# Patient Record
Sex: MALE | Race: WHITE | NOT HISPANIC OR LATINO | Employment: PART TIME | URBAN - METROPOLITAN AREA
[De-identification: names, ages, dates, MRNs, and addresses within clinical notes are randomized per-mention and may not be internally consistent; named-entity substitution may affect disease eponyms.]

---

## 2017-05-31 ENCOUNTER — ALLSCRIPTS OFFICE VISIT (OUTPATIENT)
Dept: OTHER | Facility: OTHER | Age: 47
End: 2017-05-31

## 2017-05-31 DIAGNOSIS — M54.50 LOW BACK PAIN: ICD-10-CM

## 2017-05-31 DIAGNOSIS — E03.9 HYPOTHYROIDISM: ICD-10-CM

## 2017-05-31 DIAGNOSIS — E78.5 HYPERLIPIDEMIA: ICD-10-CM

## 2017-06-10 ENCOUNTER — HOSPITAL ENCOUNTER (EMERGENCY)
Facility: HOSPITAL | Age: 47
Discharge: HOME | End: 2017-06-10
Attending: EMERGENCY MEDICINE | Admitting: EMERGENCY MEDICINE
Payer: COMMERCIAL

## 2017-06-10 VITALS
RESPIRATION RATE: 18 BRPM | SYSTOLIC BLOOD PRESSURE: 118 MMHG | WEIGHT: 200 LBS | DIASTOLIC BLOOD PRESSURE: 57 MMHG | OXYGEN SATURATION: 95 % | HEART RATE: 92 BPM | TEMPERATURE: 98.2 F

## 2017-06-10 DIAGNOSIS — R50.9 FEVER: Primary | ICD-10-CM

## 2017-06-10 LAB
ANION GAP BLD CALC-SCNC: 19 MMOL/L (ref 4–13)
ANION GAP SERPL CALCULATED.3IONS-SCNC: 9 MMOL/L (ref 4–13)
BASOPHILS # BLD MANUAL: 0.08 THOUSAND/UL (ref 0–0.1)
BASOPHILS NFR MAR MANUAL: 1 % (ref 0–1)
BUN BLD-MCNC: 14 MG/DL (ref 5–25)
BUN SERPL-MCNC: 15 MG/DL (ref 5–25)
CA-I BLD-SCNC: 0.86 MMOL/L (ref 1.12–1.32)
CALCIUM SERPL-MCNC: 7.2 MG/DL (ref 8.3–10.1)
CHLORIDE BLD-SCNC: 95 MMOL/L (ref 100–108)
CHLORIDE SERPL-SCNC: 97 MMOL/L (ref 100–108)
CO2 SERPL-SCNC: 27 MMOL/L (ref 21–32)
CREAT BLD-MCNC: 1.2 MG/DL (ref 0.6–1.3)
CREAT SERPL-MCNC: 1.13 MG/DL (ref 0.6–1.3)
EOSINOPHIL # BLD MANUAL: 0 THOUSAND/UL (ref 0–0.4)
EOSINOPHIL NFR BLD MANUAL: 0 % (ref 0–6)
ERYTHROCYTE [DISTWIDTH] IN BLOOD BY AUTOMATED COUNT: 14.5 % (ref 11.6–15.1)
GFR SERPL CREATININE-BSD FRML MDRD: >60 ML/MIN/1.73SQ M
GFR SERPL CREATININE-BSD FRML MDRD: >60 ML/MIN/1.73SQ M
GLUCOSE SERPL-MCNC: 110 MG/DL (ref 65–140)
GLUCOSE SERPL-MCNC: 115 MG/DL (ref 65–140)
HCT VFR BLD AUTO: 37.1 % (ref 36.5–49.3)
HCT VFR BLD CALC: 39 % (ref 36.5–49.3)
HGB BLD-MCNC: 12.6 G/DL (ref 12–17)
HGB BLDA-MCNC: 13.3 G/DL (ref 12–17)
LYMPHOCYTES # BLD AUTO: 1.95 THOUSAND/UL (ref 0.6–4.47)
LYMPHOCYTES # BLD AUTO: 24 % (ref 14–44)
MCH RBC QN AUTO: 30.2 PG (ref 26.8–34.3)
MCHC RBC AUTO-ENTMCNC: 34 G/DL (ref 31.4–37.4)
MCV RBC AUTO: 89 FL (ref 82–98)
METAMYELOCYTES NFR BLD MANUAL: 4 % (ref 0–1)
MONOCYTES # BLD AUTO: 0.65 THOUSAND/UL (ref 0–1.22)
MONOCYTES NFR BLD: 8 % (ref 4–12)
NEUTROPHILS # BLD MANUAL: 4.87 THOUSAND/UL (ref 1.85–7.62)
NEUTS BAND NFR BLD MANUAL: 7 % (ref 0–8)
NEUTS SEG NFR BLD AUTO: 53 % (ref 43–75)
NRBC BLD AUTO-RTO: 0 /100 WBCS
PCO2 BLD: 26 MMOL/L (ref 21–32)
PLATELET # BLD AUTO: 105 THOUSANDS/UL (ref 149–390)
PLATELET BLD QL SMEAR: ABNORMAL
PMV BLD AUTO: 11.1 FL (ref 8.9–12.7)
POIKILOCYTOSIS BLD QL SMEAR: PRESENT
POTASSIUM BLD-SCNC: 3.2 MMOL/L (ref 3.5–5.3)
POTASSIUM SERPL-SCNC: 3.2 MMOL/L (ref 3.5–5.3)
RBC # BLD AUTO: 4.17 MILLION/UL (ref 3.88–5.62)
RBC MORPH BLD: PRESENT
SODIUM BLD-SCNC: 135 MMOL/L (ref 136–145)
SODIUM SERPL-SCNC: 133 MMOL/L (ref 136–145)
SPECIMEN SOURCE: ABNORMAL
VARIANT LYMPHS # BLD AUTO: 3 %
WBC # BLD AUTO: 8.11 THOUSAND/UL (ref 4.31–10.16)

## 2017-06-10 PROCEDURE — 85007 BL SMEAR W/DIFF WBC COUNT: CPT | Performed by: EMERGENCY MEDICINE

## 2017-06-10 PROCEDURE — 80047 BASIC METABLC PNL IONIZED CA: CPT

## 2017-06-10 PROCEDURE — 85014 HEMATOCRIT: CPT

## 2017-06-10 PROCEDURE — 85027 COMPLETE CBC AUTOMATED: CPT | Performed by: EMERGENCY MEDICINE

## 2017-06-10 PROCEDURE — 80048 BASIC METABOLIC PNL TOTAL CA: CPT | Performed by: EMERGENCY MEDICINE

## 2017-06-10 PROCEDURE — 36415 COLL VENOUS BLD VENIPUNCTURE: CPT | Performed by: EMERGENCY MEDICINE

## 2017-06-10 PROCEDURE — 99283 EMERGENCY DEPT VISIT LOW MDM: CPT

## 2017-06-10 RX ORDER — ACETAMINOPHEN 325 MG/1
650 TABLET ORAL ONCE
Status: COMPLETED | OUTPATIENT
Start: 2017-06-10 | End: 2017-06-10

## 2017-06-10 RX ADMIN — ACETAMINOPHEN 650 MG: 325 TABLET, FILM COATED ORAL at 14:01

## 2017-07-03 ENCOUNTER — ALLSCRIPTS OFFICE VISIT (OUTPATIENT)
Dept: OTHER | Facility: OTHER | Age: 47
End: 2017-07-03

## 2017-07-10 ENCOUNTER — GENERIC CONVERSION - ENCOUNTER (OUTPATIENT)
Dept: OTHER | Facility: OTHER | Age: 47
End: 2017-07-10

## 2017-10-26 ENCOUNTER — GENERIC CONVERSION - ENCOUNTER (OUTPATIENT)
Dept: OTHER | Facility: OTHER | Age: 47
End: 2017-10-26

## 2017-12-26 ENCOUNTER — ALLSCRIPTS OFFICE VISIT (OUTPATIENT)
Dept: OTHER | Facility: OTHER | Age: 47
End: 2017-12-26

## 2017-12-27 NOTE — PROGRESS NOTES
Assessment   1  Cough (786 2) (R05)    Plan   Cough    · Benzonatate 100 MG Oral Capsule; TAKE 1 CAPSULE 3 TIMES DAILY AS    NEEDED    Discussion/Summary      Cough/bronchitis is advised to take meds as directed  Cover cough  Need for adequate hydration  Intake of protein to help fight the infection  report any new symptoms  also advised to get blood tests done that were ordered since May 2017  Verbalized understanding  The patient was counseled regarding diagnostic results,-- instructions for management,-- risk factor reductions,-- risks and benefits of treatment options,-- importance of compliance with treatment  Possible side effects of new medications were reviewed with the patient/guardian today  The treatment plan was reviewed with the patient/guardian  The patient/guardian understands and agrees with the treatment plan    Educational resources provided:      Chief Complaint   Patient presents to the clinic with complaint of cough and congestion      History of Present Illness   HPI: as above started last week, 6 days ago, started with a sore throat which is now resolved and the cough started 1 day after, and now persist  Denies SOB, C P , dizziness, diarrhea, constipation, N/V  No wheezing  cough is of moderate intensity  ongoing history of hypothyroidism, hyperlipidemia, osteoarthritis, with some cognitive issues  last blood test done in August 2016  Another set was ordered by another provider in May 2017 but patient did not get it done  He is advised to get it done as soon as possible and to keep his next appointment scheduled for January 22, 2018  Blood pressure rechecked by me and it is 102/66 mmHg  Hospital Based Practices Required Assessment:      Pain Assessment      the patient states they do not have pain  (on a scale of 0 to 10, the patient rates the pain at 0 )       Prefered Language is  ENGLISH  Primary Language is  ENGLISH        Education Completed: disease/condition-- and-- treatment/procedure      Teaching Method: verbal      Person Taught: patient      Evaluation Of Learning: verbalized/demonstrated understanding      Review of Systems        Constitutional: no fever or chills, feels well, no tiredness, no recent weight loss or weight gain  ENT: no complaints of earache, no loss of hearing, no nosebleeds or nasal discharge, no sore throat or hoarseness  Cardiovascular: no complaints of slow or fast heart rate, no chest pain, no palpitations, no leg claudication or lower extremity edema  Respiratory: cough, but-- as noted in HPI,-- no shortness of breath,-- no wheezing-- and-- no shortness of breath during exertion  Gastrointestinal: no complaints of abdominal pain, no constipation, no nausea or vomiting, no diarrhea or bloody stools  Other Symptoms: Denies sneezing, itchy nose or eyes  ROS reviewed  Active Problems   1  Allergic rhinitis (477 9) (J30 9)   2  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)   3  Chronic cough (786 2) (R05)   4  Chronic low back pain (724 2,338 29) (M54 5,G89 29)   5  Constipation (564 00) (K59 00)   6  External hemorrhoids (455 3) (K64 4)   7  Hyperlipidemia (272 4) (E78 5)   8  Hypothyroidism (244 9) (E03 9)   9  Lumbar radiculopathy (724 4) (M54 16)   10  Mass of parotid gland (784 2) (K11 9)   11  Need for immunization against influenza (V04 81) (Z23)   12  Osteoarthritis of both hands, unspecified osteoarthritis type (715 94) (M19 041,M19 042)    Past Medical History   Active Problems And Past Medical History Reviewed: The active problems and past medical history were reviewed and updated today  Surgical History   Surgical History Reviewed: The surgical history was reviewed and updated today  Social History    · Former smoker (F82 47) (K98 018)  The social history was reviewed and updated today  The social history was reviewed and is unchanged  Family History   Family History Reviewed:     The family history was reviewed and updated today  Current Meds    1  Benzonatate 100 MG Oral Capsule; TAKE 1 CAPSULE 3 TIMES DAILY AS NEEDED; Therapy: 61FBT6559 to (Evaluate:56Orr9442)  Requested for: 95YDA1651; Last     Rx:73Dyn5435 Ordered   2  Cough Drops Menthol Mouth/Throat Lozenge; ALLOW 1 LOZENGE TO DISSOLVE     SLOWLY IN MOUTH AS NEEDED; Therapy: 88QZF8318 to (Evaluate:56Vpi0622)  Requested for: 47PHH2010; Last     Rx:01Fpw0319 Ordered   3  Ibuprofen 200 MG Oral Capsule; Therapy: 51QCU8756 to Recorded   4  Levothyroxine Sodium 112 MCG Oral Tablet; Take 1 tablet daily, 30 minutes prior to     breakfast or other medications; Therapy: 31QLM9051 to (Last Rx:16Oct2017)  Requested for: 16Oct2017 Ordered   5  PredniSONE 20 MG Oral Tablet; take 2 tablets daily; Therapy: 54QOC3891 to (Evaluate:97Nbm7342)  Requested for: 62JWU7593; Last     Rx:22Rcf0062 Ordered     The medication list was reviewed and updated today  Allergies   1  No Known Drug Allergies    Vitals    Recorded: 47QTQ1250 12:44PM   Temperature 97 8 F   Heart Rate 72   Systolic 86   Diastolic 68   Height 5 ft 6 in   Weight 185 lb 2 96 oz   BMI Calculated 29 89   BSA Calculated 1 94     Physical Exam        Constitutional      General appearance: No acute distress, well appearing and well nourished  well developed,-- normal body odor,-- does not smell of feces,-- does not smell of urine,-- well nourished,-- clothing appropriate-- and-- well groomed  Ears, Nose, Mouth, and Throat      Otoscopic examination: Tympanic membrance translucent with normal light reflex  Canals patent without erythema  Nasal mucosa, septum, and turbinates: Abnormal  -- 1+ turbinates, moderate nasal discharge,  Oropharynx: Normal with no erythema, edema, exudate or lesions  Pulmonary      Respiratory effort: No increased work of breathing or signs of respiratory distress         Auscultation of lungs: Abnormal  -- minimal to mild congestion in the lung fields  No wheezing  No decreased breath sounds  Cardiovascular      Auscultation of heart: Normal rate and rhythm, normal S1 and S2, without murmurs  Psychiatric      Orientation to person, place and time: Normal        Mood and affect: Normal           Attending Note   Collaborating Physician Note: Collaborating Physician: I agree with the Advanced Practitioner note  Future Appointments      Date/Time Provider Specialty Site   01/22/2018 10:15 AM BELKYS Leos  Internal Medicine [address] PCP     Signatures    Electronically signed by : Shashank Ivory; Dec 26 2017  2:17PM EST                       (Author)     Electronically signed by :  BELKYS Ferreira ; Dec 26 2017  2:40PM EST                       (Review)

## 2018-01-08 ENCOUNTER — TRANSCRIBE ORDERS (OUTPATIENT)
Dept: LAB | Facility: HOSPITAL | Age: 48
End: 2018-01-08

## 2018-01-08 ENCOUNTER — APPOINTMENT (OUTPATIENT)
Dept: LAB | Facility: HOSPITAL | Age: 48
End: 2018-01-08
Payer: COMMERCIAL

## 2018-01-08 DIAGNOSIS — M54.50 LOW BACK PAIN: ICD-10-CM

## 2018-01-08 DIAGNOSIS — E03.9 HYPOTHYROIDISM: ICD-10-CM

## 2018-01-08 DIAGNOSIS — E78.5 HYPERLIPIDEMIA: ICD-10-CM

## 2018-01-08 LAB
ANION GAP SERPL CALCULATED.3IONS-SCNC: 7 MMOL/L (ref 4–13)
BUN SERPL-MCNC: 11 MG/DL (ref 5–25)
CALCIUM SERPL-MCNC: 8.2 MG/DL (ref 8.3–10.1)
CHLORIDE SERPL-SCNC: 102 MMOL/L (ref 100–108)
CHOLEST SERPL-MCNC: 209 MG/DL (ref 50–200)
CO2 SERPL-SCNC: 29 MMOL/L (ref 21–32)
CREAT SERPL-MCNC: 0.89 MG/DL (ref 0.6–1.3)
ERYTHROCYTE [DISTWIDTH] IN BLOOD BY AUTOMATED COUNT: 14.2 % (ref 11.6–15.1)
GFR SERPL CREATININE-BSD FRML MDRD: 100 ML/MIN/1.73SQ M
GLUCOSE P FAST SERPL-MCNC: 95 MG/DL (ref 65–99)
HCT VFR BLD AUTO: 41.7 % (ref 36.5–49.3)
HDLC SERPL-MCNC: 41 MG/DL (ref 40–60)
HGB BLD-MCNC: 14 G/DL (ref 12–17)
LDLC SERPL CALC-MCNC: 138 MG/DL (ref 0–100)
MCH RBC QN AUTO: 30.6 PG (ref 26.8–34.3)
MCHC RBC AUTO-ENTMCNC: 33.6 G/DL (ref 31.4–37.4)
MCV RBC AUTO: 91 FL (ref 82–98)
PLATELET # BLD AUTO: 191 THOUSANDS/UL (ref 149–390)
PMV BLD AUTO: 10.9 FL (ref 8.9–12.7)
POTASSIUM SERPL-SCNC: 3.6 MMOL/L (ref 3.5–5.3)
RBC # BLD AUTO: 4.57 MILLION/UL (ref 3.88–5.62)
SODIUM SERPL-SCNC: 138 MMOL/L (ref 136–145)
T4 FREE SERPL-MCNC: 0.67 NG/DL (ref 0.76–1.46)
TRIGL SERPL-MCNC: 148 MG/DL
TSH SERPL DL<=0.05 MIU/L-ACNC: 34.3 UIU/ML (ref 0.36–3.74)
WBC # BLD AUTO: 8.41 THOUSAND/UL (ref 4.31–10.16)

## 2018-01-08 PROCEDURE — 36415 COLL VENOUS BLD VENIPUNCTURE: CPT

## 2018-01-08 PROCEDURE — 80061 LIPID PANEL: CPT

## 2018-01-08 PROCEDURE — 80048 BASIC METABOLIC PNL TOTAL CA: CPT

## 2018-01-08 PROCEDURE — 84443 ASSAY THYROID STIM HORMONE: CPT

## 2018-01-08 PROCEDURE — 85027 COMPLETE CBC AUTOMATED: CPT

## 2018-01-08 PROCEDURE — 84439 ASSAY OF FREE THYROXINE: CPT

## 2018-01-09 ENCOUNTER — GENERIC CONVERSION - ENCOUNTER (OUTPATIENT)
Dept: OTHER | Facility: OTHER | Age: 48
End: 2018-01-09

## 2018-01-12 VITALS
DIASTOLIC BLOOD PRESSURE: 78 MMHG | HEART RATE: 96 BPM | WEIGHT: 197.09 LBS | TEMPERATURE: 99.1 F | SYSTOLIC BLOOD PRESSURE: 102 MMHG | HEIGHT: 66 IN | BODY MASS INDEX: 31.68 KG/M2

## 2018-01-12 NOTE — PROGRESS NOTES
Patient Health Assessment    Date:            05/09/2016  Blood Pressure:  98/57  Pulse:           70  Age:             45  Weight:          200 lbs  Height/Length:   5' 7"  Body Mass Index: 31 2  Provider:        [id number]  Clinic:          [facility] 39: pt no longer has diabetes, is controlled w diet and exercise  Medications:   Allergies:  Since Last Visit: Medical Alert: updated    Medications: updated    Allergies:        No Change  Pain Scale Type: Numeric Pain ScalePain Level: 0  Description:  pt presents for periodic exam: no pain currently  CC: i need a cleaning  IOE shows #8 comp fractured, #2, 3, supraeruption, buccal abfraction noted,  4, 5 large restorations, primary retained C, large restorations on anterior  teeth, missing 10, 11, #14 previous crown off, OL caries noted, root canal  intact lower premolar, previous neva #23, short root, poss retained primary,  caries noted 28, 29 interprox, cross bite noted, slight posterior bite collapse   anterior occlusion, minimal calclulus, interprox plaque, gingival inflammation   interprox 2, 3,  eoe:wnl  dwp need for multiple restorations, large restorations may need crowns, dwp  should have restorative done first then do partial dentures to replace  missing teeth, pt states he doesn't have the funds to pay for treatment would  need to know co pays, dwp should call [facility] directly and see what copays and  deductible would be, otherwise would be billed  cavitron, hand instruments, bleeding in 2, 3, area, polished wpaste, flossed  nv: restorative    ----- Signed on Monday, May 09, 2016 at 4:28:07 PM  -----  ----- Provider: Johnna Garcia DDS -- Clinic: [facility] -----

## 2018-01-12 NOTE — PROGRESS NOTES
Patient Health Assessment    Date:            10/26/2017  Blood Pressure:  118/78  Pulse:           84  Age:             47  Weight:          186 lbs  Height/Length:   5' 7"  Body Mass Index: 29 1  Provider:        [email or web address]  [facility]      RECALL EXAM, ADULT PROPHY AND 4BW  Medical Alert: Diabetes    Thyroid Problems  Medications: LEVOTHYROXIN TAB 100MCG 100  Allergies:      none  Since Last Visit: Medical Alert: Change    Medications: Change    Allergies:        No Change  Pain Scale Type: Numeric Pain ScalePain Level: 0  Description: no dental pain or problems  scaled, polished and flossed  light buildup  mod bleeding molars  pt aware that op /tx plan still exists from 5/9/2016   pt states " I just  never got back" informed pt if he doesn't follow up with tx plan , he can loose   teeth  moved to room 5 for dr exam    ----- Signed on Thursday, October 26, 2017 at 10:21:25 AM  -----  ----- Provider: [id number] - Selvin Gaitan [facility] -- Clinic: [facility] -----

## 2018-01-12 NOTE — MISCELLANEOUS
Message  Message Free Text Note Form: Patient reports he has had a cough for approximately 8 years  the cough is usually present in the morning  He denies smoking  It resolves in the afternoon  He denies being on PPi or famotidine in the past  He denies any heartburn  He was evaluated by ENT on 5/26/16, but does not feel he had enough evaluation and would like to be further evaluated  He has an appt with the [facility] on 7/13 and is willing to wait until then to be evaluated        Signatures   Electronically signed by : Alden Martin, ; Jun 23 2016  2:34PM EST                       (Author)

## 2018-01-13 VITALS
HEART RATE: 84 BPM | DIASTOLIC BLOOD PRESSURE: 76 MMHG | HEIGHT: 66 IN | SYSTOLIC BLOOD PRESSURE: 102 MMHG | TEMPERATURE: 98.3 F | BODY MASS INDEX: 30.26 KG/M2 | WEIGHT: 188.27 LBS

## 2018-01-14 NOTE — PROGRESS NOTES
Assessment    1  Contact dermatitis (692 9) (L25 9)   · Will start patient on low potency topical steroid BID for 3 days only  Patient also advised      to stop using [place] soap as this can be particularly abrasive  Patient      recommended to use [facility] soap instead  2  Bronchitis (490) (J40)   3  Ceruminosis (380 4) (H61 20)    Plan  Bronchitis    · Benzonatate 100 MG Oral Capsule; TAKE 1 CAPSULE 3 TIMES DAILY AS NEEDED   · Robafen CF Cough/Cold 5- MG/5ML Oral Syrup; TAKE 5 ML TWICE DAILY  Ceruminosis    · Debrox 6 5 % Otic Solution; USE AS DIRECTED  Contact dermatitis    · Eucerin Calming Daily Moist External Cream; APPLY SPARINGLY TO AFFECTED AREA(S)  TWICE DAILY    Discussion/Summary  Discussion Summary:   1) Bronchitis 2/2 viral URI - will prescribe Tessalon, if patient cannot afford, will recommend he try Robitussin  2) Contact dermatitis of hands and lower extremity  Will prescribe barrier cream such as Eucerin before he goes to work  3) Ceruminosis - patient has earwax - will prescribe Debrox solution and have patient come in again on 1/28/16 to have it flushed with normal saline by myself  F/u for health maintenance next visit in February  Counseling Documentation With Imm: The patient was counseled regarding instructions for management, patient and family education  Chief Complaint  Chief Complaint Free Text Note Form: Bronchitis      History of Present Illness  HPI: Patient with history of bronchitis 2/2 to viral URI  Patient states he has bronchitis every year when it gets cold around January  Patient has itchyness on hands and legs  Patient denies any fevers, chills, sweats, nausea, vomiting  Patient states cough gets worse at nighttime  Patient states the Mucinex has helped only a little bit but he still has the cough  Patient also complains of earwax  Hospital Based Practices Required Assessment:   Pain Assessment   the patient states they do not have pain   (on a scale of 0 to 10, the patient rates the pain at 0 )    Depression And Suicide Screen  No, the patient has not had thoughts of hurting themself  No, the patient has not felt depressed in the past 7 days  Prefered Language is  english  Primary Language is  english  Review of Systems  Complete-Male:   Constitutional: as noted in HPI  Eyes: No complaints of eye pain, no red eyes, no discharge from eyes, no itchy eyes  ENT: no complaints of earache, no hearing loss, no nosebleeds, no nasal discharge, no sore throat, no hoarseness and as noted in HPI  Cardiovascular: No complaints of slow heart rate, no fast heart rate, no chest pain, no palpitations, no leg claudication, no lower extremity  Respiratory: No complaints of shortness of breath, no wheezing, no cough, no SOB on exertion, no orthopnea or PND  Gastrointestinal: No complaints of abdominal pain, no constipation, no nausea or vomiting, no diarrhea or bloody stools  Genitourinary: No complaints of dysuria, no incontinence, no hesitancy, no nocturia, no genital lesion, no testicular pain  Musculoskeletal: No complaints of arthralgia, no myalgias, no joint swelling or stiffness, no limb pain or swelling  Integumentary: No complaints of skin rash or skin lesions, no itching, no skin wound, no dry skin  Neurological: No compliants of headache, no confusion, no convulsions, no numbness or tingling, no dizziness or fainting, no limb weakness, no difficulty walking  ROS Reviewed:   ROS reviewed  Active Problems    1  Allergic rhinitis (477 9) (J30 9)   2  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)   3  Bronchitis (490) (J40)   4  Chronic low back pain (724 2,338 29) (M54 5,G89 29)   5  Constipation (564 00) (K59 00)   6  Contact dermatitis (692 9) (L25 9)   7  Dry skin (701 1) (L85 3)   8  Exposure to head lice (D31 07) (D04 39)   9  External hemorrhoids (455 3) (K64 4)   10  Hamstring tightness (728 9) (M62 89)   11  Hematochezia (578 1) (K92 1)   12  Hyperlipidemia (272 4) (E78 5)   13  Hypothyroidism (244 9) (E03 9)   14  Lumbar radiculopathy (724 4) (M54 16)   15  Mass of parotid gland (784 2) (R22 0)   16  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)   17  Need for immunization against influenza (V04 81) (Z23)   18  Osteoarthritis of both hands, unspecified osteoarthritis type (715 94) (M19 041,M19 042)   19  Pain, arm, left (729 5) (M79 602)    Past Medical History    1  History of hypoparathyroidism (V12 29) (Z86 39)  Active Problems And Past Medical History Reviewed: The active problems and past medical history were reviewed and updated today  Surgical History    1  History of Open Treatment Of Each Fracture Of Proximal Finger Phalanx  Surgical History Reviewed: The surgical history was reviewed and updated today  Family History    1  No pertinent family history    2  No pertinent family history    3  Denied: Family history of coronary artery disease   4  Denied: Family history of diabetes mellitus   5  Denied: Family history of hypertension   6  Denied: FH: colon cancer  Family History Reviewed: The family history was reviewed and updated today  Social History    · Former smoker (W80 47) (S97 293)  Social History Reviewed: The social history was reviewed and updated today  The social history was reviewed and is unchanged  Current Meds   1  Aveeno Daily Moisturizing External Lotion; USE AS DIRECTED ON PACKAGE; Therapy: 51FII8780 to (Last Rx:18Nov2015)  Requested for: 92RQY9572 Ordered   2  Calcium 500+D High Potency 500-400 MG-UNIT Oral Tablet; Please take  tab twice daily; Therapy: 64IYU3904 to (Evaluate:32Wfr6144)  Requested for: 69HQM2273; Last Rx:18Jun2015   Ordered   3  Cyclobenzaprine HCl - 5 MG Oral Tablet; take 1 tablet at bedtime as needed; Therapy: 49TDQ8671 to (Evaluate:82Obx2195)  Requested for: 56EPJ5988; Last Rx:21Oct2015   Ordered   4  Levothyroxine Sodium 100 MCG Oral Tablet;  Take 1 tablet daily; Therapy: 98CBV5639 to Deepa Alvarez)  Requested for: 0490 39 07 81; Last Rx:10Ipo3840   Ordered   5  Lice Treatment 1 % External Lotion; APPLY AS DIRECTED to affected area, wash off in 10 minutes  Repeat application in 7-18 days; Therapy: 12FZU8051 to (Last Rx:18Nov2015)  Requested for: 85QMY2054 Ordered   6  Mucinex 600 MG Oral Tablet Extended Release 12 Hour; TAKE 1 TABLET EVERY 12 HOURS AS   NEEDED FOR CONGESTION; Therapy: 25ORY0478 to (Evaluate:13Fci8420)  Requested for: 49CMH9202; Last Rx:04Jan2016   Ordered   7  Naproxen 500 MG Oral Tablet; take 1 tablet every 12 hours with food as needed; Therapy: 99UWG5168 to (Evaluate:33Yzy3198)  Requested for: 01XXN9460; Last Rx:21Oct2015   Ordered   8  Nix Creme Rinse 1 % External Liquid; Therapy: 22GQT6310 to Recorded   9  Polyethylene Glycol 3350 Oral Powder; Therapy: 39KJR7352 to Recorded  Medication List Reviewed: The medication list was reviewed and updated today  Allergies    1  No Known Drug Allergies    Vitals  Vital Signs [Data Includes: Current Encounter]    Recorded: 55KNU4006 03:43PM   Temperature 98 1 F   Heart Rate 78   Systolic 655   Diastolic 60   Height 5 ft 7 in   Weight 198 lb 6 61 oz   BMI Calculated 31 08   BSA Calculated 2 02     Physical Exam    Constitutional   General appearance: No acute distress, well appearing and well nourished  Eyes   Conjunctiva and lids: No swelling, erythema, or discharge  Pupils and irises: Equal, round and reactive to light  Ears, Nose, Mouth, and Throat   External inspection of ears and nose: Normal     Otoscopic examination: Abnormal   cerumen in ear canal, cannot visualize tympanic membrane  Oropharynx: Normal with no erythema, edema, exudate or lesions  Pulmonary   Respiratory effort: No increased work of breathing or signs of respiratory distress  Auscultation of lungs: Clear to auscultation, equal breath sounds bilaterally, no wheezes, no rales, no rhonci  Cardiovascular   Auscultation of heart: Normal rate and rhythm, normal S1 and S2, without murmurs  Examination of extremities for edema and/or varicosities: Normal     Abdomen   Abdomen: Non-tender, no masses  Liver and spleen: No hepatomegaly or splenomegaly  Musculoskeletal   Gait and station: Normal     Skin   Skin and subcutaneous tissue: Abnormal   Exoriation on hands and legs  Psychiatric   Orientation to person, place and time: Normal     Mood and affect: Normal          Attending Note  Attending Note: Attending Note: I discussed the case with the Resident and reviewed the Resident's note, I supervised the Resident and I agree with the Resident management plan as it was presented to me  Level of Participation: I was present in clinic, but did not examine the patient  Patient's History: 49 yo M who presents for evaluation of post infectious bronchitis  and c/o ceruminosis  Pt also c/o contact dermatitis  Diagnosis and Plan: Post infectious cough- symptomatic mgmt for robussin CF  Contact dermatitis- Eucerin barrier cream after showering  Ceruminosis- pt to use debrox x 1 wkk to loosen wax and then return to clinic for lavage to remove wax plug  I agree with the Resident's note  Future Appointments    Date/Time Provider Specialty Site   01/28/2016 02:30 PM BELKYS Lubin   Internal Medicine [address] PCP   02/10/2016 02:30 PM Severiano Pedro, MD Internal Medicine [address] PCP     Signatures   Electronically signed by : BELKYS Trujilol ; Jan 19 2016  4:51PM EST                       (Author)    Electronically signed by : BELKYS Diaz ; Jan 19 2016  4:56PM EST                       (Author)

## 2018-01-15 NOTE — MISCELLANEOUS
Message  Called patient at home number  He states that he has improved cough, but took his 10 prednisone 20mg pills in two nights as apposed to 5  Discussed importance of medication compliance with patient  Menthol lozenges, benzonatate and prednisone script sent to pharmacy  Patient to call if cough worsens        Plan  Chronic cough    · Benzonatate 100 MG Oral Capsule; TAKE 1 CAPSULE 3 TIMES DAILY AS  NEEDED   · Cough Drops Menthol Mouth/Throat Lozenge; ALLOW 1 LOZENGE TO DISSOLVE  SLOWLY IN MOUTH AS NEEDED   · PredniSONE 20 MG Oral Tablet; take 2 tablets daily    Signatures   Electronically signed by : BELKYS Lozano ; Jul 10 2017  9:26AM EST                       (Author)

## 2018-01-15 NOTE — PROGRESS NOTES
Assessment    1  Cerumen impaction (380 4) (H61 20)   2  Scabies (133 0) (B86)    Plan  Cerumen impaction    · *1 - [facility] Physician Referral  Consult for excess cerumen in ear canals  bilaterally  Attempted to irrigate without sucess  Thanks  Status: Hold For - Scheduling  Requested  for: 18FZI1627  Care Summary provided  : Yes  Scabies    · Permethrin 5 % External Cream; APPLY AS DIRECTED    Discussion/Summary  Discussion Summary:   1) Excess cerumen - failed to irrigate all of the earwax  Will refer to ENT for further removal   2) Rashes secondary possible scabies - Advised removing the bugs from home, wife with similar symptoms, will try trial of permethrin cream for scabies  Follow up as needed  Counseling Documentation With Imm: The patient, patient's family was counseled regarding instructions for management, impressions, importance of compliance with treatment  Chief Complaint  Chief Complaint Free Text Note Form: Excess cerumen      History of Present Illness  HPI: Patient arrives for request of cerumen removal, due to reduced hearing and being able to feel the excess earwax in his ear canal  Patient also complains of skin itchiness, which he attributes to bug bites  He has rash on arms and legs  His wife has similar symptoms which were relieved with permethrin cream        Hospital Based Practices Required Assessment:   Pain Assessment   the patient states they do not have pain  (on a scale of 0 to 10, the patient rates the pain at 0 )    Depression And Suicide Screen  No, the patient has not had thoughts of hurting themself  No, the patient has not felt depressed in the past 7 days  Prefered Language is  english  Primary Language is  english  Review of Systems  Complete-Male:   Constitutional: No fever or chills, feels well, no tiredness, no recent weight gain or weight loss  Eyes: No complaints of eye pain, no red eyes, no discharge from eyes, no itchy eyes  ENT: as noted in HPI  Cardiovascular: No complaints of slow heart rate, no fast heart rate, no chest pain, no palpitations, no leg claudication, no lower extremity  Gastrointestinal: No complaints of abdominal pain, no constipation, no nausea or vomiting, no diarrhea or bloody stools  Genitourinary: No complaints of dysuria, no incontinence, no hesitancy, no nocturia, no genital lesion, no testicular pain  Musculoskeletal: No complaints of arthralgia, no myalgias, no joint swelling or stiffness, no limb pain or swelling  Integumentary: a rash and itching, but as noted in HPI  ROS Reviewed:   ROS reviewed  Active Problems    1  Allergic rhinitis (477 9) (J30 9)   2  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)   3  Bronchitis (490) (J40)   4  Cerumen impaction (380 4) (H61 20)   5  Chronic low back pain (724 2,338 29) (M54 5,G89 29)   6  Constipation (564 00) (K59 00)   7  Contact dermatitis (692 9) (L25 9)   8  Dry skin (701 1) (L85 3)   9  Exposure to head lice (K18 12) (C17 35)   10  External hemorrhoids (455 3) (K64 4)   11  Hamstring tightness (728 9) (M62 89)   12  Hematochezia (578 1) (K92 1)   13  Hyperlipidemia (272 4) (E78 5)   14  Hypothyroidism (244 9) (E03 9)   15  Lumbar radiculopathy (724 4) (M54 16)   16  Mass of parotid gland (784 2) (R22 0)   17  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)   18  Need for immunization against influenza (V04 81) (Z23)   19  Osteoarthritis of both hands, unspecified osteoarthritis type (715 94) (M19 041,M19 042)   20  Pain, arm, left (729 5) (M79 602)    Past Medical History    1  History of hypoparathyroidism (V12 29) (Z86 39)    Surgical History    1  History of Open Treatment Of Each Fracture Of Proximal Finger Phalanx    Family History    1  No pertinent family history    2  No pertinent family history    3  Denied: Family history of coronary artery disease   4  Denied: Family history of diabetes mellitus   5   Denied: Family history of hypertension   6  Denied: FH: colon cancer    Social History    · Former smoker (O29 64) (U58 135)  Social History Reviewed: The social history was reviewed and updated today  The social history was reviewed and is unchanged  Current Meds   1  Aveeno Daily Moisturizing External Lotion; USE AS DIRECTED ON PACKAGE; Therapy: 16UZS0494 to (Last Rx:18Nov2015)  Requested for: 24ZCS9387 Ordered   2  Benzonatate 100 MG Oral Capsule; TAKE 1 CAPSULE 3 TIMES DAILY AS NEEDED; Therapy: 14WTW0197 to (Evaluate:18Feb2016)  Requested for: 42TKJ0840; Last Rx:19Jan2016   Ordered   3  Calcium 500+D High Potency 500-400 MG-UNIT Oral Tablet; Please take  tab twice daily; Therapy: 65VKQ6854 to (Evaluate:07Dug2389)  Requested for: 08GTD8040; Last Rx:18Jun2015   Ordered   4  Cyclobenzaprine HCl - 5 MG Oral Tablet; take 1 tablet at bedtime as needed; Therapy: 85RKO8238 to (Evaluate:97Sis5547)  Requested for: 33LSF2880; Last Rx:21Oct2015   Ordered   5  Debrox 6 5 % Otic Solution; USE AS DIRECTED; Therapy: 54UYS6586 to (Last Rx:19Jan2016)  Requested for: 35LMJ3421 Ordered   6  Eucerin Calming Daily Moist External Cream; APPLY SPARINGLY TO AFFECTED AREA(S) TWICE   DAILY; Therapy: 41OGZ3195 to (Last Rx:19Jan2016)  Requested for: 90AUZ5317 Ordered   7  Levothyroxine Sodium 100 MCG Oral Tablet; Take 1 tablet daily; Therapy: 18YFP3801 to )  Requested for: 0490 39 07 81; Last Rx:29Oct2015   Ordered   8  Lice Treatment 1 % External Lotion; APPLY AS DIRECTED to affected area, wash off in 10 minutes  Repeat application in 7-78 days; Therapy: 70FZO9384 to (Last Rx:18Nov2015)  Requested for: 22MEM9522 Ordered   9  Mucinex 600 MG Oral Tablet Extended Release 12 Hour; TAKE 1 TABLET EVERY 12 HOURS AS   NEEDED FOR CONGESTION; Therapy: 34KII0481 to (Evaluate:17Fqx1824)  Requested for: 66TIV0305; Last Rx:04Jan2016   Ordered   10  Naproxen 500 MG Oral Tablet; take 1 tablet every 12 hours with food as needed;     Therapy: 38ZCE9360 to (Evaluate:64Lsr0988)  Requested for: 21Oct2015; Last Rx:21Oct2015    Ordered   11  Nix Creme Rinse 1 % External Liquid; Therapy: 01UMI6482 to Recorded   12  Polyethylene Glycol 3350 Oral Powder; Therapy: 85TKC7543 to Recorded   13  Robafen CF Cough/Cold 5- MG/5ML Oral Syrup; TAKE 5 ML TWICE DAILY; Therapy: 78VLS9895 to (Evaluate:31Jan2016)  Requested for: 59FXW3423; Last Rx:19Jan2016    Ordered    Allergies    1  No Known Drug Allergies    Vitals  Vital Signs [Data Includes: Current Encounter]    Recorded: 87TLQ1371 03:44PM   Temperature 97 7 F   Heart Rate 74   Systolic 596   Diastolic 58   Height 5 ft 7 in   Weight 198 lb 6 61 oz   BMI Calculated 31 08   BSA Calculated 2 02     Physical Exam    Constitutional   General appearance: No acute distress, well appearing and well nourished  Eyes   Conjunctiva and lids: No swelling, erythema, or discharge  Pupils and irises: Equal, round and reactive to light  Ears, Nose, Mouth, and Throat   External inspection of ears and nose: Normal     Otoscopic examination: Abnormal   Cerumen blocking view of tympanic membrane bilaterally  Skin   Skin and subcutaneous tissue: Abnormal   Rashes present on back, hands, and right thigh  The rash on the wrist is linear with excoriations  Examination of the skin for lesions: Abnormal   Round papules surrounded by erythema on shoulder and right inner thigh  Procedure            Procedure: cerumen removal    Indication: tympanic membrane(s) could not be visualized and cerumen impaction in both ears  Prep: hydrogen peroxide was placed in the canal prior to the procedure  Procedure Note: The procedure was performed by the Provider and lasted 25 minute(s)   The procedure required significant time and effort to remove the cerumen  A otoscope was placed in the ear canal(s) to visualize the ear canal debris  The procedure was unsuccessful    Post-Procedure:   Patient Status: the patient tolerated the procedure well  Complications: there were no complications  Patient instructions: avoid using q-tips  Follow-up as needed and Follow up with ENT  Attending Note  Attending Note: Attending Note: I discussed the case with the Resident and reviewed the Resident's note and I agree with the Resident management plan as it was presented to me        Future Appointments    Date/Time Provider Specialty Site   02/10/2016 02:30 PM Mallory Hackett MD Internal Medicine [address] PCP   05/26/2016 11:00 AM [facility], ENT  [address] [place] [address]   Electronically signed by : BELKYS Mcgraw ; Jan 28 2016  4:57PM EST                       (Author)    Electronically signed by : BELKYS Mcgraw ; Jan 28 2016  4:58PM EST                       (Author)    Electronically signed by : BELKYS Cancino ; Jan 28 2016  4:59PM EST                       (Co-author)

## 2018-01-23 VITALS
BODY MASS INDEX: 29.76 KG/M2 | TEMPERATURE: 97.8 F | WEIGHT: 185.19 LBS | SYSTOLIC BLOOD PRESSURE: 86 MMHG | HEART RATE: 72 BPM | HEIGHT: 66 IN | DIASTOLIC BLOOD PRESSURE: 68 MMHG

## 2018-01-23 NOTE — MISCELLANEOUS
Message  Spoke with patient over the phone today about his TSH of 34 and low T4  Pt reports he has NOT been completely compliant with his Synthroid  He misses doses some days and he takes it 30 min before lunch and dinner or breakfast other days  Therefore, dose increase at this time would not be warranted  Explained to patient we can adjust his dose to three times weekly or one time weekly if that would help with compliance  We can do this in person at his next clinic appointment  Advised patient to take synthroid daily 1 hour before breakfast/coffee and to not miss a dose until his next appointment  Advised him that he is at risk of going to the hospital if he does not  Pt voiced understanding and is agreeable        Signatures   Electronically signed by : Ronny West DO; Jan 9 2018  8:56AM EST                       (Author)

## 2018-03-05 DIAGNOSIS — E03.9 HYPOTHYROIDISM: ICD-10-CM

## 2018-03-09 RX ORDER — BENZONATATE 100 MG/1
CAPSULE ORAL
Qty: 30 CAPSULE | Refills: 0 | OUTPATIENT
Start: 2018-03-09

## 2018-03-12 NOTE — PROGRESS NOTES
Spoke with patient and reminded him to have his T3, FREE AND T4, FREE done   Patient has appt tomorrow 03/13/2018 with PCP and he will try to have them done tomorrow

## 2018-03-15 ENCOUNTER — OFFICE VISIT (OUTPATIENT)
Dept: INTERNAL MEDICINE CLINIC | Facility: CLINIC | Age: 48
End: 2018-03-15
Payer: COMMERCIAL

## 2018-03-15 VITALS
BODY MASS INDEX: 30.53 KG/M2 | HEART RATE: 96 BPM | SYSTOLIC BLOOD PRESSURE: 100 MMHG | DIASTOLIC BLOOD PRESSURE: 62 MMHG | WEIGHT: 189.15 LBS | TEMPERATURE: 98 F

## 2018-03-15 DIAGNOSIS — J45.909 REACTIVE AIRWAY DISEASE: ICD-10-CM

## 2018-03-15 DIAGNOSIS — E03.9 HYPOTHYROIDISM: ICD-10-CM

## 2018-03-15 DIAGNOSIS — Z23 NEED FOR DIPHTHERIA-TETANUS-PERTUSSIS (TDAP) VACCINE: ICD-10-CM

## 2018-03-15 DIAGNOSIS — Z23 NEED FOR PROPHYLACTIC VACCINATION AND INOCULATION AGAINST INFLUENZA: Primary | ICD-10-CM

## 2018-03-15 PROBLEM — R05.8 DRY COUGH: Status: ACTIVE | Noted: 2018-03-15

## 2018-03-15 PROCEDURE — 99213 OFFICE O/P EST LOW 20 MIN: CPT | Performed by: INTERNAL MEDICINE

## 2018-03-15 PROCEDURE — 90471 IMMUNIZATION ADMIN: CPT | Performed by: INTERNAL MEDICINE

## 2018-03-15 PROCEDURE — 90715 TDAP VACCINE 7 YRS/> IM: CPT | Performed by: INTERNAL MEDICINE

## 2018-03-15 PROCEDURE — 90656 IIV3 VACC NO PRSV 0.5 ML IM: CPT | Performed by: INTERNAL MEDICINE

## 2018-03-15 PROCEDURE — 90472 IMMUNIZATION ADMIN EACH ADD: CPT | Performed by: INTERNAL MEDICINE

## 2018-03-15 RX ORDER — ALBUTEROL SULFATE 90 UG/1
2 AEROSOL, METERED RESPIRATORY (INHALATION) EVERY 6 HOURS PRN
Qty: 1 INHALER | Refills: 0 | Status: SHIPPED | OUTPATIENT
Start: 2018-03-15 | End: 2018-11-30

## 2018-03-15 RX ORDER — BENZONATATE 100 MG/1
100 CAPSULE ORAL 3 TIMES DAILY PRN
Qty: 20 CAPSULE | Refills: 0 | Status: SHIPPED | OUTPATIENT
Start: 2018-03-15 | End: 2018-11-17

## 2018-03-15 NOTE — PROGRESS NOTES
INTERNAL MEDICINE FOLLOW-UP OFFICE VISIT  [facility]  [address] [place] [address]    NAME: Kira Wang  AGE: 50 y o  SEX: male    DATE OF ENCOUNTER: 3/15/2018    Assessment and Plan     Hypothyroidism  TSH 34 FT4 0 67  Levothyroxine increased to 150 from 112 mcg daily  Patient given Rx fro new blood work , although did not do it  Will Give new Script for TSH and FT4    Dry cough  Likely related to cold exposure  Plan:  Avoid triggers  Tessalon pearls TID PRN      Orders Placed This Encounter   Procedures    FLU VACCINE GREATER THAN OR EQUAL TO 4YO PRESERVATIVE FREE IM    TDAP VACCINE GREATER THAN OR EQUAL TO 8YO IM       - Counseling Documentation: patient was counseled regarding: diagnostic results, instructions for management and risk factor reductions    Chief Complaint     Chief Complaint   Patient presents with    Cough     dry cough, needs thyroid bloodwork        History of Present Illness     Kira Wang is a 50 y o  male here for evaluation of a cough  Onset of symptoms was 2 weeks ago  Symptoms have been gradually improving since that time  The cough is dry and is aggravated by cold air  Associated symptoms include: none  Patient does not have a history of asthma  Patient does not have a history of environmental allergens  Patient has not traveled recently  Patient does not have a history of smoking  Patient has not had a previous chest x-ray  Patient has not had a PPD done  The following portions of the patient's history were reviewed and updated as appropriate: allergies, current medications, past family history, past medical history, past social history, past surgical history and problem list     Review of Systems     Review of Systems   Constitutional: Negative for activity change, appetite change, chills and unexpected weight change     HENT: Negative for congestion, drooling, ear discharge, ear pain, nosebleeds, postnasal drip, rhinorrhea, sinus pain, sneezing, sore throat and trouble swallowing  Eyes: Negative for pain, discharge, redness, itching and visual disturbance  Respiratory: Positive for cough  Negative for chest tightness, shortness of breath and wheezing  Cardiovascular: Negative for chest pain, palpitations and leg swelling  Gastrointestinal: Negative for abdominal distention, abdominal pain, constipation, diarrhea and nausea  Endocrine: Negative for cold intolerance and heat intolerance  Genitourinary: Negative for difficulty urinating and dysuria  Musculoskeletal: Negative for arthralgias and back pain  Skin: Negative for color change  Allergic/Immunologic: Negative for environmental allergies and food allergies  Neurological: Negative for dizziness, light-headedness and headaches  Psychiatric/Behavioral: Negative for agitation, behavioral problems and sleep disturbance  Active Problem List     Patient Active Problem List   Diagnosis    Allergic rhinitis    Arterial ectasia (HCC)    Chronic low back pain    Hyperlipidemia    Hypothyroidism    Lumbar radiculopathy    Mass of parotid gland    Osteoarthritis of both hands       Objective     /62   Pulse 96   Temp 98 °F (36 7 °C)   Wt 85 8 kg (189 lb 2 5 oz)   BMI 30 53 kg/m²     Physical Exam   Constitutional: He is oriented to person, place, and time  He appears well-developed and well-nourished  No distress  HENT:   Head: Normocephalic and atraumatic  Mouth/Throat: Oropharynx is clear and moist  No oropharyngeal exudate  Eyes: EOM are normal  Pupils are equal, round, and reactive to light  Right eye exhibits no discharge  Left eye exhibits no discharge  Neck: Normal range of motion  Neck supple  No JVD present  No thyromegaly present  Cardiovascular: Normal rate, regular rhythm and normal heart sounds  Exam reveals no gallop and no friction rub  No murmur heard    Pulmonary/Chest: Effort normal and breath sounds normal  No stridor  No respiratory distress  He has no wheezes  Abdominal: Soft  Bowel sounds are normal  He exhibits no distension  There is no tenderness  Musculoskeletal: Normal range of motion  He exhibits no edema, tenderness or deformity  Neurological: He is alert and oriented to person, place, and time  No cranial nerve deficit  Coordination normal    Skin: Skin is warm and dry  No rash noted  He is not diaphoretic  No erythema  Psychiatric: His behavior is normal  Thought content normal    Nursing note and vitals reviewed        Pertinent Laboratory/Diagnostic Studies:  CBC:   Lab Results   Component Value Date/Time    WBC 8 41 01/08/2018 08:21 AM    RBC 4 57 01/08/2018 08:21 AM    HGB 14 0 01/08/2018 08:21 AM    HCT 41 7 01/08/2018 08:21 AM    MCV 91 01/08/2018 08:21 AM    MCH 30 6 01/08/2018 08:21 AM    MCHC 33 6 01/08/2018 08:21 AM    RDW 14 2 01/08/2018 08:21 AM    MPV 10 9 01/08/2018 08:21 AM     01/08/2018 08:21 AM    NRBC 0 06/10/2017 12:09 PM    LYMPHOPCT 24 06/10/2017 12:09 PM    MONOPCT 8 06/10/2017 12:09 PM    EOSPCT 0 06/10/2017 12:09 PM    BASOPCT 1 06/10/2017 12:09 PM    EOSABS 0 00 06/10/2017 12:09 PM     Chemistry Profile:   Lab Results   Component Value Date/Time     01/08/2018 08:21 AM     (L) 01/05/2015 12:36 PM    K 3 6 01/08/2018 08:21 AM    K 3 6 01/05/2015 12:36 PM     01/08/2018 08:21 AM    CL 97 (L) 01/05/2015 12:36 PM    CO2 29 01/08/2018 08:21 AM    CO2 29 01/05/2015 12:36 PM    ANIONGAP 7 01/08/2018 08:21 AM    ANIONGAP 8 01/05/2015 12:36 PM    BUN 11 01/08/2018 08:21 AM    BUN 11 01/05/2015 12:36 PM    CREATININE 0 89 01/08/2018 08:21 AM    CREATININE 0 91 01/05/2015 12:36 PM    GLUCOSE 115 06/10/2017 01:09 PM    GLUCOSE 90 01/05/2015 12:36 PM    CALCIUM 8 2 (L) 01/08/2018 08:21 AM    CALCIUM 8 0 (L) 01/05/2015 12:36 PM    MG 2 2 01/05/2015 12:36 PM    EGFR 100 01/08/2018 08:21 AM    EGFR >60 0 06/10/2017 01:09 PM     Endocrine Studies: Results from last 6 Months  Lab Units 01/08/18  0821   TSH 3RD GENERATON uIU/mL 34 300*   FREE T4 ng/dL 0 67*   TRIGLYCERIDES mg/dL 148   CHOLESTEROL mg/dL 209*   HDL mg/dL 41   LDL CALC mg/dL 138*       Current Medications     Current Outpatient Prescriptions:     LEVOTHYROXINE SODIUM PO, Take 112 mcg by mouth daily  , Disp: , Rfl:     NAPROXEN PO, Take 500 mg by mouth as needed  , Disp: , Rfl:     Health Maintenance     Health Maintenance   Topic Date Due    HIV SCREENING  1970    Depression Screening PHQ-9  07/13/1980    DTaP,Tdap,and Td Vaccines (1 - Tdap) 01/21/2009    INFLUENZA VACCINE  09/01/2017     Immunization History   Administered Date(s) Administered    Influenza TIV (IM) 11/18/2015    TD (adult) Preservative Free 01/20/2009       Mike Service DO  3/15/2018 11:32 AM

## 2018-03-15 NOTE — ASSESSMENT & PLAN NOTE
TSH 34 FT4 0 67  Levothyroxine increased to 150 from 112 mcg daily  Patient given Rx fro new blood work , although did not do it  Will Give new Script for TSH and FT4

## 2018-05-10 ENCOUNTER — APPOINTMENT (EMERGENCY)
Dept: RADIOLOGY | Facility: HOSPITAL | Age: 48
End: 2018-05-10
Payer: COMMERCIAL

## 2018-05-10 ENCOUNTER — HOSPITAL ENCOUNTER (EMERGENCY)
Facility: HOSPITAL | Age: 48
Discharge: HOME | End: 2018-05-10
Attending: EMERGENCY MEDICINE | Admitting: EMERGENCY MEDICINE
Payer: COMMERCIAL

## 2018-05-10 VITALS
OXYGEN SATURATION: 98 % | TEMPERATURE: 97.9 F | RESPIRATION RATE: 18 BRPM | SYSTOLIC BLOOD PRESSURE: 131 MMHG | HEART RATE: 79 BPM | DIASTOLIC BLOOD PRESSURE: 79 MMHG

## 2018-05-10 DIAGNOSIS — M54.50 LOW BACK PAIN: Primary | ICD-10-CM

## 2018-05-10 PROCEDURE — 99283 EMERGENCY DEPT VISIT LOW MDM: CPT

## 2018-05-10 PROCEDURE — 72100 X-RAY EXAM L-S SPINE 2/3 VWS: CPT

## 2018-05-10 RX ORDER — METHOCARBAMOL 750 MG/1
750 TABLET, FILM COATED ORAL 3 TIMES DAILY
Qty: 9 TABLET | Refills: 0 | Status: SHIPPED | OUTPATIENT
Start: 2018-05-10 | End: 2018-08-02 | Stop reason: SDUPTHER

## 2018-05-10 NOTE — DISCHARGE INSTRUCTIONS
Acute Low Back Pain   WHAT YOU NEED TO KNOW:   Acute low back pain is sudden discomfort in your lower back area that lasts for up to 6 weeks  The discomfort makes it difficult to tolerate activity  DISCHARGE INSTRUCTIONS:   Return to the emergency department if:   · You have severe pain  · You have sudden stiffness and heaviness on both buttocks down to both legs  · You have numbness or weakness in one leg, or pain in both legs  · You have numbness in your genital area or across your lower back  · You cannot control your urine or bowel movements  Contact your healthcare provider if:   · You have a fever  · You have pain at night or when you rest     · Your pain does not get better with treatment  · You have pain that worsens when you cough or sneeze  · You suddenly feel something pop or snap in your back  · You have questions or concerns about your condition or care  Medicines: The following medicines may be ordered by your healthcare provider:  · Acetaminophen  decreases pain  It is available without a doctor's order  Ask how much to take and how often to take it  Follow directions  Acetaminophen can cause liver damage if not taken correctly  · NSAIDs  help decrease swelling and pain  This medicine is available with or without a doctor's order  NSAIDs can cause stomach bleeding or kidney problems in certain people  If you take blood thinner medicine, always ask your healthcare provider if NSAIDs are safe for you  Always read the medicine label and follow directions  · Prescription pain medicine  may be given  Ask your healthcare provider how to take this medicine safely  · Muscle relaxers  decrease pain by relaxing the muscles in your lower spine  · Take your medicine as directed  Contact your healthcare provider if you think your medicine is not helping or if you have side effects  Tell him of her if you are allergic to any medicine   Keep a list of the medicines, vitamins, and herbs you take  Include the amounts, and when and why you take them  Bring the list or the pill bottles to follow-up visits  Carry your medicine list with you in case of an emergency  Self-care:   · Stay active  as much as you can without causing more pain  Bed rest could make your back pain worse  Start with some light exercises such as walking  Avoid heavy lifting until your pain is gone  Ask for more information about the activities or exercises that are right for you  · Ice  helps decrease swelling, pain, and muscle spams  Put crushed ice in a plastic bag  Cover it with a towel  Place it on your lower back for 20 to 30 minutes every 2 hours  Do this for about 2 to 3 days after your pain starts, or as directed  · Heat  helps decrease pain and muscle spasms  Start to use heat after treatment with ice has stopped  Use a small towel dampened with warm water or a heating pad, or sit in a warm bath  Apply heat on the area for 20 to 30 minutes every 2 hours for as many days as directed  Alternate heat and ice  Prevent acute low back pain:   · Use proper body mechanics  ¨ Bend at the hips and knees when you  objects  Do not bend from the waist  Use your leg muscles as you lift the load  Do not use your back  Keep the object close to your chest as you lift it  Try not to twist or lift anything above your waist     ¨ Change your position often when you stand for long periods of time  Rest one foot on a small box or footrest, and then switch to the other foot often  ¨ Try not to sit for long periods of time  When you do, sit in a straight-backed chair with your feet flat on the floor  Never reach, pull, or push while you are sitting  · Do exercises that strengthen your back muscles  Warm up before you exercise  Ask your healthcare provider the best exercises for you  · Maintain a healthy weight  Ask your healthcare provider how much you should weigh   Ask him to help you create a weight loss plan if you are overweight  Follow up with your healthcare provider as directed:  Return for a follow-up visit if you still have pain after 1 to 3 weeks of treatment  You may need to visit an orthopedist if your back pain lasts more than 12 weeks  Write down your questions so you remember to ask them during your visits  © 2017 [employer]  Information is for End User's use only and may not be sold, redistributed or otherwise used for commercial purposes  All illustrations and images included in CareNotes® are the copyrighted property of [employer]  or [employer]  The above information is an  only  It is not intended as medical advice for individual conditions or treatments  Talk to your doctor, nurse or pharmacist before following any medical regimen to see if it is safe and effective for you

## 2018-05-10 NOTE — ED PROVIDER NOTES
History  Chief Complaint   Patient presents with    Back Pain     was told in past that he had arthritis in his back  Having pain despite taking his medications     48yo male presents emergency room for evaluation of right lower back pain  Has been ongoing for over a year  He was told it was arthritis  He has takes Tylenol and Aleve without relief  Pain is worse after his shift at [employer]  No acute injury  No bowel or bladder dysfunction  No saddle anesthesia  Able to walk  Patient states he never had an x-ray done to confirm the arthritis  Admits to occasional pain in the left lower leg  Denies weakness or paresthesia  History provided by:  Patient      Prior to Admission Medications   Prescriptions Last Dose Informant Patient Reported? Taking? LEVOTHYROXINE SODIUM PO   Yes No   Sig: Take 150 mcg by mouth daily    albuterol (PROVENTIL HFA,VENTOLIN HFA) 90 mcg/act inhaler   No No   Sig: Inhale 2 puffs every 6 (six) hours as needed for wheezing (cough)   benzonatate (TESSALON PERLES) 100 mg capsule   No No   Sig: Take 1 capsule (100 mg total) by mouth 3 (three) times a day as needed for cough      Facility-Administered Medications: None       Past Medical History:   Diagnosis Date    Chronic cough     Hypoparathyroidism (HCC)     continue taking calcium and vitamin D, will check CMP, PTH level and Vitamin D level       Past Surgical History:   Procedure Laterality Date    FRACTURE SURGERY      Open Treatment of Each Proximal Finger Phalanx       History reviewed  No pertinent family history  I have reviewed and agree with the history as documented  Social History   Substance Use Topics    Smoking status: Former Smoker    Smokeless tobacco: Never Used    Alcohol use Yes      Comment: occaisionally        Review of Systems   Constitutional: Negative for chills and fever  Gastrointestinal: Negative for abdominal pain and vomiting  Musculoskeletal: Positive for back pain     Skin: Negative for rash and wound  Neurological: Negative for weakness and numbness  Physical Exam  ED Triage Vitals [05/10/18 1040]   Temperature Pulse Respirations Blood Pressure SpO2   97 9 °F (36 6 °C) 79 18 131/79 98 %      Temp src Heart Rate Source Patient Position - Orthostatic VS BP Location FiO2 (%)   -- Monitor Sitting -- --      Pain Score       Worst Possible Pain           Orthostatic Vital Signs  Vitals:    05/10/18 1040   BP: 131/79   Pulse: 79   Patient Position - Orthostatic VS: Sitting       Physical Exam   Constitutional: He appears well-developed and well-nourished  HENT:   Right Ear: External ear normal    Left Ear: External ear normal    Eyes: Conjunctivae are normal    Neck: Neck supple  Cardiovascular: Normal rate, regular rhythm and normal heart sounds  Pulmonary/Chest: Effort normal and breath sounds normal    Abdominal: Soft  Bowel sounds are normal  He exhibits no distension  There is no tenderness  There is no CVA tenderness  Musculoskeletal:        Lumbar back: He exhibits tenderness and pain  He exhibits normal range of motion, no bony tenderness and no swelling  Back:    Negative straight leg raise  No foot drop  Able to walk     Neurological: He is alert  Reflex Scores:       Patellar reflexes are 2+ on the right side and 2+ on the left side  Skin: Skin is warm and dry  No rash noted  Psychiatric: He has a normal mood and affect  Nursing note and vitals reviewed  ED Medications  Medications - No data to display    Diagnostic Studies  Results Reviewed     None                 XR lumbar spine 2 or 3 views   ED Interpretation by Ari James PA-C (05/10 1254)   + OA      Final Result by Dannie Snellen, DO (05/10 1410)   Progressive degenerative changes        Workstation performed: [id number]                    Procedures  Procedures       Phone Contacts  ED Phone Contact    ED Course                               MDM  Number of Diagnoses or Management Options  Low back pain:      Amount and/or Complexity of Data Reviewed  Tests in the radiology section of CPT®: ordered and reviewed    Patient Progress  Patient progress: stable    CritCare Time    Disposition  Final diagnoses:   Low back pain     Time reflects when diagnosis was documented in both MDM as applicable and the Disposition within this note     Time User Action Codes Description Comment    5/10/2018 12:54 PM Oniel Fails Add [M54 5] Low back pain       ED Disposition     ED Disposition Condition Comment    Discharge  [facility] discharge to home/self care  Condition at discharge: Good        Follow-up Information     Follow up With Specialties Details Why Contact Info Additional Byron Matt [place] [address], DO Internal Medicine In 3 days  [address] [facility] [address] [facility]  [phone]       [facility] Emergency Department Emergency Medicine  If symptoms worsen [address]  [phone]  ED, [address], [address]        Discharge Medication List as of 5/10/2018 12:56 PM      START taking these medications    Details   methocarbamol (ROBAXIN) 750 mg tablet Take 1 tablet (750 mg total) by mouth 3 (three) times a day for 3 days, Starting u 5/10/2018, Until Sun 5/13/2018, Normal         CONTINUE these medications which have NOT CHANGED    Details   albuterol (PROVENTIL HFA,VENTOLIN HFA) 90 mcg/act inhaler Inhale 2 puffs every 6 (six) hours as needed for wheezing (cough), Starting u 3/15/2018, Print      benzonatate (TESSALON PERLES) 100 mg capsule Take 1 capsule (100 mg total) by mouth 3 (three) times a day as needed for cough, Starting u 3/15/2018, Print      LEVOTHYROXINE SODIUM PO Take 150 mcg by mouth daily , Historical Med           No discharge procedures on file      ED Provider  Electronically Signed by           Federico Tolentino PA-C  05/10/18 5045

## 2018-07-17 DIAGNOSIS — M54.50 LOW BACK PAIN: ICD-10-CM

## 2018-07-17 RX ORDER — METHOCARBAMOL 750 MG/1
750 TABLET, FILM COATED ORAL 3 TIMES DAILY
Qty: 9 TABLET | Refills: 0 | OUTPATIENT
Start: 2018-07-17 | End: 2018-07-20

## 2018-07-17 NOTE — TELEPHONE ENCOUNTER
Patient states he is in a lot of pain in his lower side  Emergency room had given him this medication  He did schedule an appointment to be seen for it on Thursday  But he was hoping to get this refilled today

## 2018-07-17 NOTE — TELEPHONE ENCOUNTER
Chao Natarajan,   This patient was given only 9 tablets back in May  I do not feel comfortable prescribing this medication as he hasn't been evaluated in over two months  Thank you

## 2018-08-02 ENCOUNTER — OFFICE VISIT (OUTPATIENT)
Dept: INTERNAL MEDICINE CLINIC | Facility: CLINIC | Age: 48
End: 2018-08-02
Payer: COMMERCIAL

## 2018-08-02 ENCOUNTER — TELEPHONE (OUTPATIENT)
Dept: INTERNAL MEDICINE CLINIC | Facility: CLINIC | Age: 48
End: 2018-08-02

## 2018-08-02 VITALS
SYSTOLIC BLOOD PRESSURE: 118 MMHG | TEMPERATURE: 98.6 F | DIASTOLIC BLOOD PRESSURE: 82 MMHG | HEART RATE: 88 BPM | WEIGHT: 192.24 LBS | HEIGHT: 67 IN | BODY MASS INDEX: 30.17 KG/M2

## 2018-08-02 DIAGNOSIS — Z12.11 COLON CANCER SCREENING: ICD-10-CM

## 2018-08-02 DIAGNOSIS — S29.012A MUSCLE STRAIN OF LEFT UPPER BACK: ICD-10-CM

## 2018-08-02 DIAGNOSIS — M54.50 LOW BACK PAIN: Primary | ICD-10-CM

## 2018-08-02 PROCEDURE — 99213 OFFICE O/P EST LOW 20 MIN: CPT | Performed by: INTERNAL MEDICINE

## 2018-08-02 PROCEDURE — 3725F SCREEN DEPRESSION PERFORMED: CPT | Performed by: INTERNAL MEDICINE

## 2018-08-02 RX ORDER — CYCLOBENZAPRINE HCL 5 MG
10 TABLET ORAL 3 TIMES DAILY PRN
Qty: 30 TABLET | Refills: 0 | Status: SHIPPED | OUTPATIENT
Start: 2018-08-02 | End: 2018-08-02 | Stop reason: CLARIF

## 2018-08-02 RX ORDER — NAPROXEN 500 MG/1
500 TABLET ORAL 2 TIMES DAILY WITH MEALS
Qty: 60 TABLET | Refills: 1 | Status: SHIPPED | OUTPATIENT
Start: 2018-08-02 | End: 2019-12-23

## 2018-08-02 RX ORDER — METHOCARBAMOL 750 MG/1
750 TABLET, FILM COATED ORAL 3 TIMES DAILY
Qty: 30 TABLET | Refills: 0 | Status: SHIPPED | OUTPATIENT
Start: 2018-08-02 | End: 2018-09-05 | Stop reason: SDUPTHER

## 2018-08-02 NOTE — PROGRESS NOTES
ASSESSMENT/PLAN:  Problem List Items Addressed This Visit        Musculoskeletal and Integument    Muscle strain of left upper back     · Patient hurt his back lifting some laundry basket  · Most likely a muscle strain  Neurology intact with no swelling or decreased ROM  · Continue Tylenol 650 mg q 8 hours  · Continue methocarbamol 750 mg    · Use naproxen 500 mg as needed for pain  · Return in 1 month if symptoms worsened or no improvement  Other Visit Diagnoses     Low back pain    -  Primary    Relevant Medications    naproxen (EC NAPROSYN) 500 MG EC tablet    methocarbamol (ROBAXIN) 750 mg tablet    Colon cancer screening        Relevant Orders    Ambulatory referral to Gastroenterology          Health Maintenance:  Defer till next visit  Colon Ca Screening-Colonoscopy referral       CHIEF COMPLAINT: Left     HISTORY OF PRESENT ILLNESS:  Back Pain   This is a new problem  The current episode started yesterday  The problem occurs constantly  The problem is unchanged  Pain location: left paraspinal muscles  The quality of the pain is described as stabbing  The pain does not radiate  The pain is at a severity of 7/10  The pain is moderate  The pain is the same all the time  The symptoms are aggravated by bending and twisting  Stiffness is present all day  Pertinent negatives include no abdominal pain, bladder incontinence, bowel incontinence, chest pain, fever, leg pain, paresthesias, perianal numbness or weakness  He has tried analgesics, heat and NSAIDs for the symptoms  The treatment provided mild relief  Review of Systems   Constitutional: Positive for fatigue  Negative for activity change, chills and fever  Eyes: Negative for visual disturbance  Respiratory: Negative for cough, chest tightness and shortness of breath  Cardiovascular: Negative for chest pain and leg swelling     Gastrointestinal: Negative for abdominal pain, bowel incontinence, constipation, diarrhea, nausea and vomiting  Endocrine: Negative for cold intolerance and heat intolerance  Genitourinary: Negative for bladder incontinence and difficulty urinating  Musculoskeletal: Positive for back pain and myalgias  Negative for gait problem  Skin: Negative for rash  Allergic/Immunologic: Negative  Neurological: Negative  Negative for dizziness, weakness, light-headedness and paresthesias  Hematological: Negative  Psychiatric/Behavioral: Negative  All other systems reviewed and are negative  OBJECTIVE:  Vitals:    08/02/18 1431   BP: 118/82   BP Location: Left arm   Patient Position: Sitting   Cuff Size: Adult   Pulse: 88   Temp: 98 6 °F (37 °C)   TempSrc: Oral   Weight: 87 2 kg (192 lb 3 9 oz)   Height: 5' 7" (1 702 m)     Physical Exam   Constitutional: He appears well-developed and well-nourished  No distress  HENT:   Head: Normocephalic and atraumatic  Cardiovascular: Normal rate, regular rhythm and normal heart sounds  Pulmonary/Chest: Effort normal and breath sounds normal    Musculoskeletal: Normal range of motion  He exhibits tenderness (to palpation on the left paraspinal muscles  No spinal tenderness to palpation  No focal areas of erythema or rashes  )  He exhibits no edema           Current Outpatient Prescriptions:     benzonatate (TESSALON PERLES) 100 mg capsule, Take 1 capsule (100 mg total) by mouth 3 (three) times a day as needed for cough, Disp: 20 capsule, Rfl: 0    levothyroxine 150 mcg tablet, TAKE 3 TABLETS 2 TIMES PER WEEK ON EMPTY STOMACH , Disp: , Rfl: 3    methocarbamol (ROBAXIN) 750 mg tablet, Take 1 tablet (750 mg total) by mouth 3 (three) times a day for 10 days, Disp: 30 tablet, Rfl: 0    naproxen (EC NAPROSYN) 500 MG EC tablet, Take 1 tablet (500 mg total) by mouth 2 (two) times a day with meals, Disp: 60 tablet, Rfl: 1    albuterol (PROVENTIL HFA,VENTOLIN HFA) 90 mcg/act inhaler, Inhale 2 puffs every 6 (six) hours as needed for wheezing (cough), Disp: 1 Inhaler, Rfl: 0    [employer] ARTHRITIS PAIN RELIEF 650 MG CR tablet, Take 650 mg by mouth 4 (four) times a day as needed, Disp: , Rfl: 6    Past Medical History:   Diagnosis Date    Chronic cough     Hypoparathyroidism (HCC)     continue taking calcium and vitamin D, will check CMP, PTH level and Vitamin D level     Past Surgical History:   Procedure Laterality Date    FRACTURE SURGERY      Open Treatment of Each Proximal Finger Phalanx     Social History     Social History    Marital status:      Spouse name: N/A    Number of children: N/A    Years of education: N/A     Occupational History    Not on file  Social History Main Topics    Smoking status: Former Smoker    Smokeless tobacco: Never Used      Comment: pt "tried it when he was a teenager but did not like them"    Alcohol use Yes      Comment: occasionally    Drug use: No    Sexual activity: No     Other Topics Concern    Not on file     Social History Narrative    No narrative on file     History reviewed  No pertinent family history  BELKYS Meza [address] Internal Medicine PGY-2  [address] [address]  [address] [facility]

## 2018-08-02 NOTE — ASSESSMENT & PLAN NOTE
· Patient hurt his back lifting some laundry basket  · Most likely a muscle strain  Neurology intact with no swelling or decreased ROM  · Continue Tylenol 650 mg q 8 hours  · Continue methocarbamol 750 mg    · Use naproxen 500 mg as needed for pain  · Return in 1 month if symptoms worsened or no improvement

## 2018-08-02 NOTE — TELEPHONE ENCOUNTER
Pharmacist called in to inform us that the NAPROXEN ( EC) 500 MG is on back order, can they switch to the regular NAPROXEN   If so can you send in new script   Thank you !

## 2018-08-23 ENCOUNTER — OFFICE VISIT (OUTPATIENT)
Dept: INTERNAL MEDICINE CLINIC | Facility: CLINIC | Age: 48
End: 2018-08-23
Payer: COMMERCIAL

## 2018-08-23 VITALS
DIASTOLIC BLOOD PRESSURE: 62 MMHG | HEIGHT: 67 IN | WEIGHT: 189.15 LBS | BODY MASS INDEX: 29.69 KG/M2 | HEART RATE: 72 BPM | TEMPERATURE: 98.5 F | SYSTOLIC BLOOD PRESSURE: 92 MMHG

## 2018-08-23 DIAGNOSIS — L85.3 DRY SKIN: ICD-10-CM

## 2018-08-23 DIAGNOSIS — Z00.00 HEALTHCARE MAINTENANCE: Primary | ICD-10-CM

## 2018-08-23 DIAGNOSIS — S29.012D MUSCLE STRAIN OF LEFT UPPER BACK, SUBSEQUENT ENCOUNTER: ICD-10-CM

## 2018-08-23 PROCEDURE — 3008F BODY MASS INDEX DOCD: CPT | Performed by: INTERNAL MEDICINE

## 2018-08-23 PROCEDURE — 99213 OFFICE O/P EST LOW 20 MIN: CPT | Performed by: INTERNAL MEDICINE

## 2018-08-23 RX ORDER — PETROLATUM 0.61 G/G
CREAM TOPICAL AS NEEDED
Qty: 397 G | Refills: 0 | Status: SHIPPED | OUTPATIENT
Start: 2018-08-23 | End: 2021-01-30

## 2018-08-23 NOTE — PROGRESS NOTES
INTERNAL MEDICINE FOLLOW-UP OFFICE VISIT  [facility]  [address] [place] [address]  [facility]    NAME: Kylie Lopez  AGE: 46 y o  SEX: male    DATE OF ENCOUNTER: 8/23/2018    Assessment and Plan     Chronic Low Back Pain with Acute Lumbar Muscle Strain diagnosed on 08/02/18 at our clinic  Well controlled back pain today  Improved from previous visit  · Continue Robaxin, Tylenol, Naprosyn p r n  · Heat/ice  · Patient refused physical therapy as he is "busy" as single parent    Dry Skin (of hands)  · Eucerin/Cetaphil    Hypothyroidism  Last TSH was 34, T4 was 0 67 in 01/18  At that time, levothyroxine was increased from 112 5 to 150  Patient has not got labs since recent increase in medication because he states he has too "busy as a single parent"  Fortunately, pt reports no hypothyroid symptoms  · Patient instructed that he will need to get TSH/T4 labs done in order to appropriately titrate levothyroxine medication  Instructed him that his health is important for his children's care    Health maintenance  · Colonoscopy referral  · Ordered A1c, patient states his history of prediabetes but no A1c in chart review  · UTD on TDaP          Diagnoses and all orders for this visit:    Healthcare maintenance  -     Ambulatory Referral to GI Endoscopy; Future  -     Hemoglobin A1C; Future    Dry skin  -     Skin Protectants, Misc   (EUCERIN) cream; Apply topically as needed for wound care    Muscle strain of left upper back, subsequent encounter        Orders Placed This Encounter   Procedures    Hemoglobin A1C    Ambulatory Referral to GI Endoscopy       - Counseling Documentation: patient was counseled regarding: diagnostic results, instructions for management, risk factor reductions and prognosis    Chief Complaint     Chief Complaint   Patient presents with    Follow-up    Arthritis       History of Present Illness     Patient is a 49-year-old male with a past medical history of chronic low back pain, hypothyroidism who presents for general follow-up  Patient was here 2 weeks ago and diagnosed with lumbar muscle strain, and stated that since the time period pain is better controlled with Robaxin  Currently has no acute complaints aside from dry skin on the hands  The following portions of the patient's history were reviewed and updated as appropriate: allergies, current medications, past family history, past medical history, past social history, past surgical history and problem list     Review of Systems     ROS  CONSTITUTIONAL: Denies any fever, chills, rigors, and weight loss  HEENT: No earache or tinnitus  Denies hearing loss or visual disturbances  CARDIOVASCULAR: No chest pain or palpitations  RESPIRATORY: Denies any cough, hemoptysis, shortness of breath or dyspnea on exertion  GASTROINTESTINAL: Denies abdominal pain, nausea, vomitng   GENITOURINARY: No problems with urination  Denies any hematuria or dysuria  NEUROLOGIC: No dizziness or vertigo, denies headaches  MUSCULOSKELETAL: Denies any muscle or joint pain  SKIN: Denies skin rashes or itching  ENDOCRINE: Denies excessive thirst  Denies intolerance to heat or cold  PSYCHOSOCIAL: Denies depression or anxiety  Denies any recent memory loss         Active Problem List     Patient Active Problem List   Diagnosis    Allergic rhinitis    Arterial ectasia (HCC)    Chronic low back pain    Hyperlipidemia    Hypothyroidism    Lumbar radiculopathy    Mass of parotid gland    Osteoarthritis of both hands    Dry cough    Reactive airway disease    Muscle strain of left upper back       Objective     BP 92/62 (BP Location: Right arm, Patient Position: Sitting, Cuff Size: Adult)   Pulse 72   Temp 98 5 °F (36 9 °C) (Oral)   Ht 5' 7" (1 702 m)   Wt 85 8 kg (189 lb 2 5 oz)   BMI 29 63 kg/m²     Physical Exam:   GENERAL: NAD  HEENT:  NC/AT, PERRL, EOMI, MMM, no scleral icterus  CARDIAC:  RRR, +S1/S2, no S3/S4 heard, no m/g/r  PULMONARY:  CTA B/L, no wheezing/rales/rhonci, non-labored breathing  ABDOMEN:  Soft, NT/ND, +BS, no rebound/guarding/rigidity  Extremities:  2+ Pulses in DP/PT  No edema, cyanosis, or clubbing  NEUROLOGIC:  Alert/oriented x3   No motor or sensory deficits  SKIN:  No rashes or erythema      Pertinent Laboratory/Diagnostic Studies:  CBC:   Lab Results   Component Value Date/Time    WBC 8 41 01/08/2018 08:21 AM    RBC 4 57 01/08/2018 08:21 AM    HGB 14 0 01/08/2018 08:21 AM    HCT 41 7 01/08/2018 08:21 AM    MCV 91 01/08/2018 08:21 AM    MCH 30 6 01/08/2018 08:21 AM    MCHC 33 6 01/08/2018 08:21 AM    RDW 14 2 01/08/2018 08:21 AM    MPV 10 9 01/08/2018 08:21 AM     01/08/2018 08:21 AM    NRBC 0 06/10/2017 12:09 PM    LYMPHOPCT 24 06/10/2017 12:09 PM    MONOPCT 8 06/10/2017 12:09 PM    EOSPCT 0 06/10/2017 12:09 PM    BASOPCT 1 06/10/2017 12:09 PM    EOSABS 0 00 06/10/2017 12:09 PM     Chemistry Profile:   Lab Results   Component Value Date/Time     01/08/2018 08:21 AM     (L) 01/05/2015 12:36 PM    K 3 6 01/08/2018 08:21 AM    K 3 6 01/05/2015 12:36 PM     01/08/2018 08:21 AM    CL 97 (L) 01/05/2015 12:36 PM    CO2 29 01/08/2018 08:21 AM    CO2 29 01/05/2015 12:36 PM    ANIONGAP 7 01/08/2018 08:21 AM    ANIONGAP 8 01/05/2015 12:36 PM    BUN 11 01/08/2018 08:21 AM    BUN 11 01/05/2015 12:36 PM    CREATININE 0 89 01/08/2018 08:21 AM    CREATININE 0 91 01/05/2015 12:36 PM    GLUF 95 01/08/2018 08:21 AM    GLUCOSE 115 06/10/2017 01:09 PM    GLUCOSE 90 01/05/2015 12:36 PM    CALCIUM 8 2 (L) 01/08/2018 08:21 AM    CALCIUM 8 0 (L) 01/05/2015 12:36 PM    MG 2 2 01/05/2015 12:36 PM    EGFR 100 01/08/2018 08:21 AM    EGFR >60 0 06/10/2017 01:09 PM     CBC:     Chemistry Profile:       Invalid input(s): EXTCREAT, EGFRAA, EGFRNAA    Current Medications     Current Outpatient Prescriptions:     albuterol (PROVENTIL HFA,VENTOLIN HFA) 90 mcg/act inhaler, Inhale 2 puffs every 6 (six) hours as needed for wheezing (cough), Disp: 1 Inhaler, Rfl: 0    [employer]S ARTHRITIS PAIN RELIEF 650 MG CR tablet, Take 650 mg by mouth 4 (four) times a day as needed, Disp: , Rfl: 6    levothyroxine 150 mcg tablet, TAKE 3 TABLETS 2 TIMES PER WEEK ON EMPTY STOMACH , Disp: , Rfl: 3    naproxen (EC NAPROSYN) 500 MG EC tablet, Take 1 tablet (500 mg total) by mouth 2 (two) times a day with meals, Disp: 60 tablet, Rfl: 1    benzonatate (TESSALON PERLES) 100 mg capsule, Take 1 capsule (100 mg total) by mouth 3 (three) times a day as needed for cough (Patient not taking: Reported on 8/23/2018 ), Disp: 20 capsule, Rfl: 0    methocarbamol (ROBAXIN) 750 mg tablet, Take 1 tablet (750 mg total) by mouth 3 (three) times a day for 10 days, Disp: 30 tablet, Rfl: 0    Skin Protectants, Misc  (EUCERIN) cream, Apply topically as needed for wound care, Disp: 397 g, Rfl: 0    Health Maintenance     Health Maintenance   Topic Date Due    CRC Screening: Colonoscopy  1970    INFLUENZA VACCINE  09/01/2018    Depression Screening PHQ-9  08/02/2019    DTaP,Tdap,and Td Vaccines (2 - Td) 03/15/2028     Immunization History   Administered Date(s) Administered     Influenza (IM) Preservative Free 03/15/2018    Influenza TIV (IM) 11/18/2015    TD (adult) Preservative Free 01/20/2009    Tdap 03/15/2018       BELKYS Cervantes    Internal Medicine PGY-1  8/23/2018 11:38 AM

## 2018-09-05 DIAGNOSIS — M54.50 LOW BACK PAIN: ICD-10-CM

## 2018-09-05 RX ORDER — METHOCARBAMOL 750 MG/1
750 TABLET, FILM COATED ORAL 3 TIMES DAILY
Qty: 30 TABLET | Refills: 0 | Status: SHIPPED | OUTPATIENT
Start: 2018-09-05 | End: 2018-11-30

## 2018-09-12 ENCOUNTER — APPOINTMENT (OUTPATIENT)
Dept: LAB | Facility: HOSPITAL | Age: 48
End: 2018-09-12
Payer: COMMERCIAL

## 2018-09-12 DIAGNOSIS — Z00.00 HEALTHCARE MAINTENANCE: ICD-10-CM

## 2018-09-12 DIAGNOSIS — E03.9 HYPOTHYROIDISM: ICD-10-CM

## 2018-09-12 LAB
EST. AVERAGE GLUCOSE BLD GHB EST-MCNC: 111 MG/DL
HBA1C MFR BLD: 5.5 % (ref 4.2–6.3)
T3FREE SERPL-MCNC: 3.1 PG/ML (ref 2.3–4.2)
T4 FREE SERPL-MCNC: 1.27 NG/DL (ref 0.76–1.46)
TSH 30M P TRH SERPL-ACNC: 0.02 UIU/ML
TSH SERPL DL<=0.05 MIU/L-ACNC: 0.02 UIU/ML
TSH SERPL DL<=0.05 MIU/L-ACNC: 0.02 UIU/ML (ref 0.36–3.74)

## 2018-09-12 PROCEDURE — 83036 HEMOGLOBIN GLYCOSYLATED A1C: CPT

## 2018-09-12 PROCEDURE — 84481 FREE ASSAY (FT-3): CPT

## 2018-09-12 PROCEDURE — 36415 COLL VENOUS BLD VENIPUNCTURE: CPT

## 2018-09-12 PROCEDURE — 84439 ASSAY OF FREE THYROXINE: CPT

## 2018-09-12 PROCEDURE — 84443 ASSAY THYROID STIM HORMONE: CPT

## 2018-09-19 ENCOUNTER — TELEPHONE (OUTPATIENT)
Dept: OTHER | Facility: HOSPITAL | Age: 48
End: 2018-09-19

## 2018-09-19 ENCOUNTER — TELEPHONE (OUTPATIENT)
Dept: INTERNAL MEDICINE CLINIC | Facility: CLINIC | Age: 48
End: 2018-09-19

## 2018-09-21 DIAGNOSIS — E03.9 HYPOTHYROIDISM: Primary | ICD-10-CM

## 2018-09-21 RX ORDER — LEVOTHYROXINE SODIUM 0.15 MG/1
150 TABLET ORAL 2 TIMES WEEKLY
Qty: 60 TABLET | Refills: 0 | Status: SHIPPED | OUTPATIENT
Start: 2018-09-24 | End: 2019-03-18 | Stop reason: SDUPTHER

## 2018-09-21 NOTE — PROGRESS NOTES
Patient will now be taking 3 tablets on Monday and 2 tablets on Thursday of 150 mcg of synthroid  Called patient and he verbalized understanding  BELKYS Harper    Internal Medicine PGY-2  9/21/2018 9:51 AM

## 2018-11-17 ENCOUNTER — HOSPITAL ENCOUNTER (EMERGENCY)
Facility: HOSPITAL | Age: 48
Discharge: HOME | End: 2018-11-17
Attending: EMERGENCY MEDICINE
Payer: COMMERCIAL

## 2018-11-17 ENCOUNTER — APPOINTMENT (EMERGENCY)
Dept: RADIOLOGY | Facility: HOSPITAL | Age: 48
End: 2018-11-17
Payer: COMMERCIAL

## 2018-11-17 VITALS
TEMPERATURE: 97.4 F | RESPIRATION RATE: 20 BRPM | SYSTOLIC BLOOD PRESSURE: 123 MMHG | OXYGEN SATURATION: 94 % | HEART RATE: 90 BPM | DIASTOLIC BLOOD PRESSURE: 80 MMHG

## 2018-11-17 DIAGNOSIS — J40 BRONCHITIS: Primary | ICD-10-CM

## 2018-11-17 DIAGNOSIS — J45.909 REACTIVE AIRWAY DISEASE: ICD-10-CM

## 2018-11-17 PROCEDURE — 71046 X-RAY EXAM CHEST 2 VIEWS: CPT

## 2018-11-17 PROCEDURE — 99283 EMERGENCY DEPT VISIT LOW MDM: CPT

## 2018-11-17 RX ORDER — BENZONATATE 100 MG/1
100 CAPSULE ORAL 3 TIMES DAILY PRN
Qty: 20 CAPSULE | Refills: 0 | Status: SHIPPED | OUTPATIENT
Start: 2018-11-17 | End: 2018-11-20 | Stop reason: ALTCHOICE

## 2018-11-17 RX ORDER — ALBUTEROL SULFATE 90 UG/1
2 AEROSOL, METERED RESPIRATORY (INHALATION) EVERY 6 HOURS PRN
Qty: 1 INHALER | Refills: 0 | Status: SHIPPED | OUTPATIENT
Start: 2018-11-17 | End: 2019-10-21 | Stop reason: SDUPTHER

## 2018-11-17 NOTE — ED PROVIDER NOTES
History  Chief Complaint   Patient presents with    URI     Pt has c/o cough, congestion for a week  Patient is a 47-year-old male who presents with his daughter for evaluation of a cough  The patient states that he thinks he has bronchitis and laryngitis due to having change in his voice  He denies any significant sputum production  Denies any chest pain  He does report having some short of breath when he coughs  Denies abdominal pain or back pain  Denies any significant headache or blurry vision  Denies any significant fever  URI   Presenting symptoms: no fatigue and no sore throat    Associated symptoms: no myalgias and no sneezing        Prior to Admission Medications   Prescriptions Last Dose Informant Patient Reported? Taking? [employer] ARTHRITIS PAIN RELIEF 650 MG CR tablet  Self Yes No   Sig: Take 650 mg by mouth 4 (four) times a day as needed   Skin Protectants, Misc  (EUCERIN) cream   No No   Sig: Apply topically as needed for wound care   albuterol (PROVENTIL HFA,VENTOLIN HFA) 90 mcg/act inhaler  Self No No   Sig: Inhale 2 puffs every 6 (six) hours as needed for wheezing (cough)   benzonatate (TESSALON PERLES) 100 mg capsule  Self No No   Sig: Take 1 capsule (100 mg total) by mouth 3 (three) times a day as needed for cough   Patient not taking: Reported on 8/23/2018    levothyroxine 150 mcg tablet   No No   Sig: Take 1 tablet (150 mcg total) by mouth 2 (two) times a week Take 3 tablets on monday, and 2 tablets on Thursday     methocarbamol (ROBAXIN) 750 mg tablet   No No   Sig: Take 1 tablet (750 mg total) by mouth 3 (three) times a day for 10 days   naproxen (EC NAPROSYN) 500 MG EC tablet  Self No No   Sig: Take 1 tablet (500 mg total) by mouth 2 (two) times a day with meals      Facility-Administered Medications: None       Past Medical History:   Diagnosis Date    Chronic cough     Hypoparathyroidism (HCC)     continue taking calcium and vitamin D, will check CMP, PTH level and Vitamin D level       Past Surgical History:   Procedure Laterality Date    FRACTURE SURGERY      Open Treatment of Each Proximal Finger Phalanx       Family History   Problem Relation Age of Onset    No Known Problems Mother     No Known Problems Father     No Known Problems Sister     No Known Problems Brother     No Known Problems Son     No Known Problems Daughter     No Known Problems Maternal Grandmother     No Known Problems Maternal Grandfather     No Known Problems Paternal Grandmother     No Known Problems Paternal Grandfather     No Known Problems Maternal Aunt     No Known Problems Maternal Uncle     No Known Problems Paternal Aunt     No Known Problems Paternal Uncle     No Known Problems Cousin      I have reviewed and agree with the history as documented  Social History   Substance Use Topics    Smoking status: Former Smoker    Smokeless tobacco: Never Used      Comment: pt "tried it when he was a teenager but did not like them"    Alcohol use Yes      Comment: occasionally        Review of Systems   Constitutional: Negative for activity change, appetite change and fatigue  HENT: Negative for nosebleeds, sneezing, sore throat, trouble swallowing and voice change  Eyes: Negative for photophobia, pain and visual disturbance  Respiratory: Negative for apnea, choking and stridor  Cardiovascular: Negative for palpitations and leg swelling  Gastrointestinal: Negative for anal bleeding and constipation  Endocrine: Negative for cold intolerance, heat intolerance, polydipsia and polyphagia  Genitourinary: Negative for decreased urine volume, enuresis, frequency, genital sores and urgency  Musculoskeletal: Negative for joint swelling and myalgias  Allergic/Immunologic: Negative for environmental allergies and food allergies  Neurological: Negative for tremors, seizures, speech difficulty and weakness  Hematological: Negative for adenopathy     Psychiatric/Behavioral: Negative for behavioral problems, decreased concentration, dysphoric mood and hallucinations  Physical Exam  Physical Exam   Constitutional: He is oriented to person, place, and time  He appears well-developed and well-nourished  No distress  HENT:   Head: Normocephalic and atraumatic  Right Ear: External ear normal    Left Ear: External ear normal    Nose: Nose normal    Mouth/Throat: Oropharynx is clear and moist    Eyes: Pupils are equal, round, and reactive to light  Conjunctivae and EOM are normal    Neck: Normal range of motion  Neck supple  Cardiovascular: Normal rate, regular rhythm and normal heart sounds  Exam reveals no gallop and no friction rub  No murmur heard  Pulmonary/Chest: Effort normal and breath sounds normal  No respiratory distress  He has no wheezes  Course cough noted on exam    Abdominal: Soft  Bowel sounds are normal    Neurological: He is alert and oriented to person, place, and time  Skin: Skin is warm and dry  He is not diaphoretic  Psychiatric: He has a normal mood and affect  His behavior is normal    Vitals reviewed        Vital Signs  ED Triage Vitals [11/17/18 1241]   Temperature Pulse Respirations Blood Pressure SpO2   (!) 97 4 °F (36 3 °C) 90 20 123/80 94 %      Temp Source Heart Rate Source Patient Position - Orthostatic VS BP Location FiO2 (%)   Tympanic Monitor Sitting Left arm --      Pain Score       --           Vitals:    11/17/18 1241   BP: 123/80   Pulse: 90   Patient Position - Orthostatic VS: Sitting       Visual Acuity      ED Medications  Medications - No data to display    Diagnostic Studies  Results Reviewed     None                 XR chest 2 views   ED Interpretation by Dayanna Monaco PA-C (11/17 1319)   No acute abnormalities                 Procedures  Procedures       Phone Contacts  ED Phone Contact    ED Course                               [facility]  Crit[facility] Time    Disposition  Final diagnoses:   Bronchitis     Time reflects when diagnosis was documented in both MDM as applicable and the Disposition within this note     Time User Action Codes Description Comment    11/17/2018  1:19 PM Dada, [facility] Bronchitis       ED Disposition     ED Disposition Condition Comment    Discharge  [facility] discharge to home/self care  Condition at discharge: Stable        Follow-up Information     Follow up With Specialties Details Why [facility] [address] [address] [phone]          Patient's Medications   Discharge Prescriptions    ALBUTEROL (PROVENTIL HFA,VENTOLIN HFA) 90 MCG/ACT INHALER    Inhale 2 puffs every 6 (six) hours as needed for wheezing       Start Date: 11/17/2018End Date: 11/17/2019       Order Dose: 2 puffs       Quantity: 1 Inhaler    Refills: 0     No discharge procedures on file      ED Provider  Electronically Signed by           Rogelio Berry PA-C  11/17/18 0646

## 2018-11-17 NOTE — DISCHARGE INSTRUCTIONS

## 2018-11-18 NOTE — PROGRESS NOTES
Attempted to call patient in regard to x-ray findings  Phone number has been temporarily disconnected  Will send letter to home

## 2018-11-20 ENCOUNTER — TELEPHONE (OUTPATIENT)
Dept: INTERNAL MEDICINE CLINIC | Facility: CLINIC | Age: 48
End: 2018-11-20

## 2018-11-20 ENCOUNTER — OFFICE VISIT (OUTPATIENT)
Dept: INTERNAL MEDICINE CLINIC | Facility: CLINIC | Age: 48
End: 2018-11-20
Payer: COMMERCIAL

## 2018-11-20 VITALS
DIASTOLIC BLOOD PRESSURE: 70 MMHG | TEMPERATURE: 98.6 F | SYSTOLIC BLOOD PRESSURE: 100 MMHG | HEART RATE: 84 BPM | HEIGHT: 67 IN | WEIGHT: 185.63 LBS | BODY MASS INDEX: 29.13 KG/M2

## 2018-11-20 DIAGNOSIS — J18.9 PNEUMONIA OF RIGHT MIDDLE LOBE DUE TO INFECTIOUS ORGANISM: Primary | ICD-10-CM

## 2018-11-20 DIAGNOSIS — R05.9 COUGH: ICD-10-CM

## 2018-11-20 PROBLEM — R05.8 DRY COUGH: Chronic | Status: ACTIVE | Noted: 2018-03-15

## 2018-11-20 PROCEDURE — 3008F BODY MASS INDEX DOCD: CPT | Performed by: INTERNAL MEDICINE

## 2018-11-20 PROCEDURE — 99213 OFFICE O/P EST LOW 20 MIN: CPT | Performed by: INTERNAL MEDICINE

## 2018-11-20 RX ORDER — AMOXICILLIN 500 MG/1
500 CAPSULE ORAL EVERY 8 HOURS SCHEDULED
Qty: 15 CAPSULE | Refills: 0 | Status: SHIPPED | OUTPATIENT
Start: 2018-11-20 | End: 2018-11-25

## 2018-11-20 RX ORDER — AZITHROMYCIN 250 MG/1
TABLET, FILM COATED ORAL
Qty: 6 TABLET | Refills: 0 | Status: SHIPPED | OUTPATIENT
Start: 2018-11-20 | End: 2018-11-24

## 2018-11-20 RX ORDER — BENZONATATE 100 MG/1
100 CAPSULE ORAL 3 TIMES DAILY PRN
Qty: 20 CAPSULE | Refills: 0 | Status: SHIPPED | OUTPATIENT
Start: 2018-11-20 | End: 2018-11-30 | Stop reason: SDUPTHER

## 2018-11-20 NOTE — TELEPHONE ENCOUNTER
PATIENT CALL, HE WENT TO THE ER ON 11/17/2018 HE WAS DIAGNOSES WITH BRONCHITIS THEY ONLY PRESCRIBE HIM  ALBUTEROL PUMP HE WANT ANTIBIOTICS  I OFFER A PATIENT A SAME DAY APPT AND HE HANG UP THE PHONE WHEN I TOLD HIM THE HE NEED TO COME IN TO BE EVALUATE BY THE DOCTOR

## 2018-11-20 NOTE — PROGRESS NOTES
ASSESSMENT/PLAN:  Plan:  Nonproductive cough, suspected RML pneumonia  May be precipitated by cold exposure given chronicity of symptoms each winter  CXR in ED on 11/17 showed possible right middle lobe pneumonia  · Will treat with short course of azithromycin and amoxicillin for suspected pneumonia  · Pt can continue with Tessalon perles for cough suppression  · Pt advised to cover face with scarf to avoid cold air and to stay warm when outside and hydrate  Follow-up:  If symptoms don't improve in one week or worsen    CHIEF COMPLAINT:  Non productive cough    HISTORY OF PRESENT ILLNESS:  45 yo M with hx of hypothyroidism, lumbar radiculopathy, OA, chronic LBP, HLD presenting with dry cough  Denies production of sputum  Per pt he has had it for 11 days so far and gets this cough every year  Pt believes it is worse with change in weather  Denies fevers, chills, weight loss, SOB, ABDULLAHI  Denies history of asthma or COPD, however states he has been using asthma inhaler every 4-6 hours without relief in symptoms  Pt states he went to the ED on 11/17 where he was prescribed Tessalon Perles as well as albuterol inhaler, which he has been using without relief  Per pt, his daughter has been sick recently also with multiple viruses  Pt reports cough is worse at night when he lies down but denies orthopnea, SOB, ABDULLAHI, peripheral edema, CP, epigastric or substernal pain, acid reflux or regurgitation  Pt also reports voice change which he believes is due to the cough/infection  Pt states he gets these symptoms for about two weeks every winter, but he does not remember what he usually uses to treat his symptoms  Pt states his cough this year is worse than usual   Pt has not had any recent medication changes prior to cough, does not take ACE inhibitor  Pt has been taking Nyquil/Dayquil without relief of symptoms   Pt states he smoked for a very short time in his teens, possibly months, and has not smoked since     Review of Systems   Constitutional: Negative for chills, fatigue and fever  HENT: Positive for voice change  Negative for congestion, rhinorrhea, sneezing and sore throat  Respiratory: Positive for cough  Negative for shortness of breath and wheezing  Cardiovascular: Negative for chest pain, palpitations and leg swelling  Gastrointestinal: Negative for abdominal pain, constipation, diarrhea, nausea and vomiting  Neurological: Negative for dizziness, light-headedness and headaches  OBJECTIVE:  Vitals:    11/20/18 1606   BP: 100/70   Pulse: 84   Temp: 98 6 °F (37 °C)   Weight: 84 2 kg (185 lb 10 oz)   Height: 5' 7" (1 702 m)       Physical Exam   Constitutional: He is oriented to person, place, and time  He appears well-developed and well-nourished  HENT:   Head: Normocephalic and atraumatic  Nose: Nose normal    Mouth/Throat: Oropharynx is clear and moist    Eyes: Right eye exhibits no discharge  Left eye exhibits no discharge  Cardiovascular: Normal rate, regular rhythm and normal heart sounds  Exam reveals no gallop and no friction rub  No murmur heard  Pulmonary/Chest: Effort normal and breath sounds normal  No respiratory distress  He has no wheezes  He has no rales  Decreased tactile fremitus posterior right side   Abdominal: Soft  Bowel sounds are normal  He exhibits no distension  There is no tenderness  There is no guarding  Neurological: He is alert and oriented to person, place, and time  Skin: Skin is warm and dry     Psychiatric: His speech is normal          Current Outpatient Prescriptions:     albuterol (PROVENTIL HFA,VENTOLIN HFA) 90 mcg/act inhaler, Inhale 2 puffs every 6 (six) hours as needed for wheezing (cough), Disp: 1 Inhaler, Rfl: 0    albuterol (PROVENTIL HFA,VENTOLIN HFA) 90 mcg/act inhaler, Inhale 2 puffs every 6 (six) hours as needed for wheezing, Disp: 1 Inhaler, Rfl: 0    benzonatate (TESSALON PERLES) 100 mg capsule, Take 1 capsule (100 mg total) by mouth 3 (three) times a day as needed for cough, Disp: 20 capsule, Rfl: 0    CVS ARTHRITIS PAIN RELIEF 650 MG CR tablet, Take 650 mg by mouth 4 (four) times a day as needed, Disp: , Rfl: 6    levothyroxine 150 mcg tablet, Take 1 tablet (150 mcg total) by mouth 2 (two) times a week Take 3 tablets on monday, and 2 tablets on Thursday  , Disp: 60 tablet, Rfl: 0    naproxen (EC NAPROSYN) 500 MG EC tablet, Take 1 tablet (500 mg total) by mouth 2 (two) times a day with meals, Disp: 60 tablet, Rfl: 1    Skin Protectants, Misc  (EUCERIN) cream, Apply topically as needed for wound care, Disp: 397 g, Rfl: 0    methocarbamol (ROBAXIN) 750 mg tablet, Take 1 tablet (750 mg total) by mouth 3 (three) times a day for 10 days, Disp: 30 tablet, Rfl: 0    Past Medical History:   Diagnosis Date    Chronic cough     Hypoparathyroidism (HCC)     continue taking calcium and vitamin D, will check CMP, PTH level and Vitamin D level     Past Surgical History:   Procedure Laterality Date    FRACTURE SURGERY      Open Treatment of Each Proximal Finger Phalanx     Social History     Social History    Marital status:      Spouse name: N/A    Number of children: N/A    Years of education: N/A     Occupational History    Not on file       Social History Main Topics    Smoking status: Former Smoker    Smokeless tobacco: Never Used      Comment: pt "tried it when he was a teenager but did not like them"    Alcohol use Yes      Comment: occasionally    Drug use: No    Sexual activity: No     Other Topics Concern    Not on file     Social History Narrative    No narrative on file     Family History   Problem Relation Age of Onset    No Known Problems Mother     No Known Problems Father     No Known Problems Sister     No Known Problems Brother     No Known Problems Son     No Known Problems Daughter     No Known Problems Maternal Grandmother     No Known Problems Maternal Grandfather     No Known Problems Paternal Grandmother     No Known Problems Paternal Grandfather     No Known Problems Maternal Aunt     No Known Problems Maternal Uncle     No Known Problems Paternal Aunt     No Known Problems Paternal Uncle     No Known Problems Cousin        DO Sartah Dubon [address] Internal Medicine PGY-1    [address], [address] [place] [address]  [phone]

## 2018-11-20 NOTE — PATIENT INSTRUCTIONS
Acute Cough   WHAT YOU NEED TO KNOW:   What is an acute cough? An acute cough can last up to 3 weeks  Common causes of an acute cough include a cold, allergies, or a lung infection  How is the cause of an acute cough diagnosed? Your healthcare provider will examine you and listen to your lungs  Tell your healthcare provider if you cough up any mucus, or have a fever or shortness of breath  Also tell your provider what makes the cough better or worse  Depending on your symptoms, you may need a chest x-ray  A sample of mucus may be collected and tested for infection  How is an acute cough treated? An acute cough usually goes away on its own  Ask your healthcare provider about medicines you can take to decrease your cough  You may need medicine to stop the cough, decrease swelling in your airways, or help open your airways  Medicine may also be given to help you cough up mucus  If you have an infection caused by bacteria, you may need antibiotics  What can I do to manage my cough? · Do not smoke and stay away from others who smoke  Nicotine and other chemicals in cigarettes and cigars can cause lung damage and make your cough worse  Ask your healthcare provider for information if you currently smoke and need help to quit  E-cigarettes or smokeless tobacco still contain nicotine  Talk to your healthcare provider before you use these products  · Drink extra liquids as directed  Liquids will help thin and loosen mucus so you can cough it up  Liquids will also help prevent dehydration  Examples of good liquids to drink include water, fruit juice, and broth  Do not drink liquids that contain caffeine  Caffeine can increase your risk for dehydration  Ask your healthcare provider how much liquid to drink each day  · Rest as directed  Do not do activities that make your cough worse, such as exercise  · Use a humidifier or vaporizer    Use a cool mist humidifier or a vaporizer to increase air moisture in your home  This may make it easier for you to breathe and help decrease your cough  · Eat 2 to 5 mL of honey 2 times each day  Honey can help thin mucus and decrease your cough  · Use cough drops or lozenges  These can help decrease throat irritation and your cough  When should I seek immediate care? · You have trouble breathing or feel short of breath  · You cough up blood, or you see blood in your mucus  · You faint or feel weak or dizzy  · You have chest pain when you cough or take a deep breath  · You have new wheezing  When should I contact my healthcare provider? · You have a fever  · Your cough lasts longer than 4 weeks  · Your symptoms do not improve with treatment  · You have questions or concerns about your condition or care  CARE AGREEMENT:   You have the right to help plan your care  Learn about your health condition and how it may be treated  Discuss treatment options with your caregivers to decide what care you want to receive  You always have the right to refuse treatment  The above information is an  only  It is not intended as medical advice for individual conditions or treatments  Talk to your doctor, nurse or pharmacist before following any medical regimen to see if it is safe and effective for you  © 2017 [employer]  Information is for End User's use only and may not be sold, redistributed or otherwise used for commercial purposes  All illustrations and images included in CareNotes® are the copyrighted property of [employer]  or [employer]

## 2018-11-23 DIAGNOSIS — J18.9 PNEUMONIA OF RIGHT MIDDLE LOBE DUE TO INFECTIOUS ORGANISM: ICD-10-CM

## 2018-11-26 RX ORDER — AZITHROMYCIN 250 MG/1
TABLET, FILM COATED ORAL
Qty: 6 TABLET | Refills: 0 | OUTPATIENT
Start: 2018-11-26

## 2018-11-26 RX ORDER — AMOXICILLIN 500 MG/1
500 CAPSULE ORAL EVERY 8 HOURS SCHEDULED
Qty: 15 CAPSULE | Refills: 0 | OUTPATIENT
Start: 2018-11-26 | End: 2018-12-01

## 2018-11-26 NOTE — TELEPHONE ENCOUNTER
Refill requested for amoxicillin/azithromycin to same pharmacy they were previously sent to  Pt was prescribed short course of both antibiotics at last visit on 11/20, not appropriate for refill

## 2018-11-29 RX ORDER — NAPROXEN 500 MG/1
500 TABLET ORAL 2 TIMES DAILY WITH MEALS
Refills: 1 | COMMUNITY
Start: 2018-09-19 | End: 2018-11-30

## 2018-11-30 ENCOUNTER — OFFICE VISIT (OUTPATIENT)
Dept: INTERNAL MEDICINE CLINIC | Facility: CLINIC | Age: 48
End: 2018-11-30
Payer: COMMERCIAL

## 2018-11-30 ENCOUNTER — TELEPHONE (OUTPATIENT)
Dept: INTERNAL MEDICINE CLINIC | Facility: CLINIC | Age: 48
End: 2018-11-30

## 2018-11-30 VITALS
HEART RATE: 84 BPM | HEIGHT: 67 IN | SYSTOLIC BLOOD PRESSURE: 110 MMHG | DIASTOLIC BLOOD PRESSURE: 60 MMHG | BODY MASS INDEX: 29.13 KG/M2 | TEMPERATURE: 98 F | WEIGHT: 185.63 LBS

## 2018-11-30 DIAGNOSIS — R05.9 COUGH: ICD-10-CM

## 2018-11-30 DIAGNOSIS — R05.9 COUGH: Primary | ICD-10-CM

## 2018-11-30 PROBLEM — J13 PNEUMONIA OF RIGHT MIDDLE LOBE DUE TO STREPTOCOCCUS PNEUMONIAE (HCC): Status: ACTIVE | Noted: 2018-11-30

## 2018-11-30 PROCEDURE — 1036F TOBACCO NON-USER: CPT | Performed by: INTERNAL MEDICINE

## 2018-11-30 PROCEDURE — 99213 OFFICE O/P EST LOW 20 MIN: CPT | Performed by: INTERNAL MEDICINE

## 2018-11-30 PROCEDURE — 3008F BODY MASS INDEX DOCD: CPT | Performed by: INTERNAL MEDICINE

## 2018-11-30 RX ORDER — BENZONATATE 100 MG/1
100 CAPSULE ORAL 3 TIMES DAILY PRN
Qty: 20 CAPSULE | Refills: 0 | Status: SHIPPED | OUTPATIENT
Start: 2018-11-30 | End: 2019-10-21 | Stop reason: SDUPTHER

## 2018-11-30 NOTE — TELEPHONE ENCOUNTER
[facility] CALLED STATING DEXTROMETHORPHAN THAT WAS SENT TODAY DOES NOT COME IN 7 5 MG/ML CAN YOU SEND ANOTHER DOSE OR DIFFERENT MED

## 2018-11-30 NOTE — PROGRESS NOTES
INTERNAL MEDICINE FOLLOW-UP OFFICE VISIT  [facility]  [address] [place] [address]    NAME: Manuel Trujillo  AGE: 46 y o  SEX: male    DATE OF ENCOUNTER: 11/30/2018    Assessment and Plan   Post infectious cough   -Cough syrup   -Follow up in 3 months with PCP     No orders of the defined types were placed in this encounter  Chief Complaint     Chief Complaint   Patient presents with    Cough     had bronchitis and pneumonia       History of Present Illness     HPI    This is a 46year old male who was recently see in the ED with bronchitis, he was then seen in the clinic for pneumonia and prescribed antibiotics  He reports that he finished the antibiotics without any major side affects however sometimes he took extra antibiotics for the cough  Pt reported that he is doing well  Denied fever, chills, nausea, and vomiting  Reported that all is well except the cough that he has been having and it is very annoying to him 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  The following portions of the patient's history were reviewed and updated as appropriate: allergies, current medications, past family history, past medical history, past social history, past surgical history and problem list     Review of Systems     Review of Systems   Constitutional: Negative for activity change, appetite change, chills and fever  HENT: Negative for sneezing and sore throat  Eyes: Negative for photophobia and visual disturbance  Respiratory: Positive for cough  Negative for chest tightness, shortness of breath and wheezing  Cardiovascular: Negative for chest pain, palpitations and leg swelling  Gastrointestinal: Negative for constipation, diarrhea, nausea and vomiting  Endocrine: Negative for polydipsia, polyphagia and polyuria  Genitourinary: Negative for difficulty urinating and dysuria  Neurological: Negative for dizziness, syncope and light-headedness  Active Problem List     Patient Active Problem List   Diagnosis    Allergic rhinitis    Arterial ectasia (HCC)    Chronic low back pain    Hyperlipidemia    Hypothyroidism    Lumbar radiculopathy    Mass of parotid gland    Osteoarthritis of both hands    Dry cough    Reactive airway disease    Muscle strain of left upper back    Pneumonia of right middle lobe due to Streptococcus pneumoniae (HCC)       Objective     /60   Pulse 84   Temp 98 °F (36 7 °C)   Ht 5' 7" (1 702 m)   Wt 84 2 kg (185 lb 10 oz)   BMI 29 07 kg/m²     Physical Exam   Constitutional: He appears well-developed and well-nourished  No distress  HENT:   Head: Normocephalic and atraumatic  Eyes: Conjunctivae are normal  Right eye exhibits no discharge  Left eye exhibits no discharge  Neck: Neck supple  No JVD present  Cardiovascular: Normal rate and regular rhythm  No murmur heard  Pulmonary/Chest: No respiratory distress  He has no wheezes  Abdominal: Soft  He exhibits no distension  There is no tenderness  There is no rebound and no guarding  Musculoskeletal: Normal range of motion  He exhibits no edema  Neurological: He is alert  No cranial nerve deficit  Skin: Skin is warm and dry  No rash noted  He is not diaphoretic  No erythema  Current Medications     Current Outpatient Prescriptions:     albuterol (PROVENTIL HFA,VENTOLIN HFA) 90 mcg/act inhaler, Inhale 2 puffs every 6 (six) hours as needed for wheezing, Disp: 1 Inhaler, Rfl: 0    benzonatate (TESSALON PERLES) 100 mg capsule, Take 1 capsule (100 mg total) by mouth 3 (three) times a day as needed for cough, Disp: 20 capsule, Rfl: 0    CVS ARTHRITIS PAIN RELIEF 650 MG CR tablet, Take 650 mg by mouth 4 (four) times a day as needed, Disp: , Rfl: 6    levothyroxine 150 mcg tablet, Take 1 tablet (150 mcg total) by mouth 2 (two) times a week Take 3 tablets on monday, and 2 tablets on Thursday  , Disp: 60 tablet, Rfl: 0    naproxen (EC NAPROSYN) 500 MG EC tablet, Take 1 tablet (500 mg total) by mouth 2 (two) times a day with meals, Disp: 60 tablet, Rfl: 1    Skin Protectants, Misc  (EUCERIN) cream, Apply topically as needed for wound care, Disp: 397 g, Rfl: 0    dextromethorphan 7 5 MG/5ML SYRP, Take 5 mL (7 5 mg total) by mouth every 6 (six) hours as needed for cough, Disp: 60 mL, Rfl: 0    Health Maintenance     Health Maintenance   Topic Date Due    CRC Screening: Colonoscopy  1970    Pneumococcal PPSV23 Medium Risk Adult (1 of 1 - PPSV23) 07/13/1987    INFLUENZA VACCINE  07/01/2018    Depression Screening PHQ  08/02/2019    DTaP,Tdap,and Td Vaccines (2 - Td) 03/15/2028     Immunization History   Administered Date(s) Administered     Influenza (IM) Preservative Free 03/15/2018    Influenza TIV (IM) 11/18/2015    TD (adult) Preservative Free 01/20/2009    Tdap 03/15/2018       Alessandra GARCÍA    Internal Medicine PGY-3  11/30/2018 1:23 PM

## 2019-01-15 ENCOUNTER — TELEPHONE (OUTPATIENT)
Dept: INTERNAL MEDICINE CLINIC | Facility: CLINIC | Age: 49
End: 2019-01-15

## 2019-01-15 DIAGNOSIS — E03.9 HYPOTHYROIDISM: Primary | ICD-10-CM

## 2019-01-15 NOTE — TELEPHONE ENCOUNTER
Patient has a follow up appointment in March  He would like to have his blood work done before  He knows he needs to have his thyroid and cholesterol checked  Please let him know when this is ordered  We can call him after 2pm today

## 2019-03-18 ENCOUNTER — OFFICE VISIT (OUTPATIENT)
Dept: INTERNAL MEDICINE CLINIC | Facility: CLINIC | Age: 49
End: 2019-03-18

## 2019-03-18 VITALS
SYSTOLIC BLOOD PRESSURE: 110 MMHG | BODY MASS INDEX: 30.31 KG/M2 | HEIGHT: 67 IN | HEART RATE: 80 BPM | TEMPERATURE: 97.5 F | DIASTOLIC BLOOD PRESSURE: 78 MMHG | WEIGHT: 193.12 LBS

## 2019-03-18 DIAGNOSIS — E03.9 HYPOTHYROIDISM: ICD-10-CM

## 2019-03-18 DIAGNOSIS — S39.012A STRAIN OF LUMBAR PARASPINAL MUSCLE: Primary | ICD-10-CM

## 2019-03-18 DIAGNOSIS — R05.3 CHRONIC COUGH: ICD-10-CM

## 2019-03-18 PROCEDURE — 99213 OFFICE O/P EST LOW 20 MIN: CPT | Performed by: INTERNAL MEDICINE

## 2019-03-18 RX ORDER — MELOXICAM 7.5 MG/1
7.5 TABLET ORAL DAILY
Qty: 7 TABLET | Refills: 0 | Status: SHIPPED | OUTPATIENT
Start: 2019-03-18 | End: 2019-12-23

## 2019-03-18 RX ORDER — METHOCARBAMOL 500 MG/1
500 TABLET, FILM COATED ORAL 4 TIMES DAILY
Qty: 56 TABLET | Refills: 0 | Status: SHIPPED | OUTPATIENT
Start: 2019-03-18 | End: 2019-12-23

## 2019-03-18 RX ORDER — BENZONATATE 100 MG/1
100 CAPSULE ORAL 3 TIMES DAILY PRN
Qty: 20 CAPSULE | Refills: 0 | Status: SHIPPED | OUTPATIENT
Start: 2019-03-18 | End: 2019-10-21 | Stop reason: SDUPTHER

## 2019-03-18 RX ORDER — LEVOTHYROXINE SODIUM 0.15 MG/1
150 TABLET ORAL 2 TIMES WEEKLY
Qty: 60 TABLET | Refills: 0 | Status: SHIPPED | OUTPATIENT
Start: 2019-03-18 | End: 2019-05-21 | Stop reason: SDUPTHER

## 2019-03-18 NOTE — PROGRESS NOTES
INTERNAL MEDICINE FOLLOW-UP OFFICE VISIT  [facility]  [address] [place] [address]    NAME: Isabel Andino  AGE: 46 y o  SEX: male    DATE OF ENCOUNTER: 3/18/2019    Assessment and Plan   Patient is 69-year-old male with past medical history of allergic rhinitis, hypothyroidism, chronic low back pain, hyperlipidemia who presents for general follow-up  Hypothyrodism  Last TSH was 34, T4 was 1 27 in 01/18  At that time, levothyroxine was increased from 112 5 to 150  Patient has not got labs since recent increase in medication because he states he has too "busy as a single parent"  Currently patient has no hypothyroid symptoms and no obvious goiter on exam   · Patient advised to get TSH/T4 completed in order to titrate medications appropriately   · Refilled levothyroxine 150 micrograms  Patient is taking twice a week on Monday and Wednesday on anti stomach before breakfast    Lumbar muscle strain  Patient fell and slipped on ice  Tender to palpation of the left lower back, no radicular signs  Responded mildly to Tylenol  Physical exam relatively benign, negative straight leg test or motor/sensory deficits  · Prescribed course of Mobic and Robaxin  · Patient can continued Tylenol  · Ice/heat to affected area    Health maintenance  · Patient still has yet to get colonoscopy, informed him of the importance  · Flu vaccine given in clinic today      Diagnoses and all orders for this visit:    Strain of lumbar paraspinal muscle  -     methocarbamol (ROBAXIN) 500 mg tablet; Take 1 tablet (500 mg total) by mouth 4 (four) times a day  -     meloxicam (MOBIC) 7 5 mg tablet; Take 1 tablet (7 5 mg total) by mouth daily    Hypothyroidism  -     levothyroxine 150 mcg tablet; Take 1 tablet (150 mcg total) by mouth 2 (two) times a week Take 3 tablets on monday, and 2 tablets on Thursday  Chronic cough  -     benzonatate (TESSALON PERLES) 100 mg capsule;  Take 1 capsule (100 mg total) by mouth 3 (three) times a day as needed for cough        No orders of the defined types were placed in this encounter       - Counseling Documentation: patient was counseled regarding: diagnostic results, instructions for management, risk factor reductions and prognosis    Chief Complaint     Chief Complaint   Patient presents with    Follow-up     physical; cough; leg pain       History of Present Illness     Patient is 64-year-old male with past medical history of allergic rhinitis, hypothyroidism, chronic low back pain, hyperlipidemia who presents for general follow-up  Patient fell 1 week ago on black ice and landed on his left lower back  Since that time he had persistent 5/10 back pain, sharp in quality, no radiation, worsened with      The following portions of the patient's history were reviewed and updated as appropriate: allergies, current medications, past family history, past medical history, past social history, past surgical history and problem list     Review of Systems     ROS  CONSTITUTIONAL: Denies any fever, chills, rigors, and weight loss  HEENT: No earache or tinnitus  Denies hearing loss  CARDIOVASCULAR: No chest pain or palpitations  RESPIRATORY: Denies any cough, hemoptysis, shortness of breath or dyspnea on exertion  GASTROINTESTINAL: Denies abdominal pain, nausea, vomitng   NEUROLOGIC: No dizziness or vertigo, denies headaches  MUSCULOSKELETAL: positive for back pain  SKIN: Denies skin rashes or itching            Active Problem List     Patient Active Problem List   Diagnosis    Allergic rhinitis    Arterial ectasia (HCC)    Chronic low back pain    Hyperlipidemia    Hypothyroidism    Lumbar radiculopathy    Mass of parotid gland    Osteoarthritis of both hands    Dry cough    Reactive airway disease    Muscle strain of left upper back    Pneumonia of right middle lobe due to Streptococcus pneumoniae ([facility] [address] )       Objective     There were no vitals taken for this visit  Physical Exam:   GENERAL: NAD  HEENT:  NC/AT, PERRL, EOMI, MMM, no scleral icterus  CARDIAC:  RRR, +S1/S2, no S3/S4 heard, no m/g/r  PULMONARY:  CTA B/L, no wheezing/rales/rhonci, non-labored breathing  ABDOMEN:  Soft, NT/ND, +BS, no rebound/guarding/rigidity  Extremities:  2+ Pulses in DP/PT  No edema, cyanosis, or clubbing  Back:  No signs of trauma on inspection  Tenderness to palpation of the left lower back  Full range of motion of the back  No motor or sensory deficits  Negative straight leg test     Pertinent Laboratory/Diagnostic Studies:  CBC:   Lab Results   Component Value Date/Time    WBC 8 41 01/08/2018 08:21 AM    RBC 4 57 01/08/2018 08:21 AM    HGB 14 0 01/08/2018 08:21 AM    HCT 41 7 01/08/2018 08:21 AM    MCV 91 01/08/2018 08:21 AM    MCH 30 6 01/08/2018 08:21 AM    MCHC 33 6 01/08/2018 08:21 AM    RDW 14 2 01/08/2018 08:21 AM    MPV 10 9 01/08/2018 08:21 AM     01/08/2018 08:21 AM    NRBC 0 06/10/2017 12:09 PM    LYMPHOPCT 24 06/10/2017 12:09 PM    MONOPCT 8 06/10/2017 12:09 PM    EOSPCT 0 06/10/2017 12:09 PM    BASOPCT 1 06/10/2017 12:09 PM    EOSABS 0 00 06/10/2017 12:09 PM     Chemistry Profile:       Invalid input(s): EXTSODIUM, EXTPOTASSIUM, EXTCO2, EXTANIONGAP, EXTBUN, EXTCREAT, EXTGLUBLD, GLU, SLAMBGLUCOSE, EXTCALCIUM, CAADJUST, ALBUMIN, SERUMALBUMIN, EXTEGFR    Current Medications     Current Outpatient Medications:     albuterol (PROVENTIL HFA,VENTOLIN HFA) 90 mcg/act inhaler, Inhale 2 puffs every 6 (six) hours as needed for wheezing, Disp: 1 Inhaler, Rfl: 0    levothyroxine 150 mcg tablet, Take 1 tablet (150 mcg total) by mouth 2 (two) times a week Take 3 tablets on monday, and 2 tablets on Thursday  , Disp: 60 tablet, Rfl: 0    benzonatate (TESSALON PERLES) 100 mg capsule, Take 1 capsule (100 mg total) by mouth 3 (three) times a day as needed for cough (Patient not taking: Reported on 3/18/2019), Disp: 20 capsule, Rfl: 0    [employer] ARTHRITIS PAIN RELIEF 650 MG CR tablet, Take 650 mg by mouth 4 (four) times a day as needed, Disp: , Rfl: 6    guaiFENesin (ROBITUSSIN) 100 MG/5ML oral liquid, Take 10 mL (200 mg total) by mouth 3 (three) times a day as needed for cough (Patient not taking: Reported on 3/18/2019), Disp: 120 mL, Rfl: 0    naproxen (EC NAPROSYN) 500 MG EC tablet, Take 1 tablet (500 mg total) by mouth 2 (two) times a day with meals (Patient not taking: Reported on 3/18/2019), Disp: 60 tablet, Rfl: 1    Skin Protectants, Misc  (EUCERIN) cream, Apply topically as needed for wound care (Patient not taking: Reported on 3/18/2019), Disp: 397 g, Rfl: 0    Health Maintenance     Health Maintenance   Topic Date Due    CRC Screening: Colonoscopy  1970    BMI: Followup Plan  07/13/1986    Pneumococcal PPSV23 Medium Risk Adult (1 of 1 - PPSV23) 07/13/1987    INFLUENZA VACCINE  07/01/2018    Depression Screening PHQ  08/02/2019    BMI: Adult  11/30/2019    DTaP,Tdap,and Td Vaccines (2 - Td) 03/15/2028    HEPATITIS B VACCINES  Aged Out     Immunization History   Administered Date(s) Administered     Influenza (IM) Preservative Free 03/15/2018    Influenza TIV (IM) 11/18/2015    TD (adult) Preservative Free 01/20/2009    Tdap 03/15/2018       BELKYS Johnson    Internal Medicine PGY-2  3/18/2019 9:32 AM

## 2019-03-20 ENCOUNTER — APPOINTMENT (OUTPATIENT)
Dept: LAB | Facility: HOSPITAL | Age: 49
End: 2019-03-20
Payer: COMMERCIAL

## 2019-03-20 ENCOUNTER — TRANSCRIBE ORDERS (OUTPATIENT)
Dept: LAB | Facility: HOSPITAL | Age: 49
End: 2019-03-20

## 2019-03-20 DIAGNOSIS — E03.9 HYPOTHYROIDISM: ICD-10-CM

## 2019-03-20 LAB
T3FREE SERPL-MCNC: 2.99 PG/ML (ref 2.3–4.2)
TSH SERPL DL<=0.05 MIU/L-ACNC: 0.48 UIU/ML (ref 0.36–3.74)

## 2019-03-20 PROCEDURE — 84443 ASSAY THYROID STIM HORMONE: CPT

## 2019-03-20 PROCEDURE — 84481 FREE ASSAY (FT-3): CPT

## 2019-03-20 PROCEDURE — 36415 COLL VENOUS BLD VENIPUNCTURE: CPT

## 2019-03-21 NOTE — PROGRESS NOTES
TSH returned as 0 483  I erroneously looked at old TSH during my previous visit  Given patient normal labs can continue current dose of Levothyroxine as stated during my previous progress note  Will re-address during next visit  Will duyen ayoub

## 2019-04-19 DIAGNOSIS — E03.9 HYPOTHYROIDISM: ICD-10-CM

## 2019-04-19 RX ORDER — LEVOTHYROXINE SODIUM 0.15 MG/1
TABLET ORAL
Qty: 60 TABLET | Refills: 2 | Status: SHIPPED | OUTPATIENT
Start: 2019-04-19 | End: 2019-04-26

## 2019-04-26 ENCOUNTER — TELEPHONE (OUTPATIENT)
Dept: INTERNAL MEDICINE CLINIC | Facility: CLINIC | Age: 49
End: 2019-04-26

## 2019-04-26 DIAGNOSIS — E03.9 HYPOTHYROIDISM: ICD-10-CM

## 2019-04-29 ENCOUNTER — TELEPHONE (OUTPATIENT)
Dept: INTERNAL MEDICINE CLINIC | Facility: CLINIC | Age: 49
End: 2019-04-29

## 2019-04-30 ENCOUNTER — TELEPHONE (OUTPATIENT)
Dept: INTERNAL MEDICINE CLINIC | Facility: CLINIC | Age: 49
End: 2019-04-30

## 2019-05-21 DIAGNOSIS — E03.9 HYPOTHYROIDISM: ICD-10-CM

## 2019-05-21 RX ORDER — LEVOTHYROXINE SODIUM 0.15 MG/1
TABLET ORAL
Qty: 60 TABLET | Refills: 0 | Status: SHIPPED | OUTPATIENT
Start: 2019-05-21 | End: 2019-07-02 | Stop reason: SDUPTHER

## 2019-06-27 ENCOUNTER — APPOINTMENT (OUTPATIENT)
Dept: LAB | Facility: HOSPITAL | Age: 49
End: 2019-06-27
Payer: COMMERCIAL

## 2019-06-27 ENCOUNTER — TRANSCRIBE ORDERS (OUTPATIENT)
Dept: LAB | Facility: HOSPITAL | Age: 49
End: 2019-06-27

## 2019-06-27 LAB
CHOLEST SERPL-MCNC: 186 MG/DL (ref 50–200)
HDLC SERPL-MCNC: 40 MG/DL (ref 40–60)
LDLC SERPL CALC-MCNC: 117 MG/DL (ref 0–100)
NONHDLC SERPL-MCNC: 146 MG/DL
TRIGL SERPL-MCNC: 147 MG/DL

## 2019-06-27 PROCEDURE — 80061 LIPID PANEL: CPT

## 2019-06-27 PROCEDURE — 36415 COLL VENOUS BLD VENIPUNCTURE: CPT

## 2019-07-02 DIAGNOSIS — E03.9 HYPOTHYROIDISM: ICD-10-CM

## 2019-07-02 RX ORDER — LEVOTHYROXINE SODIUM 0.15 MG/1
TABLET ORAL
Qty: 60 TABLET | Refills: 0 | Status: SHIPPED | OUTPATIENT
Start: 2019-07-02 | End: 2019-07-30 | Stop reason: SDUPTHER

## 2019-07-15 ENCOUNTER — TELEPHONE (OUTPATIENT)
Dept: INTERNAL MEDICINE CLINIC | Facility: CLINIC | Age: 49
End: 2019-07-15

## 2019-07-30 DIAGNOSIS — E03.9 HYPOTHYROIDISM: ICD-10-CM

## 2019-07-30 RX ORDER — LEVOTHYROXINE SODIUM 0.15 MG/1
TABLET ORAL
Qty: 60 TABLET | Refills: 0 | Status: SHIPPED | OUTPATIENT
Start: 2019-07-30 | End: 2019-09-09 | Stop reason: SDUPTHER

## 2019-08-21 DIAGNOSIS — E03.9 HYPOTHYROIDISM: ICD-10-CM

## 2019-08-21 RX ORDER — LEVOTHYROXINE SODIUM 0.15 MG/1
TABLET ORAL
Qty: 60 TABLET | Refills: 0 | OUTPATIENT
Start: 2019-08-21

## 2019-09-09 DIAGNOSIS — E03.9 HYPOTHYROIDISM: ICD-10-CM

## 2019-09-09 RX ORDER — LEVOTHYROXINE SODIUM 0.15 MG/1
TABLET ORAL
Qty: 60 TABLET | Refills: 0 | Status: SHIPPED | OUTPATIENT
Start: 2019-09-09 | End: 2019-11-03 | Stop reason: SDUPTHER

## 2019-09-09 NOTE — TELEPHONE ENCOUNTER
Patient called requesting refills of this medication  Patient states he only has three pills left as he did not  his last refill  Patient stated he is "a single parent" and could not tell me the last time he picked up this medication  Patient stated he takes his medication two days a week (3 on Monday and 2 on Thursday) and stated that he does the best he can with this medication

## 2019-10-21 ENCOUNTER — OFFICE VISIT (OUTPATIENT)
Dept: INTERNAL MEDICINE CLINIC | Facility: CLINIC | Age: 49
End: 2019-10-21

## 2019-10-21 VITALS
WEIGHT: 186.95 LBS | DIASTOLIC BLOOD PRESSURE: 70 MMHG | HEART RATE: 80 BPM | BODY MASS INDEX: 29.34 KG/M2 | SYSTOLIC BLOOD PRESSURE: 94 MMHG | TEMPERATURE: 97.3 F | HEIGHT: 67 IN

## 2019-10-21 DIAGNOSIS — J30.9 ALLERGIC RHINITIS, UNSPECIFIED SEASONALITY, UNSPECIFIED TRIGGER: ICD-10-CM

## 2019-10-21 DIAGNOSIS — Z12.11 COLON CANCER SCREENING: ICD-10-CM

## 2019-10-21 DIAGNOSIS — R05.9 COUGH: Primary | ICD-10-CM

## 2019-10-21 DIAGNOSIS — J45.909 REACTIVE AIRWAY DISEASE WITHOUT COMPLICATION, UNSPECIFIED ASTHMA SEVERITY, UNSPECIFIED WHETHER PERSISTENT: ICD-10-CM

## 2019-10-21 DIAGNOSIS — Z91.89 PNEUMOCOCCAL VACCINATION INDICATED: ICD-10-CM

## 2019-10-21 DIAGNOSIS — Z23 NEED FOR INFLUENZA VACCINATION: ICD-10-CM

## 2019-10-21 DIAGNOSIS — R05.3 CHRONIC COUGH: ICD-10-CM

## 2019-10-21 DIAGNOSIS — J40 BRONCHITIS: ICD-10-CM

## 2019-10-21 PROCEDURE — 3725F SCREEN DEPRESSION PERFORMED: CPT | Performed by: INTERNAL MEDICINE

## 2019-10-21 PROCEDURE — 90682 RIV4 VACC RECOMBINANT DNA IM: CPT | Performed by: INTERNAL MEDICINE

## 2019-10-21 PROCEDURE — 99214 OFFICE O/P EST MOD 30 MIN: CPT | Performed by: INTERNAL MEDICINE

## 2019-10-21 PROCEDURE — 1036F TOBACCO NON-USER: CPT | Performed by: INTERNAL MEDICINE

## 2019-10-21 PROCEDURE — 3008F BODY MASS INDEX DOCD: CPT | Performed by: INTERNAL MEDICINE

## 2019-10-21 PROCEDURE — 90732 PPSV23 VACC 2 YRS+ SUBQ/IM: CPT | Performed by: INTERNAL MEDICINE

## 2019-10-21 PROCEDURE — 90472 IMMUNIZATION ADMIN EACH ADD: CPT | Performed by: INTERNAL MEDICINE

## 2019-10-21 PROCEDURE — 90471 IMMUNIZATION ADMIN: CPT | Performed by: INTERNAL MEDICINE

## 2019-10-21 RX ORDER — BENZONATATE 100 MG/1
100 CAPSULE ORAL 3 TIMES DAILY PRN
Qty: 20 CAPSULE | Refills: 0 | Status: SHIPPED | OUTPATIENT
Start: 2019-10-21 | End: 2020-06-04 | Stop reason: SDUPTHER

## 2019-10-21 RX ORDER — CETIRIZINE HYDROCHLORIDE 10 MG/1
10 TABLET ORAL
Qty: 30 TABLET | Refills: 2 | Status: SHIPPED | OUTPATIENT
Start: 2019-10-21 | End: 2020-07-23 | Stop reason: ALTCHOICE

## 2019-10-21 RX ORDER — ALBUTEROL SULFATE 90 UG/1
2 AEROSOL, METERED RESPIRATORY (INHALATION) EVERY 6 HOURS PRN
Qty: 1 INHALER | Refills: 0 | Status: SHIPPED | OUTPATIENT
Start: 2019-10-21 | End: 2020-10-20

## 2019-10-21 NOTE — PROGRESS NOTES
INTERNAL MEDICINE FOLLOW-UP OFFICE VISIT  [facility]  [address] [place] [address]    NAME: Lesly Escobar  AGE: 44 y o  SEX: male    DATE OF ENCOUNTER: 10/21/2019    Assessment and Plan     1  Cough  - acute and relatively uncontrolled  - etiology is secondary to allergic rhinitis versus viral  - likely secondary to allergies as patient reports experiencing worsening of his cough every year roughly around the same time for the last 11 years   - will treat with supportive care for the time being, namely encourage regular use of his Robitussin and Tessalon Perles with as needed use of his albuterol inhaler  - he is encouraged to call the office in 2 weeks if his symptoms do not improve  - otherwise will see him for his next regularly scheduled follow-up examination in November  - guaiFENesin (ROBITUSSIN) 100 MG/5ML oral liquid; Take 10 mL (200 mg total) by mouth 3 (three) times a day as needed for cough  Dispense: 120 mL; Refill: 0    2  Allergic rhinitis, unspecified seasonality, unspecified trigger  - patient denies any overt allergic symptoms but exhibits pale nasal mucosa on exam  - given chronicity and regularity of symptoms, his cough is likely secondary to environmental allergy exposure  - will start cetirizine 10 mg q h s  and encourage patient to take this medication until we are firmly within winter and the frost has notified all of the ragweed in air  - cetirizine (ZyrTEC) 10 mg tablet; Take 1 tablet (10 mg total) by mouth daily at bedtime  Dispense: 30 tablet; Refill: 2    3  Need for influenza vaccination  - flu vaccine given in clinic today  - influenza vaccine, [id number], quadrivalent, recombinant, PF, 0 5 mL, for patients 18 yr+ (FLUBLOK)    4  Pneumococcal vaccination indicated  - Pneumovax given in clinic today  - PNEUMOCOCCAL POLYSACCHARIDE VACCINE 23-VALENT =>1YO SQ IM    5  Bronchitis  - treatment outlined as in 1    Above  - albuterol (PROVENTIL HFA,VENTOLIN HFA) 90 mcg/act inhaler; Inhale 2 puffs every 6 (six) hours as needed for wheezing  Dispense: 1 Inhaler; Refill: 0    6  Chronic cough  - treatment outlined as in 1  Above  - benzonatate (TESSALON PERLES) 100 mg capsule; Take 1 capsule (100 mg total) by mouth 3 (three) times a day as needed for cough  Dispense: 20 capsule; Refill: 0    7  Colon cancer screening  - patient is willing to receive screening colonoscopy, however he will only do so after February when his funds are "less tight"  - will replace another referral to GI  - will require close follow-up at subsequent visits  - Ambulatory referral to Gastroenterology; Future    8  Reactive airway disease without complication, unspecified asthma severity, unspecified whether persistent  - spacer for albuterol inhaler provided in clinic to patient today  - Spacer Device for Inhaler    No orders of the defined types were placed in this encounter       - Counseling Documentation: patient was counseled regarding: diagnostic results, instructions for management, risk factor reductions, prognosis, patient and family education, impressions, risks and benefits of treatment options and importance of compliance with treatment  - Counseling Time: counseling time more than 50% of visit: 45 minutes  - Medication Side Effects: Adverse side effects of medications were reviewed with the patient/guardian today  Chief Complaint     Chief Complaint   Patient presents with    Cough     usually has it once a year , the cough is familiar to bronchitis,        History of Present Illness     Rachel Guillory is a 57-year-old male with past medical history significant for hypothyroidism, hyperlipidemia, allergic rhinitis, and chronic low back pain who presents to clinic today for a same-day acute visit regarding a chief complaint of cough  Last visit 03/18/2019 for regularly scheduled follow-up    PCP is Josiah Justice MD     Patient reports a dry cough that has been present for last 1 week  He has a chronic dry cough at baseline, but current presentation has been worse than normal   He denies any sputum production nor did she have the sensation of needing to clear his throat or bring up any mucus  He reports he had 1 episode of feeling feverish at the onset of symptoms that broke overnight, however he does not have a thermometer at home so he did not record a temperature  He denies any recurrent fevers since then  He denies any chills or night sweats  His also denies any shortness of breath or wheezing  Denies chest pain, pleuritic pain, palpitations, abdominal pain, nausea, vomiting, diarrhea, constipation, muscle aches or pains  Denies any recent sick contacts, recent travel, or unusual exposures  Over this last week, he has been attempting to manage his symptoms by regularly using his prescribed Tessalon Perles, albuterol inhaler, and Robitussin  He does not expressly report any relief with any of these medications, however he is only considering improvement to mean complete resolution of symptoms as opposed to lessening of the burden of his cough  Because of this relatively poor insight, it is difficult to assess whether his symptoms have worsened or improved  As result of attempting these medications, he is currently running low on his Tessalon Perles and Robitussin  With regard to his routine health maintenance, he is due for both this year's flu vaccine as well as Pneumovax  After discussion, he is willing to obtain both vaccines today  He is also overdue for screening colonoscopy  He is willing to obtain this exam after this coming February, when his financial situation is more amenable to seeing another provider        The following portions of the patient's history were reviewed and updated as appropriate: allergies, current medications, past family history, past medical history, past social history, past surgical history and problem list     Review of Systems     Review of Systems   Constitutional: Negative for chills, diaphoresis, fatigue and fever  HENT: Negative for congestion, hearing loss, postnasal drip, rhinorrhea, sinus pressure, sinus pain, sore throat and voice change  Eyes: Negative for visual disturbance  Respiratory: Positive for cough  Negative for chest tightness, shortness of breath and wheezing  See HPI  Cardiovascular: Negative for chest pain, palpitations and leg swelling  Gastrointestinal: Negative for abdominal pain, constipation, diarrhea, nausea and vomiting  Genitourinary: Negative for difficulty urinating, dysuria, frequency and urgency  Musculoskeletal: Negative for arthralgias, joint swelling and myalgias  Neurological: Negative for dizziness, weakness, light-headedness, numbness and headaches  Active Problem List     Patient Active Problem List   Diagnosis    Allergic rhinitis    Arterial ectasia (HCC)    Chronic low back pain    Hyperlipidemia    Hypothyroidism    Lumbar radiculopathy    Mass of parotid gland    Osteoarthritis of both hands    Dry cough    Reactive airway disease    Muscle strain of left upper back    Pneumonia of right middle lobe due to Streptococcus pneumoniae (HCC)       Objective     BP 94/70 (BP Location: Left arm, Patient Position: Sitting, Cuff Size: Adult)   Pulse 80   Temp (!) 97 3 °F (36 3 °C) (Oral)   Ht 5' 7" (1 702 m)   Wt 84 8 kg (186 lb 15 2 oz)   BMI 29 28 kg/m²     Physical Exam   Constitutional: He is oriented to person, place, and time  He appears well-developed and well-nourished  No distress  HENT:   Head: Normocephalic and atraumatic  Nose: No rhinorrhea, nose lacerations, sinus tenderness or nasal deformity  Right sinus exhibits no maxillary sinus tenderness and no frontal sinus tenderness  Left sinus exhibits no maxillary sinus tenderness and no frontal sinus tenderness  Mouth/Throat: No oropharyngeal exudate (No overt cobblestoning noted)  Pale nasal mucosa right worse than left   Eyes: Pupils are equal, round, and reactive to light  Conjunctivae are normal  No scleral icterus  Neck: Neck supple  Cardiovascular: Normal rate, regular rhythm, normal heart sounds and intact distal pulses  Exam reveals no gallop and no friction rub  No murmur heard  Pulmonary/Chest: Effort normal and breath sounds normal  No stridor  No respiratory distress  He has no wheezes  He has no rales  Abdominal: Soft  Bowel sounds are normal  He exhibits no distension and no mass  There is no tenderness  There is no guarding  Musculoskeletal: He exhibits no edema, tenderness or deformity  Lymphadenopathy:     He has no cervical adenopathy  Neurological: He is alert and oriented to person, place, and time  Skin: Skin is warm and dry  Capillary refill takes less than 2 seconds  He is not diaphoretic  Nursing note and vitals reviewed  Pertinent Laboratory/Diagnostic Studies:  Xr Chest 2 Views    Result Date: 11/17/2018  Impression: Question new mild right middle lobe infiltrate   Workstation performed: [id number]       Images and diagnostics reviewed     Current Medications     Current Outpatient Medications:     albuterol (PROVENTIL HFA,VENTOLIN HFA) 90 mcg/act inhaler, Inhale 2 puffs every 6 (six) hours as needed for wheezing, Disp: 1 Inhaler, Rfl: 0    benzonatate (TESSALON PERLES) 100 mg capsule, Take 1 capsule (100 mg total) by mouth 3 (three) times a day as needed for cough, Disp: 20 capsule, Rfl: 0    guaiFENesin (ROBITUSSIN) 100 MG/5ML oral liquid, Take 10 mL (200 mg total) by mouth 3 (three) times a day as needed for cough, Disp: 120 mL, Rfl: 0    levothyroxine 150 mcg tablet, Take 3 tablets on Monday and 2 tablets on Thursday, Disp: 60 tablet, Rfl: 0    CVS ARTHRITIS PAIN RELIEF 650 MG CR tablet, Take 650 mg by mouth 4 (four) times a day as needed, Disp: , Rfl: 6    meloxicam (MOBIC) 7 5 mg tablet, Take 1 tablet (7 5 mg total) by mouth daily (Patient not taking: Reported on 10/21/2019), Disp: 7 tablet, Rfl: 0    methocarbamol (ROBAXIN) 500 mg tablet, Take 1 tablet (500 mg total) by mouth 4 (four) times a day (Patient not taking: Reported on 10/21/2019), Disp: 56 tablet, Rfl: 0    naproxen (EC NAPROSYN) 500 MG EC tablet, Take 1 tablet (500 mg total) by mouth 2 (two) times a day with meals (Patient not taking: Reported on 3/18/2019), Disp: 60 tablet, Rfl: 1    Skin Protectants, Misc  (EUCERIN) cream, Apply topically as needed for wound care (Patient not taking: Reported on 3/18/2019), Disp: 397 g, Rfl: 0    Health Maintenance     Health Maintenance   Topic Date Due    CRC Screening: Colonoscopy  1970    Pneumococcal Vaccine: Pediatrics (0 to 5 Years) and At-Risk Patients (6 to 59 Years) (1 of 1 - PPSV23) 07/13/1974    BMI: Followup Plan  07/13/1986    INFLUENZA VACCINE  07/01/2019    Depression Screening PHQ  08/02/2019    BMI: Adult  03/18/2020    DTaP,Tdap,and Td Vaccines (2 - Td) 03/15/2028    Pneumococcal Vaccine: 65+ Years (1 of 2 - PCV13) 07/13/2033    HEPATITIS B VACCINES  Aged Out     Immunization History   Administered Date(s) Administered     Influenza (IM) Preservative Free 03/15/2018    Influenza TIV (IM) 11/18/2015    TD (adult) Preservative Free 01/20/2009    Tdap 03/15/2018       GINNY Washington  Internal Medicine PGY-1  [address] [facility] , [address]  Office: [phone]  Fax: [phone]

## 2019-10-21 NOTE — PATIENT INSTRUCTIONS
For your cough, please take your ROBITUSSIN and BENZONATATE regularly as schedule on the bottle for the next 2 weeks  If your cough does not improve at that time, please call our office to let us know  For your allergies, which are contributing to your cough, please take your CETIRIZINE as prescribed at night every day  Please take this medicine every day until the middle of winter (December/January)  You are also being provided with a referral for gastroenterology (GI)  Please call their office in February to schedule a screening colonoscopy

## 2019-11-03 DIAGNOSIS — E03.9 HYPOTHYROIDISM: ICD-10-CM

## 2019-11-04 RX ORDER — LEVOTHYROXINE SODIUM 0.15 MG/1
TABLET ORAL
Qty: 60 TABLET | Refills: 0 | Status: SHIPPED | OUTPATIENT
Start: 2019-11-04 | End: 2019-11-20 | Stop reason: SDUPTHER

## 2019-11-20 DIAGNOSIS — E03.9 HYPOTHYROIDISM: ICD-10-CM

## 2019-11-20 DIAGNOSIS — R05.9 COUGH: ICD-10-CM

## 2019-11-21 RX ORDER — LEVOTHYROXINE SODIUM 0.15 MG/1
TABLET ORAL
Qty: 60 TABLET | Refills: 0 | Status: SHIPPED | OUTPATIENT
Start: 2019-11-21 | End: 2019-12-23 | Stop reason: SDUPTHER

## 2019-12-20 RX ORDER — SENNOSIDES 8.6 MG
TABLET ORAL
COMMUNITY
Start: 2019-10-21 | End: 2021-10-13 | Stop reason: SDUPTHER

## 2019-12-20 RX ORDER — GUAIFENESIN 100 MG/5ML
LIQUID ORAL
COMMUNITY
Start: 2019-10-21 | End: 2021-10-13

## 2019-12-23 ENCOUNTER — OFFICE VISIT (OUTPATIENT)
Dept: INTERNAL MEDICINE CLINIC | Facility: CLINIC | Age: 49
End: 2019-12-23

## 2019-12-23 VITALS
TEMPERATURE: 98 F | HEART RATE: 98 BPM | SYSTOLIC BLOOD PRESSURE: 100 MMHG | DIASTOLIC BLOOD PRESSURE: 62 MMHG | WEIGHT: 190.04 LBS | BODY MASS INDEX: 29.83 KG/M2 | HEIGHT: 67 IN

## 2019-12-23 DIAGNOSIS — K60.3 ANAL FISTULA: Primary | ICD-10-CM

## 2019-12-23 DIAGNOSIS — S29.012A MUSCLE STRAIN OF LEFT UPPER BACK: ICD-10-CM

## 2019-12-23 DIAGNOSIS — H61.20 CERUMEN IMPACTION: ICD-10-CM

## 2019-12-23 DIAGNOSIS — Z12.11 COLON CANCER SCREENING: ICD-10-CM

## 2019-12-23 DIAGNOSIS — E03.9 HYPOTHYROIDISM: ICD-10-CM

## 2019-12-23 PROCEDURE — 99213 OFFICE O/P EST LOW 20 MIN: CPT | Performed by: INTERNAL MEDICINE

## 2019-12-23 PROCEDURE — 1036F TOBACCO NON-USER: CPT | Performed by: INTERNAL MEDICINE

## 2019-12-23 PROCEDURE — 3008F BODY MASS INDEX DOCD: CPT | Performed by: INTERNAL MEDICINE

## 2019-12-23 RX ORDER — ACETAMINOPHEN 650 MG
650 TABLET, EXTENDED RELEASE ORAL 4 TIMES DAILY PRN
Qty: 90 TABLET | Refills: 0 | Status: SHIPPED | OUTPATIENT
Start: 2019-12-23 | End: 2020-05-28 | Stop reason: SDUPTHER

## 2019-12-23 RX ORDER — LEVOTHYROXINE SODIUM 0.15 MG/1
TABLET ORAL
Qty: 90 TABLET | Refills: 3 | Status: SHIPPED | OUTPATIENT
Start: 2019-12-23 | End: 2020-01-02

## 2019-12-23 NOTE — PROGRESS NOTES
INTERNAL MEDICINE FOLLOW-UP OFFICE VISIT  [facility]  [address] [place] [address]    NAME: Hellen Rapp  AGE: 44 y o  SEX: male    DATE OF ENCOUNTER: 12/23/2019    Assessment and Plan   Patient is 78-year-old male with past medical history of allergic rhinitis, hypothyroidism, chronic low back pain, hyperlipidemia who presents for general follow-up  Fistula  Patient has anal fistula on PE leaking clear liquid fluid  Symptoms started roughly 8 weeks ago end week it has occurred intermittently  Did not examine the area stating was hard to see, however has noted the his underwear has been soaked intermittently  Poor historian regarding details  · Colorectal Surgery Referral    Cerumen Impaction  · I disimpacted both ear today in clinic with significant relief in ear fullnss and hearing     Hypothyrodism  Last TSH 0 483 earlier this year  Denies any hypo or hyperthyroid symptoms  · Refilled levothyroxine 150 micrograms  Patient is taking twice a week on Monday and Wednesday on anti stomach before breakfast     Hx of Lumbar muscle strain  Improved with Tyelnol  · Refilled Tylenol     Health maintenance  · Patient still has yet to get colonoscopy, informed him of the importance  · Up-to-date on flu and pneumonia vaccine  · I recommend shingles vaccine as outpatient pharmacy  Patient will consider but currently with no funds  Diagnoses and all orders for this visit:    Anal fistula  -     Ambulatory referral to Colorectal Surgery; Future    Muscle strain of left upper back  -     [facility] ARTHRITIS PAIN RELIEF 650 MG CR tablet; Take 1 tablet (650 mg total) by mouth 4 (four) times a day as needed for moderate pain    Colon cancer screening  -     Ambulatory Referral to GI Endoscopy;  Future    Hypothyroidism  -     levothyroxine 150 mcg tablet; twice a week on Monday(3 tabs) and Thursday(2 tabs) on anti stomach before breakfast    Cerumen impaction    Other orders  -     [facility] TUSSIN ADULT CHEST CONGEST 100 MG/5ML oral liquid  -     DIABETIC TUSSIN  MG/5ML oral liquid        Orders Placed This Encounter   Procedures    Ambulatory Referral to GI Endoscopy    Ambulatory referral to Colorectal Surgery       Is the pt diabetic? no  Have they been on started on Metformin? no    - Counseling Documentation: patient was counseled regarding: diagnostic results, instructions for management, risk factor reductions and prognosis    Chief Complaint     Chief Complaint   Patient presents with    Follow-up     ear check       History of Present Illness     HPI  Patient is 70-year-old male with past medical history of allergic rhinitis, hypothyroidism, chronic low back pain, hyperlipidemia who presents for general follow-up  Patient reports he feels fluid leakage around gluteal cleft area for the past few weeks intermittently  Denies any associated symptoms include abdominal pain, nausea, vomiting, change in bowel movements, fever, chills, previous similar symptoms  Patient also complains about ear fullness and cerumen impaction in both ears  Please see assessment plan for further details  The following portions of the patient's history were reviewed and updated as appropriate: allergies, current medications, past family history, past medical history, past social history, past surgical history and problem list     Review of Systems     ROS  CONSTITUTIONAL: Denies any fever, chills, rigors, and weight loss  HEENT: No earache or tinnitus  Denies hearing loss  CARDIOVASCULAR: No chest pain or palpitations  RESPIRATORY: Denies any cough, hemoptysis, shortness of breath or dyspnea on exertion  GASTROINTESTINAL: Denies abdominal pain, nausea, vomitng   NEUROLOGIC: No dizziness or vertigo, denies headaches  MUSCULOSKELETAL: Denies any muscle or joint pain  SKIN: Denies skin rashes or itching        All other ROS negative      Active Problem List     Patient Active Problem List   Diagnosis    Allergic rhinitis    Arterial ectasia (HCC)    Chronic low back pain    Hyperlipidemia    Hypothyroidism    Lumbar radiculopathy    Mass of parotid gland    Osteoarthritis of both hands    Dry cough    Reactive airway disease    Muscle strain of left upper back    Pneumonia of right middle lobe due to Streptococcus pneumoniae (HCC)       Objective     /62 (BP Location: Right arm, Patient Position: Sitting, Cuff Size: Adult)   Pulse 98   Temp 98 °F (36 7 °C) (Oral)   Ht 5' 7" (1 702 m)   Wt 86 2 kg (190 lb 0 6 oz)   BMI 29 76 kg/m²     Physical Exam:   GENERAL: NAD  HEENT:  NC/AT, PERRL, EOMI, MMM, no scleral icterus, no thyromegaly noted, no thyroid bruit noted, no LAD, no tracheal deviation, no exopthalmos,  CARDIAC:  RRR, +S1/S2, no S3/S4 heard, no m/g/r  PULMONARY:  CTA B/L, no wheezing/rales/rhonci, non-labored breathing  ABDOMEN:  Soft, NT/ND, +BS, no rebound/guarding/rigidity  Extremities:  2+ Pulses in DP/PT  No edema, cyanosis, or clubbing  NEUROLOGIC:  Alert/oriented x3  No motor or sensory deficits  SKIN:  No rashes or erythema  PSYCH: normal affct, no hallucinations, normal speech  Please see picture fistula below  Mild erythema around fistula with white clear discharge leaking out  No tenderness to palpation            Pertinent Laboratory/Diagnostic Studies:  CBC:   Lab Results   Component Value Date/Time    WBC 8 41 01/08/2018 08:21 AM    RBC 4 57 01/08/2018 08:21 AM    HGB 14 0 01/08/2018 08:21 AM    HCT 41 7 01/08/2018 08:21 AM    MCV 91 01/08/2018 08:21 AM    MCH 30 6 01/08/2018 08:21 AM    MCHC 33 6 01/08/2018 08:21 AM    RDW 14 2 01/08/2018 08:21 AM    MPV 10 9 01/08/2018 08:21 AM     01/08/2018 08:21 AM    NRBC 0 06/10/2017 12:09 PM    LYMPHOPCT 24 06/10/2017 12:09 PM    MONOPCT 8 06/10/2017 12:09 PM    EOSPCT 0 06/10/2017 12:09 PM    BASOPCT 1 06/10/2017 12:09 PM    EOSABS 0 00 06/10/2017 12:09 PM     Chemistry Profile: No results for input(s): NA, K, CL, CO2, ANIONGAP, BUN, CREATININE, GLUF, GLUC, GLUCOSE, CALCIUM, CORRECTEDCA, MG, PHOS, AST, ALT, ALKPHOS, PROT, BILITOT, EGFR, GFRAA, GFRNONAA, EGFRAA, EGFRNAA, EGFRNONAFA in the last 8784 hours  Invalid input(s): EXTSODIUM, EXTPOTASSIUM, EXTCO2, EXTANIONGAP, EXTBUN, EXTCREAT, EXTGLUBLD, GLU, SLAMBGLUCOSE, EXTCALCIUM, CAADJUST, ALBUMIN, SERUMALBUMIN, EXTEGFR    Current Medications     Current Outpatient Medications:     albuterol (PROVENTIL HFA,VENTOLIN HFA) 90 mcg/act inhaler, Inhale 2 puffs every 6 (six) hours as needed for wheezing, Disp: 1 Inhaler, Rfl: 0    benzonatate (TESSALON PERLES) 100 mg capsule, Take 1 capsule (100 mg total) by mouth 3 (three) times a day as needed for cough, Disp: 20 capsule, Rfl: 0    cetirizine (ZyrTEC) 10 mg tablet, Take 1 tablet (10 mg total) by mouth daily at bedtime, Disp: 30 tablet, Rfl: 2    levothyroxine 150 mcg tablet, twice a week on Monday(3 tabs) and Thursday(2 tabs) on anti stomach before breakfast, Disp: 90 tablet, Rfl: 3    CVS ARTHRITIS PAIN RELIEF 650 MG CR tablet, Take 1 tablet (650 mg total) by mouth 4 (four) times a day as needed for moderate pain, Disp: 90 tablet, Rfl: 0    CVS TUSSIN ADULT CHEST CONGEST 100 MG/5ML oral liquid, , Disp: , Rfl:     DIABETIC TUSSIN  MG/5ML oral liquid, , Disp: , Rfl:     guaiFENesin (ROBITUSSIN) 100 MG/5ML oral liquid, Take 10 mL (200 mg total) by mouth 3 (three) times a day as needed for cough (Patient not taking: Reported on 12/23/2019), Disp: 120 mL, Rfl: 0    Skin Protectants, Misc   (EUCERIN) cream, Apply topically as needed for wound care (Patient not taking: Reported on 3/18/2019), Disp: 397 g, Rfl: 0    Health Maintenance     Health Maintenance   Topic Date Due    CRC Screening: Colonoscopy  1970    HIV Screening  07/13/1983    BMI: Followup Plan  07/13/1986    Depression Screening PHQ  10/21/2020    BMI: Adult  12/23/2020    DTaP,Tdap,and Td Vaccines (2 - Td) 03/15/2028    Pneumococcal Vaccine: 65+ Years (1 of 2 - PCV13) 07/13/2033    Pneumococcal Vaccine: Pediatrics (0 to 5 Years) and At-Risk Patients (6 to 59 Years)  Completed    Influenza Vaccine  Completed    HIB Vaccine  Aged Out    Hepatitis B Vaccine  Aged Out    IPV Vaccine  Aged Out    Hepatitis A Vaccine  Aged Out    Meningococcal ACWY Vaccine  Aged Out    HPV Vaccine  Aged Out     Immunization History   Administered Date(s) Administered     Influenza (IM) Preservative Free 03/15/2018    Influenza TIV (IM) 11/18/2015    Influenza, recombinant, quadrivalent,injectable, preservative free 10/21/2019    Pneumococcal Polysaccharide PPV23 10/21/2019    TD (adult) Preservative Free 01/20/2009    Tdap 03/15/2018       BELKYS Sommers    Internal Medicine PGY-2  12/23/2019 11:41 AM

## 2020-01-01 DIAGNOSIS — E03.9 HYPOTHYROIDISM: ICD-10-CM

## 2020-01-02 RX ORDER — LEVOTHYROXINE SODIUM 0.15 MG/1
TABLET ORAL
Qty: 60 TABLET | Refills: 0 | Status: SHIPPED | OUTPATIENT
Start: 2020-01-02 | End: 2020-07-23 | Stop reason: SDUPTHER

## 2020-03-18 DIAGNOSIS — S29.012A MUSCLE STRAIN OF LEFT UPPER BACK: ICD-10-CM

## 2020-05-04 DIAGNOSIS — E03.9 HYPOTHYROIDISM: ICD-10-CM

## 2020-05-19 ENCOUNTER — TELEPHONE (OUTPATIENT)
Dept: INTERNAL MEDICINE CLINIC | Facility: CLINIC | Age: 50
End: 2020-05-19

## 2020-05-20 ENCOUNTER — TELEPHONE (OUTPATIENT)
Dept: INTERNAL MEDICINE CLINIC | Facility: CLINIC | Age: 50
End: 2020-05-20

## 2020-05-25 NOTE — PATIENT INSTRUCTIONS
Acute Cough   WHAT YOU NEED TO KNOW:   An acute cough can last up to 3 weeks  Common causes of an acute cough include a cold, allergies, or a lung infection  DISCHARGE INSTRUCTIONS:   Return to the emergency department if:   · You have trouble breathing or feel short of breath  · You cough up blood, or you see blood in your mucus  · You faint or feel weak or dizzy  · You have chest pain when you cough or take a deep breath  · You have new wheezing  Contact your healthcare provider if:   · You have a fever  · Your cough lasts longer than 4 weeks  · Your symptoms do not improve with treatment  · You have questions or concerns about your condition or care  Medicines:   · Medicines  may be needed to stop the cough, decrease swelling in your airways, or help open your airways  Medicine may also be given to help you cough up mucus  Ask your healthcare provider what over-the-counter medicines you can take  If you have an infection caused by bacteria, you may need antibiotics  · Take your medicine as directed  Contact your healthcare provider if you think your medicine is not helping or if you have side effects  Tell him or her if you are allergic to any medicine  Keep a list of the medicines, vitamins, and herbs you take  Include the amounts, and when and why you take them  Bring the list or the pill bottles to follow-up visits  Carry your medicine list with you in case of an emergency  Manage your symptoms:   · Do not smoke and stay away from others who smoke  Nicotine and other chemicals in cigarettes and cigars can cause lung damage and make your cough worse  Ask your healthcare provider for information if you currently smoke and need help to quit  E-cigarettes or smokeless tobacco still contain nicotine  Talk to your healthcare provider before you use these products  · Drink extra liquids as directed  Liquids will help thin and loosen mucus so you can cough it up   Liquids will Subjective:       Patient ID: Andre Padgett is a 46 y.o. male.    Chief Complaint: Nasal Congestion    HPI   Had covid detected in March.  Slight cough, diarrhea, fever, achy joints.    Saw Dr Diop May 11 with headaches andsinus symptoms. CT of sinuses 5/11- mild mucosal thickening paranasal .  He was tx with augmentin. No nasal drainage at present.  Some post nasal drip.  Taking flonase.  Irrigates with saline.     He is concerned about recurrent fever this weekend.98.8 Friday.  Sat 101.Sunday 99.7.  Afebrile today.    Nose congested later in day.  Not sob, but takes more effort to inspire into anterior chest .    CXR normal May 9 .  Not wheezing.    3 days ago was biking and had no sob.   Fatigued this am.    He is also concerned by wt loss.   26 lb wt loss from Decmber.  Very anxious.  Takes xanax- 1 to 1.5 tabs to sleep.    Recent labs reviewed.   He would like CXR repeated, as he fears pneumonia.      Review of Systems   Constitutional: Negative for activity change and unexpected weight change.   Respiratory: Negative for chest tightness and shortness of breath.    Cardiovascular: Negative for chest pain and leg swelling.   Gastrointestinal: Negative for abdominal pain.   Neurological: Negative for headaches.   Psychiatric/Behavioral: Negative for dysphoric mood.       Objective:      Physical Exam   Constitutional: He is oriented to person, place, and time. He appears well-developed and well-nourished.   Eyes: No scleral icterus.   Neck: No JVD present. No thyromegaly present.   Cardiovascular: Normal rate, regular rhythm and normal heart sounds.   Pulmonary/Chest: Effort normal and breath sounds normal. No respiratory distress. He has no wheezes. He has no rales.   Abdominal: Soft. He exhibits no mass. There is no tenderness.   Musculoskeletal: He exhibits no edema.   Neurological: He is alert and oriented to person, place, and time.   Psychiatric: His behavior is normal.   anxious       Lab Results    Component Value Date    WBC 8.40 05/25/2020    HGB 15.7 05/25/2020    HCT 48.1 05/25/2020     05/25/2020    CHOL 161 12/02/2019    TRIG 147 12/02/2019    HDL 56 12/02/2019    ALT 21 05/05/2020    AST 18 05/05/2020     05/05/2020    K 3.5 05/05/2020     05/05/2020    CREATININE 0.8 05/05/2020    BUN 9 05/05/2020    CO2 25 05/05/2020    TSH 1.502 02/21/2019    INR 1.3 (H) 01/21/2017    HGBA1C 5.4 02/21/2019     Assessment:       1. Fever, unspecified fever cause    2. Hypocalcemia    3. Anemia, unspecified type    4. History of non anemic vitamin B12 deficiency    5. Weight loss    6. Dyspnea, unspecified type        Plan:       Andre was seen today for nasal congestion.    Diagnoses and all orders for this visit:    Fever, unspecified fever cause  -     X-Ray Chest PA And Lateral; Future  -     CBC auto differential; Future  -     C-reactive protein; Future    Hypocalcemia  -     Vitamin D; Future    Anemia, unspecified type    History of non anemic vitamin B12 deficiency  -     Methylmalonic Acid, Serum; Future    Weight loss  -     TSH; Future    Dyspnea, unspecified type    Other orders  -     Cancel: X-Ray Chest PA And Lateral; Future          also help prevent dehydration  Examples of good liquids to drink include water, fruit juice, and broth  Do not drink liquids that contain caffeine  Caffeine can increase your risk for dehydration  Ask your healthcare provider how much liquid to drink each day  · Rest as directed  Do not do activities that make your cough worse, such as exercise  · Use a humidifier or vaporizer  Use a cool mist humidifier or a vaporizer to increase air moisture in your home  This may make it easier for you to breathe and help decrease your cough  · Eat 2 to 5 mL of honey 2 times each day  Honey can help thin mucus and decrease your cough  · Use cough drops or lozenges  These can help decrease throat irritation and your cough  Follow up with your healthcare provider as directed:  Write down your questions so you remember to ask them during your visits  © 2017 [employer] Information is for End User's use only and may not be sold, redistributed or otherwise used for commercial purposes  All illustrations and images included in CareNotes® are the copyrighted property of [employer]  or [employer]  The above information is an  only  It is not intended as medical advice for individual conditions or treatments  Talk to your doctor, nurse or pharmacist before following any medical regimen to see if it is safe and effective for you

## 2020-05-28 DIAGNOSIS — S29.012A MUSCLE STRAIN OF LEFT UPPER BACK: ICD-10-CM

## 2020-05-28 RX ORDER — ACETAMINOPHEN 650 MG
650 TABLET, EXTENDED RELEASE ORAL 4 TIMES DAILY PRN
Qty: 90 TABLET | Refills: 1 | Status: SHIPPED | OUTPATIENT
Start: 2020-05-28 | End: 2020-11-12 | Stop reason: SDUPTHER

## 2020-06-03 ENCOUNTER — TELEPHONE (OUTPATIENT)
Dept: INTERNAL MEDICINE CLINIC | Facility: CLINIC | Age: 50
End: 2020-06-03

## 2020-06-04 ENCOUNTER — OFFICE VISIT (OUTPATIENT)
Dept: INTERNAL MEDICINE CLINIC | Facility: CLINIC | Age: 50
End: 2020-06-04

## 2020-06-04 VITALS
HEART RATE: 101 BPM | WEIGHT: 194.89 LBS | SYSTOLIC BLOOD PRESSURE: 106 MMHG | DIASTOLIC BLOOD PRESSURE: 68 MMHG | TEMPERATURE: 97 F | OXYGEN SATURATION: 97 % | BODY MASS INDEX: 30.52 KG/M2

## 2020-06-04 DIAGNOSIS — E03.9 HYPOTHYROIDISM, UNSPECIFIED TYPE: Primary | ICD-10-CM

## 2020-06-04 DIAGNOSIS — R05.3 CHRONIC COUGH: ICD-10-CM

## 2020-06-04 DIAGNOSIS — E78.5 HYPERLIPIDEMIA, UNSPECIFIED HYPERLIPIDEMIA TYPE: ICD-10-CM

## 2020-06-04 PROCEDURE — 1036F TOBACCO NON-USER: CPT | Performed by: INTERNAL MEDICINE

## 2020-06-04 PROCEDURE — 99213 OFFICE O/P EST LOW 20 MIN: CPT | Performed by: INTERNAL MEDICINE

## 2020-06-04 RX ORDER — BENZONATATE 100 MG/1
100 CAPSULE ORAL 3 TIMES DAILY PRN
Qty: 30 CAPSULE | Refills: 0 | Status: SHIPPED | OUTPATIENT
Start: 2020-06-04 | End: 2020-07-23 | Stop reason: ALTCHOICE

## 2020-07-05 RX ORDER — ACETAMINOPHEN 650 MG
650 TABLET, EXTENDED RELEASE ORAL 4 TIMES DAILY PRN
Qty: 90 TABLET | Refills: 0 | OUTPATIENT
Start: 2020-07-05

## 2020-07-05 RX ORDER — LEVOTHYROXINE SODIUM 0.15 MG/1
TABLET ORAL
Qty: 60 TABLET | Refills: 0 | OUTPATIENT
Start: 2020-07-05

## 2020-07-07 ENCOUNTER — HOSPITAL ENCOUNTER (EMERGENCY)
Facility: HOSPITAL | Age: 50
Discharge: HOME | End: 2020-07-07
Attending: EMERGENCY MEDICINE | Admitting: EMERGENCY MEDICINE
Payer: COMMERCIAL

## 2020-07-07 VITALS
DIASTOLIC BLOOD PRESSURE: 61 MMHG | TEMPERATURE: 98.3 F | HEART RATE: 86 BPM | BODY MASS INDEX: 29.76 KG/M2 | SYSTOLIC BLOOD PRESSURE: 102 MMHG | OXYGEN SATURATION: 95 % | WEIGHT: 190 LBS | RESPIRATION RATE: 18 BRPM

## 2020-07-07 DIAGNOSIS — M54.50 ACUTE RIGHT-SIDED LOW BACK PAIN WITHOUT SCIATICA: Primary | ICD-10-CM

## 2020-07-07 PROCEDURE — 99283 EMERGENCY DEPT VISIT LOW MDM: CPT

## 2020-07-07 PROCEDURE — 99284 EMERGENCY DEPT VISIT MOD MDM: CPT | Performed by: EMERGENCY MEDICINE

## 2020-07-07 RX ORDER — NAPROXEN 500 MG/1
500 TABLET ORAL 2 TIMES DAILY WITH MEALS
Qty: 10 TABLET | Refills: 0 | Status: SHIPPED | OUTPATIENT
Start: 2020-07-07 | End: 2020-12-08 | Stop reason: ALTCHOICE

## 2020-07-07 RX ORDER — LIDOCAINE 50 MG/G
1 PATCH TOPICAL ONCE
Status: DISCONTINUED | OUTPATIENT
Start: 2020-07-07 | End: 2020-07-07 | Stop reason: HOSPADM

## 2020-07-07 RX ORDER — METHOCARBAMOL 500 MG/1
1000 TABLET, FILM COATED ORAL 2 TIMES DAILY
Qty: 8 TABLET | Refills: 0 | Status: SHIPPED | OUTPATIENT
Start: 2020-07-07 | End: 2020-07-23 | Stop reason: SDUPTHER

## 2020-07-07 RX ORDER — IBUPROFEN 600 MG/1
600 TABLET ORAL ONCE
Status: COMPLETED | OUTPATIENT
Start: 2020-07-07 | End: 2020-07-07

## 2020-07-07 RX ADMIN — IBUPROFEN 600 MG: 600 TABLET, FILM COATED ORAL at 13:14

## 2020-07-07 RX ADMIN — LIDOCAINE 1 PATCH: 50 PATCH TOPICAL at 13:14

## 2020-07-07 NOTE — ED PROVIDER NOTES
History  Chief Complaint   Patient presents with    Back Pain     Pt reports right sided back pain for "a couple of weeks" Pt reports he thinks it is from working out at home  Pt tried heating pads and tylenol at home with some releif      Oconnor Dara is a 44y o  year old male with PMH of chronic low back pain presenting to the [facility] ED for right side low back pain  Symptoms have been present for several weeks  Symptoms are unchanged today but present to ED as he has not had relief or improvement at home  Discomfort in the right side of lower back  Onset after working out at home  Worse in morning  Nonraditaing  Patient has taken 650mg tylenol daily and applied heat to the area  Patient is able to ambulate  Patient denies bowel/bladder incontinence, saddle anesthesia  Patient denies recent trauma to the area  Denies unexplained fever/night sweats, weight loss, neurologic symptoms, urinary retention  Denies Hx of IVDU  No history of chronic steroids, nor hx of cancer (prostate, renal, lung)  Patient denies fevers, chest pain, dyspnea, cough, abdominal pain, leg pain/swelling  History provided by:  Patient   used: No    Back Pain   Associated symptoms: no abdominal pain, no chest pain, no dysuria, no fever, no headaches, no numbness and no weakness        Prior to Admission Medications   Prescriptions Last Dose Informant Patient Reported? Taking? [facility]S ARTHRITIS PAIN RELIEF 650 MG CR tablet   No No   Sig: Take 1 tablet (650 mg total) by mouth 4 (four) times a day as needed for moderate pain   [facility]S TUSSIN ADULT CHEST CONGEST 100 MG/5ML oral liquid   Yes No   DIABETIC TUSSIN  MG/5ML oral liquid   Yes No   Skin Protectants, Misc   (EUCERIN) cream   No No   Sig: Apply topically as needed for wound care   Patient not taking: Reported on 3/18/2019   albuterol (PROVENTIL HFA,VENTOLIN HFA) 90 mcg/act inhaler More than a month at Unknown time  No No   Sig: Inhale 2 puffs every 6 (six) hours as needed for wheezing   benzonatate (TESSALON PERLES) 100 mg capsule More than a month at Unknown time  No No   Sig: Take 1 capsule (100 mg total) by mouth 3 (three) times a day as needed for cough   Patient not taking: Reported on 7/7/2020   cetirizine (ZyrTEC) 10 mg tablet   No No   Sig: Take 1 tablet (10 mg total) by mouth daily at bedtime   Patient not taking: Reported on 6/4/2020   guaiFENesin (ROBITUSSIN) 100 MG/5ML oral liquid   No No   Sig: Take 10 mL (200 mg total) by mouth 3 (three) times a day as needed for cough   Patient not taking: Reported on 12/23/2019   levothyroxine 150 mcg tablet 7/6/2020 at Unknown time  No Yes   Sig: TWICE A WEEK ON MONDAY(3 TABS) AND THURSDAY(2 TABS) ON ANTI STOMACH BEFORE BREAKFAST      Facility-Administered Medications: None       Past Medical History:   Diagnosis Date    Chronic cough     Hypoparathyroidism (HCC)     continue taking calcium and vitamin D, will check CMP, PTH level and Vitamin D level       Past Surgical History:   Procedure Laterality Date    FRACTURE SURGERY      Open Treatment of Each Proximal Finger Phalanx       Family History   Problem Relation Age of Onset    No Known Problems Mother     No Known Problems Father     No Known Problems Sister     No Known Problems Brother     No Known Problems Son     No Known Problems Daughter     No Known Problems Maternal Grandmother     No Known Problems Maternal Grandfather     No Known Problems Paternal Grandmother     No Known Problems Paternal Grandfather     No Known Problems Maternal Aunt     No Known Problems Maternal Uncle     No Known Problems Paternal Aunt     No Known Problems Paternal Uncle     No Known Problems Cousin      I have reviewed and agree with the history as documented      E-Cigarette/Vaping     E-Cigarette/Vaping Substances     Social History     Tobacco Use    Smoking status: Former Smoker    Smokeless tobacco: Never Used    Tobacco comment: pt "tried it when he was a teenager but did not like them"   Substance Use Topics    Alcohol use: Yes     Comment: occasionally    Drug use: No        Review of Systems   Constitutional: Negative for chills, fatigue and fever  HENT: Negative for congestion, nosebleeds and rhinorrhea  Eyes: Negative for visual disturbance  Respiratory: Negative for cough, chest tightness, shortness of breath and wheezing  Cardiovascular: Negative for chest pain, palpitations and leg swelling  Gastrointestinal: Negative for abdominal distention, abdominal pain, diarrhea, nausea and vomiting  Endocrine: Negative for polyuria  Genitourinary: Negative for difficulty urinating, dysuria and hematuria  Musculoskeletal: Positive for back pain  Negative for arthralgias, gait problem and joint swelling  Skin: Negative for rash  Neurological: Negative for seizures, syncope, weakness, light-headedness, numbness and headaches  Hematological: Does not bruise/bleed easily  Psychiatric/Behavioral: Negative for behavioral problems and confusion  All other systems reviewed and are negative  Physical Exam  ED Triage Vitals [07/07/20 1224]   Temperature Pulse Respirations Blood Pressure SpO2   98 3 °F (36 8 °C) 86 18 102/61 95 %      Temp Source Heart Rate Source Patient Position - Orthostatic VS BP Location FiO2 (%)   Oral Monitor Lying Left arm --      Pain Score       5             Orthostatic Vital Signs  Vitals:    07/07/20 1224   BP: 102/61   Pulse: 86   Patient Position - Orthostatic VS: Lying       Physical Exam   Constitutional: He is oriented to person, place, and time  He appears well-developed and well-nourished  No distress  HENT:   Head: Normocephalic and atraumatic  Eyes: Right eye exhibits no discharge  Left eye exhibits no discharge  Cardiovascular: Normal rate and regular rhythm  Pulmonary/Chest: Effort normal and breath sounds normal  No respiratory distress  He has no wheezes  He has no rales     Abdominal: Soft  Bowel sounds are normal  He exhibits no distension  There is no tenderness  There is no rebound and no guarding  Musculoskeletal:        Right hip: He exhibits no tenderness  Left hip: He exhibits no tenderness  Cervical back: He exhibits no tenderness  Thoracic back: He exhibits no tenderness  Lumbar back: He exhibits tenderness (right paraspinal), edema and spasm (right paraspinal)  He exhibits no bony tenderness  Neurological: He is alert and oriented to person, place, and time  5/5 strength b/l UE/LE  Negative straight leg raise b/l  Skin: Skin is warm  Capillary refill takes less than 2 seconds  No rash noted  He is not diaphoretic  Psychiatric: He has a normal mood and affect  Nursing note and vitals reviewed  ED Medications  Medications   ibuprofen (MOTRIN) tablet 600 mg (600 mg Oral Given 7/7/20 1314)       Diagnostic Studies  Results Reviewed     None                 No orders to display         Procedures  Procedures      ED Course       US AUDIT      Most Recent Value   Initial Alcohol Screen: US AUDIT-C    1  How often do you have a drink containing alcohol?  0 Filed at: 07/07/2020 1222   2  How many drinks containing alcohol do you have on a typical day you are drinking? 0 Filed at: 07/07/2020 1222   3a  Male UNDER 65: How often do you have five or more drinks on one occasion? 0 Filed at: 07/07/2020 1222   Audit-C Score  0 Filed at: 07/07/2020 1222                  MANOJ/DAST-10      Most Recent Value   How many times in the past year have you    Used an illegal drug or used a prescription medication for non-medical reasons? Never Filed at: 07/07/2020 1222                              MDM  Number of Diagnoses or Management Options  Acute right-sided low back pain without sciatica:   Diagnosis management comments: 44 y o  male presenting for low back pain  No red flag symptoms   Right paraspinal muscle spasm on exam   Will treat with lidocaine patch and motrin  Patient ambulatory in ED prior to discharge  Patient instructed to take prescribed naproxen and robaxin as needed for discomfort  Patient instructed to RTED immediately if they develop fevers, weakness, incontinence, anesthesia or any other symptoms  The patient was also provided written after visit summary with return precautions  I have discussed with the patient our plan to discharge them from the ED and the patient is in agreement with this plan  I have educated the patient on pertinent lab/imaging results and answered their questions  Return to the ED precautions given  I have also discussed with the patient plans for follow up with PCP and comprehensive spine  Amount and/or Complexity of Data Reviewed  Review and summarize past medical records: yes    Patient Progress  Patient progress: stable        Disposition  Final diagnoses:   Acute right-sided low back pain without sciatica     Time reflects when diagnosis was documented in both MDM as applicable and the Disposition within this note     Time User Action Codes Description Comment    7/7/2020  1:03 PM Edwina Araceli Add [M54 5] Acute right-sided low back pain without sciatica       ED Disposition     ED Disposition Condition Date/Time Comment    Discharge Stable Tue Jul 7, 2020  1:02 PM Rhoda Art discharge to home/self care  Follow-up Information     Follow up With Specialties Details Why Sarah Louise MD Gastroenterology, Internal Medicine Call  To make appointment for reevaluation in 3-5 days  [address]       [facility] Physical Therapy Call  To make appointment for reevaluation in 3-5 days   [phone]          Discharge Medication List as of 7/7/2020  1:50 PM      START taking these medications    Details   methocarbamol (ROBAXIN) 500 mg tablet Take 2 tablets (1,000 mg total) by mouth 2 (two) times a day for 2 days, Starting Tue 7/7/2020, Until Thu 7/9/2020, Print      naproxen (NAPROSYN) 500 mg tablet Take 1 tablet (500 mg total) by mouth 2 (two) times a day with meals for 5 days, Starting Tue 7/7/2020, Until Sun 7/12/2020, Print         CONTINUE these medications which have NOT CHANGED    Details   levothyroxine 150 mcg tablet TWICE A WEEK ON MONDAY(3 TABS) AND THURSDAY(2 TABS) ON ANTI STOMACH BEFORE BREAKFAST, Normal      albuterol (PROVENTIL HFA,VENTOLIN HFA) 90 mcg/act inhaler Inhale 2 puffs every 6 (six) hours as needed for wheezing, Starting Mon 10/21/2019, Until Tue 10/20/2020, Normal      benzonatate (TESSALON PERLES) 100 mg capsule Take 1 capsule (100 mg total) by mouth 3 (three) times a day as needed for cough, Starting Thu 6/4/2020, Normal      cetirizine (ZyrTEC) 10 mg tablet Take 1 tablet (10 mg total) by mouth daily at bedtime, Starting Mon 10/21/2019, Normal      CVS ARTHRITIS PAIN RELIEF 650 MG CR tablet Take 1 tablet (650 mg total) by mouth 4 (four) times a day as needed for moderate pain, Starting Thu 5/28/2020, Normal      !! CVS TUSSIN ADULT CHEST CONGEST 100 MG/5ML oral liquid Starting Mon 10/21/2019, Historical Med      !! DIABETIC TUSSIN  MG/5ML oral liquid Starting Mon 10/21/2019, Historical Med      !! guaiFENesin (ROBITUSSIN) 100 MG/5ML oral liquid Take 10 mL (200 mg total) by mouth 3 (three) times a day as needed for cough, Starting Thu 11/21/2019, Normal      Skin Protectants, Misc  (EUCERIN) cream Apply topically as needed for wound care, Starting Thu 8/23/2018, Normal       !! - Potential duplicate medications found  Please discuss with provider  No discharge procedures on file  PDMP Review     None           ED Provider  Attending physically available and evaluated Sheng Mars  ILIANA managed the patient along with the ED Attending      Electronically Signed by Meme Steward 162, DO  07/08/20 9438

## 2020-07-07 NOTE — ED ATTENDING ATTESTATION
7/7/2020  IHernan MD, saw and evaluated the patient  I have discussed the patient with the resident and agree with the resident's findings, Plan of Care, and MDM as documented in the resident's note, unless otherwise documented below  All available laboratory and imaging studies were reviewed by myself  I was present for key portions of any procedure(s) performed by the resident and I was immediately available to provide assistance  I agree with the current assessment done in the Emergency Department  I have conducted an independent evaluation of this patient  44 y o  male with PMH of hypoparathyroidism and prior low back pain presenting with right sided lumbar back pain for the past 1 week  Patient reports trying to exercise indoors during Covid-19 outbreak and doing leg lifts while lying on his bed  He is wondering if this may have led to him developing a right lumbar back pain  He denies pain, reports it is just a discomfort  He has been taking Tylenol every morning and it completely alleviates pain for a big part of the day, however, discomfort then returns  No pain, weakness or numbness radiating down either leg  No prior trauma  No prior surgeries  No difficulty with bowel or bladder control  No history of CA  Ten systems reviewed and negative except for back discomfort as above  Physical Exam  Vitals:    07/07/20 1224   BP: 102/61   TempSrc: Oral   Pulse: 86   Resp: 18   Patient Position - Orthostatic VS: Lying   Temp: 98 3 °F (36 8 °C)     Constitutional:  Awake, alert, oriented  No acute distress  HEENT:  Normocephalic, atraumatic  Sclera anicteric, conjunctiva not injected  Moist oral mucosa  Cardiac:  Appears well-perfused  Respiratory:  Breathing comfortably on room air  Abdomen:  Nondistended  BS present  Soft, non-tender  No rebound or guarding  Extremities:  Tender to palpation over right paraspinal lumbar muscle group  No midline T or L or S tenderness   No SI joint tenderness bilaterally  5/5 strength in hip flexors/quadriceps, hamstrings, tibialis anterior, gastroc soleus, EHL and FHL  2+ patellar tendon reflexes bilaterally  SILT throughout cutaneous nn distributions of BLE  No deformities, no edema  Integument:  No rashes or exposed areas, cap refill less than 2 seconds  Neurologic:  Awake, alert, and oriented x3  Nonfocal exam   Psychiatric:  Normal affect    ED Course  Medications   lidocaine (LIDODERM) 5 % patch 1 patch (1 patch Topical Medication Applied 7/7/20 1314)   ibuprofen (MOTRIN) tablet 600 mg (600 mg Oral Given 7/7/20 1314)     44 y o  male presenting with right sided lumbar back pain  VS reviewed, WNL  No red flags such as h/o IVDU, DM, steroid use, cancer, trauma  No red flags such as saddle anesthesia, difficulty with bowel or bladder control  Exam most consistent with right paraspinal lumbar muscle group strain  Recommend NSAIDS and tylenol, may use lidocaine patches OTC or lidocaine cream, will provide with several days worth of Robaxin for muscle spasms, PRN  Follow up with PCP and with comprehensive spine program  RTED PRN or if symptoms worsen      Clinical Impression  Final diagnoses:   Acute right-sided low back pain without sciatica       Patient's Medications   Discharge Prescriptions    METHOCARBAMOL (ROBAXIN) 500 MG TABLET    Take 2 tablets (1,000 mg total) by mouth 2 (two) times a day for 2 days       Start Date: 7/7/2020  End Date: 7/9/2020       Order Dose: 1,000 mg       Quantity: 8 tablet    Refills: 0    NAPROXEN (NAPROSYN) 500 MG TABLET    Take 1 tablet (500 mg total) by mouth 2 (two) times a day with meals for 5 days       Start Date: 7/7/2020  End Date: 7/12/2020       Order Dose: 500 mg       Quantity: 10 tablet    Refills: 0

## 2020-07-07 NOTE — DISCHARGE INSTRUCTIONS
You have been seen for low back pain  Please take tylenol as needed for discomfort  Return to the emergency department if you develop worsening pain, weakness, fevers or any other concerning symptoms  Please follow up with your PCP and comprehensive spine by calling the number provided

## 2020-07-22 ENCOUNTER — APPOINTMENT (OUTPATIENT)
Dept: LAB | Facility: HOSPITAL | Age: 50
End: 2020-07-22
Payer: COMMERCIAL

## 2020-07-22 DIAGNOSIS — E78.5 HYPERLIPIDEMIA, UNSPECIFIED HYPERLIPIDEMIA TYPE: ICD-10-CM

## 2020-07-22 DIAGNOSIS — E03.9 HYPOTHYROIDISM, UNSPECIFIED TYPE: ICD-10-CM

## 2020-07-22 LAB
ALBUMIN SERPL BCP-MCNC: 3.5 G/DL (ref 3.5–5)
ALP SERPL-CCNC: 87 U/L (ref 46–116)
ALT SERPL W P-5'-P-CCNC: 51 U/L (ref 12–78)
ANION GAP SERPL CALCULATED.3IONS-SCNC: 10 MMOL/L (ref 4–13)
AST SERPL W P-5'-P-CCNC: 39 U/L (ref 5–45)
BASOPHILS # BLD AUTO: 0.04 THOUSANDS/ΜL (ref 0–0.1)
BASOPHILS NFR BLD AUTO: 1 % (ref 0–1)
BILIRUB SERPL-MCNC: 0.63 MG/DL (ref 0.2–1)
BUN SERPL-MCNC: 14 MG/DL (ref 5–25)
CALCIUM SERPL-MCNC: 7.4 MG/DL (ref 8.3–10.1)
CHLORIDE SERPL-SCNC: 105 MMOL/L (ref 100–108)
CHOLEST SERPL-MCNC: 280 MG/DL (ref 50–200)
CO2 SERPL-SCNC: 26 MMOL/L (ref 21–32)
CREAT SERPL-MCNC: 1.03 MG/DL (ref 0.6–1.3)
EOSINOPHIL # BLD AUTO: 0.27 THOUSAND/ΜL (ref 0–0.61)
EOSINOPHIL NFR BLD AUTO: 4 % (ref 0–6)
ERYTHROCYTE [DISTWIDTH] IN BLOOD BY AUTOMATED COUNT: 14.1 % (ref 11.6–15.1)
GFR SERPL CREATININE-BSD FRML MDRD: 83 ML/MIN/1.73SQ M
GLUCOSE P FAST SERPL-MCNC: 78 MG/DL (ref 65–99)
HCT VFR BLD AUTO: 43.8 % (ref 36.5–49.3)
HDLC SERPL-MCNC: 46 MG/DL
HGB BLD-MCNC: 14.4 G/DL (ref 12–17)
IMM GRANULOCYTES # BLD AUTO: 0.04 THOUSAND/UL (ref 0–0.2)
IMM GRANULOCYTES NFR BLD AUTO: 1 % (ref 0–2)
LDLC SERPL CALC-MCNC: 210 MG/DL (ref 0–100)
LYMPHOCYTES # BLD AUTO: 2.74 THOUSANDS/ΜL (ref 0.6–4.47)
LYMPHOCYTES NFR BLD AUTO: 36 % (ref 14–44)
MCH RBC QN AUTO: 31 PG (ref 26.8–34.3)
MCHC RBC AUTO-ENTMCNC: 32.9 G/DL (ref 31.4–37.4)
MCV RBC AUTO: 94 FL (ref 82–98)
MONOCYTES # BLD AUTO: 0.67 THOUSAND/ΜL (ref 0.17–1.22)
MONOCYTES NFR BLD AUTO: 9 % (ref 4–12)
NEUTROPHILS # BLD AUTO: 3.76 THOUSANDS/ΜL (ref 1.85–7.62)
NEUTS SEG NFR BLD AUTO: 49 % (ref 43–75)
NRBC BLD AUTO-RTO: 0 /100 WBCS
PLATELET # BLD AUTO: 174 THOUSANDS/UL (ref 149–390)
PMV BLD AUTO: 11.5 FL (ref 8.9–12.7)
POTASSIUM SERPL-SCNC: 3.9 MMOL/L (ref 3.5–5.3)
PROT SERPL-MCNC: 7.8 G/DL (ref 6.4–8.2)
RBC # BLD AUTO: 4.65 MILLION/UL (ref 3.88–5.62)
SODIUM SERPL-SCNC: 141 MMOL/L (ref 136–145)
T3 SERPL-MCNC: 0.7 NG/ML (ref 0.6–1.8)
T4 FREE SERPL-MCNC: 0.62 NG/DL (ref 0.76–1.46)
TRIGL SERPL-MCNC: 120 MG/DL
TSH SERPL DL<=0.05 MIU/L-ACNC: 7.21 UIU/ML (ref 0.36–3.74)
WBC # BLD AUTO: 7.52 THOUSAND/UL (ref 4.31–10.16)

## 2020-07-22 PROCEDURE — 84443 ASSAY THYROID STIM HORMONE: CPT

## 2020-07-22 PROCEDURE — 84480 ASSAY TRIIODOTHYRONINE (T3): CPT

## 2020-07-22 PROCEDURE — 36415 COLL VENOUS BLD VENIPUNCTURE: CPT

## 2020-07-22 PROCEDURE — 84439 ASSAY OF FREE THYROXINE: CPT

## 2020-07-22 PROCEDURE — 80061 LIPID PANEL: CPT

## 2020-07-22 PROCEDURE — 85025 COMPLETE CBC W/AUTO DIFF WBC: CPT

## 2020-07-22 PROCEDURE — 80053 COMPREHEN METABOLIC PANEL: CPT

## 2020-07-23 ENCOUNTER — OFFICE VISIT (OUTPATIENT)
Dept: INTERNAL MEDICINE CLINIC | Facility: CLINIC | Age: 50
End: 2020-07-23

## 2020-07-23 VITALS
DIASTOLIC BLOOD PRESSURE: 70 MMHG | BODY MASS INDEX: 31.42 KG/M2 | WEIGHT: 200.62 LBS | HEART RATE: 94 BPM | TEMPERATURE: 97 F | SYSTOLIC BLOOD PRESSURE: 100 MMHG

## 2020-07-23 DIAGNOSIS — M19.90 ARTHRITIS: Primary | ICD-10-CM

## 2020-07-23 DIAGNOSIS — M54.50 ACUTE RIGHT-SIDED LOW BACK PAIN WITHOUT SCIATICA: ICD-10-CM

## 2020-07-23 DIAGNOSIS — E03.9 HYPOTHYROIDISM: ICD-10-CM

## 2020-07-23 PROCEDURE — 1036F TOBACCO NON-USER: CPT | Performed by: INTERNAL MEDICINE

## 2020-07-23 PROCEDURE — 99213 OFFICE O/P EST LOW 20 MIN: CPT | Performed by: INTERNAL MEDICINE

## 2020-07-23 RX ORDER — METHOCARBAMOL 500 MG/1
500 TABLET, FILM COATED ORAL 2 TIMES DAILY
Qty: 14 TABLET | Refills: 0 | Status: SHIPPED | OUTPATIENT
Start: 2020-07-23 | End: 2021-07-06 | Stop reason: ALTCHOICE

## 2020-07-23 RX ORDER — METHOCARBAMOL 500 MG/1
500 TABLET, FILM COATED ORAL 2 TIMES DAILY
Qty: 14 TABLET | Refills: 0 | Status: SHIPPED | OUTPATIENT
Start: 2020-07-23 | End: 2020-07-23 | Stop reason: SDUPTHER

## 2020-07-23 RX ORDER — LEVOTHYROXINE SODIUM 0.15 MG/1
TABLET ORAL
Qty: 60 TABLET | Refills: 0 | Status: SHIPPED | OUTPATIENT
Start: 2020-07-23 | End: 2020-12-08 | Stop reason: SDUPTHER

## 2020-07-23 RX ORDER — MELATONIN
1000 DAILY
Qty: 60 TABLET | Refills: 0 | Status: SHIPPED | OUTPATIENT
Start: 2020-07-23 | End: 2020-09-28

## 2020-07-23 NOTE — PROGRESS NOTES
[facility]  INTERNAL MEDICINE  [address] [facility] [address], [facility]      NAME: Sapphire Meza  AGE: 44 y o  SEX: male    DATE OF ENCOUNTER: 7/23/2020    ASSESSMENT AND PLAN     1  Acute right-sided low back pain without sciatica  · Now resolved, patient said robaxin helped a lot and he would like to have some extra pills with him just incase the pain recurs  - methocarbamol (ROBAXIN) 500 mg tablet; Take 1 tablet (500 mg total) by mouth 2 (two) times a day for 7 days  Dispense: 14 tablet; Refill: 0    2  Hypothyroidism  · Reports non-compliance with the medication as he ran out   · Will not increase dose  Have refilled the medication and asked him to repeat his thyroid and lipid panel in 3 months and follow up  Lipid panel de-ranged currently likely secondary to hypothyroidism  Since his LDL is also elevated chronically he will need a statin but he wants to make dietary changes before that  - levothyroxine 150 mcg tablet; Take once every morning on an empty stomach  Dispense: 60 tablet; Refill: 0  - Lipid Panel with Direct LDL reflex; Future  - TSH, 3rd generation with Free T4 reflex; Future    3  Arthritis  - cholecalciferol (VITAMIN D3) 1,000 units tablet; Take 1 tablet (1,000 Units total) by mouth daily  Dispense: 60 tablet; Refill: 0    Orders Placed This Encounter   Procedures    Lipid Panel with Direct LDL reflex    TSH, 3rd generation with Free T4 reflex       CHIEF COMPLAINT     Chief Complaint   Patient presents with    Follow-up     ER follow up for back pain       HISTORY OF PRESENT ILLNESS     Patient is a 75-year-old male who presents for ED follow-up of back pain  Patient had presented to the ED with right-sided back pain that acutely worsened in the setting of chronic back pain  He was discharged on naproxen and Robaxin  He reports that his back pain has now resolved  Patient is overweight and reports eating unhealthy   He says he is a single father and he does not have time to cook for him and his daughter so he usually eats microwave food  The following portions of the patient's history were reviewed and updated as appropriate: allergies, current medications, past family history, past medical history, past social history, past surgical history and problem list     REVIEW OF SYSTEMS     Review of Systems   Musculoskeletal: Positive for back pain           All other ROS negative except per HPI    ACTIVE PROBLEM LIST     Patient Active Problem List   Diagnosis    Allergic rhinitis    Arterial ectasia (HCC)    Chronic low back pain    Hyperlipidemia    Hypothyroidism    Lumbar radiculopathy    Mass of parotid gland    Osteoarthritis of both hands    Dry cough    Reactive airway disease    Muscle strain of left upper back    Pneumonia of right middle lobe due to Streptococcus pneumoniae [facility])       Past Medical History:   Diagnosis Date    Chronic cough     Hypoparathyroidism (HCC)     continue taking calcium and vitamin D, will check CMP, PTH level and Vitamin D level       CURRENT MEDICATIONS       Current Outpatient Medications:     albuterol (PROVENTIL HFA,VENTOLIN HFA) 90 mcg/act inhaler, Inhale 2 puffs every 6 (six) hours as needed for wheezing, Disp: 1 Inhaler, Rfl: 0    CVS ARTHRITIS PAIN RELIEF 650 MG CR tablet, Take 1 tablet (650 mg total) by mouth 4 (four) times a day as needed for moderate pain, Disp: 90 tablet, Rfl: 1    levothyroxine 150 mcg tablet, Take once every morning on an empty stomach, Disp: 60 tablet, Rfl: 0    cholecalciferol (VITAMIN D3) 1,000 units tablet, Take 1 tablet (1,000 Units total) by mouth daily, Disp: 60 tablet, Rfl: 0    CVS TUSSIN ADULT CHEST CONGEST 100 MG/5ML oral liquid, , Disp: , Rfl:     DIABETIC TUSSIN  MG/5ML oral liquid, , Disp: , Rfl:     methocarbamol (ROBAXIN) 500 mg tablet, Take 1 tablet (500 mg total) by mouth 2 (two) times a day for 7 days, Disp: 14 tablet, Rfl: 0   naproxen (NAPROSYN) 500 mg tablet, Take 1 tablet (500 mg total) by mouth 2 (two) times a day with meals for 5 days, Disp: 10 tablet, Rfl: 0    Skin Protectants, Misc  (EUCERIN) cream, Apply topically as needed for wound care (Patient not taking: Reported on 3/18/2019), Disp: 397 g, Rfl: 0    Allergies   Allergen Reactions    Chlorine Rash       Social History   Past Surgical History:   Procedure Laterality Date    FRACTURE SURGERY      Open Treatment of Each Proximal Finger Phalanx     Family History   Problem Relation Age of Onset    No Known Problems Mother     No Known Problems Father     No Known Problems Sister     No Known Problems Brother     No Known Problems Son     No Known Problems Daughter     No Known Problems Maternal Grandmother     No Known Problems Maternal Grandfather     No Known Problems Paternal Grandmother     No Known Problems Paternal Grandfather     No Known Problems Maternal Aunt     No Known Problems Maternal Uncle     No Known Problems Paternal Aunt     No Known Problems Paternal Uncle     No Known Problems Cousin        OBJECTIVE     /70 (BP Location: Left arm, Patient Position: Sitting, Cuff Size: Standard)   Pulse 94   Temp (!) 97 °F (36 1 °C) (Temporal)   Wt 91 kg (200 lb 9 9 oz)   BMI 31 42 kg/m²     Physical Exam:   GENERAL: NAD  HEENT:  NC/AT, PERRL, EOMI, MMM, no scleral icterus  CARDIAC:  RRR, +S1/S2, no S3/S4 heard, no m/g/r  PULMONARY:  CTA B/L, no wheezing/rales/rhonci, non-labored breathing  ABDOMEN:  Soft, NT/ND, +BS, no rebound/guarding/rigidity  Extremities:  2+ Pulses in DP/PT  No edema, cyanosis, or clubbing  NEUROLOGIC:  Alert/oriented x3  No motor or sensory deficits  SKIN:  No rashes or erythema    Pertinent Laboratory/Diagnostic Studies:     No results found      Images and diagnostics reviewed     HEALTH MAINTENANCE     Health Maintenance   Topic Date Due    CRC Screening: Colonoscopy  1970    HIV Screening  07/13/1983    Annual Physical  07/13/1986    Influenza Vaccine  07/01/2020    Depression Screening PHQ  06/04/2021    BMI: Followup Plan  06/04/2021    BMI: Adult  07/23/2021    DTaP,Tdap,and Td Vaccines (2 - Td) 03/15/2028    Pneumococcal Vaccine: 65+ Years (1 of 2 - PCV13) 07/13/2033    Pneumococcal Vaccine: Pediatrics (0 to 5 Years) and At-Risk Patients (6 to 59 Years)  Completed    HIB Vaccine  Aged Out    Hepatitis B Vaccine  Aged Out    IPV Vaccine  Aged Out    Hepatitis A Vaccine  Aged Out    Meningococcal ACWY Vaccine  Aged Out    HPV Vaccine  Aged [employer] History   Administered Date(s) Administered     Influenza (IM) Preservative Free 03/15/2018    Influenza TIV (IM) 11/18/2015    Influenza, recombinant, quadrivalent,injectable, preservative free 10/21/2019    Pneumococcal Polysaccharide PPV23 10/21/2019    TD (adult) Preservative Free 01/20/2009    Tdap 03/15/2018         Susan Ball MD  PGY-3  Internal Medicine

## 2020-08-25 ENCOUNTER — TELEPHONE (OUTPATIENT)
Dept: INTERNAL MEDICINE CLINIC | Facility: CLINIC | Age: 50
End: 2020-08-25

## 2020-08-25 NOTE — TELEPHONE ENCOUNTER
CALLED PATIENT TO RESCHEDULE MISSED COLON RECTAL APPOINTMENT 8/24/20  PATIENT'S DAUGHTER ANSWERED AND STATED SHE WAS NOT WITH THE PATIENT AT THE TIME OF THE CALL   SHE STATED SHE WILL HAVE PATIENT CALL BACK

## 2020-09-27 DIAGNOSIS — M19.90 ARTHRITIS: ICD-10-CM

## 2020-09-28 RX ORDER — MELATONIN
Qty: 30 TABLET | Refills: 1 | Status: SHIPPED | OUTPATIENT
Start: 2020-09-28 | End: 2020-12-21

## 2020-11-12 DIAGNOSIS — S29.012A MUSCLE STRAIN OF LEFT UPPER BACK: ICD-10-CM

## 2020-11-12 RX ORDER — ACETAMINOPHEN 650 MG
650 TABLET, EXTENDED RELEASE ORAL 4 TIMES DAILY PRN
Qty: 90 TABLET | Refills: 3 | Status: SHIPPED | OUTPATIENT
Start: 2020-11-12 | End: 2021-07-06 | Stop reason: SDUPTHER

## 2020-11-16 ENCOUNTER — TELEPHONE (OUTPATIENT)
Dept: INTERNAL MEDICINE CLINIC | Facility: CLINIC | Age: 50
End: 2020-11-16

## 2020-11-16 ENCOUNTER — CLINICAL SUPPORT (OUTPATIENT)
Dept: INTERNAL MEDICINE CLINIC | Facility: CLINIC | Age: 50
End: 2020-11-16

## 2020-11-16 DIAGNOSIS — Z23 NEED FOR INFLUENZA VACCINATION: Primary | ICD-10-CM

## 2020-11-16 PROCEDURE — 90682 RIV4 VACC RECOMBINANT DNA IM: CPT | Performed by: INTERNAL MEDICINE

## 2020-11-16 PROCEDURE — 90471 IMMUNIZATION ADMIN: CPT | Performed by: INTERNAL MEDICINE

## 2020-12-08 ENCOUNTER — OFFICE VISIT (OUTPATIENT)
Dept: INTERNAL MEDICINE CLINIC | Facility: CLINIC | Age: 50
End: 2020-12-08

## 2020-12-08 VITALS
TEMPERATURE: 97.6 F | DIASTOLIC BLOOD PRESSURE: 78 MMHG | WEIGHT: 188 LBS | BODY MASS INDEX: 29.44 KG/M2 | OXYGEN SATURATION: 99 % | HEART RATE: 75 BPM | SYSTOLIC BLOOD PRESSURE: 117 MMHG

## 2020-12-08 DIAGNOSIS — Z12.11 COLON CANCER SCREENING: ICD-10-CM

## 2020-12-08 DIAGNOSIS — M79.602 PAIN OF LEFT UPPER EXTREMITY: ICD-10-CM

## 2020-12-08 DIAGNOSIS — H61.22 IMPACTED CERUMEN OF LEFT EAR: ICD-10-CM

## 2020-12-08 DIAGNOSIS — E03.9 HYPOTHYROIDISM, UNSPECIFIED TYPE: Primary | ICD-10-CM

## 2020-12-08 DIAGNOSIS — Z11.4 SCREENING FOR HIV (HUMAN IMMUNODEFICIENCY VIRUS): ICD-10-CM

## 2020-12-08 DIAGNOSIS — M54.50 ACUTE RIGHT-SIDED LOW BACK PAIN WITHOUT SCIATICA: ICD-10-CM

## 2020-12-08 DIAGNOSIS — E03.9 HYPOTHYROIDISM: ICD-10-CM

## 2020-12-08 PROCEDURE — 99214 OFFICE O/P EST MOD 30 MIN: CPT | Performed by: INTERNAL MEDICINE

## 2020-12-08 PROCEDURE — 1036F TOBACCO NON-USER: CPT | Performed by: INTERNAL MEDICINE

## 2020-12-08 RX ORDER — LEVOTHYROXINE SODIUM 0.15 MG/1
TABLET ORAL
Qty: 60 TABLET | Refills: 0 | Status: SHIPPED | OUTPATIENT
Start: 2020-12-08 | End: 2021-01-26 | Stop reason: SDUPTHER

## 2020-12-08 RX ORDER — NAPROXEN 250 MG/1
250 TABLET ORAL EVERY 8 HOURS PRN
Qty: 15 TABLET | Refills: 0 | Status: SHIPPED | OUTPATIENT
Start: 2020-12-08 | End: 2021-01-30

## 2020-12-08 RX ORDER — HYDROCORTISONE 0.5 %
CREAM (GRAM) TOPICAL
Qty: 30 G | Refills: 0 | Status: SHIPPED | OUTPATIENT
Start: 2020-12-08 | End: 2021-07-06 | Stop reason: ALTCHOICE

## 2020-12-17 DIAGNOSIS — M19.90 ARTHRITIS: ICD-10-CM

## 2020-12-21 RX ORDER — MULTIVIT-MIN/IRON/FOLIC ACID/K 18-600-40
CAPSULE ORAL
Qty: 30 TABLET | Refills: 1 | Status: SHIPPED | OUTPATIENT
Start: 2020-12-21 | End: 2021-04-21

## 2021-01-19 ENCOUNTER — LAB (OUTPATIENT)
Dept: LAB | Facility: HOSPITAL | Age: 51
End: 2021-01-19
Payer: COMMERCIAL

## 2021-01-19 ENCOUNTER — TRANSCRIBE ORDERS (OUTPATIENT)
Dept: LAB | Facility: HOSPITAL | Age: 51
End: 2021-01-19

## 2021-01-19 ENCOUNTER — TELEPHONE (OUTPATIENT)
Dept: INTERNAL MEDICINE CLINIC | Facility: CLINIC | Age: 51
End: 2021-01-19

## 2021-01-19 DIAGNOSIS — E03.9 HYPOTHYROIDISM, UNSPECIFIED TYPE: Primary | ICD-10-CM

## 2021-01-19 DIAGNOSIS — E03.9 HYPOTHYROIDISM, UNSPECIFIED TYPE: ICD-10-CM

## 2021-01-19 DIAGNOSIS — Z11.4 SCREENING FOR HIV (HUMAN IMMUNODEFICIENCY VIRUS): ICD-10-CM

## 2021-01-19 DIAGNOSIS — E03.9 HYPOTHYROIDISM: ICD-10-CM

## 2021-01-19 LAB
ALBUMIN SERPL BCP-MCNC: 3.1 G/DL (ref 3.5–5)
ALP SERPL-CCNC: 99 U/L (ref 46–116)
ALT SERPL W P-5'-P-CCNC: 43 U/L (ref 12–78)
ANION GAP SERPL CALCULATED.3IONS-SCNC: 3 MMOL/L (ref 4–13)
AST SERPL W P-5'-P-CCNC: 28 U/L (ref 5–45)
BASOPHILS # BLD AUTO: 0.02 THOUSANDS/ΜL (ref 0–0.1)
BASOPHILS NFR BLD AUTO: 0 % (ref 0–1)
BILIRUB SERPL-MCNC: 0.47 MG/DL (ref 0.2–1)
BUN SERPL-MCNC: 12 MG/DL (ref 5–25)
CALCIUM ALBUM COR SERPL-MCNC: 9 MG/DL (ref 8.3–10.1)
CALCIUM SERPL-MCNC: 8.3 MG/DL (ref 8.3–10.1)
CHLORIDE SERPL-SCNC: 109 MMOL/L (ref 100–108)
CHOLEST SERPL-MCNC: 202 MG/DL (ref 50–200)
CO2 SERPL-SCNC: 28 MMOL/L (ref 21–32)
CREAT SERPL-MCNC: 0.77 MG/DL (ref 0.6–1.3)
EOSINOPHIL # BLD AUTO: 0.19 THOUSAND/ΜL (ref 0–0.61)
EOSINOPHIL NFR BLD AUTO: 3 % (ref 0–6)
ERYTHROCYTE [DISTWIDTH] IN BLOOD BY AUTOMATED COUNT: 13.7 % (ref 11.6–15.1)
EST. AVERAGE GLUCOSE BLD GHB EST-MCNC: 114 MG/DL
GFR SERPL CREATININE-BSD FRML MDRD: 104 ML/MIN/1.73SQ M
GLUCOSE P FAST SERPL-MCNC: 97 MG/DL (ref 65–99)
HBA1C MFR BLD: 5.6 %
HCT VFR BLD AUTO: 43.1 % (ref 36.5–49.3)
HDLC SERPL-MCNC: 36 MG/DL
HGB BLD-MCNC: 14 G/DL (ref 12–17)
IMM GRANULOCYTES # BLD AUTO: 0.03 THOUSAND/UL (ref 0–0.2)
IMM GRANULOCYTES NFR BLD AUTO: 1 % (ref 0–2)
LDLC SERPL CALC-MCNC: 148 MG/DL (ref 0–100)
LYMPHOCYTES # BLD AUTO: 2.07 THOUSANDS/ΜL (ref 0.6–4.47)
LYMPHOCYTES NFR BLD AUTO: 31 % (ref 14–44)
MCH RBC QN AUTO: 29.9 PG (ref 26.8–34.3)
MCHC RBC AUTO-ENTMCNC: 32.5 G/DL (ref 31.4–37.4)
MCV RBC AUTO: 92 FL (ref 82–98)
MONOCYTES # BLD AUTO: 0.77 THOUSAND/ΜL (ref 0.17–1.22)
MONOCYTES NFR BLD AUTO: 12 % (ref 4–12)
NEUTROPHILS # BLD AUTO: 3.56 THOUSANDS/ΜL (ref 1.85–7.62)
NEUTS SEG NFR BLD AUTO: 53 % (ref 43–75)
NRBC BLD AUTO-RTO: 0 /100 WBCS
PLATELET # BLD AUTO: 203 THOUSANDS/UL (ref 149–390)
PMV BLD AUTO: 11.4 FL (ref 8.9–12.7)
POTASSIUM SERPL-SCNC: 3.9 MMOL/L (ref 3.5–5.3)
PROT SERPL-MCNC: 7.5 G/DL (ref 6.4–8.2)
RBC # BLD AUTO: 4.69 MILLION/UL (ref 3.88–5.62)
SODIUM SERPL-SCNC: 140 MMOL/L (ref 136–145)
T4 FREE SERPL-MCNC: 2.21 NG/DL (ref 0.76–1.46)
TRIGL SERPL-MCNC: 88 MG/DL
TSH SERPL DL<=0.05 MIU/L-ACNC: <0.007 UIU/ML (ref 0.36–3.74)
WBC # BLD AUTO: 6.64 THOUSAND/UL (ref 4.31–10.16)

## 2021-01-19 PROCEDURE — 83036 HEMOGLOBIN GLYCOSYLATED A1C: CPT

## 2021-01-19 PROCEDURE — 80053 COMPREHEN METABOLIC PANEL: CPT

## 2021-01-19 PROCEDURE — 85025 COMPLETE CBC W/AUTO DIFF WBC: CPT

## 2021-01-19 PROCEDURE — 84439 ASSAY OF FREE THYROXINE: CPT

## 2021-01-19 PROCEDURE — 84443 ASSAY THYROID STIM HORMONE: CPT

## 2021-01-19 PROCEDURE — 80061 LIPID PANEL: CPT

## 2021-01-19 PROCEDURE — 36415 COLL VENOUS BLD VENIPUNCTURE: CPT

## 2021-01-19 PROCEDURE — 87389 HIV-1 AG W/HIV-1&-2 AB AG IA: CPT

## 2021-01-19 NOTE — TELEPHONE ENCOUNTER
Thank-you for letting me know Ioana De Souza and thanks for talking to him Dr Kunal Do, I had a hard time trying to convince him to take the meds daily so we had agreed on twice a week but hopefully he is adherent with a daily regimen

## 2021-01-19 NOTE — TELEPHONE ENCOUNTER
Notes recorded by Sailaja Klein LPN on 6/56/7257 at 6:94 PM EST  I spoke with patient and made aware of message  I scheduled him for tomorrow 1/20/2020 but he was reluctant and stating he would rather come next week  I explained again that this is urgent and could be affecting his heart  He kept saying to let you know its because he is not taking his medication correctly  I explained to him that you are aware of that already  I advised again the importance of tomorrows appt  Notes recorded by Es Ward MD on 1/19/2021 at 1:14 PM EST  Patient needs an urgent follow-up for hypothyroidism  His free t4 is elevated and he is at a risk of having arrhythmias  He was taking more thyroid medication that he should be taking when I last saw him  Please have him come back to the clinic to go over exactly how much he needs to take and possibly going down on his dose  Patient called back stating he is taking all his thyroid medication on Monday so he doesn't forget because he is a single parent and sometimes forgets  I spoke with Dr Corina Fitch regarding this and we did a phone call to patient to determine what patient is actually taking as Dr Almer Collet last office note on 12/8/2021 stated he was going to take 150 mcg, 4 tablets on Mon and 3 tablets on Thurs  When I confirmed this with patient he stated he takes all 7 days worth of tablets on Mondays  Dr Corina Fitch discussed with him that this appears to be causing highs and lows and does not appear to be the best option for him  We discussed patients morning routine to see what the best time of day is to take his medication daily  We determined that he brushes his teeth every morning so we advised patient to put his pill bottle next to his toothbrush and take one tablet every morning before he brushes his teeth  Patient is agreeable to try this and I will follow up with him in one week   Per Dr Corina Fitch I cancelled his appt for tomorrow

## 2021-01-20 LAB — HIV 1+2 AB+HIV1 P24 AG SERPL QL IA: NORMAL

## 2021-01-21 ENCOUNTER — TELEPHONE (OUTPATIENT)
Dept: INTERNAL MEDICINE CLINIC | Facility: CLINIC | Age: 51
End: 2021-01-21

## 2021-01-21 DIAGNOSIS — E03.9 HYPOTHYROIDISM, UNSPECIFIED TYPE: Primary | ICD-10-CM

## 2021-01-21 NOTE — TELEPHONE ENCOUNTER
Patient requesting a call from you in regards to further questions about his labs specifically cholesterol results

## 2021-01-26 DIAGNOSIS — E03.9 HYPOTHYROIDISM: ICD-10-CM

## 2021-01-26 RX ORDER — LEVOTHYROXINE SODIUM 0.15 MG/1
TABLET ORAL
Qty: 60 TABLET | Refills: 0 | Status: SHIPPED | OUTPATIENT
Start: 2021-01-26 | End: 2021-04-30

## 2021-01-26 RX ORDER — LEVOTHYROXINE SODIUM 0.15 MG/1
TABLET ORAL
Qty: 60 TABLET | Refills: 0 | Status: CANCELLED | OUTPATIENT
Start: 2021-01-26

## 2021-01-26 NOTE — TELEPHONE ENCOUNTER
See refill request   Patient cancelled appointment on 1/20/21, but has an appointment on 2/15/2021 for ear cleaning

## 2021-01-26 NOTE — TELEPHONE ENCOUNTER
Thank-you for letting me know, please ask the patient to get a repeat thyroid function test in 2 weeks before his appointment  I have put in the order in the system  I have refilled the levothyroxine and changed it to be taken everyday instead of how it was prescribed

## 2021-01-26 NOTE — TELEPHONE ENCOUNTER
Name of medication, dose, quantity and frequency    Requested Prescriptions     Pending Prescriptions Disp Refills    levothyroxine 150 mcg tablet 60 tablet 0     Sig: Take 4 tablets on Monday and 3 on Thursday on an empty stomach       Number of refills left:    Amount of medication left:    Pharmacy verified and updated-Yes    Additional information:

## 2021-01-30 ENCOUNTER — HOSPITAL ENCOUNTER (EMERGENCY)
Facility: HOSPITAL | Age: 51
Discharge: HOME | End: 2021-01-30
Attending: EMERGENCY MEDICINE | Admitting: EMERGENCY MEDICINE
Payer: COMMERCIAL

## 2021-01-30 VITALS
RESPIRATION RATE: 18 BRPM | TEMPERATURE: 98 F | OXYGEN SATURATION: 97 % | SYSTOLIC BLOOD PRESSURE: 127 MMHG | HEART RATE: 91 BPM | DIASTOLIC BLOOD PRESSURE: 72 MMHG | WEIGHT: 188 LBS | BODY MASS INDEX: 29.51 KG/M2 | HEIGHT: 67 IN

## 2021-01-30 DIAGNOSIS — M70.61 TROCHANTERIC BURSITIS, RIGHT HIP: Primary | ICD-10-CM

## 2021-01-30 PROCEDURE — 99283 EMERGENCY DEPT VISIT LOW MDM: CPT

## 2021-01-30 PROCEDURE — 99284 EMERGENCY DEPT VISIT MOD MDM: CPT | Performed by: EMERGENCY MEDICINE

## 2021-01-30 RX ORDER — NAPROXEN 500 MG/1
500 TABLET ORAL 2 TIMES DAILY WITH MEALS
Qty: 10 TABLET | Refills: 0 | Status: SHIPPED | OUTPATIENT
Start: 2021-01-30 | End: 2021-01-30 | Stop reason: SDUPTHER

## 2021-01-30 RX ORDER — NAPROXEN 500 MG/1
500 TABLET ORAL 2 TIMES DAILY WITH MEALS
Qty: 10 TABLET | Refills: 0 | Status: SHIPPED | OUTPATIENT
Start: 2021-01-30 | End: 2021-02-15 | Stop reason: SDUPTHER

## 2021-01-30 RX ORDER — NAPROXEN 500 MG/1
500 TABLET ORAL ONCE
Status: COMPLETED | OUTPATIENT
Start: 2021-01-30 | End: 2021-01-30

## 2021-01-30 RX ORDER — LIDOCAINE 50 MG/G
1 PATCH TOPICAL ONCE
Status: DISCONTINUED | OUTPATIENT
Start: 2021-01-30 | End: 2021-01-30 | Stop reason: HOSPADM

## 2021-01-30 RX ADMIN — LIDOCAINE 1 PATCH: 50 PATCH TOPICAL at 10:08

## 2021-01-30 RX ADMIN — NAPROXEN 500 MG: 500 TABLET ORAL at 10:08

## 2021-01-30 NOTE — Clinical Note
Andre Soria was seen and treated in our emergency department on 1/30/2021  Diagnosis: Right Hip Bursitits    Luis    He may return on this date: 02/01/2021    Please excuse Andre Soria from work on 1/30/2021 and 1/31/2021     If you have any questions or concerns, please don't hesitate to call        Kylee Trevino, DO    ______________________________           _______________          _______________  Hospital Representative                              Date                                Time

## 2021-01-30 NOTE — ED PROVIDER NOTES
History  Chief Complaint   Patient presents with    Leg Pain     Patient reports right buttock pain that started last night in his right posterior thigh  Denies injury  Haven Callahan is a 44y o  year old male with PMH of chronic low back pain, lumbar radiculopathy presenting to the [facility] ED for right leg and hip pain  Patient has had one day of right latera hip pain  Sharp sensation  No history of similar pain previously  Radiating to right lateral thigh  No associated numbness/tingling or saddle anesthesia  Patient denies injury to the hip or back  Patient is able to walk  The patient has taken muscle relaxers and heating pads at home to attempt to alleviate symptoms  Patient denies fevers, chest pain, dyspnea, cough, abdominal pain  History provided by:  Patient   used: No        Prior to Admission Medications   Prescriptions Last Dose Informant Patient Reported? Taking?    CVS Arthritis Pain Relief 650 MG CR tablet   No No   Sig: Take 1 tablet (650 mg total) by mouth 4 (four) times a day as needed for moderate pain   CVS TUSSIN ADULT CHEST CONGEST 100 MG/5ML oral liquid   Yes No   D3-1000 25 MCG (1000 UT) tablet   No No   Sig: TAKE 1 TABLET BY MOUTH EVERY DAY   DIABETIC TUSSIN  MG/5ML oral liquid   Yes No   Menthol-Methyl Salicylate (NAWAF TAY GREASELESS) 10-15 % greaseless cream   No No   Sig: Apply topically daily at bedtime   carbamide peroxide (DEBROX) 6 5 % otic solution   No No   Sig: Administer 5 drops into both ears 2 (two) times a day   levothyroxine 150 mcg tablet   No No   Sig: Take one tablet everyday in the morning   methocarbamol (ROBAXIN) 500 mg tablet 1/30/2021 at Unknown time  No Yes   Sig: Take 1 tablet (500 mg total) by mouth 2 (two) times a day for 7 days      Facility-Administered Medications: None       Past Medical History:   Diagnosis Date    Arthritis     Chronic cough     Disease of thyroid gland     Hypoparathyroidism (HCC) continue taking calcium and vitamin D, will check CMP, PTH level and Vitamin D level       Past Surgical History:   Procedure Laterality Date    FRACTURE SURGERY      Open Treatment of Each Proximal Finger Phalanx       Family History   Problem Relation Age of Onset    No Known Problems Mother     No Known Problems Father     No Known Problems Sister     No Known Problems Brother     No Known Problems Son     Learning disabilities Daughter     Asthma Daughter     Seizures Daughter     No Known Problems Maternal Grandmother     No Known Problems Maternal Grandfather     No Known Problems Paternal Grandmother     No Known Problems Paternal Grandfather     No Known Problems Maternal Aunt     No Known Problems Maternal Uncle     No Known Problems Paternal Aunt     No Known Problems Paternal Uncle     No Known Problems Cousin      I have reviewed and agree with the history as documented  E-Cigarette/Vaping    E-Cigarette Use Never User      E-Cigarette/Vaping Substances    Nicotine No     THC No     CBD No     Flavoring No     Other No     Unknown No      Social History     Tobacco Use    Smoking status: Former Smoker    Smokeless tobacco: Never Used    Tobacco comment: pt "tried it when he was a teenager but did not like them"   Substance Use Topics    Alcohol use: Yes     Comment: occasionally    Drug use: No        Review of Systems   Constitutional: Negative for chills, fatigue and fever  HENT: Negative for congestion, nosebleeds and rhinorrhea  Respiratory: Negative for cough, chest tightness, shortness of breath and wheezing  Cardiovascular: Negative for chest pain, palpitations and leg swelling  Gastrointestinal: Negative for abdominal distention, abdominal pain, diarrhea, nausea and vomiting  Endocrine: Negative for polyuria  Genitourinary: Negative for dysuria and hematuria  Musculoskeletal: Positive for arthralgias  Negative for gait problem and joint swelling  Skin: Negative for rash  Neurological: Negative for seizures, syncope, weakness, light-headedness and headaches  Psychiatric/Behavioral: Negative for behavioral problems and confusion  All other systems reviewed and are negative  Physical Exam  ED Triage Vitals   Temperature Pulse Respirations Blood Pressure SpO2   01/30/21 0913 01/30/21 0913 01/30/21 0913 01/30/21 0913 01/30/21 0914   98 °F (36 7 °C) 91 18 127/72 97 %      Temp Source Heart Rate Source Patient Position - Orthostatic VS BP Location FiO2 (%)   01/30/21 0913 01/30/21 0913 01/30/21 0913 01/30/21 0913 --   Oral Monitor Lying Right arm       Pain Score       01/30/21 0913       6             Orthostatic Vital Signs  Vitals:    01/30/21 0913   BP: 127/72   Pulse: 91   Patient Position - Orthostatic VS: Lying       Physical Exam  Vitals signs and nursing note reviewed  Constitutional:       General: He is not in acute distress  Appearance: Normal appearance  He is well-developed  He is not ill-appearing, toxic-appearing or diaphoretic  HENT:      Head: Normocephalic and atraumatic  Nose: No congestion or rhinorrhea  Eyes:      General:         Right eye: No discharge  Left eye: No discharge  Neck:      Musculoskeletal: Normal range of motion and neck supple  No neck rigidity  Cardiovascular:      Rate and Rhythm: Normal rate and regular rhythm  Heart sounds: No murmur  Pulmonary:      Effort: Pulmonary effort is normal  No respiratory distress  Breath sounds: Normal breath sounds  No wheezing or rales  Abdominal:      General: Bowel sounds are normal       Palpations: Abdomen is soft  Tenderness: There is no abdominal tenderness  There is no guarding or rebound  Musculoskeletal:      Right hip: He exhibits tenderness  He exhibits normal range of motion, normal strength, no swelling and no crepitus  Right knee: He exhibits no swelling and no effusion  No tenderness found        Lumbar back: He exhibits no tenderness  Right lower leg: No edema  Left lower leg: No edema  Skin:     General: Skin is warm  Capillary Refill: Capillary refill takes less than 2 seconds  Neurological:      Mental Status: He is alert and oriented to person, place, and time  Comments: 5/5 strength b/l LE  Sensation grossly intact b/l LE   Psychiatric:         Mood and Affect: Mood normal          Behavior: Behavior normal          ED Medications  Medications   lidocaine (LIDODERM) 5 % patch 1 patch (1 patch Topical Medication Applied 1/30/21 1008)   naproxen (NAPROSYN) tablet 500 mg (500 mg Oral Given 1/30/21 1008)       Diagnostic Studies  Results Reviewed     None                 No orders to display         Procedures  Procedures      ED Course                             SBIRT 22yo+      Most Recent Value   SBIRT (22 yo +)   In order to provide better care to our patients, we are screening all of our patients for alcohol and drug use  Would it be okay to ask you these screening questions? Unable to answer at this time Filed at: 01/30/2021 0917                MDM  Number of Diagnoses or Management Options  Trochanteric bursitis, right hip:   Diagnosis management comments: 44 y o  male presenting for right gluteal and posterior leg pain  Chart reviewed,  on 1/19/21  Offered xray, patient declines  Will treat with NSAIDS and lidocaine patch  Patient instructed to take prescribed naproxen as needed for discomfort  I have discussed with the patient our plan to discharge them from the ED and the patient is in agreement with this plan  The patient was provided a written after visit summary with strict RTED precautions  I have also discussed with the patient plans for follow up with their PCP and PT         Amount and/or Complexity of Data Reviewed  Review and summarize past medical records: yes    Patient Progress  Patient progress: stable      Disposition  Final diagnoses:   Trochanteric bursitis, right hip     Time reflects when diagnosis was documented in both MDM as applicable and the Disposition within this note     Time User Action Codes Description Comment    1/30/2021  9:39 AM Gabriela Irvin Add [M70 61] Trochanteric bursitis, right hip       ED Disposition     ED Disposition Condition Date/Time Comment    Discharge Stable Sat Jan 30, 2021  9:40 AM Lena Jimmy discharge to home/self care  Follow-up Information     Follow up With Specialties Details Why Contact Info Additional Information    Physical Therapy at [address] [place] [address] Physical Therapy Call  To establish care  Jamil Chis[address]  [phone] Physical Therapy at [address] [place] [address], [address]    [address]    [facility] Internal Medicine Call  To make appointment for reevaluation in 3-5 days   [address] [facility] [id number]  [address] [place] [address], [address] [place] [address], [address] [phone]          Discharge Medication List as of 1/30/2021  9:51 AM      START taking these medications    Details   naproxen (NAPROSYN) 500 mg tablet Take 1 tablet (500 mg total) by mouth 2 (two) times a day with meals for 5 days, Starting Sat 1/30/2021, Until Thu 2/4/2021, Print         CONTINUE these medications which have NOT CHANGED    Details   methocarbamol (ROBAXIN) 500 mg tablet Take 1 tablet (500 mg total) by mouth 2 (two) times a day for 7 days, Starting Thu 7/23/2020, Until Sat 1/30/2021, Normal      carbamide peroxide (DEBROX) 6 5 % otic solution Administer 5 drops into both ears 2 (two) times a day, Starting Tue 12/8/2020, Normal      CVS Arthritis Pain Relief 650 MG CR tablet Take 1 tablet (650 mg total) by mouth 4 (four) times a day as needed for moderate pain, Starting Thu 11/12/2020, Normal      !! CVS TUSSIN ADULT CHEST CONGEST 100 MG/5ML oral liquid Starting Mon 10/21/2019, Historical Med      D3-1000 25 MCG (1000 UT) tablet TAKE 1 TABLET BY MOUTH EVERY DAY, Normal      !! DIABETIC TUSSIN  MG/5ML oral liquid Starting Mon 10/21/2019, Historical Med      levothyroxine 150 mcg tablet Take one tablet everyday in the morning, Normal      Menthol-Methyl Salicylate (NAWAF TAY GREASELESS) 10-15 % greaseless cream Apply topically daily at bedtime, Starting Tue 12/8/2020, Normal       !! - Potential duplicate medications found  Please discuss with provider  No discharge procedures on file  PDMP Review     None           ED Provider  Attending physically available and evaluated Valadez Days  I managed the patient along with the ED Attending      Electronically Signed by Tushar Boss DO

## 2021-01-30 NOTE — ED ATTENDING ATTESTATION
1/30/2021  Reyna Cochran DO saw and evaluated the patient  I have discussed the patient with the resident/non-physician practitioner and agree with the resident's/non-physician practitioner's findings, Plan of Care, and MDM as documented in the resident's/non-physician practitioner's note, except where noted  All available labs and Radiology studies were reviewed  I was present for key portions of any procedure(s) performed by the resident/non-physician practitioner and I was immediately available to provide assistance  At this point I agree with the current assessment done in the Emergency Department  I have conducted an independent evaluation of this patient a history and physical is as follows:      44 yom with atraumatic pain in r lateral hip, no radiation  +discomfort with ambulation  No relief w muscle relaxer or heating pad  Works at [employer]  /72 (BP Location: Right arm)   Pulse 91   Temp 98 °F (36 7 °C) (Oral)   Resp 18   Ht 5' 7" (1 702 m)   Wt 85 3 kg (188 lb)   SpO2 97%   BMI 29 44 kg/m²   Uncomfortable, +TTP right glut, ITB and r trochanter, NROM      Patient is concerned overuse may be contributing  He is also concerned about being able to function at work over the weekend at [employer]  Is requesting pain medicine, work note  He declines x-ray and has follow-up on February 8 where he will seek imaging if pain persists  Patient happy with Toradol and lidocaine patch  Recommended ibuprofen and acetaminophen for home        ED Course         Critical Care Time  Procedures

## 2021-01-30 NOTE — DISCHARGE INSTRUCTIONS
You have been seen for right hip pain  This could be due to a bursitis  Please take the prescribed naproxen as needed for discomfort  Return to the emergency department if you develop worsening pain, weakness, incontinence or any other symptoms of concern  Please follow up with PT and the [address] by calling the number provided

## 2021-02-10 ENCOUNTER — TELEPHONE (OUTPATIENT)
Dept: INTERNAL MEDICINE CLINIC | Facility: CLINIC | Age: 51
End: 2021-02-10

## 2021-02-10 NOTE — TELEPHONE ENCOUNTER
Called and spoke with patient  Patient was reminded to get lab work done prior to appointment  Patient rescheduled to 2/16/2021 because he has to work on 2/15/21

## 2021-02-10 NOTE — TELEPHONE ENCOUNTER
----- Message from Charlotte Escalera MD sent at 2/9/2021  1:41 PM EST -----  Regarding: Alveta Night needed before appointment  Please ask patient to get repeat blood work before his upcoming appointment on Monday, ideally he should get it done this week so we can results available on Monday and we can go over them to adjust his thyroid medication   Thanks

## 2021-02-15 DIAGNOSIS — M70.61 TROCHANTERIC BURSITIS, RIGHT HIP: ICD-10-CM

## 2021-02-15 NOTE — TELEPHONE ENCOUNTER
Name of medication, dose, quantity and frequency    Requested Prescriptions     Pending Prescriptions Disp Refills    naproxen (NAPROSYN) 500 mg tablet 10 tablet 0     Sig: Take 1 tablet (500 mg total) by mouth 2 (two) times a day with meals for 5 days     Number of refills left:  0    Amount of medication left:  0    Pharmacy verified and updated  yes    Additional information:

## 2021-02-16 RX ORDER — NAPROXEN 500 MG/1
500 TABLET ORAL 2 TIMES DAILY WITH MEALS
Qty: 10 TABLET | Refills: 0 | Status: SHIPPED | OUTPATIENT
Start: 2021-02-16 | End: 2021-07-06 | Stop reason: ALTCHOICE

## 2021-02-26 ENCOUNTER — TELEPHONE (OUTPATIENT)
Dept: INTERNAL MEDICINE CLINIC | Facility: CLINIC | Age: 51
End: 2021-02-26

## 2021-02-26 DIAGNOSIS — Z12.11 COLON CANCER SCREENING: Primary | ICD-10-CM

## 2021-04-17 DIAGNOSIS — M19.90 ARTHRITIS: ICD-10-CM

## 2021-04-21 RX ORDER — MELATONIN
Qty: 30 TABLET | Refills: 1 | Status: SHIPPED | OUTPATIENT
Start: 2021-04-21 | End: 2021-07-06

## 2021-04-28 DIAGNOSIS — E03.9 HYPOTHYROIDISM: ICD-10-CM

## 2021-04-29 ENCOUNTER — TELEMEDICINE (OUTPATIENT)
Dept: INTERNAL MEDICINE CLINIC | Facility: CLINIC | Age: 51
End: 2021-04-29

## 2021-04-29 DIAGNOSIS — Z20.822 CLOSE EXPOSURE TO COVID-19 VIRUS: Primary | ICD-10-CM

## 2021-04-29 PROCEDURE — G2012 BRIEF CHECK IN BY MD/QHP: HCPCS | Performed by: INTERNAL MEDICINE

## 2021-04-29 NOTE — PROGRESS NOTES
COVID-19 Outpatient Progress Note    Assessment/Plan:    Problem List Items Addressed This Visit     None      Visit Diagnoses     Close exposure to COVID-19 virus    -  Primary    Relevant Orders    Multiplex COVID19, Influenza A/B, RSV PCR, [facility] - [facility]         Disposition:     I recommended self-quarantine for 10 days and to watch for symptoms until 14 days after exposure  If patient were to develop symptoms, they should self isolate and call our office for further guidance  I recommended COVID-19 PCR testing on or after day 5 since last exposure and if negative can end quarantine after 7 days  Patient was instructed to watch for symptoms until 14 days after exposure  If patient were to develop symptoms, they should immediately self isolate and call our office for further guidance  After clarifying the patient's history, my suspicion for COVID-19 infection is very low  I referred patient to one of our centralized sites for a COVID-19 swab  Negative symptoms though with close contact positive  Patient requires covid negative testing to return to work  Will order testing in the future and recommend isolation for the time being  Patient advised to call us vs present to the ED if symptoms develop  I have spent 15 minutes directly with the patient  Greater than 50% of this time was spent in counseling/coordination of care regarding: diagnostic results, prognosis, risks and benefits of treatment options, instructions for management, patient and family education, importance of treatment compliance, risk factor reductions and impressions  Encounter provider Marielena Miller MD    Provider located at [address] [place] [address] Road  [address] [place] [id number] [phone]    Recent Visits  No visits were found meeting these conditions     Showing recent visits within past 7 days and meeting all other requirements Today's Visits  Date Type Provider Dept   04/29/21 Telemedicine Mary Ann Araujo MD [address] [facility] today's visits and meeting all other requirements     Future Appointments  No visits were found meeting these conditions  Showing future appointments within next 150 days and meeting all other requirements        Patient agrees to participate in a virtual check in via telephone or video visit instead of presenting to the office to address urgent/immediate medical needs  Patient is aware this is a billable service  After connecting through Telephone, the patient was identified by name and date of birth  Manuel Trujillo was informed that this was a telemedicine visit and that the exam was being conducted confidentially over secure lines  My office door was closed  No one else was in the room  Manuel Trujillo acknowledged consent and understanding of privacy and security of the telemedicine visit  I informed the patient that I have reviewed his record in Epic and presented the opportunity for him to ask any questions regarding the visit today  The patient agreed to participate  It was my intent to perform this visit via video technology but the patient was not able to do a video connection so the visit was completed via audio telephone only  Subjective:   Manuel Trujillo is a 44 y o  male who is concerned about COVID-19  Patient denies fever, chills, fatigue, malaise, congestion, rhinorrhea, sore throat, anosmia, loss of taste, cough, shortness of breath, chest tightness, abdominal pain, nausea, vomiting, diarrhea, myalgias and headaches       Exposure:   Contact with a person who is under investigation (PUI) for or who is positive for COVID-19 within the last 14 days?: Yes    Hospitalized recently for fever and/or lower respiratory symptoms?: No      Currently a healthcare worker that is involved in direct patient care?: No      Works in a special setting where the risk of COVID-19 transmission may be high? (this may include long-term care, correctional and residential facilities; homeless shelters; assisted-living facilities and group homes ): No      Resident in a special setting where the risk of COVID-19 transmission may be high? (this may include long-term care, correctional and residential facilities; homeless shelters; assisted-living facilities and group homes ): No      Exposure to covid positive daughter who lives with him in house  No symptoms at this time       No results found for: Felipe Simón, SARSCORONAVI, CORONAVIRUSR  Past Medical History:   Diagnosis Date    Arthritis     Chronic cough     Disease of thyroid gland     Hypoparathyroidism ([facility] [address] )     continue taking calcium and vitamin D, will check CMP, PTH level and Vitamin D level     Past Surgical History:   Procedure Laterality Date    FRACTURE SURGERY      Open Treatment of Each Proximal Finger Phalanx     Current Outpatient Medications   Medication Sig Dispense Refill    carbamide peroxide (DEBROX) 6 5 % otic solution Administer 5 drops into both ears 2 (two) times a day 15 mL 1    cholecalciferol (VITAMIN D3) 1,000 units tablet TAKE 1 TABLET BY MOUTH EVERY DAY 30 tablet 1    CVS Arthritis Pain Relief 650 MG CR tablet Take 1 tablet (650 mg total) by mouth 4 (four) times a day as needed for moderate pain 90 tablet 3    CVS TUSSIN ADULT CHEST CONGEST 100 MG/5ML oral liquid       DIABETIC TUSSIN  MG/5ML oral liquid       levothyroxine 150 mcg tablet Take one tablet everyday in the morning 60 tablet 0    Menthol-Methyl Salicylate (NAWAF TAY GREASELESS) 10-15 % greaseless cream Apply topically daily at bedtime 30 g 0    methocarbamol (ROBAXIN) 500 mg tablet Take 1 tablet (500 mg total) by mouth 2 (two) times a day for 7 days 14 tablet 0    naproxen (NAPROSYN) 500 mg tablet Take 1 tablet (500 mg total) by mouth 2 (two) times a day with meals for 5 days 10 tablet 0     No current facility-administered medications for this visit  Allergies   Allergen Reactions    Chlorine Rash       Review of Systems   Constitutional: Negative for chills, fatigue and fever  HENT: Negative for congestion, rhinorrhea and sore throat  Respiratory: Negative for cough, chest tightness and shortness of breath  Gastrointestinal: Negative for abdominal pain, diarrhea, nausea and vomiting  Musculoskeletal: Negative for myalgias  Neurological: Negative for headaches  Objective: There were no vitals filed for this visit  Physical Exam  Constitutional:       Comments: Unable to perform, telephone visit  VIRTUAL VISIT DISCLAIMER    Karina Mac acknowledges that he has consented to an online visit or consultation  He understands that the online visit is based solely on information provided by him, and that, in the absence of a face-to-face physical evaluation by the physician, the diagnosis he receives is both limited and provisional in terms of accuracy and completeness  This is not intended to replace a full medical face-to-face evaluation by the physician  Karina Mac understands and accepts these terms

## 2021-04-30 RX ORDER — LEVOTHYROXINE SODIUM 0.15 MG/1
TABLET ORAL
Qty: 60 TABLET | Refills: 0 | Status: SHIPPED | OUTPATIENT
Start: 2021-04-30 | End: 2021-05-23

## 2021-05-06 ENCOUNTER — TELEPHONE (OUTPATIENT)
Dept: INTERNAL MEDICINE CLINIC | Facility: CLINIC | Age: 51
End: 2021-05-06

## 2021-05-06 ENCOUNTER — HOSPITAL ENCOUNTER (EMERGENCY)
Facility: HOSPITAL | Age: 51
Discharge: HOME | End: 2021-05-06
Attending: EMERGENCY MEDICINE | Admitting: EMERGENCY MEDICINE
Payer: COMMERCIAL

## 2021-05-06 VITALS
BODY MASS INDEX: 29.66 KG/M2 | RESPIRATION RATE: 18 BRPM | TEMPERATURE: 98.4 F | HEART RATE: 105 BPM | WEIGHT: 189 LBS | HEIGHT: 67 IN | SYSTOLIC BLOOD PRESSURE: 144 MMHG | DIASTOLIC BLOOD PRESSURE: 65 MMHG | OXYGEN SATURATION: 97 %

## 2021-05-06 DIAGNOSIS — Z20.822 ENCOUNTER FOR SCREENING LABORATORY TESTING FOR COVID-19 VIRUS: Primary | ICD-10-CM

## 2021-05-06 LAB — SARS-COV-2 RNA RESP QL NAA+PROBE: NEGATIVE

## 2021-05-06 PROCEDURE — 99283 EMERGENCY DEPT VISIT LOW MDM: CPT

## 2021-05-06 PROCEDURE — 99282 EMERGENCY DEPT VISIT SF MDM: CPT | Performed by: PHYSICIAN ASSISTANT

## 2021-05-06 PROCEDURE — U0005 INFEC AGEN DETEC AMPLI PROBE: HCPCS | Performed by: PHYSICIAN ASSISTANT

## 2021-05-06 PROCEDURE — U0003 INFECTIOUS AGENT DETECTION BY NUCLEIC ACID (DNA OR RNA); SEVERE ACUTE RESPIRATORY SYNDROME CORONAVIRUS 2 (SARS-COV-2) (CORONAVIRUS DISEASE [COVID-19]), AMPLIFIED PROBE TECHNIQUE, MAKING USE OF HIGH THROUGHPUT TECHNOLOGIES AS DESCRIBED BY CMS-2020-01-R: HCPCS | Performed by: PHYSICIAN ASSISTANT

## 2021-05-06 NOTE — TELEPHONE ENCOUNTER
----- Message from Toñito Robertson sent at 5/6/2021  1:46 PM EDT -----  Regarding: RE: Non-Urgent Medical Question  Contact: [phone]  Thank you

## 2021-05-06 NOTE — ED PROVIDER NOTES
History  Chief Complaint   Patient presents with    Medical Problem     Pt requesting covid test, daughter tested + 2 weeks ago  Denies symptoms  This is a 24-year-old male patient who presents for a COVID test   His daughter was diagnosed with COVID 2 weeks ago  Patient denies any symptoms  No fever chills headache blurred vision double vision body aches cough congestion sore throat nausea vomiting diarrhea abdominal pain no chest pain or shortness breath  He is currently asymptomatic with states he needs a negative test to return to work  All vitals were reviewed  Patient is not hypoxic nor tachypneic  He will have his test any discharge  Prior to Admission Medications   Prescriptions Last Dose Informant Patient Reported? Taking?    [facility] Arthritis Pain Relief 650 MG CR tablet   No No   Sig: Take 1 tablet (650 mg total) by mouth 4 (four) times a day as needed for moderate pain   [facility] TUSSIN ADULT CHEST CONGEST 100 MG/5ML oral liquid   Yes No   DIABETIC TUSSIN  MG/5ML oral liquid   Yes No   Menthol-Methyl Salicylate (NAWAF TAY GREASELESS) 10-15 % greaseless cream   No No   Sig: Apply topically daily at bedtime   carbamide peroxide (DEBROX) 6 5 % otic solution   No No   Sig: Administer 5 drops into both ears 2 (two) times a day   cholecalciferol (VITAMIN D3) 1,000 units tablet   No No   Sig: TAKE 1 TABLET BY MOUTH EVERY DAY   levothyroxine 150 mcg tablet   No No   Sig: TAKE ONCE EVERY MORNING ON AN EMPTY STOMACH   methocarbamol (ROBAXIN) 500 mg tablet   No No   Sig: Take 1 tablet (500 mg total) by mouth 2 (two) times a day for 7 days   naproxen (NAPROSYN) 500 mg tablet   No No   Sig: Take 1 tablet (500 mg total) by mouth 2 (two) times a day with meals for 5 days      Facility-Administered Medications: None       Past Medical History:   Diagnosis Date    Arthritis     Chronic cough     Disease of thyroid gland     Hypoparathyroidism (HCC)     continue taking calcium and vitamin D, will check CMP, PTH level and Vitamin D level       Past Surgical History:   Procedure Laterality Date    FRACTURE SURGERY      Open Treatment of Each Proximal Finger Phalanx       Family History   Problem Relation Age of Onset    No Known Problems Mother     No Known Problems Father     No Known Problems Sister     No Known Problems Brother     No Known Problems Son     Learning disabilities Daughter     Asthma Daughter     Seizures Daughter     No Known Problems Maternal Grandmother     No Known Problems Maternal Grandfather     No Known Problems Paternal Grandmother     No Known Problems Paternal Grandfather     No Known Problems Maternal Aunt     No Known Problems Maternal Uncle     No Known Problems Paternal Aunt     No Known Problems Paternal Uncle     No Known Problems Cousin      I have reviewed and agree with the history as documented  E-Cigarette/Vaping    E-Cigarette Use Never User      E-Cigarette/Vaping Substances    Nicotine No     THC No     CBD No     Flavoring No     Other No     Unknown No      Social History     Tobacco Use    Smoking status: Former Smoker    Smokeless tobacco: Never Used    Tobacco comment: pt "tried it when he was a teenager but did not like them"   Substance Use Topics    Alcohol use: Yes     Comment: occasionally    Drug use: No       Review of Systems   Constitutional: Negative for diaphoresis, fatigue and fever  HENT: Negative for congestion, ear pain, nosebleeds and sore throat  Eyes: Negative for photophobia, pain, discharge and visual disturbance  Respiratory: Negative for cough, choking, chest tightness, shortness of breath and wheezing  Cardiovascular: Negative for chest pain and palpitations  Gastrointestinal: Negative for abdominal distention, abdominal pain, diarrhea and vomiting  Genitourinary: Negative for dysuria, flank pain and frequency  Musculoskeletal: Negative for back pain, gait problem and joint swelling     Skin: Negative for color change and rash  Neurological: Negative for dizziness, syncope and headaches  Psychiatric/Behavioral: Negative for behavioral problems and confusion  The patient is not nervous/anxious  All other systems reviewed and are negative  Physical Exam  Physical Exam  Vitals signs and nursing note reviewed  Constitutional:       Appearance: He is well-developed  HENT:      Head: Normocephalic and atraumatic  Right Ear: Tympanic membrane, ear canal and external ear normal       Left Ear: Tympanic membrane, ear canal and external ear normal       Nose: Nose normal       Mouth/Throat:      Mouth: Mucous membranes are moist       Pharynx: Oropharynx is clear  No oropharyngeal exudate or posterior oropharyngeal erythema  Eyes:      Conjunctiva/sclera: Conjunctivae normal       Pupils: Pupils are equal, round, and reactive to light  Neck:      Musculoskeletal: Normal range of motion and neck supple  Cardiovascular:      Rate and Rhythm: Normal rate and regular rhythm  Pulmonary:      Effort: Pulmonary effort is normal       Breath sounds: Normal breath sounds  Abdominal:      General: Bowel sounds are normal       Palpations: Abdomen is soft  Tenderness: There is no abdominal tenderness  Musculoskeletal: Normal range of motion  Right lower leg: No edema  Left lower leg: No edema  Skin:     General: Skin is warm  Capillary Refill: Capillary refill takes less than 2 seconds  Neurological:      General: No focal deficit present  Mental Status: He is alert and oriented to person, place, and time  Mental status is at baseline     Psychiatric:         Behavior: Behavior normal          Vital Signs  ED Triage Vitals [05/06/21 1115]   Temperature Pulse Respirations Blood Pressure SpO2   98 4 °F (36 9 °C) 105 18 144/65 97 %      Temp Source Heart Rate Source Patient Position - Orthostatic VS BP Location FiO2 (%)   Oral Monitor Sitting Left arm --      Pain Score       --           Vitals:    05/06/21 1115   BP: 144/65   Pulse: 105   Patient Position - Orthostatic VS: Sitting         Visual Acuity      ED Medications  Medications - No data to display    Diagnostic Studies  Results Reviewed     Procedure Component Value Units Date/Time    Novel Coronavirus (Covid-19),PCR [facility] - 24 Hour Routine [[id number]]     Lab Status: No result Specimen: Nasopharyngeal Swab                  No orders to display              Procedures  Procedures         ED Course                                           MDM    Disposition  Final diagnoses:   Encounter for screening laboratory testing for COVID-19 virus     Time reflects when diagnosis was documented in both MDM as applicable and the Disposition within this note     Time User Action Codes Description Comment    5/6/2021 11:21 AM Anup Dai Add [Z20 822] Encounter for screening laboratory testing for COVID-19 virus       ED Disposition     ED Disposition Condition Date/Time Comment    Discharge Stable Thu May 6, 2021 11:21 AM Eber Pencil discharge to home/self care  Follow-up Information     Follow up With Specialties Details Why [address], DO Internal Medicine   [address]  [phone]            Patient's Medications   Discharge Prescriptions    No medications on file     No discharge procedures on file      PDMP Review     None          ED Provider  Electronically Signed by           Issa Tadeo PA-C  05/06/21 1123

## 2021-05-06 NOTE — Clinical Note
Farida Ninocorneliobernie was seen and treated in our emergency department on 5/6/2021  Diagnosis: COVID test pending    Marlene Rowan    He may return on this date: 05/10/2021         If you have any questions or concerns, please don't hesitate to call        Thania Diane PA-C    ______________________________           _______________          _______________  Hospital Representative                              Date                                Time

## 2021-05-06 NOTE — LETTER
May 7, 2021     Patient: Nadine Alas   YOB: 1970   Date of Visit: 5/6/2021       To Whom it May Concern:    Nadine Alas is under my professional care  He may return to work on 05/08/2021  If you have any questions or concerns, please don't hesitate to call           Sincerely,          Teetee Parsons MD       CC: No Recipients

## 2021-05-06 NOTE — TELEPHONE ENCOUNTER
Patient was recently seen by virtual visit for COVID exposure  Patient was recommended to get COVID testing on day 5 after exposure, but never went due to transportation issues  Patient went today for testing because he would like to return to work on Sunday  His test came back negative  Can you please write a note for patient to return to work Sunday based on the timing of testing, negative results, and having been in quarantine for over 7 days  Thank you

## 2021-05-17 ENCOUNTER — IMMUNIZATIONS (OUTPATIENT)
Dept: FAMILY MEDICINE CLINIC | Facility: HOSPITAL | Age: 51
End: 2021-05-17

## 2021-05-17 DIAGNOSIS — Z23 ENCOUNTER FOR IMMUNIZATION: Primary | ICD-10-CM

## 2021-05-17 PROCEDURE — 91300 SARS-COV-2 / COVID-19 MRNA VACCINE (PFIZER-BIONTECH) 30 MCG: CPT

## 2021-05-17 PROCEDURE — 0001A SARS-COV-2 / COVID-19 MRNA VACCINE (PFIZER-BIONTECH) 30 MCG: CPT

## 2021-05-23 DIAGNOSIS — E03.9 HYPOTHYROIDISM: ICD-10-CM

## 2021-05-23 RX ORDER — LEVOTHYROXINE SODIUM 0.15 MG/1
TABLET ORAL
Qty: 60 TABLET | Refills: 0 | Status: SHIPPED | OUTPATIENT
Start: 2021-05-23 | End: 2021-07-06

## 2021-05-24 ENCOUNTER — CONSULT (OUTPATIENT)
Dept: MULTI SPECIALTY CLINIC | Facility: CLINIC | Age: 51
End: 2021-05-24

## 2021-05-24 VITALS
WEIGHT: 192 LBS | DIASTOLIC BLOOD PRESSURE: 68 MMHG | SYSTOLIC BLOOD PRESSURE: 103 MMHG | TEMPERATURE: 98.2 F | HEART RATE: 93 BPM | BODY MASS INDEX: 30.07 KG/M2

## 2021-05-24 DIAGNOSIS — Z12.11 COLON CANCER SCREENING: ICD-10-CM

## 2021-05-24 PROCEDURE — 99242 OFF/OP CONSLTJ NEW/EST SF 20: CPT | Performed by: SURGERY

## 2021-05-24 NOTE — PROGRESS NOTES
Assessment/Plan:    No problem-specific Assessment & Plan notes found for this encounter  Diagnoses and all orders for this visit:    Colon cancer screening  -     Ambulatory referral to Colorectal Surgery        Anal fistula   -after long discussion with patient it was recommended that he undergo colonoscopy and wound exam under anesthesia with possible advancement flap  Risks and benefits were discussed with patient  At this time patient states he is busy and would like to call back at a better time for him to undergo the procedure  Discussed importance of colonoscopy to rule out cancer  Patient verbalized understanding  Subjective:      Patient ID: Jenny Bhatti is a 44 y o  male  HPI    47-year-old male presents with  Anal leakage from likely fistula  Difficulty keeping area clean  He states this has been present for over a year  Patient recalls having an abscess the a long time ago that he did not see a doctor before  Denies any recent surgeries  He states he had a colonoscopy done a long time ago  Review of Systems   Constitutional: Negative  HENT: Negative  Respiratory: Negative  Cardiovascular: Negative  Gastrointestinal:        Perianal stool leakage   Genitourinary: Negative  Musculoskeletal: Negative  Skin: Negative  Neurological: Negative  Hematological: Negative  Psychiatric/Behavioral: Negative  Objective:      /68 (BP Location: Left arm, Patient Position: Sitting, Cuff Size: Large)   Pulse 93   Temp 98 2 °F (36 8 °C) (Temporal)   Wt 87 1 kg (192 lb)   BMI 30 07 kg/m²          Physical Exam  Constitutional:       General: He is not in acute distress  Appearance: Normal appearance  He is normal weight  He is not ill-appearing or toxic-appearing  HENT:      Head: Normocephalic and atraumatic  Cardiovascular:      Rate and Rhythm: Normal rate and regular rhythm     Pulmonary:      Effort: Pulmonary effort is normal  No respiratory distress  Abdominal:      General: There is no distension  Genitourinary:     Comments: Left gluteal old scar, no obvious external opening identified  SYLVIE: prostate smooth, not enlarge, No masses or stool ball felt  Skin:     General: Skin is warm  Capillary Refill: Capillary refill takes less than 2 seconds  Neurological:      General: No focal deficit present  Mental Status: He is alert and oriented to person, place, and time     Psychiatric:         Mood and Affect: Mood normal          Behavior: Behavior normal

## 2021-06-15 ENCOUNTER — IMMUNIZATIONS (OUTPATIENT)
Dept: FAMILY MEDICINE CLINIC | Facility: HOSPITAL | Age: 51
End: 2021-06-15

## 2021-06-15 DIAGNOSIS — Z23 ENCOUNTER FOR IMMUNIZATION: Primary | ICD-10-CM

## 2021-06-15 PROCEDURE — 0002A SARS-COV-2 / COVID-19 MRNA VACCINE (PFIZER-BIONTECH) 30 MCG: CPT

## 2021-06-15 PROCEDURE — 91300 SARS-COV-2 / COVID-19 MRNA VACCINE (PFIZER-BIONTECH) 30 MCG: CPT

## 2021-06-24 DIAGNOSIS — E03.9 HYPOTHYROIDISM: ICD-10-CM

## 2021-06-25 DIAGNOSIS — M19.90 ARTHRITIS: ICD-10-CM

## 2021-07-06 ENCOUNTER — OFFICE VISIT (OUTPATIENT)
Dept: INTERNAL MEDICINE CLINIC | Facility: CLINIC | Age: 51
End: 2021-07-06

## 2021-07-06 VITALS
BODY MASS INDEX: 28.53 KG/M2 | TEMPERATURE: 97.5 F | DIASTOLIC BLOOD PRESSURE: 64 MMHG | HEART RATE: 94 BPM | WEIGHT: 181.8 LBS | HEIGHT: 67 IN | SYSTOLIC BLOOD PRESSURE: 104 MMHG | OXYGEN SATURATION: 96 %

## 2021-07-06 DIAGNOSIS — Z00.00 ENCOUNTER FOR ANNUAL PHYSICAL EXAM: Primary | ICD-10-CM

## 2021-07-06 DIAGNOSIS — E03.9 HYPOTHYROIDISM: ICD-10-CM

## 2021-07-06 DIAGNOSIS — H61.23 BILATERAL IMPACTED CERUMEN: ICD-10-CM

## 2021-07-06 DIAGNOSIS — M19.90 ARTHRITIS: ICD-10-CM

## 2021-07-06 DIAGNOSIS — Z11.59 NEED FOR HEPATITIS C SCREENING TEST: ICD-10-CM

## 2021-07-06 PROCEDURE — 69209 REMOVE IMPACTED EAR WAX UNI: CPT | Performed by: INTERNAL MEDICINE

## 2021-07-06 PROCEDURE — T1015 CLINIC SERVICE: HCPCS | Performed by: INTERNAL MEDICINE

## 2021-07-06 PROCEDURE — 99214 OFFICE O/P EST MOD 30 MIN: CPT | Performed by: INTERNAL MEDICINE

## 2021-07-06 RX ORDER — SENNOSIDES 8.6 MG
650 CAPSULE ORAL EVERY 8 HOURS PRN
Qty: 90 TABLET | Refills: 0 | Status: SHIPPED | OUTPATIENT
Start: 2021-07-06 | End: 2021-10-13 | Stop reason: SDUPTHER

## 2021-07-06 RX ORDER — ACETAMINOPHEN 650 MG
650 TABLET, EXTENDED RELEASE ORAL 4 TIMES DAILY PRN
Qty: 90 TABLET | Refills: 0 | Status: SHIPPED | OUTPATIENT
Start: 2021-07-06 | End: 2021-07-06

## 2021-07-06 RX ORDER — VITAMIN B COMPLEX
1000 TABLET ORAL DAILY
Qty: 90 TABLET | Refills: 1 | Status: SHIPPED | OUTPATIENT
Start: 2021-07-06 | End: 2021-10-13 | Stop reason: SDUPTHER

## 2021-07-06 RX ORDER — VITAMIN B COMPLEX
TABLET ORAL
Qty: 30 TABLET | Refills: 1 | Status: SHIPPED | OUTPATIENT
Start: 2021-07-06 | End: 2021-07-06 | Stop reason: SDUPTHER

## 2021-07-06 RX ORDER — LEVOTHYROXINE SODIUM 0.15 MG/1
TABLET ORAL
Qty: 60 TABLET | Refills: 0 | OUTPATIENT
Start: 2021-07-06

## 2021-07-06 RX ORDER — LEVOTHYROXINE SODIUM 0.15 MG/1
TABLET ORAL
Qty: 90 TABLET | Refills: 1 | Status: SHIPPED | OUTPATIENT
Start: 2021-07-06 | End: 2021-12-16

## 2021-07-06 NOTE — PATIENT INSTRUCTIONS
Repeat Blood work in 8 weeks  Follow-up in 3 months  Follow-up with colorectal surgery for colonoscopy  Levothyroxine 3 tablets (450 mg total) on Monday and Thursday only

## 2021-07-06 NOTE — PROGRESS NOTES
[facility]  INTERNAL MEDICINE  [address] [place] [address] [place] [address]      NAME: Idalia Powers  AGE: 44 y o  SEX: male    DATE OF ENCOUNTER: 7/6/2021    ASSESSMENT AND PLAN     1  Encounter for annual physical exam  Past medical history of hypothyroidism, osteoarthritis presenting for annual physical exam   Vital signs stable  Physical exam unremarkable except for impacted cerumen in bilateral ears  Patient requests refills of his medications  Medications reviewed with patient  Previous blood work reviewed  - BMI Counseling: Body mass index is 28 47 kg/m²  The BMI is above normal  Nutrition recommendations include reducing portion sizes, reducing fast food intake, consuming healthier snacks and decreasing soda and/or juice intake  Exercise recommendations include exercising 3-5 times per week  - PHQ 2 score 0; negative depression screening  - ASCVD risk 4 1%    2  Impacted cerumen of bilateral ears  - continue carbamide peroxide (DEBROX) 6 5 % otic solution; Administer 5 drops into both ears 2 (two) times a day  Dispense: 15 mL; Refill: 1  - Ear cerumen removal - patient tolerated the procedure well without any complications  Significant ear wax noted after removal   Reported improved hearing    - avoid Q-tips    3  Hypothyroidism  Patient's blood work in 01/2021 show TSH was suppressed with elevated T4  Patient wishes to switch to 2 times weekly dosing instead of daily dosing  Calculated weekly dosing is 900 mcg per week  - levothyroxine 150 mcg tablet; Take 3 tablets on Monday and 3 tablets on Thursday  Dispense: 90 tablet; Refill: 1  - TSH + free T4 ordered in 8 weeks  - follow-up in 3 months    4  Arthritis  Stable  Reports some left shoulder pain which is well controlled with Tylenol   - cholecalciferol (VITAMIN D3) 25 mcg (1,000 units) tablet; Take 1 tablet (1,000 Units total) by mouth daily  Dispense: 90 tablet;  Refill: 1  - acetaminophen (TYLENOL) 650 mg CR tablet; Take 1 tablet (650 mg total) by mouth every 8 (eight) hours as needed for mild pain  Dispense: 90 tablet; Refill: 0    5  Need for hepatitis C screening test  Agreeable to hepatitis-C screening   - Hepatitis C antibody; Future    Follow-up in 3 months with blood work  Orders Placed This Encounter   Procedures    Hepatitis C antibody       CHIEF COMPLAINT     Chief Complaint   Patient presents with    Annual Exam       HISTORY OF PRESENT ILLNESS     Yu [facility] Echo history of hypothyroidism, osteoarthritis, lumbar radiculopathy presenting for annual physical exam and ear cleaning  Patient reports that he has been doing well and only complains of left shoulder pain, mild in nature, improved with use of Tylenol  ROM and strength intact  Pain worse when lying on left side  Patient reports that he normally gets his ears cleaned every 3 months  Reports decreased hearing bilaterally  He wants to get his ears cleaned today  He has been using ear drops to help loosen ear wax  Patient has been using Q-tips; recommended against this  Denies any fevers, chills, nausea, vomiting, chest pain, shortness of breath, weakness, fatigue, constipation, diarrhea  TSH and T4 reviewed from 01/2021       The following portions of the patient's history were reviewed and updated as appropriate: allergies, current medications, past family history, past medical history, past social history, past surgical history and problem list     REVIEW OF SYSTEMS     ROS      All other ROS negative except per HPI    ACTIVE PROBLEM LIST     Patient Active Problem List   Diagnosis    Allergic rhinitis    Arterial ectasia (HCC)    Chronic low back pain    Hyperlipidemia    Hypothyroidism    Lumbar radiculopathy    Mass of parotid gland    Osteoarthritis of both hands    Dry cough    Reactive airway disease    Muscle strain of left upper back    Pneumonia of right middle lobe due to Streptococcus pneumoniae ([facility] [address] )       Past Medical History:   Diagnosis Date    Arthritis     Chronic cough     Disease of thyroid gland     Hypoparathyroidism (HCC)     continue taking calcium and vitamin D, will check CMP, PTH level and Vitamin D level       CURRENT MEDICATIONS       Current Outpatient Medications:     cholecalciferol (VITAMIN D3) 25 mcg (1,000 units) tablet, Take 1 tablet (1,000 Units total) by mouth daily, Disp: 90 tablet, Rfl: 1    levothyroxine 150 mcg tablet, Take 3 tablets on Monday and 3 tablets on Thursday  , Disp: 90 tablet, Rfl: 1    acetaminophen (TYLENOL) 650 mg CR tablet, Take 1 tablet (650 mg total) by mouth every 8 (eight) hours as needed for mild pain, Disp: 90 tablet, Rfl: 0    carbamide peroxide (DEBROX) 6 5 % otic solution, Administer 5 drops into both ears 2 (two) times a day, Disp: 15 mL, Rfl: 1    CVS TUSSIN ADULT CHEST CONGEST 100 MG/5ML oral liquid, , Disp: , Rfl:     DIABETIC TUSSIN  MG/5ML oral liquid, , Disp: , Rfl:     Allergies   Allergen Reactions    Chlorine Rash       Social History   Past Surgical History:   Procedure Laterality Date    FRACTURE SURGERY      Open Treatment of Each Proximal Finger Phalanx     Family History   Problem Relation Age of Onset    No Known Problems Mother     No Known Problems Father     No Known Problems Sister     No Known Problems Brother     No Known Problems Son     Learning disabilities Daughter     Asthma Daughter     Seizures Daughter     No Known Problems Maternal Grandmother     No Known Problems Maternal Grandfather     No Known Problems Paternal Grandmother     No Known Problems Paternal Grandfather     No Known Problems Maternal Aunt     No Known Problems Maternal Uncle     No Known Problems Paternal Aunt     No Known Problems Paternal Uncle     No Known Problems Cousin        OBJECTIVE     /64 (BP Location: Left arm, Patient Position: Sitting, Cuff Size: Standard)   Pulse 94   Temp 97 5 °F (36 4 °C) (Temporal)   Ht 5' 7" (1 702 m)   Wt 82 5 kg (181 lb 12 8 oz)   SpO2 96%   BMI 28 47 kg/m²     Physical Exam  Vitals reviewed  Constitutional:       General: He is not in acute distress  Appearance: Normal appearance  He is not ill-appearing  Comments: BMI 28 5   HENT:      Head: Normocephalic and atraumatic  Right Ear: There is impacted cerumen  Left Ear: There is impacted cerumen  Nose: Nose normal  No congestion  Mouth/Throat:      Mouth: Mucous membranes are moist       Pharynx: Oropharynx is clear  No oropharyngeal exudate  Eyes:      General: No scleral icterus  Extraocular Movements: Extraocular movements intact  Conjunctiva/sclera: Conjunctivae normal    Cardiovascular:      Rate and Rhythm: Normal rate and regular rhythm  Pulses: Normal pulses  Heart sounds: Normal heart sounds  No murmur heard  No friction rub  No gallop  Pulmonary:      Effort: Pulmonary effort is normal  No respiratory distress  Breath sounds: Normal breath sounds  No wheezing or rales  Abdominal:      General: Abdomen is flat  Bowel sounds are normal       Palpations: Abdomen is soft  Tenderness: There is no abdominal tenderness  Musculoskeletal:         General: Normal range of motion  Cervical back: Normal range of motion and neck supple  No tenderness  Right lower leg: No edema  Left lower leg: No edema  Lymphadenopathy:      Cervical: No cervical adenopathy  Skin:     General: Skin is warm  Capillary Refill: Capillary refill takes less than 2 seconds  Neurological:      Mental Status: He is alert and oriented to person, place, and time  Motor: No weakness  Gait: Gait normal    Psychiatric:         Mood and Affect: Mood normal          Thought Content:  Thought content normal           Ear cerumen removal    Date/Time: 7/6/2021 4:44 PM  Performed by: Yanet Phillips DO  Authorized by: Yanet Phillips DO   Universal Protocol:  Procedure performed by:  Consent: Verbal consent obtained  Risks and benefits: risks, benefits and alternatives were discussed  Consent given by: parent and patient  Timeout called at: 7/6/2021 4:00 PM   Patient understanding: patient states understanding of the procedure being performed  Patient consent: the patient's understanding of the procedure matches consent given  Patient identity confirmed: verbally with patient      Patient location:  Clinic  Indications / Diagnosis:  Impacted cerumen  Procedure details:     Local anesthetic:  None    Location:  R ear and L ear    Procedure type: irrigation only      Approach:  External  Post-procedure details:     Complication:  None    Hearing quality:  Improved    Patient tolerance of procedure: Tolerated well, no immediate complications          Pertinent Laboratory/Diagnostic Studies:     No results found      Images and diagnostics reviewed     HEALTH MAINTENANCE     Health Maintenance   Topic Date Due    Hepatitis C Screening  Never done    Dental Periodic Exam  Never done    Dental X-Ray: Bitewings  Never done    Dental X-Ray: Full Mouth  Never done    Dental Prophylaxis  Never done    Annual Physical  Never done    Colorectal Cancer Screening  Never done    BMI: Followup Plan  06/04/2021    Influenza Vaccine (1) 09/01/2021    Depression Screening PHQ  07/06/2022    BMI: Adult  07/06/2022    DTaP,Tdap,and Td Vaccines (2 - Td or Tdap) 03/15/2028    HIV Screening  Completed    COVID-19 Vaccine  Completed    Pneumococcal Vaccine: Pediatrics (0 to 5 Years) and At-Risk Patients (6 to 59 Years)  Aged Out    HIB Vaccine  Aged Out    Hepatitis B Vaccine  Aged Out    IPV Vaccine  Aged Out    Hepatitis A Vaccine  Aged Out    Meningococcal ACWY Vaccine  Aged Out    HPV Vaccine  Aged [employer] History   Administered Date(s) Administered    Influenza, recombinant, quadrivalent,injectable, preservative free 10/21/2019, 11/16/2020    Influenza, seasonal, injectable 11/18/2015    Influenza, seasonal, injectable, preservative free 03/15/2018    Pneumococcal Polysaccharide PPV23 10/21/2019    SARS-CoV-2 / COVID-19 mRNA IM (Pfizer-BioNTech) 05/17/2021, 06/15/2021    TD (adult) Preservative Free 01/20/2009    Tdap 03/15/2018       DO [address] [address] Internal Medicine PGY-2    [address] [facility] , [facility] [address] [facility]  Office: [phone]  Fax: [phone]

## 2021-09-24 ENCOUNTER — APPOINTMENT (OUTPATIENT)
Dept: LAB | Facility: HOSPITAL | Age: 51
End: 2021-09-24
Payer: COMMERCIAL

## 2021-09-24 DIAGNOSIS — Z11.59 NEED FOR HEPATITIS C SCREENING TEST: ICD-10-CM

## 2021-09-24 DIAGNOSIS — E03.9 HYPOTHYROIDISM, UNSPECIFIED TYPE: ICD-10-CM

## 2021-09-24 DIAGNOSIS — E03.9 HYPOTHYROIDISM: ICD-10-CM

## 2021-09-24 LAB
HCV AB SER QL: NORMAL
T4 FREE SERPL-MCNC: 1.33 NG/DL (ref 0.76–1.46)
TSH SERPL DL<=0.05 MIU/L-ACNC: <0.007 UIU/ML (ref 0.36–3.74)

## 2021-09-24 PROCEDURE — 84439 ASSAY OF FREE THYROXINE: CPT

## 2021-09-24 PROCEDURE — 86803 HEPATITIS C AB TEST: CPT

## 2021-09-24 PROCEDURE — 36415 COLL VENOUS BLD VENIPUNCTURE: CPT

## 2021-09-24 PROCEDURE — 84443 ASSAY THYROID STIM HORMONE: CPT

## 2021-09-28 ENCOUNTER — TELEPHONE (OUTPATIENT)
Dept: INTERNAL MEDICINE CLINIC | Facility: CLINIC | Age: 51
End: 2021-09-28

## 2021-10-06 DIAGNOSIS — E03.9 HYPOTHYROIDISM, UNSPECIFIED TYPE: Primary | ICD-10-CM

## 2021-10-13 ENCOUNTER — OFFICE VISIT (OUTPATIENT)
Dept: INTERNAL MEDICINE CLINIC | Facility: CLINIC | Age: 51
End: 2021-10-13

## 2021-10-13 VITALS
HEART RATE: 92 BPM | DIASTOLIC BLOOD PRESSURE: 69 MMHG | SYSTOLIC BLOOD PRESSURE: 106 MMHG | WEIGHT: 180 LBS | BODY MASS INDEX: 28.25 KG/M2 | HEIGHT: 67 IN | TEMPERATURE: 97.8 F

## 2021-10-13 DIAGNOSIS — E83.51 HYPOCALCEMIA: Primary | ICD-10-CM

## 2021-10-13 DIAGNOSIS — R05.3 CHRONIC COUGH: ICD-10-CM

## 2021-10-13 DIAGNOSIS — M19.90 ARTHRITIS: ICD-10-CM

## 2021-10-13 DIAGNOSIS — E03.9 HYPOTHYROIDISM, UNSPECIFIED TYPE: ICD-10-CM

## 2021-10-13 DIAGNOSIS — H61.23 BILATERAL IMPACTED CERUMEN: ICD-10-CM

## 2021-10-13 PROCEDURE — 99214 OFFICE O/P EST MOD 30 MIN: CPT | Performed by: INTERNAL MEDICINE

## 2021-10-13 RX ORDER — VITAMIN B COMPLEX
1000 TABLET ORAL DAILY
Qty: 90 TABLET | Refills: 1 | Status: SHIPPED | OUTPATIENT
Start: 2021-10-13 | End: 2022-06-09

## 2021-10-13 RX ORDER — SENNOSIDES 8.6 MG
100 TABLET ORAL 3 TIMES DAILY PRN
Qty: 120 ML | Refills: 0 | Status: SHIPPED | OUTPATIENT
Start: 2021-10-13

## 2021-10-13 RX ORDER — SENNOSIDES 8.6 MG
650 CAPSULE ORAL EVERY 8 HOURS PRN
Qty: 90 TABLET | Refills: 0 | Status: SHIPPED | OUTPATIENT
Start: 2021-10-13 | End: 2021-12-28 | Stop reason: SDUPTHER

## 2021-11-26 ENCOUNTER — HOSPITAL ENCOUNTER (EMERGENCY)
Facility: HOSPITAL | Age: 51
Discharge: HOME | End: 2021-11-26
Attending: EMERGENCY MEDICINE | Admitting: EMERGENCY MEDICINE
Payer: COMMERCIAL

## 2021-11-26 VITALS
OXYGEN SATURATION: 98 % | DIASTOLIC BLOOD PRESSURE: 83 MMHG | SYSTOLIC BLOOD PRESSURE: 121 MMHG | RESPIRATION RATE: 18 BRPM | TEMPERATURE: 97.9 F | HEART RATE: 102 BPM

## 2021-11-26 DIAGNOSIS — J10.1 INFLUENZA A: ICD-10-CM

## 2021-11-26 DIAGNOSIS — R05.9 COUGH: Primary | ICD-10-CM

## 2021-11-26 LAB
FLUAV RNA RESP QL NAA+PROBE: POSITIVE
FLUBV RNA RESP QL NAA+PROBE: NEGATIVE
RSV RNA RESP QL NAA+PROBE: NEGATIVE
SARS-COV-2 RNA RESP QL NAA+PROBE: NEGATIVE

## 2021-11-26 PROCEDURE — 99283 EMERGENCY DEPT VISIT LOW MDM: CPT

## 2021-11-26 PROCEDURE — 0241U HB NFCT DS VIR RESP RNA 4 TRGT: CPT | Performed by: EMERGENCY MEDICINE

## 2021-11-26 PROCEDURE — 99284 EMERGENCY DEPT VISIT MOD MDM: CPT | Performed by: EMERGENCY MEDICINE

## 2021-11-26 RX ORDER — PREDNISONE 20 MG/1
60 TABLET ORAL DAILY
Qty: 15 TABLET | Refills: 0 | Status: SHIPPED | OUTPATIENT
Start: 2021-11-26 | End: 2021-12-01

## 2021-11-26 RX ORDER — BENZONATATE 100 MG/1
100 CAPSULE ORAL EVERY 8 HOURS
Qty: 21 CAPSULE | Refills: 0 | Status: SHIPPED | OUTPATIENT
Start: 2021-11-26 | End: 2022-01-25 | Stop reason: SDUPTHER

## 2021-11-26 RX ORDER — ALBUTEROL SULFATE 90 UG/1
2 AEROSOL, METERED RESPIRATORY (INHALATION) EVERY 4 HOURS PRN
Qty: 8 G | Refills: 0 | Status: SHIPPED | OUTPATIENT
Start: 2021-11-26

## 2021-12-16 DIAGNOSIS — E03.9 HYPOTHYROIDISM: ICD-10-CM

## 2021-12-16 RX ORDER — LEVOTHYROXINE SODIUM 0.15 MG/1
TABLET ORAL
Qty: 90 TABLET | Refills: 1 | Status: SHIPPED | OUTPATIENT
Start: 2021-12-16 | End: 2022-05-20 | Stop reason: SDUPTHER

## 2021-12-20 ENCOUNTER — OFFICE VISIT (OUTPATIENT)
Dept: DENTISTRY | Facility: CLINIC | Age: 51
End: 2021-12-20

## 2021-12-20 VITALS — HEART RATE: 73 BPM | DIASTOLIC BLOOD PRESSURE: 76 MMHG | TEMPERATURE: 96.7 F | SYSTOLIC BLOOD PRESSURE: 114 MMHG

## 2021-12-20 DIAGNOSIS — K02.9 CARIES: Primary | ICD-10-CM

## 2021-12-20 PROCEDURE — D0220 INTRAORAL - PERIAPICAL FIRST RADIOGRAPHIC IMAGE: HCPCS | Performed by: DENTIST

## 2021-12-20 PROCEDURE — D0140 LIMITED ORAL EVALUATION - PROBLEM FOCUSED: HCPCS | Performed by: DENTIST

## 2021-12-28 DIAGNOSIS — M19.90 ARTHRITIS: ICD-10-CM

## 2021-12-28 RX ORDER — SENNOSIDES 8.6 MG
650 CAPSULE ORAL EVERY 8 HOURS PRN
Qty: 90 TABLET | Refills: 0 | Status: SHIPPED | OUTPATIENT
Start: 2021-12-28 | End: 2022-01-25 | Stop reason: SDUPTHER

## 2022-01-24 DIAGNOSIS — M19.90 ARTHRITIS: ICD-10-CM

## 2022-01-24 DIAGNOSIS — R05.9 COUGH: ICD-10-CM

## 2022-01-24 RX ORDER — BENZONATATE 100 MG/1
100 CAPSULE ORAL EVERY 8 HOURS
Qty: 21 CAPSULE | Refills: 0 | OUTPATIENT
Start: 2022-01-24

## 2022-01-24 RX ORDER — SENNOSIDES 8.6 MG
650 CAPSULE ORAL EVERY 8 HOURS PRN
Qty: 90 TABLET | Refills: 0 | OUTPATIENT
Start: 2022-01-24

## 2022-01-25 DIAGNOSIS — M19.90 ARTHRITIS: ICD-10-CM

## 2022-01-25 DIAGNOSIS — R05.9 COUGH: ICD-10-CM

## 2022-01-25 RX ORDER — BENZONATATE 100 MG/1
100 CAPSULE ORAL EVERY 8 HOURS
Qty: 21 CAPSULE | Refills: 0 | Status: SHIPPED | OUTPATIENT
Start: 2022-01-25

## 2022-01-25 RX ORDER — SENNOSIDES 8.6 MG
650 CAPSULE ORAL EVERY 8 HOURS PRN
Qty: 90 TABLET | Refills: 0 | Status: SHIPPED | OUTPATIENT
Start: 2022-01-25 | End: 2022-03-18 | Stop reason: SDUPTHER

## 2022-01-25 NOTE — TELEPHONE ENCOUNTER
Requested Prescriptions     Pending Prescriptions Disp Refills    benzonatate (TESSALON PERLES) 100 mg capsule 21 capsule 0     Sig: Take 1 capsule (100 mg total) by mouth every 8 (eight) hours    acetaminophen (TYLENOL) 650 mg CR tablet 90 tablet 0     Sig: Take 1 tablet (650 mg total) by mouth every 8 (eight) hours as needed for mild pain

## 2022-02-07 ENCOUNTER — IMMUNIZATIONS (OUTPATIENT)
Dept: FAMILY MEDICINE CLINIC | Facility: HOSPITAL | Age: 52
End: 2022-02-07

## 2022-02-07 DIAGNOSIS — Z23 ENCOUNTER FOR IMMUNIZATION: Primary | ICD-10-CM

## 2022-02-07 PROCEDURE — 0001A COVID-19 PFIZER VACC 0.3 ML: CPT

## 2022-02-07 PROCEDURE — 91300 COVID-19 PFIZER VACC 0.3 ML: CPT

## 2022-02-08 NOTE — TELEPHONE ENCOUNTER
Called patient and he is aware of lab work and changes to synthroid meds  DISPLAY PLAN FREE TEXT DISPLAY PLAN FREE TEXT

## 2022-02-16 ENCOUNTER — OFFICE VISIT (OUTPATIENT)
Dept: INTERNAL MEDICINE CLINIC | Facility: CLINIC | Age: 52
End: 2022-02-16

## 2022-02-16 VITALS
SYSTOLIC BLOOD PRESSURE: 100 MMHG | WEIGHT: 180 LBS | TEMPERATURE: 98 F | HEART RATE: 79 BPM | BODY MASS INDEX: 28.19 KG/M2 | DIASTOLIC BLOOD PRESSURE: 65 MMHG

## 2022-02-16 DIAGNOSIS — Z12.11 SCREENING FOR COLON CANCER: Primary | ICD-10-CM

## 2022-02-16 DIAGNOSIS — H61.23 BILATERAL IMPACTED CERUMEN: ICD-10-CM

## 2022-02-16 DIAGNOSIS — E03.9 HYPOTHYROIDISM, UNSPECIFIED TYPE: ICD-10-CM

## 2022-02-16 PROCEDURE — 69210 REMOVE IMPACTED EAR WAX UNI: CPT | Performed by: INTERNAL MEDICINE

## 2022-02-16 PROCEDURE — 99213 OFFICE O/P EST LOW 20 MIN: CPT | Performed by: INTERNAL MEDICINE

## 2022-02-16 NOTE — PATIENT INSTRUCTIONS
Good to see you today, Mando Dueñas  In 2 weeks, we can try to remove the ear wax again, use drops before hand as we discussed    You need to let us know when you are going to get the colonoscopy per colorectal surgery for  Your fistula    Please get your blood work I gave you today to check on your thyroid

## 2022-02-16 NOTE — PROGRESS NOTES
[address] [place] [address]  INTERNAL MEDICINE OFFICE VISIT     PATIENT INFORMATION     Efrain Oviedo   46 y o  male   MRN: [id number]    ASSESSMENT/PLAN     1  Screening for colon cancer  -     Occult Blood, Fecal Immunochemical; Future  -patient would like to try FIT test as not sure when he'll be able to get c scope as recommended by CRS for anal fistula , strongly encouraged him to get C scope for this issue as symptomatic  2  Bilateral impacted cerumen  -     carbamide peroxide (DEBROX) 6 5 % otic solution; Administer 5 drops into both ears 2 (two) times a day  -advised him to use these so removal of right sided cerumen easier in 2 weeks  3  Hypothyroidism, unspecified type  -reprinted labs for TSH and T4 as last were abnormal  Given to him to today and states he'll go to hospital  Cont synthroid  Schedule a follow-up appointment in 2 weeks as already scheduled for right cerumen removal after debrox in 2 weeks  HEALTH MAINTENANCE     Immunization History   Administered Date(s) Administered    COVID-19 PFIZER VACCINE 0 3 ML IM 05/17/2021, 06/15/2021, 02/07/2022    Influenza, recombinant, quadrivalent,injectable, preservative free 10/21/2019, 11/16/2020    Influenza, seasonal, injectable 11/18/2015    Influenza, seasonal, injectable, preservative free 03/15/2018    Pneumococcal Polysaccharide PPV23 10/21/2019    TD (adult) Preservative Free 01/20/2009    Tdap 03/15/2018     Immunizations:  · Had covid series, needs Flu, zoster   Screening:  · Given FIT today however  Needs colonoscopy for anal fistula   Labs given for thyroid follow up , sees dental next week for cleaning     Depression Screening and Follow-up Plan: Patient was screened for depression during today's encounter  They screened negative with a PHQ-2 score of 0          CHIEF COMPLAINT     Chief Complaint   Patient presents with    Follow-up     ear wax removal      HISTORY OF PRESENT ILLNESS     Patient presents today for follow up from ER visit in November 2021 for Influenza A  However due to work schedule he hasn't been able to follow up sooner  He is also in need today of cerumen removal  We discussed that he hasn't had his thyroid follow up labs and he reported again, he hasn't had time with work  He also hasn't followed up with CRS for anal fistula  He does report some leakage and it is bothersome but needs to find time with work  I encouraged him to take time for himself so he can attend to his health and he agreed  REVIEW OF SYSTEMS     Review of Systems   Constitutional: Negative for chills and fever  HENT: Positive for ear pain and hearing loss  Negative for tinnitus  Respiratory: Negative for cough, chest tightness and shortness of breath  Cardiovascular: Negative for chest pain and leg swelling  Gastrointestinal: Negative for abdominal pain and diarrhea  Anal leakage (chronic)   Skin: Negative for rash  Neurological: Negative for headaches  OBJECTIVE     Vitals:    02/16/22 1037   BP: 100/65   BP Location: Left arm   Patient Position: Sitting   Cuff Size: Large   Pulse: 79   Temp: 98 °F (36 7 °C)   TempSrc: Temporal   Weight: 81 6 kg (180 lb)     Physical Exam  Vitals reviewed  Constitutional:       General: He is not in acute distress  Appearance: Normal appearance  HENT:      Right Ear: There is impacted cerumen  Left Ear: There is impacted cerumen  Mouth/Throat:      Mouth: Mucous membranes are moist    Eyes:      General: No scleral icterus  Cardiovascular:      Rate and Rhythm: Normal rate and regular rhythm  Pulses: Normal pulses  Pulmonary:      Effort: Pulmonary effort is normal       Breath sounds: Normal breath sounds  Musculoskeletal:         General: Deformity present  No swelling  Cervical back: Normal range of motion  Comments: bl 5th digit deformity L>R   Skin:     Capillary Refill: Capillary refill takes less than 2 seconds  Neurological:      Mental Status: He is alert  Psychiatric:         Mood and Affect: Mood normal          Behavior: Behavior normal          CURRENT MEDICATIONS     Current Outpatient Medications:     acetaminophen (TYLENOL) 650 mg CR tablet, Take 1 tablet (650 mg total) by mouth every 8 (eight) hours as needed for mild pain, Disp: 90 tablet, Rfl: 0    albuterol (Ventolin HFA) 90 mcg/act inhaler, Inhale 2 puffs every 4 (four) hours as needed for wheezing, Disp: 8 g, Rfl: 0    benzonatate (TESSALON PERLES) 100 mg capsule, Take 1 capsule (100 mg total) by mouth every 8 (eight) hours, Disp: 21 capsule, Rfl: 0    cholecalciferol (VITAMIN D3) 25 mcg (1,000 units) tablet, Take 1 tablet (1,000 Units total) by mouth daily, Disp: 90 tablet, Rfl: 1    CVS Tussin Adult Chest Congest 100 MG/5ML oral liquid, Take 5 mL (100 mg total) by mouth 3 (three) times a day as needed for cough, Disp: 120 mL, Rfl: 0    levothyroxine 150 mcg tablet, TAKE 3 TABLETS ON MONDAY AND 3 TABLETS ON THURSDAY , Disp: 90 tablet, Rfl: 1    carbamide peroxide (DEBROX) 6 5 % otic solution, Administer 5 drops into both ears 2 (two) times a day, Disp: 15 mL, Rfl: 1    PAST MEDICAL & SURGICAL HISTORY     Past Medical History:   Diagnosis Date    Arthritis     Chronic cough     Disease of thyroid gland     Hypoparathyroidism (HCC)     continue taking calcium and vitamin D, will check CMP, PTH level and Vitamin D level     Past Surgical History:   Procedure Laterality Date    FRACTURE SURGERY      Open Treatment of Each Proximal Finger Phalanx     SOCIAL & FAMILY HISTORY     Social History     Socioeconomic History    Marital status:       Spouse name: Not on file    Number of children: 1    Years of education: Not on file    Highest education level: Not on file   Occupational History    Occupation:    Tobacco Use    Smoking status: Former Smoker    Smokeless tobacco: Never Used    Tobacco comment: pt "tried it when he was a teenager but did not like them"   Vaping Use    Vaping Use: Never used   Substance and Sexual Activity    Alcohol use: Yes     Comment: occasionally    Drug use: No    Sexual activity: Not Currently   Other Topics Concern    Not on file   Social History Narrative    Not on file     Social Determinants of Health     Financial Resource Strain: Low Risk     Difficulty of Paying Living Expenses: Not hard at all   Food Insecurity: No Food Insecurity    Worried About [address] in the Last Year: Never true    [address] [place] in the Last Year: Never true   Transportation Needs: No Transportation Needs    Lack of Transportation (Medical): No    Lack of Transportation (Non-Medical):  No   Physical Activity: Not on file   Stress: Not on file   Social Connections: Not on file   Intimate Partner Violence: Not on file   Housing Stability: Not on file     Social History     Substance and Sexual Activity   Alcohol Use Yes    Comment: occasionally       Social History     Substance and Sexual Activity   Drug Use No     Social History     Tobacco Use   Smoking Status Former Smoker   Smokeless Tobacco Never Used   Tobacco Comment    pt "tried it when he was a teenager but did not like them"     Family History   Problem Relation Age of Onset    No Known Problems Mother     No Known Problems Father     No Known Problems Sister     No Known Problems Brother     No Known Problems Son     Learning disabilities Daughter     Asthma Daughter     Seizures Daughter     No Known Problems Maternal Grandmother     No Known Problems Maternal Grandfather     No Known Problems Paternal Grandmother     No Known Problems Paternal Grandfather     No Known Problems Maternal Aunt     No Known Problems Maternal Uncle     No Known Problems Paternal Aunt     No Known Problems Paternal Uncle     No Known Problems Cousin        Ear cerumen removal    Date/Time: 2/16/2022 1:58 PM  Performed by: Luis Mcwilliams   Authorized by: Claude Hernandez DO   Universal Protocol:  Consent: Verbal consent obtained  Written consent not obtained  Risks and benefits: risks, benefits and alternatives were discussed  Consent given by: patient  Timeout called at: 2/16/2022 1:59 PM   Patient understanding: patient states understanding of the procedure being performed  Patient consent: the patient's understanding of the procedure matches consent given  Procedure consent: procedure consent matches procedure scheduled  Patient identity confirmed: verbally with patient      Patient location:  Clinic  Indications / Diagnosis:  Bilateral cerumen impaction  Procedure details:     Location:  L ear and R ear    Procedure type: irrigation with instrumentation      Instrumentation: curette      Approach:  External    Visualization (free text):  [place]     Equipment used:  Lavage kit with warm water  Post-procedure details:     Complication:  None    Hearing quality:  Improved    Patient tolerance of procedure: Tolerated well, no immediate complications  Comments:      Given patient script for debrox as little cerumen removed on right and can return for removal 2 weeks  Left ear cerumen removed , some coughing but no issues and TM visualized with no perforation or erythema       ==  Claude Hernandez DO    [facility]  [address] [facility] , [address] [address] [facility] [facility]  Office: [phone]  Fax: [phone]

## 2022-02-22 ENCOUNTER — CLINICAL SUPPORT (OUTPATIENT)
Dept: DENTISTRY | Facility: CLINIC | Age: 52
End: 2022-02-22

## 2022-02-22 VITALS — DIASTOLIC BLOOD PRESSURE: 75 MMHG | HEART RATE: 101 BPM | TEMPERATURE: 98.4 F | SYSTOLIC BLOOD PRESSURE: 136 MMHG

## 2022-02-22 DIAGNOSIS — Z01.20 ENCOUNTER FOR DENTAL EXAMINATION AND CLEANING WITHOUT ABNORMAL FINDINGS: Primary | ICD-10-CM

## 2022-02-22 PROCEDURE — D0274 BITEWINGS - 4 RADIOGRAPHIC IMAGES: HCPCS

## 2022-02-22 PROCEDURE — D0120 PERIODIC ORAL EVALUATION - ESTABLISHED PATIENT: HCPCS | Performed by: DENTIST

## 2022-02-22 PROCEDURE — D1110 PROPHYLAXIS - ADULT: HCPCS

## 2022-02-22 NOTE — PROGRESS NOTES
RECALL EXAM, ADULT RACHEL,  4bwx    CHIEF CONCERN: 2 upper front teeth are cracked and he would like crowns  PAIN SCALE: 0  PLAQUE: gen mod ip w/ UR ip food debris  CALCULUS:  light  BLEEDING: mod UR area  STAIN : none  ORAL HYGIENE:  Poor  Pt only brushes once a day at night and never flosses  Hand scaled, polished and flossed  Oral Hygiene Instruction :  recommended brushing 2 x daily for 2 minutes MIN, recommended flossing daily, reviewed dietary precautions  soft tissue evaluation    Soft tissue exam:  soft tissue exam was normal  ExtraOral exam:   Extraoral exam was normal    Dr Gini Medina exam=   Reviewed with patient clinical and radiographic findings and patient verbalized understanding  All questions and concerns addressed  Traumatic bite due to loss of molars causing anterior cracking of teeth  After review and updating all existing treatment rec  1)ext root tip 12 and 28  2)14 and 29 comp restorations  3) re-eval to decide on [facility] and #8 and 9 [facility]  REFERRALS: no referrals needed    CARIES FINDINGS: #14DL, #29V, #12 and #28 retained roots need ext  Next Visit: #12 AND 28 root tip EXT    Next Recall: 6 month recall

## 2022-02-26 ENCOUNTER — HOSPITAL ENCOUNTER (EMERGENCY)
Facility: HOSPITAL | Age: 52
Discharge: HOME | End: 2022-02-26
Attending: EMERGENCY MEDICINE | Admitting: EMERGENCY MEDICINE
Payer: COMMERCIAL

## 2022-02-26 VITALS
RESPIRATION RATE: 18 BRPM | SYSTOLIC BLOOD PRESSURE: 120 MMHG | HEART RATE: 67 BPM | OXYGEN SATURATION: 98 % | DIASTOLIC BLOOD PRESSURE: 67 MMHG

## 2022-02-26 DIAGNOSIS — L08.9 SKIN INFECTION: Primary | ICD-10-CM

## 2022-02-26 PROCEDURE — 99284 EMERGENCY DEPT VISIT MOD MDM: CPT | Performed by: EMERGENCY MEDICINE

## 2022-02-26 PROCEDURE — 99282 EMERGENCY DEPT VISIT SF MDM: CPT

## 2022-02-26 RX ORDER — DIAPER,BRIEF,INFANT-TODD,DISP
EACH MISCELLANEOUS 2 TIMES DAILY
Qty: 30 G | Refills: 0 | Status: SHIPPED | OUTPATIENT
Start: 2022-02-26

## 2022-02-26 RX ORDER — CEPHALEXIN 500 MG/1
500 CAPSULE ORAL EVERY 12 HOURS SCHEDULED
Qty: 14 CAPSULE | Refills: 0 | Status: SHIPPED | OUTPATIENT
Start: 2022-02-26 | End: 2022-03-05

## 2022-02-26 NOTE — Clinical Note
Vanesa Ortega was seen and treated in our emergency department on 2/26/2022  Diagnosis:     Lillia Sandifer  may return to work on return date  He may return on this date: 02/27/2022         If you have any questions or concerns, please don't hesitate to call        Ya Medina MD    ______________________________           _______________          _______________  [facility] Representative                              Date                                Time

## 2022-02-26 NOTE — ED PROVIDER NOTES
History  Chief Complaint   Patient presents with    Skin Irritation     pt presents with L forearm skin irritation  States it began with the last "few hours" at work ([employer])  Denies pain /reports itching     HPI  68-year-old man presents to ED for evaluation of left forearm skin rash  He states it began a few hours ago at work  He works as a  and  at [employer]  Prior to this he did not notice any rashes  He denies any rashes anywhere else on his body  He denies pain or itching  He denies drainage or bleeding  He states he has never had a rash like this before  He denies fevers or chills  Denies any new lotions, soaps, body washes or detergents  Patient denies headache, visual changes, dizziness, fevers, chills, chest pain, palpitations, abdominal pain, diarrhea, dysuria or numbness or tingling of the extremities  Prior to Admission Medications   Prescriptions Last Dose Informant Patient Reported? Taking? CVS Tussin Adult Chest Congest 100 MG/5ML oral liquid   No No   Sig: Take 5 mL (100 mg total) by mouth 3 (three) times a day as needed for cough   acetaminophen (TYLENOL) 650 mg CR tablet   No No   Sig: Take 1 tablet (650 mg total) by mouth every 8 (eight) hours as needed for mild pain   albuterol (Ventolin HFA) 90 mcg/act inhaler   No No   Sig: Inhale 2 puffs every 4 (four) hours as needed for wheezing   benzonatate (TESSALON PERLES) 100 mg capsule   No No   Sig: Take 1 capsule (100 mg total) by mouth every 8 (eight) hours   carbamide peroxide (DEBROX) 6 5 % otic solution   No No   Sig: Administer 5 drops into both ears 2 (two) times a day   cholecalciferol (VITAMIN D3) 25 mcg (1,000 units) tablet   No No   Sig: Take 1 tablet (1,000 Units total) by mouth daily   levothyroxine 150 mcg tablet   No No   Sig: TAKE 3 TABLETS ON MONDAY AND 3 TABLETS ON THURSDAY        Facility-Administered Medications: None       Past Medical History:   Diagnosis Date    Arthritis     Chronic cough  Disease of thyroid gland     Hypoparathyroidism ([facility] [address] )     continue taking calcium and vitamin D, will check CMP, PTH level and Vitamin D level       Past Surgical History:   Procedure Laterality Date    FRACTURE SURGERY      Open Treatment of Each Proximal Finger Phalanx       Family History   Problem Relation Age of Onset    No Known Problems Mother     No Known Problems Father     No Known Problems Sister     No Known Problems Brother     No Known Problems Son     Learning disabilities Daughter     Asthma Daughter     Seizures Daughter     No Known Problems Maternal Grandmother     No Known Problems Maternal Grandfather     No Known Problems Paternal Grandmother     No Known Problems Paternal Grandfather     No Known Problems Maternal Aunt     No Known Problems Maternal Uncle     No Known Problems Paternal Aunt     No Known Problems Paternal Uncle     No Known Problems Cousin      I have reviewed and agree with the history as documented  E-Cigarette/Vaping    E-Cigarette Use Never User      E-Cigarette/Vaping Substances    Nicotine No     THC No     CBD No     Flavoring No     Other No     Unknown No      Social History     Tobacco Use    Smoking status: Former Smoker    Smokeless tobacco: Never Used    Tobacco comment: pt "tried it when he was a teenager but did not like them"   Vaping Use    Vaping Use: Never used   Substance Use Topics    Alcohol use: Yes     Comment: occasionally    Drug use: No        Review of Systems   All other systems reviewed and are negative        Physical Exam  ED Triage Vitals [02/26/22 1525]   Temp Pulse Respirations Blood Pressure SpO2   -- 67 18 120/67 98 %      Temp src Heart Rate Source Patient Position - Orthostatic VS BP Location FiO2 (%)   -- Monitor -- Right arm --      Pain Score       --             Orthostatic Vital Signs  Vitals:    02/26/22 1525   BP: 120/67   Pulse: 67       Physical Exam   PHYSICAL EXAM    Constitutional:  Well developed, well nourished, no acute distress, non-toxic appearance    HEENT:  Conjunctiva normal  Oropharynx moist  Respiratory:  No respiratory distress, normal breath sounds  Cardiovascular:  Normal rate, normal rhythm, no murmurs  GI:  Soft, nondistended, normal bowel sounds, nontender  :  No costovertebral angle tenderness   Musculoskeletal:  No edema, no tenderness, no deformities  Integument:  Well hydrated  Rash over left forearm as belwo  Lymphatic:  No lymphadenopathy noted   Neurologic:  Alert & oriented, normal motor function, normal sensory function, no focal deficits noted   Psychiatric:  Speech and behavior appropriate             ED Medications  Medications - No data to display    Diagnostic Studies  Results Reviewed     None                 No orders to display         Procedures  Procedures      ED Course                                       MDM  Number of Diagnoses or Management Options  Skin infection  Diagnosis management comments: 64-year-old man presents to ED for evaluation of left forearm skin rash  Rash it is well demarcated, erythematous and blanchable  Likely soft tissue infection  Will start Keflex and topical hydrocortisone  Patient instructed to return to the ER or see his PCP for any worsening sign of infection, pain, itching, drainage or spreading of the rash, or any systemic symptoms          Amount and/or Complexity of Data Reviewed  Review and summarize past medical records: yes  Discuss the patient with other providers: yes    Risk of Complications, Morbidity, and/or Mortality  Presenting problems: low  Diagnostic procedures: low  Management options: low        Disposition  Final diagnoses:   Skin infection     Time reflects when diagnosis was documented in both MDM as applicable and the Disposition within this note     Time User Action Codes Description Comment    2/26/2022  3:47 PM Eleazar Palomino Add [L08 9] Skin infection       ED Disposition     ED Disposition Condition Date/Time Comment    Discharge Stable Sat Feb 26, 2022  3:44 PM Chelsey Lua discharge to home/self care  Follow-up Information     Follow up With Specialties Details Why Contact Info    Sapphire Jarrell DO Internal Medicine Call   [address]  [phone]            Patient's Medications   Discharge Prescriptions    CEPHALEXIN (KEFLEX) 500 MG CAPSULE    Take 1 capsule (500 mg total) by mouth every 12 (twelve) hours for 7 days       Start Date: 2/26/2022 End Date: 3/5/2022       Order Dose: 500 mg       Quantity: 14 capsule    Refills: 0    HYDROCORTISONE 1 % OINTMENT    Apply topically 2 (two) times a day       Start Date: 2/26/2022 End Date: --       Order Dose: --       Quantity: 30 g    Refills: 0     No discharge procedures on file  PDMP Review     None           ED Provider  Attending physically available and evaluated Chelsey Lua  ILIANA managed the patient along with the ED Attending      Electronically Signed by         Charlette Stephenson MD  02/26/22 0464

## 2022-02-26 NOTE — Clinical Note
Jeronimo Shanks was seen and treated in our emergency department on 2/26/2022  Diagnosis:     Yariel Torres  may return to work on return date  He may return on this date: 02/27/2022         If you have any questions or concerns, please don't hesitate to call        Maggie Tijerina MD    ______________________________           _______________          _______________  [facility] Representative                              Date                                Time

## 2022-02-27 NOTE — ED ATTENDING ATTESTATION
2/26/2022  I, Susan Chavez DO, saw and evaluated the patient  I have discussed the patient with the resident/non-physician practitioner and agree with the resident's/non-physician practitioner's findings, Plan of Care, and MDM as documented in the resident's/non-physician practitioner's note, except where noted  All available labs and Radiology studies were reviewed  I was present for key portions of any procedure(s) performed by the resident/non-physician practitioner and I was immediately available to provide assistance  At this point I agree with the current assessment done in the Emergency Department  I have conducted an independent evaluation of this patient a history and physical is as follows:    68-year-old male with left arm rash  Well-demarcated started today some crusting  It itches but does not really hurt that much  No other lesions anywhere    Could be erysipelas, plan treat for strep, corticosteroids if this is a contact dermatitis    ED Course         Critical Care Time  Procedures

## 2022-03-18 DIAGNOSIS — M19.90 ARTHRITIS: ICD-10-CM

## 2022-03-18 RX ORDER — SENNOSIDES 8.6 MG
650 CAPSULE ORAL EVERY 8 HOURS PRN
Qty: 90 TABLET | Refills: 3 | Status: SHIPPED | OUTPATIENT
Start: 2022-03-18 | End: 2022-05-20 | Stop reason: SDUPTHER

## 2022-04-11 ENCOUNTER — DOCUMENTATION (OUTPATIENT)
Dept: DENTISTRY | Facility: CLINIC | Age: 52
End: 2022-04-11

## 2022-05-20 DIAGNOSIS — E03.9 HYPOTHYROIDISM: ICD-10-CM

## 2022-05-20 DIAGNOSIS — M19.90 ARTHRITIS: ICD-10-CM

## 2022-05-20 RX ORDER — LEVOTHYROXINE SODIUM 0.15 MG/1
TABLET ORAL
Qty: 90 TABLET | Refills: 3 | Status: SHIPPED | OUTPATIENT
Start: 2022-05-20

## 2022-05-20 RX ORDER — SENNOSIDES 8.6 MG
650 CAPSULE ORAL EVERY 8 HOURS PRN
Qty: 90 TABLET | Refills: 3 | Status: SHIPPED | OUTPATIENT
Start: 2022-05-20

## 2022-05-20 NOTE — TELEPHONE ENCOUNTER
[facility] MyChart message to refill Tylenol and Levothyroxine   Both sent to the pharmacy

## 2022-06-07 ENCOUNTER — OFFICE VISIT (OUTPATIENT)
Dept: INTERNAL MEDICINE CLINIC | Facility: CLINIC | Age: 52
End: 2022-06-07

## 2022-06-07 ENCOUNTER — OFFICE VISIT (OUTPATIENT)
Dept: DENTISTRY | Facility: CLINIC | Age: 52
End: 2022-06-07

## 2022-06-07 VITALS — TEMPERATURE: 97.3 F | SYSTOLIC BLOOD PRESSURE: 103 MMHG | DIASTOLIC BLOOD PRESSURE: 68 MMHG | HEART RATE: 77 BPM

## 2022-06-07 VITALS
DIASTOLIC BLOOD PRESSURE: 68 MMHG | SYSTOLIC BLOOD PRESSURE: 113 MMHG | OXYGEN SATURATION: 98 % | WEIGHT: 185.6 LBS | BODY MASS INDEX: 29.07 KG/M2 | TEMPERATURE: 98.6 F | HEART RATE: 83 BPM

## 2022-06-07 DIAGNOSIS — H61.23 BILATERAL IMPACTED CERUMEN: ICD-10-CM

## 2022-06-07 DIAGNOSIS — Z01.21 ENCOUNTER FOR DENTAL EXAMINATION AND CLEANING WITH ABNORMAL FINDINGS: Primary | ICD-10-CM

## 2022-06-07 PROCEDURE — 69210 REMOVE IMPACTED EAR WAX UNI: CPT | Performed by: INTERNAL MEDICINE

## 2022-06-07 PROCEDURE — 99213 OFFICE O/P EST LOW 20 MIN: CPT | Performed by: INTERNAL MEDICINE

## 2022-06-07 PROCEDURE — D0210 INTRAORAL - COMPLETE SERIES OF RADIOGRAPHIC IMAGES: HCPCS

## 2022-06-07 NOTE — PROGRESS NOTES
[facility] 41  INTERNAL MEDICINE  [address] [facility] [employer] [place] [address]      NAME: Lena Marquez  AGE: 46 y o  SEX: male    DATE OF ENCOUNTER: 6/7/2022    ASSESSMENT AND PLAN     1  Bilateral impacted cerumen  Presented with complaints of bilateral hearing loss and right ear pain  Exam showed bilateral impacted cerumen worse on the right  Ear cleaning performed in clinic with improvement in hearing and no complications  Recommend continuing debrox and avoiding ear swabs  Recommend OTC ear cleaning kit to help remove residual ear wax in the right ear  - carbamide peroxide (DEBROX) 6 5 % otic solution; Administer 5 drops into both ears 2 (two) times a day  Dispense: 15 mL; Refill: 1  - Ear cerumen removal    RTC in 4 months for follow-up  Orders Placed This Encounter   Procedures    Ear cerumen removal       CHIEF COMPLAINT     Chief Complaint   Patient presents with    Earache     Right ear         HISTORY OF PRESENT ILLNESS     Lena Marquez is a 46year old male presenting with 1 day history of right ear pain and ongoing hearing loss for the past couple of weeks  He was previously told to use debrox ear drops 1 week ago  Denies any fevers, chills, headache, loss of balance, nausea, vomiting, tinnitus  No other acute complaints  Reports not using ear q-tips at home  The following portions of the patient's history were reviewed and updated as appropriate: allergies, current medications, past family history, past medical history, past social history, past surgical history and problem list     REVIEW OF SYSTEMS     Review of Systems   HENT: Positive for ear pain  All other systems reviewed and are negative          All other ROS negative except per HPI    ACTIVE PROBLEM LIST     Patient Active Problem List   Diagnosis    Allergic rhinitis    Arterial ectasia (HCC)    Chronic low back pain    Hyperlipidemia    Hypothyroidism    Lumbar radiculopathy    Mass of parotid gland    Osteoarthritis of both hands    Dry cough    Reactive airway disease    Muscle strain of left upper back    Pneumonia of right middle lobe due to Streptococcus pneumoniae (HCC)    Bilateral impacted cerumen       Past Medical History:   Diagnosis Date    Arthritis     Chronic cough     Disease of thyroid gland     Hypoparathyroidism (HCC)     continue taking calcium and vitamin D, will check CMP, PTH level and Vitamin D level       CURRENT MEDICATIONS       Current Outpatient Medications:     acetaminophen (TYLENOL) 650 mg CR tablet, Take 1 tablet (650 mg total) by mouth every 8 (eight) hours as needed for mild pain, Disp: 90 tablet, Rfl: 3    albuterol (Ventolin HFA) 90 mcg/act inhaler, Inhale 2 puffs every 4 (four) hours as needed for wheezing, Disp: 8 g, Rfl: 0    benzonatate (TESSALON PERLES) 100 mg capsule, Take 1 capsule (100 mg total) by mouth every 8 (eight) hours, Disp: 21 capsule, Rfl: 0    carbamide peroxide (DEBROX) 6 5 % otic solution, Administer 5 drops into both ears 2 (two) times a day, Disp: 15 mL, Rfl: 1    cholecalciferol (VITAMIN D3) 25 mcg (1,000 units) tablet, Take 1 tablet (1,000 Units total) by mouth daily, Disp: 90 tablet, Rfl: 1    CVS Tussin Adult Chest Congest 100 MG/5ML oral liquid, Take 5 mL (100 mg total) by mouth 3 (three) times a day as needed for cough, Disp: 120 mL, Rfl: 0    hydrocortisone 1 % ointment, Apply topically 2 (two) times a day, Disp: 30 g, Rfl: 0    levothyroxine 150 mcg tablet, One tablet daily, Disp: 90 tablet, Rfl: 3    Allergies   Allergen Reactions    Chlorine Rash       Social History   Past Surgical History:   Procedure Laterality Date    FRACTURE SURGERY      Open Treatment of Each Proximal Finger Phalanx     Family History   Problem Relation Age of Onset    No Known Problems Mother     No Known Problems Father     No Known Problems Sister     No Known Problems Brother     No Known Problems Son  Learning disabilities Daughter     Asthma Daughter     Seizures Daughter     No Known Problems Maternal Grandmother     No Known Problems Maternal Grandfather     No Known Problems Paternal Grandmother     No Known Problems Paternal Grandfather     No Known Problems Maternal Aunt     No Known Problems Maternal Uncle     No Known Problems Paternal Aunt     No Known Problems Paternal Uncle     No Known Problems Cousin        OBJECTIVE     /68 (BP Location: Right arm, Patient Position: Sitting, Cuff Size: Standard)   Pulse 83   Temp 98 6 °F (37 °C) (Temporal)   Wt 84 2 kg (185 lb 9 6 oz)   SpO2 98%   BMI 29 07 kg/m²     Physical Exam  Vitals reviewed  Constitutional:       General: He is not in acute distress  Appearance: Normal appearance  He is not ill-appearing  HENT:      Right Ear: Ear canal normal  There is impacted cerumen  Left Ear: Ear canal normal  There is impacted cerumen  Eyes:      General: No scleral icterus  Extraocular Movements: Extraocular movements intact  Conjunctiva/sclera: Conjunctivae normal    Cardiovascular:      Rate and Rhythm: Normal rate and regular rhythm  Pulmonary:      Effort: Pulmonary effort is normal       Breath sounds: Normal breath sounds  Abdominal:      General: Bowel sounds are normal       Palpations: Abdomen is soft  Tenderness: There is no abdominal tenderness  Musculoskeletal:         General: Normal range of motion  Skin:     General: Skin is warm and dry  Neurological:      General: No focal deficit present  Mental Status: He is alert and oriented to person, place, and time  Psychiatric:         Mood and Affect: Mood normal          Behavior: Behavior normal        Ear cerumen removal    Date/Time: 6/7/2022 4:40 PM  Performed by: Myrtle Correia DO  Authorized by: Myrtle Correia DO   Universal Protocol:  Procedure performed by:  Consent: Verbal consent obtained   Written consent not obtained  Consent given by: patient  Patient understanding: patient states understanding of the procedure being performed  Patient consent: the patient's understanding of the procedure matches consent given  Patient identity confirmed: verbally with patient      Patient location:  Clinic  Indications / Diagnosis:  Impacted ear cerumen bilaterally  Procedure details:     Local anesthetic:  None    Location:  L ear and R ear    Procedure type: irrigation with instrumentation      Instrumentation: curette      Approach:  External  Post-procedure details:     Complication:  None    Hearing quality:  Improved    Patient tolerance of procedure: Tolerated well, no immediate complications           Pertinent Laboratory/Diagnostic Studies:     No results found      Images and diagnostics reviewed     HEALTH MAINTENANCE     Health Maintenance   Topic Date Due    Annual Physical  Never done    Colorectal Cancer Screening  Never done    COVID-19 Vaccine (4 - Booster for Pfizer series) 06/07/2022    BMI: Followup Plan  07/06/2022    Influenza Vaccine (Season Ended) 09/01/2022    Dental Periodic Exam  08/23/2022    Dental Prophylaxis  08/23/2022    Depression Screening  02/16/2023    BMI: Adult  06/07/2023    Dental X-Ray: Bitewings  06/08/2023    Dental X-Ray: Full Mouth  06/08/2025    DTaP,Tdap,and Td Vaccines (2 - Td or Tdap) 03/15/2028    HIV Screening  Completed    Hepatitis C Screening  Completed    Pneumococcal Vaccine: Pediatrics (0 to 5 Years) and At-Risk Patients (6 to 59 Years)  Aged Out    HIB Vaccine  Aged Out    Hepatitis B Vaccine  Aged Out    IPV Vaccine  Aged Out    Hepatitis A Vaccine  Aged Out    Meningococcal ACWY Vaccine  Aged Out    HPV Vaccine  Aged [employer] History   Administered Date(s) Administered    COVID-19 PFIZER VACCINE 0 3 ML IM 05/17/2021, 06/15/2021, 02/07/2022    Influenza, recombinant, quadrivalent,injectable, preservative free 10/21/2019, 11/16/2020    Influenza, seasonal, injectable 11/18/2015    Influenza, seasonal, injectable, preservative free 03/15/2018    Pneumococcal Polysaccharide PPV23 10/21/2019    TD (adult) Preservative Free 01/20/2009    Tdap 03/15/2018       I globally spent 30 minutes face-to-face with the patient and with chart review       Dayami Mckenzie, DO  Internal Medicine PGY-2

## 2022-06-07 NOTE — PROGRESS NOTES
FMX      Reviewed med hist with patient from [address] [place] [address] Rd  Reactive airway disease  Hist of mass of the parotid gland  ASA-II    FMX-Obtained to update radiographs prior to starting the treatment plan  Patient had Adult prophy with periodic exam completed on 2/22/2022  At that time [facility] completed a treatment plan with EXT of rt tips on #12 and #28 first to be done  Caries control would be following exts      Judy Spain    NV:EXT root tip 12 and 28  NV2:14 and 29 comp restorations  NV3:re-eval to decide on Max and Oliver RPDs and 8 and 9 [facility]  NV4:Prophy due after 8/22/2022

## 2022-06-15 ENCOUNTER — OFFICE VISIT (OUTPATIENT)
Dept: INTERNAL MEDICINE CLINIC | Facility: CLINIC | Age: 52
End: 2022-06-15

## 2022-06-15 VITALS
BODY MASS INDEX: 28.98 KG/M2 | DIASTOLIC BLOOD PRESSURE: 67 MMHG | TEMPERATURE: 97.3 F | HEART RATE: 80 BPM | WEIGHT: 185 LBS | SYSTOLIC BLOOD PRESSURE: 95 MMHG

## 2022-06-15 DIAGNOSIS — H61.21 EXCESSIVE EAR WAX, RIGHT: ICD-10-CM

## 2022-06-15 DIAGNOSIS — H72.91 RUPTURED EAR DRUM, RIGHT: Primary | ICD-10-CM

## 2022-06-15 PROCEDURE — 99213 OFFICE O/P EST LOW 20 MIN: CPT | Performed by: INTERNAL MEDICINE

## 2022-06-15 PROCEDURE — 69209 REMOVE IMPACTED EAR WAX UNI: CPT | Performed by: INTERNAL MEDICINE

## 2022-06-15 RX ORDER — OFLOXACIN 3 MG/ML
5 SOLUTION AURICULAR (OTIC) 2 TIMES DAILY
Qty: 5 ML | Refills: 0 | Status: SHIPPED | OUTPATIENT
Start: 2022-06-15 | End: 2022-06-20

## 2022-06-15 NOTE — PATIENT INSTRUCTIONS
Ruptured Eardrum   WHAT YOU NEED TO KNOW:   What is a ruptured eardrum? A ruptured eardrum is a tear or hole in your eardrum  What causes a ruptured eardrum? Air pressure changes from a plane ride or scuba diving    Injury caused by objects put into your ear, such as cotton swabs    Infections of your middle ear     A head injury from contact sports, car accidents, and falls     Ear surgeries or procedures    What are the signs and symptoms of a ruptured eardrum? Clear, thick, yellowish, or bloody ear discharge    Hearing loss     Ear pain    Ringing or buzzing in your ear    Dizziness    How is a ruptured eardrum diagnosed? Your healthcare provider may use a tool called an otoscope to look inside your ear  An otoscope will allow your healthcare provider to see your eardrum and the size and location of the tear  He will also be able to see if there is fluid or infection inside your ear  How is a ruptured eardrum treated? A mild rupture may heal on its own over time  Your healthcare provider may clean your ear and put a bandage over it  He may also place a cotton ear plug in your ear to cover the tear  Antibiotic ear drops  may be needed to treat or prevent an infection caused by bacteria  Surgery  may be needed to repair your eardrum if the hole in your eardrum is large, or does not heal  You may need any of the following:     Myringoplasty  is surgery that uses a graft to cover your torn eardrum  A graft may be may be tissue taken from your own body or it may be artificial  A procedure called a mastoidectomy may also be done with a myringoplasty  A mastoidectomy is removal of infected bone from behind your ear  Tympanoplasty  is surgery to repair your torn eardrum and any damage to your inner ear  The hole in your eardrum will be covered with a tissue graft  You may also need to have a mastoidectomy with your tympanoplasty surgery  How can I care for my ruptured eardrum?    Always keep your ear dry  Do not let your ear get wet, such as when bathing or swimming  Water may cause your damaged eardrum to heal more slowly and increase your risk for infection  Do not put anything in your ear  Never put objects such as cotton swabs in your ear  Pointed objects may damage or worsen the damage to your eardrum  Try not to blow your nose  The increase in pressure may cause further damage to your eardrum  When should I seek immediate care? You are bleeding from your ear  You cannot move or feel areas of your face  When should I call my doctor? You have a fever  Your hearing loss gets worse  You feel increased dizziness, or you are vomiting  You have worsening ear pain or a new buzzing sound in your ear  Your symptoms do not improve, even after you take your medicine  You have questions or concerns about your condition or care  CARE AGREEMENT:   You have the right to help plan your care  Learn about your health condition and how it may be treated  Discuss treatment options with your healthcare providers to decide what care you want to receive  You always have the right to refuse treatment  The above information is an  only  It is not intended as medical advice for individual conditions or treatments  Talk to your doctor, nurse or pharmacist before following any medical regimen to see if it is safe and effective for you  © Copyright [employer] 2022 Information is for End User's use only and may not be sold, redistributed or otherwise used for commercial purposes   All illustrations and images included in CareNotes® are the copyrighted property of A D A M , Inc  or [address] [place], [place] [address]

## 2022-06-15 NOTE — PROGRESS NOTES
Faustino   INTERNAL MEDICINE  [address] [employer] [address], [facility]      NAME: Gerri House  AGE: 46 y o  SEX: male    DATE OF ENCOUNTER: 6/15/2022    ASSESSMENT AND PLAN     1  Ruptured ear drum, right  2  Excessive ear wax, right  Patient presenting with re-evaluation of bilateral ear cerumen s/p lavage on 6/7  Patient initially presented to clinic on 6/7/2022 with 1 day history of right ear pain  Patient initially tolerated the procedure well on the left ear but the right ear was not further lavaged as patient could not tolerate the procedure previously  Patient left clinic with improvement in hearing but presents with persistent "squishing" noise with sensation of persistent water in his right ear  On initial evaluation, patient's TM could not be visualized due to wet ear wax covering 90% of the canal  Patient underwent lavage in clinic today of the right ear and tolerated the procedure well without any pain  On repeat evaluation, patient's TM was now fully visualized and shows perforated TM on the right  Likely patient perforated his right TM prior to last visit but possibly could have occurred during prior ear lavage as well  No hearing loss today  - recommend ofloxacin (FLOXIN) 0 3 % otic solution; Administer 5 drops to the right ear 2 (two) times a day for 5 days  Dispense: 5 mL; Refill: 0  - will re-evaluate patient in 3 months as the ruptured TM should heal on its own  - consider referral to ENT if patient develops symptoms of fevers or significant ear pain   - Recommend avoiding putting any foreign objects in the ears  - Avoid swimming     RTC in 3 months for evaluation of rupture right TM      Orders Placed This Encounter   Procedures    Ear cerumen removal       CHIEF COMPLAINT     Chief Complaint   Patient presents with    Ear Fullness     Believes he still has water in ears from when it was cleaned, uncomfortable       HISTORY OF PRESENT Mary Rodriguez is a 46year old male with pmhx of bilateral impacted cerumen s/p lavage on 6/7/2022 who presents for follow-up as he has persistent sensation of water in his right ear with "squishing" noise  Denies any pain, fevers, hearing loss, dizziness  Patient denies any ear pain and ear discharge currently  Reports he never had pain however patient initially presented to clinic with 1 day history of ear pain on the right  Denies use of q-tips at home  The following portions of the patient's history were reviewed and updated as appropriate: allergies, current medications, past family history, past medical history, past social history, past surgical history and problem list     REVIEW OF SYSTEMS     Review of Systems   Constitutional: Negative  HENT: Negative for ear discharge, ear pain and hearing loss  Sensation of water in ear   Eyes: Negative  Cardiovascular: Negative  Respiratory: Negative  Musculoskeletal: Negative  Gastrointestinal: Negative  Neurological: Negative  Psychiatric/Behavioral: Negative            All other ROS negative except per HPI    ACTIVE PROBLEM LIST     Patient Active Problem List   Diagnosis    Allergic rhinitis    Arterial ectasia (HCC)    Chronic low back pain    Hyperlipidemia    Hypothyroidism    Lumbar radiculopathy    Mass of parotid gland    Osteoarthritis of both hands    Dry cough    Reactive airway disease    Muscle strain of left upper back    Pneumonia of right middle lobe due to Streptococcus pneumoniae (HCC)    Bilateral impacted cerumen       Past Medical History:   Diagnosis Date    Arthritis     Chronic cough     Disease of thyroid gland     Hypoparathyroidism (HCC)     continue taking calcium and vitamin D, will check CMP, PTH level and Vitamin D level       CURRENT MEDICATIONS       Current Outpatient Medications:     ofloxacin (FLOXIN) 0 3 % otic solution, Administer 5 drops to the right ear 2 (two) times a day for 5 days, Disp: 5 mL, Rfl: 0    acetaminophen (TYLENOL) 650 mg CR tablet, Take 1 tablet (650 mg total) by mouth every 8 (eight) hours as needed for mild pain, Disp: 90 tablet, Rfl: 3    albuterol (Ventolin HFA) 90 mcg/act inhaler, Inhale 2 puffs every 4 (four) hours as needed for wheezing, Disp: 8 g, Rfl: 0    benzonatate (TESSALON PERLES) 100 mg capsule, Take 1 capsule (100 mg total) by mouth every 8 (eight) hours, Disp: 21 capsule, Rfl: 0    cholecalciferol (VITAMIN D3) 25 mcg (1,000 units) tablet, TAKE 1 TABLET BY MOUTH EVERY DAY, Disp: 90 tablet, Rfl: 3    CVS Tussin Adult Chest Congest 100 MG/5ML oral liquid, Take 5 mL (100 mg total) by mouth 3 (three) times a day as needed for cough, Disp: 120 mL, Rfl: 0    hydrocortisone 1 % ointment, Apply topically 2 (two) times a day, Disp: 30 g, Rfl: 0    levothyroxine 150 mcg tablet, One tablet daily, Disp: 90 tablet, Rfl: 3    Allergies   Allergen Reactions    Chlorine Rash       Social History   Past Surgical History:   Procedure Laterality Date    FRACTURE SURGERY      Open Treatment of Each Proximal Finger Phalanx     Family History   Problem Relation Age of Onset    No Known Problems Mother     No Known Problems Father     No Known Problems Sister     No Known Problems Brother     No Known Problems Son     Learning disabilities Daughter     Asthma Daughter     Seizures Daughter     No Known Problems Maternal Grandmother     No Known Problems Maternal Grandfather     No Known Problems Paternal Grandmother     No Known Problems Paternal Grandfather     No Known Problems Maternal Aunt     No Known Problems Maternal Uncle     No Known Problems Paternal Aunt     No Known Problems Paternal Uncle     No Known Problems Cousin        OBJECTIVE     BP 95/67 (BP Location: Left arm, Patient Position: Sitting, Cuff Size: Large)   Pulse 80   Temp (!) 97 3 °F (36 3 °C) (Temporal)   Wt 83 9 kg (185 lb)   BMI 28 98 kg/m²     Physical Exam  Vitals reviewed  Constitutional:       General: He is not in acute distress  Appearance: Normal appearance  He is not ill-appearing  HENT:      Head: Normocephalic and atraumatic  Right Ear: Hearing normal       Left Ear: Hearing, tympanic membrane and ear canal normal       Ears:      Comments: Right ear: Unable to fully visualize TM initially as patient had wet ear wax covering 90% of the canal    Left ear: No significant ear wax  TM normal    Eyes:      General: No scleral icterus  Extraocular Movements: Extraocular movements intact  Conjunctiva/sclera: Conjunctivae normal    Cardiovascular:      Rate and Rhythm: Normal rate and regular rhythm  Heart sounds: Normal heart sounds  No murmur heard  Pulmonary:      Effort: Pulmonary effort is normal  No respiratory distress  Breath sounds: Normal breath sounds  No wheezing  Skin:     General: Skin is warm and dry  Neurological:      Mental Status: He is alert and oriented to person, place, and time  Mental status is at baseline  Gait: Gait normal    Psychiatric:         Mood and Affect: Mood normal          Behavior: Behavior normal           Ear cerumen removal    Date/Time: 6/15/2022 12:02 PM  Performed by: Janna Jj DO  Authorized by: Janna Jj DO   Universal Protocol:  Consent: Verbal consent obtained  Consent given by: patient  Timeout called at: 6/15/2022 11:00 AM   Patient understanding: patient states understanding of the procedure being performed  Patient identity confirmed: verbally with patient      Patient location:  Clinic  Procedure details:     Location:  R ear    Procedure type: irrigation only    Post-procedure details:     Hearing quality:  Normal  Comments:      Significant ear wax removed with lavage of the right ear  On re-evaluation, full TM is now visualized which show small perforation   Patient denied any pain during the procedure and tolerated the procedure well         Pertinent Laboratory/Diagnostic Studies:     No results found  Images and diagnostics reviewed     HEALTH MAINTENANCE     Health Maintenance   Topic Date Due    Annual Physical  Never done    Colorectal Cancer Screening  Never done    COVID-19 Vaccine (4 - Booster for Pfizer series) 06/07/2022    BMI: Followup Plan  07/06/2022    Influenza Vaccine (Season Ended) 09/01/2022    Dental Periodic Exam  08/23/2022    Dental Prophylaxis  08/23/2022    Depression Screening  02/16/2023    Dental X-Ray: Bitewings  06/08/2023    BMI: Adult  06/15/2023    Dental X-Ray: Full Mouth  06/08/2025    DTaP,Tdap,and Td Vaccines (2 - Td or Tdap) 03/15/2028    HIV Screening  Completed    Hepatitis C Screening  Completed    Pneumococcal Vaccine: Pediatrics (0 to 5 Years) and At-Risk Patients (6 to 59 Years)  Aged Out    HIB Vaccine  Aged Out    Hepatitis B Vaccine  Aged Out    IPV Vaccine  Aged Out    Hepatitis A Vaccine  Aged Out    Meningococcal ACWY Vaccine  Aged Out    HPV Vaccine  Aged [employer] History   Administered Date(s) Administered    COVID-19 PFIZER VACCINE 0 3 ML IM 05/17/2021, 06/15/2021, 02/07/2022    Influenza, recombinant, quadrivalent,injectable, preservative free 10/21/2019, 11/16/2020    Influenza, seasonal, injectable 11/18/2015    Influenza, seasonal, injectable, preservative free 03/15/2018    Pneumococcal Polysaccharide PPV23 10/21/2019    TD (adult) Preservative Free 01/20/2009    Tdap 03/15/2018       I globally spent 30 minutes face-to-face with the patient and with chart review       Rafa Blake DO  Internal Medicine PGY-2

## 2022-09-14 DIAGNOSIS — R05.9 COUGH: ICD-10-CM

## 2022-09-14 RX ORDER — BENZONATATE 100 MG/1
100 CAPSULE ORAL EVERY 8 HOURS
Qty: 21 CAPSULE | Refills: 1 | Status: SHIPPED | OUTPATIENT
Start: 2022-09-14 | End: 2022-09-28 | Stop reason: SDUPTHER

## 2022-09-14 RX ORDER — ALBUTEROL SULFATE 90 UG/1
2 AEROSOL, METERED RESPIRATORY (INHALATION) EVERY 4 HOURS PRN
Qty: 8 G | Refills: 3 | Status: SHIPPED | OUTPATIENT
Start: 2022-09-14 | End: 2022-09-28 | Stop reason: SDUPTHER

## 2022-09-14 NOTE — TELEPHONE ENCOUNTER
From: Sobia Ndiaye  To: Office of [place], [place]  Sent: 9/14/2022 7:10 AM EDT  Subject: Medication Renewal Request    Refills have been requested for the following medications:    Other - Benzonatate 100 mg capsule    Preferred pharmacy: [id number] Brendan NICKERSON

## 2022-09-21 ENCOUNTER — APPOINTMENT (OUTPATIENT)
Dept: RADIOLOGY | Facility: HOSPITAL | Age: 52
End: 2022-09-21
Payer: COMMERCIAL

## 2022-09-21 ENCOUNTER — HOSPITAL ENCOUNTER (EMERGENCY)
Facility: HOSPITAL | Age: 52
Discharge: HOME | End: 2022-09-21
Attending: EMERGENCY MEDICINE
Payer: COMMERCIAL

## 2022-09-21 VITALS
SYSTOLIC BLOOD PRESSURE: 103 MMHG | RESPIRATION RATE: 17 BRPM | HEART RATE: 95 BPM | BODY MASS INDEX: 28.58 KG/M2 | DIASTOLIC BLOOD PRESSURE: 74 MMHG | HEIGHT: 67 IN | OXYGEN SATURATION: 96 % | WEIGHT: 182.1 LBS | TEMPERATURE: 98.4 F

## 2022-09-21 DIAGNOSIS — R05.9 COUGH: Primary | ICD-10-CM

## 2022-09-21 DIAGNOSIS — J40 BRONCHITIS: ICD-10-CM

## 2022-09-21 LAB — SARS-COV-2 RNA RESP QL NAA+PROBE: NEGATIVE

## 2022-09-21 PROCEDURE — 71046 X-RAY EXAM CHEST 2 VIEWS: CPT

## 2022-09-21 PROCEDURE — U0003 INFECTIOUS AGENT DETECTION BY NUCLEIC ACID (DNA OR RNA); SEVERE ACUTE RESPIRATORY SYNDROME CORONAVIRUS 2 (SARS-COV-2) (CORONAVIRUS DISEASE [COVID-19]), AMPLIFIED PROBE TECHNIQUE, MAKING USE OF HIGH THROUGHPUT TECHNOLOGIES AS DESCRIBED BY CMS-2020-01-R: HCPCS | Performed by: EMERGENCY MEDICINE

## 2022-09-21 PROCEDURE — U0005 INFEC AGEN DETEC AMPLI PROBE: HCPCS | Performed by: EMERGENCY MEDICINE

## 2022-09-21 PROCEDURE — 99284 EMERGENCY DEPT VISIT MOD MDM: CPT | Performed by: EMERGENCY MEDICINE

## 2022-09-21 PROCEDURE — 99283 EMERGENCY DEPT VISIT LOW MDM: CPT

## 2022-09-21 RX ORDER — BENZONATATE 100 MG/1
100 CAPSULE ORAL EVERY 8 HOURS
Qty: 21 CAPSULE | Refills: 0 | Status: SHIPPED | OUTPATIENT
Start: 2022-09-21 | End: 2022-09-28 | Stop reason: SDUPTHER

## 2022-09-21 NOTE — ED ATTENDING ATTESTATION
9/21/2022  IMike MD, saw and evaluated the patient  I have discussed the patient with the resident/non-physician practitioner and agree with the resident's/non-physician practitioner's findings, Plan of Care, and MDM as documented in the resident's/non-physician practitioner's note, except where noted  All available labs and Radiology studies were reviewed  I was present for key portions of any procedure(s) performed by the resident/non-physician practitioner and I was immediately available to provide assistance  At this point I agree with the current assessment done in the Emergency Department    I have conducted an independent evaluation of this patient a history and physical is as follows:  Cough for a week   Low grade temp  100    No sob no cp   No sore throat      Exam nad looks well normal pulse ox normal vital signs  Neck no jvd  Lungs  Rhonchi   No rales   Heart rrr no m abd soft nt nd pos bs   Impression bronchitis rule out pneumonia  Plan  CXR      ED Course         Critical Care Time  Procedures

## 2022-09-21 NOTE — DISCHARGE INSTRUCTIONS
Honey is also a good treatment for a cough  Mucinex and a saline nasal spray may also help your congestion  Someone will call you with your COVID test results, or you can check for the results on [facility] MyChart

## 2022-09-21 NOTE — ED NOTES
Pt discharged by ED provider, independent of nursing staff        Anabella Medel, LUCRECIA  09/21/22 [id number]

## 2022-09-21 NOTE — Clinical Note
Chelsey Lua was seen and treated in our emergency department on 9/21/2022  Diagnosis:     Go Cedillo    He may return on this date: 09/23/2022         If you have any questions or concerns, please don't hesitate to call        Julio Rios MD    ______________________________           _______________          _______________  Hospital Representative                              Date                                Time

## 2022-09-21 NOTE — ED PROVIDER NOTES
History  Chief Complaint   Patient presents with    Cough     Pt c/o cough for approx  7 days  Rory Alejandre is a 45year-old man presenting with a cough over the last week  It is nonproductive  He feels congested  He had a low-grade temperature of around 100 F today  He is vaccinated against COVID x2  No nausea, vomiting, diarrhea, abdominal pain, shortness a breath, chest pain, earache, sore throat  Prior to Admission Medications   Prescriptions Last Dose Informant Patient Reported? Taking?    CVS Tussin Adult Chest Congest 100 MG/5ML oral liquid   No No   Sig: Take 5 mL (100 mg total) by mouth 3 (three) times a day as needed for cough   acetaminophen (TYLENOL) 650 mg CR tablet   No No   Sig: Take 1 tablet (650 mg total) by mouth every 8 (eight) hours as needed for mild pain   albuterol (Ventolin HFA) 90 mcg/act inhaler   No No   Sig: Inhale 2 puffs every 4 (four) hours as needed for wheezing   benzonatate (TESSALON PERLES) 100 mg capsule   No No   Sig: Take 1 capsule (100 mg total) by mouth every 8 (eight) hours   cholecalciferol (VITAMIN D3) 25 mcg (1,000 units) tablet   No No   Sig: TAKE 1 TABLET BY MOUTH EVERY DAY   hydrocortisone 1 % ointment   No No   Sig: Apply topically 2 (two) times a day   levothyroxine 150 mcg tablet   No No   Sig: One tablet daily      Facility-Administered Medications: None       Past Medical History:   Diagnosis Date    Arthritis     Chronic cough     Disease of thyroid gland     Hypoparathyroidism (HCC)     continue taking calcium and vitamin D, will check CMP, PTH level and Vitamin D level       Past Surgical History:   Procedure Laterality Date    FRACTURE SURGERY      Open Treatment of Each Proximal Finger Phalanx       Family History   Problem Relation Age of Onset    No Known Problems Mother     No Known Problems Father     No Known Problems Sister     No Known Problems Brother     No Known Problems Son     Learning disabilities Daughter  Asthma Daughter     Seizures Daughter     No Known Problems Maternal Grandmother     No Known Problems Maternal Grandfather     No Known Problems Paternal Grandmother     No Known Problems Paternal Grandfather     No Known Problems Maternal Aunt     No Known Problems Maternal Uncle     No Known Problems Paternal Aunt     No Known Problems Paternal Uncle     No Known Problems Cousin      I have reviewed and agree with the history as documented  E-Cigarette/Vaping    E-Cigarette Use Never User      E-Cigarette/Vaping Substances    Nicotine No     THC No     CBD No     Flavoring No     Other No     Unknown No      Social History     Tobacco Use    Smoking status: Former Smoker    Smokeless tobacco: Never Used    Tobacco comment: pt "tried it when he was a teenager but did not like them"   Vaping Use    Vaping Use: Never used   Substance Use Topics    Alcohol use: Yes     Comment: occasionally    Drug use: No        Review of Systems   All other systems reviewed and are negative  Physical Exam  ED Triage Vitals [09/21/22 0855]   Temperature Pulse Respirations Blood Pressure SpO2   98 4 °F (36 9 °C) 95 17 103/74 96 %      Temp Source Heart Rate Source Patient Position - Orthostatic VS BP Location FiO2 (%)   Oral Monitor Sitting Left arm --      Pain Score       No Pain             Orthostatic Vital Signs  Vitals:    09/21/22 0855   BP: 103/74   Pulse: 95   Patient Position - Orthostatic VS: Sitting       Physical Exam  Vitals and nursing note reviewed  Constitutional:       General: He is not in acute distress  Appearance: Normal appearance  He is well-developed  He is not ill-appearing  HENT:      Head: Normocephalic and atraumatic  Right Ear: Tympanic membrane and external ear normal       Left Ear: Tympanic membrane and external ear normal       Nose: Nose normal  No congestion or rhinorrhea        Mouth/Throat:      Mouth: Mucous membranes are moist       Pharynx: No oropharyngeal exudate or posterior oropharyngeal erythema  Eyes:      General: No scleral icterus  Conjunctiva/sclera: Conjunctivae normal    Cardiovascular:      Rate and Rhythm: Normal rate and regular rhythm  Heart sounds: No murmur heard  Pulmonary:      Effort: Pulmonary effort is normal  No respiratory distress  Comments: Rhonchi present in all lung fields equally  Abdominal:      General: There is no distension  Palpations: Abdomen is soft  Tenderness: There is no abdominal tenderness  Musculoskeletal:         General: No deformity  Cervical back: Normal range of motion  Skin:     General: Skin is warm and dry  Coloration: Skin is not jaundiced or pale  Neurological:      General: No focal deficit present  Mental Status: He is alert and oriented to person, place, and time  ED Medications  Medications - No data to display    Diagnostic Studies  Results Reviewed     Procedure Component Value Units Date/Time    COVID only [[id number]]  (Normal) Collected: 09/21/22 0951    Lab Status: Final result Specimen: Nares from Nose Updated: 09/21/22 1102     SARS-CoV-2 Negative    Narrative:      FOR PEDIATRIC PATIENTS - copy/paste COVID Guidelines URL to browser: [email or web address]    SARS-CoV-2 assay is a Nucleic Acid Amplification assay intended for the  qualitative detection of nucleic acid from SARS-CoV-2 in nasopharyngeal  swabs  Results are for the presumptive identification of SARS-CoV-2 RNA  Positive results are indicative of infection with SARS-CoV-2, the virus  causing COVID-19, but do not rule out bacterial infection or co-infection  with other viruses  Laboratories within the [place] and its  territories are required to report all positive results to the appropriate  public health authorities   Negative results do not preclude SARS-CoV-2  infection and should not be used as the sole basis for treatment or other  patient management decisions  Negative results must be combined with  clinical observations, patient history, and epidemiological information  This test has not been FDA cleared or approved  This test has been authorized by FDA under an Emergency Use Authorization  (EUA)  This test is only authorized for the duration of time the  declaration that circumstances exist justifying the authorization of the  emergency use of an in vitro diagnostic tests for detection of SARS-CoV-2  virus and/or diagnosis of COVID-19 infection under section 564(b)(1) of  the Act, 21 U  S C  988AAP-9(Y)(1), unless the authorization is terminated  or revoked sooner  The test has been validated but independent review by FDA  and CLIA is pending  Test performed using [employer] GeneXpert: This RT-PCR assay targets N2,  a region unique to SARS-CoV-2  A conserved region in the E-gene was chosen  for pan-Sarbecovirus detection which includes SARS-CoV-2  According to CMS-2020-01-R, this platform meets the definition of high-throughput technology  XR chest 2 views   Final Result by Samuel Velazquez MD (09/21 1008)      No acute cardiopulmonary disease  Findings are stable            Workstation performed: [id number]               Procedures  Procedures      ED Course                                       MDM  Number of Diagnoses or Management Options  Bronchitis  Cough  Diagnosis management comments: 46 yo man presenting with cough x1 week  Concerning for pneumonia, viral illness, bronchitis  Given length of symptoms, will assess for pneumonia with chest x-ray  Will test for COVID  Chest x-ray shows no focal findings  Recommended supportive measures at home  Prescribed Tessalon Perles for cough at home  Discharged home in stable condition, return precautions given        Disposition  Final diagnoses:   Cough   Bronchitis     Time reflects when diagnosis was documented in both MDM as applicable and the Disposition within this note     Time User Action Codes Description Comment    9/21/2022 10:17 AM Milton Gohersond Add [R05 9] Cough     9/21/2022 10:17 AM Milton Gohersond Add [J40] Bronchitis       ED Disposition     ED Disposition   Discharge    Condition   Stable    Date/Time   Wed Sep 21, 2022 10:17 AM    Comment   Lory Spaulding discharge to home/self care  Follow-up Information     Follow up With Specialties Details Why Contact Info Additional Byron Camarillo [address], DO Internal Medicine   [address]  [phone]       [address] [place] [address] [address] [facility] Emergency Department Emergency Medicine  If symptoms worsen [address] [phone]  [address] [facility] [address] [facility] Emergency Department, [address], [address]          Discharge Medication List as of 9/21/2022 10:19 AM      START taking these medications    Details   !! benzonatate (TESSALON PERLES) 100 mg capsule Take 1 capsule (100 mg total) by mouth every 8 (eight) hours, Starting Wed 9/21/2022, Normal       !! - Potential duplicate medications found  Please discuss with provider        CONTINUE these medications which have NOT CHANGED    Details   albuterol (Ventolin HFA) 90 mcg/act inhaler Inhale 2 puffs every 4 (four) hours as needed for wheezing, Starting Wed 9/14/2022, Normal      !! benzonatate (TESSALON PERLES) 100 mg capsule Take 1 capsule (100 mg total) by mouth every 8 (eight) hours, Starting Wed 9/14/2022, Normal      acetaminophen (TYLENOL) 650 mg CR tablet Take 1 tablet (650 mg total) by mouth every 8 (eight) hours as needed for mild pain, Starting Fri 5/20/2022, Normal      cholecalciferol (VITAMIN D3) 25 mcg (1,000 units) tablet TAKE 1 TABLET BY MOUTH EVERY DAY, Normal      CVS Tussin Adult Chest Congest 100 MG/5ML oral liquid Take 5 mL (100 mg total) by mouth 3 (three) times a day as needed for cough, Starting Wed 10/13/2021, Normal      hydrocortisone 1 % ointment Apply topically 2 (two) times a day, Starting Sat 2/26/2022, Normal      levothyroxine 150 mcg tablet One tablet daily, Normal       !! - Potential duplicate medications found  Please discuss with provider  No discharge procedures on file  PDMP Review     None           ED Provider  Attending physically available and evaluated Jacquelyn Garcias I managed the patient along with the ED Attending      Electronically Signed by         Henrry Green MD  09/21/22 0389

## 2022-09-27 ENCOUNTER — TELEPHONE (OUTPATIENT)
Dept: INTERNAL MEDICINE CLINIC | Facility: CLINIC | Age: 52
End: 2022-09-27

## 2022-09-27 NOTE — TELEPHONE ENCOUNTER
Patient called in stating he was seen in ED for a cough  Per patient the tessalon perles are not helping and is requesting something else to take for cough  Is there any recommendations for patient or anything else you can send  Please review and advise

## 2022-09-28 ENCOUNTER — OFFICE VISIT (OUTPATIENT)
Dept: INTERNAL MEDICINE CLINIC | Facility: CLINIC | Age: 52
End: 2022-09-28

## 2022-09-28 VITALS
WEIGHT: 185 LBS | HEIGHT: 68 IN | HEART RATE: 90 BPM | BODY MASS INDEX: 28.04 KG/M2 | SYSTOLIC BLOOD PRESSURE: 115 MMHG | TEMPERATURE: 98.2 F | DIASTOLIC BLOOD PRESSURE: 82 MMHG

## 2022-09-28 DIAGNOSIS — R05.3 CHRONIC COUGH: Primary | ICD-10-CM

## 2022-09-28 DIAGNOSIS — R05.9 COUGH: ICD-10-CM

## 2022-09-28 PROCEDURE — 99214 OFFICE O/P EST MOD 30 MIN: CPT | Performed by: INTERNAL MEDICINE

## 2022-09-28 RX ORDER — ALBUTEROL SULFATE 90 UG/1
2 AEROSOL, METERED RESPIRATORY (INHALATION) EVERY 4 HOURS PRN
Qty: 8 G | Refills: 3 | Status: SHIPPED | OUTPATIENT
Start: 2022-09-28

## 2022-09-28 RX ORDER — FLUTICASONE PROPIONATE 110 UG/1
2 AEROSOL, METERED RESPIRATORY (INHALATION) 2 TIMES DAILY
Qty: 12 G | Refills: 1 | Status: SHIPPED | OUTPATIENT
Start: 2022-09-28

## 2022-09-28 RX ORDER — BENZONATATE 100 MG/1
100 CAPSULE ORAL EVERY 8 HOURS
Qty: 21 CAPSULE | Refills: 1 | Status: SHIPPED | OUTPATIENT
Start: 2022-09-28 | End: 2022-10-25 | Stop reason: SDUPTHER

## 2022-09-28 NOTE — TELEPHONE ENCOUNTER
Patient stated his cough in getting worse and would like to see a provider  Appointment scheduled for today at 3:30

## 2022-09-28 NOTE — TELEPHONE ENCOUNTER
Is there something else that can be prescribed for the patient?  He stated he has a dry persistent cough and Tessalon Perles not working

## 2022-09-28 NOTE — PROGRESS NOTES
[address] [place] [address]  INTERNAL MEDICINE OFFICE VISIT     PATIENT INFORMATION     Surendra Aranda   45 y o  male   MRN: [id number]    ASSESSMENT/PLAN     1  Chronic cough  Patient seen today in office for a same day appointment due to cough bothering the patient  He has been having chronic cough since past 2 years  It is a dry cough  He mentions that it is brought on by cold temperatures  This specific episode started 2 weeks ago with a fever and sore throat  Fever and sore throat went away in 2 days but the cough has continued since then  No fevers, sputum production, rash, breathlessness, chest pain  He is a never smoker  CXR from recent ED visit is unremarkable  Patient has been taking Albuterol inhaler, CVS tussin, benzonatate, but none of these seems to be helping  He does have a cat at home  Patient has never got a PFT done  Asthma? ABPA? -     fluticasone (Flovent HFA) 110 MCG/ACT inhaler; Inhale 2 puffs 2 (two) times a day Rinse mouth after use  -     CT chest wo contrast; Future; Expected date: 10/05/2022  [facility] FADI Allergy Panel, Adult; Future  -     Aspergillus fumagatus IgE; Future  -     IgE; Future    BMI Counseling: Body mass index is 28 34 kg/m²  The BMI is above normal  Nutrition recommendations include reducing portion sizes, decreasing overall calorie intake, reducing fast food intake and decreasing soda and/or juice intake  Exercise recommendations include exercising 3-5 times per week  Schedule a follow-up appointment in 6 weeks      HEALTH MAINTENANCE     Immunization History   Administered Date(s) Administered    COVID-19 PFIZER VACCINE 0 3 ML IM 05/17/2021, 06/15/2021, 02/07/2022    Influenza, recombinant, quadrivalent,injectable, preservative free 10/21/2019, 11/16/2020    Influenza, seasonal, injectable 11/18/2015    Influenza, seasonal, injectable, preservative free 03/15/2018    Pneumococcal Polysaccharide PPV23 10/21/2019    TD (adult) Preservative Free 01/20/2009    Tdap 03/15/2018     Immunizations:  · Up to date on all age-appropriate immunizations  Screening:  · Depression screen, HIV, Hep B, BMI follow-up up to date  · Colonoscopy never done - discussed - patient not interested       [facility]     Chief Complaint   Patient presents with    Cough     Cough x 2 weeks, dx with bronchitis      HISTORY OF PRESENT ILLNESS     Dub Knee is a 52/M with P/M/H of arthritis, chronic bronchitis, hypothyroidism  Patient presented today to the office due to intolerable cough since 2 weeks  He mentions that he has cough in colder temperatures since rooughly 2 years  This particular episode started 2 weeks ago with a fever and sore throat  Fever and sore throat resolved the next day but the cough persisted  The cough is dry and only during the day, it doesn't bother the patient at night and he sleeps well  He has been taking albuterol inhaler, CVS tussin, benzonatate but nothing seems to help  Chest Xray done during a recent ED visit is unremarkable  No associated fever, headache, shortness of breath, chest pain, rash  REVIEW OF SYSTEMS     Review of Systems   Constitutional: Negative for activity change, appetite change, chills, fatigue, fever and unexpected weight change  HENT: Negative for congestion, ear discharge, ear pain, postnasal drip, rhinorrhea, sinus pain, sore throat, trouble swallowing and voice change  Eyes: Negative for pain and redness  Respiratory: Negative for cough, chest tightness, shortness of breath and wheezing  Cardiovascular: Negative for chest pain, palpitations and leg swelling  Gastrointestinal: Negative for abdominal pain, constipation, diarrhea and vomiting  Endocrine: Negative  Genitourinary: Negative  Musculoskeletal: Negative for arthralgias and myalgias  Skin: Negative for color change, pallor and rash     Allergic/Immunologic: Negative for environmental allergies and immunocompromised state    Neurological: Negative  Hematological: Negative for adenopathy  Does not bruise/bleed easily  Psychiatric/Behavioral: Negative  OBJECTIVE     Vitals:    09/28/22 1503   BP: 115/82   BP Location: Right arm   Patient Position: Sitting   Cuff Size: Large   Pulse: 90   Temp: 98 2 °F (36 8 °C)   TempSrc: Temporal   Weight: 83 9 kg (185 lb)   Height: 5' 7 75" (1 721 m)     Physical Exam  Vitals reviewed  Constitutional:       General: He is not in acute distress  Appearance: He is obese  HENT:      Head: Normocephalic and atraumatic  Nose: Nose normal  No congestion or rhinorrhea  Mouth/Throat:      Mouth: Mucous membranes are moist       Pharynx: Oropharynx is clear  No oropharyngeal exudate or posterior oropharyngeal erythema  Eyes:      Conjunctiva/sclera: Conjunctivae normal    Cardiovascular:      Rate and Rhythm: Normal rate and regular rhythm  Pulses: Normal pulses  Heart sounds: Normal heart sounds  No murmur heard  Pulmonary:      Effort: Pulmonary effort is normal       Breath sounds: Wheezing present  Abdominal:      General: Bowel sounds are normal       Palpations: Abdomen is soft  Musculoskeletal:      Right lower leg: No edema  Left lower leg: No edema  Skin:     General: Skin is warm  Coloration: Skin is not jaundiced or pale  Neurological:      General: No focal deficit present  Mental Status: He is alert and oriented to person, place, and time     Psychiatric:         Mood and Affect: Mood normal          Behavior: Behavior normal        CURRENT MEDICATIONS     Current Outpatient Medications:     acetaminophen (TYLENOL) 650 mg CR tablet, Take 1 tablet (650 mg total) by mouth every 8 (eight) hours as needed for mild pain, Disp: 90 tablet, Rfl: 3    albuterol (Ventolin HFA) 90 mcg/act inhaler, Inhale 2 puffs every 4 (four) hours as needed for wheezing, Disp: 8 g, Rfl: 3    benzonatate (TESSALON PERLES) 100 mg capsule, Take 1 capsule (100 mg total) by mouth every 8 (eight) hours, Disp: 21 capsule, Rfl: 1    cholecalciferol (VITAMIN D3) 25 mcg (1,000 units) tablet, TAKE 1 TABLET BY MOUTH EVERY DAY, Disp: 90 tablet, Rfl: 3    fluticasone (Flovent HFA) 110 MCG/ACT inhaler, Inhale 2 puffs 2 (two) times a day Rinse mouth after use , Disp: 12 g, Rfl: 1    hydrocortisone 1 % ointment, Apply topically 2 (two) times a day, Disp: 30 g, Rfl: 0    levothyroxine 150 mcg tablet, One tablet daily, Disp: 90 tablet, Rfl: 3    benzonatate (TESSALON PERLES) 100 mg capsule, Take 1 capsule (100 mg total) by mouth every 8 (eight) hours (Patient not taking: Reported on 9/28/2022), Disp: 21 capsule, Rfl: 0    CVS Tussin Adult Chest Congest 100 MG/5ML oral liquid, Take 5 mL (100 mg total) by mouth 3 (three) times a day as needed for cough (Patient not taking: Reported on 9/28/2022), Disp: 120 mL, Rfl: 0    PAST MEDICAL & SURGICAL HISTORY     Past Medical History:   Diagnosis Date    Arthritis     Chronic cough     Disease of thyroid gland     Hypoparathyroidism (HCC)     continue taking calcium and vitamin D, will check CMP, PTH level and Vitamin D level     Past Surgical History:   Procedure Laterality Date    FRACTURE SURGERY      Open Treatment of Each Proximal Finger Phalanx     SOCIAL & FAMILY HISTORY     Social History     Socioeconomic History    Marital status:       Spouse name: Not on file    Number of children: 1    Years of education: Not on file    Highest education level: Not on file   Occupational History    Occupation:    Tobacco Use    Smoking status: Former Smoker    Smokeless tobacco: Never Used    Tobacco comment: pt "tried it when he was a teenager but did not like them"   Vaping Use    Vaping Use: Never used   Substance and Sexual Activity    Alcohol use: Yes     Comment: occasionally    Drug use: No    Sexual activity: Not Currently   Other Topics Concern    Not on file   Social History Narrative    Not on file     Social Determinants of Health     Financial Resource Strain: Low Risk     Difficulty of Paying Living Expenses: Not hard at all   Food Insecurity: No Food Insecurity    Worried About Running Out of Food in the Last Year: Never true    Dona of Food in the Last Year: Never true   Transportation Needs: No Transportation Needs    Lack of Transportation (Medical): No    Lack of Transportation (Non-Medical): No   Physical Activity: Not on file   Stress: Not on file   Social Connections: Not on file   Intimate Partner Violence: Not on file   Housing Stability: Low Risk     Unable to Pay for Housing in the Last Year: No    Number of Places Lived in the Last Year: 1    Unstable Housing in the Last Year: No     Social History     Substance and Sexual Activity   Alcohol Use Yes    Comment: occasionally     Social History     Substance and Sexual Activity   Drug Use No     Social History     Tobacco Use   Smoking Status Former Smoker   Smokeless Tobacco Never Used   Tobacco Comment    pt "tried it when he was a teenager but did not like them"     Family History   Problem Relation Age of Onset    No Known Problems Mother     No Known Problems Father     No Known Problems Sister     No Known Problems Brother     No Known Problems Son     Learning disabilities Daughter     Asthma Daughter     Seizures Daughter     No Known Problems Maternal Grandmother     No Known Problems Maternal Grandfather     No Known Problems Paternal Grandmother     No Known Problems Paternal Grandfather     No Known Problems Maternal Aunt     No Known Problems Maternal Uncle     No Known Problems Paternal Aunt     No Known Problems Paternal Uncle     No Known Problems Cousin      ==  Stacie Fabian MD  PGY-1, Internal Medicine    [place] [address] [address]   [facility] , [address], [address] [facility]  Office: [phone]  Fax: [phone]

## 2022-09-28 NOTE — TELEPHONE ENCOUNTER
I see he has an inhaler so he may try that for his cough  I can give refills if needed  Also he can try over the counter robitussin  Let me know, thanks!

## 2022-10-12 PROBLEM — H61.23 BILATERAL IMPACTED CERUMEN: Status: RESOLVED | Noted: 2021-10-13 | Resolved: 2022-10-12

## 2022-10-24 DIAGNOSIS — M19.90 ARTHRITIS: ICD-10-CM

## 2022-10-24 DIAGNOSIS — R05.9 COUGH: ICD-10-CM

## 2022-10-25 RX ORDER — BENZONATATE 100 MG/1
100 CAPSULE ORAL EVERY 8 HOURS
Qty: 20 CAPSULE | Refills: 1 | Status: SHIPPED | OUTPATIENT
Start: 2022-10-25 | End: 2022-11-05 | Stop reason: SDUPTHER

## 2022-10-25 RX ORDER — SENNOSIDES 8.6 MG
650 CAPSULE ORAL EVERY 8 HOURS PRN
Qty: 90 TABLET | Refills: 3 | Status: SHIPPED | OUTPATIENT
Start: 2022-10-25

## 2022-10-25 NOTE — TELEPHONE ENCOUNTER
From: Marcos Pate  To: Office of Teresita García  Sent: 10/24/2022 6:40 PM EDT  Subject: Medication Renewal Request    Refills have been requested for the following medications:    Other - Benazontate 100 mg capsule    Preferred pharmacy: [id number]- [facility], [facility] [address]

## 2022-10-26 ENCOUNTER — TELEPHONE (OUTPATIENT)
Dept: INTERNAL MEDICINE CLINIC | Facility: CLINIC | Age: 52
End: 2022-10-26

## 2022-10-26 NOTE — TELEPHONE ENCOUNTER
Patient sent a message through [address] asking for a nebulizer machine for his cough  He said he is still coughing and it has been 3 months  He was here recently so not sure if another appt is warranted  This request will have to go through [facility]

## 2022-10-27 ENCOUNTER — TELEPHONE (OUTPATIENT)
Dept: OTHER | Facility: HOSPITAL | Age: 52
End: 2022-10-27

## 2022-10-27 DIAGNOSIS — R05.8 DRY COUGH: Primary | ICD-10-CM

## 2022-10-27 RX ORDER — ALBUTEROL SULFATE 2.5 MG/3ML
2.5 SOLUTION RESPIRATORY (INHALATION) EVERY 6 HOURS PRN
Qty: 5 ML | Refills: 3 | Status: SHIPPED | OUTPATIENT
Start: 2022-10-27 | End: 2022-11-05 | Stop reason: SDUPTHER

## 2022-10-27 NOTE — TELEPHONE ENCOUNTER
Ordered nebulized and supplied through [facility]  Called patient, patient made aware if nebulizer is covered, he will get a call from [facility] to set up delivery or   If nebulizer not covered [facility]istan will inform our office  Patient denied any symptoms such as fever, chills, weight loss, or blood in sputum  Also provided patient with number to contact [facility] if he does not hear from them in about 1 week and number for central scheduling to schedule CT  Nebulizer does not include nebulizer solution, please send nebulizer solution to patient's pharmacy

## 2022-10-29 LAB
DME PARACHUTE DELIVERY DATE ACTUAL: NORMAL
DME PARACHUTE DELIVERY DATE REQUESTED: NORMAL
DME PARACHUTE ITEM DESCRIPTION: NORMAL
DME PARACHUTE ORDER STATUS: NORMAL
DME PARACHUTE SUPPLIER NAME: NORMAL
DME PARACHUTE SUPPLIER PHONE: NORMAL

## 2022-11-05 ENCOUNTER — HOSPITAL ENCOUNTER (EMERGENCY)
Facility: HOSPITAL | Age: 52
Discharge: HOME | End: 2022-11-05
Attending: EMERGENCY MEDICINE

## 2022-11-05 VITALS
OXYGEN SATURATION: 96 % | HEART RATE: 88 BPM | RESPIRATION RATE: 18 BRPM | SYSTOLIC BLOOD PRESSURE: 134 MMHG | DIASTOLIC BLOOD PRESSURE: 79 MMHG | TEMPERATURE: 98 F

## 2022-11-05 DIAGNOSIS — T14.8XXA SPRAIN, STRAIN: ICD-10-CM

## 2022-11-05 DIAGNOSIS — J40 BRONCHITIS: ICD-10-CM

## 2022-11-05 DIAGNOSIS — R05.9 COUGH: ICD-10-CM

## 2022-11-05 DIAGNOSIS — R05.8 DRY COUGH: ICD-10-CM

## 2022-11-05 DIAGNOSIS — M79.651 RIGHT THIGH PAIN: Primary | ICD-10-CM

## 2022-11-05 RX ORDER — ACETAMINOPHEN 325 MG/1
975 TABLET ORAL ONCE
Status: COMPLETED | OUTPATIENT
Start: 2022-11-05 | End: 2022-11-05

## 2022-11-05 RX ORDER — IPRATROPIUM BROMIDE AND ALBUTEROL SULFATE 2.5; .5 MG/3ML; MG/3ML
3 SOLUTION RESPIRATORY (INHALATION)
Status: DISCONTINUED | OUTPATIENT
Start: 2022-11-05 | End: 2022-11-05 | Stop reason: HOSPADM

## 2022-11-05 RX ORDER — BENZONATATE 100 MG/1
100 CAPSULE ORAL EVERY 8 HOURS
Qty: 20 CAPSULE | Refills: 0 | Status: SHIPPED | OUTPATIENT
Start: 2022-11-05

## 2022-11-05 RX ORDER — KETOROLAC TROMETHAMINE 30 MG/ML
15 INJECTION, SOLUTION INTRAMUSCULAR; INTRAVENOUS ONCE
Status: COMPLETED | OUTPATIENT
Start: 2022-11-05 | End: 2022-11-05

## 2022-11-05 RX ORDER — PREDNISONE 50 MG/1
50 TABLET ORAL DAILY
Qty: 5 TABLET | Refills: 0 | Status: SHIPPED | OUTPATIENT
Start: 2022-11-05 | End: 2022-11-10

## 2022-11-05 RX ORDER — ALBUTEROL SULFATE 2.5 MG/3ML
2.5 SOLUTION RESPIRATORY (INHALATION) EVERY 6 HOURS PRN
Qty: 5 ML | Refills: 0 | Status: SHIPPED | OUTPATIENT
Start: 2022-11-05

## 2022-11-05 RX ADMIN — ACETAMINOPHEN 975 MG: 325 TABLET ORAL at 02:11

## 2022-11-05 RX ADMIN — KETOROLAC TROMETHAMINE 15 MG: 30 INJECTION, SOLUTION INTRAMUSCULAR; INTRAVENOUS at 02:12

## 2022-11-05 RX ADMIN — IPRATROPIUM BROMIDE AND ALBUTEROL SULFATE 3 ML: 2.5; .5 SOLUTION RESPIRATORY (INHALATION) at 02:14

## 2022-11-05 NOTE — Clinical Note
Shahzad Hays was seen and treated in our emergency department on 11/5/2022  Diagnosis:     Joellen Yuan    He may return on this date: 11/06/2022         If you have any questions or concerns, please don't hesitate to call        Gurpreet Pruitt, DO    ______________________________           _______________          _______________  Hospital Representative                              Date                                Time

## 2022-11-05 NOTE — ED PROVIDER NOTES
History  Chief Complaint   Patient presents with   • Groin Pain     Right sided groin pain for 2-3 weeks  States that it is especially bad tonight and nothing seems to help with the pain  Pt also with dry cough that he has had for 3 months that hasn't gone away  No fever chills/nausea/vomiting  No chest px no SOB  HPI  57-year-old male presents with complaint of 2 weeks of right inner thigh pain  Patient denies any inciting injury  He works as a  and stands up walks around most of the day  States that the pain is worse when he walks  Patient also admits to dry cough for last 3 months has been persistent  Denies any chest pain, fever, chills, nausea, vomiting, lateralizing weakness, abscess, groin pain, penile discharge  Can not describe any palliative factors  Prior to Admission Medications   Prescriptions Last Dose Informant Patient Reported?  Taking?   acetaminophen (TYLENOL) 650 mg CR tablet   No No   Sig: Take 1 tablet (650 mg total) by mouth every 8 (eight) hours as needed for mild pain   albuterol (2 5 mg/3 mL) 0 083 % nebulizer solution   No No   Sig: Take 3 mL (2 5 mg total) by nebulization every 6 (six) hours as needed for wheezing or shortness of breath   albuterol (2 5 mg/3 mL) 0 083 % nebulizer solution   No Yes   Sig: Take 3 mL (2 5 mg total) by nebulization every 6 (six) hours as needed for wheezing or shortness of breath   albuterol (Ventolin HFA) 90 mcg/act inhaler   No No   Sig: Inhale 2 puffs every 4 (four) hours as needed for wheezing   benzonatate (TESSALON PERLES) 100 mg capsule   No No   Sig: Take 1 capsule (100 mg total) by mouth every 8 (eight) hours   benzonatate (TESSALON PERLES) 100 mg capsule   No Yes   Sig: Take 1 capsule (100 mg total) by mouth every 8 (eight) hours   cholecalciferol (VITAMIN D3) 25 mcg (1,000 units) tablet   No No   Sig: TAKE 1 TABLET BY MOUTH EVERY DAY   fluticasone (Flovent HFA) 110 MCG/ACT inhaler   No No   Sig: Inhale 2 puffs 2 (two) times a day Rinse mouth after use    hydrocortisone 1 % ointment   No No   Sig: Apply topically 2 (two) times a day   levothyroxine 150 mcg tablet   No No   Sig: One tablet daily      Facility-Administered Medications: None       Past Medical History:   Diagnosis Date   • Arthritis    • Chronic cough    • Disease of thyroid gland    • Hypoparathyroidism ([address] )     continue taking calcium and vitamin D, will check CMP, PTH level and Vitamin D level       Past Surgical History:   Procedure Laterality Date   • FRACTURE SURGERY      Open Treatment of Each Proximal Finger Phalanx       Family History   Problem Relation Age of Onset   • No Known Problems Mother    • No Known Problems Father    • No Known Problems Sister    • No Known Problems Brother    • No Known Problems Son    • Learning disabilities Daughter    • Asthma Daughter    • Seizures Daughter    • No Known Problems Maternal Grandmother    • No Known Problems Maternal Grandfather    • No Known Problems Paternal Grandmother    • No Known Problems Paternal Grandfather    • No Known Problems Maternal Aunt    • No Known Problems Maternal Uncle    • No Known Problems Paternal Aunt    • No Known Problems Paternal Uncle    • No Known Problems Cousin      I have reviewed and agree with the history as documented  E-Cigarette/Vaping   • E-Cigarette Use Never User      E-Cigarette/Vaping Substances   • Nicotine No    • THC No    • CBD No    • Flavoring No    • Other No    • Unknown No      Social History     Tobacco Use   • Smoking status: Former Smoker   • Smokeless tobacco: Never Used   • Tobacco comment: pt "tried it when he was a teenager but did not like them"   Vaping Use   • Vaping Use: Never used   Substance Use Topics   • Alcohol use: Yes     Comment: occasionally   • Drug use: No        Review of Systems   Constitutional: Negative for chills and fever  HENT: Negative for congestion, ear pain, rhinorrhea and sore throat  Eyes: Negative for pain     Respiratory: Positive for cough  Negative for apnea, choking, chest tightness, shortness of breath, wheezing and stridor  Cardiovascular: Negative for chest pain, palpitations and leg swelling  Gastrointestinal: Negative for abdominal pain, constipation, diarrhea, nausea and vomiting  Genitourinary: Negative for hematuria  Musculoskeletal: Negative for arthralgias and back pain  Skin: Negative for rash and wound  Neurological: Negative for dizziness  Psychiatric/Behavioral: Negative for agitation and hallucinations  All other systems reviewed and are negative  Physical Exam  ED Triage Vitals   Temperature Pulse Respirations Blood Pressure SpO2   11/05/22 0145 11/05/22 0134 11/05/22 0134 11/05/22 0134 11/05/22 0134   98 °F (36 7 °C) 88 18 134/79 96 %      Temp Source Heart Rate Source Patient Position - Orthostatic VS BP Location FiO2 (%)   11/05/22 0145 11/05/22 0134 11/05/22 0134 11/05/22 0134 --   Oral Monitor Lying Right arm       Pain Score       11/05/22 0134       5             Orthostatic Vital Signs  Vitals:    11/05/22 0134   BP: 134/79   Pulse: 88   Patient Position - Orthostatic VS: Lying       Physical Exam  Vitals reviewed  Constitutional:       General: He is not in acute distress  Appearance: He is well-developed  HENT:      Head: Normocephalic and atraumatic  Right Ear: External ear normal       Left Ear: External ear normal       Nose: Nose normal  No congestion or rhinorrhea  Mouth/Throat:      Mouth: Mucous membranes are moist       Pharynx: No oropharyngeal exudate or posterior oropharyngeal erythema  Eyes:      General:         Right eye: No discharge  Left eye: No discharge  Extraocular Movements: Extraocular movements intact  Conjunctiva/sclera: Conjunctivae normal       Pupils: Pupils are equal, round, and reactive to light  Cardiovascular:      Rate and Rhythm: Normal rate and regular rhythm  Pulses: Normal pulses        Heart sounds: Normal heart sounds  Pulmonary:      Effort: Pulmonary effort is normal  No respiratory distress  Breath sounds: Normal breath sounds  No wheezing or rales  Abdominal:      Palpations: Abdomen is soft  Tenderness: There is no abdominal tenderness  Musculoskeletal:         General: Tenderness present  Normal range of motion  Cervical back: Normal range of motion and neck supple  No rigidity  Comments: Tenderness to the inner thigh no erythema or purulence   Skin:     General: Skin is warm  Findings: No erythema or rash  Neurological:      General: No focal deficit present  Mental Status: He is alert and oriented to person, place, and time  Cranial Nerves: No cranial nerve deficit  Sensory: No sensory deficit  Motor: No weakness  Coordination: Coordination normal    Psychiatric:         Mood and Affect: Mood normal          ED Medications  Medications   ketorolac (TORADOL) injection 15 mg (15 mg Intramuscular Given 11/5/22 0212)   acetaminophen (TYLENOL) tablet 975 mg (975 mg Oral Given 11/5/22 0211)       Diagnostic Studies  Results Reviewed     None                 No orders to display         Procedures  Procedures      ED Course                             SBIRT 20yo+    Flowsheet Row Most Recent Value   SBIRT (23 yo +)    In order to provide better care to our patients, we are screening all of our patients for alcohol and drug use  Would it be okay to ask you these screening questions? No Filed at: 11/05/2022 0137                MDM  24-year-old male presents with complaint of 2 weeks of right inner thigh pain  Right thigh pain and cough  Muscle sprain/strain   Bronchitis  Patient is given symptomatic treatment given follow-up and return precautions    Discharged      Disposition  Final diagnoses:   Right thigh pain   Sprain, strain   Bronchitis     Time reflects when diagnosis was documented in both MDM as applicable and the Disposition within this note     Time User Action Codes Description Comment    2022  2:11 AM Marlane Hurl Add [Q37 244] Right thigh pain     2022  2:48 AM Marlane Hurl Add Vignesh Andreea  8XXA] Sprain, strain     2022  2:48 AM Marlane Hurl Add [R05 9] Cough     2022  2:49 AM Marlane Hurl Add [R05 8] Dry cough     2022  2:49 AM Marlane Hurl Modify [R05 8] Dry cough     2022  2:49 AM Marlane Hurl Add [J40] Bronchitis       ED Disposition     ED Disposition   Discharge    Condition   Stable    Date/Time   Sat 2022  2:11 AM    Comment   Hellen Rapp discharge to home/self care                 Follow-up Information     Follow up With Specialties Details Why [facility] Emergency Department Emergency Medicine Go to  If symptoms worsen or if you have additional concerns [address] [id number]  [address] Emergency Department, [address], [address], [place] Internal Medicine Schedule an appointment as soon as possible for a visit   [address]  [phone]             Discharge Medication List as of 2022  3:00 AM      START taking these medications    Details   predniSONE 50 mg tablet Take 1 tablet (50 mg total) by mouth daily for 5 days, Starting Sat 2022, Until Thu 11/10/2022, Normal         CONTINUE these medications which have CHANGED    Details   albuterol (2 5 mg/3 mL) 0 083 % nebulizer solution Take 3 mL (2 5 mg total) by nebulization every 6 (six) hours as needed for wheezing or shortness of breath, Starting Sat 2022, Normal      benzonatate (TESSALON PERLES) 100 mg capsule Take 1 capsule (100 mg total) by mouth every 8 (eight) hours, Starting Sat 2022, Normal         CONTINUE these medications which have NOT CHANGED    Details   acetaminophen (TYLENOL) 650 mg CR tablet Take 1 tablet (650 mg total) by mouth every 8 (eight) hours as needed for mild pain, Starting Tue 10/25/2022, Normal      albuterol (Ventolin HFA) 90 mcg/act inhaler Inhale 2 puffs every 4 (four) hours as needed for wheezing, Starting Wed 9/28/2022, Normal      cholecalciferol (VITAMIN D3) 25 mcg (1,000 units) tablet TAKE 1 TABLET BY MOUTH EVERY DAY, Normal      fluticasone (Flovent HFA) 110 MCG/ACT inhaler Inhale 2 puffs 2 (two) times a day Rinse mouth after use , Starting Wed 9/28/2022, Normal      hydrocortisone 1 % ointment Apply topically 2 (two) times a day, Starting Sat 2/26/2022, Normal      levothyroxine 150 mcg tablet One tablet daily, Normal           No discharge procedures on file  PDMP Review     None           ED Provider  Attending physically available and evaluated Haven Callahan  ILIANA managed the patient along with the ED Attending      Electronically Signed by         Ulysses Armas DO  11/10/22 1942

## 2022-11-05 NOTE — Clinical Note
Everardo Craig was seen and treated in our emergency department on 11/5/2022  Diagnosis:     Jennifer Solon    He may return on this date: 11/06/2022         If you have any questions or concerns, please don't hesitate to call        Alfredo Chicas DO    ______________________________           _______________          _______________  Hospital Representative                              Date                                Time

## 2022-11-06 NOTE — ED ATTENDING ATTESTATION
11/5/2022  I, Claudio Tuttle MD, saw and evaluated the patient  I have discussed the patient with the resident/non-physician practitioner and agree with the resident's/non-physician practitioner's findings, Plan of Care, and MDM as documented in the resident's/non-physician practitioner's note, except where noted  All available labs and Radiology studies were reviewed  I was present for key portions of any procedure(s) performed by the resident/non-physician practitioner and I was immediately available to provide assistance  At this point I agree with the current assessment done in the Emergency Department  I have conducted an independent evaluation of this patient a history and physical is as follows:    ED Course     57-year-old male presents with 2 complaints  Patient has right groin pain onset symptoms proximally 2 3 weeks prior to arrival patient states that worse with bending hip flexion  Denies any numbness focal weakness pain distributed over medial aspect of thigh  Worse with hip abduction flexion  Patient also states he has had a nonproductive dry cough with mild response to albuterol at home  Denies fevers chills nausea vomiting  Vitals reviewed  Patient well appearing nontoxic no acute distress  Heart regular rate rhythm without murmurs  Lungs clear to auscultation bilaterally  Abdomen soft nontender nondistended normal bowel sounds  Extremities no edema examination of right lower extremity limb warm well perfused there is no soft tissue swelling no erythema full range of motion of hip joint knee joint ankle joint without pain no pain with log roll or axial load of leg pain noted with abduction and flexion of hip over muscle bellies of inner thigh  Impression:  URI cough plan to give trial bronchodilators reassess anticipate discharge  2  Right leg pain suspect MSK causes    Give trial of NSAIDs reassess anticipate discharge  Critical Care Time  Procedures

## 2022-11-21 ENCOUNTER — OFFICE VISIT (OUTPATIENT)
Dept: DENTISTRY | Facility: CLINIC | Age: 52
End: 2022-11-21

## 2022-11-21 VITALS — DIASTOLIC BLOOD PRESSURE: 72 MMHG | HEART RATE: 84 BPM | TEMPERATURE: 97.7 F | SYSTOLIC BLOOD PRESSURE: 111 MMHG

## 2022-11-21 VITALS — TEMPERATURE: 98.1 F

## 2022-11-21 DIAGNOSIS — Z92.89 HISTORY OF DENTAL SURGERY: Primary | ICD-10-CM

## 2022-11-21 DIAGNOSIS — K02.9 CARIES: Primary | ICD-10-CM

## 2022-11-21 NOTE — PROGRESS NOTES
Limited Oral Evaluation    Vanda Ferrer presented to [facility] for a limited oral evaluation after extractions #12 and #28 this morning  PMH reviewed, no changes  Pt confirmed he was not taking any blood thinners, including aspirin  ASA: II  Pain: 0/10      Pt had teeth #12 and #28 extracted at 9:30 am w/ Dr Stephane Almonte  Pt was sitting in waiting room since extractions  Pt felt he was bleeding a lot and didn't understand why it wasn't stopping after 30 mins  Pt stated he was not biting on gauze in waiting room  Clinical exam revealed normal extraction sites  No excessive bleeding was present  Pt still had some oozing blood, especially from #12, but WNL for having extractions 2 hours ago  Instructed pt to bite on gauze for 30 mins and repeat if necessary  Reviewed post op instructions again and provided pt with written post op instructions and extra gauze  Informed pt he can call for any complications  Pt left ambulatory and satisfied       NV: periodic exam    NV2: re-treatment plan

## 2022-11-21 NOTE — PROGRESS NOTES
Oral Surgery    Mickey Robles presents for Ext #12+28  due to caries and infection  PMH, patient denies any changes  ASA 1, pain 0  Obtained a direct and personal consent  Risks and complications were explained  Pt agreed and consented  Consent scanned in [facility] center  Pre-Op BP WNL  Administered 1 carpules of 2% lidocaine w/ 1:100,000 epi via BENEDICT block and 2 carpules of 4% septocaine w/ 1:100,000 epi via buccal infiltration  ? Adequate anesthesia obtained, reflected gingiva, elevated, and extracted #12 and 28  Socket curetted and irrigated  Post op instructions given including alternating between advil and tylenol at the recommended doses, icing face if swollen, no straws or spitting or smoking  Upon dismissal, patient received POI and gauze  Universal Protocol  Other Assisting Provider: Yes, Andrews Rubin (assistant)  Verbal consent obtained? YES  Written consent obtained? YES  Risks, benefits and alternatives discussed?: YES  Consent given by: Patient    Time Out  Immediately prior to the procedure a time out was called: YES  Time Out:  10am    A time out verifies correct patient, procedure, equipment, support staff and site/side marked as required  Patient states understanding of procedure being performed: YES  Patient's understanding of procedure matches consent: YES  Procedure consent matches procedure scheduled: YES  Test results available and properly labeled: N/A  Site marked: YES  Radiology Images displayed and confirmed    If images not available, report reviewed:  YES  Required items - Required blood products, implants, devices and special equipment available: YES  Patient identity confirmed:  YES    NV: periodic  NNV: re-treatment plan

## 2022-11-24 DIAGNOSIS — R05.3 CHRONIC COUGH: ICD-10-CM

## 2022-11-25 RX ORDER — DEXAMETHASONE 4 MG/1
TABLET ORAL
Qty: 12 G | Refills: 1 | Status: SHIPPED | OUTPATIENT
Start: 2022-11-25

## 2022-12-15 DIAGNOSIS — M19.90 ARTHRITIS: ICD-10-CM

## 2022-12-15 RX ORDER — SENNOSIDES 8.6 MG
650 CAPSULE ORAL EVERY 8 HOURS PRN
Qty: 90 TABLET | Refills: 3 | Status: SHIPPED | OUTPATIENT
Start: 2022-12-15

## 2022-12-16 DIAGNOSIS — M79.10 MUSCLE ACHE: Primary | ICD-10-CM

## 2023-02-10 ENCOUNTER — OFFICE VISIT (OUTPATIENT)
Dept: INTERNAL MEDICINE CLINIC | Facility: CLINIC | Age: 53
End: 2023-02-10

## 2023-02-10 VITALS
WEIGHT: 182.2 LBS | BODY MASS INDEX: 29.28 KG/M2 | DIASTOLIC BLOOD PRESSURE: 66 MMHG | OXYGEN SATURATION: 98 % | TEMPERATURE: 97.5 F | HEART RATE: 88 BPM | HEIGHT: 66 IN | SYSTOLIC BLOOD PRESSURE: 95 MMHG

## 2023-02-10 DIAGNOSIS — Z12.11 SCREEN FOR COLON CANCER: ICD-10-CM

## 2023-02-10 DIAGNOSIS — E78.2 MIXED HYPERLIPIDEMIA: ICD-10-CM

## 2023-02-10 DIAGNOSIS — Z12.5 PROSTATE CANCER SCREENING: ICD-10-CM

## 2023-02-10 DIAGNOSIS — J45.909 REACTIVE AIRWAY DISEASE WITHOUT COMPLICATION, UNSPECIFIED ASTHMA SEVERITY, UNSPECIFIED WHETHER PERSISTENT: ICD-10-CM

## 2023-02-10 DIAGNOSIS — E03.8 OTHER SPECIFIED HYPOTHYROIDISM: ICD-10-CM

## 2023-02-10 DIAGNOSIS — H61.23 BILATERAL IMPACTED CERUMEN: ICD-10-CM

## 2023-02-10 DIAGNOSIS — Z00.00 ANNUAL PHYSICAL EXAM: Primary | ICD-10-CM

## 2023-02-10 DIAGNOSIS — J30.89 NON-SEASONAL ALLERGIC RHINITIS DUE TO OTHER ALLERGIC TRIGGER: ICD-10-CM

## 2023-02-10 DIAGNOSIS — Z12.2 ENCOUNTER FOR SCREENING FOR LUNG CANCER: ICD-10-CM

## 2023-02-10 RX ORDER — BENZONATATE 100 MG/1
100 CAPSULE ORAL EVERY 8 HOURS
Qty: 90 CAPSULE | Refills: 1 | Status: SHIPPED | OUTPATIENT
Start: 2023-02-10

## 2023-02-10 RX ORDER — CETIRIZINE HYDROCHLORIDE 10 MG/1
10 TABLET ORAL DAILY
Qty: 90 TABLET | Refills: 1 | Status: SHIPPED | OUTPATIENT
Start: 2023-02-10

## 2023-02-10 RX ORDER — FLUTICASONE PROPIONATE 110 UG/1
2 AEROSOL, METERED RESPIRATORY (INHALATION) 2 TIMES DAILY
Qty: 12 G | Refills: 1 | Status: SHIPPED | OUTPATIENT
Start: 2023-02-10

## 2023-02-14 PROBLEM — J13 PNEUMONIA OF RIGHT MIDDLE LOBE DUE TO STREPTOCOCCUS PNEUMONIAE (HCC): Status: RESOLVED | Noted: 2018-11-30 | Resolved: 2023-02-14

## 2023-02-14 PROBLEM — R05.8 DRY COUGH: Status: RESOLVED | Noted: 2018-03-15 | Resolved: 2023-02-14

## 2023-02-14 PROBLEM — S29.012A MUSCLE STRAIN OF LEFT UPPER BACK: Status: RESOLVED | Noted: 2018-08-02 | Resolved: 2023-02-14

## 2023-02-14 PROBLEM — Z00.00 ANNUAL PHYSICAL EXAM: Status: ACTIVE | Noted: 2023-02-14

## 2023-02-14 NOTE — ASSESSMENT & PLAN NOTE
Lab Results   Component Value Date    CHOLESTEROL 202 (H) 01/19/2021    CHOLESTEROL 280 (H) 07/22/2020    CHOLESTEROL 186 06/27/2019     Lab Results   Component Value Date    HDL 36 (L) 01/19/2021    HDL 46 07/22/2020    HDL 40 06/27/2019     Lab Results   Component Value Date    TRIG 88 01/19/2021    TRIG 120 07/22/2020    TRIG 147 06/27/2019     Lab Results   Component Value Date    NONHDLC 146 06/27/2019     Lab Results   Component Value Date    LDLCALC 148 (H) 01/19/2021     History of high cholesterol, not currently on medications  Due for repeat lipid panel  Continue low fat diet

## 2023-02-14 NOTE — ASSESSMENT & PLAN NOTE
Lab Results   Component Value Date    [id number] <0 007 (L) 09/24/2021     Previous TSH was undetectable and T4 was normal, but there was no follow up testing  Patient states he has been compliant  Continue levothyroxine 150 mcg daily

## 2023-02-14 NOTE — ASSESSMENT & PLAN NOTE
Discussed treatment options  Patient agreeable to irrigation  Patient tolerated well  Discussed aftercare and ways to prevent in the future  Recheck after complete irrigation revealed normal ear canals and TMs bilaterally

## 2023-02-14 NOTE — PROGRESS NOTES
[address] [facility] [facility]    NAME: Coleman Crane  AGE: 45 y o  SEX: male  : 1970     DATE: 2023     Assessment and Plan:     Problem List Items Addressed This Visit        Endocrine    Hypothyroidism     Lab Results   Component Value Date    [id number] <0 007 (L) 2021     Previous TSH was undetectable and T4 was normal, but there was no follow up testing  Patient states he has been compliant  Continue levothyroxine 150 mcg daily  Relevant Orders    TSH, 3rd generation with Free T4 reflex       Respiratory    Allergic rhinitis     Likely due to allergy/irritant  Start cetirizine 10 mg daily  Flonase daily as needed  Avoid irritants  Return to care if symptoms worsen or fail to improve  Relevant Medications    cetirizine (ZyrTEC) 10 mg tablet    fluticasone (Flovent HFA) 110 MCG/ACT inhaler    benzonatate (TESSALON PERLES) 100 mg capsule    Other Relevant Orders    CBC and differential    Comprehensive metabolic panel    Reactive airway disease     Avoid irritants  Tessalon Perles as needed for cough  Albuterol as needed  Will likely improve with allergy treatment  Return to care if symptoms worsen or fail to improve  Nervous and Auditory    Bilateral impacted cerumen     Discussed treatment options  Patient agreeable to irrigation  Patient tolerated well  Discussed aftercare and ways to prevent in the future  Recheck after complete irrigation revealed normal ear canals and TMs bilaterally               Other    Hyperlipidemia     Lab Results   Component Value Date    CHOLESTEROL 202 (H) 2021    CHOLESTEROL 280 (H) 2020    CHOLESTEROL 186 2019     Lab Results   Component Value Date    HDL 36 (L) 2021    HDL 46 2020    HDL 40 2019     Lab Results   Component Value Date    TRIG 88 2021    TRIG 120 2020    TRIG 147 2019     Lab Results Component Value Date    Kim 146 06/27/2019     Lab Results   Component Value Date    LDLCALC 148 (H) 01/19/2021     History of high cholesterol, not currently on medications  Due for repeat lipid panel  Continue low fat diet  Relevant Orders    Lipid panel    Annual physical exam - Primary     Discussed general health recommendations and screening guidelines  Agreeable to CT lung screening  Patient agreeable to prostate cancer screening with PSA  States he has difficulty taking off from work, and hasn't been able to schedule colonoscopy  Encouraged patient to schedule  Declines immunizations today  Agreeable to screening lab work  Recommend routine dental and eye exams  Next physical one year  Other Visit Diagnoses     Prostate cancer screening        Relevant Orders    PSA, total and free    Screen for colon cancer        Relevant Orders    Ambulatory referral for colonoscopy    Encounter for screening for lung cancer        Relevant Orders    CT lung screening program    BMI 29 0-29 9,adult              Immunizations and preventive care screenings were discussed with patient today  Appropriate education was printed on patient's after visit summary  Discussed risks and benefits of prostate cancer screening  We discussed the controversial history of PSA screening for prostate cancer in the [place] as well as the risk of over detection and over treatment of prostate cancer by way of PSA screening  The patient understands that PSA blood testing is an imperfect way to screen for prostate cancer and that elevated PSA levels in the blood may also be caused by infection, inflammation, prostatic trauma or manipulation, urological procedures, or by benign prostatic enlargement      The role of the digital rectal examination in prostate cancer screening was also discussed and I discussed with him that there is large interobserver variability in the findings of digital rectal examination  Counseling:  Alcohol/drug use: discussed moderation in alcohol intake, the recommendations for healthy alcohol use, and avoidance of illicit drug use  Dental Health: discussed importance of regular tooth brushing, flossing, and dental visits  Injury prevention: discussed safety/seat belts, safety helmets, smoke detectors, carbon dioxide detectors, and smoking near bedding or upholstery  Sexual health: discussed sexually transmitted diseases, partner selection, use of condoms, avoidance of unintended pregnancy, and contraceptive alternatives  · Exercise: the importance of regular exercise/physical activity was discussed  Recommend exercise 3-5 times per week for at least 30 minutes  BMI Counseling: Body mass index is 29 14 kg/m²  The BMI is above normal  Nutrition recommendations include decreasing portion sizes, encouraging healthy choices of fruits and vegetables, decreasing fast food intake, consuming healthier snacks, limiting drinks that contain sugar, moderation in carbohydrate intake, increasing intake of lean protein, reducing intake of saturated and trans fat and reducing intake of cholesterol  Exercise recommendations include moderate physical activity 150 minutes/week, exercising 3-5 times per week and strength training exercises  No pharmacotherapy was ordered  Rationale for BMI follow-up plan is due to patient being overweight or obese  No follow-ups on file  Chief Complaint:     Chief Complaint   Patient presents with   • Annual Exam   • Cerumen Impaction      History of Present Illness:     Adult Annual Physical   Patient here for a comprehensive physical exam  The patient reports problems - ears clogged  Denies ear pain or discharge  Denies tinnitus or hearing loss  He states it just feels like he needs them cleaned  He is also complaining of congestion and post nasal drip for a couple months  Denies fever or chills  Denies fatigue or myalgia  Denies sore throat  Admits to occasional dry cough  Worse at night  Denies wheezing or SOB  Takes benzonatate as needed which helps  Diet and Physical Activity  · Diet/Nutrition: well balanced diet, limited junk food and limited fruits/vegetables  · Exercise: no formal exercise  General Health  · Sleep: sleeps well and gets 7-8 hours of sleep on average  · Hearing: normal - bilateral   · Vision: no vision problems, goes for regular eye exams, most recent eye exam <1 year ago and wears glasses  · Dental: regular dental visits and brushes teeth twice daily   Health  · Symptoms include: none     Review of Systems:     Review of Systems   Constitutional: Negative for appetite change, chills, diaphoresis, fatigue, fever and unexpected weight change  HENT: Positive for congestion, postnasal drip and rhinorrhea  Negative for dental problem, ear discharge, ear pain, hearing loss, sinus pressure, sinus pain, sneezing, sore throat, tinnitus and trouble swallowing  Eyes: Negative for visual disturbance  Respiratory: Positive for cough  Negative for chest tightness, shortness of breath and wheezing  Cardiovascular: Negative for chest pain, palpitations and leg swelling  Gastrointestinal: Negative for abdominal pain, blood in stool, constipation, diarrhea, nausea and vomiting  Endocrine: Negative for cold intolerance, heat intolerance, polydipsia, polyphagia and polyuria  Genitourinary: Negative for decreased urine volume, difficulty urinating, dysuria, flank pain, frequency, hematuria and urgency  Musculoskeletal: Negative for arthralgias and myalgias  Skin: Negative for rash  Neurological: Negative for dizziness, tremors, weakness, light-headedness, numbness and headaches  Hematological: Negative for adenopathy        Past Medical History:     Past Medical History:   Diagnosis Date   • Arthritis    • Chronic cough    • Disease of thyroid gland    • Hypoparathyroidism ([facility] [address] )     continue taking calcium and vitamin D, will check CMP, PTH level and Vitamin D level   • Pneumonia of right middle lobe due to Streptococcus pneumoniae ([facility] [address] ) 11/30/2018      Past Surgical History:     Past Surgical History:   Procedure Laterality Date   • FRACTURE SURGERY      Open Treatment of Each Proximal Finger Phalanx      Family History:     Family History   Problem Relation Age of Onset   • No Known Problems Mother    • No Known Problems Father    • No Known Problems Sister    • No Known Problems Brother    • No Known Problems Son    • Learning disabilities Daughter    • Asthma Daughter    • Seizures Daughter    • No Known Problems Maternal Grandmother    • No Known Problems Maternal Grandfather    • No Known Problems Paternal Grandmother    • No Known Problems Paternal Grandfather    • No Known Problems Maternal Aunt    • No Known Problems Maternal Uncle    • No Known Problems Paternal Aunt    • No Known Problems Paternal Uncle    • No Known Problems Cousin       Social History:     Social History     Socioeconomic History   • Marital status:       Spouse name: None   • Number of children: 1   • Years of education: None   • Highest education level: None   Occupational History   • Occupation:    Tobacco Use   • Smoking status: Former   • Smokeless tobacco: Never   • Tobacco comments:     pt "tried it when he was a teenager but did not like them"   Vaping Use   • Vaping Use: Never used   Substance and Sexual Activity   • Alcohol use: Yes     Comment: occasionally   • Drug use: No   • Sexual activity: Not Currently   Other Topics Concern   • None   Social History Narrative   • None     Social Determinants of Health     Financial Resource Strain: Low Risk    • Difficulty of Paying Living Expenses: Not hard at all   Food Insecurity: No Food Insecurity   • Worried About Running Out of Food in the Last Year: Never true   • Ran Out of Food in the Last Year: Never true   Transportation Needs: No Transportation Needs   • Lack of Transportation (Medical): No   • Lack of Transportation (Non-Medical): No   Physical Activity: Not on file   Stress: Not on file   Social Connections: Not on file   Intimate Partner Violence: Not on file   Housing Stability: Low Risk    • Unable to Pay for Housing in the Last Year: No   • Number of Places Lived in the Last Year: 1   • Unstable Housing in the Last Year: No      Current Medications:     Current Outpatient Medications   Medication Sig Dispense Refill   • acetaminophen (TYLENOL) 650 mg CR tablet Take 1 tablet (650 mg total) by mouth every 8 (eight) hours as needed for mild pain 90 tablet 3   • albuterol (2 5 mg/3 mL) 0 083 % nebulizer solution Take 3 mL (2 5 mg total) by nebulization every 6 (six) hours as needed for wheezing or shortness of breath 5 mL 0   • albuterol (Ventolin HFA) 90 mcg/act inhaler Inhale 2 puffs every 4 (four) hours as needed for wheezing 8 g 3   • benzonatate (TESSALON PERLES) 100 mg capsule Take 1 capsule (100 mg total) by mouth every 8 (eight) hours 90 capsule 1   • cetirizine (ZyrTEC) 10 mg tablet Take 1 tablet (10 mg total) by mouth daily 90 tablet 1   • cholecalciferol (VITAMIN D3) 25 mcg (1,000 units) tablet TAKE 1 TABLET BY MOUTH EVERY DAY 90 tablet 3   • Diclofenac Sodium (VOLTAREN) 1 % Apply 2 g topically 4 (four) times a day 2 g 1   • fluticasone (Flovent HFA) 110 MCG/ACT inhaler Inhale 2 puffs 2 (two) times a day Rinse mouth after use  12 g 1   • hydrocortisone 1 % ointment Apply topically 2 (two) times a day 30 g 0   • levothyroxine 150 mcg tablet One tablet daily 90 tablet 3     No current facility-administered medications for this visit  Allergies:      Allergies   Allergen Reactions   • Chlorine Rash      Physical Exam:     BP 95/66 (BP Location: Left arm, Patient Position: Sitting, Cuff Size: Large)   Pulse 88   Temp 97 5 °F (36 4 °C) (Temporal)   Ht 5' 6 3" (1 684 m)   Wt 82 6 kg (182 lb 3 2 oz)   SpO2 98%   BMI 29 14 kg/m²     Physical Exam  Vitals and nursing note reviewed  Constitutional:       General: He is awake  He is not in acute distress  Appearance: Normal appearance  He is well-developed, well-groomed and overweight  He is not ill-appearing  HENT:      Head: Normocephalic and atraumatic  Right Ear: Hearing and external ear normal  There is impacted cerumen  Left Ear: Hearing and external ear normal  There is impacted cerumen  Nose: Congestion and rhinorrhea present  Rhinorrhea is clear  Mouth/Throat:      Lips: Pink  Mouth: Mucous membranes are moist       Pharynx: Oropharynx is clear  Uvula midline  No oropharyngeal exudate or posterior oropharyngeal erythema  Eyes:      General: Lids are normal  No scleral icterus  Extraocular Movements: Extraocular movements intact  Conjunctiva/sclera: Conjunctivae normal       Pupils: Pupils are equal, round, and reactive to light  Cardiovascular:      Rate and Rhythm: Normal rate and regular rhythm  Pulses: Normal pulses  Heart sounds: Normal heart sounds  No murmur heard  Pulmonary:      Effort: Pulmonary effort is normal  No respiratory distress  Breath sounds: Normal breath sounds and air entry  No decreased air movement  No decreased breath sounds, wheezing, rhonchi or rales  Abdominal:      General: Abdomen is flat  Bowel sounds are normal  There is no distension  Palpations: Abdomen is soft  There is no mass  Tenderness: There is no abdominal tenderness  There is no right CVA tenderness, left CVA tenderness, guarding or rebound  Hernia: No hernia is present  Musculoskeletal:         General: Normal range of motion  Cervical back: Full passive range of motion without pain, normal range of motion and neck supple  Right lower leg: No edema  Left lower leg: No edema  Lymphadenopathy:      Cervical: No cervical adenopathy  Skin:     General: Skin is warm        Capillary Refill: Capillary refill takes less than 2 seconds  Coloration: Skin is not jaundiced  Findings: No rash  Neurological:      General: No focal deficit present  Mental Status: He is alert and oriented to person, place, and time  Mental status is at baseline  Sensory: Sensation is intact  Motor: Motor function is intact  Coordination: Coordination is intact  Gait: Gait is intact  Psychiatric:         Attention and Perception: Attention normal          Mood and Affect: Mood and affect normal          Speech: Speech normal          Behavior: Behavior normal  Behavior is cooperative  Cognition and Memory: Cognition normal           Ear cerumen removal    Date/Time: 2/10/2023 3:00 PM  Performed by: Loreta Klinefelter, PA-C  Authorized by: Loreta Klinefelter, PA-C   Universal Protocol:  Consent: Verbal consent obtained  Risks and benefits: risks, benefits and alternatives were discussed  Consent given by: patient  Patient understanding: patient states understanding of the procedure being performed  Patient consent: the patient's understanding of the procedure matches consent given  Patient identity confirmed: verbally with patient      Patient location:  Clinic  Indications / Diagnosis:  Bilateral cerumen impaction  Procedure details:     Local anesthetic:  None    Location:  R ear and L ear    Procedure type: irrigation with instrumentation      Instrumentation: curette      Approach:  External  Post-procedure details:     Complication:  None    Hearing quality:  Normal    Patient tolerance of procedure:   Tolerated well, no immediate complications      Loreta Klinefelter, [place]

## 2023-02-14 NOTE — ASSESSMENT & PLAN NOTE
Likely due to allergy/irritant  Start cetirizine 10 mg daily  Flonase daily as needed  Avoid irritants  Return to care if symptoms worsen or fail to improve

## 2023-02-14 NOTE — PATIENT INSTRUCTIONS
Wellness Visit for Adults   AMBULATORY CARE:   A wellness visit  is when you see your healthcare provider to get screened for health problems  Your healthcare provider will also give you advice on how to stay healthy  Write down your questions so you remember to ask them  Ask your healthcare provider how often you should have a wellness visit  What happens at a wellness visit:  Your healthcare provider will ask about your health, and your family history of health problems  This includes high blood pressure, heart disease, and cancer  He or she will ask if you have symptoms that concern you, if you smoke, and about your mood  You may also be asked about your intake of medicines, supplements, food, and alcohol  Any of the following may be done:  · Your weight  will be checked  Your height may also be checked so your body mass index (BMI) can be calculated  Your BMI shows if you are at a healthy weight  · Your blood pressure  and heart rate will be checked  Your temperature may also be checked  · Blood and urine tests  may be done  Blood tests may be done to check your cholesterol levels  Abnormal cholesterol levels increase your risk for heart disease and stroke  You may also need a blood or urine test to check for diabetes if you are at increased risk  Urine tests may be done to look for signs of an infection or kidney disease  · A physical exam  includes checking your heartbeat and lungs with a stethoscope  Your healthcare provider may also check your skin to look for sun damage  · Screening tests  may be recommended  A screening test is done to check for diseases that may not cause symptoms  The screening tests you may need depend on your age, gender, family history, and lifestyle habits  For example, colorectal screening may be recommended if you are 48years old or older  Screening tests you need if you are a woman:   · A Pap smear  is used to screen for cervical cancer   Pap smears are usually done every 3 to 5 years depending on your age  You may need them more often if you have had abnormal Pap smear test results in the past  Ask your healthcare provider how often you should have a Pap smear  · A mammogram  is an x-ray of your breasts to screen for breast cancer  Experts recommend mammograms every 2 years starting at age 48 years  You may need a mammogram at age 52 years or younger if you have an increased risk for breast cancer  Talk to your healthcare provider about when you should start having mammograms and how often you need them  Vaccines you may need:   · Get an influenza vaccine  every year  The influenza vaccine protects you from the flu  Several types of viruses cause the flu  The viruses change over time, so new vaccines are made each year  · Get a tetanus-diphtheria (Td) booster vaccine  every 10 years  This vaccine protects you against tetanus and diphtheria  Tetanus is a severe infection that may cause painful muscle spasms and lockjaw  Diphtheria is a severe bacterial infection that causes a thick covering in the back of your mouth and throat  · Get a human papillomavirus (HPV) vaccine  if you are female and aged 23 to 32 or male 23 to 24 and never received it  This vaccine protects you from HPV infection  HPV is the most common infection spread by sexual contact  HPV may also cause vaginal, penile, and anal cancers  · Get a pneumococcal vaccine  if you are aged 72 years or older  The pneumococcal vaccine is an injection given to protect you from pneumococcal disease  Pneumococcal disease is an infection caused by pneumococcal bacteria  The infection may cause pneumonia, meningitis, or an ear infection  · Get a shingles vaccine  if you are 60 or older, even if you have had shingles before  The shingles vaccine is an injection to protect you from the varicella-zoster virus  This is the same virus that causes chickenpox   Shingles is a painful rash that develops in people who had chickenpox or have been exposed to the virus  How to eat healthy:  My Plate is a model for planning healthy meals  It shows the types and amounts of foods that should go on your plate  Fruits and vegetables make up about half of your plate, and grains and protein make up the other half  A serving of dairy is included on the side of your plate  The amount of calories and serving sizes you need depends on your age, gender, weight, and height  Examples of healthy foods are listed below:  · Eat a variety of vegetables  such as dark green, red, and orange vegetables  You can also include canned vegetables low in sodium (salt) and frozen vegetables without added butter or sauces  · Eat a variety of fresh fruits , canned fruit in 100% juice, frozen fruit, and dried fruit  · Include whole grains  At least half of the grains you eat should be whole grains  Examples include whole-wheat bread, wheat pasta, brown rice, and whole-grain cereals such as oatmeal     · Eat a variety of protein foods such as seafood (fish and shellfish), lean meat, and poultry without skin (turkey and chicken)  Examples of lean meats include pork leg, shoulder, or tenderloin, and beef round, sirloin, tenderloin, and extra lean ground beef  Other protein foods include eggs and egg substitutes, beans, peas, soy products, nuts, and seeds  · Choose low-fat dairy products such as skim or 1% milk or low-fat yogurt, cheese, and cottage cheese  · Limit unhealthy fats  such as butter, hard margarine, and shortening  Exercise:  Exercise at least 30 minutes per day on most days of the week  Some examples of exercise include walking, biking, dancing, and swimming  You can also fit in more physical activity by taking the stairs instead of the elevator or parking farther away from stores  Include muscle strengthening activities 2 days each week  Regular exercise provides many health benefits   It helps you manage your weight, and decreases your risk for type 2 diabetes, heart disease, stroke, and high blood pressure  Exercise can also help improve your mood  Ask your healthcare provider about the best exercise plan for you  General health and safety guidelines:   · Do not smoke  Nicotine and other chemicals in cigarettes and cigars can cause lung damage  Ask your healthcare provider for information if you currently smoke and need help to quit  E-cigarettes or smokeless tobacco still contain nicotine  Talk to your healthcare provider before you use these products  · Limit alcohol  A drink of alcohol is 12 ounces of beer, 5 ounces of wine, or 1½ ounces of liquor  · Lose weight, if needed  Being overweight increases your risk of certain health conditions  These include heart disease, high blood pressure, type 2 diabetes, and certain types of cancer  · Protect your skin  Do not sunbathe or use tanning beds  Use sunscreen with a SPF 15 or higher  Apply sunscreen at least 15 minutes before you go outside  Reapply sunscreen every 2 hours  Wear protective clothing, hats, and sunglasses when you are outside  · Drive safely  Always wear your seatbelt  Make sure everyone in your car wears a seatbelt  A seatbelt can save your life if you are in an accident  Do not use your cell phone when you are driving  This could distract you and cause an accident  Pull over if you need to make a call or send a text message  · Practice safe sex  Use latex condoms if are sexually active and have more than one partner  Your healthcare provider may recommend screening tests for sexually transmitted infections (STIs)  · Wear helmets, lifejackets, and protective gear  Always wear a helmet when you ride a bike or motorcycle, go skiing, or play sports that could cause a head injury  Wear protective equipment when you play sports  Wear a lifejacket when you are on a boat or doing water sports      © Copyright [employer] 2022 Information is for End User's use only and may not be sold, redistributed or otherwise used for commercial purposes  All illustrations and images included in CareNotes® are the copyrighted property of A D A M [employer]  or Pia Mcmillan  The above information is an  only  It is not intended as medical advice for individual conditions or treatments  Talk to your doctor, nurse or pharmacist before following any medical regimen to see if it is safe and effective for you  Weight Management   AMBULATORY CARE:   Why it is important to manage your weight:  Being overweight increases your risk of health conditions such as heart disease, high blood pressure, type 2 diabetes, and certain types of cancer  It can also increase your risk for osteoarthritis, sleep apnea, and other respiratory problems  Aim for a slow, steady weight loss  Even a small amount of weight loss can lower your risk of health problems  Risks of being overweight:  Extra weight can cause many health problems, including the following:  · Diabetes (high blood sugar level)    · High blood pressure or high cholesterol    · Heart disease    · Stroke    · Gallbladder or liver disease    · Cancer of the colon, breast, prostate, liver, or kidney    · Sleep apnea    · Arthritis or gout    Screening  is done to check for health conditions before you have signs or symptoms  If you are 28to 79years old, your blood sugar level may be checked every 3 years for signs of prediabetes or diabetes  Your healthcare provider will check your blood pressure at each visit  High blood pressure can lead to a stroke or other problems  Your provider may check for signs of heart disease, cancer, or other health problems  How to lose weight safely:  A safe and healthy way to lose weight is to eat fewer calories and get regular exercise  · You can lose up about 1 pound a week by decreasing the number of calories you eat by 500 calories each day   You can decrease calories by eating smaller portion sizes or by cutting out high-calorie foods  Read labels to find out how many calories are in the foods you eat  · You can also burn calories with exercise such as walking, swimming, or biking  You will be more likely to keep weight off if you make these changes part of your lifestyle  Exercise at least 30 minutes per day on most days of the week  You can also fit in more physical activity by taking the stairs instead of the elevator or parking farther away from stores  Ask your healthcare provider about the best exercise plan for you  Healthy meal plan for weight management:  A healthy meal plan includes a variety of foods, contains fewer calories, and helps you stay healthy  A healthy meal plan includes the following:     · Eat whole-grain foods more often  A healthy meal plan should contain fiber  Fiber is the part of grains, fruits, and vegetables that is not broken down by your body  Whole-grain foods are healthy and provide extra fiber in your diet  Some examples of whole-grain foods are whole-wheat breads and pastas, oatmeal, brown rice, and bulgur  · Eat a variety of vegetables every day  Include dark, leafy greens such as spinach, kale, jennifer greens, and mustard greens  Eat yellow and orange vegetables such as carrots, sweet potatoes, and winter squash  · Eat a variety of fruits every day  Choose fresh or canned fruit (canned in its own juice or light syrup) instead of juice  Fruit juice has very little or no fiber  · Eat low-fat dairy foods  Drink fat-free (skim) milk or 1% milk  Eat fat-free yogurt and low-fat cottage cheese  Try low-fat cheeses such as mozzarella and other reduced-fat cheeses  · Choose meat and other protein foods that are low in fat  Choose beans or other legumes such as split peas or lentils  Choose fish, skinless poultry (chicken or turkey), or lean cuts of red meat (beef or pork)   Before you cook meat or poultry, cut off any visible fat  · Use less fat and oil  Try baking foods instead of frying them  Add less fat, such as margarine, sour cream, regular salad dressing and mayonnaise to foods  Eat fewer high-fat foods  Some examples of high-fat foods include french fries, doughnuts, ice cream, and cakes  · Eat fewer sweets  Limit foods and drinks that are high in sugar  This includes candy, cookies, regular soda, and sweetened drinks  Ways to decrease calories:   · Eat smaller portions  ? Use a small plate with smaller servings  ? Do not eat second helpings  ? When you eat at a restaurant, ask for a box and place half of your meal in the box before you eat  ? Share an entrée with someone else  · Replace high-calorie snacks with healthy, low-calorie snacks  ? Choose fresh fruit, vegetables, fat-free rice cakes, or air-popped popcorn instead of potato chips, nuts, or chocolate  ? Choose water or calorie-free drinks instead of soda or sweetened drinks  · Do not shop for groceries when you are hungry  You may be more likely to make unhealthy food choices  Take a grocery list of healthy foods and shop after you have eaten  · Eat regular meals  Do not skip meals  Skipping meals can lead to overeating later in the day  This can make it harder for you to lose weight  Eat a healthy snack in place of a meal if you do not have time to eat a regular meal  Talk with a dietitian to help you create a meal plan and schedule that is right for you  Other things to consider as you try to lose weight:   · Be aware of situations that may give you the urge to overeat, such as eating while watching television  Find ways to avoid these situations  For example, read a book, go for a walk, or do crafts  · Meet with a weight loss support group or friends who are also trying to lose weight  This may help you stay motivated to continue working on your weight loss goals      © Copyright [employer] 2022 Information is for End User's use only and may not be sold, redistributed or otherwise used for commercial purposes  All illustrations and images included in CareNotes® are the copyrighted property of A [employer] M [employer] Inc  or Pia Mcmillan  The above information is an  only  It is not intended as medical advice for individual conditions or treatments  Talk to your doctor, nurse or pharmacist before following any medical regimen to see if it is safe and effective for you

## 2023-02-14 NOTE — ASSESSMENT & PLAN NOTE
Discussed general health recommendations and screening guidelines  Agreeable to CT lung screening  Patient agreeable to prostate cancer screening with PSA  States he has difficulty taking off from work, and hasn't been able to schedule colonoscopy  Encouraged patient to schedule  Declines immunizations today  Agreeable to screening lab work  Recommend routine dental and eye exams  Next physical one year

## 2023-02-14 NOTE — ASSESSMENT & PLAN NOTE
Avoid irritants  Tessalon Perles as needed for cough  Albuterol as needed  Will likely improve with allergy treatment  Return to care if symptoms worsen or fail to improve

## 2023-03-27 ENCOUNTER — VBI (OUTPATIENT)
Dept: ADMINISTRATIVE | Facility: OTHER | Age: 53
End: 2023-03-27

## 2023-04-15 PROBLEM — H61.23 BILATERAL IMPACTED CERUMEN: Status: RESOLVED | Noted: 2021-10-13 | Resolved: 2023-04-15

## 2023-05-02 ENCOUNTER — APPOINTMENT (OUTPATIENT)
Dept: LAB | Facility: CLINIC | Age: 53
End: 2023-05-02

## 2023-05-02 DIAGNOSIS — Z12.5 PROSTATE CANCER SCREENING: ICD-10-CM

## 2023-05-02 DIAGNOSIS — E03.8 OTHER SPECIFIED HYPOTHYROIDISM: ICD-10-CM

## 2023-05-02 DIAGNOSIS — J30.89 NON-SEASONAL ALLERGIC RHINITIS DUE TO OTHER ALLERGIC TRIGGER: ICD-10-CM

## 2023-05-02 DIAGNOSIS — E78.2 MIXED HYPERLIPIDEMIA: ICD-10-CM

## 2023-05-02 DIAGNOSIS — R05.3 CHRONIC COUGH: ICD-10-CM

## 2023-05-02 LAB
BASOPHILS # BLD AUTO: 0.05 THOUSANDS/ΜL (ref 0–0.1)
BASOPHILS NFR BLD AUTO: 1 % (ref 0–1)
EOSINOPHIL # BLD AUTO: 0.2 THOUSAND/ΜL (ref 0–0.61)
EOSINOPHIL NFR BLD AUTO: 3 % (ref 0–6)
ERYTHROCYTE [DISTWIDTH] IN BLOOD BY AUTOMATED COUNT: 13.4 % (ref 11.6–15.1)
HCT VFR BLD AUTO: 45 % (ref 36.5–49.3)
HGB BLD-MCNC: 15.1 G/DL (ref 12–17)
IMM GRANULOCYTES # BLD AUTO: 0.02 THOUSAND/UL (ref 0–0.2)
IMM GRANULOCYTES NFR BLD AUTO: 0 % (ref 0–2)
LYMPHOCYTES # BLD AUTO: 2.18 THOUSANDS/ΜL (ref 0.6–4.47)
LYMPHOCYTES NFR BLD AUTO: 31 % (ref 14–44)
MCH RBC QN AUTO: 30.8 PG (ref 26.8–34.3)
MCHC RBC AUTO-ENTMCNC: 33.6 G/DL (ref 31.4–37.4)
MCV RBC AUTO: 92 FL (ref 82–98)
MONOCYTES # BLD AUTO: 0.68 THOUSAND/ΜL (ref 0.17–1.22)
MONOCYTES NFR BLD AUTO: 10 % (ref 4–12)
NEUTROPHILS # BLD AUTO: 3.83 THOUSANDS/ΜL (ref 1.85–7.62)
NEUTS SEG NFR BLD AUTO: 55 % (ref 43–75)
NRBC BLD AUTO-RTO: 0 /100 WBCS
PLATELET # BLD AUTO: 185 THOUSANDS/UL (ref 149–390)
PMV BLD AUTO: 11.8 FL (ref 8.9–12.7)
RBC # BLD AUTO: 4.9 MILLION/UL (ref 3.88–5.62)
WBC # BLD AUTO: 6.96 THOUSAND/UL (ref 4.31–10.16)

## 2023-05-03 LAB
ALBUMIN SERPL BCP-MCNC: 3.3 G/DL (ref 3.5–5)
ALP SERPL-CCNC: 86 U/L (ref 46–116)
ALT SERPL W P-5'-P-CCNC: 42 U/L (ref 12–78)
ANION GAP SERPL CALCULATED.3IONS-SCNC: 4 MMOL/L (ref 4–13)
AST SERPL W P-5'-P-CCNC: 33 U/L (ref 5–45)
BILIRUB SERPL-MCNC: 0.52 MG/DL (ref 0.2–1)
BUN SERPL-MCNC: 10 MG/DL (ref 5–25)
CALCIUM ALBUM COR SERPL-MCNC: 7.8 MG/DL (ref 8.3–10.1)
CALCIUM SERPL-MCNC: 7.2 MG/DL (ref 8.3–10.1)
CHLORIDE SERPL-SCNC: 105 MMOL/L (ref 96–108)
CHOLEST SERPL-MCNC: 253 MG/DL
CO2 SERPL-SCNC: 28 MMOL/L (ref 21–32)
CREAT SERPL-MCNC: 0.93 MG/DL (ref 0.6–1.3)
GFR SERPL CREATININE-BSD FRML MDRD: 92 ML/MIN/1.73SQ M
GLUCOSE P FAST SERPL-MCNC: 88 MG/DL (ref 65–99)
HDLC SERPL-MCNC: 46 MG/DL
LDLC SERPL CALC-MCNC: 169 MG/DL (ref 0–100)
NONHDLC SERPL-MCNC: 207 MG/DL
POTASSIUM SERPL-SCNC: 4.2 MMOL/L (ref 3.5–5.3)
PROT SERPL-MCNC: 7.3 G/DL (ref 6.4–8.4)
PSA FREE MFR SERPL: 22.4 %
PSA FREE SERPL-MCNC: 0.38 NG/ML
PSA SERPL-MCNC: 1.7 NG/ML (ref 0–4)
SODIUM SERPL-SCNC: 137 MMOL/L (ref 135–147)
T4 FREE SERPL-MCNC: 1.63 NG/DL (ref 0.76–1.46)
TRIGL SERPL-MCNC: 188 MG/DL
TSH SERPL DL<=0.05 MIU/L-ACNC: 0.03 UIU/ML (ref 0.45–4.5)

## 2023-05-04 DIAGNOSIS — E03.9 HYPOTHYROIDISM: ICD-10-CM

## 2023-05-04 DIAGNOSIS — E83.51 HYPOCALCEMIA: Primary | ICD-10-CM

## 2023-05-04 LAB
A ALTERNATA IGE QN: <0.1 KUA/I
A FUMIGATUS IGE QN: <0.1 KUA/I
BERMUDA GRASS IGE QN: <0.1 KUA/I
BOXELDER IGE QN: <0.1 KUA/I
C HERBARUM IGE QN: <0.1 KUA/I
CAT DANDER IGE QN: <0.1 KUA/I
CMN PIGWEED IGE QN: <0.1 KUA/I
COMMON RAGWEED IGE QN: <0.1 KUA/I
COTTONWOOD IGE QN: <0.1 KUA/I
D FARINAE IGE QN: <0.1 KUA/I
D PTERONYSS IGE QN: <0.1 KUA/I
DOG DANDER IGE QN: <0.1 KUA/I
LONDON PLANE IGE QN: <0.1 KUA/I
MOUSE URINE PROT IGE QN: <0.1 KUA/I
MT JUNIPER IGE QN: 0.16 KUA/I
MUGWORT IGE QN: <0.1 KUA/I
P NOTATUM IGE QN: <0.1 KUA/I
ROACH IGE QN: 0.18 KUA/I
SHEEP SORREL IGE QN: <0.1 KUA/I
SILVER BIRCH IGE QN: <0.1 KUA/I
TIMOTHY IGE QN: <0.1 KUA/I
TOTAL IGE SMQN RAST: 51.2 KU/L (ref 0–113)
WALNUT IGE QN: <0.1 KUA/I
WHITE ASH IGE QN: <0.1 KUA/I
WHITE ELM IGE QN: <0.1 KUA/I
WHITE MULBERRY IGE QN: <0.1 KUA/I
WHITE OAK IGE QN: 0.1 KUA/I

## 2023-05-04 RX ORDER — LEVOTHYROXINE SODIUM 0.12 MG/1
TABLET ORAL
Qty: 30 TABLET | Refills: 1 | Status: SHIPPED | OUTPATIENT
Start: 2023-05-04 | End: 2023-08-13 | Stop reason: SDUPTHER

## 2023-07-08 DIAGNOSIS — M19.90 ARTHRITIS: ICD-10-CM

## 2023-07-08 DIAGNOSIS — J30.89 NON-SEASONAL ALLERGIC RHINITIS DUE TO OTHER ALLERGIC TRIGGER: ICD-10-CM

## 2023-07-10 RX ORDER — SENNOSIDES 8.6 MG
650 CAPSULE ORAL EVERY 8 HOURS PRN
Qty: 90 TABLET | Refills: 0 | Status: SHIPPED | OUTPATIENT
Start: 2023-07-10

## 2023-07-10 RX ORDER — CETIRIZINE HYDROCHLORIDE 10 MG/1
10 TABLET ORAL DAILY
Qty: 90 TABLET | Refills: 0 | Status: SHIPPED | OUTPATIENT
Start: 2023-07-10

## 2023-08-04 DIAGNOSIS — M79.10 MUSCLE ACHE: ICD-10-CM

## 2023-08-04 DIAGNOSIS — J30.89 NON-SEASONAL ALLERGIC RHINITIS DUE TO OTHER ALLERGIC TRIGGER: ICD-10-CM

## 2023-08-04 DIAGNOSIS — L08.9 SKIN INFECTION: ICD-10-CM

## 2023-08-07 RX ORDER — FLUTICASONE PROPIONATE 110 UG/1
2 AEROSOL, METERED RESPIRATORY (INHALATION) 2 TIMES DAILY
Qty: 12 G | Refills: 5 | Status: SHIPPED | OUTPATIENT
Start: 2023-08-07

## 2023-08-07 RX ORDER — DIAPER,BRIEF,INFANT-TODD,DISP
EACH MISCELLANEOUS 2 TIMES DAILY
Qty: 30 G | Refills: 0 | Status: SHIPPED | OUTPATIENT
Start: 2023-08-07

## 2023-08-13 DIAGNOSIS — E03.9 HYPOTHYROIDISM: ICD-10-CM

## 2023-08-14 ENCOUNTER — OFFICE VISIT (OUTPATIENT)
Dept: INTERNAL MEDICINE CLINIC | Facility: CLINIC | Age: 53
End: 2023-08-14

## 2023-08-14 VITALS
DIASTOLIC BLOOD PRESSURE: 69 MMHG | OXYGEN SATURATION: 97 % | TEMPERATURE: 98.2 F | BODY MASS INDEX: 28.79 KG/M2 | WEIGHT: 180 LBS | SYSTOLIC BLOOD PRESSURE: 94 MMHG | HEART RATE: 77 BPM

## 2023-08-14 DIAGNOSIS — E78.2 MIXED HYPERLIPIDEMIA: ICD-10-CM

## 2023-08-14 DIAGNOSIS — E03.8 OTHER SPECIFIED HYPOTHYROIDISM: ICD-10-CM

## 2023-08-14 DIAGNOSIS — H61.23 BILATERAL IMPACTED CERUMEN: Primary | ICD-10-CM

## 2023-08-14 PROBLEM — Z00.00 ANNUAL PHYSICAL EXAM: Status: RESOLVED | Noted: 2023-02-14 | Resolved: 2023-08-14

## 2023-08-14 PROCEDURE — 99214 OFFICE O/P EST MOD 30 MIN: CPT | Performed by: PHYSICIAN ASSISTANT

## 2023-08-14 PROCEDURE — 69210 REMOVE IMPACTED EAR WAX UNI: CPT | Performed by: PHYSICIAN ASSISTANT

## 2023-08-14 RX ORDER — LEVOTHYROXINE SODIUM 0.12 MG/1
TABLET ORAL
Qty: 90 TABLET | Refills: 3 | Status: SHIPPED | OUTPATIENT
Start: 2023-08-14

## 2023-08-14 NOTE — ASSESSMENT & PLAN NOTE
Lab Results   Component Value Date    [id number] 0.030 (L) 05/02/2023     Recent TSH too low and levothyroxine was decreased from 150 to 125 mcg daily. T4 was elevated at 1.63. Will recheck TSH and T4 today.

## 2023-08-14 NOTE — PROGRESS NOTES
Name: Zeus Smith      : 1970      MRN: [id number]  Encounter Provider: Moni Mclain PA-C  Encounter Date: 2023   Encounter department: [address] [place] [address]    Assessment & Plan     1. Bilateral impacted cerumen  Assessment & Plan:  Discussed treatment options. Patient agreeable to irrigation. Patient tolerated well. Discussed aftercare and ways to prevent in the future. Recheck after complete irrigation revealed normal ear canals and TMs bilaterally. Orders:  -     Ear cerumen removal    2. Other specified hypothyroidism  Assessment & Plan:  Lab Results   Component Value Date    [id number] 0.030 (L) 2023     Recent TSH too low and levothyroxine was decreased from 150 to 125 mcg daily. T4 was elevated at 1.63. Will recheck TSH and T4 today. Orders:  -     TSH, 3rd generation; Future  -     T4, free; Future    3. Mixed hyperlipidemia  Assessment & Plan:  Lab Results   Component Value Date    CHOLESTEROL 253 (H) 2023    CHOLESTEROL 202 (H) 2021    CHOLESTEROL 280 (H) 2020     Lab Results   Component Value Date    HDL 46 2023    HDL 36 (L) 2021    HDL 46 2020     Lab Results   Component Value Date    TRIG 188 (H) 2023    TRIG 88 2021    TRIG 120 2020     Lab Results   Component Value Date    [address] 207 2023    [address] 146 2019     Lab Results   Component Value Date    LDLCALC 169 (H) 2023     Due for repeat lipid panel. Continue low fat diet. ASCVD risk 4.6%. Orders:  -     Lipid panel; Future         Subjective      Patient is a 52year old male presenting for ear lavage. States his ears have been clogged for awhile, unsure how long. Admits to difficulty hearing. Denies pain. Denies tinnitus. Denies otorrhea. No other associated symptoms. States he usually has his ears cleaned a couple times a year.      Review of Systems   Constitutional: Negative for appetite change, chills, diaphoresis, fatigue, fever and unexpected weight change. HENT: Positive for hearing loss. Negative for congestion, dental problem, ear discharge, ear pain, postnasal drip, rhinorrhea, sinus pressure, sinus pain, sneezing, sore throat, tinnitus and trouble swallowing. Eyes: Negative for visual disturbance. Respiratory: Negative for cough, chest tightness, shortness of breath and wheezing. Cardiovascular: Negative for chest pain, palpitations and leg swelling. Gastrointestinal: Negative for abdominal pain, blood in stool, constipation, diarrhea, nausea and vomiting. Endocrine: Negative for cold intolerance, heat intolerance, polydipsia, polyphagia and polyuria. Genitourinary: Negative for decreased urine volume, difficulty urinating, dysuria, flank pain, frequency, hematuria and urgency. Musculoskeletal: Negative for arthralgias and myalgias. Skin: Negative for rash. Neurological: Negative for dizziness, tremors, weakness, light-headedness, numbness and headaches. Hematological: Negative for adenopathy.        Current Outpatient Medications on File Prior to Visit   Medication Sig   • acetaminophen (TYLENOL) 650 mg CR tablet Take 1 tablet (650 mg total) by mouth every 8 (eight) hours as needed for mild pain   • albuterol (2.5 mg/3 mL) 0.083 % nebulizer solution Take 3 mL (2.5 mg total) by nebulization every 6 (six) hours as needed for wheezing or shortness of breath   • albuterol (Ventolin HFA) 90 mcg/act inhaler Inhale 2 puffs every 4 (four) hours as needed for wheezing   • benzonatate (TESSALON PERLES) 100 mg capsule Take 1 capsule (100 mg total) by mouth every 8 (eight) hours   • cetirizine (ZyrTEC) 10 mg tablet Take 1 tablet (10 mg total) by mouth daily   • cholecalciferol (VITAMIN D3) 25 mcg (1,000 units) tablet TAKE 1 TABLET BY MOUTH EVERY DAY   • Diclofenac Sodium (VOLTAREN) 1 % Apply 2 g topically 4 (four) times a day   • fluticasone (Flovent HFA) 110 MCG/ACT inhaler Inhale 2 puffs 2 (two) times a day Rinse mouth after use. • hydrocortisone 1 % ointment Apply topically 2 (two) times a day   • levothyroxine 125 mcg tablet One tablet daily       Objective     BP 94/69 (BP Location: Left arm, Patient Position: Sitting, Cuff Size: Standard)   Pulse 77   Temp 98.2 °F (36.8 °C) (Temporal)   Wt 81.6 kg (180 lb)   SpO2 97%   BMI 28.79 kg/m²     Physical Exam  Vitals reviewed. Constitutional:       General: He is awake. He is not in acute distress. Appearance: Normal appearance. He is well-developed, well-groomed and overweight. He is not ill-appearing. HENT:      Head: Normocephalic and atraumatic. Right Ear: External ear normal. There is impacted cerumen. Left Ear: External ear normal. There is impacted cerumen. Eyes:      General: No scleral icterus. Conjunctiva/sclera: Conjunctivae normal.   Cardiovascular:      Rate and Rhythm: Normal rate and regular rhythm. Pulses: Normal pulses. Heart sounds: Normal heart sounds. No murmur heard. Pulmonary:      Effort: Pulmonary effort is normal. No respiratory distress. Breath sounds: Normal breath sounds and air entry. No decreased air movement. No decreased breath sounds, wheezing, rhonchi or rales. Skin:     General: Skin is warm and dry. Neurological:      General: No focal deficit present. Mental Status: He is alert and oriented to person, place, and time. Mental status is at baseline. Motor: Motor function is intact. Coordination: Coordination is intact. Gait: Gait is intact. Psychiatric:         Attention and Perception: Attention normal.         Mood and Affect: Mood and affect normal.         Speech: Speech normal.         Behavior: Behavior normal. Behavior is cooperative.          Cognition and Memory: Cognition normal.          Ear cerumen removal    Date/Time: 8/14/2023 4:06 PM    Performed by: Maricela Gaucher, PA-C  Authorized by: Maricela Gaucher, PA-C  Universal Protocol:  Consent: Verbal consent obtained. Risks and benefits: risks, benefits and alternatives were discussed  Consent given by: patient  Patient understanding: patient states understanding of the procedure being performed  Patient consent: the patient's understanding of the procedure matches consent given  Patient identity confirmed: verbally with patient      Patient location:  Clinic  Indications / Diagnosis:  Cerumen impaction  Procedure details:     Local anesthetic:  None    Location:  L ear and R ear    Procedure type: irrigation with instrumentation      Instrumentation: curette and forceps      Approach:  External  Post-procedure details:     Complication:  None    Hearing quality:  Improved    Patient tolerance of procedure:   Tolerated well, no immediate complications      Edda Harry PA-C

## 2023-08-14 NOTE — ASSESSMENT & PLAN NOTE
Lab Results   Component Value Date    CHOLESTEROL 253 (H) 05/02/2023    CHOLESTEROL 202 (H) 01/19/2021    CHOLESTEROL 280 (H) 07/22/2020     Lab Results   Component Value Date    HDL 46 05/02/2023    HDL 36 (L) 01/19/2021    HDL 46 07/22/2020     Lab Results   Component Value Date    TRIG 188 (H) 05/02/2023    TRIG 88 01/19/2021    TRIG 120 07/22/2020     Lab Results   Component Value Date    [address] 207 05/02/2023    [address] 146 06/27/2019     Lab Results   Component Value Date    LDLCALC 169 (H) 05/02/2023     Due for repeat lipid panel. Continue low fat diet. ASCVD risk 4.6%.

## 2023-08-17 NOTE — ASSESSMENT & PLAN NOTE
Discussed treatment options. Patient agreeable to irrigation. Patient tolerated well. Discussed aftercare and ways to prevent in the future. Recheck after complete irrigation revealed normal ear canals and TMs bilaterally.

## 2023-10-15 PROBLEM — H61.23 BILATERAL IMPACTED CERUMEN: Status: RESOLVED | Noted: 2021-10-13 | Resolved: 2023-10-15

## 2023-11-13 DIAGNOSIS — J30.89 NON-SEASONAL ALLERGIC RHINITIS DUE TO OTHER ALLERGIC TRIGGER: ICD-10-CM

## 2023-11-13 RX ORDER — CETIRIZINE HYDROCHLORIDE 10 MG/1
10 TABLET ORAL DAILY
Qty: 90 TABLET | Refills: 3 | Status: SHIPPED | OUTPATIENT
Start: 2023-11-13

## 2023-11-26 DIAGNOSIS — M19.90 ARTHRITIS: ICD-10-CM

## 2023-11-27 RX ORDER — SENNOSIDES 8.6 MG
650 CAPSULE ORAL EVERY 8 HOURS PRN
Qty: 90 TABLET | Refills: 3 | Status: SHIPPED | OUTPATIENT
Start: 2023-11-27

## 2023-12-22 DIAGNOSIS — M79.10 MUSCLE ACHE: ICD-10-CM

## 2024-01-07 DIAGNOSIS — J30.89 NON-SEASONAL ALLERGIC RHINITIS DUE TO OTHER ALLERGIC TRIGGER: ICD-10-CM

## 2024-01-08 RX ORDER — CETIRIZINE HYDROCHLORIDE 10 MG/1
10 TABLET ORAL DAILY
Qty: 90 TABLET | Refills: 3 | Status: SHIPPED | OUTPATIENT
Start: 2024-01-08

## 2024-01-10 ENCOUNTER — RA CDI HCC (OUTPATIENT)
Dept: OTHER | Facility: HOSPITAL | Age: 54
End: 2024-01-10

## 2024-01-23 ENCOUNTER — TELEPHONE (OUTPATIENT)
Dept: INTERNAL MEDICINE CLINIC | Facility: CLINIC | Age: 54
End: 2024-01-23

## 2024-01-23 NOTE — TELEPHONE ENCOUNTER
Good morning,  This patient has an appt on 2/1/24 - looks like an OVL/OVS. He also has his annual scheduled 2/15/24. His appt on 2/1 can be cancelled and I can see him on 2/15/24 for both. Thank you

## 2024-02-18 DIAGNOSIS — M19.90 ARTHRITIS: ICD-10-CM

## 2024-02-20 RX ORDER — SENNOSIDES 8.6 MG
650 CAPSULE ORAL EVERY 8 HOURS PRN
Qty: 90 TABLET | Refills: 0 | Status: SHIPPED | OUTPATIENT
Start: 2024-02-20

## 2024-02-23 ENCOUNTER — OFFICE VISIT (OUTPATIENT)
Dept: INTERNAL MEDICINE CLINIC | Facility: CLINIC | Age: 54
End: 2024-02-23

## 2024-02-23 VITALS
HEART RATE: 96 BPM | DIASTOLIC BLOOD PRESSURE: 66 MMHG | TEMPERATURE: 98.1 F | SYSTOLIC BLOOD PRESSURE: 101 MMHG | HEIGHT: 66 IN | BODY MASS INDEX: 29.41 KG/M2 | WEIGHT: 183 LBS

## 2024-02-23 DIAGNOSIS — Z00.00 ANNUAL PHYSICAL EXAM: Primary | ICD-10-CM

## 2024-02-23 DIAGNOSIS — E55.9 VITAMIN D DEFICIENCY: ICD-10-CM

## 2024-02-23 DIAGNOSIS — E03.8 OTHER SPECIFIED HYPOTHYROIDISM: ICD-10-CM

## 2024-02-23 DIAGNOSIS — H61.23 BILATERAL IMPACTED CERUMEN: ICD-10-CM

## 2024-02-23 DIAGNOSIS — J30.89 NON-SEASONAL ALLERGIC RHINITIS DUE TO OTHER ALLERGIC TRIGGER: ICD-10-CM

## 2024-02-23 DIAGNOSIS — Z12.5 PROSTATE CANCER SCREENING: ICD-10-CM

## 2024-02-23 DIAGNOSIS — E78.2 MIXED HYPERLIPIDEMIA: ICD-10-CM

## 2024-02-23 DIAGNOSIS — E66.3 OVERWEIGHT WITH BODY MASS INDEX (BMI) OF 29 TO 29.9 IN ADULT: ICD-10-CM

## 2024-02-23 PROCEDURE — 69210 REMOVE IMPACTED EAR WAX UNI: CPT | Performed by: PHYSICIAN ASSISTANT

## 2024-02-23 PROCEDURE — 99396 PREV VISIT EST AGE 40-64: CPT | Performed by: PHYSICIAN ASSISTANT

## 2024-02-23 PROCEDURE — 99214 OFFICE O/P EST MOD 30 MIN: CPT | Performed by: PHYSICIAN ASSISTANT

## 2024-02-23 NOTE — ASSESSMENT & PLAN NOTE
Discussed general health recommendations and screening guidelines. Patient agreeable to prostate cancer screening with PSA. States he has difficulty taking off from work, and hasn't been able to schedule colonoscopy or CT lung cancer screening. Declines at this time. Encouraged patient to schedule. Declines immunizations today. Recommend routine dental and eye exams. Next physical one year.

## 2024-02-23 NOTE — ASSESSMENT & PLAN NOTE
Lab Results   Component Value Date    [id number] 0.030 (L) 05/02/2023      Over due for repeat thyroid labs. Continue levothyroxine 125 mcg daily.

## 2024-02-23 NOTE — ASSESSMENT & PLAN NOTE
Lab Results   Component Value Date    CHOLESTEROL 253 (H) 05/02/2023    CHOLESTEROL 202 (H) 01/19/2021    CHOLESTEROL 280 (H) 07/22/2020     Lab Results   Component Value Date    HDL 46 05/02/2023    HDL 36 (L) 01/19/2021    HDL 46 07/22/2020     Lab Results   Component Value Date    TRIG 188 (H) 05/02/2023    TRIG 88 01/19/2021    TRIG 120 07/22/2020     Lab Results   Component Value Date    NONHDLC 207 05/02/2023    NONHDLC 146 06/27/2019      Lab Results   Component Value Date    LDLCALC 169 (H) 05/02/2023      Due for repeat lipid panel. Encouraged low fat diet. No current medication regimen. ASCVD risk 4.6%.

## 2024-02-23 NOTE — PATIENT INSTRUCTIONS
Wellness Visit for Adults   AMBULATORY CARE:   A wellness visit  is when you see your healthcare provider to get screened for health problems. Your healthcare provider will also give you advice on how to stay healthy. Write down your questions so you remember to ask them. Ask your healthcare provider how often you should have a wellness visit.  What happens at a wellness visit:  Your healthcare provider will ask about your health, and your family history of health problems. This includes high blood pressure, heart disease, and cancer. He or she will ask if you have symptoms that concern you, if you smoke, and about your mood. You may also be asked about your intake of medicines, supplements, food, and alcohol. Any of the following may be done:  Your weight  will be checked. Your height may also be checked so your body mass index (BMI) can be calculated. Your BMI shows if you are at a healthy weight.    Your blood pressure  and heart rate will be checked. Your temperature may also be checked.    Blood and urine tests  may be done. Blood tests may be done to check your cholesterol levels. Abnormal cholesterol levels increase your risk for heart disease and stroke. You may also need a blood or urine test to check for diabetes if you are at increased risk. Urine tests may be done to look for signs of an infection or kidney disease.    A physical exam  includes checking your heartbeat and lungs with a stethoscope. Your healthcare provider may also check your skin to look for sun damage.    Screening tests  may be recommended. A screening test is done to check for diseases that may not cause symptoms. The screening tests you may need depend on your age, gender, family history, and lifestyle habits. For example, colorectal screening may be recommended if you are 50 years old or older.    Screening tests you need if you are a woman:   A Pap smear  is used to screen for cervical cancer. Pap smears are usually done every 3 to  5 years depending on your age. You may need them more often if you have had abnormal Pap smear test results in the past. Ask your healthcare provider how often you should have a Pap smear.    A mammogram  is an x-ray of your breasts to screen for breast cancer. Experts recommend mammograms every 2 years starting at age 50 years. You may need a mammogram at age 49 years or younger if you have an increased risk for breast cancer. Talk to your healthcare provider about when you should start having mammograms and how often you need them.    Vaccines you may need:   Get an influenza vaccine  every year. The influenza vaccine protects you from the flu. Several types of viruses cause the flu. The viruses change over time, so new vaccines are made each year.    Get a tetanus-diphtheria (Td) booster vaccine  every 10 years. This vaccine protects you against tetanus and diphtheria. Tetanus is a severe infection that may cause painful muscle spasms and lockjaw. Diphtheria is a severe bacterial infection that causes a thick covering in the back of your mouth and throat.    Get a human papillomavirus (HPV) vaccine  if you are female and aged 19 to 26 or male 19 to 21 and never received it. This vaccine protects you from HPV infection. HPV is the most common infection spread by sexual contact. HPV may also cause vaginal, penile, and anal cancers.    Get a pneumococcal vaccine  if you are aged 65 years or older. The pneumococcal vaccine is an injection given to protect you from pneumococcal disease. Pneumococcal disease is an infection caused by pneumococcal bacteria. The infection may cause pneumonia, meningitis, or an ear infection.    Get a shingles vaccine  if you are 60 or older, even if you have had shingles before. The shingles vaccine is an injection to protect you from the varicella-zoster virus. This is the same virus that causes chickenpox. Shingles is a painful rash that develops in people who had chickenpox or have  been exposed to the virus.    How to eat healthy:  My Plate is a model for planning healthy meals. It shows the types and amounts of foods that should go on your plate. Fruits and vegetables make up about half of your plate, and grains and protein make up the other half. A serving of dairy is included on the side of your plate. The amount of calories and serving sizes you need depends on your age, gender, weight, and height. Examples of healthy foods are listed below:  Eat a variety of vegetables  such as dark green, red, and orange vegetables. You can also include canned vegetables low in sodium (salt) and frozen vegetables without added butter or sauces.    Eat a variety of fresh fruits , canned fruit in 100% juice, frozen fruit, and dried fruit.    Include whole grains.  At least half of the grains you eat should be whole grains. Examples include whole-wheat bread, wheat pasta, brown rice, and whole-grain cereals such as oatmeal.    Eat a variety of protein foods such as seafood (fish and shellfish), lean meat, and poultry without skin (turkey and chicken). Examples of lean meats include pork leg, shoulder, or tenderloin, and beef round, sirloin, tenderloin, and extra lean ground beef. Other protein foods include eggs and egg substitutes, beans, peas, soy products, nuts, and seeds.    Choose low-fat dairy products such as skim or 1% milk or low-fat yogurt, cheese, and cottage cheese.    Limit unhealthy fats  such as butter, hard margarine, and shortening.       Exercise:  Exercise at least 30 minutes per day on most days of the week. Some examples of exercise include walking, biking, dancing, and swimming. You can also fit in more physical activity by taking the stairs instead of the elevator or parking farther away from stores. Include muscle strengthening activities 2 days each week. Regular exercise provides many health benefits. It helps you manage your weight, and decreases your risk for type 2 diabetes,  heart disease, stroke, and high blood pressure. Exercise can also help improve your mood. Ask your healthcare provider about the best exercise plan for you.       General health and safety guidelines:   Do not smoke.  Nicotine and other chemicals in cigarettes and cigars can cause lung damage. Ask your healthcare provider for information if you currently smoke and need help to quit. E-cigarettes or smokeless tobacco still contain nicotine. Talk to your healthcare provider before you use these products.    Limit alcohol.  A drink of alcohol is 12 ounces of beer, 5 ounces of wine, or 1½ ounces of liquor.    Lose weight, if needed.  Being overweight increases your risk of certain health conditions. These include heart disease, high blood pressure, type 2 diabetes, and certain types of cancer.    Protect your skin.  Do not sunbathe or use tanning beds. Use sunscreen with a SPF 15 or higher. Apply sunscreen at least 15 minutes before you go outside. Reapply sunscreen every 2 hours. Wear protective clothing, hats, and sunglasses when you are outside.    Drive safely.  Always wear your seatbelt. Make sure everyone in your car wears a seatbelt. A seatbelt can save your life if you are in an accident. Do not use your cell phone when you are driving. This could distract you and cause an accident. Pull over if you need to make a call or send a text message.    Practice safe sex.  Use latex condoms if are sexually active and have more than one partner. Your healthcare provider may recommend screening tests for sexually transmitted infections (STIs).    Wear helmets, lifejackets, and protective gear.  Always wear a helmet when you ride a bike or motorcycle, go skiing, or play sports that could cause a head injury. Wear protective equipment when you play sports. Wear a lifejacket when you are on a boat or doing water sports.    © Copyright [employer] 2023 Information is for End User's use only and may not be sold, redistributed or  otherwise used for commercial purposes.  The above information is an  only. It is not intended as medical advice for individual conditions or treatments. Talk to your doctor, nurse or pharmacist before following any medical regimen to see if it is safe and effective for you.    Weight Management   AMBULATORY CARE:   Why it is important to manage your weight:  Being overweight increases your risk of health conditions such as heart disease, high blood pressure, type 2 diabetes, and certain types of cancer. It can also increase your risk for osteoarthritis, sleep apnea, and other respiratory problems. Aim for a slow, steady weight loss. Even a small amount of weight loss can lower your risk of health problems.  Risks of being overweight:  Extra weight can cause many health problems, including the following:  Diabetes (high blood sugar level)    High blood pressure or high cholesterol    Heart disease    Stroke    Gallbladder or liver disease    Cancer of the colon, breast, prostate, liver, or kidney    Sleep apnea    Arthritis or gout    Screening  is done to check for health conditions before you have signs or symptoms. If you are 35 to 70 years old, your blood sugar level may be checked every 3 years for signs of prediabetes or diabetes. Your healthcare provider will check your blood pressure at each visit. High blood pressure can lead to a stroke or other problems. Your provider may check for signs of heart disease, cancer, or other health problems.  How to lose weight safely:  A safe and healthy way to lose weight is to eat fewer calories and get regular exercise.  You can lose up about 1 pound a week by decreasing the number of calories you eat by 500 calories each day. You can decrease calories by eating smaller portion sizes or by cutting out high-calorie foods. Read labels to find out how many calories are in the foods you eat.         You can also burn calories with exercise such as walking,  swimming, or biking. You will be more likely to keep weight off if you make these changes part of your lifestyle. Exercise at least 30 minutes per day on most days of the week. You can also fit in more physical activity by taking the stairs instead of the elevator or parking farther away from stores. Ask your healthcare provider about the best exercise plan for you.       Healthy meal plan for weight management:  A healthy meal plan includes a variety of foods, contains fewer calories, and helps you stay healthy. A healthy meal plan includes the following:     Eat whole-grain foods more often.  A healthy meal plan should contain fiber. Fiber is the part of grains, fruits, and vegetables that is not broken down by your body. Whole-grain foods are healthy and provide extra fiber in your diet. Some examples of whole-grain foods are whole-wheat breads and pastas, oatmeal, brown rice, and bulgur.    Eat a variety of vegetables every day.  Include dark, leafy greens such as spinach, kale, jennifer greens, and mustard greens. Eat yellow and orange vegetables such as carrots, sweet potatoes, and winter squash.     Eat a variety of fruits every day.  Choose fresh or canned fruit (canned in its own juice or light syrup) instead of juice. Fruit juice has very little or no fiber.    Eat low-fat dairy foods.  Drink fat-free (skim) milk or 1% milk. Eat fat-free yogurt and low-fat cottage cheese. Try low-fat cheeses such as mozzarella and other reduced-fat cheeses.    Choose meat and other protein foods that are low in fat.  Choose beans or other legumes such as split peas or lentils. Choose fish, skinless poultry (chicken or turkey), or lean cuts of red meat (beef or pork). Before you cook meat or poultry, cut off any visible fat.     Use less fat and oil.  Try baking foods instead of frying them. Add less fat, such as margarine, sour cream, regular salad dressing and mayonnaise to foods. Eat fewer high-fat foods. Some examples of  high-fat foods include french fries, doughnuts, ice cream, and cakes.    Eat fewer sweets.  Limit foods and drinks that are high in sugar. This includes candy, cookies, regular soda, and sweetened drinks.  Ways to decrease calories:   Eat smaller portions.     Use a small plate with smaller servings.    Do not eat second helpings.    When you eat at a restaurant, ask for a box and place half of your meal in the box before you eat.    Share an entrée with someone else.    Replace high-calorie snacks with healthy, low-calorie snacks.     Choose fresh fruit, vegetables, fat-free rice cakes, or air-popped popcorn instead of potato chips, nuts, or chocolate.    Choose water or calorie-free drinks instead of soda or sweetened drinks.    Do not shop for groceries when you are hungry.  You may be more likely to make unhealthy food choices. Take a grocery list of healthy foods and shop after you have eaten.    Eat regular meals. Do not skip meals. Skipping meals can lead to overeating later in the day. This can make it harder for you to lose weight. Eat a healthy snack in place of a meal if you do not have time to eat a regular meal. Talk with a dietitian to help you create a meal plan and schedule that is right for you.    Other things to consider as you try to lose weight:   Be aware of situations that may give you the urge to overeat, such as eating while watching television. Find ways to avoid these situations. For example, read a book, go for a walk, or do crafts.    Meet with a weight loss support group or friends who are also trying to lose weight. This may help you stay motivated to continue working on your weight loss goals.    © Copyright [employer] 2023 Information is for End User's use only and may not be sold, redistributed or otherwise used for commercial purposes.  The above information is an  only. It is not intended as medical advice for individual conditions or treatments. Talk to your doctor,  nurse or pharmacist before following any medical regimen to see if it is safe and effective for you.

## 2024-02-23 NOTE — PROGRESS NOTES
ADULT ANNUAL PHYSICAL  [facility] [place]    NAME: Luis Forrester  AGE: 53 y.o. SEX: male  : 1970     DATE: 2024     Assessment and Plan:     Problem List Items Addressed This Visit          Endocrine    Hypothyroidism     Lab Results   Component Value Date    [id number] 0.030 (L) 2023      Over due for repeat thyroid labs. Continue levothyroxine 125 mcg daily.          Relevant Orders    CBC and differential    TSH, 3rd generation with Free T4 reflex       Respiratory    Allergic rhinitis     Continue cetirizine 10 mg daily. Flonase daily as needed. Avoid irritants.             Nervous and Auditory    Bilateral impacted cerumen     Discussed treatment options. Patient agreeable to irrigation. Patient tolerated well. Discussed aftercare and ways to prevent in the future.   Recheck after complete irrigation revealed normal ear canals and TMs bilaterally.          Relevant Orders    Ear cerumen removal (Completed)       Other    Hyperlipidemia     Lab Results   Component Value Date    CHOLESTEROL 253 (H) 2023    CHOLESTEROL 202 (H) 2021    CHOLESTEROL 280 (H) 2020     Lab Results   Component Value Date    HDL 46 2023    HDL 36 (L) 2021    HDL 46 2020     Lab Results   Component Value Date    TRIG 188 (H) 2023    TRIG 88 2021    TRIG 120 2020     Lab Results   Component Value Date    NONHDLC 207 2023    NONHDLC 146 2019      Lab Results   Component Value Date    LDLCALC 169 (H) 2023      Due for repeat lipid panel. Encouraged low fat diet. No current medication regimen. ASCVD risk 4.6%.          Relevant Orders    Comprehensive metabolic panel    Lipid panel    Annual physical exam - Primary     Discussed general health recommendations and screening guidelines. Patient agreeable to prostate cancer screening with PSA. States he has difficulty taking off from work,  and hasn't been able to schedule colonoscopy or CT lung cancer screening. Declines at this time. Encouraged patient to schedule. Declines immunizations today. Recommend routine dental and eye exams. Next physical one year.           Vitamin D deficiency     Will recheck vitamin D.          Relevant Orders    Vitamin D 25 hydroxy     Other Visit Diagnoses       Prostate cancer screening        Relevant Orders    PSA, Total Screen    Overweight with body mass index (BMI) of 29 to 29.9 in adult                  Immunizations and preventive care screenings were discussed with patient today. Appropriate education was printed on patient's after visit summary.    Discussed risks and benefits of prostate cancer screening. We discussed the controversial history of PSA screening for prostate cancer in the [place] as well as the risk of over detection and over treatment of prostate cancer by way of PSA screening.  The patient understands that PSA blood testing is an imperfect way to screen for prostate cancer and that elevated PSA levels in the blood may also be caused by infection, inflammation, prostatic trauma or manipulation, urological procedures, or by benign prostatic enlargement.    The role of the digital rectal examination in prostate cancer screening was also discussed and I discussed with him that there is large interobserver variability in the findings of digital rectal examination.    Counseling:  Alcohol/drug use: discussed moderation in alcohol intake, the recommendations for healthy alcohol use, and avoidance of illicit drug use.  Dental Health: discussed importance of regular tooth brushing, flossing, and dental visits.  Injury prevention: discussed safety/seat belts, safety helmets, smoke detectors, carbon dioxide detectors, and smoking near bedding or upholstery.  Sexual health: discussed sexually transmitted diseases, partner selection, use of condoms, avoidance of unintended pregnancy, and  contraceptive alternatives.  Exercise: the importance of regular exercise/physical activity was discussed. Recommend exercise 3-5 times per week for at least 30 minutes.     BMI Counseling: Body mass index is 29.54 kg/m². The BMI is above normal. Nutrition recommendations include decreasing portion sizes, encouraging healthy choices of fruits and vegetables, decreasing fast food intake, consuming healthier snacks, limiting drinks that contain sugar, moderation in carbohydrate intake, increasing intake of lean protein, reducing intake of saturated and trans fat and reducing intake of cholesterol. Exercise recommendations include moderate physical activity 150 minutes/week, exercising 3-5 times per week and strength training exercises. No pharmacotherapy was ordered. Rationale for BMI follow-up plan is due to patient being overweight or obese.     Depression Screening and Follow-up Plan: Patient was screened for depression during today's encounter. They screened negative with a PHQ-2 score of 0.        Return in about 4 months (around 6/23/2024) for Recheck ears - 40 mins.     Chief Complaint:     Chief Complaint   Patient presents with    Physical Exam     Ear cleaning      History of Present Illness:     Adult Annual Physical   Patient here for a comprehensive physical exam. The patient reports no problems.    Diet and Physical Activity  Diet/Nutrition: poor diet and limited fruits/vegetables.   Exercise: no formal exercise.      Depression Screening  PHQ-2/9 Depression Screening    Little interest or pleasure in doing things: 0 - not at all  Feeling down, depressed, or hopeless: 0 - not at all  PHQ-2 Score: 0  PHQ-2 Interpretation: Negative depression screen       General Health  Sleep: sleeps well and gets more than 8 hours of sleep on average.   Hearing: normal - bilateral.  Vision: no vision problems, most recent eye exam >1 year ago, and wears glasses.   Dental: no dental visits for >1 year and brushes teeth  twice daily.        Health  Symptoms include: none    Advanced Care Planning  Do you have an advanced directive? no  Do you have a durable medical power of ? no  ACP document given to patient? no     Review of Systems:     Review of Systems   Constitutional:  Negative for appetite change, chills, diaphoresis, fatigue, fever and unexpected weight change.   HENT:  Negative for congestion, dental problem, ear discharge, ear pain, hearing loss, postnasal drip, rhinorrhea, sinus pressure, sinus pain, sneezing, sore throat, tinnitus and trouble swallowing.    Eyes:  Negative for visual disturbance.   Respiratory:  Negative for cough, chest tightness, shortness of breath and wheezing.    Cardiovascular:  Negative for chest pain, palpitations and leg swelling.   Gastrointestinal:  Negative for abdominal pain, blood in stool, constipation, diarrhea, nausea and vomiting.   Endocrine: Negative for cold intolerance, heat intolerance, polydipsia, polyphagia and polyuria.   Genitourinary:  Negative for decreased urine volume, difficulty urinating, dysuria, flank pain, frequency, hematuria and urgency.   Musculoskeletal:  Negative for arthralgias and myalgias.   Skin:  Negative for rash.   Neurological:  Negative for dizziness, tremors, weakness, light-headedness, numbness and headaches.   Hematological:  Negative for adenopathy.      Past Medical History:     Past Medical History:   Diagnosis Date    Arthritis     Chronic cough     Disease of thyroid gland     Hypoparathyroidism (HCC)     continue taking calcium and vitamin D, will check CMP, PTH level and Vitamin D level    Pneumonia of right middle lobe due to Streptococcus pneumoniae ([facility]) 11/30/2018      Past Surgical History:     Past Surgical History:   Procedure Laterality Date    FRACTURE SURGERY      Open Treatment of Each Proximal Finger Phalanx      Family History:     Family History   Problem Relation Age of Onset    No Known Problems Mother     No Known  "Problems Father     No Known Problems Sister     No Known Problems Brother     No Known Problems Son     Learning disabilities Daughter     Asthma Daughter     Seizures Daughter     No Known Problems Maternal Grandmother     No Known Problems Maternal Grandfather     No Known Problems Paternal Grandmother     No Known Problems Paternal Grandfather     No Known Problems Maternal Aunt     No Known Problems Maternal Uncle     No Known Problems Paternal Aunt     No Known Problems Paternal Uncle     No Known Problems Cousin       Social History:     Social History     Socioeconomic History    Marital status:      Spouse name: None    Number of children: 1    Years of education: None    Highest education level: None   Occupational History    Occupation:    Tobacco Use    Smoking status: Former    Smokeless tobacco: Never    Tobacco comments:     pt \"tried it when he was a teenager but did not like them\"   Vaping Use    Vaping status: Never Used   Substance and Sexual Activity    Alcohol use: Yes     Comment: occasionally    Drug use: No    Sexual activity: Not Currently   Other Topics Concern    None   Social History Narrative    None     Social Determinants of Health     Financial Resource Strain: Low Risk  (2/23/2024)    Overall Financial Resource Strain (CARDIA)     Difficulty of Paying Living Expenses: Not hard at all   Food Insecurity: No Food Insecurity (2/23/2024)    Hunger Vital Sign     Worried About Running Out of Food in the Last Year: Never true     Ran Out of Food in the Last Year: Never true   Transportation Needs: No Transportation Needs (2/23/2024)    PRAPARE - Transportation     Lack of Transportation (Medical): No     Lack of Transportation (Non-Medical): No   Physical Activity: Not on file   Stress: Not on file   Social Connections: Not on file   Intimate Partner Violence: Not on file   Housing Stability: Low Risk  (6/7/2022)    Housing Stability Vital Sign     Unable to Pay for " "Housing in the Last Year: No     Number of Places Lived in the Last Year: 1     Unstable Housing in the Last Year: No      Current Medications:     Current Outpatient Medications   Medication Sig Dispense Refill    acetaminophen (TYLENOL) 650 mg CR tablet Take 1 tablet (650 mg total) by mouth every 8 (eight) hours as needed for mild pain 90 tablet 0    albuterol (2.5 mg/3 mL) 0.083 % nebulizer solution Take 3 mL (2.5 mg total) by nebulization every 6 (six) hours as needed for wheezing or shortness of breath 5 mL 0    albuterol (Ventolin HFA) 90 mcg/act inhaler Inhale 2 puffs every 4 (four) hours as needed for wheezing 8 g 3    benzonatate (TESSALON PERLES) 100 mg capsule Take 1 capsule (100 mg total) by mouth every 8 (eight) hours 90 capsule 1    cetirizine (ZyrTEC) 10 mg tablet Take 1 tablet (10 mg total) by mouth daily 90 tablet 3    cholecalciferol (VITAMIN D3) 25 mcg (1,000 units) tablet TAKE 1 TABLET BY MOUTH EVERY DAY 90 tablet 3    Diclofenac Sodium (VOLTAREN) 1 % Apply 2 g topically 4 (four) times a day 2 g 3    fluticasone (Flovent HFA) 110 MCG/ACT inhaler Inhale 2 puffs 2 (two) times a day Rinse mouth after use. 12 g 5    hydrocortisone 1 % ointment Apply topically 2 (two) times a day 30 g 0    levothyroxine 125 mcg tablet One tablet daily 90 tablet 3     No current facility-administered medications for this visit.      Allergies:     Allergies   Allergen Reactions    Chlorine Rash      Physical Exam:     /66 (BP Location: Left arm, Patient Position: Sitting, Cuff Size: Large)   Pulse 96   Temp 98.1 °F (36.7 °C) (Temporal)   Ht 5' 6\" (1.676 m)   Wt 83 kg (183 lb)   BMI 29.54 kg/m²     Physical Exam  Vitals and nursing note reviewed.   Constitutional:       General: He is awake. He is not in acute distress.     Appearance: Normal appearance. He is well-developed, well-groomed and overweight. He is not ill-appearing.   HENT:      Head: Normocephalic and atraumatic.      Right Ear: Hearing and " external ear normal. There is impacted cerumen.      Left Ear: Hearing and external ear normal. There is impacted cerumen.      Nose: Nose normal. No congestion or rhinorrhea.      Mouth/Throat:      Lips: Pink.      Mouth: Mucous membranes are moist.      Pharynx: Oropharynx is clear. Uvula midline. No oropharyngeal exudate or posterior oropharyngeal erythema.   Eyes:      General: Lids are normal. No scleral icterus.     Extraocular Movements: Extraocular movements intact.      Conjunctiva/sclera: Conjunctivae normal.      Pupils: Pupils are equal, round, and reactive to light.   Cardiovascular:      Rate and Rhythm: Normal rate and regular rhythm.      Pulses: Normal pulses.      Heart sounds: Normal heart sounds. No murmur heard.  Pulmonary:      Effort: Pulmonary effort is normal. No respiratory distress.      Breath sounds: Normal breath sounds and air entry. No decreased air movement. No decreased breath sounds, wheezing, rhonchi or rales.   Abdominal:      General: Abdomen is flat. Bowel sounds are normal. There is no distension.      Palpations: Abdomen is soft. There is no mass.      Tenderness: There is no abdominal tenderness. There is no right CVA tenderness, left CVA tenderness, guarding or rebound.      Hernia: No hernia is present.   Musculoskeletal:         General: Normal range of motion.      Cervical back: Full passive range of motion without pain, normal range of motion and neck supple.      Right lower leg: No edema.      Left lower leg: No edema.   Lymphadenopathy:      Cervical: No cervical adenopathy.   Skin:     General: Skin is warm.      Capillary Refill: Capillary refill takes less than 2 seconds.      Coloration: Skin is not jaundiced.      Findings: No rash.   Neurological:      General: No focal deficit present.      Mental Status: He is alert and oriented to person, place, and time. Mental status is at baseline.      Sensory: Sensation is intact.      Motor: Motor function is intact.       Coordination: Coordination is intact.      Gait: Gait is intact.   Psychiatric:         Attention and Perception: Attention normal.         Mood and Affect: Mood and affect normal.         Speech: Speech normal.         Behavior: Behavior normal. Behavior is cooperative.     Ear cerumen removal    Date/Time: 2/23/2024 3:20 PM    Performed by: Joceline Shook PA-C  Authorized by: Joceline Shook PA-C  Universal Protocol:  Consent: Verbal consent obtained.  Risks and benefits: risks, benefits and alternatives were discussed  Consent given by: patient  Patient understanding: patient states understanding of the procedure being performed  Patient consent: the patient's understanding of the procedure matches consent given  Patient identity confirmed: verbally with patient    Patient location:  Clinic  Indications / Diagnosis:  Cerumen impaction - bilateral  Procedure details:     Local anesthetic:  None    Location:  L ear and R ear    Procedure type: irrigation with instrumentation      Instrumentation: curette and forceps      Approach:  External  Post-procedure details:     Complication:  None    Hearing quality:  Normal    Patient tolerance of procedure:  Tolerated well, no immediate complications        Joceline Shook PA-C  [facility]

## 2024-04-05 DIAGNOSIS — M19.90 ARTHRITIS: ICD-10-CM

## 2024-04-05 RX ORDER — SENNOSIDES 8.6 MG
650 CAPSULE ORAL EVERY 8 HOURS PRN
Qty: 90 TABLET | Refills: 3 | Status: SHIPPED | OUTPATIENT
Start: 2024-04-05

## 2024-04-23 PROBLEM — H61.23 BILATERAL IMPACTED CERUMEN: Status: RESOLVED | Noted: 2021-10-13 | Resolved: 2024-04-23

## 2024-05-22 ENCOUNTER — TELEPHONE (OUTPATIENT)
Dept: INTERNAL MEDICINE CLINIC | Facility: CLINIC | Age: 54
End: 2024-05-22

## 2024-05-22 NOTE — TELEPHONE ENCOUNTER
Patient called in requesting if something to be sent to the pharmacy to his bronchitis, please advise

## 2024-05-29 ENCOUNTER — OFFICE VISIT (OUTPATIENT)
Dept: INTERNAL MEDICINE CLINIC | Facility: CLINIC | Age: 54
End: 2024-05-29

## 2024-05-29 VITALS
BODY MASS INDEX: 27.8 KG/M2 | SYSTOLIC BLOOD PRESSURE: 104 MMHG | HEIGHT: 66 IN | DIASTOLIC BLOOD PRESSURE: 60 MMHG | HEART RATE: 88 BPM | WEIGHT: 173 LBS | TEMPERATURE: 98.1 F

## 2024-05-29 DIAGNOSIS — Z12.12 ENCOUNTER FOR COLORECTAL CANCER SCREENING: ICD-10-CM

## 2024-05-29 DIAGNOSIS — E55.9 VITAMIN D DEFICIENCY: ICD-10-CM

## 2024-05-29 DIAGNOSIS — E78.2 MIXED HYPERLIPIDEMIA: ICD-10-CM

## 2024-05-29 DIAGNOSIS — Z12.11 ENCOUNTER FOR COLORECTAL CANCER SCREENING: ICD-10-CM

## 2024-05-29 DIAGNOSIS — E66.3 OVERWEIGHT WITH BODY MASS INDEX (BMI) OF 27 TO 27.9 IN ADULT: ICD-10-CM

## 2024-05-29 DIAGNOSIS — J45.909 REACTIVE AIRWAY DISEASE WITHOUT COMPLICATION, UNSPECIFIED ASTHMA SEVERITY, UNSPECIFIED WHETHER PERSISTENT: Primary | ICD-10-CM

## 2024-05-29 DIAGNOSIS — Z23 ENCOUNTER FOR IMMUNIZATION: ICD-10-CM

## 2024-05-29 DIAGNOSIS — E03.8 OTHER SPECIFIED HYPOTHYROIDISM: ICD-10-CM

## 2024-05-29 DIAGNOSIS — H61.23 BILATERAL IMPACTED CERUMEN: ICD-10-CM

## 2024-05-29 DIAGNOSIS — J30.89 NON-SEASONAL ALLERGIC RHINITIS DUE TO OTHER ALLERGIC TRIGGER: ICD-10-CM

## 2024-05-29 PROBLEM — Z00.00 ANNUAL PHYSICAL EXAM: Status: RESOLVED | Noted: 2023-02-14 | Resolved: 2024-05-29

## 2024-05-29 PROCEDURE — 90472 IMMUNIZATION ADMIN EACH ADD: CPT | Performed by: PHYSICIAN ASSISTANT

## 2024-05-29 PROCEDURE — 90715 TDAP VACCINE 7 YRS/> IM: CPT | Performed by: PHYSICIAN ASSISTANT

## 2024-05-29 PROCEDURE — 99214 OFFICE O/P EST MOD 30 MIN: CPT | Performed by: PHYSICIAN ASSISTANT

## 2024-05-29 PROCEDURE — 90750 HZV VACC RECOMBINANT IM: CPT | Performed by: PHYSICIAN ASSISTANT

## 2024-05-29 PROCEDURE — 90471 IMMUNIZATION ADMIN: CPT | Performed by: PHYSICIAN ASSISTANT

## 2024-05-29 PROCEDURE — 69210 REMOVE IMPACTED EAR WAX UNI: CPT | Performed by: PHYSICIAN ASSISTANT

## 2024-05-29 RX ORDER — CETIRIZINE HYDROCHLORIDE 10 MG/1
10 TABLET ORAL DAILY
Qty: 90 TABLET | Refills: 3 | Status: SHIPPED | OUTPATIENT
Start: 2024-05-29

## 2024-05-29 RX ORDER — LEVOTHYROXINE SODIUM 0.12 MG/1
TABLET ORAL
Qty: 90 TABLET | Refills: 3 | Status: SHIPPED | OUTPATIENT
Start: 2024-05-29

## 2024-05-29 RX ORDER — BENZONATATE 100 MG/1
100 CAPSULE ORAL EVERY 8 HOURS
Qty: 90 CAPSULE | Refills: 1 | Status: SHIPPED | OUTPATIENT
Start: 2024-05-29

## 2024-05-29 RX ORDER — ALBUTEROL SULFATE 90 UG/1
2 AEROSOL, METERED RESPIRATORY (INHALATION) EVERY 4 HOURS PRN
Qty: 8 G | Refills: 3 | Status: SHIPPED | OUTPATIENT
Start: 2024-05-29

## 2024-05-29 RX ORDER — FLUTICASONE PROPIONATE 110 UG/1
2 AEROSOL, METERED RESPIRATORY (INHALATION) 2 TIMES DAILY
Qty: 12 G | Refills: 5 | Status: SHIPPED | OUTPATIENT
Start: 2024-05-29

## 2024-05-29 NOTE — PROGRESS NOTES
Ambulatory Visit  Name: Luis Forrester      : 1970      MRN: [id number]  Encounter Provider: Joceline Shook PA-C  Encounter Date: 2024   [facility]    Assessment & Plan   1. Reactive airway disease without complication, unspecified asthma severity, unspecified whether persistent  Assessment & Plan:  Will consider ordering PFTS. Restart Flovent 110 mcg 2 puffs BID. Rinse mouth after use. Albuterol as needed. Tessalon Perles as needed. Ensure adequate hydration and rest. ER precautions given. Return to care if symptoms worsen or fail to improve.   Orders:  -     albuterol (Ventolin HFA) 90 mcg/act inhaler; Inhale 2 puffs every 4 (four) hours as needed for wheezing  2. Other specified hypothyroidism  Assessment & Plan:  Lab Results   Component Value Date    [id number] 0.030 (L) 2023      Over due for repeat thyroid labs. Reminded patient again to complete lab work. Continue levothyroxine 125 mcg daily.   Orders:  -     levothyroxine 125 mcg tablet; One tablet daily  3. Mixed hyperlipidemia  Assessment & Plan:  Lab Results   Component Value Date    CHOLESTEROL 253 (H) 2023    CHOLESTEROL 202 (H) 2021    CHOLESTEROL 280 (H) 2020     Lab Results   Component Value Date    HDL 46 2023    HDL 36 (L) 2021    HDL 46 2020     Lab Results   Component Value Date    TRIG 188 (H) 2023    TRIG 88 2021    TRIG 120 2020     Lab Results   Component Value Date    NONHDLC 207 2023    NONHDLC 146 2019      Lab Results   Component Value Date    LDLCALC 169 (H) 2023     Due for repeat lipid panel. Encouraged low fat diet. No current medication regimen. ASCVD risk 5.5%   4. Non-seasonal allergic rhinitis due to other allergic trigger  Assessment & Plan:  Continue cetirizine 10 mg daily.   Orders:  -     benzonatate (TESSALON PERLES) 100 mg capsule; Take 1 capsule (100 mg total) by mouth  every 8 (eight) hours  -     fluticasone (Flovent HFA) 110 MCG/ACT inhaler; Inhale 2 puffs 2 (two) times a day Rinse mouth after use.  -     cetirizine (ZyrTEC) 10 mg tablet; Take 1 tablet (10 mg total) by mouth daily  5. Bilateral impacted cerumen  Assessment & Plan:  Discussed treatment options. Patient agreeable to irrigation. Patient tolerated well. Discussed aftercare and ways to prevent in the future.   Recheck after complete irrigation revealed normal ear canals and TMs bilaterally.  Orders:  -     Ear cerumen removal  6. Encounter for immunization  Assessment & Plan:  Agreeable to Tdap and Shingrix today. Tolerated well. Second Shingrix in 2-6 months.   Orders:  -     TDAP VACCINE GREATER THAN OR EQUAL TO 8YO IM  -     Zoster Vaccine Recombinant IM  7. Encounter for colorectal cancer screening  Assessment & Plan:  Due for colorectal cancer screening. Referral placed.   8. Vitamin D deficiency  Assessment & Plan:  Will recheck vitamin D.   9. Overweight with body mass index (BMI) of 27 to 27.9 in adult    BMI Counseling: Body mass index is 27.92 kg/m². The BMI is above normal. Nutrition recommendations include decreasing portion sizes, encouraging healthy choices of fruits and vegetables, decreasing fast food intake, consuming healthier snacks, limiting drinks that contain sugar, moderation in carbohydrate intake, increasing intake of lean protein, reducing intake of saturated and trans fat and reducing intake of cholesterol. Exercise recommendations include moderate physical activity 150 minutes/week, exercising 3-5 times per week and strength training exercises. No pharmacotherapy was ordered. Rationale for BMI follow-up plan is due to patient being overweight or obese.       History of Present Illness     Patient is a 53 year old male presenting for ear lavage. States his ears have been clogged for awhile, unsure how long. Admits to difficulty hearing. Denies pain. Denies tinnitus. Denies otorrhea. No  other associated symptoms. States he usually has his ears cleaned a couple times a year.   Also states he has had a persistent dry cough. Unsure how long. Denies fever of chills. Denies sore throat. Denies difficulty swallowing. Denies wheezing or SOB. He admits he has forgotten to use his inhalers. No other associated symptoms.         Review of Systems   Constitutional:  Negative for appetite change, chills, diaphoresis, fatigue, fever and unexpected weight change.   HENT:  Negative for congestion, dental problem, ear discharge, ear pain, hearing loss, postnasal drip, rhinorrhea, sinus pressure, sinus pain, sneezing, sore throat, tinnitus and trouble swallowing.    Eyes:  Negative for visual disturbance.   Respiratory:  Positive for cough. Negative for chest tightness, shortness of breath and wheezing.    Cardiovascular:  Negative for chest pain, palpitations and leg swelling.   Gastrointestinal:  Negative for abdominal pain, blood in stool, constipation, diarrhea, nausea and vomiting.   Endocrine: Negative for cold intolerance, heat intolerance, polydipsia, polyphagia and polyuria.   Genitourinary:  Negative for decreased urine volume, difficulty urinating, dysuria, flank pain, frequency, hematuria and urgency.   Musculoskeletal:  Negative for arthralgias and myalgias.   Skin:  Negative for rash.   Neurological:  Negative for dizziness, tremors, weakness, light-headedness, numbness and headaches.   Hematological:  Negative for adenopathy.     Past Medical History   Past Medical History:   Diagnosis Date    Arthritis     Chronic cough     Disease of thyroid gland     Hypoparathyroidism (HCC)     continue taking calcium and vitamin D, will check CMP, PTH level and Vitamin D level    Pneumonia of right middle lobe due to Streptococcus pneumoniae ([facility]) 11/30/2018     Past Surgical History:   Procedure Laterality Date    FRACTURE SURGERY      Open Treatment of Each Proximal Finger Phalanx     Family History   Problem  Relation Age of Onset    No Known Problems Mother     No Known Problems Father     No Known Problems Sister     No Known Problems Brother     No Known Problems Son     Learning disabilities Daughter     Asthma Daughter     Seizures Daughter     No Known Problems Maternal Grandmother     No Known Problems Maternal Grandfather     No Known Problems Paternal Grandmother     No Known Problems Paternal Grandfather     No Known Problems Maternal Aunt     No Known Problems Maternal Uncle     No Known Problems Paternal Aunt     No Known Problems Paternal Uncle     No Known Problems Cousin      Current Outpatient Medications on File Prior to Visit   Medication Sig Dispense Refill    acetaminophen (TYLENOL) 650 mg CR tablet Take 1 tablet (650 mg total) by mouth every 8 (eight) hours as needed for mild pain 90 tablet 3    albuterol (2.5 mg/3 mL) 0.083 % nebulizer solution Take 3 mL (2.5 mg total) by nebulization every 6 (six) hours as needed for wheezing or shortness of breath 5 mL 0    cholecalciferol (VITAMIN D3) 25 mcg (1,000 units) tablet TAKE 1 TABLET BY MOUTH EVERY DAY 90 tablet 3    Diclofenac Sodium (VOLTAREN) 1 % Apply 2 g topically 4 (four) times a day 2 g 3    hydrocortisone 1 % ointment Apply topically 2 (two) times a day 30 g 0    [DISCONTINUED] albuterol (Ventolin HFA) 90 mcg/act inhaler Inhale 2 puffs every 4 (four) hours as needed for wheezing 8 g 3    [DISCONTINUED] benzonatate (TESSALON PERLES) 100 mg capsule Take 1 capsule (100 mg total) by mouth every 8 (eight) hours 90 capsule 1    [DISCONTINUED] cetirizine (ZyrTEC) 10 mg tablet Take 1 tablet (10 mg total) by mouth daily 90 tablet 3    [DISCONTINUED] fluticasone (Flovent HFA) 110 MCG/ACT inhaler Inhale 2 puffs 2 (two) times a day Rinse mouth after use. 12 g 5    [DISCONTINUED] levothyroxine 125 mcg tablet One tablet daily 90 tablet 3     No current facility-administered medications on file prior to visit.     Allergies   Allergen Reactions    Chlorine Rash  "     Current Outpatient Medications on File Prior to Visit   Medication Sig Dispense Refill    acetaminophen (TYLENOL) 650 mg CR tablet Take 1 tablet (650 mg total) by mouth every 8 (eight) hours as needed for mild pain 90 tablet 3    albuterol (2.5 mg/3 mL) 0.083 % nebulizer solution Take 3 mL (2.5 mg total) by nebulization every 6 (six) hours as needed for wheezing or shortness of breath 5 mL 0    cholecalciferol (VITAMIN D3) 25 mcg (1,000 units) tablet TAKE 1 TABLET BY MOUTH EVERY DAY 90 tablet 3    Diclofenac Sodium (VOLTAREN) 1 % Apply 2 g topically 4 (four) times a day 2 g 3    hydrocortisone 1 % ointment Apply topically 2 (two) times a day 30 g 0    [DISCONTINUED] albuterol (Ventolin HFA) 90 mcg/act inhaler Inhale 2 puffs every 4 (four) hours as needed for wheezing 8 g 3    [DISCONTINUED] benzonatate (TESSALON PERLES) 100 mg capsule Take 1 capsule (100 mg total) by mouth every 8 (eight) hours 90 capsule 1    [DISCONTINUED] cetirizine (ZyrTEC) 10 mg tablet Take 1 tablet (10 mg total) by mouth daily 90 tablet 3    [DISCONTINUED] fluticasone (Flovent HFA) 110 MCG/ACT inhaler Inhale 2 puffs 2 (two) times a day Rinse mouth after use. 12 g 5    [DISCONTINUED] levothyroxine 125 mcg tablet One tablet daily 90 tablet 3     No current facility-administered medications on file prior to visit.      Social History     Tobacco Use    Smoking status: Former    Smokeless tobacco: Never    Tobacco comments:     pt \"tried it when he was a teenager but did not like them\"   Vaping Use    Vaping status: Never Used   Substance and Sexual Activity    Alcohol use: Yes     Comment: occasionally    Drug use: No    Sexual activity: Not Currently     Objective     /60 (BP Location: Right arm, Patient Position: Sitting, Cuff Size: Large)   Pulse 88   Temp 98.1 °F (36.7 °C) (Temporal)   Ht 5' 6\" (1.676 m)   Wt 78.5 kg (173 lb)   BMI 27.92 kg/m²     Physical Exam  Vitals and nursing note reviewed.   Constitutional:       " General: He is awake. He is not in acute distress.     Appearance: Normal appearance. He is well-developed, well-groomed and overweight. He is not ill-appearing.   HENT:      Head: Normocephalic and atraumatic.      Right Ear: Hearing and external ear normal. There is impacted cerumen.      Left Ear: Hearing and external ear normal. There is impacted cerumen.      Nose: Nose normal. No congestion or rhinorrhea.      Mouth/Throat:      Lips: Pink.      Mouth: Mucous membranes are moist.      Pharynx: Oropharynx is clear. Uvula midline. No oropharyngeal exudate or posterior oropharyngeal erythema.   Eyes:      General: Lids are normal. No scleral icterus.     Extraocular Movements: Extraocular movements intact.      Conjunctiva/sclera: Conjunctivae normal.      Pupils: Pupils are equal, round, and reactive to light.   Cardiovascular:      Rate and Rhythm: Normal rate and regular rhythm.      Pulses: Normal pulses.      Heart sounds: Normal heart sounds. No murmur heard.  Pulmonary:      Effort: Pulmonary effort is normal. No respiratory distress.      Breath sounds: Normal breath sounds and air entry. No decreased air movement. No decreased breath sounds, wheezing, rhonchi or rales.   Abdominal:      General: Abdomen is flat. Bowel sounds are normal. There is no distension.      Palpations: Abdomen is soft. There is no mass.      Tenderness: There is no abdominal tenderness. There is no right CVA tenderness, left CVA tenderness, guarding or rebound.      Hernia: No hernia is present.   Musculoskeletal:         General: Normal range of motion.      Cervical back: Full passive range of motion without pain, normal range of motion and neck supple.      Right lower leg: No edema.      Left lower leg: No edema.   Lymphadenopathy:      Cervical: No cervical adenopathy.   Skin:     General: Skin is warm.      Capillary Refill: Capillary refill takes less than 2 seconds.      Coloration: Skin is not jaundiced.      Findings:  No rash.   Neurological:      General: No focal deficit present.      Mental Status: He is alert and oriented to person, place, and time. Mental status is at baseline.      Sensory: Sensation is intact.      Motor: Motor function is intact.      Coordination: Coordination is intact.      Gait: Gait is intact.   Psychiatric:         Attention and Perception: Attention normal.         Mood and Affect: Mood and affect normal.         Speech: Speech normal.         Behavior: Behavior normal. Behavior is cooperative.         Ear cerumen removal    Date/Time: 5/29/2024 3:00 PM    Performed by: Joceline Shook PA-C  Authorized by: Joceline Shook PA-C  Universal Protocol:  Consent: Verbal consent obtained.  Risks and benefits: risks, benefits and alternatives were discussed  Consent given by: patient  Patient understanding: patient states understanding of the procedure being performed  Patient identity confirmed: verbally with patient    Patient location:  Clinic  Indications / Diagnosis:  Cerumen impaction bilaterally  Procedure details:     Local anesthetic:  None    Location:  R ear and L ear    Procedure type: irrigation with instrumentation      Instrumentation: curette and forceps      Approach:  External  Post-procedure details:     Complication:  None    Hearing quality:  Improved    Patient tolerance of procedure:  Tolerated well, no immediate complications     Administrative Statements   I have spent a total time of 40 minutes on 05/29/24 In caring for this patient including Diagnostic results, Prognosis, Risks and benefits of tx options, Instructions for management, Patient and family education, Importance of tx compliance, Risk factor reductions, Impressions, Counseling / Coordination of care, Reviewing / ordering tests, medicine, procedures  , and Obtaining or reviewing history  .

## 2024-05-30 NOTE — ASSESSMENT & PLAN NOTE
"OB Visit    Dain Tejeda is a 33 year old  female who presents to clinic for a follow up OB visit. She is currently 12w4d with an estimated date of delivery of 2017 via sure LMP.   She denies headache, chest pain, shortness of breath, abdominal pain/contractions, vaginal bleeding, or abnormal discharge.     She has not felt baby move.     New concerns today: Still throwing up. Varies. Using Zofran 1x/day- in the mornings. Didn't realize she could take it more than once a day. Drinks plently of fluids.     Obstetric History       T0      TAB0   SAB0   E0   M0   L0       # Outcome Date GA Lbr Dontae/2nd Weight Sex Delivery Anes PTL Lv   1 Current                   Physical exam:  BP 99/65 (BP Location: Right arm, Patient Position: Chair, Cuff Size: Adult Regular)  Pulse 81  Temp 98.1  F (36.7  C) (Oral)  Resp 16  Ht 4' 11.45\" (151 cm)  Wt 119 lb 9.6 oz (54.3 kg)  LMP 2017  SpO2 99%  BMI 23.79 kg/m2    General: alert, female in no acute distress  Abdomen: gravid uterus appropriate for gestation age above pubic symphysis, non-tender, FHTs 176  Extremities: no edema    Assessment/Plan:    12w4d    1. Nausea/vomiting in pregnancy  Continuing her zofran- discussed to increase this to TID PRN.    2. Supervision of high-risk pregnancy of elderly primigravida  - Reviewed pre- labs.  - Reviewed genetic screening options in light of her AMA, including false positive rate of 5% with screening tests and diagnostic options (chorionic villus sampling, amniocentesis), and patient declines.  - Continued recommendation for breastfeeding.  - Discussed warning signs to watch for and expectations for second trimester.     3. Screening for cervical cancer- ASCUS, HPV+ 2017  - Discussed ASCUS and HPV+ on her pap/cotesting from 17. Colposcopy is indicated but she wishes to defer to postpartum period. Will discuss again at 6wk check up.     4. Anemia of Pregnancy  - Hb 10.4, discussed high iron " Will consider ordering PFTS. Restart Flovent 110 mcg 2 puffs BID. Rinse mouth after use. Albuterol as needed. Tessalon Perles as needed. Ensure adequate hydration and rest. ER precautions given. Return to care if symptoms worsen or fail to improve.    containing foods.   - Will start iron supplement once N/V resolves  - Continue prenatal with iron    Follow up in 2 weeks for routine prenatal visit, vomiting f/u    Cyndie Jenkins MD  [facility] Resident  Pager# [phone]    Precepted with: Shannan Ulloa MD

## 2024-05-30 NOTE — ASSESSMENT & PLAN NOTE
Lab Results   Component Value Date    [id number] 0.030 (L) 05/02/2023      Over due for repeat thyroid labs. Reminded patient again to complete lab work. Continue levothyroxine 125 mcg daily.

## 2024-05-30 NOTE — ASSESSMENT & PLAN NOTE
Lab Results   Component Value Date    CHOLESTEROL 253 (H) 05/02/2023    CHOLESTEROL 202 (H) 01/19/2021    CHOLESTEROL 280 (H) 07/22/2020     Lab Results   Component Value Date    HDL 46 05/02/2023    HDL 36 (L) 01/19/2021    HDL 46 07/22/2020     Lab Results   Component Value Date    TRIG 188 (H) 05/02/2023    TRIG 88 01/19/2021    TRIG 120 07/22/2020     Lab Results   Component Value Date    NONHDLC 207 05/02/2023    NONHDLC 146 06/27/2019      Lab Results   Component Value Date    LDLCALC 169 (H) 05/02/2023     Due for repeat lipid panel. Encouraged low fat diet. No current medication regimen. ASCVD risk 5.5%

## 2024-06-15 ENCOUNTER — APPOINTMENT (EMERGENCY)
Dept: RADIOLOGY | Facility: HOSPITAL | Age: 54
DRG: 912 | End: 2024-06-15
Payer: COMMERCIAL

## 2024-06-15 ENCOUNTER — HOSPITAL ENCOUNTER (INPATIENT)
Facility: HOSPITAL | Age: 54
LOS: 27 days | DRG: 912 | End: 2024-07-12
Attending: EMERGENCY MEDICINE | Admitting: SURGERY
Payer: COMMERCIAL

## 2024-06-15 ENCOUNTER — APPOINTMENT (OUTPATIENT)
Dept: RADIOLOGY | Facility: HOSPITAL | Age: 54
DRG: 912 | End: 2024-06-15
Payer: COMMERCIAL

## 2024-06-15 DIAGNOSIS — M48.02 CERVICAL STENOSIS OF SPINE: ICD-10-CM

## 2024-06-15 DIAGNOSIS — E87.1 HYPONATREMIA: ICD-10-CM

## 2024-06-15 DIAGNOSIS — S06.9XAA TBI (TRAUMATIC BRAIN INJURY) (HCC): ICD-10-CM

## 2024-06-15 DIAGNOSIS — M54.16 LUMBAR RADICULOPATHY: ICD-10-CM

## 2024-06-15 DIAGNOSIS — S06.5XAA SUBDURAL HEMATOMA (HCC): ICD-10-CM

## 2024-06-15 DIAGNOSIS — S09.90XA CLOSED HEAD INJURY, INITIAL ENCOUNTER: Primary | ICD-10-CM

## 2024-06-15 DIAGNOSIS — R55 SYNCOPE, UNSPECIFIED SYNCOPE TYPE: ICD-10-CM

## 2024-06-15 DIAGNOSIS — Z98.890 POSTOPERATIVE STATE: ICD-10-CM

## 2024-06-15 DIAGNOSIS — I49.5 TACHY-BRADY SYNDROME (HCC): ICD-10-CM
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
## 2024-06-15 DIAGNOSIS — I60.9 SUBARACHNOID HEMORRHAGE (HCC): ICD-10-CM

## 2024-06-15 DIAGNOSIS — R13.10 DYSPHAGIA, UNSPECIFIED TYPE: ICD-10-CM

## 2024-06-15 DIAGNOSIS — R55 SYNCOPE AND COLLAPSE: ICD-10-CM

## 2024-06-15 DIAGNOSIS — R29.898 RIGHT HAND WEAKNESS: ICD-10-CM

## 2024-06-15 PROBLEM — M79.642 BILATERAL HAND PAIN: Status: ACTIVE | Noted: 2024-06-15

## 2024-06-15 PROBLEM — M79.641 BILATERAL HAND PAIN: Status: ACTIVE | Noted: 2024-06-15

## 2024-06-15 LAB
2HR DELTA HS TROPONIN: -1 NG/L
4HR DELTA HS TROPONIN: 0 NG/L
ALBUMIN SERPL BCP-MCNC: 3.5 G/DL (ref 3.5–5)
ALP SERPL-CCNC: 80 U/L (ref 34–104)
ALT SERPL W P-5'-P-CCNC: 26 U/L (ref 7–52)
ANION GAP SERPL CALCULATED.3IONS-SCNC: 8 MMOL/L (ref 4–13)
AST SERPL W P-5'-P-CCNC: 31 U/L (ref 13–39)
BASOPHILS # BLD AUTO: 0.06 THOUSANDS/ÂΜL (ref 0–0.1)
BASOPHILS NFR BLD AUTO: 1 % (ref 0–1)
BILIRUB SERPL-MCNC: 0.45 MG/DL (ref 0.2–1)
BUN SERPL-MCNC: 13 MG/DL (ref 5–25)
CALCIUM SERPL-MCNC: 6.6 MG/DL (ref 8.4–10.2)
CARDIAC TROPONIN I PNL SERPL HS: 3 NG/L
CARDIAC TROPONIN I PNL SERPL HS: 4 NG/L
CARDIAC TROPONIN I PNL SERPL HS: 4 NG/L
CHLORIDE SERPL-SCNC: 101 MMOL/L (ref 96–108)
CO2 SERPL-SCNC: 27 MMOL/L (ref 21–32)
CREAT SERPL-MCNC: 0.76 MG/DL (ref 0.6–1.3)
EOSINOPHIL # BLD AUTO: 0.02 THOUSAND/ÂΜL (ref 0–0.61)
EOSINOPHIL NFR BLD AUTO: 0 % (ref 0–6)
ERYTHROCYTE [DISTWIDTH] IN BLOOD BY AUTOMATED COUNT: 13.3 % (ref 11.6–15.1)
GFR SERPL CREATININE-BSD FRML MDRD: 102 ML/MIN/1.73SQ M
GLUCOSE SERPL-MCNC: 123 MG/DL (ref 65–140)
HCT VFR BLD AUTO: 40 % (ref 36.5–49.3)
HGB BLD-MCNC: 13.3 G/DL (ref 12–17)
IMM GRANULOCYTES # BLD AUTO: 0.1 THOUSAND/UL (ref 0–0.2)
IMM GRANULOCYTES NFR BLD AUTO: 1 % (ref 0–2)
LYMPHOCYTES # BLD AUTO: 0.99 THOUSANDS/ÂΜL (ref 0.6–4.47)
LYMPHOCYTES NFR BLD AUTO: 9 % (ref 14–44)
MCH RBC QN AUTO: 31.4 PG (ref 26.8–34.3)
MCHC RBC AUTO-ENTMCNC: 33.3 G/DL (ref 31.4–37.4)
MCV RBC AUTO: 94 FL (ref 82–98)
MONOCYTES # BLD AUTO: 0.73 THOUSAND/ÂΜL (ref 0.17–1.22)
MONOCYTES NFR BLD AUTO: 7 % (ref 4–12)
NEUTROPHILS # BLD AUTO: 9.21 THOUSANDS/ÂΜL (ref 1.85–7.62)
NEUTS SEG NFR BLD AUTO: 82 % (ref 43–75)
NRBC BLD AUTO-RTO: 0 /100 WBCS
PLATELET # BLD AUTO: 160 THOUSANDS/UL (ref 149–390)
PMV BLD AUTO: 10.2 FL (ref 8.9–12.7)
POTASSIUM SERPL-SCNC: 3.6 MMOL/L (ref 3.5–5.3)
PROT SERPL-MCNC: 6.7 G/DL (ref 6.4–8.4)
RBC # BLD AUTO: 4.24 MILLION/UL (ref 3.88–5.62)
SODIUM SERPL-SCNC: 136 MMOL/L (ref 135–147)
T4 FREE SERPL-MCNC: 1.4 NG/DL (ref 0.61–1.12)
TSH SERPL DL<=0.05 MIU/L-ACNC: 14.97 UIU/ML (ref 0.45–4.5)
WBC # BLD AUTO: 11.11 THOUSAND/UL (ref 4.31–10.16)

## 2024-06-15 PROCEDURE — 84484 ASSAY OF TROPONIN QUANT: CPT | Performed by: PHYSICIAN ASSISTANT

## 2024-06-15 PROCEDURE — 84484 ASSAY OF TROPONIN QUANT: CPT

## 2024-06-15 PROCEDURE — 99223 1ST HOSP IP/OBS HIGH 75: CPT | Performed by: SURGERY

## 2024-06-15 PROCEDURE — 84439 ASSAY OF FREE THYROXINE: CPT

## 2024-06-15 PROCEDURE — 73130 X-RAY EXAM OF HAND: CPT

## 2024-06-15 PROCEDURE — 72125 CT NECK SPINE W/O DYE: CPT

## 2024-06-15 PROCEDURE — 80053 COMPREHEN METABOLIC PANEL: CPT

## 2024-06-15 PROCEDURE — 72128 CT CHEST SPINE W/O DYE: CPT

## 2024-06-15 PROCEDURE — 99223 1ST HOSP IP/OBS HIGH 75: CPT | Performed by: EMERGENCY MEDICINE

## 2024-06-15 PROCEDURE — 36415 COLL VENOUS BLD VENIPUNCTURE: CPT

## 2024-06-15 PROCEDURE — 99291 CRITICAL CARE FIRST HOUR: CPT | Performed by: EMERGENCY MEDICINE

## 2024-06-15 PROCEDURE — 99284 EMERGENCY DEPT VISIT MOD MDM: CPT

## 2024-06-15 PROCEDURE — 99255 IP/OBS CONSLTJ NEW/EST HI 80: CPT | Performed by: NURSE PRACTITIONER

## 2024-06-15 PROCEDURE — 93005 ELECTROCARDIOGRAM TRACING: CPT

## 2024-06-15 PROCEDURE — NC001 PR NO CHARGE: Performed by: NURSE PRACTITIONER

## 2024-06-15 PROCEDURE — 84443 ASSAY THYROID STIM HORMONE: CPT

## 2024-06-15 PROCEDURE — 70450 CT HEAD/BRAIN W/O DYE: CPT

## 2024-06-15 PROCEDURE — 70496 CT ANGIOGRAPHY HEAD: CPT

## 2024-06-15 PROCEDURE — 72141 MRI NECK SPINE W/O DYE: CPT

## 2024-06-15 PROCEDURE — 85025 COMPLETE CBC W/AUTO DIFF WBC: CPT

## 2024-06-15 RX ORDER — ALBUTEROL SULFATE 2.5 MG/3ML
2.5 SOLUTION RESPIRATORY (INHALATION) EVERY 6 HOURS PRN
Status: DISCONTINUED | OUTPATIENT
Start: 2024-06-15 | End: 2024-06-17

## 2024-06-15 RX ORDER — HYDROMORPHONE HCL IN WATER/PF 6 MG/30 ML
0.2 PATIENT CONTROLLED ANALGESIA SYRINGE INTRAVENOUS EVERY 2 HOUR PRN
Status: DISCONTINUED | OUTPATIENT
Start: 2024-06-15 | End: 2024-07-12 | Stop reason: HOSPADM

## 2024-06-15 RX ORDER — GABAPENTIN 100 MG/1
100 CAPSULE ORAL 3 TIMES DAILY
Status: DISCONTINUED | OUTPATIENT
Start: 2024-06-15 | End: 2024-06-30

## 2024-06-15 RX ORDER — METHOCARBAMOL 500 MG/1
500 TABLET, FILM COATED ORAL EVERY 6 HOURS SCHEDULED
Status: DISCONTINUED | OUTPATIENT
Start: 2024-06-15 | End: 2024-06-30

## 2024-06-15 RX ORDER — LEVETIRACETAM 500 MG/1
500 TABLET ORAL 2 TIMES DAILY
Status: DISCONTINUED | OUTPATIENT
Start: 2024-06-15 | End: 2024-06-20

## 2024-06-15 RX ORDER — ACETAMINOPHEN 325 MG/1
650 TABLET ORAL ONCE
Status: COMPLETED | OUTPATIENT
Start: 2024-06-15 | End: 2024-06-15

## 2024-06-15 RX ORDER — OXYCODONE HYDROCHLORIDE 5 MG/1
5 TABLET ORAL EVERY 4 HOURS PRN
Status: DISCONTINUED | OUTPATIENT
Start: 2024-06-15 | End: 2024-06-30

## 2024-06-15 RX ORDER — LEVOTHYROXINE SODIUM 0.12 MG/1
125 TABLET ORAL
Status: DISCONTINUED | OUTPATIENT
Start: 2024-06-15 | End: 2024-06-30

## 2024-06-15 RX ORDER — POLYETHYLENE GLYCOL 3350 17 G/17G
17 POWDER, FOR SOLUTION ORAL DAILY
Status: DISCONTINUED | OUTPATIENT
Start: 2024-06-15 | End: 2024-07-10

## 2024-06-15 RX ORDER — ALBUTEROL SULFATE 90 UG/1
2 AEROSOL, METERED RESPIRATORY (INHALATION) EVERY 4 HOURS PRN
Status: DISCONTINUED | OUTPATIENT
Start: 2024-06-15 | End: 2024-07-12 | Stop reason: HOSPADM

## 2024-06-15 RX ORDER — ONDANSETRON 2 MG/ML
4 INJECTION INTRAMUSCULAR; INTRAVENOUS EVERY 4 HOURS PRN
Status: DISCONTINUED | OUTPATIENT
Start: 2024-06-15 | End: 2024-07-12 | Stop reason: HOSPADM

## 2024-06-15 RX ORDER — ACETAMINOPHEN 325 MG/1
975 TABLET ORAL EVERY 8 HOURS SCHEDULED
Status: DISCONTINUED | OUTPATIENT
Start: 2024-06-15 | End: 2024-06-30

## 2024-06-15 RX ORDER — AMOXICILLIN 250 MG
1 CAPSULE ORAL
Status: DISCONTINUED | OUTPATIENT
Start: 2024-06-15 | End: 2024-06-22

## 2024-06-15 RX ADMIN — METHOCARBAMOL 500 MG: 500 TABLET ORAL at 23:26

## 2024-06-15 RX ADMIN — ACETAMINOPHEN 975 MG: 325 TABLET, FILM COATED ORAL at 18:02

## 2024-06-15 RX ADMIN — METHOCARBAMOL 500 MG: 500 TABLET ORAL at 18:02

## 2024-06-15 RX ADMIN — Medication 1000 UNITS: at 14:08

## 2024-06-15 RX ADMIN — METHOCARBAMOL 500 MG: 500 TABLET ORAL at 14:08

## 2024-06-15 RX ADMIN — ACETAMINOPHEN 650 MG: 325 TABLET, FILM COATED ORAL at 10:54

## 2024-06-15 RX ADMIN — IOHEXOL 85 ML: 350 INJECTION, SOLUTION INTRAVENOUS at 10:37

## 2024-06-15 RX ADMIN — LEVETIRACETAM 500 MG: 500 TABLET, FILM COATED ORAL at 14:08

## 2024-06-15 RX ADMIN — OXYCODONE HYDROCHLORIDE 5 MG: 5 TABLET ORAL at 14:17

## 2024-06-15 RX ADMIN — LEVOTHYROXINE SODIUM 125 MCG: 125 TABLET ORAL at 14:08

## 2024-06-15 RX ADMIN — GABAPENTIN 100 MG: 100 CAPSULE ORAL at 21:05

## 2024-06-15 RX ADMIN — GABAPENTIN 100 MG: 100 CAPSULE ORAL at 14:08

## 2024-06-15 NOTE — ASSESSMENT & PLAN NOTE
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  - CT scan of the head from 6/15/2024 demonstrated: New acute trace subarachnoid hemorrhage in bilateral parafalcine regions. New acute trace parafalcine subdural hematoma.  -  Additional imaging with CTA of the head and neck 6/15/2024 demonstrated: Negative CTA head traumatic vascular injury, aneurysm, large vessel occlusion, high-grade stenosis, or dissection. Partially imaged ectatic right carotid bifurcation and proximal cervical internal carotid artery with retropharyngeal course, similar to CT neck with contrast 10/12/2011.  - Admit as level 2 step-down with HOT protocol.  The patient may require ICU level of care during hospital encounter if the patient has clinical decompensation or worsening of traumatic brain injury.  - Neurosurgery evaluation and recommendations appreciated.  - Hold anti-platelet and anticoagulant medications for least 2 weeks and/or until cleared by Neurosurgery.   - Patient is not on any chronic antiplatelet or anticoagulant medications at baseline.  - Continue Keppra for 7 days for seizure prophylaxis.  - Monitor neurologic exam.  - Continue symptomatic management with analgesia as needed.  - Tentative plan for repeat CT scan of the head in 12-24 hours or sooner if patient has clinical decline or drop in GCS > or equal to 2.  - PT and OT evaluation and treatment as indicated.   Russel Spear MD, PhD  [facility] cardiology  Cardiology clinic note  [facility] heart specialists    Subjective:     Encounter Date:11/01/2019      Patient ID: Kathy Ramirez is a 64 y.o. female.    Chief Complaint:  No chief complaint on file.      HPI:  History of Present Illness  At the pleasure seeing this very pleasant 64-year-old female who is here from primary care Lucy Turcios MD.  She is referred with family history of coronary artery disease as well as comorbidities of hypertension hyperlipidemia and diabetes for establishment of care.  She does complain of intermittent presyncope when she stands up most consistent with orthostatic hypotension.  Blood pressure today is 110/66 with a heart rate of 70s regular.  She also has a history of hyperlipidemia and is intermittently compliant with atorvastatin given severe myalgias and muscle aches.  She has not tried many other statins.  We did discuss alternatives today and she is open to changes.  She is largely sedentary lifestyle and at 170 pounds above her goal body weight.  We discussed diet and exercise extensively today.  She denies overt chest pain but does have dyspnea on exertion NYHA class II symptoms.  No history of overt CAD or PAD.  No history of stroke.  Essentially no other complaints.  She needs refills of her hydrochlorothiazide today.        Review of systems x14 point review of systems is negative except was mentioned above.  She denies heart failure signs or symptoms as well.      The following portions of the patient's history were reviewed and updated as appropriate: allergies, current medications, past family history, past medical history, past social history, past surgical history and problem list.    Problem List:  Patient Active Problem List   Diagnosis   • Lumbar canal stenosis   • Lumbar radiculopathy   • Diabetes mellitus (CMS/HCC)   • Hypertension   • Rheumatoid arthritis (CMS/HCC)   • Cervical radiculitis   • Acute pain of left shoulder    • Family history of early CAD   • Hyperlipidemia LDL goal <100   • Orthostasis       Past Medical History:  Past Medical History:   Diagnosis Date   • DDD (degenerative disc disease), lumbar    • Diabetes mellitus (CMS/HCC)     TYPE 2   • Dysrhythmia    • GERD (gastroesophageal reflux disease)    • Herpes     GETS FEVER BLISTERS AT TIMES   • History of hepatitis A        • History of transfusion    • Hyperlipidemia    • Hypertension    • IBS (irritable bowel syndrome)    • Lumbar canal stenosis    • Lumbar radiculopathy    • PONV (postoperative nausea and vomiting)    • Psoriasis    • Rheumatoid arthritis (CMS/HCC)    • RLS (restless legs syndrome)    • Sinus tachycardia        Past Surgical History:  Past Surgical History:   Procedure Laterality Date   • ANTERIOR CERVICAL DISCECTOMY W/ FUSION N/A 2018    Procedure: C5 6 anterior cervical discectomy, fusion and instrumentation;  Surgeon: Robbie Rahman MD;  Location: [facility];  Service: Neurosurgery   • APPENDECTOMY     •  SECTION      5 csection   • CHOLECYSTECTOMY     • COLONOSCOPY     • HYSTERECTOMY     • KNEE ARTHROPLASTY Left    • KNEE ARTHROPLASTY Right    • WISDOM TOOTH EXTRACTION         Social History:  Social History     Socioeconomic History   • Marital status:      Spouse name: Not on file   • Number of children: 5   • Years of education: MA   • Highest education level: Not on file   Occupational History   • Occupation: Cancer    Tobacco Use   • Smoking status: Never Smoker   • Smokeless tobacco: Never Used   Substance and Sexual Activity   • Alcohol use: Yes     Comment: rare   • Drug use: No   • Sexual activity: Defer       Allergies:  No Known Allergies    Immunizations:    There is no immunization history on file for this patient.    ROS:  ROS       Objective:       Blood pressure 110/66 heart rate 78 weight 170 pounds 5 feet 3 tall respirations 18-20  There were no  vitals taken for this visit.    Physical Exam  No acute distress alert and oriented x3 afebrile vital signs stable  Trachea midline neck supple no carotid bruits no JVD  Regular rate and rhythm no rubs murmurs or gallops  Clear to auscultation bilaterally  Abdomen obese soft nontender nondistended bowel sounds positive  No clubbing cyanosis or edema noted  Cap refill less than 2 seconds  Skin warm and dry  No bony ab normalities grossly  Neurologic grossly intact bilaterally  Normal mood and affect    Vitals reviewed above  In-Office Procedure(s):  Procedures    ASCVD RIsk Score::  The ASCVD Risk score (Yenmadai GALE Jr., et al., 2013) failed to calculate for the following reasons:    Cannot find a previous HDL lab    Cannot find a previous total cholesterol lab    Recent Radiology:  Imaging Results (Most Recent)     None          Lab Review:   No visits with results within 6 Month(s) from this visit.   Latest known visit with results is:   Admission on 07/02/2018, Discharged on 07/04/2018   Component Date Value   • Glucose 07/02/2018 165*   • BUN 07/02/2018 16    • Creatinine 07/02/2018 0.95    • Sodium 07/02/2018 140    • Potassium 07/02/2018 3.7    • Chloride 07/02/2018 103    • CO2 07/02/2018 24.1    • Calcium 07/02/2018 9.0    • eGFR Non  Amer 07/02/2018 59*   • BUN/Creatinine Ratio 07/02/2018 16.8    • Anion Gap 07/02/2018 12.9    • Glucose 07/02/2018 167*   • Glucose 07/02/2018 171*   • Glucose 07/02/2018 174*   • Glucose 07/02/2018 158*   • WBC 07/03/2018 5.43    • RBC 07/03/2018 3.28*   • Hemoglobin 07/03/2018 10.2*   • Hematocrit 07/03/2018 31.4*   • MCV 07/03/2018 95.7    • MCH 07/03/2018 31.1    • MCHC 07/03/2018 32.5    • RDW 07/03/2018 13.3*   • RDW-SD 07/03/2018 46.0    • MPV 07/03/2018 11.6    • Platelets 07/03/2018 186    • Neutrophil % 07/03/2018 50.7    • Lymphocyte % 07/03/2018 33.1    • Monocyte % 07/03/2018 12.3*   • Eosinophil % 07/03/2018 3.5    • Basophil % 07/03/2018 0.4    • Immature  Grans % 07/03/2018 0.2    • Neutrophils, Absolute 07/03/2018 2.75    • Lymphocytes, Absolute 07/03/2018 1.80    • Monocytes, Absolute 07/03/2018 0.67    • Eosinophils, Absolute 07/03/2018 0.19    • Basophils, Absolute 07/03/2018 0.02    • Immature Grans, Absolute 07/03/2018 0.01    • Hemoglobin A1C 07/03/2018 7.70*   • Glucose 07/03/2018 130*   • BUN 07/03/2018 11    • Creatinine 07/03/2018 0.84    • Sodium 07/03/2018 141    • Potassium 07/03/2018 3.6    • Chloride 07/03/2018 106    • CO2 07/03/2018 24.8    • Calcium 07/03/2018 7.5*   • Total Protein 07/03/2018 6.7    • Albumin 07/03/2018 3.50    • ALT (SGPT) 07/03/2018 80*   • AST (SGOT) 07/03/2018 94*   • Alkaline Phosphatase 07/03/2018 75    • Total Bilirubin 07/03/2018 0.7    • eGFR Non  Amer 07/03/2018 68    • Globulin 07/03/2018 3.2    • A/G Ratio 07/03/2018 1.1    • BUN/Creatinine Ratio 07/03/2018 13.1    • Anion Gap 07/03/2018 10.2    • Glucose 07/03/2018 153*   • Glucose 07/03/2018 154*   • Glucose 07/03/2018 130    • Glucose 07/03/2018 214*   • WBC 07/04/2018 7.82    • RBC 07/04/2018 3.41*   • Hemoglobin 07/04/2018 10.5*   • Hematocrit 07/04/2018 33.2*   • MCV 07/04/2018 97.4    • MCH 07/04/2018 30.8    • MCHC 07/04/2018 31.6*   • RDW 07/04/2018 13.4*   • RDW-SD 07/04/2018 46.3    • MPV 07/04/2018 11.1    • Platelets 07/04/2018 183    • Neutrophil % 07/04/2018 61.1    • Lymphocyte % 07/04/2018 24.0    • Monocyte % 07/04/2018 12.8*   • Eosinophil % 07/04/2018 1.5    • Basophil % 07/04/2018 0.3    • Immature Grans % 07/04/2018 0.3    • Neutrophils, Absolute 07/04/2018 4.78    • Lymphocytes, Absolute 07/04/2018 1.88    • Monocytes, Absolute 07/04/2018 1.00    • Eosinophils, Absolute 07/04/2018 0.12    • Basophils, Absolute 07/04/2018 0.02    • Immature Grans, Absolute 07/04/2018 0.02    • Glucose 07/04/2018 162*                Assessment:          Diagnosis Plan   1. Essential hypertension     2. Hyperlipidemia LDL goal <100     3. Orthostasis     4.  Family history of early CAD            Plan:      #1 primary prevention goals for CAD and PAD    Hyperlipidemia, stop atorvastatin start pravastatin 10 mg p.o. daily, fasting lipid panel in 3 months with CMP    Hypertension, refill HCTZ 12.5 p.o. daily    Sedentary lifestyle refer to cardiac rehab if qualified    EKG today for presyncope and dizziness and for baseline    2D echo for dyspnea on exertion as well as presyncope has not had structural functional evaluation.  Will call results to patient and recommend further testing if abnormal    Return to clinic in 3 months with fasting lipid panel and CMP prior to visit.  Will call patient with results of echo    Thank you very much for the referral.  It is a letter to be involved in her cardiac cardiovascular care.  Please call with any questions or concerns  Russel Spear MD PhD                       Russel Spear MD  11/01/19  .

## 2024-06-15 NOTE — ED ATTENDING ATTESTATION
6/15/2024  ISkyler MD, saw and evaluated the patient. I have discussed the patient with the resident/non-physician practitioner and agree with the resident's/non-physician practitioner's findings, Plan of Care, and MDM as documented in the resident's/non-physician practitioner's note, except where noted. All available labs and Radiology studies were reviewed.  I was present for key portions of any procedure(s) performed by the resident/non-physician practitioner and I was immediately available to provide assistance.       At this point I agree with the current assessment done in the Emergency Department.  I have conducted an independent evaluation of this patient a history and physical is as follows:    ED Course         Critical Care Time  CriticalCare Time    Date/Time: 6/15/2024 8:00 PM    Performed by: Skyler Morales MD  Authorized by: Skyler Morales MD    Critical care provider statement:     Critical care time (minutes):  32    Critical care time was exclusive of:  Separately billable procedures and treating other patients and teaching time    Critical care was necessary to treat or prevent imminent or life-threatening deterioration of the following conditions:  CNS failure or compromise    Critical care was time spent personally by me on the following activities:  Obtaining history from patient or surrogate, development of treatment plan with patient or surrogate, evaluation of patient's response to treatment, examination of patient, review of old charts, re-evaluation of patient's condition, ordering and review of radiographic studies, ordering and review of laboratory studies and discussions with consultants      54 yo male states he passed out today while in living room, woke up on floor.  Pt called daughter who called 911.  Pt does not recall events.  Pt c/o back pain and wrist pain.  No fever, no chills, no headache, no cp, no sob, no n/v/d. No abdominal pain. No palpitation.  Pt with hx of  hypoparathyroidism. Vss, afebrile, lungs cta, rrr, abdomen soft nontender, thoracic tenderness, bilateral wrist tenderness, no focal neuro deficits.  Ct head, ct tspine, wrist xrays, cardiac workup

## 2024-06-15 NOTE — H&P
[facility]  H&P  Name: Luis Forrester 53 y.o. male I MRN: [id number]  Unit/Bed#: Z5HA I Date of Admission: 6/15/2024   Date of Service: 6/15/2024 I Hospital Day: 0      Assessment & Plan   Syncope and collapse  Assessment & Plan  - Patient presented after syncope episode.   - Patient reports multiple syncopal episodes totaling at least 3 episodes over the last 6 months.  He reports no preceding symptoms or events and states that these syncope events occur totally at random and unexpectedly.   - He has had no prior workup for syncope.  - EKG from 6/15/2024 reviewed.  - Further workup to include labs including troponins (normal x 2 on 6/15/2024), telemetry monitoring and echocardiogram.  - Check orthostatic vital signs.  - Fall precautions.  - Internal medicine consultation for medication review and assistance with medical management/syncope workup.  - PT and OT evaluation and treatment as indicated.  - Case management consulted for disposition planning.    * TBI (traumatic brain injury) ([facility])  Assessment & Plan  - Traumatic brain injury with new acute appearing trace subarachnoid hemorrhage in the bilateral parafalcine regions as well as a new appearing trace parafalcine subdural hematoma, present on admission.  - CT scan of the head from 6/15/2024 demonstrated: New acute trace subarachnoid hemorrhage in bilateral parafalcine regions. New acute trace parafalcine subdural hematoma.  -  Additional imaging with CTA of the head and neck 6/15/2024 demonstrated: Negative CTA head traumatic vascular injury, aneurysm, large vessel occlusion, high-grade stenosis, or dissection. Partially imaged ectatic right carotid bifurcation and proximal cervical internal carotid artery with retropharyngeal course, similar to CT neck with contrast 10/12/2011.  - Admit as level 2 step-down with HOT protocol.  The patient may require ICU level of care during hospital encounter if the patient has  "clinical decompensation or worsening of traumatic brain injury.  - Neurosurgery evaluation and recommendations appreciated.  - Hold anti-platelet and anticoagulant medications for least 2 weeks and/or until cleared by Neurosurgery.   - Patient is not on any chronic antiplatelet or anticoagulant medications at baseline.  - Continue Keppra for 7 days for seizure prophylaxis.  - Monitor neurologic exam.  - Continue symptomatic management with analgesia as needed.  - Tentative plan for repeat CT scan of the head in 12-24 hours or sooner if patient has clinical decline or drop in GCS > or equal to 2.  - PT and OT evaluation and treatment as indicated.    Bilateral hand pain  Assessment & Plan  - Patient with bilateral hand pain, present on presentation.  - Bilateral hand x-rays from 6/15/2024 reviewed and are negative for acute osseous abnormality.  - May remain weightbearing as tolerated on the bilateral upper extremities and may continue activity as tolerated.  - Multimodal analgesic regimen.  - PT and OT evaluation and treatment as indicated.    Hypothyroidism  Assessment & Plan  - Patient with chronic history of hypothyroidism.  - Continue home medication regimen including levothyroxine.  - Outpatient follow-up routine.           Trauma Alert: Evaluation; trauma team notified at 10:33 AM via text and I arrived at 11:01 AM  Model of Arrival: Self    Trauma Team: Attending Dr. Diamond and AP Yazan Magaña PA-C  Consultants:     Neurosurgery: routine consult; Epic consult order placed;     History of Present Illness     Chief Complaint: \"My hands and back hurt.\"  Mechanism:Fall, Other: Syncope and collapse     HPI:    Luis Forrester is a 53 y.o. male who presents after a syncope episode overnight.  On presentation, he notes pain in both hands as well as his mid to upper back.  He notes that the syncope episodes have happened a few times over the last 6 months.  He notes that they happen unexpectedly and randomly with " no preceding symptoms.  Last night event happened after he returned home around 1 AM.  He blacked out and does not recall how long he was out before being found by his daughter.    Review of Systems   Constitutional: Negative.  Negative for activity change, appetite change, chills, fatigue and fever.   HENT: Negative.  Negative for ear pain, facial swelling, nosebleeds and voice change.    Eyes: Negative.  Negative for photophobia, pain, redness and visual disturbance.   Respiratory: Negative.  Negative for cough, chest tightness, shortness of breath and wheezing.    Cardiovascular: Negative.  Negative for chest pain, palpitations and leg swelling.   Gastrointestinal: Negative.  Negative for abdominal distention, abdominal pain, nausea and vomiting.   Endocrine: Negative.    Genitourinary: Negative.  Negative for dysuria, flank pain, frequency and hematuria.   Musculoskeletal:  Positive for arthralgias (Bilateral hand pain) and back pain (Mid upper back pain). Negative for myalgias and neck pain.   Skin: Negative.  Negative for color change, pallor, rash and wound.   Allergic/Immunologic: Negative.    Neurological:  Positive for syncope. Negative for dizziness, weakness, light-headedness, numbness and headaches.   Hematological: Negative.    Psychiatric/Behavioral: Negative.  Negative for agitation, confusion, self-injury and sleep disturbance. The patient is not nervous/anxious.    All other systems reviewed and are negative.    12-point, complete review of systems was reviewed and negative except as stated above.     Historical Information     Past Medical History:   Diagnosis Date    Arthritis     Chronic cough     Disease of thyroid gland     Hypoparathyroidism (HCC)     continue taking calcium and vitamin D, will check CMP, PTH level and Vitamin D level    Pneumonia of right middle lobe due to Streptococcus pneumoniae (HCC) 11/30/2018     Past Surgical History:   Procedure Laterality Date    FRACTURE SURGERY    "   Open Treatment of Each Proximal Finger Phalanx        Social History     Tobacco Use    Smoking status: Former    Smokeless tobacco: Never    Tobacco comments:     pt \"tried it when he was a teenager but did not like them\"   Vaping Use    Vaping status: Never Used   Substance Use Topics    Alcohol use: Yes     Comment: occasionally    Drug use: No     Immunization History   Administered Date(s) Administered    COVID-19 PFIZER VACCINE 0.3 ML IM 05/17/2021, 06/15/2021, 02/07/2022    INFLUENZA 01/19/2006    Influenza, recombinant, quadrivalent,injectable, preservative free 10/21/2019, 11/16/2020    Influenza, seasonal, injectable 11/18/2015    Influenza, seasonal, injectable, preservative free 03/15/2018    Pneumococcal Polysaccharide PPV23 10/21/2019    TD (adult) Preservative Free 01/20/2009    Tdap 03/15/2018, 05/29/2024    Zoster Vaccine Recombinant 05/29/2024     Last Tetanus: Up-to-date  Family History: Non-contributory     Meds/Allergies   all current active meds have been reviewed     Allergies   Allergen Reactions    Chlorine Rash       Objective   Initial Vitals:   Temperature: 98.4 °F (36.9 °C) (06/15/24 0741)  Pulse: 70 (06/15/24 0741)  Respirations: 16 (06/15/24 0741)  Blood Pressure: 107/57 (06/15/24 0741)    Primary Survey:   Airway:        Status: patent;        Pre-hospital Interventions: none        Hospital Interventions: none  Breathing:        Pre-hospital Interventions: none       Effort: normal       Right breath sounds: normal       Left breath sounds: normal  Circulation:        Rhythm: regular       Rate: regular   Right Pulses Left Pulses    R radial: 2+  R femoral: 2+  R pedal: 2+  R carotid: 2+  R popliteal: 2+ L radial: 2+  L femoral: 2+  L pedal: 2+  L carotid: 2+  L popliteal: 2+   Disability:        GCS: Eye: 4; Verbal: 5 Motor: 6 Total: 15       Right Pupil: 4 mm;  round;  reactive         Left Pupil:  4 mm;  round;  reactive      R Motor Strength L Motor Strength    R : 5/5  R " dorsiflex: 5/5  R plantarflex: 5/5 L : 5/5  L dorsiflex: 5/5  L plantarflex: 5/5        Sensory:  No sensory deficit  Exposure:       Completed: Yes      Secondary Survey:  Physical Exam  Vitals and nursing note reviewed. Exam conducted with a chaperone present.   Constitutional:       General: He is awake. He is not in acute distress.     Appearance: Normal appearance. He is normal weight. He is not ill-appearing, toxic-appearing or diaphoretic.      Interventions: He is not intubated.Cervical collar in place.   HENT:      Head: Normocephalic and atraumatic. No abrasion, contusion or laceration.      Jaw: There is normal jaw occlusion.      Right Ear: Hearing and external ear normal. No swelling or tenderness.      Left Ear: Hearing and external ear normal. No swelling or tenderness.      Nose: Nose normal. No nasal deformity, septal deviation, signs of injury, laceration or nasal tenderness.      Mouth/Throat:      Mouth: Mucous membranes are moist.      Pharynx: Oropharynx is clear.   Eyes:      General: Lids are normal. Vision grossly intact.      Extraocular Movements: Extraocular movements intact.      Conjunctiva/sclera: Conjunctivae normal.      Pupils: Pupils are equal, round, and reactive to light.      Comments: Pupils were 4 mm, equal, round and reactive bilaterally   Cardiovascular:      Rate and Rhythm: Normal rate and regular rhythm.      Pulses: Normal pulses.           Carotid pulses are 2+ on the right side and 2+ on the left side.       Radial pulses are 2+ on the right side and 2+ on the left side.        Femoral pulses are 2+ on the right side and 2+ on the left side.       Dorsalis pedis pulses are 2+ on the right side and 2+ on the left side.      Heart sounds: Normal heart sounds. No murmur heard.     No friction rub. No gallop.   Pulmonary:      Effort: Pulmonary effort is normal. No tachypnea, bradypnea, accessory muscle usage, prolonged expiration, respiratory distress or retractions.  He is not intubated.      Breath sounds: Normal breath sounds and air entry. No stridor or decreased air movement. No decreased breath sounds, wheezing, rhonchi or rales.   Chest:      Chest wall: No lacerations, deformity, swelling, tenderness or crepitus.   Abdominal:      General: Abdomen is flat. Bowel sounds are normal. There is no distension.      Palpations: Abdomen is soft.      Tenderness: There is no abdominal tenderness. There is no guarding or rebound.   Musculoskeletal:         General: No swelling or deformity.      Right hand: Tenderness present. No swelling, deformity or lacerations. Decreased range of motion (Due to pain). Normal sensation. Normal pulse.      Left hand: Tenderness present. No swelling, deformity or lacerations. Decreased range of motion (Due to pain). Normal sensation. Normal pulse.      Cervical back: Neck supple. No swelling, deformity, lacerations or tenderness. No pain with movement, spinous process tenderness or muscular tenderness. Normal range of motion.      Thoracic back: Tenderness present. No swelling, deformity, signs of trauma or lacerations. Decreased range of motion (Secondary to pain).      Lumbar back: No swelling, deformity, signs of trauma, lacerations or tenderness.      Right lower leg: No edema.      Left lower leg: No edema.      Comments: No midline cervical or lumbar spine tenderness, step-offs or deformities.  No paraspinal muscular tenderness in the neck or back.    The patient did have tenderness over his upper thoracic spine without focal/point tenderness or external signs of trauma.  He had diffuse pain across the upper back bilaterally in this region as well    Patient notes pain in his bilateral hands without focal/point tenderness or external signs of trauma.  The remainder of his bilateral upper extremity exams are unremarkable.    Normal range of motion in bilateral lower extremities without pain, tenderness or deformity.     Skin:     General:  Skin is warm and dry.      Capillary Refill: Capillary refill takes less than 2 seconds.      Coloration: Skin is not jaundiced or pale.      Findings: No abrasion, bruising, ecchymosis, erythema, laceration, lesion, rash or wound.   Neurological:      General: No focal deficit present.      Mental Status: He is alert and oriented to person, place, and time. Mental status is at baseline.      GCS: GCS eye subscore is 4. GCS verbal subscore is 5. GCS motor subscore is 6.      Sensory: Sensation is intact. No sensory deficit.      Motor: Motor function is intact. No weakness.   Psychiatric:         Mood and Affect: Mood normal.         Behavior: Behavior is cooperative.         Invasive Devices       Peripheral Intravenous Line  Duration             Peripheral IV 06/15/24 Right Antecubital <1 day                  Lab Results: I have personally reviewed all pertinent laboratory/test results from 06/15/24, including the preceding 24 hours.  Recent Labs     06/15/24  0856 06/15/24  1101   WBC 11.11*  --    HGB 13.3  --    HCT 40.0  --      --    SODIUM 136  --    K 3.6  --      --    CO2 27  --    BUN 13  --    CREATININE 0.76  --    GLUC 123  --    AST 31  --    ALT 26  --    ALB 3.5  --    TBILI 0.45  --    ALKPHOS 80  --    HSTNI0 4  --    HSTNI2  --  3       Imaging Results: I have personally reviewed pertinent images saved in PACS. CT scan findings (and other pertinent positive findings on images) were discussed with radiology. My interpretation of the images/reports are as follows:  Chest Xray(s): N/A   FAST exam(s): N/A   CT Scan(s): positive for acute findings: New acute trace subarachnoid hemorrhage in bilateral parafalcine regions.  New acute trace parafalcine subdural hematoma.    Additional Xray(s): negative for acute findings     Other Studies: Additional workup including echocardiogram pending.    Code Status: Level 1 - Full Code  Advance Directive and Living Will:      Power of :     POLST:

## 2024-06-15 NOTE — ASSESSMENT & PLAN NOTE
- Patient presented after syncope episode.   - Patient reports multiple syncopal episodes totaling at least 3 episodes over the last 6 months.  He reports no preceding symptoms or events and states that these syncope events occur totally at random and unexpectedly.   - He has had no prior workup for syncope.  - EKG from 6/15/2024 reviewed.  - Further workup to include labs including troponins (normal x 2 on 6/15/2024), telemetry monitoring and echocardiogram.  - Check orthostatic vital signs.  - Fall precautions.  - Internal medicine consultation for medication review and assistance with medical management/syncope workup.  - PT and OT evaluation and treatment as indicated.  - Case management consulted for disposition planning.

## 2024-06-15 NOTE — Clinical Note
Case was discussed with trauma and the patient's admission status was agreed to be Admission Status: inpatient status to the service of Dr. Cavazos

## 2024-06-15 NOTE — PLAN OF CARE
Problem: Prexisting or High Potential for Compromised Skin Integrity  Goal: Skin integrity is maintained or improved  Description: INTERVENTIONS:  - Identify patients at risk for skin breakdown  - Assess and monitor skin integrity  - Assess and monitor nutrition and hydration status  - Monitor labs   - Assess for incontinence   - Turn and reposition patient  - Assist with mobility/ambulation  - Relieve pressure over bony prominences  - Avoid friction and shearing  - Provide appropriate hygiene as needed including keeping skin clean and dry  - Evaluate need for skin moisturizer/barrier cream  - Collaborate with interdisciplinary team   - Patient/family teaching  - Consider wound care consult   Outcome: Progressing     Problem: PAIN - ADULT  Goal: Verbalizes/displays adequate comfort level or baseline comfort level  Description: Interventions:  - Encourage patient to monitor pain and request assistance  - Assess pain using appropriate pain scale  - Administer analgesics based on type and severity of pain and evaluate response  - Implement non-pharmacological measures as appropriate and evaluate response  - Consider cultural and social influences on pain and pain management  - Notify physician/advanced practitioner if interventions unsuccessful or patient reports new pain  Outcome: Progressing     Problem: INFECTION - ADULT  Goal: Absence or prevention of progression during hospitalization  Description: INTERVENTIONS:  - Assess and monitor for signs and symptoms of infection  - Monitor lab/diagnostic results  - Monitor all insertion sites, i.e. indwelling lines, tubes, and drains  - Monitor endotracheal if appropriate and nasal secretions for changes in amount and color  - Bates appropriate cooling/warming therapies per order  - Administer medications as ordered  - Instruct and encourage patient and family to use good hand hygiene technique  - Identify and instruct in appropriate isolation precautions for  identified infection/condition  Outcome: Progressing     Problem: SAFETY ADULT  Goal: Patient will remain free of falls  Description: INTERVENTIONS:  - Educate patient/family on patient safety including physical limitations  - Instruct patient to call for assistance with activity   - Consult OT/PT to assist with strengthening/mobility   - Keep Call bell within reach  - Keep bed low and locked with side rails adjusted as appropriate  - Keep care items and personal belongings within reach  - Initiate and maintain comfort rounds  - Make Fall Risk Sign visible to staff  - Apply yellow socks and bracelet for high fall risk patients  - Consider moving patient to room near nurses station  Outcome: Progressing  Goal: Maintain or return to baseline ADL function  Description: INTERVENTIONS:  -  Assess patient's ability to carry out ADLs; assess patient's baseline for ADL function and identify physical deficits which impact ability to perform ADLs (bathing, care of mouth/teeth, toileting, grooming, dressing, etc.)  - Assess/evaluate cause of self-care deficits   - Assess range of motion  - Assess patient's mobility; develop plan if impaired  - Assess patient's need for assistive devices and provide as appropriate  - Encourage maximum independence but intervene and supervise when necessary  - Involve family in performance of ADLs  - Assess for home care needs following discharge   - Consider OT consult to assist with ADL evaluation and planning for discharge  - Provide patient education as appropriate  Outcome: Progressing  Goal: Maintains/Returns to pre admission functional level  Description: INTERVENTIONS:  - Perform AM-PAC 6 Click Basic Mobility/ Daily Activity assessment daily.  - Set and communicate daily mobility goal to care team and patient/family/caregiver.   - Collaborate with rehabilitation services on mobility goals if consulted  - Out of bed for toileting  - Record patient progress and toleration of activity level    Outcome: Progressing     Problem: DISCHARGE PLANNING  Goal: Discharge to home or other facility with appropriate resources  Description: INTERVENTIONS:  - Identify barriers to discharge w/patient and caregiver  - Arrange for needed discharge resources and transportation as appropriate  - Identify discharge learning needs (meds, wound care, etc.)  - Arrange for interpretive services to assist at discharge as needed  - Refer to Case Management Department for coordinating discharge planning if the patient needs post-hospital services based on physician/advanced practitioner order or complex needs related to functional status, cognitive ability, or social support system  Outcome: Progressing     Problem: Knowledge Deficit  Goal: Patient/family/caregiver demonstrates understanding of disease process, treatment plan, medications, and discharge instructions  Description: Complete learning assessment and assess knowledge base.  Interventions:  - Provide teaching at level of understanding  - Provide teaching via preferred learning methods  Outcome: Progressing

## 2024-06-15 NOTE — LETTER
[facility]  [address]  [place] [address]  Dept: [phone]    July 8, 2024     Patient: Luis Forrester   YOB: 1970   Date of Visit: 6/15/2024       To Whom it May Concern:    Luis Forrester is under my professional care. He was seen in the hospital from 6/15/2024 to 07/08/24. He {Return to school/sport/work:60474}.    If you have any questions or concerns, please don't hesitate to call.         Sincerely,          King Carpenter, DO

## 2024-06-15 NOTE — ASSESSMENT & PLAN NOTE
- Patient with chronic history of hypothyroidism.  - Continue home medication regimen including levothyroxine.  - Outpatient follow-up routine.

## 2024-06-15 NOTE — ED PROVIDER NOTES
History  Chief Complaint   Patient presents with    Fall     Patient bring brought in via EMS for fall. Fall was witnessed with negative head strike negative thinners. Patient has generalized pain.      53-year-old male with past medical history hypoparathyroidism, hypothyroidism, presents to ED for evaluation of suspected syncopal episode this morning.  Patient states that he woke up on the floor this morning.  He does not remember passing out or falling.  When he woke up, he was not able to get himself up on the floor.  Per patient's daughter, she found him on the floor.  He states that he was calling for her for several hours prior to that, but she did not hear him.  Patient currently complains of back pain and bilateral wrist pain.  Patient states he cannot remember the events surrounding waking up on the floor.  He denies associated fever, chills, recent illness, headache, neck pain, chest pain, shortness of breath, abdominal pain, nausea, vomiting.        Prior to Admission Medications   Prescriptions Last Dose Informant Patient Reported? Taking?   Diclofenac Sodium (VOLTAREN) 1 %   No No   Sig: Apply 2 g topically 4 (four) times a day   acetaminophen (TYLENOL) 650 mg CR tablet   No No   Sig: Take 1 tablet (650 mg total) by mouth every 8 (eight) hours as needed for mild pain   albuterol (2.5 mg/3 mL) 0.083 % nebulizer solution   No No   Sig: Take 3 mL (2.5 mg total) by nebulization every 6 (six) hours as needed for wheezing or shortness of breath   albuterol (Ventolin HFA) 90 mcg/act inhaler   No No   Sig: Inhale 2 puffs every 4 (four) hours as needed for wheezing   benzonatate (TESSALON PERLES) 100 mg capsule   No No   Sig: Take 1 capsule (100 mg total) by mouth every 8 (eight) hours   cetirizine (ZyrTEC) 10 mg tablet   No No   Sig: Take 1 tablet (10 mg total) by mouth daily   cholecalciferol (VITAMIN D3) 25 mcg (1,000 units) tablet   No No   Sig: TAKE 1 TABLET BY MOUTH EVERY DAY   hydrocortisone 1 % ointment  "  No No   Sig: Apply topically 2 (two) times a day   levothyroxine 125 mcg tablet   No No   Sig: One tablet daily      Facility-Administered Medications: None       Past Medical History:   Diagnosis Date    Arthritis     Chronic cough     Disease of thyroid gland     Hypoparathyroidism (HCC)     continue taking calcium and vitamin D, will check CMP, PTH level and Vitamin D level    Pneumonia of right middle lobe due to Streptococcus pneumoniae (HCC) 11/30/2018       Past Surgical History:   Procedure Laterality Date    FRACTURE SURGERY      Open Treatment of Each Proximal Finger Phalanx       Family History   Problem Relation Age of Onset    No Known Problems Mother     No Known Problems Father     No Known Problems Sister     No Known Problems Brother     No Known Problems Son     Learning disabilities Daughter     Asthma Daughter     Seizures Daughter     No Known Problems Maternal Grandmother     No Known Problems Maternal Grandfather     No Known Problems Paternal Grandmother     No Known Problems Paternal Grandfather     No Known Problems Maternal Aunt     No Known Problems Maternal Uncle     No Known Problems Paternal Aunt     No Known Problems Paternal Uncle     No Known Problems Cousin      I have reviewed and agree with the history as documented.    E-Cigarette/Vaping    E-Cigarette Use Never User      E-Cigarette/Vaping Substances    Nicotine No     THC No     CBD No     Flavoring No     Other No     Unknown No      Social History     Tobacco Use    Smoking status: Former    Smokeless tobacco: Never    Tobacco comments:     pt \"tried it when he was a teenager but did not like them\"   Vaping Use    Vaping status: Never Used   Substance Use Topics    Alcohol use: Yes     Comment: occasionally    Drug use: No        Review of Systems   Constitutional:  Negative for chills and fever.   HENT:  Negative for congestion.    Respiratory:  Negative for cough and shortness of breath.    Gastrointestinal:  Negative " for abdominal pain, diarrhea, nausea and vomiting.   Genitourinary:  Negative for difficulty urinating.   Musculoskeletal:  Positive for back pain. Negative for neck pain.   Skin:  Negative for color change.   Neurological:  Negative for light-headedness and headaches.       Physical Exam  ED Triage Vitals [06/15/24 0741]   Temperature Pulse Respirations Blood Pressure SpO2   98.4 °F (36.9 °C) 70 16 107/57 98 %      Temp Source Heart Rate Source Patient Position - Orthostatic VS BP Location FiO2 (%)   Oral Monitor Lying Left arm --      Pain Score       4             Orthostatic Vital Signs  Vitals:    06/15/24 0741 06/15/24 1057   BP: 107/57 102/60   Pulse: 70 71   Patient Position - Orthostatic VS: Lying Lying       Physical Exam  Vitals and nursing note reviewed.   Constitutional:       General: He is not in acute distress.     Appearance: Normal appearance. He is normal weight. He is not ill-appearing, toxic-appearing or diaphoretic.   HENT:      Head: Normocephalic and atraumatic.      Nose: No rhinorrhea.      Mouth/Throat:      Pharynx: Oropharynx is clear.   Eyes:      Conjunctiva/sclera: Conjunctivae normal.   Cardiovascular:      Rate and Rhythm: Normal rate and regular rhythm.      Pulses: Normal pulses.      Heart sounds: Normal heart sounds.   Pulmonary:      Effort: Pulmonary effort is normal. No respiratory distress.      Breath sounds: Normal breath sounds. No wheezing.   Abdominal:      Palpations: Abdomen is soft.      Tenderness: There is no abdominal tenderness.   Musculoskeletal:      Cervical back: Neck supple. No tenderness.      Comments: Tenderness to palpation in the thoracic spine, bilateral wrists.  No tenderness palpation of the cervical spine.   Skin:     General: Skin is warm and dry.      Capillary Refill: Capillary refill takes less than 2 seconds.   Neurological:      Mental Status: He is alert and oriented to person, place, and time.      Comments: 5 out of 5 strength in all  extremities         ED Medications  Medications   albuterol inhalation solution 2.5 mg (has no administration in time range)   albuterol (PROVENTIL HFA,VENTOLIN HFA) inhaler 2 puff (has no administration in time range)   Cholecalciferol (VITAMIN D3) tablet 1,000 Units (has no administration in time range)   levothyroxine tablet 125 mcg (has no administration in time range)   gabapentin (NEURONTIN) capsule 100 mg (has no administration in time range)   acetaminophen (TYLENOL) tablet 975 mg (has no administration in time range)   oxyCODONE (ROXICODONE) split tablet 2.5 mg (has no administration in time range)     Or   oxyCODONE (ROXICODONE) IR tablet 5 mg (has no administration in time range)   HYDROmorphone HCl (DILAUDID) injection 0.2 mg (has no administration in time range)   naloxone (NARCAN) 0.04 mg/mL syringe 0.04 mg (has no administration in time range)   senna-docusate sodium (SENOKOT S) 8.6-50 mg per tablet 1 tablet (has no administration in time range)   polyethylene glycol (MIRALAX) packet 17 g (has no administration in time range)   levETIRAcetam (KEPPRA) tablet 500 mg (has no administration in time range)   ondansetron (ZOFRAN) injection 4 mg (has no administration in time range)   methocarbamol (ROBAXIN) tablet 500 mg (has no administration in time range)   acetaminophen (TYLENOL) tablet 650 mg (650 mg Oral Given 6/15/24 1054)   iohexol (OMNIPAQUE) 350 MG/ML injection (MULTI-DOSE) 85 mL (85 mL Intravenous Given 6/15/24 1037)       Diagnostic Studies  Results Reviewed       Procedure Component Value Units Date/Time    HS Troponin I 2hr [[id number]]  (Normal) Collected: 06/15/24 1101    Lab Status: Final result Specimen: Blood from Arm, Right Updated: 06/15/24 1136     hs TnI 2hr 3 ng/L      Delta 2hr hsTnI -1 ng/L     HS Troponin I 4hr [[id number]]     Lab Status: No result Specimen: Blood     T4, free [[id number]] Collected: 06/15/24 0856    Lab Status: In process Specimen: Blood from Arm, Right Updated:  06/15/24 1008    TSH [[id number]]  (Abnormal) Collected: 06/15/24 0856    Lab Status: Final result Specimen: Blood from Arm, Right Updated: 06/15/24 0944     TSH 3RD GENERATON 14.972 uIU/mL     HS Troponin 0hr (reflex protocol) [[id number]]  (Normal) Collected: 06/15/24 0856    Lab Status: Final result Specimen: Blood from Arm, Right Updated: 06/15/24 0935     hs TnI 0hr 4 ng/L     Comprehensive metabolic panel [[id number]]  (Abnormal) Collected: 06/15/24 0856    Lab Status: Final result Specimen: Blood from Arm, Right Updated: 06/15/24 0929     Sodium 136 mmol/L      Potassium 3.6 mmol/L      Chloride 101 mmol/L      CO2 27 mmol/L      ANION GAP 8 mmol/L      BUN 13 mg/dL      Creatinine 0.76 mg/dL      Glucose 123 mg/dL      Calcium 6.6 mg/dL      AST 31 U/L      ALT 26 U/L      Alkaline Phosphatase 80 U/L      Total Protein 6.7 g/dL      Albumin 3.5 g/dL      Total Bilirubin 0.45 mg/dL      eGFR 102 ml/min/1.73sq m     Narrative:      National Kid[employer] guidelines for Chronic Kidney Disease (CKD):     Stage 1 with normal or high GFR (GFR > 90 mL/min/1.73 square meters)    Stage 2 Mild CKD (GFR = 60-89 mL/min/1.73 square meters)    Stage 3A Moderate CKD (GFR = 45-59 mL/min/1.73 square meters)    Stage 3B Moderate CKD (GFR = 30-44 mL/min/1.73 square meters)    Stage 4 Severe CKD (GFR = 15-29 mL/min/1.73 square meters)    Stage 5 End Stage CKD (GFR <15 mL/min/1.73 square meters)  Note: GFR calculation is accurate only with a steady state creatinine    CBC and differential [[id number]]  (Abnormal) Collected: 06/15/24 0856    Lab Status: Final result Specimen: Blood from Arm, Right Updated: 06/15/24 0906     WBC 11.11 Thousand/uL      RBC 4.24 Million/uL      Hemoglobin 13.3 g/dL      Hematocrit 40.0 %      MCV 94 fL      MCH 31.4 pg      MCHC 33.3 g/dL      RDW 13.3 %      MPV 10.2 fL      Platelets 160 Thousands/uL      nRBC 0 /100 WBCs      Segmented % 82 %      Immature Grans % 1 %      Lymphocytes % 9  %      Monocytes % 7 %      Eosinophils Relative 0 %      Basophils Relative 1 %      Absolute Neutrophils 9.21 Thousands/µL      Absolute Immature Grans 0.10 Thousand/uL      Absolute Lymphocytes 0.99 Thousands/µL      Absolute Monocytes 0.73 Thousand/µL      Eosinophils Absolute 0.02 Thousand/µL      Basophils Absolute 0.06 Thousands/µL                    CTA head w wo contrast   Final Result by Eddie Jimenez MD (06/15 1117)      Negative CTA head traumatic vascular injury, aneurysm, large vessel occlusion, high-grade stenosis, or dissection.      Partially imaged ectatic right carotid bifurcation and proximal cervical internal carotid artery with retropharyngeal course, similar to CT neck with contrast 10/12/2011.      Additional chronic/incidental findings as detailed above.      Please see earlier same day CT head without contrast and concurrent CT cervical spine without contrast.                              Workstation performed: [id number]         CT spine cervical without contrast   Final Result by Eddie Jimenez MD (06/15 1122)      No cervical spine fracture or traumatic malalignment.      Posterior osteophyte disc complexes C3-C4 and C5-C6 contributes to at least moderate canal stenosis and severe bilateral foraminal narrowing at these levels. Consider nonemergent follow-up MRI cervical spine without contrast for further evaluation.      Ectatic right carotid bifurcation and proximal cervical internal carotid artery with retropharyngeal course, similar to CT neck with contrast 10/12/2011.      Please see same day CTA head with contrast, CT head without contrast, CT thoracic spine without contrast for further evaluation.      The study was marked in EPIC for immediate notification.                           Workstation performed: [id number]         XR hand 3+ views LEFT   Final Result by Bambi Ventura MD (06/15 1110)      No acute osseous abnormality.      Degenerative changes as  described.      Resident: ALLYSON AWAN I, the attending radiologist, have reviewed the images and agree with the final report above.      Workstation performed: [id number]         XR hand 3+ views RIGHT   Final Result by Bambi Ventura MD (06/15 1111)      1. No acute osseous abnormality.      2. Degenerative changes as described.      Resident: ALLYSON AWAN I, the attending radiologist, have reviewed the images and agree with the final report above.      Workstation performed: [id number]         CT head wo contrast   Final Result by Eddie Jimenez MD (06/15 1010)      New acute trace subarachnoid hemorrhage in bilateral parafalcine regions. Recommend dedicated CTA head with contrast for further evaluation.      New acute trace parafalcine subdural hematoma.         I personally discussed this study with Travis Powell on 6/15/2024 10:08 AM.                              Workstation performed: [id number]         CT spine thoracic without contrast   Final Result by Eddie Jimenez MD (06/15 1014)      No acute osseous abnormality of thoracic spine.      Diffuse idiopathic skeletal hyperostosis (DISH).      I personally discussed this study with Travis Powell on 6/15/2024 10:08 AM.                  Workstation performed: [id number]               Procedures  Procedures      ED Course  ED Course as of 06/15/24 1215   Sat Simón 15, 2024   1004 TSH 3RD GENERATON(!): 14.972  Will order T4   1006 CT head wo contrast  CT head  discussed with radiologist. There is a new subarachnoid hemorrhage in the b/l parafalcine region,  and parafalcine subdural hematoma. Will order CTA head with contrast   1034 Will admit to trauma, who will come evaluate the patient                             SBIRT 22yo+      Flowsheet Row Most Recent Value   Initial Alcohol Screen: US AUDIT-C     1. How often do you have a drink containing alcohol? 0 Filed at: 06/15/2024 0743   2. How many drinks containing alcohol do  you have on a typical day you are drinking?  0 Filed at: 06/15/2024 0743   3a. Male UNDER 65: How often do you have five or more drinks on one occasion? 0 Filed at: 06/15/2024 0743   3b. FEMALE Any Age, or MALE 65+: How often do you have 4 or more drinks on one occassion? 0 Filed at: 06/15/2024 0743   Audit-C Score 0 Filed at: 06/15/2024 0743   MANOJ: How many times in the past year have you...    Used an illegal drug or used a prescription medication for non-medical reasons? Never Filed at: 06/15/2024 0743                  Medical Decision Making  53-year-old male with past medical history hypothyroidism, hypoparathyroidism presents ED for evaluation of suspected syncope this morning.  Patient woke up on the floor with thoracic back pain and bilateral wrist pain.  On exam, vitals unremarkable.  Patient is normocephalic atraumatic.  No cervical tenderness to palpation.  Patient has tenderness palpation in the thoracic spine, bilateral wrists.  Heart regular rate and rhythm.  Lungs clear to auscultation bilaterally.  Abdomen soft nontender nondistended.  5 out of 5 strength in all extremities.  Differential diagnosis: Patient's story is vague, so the differential is broad for the etiology of patient's syncope, if he did syncopize including cardiac, electrolyte abnormality, thyroid, I doubt seizure.  Will order CT head to rule out intracranial abnormality, thoracic spine CT to rule out fracture, bilateral wrist x-rays to rule out fracture, CBC, CMP to rule out electrolyte abnormality, troponin, TSH to evaluate for hypothyroidism.  Please see ED course for additional information.    Amount and/or Complexity of Data Reviewed  Labs: ordered. Decision-making details documented in ED Course.  Radiology: ordered. Decision-making details documented in ED Course.    Risk  OTC drugs.  Prescription drug management.  Decision regarding hospitalization.          Disposition  Final diagnoses:   Closed head injury, initial encounter    Fall, initial encounter   Subarachnoid hemorrhage (HCC)   Subdural hematoma (HCC)     Time reflects when diagnosis was documented in both MDM as applicable and the Disposition within this note       Time User Action Codes Description Comment    6/15/2024 10:28 AM Travis Powell [S09.90XA] Closed head injury, initial encounter     6/15/2024 10:28 AM Travis Powell [W19.XXXA] Fall, initial encounter     6/15/2024 10:29 AM Travis Powell [I60.9] Subarachnoid hemorrhage (HCC)     6/15/2024 10:29 AM Travis Powell [S06.5XAA] Subdural hematoma (HCC)     6/15/2024 12:10 PM Yazan Magaña [R55] Syncope and collapse           ED Disposition       ED Disposition   Admit    Condition   Stable    Date/Time   Sat Simón 15, 2024 1159    Comment   Case was discussed with trauma and the patient's admission status was agreed to be Admission Status: inpatient status to the service of Dr. Diamond .               Follow-up Information    None         Patient's Medications   Discharge Prescriptions    No medications on file     No discharge procedures on file.    PDMP Review         Value Time User    PDMP Reviewed  Yes 6/15/2024 11:46 AM Yazan Magaña PA-C             ED Provider  Attending physically available and evaluated Luis Forrester. I managed the patient along with the ED Attending.    Electronically Signed by           Travis Powell DO  06/15/24 8729

## 2024-06-15 NOTE — ASSESSMENT & PLAN NOTE
- Patient with bilateral hand pain, present on presentation.  - Bilateral hand x-rays from 6/15/2024 reviewed and are negative for acute osseous abnormality.  - May remain weightbearing as tolerated on the bilateral upper extremities and may continue activity as tolerated.  - Multimodal analgesic regimen.  - PT and OT evaluation and treatment as indicated.

## 2024-06-16 ENCOUNTER — ANESTHESIA (INPATIENT)
Dept: PERIOP | Facility: HOSPITAL | Age: 54
End: 2024-06-16
Payer: COMMERCIAL

## 2024-06-16 ENCOUNTER — APPOINTMENT (INPATIENT)
Dept: RADIOLOGY | Facility: HOSPITAL | Age: 54
DRG: 912 | End: 2024-06-16
Payer: COMMERCIAL

## 2024-06-16 ENCOUNTER — ANESTHESIA EVENT (INPATIENT)
Dept: PERIOP | Facility: HOSPITAL | Age: 54
End: 2024-06-16
Payer: COMMERCIAL

## 2024-06-16 LAB
ABO GROUP BLD: NORMAL
ABO GROUP BLD: NORMAL
ANION GAP SERPL CALCULATED.3IONS-SCNC: 10 MMOL/L (ref 4–13)
APTT PPP: 28 SECONDS (ref 23–37)
ATRIAL RATE: 67 BPM
BASE EXCESS BLDA CALC-SCNC: -1 MMOL/L (ref -2–3)
BASE EXCESS BLDA CALC-SCNC: 1 MMOL/L (ref -2–3)
BLD GP AB SCN SERPL QL: NEGATIVE
BUN SERPL-MCNC: 9 MG/DL (ref 5–25)
CA-I BLD-SCNC: 0.86 MMOL/L (ref 1.12–1.32)
CA-I BLD-SCNC: 0.87 MMOL/L (ref 1.12–1.32)
CA-I BLD-SCNC: 1.01 MMOL/L (ref 1.12–1.32)
CALCIUM SERPL-MCNC: 6.6 MG/DL (ref 8.4–10.2)
CHLORIDE SERPL-SCNC: 99 MMOL/L (ref 96–108)
CO2 SERPL-SCNC: 26 MMOL/L (ref 21–32)
CREAT SERPL-MCNC: 0.74 MG/DL (ref 0.6–1.3)
ERYTHROCYTE [DISTWIDTH] IN BLOOD BY AUTOMATED COUNT: 13.6 % (ref 11.6–15.1)
GFR SERPL CREATININE-BSD FRML MDRD: 103 ML/MIN/1.73SQ M
GLUCOSE SERPL-MCNC: 106 MG/DL (ref 65–140)
GLUCOSE SERPL-MCNC: 122 MG/DL (ref 65–140)
GLUCOSE SERPL-MCNC: 152 MG/DL (ref 65–140)
GLUCOSE SERPL-MCNC: 99 MG/DL (ref 65–140)
HCO3 BLDA-SCNC: 24.2 MMOL/L (ref 22–28)
HCO3 BLDA-SCNC: 25.6 MMOL/L (ref 22–28)
HCT VFR BLD AUTO: 40.3 % (ref 36.5–49.3)
HCT VFR BLD CALC: 36 % (ref 36.5–49.3)
HCT VFR BLD CALC: 36 % (ref 36.5–49.3)
HGB BLD-MCNC: 13.6 G/DL (ref 12–17)
HGB BLDA-MCNC: 12.2 G/DL (ref 12–17)
HGB BLDA-MCNC: 12.2 G/DL (ref 12–17)
INR PPP: 0.99 (ref 0.84–1.19)
MAGNESIUM SERPL-MCNC: 2 MG/DL (ref 1.9–2.7)
MCH RBC QN AUTO: 31.8 PG (ref 26.8–34.3)
MCHC RBC AUTO-ENTMCNC: 33.7 G/DL (ref 31.4–37.4)
MCV RBC AUTO: 94 FL (ref 82–98)
P AXIS: 83 DEGREES
PCO2 BLD: 25 MMOL/L (ref 21–32)
PCO2 BLD: 27 MMOL/L (ref 21–32)
PCO2 BLD: 39.4 MM HG (ref 36–44)
PCO2 BLD: 40.4 MM HG (ref 36–44)
PH BLD: 7.39 [PH] (ref 7.35–7.45)
PH BLD: 7.42 [PH] (ref 7.35–7.45)
PHOSPHATE SERPL-MCNC: 3.5 MG/DL (ref 2.7–4.5)
PLATELET # BLD AUTO: 256 THOUSANDS/UL (ref 149–390)
PMV BLD AUTO: 10.4 FL (ref 8.9–12.7)
PO2 BLD: 184 MM HG (ref 75–129)
PO2 BLD: 279 MM HG (ref 75–129)
POTASSIUM BLD-SCNC: 3.3 MMOL/L (ref 3.5–5.3)
POTASSIUM BLD-SCNC: 3.6 MMOL/L (ref 3.5–5.3)
POTASSIUM SERPL-SCNC: 3.4 MMOL/L (ref 3.5–5.3)
PR INTERVAL: 166 MS
PROTHROMBIN TIME: 13 SECONDS (ref 11.6–14.5)
QRS AXIS: 70 DEGREES
QRSD INTERVAL: 90 MS
QT INTERVAL: 406 MS
QTC INTERVAL: 429 MS
RBC # BLD AUTO: 4.28 MILLION/UL (ref 3.88–5.62)
RH BLD: POSITIVE
RH BLD: POSITIVE
SAO2 % BLD FROM PO2: 100 % (ref 60–85)
SAO2 % BLD FROM PO2: 100 % (ref 60–85)
SODIUM BLD-SCNC: 137 MMOL/L (ref 136–145)
SODIUM BLD-SCNC: 142 MMOL/L (ref 136–145)
SODIUM SERPL-SCNC: 135 MMOL/L (ref 135–147)
SPECIMEN EXPIRATION DATE: NORMAL
SPECIMEN SOURCE: ABNORMAL
SPECIMEN SOURCE: ABNORMAL
T WAVE AXIS: 75 DEGREES
VENTRICULAR RATE: 67 BPM
WBC # BLD AUTO: 11.01 THOUSAND/UL (ref 4.31–10.16)

## 2024-06-16 PROCEDURE — 86850 RBC ANTIBODY SCREEN: CPT | Performed by: NURSE ANESTHETIST, CERTIFIED REGISTERED

## 2024-06-16 PROCEDURE — 8E09XBF COMPUTER ASSISTED PROCEDURE OF HEAD AND NECK REGION, WITH FLUOROSCOPY: ICD-10-PCS | Performed by: STUDENT IN AN ORGANIZED HEALTH CARE EDUCATION/TRAINING PROGRAM

## 2024-06-16 PROCEDURE — 61783 SCAN PROC SPINAL: CPT | Performed by: STUDENT IN AN ORGANIZED HEALTH CARE EDUCATION/TRAINING PROGRAM

## 2024-06-16 PROCEDURE — 82330 ASSAY OF CALCIUM: CPT

## 2024-06-16 PROCEDURE — 85014 HEMATOCRIT: CPT

## 2024-06-16 PROCEDURE — 0RG4071 FUSION OF CERVICOTHORACIC VERTEBRAL JOINT WITH AUTOLOGOUS TISSUE SUBSTITUTE, POSTERIOR APPROACH, POSTERIOR COLUMN, OPEN APPROACH: ICD-10-PCS | Performed by: STUDENT IN AN ORGANIZED HEALTH CARE EDUCATION/TRAINING PROGRAM

## 2024-06-16 PROCEDURE — 22614 ARTHRD PST TQ 1NTRSPC EA ADD: CPT | Performed by: STUDENT IN AN ORGANIZED HEALTH CARE EDUCATION/TRAINING PROGRAM

## 2024-06-16 PROCEDURE — 63045 LAM FACETEC & FORAMOT CRV: CPT | Performed by: STUDENT IN AN ORGANIZED HEALTH CARE EDUCATION/TRAINING PROGRAM

## 2024-06-16 PROCEDURE — 20930 SP BONE ALGRFT MORSEL ADD-ON: CPT | Performed by: STUDENT IN AN ORGANIZED HEALTH CARE EDUCATION/TRAINING PROGRAM

## 2024-06-16 PROCEDURE — 84132 ASSAY OF SERUM POTASSIUM: CPT

## 2024-06-16 PROCEDURE — 85730 THROMBOPLASTIN TIME PARTIAL: CPT

## 2024-06-16 PROCEDURE — 22843 INSERT SPINE FIXATION DEVICE: CPT | Performed by: STUDENT IN AN ORGANIZED HEALTH CARE EDUCATION/TRAINING PROGRAM

## 2024-06-16 PROCEDURE — 71045 X-RAY EXAM CHEST 1 VIEW: CPT

## 2024-06-16 PROCEDURE — 83735 ASSAY OF MAGNESIUM: CPT

## 2024-06-16 PROCEDURE — 72040 X-RAY EXAM NECK SPINE 2-3 VW: CPT

## 2024-06-16 PROCEDURE — 4A11X4G MONITORING OF PERIPHERAL NERVOUS ELECTRICAL ACTIVITY, INTRAOPERATIVE, EXTERNAL APPROACH: ICD-10-PCS | Performed by: STUDENT IN AN ORGANIZED HEALTH CARE EDUCATION/TRAINING PROGRAM

## 2024-06-16 PROCEDURE — C1713 ANCHOR/SCREW BN/BN,TIS/BN: HCPCS | Performed by: STUDENT IN AN ORGANIZED HEALTH CARE EDUCATION/TRAINING PROGRAM

## 2024-06-16 PROCEDURE — 86900 BLOOD TYPING SEROLOGIC ABO: CPT | Performed by: NURSE ANESTHETIST, CERTIFIED REGISTERED

## 2024-06-16 PROCEDURE — 86923 COMPATIBILITY TEST ELECTRIC: CPT

## 2024-06-16 PROCEDURE — 80048 BASIC METABOLIC PNL TOTAL CA: CPT | Performed by: PHYSICIAN ASSISTANT

## 2024-06-16 PROCEDURE — 4A133J1 MONITORING OF ARTERIAL PULSE, PERIPHERAL, PERCUTANEOUS APPROACH: ICD-10-PCS | Performed by: STUDENT IN AN ORGANIZED HEALTH CARE EDUCATION/TRAINING PROGRAM

## 2024-06-16 PROCEDURE — 03HY32Z INSERTION OF MONITORING DEVICE INTO UPPER ARTERY, PERCUTANEOUS APPROACH: ICD-10-PCS | Performed by: STUDENT IN AN ORGANIZED HEALTH CARE EDUCATION/TRAINING PROGRAM

## 2024-06-16 PROCEDURE — 22600 ARTHRD PST TQ 1NTRSPC CRV: CPT | Performed by: STUDENT IN AN ORGANIZED HEALTH CARE EDUCATION/TRAINING PROGRAM

## 2024-06-16 PROCEDURE — 00NW0ZZ RELEASE CERVICAL SPINAL CORD, OPEN APPROACH: ICD-10-PCS | Performed by: STUDENT IN AN ORGANIZED HEALTH CARE EDUCATION/TRAINING PROGRAM

## 2024-06-16 PROCEDURE — 63048 LAM FACETEC &FORAMOT EA ADDL: CPT | Performed by: STUDENT IN AN ORGANIZED HEALTH CARE EDUCATION/TRAINING PROGRAM

## 2024-06-16 PROCEDURE — 84295 ASSAY OF SERUM SODIUM: CPT

## 2024-06-16 PROCEDURE — 20936 SP BONE AGRFT LOCAL ADD-ON: CPT | Performed by: STUDENT IN AN ORGANIZED HEALTH CARE EDUCATION/TRAINING PROGRAM

## 2024-06-16 PROCEDURE — 0RG2071 FUSION OF 2 OR MORE CERVICAL VERTEBRAL JOINTS WITH AUTOLOGOUS TISSUE SUBSTITUTE, POSTERIOR APPROACH, POSTERIOR COLUMN, OPEN APPROACH: ICD-10-PCS | Performed by: STUDENT IN AN ORGANIZED HEALTH CARE EDUCATION/TRAINING PROGRAM

## 2024-06-16 PROCEDURE — 4A133B1 MONITORING OF ARTERIAL PRESSURE, PERIPHERAL, PERCUTANEOUS APPROACH: ICD-10-PCS | Performed by: STUDENT IN AN ORGANIZED HEALTH CARE EDUCATION/TRAINING PROGRAM

## 2024-06-16 PROCEDURE — 82947 ASSAY GLUCOSE BLOOD QUANT: CPT

## 2024-06-16 PROCEDURE — 84100 ASSAY OF PHOSPHORUS: CPT

## 2024-06-16 PROCEDURE — 86901 BLOOD TYPING SEROLOGIC RH(D): CPT | Performed by: NURSE ANESTHETIST, CERTIFIED REGISTERED

## 2024-06-16 PROCEDURE — 85027 COMPLETE CBC AUTOMATED: CPT | Performed by: PHYSICIAN ASSISTANT

## 2024-06-16 PROCEDURE — 93010 ELECTROCARDIOGRAM REPORT: CPT | Performed by: INTERNAL MEDICINE

## 2024-06-16 PROCEDURE — 85610 PROTHROMBIN TIME: CPT

## 2024-06-16 PROCEDURE — NC001 PR NO CHARGE

## 2024-06-16 PROCEDURE — 82948 REAGENT STRIP/BLOOD GLUCOSE: CPT

## 2024-06-16 PROCEDURE — 82803 BLOOD GASES ANY COMBINATION: CPT

## 2024-06-16 PROCEDURE — 99291 CRITICAL CARE FIRST HOUR: CPT | Performed by: EMERGENCY MEDICINE

## 2024-06-16 PROCEDURE — 70450 CT HEAD/BRAIN W/O DYE: CPT

## 2024-06-16 DEVICE — MULTI AXIAL SCREW 3603520 3.5 X 20MM
Type: IMPLANTABLE DEVICE | Site: SPINE CERVICAL | Status: FUNCTIONAL
Brand: INFINITY™ OCCIPITOCERVICAL UPPER THORACIC SYSTEM

## 2024-06-16 DEVICE — DBM T45010 10CC PASTE GRAFTON PLUS
Type: IMPLANTABLE DEVICE | Site: SPINE CERVICAL | Status: FUNCTIONAL
Brand: GRAFTON®AND GRAFTON PLUS®DEMINERALIZED BONE MATRIX (DBM)

## 2024-06-16 RX ORDER — SODIUM CHLORIDE, SODIUM LACTATE, POTASSIUM CHLORIDE, CALCIUM CHLORIDE 600; 310; 30; 20 MG/100ML; MG/100ML; MG/100ML; MG/100ML
INJECTION, SOLUTION INTRAVENOUS CONTINUOUS PRN
Status: DISCONTINUED | OUTPATIENT
Start: 2024-06-16 | End: 2024-06-16

## 2024-06-16 RX ORDER — METHADONE HYDROCHLORIDE 10 MG/ML
10 INJECTION, SOLUTION INTRAMUSCULAR; INTRAVENOUS; SUBCUTANEOUS ONCE
Status: COMPLETED | OUTPATIENT
Start: 2024-06-16 | End: 2024-06-16

## 2024-06-16 RX ORDER — DEXAMETHASONE SODIUM PHOSPHATE 10 MG/ML
INJECTION, SOLUTION INTRAMUSCULAR; INTRAVENOUS AS NEEDED
Status: DISCONTINUED | OUTPATIENT
Start: 2024-06-16 | End: 2024-06-16

## 2024-06-16 RX ORDER — ENOXAPARIN SODIUM 100 MG/ML
30 INJECTION SUBCUTANEOUS EVERY 12 HOURS SCHEDULED
Status: DISCONTINUED | OUTPATIENT
Start: 2024-06-17 | End: 2024-07-12 | Stop reason: HOSPADM

## 2024-06-16 RX ORDER — HYDROMORPHONE HCL IN WATER/PF 6 MG/30 ML
0.2 PATIENT CONTROLLED ANALGESIA SYRINGE INTRAVENOUS
Status: DISCONTINUED | OUTPATIENT
Start: 2024-06-16 | End: 2024-06-16 | Stop reason: HOSPADM

## 2024-06-16 RX ORDER — PHENYLEPHRINE HCL IN 0.9% NACL 1 MG/10 ML
SYRINGE (ML) INTRAVENOUS AS NEEDED
Status: DISCONTINUED | OUTPATIENT
Start: 2024-06-16 | End: 2024-06-16

## 2024-06-16 RX ORDER — MIDAZOLAM HYDROCHLORIDE 2 MG/2ML
INJECTION, SOLUTION INTRAMUSCULAR; INTRAVENOUS AS NEEDED
Status: DISCONTINUED | OUTPATIENT
Start: 2024-06-16 | End: 2024-06-16

## 2024-06-16 RX ORDER — CEFAZOLIN SODIUM 1 G/3ML
INJECTION, POWDER, FOR SOLUTION INTRAMUSCULAR; INTRAVENOUS AS NEEDED
Status: DISCONTINUED | OUTPATIENT
Start: 2024-06-16 | End: 2024-06-16

## 2024-06-16 RX ORDER — SODIUM CHLORIDE 9 MG/ML
INJECTION, SOLUTION INTRAVENOUS CONTINUOUS PRN
Status: DISCONTINUED | OUTPATIENT
Start: 2024-06-16 | End: 2024-06-16

## 2024-06-16 RX ORDER — CALCIUM GLUCONATE 20 MG/ML
2 INJECTION, SOLUTION INTRAVENOUS ONCE
Status: DISCONTINUED | OUTPATIENT
Start: 2024-06-16 | End: 2024-06-17

## 2024-06-16 RX ORDER — ONDANSETRON 2 MG/ML
4 INJECTION INTRAMUSCULAR; INTRAVENOUS ONCE AS NEEDED
Status: DISCONTINUED | OUTPATIENT
Start: 2024-06-16 | End: 2024-06-16 | Stop reason: HOSPADM

## 2024-06-16 RX ORDER — BUPIVACAINE HYDROCHLORIDE 2.5 MG/ML
INJECTION, SOLUTION EPIDURAL; INFILTRATION; INTRACAUDAL AS NEEDED
Status: DISCONTINUED | OUTPATIENT
Start: 2024-06-16 | End: 2024-06-16 | Stop reason: HOSPADM

## 2024-06-16 RX ORDER — CALCIUM CHLORIDE 100 MG/ML
INJECTION INTRAVENOUS; INTRAVENTRICULAR AS NEEDED
Status: DISCONTINUED | OUTPATIENT
Start: 2024-06-16 | End: 2024-06-16

## 2024-06-16 RX ORDER — FENTANYL CITRATE/PF 50 MCG/ML
25 SYRINGE (ML) INJECTION
Status: DISCONTINUED | OUTPATIENT
Start: 2024-06-16 | End: 2024-06-16 | Stop reason: HOSPADM

## 2024-06-16 RX ORDER — ROCURONIUM BROMIDE 10 MG/ML
INJECTION, SOLUTION INTRAVENOUS AS NEEDED
Status: DISCONTINUED | OUTPATIENT
Start: 2024-06-16 | End: 2024-06-16

## 2024-06-16 RX ORDER — HYDROMORPHONE HYDROCHLORIDE 1 MG/ML
INJECTION, SOLUTION INTRAMUSCULAR; INTRAVENOUS; SUBCUTANEOUS AS NEEDED
Status: DISCONTINUED | OUTPATIENT
Start: 2024-06-16 | End: 2024-06-16

## 2024-06-16 RX ORDER — FENTANYL CITRATE 50 UG/ML
INJECTION, SOLUTION INTRAMUSCULAR; INTRAVENOUS AS NEEDED
Status: DISCONTINUED | OUTPATIENT
Start: 2024-06-16 | End: 2024-06-16

## 2024-06-16 RX ORDER — LIDOCAINE HYDROCHLORIDE AND EPINEPHRINE 10; 10 MG/ML; UG/ML
INJECTION, SOLUTION INFILTRATION; PERINEURAL AS NEEDED
Status: DISCONTINUED | OUTPATIENT
Start: 2024-06-16 | End: 2024-06-16 | Stop reason: HOSPADM

## 2024-06-16 RX ORDER — PROPOFOL 10 MG/ML
INJECTION, EMULSION INTRAVENOUS AS NEEDED
Status: DISCONTINUED | OUTPATIENT
Start: 2024-06-16 | End: 2024-06-16

## 2024-06-16 RX ORDER — POTASSIUM CHLORIDE 14.9 MG/ML
20 INJECTION INTRAVENOUS
Status: ACTIVE | OUTPATIENT
Start: 2024-06-16 | End: 2024-06-16

## 2024-06-16 RX ORDER — ENOXAPARIN SODIUM 100 MG/ML
30 INJECTION SUBCUTANEOUS EVERY 12 HOURS SCHEDULED
Status: DISCONTINUED | OUTPATIENT
Start: 2024-06-16 | End: 2024-06-16

## 2024-06-16 RX ORDER — LIDOCAINE HYDROCHLORIDE 20 MG/ML
INJECTION, SOLUTION EPIDURAL; INFILTRATION; INTRACAUDAL; PERINEURAL AS NEEDED
Status: DISCONTINUED | OUTPATIENT
Start: 2024-06-16 | End: 2024-06-16

## 2024-06-16 RX ORDER — GLYCOPYRROLATE 0.2 MG/ML
INJECTION INTRAMUSCULAR; INTRAVENOUS AS NEEDED
Status: DISCONTINUED | OUTPATIENT
Start: 2024-06-16 | End: 2024-06-16

## 2024-06-16 RX ORDER — SUCCINYLCHOLINE/SOD CL,ISO/PF 100 MG/5ML
SYRINGE (ML) INTRAVENOUS AS NEEDED
Status: DISCONTINUED | OUTPATIENT
Start: 2024-06-16 | End: 2024-06-16

## 2024-06-16 RX ADMIN — GABAPENTIN 100 MG: 100 CAPSULE ORAL at 22:30

## 2024-06-16 RX ADMIN — LEVOTHYROXINE SODIUM 125 MCG: 125 TABLET ORAL at 05:40

## 2024-06-16 RX ADMIN — Medication 100 MG: at 08:52

## 2024-06-16 RX ADMIN — MIDAZOLAM 2 MG: 1 INJECTION INTRAMUSCULAR; INTRAVENOUS at 08:48

## 2024-06-16 RX ADMIN — FENTANYL CITRATE 50 MCG: 50 INJECTION INTRAMUSCULAR; INTRAVENOUS at 08:51

## 2024-06-16 RX ADMIN — CALCIUM CHLORIDE 0.5 G: 100 INJECTION INTRAVENOUS; INTRAVENTRICULAR at 12:10

## 2024-06-16 RX ADMIN — Medication 200 MCG: at 14:37

## 2024-06-16 RX ADMIN — DEXAMETHASONE SODIUM PHOSPHATE 10 MG: 10 INJECTION INTRAMUSCULAR; INTRAVENOUS at 09:14

## 2024-06-16 RX ADMIN — GABAPENTIN 100 MG: 100 CAPSULE ORAL at 18:40

## 2024-06-16 RX ADMIN — GLYCOPYRROLATE 5 MCG: 0.2 INJECTION, SOLUTION INTRAMUSCULAR; INTRAVENOUS at 08:51

## 2024-06-16 RX ADMIN — PHENYLEPHRINE HYDROCHLORIDE 30 MCG/MIN: 50 INJECTION INTRAVENOUS at 02:23

## 2024-06-16 RX ADMIN — LIDOCAINE HYDROCHLORIDE 100 MG: 20 INJECTION, SOLUTION EPIDURAL; INFILTRATION; INTRACAUDAL at 08:52

## 2024-06-16 RX ADMIN — HYDROMORPHONE HYDROCHLORIDE 0.5 MG: 1 INJECTION, SOLUTION INTRAMUSCULAR; INTRAVENOUS; SUBCUTANEOUS at 13:43

## 2024-06-16 RX ADMIN — CEFAZOLIN 2000 MG: 1 INJECTION, POWDER, FOR SOLUTION INTRAMUSCULAR; INTRAVENOUS at 09:28

## 2024-06-16 RX ADMIN — METHOCARBAMOL 500 MG: 500 TABLET ORAL at 05:40

## 2024-06-16 RX ADMIN — Medication 200 MCG: at 10:04

## 2024-06-16 RX ADMIN — CALCIUM CHLORIDE 0.5 G: 100 INJECTION INTRAVENOUS; INTRAVENTRICULAR at 11:45

## 2024-06-16 RX ADMIN — PHENYLEPHRINE HYDROCHLORIDE 50 MCG/MIN: 50 INJECTION INTRAVENOUS at 22:27

## 2024-06-16 RX ADMIN — PROPOFOL 150 MCG/KG/MIN: 10 INJECTION, EMULSION INTRAVENOUS at 08:55

## 2024-06-16 RX ADMIN — SODIUM CHLORIDE, SODIUM LACTATE, POTASSIUM CHLORIDE, CALCIUM CHLORIDE: 600; 310; 30; 20 INJECTION, SOLUTION INTRAVENOUS at 08:50

## 2024-06-16 RX ADMIN — SODIUM CHLORIDE 0.1 MCG/KG/MIN: 900 INJECTION INTRAVENOUS at 08:55

## 2024-06-16 RX ADMIN — GLYCOPYRROLATE 0.2 MG: 0.2 INJECTION, SOLUTION INTRAMUSCULAR; INTRAVENOUS at 10:08

## 2024-06-16 RX ADMIN — ACETAMINOPHEN 975 MG: 325 TABLET, FILM COATED ORAL at 22:30

## 2024-06-16 RX ADMIN — CALCIUM CHLORIDE 0.5 G: 100 INJECTION INTRAVENOUS; INTRAVENTRICULAR at 10:04

## 2024-06-16 RX ADMIN — Medication 200 MCG: at 09:07

## 2024-06-16 RX ADMIN — METHADONE HYDROCHLORIDE 10 MG: 10 INJECTION, SOLUTION INTRAMUSCULAR; INTRAVENOUS; SUBCUTANEOUS at 10:15

## 2024-06-16 RX ADMIN — SODIUM CHLORIDE, SODIUM LACTATE, POTASSIUM CHLORIDE, AND CALCIUM CHLORIDE: .6; .31; .03; .02 INJECTION, SOLUTION INTRAVENOUS at 14:45

## 2024-06-16 RX ADMIN — FENTANYL CITRATE 50 MCG: 50 INJECTION INTRAMUSCULAR; INTRAVENOUS at 09:19

## 2024-06-16 RX ADMIN — FENTANYL CITRATE 100 MCG: 50 INJECTION INTRAMUSCULAR; INTRAVENOUS at 10:15

## 2024-06-16 RX ADMIN — ROCURONIUM BROMIDE 50 MG: 10 INJECTION, SOLUTION INTRAVENOUS at 10:30

## 2024-06-16 RX ADMIN — CALCIUM CHLORIDE 0.5 G: 100 INJECTION INTRAVENOUS; INTRAVENTRICULAR at 09:50

## 2024-06-16 RX ADMIN — LEVETIRACETAM 500 MG: 500 TABLET, FILM COATED ORAL at 18:40

## 2024-06-16 RX ADMIN — SUGAMMADEX 200 MG: 100 INJECTION, SOLUTION INTRAVENOUS at 11:21

## 2024-06-16 RX ADMIN — PROPOFOL 50 MG: 10 INJECTION, EMULSION INTRAVENOUS at 09:19

## 2024-06-16 RX ADMIN — SODIUM CHLORIDE, SODIUM LACTATE, POTASSIUM CHLORIDE, AND CALCIUM CHLORIDE: .6; .31; .03; .02 INJECTION, SOLUTION INTRAVENOUS at 08:45

## 2024-06-16 RX ADMIN — PROPOFOL 150 MG: 10 INJECTION, EMULSION INTRAVENOUS at 08:52

## 2024-06-16 RX ADMIN — CEFAZOLIN 2000 MG: 1 INJECTION, POWDER, FOR SOLUTION INTRAMUSCULAR; INTRAVENOUS at 14:24

## 2024-06-16 RX ADMIN — SODIUM CHLORIDE: 0.9 INJECTION, SOLUTION INTRAVENOUS at 09:25

## 2024-06-16 RX ADMIN — Medication 200 MCG: at 08:51

## 2024-06-16 RX ADMIN — CEFAZOLIN 1000 MG: 1 INJECTION, POWDER, FOR SOLUTION INTRAMUSCULAR; INTRAVENOUS at 10:25

## 2024-06-16 RX ADMIN — ACETAMINOPHEN 975 MG: 325 TABLET, FILM COATED ORAL at 05:40

## 2024-06-16 NOTE — RESPIRATORY THERAPY NOTE
"RT Protocol Note  Luis Forrester 53 y.o. male MRN: [id number]  [facility]: [id number]    Assessment    Principal Problem:    TBI (traumatic brain injury) ([facility])  Active Problems:    Hypothyroidism    Syncope and collapse    Bilateral hand pain    SDH (subdural hematoma) ([facility])    Right hand weakness      Home Pulmonary Medications:  none       Past Medical History:   Diagnosis Date    Arthritis     Chronic cough     Disease of thyroid gland     Hypoparathyroidism ([facility])     continue taking calcium and vitamin D, will check CMP, PTH level and Vitamin D level    Pneumonia of right middle lobe due to Streptococcus pneumoniae ([facility]) 11/30/2018     Social History     Socioeconomic History    Marital status:      Spouse name: None    Number of children: 1    Years of education: None    Highest education level: None   Occupational History    Occupation:    Tobacco Use    Smoking status: Former    Smokeless tobacco: Never    Tobacco comments:     pt \"tried it when he was a teenager but did not like them\"   Vaping Use    Vaping status: Never Used   Substance and Sexual Activity    Alcohol use: Yes     Comment: occasionally    Drug use: No    Sexual activity: Not Currently   Other Topics Concern    None   Social History Narrative    None     Social Determinants of Health     Financial Resource Strain: Low Risk  (2/23/2024)    Overall Financial Resource Strain (CARDIA)     Difficulty of Paying Living Expenses: Not hard at all   Food Insecurity: No Food Insecurity (2/23/2024)    Hunger Vital Sign     Worried About Running Out of Food in the Last Year: Never true     Ran Out of Food in the Last Year: Never true   Transportation Needs: No Transportation Needs (2/23/2024)    PRAPARE - Transportation     Lack of Transportation (Medical): No     Lack of Transportation (Non-Medical): No   Physical Activity: Not on file   Stress: Not on file   Social Connections: Not on file   Intimate Partner " "Violence: Not on file   Housing Stability: Low Risk  (5/29/2024)    Housing Stability Vital Sign     Unable to Pay for Housing in the Last Year: No     Number of Times Moved in the Last Year: 1     Homeless in the Last Year: No       Subjective         Objective    Physical Exam:   Assessment Type: Assess only  General Appearance: Awake  Respiratory Pattern: Normal  Chest Assessment: Chest expansion symmetrical  Bilateral Breath Sounds: Clear    Vitals:  Blood pressure 97/62, pulse 66, temperature 98.3 °F (36.8 °C), temperature source Oral, resp. rate 18, height 5' 6\" (1.676 m), weight 79 kg (174 lb 3.2 oz), SpO2 97%.          Imaging and other studies:           Plan    Respiratory Plan: Discontinue Protocol, No distress/Pulmonary history        Resp Comments: (P) pt assessed per resp protocol. Pt here for fall history of TBI. Pt has no pulmonary history or pulmonary home med use per pt. will d/c protocol at this time.   "

## 2024-06-16 NOTE — PLAN OF CARE
Problem: Prexisting or High Potential for Compromised Skin Integrity  Goal: Skin integrity is maintained or improved  Description: INTERVENTIONS:  - Identify patients at risk for skin breakdown  - Assess and monitor skin integrity  - Assess and monitor nutrition and hydration status  - Monitor labs   - Assess for incontinence   - Turn and reposition patient  - Assist with mobility/ambulation  - Relieve pressure over bony prominences  - Avoid friction and shearing  - Provide appropriate hygiene as needed including keeping skin clean and dry  - Evaluate need for skin moisturizer/barrier cream  - Collaborate with interdisciplinary team   - Patient/family teaching  - Consider wound care consult   Outcome: Progressing     Problem: PAIN - ADULT  Goal: Verbalizes/displays adequate comfort level or baseline comfort level  Description: Interventions:  - Encourage patient to monitor pain and request assistance  - Assess pain using appropriate pain scale  - Administer analgesics based on type and severity of pain and evaluate response  - Implement non-pharmacological measures as appropriate and evaluate response  - Consider cultural and social influences on pain and pain management  - Notify physician/advanced practitioner if interventions unsuccessful or patient reports new pain  Outcome: Progressing     Problem: INFECTION - ADULT  Goal: Absence or prevention of progression during hospitalization  Description: INTERVENTIONS:  - Assess and monitor for signs and symptoms of infection  - Monitor lab/diagnostic results  - Monitor all insertion sites, i.e. indwelling lines, tubes, and drains  - Monitor endotracheal if appropriate and nasal secretions for changes in amount and color  - [place] appropriate cooling/warming therapies per order  - Administer medications as ordered  - Instruct and encourage patient and family to use good hand hygiene technique  - Identify and instruct in appropriate isolation precautions for  identified infection/condition  Outcome: Progressing     Problem: SAFETY ADULT  Goal: Patient will remain free of falls  Description: INTERVENTIONS:  - Educate patient/family on patient safety including physical limitations  - Instruct patient to call for assistance with activity   - Consult OT/PT to assist with strengthening/mobility   - Keep Call bell within reach  - Keep bed low and locked with side rails adjusted as appropriate  - Keep care items and personal belongings within reach  - Initiate and maintain comfort rounds  - Make Fall Risk Sign visible to staff  - Apply yellow socks and bracelet for high fall risk patients  - Consider moving patient to room near nurses station  Outcome: Progressing     Problem: SAFETY ADULT  Goal: Maintain or return to baseline ADL function  Description: INTERVENTIONS:  -  Assess patient's ability to carry out ADLs; assess patient's baseline for ADL function and identify physical deficits which impact ability to perform ADLs (bathing, care of mouth/teeth, toileting, grooming, dressing, etc.)  - Assess/evaluate cause of self-care deficits   - Assess range of motion  - Assess patient's mobility; develop plan if impaired  - Assess patient's need for assistive devices and provide as appropriate  - Encourage maximum independence but intervene and supervise when necessary  - Involve family in performance of ADLs  - Assess for home care needs following discharge   - Consider OT consult to assist with ADL evaluation and planning for discharge  - Provide patient education as appropriate  Outcome: Progressing     Problem: SAFETY ADULT  Goal: Maintains/Returns to pre admission functional level  Description: INTERVENTIONS:  - Perform AM-PAC 6 Click Basic Mobility/ Daily Activity assessment daily.  - Set and communicate daily mobility goal to care team and patient/family/caregiver.   - Collaborate with rehabilitation services on mobility goals if consulted  - Out of bed for toileting  -  Record patient progress and toleration of activity level   Outcome: Progressing     Problem: DISCHARGE PLANNING  Goal: Discharge to home or other facility with appropriate resources  Description: INTERVENTIONS:  - Identify barriers to discharge w/patient and caregiver  - Arrange for needed discharge resources and transportation as appropriate  - Identify discharge learning needs (meds, wound care, etc.)  - Arrange for interpretive services to assist at discharge as needed  - Refer to Case Management Department for coordinating discharge planning if the patient needs post-hospital services based on physician/advanced practitioner order or complex needs related to functional status, cognitive ability, or social support system  Outcome: Progressing     Problem: Knowledge Deficit  Goal: Patient/family/caregiver demonstrates understanding of disease process, treatment plan, medications, and discharge instructions  Description: Complete learning assessment and assess knowledge base.  Interventions:  - Provide teaching at level of understanding  - Provide teaching via preferred learning methods  Outcome: Progressing     Problem: NEUROSENSORY - ADULT  Goal: Achieves stable or improved neurological status  Description: INTERVENTIONS  - Monitor and report changes in neurological status  - Monitor vital signs such as temperature, blood pressure, glucose, and any other labs ordered   - Initiate measures to prevent increased intracranial pressure  - Monitor for seizure activity and implement precautions if appropriate      Outcome: Progressing     Problem: NEUROSENSORY - ADULT  Goal: Remains free of injury related to seizures activity  Description: INTERVENTIONS  - Maintain airway, patient safety  and administer oxygen as ordered  - Monitor patient for seizure activity, document and report duration and description of seizure to physician/advanced practitioner  - If seizure occurs,  ensure patient safety during seizure  -  Reorient patient post seizure  - Seizure pads on all 4 side rails  - Instruct patient/family to notify RN of any seizure activity including if an aura is experienced  - Instruct patient/family to call for assistance with activity based on nursing assessment  - Administer anti-seizure medications if ordered    Outcome: Progressing     Problem: NEUROSENSORY - ADULT  Goal: Achieves maximal functionality and self care  Description: INTERVENTIONS  - Monitor swallowing and airway patency with patient fatigue and changes in neurological status  - Encourage and assist patient to increase activity and self care.   - Encourage visually impaired, hearing impaired and aphasic patients to use assistive/communication devices  Outcome: Progressing     Problem: CARDIOVASCULAR - ADULT  Goal: Maintains optimal cardiac output and hemodynamic stability  Description: INTERVENTIONS:  - Monitor I/O, vital signs and rhythm  - Monitor for S/S and trends of decreased cardiac output  - Administer and titrate ordered vasoactive medications to optimize hemodynamic stability  - Assess quality of pulses, skin color and temperature  - Assess for signs of decreased coronary artery perfusion  - Instruct patient to report change in severity of symptoms  Outcome: Progressing     Problem: CARDIOVASCULAR - ADULT  Goal: Absence of cardiac dysrhythmias or at baseline rhythm  Description: INTERVENTIONS:  - Continuous cardiac monitoring, vital signs, obtain 12 lead EKG if ordered  - Administer antiarrhythmic and heart rate control medications as ordered  - Monitor electrolytes and administer replacement therapy as ordered  Outcome: Progressing     Problem: RESPIRATORY - ADULT  Goal: Achieves optimal ventilation and oxygenation  Description: INTERVENTIONS:  - Assess for changes in respiratory status  - Assess for changes in mentation and behavior  - Position to facilitate oxygenation and minimize respiratory effort  - Oxygen administered by appropriate  delivery if ordered  - Initiate smoking cessation education as indicated  - Encourage broncho-pulmonary hygiene including cough, deep breathe, Incentive Spirometry  - Assess the need for suctioning and aspirate as needed  - Assess and instruct to report SOB or any respiratory difficulty  - Respiratory Therapy support as indicated  Outcome: Progressing     Problem: GASTROINTESTINAL - ADULT  Goal: Minimal or absence of nausea and/or vomiting  Description: INTERVENTIONS:  - Administer IV fluids if ordered to ensure adequate hydration  - Maintain NPO status until nausea and vomiting are resolved  - Nasogastric tube if ordered  - Administer ordered antiemetic medications as needed  - Provide nonpharmacologic comfort measures as appropriate  - Advance diet as tolerated, if ordered  - Consider nutrition services referral to assist patient with adequate nutrition and appropriate food choices  Outcome: Progressing     Problem: GASTROINTESTINAL - ADULT  Goal: Maintains or returns to baseline bowel function  Description: INTERVENTIONS:  - Assess bowel function  - Encourage oral fluids to ensure adequate hydration  - Administer IV fluids if ordered to ensure adequate hydration  - Administer ordered medications as needed  - Encourage mobilization and activity  - Consider nutritional services referral to assist patient with adequate nutrition and appropriate food choices  Outcome: Progressing     Problem: GASTROINTESTINAL - ADULT  Goal: Maintains adequate nutritional intake  Description: INTERVENTIONS:  - Monitor percentage of each meal consumed  - Identify factors contributing to decreased intake, treat as appropriate  - Assist with meals as needed  - Monitor I&O, weight, and lab values if indicated  - Obtain nutrition services referral as needed  Outcome: Progressing     Problem: GASTROINTESTINAL - ADULT  Goal: Oral mucous membranes remain intact  Description: INTERVENTIONS  - Assess oral mucosa and hygiene practices  -  Implement preventative oral hygiene regimen  - Implement oral medicated treatments as ordered  - Initiate Nutrition services referral as needed  Outcome: Progressing     Problem: GENITOURINARY - ADULT  Goal: Maintains or returns to baseline urinary function  Description: INTERVENTIONS:  - Assess urinary function  - Encourage oral fluids to ensure adequate hydration if ordered  - Administer IV fluids as ordered to ensure adequate hydration  - Administer ordered medications as needed  - Offer frequent toileting  - Follow urinary retention protocol if ordered  Outcome: Progressing     Problem: GENITOURINARY - ADULT  Goal: Absence of urinary retention  Description: INTERVENTIONS:  - Assess patient’s ability to void and empty bladder  - Monitor I/O  - Bladder scan as needed  - Discuss with physician/AP medications to alleviate retention as needed  - Discuss catheterization for long term situations as appropriate  Outcome: Progressing

## 2024-06-16 NOTE — PROGRESS NOTES
[facility]  Progress Note  Name: Luis Forrester I  MRN: [id number]  [facility] Date of Admission: 6/15/2024   Date of Service: 6/15/2024 I Hospital Day: 0    Assessment & Plan   SDH (subdural hematoma) (HCC)  Assessment & Plan  Parafalcine SDH with bilateral SAH  Patient presented after being found down by his daughter, patient is unsure what happened other than he got up to go to the bathroom.  Patient denies any blood thinner use.  On exam patient Decreased sensation in his right posterior thigh right  weakness.    Imaging:    CT head without 6/15/24:New acute trace subarachnoid hemorrhage in bilateral parafalcine regions. Recommend dedicated CTA head with contrast for further evaluation.New acute trace parafalcine subdural hematoma.    Plan:  Continue frequent neurological checks.   Reviewed imaging with patient and daughter  STAT CT head with any neurological decline including drop GCS of 2pts within 1 hr.  Recommend SBP <160mmHg.  Seizure prophylaxis per trauma team  Hold all antiplatelet and anticoagulation medications.   Medical management and pain control per primary team  Mobilize with PT/OT  DVT PPX: SCDs only at this time. Would recommend additional CT head for stability prior to initiation of pharmacological DVT PPX.   Will repeat CT head tomorrow morning for stability  Recommend checking coags  No neurosurgical intervention indicated at this juncture.     Neurosurgery will continue to follow closely, plan to repeat CT head tomorrow morning, call with any further questions or concerns.    Right hand weakness  Assessment & Plan  Given new right hand weakness and pain with bilateral Mohit's and right-sided wrist weakness patient requires further workup    Imaging reviewed with :    MRI cervical spine without 6/15/2024:Congenital canal stenosis with superimposed degenerative spondylosis .Most pronounced at C3-4 and C5-C6 with moderate to  severe canal stenosis and mild cord compression. Evaluation of cord signal is limited due to artifact. No definite abnormal cord signal but mild cord edema would be difficult to exclude. Severe bilateral foraminal stenosis also seen at C3-4 and C5-C6.Mild to moderate canal stenosis at other levels    Plan:  Reviewed imaging with patient and daughter at bedside, congenital canal stenosis with superimposed degenerative spondylosis with areas of severe canal stenosis and mild cord compression with questionable cord abnormality.  Recommend MAPs>85  Trauma placed patient in aspen collar  Will transfer to ICU for closer  Tentative plan for posterior fusion tomorrow  Will make patient n.p.o.  Trauma team updated plan with patient and family             History of Present Illness     HPI: Luis Forrester is a 53 y.o.  male with PMH significant for hypoparathyroidism and hypothyroidism.  Patient presents to the ER this morning after suspected syncopal episode and fall.  Patient woke up this morning on the floor, he does not remember passing out or falling.  He went to use the bathroom when his daughter found him on the floor, he states he was there for several hours unable to get up on his own.  He was complaining of back and bilateral wrist pain, he does not take any blood thinning medication.  CT scan demonstrated SDH and SAH.    Of note patient's daughter states patient 3 syncopal episode in the past 6 months where he sustained a fall.    Patient states he got home around 2am and went in to use the bathroom, he does not recall what happened but he woke up on the floor unable to get up on his own in the hallway, he yelled for his daughter who got up to use the bathroom when she called 911 because she was unable to get him up on her own.  EMS helped get him up then she called a second time and he was brought into the hospital.  States the right side of his body bothers him.  He reports decreased sensation in his right  "posterior thigh.  He denies any neck pain.  He denies any blood thinner use.  Unsure if he struck his head or lost consciousness.  He states he cannot open up his right hand since the fall.  He also reports right leg weakness.  He offers no other complaints.      Review of Systems   Constitutional:  Positive for activity change. Negative for chills and fever.   HENT:  Negative for tinnitus and trouble swallowing.    Eyes:  Negative for visual disturbance.   Respiratory:  Negative for shortness of breath.    Cardiovascular:  Negative for chest pain.   Gastrointestinal:  Negative for abdominal pain, constipation, diarrhea, nausea and vomiting.   Genitourinary:  Negative for difficulty urinating.   Musculoskeletal:  Negative for back pain, gait problem and neck pain.   Neurological:  Positive for weakness. Negative for dizziness, speech difficulty, light-headedness, numbness and headaches.       Historical Information   Past Medical History:   Diagnosis Date    Arthritis     Chronic cough     Disease of thyroid gland     Hypoparathyroidism (HCC)     continue taking calcium and vitamin D, will check CMP, PTH level and Vitamin D level    Pneumonia of right middle lobe due to Streptococcus pneumoniae ([facility]) 11/30/2018     Past Surgical History:   Procedure Laterality Date    FRACTURE SURGERY      Open Treatment of Each Proximal Finger Phalanx     Social History     Substance and Sexual Activity   Alcohol Use Yes    Comment: occasionally     Social History     Substance and Sexual Activity   Drug Use No     Social History     Tobacco Use   Smoking Status Former   Smokeless Tobacco Never   Tobacco Comments    pt \"tried it when he was a teenager but did not like them\"     Family History   Problem Relation Age of Onset    No Known Problems Mother     No Known Problems Father     No Known Problems Sister     No Known Problems Brother     No Known Problems Son     Learning disabilities Daughter     Asthma Daughter     Seizures " Daughter     No Known Problems Maternal Grandmother     No Known Problems Maternal Grandfather     No Known Problems Paternal Grandmother     No Known Problems Paternal Grandfather     No Known Problems Maternal Aunt     No Known Problems Maternal Uncle     No Known Problems Paternal Aunt     No Known Problems Paternal Uncle     No Known Problems Cousin        Meds/Allergies   all current active meds have been reviewed, current meds:   Current Facility-Administered Medications   Medication Dose Route Frequency    acetaminophen (TYLENOL) tablet 975 mg  975 mg Oral Q8H ASHLYN    albuterol (PROVENTIL HFA,VENTOLIN HFA) inhaler 2 puff  2 puff Inhalation Q4H PRN    albuterol inhalation solution 2.5 mg  2.5 mg Nebulization Q6H PRN    Cholecalciferol (VITAMIN D3) tablet 1,000 Units  1,000 Units Oral Daily    gabapentin (NEURONTIN) capsule 100 mg  100 mg Oral TID    HYDROmorphone HCl (DILAUDID) injection 0.2 mg  0.2 mg Intravenous Q2H PRN    levETIRAcetam (KEPPRA) tablet 500 mg  500 mg Oral BID    levothyroxine tablet 125 mcg  125 mcg Oral Early Morning    methocarbamol (ROBAXIN) tablet 500 mg  500 mg Oral Q6H ASHLYN    naloxone (NARCAN) 0.04 mg/mL syringe 0.04 mg  0.04 mg Intravenous Q1MIN PRN    ondansetron (ZOFRAN) injection 4 mg  4 mg Intravenous Q4H PRN    oxyCODONE (ROXICODONE) split tablet 2.5 mg  2.5 mg Oral Q4H PRN    Or    oxyCODONE (ROXICODONE) IR tablet 5 mg  5 mg Oral Q4H PRN    polyethylene glycol (MIRALAX) packet 17 g  17 g Oral Daily    senna-docusate sodium (SENOKOT S) 8.6-50 mg per tablet 1 tablet  1 tablet Oral HS   , and PTA meds:   Prior to Admission Medications   Prescriptions Last Dose Informant Patient Reported? Taking?   Diclofenac Sodium (VOLTAREN) 1 %   No No   Sig: Apply 2 g topically 4 (four) times a day   acetaminophen (TYLENOL) 650 mg CR tablet   No No   Sig: Take 1 tablet (650 mg total) by mouth every 8 (eight) hours as needed for mild pain   albuterol (2.5 mg/3 mL) 0.083 % nebulizer solution   No  No   Sig: Take 3 mL (2.5 mg total) by nebulization every 6 (six) hours as needed for wheezing or shortness of breath   albuterol (Ventolin HFA) 90 mcg/act inhaler   No No   Sig: Inhale 2 puffs every 4 (four) hours as needed for wheezing   benzonatate (TESSALON PERLES) 100 mg capsule   No No   Sig: Take 1 capsule (100 mg total) by mouth every 8 (eight) hours   cetirizine (ZyrTEC) 10 mg tablet   No No   Sig: Take 1 tablet (10 mg total) by mouth daily   cholecalciferol (VITAMIN D3) 25 mcg (1,000 units) tablet   No No   Sig: TAKE 1 TABLET BY MOUTH EVERY DAY   hydrocortisone 1 % ointment   No No   Sig: Apply topically 2 (two) times a day   levothyroxine 125 mcg tablet   No No   Sig: One tablet daily      Facility-Administered Medications: None     Allergies   Allergen Reactions    Chlorine Rash       Objective   I/O         06/14 0701  06/15 0700 06/15 0701  06/16 0700    P.O.  120    Total Intake(mL/kg)  120 (1.5)    Net  +120          Unmeasured Urine Occurrence  0 x    Unmeasured Stool Occurrence  0 x            Physical Exam  Vitals reviewed.   Constitutional:       General: He is awake. He is not in acute distress.     Appearance: Normal appearance. He is not ill-appearing.   HENT:      Head: Normocephalic and atraumatic.   Eyes:      Extraocular Movements: Extraocular movements intact and EOM normal.      Conjunctiva/sclera: Conjunctivae normal.      Pupils: Pupils are equal, round, and reactive to light.   Cardiovascular:      Rate and Rhythm: Normal rate.   Pulmonary:      Effort: Pulmonary effort is normal. No respiratory distress.   Chest:      Chest wall: No tenderness.   Abdominal:      General: There is no distension.      Palpations: Abdomen is soft.      Tenderness: There is no abdominal tenderness.   Musculoskeletal:      Cervical back: Normal range of motion and neck supple.      Comments: Limited ROM in R hand otherwise WNL   Skin:     General: Skin is warm and dry.   Neurological:      Mental Status:  He is alert and oriented to person, place, and time.      Coordination: Finger-Nose-Finger Test normal.      Deep Tendon Reflexes:      Reflex Scores:       Bicep reflexes are 2+ on the right side and 2+ on the left side.       Patellar reflexes are 2+ on the right side and 2+ on the left side.  Psychiatric:         Attention and Perception: Attention and perception normal.         Mood and Affect: Mood and affect normal.         Speech: Speech normal.         Behavior: Behavior normal. Behavior is cooperative.         Thought Content: Thought content normal.         Cognition and Memory: Cognition and memory normal.         Judgment: Judgment normal.       Neurologic Exam     Mental Status   Oriented to person, place, and time.   Follows 2 step commands.   Attention: normal. Concentration: normal.   Speech: speech is normal   Level of consciousness: alert  Knowledge: good. Able to perform simple calculations.   Able to name object. Normal comprehension.     Cranial Nerves     CN III, IV, VI   Pupils are equal, round, and reactive to light.  Extraocular motions are normal.   CN III: no CN III palsy  CN VI: no CN VI palsy  Nystagmus: none   Diplopia: none  Conjugate gaze: present    CN V   Facial sensation intact.     CN VII   Facial expression full, symmetric.     CN VIII   CN VIII normal.   Hearing: intact    CN IX, X   CN IX normal.     CN XI   CN XI normal.     CN XII   CN XII normal.     Motor Exam   Muscle bulk: normal  Overall muscle tone: normal  Right arm pronator drift: absent  Left arm pronator drift: absent    Strength   Strength 5/5 except as noted. R  3/5  R WE/WF 4-/5     Sensory Exam   Sensation to LT intact excpet decreased to LT in R posterior thigh     Gait, Coordination, and Reflexes     Coordination   Finger to nose coordination: normal    Tremor   Resting tremor: absent  Intention tremor: absent  Action tremor: absent    Reflexes   Right biceps: 2+  Left biceps: 2+  Right patellar: 2+  Left  "patellar: 2+  Right Montiel: present  Left Montiel: present  Right ankle clonus: absent  Left ankle clonus: absent      Vitals:Blood pressure 106/65, pulse 71, temperature 99 °F (37.2 °C), resp. rate 18, height 5' 6\" (1.676 m), weight 79 kg (174 lb 3.2 oz), SpO2 96%.,Body mass index is 28.12 kg/m².     Lab Results:   Results from last 7 days   Lab Units 06/15/24  0856   WBC Thousand/uL 11.11*   HEMOGLOBIN g/dL 13.3   HEMATOCRIT % 40.0   PLATELETS Thousands/uL 160   SEGS PCT % 82*   MONO PCT % 7   EOS PCT % 0     Results from last 7 days   Lab Units 06/15/24  0856   POTASSIUM mmol/L 3.6   CHLORIDE mmol/L 101   CO2 mmol/L 27   BUN mg/dL 13   CREATININE mg/dL 0.76   CALCIUM mg/dL 6.6*   ALK PHOS U/L 80   ALT U/L 26   AST U/L 31                 No results found for: \"TROPONINT\"  ABG:No results found for: \"PHART\", \"QVV8TAS\", \"PO2ART\", \"SLH5NHN\", \"S3VUVPEA\", \"BEART\", \"SOURCE\"    Imaging Studies: I have personally reviewed pertinent reports.   and I have personally reviewed pertinent films in PACS    EKG, Pathology, and Other Studies: I have personally reviewed pertinent reports.      VTE Prophylaxis: Sequential compression device (Venodyne)     Code Status: Level 1 - Full Code  Advance Directive and Living Will:      Power of :    POLST:          "

## 2024-06-16 NOTE — QUICK NOTE
Was reached out by PACU nurse because patient was not squeezing her hands bilaterally.  Went to assess patient.  He remains drowsy from anesthesia but easily arousable.  He can lift bilateral arms off of the bed.  He has a weak  bilaterally left worse than right.  Sensation intact.  Continue to monitor,  made aware of these findings as well.

## 2024-06-16 NOTE — PROGRESS NOTES
"Acute Care Surgery   Post-Op Check Progress Note   Luis Forrester 53 y.o. male MRN: [id number]  Unit/Bed#: OR POOL Encounter: [id number]    Assessment and Plan:  53-year-old male s/p posterior C2-C7 laminectomies, b/l C21, C7, T1 pedical screw placement, b/l C3-5 lateral mass screw placement, arthrodesis C2-T1 with autologous bone graft.     Plan:  - Admit to ICU  - Frequent neuro checks  - Nsgy following - recs appreciated.  - Chemical DVT ppx start tomorrow 6/17 per Nsgy  - PRN analgesia  - Encourage IS use  - 10f accordion drain in place with sanguinous appearing output; monitor output  - Upright XR 6/17  - Phenylephrine gtt to maintain MAP >85mmHg for 5 days      Subjective/Objective     Subjective: \"can I have a beer\"    Objective:     Blood pressure 116/77, pulse 70, temperature 97.7 °F (36.5 °C), resp. rate 13, height 5' 6\" (1.676 m), weight 79 kg (174 lb 3.2 oz), SpO2 96%.,Body mass index is 28.12 kg/m².      Intake/Output Summary (Last 24 hours) at 6/16/2024 1727  Last data filed at 6/16/2024 1700  Gross per 24 hour   Intake 2920.1 ml   Output 7450 ml   Net -4529.9 ml       Invasive Devices       Peripheral Intravenous Line  Duration             Peripheral IV 06/15/24 Right Antecubital 1 day    Peripheral IV 06/16/24 Distal;Dorsal (posterior);Left Forearm <1 day    Peripheral IV 06/16/24 Left Hand <1 day              Arterial Line  Duration             Arterial Line 06/16/24 Right Radial <1 day              Drain  Duration             Closed/Suction Drain Posterior;Midline;Proximal Back Accordion 10 Fr. <1 day    Urethral Catheter Latex;Straight-tip 16 Fr. <1 day                    Physical Exam:    GENERAL APPEARANCE: Patient in no acute distress, awake, lying in bed  HEENT: NC/AT; b/l temporal surgical stabilization sites (MINE, mild ecchymosis). PERRL, EOMs intact; Mucous membranes moist  CV: Regular rate and rhythm; + S1, S2; no murmur/gallops/rubs appreciated.  LUNGS: Clear to auscultation; no " wheezes/rales/rhonci.  ABD: NABS; soft; non-distended; non-tender.  EXT: +2 pulses bilaterally upper & lower extremities; no clubbing/cyanosis/edema.  NEURO: Post-anesthetic appearing/lethargy. GCS 15. Neurovascularly intact.  LUE: 4/5 strength throughout  RUE: Delt 4/5, tricep 5/5, bicep 5/5, hand  2/5  RLE: 4/5  LLE: 5/5  Sensation intact. Thoracic drain in place; sanguineous output.   SKIN: Warm, dry and well perfused; no rash; no jaundice.        ÓSCAR Gibbons  6/16/2024

## 2024-06-16 NOTE — ASSESSMENT & PLAN NOTE
Parafalcine SDH with bilateral SAH  Patient presented after being found down by his daughter, patient is unsure what happened other than he got up to go to the bathroom.  Patient denies any blood thinner use.  On exam patient Decreased sensation in his right posterior thigh right  weakness.    Imaging:    CT head without 6/15/24:New acute trace subarachnoid hemorrhage in bilateral parafalcine regions. Recommend dedicated CTA head with contrast for further evaluation.New acute trace parafalcine subdural hematoma.    Plan:  Continue frequent neurological checks.   Reviewed imaging with patient and daughter  STAT CT head with any neurological decline including drop GCS of 2pts within 1 hr.  Recommend SBP <160mmHg.  Seizure prophylaxis per trauma team  Hold all antiplatelet and anticoagulation medications.   Medical management and pain control per primary team  Mobilize with PT/OT  DVT PPX: SCDs only at this time. Would recommend additional CT head for stability prior to initiation of pharmacological DVT PPX.   Will repeat CT head tomorrow morning for stability  No neurosurgical intervention indicated at this juncture.     Neurosurgery will continue to follow closely, plan to repeat CT head tomorrow morning, call with any further questions or concerns.

## 2024-06-16 NOTE — ANESTHESIA PREPROCEDURE EVALUATION
Medical History    History Comments   Chronic cough    Hypoparathyroidism (HCC) continue taking calcium and vitamin D, will check CMP, PTH level and Vitamin D level   Disease of thyroid gland    Arthritis    Pneumonia of right middle lobe due to Streptococcus pneumoniae (HCC)    Procedure:  DECOMPRESSION SPINE CERVICAL POSTERIOR C2-T1 with fusion navigated with Oarm (Spine Cervical)    Relevant Problems   ANESTHESIA (within normal limits)      CARDIO   (+) Hyperlipidemia      ENDO   (+) Hypothyroidism      MUSCULOSKELETAL   (+) Chronic low back pain   (+) Osteoarthritis of both hands      NEURO/PSYCH   (+) Chronic low back pain   (+) Right hand weakness   (+) SDH (subdural hematoma) (HCC)        Physical Exam    Airway    Mallampati score: II  TM Distance: >3 FB  Neck ROM: full     Dental       Cardiovascular  Rate: normal    Pulmonary  Pulmonary exam normal     Other Findings  Per pt denies anything remaining that is loose or removeable      Anesthesia Plan  ASA Score- 3     Anesthesia Type- general with ASA Monitors.         Additional Monitors: arterial line.    Airway Plan: ETT.           Plan Factors-Exercise tolerance (METS): >4 METS.    Chart reviewed.    Patient summary reviewed.    Patient is not a current smoker.              Induction- intravenous.    Postoperative Plan- Plan for postoperative opioid use.     Perioperative Resuscitation Plan - Level 1 - Full Code.       Informed Consent- Anesthetic plan and risks discussed with patient.  I personally reviewed this patient with the CRNA. Discussed and agreed on the Anesthesia Plan with the CRNA..

## 2024-06-16 NOTE — ANESTHESIA POSTPROCEDURE EVALUATION
Post-Op Assessment Note    CV Status:  Stable  Pain Score: 0    Pain management: adequate       Mental Status:  Alert and awake   Hydration Status:  Euvolemic   PONV Controlled:  Controlled   Airway Patency:  Patent     Post Op Vitals Reviewed: Yes    No anethesia notable event occurred.    Staff: CRNA               BP   133/78   Temp   98.3   Pulse  77   Resp   20   SpO2   100

## 2024-06-16 NOTE — PROGRESS NOTES
[facility]  Progress Note: Critical Care  Name: Luis Forrester 53 y.o. male I MRN: [id number]  Unit/Bed#: [facility]U 204-01 I Date of Admission: 6/15/2024   Date of Service: 6/16/2024 I Hospital Day: 1    Assessment & Plan   Neuro/Psych:  Diagnoses: Syncope, trace subarachnoid hemorrhage, trace parafalcine subdural hematoma, right hand weakness  Plan:  CT head from 6/15: New acute trace subarachnoid hemorrhage in bilateral parafalcine regions. New acute trace parafalcine subdural hematoma.   CTA head and neck 6/15:Negative CTA head traumatic vascular injury, aneurysm, large vessel occlusion, high-grade stenosis, or dissection. Partially imaged ectatic right carotid bifurcation and proximal cervical internal carotid artery with retropharyngeal course, similar to CT neck with contrast 10/12/2011.   MRI cervical spine without 6/15/2024:Congenital canal stenosis with superimposed degenerative spondylosis. Most pronounced at C3-4 and C5-C6 with moderate to severe canal stenosis and mild cord compression. Evaluation of cord signal is limited due to artifact. No definite abnormal cord signal but mild cord edema would be difficult to exclude. Severe bilateral foraminal stenosis also seen at C3-4 and C5-C6.Mild to moderate canal stenosis at other levels   Neurosurgery following  Keppra for 7 days for seizure prophylaxis.   Neuro checks q1h  STAT CT head with any neurological decline including drop GCS of 2pts within 1 hr.   Repeat CT head 6/16  SBP <160mmHg.   MAP >85 for spinal cord precaution  Posterior fusion with Neurosurgery 6/16  Analgesia: Multimodal. Tylenol, Gabapentin, Robaxin, Oxycodone PRN, Dilaudid, PRN for breakthrough     CV:  Diagnoses: Syncope  Plan:  Multiple syncopal episodes totaling at least 3 episodes over the last 6 months   EKG from 6/15/2024 reviewed, echo pending   Fall precautions  Internal Medicine consultation for syncope work up   Continuous cardiopulmonary  monitoring. MAP pushes to maintain MAP >85 per Neurosurgery.     Pulm:   Diagnoses: Chronic cough  Plan:  Home albuterol nebulizer and inhaler  Continuous pulse oximetry. Maintain O2 sat >92%.      GI:  Diagnoses: No active issues  Plan:  Bowel regimen: MiraLAX and Senokot  Zofran PRN for nausea     :  Diagnoses: No active issues  Plan:  Monitor I/Os.     F/E/N:  Plan:   F: Fluid resuscitation prn.  E: Monitor and replete electrolytes for Mg >2, Phos >3, K >4.  N: Regular diet, NPOmn     Heme/Onc:  Diagnoses: No active issues  Plan:  Transfuse for Hb<7  VTE prophylaxis: Holding in setting of brain bleed     Endo:  Diagnoses: Hx of hypothyroidism and hypoparathyroidism  Plan:   TSH 14.9, free T41.40  Continue home levothyroxine 125 mcg  Continue home vitamin D 1000 units   Monitor blood glucose.     ID:  Diagnoses: No active issues  Labs: WBC 11.11  Temperature: Tmax 99.3 F  Plan:  Monitor fever curve and WBC.     MSK/Skin:  Diagnoses: Bilateral hand pain  Plan:  Bilateral hand x-rays from 6/15/2024 reviewed and are negative for acute osseous abnormality.   PT/OT when appropriate. Encourage OOB and ambulation when appropriate.        LDAs:  Lines - PIV       Disposition: Stepdown Level 1    ICU Core Measures     A: Assess, Prevent, and Manage Pain Has pain been assessed? Yes  Need for changes to pain regimen? No   B: Both SAT/SAT  N/A   C: Choice of Sedation RASS Goal: 0 Alert and Calm  Need for changes to sedation or analgesia regimen? No   D: Delirium CAM-ICU: Negative   E: Early Mobility  Plan for early mobility? Yes   F: Family Engagement Plan for family engagement today? Yes         Prophylaxis:  VTE Contraindicated secondary to: brain bleed   Stress Ulcer  not ordered        Significant 24hr Events     24hr events: No acute issues overnight. No headache, blurry vision, numbness or tingling.      Subjective     Review of Systems: See HPI for Review of Systems     Objective                            Vitals I/O       Most Recent Min/Max in 24hrs   Temp 98.3 °F (36.8 °C) Temp  Min: 98.2 °F (36.8 °C)  Max: 99.3 °F (37.4 °C)   Pulse (!) 54 Pulse  Min: 54  Max: 75   Resp 20 Resp  Min: 16  Max: 20   BP 93/56 BP  Min: 84/55  Max: 107/57   O2 Sat 97 % SpO2  Min: 95 %  Max: 98 %      Intake/Output Summary (Last 24 hours) at 2024 0252  Last data filed at 2024 0030  Gross per 24 hour   Intake 120 ml   Output 800 ml   Net -680 ml       Diet NPO    Invasive Monitoring           Physical Exam   Physical Exam  Vitals and nursing note reviewed.   Cardiovascular:      Rate and Rhythm: Normal rate.   Abdominal: General: There is no distension.      Palpations: Abdomen is soft.      Tenderness: There is no abdominal tenderness. There is no guarding.   Constitutional:       General: He is not in acute distress.     Interventions: Cervical collar in place.   Pulmonary:      Effort: Pulmonary effort is normal. No respiratory distress.   Neurological:      General: No focal deficit present.      Mental Status: He is alert and oriented to person, place and time.            Diagnostic Studies      EK/15  Imaging: Reviewed I have personally reviewed pertinent reports.       Medications:  Scheduled PRN   acetaminophen, 975 mg, Q8H ASHLYN  Cholecalciferol, 1,000 Units, Daily  gabapentin, 100 mg, TID  levETIRAcetam, 500 mg, BID  levothyroxine, 125 mcg, Early Morning  methocarbamol, 500 mg, Q6H ASHLYN  polyethylene glycol, 17 g, Daily  senna-docusate sodium, 1 tablet, HS      albuterol, 2 puff, Q4H PRN  albuterol, 2.5 mg, Q6H PRN  HYDROmorphone, 0.2 mg, Q2H PRN  naloxone, 0.04 mg, Q1MIN PRN  ondansetron, 4 mg, Q4H PRN  oxyCODONE, 2.5 mg, Q4H PRN   Or  oxyCODONE, 5 mg, Q4H PRN       Continuous    phenylephine,  mcg/min, Last Rate: 50 mcg/min (24 7985)         Labs:    CBC    Recent Labs     06/15/24  0856   WBC 11.11*   HGB 13.3   HCT 40.0        BMP    Recent Labs     06/15/24  0856   SODIUM 136   K 3.6      CO2 27    AGAP 8   BUN 13   CREATININE 0.76   CALCIUM 6.6*       Coags    No recent results     Additional Electrolytes  No recent results       Blood Gas    No recent results  No recent results LFTs  Recent Labs     06/15/24  0856   ALT 26   AST 31   ALKPHOS 80   ALB 3.5   TBILI 0.45       Infectious  No recent results  Glucose  Recent Labs     06/15/24  0856   GLUC 123               Gala Yu MD

## 2024-06-16 NOTE — CONSULTS
[facility]  Consult: Critical Care  Name: Luis Forrester 53 y.o. male I MRN: [id number]  [facility] Date of Admission: 6/15/2024   Date of Service: 6/15/2024 I Hospital Day: 0      Consults  Assessment & Plan   Neuro/Psych:  Diagnoses: Syncope, trace subarachnoid hemorrhage, trace parafalcine subdural hematoma, right hand weakness  Plan:  CT head from 6/15: New acute trace subarachnoid hemorrhage in bilateral parafalcine regions. New acute trace parafalcine subdural hematoma.   CTA head and neck 6/15:Negative CTA head traumatic vascular injury, aneurysm, large vessel occlusion, high-grade stenosis, or dissection. Partially imaged ectatic right carotid bifurcation and proximal cervical internal carotid artery with retropharyngeal course, similar to CT neck with contrast 10/12/2011.   MRI cervical spine without 6/15/2024:Congenital canal stenosis with superimposed degenerative spondylosis .Most pronounced at C3-4 and C5-C6 with moderate to severe canal stenosis and mild cord compression. Evaluation of cord signal is limited due to artifact. No definite abnormal cord signal but mild cord edema would be difficult to exclude. Severe bilateral foraminal stenosis also seen at C3-4 and C5-C6.Mild to moderate canal stenosis at other levels   Neurosurgery consultation  Keppra for 7 days for seizure prophylaxis.   Neuro checks q1h  Repeat CT head 6/16  SBP <160mmHg.   MAP >85 for spinal cord precaution  Posterior fusion with Neurosurgery 6/16  Analgesia: Multimodal. Tylenol, Gabapentin, Robaxin, Oxycodone PRN, Dilaudid, PRN for breakthrough    CV:  Diagnoses: Syncope  Plan:  Multiple syncopal episodes totaling at least 3 episodes over the last 6 months    EKG from 6/15/2024 reviewed, echo pending   Fall precautions  Internal Medicine consultation for syncope work up   Continuous cardiopulmonary monitoring. MAP pushes to maintain MAP >85 per Neurosurgery.    Pulm:    Diagnoses: Chronic cough  Plan:  Home albuterol nebulizer and inhaler  Continuous pulse oximetry. Maintain O2 sat >92%.     GI:  Diagnoses: No active issues  Plan:  Bowel regimen: MiraLAX and Senokot  Zofran PRN for nausea    :  Diagnoses: No active issues  Plan:  Monitor I/Os.    F/E/N:  Plan:   F: Fluid resuscitation prn.  E: Monitor and replete electrolytes for Mg >2, Phos >3, K >4.  N: Regular diet, NPOmn    Heme/Onc:  Diagnoses: No active issues  Plan:  Transfuse for Hb<7  VTE prophylaxis: Holding in setting of brain bleed    Endo:  Diagnoses: Hx of hypothyroidism and hypoparathyroidism  Plan:   TSH 14.9, free T41.40  Continue home levothyroxine 125 mcg  Continue home vitamin D 1000 units   Monitor blood glucose.    ID:  Diagnoses: No active issues  Labs: WBC 11.11  Temperature: Tmax 99.3 F  Plan:  Monitor fever curve and WBC.    MSK/Skin:  Diagnoses: Bilateral hand pain  Plan:  Bilateral hand x-rays from 6/15/2024 reviewed and are negative for acute osseous abnormality.   PT/OT when appropriate. Encourage OOB and ambulation when appropriate.      LDAs:  Lines - PIV      Disposition: Critical care     History of Present Illness     HPI: Luis Forrester is a 53 y.o. who presents after a syncope episode overnight.  Multiple syncopal episodes totaling at least 3 episodes over the last 6 months. On admission,  New acute trace subarachnoid hemorrhage  and New acute trace parafalcine subdural hematoma seen on CT head. On MRI, congenital canal stenosis with superimposed degenerative spondylosis with areas of severe canal stenosis and mild cord compression with questionable cord abnormality. Upgraded to ICU for closer monitoring and maintenance of MAP >85    History obtained from chart review and the patient.  Review of Systems: See HPI for Review of Systems  Historical Information   Past Medical History:  No date: Arthritis  No date: Chronic cough  No date: Disease of thyroid gland  No date: Hypoparathyroidism  "(HCC)      Comment:  continue taking calcium and vitamin D, will check CMP,                PTH level and Vitamin D level  11/30/2018: Pneumonia of right middle lobe due to Streptococcus   pneumoniae ([facility]) Past Surgical History:  No date: FRACTURE SURGERY      Comment:  Open Treatment of Each Proximal Finger Phalanx   Current Outpatient Medications   Medication Instructions    acetaminophen (TYLENOL) 650 mg, Oral, Every 8 hours PRN    albuterol (Ventolin HFA) 90 mcg/act inhaler 2 puffs, Inhalation, Every 4 hours PRN    albuterol 2.5 mg, Nebulization, Every 6 hours PRN    benzonatate (TESSALON PERLES) 100 mg, Oral, Every 8 hours    cetirizine (ZYRTEC) 10 mg, Oral, Daily    cholecalciferol (VITAMIN D3) 25 mcg (1,000 units) tablet TAKE 1 TABLET BY MOUTH EVERY DAY    Diclofenac Sodium (VOLTAREN) 2 g, Topical, 4 times daily    hydrocortisone 1 % ointment Topical, 2 times daily    levothyroxine 125 mcg tablet One tablet daily    Allergies   Allergen Reactions    Chlorine Rash      Social History     Tobacco Use    Smoking status: Former    Smokeless tobacco: Never    Tobacco comments:     pt \"tried it when he was a teenager but did not like them\"   Vaping Use    Vaping status: Never Used   Substance Use Topics    Alcohol use: Yes     Comment: occasionally    Drug use: No    Family History   Problem Relation Age of Onset    No Known Problems Mother     No Known Problems Father     No Known Problems Sister     No Known Problems Brother     No Known Problems Son     Learning disabilities Daughter     Asthma Daughter     Seizures Daughter     No Known Problems Maternal Grandmother     No Known Problems Maternal Grandfather     No Known Problems Paternal Grandmother     No Known Problems Paternal Grandfather     No Known Problems Maternal Aunt     No Known Problems Maternal Uncle     No Known Problems Paternal Aunt     No Known Problems Paternal Uncle     No Known Problems Cousin           Objective                        "     Vitals I/O      Most Recent Min/Max in 24hrs   Temp 99 °F (37.2 °C) Temp  Min: 98.2 °F (36.8 °C)  Max: 99.3 °F (37.4 °C)   Pulse 71 Pulse  Min: 68  Max: 75   Resp 18 Resp  Min: 16  Max: 18   /65 BP  Min: 98/65  Max: 107/57   O2 Sat 96 % SpO2  Min: 95 %  Max: 98 %      Intake/Output Summary (Last 24 hours) at 6/15/2024 2023  Last data filed at 6/15/2024 1417  Gross per 24 hour   Intake 120 ml   Output --   Net 120 ml       Diet Regular; Regular House    Invasive Monitoring           Physical Exam   Physical Exam  HENT:      Head: Normocephalic.   Cardiovascular:      Rate and Rhythm: Normal rate and regular rhythm.   Musculoskeletal:      Right hand: Decreased strength.      Left hand: Normal.      Right lower leg: Normal.      Left lower leg: Normal.   Abdominal: General: There is no distension.      Palpations: Abdomen is soft.      Tenderness: There is no abdominal tenderness. There is no guarding.   Constitutional:       General: He is not in acute distress.  Pulmonary:      Effort: Pulmonary effort is normal. No respiratory distress.   Neurological:      Mental Status: He is alert and oriented to person, place and time.            Diagnostic Studies      EK/15  Imaging: Reviewed I have personally reviewed pertinent reports.       Medications:  Scheduled PRN   acetaminophen, 975 mg, Q8H ASHLYN  Cholecalciferol, 1,000 Units, Daily  gabapentin, 100 mg, TID  levETIRAcetam, 500 mg, BID  levothyroxine, 125 mcg, Early Morning  methocarbamol, 500 mg, Q6H ASHLYN  polyethylene glycol, 17 g, Daily  senna-docusate sodium, 1 tablet, HS      albuterol, 2 puff, Q4H PRN  albuterol, 2.5 mg, Q6H PRN  HYDROmorphone, 0.2 mg, Q2H PRN  naloxone, 0.04 mg, Q1MIN PRN  ondansetron, 4 mg, Q4H PRN  oxyCODONE, 2.5 mg, Q4H PRN   Or  oxyCODONE, 5 mg, Q4H PRN       Continuous          Labs:    CBC    Recent Labs     06/15/24  0856   WBC 11.11*   HGB 13.3   HCT 40.0        BMP    Recent Labs     06/15/24  0856   SODIUM 136   K  3.6      CO2 27   AGAP 8   BUN 13   CREATININE 0.76   CALCIUM 6.6*       Coags    No recent results     Additional Electrolytes  No recent results       Blood Gas    No recent results  No recent results LFTs  Recent Labs     06/15/24  0856   ALT 26   AST 31   ALKPHOS 80   ALB 3.5   TBILI 0.45       Infectious  No recent results  Glucose  Recent Labs     06/15/24  0856   GLUC 123                Gala Yu MD

## 2024-06-16 NOTE — RESTORATIVE TECHNICIAN NOTE
"       Restorative Technician Note      Patient Name: Luis Forrester     Restorative Tech Visit Date: 06/16/24  Note Type: Bracing, Initial consult (Pt going to OR shortly per RN; will follow up with bracing needs post op.)  Patient Position Upon Consult: Supine  Education Provided: Yes  Patient Position at End of Consult: Supine; All needs within reach; Other (comment) (Left in the care of critical care team)    Please contact the Mobility Coordinator on [employer] Chat  - \"SLB-PT-Restorative Tech\" role in regards to bracing instruction and/or adjustment.    Sullykyle Barnes  DPT, Restorative Technician            "

## 2024-06-16 NOTE — ASSESSMENT & PLAN NOTE
Given new right hand weakness and pain with bilateral Mohit's and right-sided wrist weakness patient requires further workup    Imaging reviewed with :    MRI cervical spine without 6/15/2024:Congenital canal stenosis with superimposed degenerative spondylosis .Most pronounced at C3-4 and C5-C6 with moderate to severe canal stenosis and mild cord compression. Evaluation of cord signal is limited due to artifact. No definite abnormal cord signal but mild cord edema would be difficult to exclude. Severe bilateral foraminal stenosis also seen at C3-4 and C5-C6.Mild to moderate canal stenosis at other levels    Plan:  Reviewed imaging with patient and daughter at bedside, congenital canal stenosis with superimposed degenerative spondylosis with areas of severe canal stenosis and mild cord compression with questionable cord abnormality.  Recommend MAPs>85  Trauma placed patient in aspen collar  Will transfer to ICU for closer  Tentative plan for posterior fusion tomorrow  Will make patient n.p.o.  Trauma team updated plan with patient and family

## 2024-06-16 NOTE — ANESTHESIA PROCEDURE NOTES
"Arterial Line Insertion    Performed by: Marcelino Lemons MD  Authorized by: Yessi Ocononr CRNA  Consent: Verbal consent obtained. Written consent obtained.  Risks and benefits: risks, benefits and alternatives were discussed  Consent given by: patient  Patient understanding: patient states understanding of the procedure being performed  Patient consent: the patient's understanding of the procedure matches consent given  Procedure consent: procedure consent matches procedure scheduled  Relevant documents: relevant documents present and verified  Test results: test results available and properly labeled  Site marked: the operative site was marked  Radiology Images: Radiology Images displayed and confirmed. If images not available, report reviewed  Patient identity confirmed: arm band, provided demographic data, hospital-assigned identification number, anonymous protocol, patient vented/unresponsive and verbally with patient  Time out: Immediately prior to procedure a \"time out\" was called to verify the correct patient, procedure, equipment, support staff and site/side marked as required.  Preparation: Patient was prepped and draped in the usual sterile fashion.  Indications: hemodynamic monitoring  Orientation:  Right  Location: radial artery  Procedure Details:  Marlon's test normal: yes  Needle gauge: 20  Number of attempts: 1    Post-procedure:  Post-procedure: dressing applied  Waveform: good waveform  Patient tolerance: Patient tolerated the procedure well with no immediate complications          "

## 2024-06-16 NOTE — ASSESSMENT & PLAN NOTE
1 Day Post-Op Procedure(s):  DECOMPRESSION SPINE CERVICAL POSTERIOR C2-T1 with fusion navigated with Oarm (Dr. Lopez, 6/16/2024)  Patient describes query syncopal episode and falls  new right hand weakness and pain with bilateral Mohit's and right-sided wrist weakness    Imaging:  MRI cervical spine without 6/15/2024:Congenital canal stenosis with superimposed degenerative spondylosis .Most pronounced at C3-4 and C5-C6 with moderate to severe canal stenosis and mild cord compression. Evaluation of cord signal is limited due to artifact. No definite abnormal cord signal but mild cord edema would be difficult to exclude. Severe bilateral foraminal stenosis also seen at C3-4 and C5-C6.Mild to moderate canal stenosis at other levels    Plan:  Continue to monitor neurological exam.   Maintain MAPs>85 x days  Hemovac drain 280 ml since surgery -maintain on full suction  Clindamycin x 2 weeks for surgical prophylaxis.  Pain control -APS on board for methadone taper  Mobilize with physical and Occupational Therapy - no collar required  Upright x-rays cervical spine AP lateral ordered and pending  DVT prophylaxis: Bilateral SCDs.  Okay for pharmacological DVT prophylaxis from our surgical standpoint    Neurosurgery will continue to follow.  Call with any questions or concerns.

## 2024-06-16 NOTE — ASSESSMENT & PLAN NOTE
Parafalcine SDH with bilateral SAH  Patient presented after being found down by his daughter, patient is unsure what happened other than he got up to go to the bathroom.  Patient denies any blood thinner use.  On exam patient Decreased sensation in his right posterior thigh right  weakness.    Imaging:  CT head without 6/15/24:New acute trace subarachnoid hemorrhage in bilateral parafalcine regions. Recommend dedicated CTA head with contrast for further evaluation.New acute trace parafalcine subdural hematoma.  Repeat CT head wo 6/15/2024: Trace subarachnoid hemorrhage within the interhemispheric fissure decreased.  No parenchymal subdural hematoma.    Plan:  Continue frequent neurological checks.   STAT CT head with any neurological decline including drop GCS of 2pts within 1 hr.  Seizure prophylaxis per trauma team  Hold all full antiplatelet and anticoagulation medications.   Medical management and pain control per primary team  Mobilize with PT/OT  DVT PPX: SCDs.  For pharmacological DVT prophylaxis from neurosurgical standpoint    Call with any further questions or concerns.

## 2024-06-16 NOTE — OP NOTE
OPERATIVE REPORT  PATIENT NAME: Luis Forrester    :  1970  MRN: [id number]  Pt Location:  OR ROOM 09    SURGERY DATE: 2024    Surgeons and Role:     * Endy Velasquez MD - Primary    Preop Diagnosis:  1) Cervical stenosis with myelopathy  2) Upper extremity weakness    Procedure(s):  1) Posterior cervical laminectomies C3, C4, C5, C6, C7  2) Bilateral C2, C7 and T1 pedicle screw placement  3) Bilateral C3, C4, C5  lateral mass screw placement  4) Arthrodesis C2-T1 with autologous bone graft and Clifton putty  5) Use of neuronavigation, Epoxy Stealth  6) Use of neuromonitoring  7) Use of fluoroscopy and O-arm  8) Use of Ignacio headholder    Specimen(s):  * No specimens in log *    Estimated Blood Loss:   400 mL    Drains:  Closed/Suction Drain Posterior;Midline;Proximal Back Accordion 10 Fr. (Active)   Dressing Status Clean;Dry;Intact 24 1545   Drainage Appearance Bloody 24 1545   Status Accordion suction 24 1545   Number of days: 0       Urethral Catheter Latex;Straight-tip 16 Fr. (Active)   Reasons to continue Urinary Catheter  Post-operative urological requirements 24 1512   Goal for Removal No longer needed- Will place order to discontinue 24 1512   Site Assessment Clean;Skin intact 24 1512   Collection Container Standard drainage bag 24 1512   Securement Method Securing device (Describe) 24 1512   Output (mL) 1200 mL 24 0915   Number of days: 0       Anesthesia Type:   General    Operative Indications:  Bilateral upper extremity weakness    Operative Findings:  Severe central canal stenosis from C3-7    Complications:   None    Procedure and Technique:  After obtaining written informed consent, the patient was brought to the operating room.  General endotracheal anesthesia was induced.  Neuro monitoring was then placed as was an arterial line and Ramirez catheter.  The patient was then placed in a Ignacio head whiteside with pins.   Mean arterial pressures were kept greater than 85 for the duration of the case. Patient was then flipped prone onto gel rolls in position and the head was secured exposing the posterior cervical region..      The patient was then given Ancef 2g as perioperative antibiotic.       The patient was prepped and draped in the usual sterile fashion.  Lidocaine with epinephrine was injected in the surgical site. Surgical time-out was performed.       A ten blade was then used to open a midline cervical incision. Monopolar cautery was then used to dissect down the nuchal ligament to expose the C2-T1 spinous processes, lamina, and lateral masses in a subperiosteal fashion. Once full exposure of the C2 and T1 pedicles as well as the lateral masses of C2-6 were was obtained, a spinous process clamp was placed on C2 and we performed an intraoperative O-arm spin. Neuro navigation was then registered and confirmed to be accurate. Image guidance from the Medtronic Stealth was used throughout the duration of the case. Images were loaded into the system and used for preoperative planning as well as intraoperative guidance. It was critically important through the duration of the case for navigation and avoidance of critical structures.      Using navigation guidance, the entry site for the C2 pedicle was identified. A 2mm mare was used to decorticate the entry site. A navigated hand drill was then used to create the screw path followed by a navigated tap. A Beltran ball tip probe was used and no breech in the pedicle was noted. A screw was then placed on the left. Similar steps were repeated for the contralateral side and another screw was placed.      Using similar steps with the navigated mare, hand drill and tap, screws were placed in the bilateral lateral masses of C3 through C5. At  C7 and T1, screws were placed in bilateral pedicles. Bilateral C6 screws were skipped because of small lateral mass sizes limiting screw placement.      Using the Midas drill with a matchstick attachment, the lamina of C3 through C6 were drilled bilaterally and the lamina and spinous processes were removed in a lobster tail fashion. The upper lamina of C7 was removed using Kerrison rongeurs. Remaining ligamentous tissue were removed from the lateral gutters with Kerrison rongeurs. The spinal cord was noted to be completely decompressed without any areas of compression.     Two lordotic rods were measured, bent and cut to size. The rods were placed in the screw heads. Another O-arm spin was performed which demonstrated satisfactory screw placement. Set caps were then placed and final tightened.  The wound was then copiously irrigated with antibiotic irrigation.  A 3 mm cutting bit was used to decorticate the facets of C2/3, C3/4, C4-5, C5-6, C6-7 and C7-T1.  The lateral masses were once again decorticated. Gibson mixed with autologous bone graft was then placed over the facet joints and around the screws for arthrodesis.      A 10F hemovac drain was then placed in the subfascial space.     Next the muscle and fascia were closed with 0 Vicryl. A #1 stratafix was used to further support the fascia in a running fashion. The deep dermal layer was closed with an inverted 2 0 Vicryl.  The skin was closed with 2-0 prolenes in a vertical mattress fashion. Bacitracin ointment was placed over the wound and  sterile dressing was applied.       Surgical sign-out was performed.    No qualified resident was available.      I was present for the entire procedure.    Patient Disposition:  PACU         SIGNATURE: Endy Velasquez MD  DATE: June 16, 2024  TIME: 4:08 PM

## 2024-06-16 NOTE — PLAN OF CARE
Problem: Prexisting or High Potential for Compromised Skin Integrity  Goal: Skin integrity is maintained or improved  Description: INTERVENTIONS:  - Identify patients at risk for skin breakdown  - Assess and monitor skin integrity  - Assess and monitor nutrition and hydration status  - Monitor labs   - Assess for incontinence   - Turn and reposition patient  - Assist with mobility/ambulation  - Relieve pressure over bony prominences  - Avoid friction and shearing  - Provide appropriate hygiene as needed including keeping skin clean and dry  - Evaluate need for skin moisturizer/barrier cream  - Collaborate with interdisciplinary team   - Patient/family teaching  - Consider wound care consult   Outcome: Progressing     Problem: PAIN - ADULT  Goal: Verbalizes/displays adequate comfort level or baseline comfort level  Description: Interventions:  - Encourage patient to monitor pain and request assistance  - Assess pain using appropriate pain scale  - Administer analgesics based on type and severity of pain and evaluate response  - Implement non-pharmacological measures as appropriate and evaluate response  - Consider cultural and social influences on pain and pain management  - Notify physician/advanced practitioner if interventions unsuccessful or patient reports new pain  Outcome: Progressing     Problem: INFECTION - ADULT  Goal: Absence or prevention of progression during hospitalization  Description: INTERVENTIONS:  - Assess and monitor for signs and symptoms of infection  - Monitor lab/diagnostic results  - Monitor all insertion sites, i.e. indwelling lines, tubes, and drains  - Monitor endotracheal if appropriate and nasal secretions for changes in amount and color  - [place] appropriate cooling/warming therapies per order  - Administer medications as ordered  - Instruct and encourage patient and family to use good hand hygiene technique  - Identify and instruct in appropriate isolation precautions for  identified infection/condition  Outcome: Progressing     Problem: SAFETY ADULT  Goal: Patient will remain free of falls  Description: INTERVENTIONS:  - Educate patient/family on patient safety including physical limitations  - Instruct patient to call for assistance with activity   - Consult OT/PT to assist with strengthening/mobility   - Keep Call bell within reach  - Keep bed low and locked with side rails adjusted as appropriate  - Keep care items and personal belongings within reach  - Initiate and maintain comfort rounds  - Make Fall Risk Sign visible to staff  - Apply yellow socks and bracelet for high fall risk patients  - Consider moving patient to room near nurses station  Outcome: Progressing  Goal: Maintain or return to baseline ADL function  Description: INTERVENTIONS:  -  Assess patient's ability to carry out ADLs; assess patient's baseline for ADL function and identify physical deficits which impact ability to perform ADLs (bathing, care of mouth/teeth, toileting, grooming, dressing, etc.)  - Assess/evaluate cause of self-care deficits   - Assess range of motion  - Assess patient's mobility; develop plan if impaired  - Assess patient's need for assistive devices and provide as appropriate  - Encourage maximum independence but intervene and supervise when necessary  - Involve family in performance of ADLs  - Assess for home care needs following discharge   - Consider OT consult to assist with ADL evaluation and planning for discharge  - Provide patient education as appropriate  Outcome: Progressing  Goal: Maintains/Returns to pre admission functional level  Description: INTERVENTIONS:  - Perform AM-PAC 6 Click Basic Mobility/ Daily Activity assessment daily.  - Set and communicate daily mobility goal to care team and patient/family/caregiver.   - Collaborate with rehabilitation services on mobility goals if consulted  - Out of bed for toileting  - Record patient progress and toleration of activity level    Outcome: Progressing     Problem: DISCHARGE PLANNING  Goal: Discharge to home or other facility with appropriate resources  Description: INTERVENTIONS:  - Identify barriers to discharge w/patient and caregiver  - Arrange for needed discharge resources and transportation as appropriate  - Identify discharge learning needs (meds, wound care, etc.)  - Arrange for interpretive services to assist at discharge as needed  - Refer to Case Management Department for coordinating discharge planning if the patient needs post-hospital services based on physician/advanced practitioner order or complex needs related to functional status, cognitive ability, or social support system  Outcome: Progressing     Problem: Knowledge Deficit  Goal: Patient/family/caregiver demonstrates understanding of disease process, treatment plan, medications, and discharge instructions  Description: Complete learning assessment and assess knowledge base.  Interventions:  - Provide teaching at level of understanding  - Provide teaching via preferred learning methods  Outcome: Progressing

## 2024-06-17 ENCOUNTER — APPOINTMENT (INPATIENT)
Dept: NON INVASIVE DIAGNOSTICS | Facility: HOSPITAL | Age: 54
DRG: 912 | End: 2024-06-17
Payer: COMMERCIAL

## 2024-06-17 ENCOUNTER — APPOINTMENT (INPATIENT)
Dept: RADIOLOGY | Facility: HOSPITAL | Age: 54
DRG: 912 | End: 2024-06-17
Payer: COMMERCIAL

## 2024-06-17 LAB
ANION GAP SERPL CALCULATED.3IONS-SCNC: 11 MMOL/L (ref 4–13)
AORTIC ROOT: 4.3 CM
APICAL FOUR CHAMBER EJECTION FRACTION: 67 %
ASCENDING AORTA: 3.4 CM
BSA FOR ECHO PROCEDURE: 1.89 M2
BUN SERPL-MCNC: 9 MG/DL (ref 5–25)
CA-I BLD-SCNC: 0.96 MMOL/L (ref 1.12–1.32)
CALCIUM SERPL-MCNC: 7.2 MG/DL (ref 8.4–10.2)
CHLORIDE SERPL-SCNC: 103 MMOL/L (ref 96–108)
CO2 SERPL-SCNC: 27 MMOL/L (ref 21–32)
CREAT SERPL-MCNC: 0.66 MG/DL (ref 0.6–1.3)
DOP CALC LVOT AREA: 4.15 CM2
DOP CALC LVOT DIAMETER: 2.3 CM
E WAVE DECELERATION TIME: 200 MS
E/A RATIO: 1.42
ERYTHROCYTE [DISTWIDTH] IN BLOOD BY AUTOMATED COUNT: 13.4 % (ref 11.6–15.1)
FRACTIONAL SHORTENING: 33 (ref 28–44)
GFR SERPL CREATININE-BSD FRML MDRD: 108 ML/MIN/1.73SQ M
GLUCOSE SERPL-MCNC: 128 MG/DL (ref 65–140)
HCT VFR BLD AUTO: 39.3 % (ref 36.5–49.3)
HGB BLD-MCNC: 13.4 G/DL (ref 12–17)
INTERVENTRICULAR SEPTUM IN DIASTOLE (PARASTERNAL SHORT AXIS VIEW): 1.1 CM
INTERVENTRICULAR SEPTUM: 1.1 CM (ref 0.6–1.1)
LAAS-AP2: 19.1 CM2
LAAS-AP4: 21.4 CM2
LEFT ATRIUM SIZE: 3.6 CM
LEFT ATRIUM VOLUME (MOD BIPLANE): 64 ML
LEFT ATRIUM VOLUME INDEX (MOD BIPLANE): 33.9 ML/M2
LEFT INTERNAL DIMENSION IN SYSTOLE: 3.1 CM (ref 2.1–4)
LEFT VENTRICULAR INTERNAL DIMENSION IN DIASTOLE: 4.6 CM (ref 3.5–6)
LEFT VENTRICULAR POSTERIOR WALL IN END DIASTOLE: 1.2 CM
LEFT VENTRICULAR STROKE VOLUME: 60 ML
LVSV (TEICH): 60 ML
MCH RBC QN AUTO: 32.3 PG (ref 26.8–34.3)
MCHC RBC AUTO-ENTMCNC: 34.1 G/DL (ref 31.4–37.4)
MCV RBC AUTO: 95 FL (ref 82–98)
MV E'TISSUE VEL-LAT: 13 CM/S
MV E'TISSUE VEL-SEP: 13 CM/S
MV PEAK A VEL: 0.67 M/S
MV PEAK E VEL: 95 CM/S
MV STENOSIS PRESSURE HALF TIME: 58 MS
MV VALVE AREA P 1/2 METHOD: 3.79
OSMOLALITY UR/SERPL-RTO: 293 MMOL/KG (ref 282–298)
OSMOLALITY UR: 272 MMOL/KG
PLATELET # BLD AUTO: 275 THOUSANDS/UL (ref 149–390)
PMV BLD AUTO: 10.6 FL (ref 8.9–12.7)
POTASSIUM SERPL-SCNC: 3.6 MMOL/L (ref 3.5–5.3)
RBC # BLD AUTO: 4.15 MILLION/UL (ref 3.88–5.62)
RIGHT ATRIUM AREA SYSTOLE A4C: 19.3 CM2
RIGHT VENTRICLE ID DIMENSION: 4.6 CM
SINOTUBULAR JUNCTION: 3.1 CM
SL CV LEFT ATRIUM LENGTH A2C: 5.1 CM
SL CV LV EF: 60
SL CV PED ECHO LEFT VENTRICLE DIASTOLIC VOLUME (MOD BIPLANE) 2D: 98 ML
SL CV PED ECHO LEFT VENTRICLE SYSTOLIC VOLUME (MOD BIPLANE) 2D: 38 ML
SL CV SINUS OF VALSALVA 2D: 4.3 CM
SODIUM 24H UR-SCNC: 48 MOL/L
SODIUM SERPL-SCNC: 141 MMOL/L (ref 135–147)
STJ: 3.1 CM
TR MAX PG: 26 MMHG
TR PEAK VELOCITY: 2.6 M/S
TRICUSPID ANNULAR PLANE SYSTOLIC EXCURSION: 3.3 CM
TRICUSPID VALVE PEAK REGURGITATION VELOCITY: 2.56 M/S
WBC # BLD AUTO: 17.82 THOUSAND/UL (ref 4.31–10.16)

## 2024-06-17 PROCEDURE — 97163 PT EVAL HIGH COMPLEX 45 MIN: CPT

## 2024-06-17 PROCEDURE — 83935 ASSAY OF URINE OSMOLALITY: CPT

## 2024-06-17 PROCEDURE — 71045 X-RAY EXAM CHEST 1 VIEW: CPT

## 2024-06-17 PROCEDURE — 85027 COMPLETE CBC AUTOMATED: CPT

## 2024-06-17 PROCEDURE — 99291 CRITICAL CARE FIRST HOUR: CPT | Performed by: SURGERY

## 2024-06-17 PROCEDURE — 99232 SBSQ HOSP IP/OBS MODERATE 35: CPT | Performed by: PHYSICIAN ASSISTANT

## 2024-06-17 PROCEDURE — 93306 TTE W/DOPPLER COMPLETE: CPT | Performed by: INTERNAL MEDICINE

## 2024-06-17 PROCEDURE — 93306 TTE W/DOPPLER COMPLETE: CPT

## 2024-06-17 PROCEDURE — 84300 ASSAY OF URINE SODIUM: CPT

## 2024-06-17 PROCEDURE — 83930 ASSAY OF BLOOD OSMOLALITY: CPT

## 2024-06-17 PROCEDURE — 80048 BASIC METABOLIC PNL TOTAL CA: CPT

## 2024-06-17 PROCEDURE — 97167 OT EVAL HIGH COMPLEX 60 MIN: CPT

## 2024-06-17 PROCEDURE — 76937 US GUIDE VASCULAR ACCESS: CPT

## 2024-06-17 PROCEDURE — 82330 ASSAY OF CALCIUM: CPT

## 2024-06-17 PROCEDURE — 36556 INSERT NON-TUNNEL CV CATH: CPT

## 2024-06-17 PROCEDURE — 02HV33Z INSERTION OF INFUSION DEVICE INTO SUPERIOR VENA CAVA, PERCUTANEOUS APPROACH: ICD-10-PCS | Performed by: SURGERY

## 2024-06-17 PROCEDURE — 99223 1ST HOSP IP/OBS HIGH 75: CPT | Performed by: PHYSICIAN ASSISTANT

## 2024-06-17 RX ORDER — CLINDAMYCIN HYDROCHLORIDE 150 MG/1
300 CAPSULE ORAL EVERY 6 HOURS SCHEDULED
Status: DISCONTINUED | OUTPATIENT
Start: 2024-06-17 | End: 2024-06-18

## 2024-06-17 RX ORDER — DESMOPRESSIN ACETATE 4 UG/ML
1 INJECTION, SOLUTION INTRAVENOUS; SUBCUTANEOUS ONCE
Status: COMPLETED | OUTPATIENT
Start: 2024-06-17 | End: 2024-06-17

## 2024-06-17 RX ORDER — METHADONE HYDROCHLORIDE 5 MG/1
5 TABLET ORAL EVERY 8 HOURS SCHEDULED
Status: COMPLETED | OUTPATIENT
Start: 2024-06-17 | End: 2024-06-18

## 2024-06-17 RX ORDER — FENTANYL CITRATE 50 UG/ML
50 INJECTION, SOLUTION INTRAMUSCULAR; INTRAVENOUS ONCE
Status: COMPLETED | OUTPATIENT
Start: 2024-06-17 | End: 2024-06-17

## 2024-06-17 RX ORDER — SODIUM CHLORIDE, SODIUM GLUCONATE, SODIUM ACETATE, POTASSIUM CHLORIDE, MAGNESIUM CHLORIDE, SODIUM PHOSPHATE, DIBASIC, AND POTASSIUM PHOSPHATE .53; .5; .37; .037; .03; .012; .00082 G/100ML; G/100ML; G/100ML; G/100ML; G/100ML; G/100ML; G/100ML
1000 INJECTION, SOLUTION INTRAVENOUS ONCE
Status: COMPLETED | OUTPATIENT
Start: 2024-06-17 | End: 2024-06-17

## 2024-06-17 RX ORDER — METHADONE HYDROCHLORIDE 5 MG/1
5 TABLET ORAL EVERY 12 HOURS SCHEDULED
Status: COMPLETED | OUTPATIENT
Start: 2024-06-18 | End: 2024-06-18

## 2024-06-17 RX ORDER — POTASSIUM CHLORIDE 20 MEQ/1
40 TABLET, EXTENDED RELEASE ORAL ONCE
Status: COMPLETED | OUTPATIENT
Start: 2024-06-17 | End: 2024-06-17

## 2024-06-17 RX ORDER — METHADONE HYDROCHLORIDE 5 MG/1
5 TABLET ORAL DAILY
Status: COMPLETED | OUTPATIENT
Start: 2024-06-19 | End: 2024-06-19

## 2024-06-17 RX ADMIN — METHOCARBAMOL 500 MG: 500 TABLET ORAL at 23:26

## 2024-06-17 RX ADMIN — LEVETIRACETAM 500 MG: 500 TABLET, FILM COATED ORAL at 18:23

## 2024-06-17 RX ADMIN — FENTANYL CITRATE 50 MCG: 50 INJECTION INTRAMUSCULAR; INTRAVENOUS at 15:53

## 2024-06-17 RX ADMIN — CLINDAMYCIN HYDROCHLORIDE 300 MG: 150 CAPSULE ORAL at 11:46

## 2024-06-17 RX ADMIN — CALCIUM GLUCONATE 3 G: 98 INJECTION, SOLUTION INTRAVENOUS at 11:29

## 2024-06-17 RX ADMIN — Medication 1000 UNITS: at 10:07

## 2024-06-17 RX ADMIN — GABAPENTIN 100 MG: 100 CAPSULE ORAL at 21:23

## 2024-06-17 RX ADMIN — ACETAMINOPHEN 975 MG: 325 TABLET, FILM COATED ORAL at 13:34

## 2024-06-17 RX ADMIN — PHENYLEPHRINE HYDROCHLORIDE 160 MCG/MIN: 50 INJECTION INTRAVENOUS at 05:13

## 2024-06-17 RX ADMIN — METHOCARBAMOL 500 MG: 500 TABLET ORAL at 18:20

## 2024-06-17 RX ADMIN — GABAPENTIN 100 MG: 100 CAPSULE ORAL at 08:34

## 2024-06-17 RX ADMIN — DESMOPRESSIN ACETATE 1 MCG: 4 SOLUTION INTRAVENOUS at 15:18

## 2024-06-17 RX ADMIN — PERFLUTREN 0.4 ML/MIN: 6.52 INJECTION, SUSPENSION INTRAVENOUS at 08:40

## 2024-06-17 RX ADMIN — METHADONE HYDROCHLORIDE 5 MG: 5 TABLET ORAL at 13:33

## 2024-06-17 RX ADMIN — ACETAMINOPHEN 975 MG: 325 TABLET, FILM COATED ORAL at 21:23

## 2024-06-17 RX ADMIN — POLYETHYLENE GLYCOL 3350 17 G: 17 POWDER, FOR SOLUTION ORAL at 10:08

## 2024-06-17 RX ADMIN — METHOCARBAMOL 500 MG: 500 TABLET ORAL at 11:46

## 2024-06-17 RX ADMIN — METHADONE HYDROCHLORIDE 5 MG: 5 TABLET ORAL at 21:23

## 2024-06-17 RX ADMIN — PHENYLEPHRINE HYDROCHLORIDE 170 MCG/MIN: 50 INJECTION INTRAVENOUS at 15:10

## 2024-06-17 RX ADMIN — ENOXAPARIN SODIUM 30 MG: 30 INJECTION SUBCUTANEOUS at 20:58

## 2024-06-17 RX ADMIN — ENOXAPARIN SODIUM 30 MG: 30 INJECTION SUBCUTANEOUS at 10:08

## 2024-06-17 RX ADMIN — PHENYLEPHRINE HYDROCHLORIDE 160 MCG/MIN: 50 INJECTION INTRAVENOUS at 10:17

## 2024-06-17 RX ADMIN — OXYCODONE HYDROCHLORIDE 5 MG: 5 TABLET ORAL at 10:12

## 2024-06-17 RX ADMIN — ACETAMINOPHEN 975 MG: 325 TABLET, FILM COATED ORAL at 05:49

## 2024-06-17 RX ADMIN — LEVOTHYROXINE SODIUM 125 MCG: 125 TABLET ORAL at 05:49

## 2024-06-17 RX ADMIN — CLINDAMYCIN HYDROCHLORIDE 300 MG: 150 CAPSULE ORAL at 23:26

## 2024-06-17 RX ADMIN — LEVETIRACETAM 500 MG: 500 TABLET, FILM COATED ORAL at 08:33

## 2024-06-17 RX ADMIN — GABAPENTIN 100 MG: 100 CAPSULE ORAL at 18:20

## 2024-06-17 RX ADMIN — CLINDAMYCIN HYDROCHLORIDE 300 MG: 150 CAPSULE ORAL at 18:21

## 2024-06-17 RX ADMIN — PHENYLEPHRINE HYDROCHLORIDE 170 MCG/MIN: 50 INJECTION INTRAVENOUS at 19:12

## 2024-06-17 RX ADMIN — POTASSIUM CHLORIDE 40 MEQ: 1500 TABLET, EXTENDED RELEASE ORAL at 08:33

## 2024-06-17 RX ADMIN — SODIUM CHLORIDE, SODIUM GLUCONATE, SODIUM ACETATE, POTASSIUM CHLORIDE, MAGNESIUM CHLORIDE, SODIUM PHOSPHATE, DIBASIC, AND POTASSIUM PHOSPHATE 1000 ML: .53; .5; .37; .037; .03; .012; .00082 INJECTION, SOLUTION INTRAVENOUS at 11:43

## 2024-06-17 NOTE — PROGRESS NOTES
[facility]  Progress Note  Name: Luis Forrester I  MRN: [id number]  Unit/Bed#: ICU 04 I Date of Admission: 6/15/2024   Date of Service: 6/17/2024 I Hospital Day: 2    Assessment & Plan   Right hand weakness  Assessment & Plan  1 Day Post-Op Procedure(s):  DECOMPRESSION SPINE CERVICAL POSTERIOR C2-T1 with fusion navigated with Oarm (Dr. Lopez, 6/16/2024)  Patient describes query syncopal episode and falls  new right hand weakness and pain with bilateral Mohit's and right-sided wrist weakness    Imaging:  MRI cervical spine without 6/15/2024:Congenital canal stenosis with superimposed degenerative spondylosis .Most pronounced at C3-4 and C5-C6 with moderate to severe canal stenosis and mild cord compression. Evaluation of cord signal is limited due to artifact. No definite abnormal cord signal but mild cord edema would be difficult to exclude. Severe bilateral foraminal stenosis also seen at C3-4 and C5-C6.Mild to moderate canal stenosis at other levels    Plan:  Continue to monitor neurological exam.   Maintain MAPs>85 x days  Hemovac drain 280 ml since surgery -maintain on full suction  Clindamycin x 2 weeks for surgical prophylaxis.  Pain control -APS on board for methadone taper  Mobilize with physical and Occupational Therapy - no collar required  Upright x-rays cervical spine AP lateral ordered and pending  DVT prophylaxis: Bilateral SCDs.  Okay for pharmacological DVT prophylaxis from our surgical standpoint    Neurosurgery will continue to follow.  Call with any questions or concerns.      SDH (subdural hematoma) (HCC)  Assessment & Plan  Parafalcine SDH with bilateral SAH  Patient presented after being found down by his daughter, patient is unsure what happened other than he got up to go to the bathroom.  Patient denies any blood thinner use.  On exam patient Decreased sensation in his right posterior thigh right  weakness.    Imaging:  CT head without  6/15/24:New acute trace subarachnoid hemorrhage in bilateral parafalcine regions. Recommend dedicated CTA head with contrast for further evaluation.New acute trace parafalcine subdural hematoma.  Repeat CT head wo 6/15/2024: Trace subarachnoid hemorrhage within the interhemispheric fissure decreased.  No parenchymal subdural hematoma.    Plan:  Continue frequent neurological checks.   STAT CT head with any neurological decline including drop GCS of 2pts within 1 hr.  Seizure prophylaxis per trauma team  Hold all full antiplatelet and anticoagulation medications.   Medical management and pain control per primary team  Mobilize with PT/OT  DVT PPX: SCDs.  For pharmacological DVT prophylaxis from neurosurgical standpoint    Call with any further questions or concerns.         Subjective/Objective   Chief Complaint: My wrist are sore and I cannot hold my phone    Subjective: Patient endorses expected postoperative neck pain which is controlled on medications.  He complains of wrist soreness but on exam points more to the dorsum of his hands described as pain but not pins-and-needles.  He describes weakness of bilateral hands difficulty holding his phone.  No lower extremity complaints.    Objective: Sitting in bed.  NAD.    I/O         06/15 0701  06/16 0700 06/16 0701  06/17 0700 06/17 0701  06/18 0700    P.O. 120  360    I.V. (mL/kg)  3384.8 (42.8)     Total Intake(mL/kg) 120 (1.5) 3384.8 (42.8) 360 (4.6)    Urine (mL/kg/hr) 1500 7395 (3.9) 585 (1.9)    Drains  280     Blood  400     Total Output 1500 8075 585    Net -1380 -4690.3 -225           Unmeasured Urine Occurrence 0 x      Unmeasured Stool Occurrence 0 x              Invasive Devices       Peripheral Intravenous Line  Duration             Peripheral IV 06/15/24 Right Antecubital 2 days    Peripheral IV 06/16/24 Distal;Dorsal (posterior);Left Forearm 1 day    Peripheral IV 06/16/24 Left Hand 1 day              Arterial Line  Duration             Arterial Line  "06/16/24 Right Radial 1 day              Drain  Duration             Urethral Catheter Latex;Straight-tip 16 Fr. 1 day    Closed/Suction Drain Posterior;Midline;Proximal Back Accordion 10 Fr. <1 day                    Physical Exam:  Vitals: Blood pressure 132/72, pulse 66, temperature 98.6 °F (37 °C), temperature source Oral, resp. rate 20, height 5' 6\" (1.676 m), weight 78.9 kg (174 lb), SpO2 99%.,Body mass index is 28.08 kg/m².    Hemodynamic Monitoring: MAP: Arterial Line MAP (mmHg): 86 mmHg    General appearance: alert, appears stated age, cooperative and no distress  Head: Normocephalic, without obvious abnormality  Eyes: Conjugate gaze and tracks properly in room  Neck: Dressing intact.  Blood present.  Hemovac with dark bloody output  Lungs: non labored breathing  Heart: regular heart rate  Neurologic:   Mental status: Alert, oriented, follows commands and answers questions appropriately  Cranial nerves: grossly intact   Sensory: normal to crude touch x 4  Motor: moving all extremities with focal distal weakness in his hands   - L 3, R 4-  IO 3 b/l   Reflexes: + santoyo R>L. Unable to relax for clonus testing    Lab Results:  Results from last 7 days   Lab Units 06/17/24 0107 06/16/24  1143 06/16/24  0930 06/16/24  0554 06/15/24  0856   WBC Thousand/uL 17.82*  --   --  11.01* 11.11*   HEMOGLOBIN g/dL 13.4  --   --  13.6 13.3   I STAT HEMOGLOBIN g/dl  --  12.2 12.2  --   --    HEMATOCRIT % 39.3  --   --  40.3 40.0   HEMATOCRIT, ISTAT %  --  36* 36*  --   --    PLATELETS Thousands/uL 275  --   --  256 160   SEGS PCT %  --   --   --   --  82*   MONO PCT %  --   --   --   --  7   EOS PCT %  --   --   --   --  0     Results from last 7 days   Lab Units 06/17/24 0107 06/16/24  1143 06/16/24  0930 06/16/24  0554 06/15/24  0856   SODIUM mmol/L 141  --   --  135 136   POTASSIUM mmol/L 3.6  --   --  3.4* 3.6   CHLORIDE mmol/L 103  --   --  99 101   CO2 mmol/L 27  --   --  26 27   CO2, I-STAT mmol/L  --  25 27  " "--   --    BUN mg/dL 9  --   --  9 13   CREATININE mg/dL 0.66  --   --  0.74 0.76   CALCIUM mg/dL 7.2*  --   --  6.6* 6.6*   ALK PHOS U/L  --   --   --   --  80   ALT U/L  --   --   --   --  26   AST U/L  --   --   --   --  31   GLUCOSE, ISTAT mg/dl  --  152* 106  --   --      Results from last 7 days   Lab Units 06/16/24  0758   MAGNESIUM mg/dL 2.0     Results from last 7 days   Lab Units 06/16/24  0758   PHOSPHORUS mg/dL 3.5     Results from last 7 days   Lab Units 06/16/24  0554   INR  0.99   PTT seconds 28     No results found for: \"TROPONINT\"  ABG:No results found for: \"PHART\", \"TPF8PMX\", \"PO2ART\", \"QCP4OIL\", \"Q0NKHTHR\", \"BEART\", \"SOURCE\"    Imaging Studies: I have personally reviewed pertinent reports.   and I have personally reviewed pertinent films in PACS    XR spine cervical 2 or 3 vw injury    Result Date: 6/17/2024  Impression: Fluoroscopy provided for procedure guidance. Please refer to the separate procedure note for additional details. Workstation performed: [id number]     CT head wo contrast    Result Date: 6/16/2024  Impression: Trace subarachnoid hemorrhage within the interhemispheric fissure is slightly decreased in conspicuity No parenchymal or subdural hematoma. Workstation performed: [id number]     XR chest portable ICU    Result Date: 6/16/2024  Impression: No acute cardiopulmonary disease. No central line. No pneumothorax. Workstation performed: [id number]     MRI cervical spine wo contrast    Result Date: 6/15/2024  Impression: Congenital canal stenosis with superimposed degenerative spondylosis . Most pronounced at C3-4 and C5-C6 with moderate to severe canal stenosis and mild cord compression. Evaluation of cord signal is limited due to artifact. No definite abnormal cord signal but mild cord edema would be difficult to exclude Severe bilateral foraminal stenosis also seen at C3-4 and C5-C6. Mild to moderate canal stenosis at other levels Workstation performed: [id number]     CT spine " cervical without contrast    Result Date: 6/15/2024  Impression: No cervical spine fracture or traumatic malalignment. Posterior osteophyte disc complexes C3-C4 and C5-C6 contributes to at least moderate canal stenosis and severe bilateral foraminal narrowing at these levels. Consider nonemergent follow-up MRI cervical spine without contrast for further evaluation. Ectatic right carotid bifurcation and proximal cervical internal carotid artery with retropharyngeal course, similar to CT neck with contrast 10/12/2011. Please see same day CTA head with contrast, CT head without contrast, CT thoracic spine without contrast for further evaluation. The study was marked in EPIC for immediate notification. Workstation performed: [id number]     CTA head w wo contrast    Result Date: 6/15/2024  Impression: Negative CTA head traumatic vascular injury, aneurysm, large vessel occlusion, high-grade stenosis, or dissection. Partially imaged ectatic right carotid bifurcation and proximal cervical internal carotid artery with retropharyngeal course, similar to CT neck with contrast 10/12/2011. Additional chronic/incidental findings as detailed above. Please see earlier same day CT head without contrast and concurrent CT cervical spine without contrast. Workstation performed: [id number]     XR hand 3+ views RIGHT    Result Date: 6/15/2024  Impression: 1. No acute osseous abnormality. 2. Degenerative changes as described. Resident: ALLYSON AWAN I, the attending radiologist, have reviewed the images and agree with the final report above. Workstation performed: [id number]     XR hand 3+ views LEFT    Result Date: 6/15/2024  Impression: No acute osseous abnormality. Degenerative changes as described. Resident: ALLYSON AWAN I, the attending radiologist, have reviewed the images and agree with the final report above. Workstation performed: [id number]     CT spine thoracic without contrast    Result Date: 6/15/2024  Impression: No acute  osseous abnormality of thoracic spine. Diffuse idiopathic skeletal hyperostosis (DISH). I personally discussed this study with Travis Powell on 6/15/2024 10:08 AM. Workstation performed: [id number]     CT head wo contrast    Result Date: 6/15/2024  Impression: New acute trace subarachnoid hemorrhage in bilateral parafalcine regions. Recommend dedicated CTA head with contrast for further evaluation. New acute trace parafalcine subdural hematoma. I personally discussed this study with Travis Powell on 6/15/2024 10:08 AM. Workstation performed: [id number]     VTE Pharmacologic Prophylaxis: Enoxaparin (Lovenox)    VTE Mechanical Prophylaxis: sequential compression device

## 2024-06-17 NOTE — PROCEDURES
Central Line Insertion    Date/Time: 6/17/2024 4:46 PM    Performed by: Shannan Valerio PA-C  Authorized by: Shannan Valerio PA-C    Patient location:  Bedside and ICU  Other Assisting Provider: Yes (comment) (Sindi Ralph MD student, Capri Burton MD)    Consent:     Consent obtained:  Verbal and written    Consent given by:  Patient (and daughter)    Risks discussed:  Arterial puncture, bleeding, incorrect placement, infection, nerve damage and pneumothorax    Alternatives discussed:  No treatment and observation  Universal protocol:     Procedure explained and questions answered to patient or proxy's satisfaction: yes      Relevant documents present and verified: yes      Test results available and properly labeled: yes      Radiology Images displayed and confirmed.  If images not available, report reviewed: yes      Required blood products, implants, devices, and special equipment available: yes      Site/side marked: yes      Immediately prior to procedure, a time out was called: yes      Patient identity confirmed:  Verbally with patient  Pre-procedure details:     Hand hygiene: Hand hygiene performed prior to insertion      Sterile barrier technique: All elements of maximal sterile technique followed      Skin preparation:  ChloraPrep    Skin preparation agent: Skin preparation agent completely dried prior to procedure    Indications:     Central line indications: medications requiring central line      Site selection rationale:  Right IJ, accessible with US guidance, optimal for cleanliness v femoral  Sedation:     Sedation type: pain meds prior, patient calm and compliant.  Anesthesia (see MAR for exact dosages):     Anesthesia method:  Local infiltration    Local anesthetic:  Lidocaine 1% w/o epi  Procedure details:     Location:  Right internal jugular    Vessel type: vein      Laterality:  Right    Approach: percutaneous technique used      Patient position:  Flat    Catheter type:  Triple lumen  16cm    Catheter size:  7 Fr    Landmarks identified: yes      Ultrasound guidance: yes      Ultrasound image availability:  Still images obtained    Sterile ultrasound techniques: Sterile gel and sterile probe covers were used      Manometry confirmation: yes      Number of attempts:  1    Successful placement: yes      Catheter tip vessel location: atriocaval junction    Post-procedure details:     Post-procedure:  Line sutured and dressing applied    Assessment:  Blood return through all ports and free fluid flow    Post-procedure complications: none      Patient tolerance of procedure:  Tolerated well, no immediate complications    Observer: Yes      Observer name:  Sofia SINGER student, Rachel SINGER

## 2024-06-17 NOTE — PLAN OF CARE
Problem: Prexisting or High Potential for Compromised Skin Integrity  Goal: Skin integrity is maintained or improved  Description: INTERVENTIONS:  - Identify patients at risk for skin breakdown  - Assess and monitor skin integrity  - Assess and monitor nutrition and hydration status  - Monitor labs   - Assess for incontinence   - Turn and reposition patient  - Assist with mobility/ambulation  - Relieve pressure over bony prominences  - Avoid friction and shearing  - Provide appropriate hygiene as needed including keeping skin clean and dry  - Evaluate need for skin moisturizer/barrier cream  - Collaborate with interdisciplinary team   - Patient/family teaching  - Consider wound care consult   Outcome: Progressing     Problem: PAIN - ADULT  Goal: Verbalizes/displays adequate comfort level or baseline comfort level  Description: Interventions:  - Encourage patient to monitor pain and request assistance  - Assess pain using appropriate pain scale  - Administer analgesics based on type and severity of pain and evaluate response  - Implement non-pharmacological measures as appropriate and evaluate response  - Consider cultural and social influences on pain and pain management  - Notify physician/advanced practitioner if interventions unsuccessful or patient reports new pain  Outcome: Progressing     Problem: INFECTION - ADULT  Goal: Absence or prevention of progression during hospitalization  Description: INTERVENTIONS:  - Assess and monitor for signs and symptoms of infection  - Monitor lab/diagnostic results  - Monitor all insertion sites, i.e. indwelling lines, tubes, and drains  - Monitor endotracheal if appropriate and nasal secretions for changes in amount and color  - [place] appropriate cooling/warming therapies per order  - Administer medications as ordered  - Instruct and encourage patient and family to use good hand hygiene technique  - Identify and instruct in appropriate isolation precautions for  identified infection/condition  Outcome: Progressing     Problem: SAFETY ADULT  Goal: Patient will remain free of falls  Description: INTERVENTIONS:  - Educate patient/family on patient safety including physical limitations  - Instruct patient to call for assistance with activity   - Consult OT/PT to assist with strengthening/mobility   - Keep Call bell within reach  - Keep bed low and locked with side rails adjusted as appropriate  - Keep care items and personal belongings within reach  - Initiate and maintain comfort rounds  - Make Fall Risk Sign visible to staff  - Apply yellow socks and bracelet for high fall risk patients  - Consider moving patient to room near nurses station  Outcome: Progressing  Goal: Maintain or return to baseline ADL function  Description: INTERVENTIONS:  -  Assess patient's ability to carry out ADLs; assess patient's baseline for ADL function and identify physical deficits which impact ability to perform ADLs (bathing, care of mouth/teeth, toileting, grooming, dressing, etc.)  - Assess/evaluate cause of self-care deficits   - Assess range of motion  - Assess patient's mobility; develop plan if impaired  - Assess patient's need for assistive devices and provide as appropriate  - Encourage maximum independence but intervene and supervise when necessary  - Involve family in performance of ADLs  - Assess for home care needs following discharge   - Consider OT consult to assist with ADL evaluation and planning for discharge  - Provide patient education as appropriate  Outcome: Progressing  Goal: Maintains/Returns to pre admission functional level  Description: INTERVENTIONS:  - Perform AM-PAC 6 Click Basic Mobility/ Daily Activity assessment daily.  - Set and communicate daily mobility goal to care team and patient/family/caregiver.   - Collaborate with rehabilitation services on mobility goals if consulted  - Out of bed for toileting  - Record patient progress and toleration of activity level    Outcome: Progressing     Problem: DISCHARGE PLANNING  Goal: Discharge to home or other facility with appropriate resources  Description: INTERVENTIONS:  - Identify barriers to discharge w/patient and caregiver  - Arrange for needed discharge resources and transportation as appropriate  - Identify discharge learning needs (meds, wound care, etc.)  - Arrange for interpretive services to assist at discharge as needed  - Refer to Case Management Department for coordinating discharge planning if the patient needs post-hospital services based on physician/advanced practitioner order or complex needs related to functional status, cognitive ability, or social support system  Outcome: Progressing     Problem: Knowledge Deficit  Goal: Patient/family/caregiver demonstrates understanding of disease process, treatment plan, medications, and discharge instructions  Description: Complete learning assessment and assess knowledge base.  Interventions:  - Provide teaching at level of understanding  - Provide teaching via preferred learning methods  Outcome: Progressing     Problem: NEUROSENSORY - ADULT  Goal: Achieves stable or improved neurological status  Description: INTERVENTIONS  - Monitor and report changes in neurological status  - Monitor vital signs such as temperature, blood pressure, glucose, and any other labs ordered   - Initiate measures to prevent increased intracranial pressure  - Monitor for seizure activity and implement precautions if appropriate      Outcome: Progressing  Goal: Remains free of injury related to seizures activity  Description: INTERVENTIONS  - Maintain airway, patient safety  and administer oxygen as ordered  - Monitor patient for seizure activity, document and report duration and description of seizure to physician/advanced practitioner  - If seizure occurs,  ensure patient safety during seizure  - Reorient patient post seizure  - Seizure pads on all 4 side rails  - Instruct patient/family to  notify RN of any seizure activity including if an aura is experienced  - Instruct patient/family to call for assistance with activity based on nursing assessment  - Administer anti-seizure medications if ordered    Outcome: Progressing  Goal: Achieves maximal functionality and self care  Description: INTERVENTIONS  - Monitor swallowing and airway patency with patient fatigue and changes in neurological status  - Encourage and assist patient to increase activity and self care.   - Encourage visually impaired, hearing impaired and aphasic patients to use assistive/communication devices  Outcome: Progressing     Problem: CARDIOVASCULAR - ADULT  Goal: Maintains optimal cardiac output and hemodynamic stability  Description: INTERVENTIONS:  - Monitor I/O, vital signs and rhythm  - Monitor for S/S and trends of decreased cardiac output  - Administer and titrate ordered vasoactive medications to optimize hemodynamic stability  - Assess quality of pulses, skin color and temperature  - Assess for signs of decreased coronary artery perfusion  - Instruct patient to report change in severity of symptoms  Outcome: Progressing  Goal: Absence of cardiac dysrhythmias or at baseline rhythm  Description: INTERVENTIONS:  - Continuous cardiac monitoring, vital signs, obtain 12 lead EKG if ordered  - Administer antiarrhythmic and heart rate control medications as ordered  - Monitor electrolytes and administer replacement therapy as ordered  Outcome: Progressing     Problem: RESPIRATORY - ADULT  Goal: Achieves optimal ventilation and oxygenation  Description: INTERVENTIONS:  - Assess for changes in respiratory status  - Assess for changes in mentation and behavior  - Position to facilitate oxygenation and minimize respiratory effort  - Oxygen administered by appropriate delivery if ordered  - Initiate smoking cessation education as indicated  - Encourage broncho-pulmonary hygiene including cough, deep breathe, Incentive Spirometry  -  Assess the need for suctioning and aspirate as needed  - Assess and instruct to report SOB or any respiratory difficulty  - Respiratory Therapy support as indicated  Outcome: Progressing     Problem: GASTROINTESTINAL - ADULT  Goal: Minimal or absence of nausea and/or vomiting  Description: INTERVENTIONS:  - Administer IV fluids if ordered to ensure adequate hydration  - Maintain NPO status until nausea and vomiting are resolved  - Nasogastric tube if ordered  - Administer ordered antiemetic medications as needed  - Provide nonpharmacologic comfort measures as appropriate  - Advance diet as tolerated, if ordered  - Consider nutrition services referral to assist patient with adequate nutrition and appropriate food choices  Outcome: Progressing  Goal: Maintains or returns to baseline bowel function  Description: INTERVENTIONS:  - Assess bowel function  - Encourage oral fluids to ensure adequate hydration  - Administer IV fluids if ordered to ensure adequate hydration  - Administer ordered medications as needed  - Encourage mobilization and activity  - Consider nutritional services referral to assist patient with adequate nutrition and appropriate food choices  Outcome: Progressing  Goal: Maintains adequate nutritional intake  Description: INTERVENTIONS:  - Monitor percentage of each meal consumed  - Identify factors contributing to decreased intake, treat as appropriate  - Assist with meals as needed  - Monitor I&O, weight, and lab values if indicated  - Obtain nutrition services referral as needed  Outcome: Progressing  Goal: Oral mucous membranes remain intact  Description: INTERVENTIONS  - Assess oral mucosa and hygiene practices  - Implement preventative oral hygiene regimen  - Implement oral medicated treatments as ordered  - Initiate Nutrition services referral as needed  Outcome: Progressing     Problem: GENITOURINARY - ADULT  Goal: Maintains or returns to baseline urinary function  Description:  INTERVENTIONS:  - Assess urinary function  - Encourage oral fluids to ensure adequate hydration if ordered  - Administer IV fluids as ordered to ensure adequate hydration  - Administer ordered medications as needed  - Offer frequent toileting  - Follow urinary retention protocol if ordered  Outcome: Progressing  Goal: Absence of urinary retention  Description: INTERVENTIONS:  - Assess patient’s ability to void and empty bladder  - Monitor I/O  - Bladder scan as needed  - Discuss with physician/AP medications to alleviate retention as needed  - Discuss catheterization for long term situations as appropriate  Outcome: Progressing

## 2024-06-17 NOTE — PLAN OF CARE
Problem: PHYSICAL THERAPY ADULT  Goal: Performs mobility at highest level of function for planned discharge setting.  See evaluation for individualized goals.  Description: Treatment/Interventions: Functional transfer training, LE strengthening/ROM, Elevations, Therapeutic exercise, Endurance training, Cognitive reorientation, Equipment eval/education, Bed mobility, Gait training, Spoke to nursing, OT  Equipment Recommended: Other (Comment) (TBD)       See flowsheet documentation for full assessment, interventions and recommendations.  Note: Prognosis: Fair  Problem List: Decreased strength, Decreased endurance, Impaired balance, Decreased mobility, Decreased coordination, Decreased cognition, Decreased safety awareness, Pain  Assessment: Pt is a 53 year old male admitted to [facility] on 6/15/2024. Pt was reported to have fell while at home. Pt received a CT scan which revealed a TBI and a subdural hematoma, and trace subarachnoid hemorrhage. Pt also received an MRI of the cervical spine which revealed C3-4 and C4-6 canal stenosis. Pt underwent Posterior cervical laminectomies C3, C4, C5, C6, C7; Bilateral C2, C7 and T1 pedicle screw placement; Bilateral C3, C4, C5  lateral mass screw placement; Arthrodesis C2-T1 with autologous bone graft and [place] putty. Pt's comorbidities affecting POC include: Osteoarthritis of both hands, Chronic cough, Disease of thyroid gland, and Hypoparathyroidism, Chronic low back pain and personal factors of: WARD. Pt's clinical presentation is currently  unstable/unpredictable which is evident in ongoing telem monitoring, Wound drain in place, and abn lab values. Pt presents w/ overall postop weakness, incl min decreased LE strength, decreased functional endurance and activity tolerance, impaired balance, and gait deviations. Will continue to follow pt in PT for progressive mobilization to address above functional deficits and to max level of independence,  endurance, and safety. Pt presents with some cognitive deficits and requires some cuing to redirect to the task as well. D/C recommendations are outlined below. Will continue to follow until medically cleared for D/C.  Barriers to Discharge: Inaccessible home environment     Rehab Resource Intensity Level, PT: I (Maximum Resource Intensity)    See flowsheet documentation for full assessment.

## 2024-06-17 NOTE — PROGRESS NOTES
[facility]  Progress Note: Critical Care  Name: Luis Forrester 53 y.o. male I MRN: [id number]  Unit/Bed#: ICU 04 I Date of Admission: 6/15/2024   Date of Service: 6/17/2024 I Hospital Day: 2    Assessment & Plan   Neuro/Psych:  Diagnoses: Syncope, trace subarachnoid hemorrhage, trace parafalcine subdural hematoma, right hand weakness  Plan:  CTA head and neck 6/15:Negative CTA head traumatic vascular injury, aneurysm, large vessel occlusion, high-grade stenosis, or dissection. Partially imaged ectatic right carotid bifurcation and proximal cervical internal carotid artery with retropharyngeal course, similar to CT neck with contrast 10/12/2011.   MRI cervical spine without 6/15/2024:Congenital canal stenosis with superimposed degenerative spondylosis. Most pronounced at C3-4 and C5-C6 with moderate to severe canal stenosis and mild cord compression. Evaluation of cord signal is limited due to artifact. No definite abnormal cord signal but mild cord edema would be difficult to exclude. Severe bilateral foraminal stenosis also seen at C3-4 and C5-C6.Mild to moderate canal stenosis at other levels   S/p C3-C7 laminectomy with C2-T1 fusion  Keppra for 7 days for seizure prophylaxis.   SBP <160mmHg.   MAP >85 for spinal cord precaution; phenylephrine pushes as needed  Analgesia: Multimodal. Tylenol, Gabapentin, Robaxin, Oxycodone PRN, Dilaudid, PRN for breakthrough     Diagnosis: Subarachnoid/subdural hemorrhage  CT head from 6/15: New acute trace subarachnoid hemorrhage in bilateral parafalcine regions. New acute trace parafalcine subdural hematoma.   CT head from 6/16: Slight decrease in size of trace subarachnoid hemorrhage, no subdural hematoma  Neuro checks q1h  Cleared to restart DVT prophylaxis by neurosurgery  STAT CT head with any neurological decline including drop GCS of 2pts within 1 hr.       CV:  Diagnoses: Syncope  Plan:  Multiple syncopal episodes totaling at least  3 episodes over the last 6 months   EKG from 6/15/2024 reviewed, echo pending   Fall precautions  Follow-up TTE  Internal Medicine consultation for syncope work up once downgraded       Pulm:   Diagnoses: Chronic cough  Plan:  Home albuterol nebulizer and inhaler  Continuous pulse oximetry. Maintain O2 sat >92%.      GI:  Diagnoses: No active issues  Plan:  Bowel regimen: MiraLAX and Senokot  Zofran PRN for nausea     :  Diagnoses: No active issues  Plan:  Monitor I/Os; 7.4 L of urine output yesterday  Ramirez in place; consider removal today  Monitor sodium; 141 from 135     F/E/N:  Plan:   F: Fluid resuscitation prn.  E: Monitor and replete electrolytes for Mg >2, Phos >3, K >4.  N: Regular diet     Heme/Onc:  Diagnoses: No active issues  Plan:  Transfuse for Hb<7  VTE prophylaxis: To be restarted today with Lovenox 30 twice daily     Endo:  Diagnoses: Hx of hypothyroidism and hypoparathyroidism  Plan:   TSH 14.9, free T41.40  Continue home levothyroxine 125 mcg  Continue home vitamin D 1000 units  Monitor blood glucose.     ID:  Diagnoses: No active issues  Labs: WBC 17 from 11.11, expected postoperative leukocytosis  Tmax 99.3  Plan:  Monitor fever curve and WBC.     MSK/Skin:  PT/OT when appropriate. Encourage OOB and ambulation when appropriate.  Surgical drain in place; management per neurosurgery        LDAs:  Lines - PIV x 3, JAYE, A-line, Ramirez       Disposition: Stepdown Level 1    ICU Core Measures     A: Assess, Prevent, and Manage Pain Has pain been assessed? Yes  Need for changes to pain regimen? No   B: Both SAT/SAT  N/A   C: Choice of Sedation RASS Goal: 0 Alert and Calm  Need for changes to sedation or analgesia regimen? No   D: Delirium CAM-ICU: Negative   E: Early Mobility  Plan for early mobility? Yes   F: Family Engagement Plan for family engagement today? Yes         Prophylaxis:  VTE Contraindicated secondary to: brain bleed   Stress Ulcer  not ordered        Significant 24hr Events     24hr  events: OR yesterday for decompression and fusion; postoperative bilateral hand weakness is stable     Subjective     Review of Systems: See HPI for Review of Systems     Objective                            Vitals I/O      Most Recent Min/Max in 24hrs   Temp 98.9 °F (37.2 °C) Temp  Min: 97.7 °F (36.5 °C)  Max: 99.3 °F (37.4 °C)   Pulse (!) 54 Pulse  Min: 50  Max: 94   Resp 21 Resp  Min: 12  Max: 25   /77 BP  Min: 101/57  Max: 140/85   O2 Sat 99 % SpO2  Min: 94 %  Max: 100 %      Intake/Output Summary (Last 24 hours) at 2024 0623  Last data filed at 2024 0600  Gross per 24 hour   Intake 3384.75 ml   Output 8075 ml   Net -4690.25 ml       Diet Regular; Regular House    Invasive Monitoring           Physical Exam   Physical Exam  Vitals and nursing note reviewed.   Skin:     Comments: JAYE drain in place with dark serosanguineous drainage in the accordion drain, surgical site cleanly dressed   Cardiovascular:      Rate and Rhythm: Normal rate.   Abdominal: General: There is no distension.      Palpations: Abdomen is soft.      Tenderness: There is no abdominal tenderness. There is no guarding.   Constitutional:       General: He is not in acute distress.  Pulmonary:      Effort: Pulmonary effort is normal. No respiratory distress.   Neurological:      General: No focal deficit present.      Mental Status: He is alert and oriented to person, place and time.      Comments: Decreased  strength bilaterally otherwise no focal deficits            Diagnostic Studies      EK/15  Imaging: Reviewed I have personally reviewed pertinent reports.       Medications:  Scheduled PRN   acetaminophen, 975 mg, Q8H ASHLYN  calcium gluconate, 2 g, Once  Cholecalciferol, 1,000 Units, Daily  enoxaparin, 30 mg, Q12H ASHLYN  gabapentin, 100 mg, TID  levETIRAcetam, 500 mg, BID  levothyroxine, 125 mcg, Early Morning  [Transfer Hold] methocarbamol, 500 mg, Q6H ASHLYN  polyethylene glycol, 17 g, Daily  senna-docusate sodium, 1  tablet, HS      albuterol, 2 puff, Q4H PRN  HYDROmorphone, 0.2 mg, Q2H PRN  naloxone, 0.04 mg, Q1MIN PRN  ondansetron, 4 mg, Q4H PRN  oxyCODONE, 2.5 mg, Q4H PRN   Or  oxyCODONE, 5 mg, Q4H PRN       Continuous    phenylephine,  mcg/min, Last Rate: 170 mcg/min (06/17/24 0605)         Labs:    CBC    Recent Labs     06/16/24  0554 06/16/24  0930 06/16/24  1143 06/17/24  0107   WBC 11.01*  --   --  17.82*   HGB 13.6   < > 12.2 13.4   HCT 40.3   < > 36* 39.3     --   --  275    < > = values in this interval not displayed.     BMP    Recent Labs     06/16/24  0554 06/16/24  0930 06/16/24 1143 06/17/24  0107   SODIUM 135  --   --  141   K 3.4*  --   --  3.6   CL 99  --   --  103   CO2 26   < > 25 27   AGAP 10  --   --  11   BUN 9  --   --  9   CREATININE 0.74  --   --  0.66   CALCIUM 6.6*  --   --  7.2*    < > = values in this interval not displayed.       Coags    Recent Labs     06/16/24  0554   INR 0.99   PTT 28        Additional Electrolytes  Recent Labs     06/16/24  0758 06/16/24  0930 06/16/24 1143 06/17/24  0107   MG 2.0  --   --   --    PHOS 3.5  --   --   --    CAIONIZED 0.87*   < > 1.01* 0.96*    < > = values in this interval not displayed.          Blood Gas    No recent results  No recent results LFTs  Recent Labs     06/15/24  0856   ALT 26   AST 31   ALKPHOS 80   ALB 3.5   TBILI 0.45       Infectious  No recent results  Glucose  Recent Labs     06/15/24  0856 06/16/24  0554 06/17/24  0107   GLUC 123 99 128               Cricket Elizondo MD

## 2024-06-17 NOTE — PLAN OF CARE
Problem: OCCUPATIONAL THERAPY ADULT  Goal: Performs self-care activities at highest level of function for planned discharge setting.  See evaluation for individualized goals.  Description: Treatment Interventions: ADL retraining, Functional transfer training, UE strengthening/ROM, Endurance training, Patient/family training, Equipment evaluation/education, Fine motor coordination activities, Activityengagement          See flowsheet documentation for full assessment, interventions and recommendations.   Note: Limitation: Decreased ADL status, Decreased UE ROM, Decreased UE strength, Decreased Safe judgement during ADL, Decreased endurance, Decreased fine motor control, Decreased self-care trans, Decreased high-level ADLs  Prognosis: Good  Assessment: Pt is a 53 y.o. male who was admitted to [facility] on 6/15/2024 with TBI (traumatic brain injury) ([facility]) after being found down by daughter - unsure of what happened other that he got up to use the BR - CT head from 6/15: New acute trace subarachnoid hemorrhage in bilateral parafalcine regions. New acute trace parafalcine subdural hematoma. MRI cervical spine without 6/15/2024:Congenital canal stenosis with superimposed degenerative spondylosis .Most pronounced at C3-4 and C5-C6 with moderate to severe canal stenosis and mild cord compression. Evaluation of cord signal is limited due to artifact. No definite abnormal cord signal but mild cord edema would be difficult to exclude. Severe bilateral foraminal stenosis also seen at C3-4 and C5-C6.Mild to moderate canal stenosis at other levels . Patient  has a past medical history of Arthritis, Chronic cough, Disease of thyroid gland, Hypoparathyroidism ([facility]), and Pneumonia of right middle lobe due to Streptococcus pneumoniae ([facility]).   At baseline pt was completing adls and mobility independently - I iadls with exception of driving - shares homemaking with s/o and dtr. Pt lives with dtr in 1st floor apt - reports  he also stays with s/o. Currently pt requires mod to max assist for overall ADLS and mod a x 1-2 for functional mobility/transfers. Pt currently presents with impairments in the following categories -difficulty performing ADLS, difficulty performing IADLS , health management , and environment activity tolerance, endurance, standing balance/tolerance, sitting balance/tolerance, UE strength, UE ROM, and FMC. These impairments, as well as pt's fatigue, pain, spinal precautions, (R) hemiparesis, (L) hemiparesis, risk for falls, and home environment  limit pt's ability to safely engage in all baseline areas of occupation, includingeating, grooming, bathing, dressing, toileting, functional mobility/transfers, community mobility, laundry , house maintenance, medication management, meal prep, cleaning, work/volunteer work , social participation , and leisure activities  From OT standpoint, recommend Level I resources upon D/C. OT will continue to follow to address the below stated goals.     Rehab Resource Intensity Level, OT: I (Maximum Resource Intensity)

## 2024-06-17 NOTE — PHYSICAL THERAPY NOTE
Physical Therapy Evaluation     Patient's Name: Luis Forrester    Admitting Diagnosis  Subarachnoid hemorrhage (HCC) [I60.9]  Syncope and collapse [R55]  Subdural hematoma (HCC) [S06.5XAA]  Closed head injury, initial encounter [S09.90XA]  Fall, initial encounter [W19.XXXA]  Other injury of unspecified body region, initial encounter [T14.8XXA]    Problem List  Patient Active Problem List   Diagnosis    Allergic rhinitis    Arterial ectasia (HCC)    Chronic low back pain    Hyperlipidemia    Hypothyroidism    Lumbar radiculopathy    Mass of parotid gland    Osteoarthritis of both hands    Reactive airway disease    Bilateral impacted cerumen    Vitamin D deficiency    Encounter for colorectal cancer screening    Encounter for immunization    TBI (traumatic brain injury) (HCC)    Syncope and collapse    Bilateral hand pain    SDH (subdural hematoma) (HCC)    Right hand weakness       Past Medical History  Past Medical History:   Diagnosis Date    Arthritis     Chronic cough     Disease of thyroid gland     Hypoparathyroidism (HCC)     continue taking calcium and vitamin D, will check CMP, PTH level and Vitamin D level    Pneumonia of right middle lobe due to Streptococcus pneumoniae (HCC) 11/30/2018       Past Surgical History  Past Surgical History:   Procedure Laterality Date    DECOMPRESSION SPINE CERVICAL POSTERIOR N/A 6/16/2024    Procedure: DECOMPRESSION SPINE CERVICAL POSTERIOR C2-T1 with fusion navigated with Oarm;  Surgeon: Endy Velasquez MD;  Location: [facility];  Service: Neurosurgery    FRACTURE SURGERY      Open Treatment of Each Proximal Finger Phalanx          06/17/24 1008   PT Last Visit   PT Visit Date 06/17/24   Note Type   Note type Evaluation   Pain Assessment   Pain Assessment Tool 0-10   Pain Score 6   Pain Location/Orientation Location: Neck;Location: Incision   Pain Onset/Description Onset: Ongoing;Frequency: Constant/Continuous;Descriptor: Discomfort   Effect of Pain on Daily  Activities Guarding   Patient's Stated Pain Goal No pain   Hospital Pain Intervention(s) Ambulation/increased activity;Repositioned   Restrictions/Precautions   Weight Bearing Precautions Per Order No   Other Precautions Cognitive;Chair Alarm;Bed Alarm;Multiple lines;Telemetry;Fall Risk;Pain;Spinal precautions  (Drain)   Home Living   Type of Home Apartment   Home Layout One level;Able to live on main level with bedroom/bathroom;Stairs to enter with rails  (4 WARD building w/ HR)   Bathroom Shower/Tub Walk-in shower   Bathroom Toilet Standard   Home Equipment Other (Comment)  (Denies having any DME)   Additional Comments Pt states that he lives with his significant other primarily; but also stays with his daughter.   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   Family/Caregiver Present No   Cognition   Overall Cognitive Status WFL   Arousal/Participation Alert   Attention Attends with cues to redirect   Orientation Level Oriented to person;Oriented to place;Oriented to situation   Memory Decreased recall of precautions   Following Commands Follows one step commands with increased time or repetition   Subjective   Subjective Pt alert; lying in bed; agreeable to mobilize with PT and OT; Would like to sit in the recliner   RUE Assessment   RUE Assessment X  (Limited AROM of of Shoulder, forearm,a nd wrist; decreased  strength)   LUE Assessment   LUE Assessment   (Limited AROM of of Shoulder, forearm,a nd wrist; decreased  strength)   RLE Assessment   RLE Assessment WFL  (AROM)   Strength RLE   RLE Overall Strength   (Fair; able to perform LAQ)   LLE Assessment   LLE  Assessment WFL  (AROM)   Strength LLE   LLE Overall Strength   (Fair; Able to perform a LAQ)   Bed Mobility   Supine to Sit 3  Moderate assistance   Additional items Assist x 2;HOB elevated;Increased time required;Verbal cues   Transfers   Sit to Stand 3  Moderate assistance   Additional items Assist x 1;Increased time required;Verbal cues   Stand to Sit 3  Moderate assistance   Additional items Assist x 1;Increased time required;Verbal cues   Ambulation/Elevation   Gait pattern Wide YARIEL;Decreased foot clearance;Inconsistent shay;Short stride;Excessively slow   Gait Assistance 3  Moderate assist   Additional items Assist x 2;Verbal cues   Assistive Device Other (Comment)  ( HHA)   Distance 4ft   Stair Management Assistance Not tested   Balance   Static Sitting Fair -   Dynamic Sitting Poor +   Static Standing Poor   Dynamic Standing Poor -   Ambulatory Poor -   Activity Tolerance   Activity Tolerance Patient limited by fatigue;Patient limited by pain   Medical Staff Made Aware Co-eval conducted w/ OT d/t medical complexity   Nurse Made Aware Spoke evelyn/ LUCRECIA Ramos; Present during the eval   Assessment   Prognosis Fair   Problem List Decreased strength;Decreased endurance;Impaired balance;Decreased mobility;Decreased coordination;Decreased cognition;Decreased safety awareness;Pain   Assessment Pt is a 53 year old male admitted to [facility] on 6/15/2024. Pt was reported to have fell while at home. Pt received a CT scan which revealed a TBI and a subdural hematoma, and trace subarachnoid hemorrhage. Pt also received an MRI of the cervical spine which revealed C3-4 and C4-6 canal stenosis. Pt underwent Posterior cervical laminectomies C3, C4, C5, C6, C7; Bilateral C2, C7 and T1 pedicle screw placement; Bilateral C3, C4, C5  lateral mass screw placement; Arthrodesis C2-T1 with autologous bone graft and [place] putty. Pt's comorbidities affecting POC include: Osteoarthritis of both hands,  Chronic cough, Disease of thyroid gland, and Hypoparathyroidism, Chronic low back pain and personal factors of: WARD. Pt's clinical presentation is currently  unstable/unpredictable which is evident in ongoing telem monitoring, Wound drain in place, and abn lab values. Pt presents w/ overall postop weakness, incl min decreased LE strength, decreased functional endurance and activity tolerance, impaired balance, and gait deviations. Will continue to follow pt in PT for progressive mobilization to address above functional deficits and to max level of independence, endurance, and safety. Pt presents with some cognitive deficits and requires some cuing to redirect to the task as well. D/C recommendations are outlined below. Will continue to follow until medically cleared for D/C.   Barriers to Discharge Inaccessible home environment   Goals   Patient Goals To decrease his pain   STG Expiration Date 06/27/24   Short Term Goal #1 7-10 days. Pt will amb 100 ft w/ least restrictive AD, in order to facilitate return to community amb status. Pt will achieve Supervision level w/ bed mob in order to facilitate safety with OOB and back to bed transitions in own living environment. Pt will perform transfers w/ Min(A)x1 to be able to perform household activities and decrease burden of care. Pt will be able to navigate 4 steps with handrail, least restrictive AD, and Min(A)x1 to be able to enter his home and to progress to the next continuum of care. Pt will participate in LE therex and balance activities to max progression w/ mobility skills.   PT Treatment Day 0   Plan   Treatment/Interventions Functional transfer training;LE strengthening/ROM;Elevations;Therapeutic exercise;Endurance training;Cognitive reorientation;Equipment eval/education;Bed mobility;Gait training;Spoke to nursing;OT   PT Frequency 3-5x/wk   Discharge Recommendation   Rehab Resource Intensity Level, PT I (Maximum Resource Intensity)   Equipment Recommended  Other (Comment)  (TBD)   AM-PAC Basic Mobility Inpatient   Turning in Flat Bed Without Bedrails 2   Lying on Back to Sitting on Edge of Flat Bed Without Bedrails 2   Moving Bed to Chair 2   Standing Up From Chair Using Arms 2   Walk in Room 1   Climb 3-5 Stairs With Railing 1   Basic Mobility Inpatient Raw Score 10   Turning Head Towards Sound 4   Follow Simple Instructions 4   Low Function Basic Mobility Raw Score  18   Low Function Basic Mobility Standardized Score  29.25   [facility] Highest Level Of Mobility   -[facility] Goal 4: Move to chair/commode   -[facility] Achieved 4: Move to chair/commode   Modified Miriam Scale   Modified [place] Scale 4   End of Consult   Patient Position at End of Consult Bedside chair;Bed/Chair alarm activated;All needs within reach         Nohemy Potter

## 2024-06-17 NOTE — UTILIZATION REVIEW
Initial Clinical Review    Admission: Date/Time/Statement:   Admission Orders (From admission, onward)       Ordered        06/15/24 1155  Inpatient Admission  Once                          Orders Placed This Encounter   Procedures    Inpatient Admission     Standing Status:   Standing     Number of Occurrences:   1     Order Specific Question:   Level of Care     Answer:   Level 2 Stepdown / HOT [14]     Order Specific Question:   Bed Type     Answer:   Trauma [7]     Order Specific Question:   Estimated length of stay     Answer:   More than 2 Midnights     Order Specific Question:   Certification     Answer:   I certify that inpatient services are medically necessary for this patient for a duration of greater than two midnights. See H&P and MD Progress Notes for additional information about the patient's course of treatment.     ED Arrival Information       Expected   -    Arrival   6/15/2024 07:35    Acuity   Urgent              Means of arrival   Ambulance    Escorted by   [facility]    Service   Trauma    Admission type   Emergency              Arrival complaint   body pain             Chief Complaint   Patient presents with    Fall     Patient bring brought in via EMS for fall. Fall was witnessed with negative head strike negative thinners. Patient has generalized pain.        Initial Presentation: 53 y.o. male who presents after a syncope episode overnight. On presentation, he notes pain in both hands as well as his mid to upper back.  He notes that the syncope episodes have happened a few times over the last 6 months. He notes that they happen unexpectedly and randomly with no preceding symptoms. Last night event happened after he returned home around 1 AM. He blacked out and does not recall how long he was out before being found by his daughter. Plan: Inpatient admission for evaluation and treatment of traumatic brain injury, syncope and collapse, bilateral hand pain, hypothyroidism:  Neurosurgery consult, Keppra x 7 days, PT/OT eval, repeat CT scan of the head in 12-24 hours, trend troponin, telemetry, Echo, check orthostatic vitals, fall precautions, continue home levothyroxine.     Neurosurgery consult: CT head without 6/15/24:New acute trace subarachnoid hemorrhage in bilateral parafalcine regions. Recommend dedicated CTA head with contrast for further evaluation.New acute trace parafalcine subdural hematoma. Frequent neuro checks. Seizure prophylaxis. CT head in am. No neurosurgical intervention indicated at this juncture. Trauma placed patient in aspen collar. Tentative plan for posterior fusion tomorrow.     ED Triage Vitals [06/15/24 0741]   Temperature Pulse Respirations Blood Pressure SpO2 Pain Score   98.4 °F (36.9 °C) 70 16 107/57 98 % 4     Weight (last 2 days)       Date/Time Weight    06/17/24 0830 78.9 (174)    06/15/24 1300 79 (174.2)    06/15/24 12:25:55 79 (174.2)            Vital Signs (last 3 days)       Date/Time Temp Pulse Resp BP MAP (mmHg) Arterial Line BP MAP SpO2 Calculated FIO2 (%) - Nasal Cannula O2 Flow Rate (L/min) Nasal Cannula O2 Flow Rate (L/min) O2 Device Cardiac (WDL) Patient Position - Orthostatic VS [place] Coma Scale Score Pain    06/17/24 1553 -- -- -- -- -- -- -- -- -- -- -- -- -- -- -- Med Not Given for Pain - for MAR use only    06/17/24 1520 -- 54 21 93/57 70 122/60 82 mmHg 97 % -- -- -- None (Room air) -- -- -- --    06/17/24 1400 -- 48 20 127/67 88 134/66 88 mmHg 97 % -- -- -- None (Room air) -- -- -- --    06/17/24 1334 -- -- -- -- -- -- -- -- -- -- -- -- -- -- -- 6    06/17/24 1333 -- -- -- -- -- -- -- -- -- -- -- -- -- -- -- 6    06/17/24 1300 -- 54 22 113/59 78 126/62 86 mmHg 99 % -- -- -- None (Room air) -- -- 15 --    06/17/24 1200 -- 56 21 100/67 78 122/66 88 mmHg 99 % -- -- -- None (Room air) -- -- 15 No Pain    06/17/24 1150 98.7 °F (37.1 °C) 54 19 103/63 79 99/80 89 mmHg 99 % -- -- -- None (Room air) -- -- -- --    06/17/24 1110 -- 58 19 --  -- 110/54 76 mmHg 99 % -- -- -- None (Room air) -- -- -- --    06/17/24 1100 -- 62 18 95/72 83 116/64 84 mmHg 99 % -- -- -- None (Room air) -- -- 15 --    06/17/24 1012 -- 66 20 -- -- 122/66 86 mmHg 99 % -- -- -- None (Room air) -- -- -- 7 06/17/24 1008 -- -- -- -- -- -- -- -- -- -- -- -- -- -- -- 6 06/17/24 1000 -- -- -- -- -- -- -- -- -- -- -- -- -- -- 15 6    06/17/24 0930 -- 76 22 -- -- 124/68 90 mmHg 99 % -- -- -- None (Room air) -- -- -- --    06/17/24 0900 -- -- -- -- -- -- -- -- -- -- -- -- -- -- 15 --    06/17/24 0830 -- 52 23 132/72 -- 140/74 98 mmHg 98 % -- -- -- None (Room air) -- -- -- --    06/17/24 0800 98.6 °F (37 °C) 48 18 132/72 95 144/66 98 mmHg 98 % -- -- -- None (Room air) -- -- 15 No Pain    06/17/24 0700 -- 54 15 119/57 77 132/64 90 mmHg 99 % -- -- -- -- -- -- 15 --    06/17/24 0608 -- 54 21 128/77 105 144/70 100 mmHg 99 % -- -- -- -- -- -- -- --    06/17/24 0600 -- 64 20 -- 82 148/74 104 mmHg 98 % -- -- -- -- -- -- 15 --    06/17/24 0500 -- -- -- -- -- -- -- -- -- -- -- -- -- -- 15 --    06/17/24 0400 98.9 °F (37.2 °C) 50 -- -- -- 130/62 86 mmHg 97 % -- -- -- None (Room air) -- Lying 15 No Pain    06/17/24 0300 -- 58 13 102/60 80 122/56 82 mmHg 95 % -- -- -- -- -- -- 15 --    06/17/24 0200 -- 50 12 108/72 88 120/54 78 mmHg 98 % -- -- -- -- -- -- 15 --    06/17/24 0100 -- 54 15 -- -- 108/58 82 mmHg 97 % -- -- -- -- -- -- 15 --    06/17/24 0030 -- -- -- -- -- 112/66 86 mmHg -- -- -- -- -- -- -- -- --    06/17/24 0000 98.5 °F (36.9 °C) 54 14 110/65 80 118/58 80 mmHg 97 % -- -- -- None (Room air) -- Lying 15 No Pain    06/16/24 2345 -- -- -- -- -- 118/56 80 mmHg -- -- -- -- -- -- -- -- --    06/16/24 2300 -- 62 12 106/60 74 110/56 74 mmHg 97 % -- -- -- -- -- -- 15 --    06/16/24 2200 -- 70 15 101/57 70 94/52 68 mmHg 96 % -- -- -- -- -- -- 15 --    06/16/24 2100 -- 84 22 111/75 96 118/68 88 mmHg 97 % -- -- -- -- -- -- 15 --    06/16/24 2000 99.3 °F (37.4 °C) 94 21 140/85 102 126/76 100 mmHg  97 % -- -- -- None (Room air) -- Lying 15 No Pain    06/16/24 1900 -- 72 23 112/74 86 126/62 84 mmHg 97 % -- -- -- None (Room air) -- Lying 15 --    06/16/24 1800 98 °F (36.7 °C) 70 25 119/80 95 126/66 88 mmHg 96 % -- -- -- None (Room air) -- Lying 15 No Pain    06/16/24 1715 -- 70 13 -- -- 120/74 94 mmHg 96 % -- -- -- -- -- -- -- --    06/16/24 1700 97.7 °F (36.5 °C) 68 17 116/77 91 124/72 94 mmHg 99 % -- -- -- None (Room air) WDL -- 15 --    06/16/24 1645 -- 66 18 124/75 90 130/68 90 mmHg 98 % -- -- -- -- -- -- -- --    06/16/24 1630 -- 76 22 129/76 93 132/74 96 mmHg 98 % 28 -- 2 L/min Nasal cannula WDL -- 15 --    06/16/24 1615 -- 68 20 119/73 89 128/70 92 mmHg 99 % -- -- -- -- -- -- -- --    06/16/24 1600 -- 74 18 117/74 87 134/70 94 mmHg 99 % 28 -- 2 L/min Nasal cannula WDL -- 15 --    06/16/24 1545 -- 76 21 114/77 90 140/76 100 mmHg 99 % 28 -- 2 L/min Nasal cannula WDL -- 15 No Pain    06/16/24 1530 -- 86 20 123/73 93 134/74 98 mmHg 94 % 28 -- 2 L/min Nasal cannula WDL -- 15 --    06/16/24 1515 -- 84 14 135/81 100 136/76 102 mmHg 100 % -- -- -- -- -- -- -- --    06/16/24 1512 98.2 °F (36.8 °C) 84 16 -- -- -- -- 100 % -- 5 L/min -- Simple mask WDL -- 15 No Pain    06/16/24 0812 97.9 °F (36.6 °C) -- -- -- -- -- -- -- -- -- -- -- -- -- -- No Pain    06/16/24 0800 -- 62 -- 116/74 90 -- -- -- -- -- -- -- -- -- -- --    06/16/24 0730 -- -- -- -- -- -- -- -- -- -- -- None (Room air) -- -- 15 No Pain    06/16/24 0600 -- 62 -- 118/71 91 -- -- 98 % -- -- -- -- -- -- -- --    06/16/24 0540 -- -- -- -- -- -- -- -- -- -- -- -- -- -- -- 1    06/16/24 0430 -- 52 -- 101/65 85 -- -- 98 % -- -- -- -- -- -- -- --    06/16/24 0412 -- -- -- 111/57 82 -- -- -- -- -- -- -- -- -- -- --    06/16/24 0400 -- -- -- -- -- -- -- -- -- -- -- -- -- -- 15 --    06/16/24 0330 98.6 °F (37 °C) 62 -- 112/72 85 -- -- 98 % -- -- -- -- -- -- -- --    06/16/24 0300 -- -- -- -- -- -- -- -- -- -- -- -- -- -- 15 --    06/16/24 0240 -- 54 -- 93/56 66 --  -- 97 % -- -- -- -- -- -- -- --    06/16/24 0234 -- -- -- 84/55 64 -- -- -- -- -- -- -- -- -- -- --    06/16/24 0231 -- -- 20 -- -- -- -- -- -- -- -- -- -- -- -- --    06/16/24 0223 -- 66 -- 97/62 -- -- -- -- -- -- -- -- -- -- -- --    06/16/24 0200 -- -- -- -- -- -- -- -- -- -- -- -- -- -- 15 --    06/16/24 0110 98.3 °F (36.8 °C) 64 -- 94/56 65 -- -- 97 % -- -- -- -- -- -- -- --    06/16/24 0100 -- -- -- -- -- -- -- -- -- -- -- -- -- -- 15 --    06/16/24 0000 -- -- -- -- -- -- -- -- -- -- -- -- -- -- 15 --    06/15/24 2300 -- -- -- -- -- -- -- -- -- -- -- -- -- -- 15 --    06/15/24 2200 -- -- -- -- -- -- -- -- -- -- -- -- -- -- 15 --    06/15/24 2100 -- -- -- -- -- -- -- -- -- -- -- -- -- -- 15 --    06/15/24 20:14:07 99 °F (37.2 °C) 71 18 106/65 79 -- -- 96 % -- -- -- -- -- -- -- --    06/15/24 2000 -- -- -- -- -- -- -- -- -- -- -- None (Room air) -- -- 15 4    06/15/24 1900 -- -- -- -- -- -- -- -- -- -- -- -- -- -- 15 --    06/15/24 1800 -- -- -- -- -- -- -- -- -- -- -- -- -- -- 15 --    06/15/24 1700 -- -- -- -- -- -- -- -- -- -- -- -- -- -- 15 --    06/15/24 16:08:19 99.3 °F (37.4 °C) 68 -- 106/64 78 -- -- 95 % -- -- -- -- -- -- -- --    06/15/24 1607 -- -- -- -- -- -- -- -- -- -- -- -- -- -- 15 --    06/15/24 1500 -- -- -- -- -- -- -- -- -- -- -- -- -- -- 15 4    06/15/24 1417 -- -- -- -- -- -- -- -- -- -- -- -- -- -- -- 7    06/15/24 14:08:55 99.3 °F (37.4 °C) 75 -- 105/63 77 -- -- 95 % -- -- -- -- -- -- -- --    06/15/24 1400 -- -- -- -- -- -- -- -- -- -- -- -- -- -- 15 --    06/15/24 1300 -- -- -- -- -- -- -- -- -- -- -- -- -- -- 15 --    06/15/24 12:25:55 98.2 °F (36.8 °C) -- 18 98/65 76 -- -- 98 % -- -- -- -- -- -- -- --    06/15/24 1057 -- 71 16 102/60 -- -- -- 98 % -- -- -- None (Room air) -- Lying -- --    06/15/24 1054 -- -- -- -- -- -- -- -- -- -- -- -- -- -- -- 7    06/15/24 0743 -- -- -- -- -- -- -- -- -- -- -- -- -- -- 15 4    06/15/24 0741 98.4 °F (36.9 °C) 70 16 107/57 79 -- -- 98 % -- -- --  None (Room air) -- Lying -- 4              Pertinent Labs/Diagnostic Test Results:   Radiology:  XR spine cervical 2 or 3 vw injury   Final Interpretation by Mary Herrera MD (06/17 0901)      Fluoroscopy provided for procedure guidance.      Please refer to the separate procedure note for additional details.                  Workstation performed: [id number]         CT head wo contrast   Final Interpretation by E. Alec Schoenberger, MD (06/16 0732)      Trace subarachnoid hemorrhage within the interhemispheric fissure is slightly decreased in conspicuity   No parenchymal or subdural hematoma.               Workstation performed: [id number]         XR chest portable ICU   Final Interpretation by Kelly Akbar MD (06/16 0648)      No acute cardiopulmonary disease.      No central line. No pneumothorax.      Workstation performed: [id number]         MRI cervical spine wo contrast   Final Interpretation by E. Alec Schoenberger, MD (06/15 1942)   Congenital canal stenosis with superimposed degenerative spondylosis .   Most pronounced at C3-4 and C5-C6 with moderate to severe canal stenosis and mild cord compression. Evaluation of cord signal is limited due to artifact. No definite abnormal cord signal but mild cord edema would be difficult to exclude   Severe bilateral foraminal stenosis also seen at C3-4 and C5-C6.   Mild to moderate canal stenosis at other levels            Workstation performed: [id number]         CTA head w wo contrast   Final Interpretation by Eddie Jimenez MD (06/15 1117)      Negative CTA head traumatic vascular injury, aneurysm, large vessel occlusion, high-grade stenosis, or dissection.      Partially imaged ectatic right carotid bifurcation and proximal cervical internal carotid artery with retropharyngeal course, similar to CT neck with contrast 10/12/2011.      Additional chronic/incidental findings as detailed above.      Please see earlier same day CT head without contrast  and concurrent CT cervical spine without contrast.                              Workstation performed: [id number]         CT spine cervical without contrast   Final Interpretation by Eddie Jimenez MD (06/15 1122)      No cervical spine fracture or traumatic malalignment.      Posterior osteophyte disc complexes C3-C4 and C5-C6 contributes to at least moderate canal stenosis and severe bilateral foraminal narrowing at these levels. Consider nonemergent follow-up MRI cervical spine without contrast for further evaluation.      Ectatic right carotid bifurcation and proximal cervical internal carotid artery with retropharyngeal course, similar to CT neck with contrast 10/12/2011.      Please see same day CTA head with contrast, CT head without contrast, CT thoracic spine without contrast for further evaluation.      The study was marked in EPIC for immediate notification.                           Workstation performed: [id number]         XR hand 3+ views LEFT   Final Interpretation by Bambi Ventura MD (06/15 1110)      No acute osseous abnormality.      Degenerative changes as described.      Resident: ALLYSON AWAN I, the attending radiologist, have reviewed the images and agree with the final report above.      Workstation performed: [id number]         XR hand 3+ views RIGHT   Final Interpretation by Bambi Ventura MD (06/15 1111)      1. No acute osseous abnormality.      2. Degenerative changes as described.      Resident: ALLYSON AWAN I, the attending radiologist, have reviewed the images and agree with the final report above.      Workstation performed: [id number]         CT head wo contrast   Final Interpretation by Eddie Jimenez MD (06/15 1010)      New acute trace subarachnoid hemorrhage in bilateral parafalcine regions. Recommend dedicated CTA head with contrast for further evaluation.      New acute trace parafalcine subdural hematoma.         I personally  discussed this study with Travis Andre on 6/15/2024 10:08 AM.                              Workstation performed: [id number]         CT spine thoracic without contrast   Final Interpretation by Eddie Jimenez MD (06/15 1014)      No acute osseous abnormality of thoracic spine.      Diffuse idiopathic skeletal hyperostosis (DISH).      I personally discussed this study with Travis Powell on 6/15/2024 10:08 AM.                  Workstation performed: [id number]         XR spine cervical 2 or 3 vw injury    (Results Pending)     Cardiology:  Echo complete w/ contrast if indicated   Final Result by Mac Benjamin MD (06/17 2341)        Left Ventricle: Left ventricular cavity size is normal. Wall thickness    is normal. The left ventricular ejection fraction is 60%. Systolic    function is normal. Wall motion is normal. Diastolic function is normal.     Right Ventricle: Right ventricular cavity size is mildly dilated.     Right Atrium: The atrium is mildly dilated.     Atrial Septum: There is a probable patent foramen ovale noted at rest    with predominant left to right shunting using color Doppler. 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  It is predominately within the right atrial    cavity.     Aorta: The aortic root is mildly dilated. The aortic root is 4.30 cm.         ECG 12 lead   Final Result by Charlie Addison MD (06/16 2318)    Age and gender specific ECG analysis    Normal sinus rhythm   Nonspecific T wave abnormality   Abnormal ECG   When compared with ECG of 20-NOV-2010 07:59,   Questionable change in QRS axis   Confirmed by Charlie Addison ([id number]) on 6/16/2024 4:44:51 AM        GI:  No orders to display           Results from last 7 days   Lab Units 06/17/24  0107 06/16/24  1143 06/16/24  0930 06/16/24  0554 06/15/24  0856   WBC Thousand/uL 17.82*  --   --  11.01* 11.11*   HEMOGLOBIN g/dL 13.4  --   --  13.6 13.3   I STAT HEMOGLOBIN g/dl  --  12.2 12.2  --   --    HEMATOCRIT % 39.3   --   --  40.3 40.0   HEMATOCRIT, ISTAT %  --  36* 36*  --   --    PLATELETS Thousands/uL 275  --   --  256 160   TOTAL NEUT ABS Thousands/µL  --   --   --   --  9.21*         Results from last 7 days   Lab Units 06/17/24  0107 06/16/24  1143 06/16/24  0930 06/16/24  0758 06/16/24  0554 06/15/24  0856   SODIUM mmol/L 141  --   --   --  135 136   POTASSIUM mmol/L 3.6  --   --   --  3.4* 3.6   CHLORIDE mmol/L 103  --   --   --  99 101   CO2 mmol/L 27  --   --   --  26 27   CO2, I-STAT mmol/L  --  25 27  --   --   --    ANION GAP mmol/L 11  --   --   --  10 8   BUN mg/dL 9  --   --   --  9 13   CREATININE mg/dL 0.66  --   --   --  0.74 0.76   EGFR ml/min/1.73sq m 108  --   --   --  103 102   CALCIUM mg/dL 7.2*  --   --   --  6.6* 6.6*   CALCIUM, IONIZED mmol/L 0.96*  --   --  0.87*  --   --    CALCIUM, IONIZED, ISTAT mmol/L  --  1.01* 0.86*  --   --   --    MAGNESIUM mg/dL  --   --   --  2.0  --   --    PHOSPHORUS mg/dL  --   --   --  3.5  --   --      Results from last 7 days   Lab Units 06/15/24  0856   AST U/L 31   ALT U/L 26   ALK PHOS U/L 80   TOTAL PROTEIN g/dL 6.7   ALBUMIN g/dL 3.5   TOTAL BILIRUBIN mg/dL 0.45     Results from last 7 days   Lab Units 06/16/24  1524   POC GLUCOSE mg/dl 122     Results from last 7 days   Lab Units 06/17/24  0107 06/16/24  0554 06/15/24  0856   GLUCOSE RANDOM mg/dL 128 99 123     Results from last 7 days   Lab Units 06/17/24  1040   OSMOLALITY, SERUM mmol/         Results from last 7 days   Lab Units 06/16/24  1143 06/16/24  0930   I STAT BASE EXC mmol/L -1 1   I STAT O2 SAT % 100* 100*   ISTAT PH ART  7.386 7.422   I STAT ART PCO2 mm HG 40.4 39.4   I STAT ART PO2 mm .0* 279.0*   I STAT ART HCO3 mmol/L 24.2 25.6         Results from last 7 days   Lab Units 06/15/24  1259 06/15/24  1101 06/15/24  0856   HS TNI 0HR ng/L  --   --  4   HS TNI 2HR ng/L  --  3  --    HSTNI D2 ng/L  --  -1  --    HS TNI 4HR ng/L 4  --   --    HSTNI D4 ng/L 0  --   --          Results from  last 7 days   Lab Units 06/16/24  0554   PROTIME seconds 13.0   INR  0.99   PTT seconds 28     Results from last 7 days   Lab Units 06/15/24  0856   TSH 3RD GENERATON uIU/mL 14.972*           Results from last 7 days   Lab Units 06/16/24  1051   UNIT PRODUCT CODE  [id number]  [id number]   UNIT NUMBER  [id number]  [id number]   UNITABO  A  A   UNITRH  POS  POS   CROSSMATCH  Compatible  Compatible   UNIT DISPENSE STATUS  Crossmatched  Crossmatched   UNIT PRODUCT VOL mL 350  350           Results from last 7 days   Lab Units 06/17/24  1040 06/17/24  1028   OSMOLALITY, SERUM mmol/  --    OSMO UR mmol/KG  --  272     Results from last 7 days   Lab Units 06/17/24  1028   SODIUM UR  48         ED Treatment-Medication Administration from 06/15/2024 0735 to 06/15/2024 1217         Date/Time Order Dose Route Action     06/15/2024 1054 acetaminophen (TYLENOL) tablet 650 mg 650 mg Oral Given     06/15/2024 1037 iohexol (OMNIPAQUE) 350 MG/ML injection (MULTI-DOSE) 85 mL 85 mL Intravenous Given            Past Medical History:   Diagnosis Date    Arthritis     Chronic cough     Disease of thyroid gland     Hypoparathyroidism (HCC)     continue taking calcium and vitamin D, will check CMP, PTH level and Vitamin D level    Pneumonia of right middle lobe due to Streptococcus pneumoniae (HCC) 11/30/2018     Present on Admission:   TBI (traumatic brain injury) (HCC)   Syncope and collapse   Bilateral hand pain   Hypothyroidism      Admitting Diagnosis: Subarachnoid hemorrhage (HCC) [I60.9]  Syncope and collapse [R55]  Subdural hematoma (HCC) [S06.5XAA]  Closed head injury, initial encounter [S09.90XA]  Fall, initial encounter [W19.XXXA]  Other injury of unspecified body region, initial encounter [T14.8XXA]  Age/Sex: 53 y.o. male  Admission Orders:  Scheduled Medications:  acetaminophen, 975 mg, Oral, Q8H ASHLYN  Cholecalciferol, 1,000 Units, Oral, Daily  clindamycin, 300 mg, Oral, Q6H ASHLYN  enoxaparin, 30 mg,  Subcutaneous, Q12H ASHLYN  gabapentin, 100 mg, Oral, TID  levETIRAcetam, 500 mg, Oral, BID  levothyroxine, 125 mcg, Oral, Early Morning  methadone, 5 mg, Oral, Q8H ASHLYN   Followed by  [START ON 6/18/2024] methadone, 5 mg, Oral, Q12H ASHLYN   Followed by  [START ON 6/19/2024] methadone, 5 mg, Oral, Daily  methocarbamol, 500 mg, Oral, Q6H ASHLYN  polyethylene glycol, 17 g, Oral, Daily  senna-docusate sodium, 1 tablet, Oral, HS      Continuous IV Infusions:  phenylephine,  mcg/min, Intravenous, Titrated      PRN Meds:  albuterol, 2 puff, Inhalation, Q4H PRN  HYDROmorphone, 0.2 mg, Intravenous, Q2H PRN  naloxone, 0.04 mg, Intravenous, Q1MIN PRN  ondansetron, 4 mg, Intravenous, Q4H PRN  oxyCODONE, 2.5 mg, Oral, Q4H PRN   Or  oxyCODONE, 5 mg, Oral, Q4H PRN        IP CONSULT TO CASE MANAGEMENT  IP CONSULT TO NEUROSURGERY  IP CONSULT TO ACUTE PAIN SERVICE    Network Utilization Review Department  ATTENTION: Please call with any questions or concerns to [phone] and carefully listen to the prompts so that you are directed to the right person. All voicemails are confidential.   For Discharge needs, contact Care Management DC Support Team at [phone] opt. [phone]  Send all requests for admission clinical reviews, approved or denied determinations and any other requests to dedicated fax number below belonging to the [facility] where the patient is receiving treatment. List of dedicated fax numbers for the Facilities:  FACILITY NAME UR FAX NUMBER   ADMISSION DENIALS (Administrative/Medical Necessity) [phone]   DISCHARGE SUPPORT TEAM (NETWORK) [phone]   PARENT CHILD HEALTH (Maternity/NICU/Pediatrics) [phone]   [facility] [phone]   [facility] [phone]   [facility] [phone]   [facility] [phone]   [facility] [phone]   [facility] [phone]    [facility] [phone]   [facility] [phone]   [facility] [phone]   [facility] [phone]   [facility] [phone]   [facility] [place] [facility] [phone]

## 2024-06-17 NOTE — CASE MANAGEMENT
Case Management Assessment & Discharge Planning Note    Patient name Luis Forrester  Location ICU 04/ICU 04 MRN [id number]  : 1970 Date 2024       Current Admission Date: 6/15/2024  Current Admission Diagnosis:TBI (traumatic brain injury) ([facility])   Patient Active Problem List    Diagnosis Date Noted Date Diagnosed    TBI (traumatic brain injury) ([facility]) 06/15/2024     Syncope and collapse 06/15/2024     Bilateral hand pain 06/15/2024     SDH (subdural hematoma) (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     Chronic low back pain 2015     Mass of parotid gland 2015     Lumbar radiculopathy 2015     Hyperlipidemia 2014     Hypothyroidism 2014     Allergic rhinitis 2012       LOS (days): 2  Geometric Mean LOS (GMLOS) (days):   Days to GMLOS:     OBJECTIVE:    Risk of Unplanned Readmission Score: 13.92         Current admission status: Inpatient  Referral Reason: Other    Preferred Pharmacy:   [id number] - [place], [place] - [address] [address]  Phone: [phone] Fax: [phone]    Primary Care Provider: Joceline Shook PA-C    [place] Insurance[place] [employer]  Secondary Insurance:     ASSESSMENT:  Active Health Care Proxies    There are no active Health Care Proxies on file.       Readmission Root Cause  30 Day Readmission: No    Patient Information  Admitted from:: Home  Mental Status: Alert  During Assessment patient was accompanied by: Not accompanied during assessment  Assessment information provided by:: Daughter  Primary Caregiver: Self  Support Systems: Daughter, Friends/neighbors  County of Residence:  [place]  What city do you live in?: [place]  Home entry access options. Select all that apply.: Stairs  Number of steps to enter home.: One Flight  Type of Current Residence: Apartment  Floor Level: 2  Upon entering residence, is there a bedroom on the main floor (no further steps)?: Yes  Upon entering residence, is there a bathroom on the main floor (no further steps)?: Yes  Living Arrangements: Lives w/ Daughter    Activities of Daily Living Prior to Admission  Functional Status: Independent  Completes ADLs independently?: Yes  Ambulates independently?: Yes  Does patient use assisted devices?: No  Does patient currently own DME?: No  Does patient have a history of Outpatient Therapy (PT/OT)?: No  Does the patient have a history of Short-Term Rehab?: No  Does patient have a history of HHC?: No  Does patient currently have HHC?: No    Patient Information Continued  Income Source: Employed  Does patient have prescription coverage?: Yes  Does patient receive dialysis treatments?: No  Does patient have a history of substance abuse?: No  Does patient have a history of Mental Health Diagnosis?: No    Means of Transportation  Means of Transport to Appts:: Family transport (Pt primairly walks)      Social Determinants of Health (SDOH)      Flowsheet Row Most Recent Value   Housing Stability    In the last 12 months, was there a time when you were not able to pay the mortgage or rent on time? N   In the past 12 months, how many times have you moved where you were living? 0   At any time in the past 12 months, were you homeless or living in a shelter (including now)? N   Transportation Needs    In the past 12 months, has lack of transportation kept you from medical appointments or from getting medications? no   In the past 12 months, has lack of transportation kept you from meetings, work, or from getting things needed for daily living? No   Food Insecurity    Within the past 12 months, you worried that your food would  run out before you got the money to buy more. Never true   Within the past 12 months, the food you bought just didn't last and you didn't have money to get more. Never true   Utilities    In the past 12 months has the electric, gas, oil, or water company threatened to shut off services in your home? No            DISCHARGE DETAILS:    Discharge planning discussed with:: DaughterMarti Miles  Freedom of Choice: Yes  Comments - Freedom of Choice: Agreeable to blanket referral for STR  CM contacted family/caregiver?: Yes  Were Treatment Team discharge recommendations reviewed with patient/caregiver?: Yes  Did patient/caregiver verbalize understanding of patient care needs?: Yes  Were patient/caregiver advised of the risks associated with not following Treatment Team discharge recommendations?: Yes    Contacts  Patient Contacts: Trinity Forrester  Relationship to Patient:: Family  Contact Method: Phone  Phone Number: [phone]  Reason/Outcome: Emergency Contact, Discharge Planning, Referral       Other Referral/Resources/Interventions Provided:  Interventions: Short Term Rehab  Referral Comments: CM sent referrals for STR, family agreeable to blanket referral.  CM discussed acute and subacute referrals with TrinityMacy  Trinity requesting only subacute referrals at this time.         Treatment Team Recommendation: Short Term Rehab      Additional Comments: CM completed an Open assessment with pt's daughter, Trinity.  Per Trinity, pt resides in an apartment with her.  Pt is independent at baseline and works.  Pt does not drive, he primairily walks to his needed destinations. CM disucssed dispo planning with Wero Petersen is agreeable to blanket referrals for subacute STR at this time but is open to the idea of acute if pt has some improvmenet while hospitalized.  CM sent blanket referrals for STR.

## 2024-06-17 NOTE — CONSULTS
Consultation - Acute Pain Service  Luis Forrester 53 y.o. male MRN: [id number]  Unit/Bed#: ICU 04 Encounter: [id number]               Luis Forrester is a 53 y.o. male status post syncope and collapse resulting in TBI and bilateral hand pain.  Found to have mild cord compression secondary to degenerative spondylosis and congenital canal stenosis.  Status post C3-C7 posterior cervical laminectomies, C2-T1 arthrodesis on 6/16/2024.  Patient received 10 mg intraoperative methadone IV.    Bilateral hand pain  Assessment & Plan  Tylenol 975 mg p.o. every 8 hours scheduled.  Gabapentin 100 mg p.o. 3 times daily.  Robaxin 500 mg p.o. every 6 hours.  Oxycodone 2.5 mg p.o. every 4 hours as needed moderate pain or 5 mg p.o. every 4 hours as needed severe pain.  Dilaudid 0.2 mg IV every 2 hours as needed breakthrough pain.  Will start methadone taper as follows:  5 mg p.o. every 8 hours x 3 doses, then  5 mg p.o. every 12 hours x 2 doses, then  5 mg p.o. once.  Bowel regimen to avoid opioid-induced constipation while on opioid pain medication.        APS will continue to follow. Please contact Acute Pain Service - via [employer] from [phone] with additional questions or concerns. See Leno or Riley for additional contacts and after hours information.     History of Present Illness    Admit Date:  6/15/2024  Hospital Day:  2 days  Primary Service:  Trauma  Attending Provider:  Papa Diamond,*  Physician Requesting Consult: Papa Diamond,*  Reason for Consult / Principal Problem: Neck and hand pain  HPI: Luis Forrester is a 53 y.o. year old male who presents with neck pain and hand pain following a fall.  Patient diagnosed with TBI and was also noted to have significant cervical spinal canal stenosis with myelopathy and upper extremity weakness.  Patient was taken to the OR yesterday for the above-noted procedure.  Currently, patient is in the ICU and sitting up in a chair.  He complains of moderate  pain in his neck and bilateral hands but states it is somewhat better than preoperatively.  Patient with no other complaints at this time.  Patient did receive methadone 10 mg IV intraoperatively to assist with postoperative pain management.  Patient was just being given oxycodone 5 mg p.o. but had taken no other as needed pain medications postoperatively.    Current pain location(s): Pain Score: 7  Pain Location/Orientation: Location: Neck, Location: Incision  Pain Scale: Pain Assessment Tool: 0-10  Current Analgesic regimen:  Tylenol 975 mg p.o. every 8 hours.  Oxycodone 2.5 mg p.o. every 4 hours as needed moderate pain or 5 mg p.o. every 4 hours as needed severe pain.  Dilaudid 0.2 mg IV every 2 hours as needed breakthrough pain.  Gabapentin 100 mg p.o. 3 times daily.  Robaxin 500 mg p.o. every 6 hours.    Pain History: Chronic lower back pain  Pain Management Physician: None    I have reviewed the patient's controlled substance dispensing history in the Prescription Drug Monitoring Program in compliance with the [facility] regulations before prescribing any controlled substances.     Inpatient consult to Acute Pain Service  Consult performed by: Tyler Garcia PA-C  Consult ordered by: Marcelino Lemons MD          Review of Systems   Musculoskeletal:  Positive for neck pain.        Bilateral hand pain   Neurological:  Positive for weakness (Bilateral upper extremities).   All other systems reviewed and are negative.      Historical Information   Past Medical History:   Diagnosis Date    Arthritis     Chronic cough     Disease of thyroid gland     Hypoparathyroidism (HCC)     continue taking calcium and vitamin D, will check CMP, PTH level and Vitamin D level    Pneumonia of right middle lobe due to Streptococcus pneumoniae (HCC) 11/30/2018     Past Surgical History:   Procedure Laterality Date    FRACTURE SURGERY      Open Treatment of Each Proximal Finger Phalanx     Social History   Social History     Substance  "and Sexual Activity   Alcohol Use Yes    Comment: occasionally     Social History     Substance and Sexual Activity   Drug Use No     Social History     Tobacco Use   Smoking Status Former   Smokeless Tobacco Never   Tobacco Comments    pt \"tried it when he was a teenager but did not like them\"     Family History:   Family History   Problem Relation Age of Onset    No Known Problems Mother     No Known Problems Father     No Known Problems Sister     No Known Problems Brother     No Known Problems Son     Learning disabilities Daughter     Asthma Daughter     Seizures Daughter     No Known Problems Maternal Grandmother     No Known Problems Maternal Grandfather     No Known Problems Paternal Grandmother     No Known Problems Paternal Grandfather     No Known Problems Maternal Aunt     No Known Problems Maternal Uncle     No Known Problems Paternal Aunt     No Known Problems Paternal Uncle     No Known Problems Cousin        Meds/Allergies   all current active meds have been reviewed, current meds:   Current Facility-Administered Medications   Medication Dose Route Frequency    acetaminophen (TYLENOL) tablet 975 mg  975 mg Oral Q8H ASHLYN    albuterol (PROVENTIL HFA,VENTOLIN HFA) inhaler 2 puff  2 puff Inhalation Q4H PRN    calcium gluconate 3 g in sodium chloride 0.9 % 100 mL IVPB  3 g Intravenous Once    Cholecalciferol (VITAMIN D3) tablet 1,000 Units  1,000 Units Oral Daily    enoxaparin (LOVENOX) subcutaneous injection 30 mg  30 mg Subcutaneous Q12H ASHLYN    gabapentin (NEURONTIN) capsule 100 mg  100 mg Oral TID    HYDROmorphone HCl (DILAUDID) injection 0.2 mg  0.2 mg Intravenous Q2H PRN    levETIRAcetam (KEPPRA) tablet 500 mg  500 mg Oral BID    levothyroxine tablet 125 mcg  125 mcg Oral Early Morning    methadone (DOLOPHINE) tablet 5 mg  5 mg Oral Q8H ASHLYN    Followed by    [START ON 6/18/2024] methadone (DOLOPHINE) tablet 5 mg  5 mg Oral Q12H ASHLYN    Followed by    [START ON 6/19/2024] methadone (DOLOPHINE) tablet 5 " mg  5 mg Oral Daily    methocarbamol (ROBAXIN) tablet 500 mg  500 mg Oral Q6H ASHLYN    naloxone (NARCAN) 0.04 mg/mL syringe 0.04 mg  0.04 mg Intravenous Q1MIN PRN    ondansetron (ZOFRAN) injection 4 mg  4 mg Intravenous Q4H PRN    oxyCODONE (ROXICODONE) split tablet 2.5 mg  2.5 mg Oral Q4H PRN    Or    oxyCODONE (ROXICODONE) IR tablet 5 mg  5 mg Oral Q4H PRN    phenylephrine (KENJI-SYNEPHRINE) 50 mg (STANDARD CONCENTRATION) in sodium chloride 0.9% 250 mL   mcg/min Intravenous Titrated    polyethylene glycol (MIRALAX) packet 17 g  17 g Oral Daily    senna-docusate sodium (SENOKOT S) 8.6-50 mg per tablet 1 tablet  1 tablet Oral HS   , and PTA meds:   Prior to Admission Medications   Prescriptions Last Dose Informant Patient Reported? Taking?   Diclofenac Sodium (VOLTAREN) 1 %   No No   Sig: Apply 2 g topically 4 (four) times a day   acetaminophen (TYLENOL) 650 mg CR tablet   No No   Sig: Take 1 tablet (650 mg total) by mouth every 8 (eight) hours as needed for mild pain   albuterol (2.5 mg/3 mL) 0.083 % nebulizer solution   No No   Sig: Take 3 mL (2.5 mg total) by nebulization every 6 (six) hours as needed for wheezing or shortness of breath   albuterol (Ventolin HFA) 90 mcg/act inhaler   No No   Sig: Inhale 2 puffs every 4 (four) hours as needed for wheezing   benzonatate (TESSALON PERLES) 100 mg capsule   No No   Sig: Take 1 capsule (100 mg total) by mouth every 8 (eight) hours   cetirizine (ZyrTEC) 10 mg tablet   No No   Sig: Take 1 tablet (10 mg total) by mouth daily   cholecalciferol (VITAMIN D3) 25 mcg (1,000 units) tablet   No No   Sig: TAKE 1 TABLET BY MOUTH EVERY DAY   hydrocortisone 1 % ointment   No No   Sig: Apply topically 2 (two) times a day   levothyroxine 125 mcg tablet   No No   Sig: One tablet daily      Facility-Administered Medications: None       Allergies   Allergen Reactions    Chlorine Rash       Objective   Vitals:    06/17/24 0700 06/17/24 0800 06/17/24 0830 06/17/24 0930   BP: 119/57  "132/72 132/72    BP Location:  Left arm     Pulse: (!) 54 (!) 48 (!) 52 76   Resp: 15 18 (!) 23 22   Temp:  98.6 °F (37 °C)     TempSrc:  Oral     SpO2: 99% 98% 98% 99%   Weight:   78.9 kg (174 lb)    Height:   5' 6\" (1.676 m)          Intake/Output Summary (Last 24 hours) at 6/17/2024 1033  Last data filed at 6/17/2024 1025  Gross per 24 hour   Intake 3624.65 ml   Output 6210 ml   Net -2585.35 ml       Physical Exam  Vitals and nursing note reviewed.   Constitutional:       General: He is awake. He is not in acute distress.     Appearance: He is not ill-appearing, toxic-appearing or diaphoretic.      Comments: Appears mildly uncomfortable.   Skin:     General: Skin is warm and dry.   Neurological:      Mental Status: He is alert and oriented to person, place, and time.      GCS: GCS eye subscore is 4. GCS verbal subscore is 5. GCS motor subscore is 6.   Psychiatric:         Attention and Perception: Attention normal.         Speech: Speech normal.         Behavior: Behavior normal. Behavior is cooperative.           Lab Results:  Estimated Creatinine Clearance: 124.9 mL/min (by C-G formula based on SCr of 0.66 mg/dL).  Lab Results   Component Value Date    WBC 17.82 (H) 06/17/2024    HGB 13.4 06/17/2024    HCT 39.3 06/17/2024     06/17/2024         Component Value Date/Time     (L) 01/05/2015 1236    K 3.6 06/17/2024 0107    K 3.6 01/05/2015 1236     06/17/2024 0107    CL 97 (L) 01/05/2015 1236    CO2 27 06/17/2024 0107    CO2 25 06/16/2024 1143    BUN 9 06/17/2024 0107    BUN 11 01/05/2015 1236    CREATININE 0.66 06/17/2024 0107    CREATININE 0.91 01/05/2015 1236         Component Value Date/Time    CALCIUM 7.2 (L) 06/17/2024 0107    CALCIUM 8.0 (L) 01/05/2015 1236    ALKPHOS 80 06/15/2024 0856    AST 31 06/15/2024 0856    ALT 26 06/15/2024 0856    TP 6.7 06/15/2024 0856    ALB 3.5 06/15/2024 0856       Imaging Studies/EKG: I have personally reviewed pertinent reports.      Counseling / " Coordination of Care  Total floor / unit time spent today 41 minutes. Greater than 50% of total time was spent with the patient and / or family counseling and / or coordination of care. A description of the counseling / coordination of care: Patient interview, physical examination, review of medical records, review of imaging and laboratory data, development of pain management plan, discussion of pain management plan with patient and primary service.    Please note that the APS provides consultative services regarding pain management only.  With the exception of ketamine and epidural infusions and except when indicated, final decisions regarding starting or changing doses of analgesic medications are at the discretion of the consulting service.  Tyler Whitlock PA-C  Acute Pain Service

## 2024-06-17 NOTE — ASSESSMENT & PLAN NOTE
Tylenol 975 mg p.o. every 8 hours scheduled.  Gabapentin 100 mg p.o. 3 times daily.  Robaxin 500 mg p.o. every 6 hours.  Oxycodone 2.5 mg p.o. every 4 hours as needed moderate pain or 5 mg p.o. every 4 hours as needed severe pain.  Change Dilaudid to 0.2 mg IV every 6 hours as needed breakthrough pain and discontinue within 24 hours.  Methadone taper as follows:  5 mg p.o. every 8 hours x 3 doses, then  5 mg p.o. every 12 hours x 2 doses, then  5 mg p.o. once (last dose today, 6/19/2024).  Bowel regimen to avoid opioid-induced constipation while on opioid pain medication.  At discharge, suggest the following:  Tylenol 975 mg p.o. every 8 hours as needed mild pain.  Gabapentin 100 mg p.o. 3 times daily.  Robaxin 500 mg p.o. every 6 hours as needed muscle spasm.  Oxycodone 2.5 mg p.o. every 6 hours as needed severe pain x 3 days.  Bowel regimen while on opioid pain medication.

## 2024-06-17 NOTE — OCCUPATIONAL THERAPY NOTE
Occupational Therapy Evaluation     Patient Name: Luis Forrester  Today's Date: 6/17/2024  Problem List  Principal Problem:    TBI (traumatic brain injury) ([facility])  Active Problems:    Hypothyroidism    Syncope and collapse    Bilateral hand pain    SDH (subdural hematoma) ([facility])    Right hand weakness    Past Medical History  Past Medical History:   Diagnosis Date    Arthritis     Chronic cough     Disease of thyroid gland     Hypoparathyroidism ([facility])     continue taking calcium and vitamin D, will check CMP, PTH level and Vitamin D level    Pneumonia of right middle lobe due to Streptococcus pneumoniae ([facility]) 11/30/2018     Past Surgical History  Past Surgical History:   Procedure Laterality Date    DECOMPRESSION SPINE CERVICAL POSTERIOR N/A 6/16/2024    Procedure: DECOMPRESSION SPINE CERVICAL POSTERIOR C2-T1 with fusion navigated with Oarm;  Surgeon: Endy Velasquez MD;  Location: [facility];  Service: Neurosurgery    FRACTURE SURGERY      Open Treatment of Each Proximal Finger Phalanx         06/17/24 1000   OT Last Visit   OT Visit Date 06/17/24   Note Type   Note type Evaluation   Pain Assessment   Pain Assessment Tool 0-10   Pain Score 6   Pain Location/Orientation Location: Neck   Hospital Pain Intervention(s) Repositioned;Ambulation/increased activity;Emotional support;Relaxation technique   Restrictions/Precautions   Weight Bearing Precautions Per Order No   Other Precautions Cognitive;Chair Alarm;Bed Alarm;Multiple lines;Fall Risk;Pain;Spinal precautions   Home Living   Type of Home Apartment   Home Layout One level   Bathroom Shower/Tub Walk-in shower   Bathroom Toilet Standard   Additional Comments reports he stays between his apt and s/o's home   Prior Function   Level of [place] Independent with ADLs;Independent with functional mobility;Independent with IADLS   Lives With Significant other;Daughter  (reports he goes between his apt and s/o's home)   Receives Help From Family   IADLs  Independent with meal prep;Independent with medication management;Family/Friend/Other provides transportation   Falls in the last 6 months 1 to 4   Vocational Part time employment   Lifestyle   Autonomy I I adls and mobility -reports he does not drive - shares homemaking with family   Reciprocal Relationships supportive family   Service to Others works PT at [employer]   Intrinsic Gratification active pta   Subjective   Subjective offers no c/o   ADL   Eating Assistance 4  Minimal Assistance   Grooming Assistance 4  Minimal Assistance   UB Bathing Assistance 3  Moderate Assistance   LB Bathing Assistance 2  Maximal Assistance   UB Dressing Assistance 3  Moderate Assistance   LB Dressing Assistance 2  Maximal Assistance   Toileting Assistance  2  Maximal Assistance   Bed Mobility   Supine to Sit 3  Moderate assistance   Additional items Assist x 2   Transfers   Sit to Stand 3  Moderate assistance   Stand to Sit 3  Moderate assistance   Functional Mobility   Functional Mobility 3  Moderate assistance   Additional items Hand hold assistance   Balance   Static Sitting Fair   Dynamic Sitting Fair -   Static Standing Poor +   Dynamic Standing Poor   Ambulatory Poor   Activity Tolerance   Activity Tolerance Patient limited by fatigue;Patient limited by pain;Treatment limited secondary to medical complications (Comment)   Medical Staff Made Aware PT present for co-eval 2* medical complexity, comorbidities and limited overall tolerance to activities   RUE Assessment   RUE Assessment X  (limited AROM B forearms/wrist/hands)   LUE Assessment   LUE Assessment X  (limited AROM B forearms, wrists and hands)   Hand Function   Gross Motor Coordination Functional   Fine Motor Coordination Impaired   Cognition   Arousal/Participation Alert;Cooperative   Attention Attends with cues to redirect   Orientation Level Oriented X4   Memory Decreased recall of precautions   Following Commands Follows one step commands with increased time or  repetition   Assessment   Limitation Decreased ADL status;Decreased UE ROM;Decreased UE strength;Decreased Safe judgement during ADL;Decreased endurance;Decreased fine motor control;Decreased self-care trans;Decreased high-level ADLs   Prognosis Good   Assessment Pt is a 53 y.o. male who was admitted to [facility] on 6/15/2024 with TBI (traumatic brain injury) ([facility]) after being found down by daughter - unsure of what happened other that he got up to use the BR - CT head from 6/15: New acute trace subarachnoid hemorrhage in bilateral parafalcine regions. New acute trace parafalcine subdural hematoma. MRI cervical spine without 6/15/2024:Congenital canal stenosis with superimposed degenerative spondylosis .Most pronounced at C3-4 and C5-C6 with moderate to severe canal stenosis and mild cord compression. Evaluation of cord signal is limited due to artifact. No definite abnormal cord signal but mild cord edema would be difficult to exclude. Severe bilateral foraminal stenosis also seen at C3-4 and C5-C6.Mild to moderate canal stenosis at other levels . Patient  has a past medical history of Arthritis, Chronic cough, Disease of thyroid gland, Hypoparathyroidism ([facility]), and Pneumonia of right middle lobe due to Streptococcus pneumoniae ([facility]).   At baseline pt was completing adls and mobility independently - I iadls with exception of driving - shares homemaking with s/o and dtr. Pt lives with dtr in 1st floor apt - reports he also stays with s/o. Currently pt requires mod to max assist for overall ADLS and mod a x 1-2 for functional mobility/transfers. Pt currently presents with impairments in the following categories -difficulty performing ADLS, difficulty performing IADLS , health management , and environment activity tolerance, endurance, standing balance/tolerance, sitting balance/tolerance, UE strength, UE ROM, and FMC. These impairments, as well as pt's fatigue, pain, spinal precautions, (R) hemiparesis,  (L) hemiparesis, risk for falls, and home environment  limit pt's ability to safely engage in all baseline areas of occupation, includingeating, grooming, bathing, dressing, toileting, functional mobility/transfers, community mobility, laundry , house maintenance, medication management, meal prep, cleaning, work/volunteer work , social participation , and leisure activities  From OT standpoint, recommend Level I resources upon D/C. OT will continue to follow to address the below stated goals.   Goals   Patient Goals have less pain   Long Term Goal #1 1) Min a UB/LB adls after setup with use of LHAE PRN  2)  Min a toileting and clothing management  3) Mod I bed mobility  4) Min a functional mob/transfers to and from all surfaces with fair to fair+ balance/safety   5) Increase activity tolerance to 30-35min for participation in adls and enjoyable activities  6) Assess DME needs   7) Demonstrate good carryover with safe use of AD, spinal precautions and use of proper body mechanics during functional tasks   8) Assess DME needs   9) Increase BUE AROM to WFL with at least 4/5 strength and fair+ FMC for functional use with adls and iadl tasks 10) Assist with safe d/c recommendations   Plan   Treatment Interventions ADL retraining;Functional transfer training;UE strengthening/ROM;Endurance training;Patient/family training;Equipment evaluation/education;Fine motor coordination activities;Activityengagement   Goal Expiration Date 07/01/24   OT Frequency 3-5x/wk   Discharge Recommendation   Rehab Resource Intensity Level, OT I (Maximum Resource Intensity)   AM-PAC Daily Activity Inpatient   Lower Body Dressing 2   Bathing 2   Toileting 2   Upper Body Dressing 2   Grooming 2   Eating 2   Daily Activity Raw Score 12   Daily Activity Standardized Score (Calc for Raw Score >=11) 30.6   AM-PAC Applied Cognition Inpatient   Following a Speech/Presentation 3   Understanding Ordinary Conversation 4   Taking Medications 3    Remembering Where Things Are Placed or Put Away 3   Remembering List of 4-5 Errands 3   Taking Care of Complicated Tasks 3   Applied Cognition Raw Score 19   Applied Cognition Standardized Score 39.77   End of Consult   Education Provided Yes   Patient Position at End of Consult Bedside chair;Bed/Chair alarm activated;All needs within reach   Nurse Communication Nurse aware of consult       The patient's raw score on the AM-PAC Daily Activity Inpatient Short Form is 12. A raw score of less than 19 suggests the patient may benefit from discharge to post-acute rehabilitation services. Please refer to the recommendation of the Occupational Therapist for safe discharge planning.      Documentation Completed By:    ADAM Santacruz/L  [employer] Certified - [id number]

## 2024-06-18 ENCOUNTER — APPOINTMENT (INPATIENT)
Dept: RADIOLOGY | Facility: HOSPITAL | Age: 54
DRG: 912 | End: 2024-06-18
Payer: COMMERCIAL

## 2024-06-18 LAB
ABO GROUP BLD BPU: NORMAL
ABO GROUP BLD BPU: NORMAL
ANION GAP SERPL CALCULATED.3IONS-SCNC: 7 MMOL/L (ref 4–13)
ANION GAP SERPL CALCULATED.3IONS-SCNC: 8 MMOL/L (ref 4–13)
ANION GAP SERPL CALCULATED.3IONS-SCNC: 9 MMOL/L (ref 4–13)
BACTERIA UR QL AUTO: NORMAL /HPF
BILIRUB UR QL STRIP: NEGATIVE
BPU ID: NORMAL
BPU ID: NORMAL
BUN SERPL-MCNC: 10 MG/DL (ref 5–25)
BUN SERPL-MCNC: 10 MG/DL (ref 5–25)
BUN SERPL-MCNC: 8 MG/DL (ref 5–25)
CA-I BLD-SCNC: 0.91 MMOL/L (ref 1.12–1.32)
CA-I BLD-SCNC: 0.92 MMOL/L (ref 1.12–1.32)
CALCIUM SERPL-MCNC: 6.7 MG/DL (ref 8.4–10.2)
CALCIUM SERPL-MCNC: 7.1 MG/DL (ref 8.4–10.2)
CALCIUM SERPL-MCNC: 7.2 MG/DL (ref 8.4–10.2)
CHLORIDE SERPL-SCNC: 93 MMOL/L (ref 96–108)
CHLORIDE SERPL-SCNC: 95 MMOL/L (ref 96–108)
CHLORIDE SERPL-SCNC: 97 MMOL/L (ref 96–108)
CLARITY UR: CLEAR
CO2 SERPL-SCNC: 31 MMOL/L (ref 21–32)
CO2 SERPL-SCNC: 31 MMOL/L (ref 21–32)
CO2 SERPL-SCNC: 32 MMOL/L (ref 21–32)
COLOR UR: COLORLESS
CREAT SERPL-MCNC: 0.63 MG/DL (ref 0.6–1.3)
CREAT SERPL-MCNC: 0.7 MG/DL (ref 0.6–1.3)
CREAT SERPL-MCNC: 0.72 MG/DL (ref 0.6–1.3)
CREAT UR-MCNC: 29.9 MG/DL
CROSSMATCH: NORMAL
CROSSMATCH: NORMAL
ERYTHROCYTE [DISTWIDTH] IN BLOOD BY AUTOMATED COUNT: 13.8 % (ref 11.6–15.1)
GFR SERPL CREATININE-BSD FRML MDRD: 105 ML/MIN/1.73SQ M
GFR SERPL CREATININE-BSD FRML MDRD: 106 ML/MIN/1.73SQ M
GFR SERPL CREATININE-BSD FRML MDRD: 110 ML/MIN/1.73SQ M
GLUCOSE SERPL-MCNC: 108 MG/DL (ref 65–140)
GLUCOSE SERPL-MCNC: 89 MG/DL (ref 65–140)
GLUCOSE SERPL-MCNC: 94 MG/DL (ref 65–140)
GLUCOSE UR STRIP-MCNC: NEGATIVE MG/DL
HCT VFR BLD AUTO: 38.2 % (ref 36.5–49.3)
HGB BLD-MCNC: 12.5 G/DL (ref 12–17)
HGB UR QL STRIP.AUTO: NEGATIVE
KETONES UR STRIP-MCNC: NEGATIVE MG/DL
LEUKOCYTE ESTERASE UR QL STRIP: NEGATIVE
MAGNESIUM SERPL-MCNC: 1.5 MG/DL (ref 1.9–2.7)
MAGNESIUM SERPL-MCNC: 2.2 MG/DL (ref 1.9–2.7)
MCH RBC QN AUTO: 31.5 PG (ref 26.8–34.3)
MCHC RBC AUTO-ENTMCNC: 32.7 G/DL (ref 31.4–37.4)
MCV RBC AUTO: 96 FL (ref 82–98)
NITRITE UR QL STRIP: NEGATIVE
NON-SQ EPI CELLS URNS QL MICRO: NORMAL /HPF
PH UR STRIP.AUTO: 7 [PH]
PHOSPHATE SERPL-MCNC: 3.1 MG/DL (ref 2.7–4.5)
PHOSPHATE SERPL-MCNC: 4.9 MG/DL (ref 2.7–4.5)
PLATELET # BLD AUTO: 236 THOUSANDS/UL (ref 149–390)
PMV BLD AUTO: 11 FL (ref 8.9–12.7)
POTASSIUM SERPL-SCNC: 3.7 MMOL/L (ref 3.5–5.3)
POTASSIUM SERPL-SCNC: 4 MMOL/L (ref 3.5–5.3)
POTASSIUM SERPL-SCNC: 4.1 MMOL/L (ref 3.5–5.3)
PROT UR STRIP-MCNC: NEGATIVE MG/DL
RBC # BLD AUTO: 3.97 MILLION/UL (ref 3.88–5.62)
RBC #/AREA URNS AUTO: NORMAL /HPF
SODIUM 24H UR-SCNC: 42 MOL/L
SODIUM SERPL-SCNC: 134 MMOL/L (ref 135–147)
SODIUM SERPL-SCNC: 134 MMOL/L (ref 135–147)
SODIUM SERPL-SCNC: 135 MMOL/L (ref 135–147)
SP GR UR STRIP.AUTO: 1.01 (ref 1–1.03)
UNIT DISPENSE STATUS: NORMAL
UNIT DISPENSE STATUS: NORMAL
UNIT PRODUCT CODE: NORMAL
UNIT PRODUCT CODE: NORMAL
UNIT PRODUCT VOLUME: 350 ML
UNIT PRODUCT VOLUME: 350 ML
UNIT RH: NORMAL
UNIT RH: NORMAL
UROBILINOGEN UR STRIP-ACNC: <2 MG/DL
WBC # BLD AUTO: 16.27 THOUSAND/UL (ref 4.31–10.16)
WBC #/AREA URNS AUTO: NORMAL /HPF

## 2024-06-18 PROCEDURE — 84100 ASSAY OF PHOSPHORUS: CPT

## 2024-06-18 PROCEDURE — 99291 CRITICAL CARE FIRST HOUR: CPT | Performed by: STUDENT IN AN ORGANIZED HEALTH CARE EDUCATION/TRAINING PROGRAM

## 2024-06-18 PROCEDURE — 82570 ASSAY OF URINE CREATININE: CPT | Performed by: PHYSICIAN ASSISTANT

## 2024-06-18 PROCEDURE — 85027 COMPLETE CBC AUTOMATED: CPT

## 2024-06-18 PROCEDURE — 80048 BASIC METABOLIC PNL TOTAL CA: CPT | Performed by: PHYSICIAN ASSISTANT

## 2024-06-18 PROCEDURE — 84300 ASSAY OF URINE SODIUM: CPT | Performed by: PHYSICIAN ASSISTANT

## 2024-06-18 PROCEDURE — 99233 SBSQ HOSP IP/OBS HIGH 50: CPT | Performed by: PHYSICIAN ASSISTANT

## 2024-06-18 PROCEDURE — 81001 URINALYSIS AUTO W/SCOPE: CPT | Performed by: PHYSICIAN ASSISTANT

## 2024-06-18 PROCEDURE — 84100 ASSAY OF PHOSPHORUS: CPT | Performed by: PHYSICIAN ASSISTANT

## 2024-06-18 PROCEDURE — 83735 ASSAY OF MAGNESIUM: CPT | Performed by: PHYSICIAN ASSISTANT

## 2024-06-18 PROCEDURE — 82330 ASSAY OF CALCIUM: CPT | Performed by: PHYSICIAN ASSISTANT

## 2024-06-18 PROCEDURE — 80048 BASIC METABOLIC PNL TOTAL CA: CPT

## 2024-06-18 PROCEDURE — 99232 SBSQ HOSP IP/OBS MODERATE 35: CPT | Performed by: NURSE PRACTITIONER

## 2024-06-18 PROCEDURE — 93005 ELECTROCARDIOGRAM TRACING: CPT

## 2024-06-18 PROCEDURE — 82330 ASSAY OF CALCIUM: CPT

## 2024-06-18 PROCEDURE — 70450 CT HEAD/BRAIN W/O DYE: CPT

## 2024-06-18 PROCEDURE — 83735 ASSAY OF MAGNESIUM: CPT

## 2024-06-18 RX ORDER — DESMOPRESSIN ACETATE 4 UG/ML
1 INJECTION, SOLUTION INTRAVENOUS; SUBCUTANEOUS ONCE
Status: COMPLETED | OUTPATIENT
Start: 2024-06-18 | End: 2024-06-18

## 2024-06-18 RX ORDER — MAGNESIUM SULFATE HEPTAHYDRATE 40 MG/ML
4 INJECTION, SOLUTION INTRAVENOUS ONCE
Status: COMPLETED | OUTPATIENT
Start: 2024-06-18 | End: 2024-06-18

## 2024-06-18 RX ORDER — CEPHALEXIN 500 MG/1
500 CAPSULE ORAL EVERY 6 HOURS SCHEDULED
Status: DISCONTINUED | OUTPATIENT
Start: 2024-06-18 | End: 2024-06-22

## 2024-06-18 RX ORDER — POTASSIUM CHLORIDE 20 MEQ/1
40 TABLET, EXTENDED RELEASE ORAL ONCE
Status: COMPLETED | OUTPATIENT
Start: 2024-06-18 | End: 2024-06-18

## 2024-06-18 RX ADMIN — ENOXAPARIN SODIUM 30 MG: 30 INJECTION SUBCUTANEOUS at 21:05

## 2024-06-18 RX ADMIN — GABAPENTIN 100 MG: 100 CAPSULE ORAL at 09:14

## 2024-06-18 RX ADMIN — PHENYLEPHRINE HYDROCHLORIDE 140 MCG/MIN: 50 INJECTION INTRAVENOUS at 20:00

## 2024-06-18 RX ADMIN — CLINDAMYCIN HYDROCHLORIDE 300 MG: 150 CAPSULE ORAL at 05:46

## 2024-06-18 RX ADMIN — METHADONE HYDROCHLORIDE 5 MG: 5 TABLET ORAL at 05:46

## 2024-06-18 RX ADMIN — POTASSIUM CHLORIDE 40 MEQ: 1500 TABLET, EXTENDED RELEASE ORAL at 09:14

## 2024-06-18 RX ADMIN — METHOCARBAMOL 500 MG: 500 TABLET ORAL at 13:03

## 2024-06-18 RX ADMIN — LEVETIRACETAM 500 MG: 500 TABLET, FILM COATED ORAL at 18:33

## 2024-06-18 RX ADMIN — POLYETHYLENE GLYCOL 3350 17 G: 17 POWDER, FOR SOLUTION ORAL at 09:16

## 2024-06-18 RX ADMIN — CLINDAMYCIN HYDROCHLORIDE 300 MG: 150 CAPSULE ORAL at 13:03

## 2024-06-18 RX ADMIN — CEPHALEXIN 500 MG: 500 CAPSULE ORAL at 18:33

## 2024-06-18 RX ADMIN — METHOCARBAMOL 500 MG: 500 TABLET ORAL at 18:33

## 2024-06-18 RX ADMIN — LEVOTHYROXINE SODIUM 125 MCG: 125 TABLET ORAL at 05:46

## 2024-06-18 RX ADMIN — MAGNESIUM SULFATE HEPTAHYDRATE 4 G: 40 INJECTION, SOLUTION INTRAVENOUS at 09:04

## 2024-06-18 RX ADMIN — DESMOPRESSIN ACETATE 1 MCG: 4 SOLUTION INTRAVENOUS at 16:55

## 2024-06-18 RX ADMIN — PHENYLEPHRINE HYDROCHLORIDE 140 MCG/MIN: 50 INJECTION INTRAVENOUS at 05:45

## 2024-06-18 RX ADMIN — GABAPENTIN 100 MG: 100 CAPSULE ORAL at 21:06

## 2024-06-18 RX ADMIN — Medication 1000 UNITS: at 09:14

## 2024-06-18 RX ADMIN — PHENYLEPHRINE HYDROCHLORIDE 140 MCG/MIN: 50 INJECTION INTRAVENOUS at 10:44

## 2024-06-18 RX ADMIN — ACETAMINOPHEN 975 MG: 325 TABLET, FILM COATED ORAL at 14:13

## 2024-06-18 RX ADMIN — METHADONE HYDROCHLORIDE 5 MG: 5 TABLET ORAL at 21:05

## 2024-06-18 RX ADMIN — SENNOSIDES AND DOCUSATE SODIUM 1 TABLET: 50; 8.6 TABLET ORAL at 21:06

## 2024-06-18 RX ADMIN — ACETAMINOPHEN 975 MG: 325 TABLET, FILM COATED ORAL at 05:47

## 2024-06-18 RX ADMIN — ACETAMINOPHEN 975 MG: 325 TABLET, FILM COATED ORAL at 21:06

## 2024-06-18 RX ADMIN — METHADONE HYDROCHLORIDE 5 MG: 5 TABLET ORAL at 14:12

## 2024-06-18 RX ADMIN — CALCIUM GLUCONATE 3 G: 98 INJECTION, SOLUTION INTRAVENOUS at 09:08

## 2024-06-18 RX ADMIN — Medication 2.5 MG: at 20:00

## 2024-06-18 RX ADMIN — GABAPENTIN 100 MG: 100 CAPSULE ORAL at 17:03

## 2024-06-18 RX ADMIN — ENOXAPARIN SODIUM 30 MG: 30 INJECTION SUBCUTANEOUS at 09:24

## 2024-06-18 RX ADMIN — PHENYLEPHRINE HYDROCHLORIDE 160 MCG/MIN: 50 INJECTION INTRAVENOUS at 00:20

## 2024-06-18 RX ADMIN — METHOCARBAMOL 500 MG: 500 TABLET ORAL at 05:46

## 2024-06-18 RX ADMIN — LEVETIRACETAM 500 MG: 500 TABLET, FILM COATED ORAL at 09:13

## 2024-06-18 NOTE — PLAN OF CARE
Problem: Prexisting or High Potential for Compromised Skin Integrity  Goal: Skin integrity is maintained or improved  Description: INTERVENTIONS:  - Identify patients at risk for skin breakdown  - Assess and monitor skin integrity  - Assess and monitor nutrition and hydration status  - Monitor labs   - Assess for incontinence   - Turn and reposition patient  - Assist with mobility/ambulation  - Relieve pressure over bony prominences  - Avoid friction and shearing  - Provide appropriate hygiene as needed including keeping skin clean and dry  - Evaluate need for skin moisturizer/barrier cream  - Collaborate with interdisciplinary team   - Patient/family teaching  - Consider wound care consult   Outcome: Progressing     Problem: PAIN - ADULT  Goal: Verbalizes/displays adequate comfort level or baseline comfort level  Description: Interventions:  - Encourage patient to monitor pain and request assistance  - Assess pain using appropriate pain scale  - Administer analgesics based on type and severity of pain and evaluate response  - Implement non-pharmacological measures as appropriate and evaluate response  - Consider cultural and social influences on pain and pain management  - Notify physician/advanced practitioner if interventions unsuccessful or patient reports new pain  Outcome: Progressing     Problem: INFECTION - ADULT  Goal: Absence or prevention of progression during hospitalization  Description: INTERVENTIONS:  - Assess and monitor for signs and symptoms of infection  - Monitor lab/diagnostic results  - Monitor all insertion sites, i.e. indwelling lines, tubes, and drains  - Monitor endotracheal if appropriate and nasal secretions for changes in amount and color  - [place] appropriate cooling/warming therapies per order  - Administer medications as ordered  - Instruct and encourage patient and family to use good hand hygiene technique  - Identify and instruct in appropriate isolation precautions for  identified infection/condition  Outcome: Progressing     Problem: SAFETY ADULT  Goal: Patient will remain free of falls  Description: INTERVENTIONS:  - Educate patient/family on patient safety including physical limitations  - Instruct patient to call for assistance with activity   - Consult OT/PT to assist with strengthening/mobility   - Keep Call bell within reach  - Keep bed low and locked with side rails adjusted as appropriate  - Keep care items and personal belongings within reach  - Initiate and maintain comfort rounds  - Make Fall Risk Sign visible to staff  - Apply yellow socks and bracelet for high fall risk patients  - Consider moving patient to room near nurses station  Outcome: Progressing  Goal: Maintain or return to baseline ADL function  Description: INTERVENTIONS:  -  Assess patient's ability to carry out ADLs; assess patient's baseline for ADL function and identify physical deficits which impact ability to perform ADLs (bathing, care of mouth/teeth, toileting, grooming, dressing, etc.)  - Assess/evaluate cause of self-care deficits   - Assess range of motion  - Assess patient's mobility; develop plan if impaired  - Assess patient's need for assistive devices and provide as appropriate  - Encourage maximum independence but intervene and supervise when necessary  - Involve family in performance of ADLs  - Assess for home care needs following discharge   - Consider OT consult to assist with ADL evaluation and planning for discharge  - Provide patient education as appropriate  Outcome: Progressing  Goal: Maintains/Returns to pre admission functional level  Description: INTERVENTIONS:  - Perform AM-PAC 6 Click Basic Mobility/ Daily Activity assessment daily.  - Set and communicate daily mobility goal to care team and patient/family/caregiver.   - Collaborate with rehabilitation services on mobility goals if consulted  - Out of bed for toileting  - Record patient progress and toleration of activity level    Outcome: Progressing     Problem: DISCHARGE PLANNING  Goal: Discharge to home or other facility with appropriate resources  Description: INTERVENTIONS:  - Identify barriers to discharge w/patient and caregiver  - Arrange for needed discharge resources and transportation as appropriate  - Identify discharge learning needs (meds, wound care, etc.)  - Arrange for interpretive services to assist at discharge as needed  - Refer to Case Management Department for coordinating discharge planning if the patient needs post-hospital services based on physician/advanced practitioner order or complex needs related to functional status, cognitive ability, or social support system  Outcome: Progressing     Problem: Knowledge Deficit  Goal: Patient/family/caregiver demonstrates understanding of disease process, treatment plan, medications, and discharge instructions  Description: Complete learning assessment and assess knowledge base.  Interventions:  - Provide teaching at level of understanding  - Provide teaching via preferred learning methods  Outcome: Progressing     Problem: NEUROSENSORY - ADULT  Goal: Achieves stable or improved neurological status  Description: INTERVENTIONS  - Monitor and report changes in neurological status  - Monitor vital signs such as temperature, blood pressure, glucose, and any other labs ordered   - Initiate measures to prevent increased intracranial pressure  - Monitor for seizure activity and implement precautions if appropriate      Outcome: Progressing  Goal: Remains free of injury related to seizures activity  Description: INTERVENTIONS  - Maintain airway, patient safety  and administer oxygen as ordered  - Monitor patient for seizure activity, document and report duration and description of seizure to physician/advanced practitioner  - If seizure occurs,  ensure patient safety during seizure  - Reorient patient post seizure  - Seizure pads on all 4 side rails  - Instruct patient/family to  notify RN of any seizure activity including if an aura is experienced  - Instruct patient/family to call for assistance with activity based on nursing assessment  - Administer anti-seizure medications if ordered    Outcome: Progressing  Goal: Achieves maximal functionality and self care  Description: INTERVENTIONS  - Monitor swallowing and airway patency with patient fatigue and changes in neurological status  - Encourage and assist patient to increase activity and self care.   - Encourage visually impaired, hearing impaired and aphasic patients to use assistive/communication devices  Outcome: Progressing     Problem: CARDIOVASCULAR - ADULT  Goal: Maintains optimal cardiac output and hemodynamic stability  Description: INTERVENTIONS:  - Monitor I/O, vital signs and rhythm  - Monitor for S/S and trends of decreased cardiac output  - Administer and titrate ordered vasoactive medications to optimize hemodynamic stability  - Assess quality of pulses, skin color and temperature  - Assess for signs of decreased coronary artery perfusion  - Instruct patient to report change in severity of symptoms  Outcome: Progressing  Goal: Absence of cardiac dysrhythmias or at baseline rhythm  Description: INTERVENTIONS:  - Continuous cardiac monitoring, vital signs, obtain 12 lead EKG if ordered  - Administer antiarrhythmic and heart rate control medications as ordered  - Monitor electrolytes and administer replacement therapy as ordered  Outcome: Progressing     Problem: RESPIRATORY - ADULT  Goal: Achieves optimal ventilation and oxygenation  Description: INTERVENTIONS:  - Assess for changes in respiratory status  - Assess for changes in mentation and behavior  - Position to facilitate oxygenation and minimize respiratory effort  - Oxygen administered by appropriate delivery if ordered  - Initiate smoking cessation education as indicated  - Encourage broncho-pulmonary hygiene including cough, deep breathe, Incentive Spirometry  -  Assess the need for suctioning and aspirate as needed  - Assess and instruct to report SOB or any respiratory difficulty  - Respiratory Therapy support as indicated  Outcome: Progressing     Problem: GASTROINTESTINAL - ADULT  Goal: Minimal or absence of nausea and/or vomiting  Description: INTERVENTIONS:  - Administer IV fluids if ordered to ensure adequate hydration  - Maintain NPO status until nausea and vomiting are resolved  - Nasogastric tube if ordered  - Administer ordered antiemetic medications as needed  - Provide nonpharmacologic comfort measures as appropriate  - Advance diet as tolerated, if ordered  - Consider nutrition services referral to assist patient with adequate nutrition and appropriate food choices  Outcome: Progressing  Goal: Maintains or returns to baseline bowel function  Description: INTERVENTIONS:  - Assess bowel function  - Encourage oral fluids to ensure adequate hydration  - Administer IV fluids if ordered to ensure adequate hydration  - Administer ordered medications as needed  - Encourage mobilization and activity  - Consider nutritional services referral to assist patient with adequate nutrition and appropriate food choices  Outcome: Progressing  Goal: Maintains adequate nutritional intake  Description: INTERVENTIONS:  - Monitor percentage of each meal consumed  - Identify factors contributing to decreased intake, treat as appropriate  - Assist with meals as needed  - Monitor I&O, weight, and lab values if indicated  - Obtain nutrition services referral as needed  Outcome: Progressing  Goal: Oral mucous membranes remain intact  Description: INTERVENTIONS  - Assess oral mucosa and hygiene practices  - Implement preventative oral hygiene regimen  - Implement oral medicated treatments as ordered  - Initiate Nutrition services referral as needed  Outcome: Progressing     Problem: GENITOURINARY - ADULT  Goal: Maintains or returns to baseline urinary function  Description:  INTERVENTIONS:  - Assess urinary function  - Encourage oral fluids to ensure adequate hydration if ordered  - Administer IV fluids as ordered to ensure adequate hydration  - Administer ordered medications as needed  - Offer frequent toileting  - Follow urinary retention protocol if ordered  Outcome: Progressing  Goal: Absence of urinary retention  Description: INTERVENTIONS:  - Assess patient’s ability to void and empty bladder  - Monitor I/O  - Bladder scan as needed  - Discuss with physician/AP medications to alleviate retention as needed  - Discuss catheterization for long term situations as appropriate  Outcome: Progressing

## 2024-06-18 NOTE — PROGRESS NOTES
[facility]  Progress Note: Critical Care  Name: Luis Forrester 53 y.o. male I MRN: [id number]  Unit/Bed#: ICU 04 I Date of Admission: 6/15/2024   Date of Service: 6/18/2024 I Hospital Day: 3    Assessment & Plan   Neuro:   Diagnosis: SAH, parafalcine SDH, spinal stenosis, post-traumatic pain  POD 2 s/p C2 - T1 decompression and fusion with NSG  Plan:   STAT CTH for 2 point or greater decline in GCS or new focal neurologic deficit not previously identified on physical exam.   Continue MAP pushes >85mmHg for spinal cord perfusion  Keppra x7 days for seizure ppx  Appreciate APS consult   Continue oral multimodal pain control  IV dilaudid PRN for breakthrough pain  Continue methadone taper  Q2 hr neuro checks  No need for hard collar per NSG  Continue to monitor hemovac output    CV:   Diagnosis: Syncope  TTE from 6/17:  Left Ventricle: Left ventricular cavity size is normal. Wall thickness is normal. The left ventricular ejection fraction is 60%. Systolic function is normal. Wall motion is normal. Diastolic function is normal.  Right Ventricle: Right ventricular cavity size is mildly dilated.  Right Atrium: The atrium is mildly dilated.  Atrial Septum: There is a probable patent foramen ovale noted at rest with predominant left to right shunting using color Doppler. 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  It is predominately within the right atrial cavity.  Aorta: The aortic root is mildly dilated. The aortic root is 4.30 cm.  Plan:   Unclear etiology of syncope - cardiac vs neurogenic  TTE largely un-remarkable  MAP pushes >85mmHg - continue isak     Pulm:  No active issues    GI:   No active issues    :   Diagnosis: Elevated urine output  Plan:   Urine lytes not concerning for DI  X1 dose DDVAP on 617  Na WNL  Daily BMP's  Strict I&O's    F/E/N:   F: N/a  E: K > 4, Mag >2  N: Continue regular diet    Heme/Onc:   No active issues  Lovenox 30mg BID for VTE prophylaxis    Endo:    Diagnosis: Hypothyroidism  Plan:   Continue home synthroid    ID:   No active issues  Continue clinda x2 weeks for surgical prophylaxis per NSG    MSK/Skin:   No active issues    Disposition: Critical care    ICU Core Measures     A: Assess, Prevent, and Manage Pain Has pain been assessed? Yes  Need for changes to pain regimen? No   B: Both SAT/SAT  N/A   C: Choice of Sedation RASS Goal: 0 Alert and Calm  Need for changes to sedation or analgesia regimen? No   D: Delirium CAM-ICU: Negative   E: Early Mobility  Plan for early mobility? Yes   F: Family Engagement Plan for family engagement today? Yes       Antibiotic Review: Post op requirements     Review of Invasive Devices:    Ashley Plan: Continue for accurate I/O monitoring for 48 hours  Central access plan: Medications requiring central line  Delmis Plan: Keep arterial line for hemodynamic monitoring    Prophylaxis:  VTE VTE covered by:  enoxaparin, Subcutaneous, 30 mg at 06/17/24 2058       Stress Ulcer  not ordered        Significant 24hr Events     24hr events: Central line placed yesterday for vasopressers being used for map pushes. APS consult for pain control. Otherwise no actue events.      Subjective     Review of Systems: See HPI for Review of Systems     Objective                            Vitals I/O      Most Recent Min/Max in 24hrs   Temp 99.6 °F (37.6 °C) Temp  Min: 98.6 °F (37 °C)  Max: 99.8 °F (37.7 °C)   Pulse 64 Pulse  Min: 48  Max: 76   Resp 18 Resp  Min: 12  Max: 24   /64 BP  Min: 93/57  Max: 153/76   O2 Sat 99 % SpO2  Min: 95 %  Max: 99 %      Intake/Output Summary (Last 24 hours) at 6/18/2024 0123  Last data filed at 6/18/2024 0000  Gross per 24 hour   Intake 4302.03 ml   Output 3435 ml   Net 867.03 ml       Diet Regular; Regular House    Invasive Monitoring   Arterial Line  Delmis /52  Arterial Line BP  Min: 99/80  Max: 148/74   MAP 74 mmHg  Arterial Line MAP (mmHg)  Min: 74 mmHg  Max: 104 mmHg           Physical Exam   Physical  Exam  Vitals and nursing note reviewed.   Eyes:      Extraocular Movements: Extraocular movements intact.      Pupils: Pupils are equal, round, and reactive to light.   Skin:     General: Skin is warm and dry.   HENT:      Head: Normocephalic and atraumatic.   Neck:      Vascular: Central line present. No JVD.   Cardiovascular:      Rate and Rhythm: Normal rate and regular rhythm.      Heart sounds: Normal heart sounds.   Musculoskeletal:      Right lower leg: No edema.      Left lower leg: No edema.   Abdominal:      Palpations: Abdomen is soft.      Tenderness: There is no abdominal tenderness. There is no guarding.   Constitutional:       General: He is not in acute distress.     Appearance: He is well-developed and well-nourished. He is not ill-appearing or toxic-appearing.   Pulmonary:      Effort: No accessory muscle usage, respiratory distress or accessory muscle usage.      Breath sounds: Normal breath sounds. No wheezing or rales.   Psychiatric:         Speech: Speech is not no expressive aphasia.   Neurological:      Mental Status: He is alert and oriented to person, place and time.      Cranial Nerves: No dysarthria or facial asymmetry.      Motor: gross motor function is at baseline for patient.      Comments: The patient has weakness in his bilateral hands, primarily with  strength. It is unchanged from prior exam, approx 2/5 bilaterally.             Diagnostic Studies      EKG: NSR  Imaging: Reviewed  I have personally reviewed pertinent reports.   and I have personally reviewed pertinent films in PACS     Medications:  Scheduled PRN   acetaminophen, 975 mg, Q8H ASHLYN  Cholecalciferol, 1,000 Units, Daily  clindamycin, 300 mg, Q6H ASHLYN  enoxaparin, 30 mg, Q12H ASHLYN  gabapentin, 100 mg, TID  levETIRAcetam, 500 mg, BID  levothyroxine, 125 mcg, Early Morning  methadone, 5 mg, Q8H ASHLYN   Followed by  methadone, 5 mg, Q12H ASHLYN   Followed by  [START ON 6/19/2024] methadone, 5 mg, Daily  methocarbamol, 500 mg,  Q6H ASHLYN  polyethylene glycol, 17 g, Daily  senna-docusate sodium, 1 tablet, HS      albuterol, 2 puff, Q4H PRN  HYDROmorphone, 0.2 mg, Q2H PRN  naloxone, 0.04 mg, Q1MIN PRN  ondansetron, 4 mg, Q4H PRN  oxyCODONE, 2.5 mg, Q4H PRN   Or  oxyCODONE, 5 mg, Q4H PRN       Continuous    phenylephine,  mcg/min, Last Rate: 160 mcg/min (06/18/24 0020)         Labs:    CBC    Recent Labs     06/16/24  0554 06/16/24  0930 06/16/24 1143 06/17/24  0107   WBC 11.01*  --   --  17.82*   HGB 13.6   < > 12.2 13.4   HCT 40.3   < > 36* 39.3     --   --  275    < > = values in this interval not displayed.     BMP    Recent Labs     06/16/24  0554 06/16/24  0930 06/16/24 1143 06/17/24 0107   SODIUM 135  --   --  141   K 3.4*  --   --  3.6   CL 99  --   --  103   CO2 26   < > 25 27   AGAP 10  --   --  11   BUN 9  --   --  9   CREATININE 0.74  --   --  0.66   CALCIUM 6.6*  --   --  7.2*    < > = values in this interval not displayed.       Coags    Recent Labs     06/16/24  0554   INR 0.99   PTT 28        Additional Electrolytes  Recent Labs     06/16/24  0758 06/16/24 0930 06/16/24 1143 06/17/24  0107   MG 2.0  --   --   --    PHOS 3.5  --   --   --    CAIONIZED 0.87*   < > 1.01* 0.96*    < > = values in this interval not displayed.          Blood Gas    No recent results  No recent results LFTs  No recent results    Infectious  No recent results  Glucose  Recent Labs     06/16/24  0554 06/17/24  0107   GLUC 99 128               Eloy Norman PA-C

## 2024-06-18 NOTE — PROGRESS NOTES
Progress Note - Acute Pain Service    Luis Forrester 53 y.o. male MRN: [id number]  Unit/Bed#: ICU 04 Encounter: [id number]      Luis Forrester is a 53 y.o. male status post syncope and collapse resulting in TBI and bilateral hand pain. Found to have mild cord compression secondary to degenerative spondylosis and congenital canal stenosis. Status post C3-C7 posterior cervical laminectomies, C2-T1 arthrodesis on 6/16/2024. Patient received 10 mg intraoperative methadone IV.     Bilateral hand pain  Assessment & Plan  Tylenol 975 mg p.o. every 8 hours scheduled.  Gabapentin 100 mg p.o. 3 times daily.  Robaxin 500 mg p.o. every 6 hours.  Oxycodone 2.5 mg p.o. every 4 hours as needed moderate pain or 5 mg p.o. every 4 hours as needed severe pain.  Dilaudid 0.2 mg IV every 2 hours as needed breakthrough pain.  Methadone taper as follows:  5 mg p.o. every 8 hours x 3 doses, then  5 mg p.o. every 12 hours x 2 doses, then  5 mg p.o. once.  Bowel regimen to avoid opioid-induced constipation while on opioid pain medication.        APS will continue to follow. Please contact Acute Pain Service - via [facility] from [phone] with additional questions or concerns. See [facility] or [facility] for additional contacts and after hours information.     Pain History  Current pain location(s):  Pain Score: 0  Pain Location/Orientation: Location: Neck, Location: Incision  Pain Scale: Pain Assessment Tool: 0-10  24 hour history: Patient's pain remains very well-controlled.  Complains only of pain in the right wrist at this time.  Denies neck or other upper extremity pain.    Opioid requirement previous 24 hours: Fentanyl 50 mcg IV x 1 (procedural), methadone 5 mg p.o. x 3.    Meds/Allergies   all current active meds have been reviewed, current meds:   Current Facility-Administered Medications   Medication Dose Route Frequency    acetaminophen (TYLENOL) tablet 975 mg  975 mg Oral Q8H ASHLYN    albuterol (PROVENTIL HFA,VENTOLIN HFA) inhaler 2  puff  2 puff Inhalation Q4H PRN    Cholecalciferol (VITAMIN D3) tablet 1,000 Units  1,000 Units Oral Daily    clindamycin (CLEOCIN) capsule 300 mg  300 mg Oral Q6H ASHLYN    enoxaparin (LOVENOX) subcutaneous injection 30 mg  30 mg Subcutaneous Q12H ASHLYN    gabapentin (NEURONTIN) capsule 100 mg  100 mg Oral TID    HYDROmorphone HCl (DILAUDID) injection 0.2 mg  0.2 mg Intravenous Q2H PRN    levETIRAcetam (KEPPRA) tablet 500 mg  500 mg Oral BID    levothyroxine tablet 125 mcg  125 mcg Oral Early Morning    magnesium sulfate 4 g/100 mL IVPB (premix) 4 g  4 g Intravenous Once    methadone (DOLOPHINE) tablet 5 mg  5 mg Oral Q12H ASHLYN    Followed by    [START ON 6/19/2024] methadone (DOLOPHINE) tablet 5 mg  5 mg Oral Daily    methocarbamol (ROBAXIN) tablet 500 mg  500 mg Oral Q6H ASHLYN    naloxone (NARCAN) 0.04 mg/mL syringe 0.04 mg  0.04 mg Intravenous Q1MIN PRN    ondansetron (ZOFRAN) injection 4 mg  4 mg Intravenous Q4H PRN    oxyCODONE (ROXICODONE) split tablet 2.5 mg  2.5 mg Oral Q4H PRN    Or    oxyCODONE (ROXICODONE) IR tablet 5 mg  5 mg Oral Q4H PRN    phenylephrine (KENJI-SYNEPHRINE) 100 mg (DOUBLE CONCENTRATION) IV in sodium chloride 0.9% 250 mL   mcg/min Intravenous Titrated    polyethylene glycol (MIRALAX) packet 17 g  17 g Oral Daily    senna-docusate sodium (SENOKOT S) 8.6-50 mg per tablet 1 tablet  1 tablet Oral HS   , and PTA meds:   Prior to Admission Medications   Prescriptions Last Dose Informant Patient Reported? Taking?   Diclofenac Sodium (VOLTAREN) 1 %   No No   Sig: Apply 2 g topically 4 (four) times a day   acetaminophen (TYLENOL) 650 mg CR tablet   No No   Sig: Take 1 tablet (650 mg total) by mouth every 8 (eight) hours as needed for mild pain   albuterol (2.5 mg/3 mL) 0.083 % nebulizer solution   No No   Sig: Take 3 mL (2.5 mg total) by nebulization every 6 (six) hours as needed for wheezing or shortness of breath   albuterol (Ventolin HFA) 90 mcg/act inhaler   No No   Sig: Inhale 2 puffs every 4  (four) hours as needed for wheezing   benzonatate (TESSALON PERLES) 100 mg capsule   No No   Sig: Take 1 capsule (100 mg total) by mouth every 8 (eight) hours   cetirizine (ZyrTEC) 10 mg tablet   No No   Sig: Take 1 tablet (10 mg total) by mouth daily   cholecalciferol (VITAMIN D3) 25 mcg (1,000 units) tablet   No No   Sig: TAKE 1 TABLET BY MOUTH EVERY DAY   hydrocortisone 1 % ointment   No No   Sig: Apply topically 2 (two) times a day   levothyroxine 125 mcg tablet   No No   Sig: One tablet daily      Facility-Administered Medications: None       Allergies   Allergen Reactions    Chlorine Rash       Objective        Vitals:    06/18/24 0730 06/18/24 0800 06/18/24 0900 06/18/24 1005   BP:  140/84 139/87    BP Location:  Left arm Left arm    Pulse: (!) 54 64 62 58   Resp: 16 20 17 (!) 25   Temp: 99.4 °F (37.4 °C)      TempSrc: Oral      SpO2: 100% 99% 100% 98%   Weight:       Height:             Physical Exam  Vitals and nursing note reviewed.   Constitutional:       General: He is awake. He is not in acute distress.     Appearance: He is not ill-appearing, toxic-appearing or diaphoretic.   Skin:     General: Skin is warm and dry.   Neurological:      Mental Status: He is alert and oriented to person, place, and time.      GCS: GCS eye subscore is 4. GCS verbal subscore is 5. GCS motor subscore is 6.   Psychiatric:         Attention and Perception: Attention normal.         Speech: Speech normal.         Behavior: Behavior normal. Behavior is cooperative.           Lab Results:   Estimated Creatinine Clearance: 114.4 mL/min (by C-G formula based on SCr of 0.72 mg/dL).  Lab Results   Component Value Date    WBC 16.27 (H) 06/18/2024    HGB 12.5 06/18/2024    HCT 38.2 06/18/2024     06/18/2024         Component Value Date/Time     (L) 01/05/2015 1236    K 3.7 06/18/2024 0439    K 3.6 01/05/2015 1236    CL 97 06/18/2024 0439    CL 97 (L) 01/05/2015 1236    CO2 31 06/18/2024 0439    CO2 25 06/16/2024 1143     BUN 10 06/18/2024 0439    BUN 11 01/05/2015 1236    CREATININE 0.72 06/18/2024 0439    CREATININE 0.91 01/05/2015 1236         Component Value Date/Time    CALCIUM 6.7 (L) 06/18/2024 0439    CALCIUM 8.0 (L) 01/05/2015 1236    ALKPHOS 80 06/15/2024 0856    AST 31 06/15/2024 0856    ALT 26 06/15/2024 0856    TP 6.7 06/15/2024 0856    ALB 3.5 06/15/2024 0856       Imaging Studies/EKG: I have personally reviewed pertinent reports.       Counseling / Coordination of Care  Total floor / unit time spent today 30 minutes minutes. Greater than 50% of total time was spent with the patient and / or family counseling and / or coordination of care. A description of the counseling / coordination of care: Patient interview, physical examination, review of medical records, review of imaging and laboratory data, development of pain management plan, discussion of pain management plan with patient and primary service.    Please note that the APS provides consultative services regarding pain management only.  With the exception of ketamine and epidural infusions and except when indicated, final decisions regarding starting or changing doses of analgesic medications are at the discretion of the consulting service.    Tyler Whitlock PA-C   Acute Pain Service

## 2024-06-18 NOTE — ASSESSMENT & PLAN NOTE
Parafalcine SDH with bilateral SAH  Patient presented after being found down by his daughter, patient is unsure what happened other than he got up to go to the bathroom.  Patient denies any blood thinner use.    Imaging:  CT head without 6/15/24:New acute trace subarachnoid hemorrhage in bilateral parafalcine regions. Recommend dedicated CTA head with contrast for further evaluation.New acute trace parafalcine subdural hematoma.  Repeat CT head wo 6/15/2024: Trace subarachnoid hemorrhage within the interhemispheric fissure decreased.  No parenchymal subdural hematoma.    Plan:  Continue frequent neurological checks.   STAT CT head with any neurological decline including drop GCS of 2pts within 1 hr.  Seizure prophylaxis per trauma team  Hold all full antiplatelet and anticoagulation medications for 2 weeks  Medical management and pain control per primary team  Mobilize with PT/OT  See above plan    Patient can follow-up as needed for this problem

## 2024-06-18 NOTE — ASSESSMENT & PLAN NOTE
2 Days Post-Op Procedure(s):  DECOMPRESSION SPINE CERVICAL POSTERIOR C2-T1 with fusion navigated with Oarm (Dr. Lopez, 6/16/2024)  Patient describes query syncopal episode and falls  New right hand weakness and pain with bilateral Mohit's and right-sided wrist weakness    Imaging:  MRI cervical spine without 6/15/2024:Congenital canal stenosis with superimposed degenerative spondylosis .Most pronounced at C3-4 and C5-C6 with moderate to severe canal stenosis and mild cord compression. Evaluation of cord signal is limited due to artifact. No definite abnormal cord signal but mild cord edema would be difficult to exclude. Severe bilateral foraminal stenosis also seen at C3-4 and C5-C6.Mild to moderate canal stenosis at other levels    Plan:  Continue to monitor neurological exam.   Maintain MAPs>85 x days (2/5)  Hemovac drain 180ml/24hrs-maintain at this time to accordion suction  Clindamycin x 2 weeks for surgical prophylaxis.  Pain control -APS on board for methadone taper  Medical management and pain control per primary team  Mobilize with physical and Occupational Therapy - no collar required  Upright x-rays cervical spine AP lateral ordered and pending  DVT prophylaxis: Bilateral SCDs and lovenox    Neurosurgery will continue to follow, call with any further questions or concerns.

## 2024-06-18 NOTE — PROGRESS NOTES
[facility]  Progress Note  Name: Luis Forrester I  MRN: [id number]  Unit/Bed#: ICU 04 I Date of Admission: 6/15/2024   Date of Service: 6/18/2024 I Hospital Day: 3    Assessment & Plan   Right hand weakness  Assessment & Plan  2 Days Post-Op Procedure(s):  DECOMPRESSION SPINE CERVICAL POSTERIOR C2-T1 with fusion navigated with Oarm (Dr. Lopez, 6/16/2024)  Patient describes query syncopal episode and falls  New right hand weakness and pain with bilateral Mohit's and right-sided wrist weakness    Imaging:  MRI cervical spine without 6/15/2024:Congenital canal stenosis with superimposed degenerative spondylosis .Most pronounced at C3-4 and C5-C6 with moderate to severe canal stenosis and mild cord compression. Evaluation of cord signal is limited due to artifact. No definite abnormal cord signal but mild cord edema would be difficult to exclude. Severe bilateral foraminal stenosis also seen at C3-4 and C5-C6.Mild to moderate canal stenosis at other levels    Plan:  Continue to monitor neurological exam.   Maintain MAPs>85 x days (2/5)  Hemovac drain 180ml/24hrs-maintain at this time to accordion suction  Clindamycin x 2 weeks for surgical prophylaxis.  Pain control -APS on board for methadone taper  Medical management and pain control per primary team  Mobilize with physical and Occupational Therapy - no collar required  Upright x-rays cervical spine AP lateral ordered and pending  DVT prophylaxis: Bilateral SCDs and lovenox    Neurosurgery will continue to follow, call with any further questions or concerns.      SDH (subdural hematoma) (HCC)  Assessment & Plan  Parafalcine SDH with bilateral SAH  Patient presented after being found down by his daughter, patient is unsure what happened other than he got up to go to the bathroom.  Patient denies any blood thinner use.    Imaging:  CT head without 6/15/24:New acute trace subarachnoid hemorrhage in bilateral parafalcine  "regions. Recommend dedicated CTA head with contrast for further evaluation.New acute trace parafalcine subdural hematoma.  Repeat CT head wo 6/15/2024: Trace subarachnoid hemorrhage within the interhemispheric fissure decreased.  No parenchymal subdural hematoma.    Plan:  Continue frequent neurological checks.   STAT CT head with any neurological decline including drop GCS of 2pts within 1 hr.  Seizure prophylaxis per trauma team  Hold all full antiplatelet and anticoagulation medications for 2 weeks  Medical management and pain control per primary team  Mobilize with PT/OT  See above plan    Patient can follow-up as needed for this problem             Subjective/Objective     Chief Complaint: \"Can I have coffee?\"    Subjective: Patient complains of some neck soreness.  He continues to endorse right top of hand pain.  He states his pain medication helps.  He offers no other complaints other than he would like some coffee.  He continues to complain of bilateral hand weakness but no numbness or tingling.  Patient remains on isak for blood pressure management    Objective: Patient comfortably laying in bed, NAD.    I/O         06/16 0701  06/17 0700 06/17 0701  06/18 0700 06/18 0701  06/19 0700    P.O.  2040     I.V. (mL/kg) 3384.8 (42.8) 2174.4 (27.6)     IV Piggyback  112.3     Total Intake(mL/kg) 3384.8 (42.8) 4326.7 (54.8)     Urine (mL/kg/hr) 7395 (3.9) 3305 (1.7) 600 (2.2)    Drains 280 180     Blood 400      Total Output 8075 3485 600    Net -4690.3 +841.7 -600                   Invasive Devices       Central Venous Catheter Line  Duration             CVC Central Lines 06/17/24 Triple 16cm Right Internal jugular <1 day              Peripheral Intravenous Line  Duration             Peripheral IV 06/15/24 Right Antecubital 3 days    Peripheral IV 06/16/24 Distal;Dorsal (posterior);Left Forearm 2 days    Peripheral IV 06/16/24 Left Hand 2 days              Arterial Line  Duration             Arterial Line 06/16/24 " "Right Radial 2 days              Drain  Duration             Urethral Catheter Latex;Straight-tip 16 Fr. 2 days    Closed/Suction Drain Posterior;Midline;Proximal Back Accordion 10 Fr. 1 day                    Physical Exam:  Vitals: Blood pressure 139/87, pulse 62, temperature 99.4 °F (37.4 °C), temperature source Oral, resp. rate 17, height 5' 6\" (1.676 m), weight 78.9 kg (174 lb), SpO2 100%.,Body mass index is 28.08 kg/m².    Hemodynamic Monitoring: MAP: Arterial Line MAP (mmHg): 104 mmHg    General appearance: alert, appears stated age, cooperative and no distress  Head: Normocephalic, without obvious abnormality, atraumatic  Eyes: EOMI, PERRL, conjugate gaze  Neck: Posterior cervical dressing intact with some old bloody drainage.  Hemovac with bloody output maintain at this time  Lungs: non labored breathing  Heart: regular heart rate  Neurologic:   Mental status: Alert, oriented x3, thought content appropriate, speech is clear, following commands  Cranial nerves: grossly intact (Cranial nerves II-XII)  Sensory: normal to light touch in all extremities x 4  Motor: moving all extremities without focal weakness except distal hand weakness.  Left  3/5 and right  4 -/5  Reflexes: 2+ and symmetric, bilateral Mohit's noted but no clonus appreciated      Lab Results:  Results from last 7 days   Lab Units 06/18/24  0439 06/17/24  0107 06/16/24  1143 06/16/24  0930 06/16/24  0554 06/15/24  0856   WBC Thousand/uL 16.27* 17.82*  --   --  11.01* 11.11*   HEMOGLOBIN g/dL 12.5 13.4  --   --  13.6 13.3   I STAT HEMOGLOBIN g/dl  --   --  12.2   < >  --   --    HEMATOCRIT % 38.2 39.3  --   --  40.3 40.0   HEMATOCRIT, ISTAT %  --   --  36*   < >  --   --    PLATELETS Thousands/uL 236 275  --   --  256 160   SEGS PCT %  --   --   --   --   --  82*   MONO PCT %  --   --   --   --   --  7   EOS PCT %  --   --   --   --   --  0    < > = values in this interval not displayed.     Results from last 7 days   Lab Units " "06/18/24  0439 06/17/24  0107 06/16/24  1143 06/16/24  0930 06/16/24  0930 06/16/24  0554 06/15/24  0856   SODIUM mmol/L 135 141  --   --   --  135 136   POTASSIUM mmol/L 3.7 3.6  --   --   --  3.4* 3.6   CHLORIDE mmol/L 97 103  --   --   --  99 101   CO2 mmol/L 31 27  --   --   --  26 27   CO2, I-STAT mmol/L  --   --  25   < > 27  --   --    BUN mg/dL 10 9  --   --   --  9 13   CREATININE mg/dL 0.72 0.66  --   --   --  0.74 0.76   CALCIUM mg/dL 6.7* 7.2*  --   --   --  6.6* 6.6*   ALK PHOS U/L  --   --   --   --   --   --  80   ALT U/L  --   --   --   --   --   --  26   AST U/L  --   --   --   --   --   --  31   GLUCOSE, ISTAT mg/dl  --   --  152*  --  106  --   --     < > = values in this interval not displayed.     Results from last 7 days   Lab Units 06/18/24  0439 06/16/24  0758   MAGNESIUM mg/dL 1.5* 2.0     Results from last 7 days   Lab Units 06/18/24  0439 06/16/24  0758   PHOSPHORUS mg/dL 3.1 3.5     Results from last 7 days   Lab Units 06/16/24  0554   INR  0.99   PTT seconds 28     No results found for: \"TROPONINT\"  ABG:No results found for: \"PHART\", \"YFI0UZC\", \"PO2ART\", \"BON5SXK\", \"R9TMEJNE\", \"BEART\", \"SOURCE\"    Imaging Studies: I have personally reviewed pertinent reports.   and I have personally reviewed pertinent films in PACS    XR chest portable ICU    Result Date: 6/18/2024  Impression: No acute cardiopulmonary disease. Right IJ line terminates in the SVC. Workstation performed: [id number]     XR spine cervical 2 or 3 vw injury    Result Date: 6/17/2024  Impression: Fluoroscopy provided for procedure guidance. Please refer to the separate procedure note for additional details. Workstation performed: [id number]     CT head wo contrast    Result Date: 6/16/2024  Impression: Trace subarachnoid hemorrhage within the interhemispheric fissure is slightly decreased in conspicuity No parenchymal or subdural hematoma. Workstation performed: [id number]     XR chest portable ICU    Result Date: " 6/16/2024  Impression: No acute cardiopulmonary disease. No central line. No pneumothorax. Workstation performed: [id number]     MRI cervical spine wo contrast    Result Date: 6/15/2024  Impression: Congenital canal stenosis with superimposed degenerative spondylosis . Most pronounced at C3-4 and C5-C6 with moderate to severe canal stenosis and mild cord compression. Evaluation of cord signal is limited due to artifact. No definite abnormal cord signal but mild cord edema would be difficult to exclude Severe bilateral foraminal stenosis also seen at C3-4 and C5-C6. Mild to moderate canal stenosis at other levels Workstation performed: [id number]     CT spine cervical without contrast    Result Date: 6/15/2024  Impression: No cervical spine fracture or traumatic malalignment. Posterior osteophyte disc complexes C3-C4 and C5-C6 contributes to at least moderate canal stenosis and severe bilateral foraminal narrowing at these levels. Consider nonemergent follow-up MRI cervical spine without contrast for further evaluation. Ectatic right carotid bifurcation and proximal cervical internal carotid artery with retropharyngeal course, similar to CT neck with contrast 10/12/2011. Please see same day CTA head with contrast, CT head without contrast, CT thoracic spine without contrast for further evaluation. The study was marked in EPIC for immediate notification. Workstation performed: [id number]     CTA head w wo contrast    Result Date: 6/15/2024  Impression: Negative CTA head traumatic vascular injury, aneurysm, large vessel occlusion, high-grade stenosis, or dissection. Partially imaged ectatic right carotid bifurcation and proximal cervical internal carotid artery with retropharyngeal course, similar to CT neck with contrast 10/12/2011. Additional chronic/incidental findings as detailed above. Please see earlier same day CT head without contrast and concurrent CT cervical spine without contrast. Workstation performed:  [id number]     XR hand 3+ views RIGHT    Result Date: 6/15/2024  Impression: 1. No acute osseous abnormality. 2. Degenerative changes as described. Resident: ALLYSON AWAN I, the attending radiologist, have reviewed the images and agree with the final report above. Workstation performed: [id number]     XR hand 3+ views LEFT    Result Date: 6/15/2024  Impression: No acute osseous abnormality. Degenerative changes as described. Resident: ALLYSON AWAN I, the attending radiologist, have reviewed the images and agree with the final report above. Workstation performed: [id number]     CT spine thoracic without contrast    Result Date: 6/15/2024  Impression: No acute osseous abnormality of thoracic spine. Diffuse idiopathic skeletal hyperostosis (DISH). I personally discussed this study with Travis Powell on 6/15/2024 10:08 AM. Workstation performed: [id number]     CT head wo contrast    Result Date: 6/15/2024  Impression: New acute trace subarachnoid hemorrhage in bilateral parafalcine regions. Recommend dedicated CTA head with contrast for further evaluation. New acute trace parafalcine subdural hematoma. I personally discussed this study with Travis Powell on 6/15/2024 10:08 AM. Workstation performed: [id number]       EKG, Pathology, and Other Studies: I have personally reviewed pertinent reports.      VTE Pharmacologic Prophylaxis: Enoxaparin (Lovenox)    VTE Mechanical Prophylaxis: sequential compression device

## 2024-06-19 LAB
25(OH)D3 SERPL-MCNC: 29.1 NG/ML (ref 30–100)
ANION GAP SERPL CALCULATED.3IONS-SCNC: 8 MMOL/L (ref 4–13)
ANION GAP SERPL CALCULATED.3IONS-SCNC: 9 MMOL/L (ref 4–13)
ATRIAL RATE: 72 BPM
BASOPHILS # BLD AUTO: 0.11 THOUSANDS/ÂΜL (ref 0–0.1)
BASOPHILS NFR BLD AUTO: 1 % (ref 0–1)
BUN SERPL-MCNC: 11 MG/DL (ref 5–25)
BUN SERPL-MCNC: 11 MG/DL (ref 5–25)
CA-I BLD-SCNC: 0.84 MMOL/L (ref 1.12–1.32)
CALCIUM SERPL-MCNC: 6.7 MG/DL (ref 8.4–10.2)
CALCIUM SERPL-MCNC: 7.2 MG/DL (ref 8.4–10.2)
CHLORIDE SERPL-SCNC: 93 MMOL/L (ref 96–108)
CHLORIDE SERPL-SCNC: 94 MMOL/L (ref 96–108)
CO2 SERPL-SCNC: 30 MMOL/L (ref 21–32)
CO2 SERPL-SCNC: 31 MMOL/L (ref 21–32)
CREAT SERPL-MCNC: 0.61 MG/DL (ref 0.6–1.3)
CREAT SERPL-MCNC: 0.67 MG/DL (ref 0.6–1.3)
EOSINOPHIL # BLD AUTO: 0.23 THOUSAND/ÂΜL (ref 0–0.61)
EOSINOPHIL NFR BLD AUTO: 2 % (ref 0–6)
ERYTHROCYTE [DISTWIDTH] IN BLOOD BY AUTOMATED COUNT: 13.4 % (ref 11.6–15.1)
GFR SERPL CREATININE-BSD FRML MDRD: 108 ML/MIN/1.73SQ M
GFR SERPL CREATININE-BSD FRML MDRD: 112 ML/MIN/1.73SQ M
GLUCOSE SERPL-MCNC: 83 MG/DL (ref 65–140)
GLUCOSE SERPL-MCNC: 96 MG/DL (ref 65–140)
HCT VFR BLD AUTO: 39.1 % (ref 36.5–49.3)
HGB BLD-MCNC: 13.2 G/DL (ref 12–17)
IMM GRANULOCYTES # BLD AUTO: 0.1 THOUSAND/UL (ref 0–0.2)
IMM GRANULOCYTES NFR BLD AUTO: 1 % (ref 0–2)
LYMPHOCYTES # BLD AUTO: 2.54 THOUSANDS/ÂΜL (ref 0.6–4.47)
LYMPHOCYTES NFR BLD AUTO: 18 % (ref 14–44)
MAGNESIUM SERPL-MCNC: 2 MG/DL (ref 1.9–2.7)
MCH RBC QN AUTO: 32.4 PG (ref 26.8–34.3)
MCHC RBC AUTO-ENTMCNC: 33.8 G/DL (ref 31.4–37.4)
MCV RBC AUTO: 96 FL (ref 82–98)
MONOCYTES # BLD AUTO: 1.63 THOUSAND/ÂΜL (ref 0.17–1.22)
MONOCYTES NFR BLD AUTO: 11 % (ref 4–12)
NEUTROPHILS # BLD AUTO: 9.84 THOUSANDS/ÂΜL (ref 1.85–7.62)
NEUTS SEG NFR BLD AUTO: 67 % (ref 43–75)
NRBC BLD AUTO-RTO: 0 /100 WBCS
OSMOLALITY UR: 421 MMOL/KG
P AXIS: 80 DEGREES
PLATELET # BLD AUTO: 232 THOUSANDS/UL (ref 149–390)
PMV BLD AUTO: 10.5 FL (ref 8.9–12.7)
POTASSIUM SERPL-SCNC: 3.9 MMOL/L (ref 3.5–5.3)
POTASSIUM SERPL-SCNC: 4 MMOL/L (ref 3.5–5.3)
PR INTERVAL: 146 MS
PTH-INTACT SERPL-MCNC: 7.3 PG/ML (ref 12–88)
QRS AXIS: 76 DEGREES
QRSD INTERVAL: 92 MS
QT INTERVAL: 438 MS
QTC INTERVAL: 480 MS
RBC # BLD AUTO: 4.08 MILLION/UL (ref 3.88–5.62)
SODIUM SERPL-SCNC: 132 MMOL/L (ref 135–147)
SODIUM SERPL-SCNC: 133 MMOL/L (ref 135–147)
T WAVE AXIS: 80 DEGREES
VENTRICULAR RATE: 72 BPM
WBC # BLD AUTO: 14.45 THOUSAND/UL (ref 4.31–10.16)

## 2024-06-19 PROCEDURE — 82330 ASSAY OF CALCIUM: CPT | Performed by: PHYSICIAN ASSISTANT

## 2024-06-19 PROCEDURE — 83970 ASSAY OF PARATHORMONE: CPT

## 2024-06-19 PROCEDURE — 83935 ASSAY OF URINE OSMOLALITY: CPT | Performed by: NURSE PRACTITIONER

## 2024-06-19 PROCEDURE — 80048 BASIC METABOLIC PNL TOTAL CA: CPT | Performed by: PHYSICIAN ASSISTANT

## 2024-06-19 PROCEDURE — 85025 COMPLETE CBC W/AUTO DIFF WBC: CPT | Performed by: PHYSICIAN ASSISTANT

## 2024-06-19 PROCEDURE — 99223 1ST HOSP IP/OBS HIGH 75: CPT | Performed by: INTERNAL MEDICINE

## 2024-06-19 PROCEDURE — 99291 CRITICAL CARE FIRST HOUR: CPT | Performed by: STUDENT IN AN ORGANIZED HEALTH CARE EDUCATION/TRAINING PROGRAM

## 2024-06-19 PROCEDURE — 99255 IP/OBS CONSLTJ NEW/EST HI 80: CPT

## 2024-06-19 PROCEDURE — 99255 IP/OBS CONSLTJ NEW/EST HI 80: CPT | Performed by: INTERNAL MEDICINE

## 2024-06-19 PROCEDURE — 99232 SBSQ HOSP IP/OBS MODERATE 35: CPT | Performed by: NURSE PRACTITIONER

## 2024-06-19 PROCEDURE — 99233 SBSQ HOSP IP/OBS HIGH 50: CPT | Performed by: PHYSICIAN ASSISTANT

## 2024-06-19 PROCEDURE — 93010 ELECTROCARDIOGRAM REPORT: CPT | Performed by: INTERNAL MEDICINE

## 2024-06-19 PROCEDURE — 83735 ASSAY OF MAGNESIUM: CPT | Performed by: PHYSICIAN ASSISTANT

## 2024-06-19 PROCEDURE — 80048 BASIC METABOLIC PNL TOTAL CA: CPT | Performed by: NURSE PRACTITIONER

## 2024-06-19 PROCEDURE — 82306 VITAMIN D 25 HYDROXY: CPT

## 2024-06-19 RX ORDER — BISACODYL 10 MG
10 SUPPOSITORY, RECTAL RECTAL ONCE
Status: COMPLETED | OUTPATIENT
Start: 2024-06-19 | End: 2024-06-19

## 2024-06-19 RX ADMIN — GABAPENTIN 100 MG: 100 CAPSULE ORAL at 16:57

## 2024-06-19 RX ADMIN — METHOCARBAMOL 500 MG: 500 TABLET ORAL at 19:52

## 2024-06-19 RX ADMIN — GABAPENTIN 100 MG: 100 CAPSULE ORAL at 10:07

## 2024-06-19 RX ADMIN — CEPHALEXIN 500 MG: 500 CAPSULE ORAL at 14:25

## 2024-06-19 RX ADMIN — LEVETIRACETAM 500 MG: 500 TABLET, FILM COATED ORAL at 10:08

## 2024-06-19 RX ADMIN — LEVOTHYROXINE SODIUM 125 MCG: 125 TABLET ORAL at 05:19

## 2024-06-19 RX ADMIN — PHENYLEPHRINE HYDROCHLORIDE 140 MCG/MIN: 50 INJECTION INTRAVENOUS at 08:06

## 2024-06-19 RX ADMIN — ACETAMINOPHEN 975 MG: 325 TABLET, FILM COATED ORAL at 14:25

## 2024-06-19 RX ADMIN — METHOCARBAMOL 500 MG: 500 TABLET ORAL at 00:17

## 2024-06-19 RX ADMIN — ACETAMINOPHEN 975 MG: 325 TABLET, FILM COATED ORAL at 21:17

## 2024-06-19 RX ADMIN — CEPHALEXIN 500 MG: 500 CAPSULE ORAL at 19:53

## 2024-06-19 RX ADMIN — METHOCARBAMOL 500 MG: 500 TABLET ORAL at 23:00

## 2024-06-19 RX ADMIN — BISACODYL 10 MG: 10 SUPPOSITORY RECTAL at 12:04

## 2024-06-19 RX ADMIN — Medication 1000 UNITS: at 10:07

## 2024-06-19 RX ADMIN — CEPHALEXIN 500 MG: 500 CAPSULE ORAL at 05:19

## 2024-06-19 RX ADMIN — CEPHALEXIN 500 MG: 500 CAPSULE ORAL at 23:00

## 2024-06-19 RX ADMIN — METHOCARBAMOL 500 MG: 500 TABLET ORAL at 14:24

## 2024-06-19 RX ADMIN — LEVETIRACETAM 500 MG: 500 TABLET, FILM COATED ORAL at 17:59

## 2024-06-19 RX ADMIN — PHENYLEPHRINE HYDROCHLORIDE 150 MCG/MIN: 50 INJECTION INTRAVENOUS at 16:40

## 2024-06-19 RX ADMIN — ENOXAPARIN SODIUM 30 MG: 30 INJECTION SUBCUTANEOUS at 21:17

## 2024-06-19 RX ADMIN — VASOPRESSIN 0.04 UNITS/MIN: 20 INJECTION INTRAVENOUS at 18:21

## 2024-06-19 RX ADMIN — METHADONE HYDROCHLORIDE 5 MG: 5 TABLET ORAL at 10:06

## 2024-06-19 RX ADMIN — GABAPENTIN 100 MG: 100 CAPSULE ORAL at 21:17

## 2024-06-19 RX ADMIN — POLYETHYLENE GLYCOL 3350 17 G: 17 POWDER, FOR SOLUTION ORAL at 10:06

## 2024-06-19 RX ADMIN — METHOCARBAMOL 500 MG: 500 TABLET ORAL at 05:19

## 2024-06-19 RX ADMIN — SENNOSIDES AND DOCUSATE SODIUM 1 TABLET: 50; 8.6 TABLET ORAL at 21:17

## 2024-06-19 RX ADMIN — CALCIUM GLUCONATE 3 G: 98 INJECTION, SOLUTION INTRAVENOUS at 08:10

## 2024-06-19 RX ADMIN — SODIUM CHLORIDE 1000 ML: 0.9 INJECTION, SOLUTION INTRAVENOUS at 10:10

## 2024-06-19 RX ADMIN — CEPHALEXIN 500 MG: 500 CAPSULE ORAL at 00:18

## 2024-06-19 NOTE — CONSULTS
PHYSICAL MEDICINE AND REHABILITATION CONSULT NOTE  Luis Forrester 53 y.o. male MRN: [id number]  Unit/Bed#: ICU 04 Encounter: [id number]    Requested by:   Ele Alba PA-C   Reason for Consultation:    TBI, cervical myelopathy - Rehabilitation medicine evaluation and assistance with appropriate disposition.  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  Patient was brought to hospital where CT head showed acute trace subarachnoid hemorrhage in the bilateral parafalcine regions.  CT of the T-spine showed DISH without acute or osseous abnormalities.  CTA head and neck did not show acute traumatic vascular injury, high-grade stenosis or dissection.  CT C spine showed no cervical fx or traumatic malalignment with mod canal and severe b/l multilevel NF stenosis.  MRI C spine shows moderate to severe canal stenosis with mild cord compression most pronounced at C3-4 and C5-6.  It also showed severe bilateral foraminal stenosis at same levels along with congenital canal stenosis with superimposed degenerative spondylosis.  Neurosurgery consulted on patient and diagnosed with parafalcine subdural hemorrhage with bilateral subarachnoid hemorrhage as well as operative intervention for the cervical stenosis with myelopathy and upper extremity weakness.  Patient underwent C3-C7 cervical  laminectomy with bilateral screw placement and fusion C2 to-T1 on 6/16.  Patient recommended maps greater than 85 for 5 days and has been on 2 vasopressors.  He had his Hemovac drain removed on 6/19.  He was recommended 2 weeks of antibiotics for infectious prophylaxis per neurosurgery.  He has not required to have a cervical collar at this time.  Acute pain service consulted and have been managing with multimodal pain regiment including IV opiates.  Patient noted to have symptomatic junctional bradycardia with asymptomatic wide-complex tachycardia on telemetry for which EP was consulted.  They felt presentation mixture of sick sinus syndrome and wide-complex tachycardia likely SVT with aberrancy and felt reasonable to place dual-chamber pacemaker given history of multiple syncopal episodes and now documented bradycardia. Course also notable for hyponatremia and polyuria for which nephrology was consulted.  Patient did receive 2 doses of DDAVP as well as intermittent IV fluids.    Luis Forrester was evaluated by PT, OT and now by PMR physician and found to have new and significant deficits in ADLs and mobility.      Prior Level of Function and Social history:    Patient lives with 19-year-old daughter and 1 level apartment with 4 steps to enter.  He was working at a local [employer] for years.  Independent with ADLs  Independent with ambulation    Current Level of Function:    Pending follow-up assessment by PT/OT  6/17 -transfers moderate assist, ambulation 4 feet moderate assist x 2 with wide base of support; max assist lower body dressing, toileting, lower body bathing; moderate assist upper body bathing and upper body dressing    The patient with following culmination of conditions is expected to significantly benefit from direct rehabilitation physician oversight to optimally monitor and manage in post-acute rehab setting.  Optimal functional outcomes expected using coordinated interdisciplinary team approach  (PMR MD, skilled nursing, PT, OT) while providing intensive inpatient rehabilitation.   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 with risk of root injury in context of cervical spondylosis and congenital narrow cannal  Traumatic brain injury with small parafalcine SDH and b/l SAH  Upper extremity weakness, impaired dexterity, coordination and balance  Pain  Sick sinus syndrome, wide-complex tachycardia likely SVT with aberrancy, hx of syncopal episodes  Hyponatremia, polyuria   Constipation   Encounter for skin care  VTE risk     Disposition recommendation:  [facility]/IRF  Patient is good candidate for transfer to [facility]/IRF pending:    - Medical clearance/stability  - Facility acceptance, insurance authorization, and bed availability     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 with risk of root injury in context of cervical spondylosis and congenital narrow canal  - Residual - upper extremity weakness, impaired dexterity, coordination and balance  - Recommend PT, OT for improvement in BUE ROM, dexterity, strength, and function as well as LE strengthening, balance, and mobility  - Fall precautions   - MAP >85 per recommendations per neurosurgery - on 2 pressors  - No Cervical collar per Nsx   - spinal precautions  - Monitor incision closely  - Monitor neuro exam, vitals, pain, b/b function closely  - Optimal pain mgmt   - Optimal b/b mgmt and monitor for neurogenic b/b  - Optimal skin mgmt with frequent turning/offloading, skin checks  - Monitor for autonomic dysfunction, patient at risk of lower blood pressures and orthostasis - obtain vitals and orthostatic vitals as needed; POOJA hose and/or abdominal binder as needed for symptomatic orthostasis/hypotension   - VTE prophylaxis currently per primary team    Traumatic brain injury with small parafalcine SDH and b/l SAH  - Status: Orientation intact, able to follow simple commands, some risk of cog impairments and impairments in safety; denies  post-concussive symptoms - monitor closely   - Overall mgmt per primary team currently, recommend optimal mgmt during rehab process as well  - Remains at risk for increased confusion/delirium, restlessness, agitation, and fall - continue to monitor for concerns/changes  - Med review: Keppra to be stopped soon, completing methadone taper; On Robaxin; on but not receiving much PRN Oxy   - Recommend optimal mgmt of hyponatremia   - Monitor neuro-exam, wakefulness, mood, cognition, insight into deficits and safety awareness   - Monitor and ensure optimal management electrolytes, nutrition, and hydration  - Monitor for signs or symptoms of infection, medication intolerances, other systemic etiologies  - Additional labs, imaging, specialist follow-up as needed per primary team currently   - Overstimulation precautions, frequent re-orientation, re-direction, re-assurance  - Optimal mood, pain, and sleep management  - If impaired sleep or behavior recommend sleep log and agitation monitoring    - Limit sedating medications when possible  - Fall precautions - if needed increase rounding or consider virtual sitter or in-person sitter  - For routine restlessness, anxiety, irritability focus on non-pharmacologic management    - Hold benzo's with increased risk paradoxical reaction and possibility of limiting cognitive recovery  - Continue PT, OT in acute setting   - Recommend PT and OT and possible ST in [facility] setting   - Can be done in [facility] setting:  - Obtain MOCA when cog status stabilized and if needed ACLS during [facility] course   - Assess iADLs - ability to manage medications, meal prep, finances, obtaining appropriate transport from community, managing emergencies, and overall safety in home environment.  - Provide therapy, compensatory strategies, and if available and necessary provide caregiver training.     Pain  - APAP TID, scheduled Robaxin  - Methadone weaning off  - Oxy 2.5-5mg Q4H PRN - not needing much    Sick sinus  syndrome, wide-complex tachycardia likely SVT with aberrancy, hx of syncopal episodes  - Await EPS placement of PPM    Hyponatremia, polyuria   - Mgmt per nephrology  - Strict monitoring of I&Os  - IVF/FR at discretion of nephro    Constipation   - Mgmt per CCM currently     Encounter for skin care  - Increased risk of skin wounds/breakdown/rashes due to recent immobility and co-morbidities   - Turn patient Q2H in bed (use pillows or wedges), encourage wt shifts every 10-15 minutes in chair  - Patient and if available caregiver education   - Hydragaurd to buttocks and sacrum BID & PRN  - Allevyn foam to heels and float heels when in bed   - Optimal bowel/bladder hygiene; keep skin clean and dry   - EHOB waffle cushion to chair/WC when OOB  - Nursing to document in chart and Notify MD if buttock, sacrum, heel, or other skin site develops erythema, skin breakdown, or rashes as soon as possible.  If patient is soiling themselves with urine or stool notify MD.  If you are unable to maintain skin integrity and prevent erythema due to frequency of soiling notify MD as soon as possible as well.     VTE risk   - lovenox, SCDs    Other medical issues:     Per primary service (and relevant consultants if applicable)    ==================================================================    Chief Complaint:  Bilateral hand weakness     History of Present Illness:   53-year-old male with PMH of asthma, HLD, hypothyroidism, who was having intermittent syncopal episodes over the last year who was found down by daughter after presumable other syncopal episode now complaining of short-term mid and upper back pain as well as pain limiting in hands and weakness of the upper extremities.  Patient was brought to hospital where CT head showed acute trace subarachnoid hemorrhage in the bilateral parafalcine regions.  CT of the T-spine showed DISH without acute or osseous abnormalities.  CTA head and neck did not show acute traumatic  vascular injury, high-grade stenosis or dissection.  CT C spine showed no cervical fx or traumatic malalignment with mod canal and severe b/l multilevel NF stenosis.  MRI C spine shows moderate to severe canal stenosis with mild cord compression most pronounced at C3-4 and C5-6.  It also showed severe bilateral foraminal stenosis at same levels along with congenital canal stenosis with superimposed degenerative spondylosis.  Neurosurgery consulted on patient and diagnosed with parafalcine subdural hemorrhage with bilateral subarachnoid hemorrhage as well as operative intervention for the cervical stenosis with myelopathy and upper extremity weakness.  Patient underwent C3-C7 cervical laminectomy with bilateral screw placement and fusion C2 to-T1 on 6/16.  Patient recommended maps greater than 85 for 5 days and has been on 2 vasopressors.  He had his Hemovac drain removed on 6/19.  He was recommended 2 weeks of antibiotics for infectious prophylaxis per neurosurgery.  He has not required to have a cervical collar at this time.  Acute pain service consulted and have been managing with multimodal pain regiment including IV opiates.  Patient noted to have symptomatic junctional bradycardia with asymptomatic wide-complex tachycardia on telemetry for which EP was consulted.  They felt presentation mixture of sick sinus syndrome and wide-complex tachycardia likely SVT with aberrancy and felt reasonable to place dual-chamber pacemaker given history of multiple syncopal episodes and now documented bradycardia. Course also notable for hyponatremia and polyuria for which nephrology was consulted.  Patient did receive 2 doses of DDAVP as well as intermittent IV fluids.    On evaluation, patient notes mild to moderate intermittent neck pain but tolerable.  He notes significant bilateral hand weakness with some tingling and burning as well as incoordination/impaired dexterity.  Patient states he has not been out of bed much but  feels like his balance was off.  Patient notes some constipation without abdominal pain, nausea or vomiting.  He denies fever, chills, sweats, calf pain, shortness of breath or other new complaints.    Review of Systems:     Complete review of systems obtained.    Please see HPI for details with other significant symptoms or history listed here:    Otherwise, 14 point review of systems completed and was otherwise unremarkable.    Allergies   Allergen Reactions    Chlorine Rash       Current Facility-Administered Medications:     acetaminophen (TYLENOL) tablet 975 mg, 975 mg, Oral, Q8H ASHLYN, Cricket Elizondo MD, 975 mg at 06/19/24 1425    albuterol (PROVENTIL HFA,VENTOLIN HFA) inhaler 2 puff, 2 puff, Inhalation, Q4H PRN, Cricket Elizondo MD    cephalexin (KEFLEX) capsule 500 mg, 500 mg, Oral, Q6H ASHLYN, Abbey Oliveros PA-C, 500 mg at 06/19/24 1425    Cholecalciferol (VITAMIN D3) tablet 1,000 Units, 1,000 Units, Oral, Daily, Cricket Elizondo MD, 1,000 Units at 06/19/24 1007    enoxaparin (LOVENOX) subcutaneous injection 30 mg, 30 mg, Subcutaneous, Q12H ASHLYN, Cricket Elizondo MD, 30 mg at 06/18/24 2105    gabapentin (NEURONTIN) capsule 100 mg, 100 mg, Oral, TID, Cricket Elizondo MD, 100 mg at 06/19/24 1007    HYDROmorphone HCl (DILAUDID) injection 0.2 mg, 0.2 mg, Intravenous, Q2H PRN, Cricket Elizondo MD    levETIRAcetam (KEPPRA) tablet 500 mg, 500 mg, Oral, BID, Cricket Elizondo MD, 500 mg at 06/19/24 1008    levothyroxine tablet 125 mcg, 125 mcg, Oral, Early Morning, Cricket Elizondo MD, 125 mcg at 06/19/24 0519    methocarbamol (ROBAXIN) tablet 500 mg, 500 mg, Oral, Q6H ASHLYN, Cricket Elizondo MD, 500 mg at 06/19/24 1424    naloxone (NARCAN) 0.04 mg/mL syringe 0.04 mg, 0.04 mg, Intravenous, Q1MIN PRN, Cricket Elizondo MD    ondansetron (ZOFRAN) injection 4 mg, 4 mg, Intravenous, Q4H PRN, Cricket Elizondo MD    oxyCODONE (ROXICODONE) split tablet 2.5 mg, 2.5 mg, Oral, Q4H PRN, 2.5 mg at 06/18/24  2000 **OR** oxyCODONE (ROXICODONE) IR tablet 5 mg, 5 mg, Oral, Q4H PRN, Cricket Elizondo MD, 5 mg at 06/17/24 1012    phenylephrine (KENJI-SYNEPHRINE) 100 mg (DOUBLE CONCENTRATION) IV in sodium chloride 0.9% 250 mL,  mcg/min, Intravenous, Titrated, Capri Burton MD, Last Rate: 21 mL/hr at 06/19/24 0806, 140 mcg/min at 06/19/24 0806    polyethylene glycol (MIRALAX) packet 17 g, 17 g, Oral, Daily, Cricket Elizondo MD, 17 g at 06/19/24 1006    senna-docusate sodium (SENOKOT S) 8.6-50 mg per tablet 1 tablet, 1 tablet, Oral, HS, Cricket Elizondo MD, 1 tablet at 06/18/24 2106    Past Medical History:   Diagnosis Date    Arthritis     Chronic cough     Disease of thyroid gland     Hypoparathyroidism (HCC)     continue taking calcium and vitamin D, will check CMP, PTH level and Vitamin D level    Pneumonia of right middle lobe due to Streptococcus pneumoniae (HCC) 11/30/2018       Past Surgical History:   Procedure Laterality Date    DECOMPRESSION SPINE CERVICAL POSTERIOR N/A 6/16/2024    Procedure: DECOMPRESSION SPINE CERVICAL POSTERIOR C2-T1 with fusion navigated with Oarm;  Surgeon: Endy Velasquez MD;  Location: [facility];  Service: Neurosurgery    FRACTURE SURGERY      Open Treatment of Each Proximal Finger Phalanx      Family History   Problem Relation Age of Onset    No Known Problems Mother     No Known Problems Father     No Known Problems Sister     No Known Problems Brother     No Known Problems Son     Learning disabilities Daughter     Asthma Daughter     Seizures Daughter     No Known Problems Maternal Grandmother     No Known Problems Maternal Grandfather     No Known Problems Paternal Grandmother     No Known Problems Paternal Grandfather     No Known Problems Maternal Aunt     No Known Problems Maternal Uncle     No Known Problems Paternal Aunt     No Known Problems Paternal Uncle     No Known Problems Cousin        Social History available currently in EMR:  (See additional SH as outlined  "above)   Social History     Socioeconomic History    Marital status:      Spouse name: None    Number of children: 1    Years of education: None    Highest education level: None   Occupational History    Occupation:    Tobacco Use    Smoking status: Former    Smokeless tobacco: Never    Tobacco comments:     pt \"tried it when he was a teenager but did not like them\"   Vaping Use    Vaping status: Never Used   Substance and Sexual Activity    Alcohol use: Yes     Comment: occasionally    Drug use: No    Sexual activity: Not Currently   Other Topics Concern    None   Social History Narrative    None     Social Determinants of Health     Financial Resource Strain: Low Risk  (2/23/2024)    Overall Financial Resource Strain (CARDIA)     Difficulty of Paying Living Expenses: Not hard at all   Food Insecurity: No Food Insecurity (6/17/2024)    Hunger Vital Sign     Worried About Running Out of Food in the Last Year: Never true     Ran Out of Food in the Last Year: Never true   Transportation Needs: No Transportation Needs (6/17/2024)    PRAPARE - Transportation     Lack of Transportation (Medical): No     Lack of Transportation (Non-Medical): No   Physical Activity: Not on file   Stress: Not on file   Social Connections: Not on file   Intimate Partner Violence: Not on file   Housing Stability: Low Risk  (6/17/2024)    Housing Stability Vital Sign     Unable to Pay for Housing in the Last Year: No     Number of Times Moved in the Last Year: 0     Homeless in the Last Year: No       Physical Examination:   Temp:  [98.2 °F (36.8 °C)-99.3 °F (37.4 °C)] 99.2 °F (37.3 °C)  HR:  [44-72] 64  Resp:  [8-23] 12  BP: ()/(63-90) 118/72  Arterial Line BP: ()/() 128/122  General: Awake, alert in NAD  HENT: NCAT, MMM  Neck: Supple, no lymphadenopathy  Respiratory: Unlabored breathing, breath sounds equal, Lungs CTA, no wheezes, rales, or rhonchi  Cardiovascular: Regular rate and rhythm, no murmurs, " rubs, or gallops  Gastrointestinal: Soft, non-tender, non-distended, normoactive bowel sounds  Genitourinary: No poon  SkiN/MSK/Extremities:  Distal extremities appropriately warm, normal cap refill distally, no cyanosis or calf edema, no calf tenderness to palpation  Neurologic/Psych:   MENTAL STATUS: awake, oriented to person, place, time, and situation  Affect: Somewhat flattened    CN II-XII: grossly intact   Strength/MMT:  Prox BUE 5-/5 R EF.EE 4+, R FF/WE 3+, L WE 3 FF 2+  Impaired finger/hand coordination     Data:  Lab Results   Component Value Date    HGB 13.2 06/19/2024    HCT 39.1 06/19/2024    WBC 14.45 (H) 06/19/2024     (L) 01/05/2015    K 4.0 06/19/2024    K 3.6 01/05/2015    CL 94 (L) 06/19/2024    CL 97 (L) 01/05/2015    GLUCOSE 152 (H) 06/16/2024    GLUCOSE 90 01/05/2015    CREATININE 0.61 06/19/2024    CREATININE 0.91 01/05/2015    BUN 11 06/19/2024    BUN 11 01/05/2015         CT head wo contrast    Result Date: 6/18/2024  Impression: 1. Near completely resolved parafalcine subarachnoid hemorrhage with trace residual subarachnoid hemorrhage in the frontoparietal regions medially. No mass effect or hydrocephalus. 2. No large territorial infarction. Workstation performed: [id number]     XR chest portable ICU    Result Date: 6/18/2024  Impression: No acute cardiopulmonary disease. Right IJ line terminates in the SVC. Workstation performed: [id number]     XR spine cervical 2 or 3 vw injury    Result Date: 6/17/2024  Impression: Fluoroscopy provided for procedure guidance. Please refer to the separate procedure note for additional details. Workstation performed: [id number]     CT head wo contrast    Result Date: 6/16/2024  Impression: Trace subarachnoid hemorrhage within the interhemispheric fissure is slightly decreased in conspicuity No parenchymal or subdural hematoma. Workstation performed: [id number]     XR chest portable ICU    Result Date: 6/16/2024  Impression: No acute cardiopulmonary  disease. No central line. No pneumothorax. Workstation performed: [id number]     MRI cervical spine wo contrast    Result Date: 6/15/2024  Impression: Congenital canal stenosis with superimposed degenerative spondylosis . Most pronounced at C3-4 and C5-C6 with moderate to severe canal stenosis and mild cord compression. Evaluation of cord signal is limited due to artifact. No definite abnormal cord signal but mild cord edema would be difficult to exclude Severe bilateral foraminal stenosis also seen at C3-4 and C5-C6. Mild to moderate canal stenosis at other levels Workstation performed: [id number]     CT spine cervical without contrast    Result Date: 6/15/2024  Impression: No cervical spine fracture or traumatic malalignment. Posterior osteophyte disc complexes C3-C4 and C5-C6 contributes to at least moderate canal stenosis and severe bilateral foraminal narrowing at these levels. Consider nonemergent follow-up MRI cervical spine without contrast for further evaluation. Ectatic right carotid bifurcation and proximal cervical internal carotid artery with retropharyngeal course, similar to CT neck with contrast 10/12/2011. Please see same day CTA head with contrast, CT head without contrast, CT thoracic spine without contrast for further evaluation. The study was marked in EPIC for immediate notification. Workstation performed: [id number]     CTA head w wo contrast    Result Date: 6/15/2024  Impression: Negative CTA head traumatic vascular injury, aneurysm, large vessel occlusion, high-grade stenosis, or dissection. Partially imaged ectatic right carotid bifurcation and proximal cervical internal carotid artery with retropharyngeal course, similar to CT neck with contrast 10/12/2011. Additional chronic/incidental findings as detailed above. Please see earlier same day CT head without contrast and concurrent CT cervical spine without contrast. Workstation performed: [id number]     XR hand 3+ views RIGHT    Result Date:  6/15/2024  Impression: 1. No acute osseous abnormality. 2. Degenerative changes as described. Resident: ALLYSON AWAN I, the attending radiologist, have reviewed the images and agree with the final report above. Workstation performed: [id number]     XR hand 3+ views LEFT    Result Date: 6/15/2024  Impression: No acute osseous abnormality. Degenerative changes as described. Resident: ALLYSON AWAN I, the attending radiologist, have reviewed the images and agree with the final report above. Workstation performed: [id number]     CT spine thoracic without contrast    Result Date: 6/15/2024  Impression: No acute osseous abnormality of thoracic spine. Diffuse idiopathic skeletal hyperostosis (DISH). I personally discussed this study with Travis Powell on 6/15/2024 10:08 AM. Workstation performed: [id number]     CT head wo contrast    Result Date: 6/15/2024  Impression: New acute trace subarachnoid hemorrhage in bilateral parafalcine regions. Recommend dedicated CTA head with contrast for further evaluation. New acute trace parafalcine subdural hematoma. I personally discussed this study with Travis Powell on 6/15/2024 10:08 AM. Workstation performed: [id number]       Pertinent labs and imaging reviewed.      Patient seen on date of service listed.    80 minutes or greater spent for this encounter which included a combination of face-to-face time with patient and non-face-to-face time which in part specifically includes management of SCI/TBI.  Face-to-face time included extended discussion with patient regarding current condition, medical history, mood, medical/rehabilitation management, and disposition.  Non face-to-face time included coordination of care with patient's co-managing AP and/or physician(s) thru communication and/or review of their recent documentation as well as reviewing vitals, bowel/bladder function, recent labs, diagnostic imaging, and notes from therapy, CM, and nursing.      I personally performed  the required components and examined the patient myself in person on 6/19/24.    Thank you for allowing the PM&R service to participate in the care of this patient. We will continue to follow Luis Forrester's progress with you. Please do not hesitate to call with questions or concerns.    Luke Suggs MD, MS  [facility]  Physical Medicine and Rehabilitation  Brain Injury Medicine

## 2024-06-19 NOTE — ASSESSMENT & PLAN NOTE
3 Days Post-Op Procedure(s):  DECOMPRESSION SPINE CERVICAL POSTERIOR C2-T1 with fusion navigated with Oarm (Dr. Lopez, 6/16/2024)  Patient describes query syncopal episode and falls  New right hand weakness and pain with bilateral Mohit's and right-sided wrist weakness    Imaging:  MRI cervical spine without 6/15/2024:Congenital canal stenosis with superimposed degenerative spondylosis .Most pronounced at C3-4 and C5-C6 with moderate to severe canal stenosis and mild cord compression. Evaluation of cord signal is limited due to artifact. No definite abnormal cord signal but mild cord edema would be difficult to exclude. Severe bilateral foraminal stenosis also seen at C3-4 and C5-C6.Mild to moderate canal stenosis at other levels    Plan:  Continue to monitor neurological exam.   Maintain MAPs>85 x days (3/5) on Rony  Hemovac drain 15ml/24hrs-removed this morning without difficulty, 1 suture tied down in place.  Clindamycin x 2 weeks for surgical prophylaxis.  Pain control -APS on board for methadone taper  Medical management and pain control per primary team  Mobilize with physical and Occupational Therapy - no collar required  Upright x-rays cervical spine AP lateral ordered and pending  DVT prophylaxis: Bilateral SCDs and lovenox-held this morning for drain removal    Neurosurgery will continue to follow, call with any further questions or concerns.

## 2024-06-19 NOTE — CASE MANAGEMENT
Case Management Discharge Planning Note    Patient name Luis Forrester  Location ICU 04/ICU 04 MRN [id number]  : 1970 Date 2024       Current Admission Date: 6/15/2024  Current Admission Diagnosis:TBI (traumatic brain injury) ([facility])   Patient Active Problem List    Diagnosis Date Noted Date Diagnosed    TBI (traumatic brain injury) ([facility]) 06/15/2024     Syncope and collapse 06/15/2024     Bilateral hand pain 06/15/2024     SDH (subdural hematoma) ([facility]) 06/15/2024     Right hand weakness 06/15/2024     Encounter for colorectal cancer screening 2024     Encounter for immunization 2024     Vitamin D deficiency 2024     Bilateral impacted cerumen 10/13/2021     Reactive airway disease 03/15/2018     Osteoarthritis of both hands 2015     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     Chronic low back pain 2015     Mass of parotid gland 2015     Lumbar radiculopathy 2015     Hyperlipidemia 2014     Hypothyroidism 2014     Allergic rhinitis 2012       LOS (days): 4  Geometric Mean LOS (GMLOS) (days):   Days to GMLOS:     OBJECTIVE:  Risk of Unplanned Readmission Score: 14.16         Current admission status: Inpatient   Preferred Pharmacy:   [id number] - [place], [place] [address]  [place] [address]  Phone: [phone] Fax: [phone]    Primary Care Provider: Joceline Shook PA-C    Primary Insurance: [place] [employer]  Secondary Insurance:     DISCHARGE DETAILS:    Cm spoke to pt's dtr regarding d/c plan.  There are two SNF following. CM reviewed [facility] and she and pt were intrigued. CM placed referral

## 2024-06-19 NOTE — ASSESSMENT & PLAN NOTE
Parafalcine SDH with bilateral SAH  Patient presented after being found down by his daughter, patient is unsure what happened other than he got up to go to the bathroom.  Patient denies any blood thinner use.    Imaging:  CT head without 6/15/24:New acute trace subarachnoid hemorrhage in bilateral parafalcine regions. Recommend dedicated CTA head with contrast for further evaluation.New acute trace parafalcine subdural hematoma.  Repeat CT head wo 6/15/2024: Trace subarachnoid hemorrhage within the interhemispheric fissure decreased.  No parenchymal subdural hematoma.  CT head without 6/18/2024:Near completely resolved parafalcine subarachnoid hemorrhage with trace residual subarachnoid hemorrhage in the frontoparietal regions medially. No mass effect or hydrocephalus.No large territorial infarction.    Plan:  Continue frequent neurological checks.   STAT CT head with any neurological decline including drop GCS of 2pts within 1 hr.  Seizure prophylaxis per trauma team  Hold all full antiplatelet and anticoagulation medications for 2 weeks  Medical management and pain control per primary team  Mobilize with PT/OT  See above plan    Patient can follow-up as needed for this problem

## 2024-06-19 NOTE — PROGRESS NOTES
Addendum: Attempted to remove drain, appears as though patient had a syncopal episode and was unresponsive sitting on edge of bed and was laid flat and started to feel better and was alert and oriented x 3.  Rolled patient on his right side and removed drain without difficulty, 1 suture tied down in place new dressing placed on incision.        [facility]  Progress Note  Name: Luis Forrester I  MRN: [id number]  Unit/Bed#: ICU 04 I Date of Admission: 6/15/2024   Date of Service: 6/19/2024 I Hospital Day: 4    Assessment & Plan   Right hand weakness  Assessment & Plan  3 Days Post-Op Procedure(s):  DECOMPRESSION SPINE CERVICAL POSTERIOR C2-T1 with fusion navigated with Oarm (Dr. Lopez, 6/16/2024)  Patient describes query syncopal episode and falls  New right hand weakness and pain with bilateral Mohit's and right-sided wrist weakness    Imaging:  MRI cervical spine without 6/15/2024:Congenital canal stenosis with superimposed degenerative spondylosis .Most pronounced at C3-4 and C5-C6 with moderate to severe canal stenosis and mild cord compression. Evaluation of cord signal is limited due to artifact. No definite abnormal cord signal but mild cord edema would be difficult to exclude. Severe bilateral foraminal stenosis also seen at C3-4 and C5-C6.Mild to moderate canal stenosis at other levels    Plan:  Continue to monitor neurological exam.   Maintain MAPs>85 x days (3/5) on Rony  Hemovac drain 15ml/24hrs-removed this morning without difficulty, 1 suture tied down in place.  Clindamycin x 2 weeks for surgical prophylaxis.  Pain control -APS on board for methadone taper  Medical management and pain control per primary team  Mobilize with physical and Occupational Therapy - no collar required  Upright x-rays cervical spine AP lateral ordered and pending  DVT prophylaxis: Bilateral SCDs and lovenox-held this morning for drain removal    Neurosurgery will continue to follow,  "call with any further questions or concerns.      SDH (subdural hematoma) ([facility])  Assessment & Plan  Parafalcine SDH with bilateral SAH  Patient presented after being found down by his daughter, patient is unsure what happened other than he got up to go to the bathroom.  Patient denies any blood thinner use.    Imaging:  CT head without 6/15/24:New acute trace subarachnoid hemorrhage in bilateral parafalcine regions. Recommend dedicated CTA head with contrast for further evaluation.New acute trace parafalcine subdural hematoma.  Repeat CT head wo 6/15/2024: Trace subarachnoid hemorrhage within the interhemispheric fissure decreased.  No parenchymal subdural hematoma.  CT head without 6/18/2024:Near completely resolved parafalcine subarachnoid hemorrhage with trace residual subarachnoid hemorrhage in the frontoparietal regions medially. No mass effect or hydrocephalus.No large territorial infarction.    Plan:  Continue frequent neurological checks.   STAT CT head with any neurological decline including drop GCS of 2pts within 1 hr.  Seizure prophylaxis per trauma team  Hold all full antiplatelet and anticoagulation medications for 2 weeks  Medical management and pain control per primary team  Mobilize with PT/OT  See above plan    Patient can follow-up as needed for this problem             Subjective/Objective     Chief Complaint: \"I didn't have coffee yet\"    Subjective: Patient states he has no pain today.  He continues to have bilateral hand weakness.  He offers no other complaints.  He remains on isak for blood pressure management    Objective: Patient comfortably laying in bed, NAD.    I/O         06/17 0701  06/18 0700 06/18 0701  06/19 0700 06/19 0701  06/20 0700    P.O. 2040 2160     I.V. (mL/kg) 2174.4 (27.6) 602.7 (7.6)     IV Piggyback 112.3 225.6     Total Intake(mL/kg) 4326.7 (54.8) 2988.3 (37.9)     Urine (mL/kg/hr) 3305 (1.7) 4490 (2.4)     Drains 180 15     Blood       Total Output 3485 4505     Net " "+841.7 -1516.7                    Invasive Devices       Central Venous Catheter Line  Duration             [facility] Central Lines 06/17/24 Triple 16cm Right Internal jugular 1 day              Peripheral Intravenous Line  Duration             Peripheral IV 06/15/24 Right Antecubital 4 days    Peripheral IV 06/16/24 Distal;Dorsal (posterior);Left Forearm 3 days    Peripheral IV 06/16/24 Left Hand 3 days              Arterial Line  Duration             Arterial Line 06/16/24 Right Radial 2 days              Drain  Duration             Closed/Suction Drain Posterior;Midline;Proximal Back Accordion 10 Fr. 2 days    Urethral Catheter Latex;Straight-tip 16 Fr. 2 days                    Physical Exam:  Vitals: Blood pressure 121/75, pulse (!) 54, temperature 99.3 °F (37.4 °C), temperature source Oral, resp. rate 18, height 5' 6\" (1.676 m), weight 78.9 kg (174 lb), SpO2 100%.,Body mass index is 28.08 kg/m².    Hemodynamic Monitoring: MAP: Arterial Line MAP (mmHg): 126 mmHg    General appearance: alert, appears stated age, cooperative and no distress  Head: Normocephalic, without obvious abnormality, atraumatic  Eyes: EOMI, PERRL, conjugate gaze  Neck: Posterior cervical dressing intact with some old bloody drainage.  Hemovac drain removed without difficulty, 1 suture tied down in place.  Patient tolerated well.  Placed new dressing  Lungs: non labored breathing  Heart: regular heart rate  Neurologic:   Mental status: Alert, oriented x3, thought content appropriate, speech is clear, following commands  Cranial nerves: grossly intact (Cranial nerves II-XII)  Sensory: normal to light touch in all extremities x 4  Motor: moving all extremities without focal weakness except distal hand weakness.  Left  3/5 and right  4 -/5  Reflexes: 2+ and symmetric, bilateral Mohit's noted but no clonus appreciated      Lab Results:  Results from last 7 days   Lab Units 06/19/24  0519 06/18/24  0439 06/17/24  0107 06/16/24  0554 " "06/15/24  0856   WBC Thousand/uL 14.45* 16.27* 17.82*   < > 11.11*   HEMOGLOBIN g/dL 13.2 12.5 13.4   < > 13.3   I STAT HEMOGLOBIN   --   --   --    < >  --    HEMATOCRIT % 39.1 38.2 39.3   < > 40.0   HEMATOCRIT, ISTAT   --   --   --    < >  --    PLATELETS Thousands/uL 232 236 275   < > 160   SEGS PCT % 67  --   --   --  82*   MONO PCT % 11  --   --   --  7   EOS PCT % 2  --   --   --  0    < > = values in this interval not displayed.     Results from last 7 days   Lab Units 06/19/24  0519 06/18/24 1829 06/18/24  1457 06/17/24  0107 06/16/24  1143 06/16/24  0930 06/16/24  0554 06/15/24  0856   SODIUM mmol/L 132* 134* 134*   < >  --   --    < > 136   POTASSIUM mmol/L 3.9 4.0 4.1   < >  --   --    < > 3.6   CHLORIDE mmol/L 93* 95* 93*   < >  --   --    < > 101   CO2 mmol/L 31 31 32   < >  --   --    < > 27   CO2, I-STAT mmol/L  --   --   --   --  25 27   < >  --    BUN mg/dL 11 10 8   < >  --   --    < > 13   CREATININE mg/dL 0.67 0.70 0.63   < >  --   --    < > 0.76   CALCIUM mg/dL 6.7* 7.1* 7.2*   < >  --   --    < > 6.6*   ALK PHOS U/L  --   --   --   --   --   --   --  80   ALT U/L  --   --   --   --   --   --   --  26   AST U/L  --   --   --   --   --   --   --  31   GLUCOSE, ISTAT mg/dl  --   --   --   --  152* 106  --   --     < > = values in this interval not displayed.     Results from last 7 days   Lab Units 06/19/24 0519 06/18/24 1829 06/18/24  0439   MAGNESIUM mg/dL 2.0 2.2 1.5*     Results from last 7 days   Lab Units 06/18/24  1829 06/18/24  0439 06/16/24  0758   PHOSPHORUS mg/dL 4.9* 3.1 3.5     Results from last 7 days   Lab Units 06/16/24  0554   INR  0.99   PTT seconds 28     No results found for: \"TROPONINT\"  ABG:No results found for: \"PHART\", \"PNQ0QMB\", \"PO2ART\", \"JQB5QPE\", \"V8CCBNNQ\", \"BEART\", \"SOURCE\"    Imaging Studies: I have personally reviewed pertinent reports.   and I have personally reviewed pertinent films in PACS    CT head wo contrast    Result Date: 6/18/2024  Impression: 1. Near " completely resolved parafalcine subarachnoid hemorrhage with trace residual subarachnoid hemorrhage in the frontoparietal regions medially. No mass effect or hydrocephalus. 2. No large territorial infarction. Workstation performed: [id number]     XR chest portable ICU    Result Date: 6/18/2024  Impression: No acute cardiopulmonary disease. Right IJ line terminates in the SVC. Workstation performed: [id number]     XR spine cervical 2 or 3 vw injury    Result Date: 6/17/2024  Impression: Fluoroscopy provided for procedure guidance. Please refer to the separate procedure note for additional details. Workstation performed: [id number]     CT head wo contrast    Result Date: 6/16/2024  Impression: Trace subarachnoid hemorrhage within the interhemispheric fissure is slightly decreased in conspicuity No parenchymal or subdural hematoma. Workstation performed: [id number]     XR chest portable ICU    Result Date: 6/16/2024  Impression: No acute cardiopulmonary disease. No central line. No pneumothorax. Workstation performed: [id number]     MRI cervical spine wo contrast    Result Date: 6/15/2024  Impression: Congenital canal stenosis with superimposed degenerative spondylosis . Most pronounced at C3-4 and C5-C6 with moderate to severe canal stenosis and mild cord compression. Evaluation of cord signal is limited due to artifact. No definite abnormal cord signal but mild cord edema would be difficult to exclude Severe bilateral foraminal stenosis also seen at C3-4 and C5-C6. Mild to moderate canal stenosis at other levels Workstation performed: [id number]     CT spine cervical without contrast    Result Date: 6/15/2024  Impression: No cervical spine fracture or traumatic malalignment. Posterior osteophyte disc complexes C3-C4 and C5-C6 contributes to at least moderate canal stenosis and severe bilateral foraminal narrowing at these levels. Consider nonemergent follow-up MRI cervical spine without contrast for further  evaluation. Ectatic right carotid bifurcation and proximal cervical internal carotid artery with retropharyngeal course, similar to CT neck with contrast 10/12/2011. Please see same day CTA head with contrast, CT head without contrast, CT thoracic spine without contrast for further evaluation. The study was marked in EPIC for immediate notification. Workstation performed: [id number]     CTA head w wo contrast    Result Date: 6/15/2024  Impression: Negative CTA head traumatic vascular injury, aneurysm, large vessel occlusion, high-grade stenosis, or dissection. Partially imaged ectatic right carotid bifurcation and proximal cervical internal carotid artery with retropharyngeal course, similar to CT neck with contrast 10/12/2011. Additional chronic/incidental findings as detailed above. Please see earlier same day CT head without contrast and concurrent CT cervical spine without contrast. Workstation performed: [id number]     XR hand 3+ views RIGHT    Result Date: 6/15/2024  Impression: 1. No acute osseous abnormality. 2. Degenerative changes as described. Resident: ALLYSON AWAN I, the attending radiologist, have reviewed the images and agree with the final report above. Workstation performed: [id number]     XR hand 3+ views LEFT    Result Date: 6/15/2024  Impression: No acute osseous abnormality. Degenerative changes as described. Resident: ALLYSON AWAN I, the attending radiologist, have reviewed the images and agree with the final report above. Workstation performed: [id number]     CT spine thoracic without contrast    Result Date: 6/15/2024  Impression: No acute osseous abnormality of thoracic spine. Diffuse idiopathic skeletal hyperostosis (DISH). I personally discussed this study with Travis Powell on 6/15/2024 10:08 AM. Workstation performed: [id number]     CT head wo contrast    Result Date: 6/15/2024  Impression: New acute trace subarachnoid hemorrhage in bilateral parafalcine regions. Recommend dedicated  CTA head with contrast for further evaluation. New acute trace parafalcine subdural hematoma. I personally discussed this study with Travis Powell on 6/15/2024 10:08 AM. Workstation performed: [id number]       EKG, Pathology, and Other Studies: I have personally reviewed pertinent reports.      VTE Pharmacologic Prophylaxis: Enoxaparin (Lovenox)    VTE Mechanical Prophylaxis: sequential compression device

## 2024-06-19 NOTE — CONSULTS
Consultation - Cardiology  Luis Forrester 53 y.o. male MRN: [id number]  Unit/Bed#: ICU 04 Encounter: [id number]      Inpatient consult to Electrophysiology  Consult performed by: Dimas Baez MD  Consult ordered by: Capri Burton MD          History of Present Illness   Physician Requesting Consult: Papa Diamond,*  Reason for Consult / Principal Problem: syncope, wide complex tachycardia      Assessment & Plan   Assessment/Plan:  53 y.o. year old male with PMH of hypothyroidism who presented with syncope resulting in TBI (subarrachnoid hemorrhage and subdural hematoma) s/p T1 decompression and fusion-- EP consulted for bradycardia.     Problems  #Syncope  #junctional bradycardia  #wide complex tachycardia  #TBI- subarachnoid hemorrhage, subdural hematoma  #cervical stenosis with myelopathy s/p cervical decompression and fusion    Plan  The patient has had multiple episodes of syncope in the past six months, the most recent resulting in TBI (SAH, SDH) and is now s/p cervical spine surgery. Patient did have symptomatic junctional bradycardia when his cervical drain when being removed and he also had a self limited run of asymptomatic wide complex tachycardia on tele. His current injuries and post surgical state from cervical spine surgery can affect the sympathetic chain and potentially cause transient bradycardia.  However with his recent history of multiple episodes of syncope- it is concerning that there may be an arrhythmogenic cause of syncope necessitating pacemaker . Please continue to monitor the patient and work up for other causes of loss of consciousness-- his daughter did describe some episodes which may be consistent with absence seizures- consider neuro consult.   -continue to monitor on tele  -we will follow and discuss pacemaker which would be later in this admission (consider pocket EP study at time of implant to see if inducible VT given run of wide complex tachycardia).          HPI: Luis Forrester is a 53 y.o. year old male with PMH of hypothyroidism who presented with syncope resulting in TBI (subarrachnoid hemorrhage and subdural hematoma) s/p cervical  decompression and fusion-- EP consulted for bradycardia.     He notes that the syncope episodes have happened a few times over the last 6 months.  He notes that they happen unexpectedly and randomly with no preceding symptoms.  Last night event happened after he returned home around 1 AM.  He blacked out and does not recall how long he was out before being found by his daughter.    He was admitted on 6/15 and on 6/16 he had the cervical spine surgery.  Morning of 6/17 the patient an episode of lightheadedness when he was having his surgical drain removed and he was sitting up at the side of the bed.  Telemetry showed a junctional bradycardia with a HR around 40 bpm. Tele also showed a run of wide complex tachycardia in which during that time the patient denied any symptoms.     TTE from 6/17:  Left Ventricle: Left ventricular cavity size is normal. Wall thickness is normal. The left ventricular ejection fraction is 60%. Systolic function is normal. Wall motion is normal. Diastolic function is normal.  Right Ventricle: Right ventricular cavity size is mildly dilated.  Right Atrium: The atrium is mildly dilated.  Atrial Septum: There is a probable patent foramen ovale noted at rest with predominant left to right shunting using color Doppler. 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  It is predominately within the right atrial cavity.  Aorta: The aortic root is mildly dilated. The aortic root is 4.30 cm.  Historical Information   Past Medical History:   Diagnosis Date    Arthritis     Chronic cough     Disease of thyroid gland     Hypoparathyroidism (HCC)     continue taking calcium and vitamin D, will check CMP, PTH level and Vitamin D level    Pneumonia of right middle lobe due to Streptococcus pneumoniae (HCC) 11/30/2018     Past  "Surgical History:   Procedure Laterality Date    DECOMPRESSION SPINE CERVICAL POSTERIOR N/A 6/16/2024    Procedure: DECOMPRESSION SPINE CERVICAL POSTERIOR C2-T1 with fusion navigated with Oa;  Surgeon: Endy Velasquez MD;  Location: [facility];  Service: Neurosurgery    FRACTURE SURGERY      Open Treatment of Each Proximal Finger Phalanx     Social History     Substance and Sexual Activity   Alcohol Use Yes    Comment: occasionally     Social History     Substance and Sexual Activity   Drug Use No     Social History     Tobacco Use   Smoking Status Former   Smokeless Tobacco Never   Tobacco Comments    pt \"tried it when he was a teenager but did not like them\"     Family History: no family history of heart disease    Meds/Allergies   Hospital Medications:   Current Facility-Administered Medications   Medication Dose Route Frequency    acetaminophen (TYLENOL) tablet 975 mg  975 mg Oral Q8H ASHLYN    albuterol (PROVENTIL HFA,VENTOLIN HFA) inhaler 2 puff  2 puff Inhalation Q4H PRN    bisacodyl (DULCOLAX) rectal suppository 10 mg  10 mg Rectal Once    cephalexin (KEFLEX) capsule 500 mg  500 mg Oral Q6H ASHLYN    Cholecalciferol (VITAMIN D3) tablet 1,000 Units  1,000 Units Oral Daily    enoxaparin (LOVENOX) subcutaneous injection 30 mg  30 mg Subcutaneous Q12H ASHLYN    gabapentin (NEURONTIN) capsule 100 mg  100 mg Oral TID    HYDROmorphone HCl (DILAUDID) injection 0.2 mg  0.2 mg Intravenous Q2H PRN    levETIRAcetam (KEPPRA) tablet 500 mg  500 mg Oral BID    levothyroxine tablet 125 mcg  125 mcg Oral Early Morning    methocarbamol (ROBAXIN) tablet 500 mg  500 mg Oral Q6H ASHLYN    naloxone (NARCAN) 0.04 mg/mL syringe 0.04 mg  0.04 mg Intravenous Q1MIN PRN    ondansetron (ZOFRAN) injection 4 mg  4 mg Intravenous Q4H PRN    oxyCODONE (ROXICODONE) split tablet 2.5 mg  2.5 mg Oral Q4H PRN    Or    oxyCODONE (ROXICODONE) IR tablet 5 mg  5 mg Oral Q4H PRN    phenylephrine (KENJI-SYNEPHRINE) 100 mg (DOUBLE CONCENTRATION) IV in sodium " "chloride 0.9% 250 mL   mcg/min Intravenous Titrated    polyethylene glycol (MIRALAX) packet 17 g  17 g Oral Daily    senna-docusate sodium (SENOKOT S) 8.6-50 mg per tablet 1 tablet  1 tablet Oral HS    sodium chloride 0.9 % bolus 1,000 mL  1,000 mL Intravenous Once     Home Medications:   Medications Prior to Admission:     acetaminophen (TYLENOL) 650 mg CR tablet    albuterol (2.5 mg/3 mL) 0.083 % nebulizer solution    albuterol (Ventolin HFA) 90 mcg/act inhaler    benzonatate (TESSALON PERLES) 100 mg capsule    cetirizine (ZyrTEC) 10 mg tablet    cholecalciferol (VITAMIN D3) 25 mcg (1,000 units) tablet    Diclofenac Sodium (VOLTAREN) 1 %    hydrocortisone 1 % ointment    levothyroxine 125 mcg tablet    Allergies   Allergen Reactions    Chlorine Rash       Objective   Vitals: Blood pressure 102/70, pulse 72, temperature 99.3 °F (37.4 °C), temperature source Oral, resp. rate 22, height 5' 6\" (1.676 m), weight 78.9 kg (174 lb), SpO2 100%.  Orthostatic Blood Pressures      Flowsheet Row Most Recent Value   Blood Pressure 102/70 filed at 06/19/2024 1000   Patient Position - Orthostatic VS Lying filed at 06/19/2024 0000              Intake/Output Summary (Last 24 hours) at 6/19/2024 1041  Last data filed at 6/19/2024 1010  Gross per 24 hour   Intake 2236.7 ml   Output 4155 ml   Net -1918.3 ml       Invasive Devices       Central Venous Catheter Line  Duration             CVC Central Lines 06/17/24 Triple 16cm Right Internal jugular 1 day              Peripheral Intravenous Line  Duration             Peripheral IV 06/15/24 Right Antecubital 4 days    Peripheral IV 06/16/24 Distal;Dorsal (posterior);Left Forearm 3 days    Peripheral IV 06/16/24 Left Hand 3 days              Arterial Line  Duration             Arterial Line 06/16/24 Right Radial 3 days              Drain  Duration             Urethral Catheter Latex;Straight-tip 16 Fr. 3 days    Closed/Suction Drain Posterior;Midline;Proximal Back Accordion 10 Fr. 2 " days                    Review of Systems:  ROS  ROS as noted above, otherwise 12 point review of systems was performed and is negative.     Physical Exam:   Physical Exam  Constitutional:       Appearance: Normal appearance.   HENT:      Head: Normocephalic and atraumatic.   Neck:      Vascular: No JVD   Cardiovascular:      Rate and Rhythm: RRR, no murmurs  Pulmonary:      Effort: CTA-b  Abdominal:      General: Abdomen is flat. Bowel sounds are normal.      Palpations: Abdomen is soft.   Neurological:      Mental Status: He is alert and oriented to person, place, and time.   Psychiatric:         Behavior: Behavior normal.       Lab Results: I have personally reviewed pertinent lab results.    Results from last 7 days   Lab Units 06/19/24 0519 06/18/24  0439 06/17/24  0107   WBC Thousand/uL 14.45* 16.27* 17.82*   HEMOGLOBIN g/dL 13.2 12.5 13.4   HEMATOCRIT % 39.1 38.2 39.3   PLATELETS Thousands/uL 232 236 275     Results from last 7 days   Lab Units 06/19/24 0519 06/18/24 1829 06/18/24  1457 06/17/24  0107 06/16/24  1143   POTASSIUM mmol/L 3.9 4.0 4.1   < >  --    CHLORIDE mmol/L 93* 95* 93*   < >  --    CO2 mmol/L 31 31 32   < >  --    CO2, I-STAT mmol/L  --   --   --   --  25   BUN mg/dL 11 10 8   < >  --    CREATININE mg/dL 0.67 0.70 0.63   < >  --    GLUCOSE, ISTAT mg/dl  --   --   --   --  152*   CALCIUM mg/dL 6.7* 7.1* 7.2*   < >  --     < > = values in this interval not displayed.     Results from last 7 days   Lab Units 06/16/24  0554   INR  0.99   PTT seconds 28     Results from last 7 days   Lab Units 06/19/24 0519 06/18/24 1829 06/18/24  0439   MAGNESIUM mg/dL 2.0 2.2 1.5*   ;t

## 2024-06-19 NOTE — PROGRESS NOTES
Progress Note - Acute Pain Service    Luis Forrester 53 y.o. male MRN: [id number]  Unit/Bed#: ICU 04 Encounter: [id number]      Luis Forrester is a 53 y.o. male status post syncope and collapse resulting in TBI and bilateral hand pain. Found to have mild cord compression secondary to degenerative spondylosis and congenital canal stenosis. Status post C3-C7 posterior cervical laminectomies, C2-T1 arthrodesis on 6/16/2024. Patient received 10 mg intraoperative methadone IV.      Bilateral hand pain  Assessment & Plan  Tylenol 975 mg p.o. every 8 hours scheduled.  Gabapentin 100 mg p.o. 3 times daily.  Robaxin 500 mg p.o. every 6 hours.  Oxycodone 2.5 mg p.o. every 4 hours as needed moderate pain or 5 mg p.o. every 4 hours as needed severe pain.  Change Dilaudid to 0.2 mg IV every 6 hours as needed breakthrough pain and discontinue within 24 hours.  Methadone taper as follows:  5 mg p.o. every 8 hours x 3 doses, then  5 mg p.o. every 12 hours x 2 doses, then  5 mg p.o. once (last dose today, 6/19/2024).  Bowel regimen to avoid opioid-induced constipation while on opioid pain medication.  At discharge, suggest the following:  Tylenol 975 mg p.o. every 8 hours as needed mild pain.  Gabapentin 100 mg p.o. 3 times daily.  Robaxin 500 mg p.o. every 6 hours as needed muscle spasm.  Oxycodone 2.5 mg p.o. every 6 hours as needed severe pain x 3 days.  Bowel regimen while on opioid pain medication.        [facility] will sign off at this time. Thank you for the consult. All opioids and other analgesics to be written at discretion of primary team. Please contact Acute Pain Service - via [employer] from [phone] with additional questions or concerns. See [employer] or Riely for additional contacts and after hours information.    Pain History  Current pain location(s):  Pain Score: 0  Pain Location/Orientation: Location: Neck  Pain Scale: Pain Assessment Tool: 0-10  24 hour history: Patient continues to deny pain.  Had last dose of  methadone this morning.    Opioid requirement previous 24 hours: Oxycodone 2.5 mg p.o. x 1.  Methadone 5 mg p.o. x 2.    Meds/Allergies   all current active meds have been reviewed, current meds:   Current Facility-Administered Medications   Medication Dose Route Frequency    acetaminophen (TYLENOL) tablet 975 mg  975 mg Oral Q8H On license of [facility]    albuterol (PROVENTIL HFA,VENTOLIN HFA) inhaler 2 puff  2 puff Inhalation Q4H PRN    bisacodyl (DULCOLAX) rectal suppository 10 mg  10 mg Rectal Once    cephalexin (KEFLEX) capsule 500 mg  500 mg Oral Q6H On license of [facility]    Cholecalciferol (VITAMIN D3) tablet 1,000 Units  1,000 Units Oral Daily    enoxaparin (LOVENOX) subcutaneous injection 30 mg  30 mg Subcutaneous Q12H On license of [facility]    gabapentin (NEURONTIN) capsule 100 mg  100 mg Oral TID    HYDROmorphone HCl (DILAUDID) injection 0.2 mg  0.2 mg Intravenous Q2H PRN    levETIRAcetam (KEPPRA) tablet 500 mg  500 mg Oral BID    levothyroxine tablet 125 mcg  125 mcg Oral Early Morning    methocarbamol (ROBAXIN) tablet 500 mg  500 mg Oral Q6H On license of [facility]    naloxone (NARCAN) 0.04 mg/mL syringe 0.04 mg  0.04 mg Intravenous Q1MIN PRN    ondansetron (ZOFRAN) injection 4 mg  4 mg Intravenous Q4H PRN    oxyCODONE (ROXICODONE) split tablet 2.5 mg  2.5 mg Oral Q4H PRN    Or    oxyCODONE (ROXICODONE) IR tablet 5 mg  5 mg Oral Q4H PRN    phenylephrine (KENJI-SYNEPHRINE) 100 mg (DOUBLE CONCENTRATION) IV in sodium chloride 0.9% 250 mL   mcg/min Intravenous Titrated    polyethylene glycol (MIRALAX) packet 17 g  17 g Oral Daily    senna-docusate sodium (SENOKOT S) 8.6-50 mg per tablet 1 tablet  1 tablet Oral HS    sodium chloride 0.9 % bolus 1,000 mL  1,000 mL Intravenous Once   , and PTA meds:   Prior to Admission Medications   Prescriptions Last Dose Informant Patient Reported? Taking?   Diclofenac Sodium (VOLTAREN) 1 %   No No   Sig: Apply 2 g topically 4 (four) times a day   acetaminophen (TYLENOL) 650 mg CR tablet   No No   Sig: Take 1 tablet (650 mg total) by mouth every  8 (eight) hours as needed for mild pain   albuterol (2.5 mg/3 mL) 0.083 % nebulizer solution   No No   Sig: Take 3 mL (2.5 mg total) by nebulization every 6 (six) hours as needed for wheezing or shortness of breath   albuterol (Ventolin HFA) 90 mcg/act inhaler   No No   Sig: Inhale 2 puffs every 4 (four) hours as needed for wheezing   benzonatate (TESSALON PERLES) 100 mg capsule   No No   Sig: Take 1 capsule (100 mg total) by mouth every 8 (eight) hours   cetirizine (ZyrTEC) 10 mg tablet   No No   Sig: Take 1 tablet (10 mg total) by mouth daily   cholecalciferol (VITAMIN D3) 25 mcg (1,000 units) tablet   No No   Sig: TAKE 1 TABLET BY MOUTH EVERY DAY   hydrocortisone 1 % ointment   No No   Sig: Apply topically 2 (two) times a day   levothyroxine 125 mcg tablet   No No   Sig: One tablet daily      Facility-Administered Medications: None       Allergies   Allergen Reactions    Chlorine Rash       Objective        Vitals:    06/19/24 0900 06/19/24 0920 06/19/24 0945 06/19/24 1000   BP: 111/73 91/63 131/75 102/70   BP Location: Left arm Left arm Left arm Left arm   Pulse: 72 (!) 44 58 72   Resp: 16 (!) 23 20 22   Temp:       TempSrc:       SpO2: 100% 98% 100% 100%   Weight:       Height:             Physical Exam  Vitals and nursing note reviewed.   Constitutional:       General: He is awake. He is not in acute distress.     Appearance: He is not ill-appearing, toxic-appearing or diaphoretic.   Skin:     General: Skin is warm and dry.   Neurological:      Mental Status: He is alert and oriented to person, place, and time.      GCS: GCS eye subscore is 4. GCS verbal subscore is 5. GCS motor subscore is 6.   Psychiatric:         Attention and Perception: Attention normal.         Speech: Speech normal.         Behavior: Behavior normal. Behavior is cooperative.           Lab Results:   Estimated Creatinine Clearance: 123 mL/min (by C-G formula based on SCr of 0.67 mg/dL).  Lab Results   Component Value Date    WBC 14.45  (H) 06/19/2024    HGB 13.2 06/19/2024    HCT 39.1 06/19/2024     06/19/2024         Component Value Date/Time     (L) 01/05/2015 1236    K 3.9 06/19/2024 0519    K 3.6 01/05/2015 1236    CL 93 (L) 06/19/2024 0519    CL 97 (L) 01/05/2015 1236    CO2 31 06/19/2024 0519    CO2 25 06/16/2024 1143    BUN 11 06/19/2024 0519    BUN 11 01/05/2015 1236    CREATININE 0.67 06/19/2024 0519    CREATININE 0.91 01/05/2015 1236         Component Value Date/Time    CALCIUM 6.7 (L) 06/19/2024 0519    CALCIUM 8.0 (L) 01/05/2015 1236    ALKPHOS 80 06/15/2024 0856    AST 31 06/15/2024 0856    ALT 26 06/15/2024 0856    TP 6.7 06/15/2024 0856    ALB 3.5 06/15/2024 0856       Imaging Studies/EKG: I have personally reviewed pertinent reports.       Counseling / Coordination of Care  Total floor / unit time spent today 30 minutes minutes. Greater than 50% of total time was spent with the patient and / or family counseling and / or coordination of care. A description of the counseling / coordination of care: Patient interview, physical examination, review of medical records, review of imaging and laboratory data, development of pain management plan, discussion of pain management plan with patient and primary service.    Please note that the APS provides consultative services regarding pain management only.  With the exception of ketamine and epidural infusions and except when indicated, final decisions regarding starting or changing doses of analgesic medications are at the discretion of the consulting service.    Tyler Whitlock PA-C   Acute Pain Service

## 2024-06-19 NOTE — PROGRESS NOTES
[facility]  Progress Note: Critical Care  Name: Luis Forrester 53 y.o. male I MRN: [id number]  Unit/Bed#: ICU 04 I Date of Admission: 6/15/2024   Date of Service: 6/19/2024 I Hospital Day: 4    Assessment & Plan   Neuro:   Diagnosis: SAH, parafalcine SDH, spinal stenosis, post-traumatic pain  POD 2 s/p C2 - T1 decompression and fusion with NSG  Plan:   STAT CTH for 2 point or greater decline in GCS or new focal neurologic deficit not previously identified on physical exam.   Continue MAP pushes >85mmHg for spinal cord perfusion  Keppra x7 days for seizure ppx  Appreciate APS consult   Continue oral multimodal pain control  IV dilaudid PRN for breakthrough pain  Continue methadone taper  Q2 hr neuro checks  No need for hard collar per NSG  Upright x-rays cervical spine AP lateral ordered and pending   Continue to monitor hemovac output; 15cc/24hr      Diagnosis: Delirium vs metabolic encephalopathy  Monitor BMP  Delirium precautions    CV:   Diagnosis: Syncope  TTE from 6/17:  Left Ventricle: Left ventricular cavity size is normal. Wall thickness is normal. The left ventricular ejection fraction is 60%. Systolic function is normal. Wall motion is normal. Diastolic function is normal.  Right Ventricle: Right ventricular cavity size is mildly dilated.  Right Atrium: The atrium is mildly dilated.  Atrial Septum: There is a probable patent foramen ovale noted at rest with predominant left to right shunting using color Doppler. 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  It is predominately within the right atrial cavity.  Aorta: The aortic root is mildly dilated. The aortic root is 4.30 cm.  Plan:   Unclear etiology of syncope - cardiac vs neurogenic  TTE largely un-remarkable  MAP pushes >85mmHg - continue isak   Continuing to had significant pressor requirements with hypotension when paused  Will consider further fluid resuscitation today    Pulm:  No active issues    GI:   No active  issues    :   Diagnosis: Elevated urine output  Plan:   Urine lytes inconsistent w/ DI  x2 dose DDVAP on 6/17 & 6/18  Significant decrease in UOP after administration  Now hyponatremic; 132 from 134  Daily BMP's  Strict I&O's  Nephrology consult today    F/E/N:   F: N/a  E: K > 4, Mag >2, Phos >3, iCal >1.2  N: Continue regular diet    Heme/Onc:   No active issues  Lovenox 30mg BID for VTE prophylaxis    Endo:   Diagnosis: Hypothyroidism  Plan:   Continue home synthroid    ID:   No active issues  Continue clinda x2 weeks for surgical prophylaxis per NSG    MSK/Skin:   No active issues    Disposition: Critical care    ICU Core Measures     A: Assess, Prevent, and Manage Pain Has pain been assessed? Yes  Need for changes to pain regimen? No   B: Both SAT/SAT  N/A   C: Choice of Sedation RASS Goal: 0 Alert and Calm  Need for changes to sedation or analgesia regimen? No   D: Delirium CAM-ICU: Negative   E: Early Mobility  Plan for early mobility? Yes   F: Family Engagement Plan for family engagement today? Yes       Antibiotic Review: Post op requirements     Review of Invasive Devices:    Ramirez Plan: Continue for accurate I/O monitoring for 48 hours  Central access plan: Medications requiring central line  [place] Plan: Keep arterial line for hemodynamic monitoring    Prophylaxis:  VTE VTE covered by:  enoxaparin, Subcutaneous, 30 mg at 06/18/24 2105       Stress Ulcer  not ordered        Significant 24hr Events     24hr events: Had an episode of bradycardia and hypotension associated with the blockage in his phenylephrine line; rebounded with resumption of the infusion.  Was given DDAVP yesterday with significant decrease in his urine output post administration.  This morning was slightly confused and refusing morning meds although alert and oriented.   Subjective     Review of Systems: See HPI for Review of Systems     Objective                            Vitals I/O      Most Recent Min/Max in 24hrs   Temp 98.4 °F  (36.9 °C) Temp  Min: 98.2 °F (36.8 °C)  Max: 99.4 °F (37.4 °C)   Pulse 60 Pulse  Min: 50  Max: 78   Resp 13 Resp  Min: 8  Max: 25   /81 BP  Min: 65/38  Max: 145/90   O2 Sat 100 % SpO2  Min: 96 %  Max: 100 %      Intake/Output Summary (Last 24 hours) at 6/19/2024 0720  Last data filed at 6/19/2024 0600  Gross per 24 hour   Intake 2988.31 ml   Output 4505 ml   Net -1516.69 ml       Diet Regular; Regular House    Invasive Monitoring   Arterial Line  Gordon /74  Arterial Line BP  Min: 74/56  Max: 146/70   MAP 92 mmHg  Arterial Line MAP (mmHg)  Min: 68 mmHg  Max: 116 mmHg           Physical Exam   Physical Exam  Vitals and nursing note reviewed.   Eyes:      Extraocular Movements: Extraocular movements intact.      Pupils: Pupils are equal, round, and reactive to light.   Skin:     General: Skin is warm and dry.   HENT:      Head: Normocephalic and atraumatic.   Neck:      Vascular: Central line present. No JVD.   Cardiovascular:      Rate and Rhythm: Normal rate and regular rhythm.      Heart sounds: Normal heart sounds.   Musculoskeletal:      Right lower leg: No edema.      Left lower leg: No edema.   Abdominal:      Palpations: Abdomen is soft.      Tenderness: There is no abdominal tenderness. There is no guarding.   Constitutional:       General: He is not in acute distress.     Appearance: He is well-developed and well-nourished. He is not ill-appearing or toxic-appearing.   Pulmonary:      Effort: No accessory muscle usage, respiratory distress or accessory muscle usage.      Breath sounds: Normal breath sounds. No wheezing or rales.   Psychiatric:         Speech: Speech is not no expressive aphasia.   Neurological:      Mental Status: He is alert and oriented to person, place and time.      Cranial Nerves: No dysarthria or facial asymmetry.      Motor: gross motor function is at baseline for patient.      Comments: The patient has weakness in his bilateral hands, primarily with  strength. It is  unchanged from prior exam, approx 2/5 bilaterally.             Diagnostic Studies      EKG: NSR  Imaging: Reviewed  I have personally reviewed pertinent reports.   and I have personally reviewed pertinent films in PACS     Medications:  Scheduled PRN   acetaminophen, 975 mg, Q8H ASHLYN  calcium gluconate, 3 g, Once  cephalexin, 500 mg, Q6H ASHLYN  Cholecalciferol, 1,000 Units, Daily  enoxaparin, 30 mg, Q12H ASHLYN  gabapentin, 100 mg, TID  levETIRAcetam, 500 mg, BID  levothyroxine, 125 mcg, Early Morning  methadone, 5 mg, Daily  methocarbamol, 500 mg, Q6H ASHLYN  polyethylene glycol, 17 g, Daily  senna-docusate sodium, 1 tablet, HS      albuterol, 2 puff, Q4H PRN  HYDROmorphone, 0.2 mg, Q2H PRN  naloxone, 0.04 mg, Q1MIN PRN  ondansetron, 4 mg, Q4H PRN  oxyCODONE, 2.5 mg, Q4H PRN   Or  oxyCODONE, 5 mg, Q4H PRN       Continuous    phenylephine,  mcg/min, Last Rate: 140 mcg/min (06/18/24 2000)         Labs:    CBC    Recent Labs     06/18/24 0439 06/19/24  0519   WBC 16.27* 14.45*   HGB 12.5 13.2   HCT 38.2 39.1    232     BMP    Recent Labs     06/18/24 1829 06/19/24  0519   SODIUM 134* 132*   K 4.0 3.9   CL 95* 93*   CO2 31 31   AGAP 8 8   BUN 10 11   CREATININE 0.70 0.67   CALCIUM 7.1* 6.7*       Coags    No recent results       Additional Electrolytes  Recent Labs     06/18/24  0439 06/18/24 1829 06/19/24  0519   MG 1.5* 2.2 2.0   PHOS 3.1 4.9*  --    CAIONIZED 0.92* 0.91* 0.84*          Blood Gas    No recent results  No recent results LFTs  No recent results    Infectious  No recent results  Glucose  Recent Labs     06/18/24  0439 06/18/24  1457 06/18/24 1829 06/19/24  0519   GLUC 89 108 94 83               Cricket Elizondo MD

## 2024-06-19 NOTE — PHYSICAL THERAPY NOTE
Physical Therapy Cancellation Note         06/19/24 1423   PT Last Visit   PT Visit Date 06/19/24   Note Type   Note Type Cancelled Session   Cancel Reasons Medical status     Per RN- pt not medically appropriate for PT session. Will continue to follow on caseload and progress as medical status allows.

## 2024-06-19 NOTE — OCCUPATIONAL THERAPY NOTE
OT CANCEL NOTE    Pt chart reviewed. Pt is not medically appropriate @ this time per RN. Will continue to follow pt on caseload and see pt when medically stable and as clinically appropriate.       06/19/24 1422   OT Last Visit   OT Visit Date 06/19/24   Note Type   Note Type Cancelled Session   Cancel Reasons Medical status         Theresa Mckay MS, OTR/L

## 2024-06-19 NOTE — CONSULTS
Consultation - Nephrology   Luis Forrester 53 y.o. male MRN: [id number]  Unit/Bed#: ICU 04 Encounter: [id number]          ASSESSMENT and PLAN:  Hyponatremia  Etiology: Multifactorial in the setting of subdural hematoma, pain, nausea +/-SIADH along need to consider DI  Has had intermittent lower sodiums dating back to 2015 from 1 33-1 35  Admission sodium 136 on 6/15/2024  Current creatinine 132  Status post DDAVP x 2 last dose 6/18/24  Received 1 L saline over 2 hours today  Last 24 hours urinary output 4490;  previously over the lst 3 days 2988, 3305, 7365  Renal function within normal limits  Workup:  Urinalysis with micro reports: Essentially bland  Urine sodium 42  Plan  Check urine osmolality and serum osmolality  Monitor intake and output closely may require further DDAVP  Repeat BMP at 2 PM and call nephrology if sodium is less than 132 or greater than 138  Monitor for further IV need  Check BMP in am  Keep potassium greater than 4.0 and magnesium greater than 2.0    Subdural hematoma  Patient presented to ER after being found down by his daughter  CT scan of head without contrast on 6/15/2024 revealed new acute trace subarachnoid hemorrhage and bilateral parafalcine regions   Repeat CT scan of head on 6/18/2024 reveals near completely resolved. parafalcine subarachnoid hemorrhage with trace residual subarachnoid hemorrhage in the frontoparietal regions medially. No mass effect or hydrocephalus.No large   Neuro surgery following    Bilateral hand pain/weakness with mild cord compression secondary to degenerative spondylolithiasis and congenital canal stenosis  Status post C3-C7 posterior cervical laminectomies, C2-[id number] on 6/16/2024.   Neurosurgery following  Acute pain management following      Overall Plan and Recommendations:  Check urine osmolality and serum osmolality  BMP at 2 PM with call parameters for sodium less than 132 or greater than 138  Check BMP in a.m.      Medical records through  [facility] and Care Everywhere has been reviewed for this consult encounter    HISTORY OF PRESENT ILLNESS:  Requesting Physician: Papa Diamond,*  Reason for Consult: Hyponatremia    Luis Forrester is a 53 y.o. male who has PMH of hypothyroidism, chronic cough, who presented to the ER to being found down by his daughter.  Hospital records the patient reported syncopal episodes several times over the last 6 months.  Workup revealed mild cord compression secondary to degenerative spondylolisthesis and congenital canal stenosis along with subdural hematoma.  Patient now status post C3-C7 posterior cervical laminectomies, C2-[id number] on 6/16/2024.  Since admission sodium has decreased and currently 132 and nephrology consulted for hyponatremia.  He is currently receiving 1 L of normal saline over 2 hours and has received 2 doses of DDAVP.  On discussion the patient reports feeling okay.  Denies chest pain or shortness of breath.  Feels he is constipated.  Has intermittent nausea, and has surgical pain.     PAST MEDICAL HISTORY:  Past Medical History:   Diagnosis Date    Arthritis     Chronic cough     Disease of thyroid gland     Hypoparathyroidism (HCC)     continue taking calcium and vitamin D, will check CMP, PTH level and Vitamin D level    Pneumonia of right middle lobe due to Streptococcus pneumoniae (HCC) 11/30/2018       PAST SURGICAL HISTORY:  Past Surgical History:   Procedure Laterality Date    DECOMPRESSION SPINE CERVICAL POSTERIOR N/A 6/16/2024    Procedure: DECOMPRESSION SPINE CERVICAL POSTERIOR C2-T1 with fusion navigated with Oa;  Surgeon: Endy Velasquez MD;  Location: [facility];  Service: Neurosurgery    FRACTURE SURGERY      Open Treatment of Each Proximal Finger Phalanx       ALLERGIES:  Allergies   Allergen Reactions    Chlorine Rash       SOCIAL HISTORY:  Social History     Substance and Sexual Activity   Alcohol Use Yes    Comment: occasionally     Social History  "    Substance and Sexual Activity   Drug Use No     Social History     Tobacco Use   Smoking Status Former   Smokeless Tobacco Never   Tobacco Comments    pt \"tried it when he was a teenager but did not like them\"       FAMILY HISTORY:  Family History   Problem Relation Age of Onset    No Known Problems Mother     No Known Problems Father     No Known Problems Sister     No Known Problems Brother     No Known Problems Son     Learning disabilities Daughter     Asthma Daughter     Seizures Daughter     No Known Problems Maternal Grandmother     No Known Problems Maternal Grandfather     No Known Problems Paternal Grandmother     No Known Problems Paternal Grandfather     No Known Problems Maternal Aunt     No Known Problems Maternal Uncle     No Known Problems Paternal Aunt     No Known Problems Paternal Uncle     No Known Problems Cousin        MEDICATIONS:    Current Facility-Administered Medications:     acetaminophen (TYLENOL) tablet 975 mg, 975 mg, Oral, Q8H ASHLYN, Cricket Elizondo MD, 975 mg at 06/18/24 2106    albuterol (PROVENTIL HFA,VENTOLIN HFA) inhaler 2 puff, 2 puff, Inhalation, Q4H PRN, Cricket Elizondo MD    bisacodyl (DULCOLAX) rectal suppository 10 mg, 10 mg, Rectal, Once, Gala Yu MD    cephalexin (KEFLEX) capsule 500 mg, 500 mg, Oral, Q6H ASHLYN, Abbey Oliveros PA-C, 500 mg at 06/19/24 0519    Cholecalciferol (VITAMIN D3) tablet 1,000 Units, 1,000 Units, Oral, Daily, Cricket Elizondo MD, 1,000 Units at 06/19/24 1007    enoxaparin (LOVENOX) subcutaneous injection 30 mg, 30 mg, Subcutaneous, Q12H ASHLYN, Cricket Elizondo MD, 30 mg at 06/18/24 2105    gabapentin (NEURONTIN) capsule 100 mg, 100 mg, Oral, TID, Cricket Elizondo MD, 100 mg at 06/19/24 1007    HYDROmorphone HCl (DILAUDID) injection 0.2 mg, 0.2 mg, Intravenous, Q2H PRN, Cricket Elizondo MD    levETIRAcetam (KEPPRA) tablet 500 mg, 500 mg, Oral, BID, Cricket Elizondo MD, 500 mg at 06/19/24 1008    levothyroxine tablet 125 " mcg, 125 mcg, Oral, Early Morning, Cricket Elizondo MD, 125 mcg at 06/19/24 0519    methocarbamol (ROBAXIN) tablet 500 mg, 500 mg, Oral, Q6H ASHLYN, Cricket Elizondo MD, 500 mg at 06/19/24 0519    naloxone (NARCAN) 0.04 mg/mL syringe 0.04 mg, 0.04 mg, Intravenous, Q1MIN PRN, Cricket Elizondo MD    ondansetron (ZOFRAN) injection 4 mg, 4 mg, Intravenous, Q4H PRN, Cricket Elizondo MD    oxyCODONE (ROXICODONE) split tablet 2.5 mg, 2.5 mg, Oral, Q4H PRN, 2.5 mg at 06/18/24 2000 **OR** oxyCODONE (ROXICODONE) IR tablet 5 mg, 5 mg, Oral, Q4H PRN, Cricket Elizondo MD, 5 mg at 06/17/24 1012    phenylephrine (KENJI-SYNEPHRINE) 100 mg (DOUBLE CONCENTRATION) IV in sodium chloride 0.9% 250 mL,  mcg/min, Intravenous, Titrated, Capri Burton MD, Last Rate: 21 mL/hr at 06/19/24 0806, 140 mcg/min at 06/19/24 0806    polyethylene glycol (MIRALAX) packet 17 g, 17 g, Oral, Daily, Cricket Elizondo MD, 17 g at 06/19/24 1006    senna-docusate sodium (SENOKOT S) 8.6-50 mg per tablet 1 tablet, 1 tablet, Oral, HS, Cricket Elizondo MD, 1 tablet at 06/18/24 2106    sodium chloride 0.9 % bolus 1,000 mL, 1,000 mL, Intravenous, Once, Gala Yu MD, Last Rate: 500 mL/hr at 06/19/24 1010, 1,000 mL at 06/19/24 1010      REVIEW OF SYSTEMS:  Patient seen and examined at bedside  Review of Systems   Constitutional: Negative.  Negative for activity change, appetite change, chills, diaphoresis, fatigue and fever.   HENT: Negative.  Negative for congestion and facial swelling.    Respiratory: Negative.     Cardiovascular: Negative.    Gastrointestinal:  Positive for constipation and nausea.   Endocrine: Negative.    Genitourinary: Negative.    Musculoskeletal: Negative.    Skin: Negative.    Allergic/Immunologic: Negative.    Neurological: Negative.    Hematological: Negative.    Psychiatric/Behavioral: Negative.           PHYSICAL EXAM:  Current weight: Weight - Scale: 78.9 kg (174 lb)  Vitals:    06/19/24 0900 06/19/24 0920  06/19/24 0945 06/19/24 1000   BP: 111/73 91/63 131/75 102/70   BP Location: Left arm Left arm Left arm Left arm   Pulse: 72 (!) 44 58 72   Resp: 16 (!) 23 20 22   Temp:       TempSrc:       SpO2: 100% 98% 100% 100%   Weight:       Height:           Physical Exam  Vitals and nursing note reviewed.   Constitutional:       Appearance: Normal appearance.   HENT:      Head: Normocephalic and atraumatic.      Nose: Nose normal.      Mouth/Throat:      Pharynx: Oropharynx is clear.   Eyes:      Extraocular Movements: Extraocular movements intact.      Conjunctiva/sclera: Conjunctivae normal.   Cardiovascular:      Rate and Rhythm: Normal rate.      Pulses: Normal pulses.      Heart sounds: Normal heart sounds.   Pulmonary:      Effort: Pulmonary effort is normal.      Breath sounds: Normal breath sounds.   Abdominal:      General: Bowel sounds are normal. There is distension.   Genitourinary:     Comments: Ramirez catheter patent for clear yellow urine  Musculoskeletal:         General: Normal range of motion.      Cervical back: Normal range of motion.   Skin:     General: Skin is warm and dry.   Neurological:      General: No focal deficit present.      Mental Status: He is alert and oriented to person, place, and time.   Psychiatric:         Mood and Affect: Mood normal.         Behavior: Behavior normal.           Invasive Devices:   Urethral Catheter Latex;Straight-tip 16 Fr. (Active)   Reasons to continue Urinary Catheter  Accurate I&O assessment in critically ill patients (48 hr. max) 06/19/24 0800   Goal for Removal No longer needed- Will place order to discontinue 06/18/24 0400   Site Assessment Clean;Skin intact 06/19/24 0800   Ramirez Care Done 06/19/24 0820   Collection Container Standard drainage bag 06/19/24 0800   Securement Method Securing device (Describe) 06/19/24 0800   Output (mL) 250 mL 06/19/24 0940       Lab Results:   Results from last 7 days   Lab Units 06/19/24  0519 06/18/24  1829 06/18/24  9504  "06/18/24  0439 06/17/24  0107 06/16/24  1143 06/16/24  0930 06/16/24  0930 06/16/24  0758 06/16/24  0554 06/15/24  0856   WBC Thousand/uL 14.45*  --   --  16.27* 17.82*  --   --   --   --    < > 11.11*   HEMOGLOBIN g/dL 13.2  --   --  12.5 13.4  --   --   --   --    < > 13.3   I STAT HEMOGLOBIN g/dl  --   --   --   --   --  12.2   < > 12.2  --   --   --    HEMATOCRIT % 39.1  --   --  38.2 39.3  --   --   --   --    < > 40.0   HEMATOCRIT, ISTAT %  --   --   --   --   --  36*   < > 36*  --   --   --    PLATELETS Thousands/uL 232  --   --  236 275  --   --   --   --    < > 160   SODIUM mmol/L 132* 134* 134* 135 141  --   --   --   --    < > 136   POTASSIUM mmol/L 3.9 4.0 4.1 3.7 3.6  --   --   --   --    < > 3.6   CHLORIDE mmol/L 93* 95* 93* 97 103  --   --   --   --    < > 101   CO2 mmol/L 31 31 32 31 27  --   --   --   --    < > 27   CO2, I-STAT mmol/L  --   --   --   --   --  25   < > 27  --   --   --    BUN mg/dL 11 10 8 10 9  --   --   --   --    < > 13   CREATININE mg/dL 0.67 0.70 0.63 0.72 0.66  --   --   --   --    < > 0.76   CALCIUM mg/dL 6.7* 7.1* 7.2* 6.7* 7.2*  --   --   --   --    < > 6.6*   MAGNESIUM mg/dL 2.0 2.2  --  1.5*  --   --   --   --  2.0   < >  --    PHOSPHORUS mg/dL  --  4.9*  --  3.1  --   --   --   --  3.5  --   --    ALK PHOS U/L  --   --   --   --   --   --   --   --   --   --  80   ALT U/L  --   --   --   --   --   --   --   --   --   --  26   AST U/L  --   --   --   --   --   --   --   --   --   --  31   GLUCOSE, ISTAT mg/dl  --   --   --   --   --  152*  --  106  --   --   --     < > = values in this interval not displayed.           Portions of the record may have been created with voice recognition software. Occasional wrong word or \"sound a like\" substitutions may have occurred due to the inherent limitations of voice recognition software. Read the chart carefully and recognize,                     "

## 2024-06-20 ENCOUNTER — APPOINTMENT (INPATIENT)
Dept: RADIOLOGY | Facility: HOSPITAL | Age: 54
DRG: 912 | End: 2024-06-20
Payer: COMMERCIAL

## 2024-06-20 ENCOUNTER — ANESTHESIA EVENT (INPATIENT)
Dept: NON INVASIVE DIAGNOSTICS | Facility: HOSPITAL | Age: 54
End: 2024-06-20
Payer: COMMERCIAL

## 2024-06-20 LAB
ANION GAP SERPL CALCULATED.3IONS-SCNC: 7 MMOL/L (ref 4–13)
ANION GAP SERPL CALCULATED.3IONS-SCNC: 9 MMOL/L (ref 4–13)
ANION GAP SERPL CALCULATED.3IONS-SCNC: 9 MMOL/L (ref 4–13)
BASOPHILS # BLD AUTO: 0.06 THOUSANDS/ÂΜL (ref 0–0.1)
BASOPHILS NFR BLD AUTO: 1 % (ref 0–1)
BUN SERPL-MCNC: 11 MG/DL (ref 5–25)
BUN SERPL-MCNC: 12 MG/DL (ref 5–25)
BUN SERPL-MCNC: 12 MG/DL (ref 5–25)
CA-I BLD-SCNC: 0.96 MMOL/L (ref 1.12–1.32)
CALCIUM SERPL-MCNC: 6.5 MG/DL (ref 8.4–10.2)
CALCIUM SERPL-MCNC: 6.6 MG/DL (ref 8.4–10.2)
CALCIUM SERPL-MCNC: 6.7 MG/DL (ref 8.4–10.2)
CHLORIDE SERPL-SCNC: 92 MMOL/L (ref 96–108)
CHLORIDE SERPL-SCNC: 92 MMOL/L (ref 96–108)
CHLORIDE SERPL-SCNC: 94 MMOL/L (ref 96–108)
CO2 SERPL-SCNC: 28 MMOL/L (ref 21–32)
CO2 SERPL-SCNC: 28 MMOL/L (ref 21–32)
CO2 SERPL-SCNC: 30 MMOL/L (ref 21–32)
CREAT SERPL-MCNC: 0.5 MG/DL (ref 0.6–1.3)
CREAT SERPL-MCNC: 0.55 MG/DL (ref 0.6–1.3)
CREAT SERPL-MCNC: 0.62 MG/DL (ref 0.6–1.3)
EOSINOPHIL # BLD AUTO: 0.16 THOUSAND/ÂΜL (ref 0–0.61)
EOSINOPHIL NFR BLD AUTO: 1 % (ref 0–6)
ERYTHROCYTE [DISTWIDTH] IN BLOOD BY AUTOMATED COUNT: 13.2 % (ref 11.6–15.1)
GFR SERPL CREATININE-BSD FRML MDRD: 111 ML/MIN/1.73SQ M
GFR SERPL CREATININE-BSD FRML MDRD: 117 ML/MIN/1.73SQ M
GFR SERPL CREATININE-BSD FRML MDRD: 121 ML/MIN/1.73SQ M
GLUCOSE SERPL-MCNC: 103 MG/DL (ref 65–140)
GLUCOSE SERPL-MCNC: 108 MG/DL (ref 65–140)
GLUCOSE SERPL-MCNC: 125 MG/DL (ref 65–140)
HCT VFR BLD AUTO: 36.4 % (ref 36.5–49.3)
HGB BLD-MCNC: 12.2 G/DL (ref 12–17)
IMM GRANULOCYTES # BLD AUTO: 0.12 THOUSAND/UL (ref 0–0.2)
IMM GRANULOCYTES NFR BLD AUTO: 1 % (ref 0–2)
LYMPHOCYTES # BLD AUTO: 1.78 THOUSANDS/ÂΜL (ref 0.6–4.47)
LYMPHOCYTES NFR BLD AUTO: 14 % (ref 14–44)
MAGNESIUM SERPL-MCNC: 1.7 MG/DL (ref 1.9–2.7)
MCH RBC QN AUTO: 32 PG (ref 26.8–34.3)
MCHC RBC AUTO-ENTMCNC: 33.5 G/DL (ref 31.4–37.4)
MCV RBC AUTO: 96 FL (ref 82–98)
MONOCYTES # BLD AUTO: 1.54 THOUSAND/ÂΜL (ref 0.17–1.22)
MONOCYTES NFR BLD AUTO: 12 % (ref 4–12)
NEUTROPHILS # BLD AUTO: 9.26 THOUSANDS/ÂΜL (ref 1.85–7.62)
NEUTS SEG NFR BLD AUTO: 71 % (ref 43–75)
NRBC BLD AUTO-RTO: 0 /100 WBCS
OSMOLALITY UR/SERPL-RTO: 280 MMOL/KG (ref 282–298)
PHOSPHATE SERPL-MCNC: 3.9 MG/DL (ref 2.7–4.5)
PLATELET # BLD AUTO: 212 THOUSANDS/UL (ref 149–390)
PMV BLD AUTO: 10.5 FL (ref 8.9–12.7)
POTASSIUM SERPL-SCNC: 3.8 MMOL/L (ref 3.5–5.3)
POTASSIUM SERPL-SCNC: 3.8 MMOL/L (ref 3.5–5.3)
POTASSIUM SERPL-SCNC: 4.2 MMOL/L (ref 3.5–5.3)
RBC # BLD AUTO: 3.81 MILLION/UL (ref 3.88–5.62)
SODIUM SERPL-SCNC: 129 MMOL/L (ref 135–147)
SODIUM SERPL-SCNC: 129 MMOL/L (ref 135–147)
SODIUM SERPL-SCNC: 131 MMOL/L (ref 135–147)
WBC # BLD AUTO: 12.92 THOUSAND/UL (ref 4.31–10.16)

## 2024-06-20 PROCEDURE — 99024 POSTOP FOLLOW-UP VISIT: CPT | Performed by: PHYSICIAN ASSISTANT

## 2024-06-20 PROCEDURE — 83930 ASSAY OF BLOOD OSMOLALITY: CPT | Performed by: NURSE PRACTITIONER

## 2024-06-20 PROCEDURE — 85025 COMPLETE CBC W/AUTO DIFF WBC: CPT | Performed by: PHYSICIAN ASSISTANT

## 2024-06-20 PROCEDURE — 99291 CRITICAL CARE FIRST HOUR: CPT | Performed by: SURGERY

## 2024-06-20 PROCEDURE — 80048 BASIC METABOLIC PNL TOTAL CA: CPT | Performed by: PHYSICIAN ASSISTANT

## 2024-06-20 PROCEDURE — 83735 ASSAY OF MAGNESIUM: CPT | Performed by: PHYSICIAN ASSISTANT

## 2024-06-20 PROCEDURE — 99232 SBSQ HOSP IP/OBS MODERATE 35: CPT | Performed by: INTERNAL MEDICINE

## 2024-06-20 PROCEDURE — 84100 ASSAY OF PHOSPHORUS: CPT | Performed by: PHYSICIAN ASSISTANT

## 2024-06-20 PROCEDURE — 82330 ASSAY OF CALCIUM: CPT | Performed by: PHYSICIAN ASSISTANT

## 2024-06-20 PROCEDURE — 72040 X-RAY EXAM NECK SPINE 2-3 VW: CPT

## 2024-06-20 RX ORDER — MAGNESIUM SULFATE HEPTAHYDRATE 40 MG/ML
2 INJECTION, SOLUTION INTRAVENOUS ONCE
Status: COMPLETED | OUTPATIENT
Start: 2024-06-20 | End: 2024-06-20

## 2024-06-20 RX ORDER — POTASSIUM CHLORIDE 14.9 MG/ML
20 INJECTION INTRAVENOUS ONCE
Status: DISCONTINUED | OUTPATIENT
Start: 2024-06-20 | End: 2024-06-20

## 2024-06-20 RX ORDER — POTASSIUM CHLORIDE 20 MEQ/1
20 TABLET, EXTENDED RELEASE ORAL ONCE
Status: COMPLETED | OUTPATIENT
Start: 2024-06-20 | End: 2024-06-20

## 2024-06-20 RX ORDER — MAGNESIUM SULFATE HEPTAHYDRATE 40 MG/ML
2 INJECTION, SOLUTION INTRAVENOUS ONCE
Status: DISCONTINUED | OUTPATIENT
Start: 2024-06-20 | End: 2024-06-20

## 2024-06-20 RX ORDER — MIDODRINE HYDROCHLORIDE 5 MG/1
5 TABLET ORAL
Status: DISCONTINUED | OUTPATIENT
Start: 2024-06-20 | End: 2024-06-22

## 2024-06-20 RX ORDER — LEVETIRACETAM 500 MG/1
500 TABLET ORAL 2 TIMES DAILY
Status: COMPLETED | OUTPATIENT
Start: 2024-06-20 | End: 2024-06-21

## 2024-06-20 RX ORDER — CALCITRIOL 0.25 UG/1
0.25 CAPSULE, LIQUID FILLED ORAL DAILY
Status: DISCONTINUED | OUTPATIENT
Start: 2024-06-20 | End: 2024-06-24

## 2024-06-20 RX ORDER — SODIUM CHLORIDE 9 MG/ML
75 INJECTION, SOLUTION INTRAVENOUS CONTINUOUS
Status: DISCONTINUED | OUTPATIENT
Start: 2024-06-20 | End: 2024-06-20

## 2024-06-20 RX ORDER — CEFAZOLIN SODIUM 2 G/50ML
2000 SOLUTION INTRAVENOUS ONCE
Status: COMPLETED | OUTPATIENT
Start: 2024-06-20 | End: 2024-06-20

## 2024-06-20 RX ADMIN — ENOXAPARIN SODIUM 30 MG: 30 INJECTION SUBCUTANEOUS at 08:00

## 2024-06-20 RX ADMIN — MIDODRINE HYDROCHLORIDE 5 MG: 5 TABLET ORAL at 12:08

## 2024-06-20 RX ADMIN — ACETAMINOPHEN 975 MG: 325 TABLET, FILM COATED ORAL at 14:42

## 2024-06-20 RX ADMIN — POTASSIUM CHLORIDE 20 MEQ: 14.9 INJECTION, SOLUTION INTRAVENOUS at 05:15

## 2024-06-20 RX ADMIN — GABAPENTIN 100 MG: 100 CAPSULE ORAL at 08:00

## 2024-06-20 RX ADMIN — LEVETIRACETAM 500 MG: 500 TABLET, FILM COATED ORAL at 08:00

## 2024-06-20 RX ADMIN — METHOCARBAMOL 500 MG: 500 TABLET ORAL at 17:05

## 2024-06-20 RX ADMIN — VASOPRESSIN 0.04 UNITS/MIN: 20 INJECTION INTRAVENOUS at 02:06

## 2024-06-20 RX ADMIN — SODIUM CHLORIDE 50 ML/HR: 4 INJECTION, SOLUTION, CONCENTRATE INTRAVENOUS at 17:03

## 2024-06-20 RX ADMIN — CEPHALEXIN 500 MG: 500 CAPSULE ORAL at 17:05

## 2024-06-20 RX ADMIN — LEVETIRACETAM 500 MG: 500 TABLET, FILM COATED ORAL at 17:05

## 2024-06-20 RX ADMIN — POTASSIUM CHLORIDE 20 MEQ: 1500 TABLET, EXTENDED RELEASE ORAL at 08:00

## 2024-06-20 RX ADMIN — METHOCARBAMOL 500 MG: 500 TABLET ORAL at 12:08

## 2024-06-20 RX ADMIN — Medication 1000 UNITS: at 08:09

## 2024-06-20 RX ADMIN — MAGNESIUM SULFATE HEPTAHYDRATE 2 G: 40 INJECTION, SOLUTION INTRAVENOUS at 05:22

## 2024-06-20 RX ADMIN — CALCIUM GLUCONATE 3 G: 98 INJECTION, SOLUTION INTRAVENOUS at 05:35

## 2024-06-20 RX ADMIN — CALCITRIOL CAPSULES 0.25 MCG 0.25 MCG: 0.25 CAPSULE ORAL at 12:08

## 2024-06-20 RX ADMIN — GABAPENTIN 100 MG: 100 CAPSULE ORAL at 20:43

## 2024-06-20 RX ADMIN — SODIUM CHLORIDE 75 ML/HR: 0.9 INJECTION, SOLUTION INTRAVENOUS at 08:11

## 2024-06-20 RX ADMIN — ENOXAPARIN SODIUM 30 MG: 30 INJECTION SUBCUTANEOUS at 20:43

## 2024-06-20 RX ADMIN — GABAPENTIN 100 MG: 100 CAPSULE ORAL at 17:05

## 2024-06-20 RX ADMIN — CEPHALEXIN 500 MG: 500 CAPSULE ORAL at 12:08

## 2024-06-20 RX ADMIN — VASOPRESSIN 0.04 UNITS/MIN: 20 INJECTION INTRAVENOUS at 19:18

## 2024-06-20 RX ADMIN — ACETAMINOPHEN 975 MG: 325 TABLET, FILM COATED ORAL at 21:42

## 2024-06-20 RX ADMIN — SENNOSIDES AND DOCUSATE SODIUM 1 TABLET: 50; 8.6 TABLET ORAL at 21:42

## 2024-06-20 RX ADMIN — PHENYLEPHRINE HYDROCHLORIDE 90 MCG/MIN: 50 INJECTION INTRAVENOUS at 17:02

## 2024-06-20 RX ADMIN — PHENYLEPHRINE HYDROCHLORIDE 150 MCG/MIN: 50 INJECTION INTRAVENOUS at 03:13

## 2024-06-20 RX ADMIN — POTASSIUM CHLORIDE 20 MEQ: 1500 TABLET, EXTENDED RELEASE ORAL at 20:43

## 2024-06-20 RX ADMIN — CEFAZOLIN SODIUM 2000 MG: 2 SOLUTION INTRAVENOUS at 17:40

## 2024-06-20 RX ADMIN — MIDODRINE HYDROCHLORIDE 5 MG: 5 TABLET ORAL at 17:05

## 2024-06-20 RX ADMIN — POLYETHYLENE GLYCOL 3350 17 G: 17 POWDER, FOR SOLUTION ORAL at 08:09

## 2024-06-20 NOTE — PROGRESS NOTES
Patient was helped to a sitting position on the edge of the bed while the Neurosurgery Nurse Practitioner was planning on removing his drain. The patient developed heavy breathing, became bradycardic. His eyes were rolled back and he was not responding. He was then immediately laid flat onto the bed and his heart rate improved and he became awake and alert.

## 2024-06-20 NOTE — PLAN OF CARE
Problem: Prexisting or High Potential for Compromised Skin Integrity  Goal: Skin integrity is maintained or improved  Description: INTERVENTIONS:  - Identify patients at risk for skin breakdown  - Assess and monitor skin integrity  - Assess and monitor nutrition and hydration status  - Monitor labs   - Assess for incontinence   - Turn and reposition patient  - Assist with mobility/ambulation  - Relieve pressure over bony prominences  - Avoid friction and shearing  - Provide appropriate hygiene as needed including keeping skin clean and dry  - Evaluate need for skin moisturizer/barrier cream  - Collaborate with interdisciplinary team   - Patient/family teaching  - Consider wound care consult   Outcome: Progressing     Problem: PAIN - ADULT  Goal: Verbalizes/displays adequate comfort level or baseline comfort level  Description: Interventions:  - Encourage patient to monitor pain and request assistance  - Assess pain using appropriate pain scale  - Administer analgesics based on type and severity of pain and evaluate response  - Implement non-pharmacological measures as appropriate and evaluate response  - Consider cultural and social influences on pain and pain management  - Notify physician/advanced practitioner if interventions unsuccessful or patient reports new pain  Outcome: Progressing     Problem: INFECTION - ADULT  Goal: Absence or prevention of progression during hospitalization  Description: INTERVENTIONS:  - Assess and monitor for signs and symptoms of infection  - Monitor lab/diagnostic results  - Monitor all insertion sites, i.e. indwelling lines, tubes, and drains  - Monitor endotracheal if appropriate and nasal secretions for changes in amount and color  - [place] appropriate cooling/warming therapies per order  - Administer medications as ordered  - Instruct and encourage patient and family to use good hand hygiene technique  - Identify and instruct in appropriate isolation precautions for  identified infection/condition  Outcome: Progressing     Problem: SAFETY ADULT  Goal: Patient will remain free of falls  Description: INTERVENTIONS:  - Educate patient/family on patient safety including physical limitations  - Instruct patient to call for assistance with activity   - Consult OT/PT to assist with strengthening/mobility   - Keep Call bell within reach  - Keep bed low and locked with side rails adjusted as appropriate  - Keep care items and personal belongings within reach  - Initiate and maintain comfort rounds  - Make Fall Risk Sign visible to staff  - Apply yellow socks and bracelet for high fall risk patients  - Consider moving patient to room near nurses station  Outcome: Progressing  Goal: Maintain or return to baseline ADL function  Description: INTERVENTIONS:  -  Assess patient's ability to carry out ADLs; assess patient's baseline for ADL function and identify physical deficits which impact ability to perform ADLs (bathing, care of mouth/teeth, toileting, grooming, dressing, etc.)  - Assess/evaluate cause of self-care deficits   - Assess range of motion  - Assess patient's mobility; develop plan if impaired  - Assess patient's need for assistive devices and provide as appropriate  - Encourage maximum independence but intervene and supervise when necessary  - Involve family in performance of ADLs  - Assess for home care needs following discharge   - Consider OT consult to assist with ADL evaluation and planning for discharge  - Provide patient education as appropriate  Outcome: Progressing  Goal: Maintains/Returns to pre admission functional level  Description: INTERVENTIONS:  - Perform AM-PAC 6 Click Basic Mobility/ Daily Activity assessment daily.  - Set and communicate daily mobility goal to care team and patient/family/caregiver.   - Collaborate with rehabilitation services on mobility goals if consulted  - Out of bed for toileting  - Record patient progress and toleration of activity level    Outcome: Progressing     Problem: DISCHARGE PLANNING  Goal: Discharge to home or other facility with appropriate resources  Description: INTERVENTIONS:  - Identify barriers to discharge w/patient and caregiver  - Arrange for needed discharge resources and transportation as appropriate  - Identify discharge learning needs (meds, wound care, etc.)  - Arrange for interpretive services to assist at discharge as needed  - Refer to Case Management Department for coordinating discharge planning if the patient needs post-hospital services based on physician/advanced practitioner order or complex needs related to functional status, cognitive ability, or social support system  Outcome: Progressing     Problem: Knowledge Deficit  Goal: Patient/family/caregiver demonstrates understanding of disease process, treatment plan, medications, and discharge instructions  Description: Complete learning assessment and assess knowledge base.  Interventions:  - Provide teaching at level of understanding  - Provide teaching via preferred learning methods  Outcome: Progressing     Problem: NEUROSENSORY - ADULT  Goal: Achieves stable or improved neurological status  Description: INTERVENTIONS  - Monitor and report changes in neurological status  - Monitor vital signs such as temperature, blood pressure, glucose, and any other labs ordered   - Initiate measures to prevent increased intracranial pressure  - Monitor for seizure activity and implement precautions if appropriate      Outcome: Progressing  Goal: Remains free of injury related to seizures activity  Description: INTERVENTIONS  - Maintain airway, patient safety  and administer oxygen as ordered  - Monitor patient for seizure activity, document and report duration and description of seizure to physician/advanced practitioner  - If seizure occurs,  ensure patient safety during seizure  - Reorient patient post seizure  - Seizure pads on all 4 side rails  - Instruct patient/family to  notify RN of any seizure activity including if an aura is experienced  - Instruct patient/family to call for assistance with activity based on nursing assessment  - Administer anti-seizure medications if ordered    Outcome: Progressing  Goal: Achieves maximal functionality and self care  Description: INTERVENTIONS  - Monitor swallowing and airway patency with patient fatigue and changes in neurological status  - Encourage and assist patient to increase activity and self care.   - Encourage visually impaired, hearing impaired and aphasic patients to use assistive/communication devices  Outcome: Progressing     Problem: CARDIOVASCULAR - ADULT  Goal: Maintains optimal cardiac output and hemodynamic stability  Description: INTERVENTIONS:  - Monitor I/O, vital signs and rhythm  - Monitor for S/S and trends of decreased cardiac output  - Administer and titrate ordered vasoactive medications to optimize hemodynamic stability  - Assess quality of pulses, skin color and temperature  - Assess for signs of decreased coronary artery perfusion  - Instruct patient to report change in severity of symptoms  Outcome: Progressing  Goal: Absence of cardiac dysrhythmias or at baseline rhythm  Description: INTERVENTIONS:  - Continuous cardiac monitoring, vital signs, obtain 12 lead EKG if ordered  - Administer antiarrhythmic and heart rate control medications as ordered  - Monitor electrolytes and administer replacement therapy as ordered  Outcome: Progressing     Problem: RESPIRATORY - ADULT  Goal: Achieves optimal ventilation and oxygenation  Description: INTERVENTIONS:  - Assess for changes in respiratory status  - Assess for changes in mentation and behavior  - Position to facilitate oxygenation and minimize respiratory effort  - Oxygen administered by appropriate delivery if ordered  - Initiate smoking cessation education as indicated  - Encourage broncho-pulmonary hygiene including cough, deep breathe, Incentive Spirometry  -  Assess the need for suctioning and aspirate as needed  - Assess and instruct to report SOB or any respiratory difficulty  - Respiratory Therapy support as indicated  Outcome: Progressing     Problem: GASTROINTESTINAL - ADULT  Goal: Minimal or absence of nausea and/or vomiting  Description: INTERVENTIONS:  - Administer IV fluids if ordered to ensure adequate hydration  - Maintain NPO status until nausea and vomiting are resolved  - Nasogastric tube if ordered  - Administer ordered antiemetic medications as needed  - Provide nonpharmacologic comfort measures as appropriate  - Advance diet as tolerated, if ordered  - Consider nutrition services referral to assist patient with adequate nutrition and appropriate food choices  Outcome: Progressing  Goal: Maintains or returns to baseline bowel function  Description: INTERVENTIONS:  - Assess bowel function  - Encourage oral fluids to ensure adequate hydration  - Administer IV fluids if ordered to ensure adequate hydration  - Administer ordered medications as needed  - Encourage mobilization and activity  - Consider nutritional services referral to assist patient with adequate nutrition and appropriate food choices  Outcome: Progressing  Goal: Maintains adequate nutritional intake  Description: INTERVENTIONS:  - Monitor percentage of each meal consumed  - Identify factors contributing to decreased intake, treat as appropriate  - Assist with meals as needed  - Monitor I&O, weight, and lab values if indicated  - Obtain nutrition services referral as needed  Outcome: Progressing  Goal: Oral mucous membranes remain intact  Description: INTERVENTIONS  - Assess oral mucosa and hygiene practices  - Implement preventative oral hygiene regimen  - Implement oral medicated treatments as ordered  - Initiate Nutrition services referral as needed  Outcome: Progressing     Problem: GENITOURINARY - ADULT  Goal: Maintains or returns to baseline urinary function  Description:  INTERVENTIONS:  - Assess urinary function  - Encourage oral fluids to ensure adequate hydration if ordered  - Administer IV fluids as ordered to ensure adequate hydration  - Administer ordered medications as needed  - Offer frequent toileting  - Follow urinary retention protocol if ordered  Outcome: Progressing  Goal: Absence of urinary retention  Description: INTERVENTIONS:  - Assess patient’s ability to void and empty bladder  - Monitor I/O  - Bladder scan as needed  - Discuss with physician/AP medications to alleviate retention as needed  - Discuss catheterization for long term situations as appropriate  Outcome: Progressing

## 2024-06-20 NOTE — PROGRESS NOTES
Progress note- Nephrology   Luis Forrester 53 y.o. male MRN: [id number]  Unit/Bed#: ICU 04 Encounter: [id number]    ASSESSMENT and PLAN:  Hyponatremia  Etiology: Multifactorial in the setting of subdural hematoma, pain, nausea +/-SIADH along need to consider DI, polyuria  Has had intermittent lower sodiums dating back to 2015 from 133-1 35  Admission sodium 136 on 6/15/2024  Current creatinine 131  Status post DDAVP x 2 last dose 6/18/24-can cause hyponatremia  That is post IV fluid 1 L yesterday a.m.  Currently on IV fluids and 5 cc an hour started this morning in preparation for tentative permanent pacemaker n.p.o. status  Last 24 hours urinary output  3525 ml/24 hours  Renal function within normal limits  Workup:  Urinalysis with micro reports: Essentially bland  Urine sodium 42; urine osmolality 421>272  Serum osmolality from 6/20/24 is 280<293  Plan  Monitor intake and output closely  Recommend Repeat BMP at 2 PM and call nephrology if sodium is less than 131 or greater than 137  Monitor sodium with IV diuretics  Check BMP in am  Once eating will need fluid restriction 1.5 L  Keep potassium greater than 4.0 and magnesium greater than 2.0     Subdural hematoma  Patient presented to ER after being found down by his daughter  CT scan of head without contrast on 6/15/2024 revealed new acute trace subarachnoid hemorrhage and bilateral parafalcine regions   Repeat CT scan of head on 6/18/2024 reveals near completely resolved. parafalcine subarachnoid hemorrhage with trace residual subarachnoid hemorrhage in the frontoparietal regions medially. No mass effect or hydrocephalus.No large   Neuro surgery following     Bilateral hand pain/weakness with mild cord compression secondary to degenerative spondylolithiasis and congenital canal stenosis  Status post C3-C7 posterior cervical laminectomies, [id number] on 6/16/2024.   Neurosurgery following  Acute pain management following     Syncope  EP  following  Tentative permanent pacemaker placement today  Currently n.p.o. and on IV fluids at 75 cc an hour which started this morning     Overall Plan and Recommendations: Discussed with primary team and agree with the plan as stated below  Recommend BMP at 2 PM with call parameters for sodium less than 132 or greater than 138  Check BMP in a.m.  Once eating patient will require fluid restriction at least 1.5 L        Medical records have been reviewed through [facility] and care everywhere for this patient encounter      Review of Systems  Patient seen and examined at bedside.  Patient denies chest pain shortness of breath dizziness lightheadedness headaches  Review of Systems   Constitutional: Negative.  Negative for activity change, appetite change, chills, diaphoresis, fatigue and fever.   HENT: Negative.  Negative for congestion and facial swelling.    Respiratory: Negative.     Cardiovascular: Negative.    Gastrointestinal: Negative.    Endocrine: Negative.    Genitourinary: Negative.    Musculoskeletal: Negative.    Skin: Negative.    Allergic/Immunologic: Negative.    Neurological:  Positive for weakness.   Hematological: Negative.    Psychiatric/Behavioral: Negative.           Physical Exam:  Current Weight: Weight - Scale: 78.9 kg (174 lb)  Vitals:    06/20/24 0500 06/20/24 0600 06/20/24 0700 06/20/24 0800   BP: 133/72 152/74 133/76 156/77   BP Location:    Left arm   Pulse: 56 58 (!) 54 66   Resp: (!) 28 14 18 18   Temp:    98.1 °F (36.7 °C)   TempSrc:    Oral   SpO2: 99% 98% 99% 100%   Weight:       Height:           Physical Exam  Vitals and nursing note reviewed.   Constitutional:       Appearance: Normal appearance.      Comments: NAD, laying flat   HENT:      Head: Normocephalic and atraumatic.      Mouth/Throat:      Mouth: Mucous membranes are dry.      Pharynx: Oropharynx is clear.   Eyes:      Extraocular Movements: Extraocular movements intact.      Conjunctiva/sclera: Conjunctivae normal.    Cardiovascular:      Rate and Rhythm: Bradycardia present.      Pulses: Normal pulses.      Heart sounds: Normal heart sounds.   Pulmonary:      Effort: Pulmonary effort is normal.      Breath sounds: Normal breath sounds.   Abdominal:      General: Bowel sounds are normal. There is distension.      Palpations: Abdomen is soft.   Genitourinary:     Comments: Ramirez catheter patent for clear yellow urine  Musculoskeletal:      Cervical back: Normal range of motion and neck supple.   Skin:     General: Skin is warm and dry.   Neurological:      General: No focal deficit present.      Mental Status: He is alert and oriented to person, place, and time.   Psychiatric:         Mood and Affect: Mood normal.         Behavior: Behavior normal.            Medications:    Current Facility-Administered Medications:     acetaminophen (TYLENOL) tablet 975 mg, 975 mg, Oral, Q8H ASHLYN, Cricket Elizondo MD, 975 mg at 06/19/24 2117    albuterol (PROVENTIL HFA,VENTOLIN HFA) inhaler 2 puff, 2 puff, Inhalation, Q4H PRN, Cricket Elizondo MD    cephalexin (KEFLEX) capsule 500 mg, 500 mg, Oral, Q6H ASHLYN, Abbey Oliveros PA-C, 500 mg at 06/19/24 2300    Cholecalciferol (VITAMIN D3) tablet 1,000 Units, 1,000 Units, Oral, Daily, Cricket Elizondo MD, 1,000 Units at 06/20/24 0809    enoxaparin (LOVENOX) subcutaneous injection 30 mg, 30 mg, Subcutaneous, Q12H ASHLYN, Cricket Elizondo MD, 30 mg at 06/20/24 0800    gabapentin (NEURONTIN) capsule 100 mg, 100 mg, Oral, TID, Cricket Elizondo MD, 100 mg at 06/20/24 0800    HYDROmorphone HCl (DILAUDID) injection 0.2 mg, 0.2 mg, Intravenous, Q2H PRN, Cricket Elizondo MD    levETIRAcetam (KEPPRA) tablet 500 mg, 500 mg, Oral, BID, Ele Alba PA-C, 500 mg at 06/20/24 0800    levothyroxine tablet 125 mcg, 125 mcg, Oral, Early Morning, Cricket Elizondo MD, 125 mcg at 06/19/24 0519    methocarbamol (ROBAXIN) tablet 500 mg, 500 mg, Oral, Q6H Cricket GOLDBERG MD, 500 mg at  06/19/24 2300    naloxone (NARCAN) 0.04 mg/mL syringe 0.04 mg, 0.04 mg, Intravenous, Q1MIN PRN, Cricket Elizondo MD    ondansetron (ZOFRAN) injection 4 mg, 4 mg, Intravenous, Q4H PRN, Cricket Elizondo MD    oxyCODONE (ROXICODONE) split tablet 2.5 mg, 2.5 mg, Oral, Q4H PRN, 2.5 mg at 06/18/24 2000 **OR** oxyCODONE (ROXICODONE) IR tablet 5 mg, 5 mg, Oral, Q4H PRN, Cricket Elizondo MD, 5 mg at 06/17/24 1012    phenylephrine (KENJI-SYNEPHRINE) 100 mg (DOUBLE CONCENTRATION) IV in sodium chloride 0.9% 250 mL,  mcg/min, Intravenous, Titrated, Capri Burton MD, Last Rate: 22.5 mL/hr at 06/20/24 0313, 150 mcg/min at 06/20/24 0313    polyethylene glycol (MIRALAX) packet 17 g, 17 g, Oral, Daily, Cricket Elizondo MD, 17 g at 06/20/24 0809    senna-docusate sodium (SENOKOT S) 8.6-50 mg per tablet 1 tablet, 1 tablet, Oral, HS, Cricket Elizondo MD, 1 tablet at 06/19/24 2117    sodium chloride 0.9 % infusion, 75 mL/hr, Intravenous, Continuous, Ele Alba PA-C, Last Rate: 75 mL/hr at 06/20/24 0811, 75 mL/hr at 06/20/24 0811    vasopressin (PITRESSIN) 20 Units in sodium chloride 0.9 % 100 mL infusion, 0.04 Units/min, Intravenous, Continuous, Capri Burton MD, Last Rate: 12 mL/hr at 06/20/24 0206, 0.04 Units/min at 06/20/24 0206    Laboratory Results:  Results from last 7 days   Lab Units 06/20/24  0428 06/19/24  1434 06/19/24  0519 06/18/24  1829 06/18/24  1457 06/18/24  0439 06/17/24  0107 06/16/24  1143 06/16/24  0930 06/16/24  0930 06/16/24  0758 06/16/24  0554 06/15/24  0856   WBC Thousand/uL 12.92*  --  14.45*  --   --  16.27* 17.82*  --   --   --   --  11.01* 11.11*   HEMOGLOBIN g/dL 12.2  --  13.2  --   --  12.5 13.4  --   --   --   --  13.6 13.3   I STAT HEMOGLOBIN g/dl  --   --   --   --   --   --   --  12.2  --  12.2  --   --   --    HEMATOCRIT % 36.4*  --  39.1  --   --  38.2 39.3  --   --   --   --  40.3 40.0   HEMATOCRIT, ISTAT %  --   --   --   --   --   --   --  36*  --  36*  --   --   --   "  PLATELETS Thousands/uL 212  --  232  --   --  236 275  --   --   --   --  256 160   SODIUM mmol/L 131* 133* 132* 134* 134* 135 141  --   --   --   --  135 136   POTASSIUM mmol/L 3.8 4.0 3.9 4.0 4.1 3.7 3.6  --   --   --   --  3.4* 3.6   CHLORIDE mmol/L 94* 94* 93* 95* 93* 97 103  --   --   --   --  99 101   CO2 mmol/L 30 30 31 31 32 31 27  --   --   --   --  26 27   CO2, I-STAT mmol/L  --   --   --   --   --   --   --  25   < > 27  --   --   --    BUN mg/dL 12 11 11 10 8 10 9  --   --   --   --  9 13   CREATININE mg/dL 0.62 0.61 0.67 0.70 0.63 0.72 0.66  --   --   --   --  0.74 0.76   CALCIUM mg/dL 6.6* 7.2* 6.7* 7.1* 7.2* 6.7* 7.2*  --   --   --   --  6.6* 6.6*   MAGNESIUM mg/dL 1.7*  --  2.0 2.2  --  1.5*  --   --   --   --  2.0  --   --    PHOSPHORUS mg/dL 3.9  --   --  4.9*  --  3.1  --   --   --   --  3.5  --   --    GLUCOSE, ISTAT mg/dl  --   --   --   --   --   --   --  152*  --  106  --   --   --     < > = values in this interval not displayed.         Portions of the record may have been created with voice recognition software. Occasional wrong word or \"sound a like\" substitutions may have occurred due to the inherent limitations of voice recognition software. Read the chart carefully and recognize,   "

## 2024-06-20 NOTE — PROGRESS NOTES
"Progress Note - Luis Forrester 53 y.o. male MRN: [id number]    [facility]: [id number]      Assessment/Plan  53 y.o. year old male with PMH of hypothyroidism who presented with syncope resulting in TBI (subarrachnoid hemorrhage and subdural hematoma and cervical spine injury)  s/p cervical spine decompression and fusion-- EP consulted for symptomatic bradycardia. The patient states he has had multiple episodes of syncope in the past 6 months.      Problems  #Syncope  #junctional bradycardia  #wide complex tachycardia  #TBI- subarachnoid hemorrhage, subdural hematoma  #cervical stenosis with myelopathy s/p cervical decompression and fusion     Plan  No events on tele since yesterday.  Planning for dual chamber pacemaker tomorrow given intermittent syncope and symptomatic junctional bradycardia documented in the hospital. NPO midnight for pacemaker tomorrow.  No further wide complex tachycardia-- suspect it may be svt with aberrancy.  Will consider pocket EP study at time of pacer implant.     Subjective:   No complaints from patient. No further episodes of lightheadedness.     Objective:     Vitals: Blood pressure 137/65, pulse 59, temperature 98.1 °F (36.7 °C), temperature source Oral, resp. rate 12, height 5' 6\" (1.676 m), weight 78.9 kg (174 lb), SpO2 97%.,Body mass index is 28.08 kg/m².      Intake/Output Summary (Last 24 hours) at 6/20/2024 1635  Last data filed at 6/20/2024 1601  Gross per 24 hour   Intake 1842.83 ml   Output 3425 ml   Net -1582.17 ml       Physical Exam:   Constitutional:       Appearance: Normal appearance.   HENT:      Head: Normocephalic and atraumatic.   Neck:      Vascular: No JVD   Cardiovascular:      Rate and Rhythm: RRR, no murmurs  Pulmonary:      Effort: CTA-b  Abdominal:      General: Abdomen is flat. Bowel sounds are normal.      Palpations: Abdomen is soft.   Musculoskeletal:     No edema  Skin:     General: Skin is warm.   Neurological:      Mental Status: He " is alert and oriented to person, place, and time.   Psychiatric:         Behavior: Behavior normal.

## 2024-06-20 NOTE — ASSESSMENT & PLAN NOTE
4 Days Post-Op Procedure(s):  DECOMPRESSION SPINE CERVICAL POSTERIOR C2-T1 with fusion navigated with [facility] (Dr. Lopez, 6/16/2024)  Patient describes query syncopal episode and falls  Found to have new right hand weakness and pain with bilateral Mohit's and right-sided wrist weakness on presentation    Imaging:  MRI cervical spine without 6/15/2024:Congenital canal stenosis with superimposed degenerative spondylosis .Most pronounced at C3-4 and C5-C6 with moderate to severe canal stenosis and mild cord compression. Evaluation of cord signal is limited due to artifact. No definite abnormal cord signal but mild cord edema would be difficult to exclude. Severe bilateral foraminal stenosis also seen at C3-4 and C5-C6.Mild to moderate canal stenosis at other levels  Postop cervical Xrays ordered and pending - d/w RN to complete today    Plan:  Continue to monitor neurological exam.   Maintain MAPs>85 x days (4/5) on Rony and vasopressin  Hemovac drain removed 6/19/2024  Keflex x 2 weeks for surgical prophylaxis.  Pain control - APS following - input appreciated  Medical management per primary team  Mobilize with physical and Occupational Therapy - no collar required  Referral for rehab - [facility] reviewing case  Upright x-rays cervical spine AP lateral ordered and pending - to be completed today a per conversation with RN  DVT prophylaxis: Bilateral SCDs and lovenox    Neurosurgery will continue to follow, call with any further questions or concerns.

## 2024-06-20 NOTE — PHYSICAL THERAPY NOTE
Physical Therapy Cancellation Note    PT orders received chart review completed. PT spoke to nsg, pt with severe orthostatic hypotension and bradycardia pending PPM today. PT will follow and treat as medically appropriate.     06/20/24 0900   Note Type   Note type Cancelled Session   Cancel Reasons Medical status   Cognition   Orientation Level Oriented to person;Oriented to place;Oriented to time;Disoriented to situation       Steffanie Ng, PT

## 2024-06-20 NOTE — PROGRESS NOTES
Patient:    MRN:  [id number]    Marni Request ID:  [id number]    Level of care reserved:  Inpatient Rehab Facility    Partner Reserved:  [facility] ([place]), [place] [id number] [phone]    Clinical needs requested:    Geography searched:  10 miles around [id number]    Start of Service:    Request sent:  11:37am EDT on 6/19/2024 by Jitendra Malik    Partner reserved:  10:32am EDT on 6/20/2024 by Jitendra Malik    Choice list shared:  10:32am EDT on 6/20/2024 by Jitendra Malik

## 2024-06-20 NOTE — CASE MANAGEMENT
Case Management Discharge Planning Note    Patient name Luis Forrester  Location ICU 04/ICU 04 MRN [id number]  : 1970 Date 2024       Current Admission Date: 6/15/2024  Current Admission Diagnosis:TBI (traumatic brain injury) ([facility])   Patient Active Problem List    Diagnosis Date Noted Date Diagnosed    TBI (traumatic brain injury) ([facility]) 06/15/2024     Syncope and collapse 06/15/2024     Bilateral hand pain 06/15/2024     SDH (subdural hematoma) (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     Chronic low back pain 2015     Mass of parotid gland 2015     Lumbar radiculopathy 2015     Hyperlipidemia 2014     Hypothyroidism 2014     Allergic rhinitis 2012       LOS (days): 5  Geometric Mean LOS (GMLOS) (days):   Days to GMLOS:     OBJECTIVE:  Risk of Unplanned Readmission Score: 16.49         Current admission status: Inpatient   Preferred Pharmacy:   [id number] - [place], [place] [address]  [place] [address]  Phone: [phone] Fax: [phone]    Primary Care Provider: Joceline Shook PA-C    Primary Insurance: [place] [employer]  Secondary Insurance:     DISCHARGE DETAILS:    Pt clinically accepted to [facility] when medically stable. Aware that plan for today is PPM

## 2024-06-20 NOTE — ARC ADMISSION
Upon review of patient's case with [facility] physician , patient is deemed [facility] appropriate at this time pending medical readiness/ home dispo confirmation / bed availability. CM made aware in Aidin .

## 2024-06-20 NOTE — PROGRESS NOTES
[facility]  Progress Note  Name: Luis Forrester I  MRN: [id number]  Unit/Bed#: ICU 04 I Date of Admission: 6/15/2024   Date of Service: 6/20/2024 I Hospital Day: 5    Assessment & Plan   Right hand weakness  Assessment & Plan  4 Days Post-Op Procedure(s):  DECOMPRESSION SPINE CERVICAL POSTERIOR C2-T1 with fusion navigated with [facility]arm (Dr. Lopez, 6/16/2024)  Patient describes query syncopal episode and falls  Found to have new right hand weakness and pain with bilateral Mohit's and right-sided wrist weakness on presentation    Imaging:  MRI cervical spine without 6/15/2024:Congenital canal stenosis with superimposed degenerative spondylosis .Most pronounced at C3-4 and C5-C6 with moderate to severe canal stenosis and mild cord compression. Evaluation of cord signal is limited due to artifact. No definite abnormal cord signal but mild cord edema would be difficult to exclude. Severe bilateral foraminal stenosis also seen at C3-4 and C5-C6.Mild to moderate canal stenosis at other levels  Postop cervical Xrays ordered and pending - d/w RN to complete today    Plan:  Continue to monitor neurological exam.   Maintain MAPs>85 x days (4/5) on Rony and vasopressin  Hemovac drain removed 6/19/2024  Keflex x 2 weeks for surgical prophylaxis.  Pain control - APS following - input appreciated  Medical management per primary team  Mobilize with physical and Occupational Therapy - no collar required  Referral for rehab - [facility] reviewing case  Upright x-rays cervical spine AP lateral ordered and pending - to be completed today a per conversation with RN  DVT prophylaxis: Bilateral SCDs and lovenox    Neurosurgery will continue to follow, call with any further questions or concerns.      SDH (subdural hematoma) (HCC)  Assessment & Plan  Parafalcine SDH with bilateral SAH  Patient presented after being found down by his daughter, patient is unsure what happened other than he got up to go to the  "bathroom.  Patient denies any blood thinner use.    Imaging:  CT head without 6/15/24:New acute trace subarachnoid hemorrhage in bilateral parafalcine regions. Recommend dedicated CTA head with contrast for further evaluation.New acute trace parafalcine subdural hematoma.  Repeat CT head wo 6/15/2024: Trace subarachnoid hemorrhage within the interhemispheric fissure decreased.  No parenchymal subdural hematoma.  CT head without 6/18/2024:Near completely resolved parafalcine subarachnoid hemorrhage with trace residual subarachnoid hemorrhage in the frontoparietal regions medially. No mass effect or hydrocephalus.No large territorial infarction.    Plan:  Continue frequent neurological checks.   STAT CT head with any neurological decline including drop GCS of 2pts within 1 hr.  Seizure prophylaxis per trauma team  Hold all full antiplatelet and anticoagulation medications for 2 weeks  Medical management and pain control per primary team  Mobilize with PT/OT  DVT PPX: SCDs. Lovenox    Patient can follow-up as needed for this problem           Subjective/Objective   Chief Complaint: \"it's (neck pain) there\"    Subjective: Patient endorses neck discomfort stating it is there.  Appears well-controlled per chart review.  Continues to complain of pain on the dorsum of his hands bilaterally.  Denies any radicular arm pain or numbness.  Continues with hand weakness.  No lower extremity complaints.    Objective: Sitting up in bed.  NAD.  Prefers to keep eyes closed.    I/O         06/18 0701  06/19 0700 06/19 0701  06/20 0700 06/20 0701  06/21 0700    P.O. 2160 1800     I.V. (mL/kg) 602.7 (7.6) 625.6 (7.9) 138 (1.7)    IV Piggyback 225.6 1100 250    Total Intake(mL/kg) 2988.3 (37.9) 3525.6 (44.7) 388 (4.9)    Urine (mL/kg/hr) 4490 (2.4) 3060 (1.6) 350 (2.6)    Drains 15 0     Stool  0     Total Output 4505 3060 350    Net -1516.7 +465.6 +38           Unmeasured Stool Occurrence  1 x             Invasive Devices       Central " "Venous Catheter Line  Duration             [facility] Central Lines 06/17/24 Triple 16cm Right Internal jugular 2 days              Drain  Duration             Closed/Suction Drain Posterior;Midline;Proximal Back Accordion 10 Fr. 3 days    Urethral Catheter Latex;Straight-tip 16 Fr. 3 days                    Physical Exam:  Vitals: Blood pressure 156/77, pulse 66, temperature 98.1 °F (36.7 °C), temperature source Oral, resp. rate 18, height 5' 6\" (1.676 m), weight 78.9 kg (174 lb), SpO2 100%.,Body mass index is 28.08 kg/m².    Hemodynamic Monitoring: MAP: Arterial Line MAP (mmHg): 126 mmHg    General appearance: alert, appears stated age, cooperative and no distress  Head: Normocephalic, without obvious abnormality  Eyes: Conjugate and tracks properly in room  Lungs: non labored breathing  Heart: regular heart rate  Neurologic:   Mental status: Alert, follows commands, thought content appropriate  Cranial nerves: grossly intact   Sensory: normal to crude touch x 4  Motor: moving all extremities with distal weakness, bilateral hands   R4, L 3  IO 2-3  WF R 4-, L 4  Reflexes: santoyo b/l    Lab Results:  Results from last 7 days   Lab Units 06/20/24  0428 06/19/24  0519 06/18/24  0439 06/16/24  0554 06/15/24  0856   WBC Thousand/uL 12.92* 14.45* 16.27*   < > 11.11*   HEMOGLOBIN g/dL 12.2 13.2 12.5   < > 13.3   I STAT HEMOGLOBIN   --   --   --    < >  --    HEMATOCRIT % 36.4* 39.1 38.2   < > 40.0   HEMATOCRIT, ISTAT   --   --   --    < >  --    PLATELETS Thousands/uL 212 232 236   < > 160   SEGS PCT % 71 67  --   --  82*   MONO PCT % 12 11  --   --  7   EOS PCT % 1 2  --   --  0    < > = values in this interval not displayed.     Results from last 7 days   Lab Units 06/20/24  0428 06/19/24  1434 06/19/24  0519 06/17/24  0107 06/16/24  1143 06/16/24  0930 06/16/24  0554 06/15/24  0856   SODIUM mmol/L 131* 133* 132*   < >  --   --    < > 136   POTASSIUM mmol/L 3.8 4.0 3.9   < >  --   --    < > 3.6   CHLORIDE mmol/L 94* 94* " "93*   < >  --   --    < > 101   CO2 mmol/L 30 30 31   < >  --   --    < > 27   CO2, I-STAT mmol/L  --   --   --   --  25 27   < >  --    BUN mg/dL 12 11 11   < >  --   --    < > 13   CREATININE mg/dL 0.62 0.61 0.67   < >  --   --    < > 0.76   CALCIUM mg/dL 6.6* 7.2* 6.7*   < >  --   --    < > 6.6*   ALK PHOS U/L  --   --   --   --   --   --   --  80   ALT U/L  --   --   --   --   --   --   --  26   AST U/L  --   --   --   --   --   --   --  31   GLUCOSE, ISTAT mg/dl  --   --   --   --  152* 106  --   --     < > = values in this interval not displayed.     Results from last 7 days   Lab Units 06/20/24  0428 06/19/24  0519 06/18/24  1829   MAGNESIUM mg/dL 1.7* 2.0 2.2     Results from last 7 days   Lab Units 06/20/24  0428 06/18/24  1829 06/18/24  0439   PHOSPHORUS mg/dL 3.9 4.9* 3.1     Results from last 7 days   Lab Units 06/16/24  0554   INR  0.99   PTT seconds 28     No results found for: \"TROPONINT\"  ABG:No results found for: \"PHART\", \"CYV3DDN\", \"PO2ART\", \"ZTQ1SWZ\", \"F3DMEBXM\", \"BEART\", \"SOURCE\"    Imaging Studies: I have personally reviewed pertinent reports.   and I have personally reviewed pertinent films in PACS    CT head wo contrast    Result Date: 6/18/2024  Impression: 1. Near completely resolved parafalcine subarachnoid hemorrhage with trace residual subarachnoid hemorrhage in the frontoparietal regions medially. No mass effect or hydrocephalus. 2. No large territorial infarction. Workstation performed: [id number]     XR chest portable ICU    Result Date: 6/18/2024  Impression: No acute cardiopulmonary disease. Right IJ line terminates in the SVC. Workstation performed: [id number]     XR spine cervical 2 or 3 vw injury    Result Date: 6/17/2024  Impression: Fluoroscopy provided for procedure guidance. Please refer to the separate procedure note for additional details. Workstation performed: [id number]     CT head wo contrast    Result Date: 6/16/2024  Impression: Trace subarachnoid hemorrhage within the " interhemispheric fissure is slightly decreased in conspicuity No parenchymal or subdural hematoma. Workstation performed: [id number]     XR chest portable ICU    Result Date: 6/16/2024  Impression: No acute cardiopulmonary disease. No central line. No pneumothorax. Workstation performed: [id number]     MRI cervical spine wo contrast    Result Date: 6/15/2024  Impression: Congenital canal stenosis with superimposed degenerative spondylosis . Most pronounced at C3-4 and C5-C6 with moderate to severe canal stenosis and mild cord compression. Evaluation of cord signal is limited due to artifact. No definite abnormal cord signal but mild cord edema would be difficult to exclude Severe bilateral foraminal stenosis also seen at C3-4 and C5-C6. Mild to moderate canal stenosis at other levels Workstation performed: [id number]     CT spine cervical without contrast    Result Date: 6/15/2024  Impression: No cervical spine fracture or traumatic malalignment. Posterior osteophyte disc complexes C3-C4 and C5-C6 contributes to at least moderate canal stenosis and severe bilateral foraminal narrowing at these levels. Consider nonemergent follow-up MRI cervical spine without contrast for further evaluation. Ectatic right carotid bifurcation and proximal cervical internal carotid artery with retropharyngeal course, similar to CT neck with contrast 10/12/2011. Please see same day CTA head with contrast, CT head without contrast, CT thoracic spine without contrast for further evaluation. The study was marked in EPIC for immediate notification. Workstation performed: [id number]     CTA head w wo contrast    Result Date: 6/15/2024  Impression: Negative CTA head traumatic vascular injury, aneurysm, large vessel occlusion, high-grade stenosis, or dissection. Partially imaged ectatic right carotid bifurcation and proximal cervical internal carotid artery with retropharyngeal course, similar to CT neck with contrast 10/12/2011. Additional  chronic/incidental findings as detailed above. Please see earlier same day CT head without contrast and concurrent CT cervical spine without contrast. Workstation performed: [id number]     XR hand 3+ views RIGHT    Result Date: 6/15/2024  Impression: 1. No acute osseous abnormality. 2. Degenerative changes as described. Resident: ALLYSON AWAN I, the attending radiologist, have reviewed the images and agree with the final report above. Workstation performed: [id number]     XR hand 3+ views LEFT    Result Date: 6/15/2024  Impression: No acute osseous abnormality. Degenerative changes as described. Resident: ALLYSON AWAN I, the attending radiologist, have reviewed the images and agree with the final report above. Workstation performed: [id number]     CT spine thoracic without contrast    Result Date: 6/15/2024  Impression: No acute osseous abnormality of thoracic spine. Diffuse idiopathic skeletal hyperostosis (DISH). I personally discussed this study with Travis Powell on 6/15/2024 10:08 AM. Workstation performed: [id number]     CT head wo contrast    Result Date: 6/15/2024  Impression: New acute trace subarachnoid hemorrhage in bilateral parafalcine regions. Recommend dedicated CTA head with contrast for further evaluation. New acute trace parafalcine subdural hematoma. I personally discussed this study with Travis Powell on 6/15/2024 10:08 AM. Workstation performed: [id number]       VTE Pharmacologic Prophylaxis: Enoxaparin (Lovenox)    VTE Mechanical Prophylaxis: sequential compression device

## 2024-06-20 NOTE — OCCUPATIONAL THERAPY NOTE
Occupational Therapy Cancel Note        Patient Name: Luis Forrester  Today's Date: 6/20/2024 06/20/24 1100   OT Last Visit   OT Visit Date 06/20/24   Note Type   Note Type Cancelled Session   Cancel Reasons Medical status       OT attempted to see pt for OT treatment however upon discussion with RN, advised to hold @ this time as pt is orthostatic, planning for PPM today, and being transferred to MICU. Will continue to follow on caseload and see when able.    Alecia Arceo MS, OTR/L

## 2024-06-20 NOTE — ASSESSMENT & PLAN NOTE
Parafalcine SDH with bilateral SAH  Patient presented after being found down by his daughter, patient is unsure what happened other than he got up to go to the bathroom.  Patient denies any blood thinner use.    Imaging:  CT head without 6/15/24:New acute trace subarachnoid hemorrhage in bilateral parafalcine regions. Recommend dedicated CTA head with contrast for further evaluation.New acute trace parafalcine subdural hematoma.  Repeat CT head wo 6/15/2024: Trace subarachnoid hemorrhage within the interhemispheric fissure decreased.  No parenchymal subdural hematoma.  CT head without 6/18/2024:Near completely resolved parafalcine subarachnoid hemorrhage with trace residual subarachnoid hemorrhage in the frontoparietal regions medially. No mass effect or hydrocephalus.No large territorial infarction.    Plan:  Continue frequent neurological checks.   STAT CT head with any neurological decline including drop GCS of 2pts within 1 hr.  Seizure prophylaxis per trauma team  Hold all full antiplatelet and anticoagulation medications for 2 weeks  Medical management and pain control per primary team  Mobilize with PT/OT  DVT PPX: SCDs. Lovenox    Patient can follow-up as needed for this problem

## 2024-06-20 NOTE — PROGRESS NOTES
24 1000   Clinical Encounter Type   Visited With Family   Routine Visit Introduction      Pastoral Care Progress Note    2024  Patient: Luis Forrester : 1970  Admission Date & Time: 6/15/2024 0736  MRN: [id number] CSN: [id number]         check-in with family. Hgospitality offered, no other needs at this time. Chaplains remain available.

## 2024-06-20 NOTE — PLAN OF CARE
Problem: Prexisting or High Potential for Compromised Skin Integrity  Goal: Skin integrity is maintained or improved  Description: INTERVENTIONS:  - Identify patients at risk for skin breakdown  - Assess and monitor skin integrity  - Assess and monitor nutrition and hydration status  - Monitor labs   - Assess for incontinence   - Turn and reposition patient  - Assist with mobility/ambulation  - Relieve pressure over bony prominences  - Avoid friction and shearing  - Provide appropriate hygiene as needed including keeping skin clean and dry  - Evaluate need for skin moisturizer/barrier cream  - Collaborate with interdisciplinary team   - Patient/family teaching  - Consider wound care consult   Outcome: Progressing     Problem: PAIN - ADULT  Goal: Verbalizes/displays adequate comfort level or baseline comfort level  Description: Interventions:  - Encourage patient to monitor pain and request assistance  - Assess pain using appropriate pain scale  - Administer analgesics based on type and severity of pain and evaluate response  - Implement non-pharmacological measures as appropriate and evaluate response  - Consider cultural and social influences on pain and pain management  - Notify physician/advanced practitioner if interventions unsuccessful or patient reports new pain  Outcome: Progressing     Problem: INFECTION - ADULT  Goal: Absence or prevention of progression during hospitalization  Description: INTERVENTIONS:  - Assess and monitor for signs and symptoms of infection  - Monitor lab/diagnostic results  - Monitor all insertion sites, i.e. indwelling lines, tubes, and drains  - Monitor endotracheal if appropriate and nasal secretions for changes in amount and color  - [place] appropriate cooling/warming therapies per order  - Administer medications as ordered  - Instruct and encourage patient and family to use good hand hygiene technique  - Identify and instruct in appropriate isolation precautions for  identified infection/condition  Outcome: Progressing     Problem: SAFETY ADULT  Goal: Patient will remain free of falls  Description: INTERVENTIONS:  - Educate patient/family on patient safety including physical limitations  - Instruct patient to call for assistance with activity   - Consult OT/PT to assist with strengthening/mobility   - Keep Call bell within reach  - Keep bed low and locked with side rails adjusted as appropriate  - Keep care items and personal belongings within reach  - Initiate and maintain comfort rounds  - Make Fall Risk Sign visible to staff  - Apply yellow socks and bracelet for high fall risk patients  - Consider moving patient to room near nurses station  Outcome: Progressing  Goal: Maintain or return to baseline ADL function  Description: INTERVENTIONS:  -  Assess patient's ability to carry out ADLs; assess patient's baseline for ADL function and identify physical deficits which impact ability to perform ADLs (bathing, care of mouth/teeth, toileting, grooming, dressing, etc.)  - Assess/evaluate cause of self-care deficits   - Assess range of motion  - Assess patient's mobility; develop plan if impaired  - Assess patient's need for assistive devices and provide as appropriate  - Encourage maximum independence but intervene and supervise when necessary  - Involve family in performance of ADLs  - Assess for home care needs following discharge   - Consider OT consult to assist with ADL evaluation and planning for discharge  - Provide patient education as appropriate  Outcome: Progressing  Goal: Maintains/Returns to pre admission functional level  Description: INTERVENTIONS:  - Perform AM-PAC 6 Click Basic Mobility/ Daily Activity assessment daily.  - Set and communicate daily mobility goal to care team and patient/family/caregiver.   - Collaborate with rehabilitation services on mobility goals if consulted  - Out of bed for toileting  - Record patient progress and toleration of activity level    Outcome: Progressing     Problem: DISCHARGE PLANNING  Goal: Discharge to home or other facility with appropriate resources  Description: INTERVENTIONS:  - Identify barriers to discharge w/patient and caregiver  - Arrange for needed discharge resources and transportation as appropriate  - Identify discharge learning needs (meds, wound care, etc.)  - Arrange for interpretive services to assist at discharge as needed  - Refer to Case Management Department for coordinating discharge planning if the patient needs post-hospital services based on physician/advanced practitioner order or complex needs related to functional status, cognitive ability, or social support system  Outcome: Progressing     Problem: Knowledge Deficit  Goal: Patient/family/caregiver demonstrates understanding of disease process, treatment plan, medications, and discharge instructions  Description: Complete learning assessment and assess knowledge base.  Interventions:  - Provide teaching at level of understanding  - Provide teaching via preferred learning methods  Outcome: Progressing     Problem: NEUROSENSORY - ADULT  Goal: Achieves stable or improved neurological status  Description: INTERVENTIONS  - Monitor and report changes in neurological status  - Monitor vital signs such as temperature, blood pressure, glucose, and any other labs ordered   - Initiate measures to prevent increased intracranial pressure  - Monitor for seizure activity and implement precautions if appropriate      Outcome: Progressing  Goal: Remains free of injury related to seizures activity  Description: INTERVENTIONS  - Maintain airway, patient safety  and administer oxygen as ordered  - Monitor patient for seizure activity, document and report duration and description of seizure to physician/advanced practitioner  - If seizure occurs,  ensure patient safety during seizure  - Reorient patient post seizure  - Seizure pads on all 4 side rails  - Instruct patient/family to  notify RN of any seizure activity including if an aura is experienced  - Instruct patient/family to call for assistance with activity based on nursing assessment  - Administer anti-seizure medications if ordered    Outcome: Progressing  Goal: Achieves maximal functionality and self care  Description: INTERVENTIONS  - Monitor swallowing and airway patency with patient fatigue and changes in neurological status  - Encourage and assist patient to increase activity and self care.   - Encourage visually impaired, hearing impaired and aphasic patients to use assistive/communication devices  Outcome: Progressing     Problem: CARDIOVASCULAR - ADULT  Goal: Maintains optimal cardiac output and hemodynamic stability  Description: INTERVENTIONS:  - Monitor I/O, vital signs and rhythm  - Monitor for S/S and trends of decreased cardiac output  - Administer and titrate ordered vasoactive medications to optimize hemodynamic stability  - Assess quality of pulses, skin color and temperature  - Assess for signs of decreased coronary artery perfusion  - Instruct patient to report change in severity of symptoms  Outcome: Progressing  Goal: Absence of cardiac dysrhythmias or at baseline rhythm  Description: INTERVENTIONS:  - Continuous cardiac monitoring, vital signs, obtain 12 lead EKG if ordered  - Administer antiarrhythmic and heart rate control medications as ordered  - Monitor electrolytes and administer replacement therapy as ordered  Outcome: Progressing     Problem: GENITOURINARY - ADULT  Goal: Maintains or returns to baseline urinary function  Description: INTERVENTIONS:  - Assess urinary function  - Encourage oral fluids to ensure adequate hydration if ordered  - Administer IV fluids as ordered to ensure adequate hydration  - Administer ordered medications as needed  - Offer frequent toileting  - Follow urinary retention protocol if ordered  Outcome: Progressing  Goal: Absence of urinary retention  Description:  INTERVENTIONS:  - Assess patient’s ability to void and empty bladder  - Monitor I/O  - Bladder scan as needed  - Discuss with physician/AP medications to alleviate retention as needed  - Discuss catheterization for long term situations as appropriate  Outcome: Progressing

## 2024-06-20 NOTE — DISCHARGE INSTR - AVS FIRST PAGE
Please refer to post pacemaker implantation discharge instructions and restrictions and your pacemaker booklet/temporary card.     Keep incision dry for one week. Leave outer bandage in place for 1 week - it is water proof, and as long as it is fully adhered to your skin you may shower with it.  If it appears as though the bandage is coming off and/or there is any communication to the area of device incision, please then keep the whole area dry for the remaining week.  After 1 week, please remove by pulling all edges away from the center of the bandage. After the bandage is removed, you may then shower normally and get the area wet with soap and water, no scrubbing, and pat dry. Do not use lotions/powders/creams on incision.    No overhead reaching/pushing/pulling/lifting greater than 5-10lbs with left arm for six weeks. Please call the office if you notice redness, swelling, bleeding, or drainage from incision or if you develop fevers.       AFTER PACEMAKER CARE:    If you have any questions, please call [phone] to speak with a nurse (8:30am-4pm, or [phone] after hours). For appointments, please call [phone].      WHAT YOU SHOULD KNOW:   A pacemaker is a small, battery-powered device that is placed under your skin in your upper chest area with wires placed through a vein that lead directly into the heart. It helps regulate your heart rate and prevent your heart from beating too slowly.                 AFTER YOU LEAVE:     Medicines:     Pain medicine:  You may need medicine to take away or decrease pain.     Learn how to take your medicine. Ask what medicine and how much you should take. Be sure you know how, when, and how often to take it. Usually Over the counter pain medicine is sufficient to control pain (Acetominophen or Ibuprofen) Ask your doctor if you may take these. If this does not control your pain, narcotic pain killers may be prescribed, please call if you need prescription.     Do not  wait until the pain is severe before you take your medicine. Tell caregivers if your pain does not decrease.    Pain medicine can make you dizzy or sleepy. Prevent falls by calling someone when you get out of bed or if you need help.        Take your medicine as directed.  Call your healthcare provider if you think your medicine is not helping or if you have side effects. Tell him if you are allergic to any medicine.    Follow up with your cardiologist after your procedure:  You will need a follow-up visit approximately 2 weeks after you leave the hospital. Your cardiologist will check your wound and make sure that your pacemaker is working correctly.     Follow the instructions to check your pacemaker:  Your cardiologist or primary healthcare provider will check your pacemaker and the battery regularly.  He will use a computer to check your pacemaker over the telephone or wireless device which will be given to you.     Pacemaker batteries usually last 8 to 10 years. The pacemaker unit will be replaced when the battery gets low. This is a simpler procedure than the original one to implant your pacemaker.    Wound care:  Keep your incision dry for one week.  Do not use lotions/powders/creams on incision.     Leave outer bandage in place for 1 week - it is water proof, and as long as it is fully adhered to your skin you may shower with it.  If it appears as though the bandage is coming off and/or there is any communication to the area of device incision, please then keep the whole area dry for the remaining week.  After 1 week, please remove by pulling all edges away from the center of the bandage.    Please call the office if you notice redness, swelling, bleeding, or drainage from incision or if you develop fevers.       Activity:   Arm movement and lifting:  Be careful using the arm on the side of your pacemaker. Do not move your arm for the first 24 hours after your procedure. Do not  lift your arm above your  shoulder or lift more than 10 pounds for one month after your procedure. Avoid pushing, pulling, or repetitive arm movements for one month. This helps the leads stay in place and helps your wound heal. Ask your caregiver when you can drive after your procedure. You may move your arm side to side without lifting above your shoulder, and do not need to wear a sling at home.   Driving: you are ok to drive 48 hours after pacemaker is implanted   Sports:  Ask your caregiver when it is okay to play tennis, golf, basketball, or any sport that requires you to lift your arms. Do not play full contact sports, such as football, that could damage your pacemaker. Ask your cardiologist or primary healthcare provider how much and what kinds of physical activity are safe for you.    Living with a pacemaker:   Tell all caregivers you have a pacemaker:  This includes surgeons, radiologists, and medical technicians. You may want to wear a medical alert ID bracelet or necklace that states that you have a pacemaker.    Carry your pacemaker ID card:  Make sure you receive a pacemaker ID card. Carry it with you at all times. It lists important information about your pacemaker. Show it to airport security if you travel.     Avoid electrical interference:  Avoid welding equipment and other equipment with large magnets or electric fields. These things could interfere with how your pacemaker works. Use your cell phone on the ear opposite from your pacemaker. Do not carry your cell phone in your shirt pocket over your chest.     Some Pacemakers are MRI safe. Ask you doctor if it is safe to proceed with MRI and let the radiologist and staff know you have a pacemaker.     Do not touch the skin around your pacemaker:  This can cause damage to the lead wires or move the pacemaker unit from where it should be.    Contact your cardiologist or primary healthcare provider if:   The area around your pacemaker has increasing amount of pain after  surgery. The pain should improve over first few days after implantation.     The skin around your stitches has increasing redness, swelling, or has drainage. This may mean that you have an infection.     You have a fever.     You have chills, a cough, and feel weak or achy. These are also signs of infection.    Your feet or ankles are more swollen than your baseline.     Your Heart rate is less than 50 beats per minute     Seek care immediately if:   Your bandage becomes soaked with blood.     Your pacemaker is swelling rapidly    Your stitches open up.     You feel your heart suddenly beating very slowly or quickly.    You become too weak or dizzy to stand, or you pass out.     Your arm or leg feels warm, tender, and painful. It may look swollen and red.    You have chest pain that does not go away with rest or medicine.     You feel lightheaded, short of breath, and have chest pain.     You cough up blood.        © 2014 [employer] Information is for End User's use only and may not be sold, redistributed or otherwise used for commercial purposes. All illustrations and images included in CareNotes® are the copyrighted property of [employer]. or [employer].  The above information is an  only. It is not intended as medical advice for individual conditions or treatments. Talk to your doctor, nurse or pharmacist before following any medical regimen to see if it is safe and effective for you.      Discharge Instructions  Posterior cervical decompression and fixation/fusion      Surgical incisional care:  Keep incision clean and dry. Avoid applying creams, lotion or antiseptic to incision area.  Check the wound daily. If the incision becomes red, swollen, tender, warm, or has increased drainage please notify physician immediately.  Okay to apply a padded dressing over incision while wearing VISTA collar in order to reduce risk of irritation.  Shower 3 days after surgery,  but do not soak in a tub and no swimming.  Use mild antimicrobial soap and water. Pat incision dry after showering.   Cervical VISTA collar to be worn at all times except for showering. Change from VISTA (grey) collar to the Leah (peach) collar prior to showering. Incision may be cleaned with water and a mild antimicrobial soap using a clean washcloth. Incision is to be gently patted dry with a clean towel. Once dry, collar should be changed back to a VISTA (grey) collar with clean pads in place.  Hand wash collar pads with mild soap and water. They are to be laid flat to dry on a clean towel. Recommend changing every 1-2 days.   Please refer to VISTA collar instructions for further details.    Activity Restrictions:  No heavy lifting greater than 5 - 10lbs. No strenuous activities.  May walk as tolerated. Encourage at least 4 short walks per day.   No driving while requiring cervical collar, anticipated six weeks.  No significant neck movement.    Postoperative medication:  Please take pain medications to relieve incision pain, and muscle relaxants to prevent spasms as directed. Please see after visit summary (AVS) for details.   Please take over the counter stool softeners such as colace or senna-s to avoid constipation while on narcotics.  Do not take ibuprofen, Naproxen/Aleve or any NSAID until cleared by surgeon. May take Tylenol instead.  If taking Coumadin, Aspirin, or Plavix, you may resume these medications when cleared by Neurosurgery.    Follow-up as scheduled for a 2 week incision check. Follow-up 6 weeks after surgery with a repeat cervical spine upright x-rays to be completed prior to visit.    **Please notify MD immediately if you experience a fever of 101.F, have increased neck or arm pain, new numbness and/or weakness in your arms, difficulty swallowing or breathing especially while lying down, numbness or weakness in arms or legs.**

## 2024-06-20 NOTE — PROGRESS NOTES
[facility]  Progress Note: Critical Care  Name: Luis Forrester 53 y.o. male I MRN: [id number]  Unit/Bed#: ICU 04 I Date of Admission: 6/15/2024   Date of Service: 6/20/2024 I Hospital Day: 5    Assessment & Plan   Neuro:   Diagnosis: SAH, parafalcine SDH, spinal stenosis, post-traumatic pain, encephalopathy   POD 2 s/p C2 - T1 decompression and fusion with NSG  Plan:   STAT CTH for 2 point or greater decline in GCS or new focal neurologic deficit not previously identified on physical exam.   Continue MAP pushes >85mmHg for spinal cord perfusion  Keppra x7 days for seizure ppx  Appreciate APS consult   Continue oral multimodal pain control  IV dilaudid PRN for breakthrough pain  Continue methadone taper  Q2 hr neuro checks  No need for hard collar per NSG  Continue to monitor hemovac output  Delirium precautions     CV:   Diagnosis: Syncope, sick sinus syndrome  TTE from 6/17:  Left Ventricle: Left ventricular cavity size is normal. Wall thickness is normal. The left ventricular ejection fraction is 60%. Systolic function is normal. Wall motion is normal. Diastolic function is normal.  Right Ventricle: Right ventricular cavity size is mildly dilated.  Right Atrium: The atrium is mildly dilated.  Atrial Septum: There is a probable patent foramen ovale noted at rest with predominant left to right shunting using color Doppler. 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  It is predominately within the right atrial cavity.  Aorta: The aortic root is mildly dilated. The aortic root is 4.30 cm.  Plan:   Unclear etiology of syncope - cardiac vs neurogenic  TTE largely un-remarkable  MAP pushes >85mmHg - continue isak and vasopressin  Tentative plan for a PPM today with EP     Pulm:  No active issues     GI:   No active issues     :   Diagnosis: Elevated urine output  Plan:   Urine lytes not concerning for DI  X1 dose DDVAP on 6/17 and again on 6/19  Na WNL  Daily BMP's  Strict  I&O's  Nephrology consulted, recs appreciated     F/E/N:   F: N/a  E: K > 4, Mag >2  N: Continue regular diet     Heme/Onc:   No active issues  Lovenox 30mg BID for VTE prophylaxis     Endo:   Diagnosis: Hypothyroidism  Plan:   Continue home synthroid     ID:   No active issues  Continue clinda x2 weeks for surgical prophylaxis per NSG     MSK/Skin:   No active issues    Disposition: Critical care    ICU Core Measures     A: Assess, Prevent, and Manage Pain Has pain been assessed? Yes  Need for changes to pain regimen? No   B: Both SAT/SAT  N/A   C: Choice of Sedation RASS Goal: 0 Alert and Calm  Need for changes to sedation or analgesia regimen? No   D: Delirium CAM-ICU: Positive   E: Early Mobility  Plan for early mobility? No   F: Family Engagement Plan for family engagement today? Yes       Antibiotic Review: Post op requirements     Review of Invasive Devices:    Ramirez Plan: Ramirez to be removed. Order has been placed  Central access plan: Medications requiring central line      Prophylaxis:  VTE VTE covered by:  enoxaparin, Subcutaneous, 30 mg at 06/19/24 2117       Stress Ulcer  not ordered        Significant 24hr Events     24hr events: DDVAP x1 yesterday for elevated uop. Nephrology consulted. Syncope yesterday with PT, was bradycardic, EP consulted and plan for PPM today.      Subjective     Review of Systems: See HPI for Review of Systems     Objective                            Vitals I/O      Most Recent Min/Max in 24hrs   Temp 98.5 °F (36.9 °C) Temp  Min: 98.2 °F (36.8 °C)  Max: 99.3 °F (37.4 °C)   Pulse 56 Pulse  Min: 44  Max: 80   Resp (!) 28 Resp  Min: 8  Max: 28   /72 BP  Min: 91/63  Max: 161/91   O2 Sat 99 % SpO2  Min: 93 %  Max: 100 %      Intake/Output Summary (Last 24 hours) at 6/20/2024 0506  Last data filed at 6/20/2024 0400  Gross per 24 hour   Intake 3567.58 ml   Output 3350 ml   Net 217.58 ml       Diet NPO; Sips with meds    Invasive Monitoring   Arterial Line  Gila /122   Arterial Line BP  Min: 104/92  Max: 128/122    mmHg  Arterial Line MAP (mmHg)  Min: 90 mmHg  Max: 126 mmHg           Physical Exam   Physical Exam  Vitals and nursing note reviewed.   Eyes:      Extraocular Movements: Extraocular movements intact.      Pupils: Pupils are equal, round, and reactive to light.   Skin:     General: Skin is warm and dry.   HENT:      Head: Normocephalic and atraumatic.   Neck:      Vascular: Central line present. No JVD.   Cardiovascular:      Rate and Rhythm: Normal rate and regular rhythm.      Pulses: Normal pulses.      Heart sounds: Normal heart sounds.   Musculoskeletal:         General: Normal range of motion.      Right lower leg: No edema.      Left lower leg: No edema.   Abdominal: General: There is distension.     Palpations: Abdomen is soft.      Tenderness: There is no abdominal tenderness.   Constitutional:       General: He is not in acute distress.     Appearance: He is well-developed and well-nourished. He is not ill-appearing.   Pulmonary:      Effort: Pulmonary effort is normal. No accessory muscle usage, respiratory distress or accessory muscle usage.      Breath sounds: Normal breath sounds. No wheezing.   Psychiatric:         Mood and Affect:  mood and affect abnormal.  Neurological:      General: No focal deficit present.      Mental Status: He is alert and oriented to person, place and time.      Cranial Nerves: No dysarthria or facial asymmetry.      Motor: Motor deficit (Continued issues with  strength in BLUE (L > R)).            Diagnostic Studies      EKG: NSR  Imaging: n/a I have personally reviewed pertinent reports.   and I have personally reviewed pertinent films in PACS     Medications:  Scheduled PRN   acetaminophen, 975 mg, Q8H ASHLYN  cephalexin, 500 mg, Q6H ASHLYN  Cholecalciferol, 1,000 Units, Daily  enoxaparin, 30 mg, Q12H ASHLYN  gabapentin, 100 mg, TID  levETIRAcetam, 500 mg, BID  levothyroxine, 125 mcg, Early Morning  methocarbamol, 500 mg,  Q6H ASHLYN  polyethylene glycol, 17 g, Daily  senna-docusate sodium, 1 tablet, HS      albuterol, 2 puff, Q4H PRN  HYDROmorphone, 0.2 mg, Q2H PRN  naloxone, 0.04 mg, Q1MIN PRN  ondansetron, 4 mg, Q4H PRN  oxyCODONE, 2.5 mg, Q4H PRN   Or  oxyCODONE, 5 mg, Q4H PRN       Continuous    phenylephine,  mcg/min, Last Rate: 150 mcg/min (06/20/24 0313)  vasopressin, 0.04 Units/min, Last Rate: 0.04 Units/min (06/20/24 0206)         Labs:    CBC    Recent Labs     06/19/24 0519 06/20/24  0428   WBC 14.45* 12.92*   HGB 13.2 12.2   HCT 39.1 36.4*    212     BMP    Recent Labs     06/19/24  1434 06/20/24  0428   SODIUM 133* 131*   K 4.0 3.8   CL 94* 94*   CO2 30 30   AGAP 9 7   BUN 11 12   CREATININE 0.61 0.62   CALCIUM 7.2* 6.6*       Coags    No recent results     Additional Electrolytes  Recent Labs     06/18/24 1829 06/19/24 0519 06/20/24  0428   MG 2.2 2.0 1.7*   PHOS 4.9*  --   --    CAIONIZED 0.91* 0.84*  --           Blood Gas    No recent results  No recent results LFTs  No recent results    Infectious  No recent results  Glucose  Recent Labs     06/18/24 1829 06/19/24  0519 06/19/24  1434 06/20/24  0428   GLUC 94 83 96 125               Eloy Norman PA-C

## 2024-06-21 ENCOUNTER — ANESTHESIA (INPATIENT)
Dept: NON INVASIVE DIAGNOSTICS | Facility: HOSPITAL | Age: 54
End: 2024-06-21
Payer: COMMERCIAL

## 2024-06-21 ENCOUNTER — TELEPHONE (OUTPATIENT)
Dept: NEUROSURGERY | Facility: CLINIC | Age: 54
End: 2024-06-21

## 2024-06-21 PROBLEM — I49.5 SSS (SICK SINUS SYNDROME) (HCC): Status: ACTIVE | Noted: 2024-06-21

## 2024-06-21 PROBLEM — Z95.0 S/P PLACEMENT OF CARDIAC PACEMAKER: Status: ACTIVE | Noted: 2024-06-21

## 2024-06-21 LAB
ANION GAP SERPL CALCULATED.3IONS-SCNC: 11 MMOL/L (ref 4–13)
ANION GAP SERPL CALCULATED.3IONS-SCNC: 11 MMOL/L (ref 4–13)
ANION GAP SERPL CALCULATED.3IONS-SCNC: 7 MMOL/L (ref 4–13)
ANION GAP SERPL CALCULATED.3IONS-SCNC: 8 MMOL/L (ref 4–13)
ANION GAP SERPL CALCULATED.3IONS-SCNC: 9 MMOL/L (ref 4–13)
BASOPHILS # BLD AUTO: 0.04 THOUSANDS/ÂΜL (ref 0–0.1)
BASOPHILS NFR BLD AUTO: 0 % (ref 0–1)
BUN SERPL-MCNC: 10 MG/DL (ref 5–25)
BUN SERPL-MCNC: 11 MG/DL (ref 5–25)
BUN SERPL-MCNC: 12 MG/DL (ref 5–25)
CA-I BLD-SCNC: 0.88 MMOL/L (ref 1.12–1.32)
CALCIUM SERPL-MCNC: 6.2 MG/DL (ref 8.4–10.2)
CALCIUM SERPL-MCNC: 6.3 MG/DL (ref 8.4–10.2)
CALCIUM SERPL-MCNC: 6.8 MG/DL (ref 8.4–10.2)
CALCIUM SERPL-MCNC: 6.9 MG/DL (ref 8.4–10.2)
CALCIUM SERPL-MCNC: 7.1 MG/DL (ref 8.4–10.2)
CHLORIDE SERPL-SCNC: 91 MMOL/L (ref 96–108)
CHLORIDE SERPL-SCNC: 92 MMOL/L (ref 96–108)
CHLORIDE SERPL-SCNC: 93 MMOL/L (ref 96–108)
CO2 SERPL-SCNC: 25 MMOL/L (ref 21–32)
CO2 SERPL-SCNC: 25 MMOL/L (ref 21–32)
CO2 SERPL-SCNC: 26 MMOL/L (ref 21–32)
CREAT SERPL-MCNC: 0.43 MG/DL (ref 0.6–1.3)
CREAT SERPL-MCNC: 0.49 MG/DL (ref 0.6–1.3)
CREAT SERPL-MCNC: 0.5 MG/DL (ref 0.6–1.3)
EOSINOPHIL # BLD AUTO: 0.49 THOUSAND/ÂΜL (ref 0–0.61)
EOSINOPHIL NFR BLD AUTO: 5 % (ref 0–6)
ERYTHROCYTE [DISTWIDTH] IN BLOOD BY AUTOMATED COUNT: 13 % (ref 11.6–15.1)
GFR SERPL CREATININE-BSD FRML MDRD: 121 ML/MIN/1.73SQ M
GFR SERPL CREATININE-BSD FRML MDRD: 123 ML/MIN/1.73SQ M
GFR SERPL CREATININE-BSD FRML MDRD: 129 ML/MIN/1.73SQ M
GLUCOSE SERPL-MCNC: 113 MG/DL (ref 65–140)
GLUCOSE SERPL-MCNC: 81 MG/DL (ref 65–140)
GLUCOSE SERPL-MCNC: 83 MG/DL (ref 65–140)
GLUCOSE SERPL-MCNC: 91 MG/DL (ref 65–140)
GLUCOSE SERPL-MCNC: 91 MG/DL (ref 65–140)
GLUCOSE SERPL-MCNC: 93 MG/DL (ref 65–140)
HCT VFR BLD AUTO: 32.2 % (ref 36.5–49.3)
HGB BLD-MCNC: 10.7 G/DL (ref 12–17)
IMM GRANULOCYTES # BLD AUTO: 0.06 THOUSAND/UL (ref 0–0.2)
IMM GRANULOCYTES NFR BLD AUTO: 1 % (ref 0–2)
LYMPHOCYTES # BLD AUTO: 1.44 THOUSANDS/ÂΜL (ref 0.6–4.47)
LYMPHOCYTES NFR BLD AUTO: 16 % (ref 14–44)
MAGNESIUM SERPL-MCNC: 1.7 MG/DL (ref 1.9–2.7)
MCH RBC QN AUTO: 31.7 PG (ref 26.8–34.3)
MCHC RBC AUTO-ENTMCNC: 33.2 G/DL (ref 31.4–37.4)
MCV RBC AUTO: 95 FL (ref 82–98)
MONOCYTES # BLD AUTO: 1.11 THOUSAND/ÂΜL (ref 0.17–1.22)
MONOCYTES NFR BLD AUTO: 12 % (ref 4–12)
NEUTROPHILS # BLD AUTO: 6.01 THOUSANDS/ÂΜL (ref 1.85–7.62)
NEUTS SEG NFR BLD AUTO: 66 % (ref 43–75)
NRBC BLD AUTO-RTO: 0 /100 WBCS
OSMOLALITY UR/SERPL-RTO: 271 MMOL/KG (ref 282–298)
OSMOLALITY UR: 333 MMOL/KG
PHOSPHATE SERPL-MCNC: 2.7 MG/DL (ref 2.7–4.5)
PLATELET # BLD AUTO: 189 THOUSANDS/UL (ref 149–390)
PMV BLD AUTO: 10 FL (ref 8.9–12.7)
POTASSIUM SERPL-SCNC: 3.5 MMOL/L (ref 3.5–5.3)
POTASSIUM SERPL-SCNC: 3.7 MMOL/L (ref 3.5–5.3)
POTASSIUM SERPL-SCNC: 3.7 MMOL/L (ref 3.5–5.3)
POTASSIUM SERPL-SCNC: 3.8 MMOL/L (ref 3.5–5.3)
POTASSIUM SERPL-SCNC: 3.9 MMOL/L (ref 3.5–5.3)
RBC # BLD AUTO: 3.38 MILLION/UL (ref 3.88–5.62)
SODIUM 24H UR-SCNC: 137 MOL/L
SODIUM SERPL-SCNC: 125 MMOL/L (ref 135–147)
SODIUM SERPL-SCNC: 126 MMOL/L (ref 135–147)
SODIUM SERPL-SCNC: 127 MMOL/L (ref 135–147)
SODIUM SERPL-SCNC: 128 MMOL/L (ref 135–147)
SODIUM SERPL-SCNC: 128 MMOL/L (ref 135–147)
WBC # BLD AUTO: 9.15 THOUSAND/UL (ref 4.31–10.16)

## 2024-06-21 PROCEDURE — 99232 SBSQ HOSP IP/OBS MODERATE 35: CPT | Performed by: INTERNAL MEDICINE

## 2024-06-21 PROCEDURE — 3E0132A INTRODUCTION OF ANTI-INFECTIVE ENVELOPE INTO SUBCUTANEOUS TISSUE, PERCUTANEOUS APPROACH: ICD-10-PCS | Performed by: INTERNAL MEDICINE

## 2024-06-21 PROCEDURE — C1898 LEAD, PMKR, OTHER THAN TRANS: HCPCS | Performed by: INTERNAL MEDICINE

## 2024-06-21 PROCEDURE — 33208 INSRT HEART PM ATRIAL & VENT: CPT | Performed by: INTERNAL MEDICINE

## 2024-06-21 PROCEDURE — 80048 BASIC METABOLIC PNL TOTAL CA: CPT | Performed by: PHYSICIAN ASSISTANT

## 2024-06-21 PROCEDURE — 82948 REAGENT STRIP/BLOOD GLUCOSE: CPT

## 2024-06-21 PROCEDURE — 83935 ASSAY OF URINE OSMOLALITY: CPT | Performed by: INTERNAL MEDICINE

## 2024-06-21 PROCEDURE — 02H63JZ INSERTION OF PACEMAKER LEAD INTO RIGHT ATRIUM, PERCUTANEOUS APPROACH: ICD-10-PCS | Performed by: INTERNAL MEDICINE

## 2024-06-21 PROCEDURE — 80048 BASIC METABOLIC PNL TOTAL CA: CPT | Performed by: INTERNAL MEDICINE

## 2024-06-21 PROCEDURE — 83930 ASSAY OF BLOOD OSMOLALITY: CPT | Performed by: INTERNAL MEDICINE

## 2024-06-21 PROCEDURE — C1892 INTRO/SHEATH,FIXED,PEEL-AWAY: HCPCS | Performed by: INTERNAL MEDICINE

## 2024-06-21 PROCEDURE — C1785 PMKR, DUAL, RATE-RESP: HCPCS | Performed by: INTERNAL MEDICINE

## 2024-06-21 PROCEDURE — 0JH606Z INSERTION OF PACEMAKER, DUAL CHAMBER INTO CHEST SUBCUTANEOUS TISSUE AND FASCIA, OPEN APPROACH: ICD-10-PCS | Performed by: INTERNAL MEDICINE

## 2024-06-21 PROCEDURE — 02HK3JZ INSERTION OF PACEMAKER LEAD INTO RIGHT VENTRICLE, PERCUTANEOUS APPROACH: ICD-10-PCS | Performed by: INTERNAL MEDICINE

## 2024-06-21 PROCEDURE — C1887 CATHETER, GUIDING: HCPCS | Performed by: INTERNAL MEDICINE

## 2024-06-21 PROCEDURE — 84100 ASSAY OF PHOSPHORUS: CPT | Performed by: PHYSICIAN ASSISTANT

## 2024-06-21 PROCEDURE — 33213 INSERT PULSE GEN DUAL LEADS: CPT | Performed by: INTERNAL MEDICINE

## 2024-06-21 PROCEDURE — 85025 COMPLETE CBC W/AUTO DIFF WBC: CPT | Performed by: PHYSICIAN ASSISTANT

## 2024-06-21 PROCEDURE — 99291 CRITICAL CARE FIRST HOUR: CPT | Performed by: SURGERY

## 2024-06-21 PROCEDURE — 83735 ASSAY OF MAGNESIUM: CPT | Performed by: PHYSICIAN ASSISTANT

## 2024-06-21 PROCEDURE — 84300 ASSAY OF URINE SODIUM: CPT | Performed by: INTERNAL MEDICINE

## 2024-06-21 PROCEDURE — C1769 GUIDE WIRE: HCPCS | Performed by: INTERNAL MEDICINE

## 2024-06-21 PROCEDURE — 82330 ASSAY OF CALCIUM: CPT | Performed by: PHYSICIAN ASSISTANT

## 2024-06-21 PROCEDURE — 99024 POSTOP FOLLOW-UP VISIT: CPT | Performed by: PHYSICIAN ASSISTANT

## 2024-06-21 DEVICE — IPG W1DR01 AZURE XT DR MRI USA
Type: IMPLANTABLE DEVICE | Site: CHEST  WALL | Status: FUNCTIONAL
Brand: AZURE™ XT DR MRI SURESCAN™

## 2024-06-21 DEVICE — ENVELOPE CMRM6122 ABSORB MED MR
Type: IMPLANTABLE DEVICE | Site: CHEST  WALL | Status: FUNCTIONAL
Brand: TYRX™

## 2024-06-21 DEVICE — LEAD 3830 US MKT/ 69CM MRI LBBAP
Type: IMPLANTABLE DEVICE | Site: HEART | Status: FUNCTIONAL
Brand: SELECTSECURE™ MRI SURESCAN™

## 2024-06-21 DEVICE — LEAD 457453 MRI US BI RCMCRD MVC
Type: IMPLANTABLE DEVICE | Site: HEART | Status: FUNCTIONAL
Brand: CAPSURE SENSE MRI™ SURESCAN™

## 2024-06-21 RX ORDER — CALCIUM GLUCONATE 20 MG/ML
2 INJECTION, SOLUTION INTRAVENOUS ONCE
Status: COMPLETED | OUTPATIENT
Start: 2024-06-21 | End: 2024-06-21

## 2024-06-21 RX ORDER — CALCIUM CHLORIDE 100 MG/ML
INJECTION INTRAVENOUS; INTRAVENTRICULAR AS NEEDED
Status: DISCONTINUED | OUTPATIENT
Start: 2024-06-21 | End: 2024-06-21

## 2024-06-21 RX ORDER — FUROSEMIDE 10 MG/ML
10 INJECTION INTRAMUSCULAR; INTRAVENOUS ONCE
Status: COMPLETED | OUTPATIENT
Start: 2024-06-21 | End: 2024-06-21

## 2024-06-21 RX ORDER — CEFAZOLIN SODIUM 2 G/50ML
2000 SOLUTION INTRAVENOUS ONCE
Status: COMPLETED | OUTPATIENT
Start: 2024-06-21 | End: 2024-06-21

## 2024-06-21 RX ORDER — MAGNESIUM SULFATE HEPTAHYDRATE 40 MG/ML
2 INJECTION, SOLUTION INTRAVENOUS ONCE
Status: COMPLETED | OUTPATIENT
Start: 2024-06-21 | End: 2024-06-21

## 2024-06-21 RX ORDER — LIDOCAINE HYDROCHLORIDE 10 MG/ML
INJECTION, SOLUTION EPIDURAL; INFILTRATION; INTRACAUDAL; PERINEURAL CODE/TRAUMA/SEDATION MEDICATION
Status: DISCONTINUED | OUTPATIENT
Start: 2024-06-21 | End: 2024-06-21 | Stop reason: HOSPADM

## 2024-06-21 RX ORDER — POTASSIUM CHLORIDE 14.9 MG/ML
20 INJECTION INTRAVENOUS ONCE
Status: COMPLETED | OUTPATIENT
Start: 2024-06-21 | End: 2024-06-21

## 2024-06-21 RX ORDER — PROPOFOL 10 MG/ML
INJECTION, EMULSION INTRAVENOUS CONTINUOUS PRN
Status: DISCONTINUED | OUTPATIENT
Start: 2024-06-21 | End: 2024-06-21

## 2024-06-21 RX ORDER — GENTAMICIN 40 MG/ML
INJECTION, SOLUTION INTRAMUSCULAR; INTRAVENOUS CODE/TRAUMA/SEDATION MEDICATION
Status: DISCONTINUED | OUTPATIENT
Start: 2024-06-21 | End: 2024-06-21 | Stop reason: HOSPADM

## 2024-06-21 RX ORDER — SODIUM CHLORIDE 9 MG/ML
INJECTION, SOLUTION INTRAVENOUS CONTINUOUS PRN
Status: DISCONTINUED | OUTPATIENT
Start: 2024-06-21 | End: 2024-06-21

## 2024-06-21 RX ADMIN — ACETAMINOPHEN 975 MG: 325 TABLET, FILM COATED ORAL at 06:02

## 2024-06-21 RX ADMIN — GABAPENTIN 100 MG: 100 CAPSULE ORAL at 17:44

## 2024-06-21 RX ADMIN — CEPHALEXIN 500 MG: 500 CAPSULE ORAL at 17:43

## 2024-06-21 RX ADMIN — LEVETIRACETAM 500 MG: 500 TABLET, FILM COATED ORAL at 08:00

## 2024-06-21 RX ADMIN — Medication 1000 UNITS: at 08:00

## 2024-06-21 RX ADMIN — CALCIUM GLUCONATE 2 G: 20 INJECTION, SOLUTION INTRAVENOUS at 08:13

## 2024-06-21 RX ADMIN — MIDODRINE HYDROCHLORIDE 5 MG: 5 TABLET ORAL at 07:59

## 2024-06-21 RX ADMIN — CEPHALEXIN 500 MG: 500 CAPSULE ORAL at 23:09

## 2024-06-21 RX ADMIN — VASOPRESSIN 0.04 UNITS/MIN: 20 INJECTION INTRAVENOUS at 03:19

## 2024-06-21 RX ADMIN — VASOPRESSIN 0.04 UNITS/MIN: 20 INJECTION INTRAVENOUS at 12:12

## 2024-06-21 RX ADMIN — SODIUM CHLORIDE: 0.9 INJECTION, SOLUTION INTRAVENOUS at 10:03

## 2024-06-21 RX ADMIN — PROPOFOL 50 MCG/KG/MIN: 10 INJECTION, EMULSION INTRAVENOUS at 10:03

## 2024-06-21 RX ADMIN — GABAPENTIN 100 MG: 100 CAPSULE ORAL at 08:01

## 2024-06-21 RX ADMIN — LEVETIRACETAM 500 MG: 500 TABLET, FILM COATED ORAL at 17:43

## 2024-06-21 RX ADMIN — ACETAMINOPHEN 975 MG: 325 TABLET, FILM COATED ORAL at 13:59

## 2024-06-21 RX ADMIN — HYDROMORPHONE HYDROCHLORIDE 0.2 MG: 0.2 INJECTION, SOLUTION INTRAMUSCULAR; INTRAVENOUS; SUBCUTANEOUS at 14:33

## 2024-06-21 RX ADMIN — METHOCARBAMOL 500 MG: 500 TABLET ORAL at 17:43

## 2024-06-21 RX ADMIN — CEPHALEXIN 500 MG: 500 CAPSULE ORAL at 00:05

## 2024-06-21 RX ADMIN — POTASSIUM CHLORIDE 20 MEQ: 14.9 INJECTION, SOLUTION INTRAVENOUS at 08:13

## 2024-06-21 RX ADMIN — CALCITRIOL CAPSULES 0.25 MCG 0.25 MCG: 0.25 CAPSULE ORAL at 08:59

## 2024-06-21 RX ADMIN — FUROSEMIDE 10 MG: 10 INJECTION, SOLUTION INTRAMUSCULAR; INTRAVENOUS at 08:00

## 2024-06-21 RX ADMIN — METHYLNALTREXONE BROMIDE 12 MG: 12 INJECTION, SOLUTION SUBCUTANEOUS at 13:13

## 2024-06-21 RX ADMIN — MAGNESIUM SULFATE HEPTAHYDRATE 2 G: 40 INJECTION, SOLUTION INTRAVENOUS at 08:13

## 2024-06-21 RX ADMIN — METHOCARBAMOL 500 MG: 500 TABLET ORAL at 00:05

## 2024-06-21 RX ADMIN — ENOXAPARIN SODIUM 30 MG: 30 INJECTION SUBCUTANEOUS at 20:21

## 2024-06-21 RX ADMIN — FUROSEMIDE 10 MG: 10 INJECTION, SOLUTION INTRAMUSCULAR; INTRAVENOUS at 16:03

## 2024-06-21 RX ADMIN — ENOXAPARIN SODIUM 30 MG: 30 INJECTION SUBCUTANEOUS at 08:00

## 2024-06-21 RX ADMIN — CEFAZOLIN SODIUM 2000 MG: 2 SOLUTION INTRAVENOUS at 10:03

## 2024-06-21 RX ADMIN — SODIUM CHLORIDE 60 ML/HR: 4 INJECTION, SOLUTION, CONCENTRATE INTRAVENOUS at 13:12

## 2024-06-21 RX ADMIN — LEVOTHYROXINE SODIUM 125 MCG: 125 TABLET ORAL at 06:02

## 2024-06-21 RX ADMIN — CALCIUM CHLORIDE 0.5 G: 100 INJECTION INTRAVENOUS; INTRAVENTRICULAR at 10:48

## 2024-06-21 RX ADMIN — VASOPRESSIN 0.04 UNITS/MIN: 20 INJECTION INTRAVENOUS at 19:47

## 2024-06-21 RX ADMIN — CALCIUM CHLORIDE 0.5 G: 100 INJECTION INTRAVENOUS; INTRAVENTRICULAR at 10:20

## 2024-06-21 RX ADMIN — MIDODRINE HYDROCHLORIDE 5 MG: 5 TABLET ORAL at 17:44

## 2024-06-21 RX ADMIN — METHOCARBAMOL 500 MG: 500 TABLET ORAL at 23:10

## 2024-06-21 RX ADMIN — CEPHALEXIN 500 MG: 500 CAPSULE ORAL at 06:02

## 2024-06-21 RX ADMIN — METHOCARBAMOL 500 MG: 500 TABLET ORAL at 06:02

## 2024-06-21 NOTE — ASSESSMENT & PLAN NOTE
POD5 PCDF C2-T1  (Dr. Lopez, 6/16/2024)  Patient describes query syncopal episode and falls  Found to have new right hand weakness and pain with bilateral Mohit's and right-sided wrist weakness on presentation    Imaging:  MRI cervical spine without 6/15/2024:Congenital canal stenosis with superimposed degenerative spondylosis .Most pronounced at C3-4 and C5-C6 with moderate to severe canal stenosis and mild cord compression. Evaluation of cord signal is limited due to artifact. No definite abnormal cord signal but mild cord edema would be difficult to exclude. Severe bilateral foraminal stenosis also seen at C3-4 and C5-C6.Mild to moderate canal stenosis at other levels  Postop cervical Xrays 6/20/2024 -expected postoperative appearance of posterior fusion C2-T1.    Plan:  Continue to monitor neurological exam.   Completed MAPs>85 x days (5/5).  Wean pressors as able per primary team  Hemovac drain removed 6/19/2024  Keflex x 2 weeks for surgical prophylaxis.  Pain control - APS following - input appreciated  Medical management per primary team  Mobilize with physical and Occupational Therapy - no collar required  Referral for rehab - [facility] reviewing case  DVT prophylaxis: Bilateral SCDs and lovenox    Neurosurgery will sign off.  Plan for outpatient follow-up for 2-week and 6-week postoperative visits. Call with any further questions or concerns.

## 2024-06-21 NOTE — TELEPHONE ENCOUNTER
6/21/24 - PT IN [facility]  7/5/24 2 WK POV W/NURSE  8/2/24 6 WK POV W/JA W/CSPINE XR  [facility]ON[facility] [employer]  2 wk RN  6 wk Dr. Lopez with cervical XR

## 2024-06-21 NOTE — OCCUPATIONAL THERAPY NOTE
OT CANCEL NOTE    Pt chart reviewed. Pt is off the floor in the cardiac cath lab for pacemaker placement. Will continue to follow pt on caseload and see pt when medically stable and as clinically appropriate.       06/21/24 1125   OT Last Visit   OT Visit Date 06/21/24   Note Type   Note type Cancelled Session   Cancel Reasons Patient off floor/test         Theresa Mckay MS, OTR/L

## 2024-06-21 NOTE — CASE MANAGEMENT
Case Management Discharge Planning Note    Patient name Luis Forrester  Location [facility] CATH LAB VIR/BE CATH * MRN [id number]  : 1970 Date 2024       Current Admission Date: 6/15/2024  Current Admission Diagnosis:TBI (traumatic brain injury) ([facility])   Patient Active Problem List    Diagnosis Date Noted Date Diagnosed    TBI (traumatic brain injury) ([facility]) 06/15/2024     Syncope and collapse 06/15/2024     Bilateral hand pain 06/15/2024     SDH (subdural hematoma) ([facility]) 06/15/2024     Right hand weakness 06/15/2024     Encounter for colorectal cancer screening 2024     Encounter for immunization 2024     Vitamin D deficiency 2024     Bilateral impacted cerumen 10/13/2021     Reactive airway disease 03/15/2018     Osteoarthritis of both hands 2015     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     Chronic low back pain 2015     Mass of parotid gland 2015     Lumbar radiculopathy 2015     Hyperlipidemia 2014     Hypothyroidism 2014     Allergic rhinitis 2012       LOS (days): 6  Geometric Mean LOS (GMLOS) (days): 10.2  Days to GMLOS:4.2     OBJECTIVE:  Risk of Unplanned Readmission Score: 18.34         Current admission status: Inpatient   Preferred Pharmacy:   [id number] - [place], [place] [address]  [place] [address]  Phone: [phone] Fax: [phone]    Primary Care Provider: Joceline Shook PA-C    Primary Insurance: [place] [employer]  Secondary Insurance:     DISCHARGE DETAILS:    Pt getting PPM today.   Pt accepted by [facility] and will d/c there once medically stable

## 2024-06-21 NOTE — ANESTHESIA POSTPROCEDURE EVALUATION
Post-Op Assessment Note    CV Status:  Stable  Pain Score: 0    Pain management: adequate    Multimodal analgesia used between 6 hours prior to anesthesia start to PACU discharge    Mental Status:  Alert and awake   Hydration Status:  Euvolemic and stable   PONV Controlled:  Controlled   Airway Patency:  Patent  Two or more mitigation strategies used for obstructive sleep apnea   Post Op Vitals Reviewed: Yes    No anethesia notable event occurred.    Staff: CRNA               BP   114/69   Temp   97   Pulse  76   Resp   12   SpO2   98

## 2024-06-21 NOTE — PLAN OF CARE
Problem: Prexisting or High Potential for Compromised Skin Integrity  Goal: Skin integrity is maintained or improved  Description: INTERVENTIONS:  - Identify patients at risk for skin breakdown  - Assess and monitor skin integrity  - Assess and monitor nutrition and hydration status  - Monitor labs   - Assess for incontinence   - Turn and reposition patient  - Assist with mobility/ambulation  - Relieve pressure over bony prominences  - Avoid friction and shearing  - Provide appropriate hygiene as needed including keeping skin clean and dry  - Evaluate need for skin moisturizer/barrier cream  - Collaborate with interdisciplinary team   - Patient/family teaching  - Consider wound care consult   Outcome: Progressing     Problem: PAIN - ADULT  Goal: Verbalizes/displays adequate comfort level or baseline comfort level  Description: Interventions:  - Encourage patient to monitor pain and request assistance  - Assess pain using appropriate pain scale  - Administer analgesics based on type and severity of pain and evaluate response  - Implement non-pharmacological measures as appropriate and evaluate response  - Consider cultural and social influences on pain and pain management  - Notify physician/advanced practitioner if interventions unsuccessful or patient reports new pain  Outcome: Progressing     Problem: INFECTION - ADULT  Goal: Absence or prevention of progression during hospitalization  Description: INTERVENTIONS:  - Assess and monitor for signs and symptoms of infection  - Monitor lab/diagnostic results  - Monitor all insertion sites, i.e. indwelling lines, tubes, and drains  - Monitor endotracheal if appropriate and nasal secretions for changes in amount and color  - Meldrim appropriate cooling/warming therapies per order  - Administer medications as ordered  - Instruct and encourage patient and family to use good hand hygiene technique  - Identify and instruct in appropriate isolation precautions for  identified infection/condition  Outcome: Progressing     Problem: SAFETY ADULT  Goal: Patient will remain free of falls  Description: INTERVENTIONS:  - Educate patient/family on patient safety including physical limitations  - Instruct patient to call for assistance with activity   - Consult OT/PT to assist with strengthening/mobility   - Keep Call bell within reach  - Keep bed low and locked with side rails adjusted as appropriate  - Keep care items and personal belongings within reach  - Initiate and maintain comfort rounds  - Make Fall Risk Sign visible to staff  - Apply yellow socks and bracelet for high fall risk patients  - Consider moving patient to room near nurses station  Outcome: Progressing  Goal: Maintain or return to baseline ADL function  Description: INTERVENTIONS:  -  Assess patient's ability to carry out ADLs; assess patient's baseline for ADL function and identify physical deficits which impact ability to perform ADLs (bathing, care of mouth/teeth, toileting, grooming, dressing, etc.)  - Assess/evaluate cause of self-care deficits   - Assess range of motion  - Assess patient's mobility; develop plan if impaired  - Assess patient's need for assistive devices and provide as appropriate  - Encourage maximum independence but intervene and supervise when necessary  - Involve family in performance of ADLs  - Assess for home care needs following discharge   - Consider OT consult to assist with ADL evaluation and planning for discharge  - Provide patient education as appropriate  Outcome: Progressing  Goal: Maintains/Returns to pre admission functional level  Description: INTERVENTIONS:  - Perform AM-PAC 6 Click Basic Mobility/ Daily Activity assessment daily.  - Set and communicate daily mobility goal to care team and patient/family/caregiver.   - Collaborate with rehabilitation services on mobility goals if consulted  - Out of bed for toileting  - Record patient progress and toleration of activity level    Outcome: Progressing     Problem: DISCHARGE PLANNING  Goal: Discharge to home or other facility with appropriate resources  Description: INTERVENTIONS:  - Identify barriers to discharge w/patient and caregiver  - Arrange for needed discharge resources and transportation as appropriate  - Identify discharge learning needs (meds, wound care, etc.)  - Arrange for interpretive services to assist at discharge as needed  - Refer to Case Management Department for coordinating discharge planning if the patient needs post-hospital services based on physician/advanced practitioner order or complex needs related to functional status, cognitive ability, or social support system  Outcome: Progressing     Problem: Knowledge Deficit  Goal: Patient/family/caregiver demonstrates understanding of disease process, treatment plan, medications, and discharge instructions  Description: Complete learning assessment and assess knowledge base.  Interventions:  - Provide teaching at level of understanding  - Provide teaching via preferred learning methods  Outcome: Progressing     Problem: NEUROSENSORY - ADULT  Goal: Achieves stable or improved neurological status  Description: INTERVENTIONS  - Monitor and report changes in neurological status  - Monitor vital signs such as temperature, blood pressure, glucose, and any other labs ordered   - Initiate measures to prevent increased intracranial pressure  - Monitor for seizure activity and implement precautions if appropriate      Outcome: Progressing  Goal: Remains free of injury related to seizures activity  Description: INTERVENTIONS  - Maintain airway, patient safety  and administer oxygen as ordered  - Monitor patient for seizure activity, document and report duration and description of seizure to physician/advanced practitioner  - If seizure occurs,  ensure patient safety during seizure  - Reorient patient post seizure  - Seizure pads on all 4 side rails  - Instruct patient/family to  notify RN of any seizure activity including if an aura is experienced  - Instruct patient/family to call for assistance with activity based on nursing assessment  - Administer anti-seizure medications if ordered    Outcome: Progressing  Goal: Achieves maximal functionality and self care  Description: INTERVENTIONS  - Monitor swallowing and airway patency with patient fatigue and changes in neurological status  - Encourage and assist patient to increase activity and self care.   - Encourage visually impaired, hearing impaired and aphasic patients to use assistive/communication devices  Outcome: Progressing     Problem: CARDIOVASCULAR - ADULT  Goal: Maintains optimal cardiac output and hemodynamic stability  Description: INTERVENTIONS:  - Monitor I/O, vital signs and rhythm  - Monitor for S/S and trends of decreased cardiac output  - Administer and titrate ordered vasoactive medications to optimize hemodynamic stability  - Assess quality of pulses, skin color and temperature  - Assess for signs of decreased coronary artery perfusion  - Instruct patient to report change in severity of symptoms  Outcome: Progressing  Goal: Absence of cardiac dysrhythmias or at baseline rhythm  Description: INTERVENTIONS:  - Continuous cardiac monitoring, vital signs, obtain 12 lead EKG if ordered  - Administer antiarrhythmic and heart rate control medications as ordered  - Monitor electrolytes and administer replacement therapy as ordered  Outcome: Progressing     Problem: RESPIRATORY - ADULT  Goal: Achieves optimal ventilation and oxygenation  Description: INTERVENTIONS:  - Assess for changes in respiratory status  - Assess for changes in mentation and behavior  - Position to facilitate oxygenation and minimize respiratory effort  - Oxygen administered by appropriate delivery if ordered  - Initiate smoking cessation education as indicated  - Encourage broncho-pulmonary hygiene including cough, deep breathe, Incentive Spirometry  -  Assess the need for suctioning and aspirate as needed  - Assess and instruct to report SOB or any respiratory difficulty  - Respiratory Therapy support as indicated  Outcome: Progressing     Problem: GASTROINTESTINAL - ADULT  Goal: Minimal or absence of nausea and/or vomiting  Description: INTERVENTIONS:  - Administer IV fluids if ordered to ensure adequate hydration  - Maintain NPO status until nausea and vomiting are resolved  - Nasogastric tube if ordered  - Administer ordered antiemetic medications as needed  - Provide nonpharmacologic comfort measures as appropriate  - Advance diet as tolerated, if ordered  - Consider nutrition services referral to assist patient with adequate nutrition and appropriate food choices  Outcome: Progressing  Goal: Maintains or returns to baseline bowel function  Description: INTERVENTIONS:  - Assess bowel function  - Encourage oral fluids to ensure adequate hydration  - Administer IV fluids if ordered to ensure adequate hydration  - Administer ordered medications as needed  - Encourage mobilization and activity  - Consider nutritional services referral to assist patient with adequate nutrition and appropriate food choices  Outcome: Progressing  Goal: Maintains adequate nutritional intake  Description: INTERVENTIONS:  - Monitor percentage of each meal consumed  - Identify factors contributing to decreased intake, treat as appropriate  - Assist with meals as needed  - Monitor I&O, weight, and lab values if indicated  - Obtain nutrition services referral as needed  Outcome: Progressing  Goal: Oral mucous membranes remain intact  Description: INTERVENTIONS  - Assess oral mucosa and hygiene practices  - Implement preventative oral hygiene regimen  - Implement oral medicated treatments as ordered  - Initiate Nutrition services referral as needed  Outcome: Progressing     Problem: GENITOURINARY - ADULT  Goal: Maintains or returns to baseline urinary function  Description:  INTERVENTIONS:  - Assess urinary function  - Encourage oral fluids to ensure adequate hydration if ordered  - Administer IV fluids as ordered to ensure adequate hydration  - Administer ordered medications as needed  - Offer frequent toileting  - Follow urinary retention protocol if ordered  Outcome: Progressing  Goal: Absence of urinary retention  Description: INTERVENTIONS:  - Assess patient’s ability to void and empty bladder  - Monitor I/O  - Bladder scan as needed  - Discuss with physician/AP medications to alleviate retention as needed  - Discuss catheterization for long term situations as appropriate  Outcome: Progressing

## 2024-06-21 NOTE — PROGRESS NOTES
[facility]  Progress Note: Critical Care  Name: Luis Forrester 53 y.o. male I MRN: [id number]  Unit/Bed#: MICU 01 I Date of Admission: 6/15/2024   Date of Service: 6/21/2024 I Hospital Day: 6    Assessment & Plan   Neuro:   Diagnosis: SAH, parafalcine SDH, spinal stenosis, post-traumatic pain, encephalopathy   POD 4 s/p C2 - T1 decompression and fusion with NSG  Plan:   STAT CTH for 2 point or greater decline in GCS or new focal neurologic deficit not previously identified on physical exam.   Continue MAP pushes >85mmHg for spinal cord perfusion  Keppra x7 days for seizure ppx  Appreciate APS consult   Continue oral multimodal pain control  IV dilaudid PRN for breakthrough pain  Continue methadone taper  Q2 hr neuro checks  No need for hard collar per NSG  Continue to monitor hemovac output  Delirium precautions     CV:   Diagnosis: Syncope, sick sinus syndrome  TTE from 6/17:  Left Ventricle: Left ventricular cavity size is normal. Wall thickness is normal. The left ventricular ejection fraction is 60%. Systolic function is normal. Wall motion is normal. Diastolic function is normal.  Right Ventricle: Right ventricular cavity size is mildly dilated.  Right Atrium: The atrium is mildly dilated.  Atrial Septum: There is a probable patent foramen ovale noted at rest with predominant left to right shunting using color Doppler. 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  It is predominately within the right atrial cavity.  Aorta: The aortic root is mildly dilated. The aortic root is 4.30 cm.  Plan:   Unclear etiology of syncope - cardiac vs neurogenic  TTE largely un-remarkable  MAP pushes >85mmHg - continue isak and vasopressin  Tentative plan for a PPM today with EP     Pulm:  No active issues     GI:   No active issues     :   Diagnosis: Elevated urine output, hyponatremia  Plan:   Urine lytes not concerning for DI  X1 dose DDVAP on 6/17 and again on 6/19  Na WNL  Daily BMP's  Strict  I&O's  Nephrology consulted, recs appreciated  Continue 1.8% NS infusion @ 50cc/hr per nephrology     F/E/N:   F: N/a  E: K > 4, Mag >2  N: Continue regular diet     Heme/Onc:   No active issues  Lovenox 30mg BID for VTE prophylaxis     Endo:   Diagnosis: Hypothyroidism  Plan:   Continue home synthroid     ID:   No active issues  Continue clinda x2 weeks for surgical prophylaxis per NSG     MSK/Skin:   No active issues    Disposition: Critical care    ICU Core Measures     A: Assess, Prevent, and Manage Pain Has pain been assessed? Yes  Need for changes to pain regimen? No   B: Both SAT/SAT  N/A   C: Choice of Sedation RASS Goal: 0 Alert and Calm  Need for changes to sedation or analgesia regimen? No   D: Delirium CAM-ICU: Negative   E: Early Mobility  Plan for early mobility? Yes   F: Family Engagement Plan for family engagement today? Yes       Antibiotic Review: Post op requirements     Review of Invasive Devices:    Ramirez Plan: Continue for accurate I/O monitoring for 48 hours  Central access plan: Medications requiring central line      Prophylaxis:  VTE VTE covered by:  enoxaparin, Subcutaneous, 30 mg at 06/20/24 2043       Stress Ulcer  not ordered        Significant 24hr Events     24hr events: No significant events. PPM cancelled yesterday, plan for today.      Subjective     Review of Systems: See HPI for Review of Systems     Objective                            Vitals I/O      Most Recent Min/Max in 24hrs   Temp 98.5 °F (36.9 °C) Temp  Min: 98.1 °F (36.7 °C)  Max: 98.5 °F (36.9 °C)   Pulse 60 Pulse  Min: 54  Max: 74   Resp 12 Resp  Min: 12  Max: 30   /67 BP  Min: 107/68  Max: 156/77   O2 Sat 94 % SpO2  Min: 94 %  Max: 100 %      Intake/Output Summary (Last 24 hours) at 6/21/2024 0301  Last data filed at 6/21/2024 0200  Gross per 24 hour   Intake 2215.89 ml   Output 2410 ml   Net -194.11 ml       Diet NPO; Sips with meds    Invasive Monitoring   Arterial Line  [place] /122  No data recorded     mmHg  No data recorded           Physical Exam   Physical Exam  Vitals and nursing note reviewed.   Eyes:      Extraocular Movements: Extraocular movements intact.      Pupils: Pupils are equal, round, and reactive to light.   Skin:     General: Skin is warm and dry.   HENT:      Head: Normocephalic and atraumatic.   Neck:      Vascular: No JVD.   Cardiovascular:      Rate and Rhythm: Normal rate and regular rhythm.      Heart sounds: Normal heart sounds.   Musculoskeletal:      Right lower leg: No edema.      Left lower leg: No edema.   Abdominal:      Palpations: Abdomen is soft.      Tenderness: There is no abdominal tenderness. There is no guarding.   Constitutional:       General: He is not in acute distress.     Appearance: He is well-developed and well-nourished. He is not ill-appearing.   Pulmonary:      Effort: Pulmonary effort is normal. No accessory muscle usage, respiratory distress or accessory muscle usage.      Breath sounds: Normal breath sounds.   Neurological:      General: No focal deficit present.      Mental Status: He is alert and oriented to person, place and time.      Motor: Motor deficit (Unchanged motor deficits in BLUE).            Diagnostic Studies      EKG: NSR  Imaging: n/a I have personally reviewed pertinent reports.   and I have personally reviewed pertinent films in PACS     Medications:  Scheduled PRN   acetaminophen, 975 mg, Q8H ASHLYN  calcitriol, 0.25 mcg, Daily  cephalexin, 500 mg, Q6H ASHLYN  Cholecalciferol, 1,000 Units, Daily  enoxaparin, 30 mg, Q12H ASHLYN  gabapentin, 100 mg, TID  levETIRAcetam, 500 mg, BID  levothyroxine, 125 mcg, Early Morning  methocarbamol, 500 mg, Q6H ASHLYN  midodrine, 5 mg, TID AC  polyethylene glycol, 17 g, Daily  senna-docusate sodium, 1 tablet, HS      albuterol, 2 puff, Q4H PRN  HYDROmorphone, 0.2 mg, Q2H PRN  naloxone, 0.04 mg, Q1MIN PRN  ondansetron, 4 mg, Q4H PRN  oxyCODONE, 2.5 mg, Q4H PRN   Or  oxyCODONE, 5 mg, Q4H PRN       Continuous     phenylephine,  mcg/min, Last Rate: 60 mcg/min (06/21/24 0139)  sodium chloride (HYPERTONIC) infusion 1.8%, 50 mL/hr, Last Rate: 50 mL/hr (06/20/24 1703)  vasopressin, 0.04 Units/min, Last Rate: 0.04 Units/min (06/20/24 1918)         Labs:    CBC    Recent Labs     06/19/24 0519 06/20/24 0428   WBC 14.45* 12.92*   HGB 13.2 12.2   HCT 39.1 36.4*    212     BMP    Recent Labs     06/20/24 1956 06/21/24  0211   SODIUM 129* 128*   K 3.8 3.7   CL 92* 92*   CO2 28 25   AGAP 9 11   BUN 11 12   CREATININE 0.55* 0.50*   CALCIUM 6.5* 6.2*       Coags    No recent results     Additional Electrolytes  Recent Labs     06/19/24  0519 06/20/24 0428 06/20/24  0904   MG 2.0 1.7*  --    PHOS  --  3.9  --    CAIONIZED 0.84*  --  0.96*          Blood Gas    No recent results  No recent results LFTs  No recent results    Infectious  No recent results  Glucose  Recent Labs     06/20/24 0428 06/20/24  1439 06/20/24 1956 06/21/24  0211   GLUC 125 108 103 91               Eloy Norman PA-C

## 2024-06-21 NOTE — PHYSICAL THERAPY NOTE
Physical Therapy Cancellation Note    PT orders received chart review completed. Pt is currently pending PPM today and not appropriate to participate in skilled PT at this time. PT will follow and treat as medically appropriate.     06/21/24 0800   Note Type   Note type Cancelled Session   Cancel Reasons Patient off floor/test   Pain Assessment   Pain Assessment Tool 0-10   Pain Score No Pain       Steffanie Ng, PT

## 2024-06-21 NOTE — PROGRESS NOTES
[facility]  Progress Note  Name: Luis Forrester I  MRN: [id number]  Unit/Bed#: MICU 01 I Date of Admission: 6/15/2024   Date of Service: 6/21/2024 I Hospital Day: 6    Assessment & Plan   Right hand weakness  Assessment & Plan  POD5 PCDF C2-T1  (Dr. Lopez, 6/16/2024)  Patient describes query syncopal episode and falls  Found to have new right hand weakness and pain with bilateral Mohit's and right-sided wrist weakness on presentation    Imaging:  MRI cervical spine without 6/15/2024:Congenital canal stenosis with superimposed degenerative spondylosis .Most pronounced at C3-4 and C5-C6 with moderate to severe canal stenosis and mild cord compression. Evaluation of cord signal is limited due to artifact. No definite abnormal cord signal but mild cord edema would be difficult to exclude. Severe bilateral foraminal stenosis also seen at C3-4 and C5-C6.Mild to moderate canal stenosis at other levels  Postop cervical Xrays 6/20/2024 -expected postoperative appearance of posterior fusion C2-T1.    Plan:  Continue to monitor neurological exam.   Completed MAPs>85 x days (5/5).  Wean pressors as able per primary team  Hemovac drain removed 6/19/2024  Keflex x 2 weeks for surgical prophylaxis.  Pain control - APS following - input appreciated  Medical management per primary team  Mobilize with physical and Occupational Therapy - no collar required  Referral for rehab - [facility] reviewing case  DVT prophylaxis: Bilateral SCDs and lovenox    Neurosurgery will sign off.  Plan for outpatient follow-up for 2-week and 6-week postoperative visits. Call with any further questions or concerns.      SSS (sick sinus syndrome) ([facility])  Assessment & Plan  S/p PPM 6/21/24    SDH (subdural hematoma) ([facility])  Assessment & Plan  Parafalcine SDH with bilateral SAH  Patient presented after being found down by his daughter, patient is unsure what happened other than he got up to go to the bathroom.  Patient  denies any blood thinner use.    Imaging:  CT head without 6/15/24:New acute trace subarachnoid hemorrhage in bilateral parafalcine regions. Recommend dedicated CTA head with contrast for further evaluation.New acute trace parafalcine subdural hematoma.  Repeat CT head wo 6/15/2024: Trace subarachnoid hemorrhage within the interhemispheric fissure decreased.  No parenchymal subdural hematoma.  CT head without 6/18/2024:Near completely resolved parafalcine subarachnoid hemorrhage with trace residual subarachnoid hemorrhage in the frontoparietal regions medially. No mass effect or hydrocephalus.No large territorial infarction.    Plan:  Continue frequent neurological checks.   STAT CT head with any neurological decline including drop GCS of 2pts within 1 hr.  Seizure prophylaxis per trauma team  Hold all full antiplatelet and anticoagulation medications for 2 weeks  Medical management and pain control per primary team  Mobilize with PT/OT  DVT PPX: SCDs. Lovenox    Patient can follow-up as needed for this problem         Subjective/Objective   Chief Complaint: I am okay    Subjective: Patient states continues with neck discomfort but overall controlled.  States improvement in the pain at the top of his hands.  Continues with hand weakness without significant change at this time.  No lower extremity complaints.  Patient is now status post permanent pacemaker placement today.    Objective: Lying in bed.  NAD.    I/O         06/19 0701  06/20 0700 06/20 0701  06/21 0700 06/21 0701  06/22 0700    P.O. 1800 260     I.V. (mL/kg) 625.6 (7.9) 1980.9 (25.1) 542.6 (6.9)    IV Piggyback 1100 250 0    Total Intake(mL/kg) 3525.6 (44.7) 2490.9 (31.6) 542.6 (6.9)    Urine (mL/kg/hr) 3060 (1.6) 2535 (1.3) 2140 (3.5)    Drains 0      Stool 0      Total Output 3060 2535 2140    Net +465.6 -44.1 -1597.4           Unmeasured Stool Occurrence 1 x              Invasive Devices       Central Venous Catheter Line  Duration             CVC  "Central Lines 06/17/24 Triple 16cm Right Internal jugular 3 days              Peripheral Intravenous Line  Duration             Peripheral IV 06/21/24 Left;Proximal;Ventral (anterior) Forearm <1 day              Drain  Duration             Urethral Catheter Latex;Straight-tip 16 Fr. 5 days                    Physical Exam:  Vitals: Blood pressure 109/61, pulse 89, temperature 98.7 °F (37.1 °C), temperature source Oral, resp. rate (!) 29, height 5' 6\" (1.676 m), weight 78.9 kg (174 lb), SpO2 95%.,Body mass index is 28.08 kg/m².    Hemodynamic Monitoring: MAP: Arterial Line MAP (mmHg): 126 mmHg    General appearance: alert, appears stated age, cooperative and no distress  Head: Normocephalic, without obvious abnormality  Eyes: conjugate gaze  Lungs: non labored breathing  Heart: regular heart rate  Neurologic:   Mental status: Alert, oriented, thought content appropriate  Cranial nerves: grossly intact   Sensory: normal to crude touch x 4  Motor: moving all extremities with weakness b/l hands (IO 2-3, WF 4-4-,  3-4)  Reflexes: b/l santoyo    Lab Results:  Results from last 7 days   Lab Units 06/21/24  0543 06/20/24  0428 06/19/24  0519   WBC Thousand/uL 9.15 12.92* 14.45*   HEMOGLOBIN g/dL 10.7* 12.2 13.2   HEMATOCRIT % 32.2* 36.4* 39.1   PLATELETS Thousands/uL 189 212 232   SEGS PCT % 66 71 67   MONO PCT % 12 12 11   EOS PCT % 5 1 2     Results from last 7 days   Lab Units 06/21/24  1407 06/21/24  0853 06/21/24  0543 06/17/24  0107 06/16/24  1143 06/16/24  0930 06/16/24  0554 06/15/24  0856   SODIUM mmol/L 125* 126* 128*   < >  --   --    < > 136   POTASSIUM mmol/L 3.9 3.7 3.8   < >  --   --    < > 3.6   CHLORIDE mmol/L 92* 92* 93*   < >  --   --    < > 101   CO2 mmol/L 26 26 26   < >  --   --    < > 27   CO2, I-STAT mmol/L  --   --   --   --  25 27   < >  --    BUN mg/dL 10 12 12   < >  --   --    < > 13   CREATININE mg/dL 0.43* 0.50* 0.50*   < >  --   --    < > 0.76   CALCIUM mg/dL 7.1* 6.9* 6.3*   < >  --   -- " "   < > 6.6*   ALK PHOS U/L  --   --   --   --   --   --   --  80   ALT U/L  --   --   --   --   --   --   --  26   AST U/L  --   --   --   --   --   --   --  31   GLUCOSE, ISTAT mg/dl  --   --   --   --  152* 106  --   --     < > = values in this interval not displayed.     Results from last 7 days   Lab Units 06/21/24  0543 06/20/24  0428 06/19/24  0519   MAGNESIUM mg/dL 1.7* 1.7* 2.0     Results from last 7 days   Lab Units 06/21/24  0543 06/20/24  0428 06/18/24  1829   PHOSPHORUS mg/dL 2.7 3.9 4.9*     Results from last 7 days   Lab Units 06/16/24  0554   INR  0.99   PTT seconds 28     No results found for: \"TROPONINT\"  ABG:No results found for: \"PHART\", \"PQR5DHM\", \"PO2ART\", \"DGK6SWX\", \"D0FUEKQC\", \"BEART\", \"SOURCE\"    Imaging Studies: I have personally reviewed pertinent reports.   and I have personally reviewed pertinent films in PACS    XR spine cervical 2 or 3 vw injury    Result Date: 6/21/2024  Impression: No acute osseous abnormality. Expected postoperative appearance of posterior fusion of C2-T1. Workstation performed: [id number]     CT head wo contrast    Result Date: 6/18/2024  Impression: 1. Near completely resolved parafalcine subarachnoid hemorrhage with trace residual subarachnoid hemorrhage in the frontoparietal regions medially. No mass effect or hydrocephalus. 2. No large territorial infarction. Workstation performed: [id number]     XR chest portable ICU    Result Date: 6/18/2024  Impression: No acute cardiopulmonary disease. Right IJ line terminates in the SVC. Workstation performed: [id number]     XR spine cervical 2 or 3 vw injury    Result Date: 6/17/2024  Impression: Fluoroscopy provided for procedure guidance. Please refer to the separate procedure note for additional details. Workstation performed: [id number]     CT head wo contrast    Result Date: 6/16/2024  Impression: Trace subarachnoid hemorrhage within the interhemispheric fissure is slightly decreased in conspicuity No parenchymal or " subdural hematoma. Workstation performed: [id number]     XR chest portable ICU    Result Date: 6/16/2024  Impression: No acute cardiopulmonary disease. No central line. No pneumothorax. Workstation performed: [id number]     MRI cervical spine wo contrast    Result Date: 6/15/2024  Impression: Congenital canal stenosis with superimposed degenerative spondylosis . Most pronounced at C3-4 and C5-C6 with moderate to severe canal stenosis and mild cord compression. Evaluation of cord signal is limited due to artifact. No definite abnormal cord signal but mild cord edema would be difficult to exclude Severe bilateral foraminal stenosis also seen at C3-4 and C5-C6. Mild to moderate canal stenosis at other levels Workstation performed: [id number]     CT spine cervical without contrast    Result Date: 6/15/2024  Impression: No cervical spine fracture or traumatic malalignment. Posterior osteophyte disc complexes C3-C4 and C5-C6 contributes to at least moderate canal stenosis and severe bilateral foraminal narrowing at these levels. Consider nonemergent follow-up MRI cervical spine without contrast for further evaluation. Ectatic right carotid bifurcation and proximal cervical internal carotid artery with retropharyngeal course, similar to CT neck with contrast 10/12/2011. Please see same day CTA head with contrast, CT head without contrast, CT thoracic spine without contrast for further evaluation. The study was marked in EPIC for immediate notification. Workstation performed: [id number]     CTA head w wo contrast    Result Date: 6/15/2024  Impression: Negative CTA head traumatic vascular injury, aneurysm, large vessel occlusion, high-grade stenosis, or dissection. Partially imaged ectatic right carotid bifurcation and proximal cervical internal carotid artery with retropharyngeal course, similar to CT neck with contrast 10/12/2011. Additional chronic/incidental findings as detailed above. Please see earlier same day CT head  without contrast and concurrent CT cervical spine without contrast. Workstation performed: [id number]     XR hand 3+ views RIGHT    Result Date: 6/15/2024  Impression: 1. No acute osseous abnormality. 2. Degenerative changes as described. Resident: ALLYSON AWAN I, the attending radiologist, have reviewed the images and agree with the final report above. Workstation performed: [id number]     XR hand 3+ views LEFT    Result Date: 6/15/2024  Impression: No acute osseous abnormality. Degenerative changes as described. Resident: ALLYSON AWAN I, the attending radiologist, have reviewed the images and agree with the final report above. Workstation performed: [id number]     CT spine thoracic without contrast    Result Date: 6/15/2024  Impression: No acute osseous abnormality of thoracic spine. Diffuse idiopathic skeletal hyperostosis (DISH). I personally discussed this study with Travis Powell on 6/15/2024 10:08 AM. Workstation performed: [id number]     CT head wo contrast    Result Date: 6/15/2024  Impression: New acute trace subarachnoid hemorrhage in bilateral parafalcine regions. Recommend dedicated CTA head with contrast for further evaluation. New acute trace parafalcine subdural hematoma. I personally discussed this study with Travis Powell on 6/15/2024 10:08 AM. Workstation performed: [id number]     VTE Pharmacologic Prophylaxis: Enoxaparin (Lovenox)    VTE Mechanical Prophylaxis: sequential compression device

## 2024-06-21 NOTE — ANESTHESIA PREPROCEDURE EVALUATION
Procedure:  Cardiac pacer implant (Chest)   SAH, parafalcine SDH, spinal stenosis, post-traumatic pain, encephalopathy   POD 4 s/p C2 - T1 decompression and fusion with NSG    Continue MAP pushes >85mmHg for spinal cord perfusion  Sick sinus syndrome for pacemaker      Relevant Problems   ANESTHESIA (within normal limits)      CARDIO   (+) Hyperlipidemia      ENDO   (+) Hypothyroidism      MUSCULOSKELETAL   (+) Chronic low back pain   (+) Osteoarthritis of both hands      NEURO/PSYCH   (+) Chronic low back pain   (+) Right hand weakness   (+) SDH (subdural hematoma) ([facility])        History    Hyperlipidemia. Hypothyroidism. TBI. Syncope and collapse.     Interpretation Summary         Left Ventricle: Left ventricular cavity size is normal. Wall thickness is normal. The left ventricular ejection fraction is 60%. Systolic function is normal. Wall motion is normal. Diastolic function is normal.    Right Ventricle: Right ventricular cavity size is mildly dilated.    Right Atrium: The atrium is mildly dilated.    Atrial Septum: There is a probable patent foramen ovale noted at rest with predominant left to right shunting using color Doppler. 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  It is predominately within the right atrial cavity.    Aorta: The aortic root is mildly dilated. The aortic root is 4.30 cm.      Physical Exam    Airway    Mallampati score: II  TM Distance: >3 FB  Neck ROM: full     Dental   Comment: Poor dentition, multiple missing and chipped teeth     Cardiovascular      Pulmonary      Other Findings        Anesthesia Plan  ASA Score- 3     Anesthesia Type- IV sedation with anesthesia with ASA Monitors.         Additional Monitors:     Airway Plan:            Plan Factors-Exercise tolerance (METS): <4 METS.    Chart reviewed. EKG reviewed. Imaging results reviewed. Existing labs reviewed. Patient summary reviewed.                  Induction- intravenous.    Postoperative Plan-     Perioperative Resuscitation Plan  - Level 1 - Full Code.       Informed Consent- Anesthetic plan and risks discussed with patient.  I personally reviewed this patient with the CRNA. Discussed and agreed on the Anesthesia Plan with the CRNA..

## 2024-06-21 NOTE — PLAN OF CARE
Problem: Prexisting or High Potential for Compromised Skin Integrity  Goal: Skin integrity is maintained or improved  Description: INTERVENTIONS:  - Identify patients at risk for skin breakdown  - Assess and monitor skin integrity  - Assess and monitor nutrition and hydration status  - Monitor labs   - Assess for incontinence   - Turn and reposition patient  - Assist with mobility/ambulation  - Relieve pressure over bony prominences  - Avoid friction and shearing  - Provide appropriate hygiene as needed including keeping skin clean and dry  - Evaluate need for skin moisturizer/barrier cream  - Collaborate with interdisciplinary team   - Patient/family teaching  - Consider wound care consult   Outcome: Progressing     Problem: SAFETY ADULT  Goal: Patient will remain free of falls  Description: INTERVENTIONS:  - Educate patient/family on patient safety including physical limitations  - Instruct patient to call for assistance with activity   - Consult OT/PT to assist with strengthening/mobility   - Keep Call bell within reach  - Keep bed low and locked with side rails adjusted as appropriate  - Keep care items and personal belongings within reach  - Initiate and maintain comfort rounds  - Make Fall Risk Sign visible to staff  - Apply yellow socks and bracelet for high fall risk patients  - Consider moving patient to room near nurses station  Outcome: Progressing     Problem: Knowledge Deficit  Goal: Patient/family/caregiver demonstrates understanding of disease process, treatment plan, medications, and discharge instructions  Description: Complete learning assessment and assess knowledge base.  Interventions:  - Provide teaching at level of understanding  - Provide teaching via preferred learning methods  Outcome: Progressing

## 2024-06-21 NOTE — PROGRESS NOTES
NEPHROLOGY PROGRESS NOTE   Luis Forrester 53 y.o. male MRN: [id number]  [facility]: [id number]      Assessment:     53-year-old male with a past medical history of hypothyroidism who presented after having a syncopal episode found to have cord compression and a subdural hematoma placated now by polyuria     Plan:     Polyuria  Patient with large inputs of 2.5 L  Urine osmolality is high at 333  There may be a partial DI associated with this  Hold off on DDAVP for now  Hyponatremia  This is likely multifactorial  Had an abnormal TSH now is on levothyroxine  Was on Keppra as well  Gabapentin can cause hyponatremia  Patient's urine osmolality is high, urine sodium is high, appears euvolemic to slightly hypervolemic  Fluid restrict to 1.2 L daily increase 0.8% to 75 cc an hour  Replete magnesium, replete potassium  If sodium still decreasing will consider a dose of tolvaptan  Subdural hematoma  Frequent neurologic checks  Neurosurgery following  Cord compression secondary to degenerative spondylolithiasis and congenital canal stenosis  Required a decompression of the spine  Ongoing neurosurgical evaluation  Pain control  Sick sinus syndrome  That is post pacemaker today    SUBJECTIVE:    Patient was seen today after his procedure.  States he is feeling okay.  Sitting resting comfortably no acute chest pain or shortness of breath    12 point review of systems was otherwise negative besides what is mentioned above.    Medications:    Current Facility-Administered Medications:     acetaminophen (TYLENOL) tablet 975 mg, 975 mg, Oral, Q8H ASHLYN, Abimbola Crump PA-C, 975 mg at 06/21/24 1359    albuterol (PROVENTIL HFA,VENTOLIN HFA) inhaler 2 puff, 2 puff, Inhalation, Q4H PRN, Abimbola Crump PA-C    calcitriol (ROCALTROL) capsule 0.25 mcg, 0.25 mcg, Oral, Daily, Abimbola Crump PA-C, 0.25 mcg at 06/21/24 0859    cephalexin (KEFLEX) capsule 500 mg, 500 mg, Oral, Q6H ASHLYN, Abimbola Crump PA-C, 500 mg  at 06/21/24 0602    Cholecalciferol (VITAMIN D3) tablet 1,000 Units, 1,000 Units, Oral, Daily, Abimbola Crump PA-C, 1,000 Units at 06/21/24 0800    enoxaparin (LOVENOX) subcutaneous injection 30 mg, 30 mg, Subcutaneous, Q12H ASHLYN, Abimbola Crump PA-C, 30 mg at 06/21/24 0800    gabapentin (NEURONTIN) capsule 100 mg, 100 mg, Oral, TID, Abimbola Crump PA-C, 100 mg at 06/21/24 0801    HYDROmorphone HCl (DILAUDID) injection 0.2 mg, 0.2 mg, Intravenous, Q2H PRN, Abimbola Crump PA-C    levETIRAcetam (KEPPRA) tablet 500 mg, 500 mg, Oral, BID, Abimbola Crump PA-C, 500 mg at 06/21/24 0800    levothyroxine tablet 125 mcg, 125 mcg, Oral, Early Morning, Abimbola Crump PA-C, 125 mcg at 06/21/24 0602    methocarbamol (ROBAXIN) tablet 500 mg, 500 mg, Oral, Q6H ASHLYN, Abimbola Crump PA-C, 500 mg at 06/21/24 0602    midodrine (PROAMATINE) tablet 5 mg, 5 mg, Oral, TID AC, Abimbola Crump PA-C, 5 mg at 06/21/24 0759    naloxone (NARCAN) 0.04 mg/mL syringe 0.04 mg, 0.04 mg, Intravenous, Q1MIN PRN, Abimbola Crump PA-C    ondansetron (ZOFRAN) injection 4 mg, 4 mg, Intravenous, Q4H PRN, Abimbola Crump PA-C    oxyCODONE (ROXICODONE) split tablet 2.5 mg, 2.5 mg, Oral, Q4H PRN, 2.5 mg at 06/18/24 2000 **OR** oxyCODONE (ROXICODONE) IR tablet 5 mg, 5 mg, Oral, Q4H PRN, Abimbola Crump PA-C, 5 mg at 06/17/24 1012    phenylephrine (KENJI-SYNEPHRINE) 100 mg (DOUBLE CONCENTRATION) IV in sodium chloride 0.9% 250 mL,  mcg/min, Intravenous, Titrated, Abimbola Crump PA-C, Stopped at 06/21/24 1338    polyethylene glycol (MIRALAX) packet 17 g, 17 g, Oral, Daily, Abimbola Crump PA-C, 17 g at 06/20/24 0809    senna-docusate sodium (SENOKOT S) 8.6-50 mg per tablet 1 tablet, 1 tablet, Oral, HS, Abimbola Crump PA-C, 1 tablet at 06/20/24 2142    sodium chloride (HYPERTONIC) infusion 1.8%, 75 mL/hr, Intravenous, Continuous, Johnnie Correa DO, Last Rate: 60 mL/hr at 06/21/24 1312, 60 mL/hr at 06/21/24 1312    vasopressin  (PITRESSIN) 20 Units in sodium chloride 0.9 % 100 mL infusion, 0.04 Units/min, Intravenous, Continuous, Abimbola Crump PA-C, Last Rate: 12 mL/hr at 06/21/24 1212, 0.04 Units/min at 06/21/24 1212    OBJECTIVE:    Vitals:    06/21/24 0800 06/21/24 0845 06/21/24 0900 06/21/24 1200   BP: 117/69  116/69    BP Location: Left arm  Left arm    Pulse: 61  66    Resp: 13  18    Temp:  98.4 °F (36.9 °C)  98.7 °F (37.1 °C)   TempSrc:  Oral     SpO2: 95%  96%    Weight:       Height:            Temp:  [98.4 °F (36.9 °C)-98.7 °F (37.1 °C)] 98.7 °F (37.1 °C)  HR:  [57-74] 66  Resp:  [12-23] 18  BP: (107-152)/(59-81) 116/69  SpO2:  [94 %-97 %] 96 %     Body mass index is 28.08 kg/m².    Weight (last 2 days)       None            I/O last 3 completed shifts:  In: 2858.7 [P.O.:260; I.V.:2348.7; IV Piggyback:250]  Out: 3560 [Urine:3560]    I/O this shift:  In: 542.6 [I.V.:542.6]  Out: 2140 [Urine:2140]      Physical exam:    General: no acute distress, cooperative  Eyes: conjunctivae pink, anicteric sclerae  ENT: lips and mucous membranes moist, no exudates, normal external ears  Neck: ROM intact, no JVD  Chest: No respiratory distress, no accessory muscle use  CVS: normal rate, non pericardial friction rub  Abdomen: soft, non-tender, non-distended, normoactive bowel sounds  Extremities: no edema of both legs  Skin: no rash  Neuro: awake, alert, oriented, grossly intact  Psych:  Pleasant affect    Invasive Devices:   Urethral Catheter Latex;Straight-tip 16 Fr. (Active)   Reasons to continue Urinary Catheter  Accurate I&O assessment in critically ill patients (48 hr. max) 06/21/24 0800   Goal for Removal Voiding trial when ambulation improves 06/21/24 0800   Site Assessment Clean;Skin intact 06/21/24 0800   Ramirez Care Done 06/21/24 0845   Collection Container Standard drainage bag 06/21/24 0800   Securement Method Securing device (Describe) 06/21/24 0800   Output (mL) 500 mL 06/21/24 1400     Lab Results:   Results from last 7 days    Lab Units 06/21/24  0853 06/21/24  0543 06/21/24  0211 06/20/24  1956 06/20/24  1439 06/20/24  0428 06/19/24  1434 06/19/24  0519 06/18/24  1829 06/18/24  1650 06/18/24  1457 06/18/24  0439 06/17/24  0107 06/16/24  1143 06/16/24  0930 06/16/24  0930 06/16/24  0758 06/16/24  0554 06/15/24  0856   WBC Thousand/uL  --  9.15  --   --   --  12.92*  --  14.45*  --   --   --  16.27* 17.82*  --   --   --   --    < > 11.11*   HEMOGLOBIN g/dL  --  10.7*  --   --   --  12.2  --  13.2  --   --   --  12.5 13.4  --   --   --   --    < > 13.3   I STAT HEMOGLOBIN g/dl  --   --   --   --   --   --   --   --   --   --   --   --   --  12.2   < > 12.2  --   --   --    HEMATOCRIT %  --  32.2*  --   --   --  36.4*  --  39.1  --   --   --  38.2 39.3  --   --   --   --    < > 40.0   HEMATOCRIT, ISTAT %  --   --   --   --   --   --   --   --   --   --   --   --   --  36*   < > 36*  --   --   --    PLATELETS Thousands/uL  --  189  --   --   --  212  --  232  --   --   --  236 275  --   --   --   --    < > 160   POTASSIUM mmol/L 3.7 3.8 3.7 3.8 4.2 3.8   < > 3.9 4.0  --    < > 3.7 3.6  --   --   --   --    < > 3.6   CHLORIDE mmol/L 92* 93* 92* 92* 92* 94*   < > 93* 95*  --    < > 97 103  --   --   --   --    < > 101   CO2 mmol/L 26 26 25 28 28 30   < > 31 31  --    < > 31 27  --   --   --   --    < > 27   CO2, I-STAT mmol/L  --   --   --   --   --   --   --   --   --   --   --   --   --  25   < > 27  --   --   --    BUN mg/dL 12 12 12 11 12 12   < > 11 10  --    < > 10 9  --   --   --   --    < > 13   CREATININE mg/dL 0.50* 0.50* 0.50* 0.55* 0.50* 0.62   < > 0.67 0.70  --    < > 0.72 0.66  --   --   --   --    < > 0.76   CALCIUM mg/dL 6.9* 6.3* 6.2* 6.5* 6.7* 6.6*   < > 6.7* 7.1*  --    < > 6.7* 7.2*  --   --   --   --    < > 6.6*   MAGNESIUM mg/dL  --  1.7*  --   --   --  1.7*  --  2.0 2.2  --   --  1.5*  --   --   --   --  2.0   < >  --    PHOSPHORUS mg/dL  --  2.7  --   --   --  3.9  --   --  4.9*  --   --  3.1  --   --   --   --  3.5   "--   --    ALK PHOS U/L  --   --   --   --   --   --   --   --   --   --   --   --   --   --   --   --   --   --  80   ALT U/L  --   --   --   --   --   --   --   --   --   --   --   --   --   --   --   --   --   --  26   AST U/L  --   --   --   --   --   --   --   --   --   --   --   --   --   --   --   --   --   --  31   GLUCOSE, ISTAT mg/dl  --   --   --   --   --   --   --   --   --   --   --   --   --  152*  --  106  --   --   --    NITRITE UA   --   --   --   --   --   --   --   --   --  Negative  --   --   --   --   --   --   --   --   --    LEUKOCYTES UA   --   --   --   --   --   --   --   --   --  Negative  --   --   --   --   --   --   --   --   --    BLOOD UA   --   --   --   --   --   --   --   --   --  Negative  --   --   --   --   --   --   --   --   --     < > = values in this interval not displayed.         Portions of the record may have been created with voice recognition software. Occasional wrong word or \"sound a like\" substitutions may have occurred due to the inherent limitations of voice recognition software. Read the chart carefully and recognize, using context, where substitutions have occurred.If you have any questions, please contact the dictating provider.    "

## 2024-06-21 NOTE — QUICK NOTE
Renal on-call: Sodium still low at 128 meq/L.  No function is stable.  Increased 1.8% saline to 60 ml/h and ordered for IV Lasix 10 mg IV.  Monitor BMP every 8 hours

## 2024-06-22 ENCOUNTER — APPOINTMENT (INPATIENT)
Dept: RADIOLOGY | Facility: HOSPITAL | Age: 54
DRG: 912 | End: 2024-06-22
Payer: COMMERCIAL

## 2024-06-22 LAB
ANION GAP SERPL CALCULATED.3IONS-SCNC: 13 MMOL/L (ref 4–13)
ANION GAP SERPL CALCULATED.3IONS-SCNC: 13 MMOL/L (ref 4–13)
ANION GAP SERPL CALCULATED.3IONS-SCNC: 16 MMOL/L (ref 4–13)
BUN SERPL-MCNC: 10 MG/DL (ref 5–25)
CA-I BLD-SCNC: 0.9 MMOL/L (ref 1.12–1.32)
CALCIUM SERPL-MCNC: 6.6 MG/DL (ref 8.4–10.2)
CALCIUM SERPL-MCNC: 7.1 MG/DL (ref 8.4–10.2)
CALCIUM SERPL-MCNC: 7.1 MG/DL (ref 8.4–10.2)
CHLORIDE SERPL-SCNC: 100 MMOL/L (ref 96–108)
CHLORIDE SERPL-SCNC: 91 MMOL/L (ref 96–108)
CHLORIDE SERPL-SCNC: 99 MMOL/L (ref 96–108)
CO2 SERPL-SCNC: 21 MMOL/L (ref 21–32)
CO2 SERPL-SCNC: 22 MMOL/L (ref 21–32)
CO2 SERPL-SCNC: 23 MMOL/L (ref 21–32)
CREAT SERPL-MCNC: 0.48 MG/DL (ref 0.6–1.3)
CREAT SERPL-MCNC: 0.51 MG/DL (ref 0.6–1.3)
CREAT SERPL-MCNC: 0.52 MG/DL (ref 0.6–1.3)
ERYTHROCYTE [DISTWIDTH] IN BLOOD BY AUTOMATED COUNT: 13 % (ref 11.6–15.1)
GFR SERPL CREATININE-BSD FRML MDRD: 120 ML/MIN/1.73SQ M
GFR SERPL CREATININE-BSD FRML MDRD: 121 ML/MIN/1.73SQ M
GFR SERPL CREATININE-BSD FRML MDRD: 124 ML/MIN/1.73SQ M
GLUCOSE SERPL-MCNC: 71 MG/DL (ref 65–140)
GLUCOSE SERPL-MCNC: 77 MG/DL (ref 65–140)
GLUCOSE SERPL-MCNC: 88 MG/DL (ref 65–140)
HCT VFR BLD AUTO: 30.7 % (ref 36.5–49.3)
HGB BLD-MCNC: 10.4 G/DL (ref 12–17)
MAGNESIUM SERPL-MCNC: 1.6 MG/DL (ref 1.9–2.7)
MCH RBC QN AUTO: 32.2 PG (ref 26.8–34.3)
MCHC RBC AUTO-ENTMCNC: 33.9 G/DL (ref 31.4–37.4)
MCV RBC AUTO: 95 FL (ref 82–98)
PHOSPHATE SERPL-MCNC: 2.4 MG/DL (ref 2.7–4.5)
PLATELET # BLD AUTO: 164 THOUSANDS/UL (ref 149–390)
PMV BLD AUTO: 10.1 FL (ref 8.9–12.7)
POTASSIUM SERPL-SCNC: 2.9 MMOL/L (ref 3.5–5.3)
POTASSIUM SERPL-SCNC: 3.2 MMOL/L (ref 3.5–5.3)
POTASSIUM SERPL-SCNC: 3.5 MMOL/L (ref 3.5–5.3)
RBC # BLD AUTO: 3.23 MILLION/UL (ref 3.88–5.62)
SODIUM SERPL-SCNC: 126 MMOL/L (ref 135–147)
SODIUM SERPL-SCNC: 136 MMOL/L (ref 135–147)
SODIUM SERPL-SCNC: 136 MMOL/L (ref 135–147)
WBC # BLD AUTO: 8.66 THOUSAND/UL (ref 4.31–10.16)

## 2024-06-22 PROCEDURE — 85027 COMPLETE CBC AUTOMATED: CPT | Performed by: PHYSICIAN ASSISTANT

## 2024-06-22 PROCEDURE — 84100 ASSAY OF PHOSPHORUS: CPT | Performed by: PHYSICIAN ASSISTANT

## 2024-06-22 PROCEDURE — 71045 X-RAY EXAM CHEST 1 VIEW: CPT

## 2024-06-22 PROCEDURE — 83735 ASSAY OF MAGNESIUM: CPT | Performed by: PHYSICIAN ASSISTANT

## 2024-06-22 PROCEDURE — 99232 SBSQ HOSP IP/OBS MODERATE 35: CPT | Performed by: INTERNAL MEDICINE

## 2024-06-22 PROCEDURE — 80048 BASIC METABOLIC PNL TOTAL CA: CPT | Performed by: INTERNAL MEDICINE

## 2024-06-22 PROCEDURE — 99291 CRITICAL CARE FIRST HOUR: CPT | Performed by: STUDENT IN AN ORGANIZED HEALTH CARE EDUCATION/TRAINING PROGRAM

## 2024-06-22 PROCEDURE — 99024 POSTOP FOLLOW-UP VISIT: CPT | Performed by: PHYSICIAN ASSISTANT

## 2024-06-22 PROCEDURE — 82330 ASSAY OF CALCIUM: CPT | Performed by: PHYSICIAN ASSISTANT

## 2024-06-22 PROCEDURE — 92610 EVALUATE SWALLOWING FUNCTION: CPT

## 2024-06-22 RX ORDER — POTASSIUM CHLORIDE 14.9 MG/ML
20 INJECTION INTRAVENOUS EVERY 4 HOURS
Status: COMPLETED | OUTPATIENT
Start: 2024-06-22 | End: 2024-06-23

## 2024-06-22 RX ORDER — CEPHALEXIN 250 MG/5ML
500 POWDER, FOR SUSPENSION ORAL EVERY 6 HOURS SCHEDULED
Status: DISCONTINUED | OUTPATIENT
Start: 2024-06-22 | End: 2024-06-24

## 2024-06-22 RX ORDER — FUROSEMIDE 10 MG/ML
10 INJECTION INTRAMUSCULAR; INTRAVENOUS
Status: DISCONTINUED | OUTPATIENT
Start: 2024-06-22 | End: 2024-06-22

## 2024-06-22 RX ORDER — MIDODRINE HYDROCHLORIDE 5 MG/1
10 TABLET ORAL EVERY 8 HOURS
Status: DISCONTINUED | OUTPATIENT
Start: 2024-06-22 | End: 2024-06-30

## 2024-06-22 RX ORDER — MAGNESIUM SULFATE HEPTAHYDRATE 40 MG/ML
4 INJECTION, SOLUTION INTRAVENOUS ONCE
Status: COMPLETED | OUTPATIENT
Start: 2024-06-22 | End: 2024-06-22

## 2024-06-22 RX ORDER — MIDODRINE HYDROCHLORIDE 5 MG/1
10 TABLET ORAL EVERY 6 HOURS
Status: DISCONTINUED | OUTPATIENT
Start: 2024-06-22 | End: 2024-06-22

## 2024-06-22 RX ORDER — DEXTROSE MONOHYDRATE 50 MG/ML
150 INJECTION, SOLUTION INTRAVENOUS CONTINUOUS
Status: DISCONTINUED | OUTPATIENT
Start: 2024-06-22 | End: 2024-06-24

## 2024-06-22 RX ORDER — CALCIUM GLUCONATE 20 MG/ML
2 INJECTION, SOLUTION INTRAVENOUS ONCE
Status: COMPLETED | OUTPATIENT
Start: 2024-06-22 | End: 2024-06-22

## 2024-06-22 RX ORDER — POTASSIUM CHLORIDE 20 MEQ/1
20 TABLET, EXTENDED RELEASE ORAL
Status: DISCONTINUED | OUTPATIENT
Start: 2024-06-22 | End: 2024-06-22

## 2024-06-22 RX ORDER — AMOXICILLIN 250 MG
2 CAPSULE ORAL 2 TIMES DAILY
Status: DISCONTINUED | OUTPATIENT
Start: 2024-06-22 | End: 2024-06-30

## 2024-06-22 RX ADMIN — CEPHALEXIN 500 MG: 500 CAPSULE ORAL at 05:39

## 2024-06-22 RX ADMIN — MIDODRINE HYDROCHLORIDE 5 MG: 5 TABLET ORAL at 06:00

## 2024-06-22 RX ADMIN — GABAPENTIN 100 MG: 100 CAPSULE ORAL at 16:22

## 2024-06-22 RX ADMIN — MIDODRINE HYDROCHLORIDE 10 MG: 5 TABLET ORAL at 21:01

## 2024-06-22 RX ADMIN — CEPHALEXIN 500 MG: 250 FOR SUSPENSION ORAL at 17:36

## 2024-06-22 RX ADMIN — POTASSIUM CHLORIDE 20 MEQ: 14.9 INJECTION, SOLUTION INTRAVENOUS at 16:22

## 2024-06-22 RX ADMIN — FUROSEMIDE 10 MG: 10 INJECTION, SOLUTION INTRAMUSCULAR; INTRAVENOUS at 12:37

## 2024-06-22 RX ADMIN — ACETAMINOPHEN 650 MG: 325 TABLET, FILM COATED ORAL at 21:01

## 2024-06-22 RX ADMIN — DEXTROSE 100 ML/HR: 5 SOLUTION INTRAVENOUS at 16:22

## 2024-06-22 RX ADMIN — Medication 1000 UNITS: at 08:12

## 2024-06-22 RX ADMIN — POTASSIUM CHLORIDE 20 MEQ: 1500 TABLET, EXTENDED RELEASE ORAL at 06:51

## 2024-06-22 RX ADMIN — ENOXAPARIN SODIUM 30 MG: 30 INJECTION SUBCUTANEOUS at 21:00

## 2024-06-22 RX ADMIN — ENOXAPARIN SODIUM 30 MG: 30 INJECTION SUBCUTANEOUS at 08:12

## 2024-06-22 RX ADMIN — CEPHALEXIN 500 MG: 500 CAPSULE ORAL at 12:36

## 2024-06-22 RX ADMIN — LEVOTHYROXINE SODIUM 125 MCG: 125 TABLET ORAL at 05:39

## 2024-06-22 RX ADMIN — DIBASIC SODIUM PHOSPHATE, MONOBASIC POTASSIUM PHOSPHATE AND MONOBASIC SODIUM PHOSPHATE 2 TABLET: 852; 155; 130 TABLET ORAL at 16:22

## 2024-06-22 RX ADMIN — POTASSIUM CHLORIDE 20 MEQ: 14.9 INJECTION, SOLUTION INTRAVENOUS at 12:38

## 2024-06-22 RX ADMIN — GABAPENTIN 100 MG: 100 CAPSULE ORAL at 21:02

## 2024-06-22 RX ADMIN — POLYETHYLENE GLYCOL 3350 17 G: 17 POWDER, FOR SOLUTION ORAL at 08:12

## 2024-06-22 RX ADMIN — MAGNESIUM SULFATE HEPTAHYDRATE 4 G: 40 INJECTION, SOLUTION INTRAVENOUS at 06:50

## 2024-06-22 RX ADMIN — METHOCARBAMOL 500 MG: 500 TABLET ORAL at 05:39

## 2024-06-22 RX ADMIN — MIDODRINE HYDROCHLORIDE 10 MG: 5 TABLET ORAL at 12:36

## 2024-06-22 RX ADMIN — METHOCARBAMOL 500 MG: 500 TABLET ORAL at 12:36

## 2024-06-22 RX ADMIN — POTASSIUM CHLORIDE 20 MEQ: 14.9 INJECTION, SOLUTION INTRAVENOUS at 21:01

## 2024-06-22 RX ADMIN — OXYCODONE HYDROCHLORIDE 5 MG: 5 TABLET ORAL at 19:01

## 2024-06-22 RX ADMIN — CEPHALEXIN 500 MG: 250 FOR SUSPENSION ORAL at 15:06

## 2024-06-22 RX ADMIN — GABAPENTIN 100 MG: 100 CAPSULE ORAL at 08:12

## 2024-06-22 RX ADMIN — METHOCARBAMOL 500 MG: 500 TABLET ORAL at 17:36

## 2024-06-22 RX ADMIN — ACETAMINOPHEN 975 MG: 325 TABLET, FILM COATED ORAL at 05:39

## 2024-06-22 RX ADMIN — SENNOSIDES AND DOCUSATE SODIUM 2 TABLET: 50; 8.6 TABLET ORAL at 17:36

## 2024-06-22 RX ADMIN — DIBASIC SODIUM PHOSPHATE, MONOBASIC POTASSIUM PHOSPHATE AND MONOBASIC SODIUM PHOSPHATE 2 TABLET: 852; 155; 130 TABLET ORAL at 06:50

## 2024-06-22 RX ADMIN — CALCITRIOL CAPSULES 0.25 MCG 0.25 MCG: 0.25 CAPSULE ORAL at 08:59

## 2024-06-22 RX ADMIN — CALCIUM GLUCONATE 2 G: 20 INJECTION, SOLUTION INTRAVENOUS at 06:50

## 2024-06-22 RX ADMIN — DIBASIC SODIUM PHOSPHATE, MONOBASIC POTASSIUM PHOSPHATE AND MONOBASIC SODIUM PHOSPHATE 2 TABLET: 852; 155; 130 TABLET ORAL at 12:36

## 2024-06-22 NOTE — PROGRESS NOTES
[facility]  Progress Note: Critical Care  Name: Luis Forrester 53 y.o. male I MRN: [id number]  Unit/Bed#: ICU 12 I Date of Admission: 6/15/2024   Date of Service: 6/22/2024 I Hospital Day: 7    Assessment & Plan   Neuro:   Diagnosis: SAH, SDH, C3-4, C5-6 severe canal stenosis with mild cord compression, encephalopathy  Plan: Status post C2-T1 decompression fusion with neurosurgery  Continue scheduled neurochecks  MAP pushes completed  Continue Keppra 7 days for seizure prophylaxis  Maintain seizure precautions  Neurosurgery signed off  Diagnosis: Posttraumatic pain  Plan: Scheduled Tylenol, gabapentin, Robaxin  APS following  Maintain delirium precautions  As needed oxycodone, Dilaudid    CV:   Diagnosis: Syncope, sick sinus syndrome  Plan: Continue midodrine  Status post pacemaker 6/21  Continue phenylephrine to maintain MAP greater than 65  Vasopressin weaned off    Pulm:  No active issues    GI:   No active issues    :   Diagnosis: Hyponatremia  Plan: Status post DDAVP  Follow-up a.m. BMP  Strict intake and output  Nephrology consulted, appreciate recommendations  Maintain fluid restriction 1200  Continue 1.8% hypertonic saline    F/E/N:   1.8% HTS @ 75  Replete electrolytes as needed  Cardiovascular diet    Heme/Onc:   Diagnosis: DVT prophylaxis  Plan: Lovenox, SCDs    Endo:   Diagnosis: Hypothyroidism  Plan: Continue Synthroid    ID:   Diagnosis: Surgical prophylaxis  Plan: Continue Keflex    MSK/Skin:   Diagnosis: Ambulatory dysfunction  Plan: PT/OT    Disposition: Critical care    ICU Core Measures     A: Assess, Prevent, and Manage Pain Has pain been assessed? Yes  Need for changes to pain regimen? No   B: Both SAT/SAT  N/A   C: Choice of Sedation RASS Goal: 0 Alert and Calm  Need for changes to sedation or analgesia regimen? No   D: Delirium CAM-ICU: Negative   E: Early Mobility  Plan for early mobility? Yes   F: Family Engagement Plan for family engagement today?  Yes       Antibiotic Review: Patient on appropriate coverage based on culture data.     Review of Invasive Devices:      Central access plan: Patient has multiple central venous catheters.       Prophylaxis:  VTE VTE covered by:  enoxaparin, Subcutaneous, 30 mg at 06/21/24 2021       Stress Ulcer  not ordered        Significant 24hr Events     24hr events: Continued hyponatremia with 1.8% hypertonic saline overnight, vasopressin weaned off     Subjective          Objective                            Vitals I/O      Most Recent Min/Max in 24hrs   Temp 98.5 °F (36.9 °C) Temp  Min: 98 °F (36.7 °C)  Max: 98.7 °F (37.1 °C)   Pulse 66 Pulse  Min: 59  Max: 89   Resp 15 Resp  Min: 11  Max: 29   /67 BP  Min: 84/55  Max: 148/78   O2 Sat 97 % SpO2  Min: 94 %  Max: 99 %      Intake/Output Summary (Last 24 hours) at 6/22/2024 0412  Last data filed at 6/22/2024 0200  Gross per 24 hour   Intake 3504.58 ml   Output 3568 ml   Net -63.42 ml       Diet Cardiovascular; Cardiac; Fluid Restriction 1200 ML    Invasive Monitoring           Physical Exam   Physical Exam  Vitals and nursing note reviewed.   Eyes:      Extraocular Movements: Extraocular movements intact.      Pupils: Pupils are equal, round, and reactive to light.   Skin:     General: Skin is warm.      Capillary Refill: Capillary refill takes less than 2 seconds.   HENT:      Head: Normocephalic.      Right Ear: Hearing normal.      Left Ear: Hearing normal.      Mouth/Throat:      Mouth: Mucous membranes are moist.   Cardiovascular:      Rate and Rhythm: Normal rate.      Pulses: Normal pulses.   Abdominal: General: There is distension.     Palpations: Abdomen is soft.      Tenderness: There is no abdominal tenderness.   Constitutional:       Appearance: He is well-developed and well-nourished.   Pulmonary:      Breath sounds: Normal breath sounds.   Neurological:      Mental Status: He is alert and oriented to person, place and time.      Comments: Hand  b/l  3/5  Bicep b/l 5/5  BLE 5/5             Diagnostic Studies      EKG:   Imaging:  I have personally reviewed pertinent reports.       Medications:  Scheduled PRN   acetaminophen, 975 mg, Q8H ASHLYN  calcitriol, 0.25 mcg, Daily  cephalexin, 500 mg, Q6H ASHLYN  Cholecalciferol, 1,000 Units, Daily  enoxaparin, 30 mg, Q12H ASHLYN  gabapentin, 100 mg, TID  levothyroxine, 125 mcg, Early Morning  methocarbamol, 500 mg, Q6H ASHLYN  midodrine, 5 mg, TID AC  polyethylene glycol, 17 g, Daily  senna-docusate sodium, 1 tablet, HS      albuterol, 2 puff, Q4H PRN  HYDROmorphone, 0.2 mg, Q2H PRN  naloxone, 0.04 mg, Q1MIN PRN  ondansetron, 4 mg, Q4H PRN  oxyCODONE, 2.5 mg, Q4H PRN   Or  oxyCODONE, 5 mg, Q4H PRN       Continuous    phenylephine,  mcg/min, Last Rate: 25 mcg/min (06/21/24 2039)  sodium chloride (HYPERTONIC) infusion 1.8%, 75 mL/hr, Last Rate: 75 mL/hr (06/22/24 0200)  vasopressin, 0.04 Units/min, Last Rate: 0.04 Units/min (06/22/24 0200)         Labs:    CBC    Recent Labs     06/20/24  0428 06/21/24  0543   WBC 12.92* 9.15   HGB 12.2 10.7*   HCT 36.4* 32.2*    189     BMP    Recent Labs     06/21/24  1407 06/21/24  2020   SODIUM 125* 127*   K 3.9 3.5   CL 92* 91*   CO2 26 25   AGAP 7 11   BUN 10 11   CREATININE 0.43* 0.49*   CALCIUM 7.1* 6.8*       Coags    No recent results     Additional Electrolytes  Recent Labs     06/20/24  0428 06/20/24  0904 06/21/24  0543   MG 1.7*  --  1.7*   PHOS 3.9  --  2.7   CAIONIZED  --  0.96* 0.88*          Blood Gas    No recent results  No recent results LFTs  No recent results    Infectious  No recent results  Glucose  Recent Labs     06/21/24  0543 06/21/24  0853 06/21/24  1407 06/21/24 2020   GLUC 113 93 83 81               ÓSCAR Guzman

## 2024-06-22 NOTE — SPEECH THERAPY NOTE
Speech-Language Pathology Bedside Swallow Evaluation    Patient Name: Luis Forrester    Today's Date: 6/22/2024     Problem List  Principal Problem:    TBI (traumatic brain injury) ([facility])  Active Problems:    Hypothyroidism    Syncope and collapse    Bilateral hand pain    SDH (subdural hematoma) ([facility])    Right hand weakness    SSS (sick sinus syndrome) ([facility])    s/p Medtronic dual chamber PPM 6/21/2024    Past Medical History  Past Medical History:   Diagnosis Date    Arthritis     Chronic cough     Disease of thyroid gland     Hypoparathyroidism ([facility])     continue taking calcium and vitamin D, will check CMP, PTH level and Vitamin D level    Pneumonia of right middle lobe due to Streptococcus pneumoniae ([facility]) 11/30/2018    s/p Medtronic dual chamber PPM 6/21/2024 06/21/2024     Past Surgical History  Past Surgical History:   Procedure Laterality Date    DECOMPRESSION SPINE CERVICAL POSTERIOR N/A 6/16/2024    Procedure: DECOMPRESSION SPINE CERVICAL POSTERIOR C2-T1 with fusion navigated with Oarm;  Surgeon: Endy Velasquez MD;  Location: [facility];  Service: Neurosurgery    FRACTURE SURGERY      Open Treatment of Each Proximal Finger Phalanx       Summary  Pt presented with s/s suggestive of mild oropharyngeal dysphagia vs general weakness/fatigue. Symptoms or concerns included  slow mastication and effortful swallows. Pt did have some belching and reported throat soreness. He reported some difficulty swallowing meds and requests meds crushed if possible. ST will f/u for diet tolerance/potential need for diet modification vs further assessment.     Risk/s for Aspiration: mild    Recommended Diet: regular diet and thin liquids   Recommended Form of Meds: crushed with puree   Aspiration precautions and swallowing strategies: upright posture  Other Recommendations: Continue frequent oral care      Current Medical Status per H&P 6/15/24  Luis Forrester is a 53 y.o. male who presents after a syncope episode  overnight.  On presentation, he notes pain in both hands as well as his mid to upper back.  He notes that the syncope episodes have happened a few times over the last 6 months.  He notes that they happen unexpectedly and randomly with no preceding symptoms.  Last night event happened after he returned home around 1 AM.  He blacked out and does not recall how long he was out before being found by his daughter.   Per critical care progress note 6/22/24   Assessment & Plan  Neuro:   Diagnosis: SAH, SDH, C3-4, C5-6 severe canal stenosis with mild cord compression, encephalopathy  Plan: Status post C2-T1 decompression fusion with neurosurgery  Continue scheduled neurochecks  MAP pushes completed  Continue Keppra 7 days for seizure prophylaxis  Maintain seizure precautions  Neurosurgery signed off  Diagnosis: Posttraumatic pain  Plan: Scheduled Tylenol, gabapentin, Robaxin  APS following  Maintain delirium precautions  As needed oxycodone, Dilaudid  CV:   Diagnosis: Syncope, sick sinus syndrome  Plan: Continue midodrine  Status post pacemaker 6/21  Continue phenylephrine to maintain MAP greater than 65  Vasopressin weaned off      Special Studies:  CXR 6/22/24 IMPRESSION:  Left subclavian dual-chamber pacemaker well-positioned with no pneumothorax.  CT head 6/18/24 IMPRESSION:  1. Near completely resolved parafalcine subarachnoid hemorrhage with trace residual subarachnoid hemorrhage in the frontoparietal regions medially. No mass effect or hydrocephalus.  2. No large territorial infarction.      Social/Education/Vocational Hx:  Pt lives  with his daughter    Swallow Information   Current Risks for Dysphagia & Aspiration:  pt report of dysphagia  Current Symptoms/Concerns: coughing with po  Current Diet: regular diet and thin liquids   Baseline Diet: regular diet and thin liquids      Baseline Assessment   Behavior/Cognition: alert  Speech/Language Status: able to participate in basic conversation and able to follow  commands  Patient Positioning: upright in bed  Pain Status/Interventions/Response to Interventions: No report of or nonverbal indications of pain.       Swallow Mechanism Exam  Facial: symmetrical  Labial: WFL  Lingual: WFL  Velum: symmetrical  Mandible: adequate ROM  Dentition: adequate  Vocal quality:clear/adequate   Volitional Cough: strong/productive   Respiratory Status: on RA        Consistencies Assessed and Performance   Consistencies Administered: thin liquids, puree, and hard solids    Oral Stage: mild and decreased mastication    Pharyngeal Stage: mild and effortful swallow    Esophageal Concerns: belching      Summary and Recommendations (see above)    Results Reviewed with: patient and RN     Treatment Recommended: brief f/u     Frequency of treatment: 1-2x f/u      Dysphagia LTG  -Patient will demonstrate safe and effective oral intake (without overt s/s significant oral/pharyngeal dysphagia including s/s penetration or aspiration) for the highest appropriate diet level.     Short Term Goals:  -Pt will tolerate regular diet and thin liquid with no significant s/s oral or pharyngeal dysphagia across 1-3 diagnostic sessions.

## 2024-06-22 NOTE — PROGRESS NOTES
"Progress Note - Electrophysiology-Cardiology (EP)   Luis Forrester 53 y.o. male MRN: [id number]  Unit/Bed#: ICU 12 Encounter: [id number]      Assessment:  Syncope with noted symptomatic bradycardia/sick sinus syndrome/junctional bradycardia status post Medtronic dual-chamber pacemaker implantation 6/21/2024  Wide-complex tachycardia noted earlier this admission, SVT suspected  No recurrent episodes noted  Will continue to monitor for recurrence via device interrogations  Normal LV systolic function with LVEF 60% per echo 6/2024  Probable PFO noted  Mild aortic root dilatation (4.3 cm)  Subdural hematoma with bilateral subarachnoid hemorrhage, severe canal stenosis with mild cord compression status post spinal decompression fusion 6/16/2024  Hypothyroidism, maintained on Synthroid      Plan:   Device interrogation today shows appropriate device function, including lead sensing, thresholds, and impedances. Incision is clean, dry, intact without swelling, hematoma, redness, bleeding, drainage, or signs of infection. Post op orders not released thus CXR not performed, will order now and await results.     All post implantation discharge instructions and restrictions were reviewed in detail, all questions were answered. Follow up has been arranged.     EP will sign off at this time, please contact us with questions or concerns.         Subjective/Objective   Chief Complaint: no cardiac complaints    Subjective: His main complaint currently is that his room is too hot, otherwise he denies cardiac complaints including chest pain or pressure, shortness of breath, palpitations, dizziness, or lightheadedness.  He has mild discomfort at device site, otherwise no issues.      Objective:     Vitals: BP 91/51 (BP Location: Left arm)   Pulse 66   Temp 98.3 °F (36.8 °C) (Oral)   Resp 13   Ht 5' 6\" (1.676 m)   Wt 78.9 kg (174 lb)   SpO2 98%   BMI 28.08 kg/m²   Vitals:    06/15/24 1300 06/17/24 0830   Weight: 79 kg (174 lb " 3.2 oz) 78.9 kg (174 lb)     Orthostatic Blood Pressures      Flowsheet Row Most Recent Value   Blood Pressure 91/51 filed at 06/22/2024 0626   Patient Position - Orthostatic VS Lying filed at 06/22/2024 0626              Intake/Output Summary (Last 24 hours) at 6/22/2024 0737  Last data filed at 6/22/2024 0600  Gross per 24 hour   Intake 3729.78 ml   Output 3455 ml   Net 274.78 ml       Invasive Devices       Central Venous Catheter Line  Duration             CVC Central Lines 06/17/24 Triple 16cm Right Internal jugular 4 days              Peripheral Intravenous Line  Duration             Peripheral IV 06/21/24 Left;Proximal;Ventral (anterior) Forearm <1 day                                Scheduled Meds:  Current Facility-Administered Medications   Medication Dose Route Frequency Provider Last Rate    acetaminophen  975 mg Oral Q8H ASHLYN Abimbola Crump PA-C      albuterol  2 puff Inhalation Q4H PRN Abimbola Crump PA-C      calcitriol  0.25 mcg Oral Daily Abimbola Crump PA-C      calcium gluconate  2 g Intravenous Once ÓSCAR Mejia 2 g (06/22/24 0650)    cephalexin  500 mg Oral Q6H ASHLYN Abimbola Crump PA-C      Cholecalciferol  1,000 Units Oral Daily Abimbola Crump PA-C      enoxaparin  30 mg Subcutaneous Q12H [facility] Abimbola Crump PA-C      gabapentin  100 mg Oral TID Abimbola Crump PA-C      HYDROmorphone  0.2 mg Intravenous Q2H PRN Abimbola Crump PA-C      levothyroxine  125 mcg Oral Early Morning Abimbola Crump PA-C      magnesium sulfate  4 g Intravenous Once ÓSCAR Mejia 4 g (06/22/24 0650)    methocarbamol  500 mg Oral Q6H [facility] Abimbola Crump PA-C      midodrine  5 mg Oral TID  Abimbola Crump PA-C      naloxone  0.04 mg Intravenous Q1MIN PRN Abimbola Crump PA-C      ondansetron  4 mg Intravenous Q4H PRN Abimbola Crump PA-C      oxyCODONE  2.5 mg Oral Q4H PRN Abimbola Crump PA-C      Or    oxyCODONE  5 mg Oral Q4H PRN Abimbola Crump PA-C       phenylephine   mcg/min Intravenous Titrated Abimbola Crump PA-C 25 mcg/min (06/21/24 2039)    polyethylene glycol  17 g Oral Daily Abimbola Crump PA-C      potassium chloride  20 mEq Oral TID With Meals ÓSCAR Mejia      potassium-sodium phosphateS  2 tablet Oral 4x Daily (with meals and at bedtime) ÓSCAR Mejia      senna-docusate sodium  1 tablet Oral HS Abimbola Crump PA-C      sodium chloride (HYPERTONIC) infusion 1.8%  75 mL/hr Intravenous Continuous Johnnie Correa, DO 75 mL/hr (06/22/24 0600)     Continuous Infusions:phenylephine,  mcg/min, Last Rate: 25 mcg/min (06/21/24 2039)  sodium chloride (HYPERTONIC) infusion 1.8%, 75 mL/hr, Last Rate: 75 mL/hr (06/22/24 0600)      PRN Meds:.  albuterol    HYDROmorphone    naloxone    ondansetron    oxyCODONE **OR** oxyCODONE    Review of Systems   Constitutional: Negative for fever and malaise/fatigue.   Cardiovascular:  Negative for chest pain, dyspnea on exertion, irregular heartbeat, leg swelling, near-syncope, orthopnea, palpitations, paroxysmal nocturnal dyspnea and syncope.   All other systems reviewed and are negative.        Physical Exam:   GEN: NAD, alert and oriented x 3, well appearing  SKIN: dry without significant lesions or rashes  HEENT: NCAT, PERRL, EOMs intact  NECK: No JVD appreciated  CARDIOVASCULAR: RRR, normal S1, S2 without murmurs, rubs, or gallops appreciated  LUNGS: Clear to auscultation bilaterally without wheezes, rhonchi, or rales  ABDOMEN: Soft, nontender, nondistended  EXTREMITIES/VASCULAR: perfused without clubbing, cyanosis, or LE edema b/l  PSYCH: Normal mood and affect  NEURO: CN ll-Xll grossly intact                Lab Results: I have personally reviewed pertinent lab results.    Results from last 7 days   Lab Units 06/22/24  0539 06/21/24  0543 06/20/24  0428   WBC Thousand/uL 8.66 9.15 12.92*   HEMOGLOBIN g/dL 10.4* 10.7* 12.2   HEMATOCRIT % 30.7* 32.2* 36.4*   PLATELETS Thousands/uL 164 189  212     Results from last 7 days   Lab Units 06/22/24  0539 06/21/24  2020 06/21/24  1407 06/17/24  0107 06/16/24  1143   POTASSIUM mmol/L 2.9* 3.5 3.9   < >  --    CHLORIDE mmol/L 91* 91* 92*   < >  --    CO2 mmol/L 22 25 26   < >  --    CO2, I-STAT mmol/L  --   --   --   --  25   BUN mg/dL 10 11 10   < >  --    CREATININE mg/dL 0.48* 0.49* 0.43*   < >  --    GLUCOSE, ISTAT mg/dl  --   --   --   --  152*   CALCIUM mg/dL 6.6* 6.8* 7.1*   < >  --     < > = values in this interval not displayed.     Results from last 7 days   Lab Units 06/16/24  0554   INR  0.99   PTT seconds 28     Results from last 7 days   Lab Units 06/22/24  0539 06/21/24  0543 06/20/24  0428   MAGNESIUM mg/dL 1.6* 1.7* 1.7*         Imaging: I have personally reviewed pertinent reports.      ECHO: No results found for this or any previous visit.      Results for orders placed during the hospital encounter of 06/15/24    Echo complete w/ contrast if indicated    Interpretation Summary    Left Ventricle: Left ventricular cavity size is normal. Wall thickness is normal. The left ventricular ejection fraction is 60%. Systolic function is normal. Wall motion is normal. Diastolic function is normal.    Right Ventricle: Right ventricular cavity size is mildly dilated.    Right Atrium: The atrium is mildly dilated.    Atrial Septum: There is a probable patent foramen ovale noted at rest with predominant left to right shunting using color Doppler. 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  It is predominately within the right atrial cavity.    Aorta: The aortic root is mildly dilated. The aortic root is 4.30 cm.        EKG/TELEMETRY: Normal sinus rhythm, no arrhythmias noted      VTE Pharmacologic Prophylaxis: Enoxaparin (Lovenox)

## 2024-06-22 NOTE — PROGRESS NOTES
NEPHROLOGY PROGRESS NOTE   Luis Forrester 53 y.o. male MRN: [id number]  Unit/Bed#: ICU 12 Encounter: [id number]      ASSESSMENT & PLAN    Assessment:     53-year-old male with a past medical history of hypothyroidism who presented after having a syncopal episode found to have cord compression and a subdural hematoma placated now by polyuria     Plan:     Polyuria  This may have been related to a transient DI although now is hyponatremic  We are inducing a net negative balance  His urine osmolality is remain high  Hyponatremia  This is likely multifactorial  Had an abnormal TSH now is on levothyroxine  Jermaine on Keppra which is likely contributing  Gabapentin can cause hyponatremia  Patient's urine osmolality is high, urine sodium is high, appears euvolemic to slightly hypervolemic  Fluid restrict to 1.2 L daily   1.8% at 75 cc an hour  Start Lasix 10 mg IV twice daily  Continue BMPs every 6 hours  Subdural hematoma  Frequent neurologic checks  Neurosurgery following  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  He has no acute complaints.  He is tolerating p.o. intake.  He has no swelling.  He is making good urine.    12 point review of systems was otherwise negative besides what is mentioned above.    Medications:    Current Facility-Administered Medications:     acetaminophen (TYLENOL) tablet 975 mg, 975 mg, Oral, Q8H ASHLYN, Abimbola Crump PA-C, 975 mg at 06/22/24 0539    albuterol (PROVENTIL HFA,VENTOLIN HFA) inhaler 2 puff, 2 puff, Inhalation, Q4H PRN, Abimbola Crump PA-C    calcitriol (ROCALTROL) capsule 0.25 mcg, 0.25 mcg, Oral, Daily, Abimbola Crump PA-C, 0.25 mcg at 06/22/24 0859    cephalexin (KEFLEX) capsule 500 mg, 500 mg, Oral, Q6H ASHLYN, Abimbola Crump PA-C, 500 mg at  06/22/24 0539    Cholecalciferol (VITAMIN D3) tablet 1,000 Units, 1,000 Units, Oral, Daily, Abimbola Crump PA-C, 1,000 Units at 06/22/24 0812    enoxaparin (LOVENOX) subcutaneous injection 30 mg, 30 mg, Subcutaneous, Q12H ASHLYN, Abimbola Crump PA-C, 30 mg at 06/22/24 0812    gabapentin (NEURONTIN) capsule 100 mg, 100 mg, Oral, TID, Abimbola Crump PA-C, 100 mg at 06/22/24 0812    HYDROmorphone HCl (DILAUDID) injection 0.2 mg, 0.2 mg, Intravenous, Q2H PRN, Abimbola Crump PA-C, 0.2 mg at 06/21/24 1433    levothyroxine tablet 125 mcg, 125 mcg, Oral, Early Morning, Abimbola Crump PA-C, 125 mcg at 06/22/24 0539    magnesium sulfate 4 g/100 mL IVPB (premix) 4 g, 4 g, Intravenous, Once, ÓSCAR Mejia, Last Rate: 25 mL/hr at 06/22/24 0650, 4 g at 06/22/24 0650    methocarbamol (ROBAXIN) tablet 500 mg, 500 mg, Oral, Q6H ASHLYN, Abimbola Crump PA-C, 500 mg at 06/22/24 0539    midodrine (PROAMATINE) tablet 5 mg, 5 mg, Oral, TID AC, Abimbola Crump PA-C, 5 mg at 06/22/24 0600    naloxone (NARCAN) 0.04 mg/mL syringe 0.04 mg, 0.04 mg, Intravenous, Q1MIN PRN, Abimbola Crump PA-C    ondansetron (ZOFRAN) injection 4 mg, 4 mg, Intravenous, Q4H PRN, Abimbola Crump PA-C    oxyCODONE (ROXICODONE) split tablet 2.5 mg, 2.5 mg, Oral, Q4H PRN, 2.5 mg at 06/18/24 2000 **OR** oxyCODONE (ROXICODONE) IR tablet 5 mg, 5 mg, Oral, Q4H PRN, Abimbola Crump PA-C, 5 mg at 06/17/24 1012    phenylephrine (KENJI-SYNEPHRINE) 100 mg (DOUBLE CONCENTRATION) IV in sodium chloride 0.9% 250 mL,  mcg/min, Intravenous, Titrated, Abimbola Crump PA-C, Held at 06/22/24 0830    polyethylene glycol (MIRALAX) packet 17 g, 17 g, Oral, Daily, Abimbola Crump PA-C, 17 g at 06/22/24 0812    potassium chloride (Klor-Con M20) CR tablet 20 mEq, 20 mEq, Oral, TID With Meals, ÓSCAR Mejia, 20 mEq at 06/22/24 0651    potassium-sodium phosphateS (K-PHOS,PHOSPHA 250) -250 mg tablet 2 tablet, 2 tablet, Oral, 4x Daily (with  meals and at bedtime), ÓSCAR Mejia, 2 tablet at 06/22/24 0650    senna-docusate sodium (SENOKOT S) 8.6-50 mg per tablet 1 tablet, 1 tablet, Oral, HS, Abimbola Crump PA-C, 1 tablet at 06/20/24 2142    sodium chloride (HYPERTONIC) infusion 1.8%, 75 mL/hr, Intravenous, Continuous, Johnnie Correa DO, Last Rate: 75 mL/hr at 06/22/24 0600, 75 mL/hr at 06/22/24 0600    OBJECTIVE:    Vitals:    06/22/24 0745 06/22/24 0830 06/22/24 0900 06/22/24 0915   BP: 96/59 94/57 (!) 120/47 93/57   BP Location:       Pulse: 70 70 80 84   Resp: 20 22 (!) 33 16   Temp:       TempSrc:       SpO2: 96% 99% 97% 98%   Weight:       Height:            Temp:  [98 °F (36.7 °C)-98.7 °F (37.1 °C)] 98.3 °F (36.8 °C)  HR:  [60-89] 84  Resp:  [11-33] 16  BP: ()/(47-78) 93/57  SpO2:  [94 %-99 %] 98 %     Body mass index is 28.08 kg/m².    Weight (last 2 days)       None            I/O last 3 completed shifts:  In: 4863.7 [P.O.:1584; I.V.:3279.7]  Out: 4490 [Urine:4475; Blood:15]    I/O this shift:  In: 225 [P.O.:75; I.V.:150]  Out: -       Physical exam:    General: no acute distress, cooperative  Eyes: conjunctivae pink, anicteric sclerae  ENT: lips and mucous membranes moist, no exudates, normal external ears  Neck: ROM intact, no JVD  Chest: No respiratory distress, no accessory muscle use  CVS: normal rate, non pericardial friction rub  Abdomen: soft, non-tender, non-distended, normoactive bowel sounds  Extremities: no edema of both legs  Skin: no rash  Neuro: awake, alert, oriented, grossly intact  Psych:  Pleasant affect    Invasive Devices:      Lab Results:   Results from last 7 days   Lab Units 06/22/24  0539 06/21/24  2020 06/21/24  1407 06/21/24  0853 06/21/24  0543 06/20/24  1439 06/20/24  0428 06/19/24  1434 06/19/24  0519 06/18/24  1829 06/18/24  1650 06/18/24  1457 06/18/24  0439 06/17/24  0107 06/16/24  1143 06/16/24  0930   WBC Thousand/uL 8.66  --   --   --  9.15  --  12.92*  --  14.45*  --   --   --  16.27*   < >   "--   --    HEMOGLOBIN g/dL 10.4*  --   --   --  10.7*  --  12.2  --  13.2  --   --   --  12.5   < >  --   --    I STAT HEMOGLOBIN g/dl  --   --   --   --   --   --   --   --   --   --   --   --   --   --  12.2 12.2   HEMATOCRIT % 30.7*  --   --   --  32.2*  --  36.4*  --  39.1  --   --   --  38.2   < >  --   --    HEMATOCRIT, ISTAT %  --   --   --   --   --   --   --   --   --   --   --   --   --   --  36* 36*   PLATELETS Thousands/uL 164  --   --   --  189  --  212  --  232  --   --   --  236   < >  --   --    POTASSIUM mmol/L 2.9* 3.5 3.9 3.7 3.8   < > 3.8   < > 3.9 4.0  --    < > 3.7   < >  --   --    CHLORIDE mmol/L 91* 91* 92* 92* 93*   < > 94*   < > 93* 95*  --    < > 97   < >  --   --    CO2 mmol/L 22 25 26 26 26   < > 30   < > 31 31  --    < > 31   < >  --   --    CO2, I-STAT mmol/L  --   --   --   --   --   --   --   --   --   --   --   --   --   --  25 27   BUN mg/dL 10 11 10 12 12   < > 12   < > 11 10  --    < > 10   < >  --   --    CREATININE mg/dL 0.48* 0.49* 0.43* 0.50* 0.50*   < > 0.62   < > 0.67 0.70  --    < > 0.72   < >  --   --    CALCIUM mg/dL 6.6* 6.8* 7.1* 6.9* 6.3*   < > 6.6*   < > 6.7* 7.1*  --    < > 6.7*   < >  --   --    MAGNESIUM mg/dL 1.6*  --   --   --  1.7*  --  1.7*  --  2.0 2.2  --   --  1.5*  --   --   --    PHOSPHORUS mg/dL 2.4*  --   --   --  2.7  --  3.9  --   --  4.9*  --   --  3.1  --   --   --    GLUCOSE, ISTAT mg/dl  --   --   --   --   --   --   --   --   --   --   --   --   --   --  152* 106   NITRITE UA   --   --   --   --   --   --   --   --   --   --  Negative  --   --   --   --   --    LEUKOCYTES UA   --   --   --   --   --   --   --   --   --   --  Negative  --   --   --   --   --    BLOOD UA   --   --   --   --   --   --   --   --   --   --  Negative  --   --   --   --   --     < > = values in this interval not displayed.         Portions of the record may have been created with voice recognition software. Occasional wrong word or \"sound a like\" substitutions may have " occurred due to the inherent limitations of voice recognition software. Read the chart carefully and recognize, using context, where substitutions have occurred.If you have any questions, please contact the dictating provider.

## 2024-06-23 PROBLEM — E87.1 HYPONATREMIA: Status: ACTIVE | Noted: 2024-06-23

## 2024-06-23 LAB
ANION GAP SERPL CALCULATED.3IONS-SCNC: 10 MMOL/L (ref 4–13)
ANION GAP SERPL CALCULATED.3IONS-SCNC: 6 MMOL/L (ref 4–13)
BUN SERPL-MCNC: 5 MG/DL (ref 5–25)
BUN SERPL-MCNC: 7 MG/DL (ref 5–25)
CALCIUM SERPL-MCNC: 7 MG/DL (ref 8.4–10.2)
CALCIUM SERPL-MCNC: 7 MG/DL (ref 8.4–10.2)
CHLORIDE SERPL-SCNC: 101 MMOL/L (ref 96–108)
CHLORIDE SERPL-SCNC: 99 MMOL/L (ref 96–108)
CO2 SERPL-SCNC: 26 MMOL/L (ref 21–32)
CO2 SERPL-SCNC: 29 MMOL/L (ref 21–32)
CREAT SERPL-MCNC: 0.46 MG/DL (ref 0.6–1.3)
CREAT SERPL-MCNC: 0.5 MG/DL (ref 0.6–1.3)
ERYTHROCYTE [DISTWIDTH] IN BLOOD BY AUTOMATED COUNT: 13.4 % (ref 11.6–15.1)
GFR SERPL CREATININE-BSD FRML MDRD: 121 ML/MIN/1.73SQ M
GFR SERPL CREATININE-BSD FRML MDRD: 126 ML/MIN/1.73SQ M
GLUCOSE SERPL-MCNC: 121 MG/DL (ref 65–140)
GLUCOSE SERPL-MCNC: 155 MG/DL (ref 65–140)
HCT VFR BLD AUTO: 31.9 % (ref 36.5–49.3)
HGB BLD-MCNC: 11 G/DL (ref 12–17)
MAGNESIUM SERPL-MCNC: 1.9 MG/DL (ref 1.9–2.7)
MCH RBC QN AUTO: 32.4 PG (ref 26.8–34.3)
MCHC RBC AUTO-ENTMCNC: 34.5 G/DL (ref 31.4–37.4)
MCV RBC AUTO: 94 FL (ref 82–98)
PHOSPHATE SERPL-MCNC: 1.9 MG/DL (ref 2.7–4.5)
PLATELET # BLD AUTO: 165 THOUSANDS/UL (ref 149–390)
PMV BLD AUTO: 10.2 FL (ref 8.9–12.7)
POTASSIUM SERPL-SCNC: 3.1 MMOL/L (ref 3.5–5.3)
POTASSIUM SERPL-SCNC: 3.1 MMOL/L (ref 3.5–5.3)
RBC # BLD AUTO: 3.4 MILLION/UL (ref 3.88–5.62)
SODIUM SERPL-SCNC: 135 MMOL/L (ref 135–147)
SODIUM SERPL-SCNC: 136 MMOL/L (ref 135–147)
WBC # BLD AUTO: 8.08 THOUSAND/UL (ref 4.31–10.16)

## 2024-06-23 PROCEDURE — 99232 SBSQ HOSP IP/OBS MODERATE 35: CPT | Performed by: INTERNAL MEDICINE

## 2024-06-23 PROCEDURE — 84100 ASSAY OF PHOSPHORUS: CPT | Performed by: NURSE PRACTITIONER

## 2024-06-23 PROCEDURE — 83735 ASSAY OF MAGNESIUM: CPT | Performed by: NURSE PRACTITIONER

## 2024-06-23 PROCEDURE — 85027 COMPLETE CBC AUTOMATED: CPT | Performed by: NURSE PRACTITIONER

## 2024-06-23 PROCEDURE — 99232 SBSQ HOSP IP/OBS MODERATE 35: CPT | Performed by: STUDENT IN AN ORGANIZED HEALTH CARE EDUCATION/TRAINING PROGRAM

## 2024-06-23 PROCEDURE — 80048 BASIC METABOLIC PNL TOTAL CA: CPT | Performed by: INTERNAL MEDICINE

## 2024-06-23 PROCEDURE — NC001 PR NO CHARGE: Performed by: STUDENT IN AN ORGANIZED HEALTH CARE EDUCATION/TRAINING PROGRAM

## 2024-06-23 RX ORDER — POTASSIUM CHLORIDE 14.9 MG/ML
20 INJECTION INTRAVENOUS EVERY 4 HOURS
Status: COMPLETED | OUTPATIENT
Start: 2024-06-23 | End: 2024-06-23

## 2024-06-23 RX ORDER — CALCIUM GLUCONATE 20 MG/ML
2 INJECTION, SOLUTION INTRAVENOUS ONCE
Status: COMPLETED | OUTPATIENT
Start: 2024-06-23 | End: 2024-06-23

## 2024-06-23 RX ORDER — POTASSIUM CHLORIDE 29.8 MG/ML
40 INJECTION INTRAVENOUS ONCE
Status: COMPLETED | OUTPATIENT
Start: 2024-06-23 | End: 2024-06-24

## 2024-06-23 RX ORDER — MAGNESIUM SULFATE HEPTAHYDRATE 40 MG/ML
2 INJECTION, SOLUTION INTRAVENOUS ONCE
Status: COMPLETED | OUTPATIENT
Start: 2024-06-23 | End: 2024-06-23

## 2024-06-23 RX ADMIN — POTASSIUM CHLORIDE 20 MEQ: 14.9 INJECTION, SOLUTION INTRAVENOUS at 12:05

## 2024-06-23 RX ADMIN — CALCITRIOL CAPSULES 0.25 MCG 0.25 MCG: 0.25 CAPSULE ORAL at 10:51

## 2024-06-23 RX ADMIN — ENOXAPARIN SODIUM 30 MG: 30 INJECTION SUBCUTANEOUS at 09:12

## 2024-06-23 RX ADMIN — DEXTROSE 150 ML/HR: 5 SOLUTION INTRAVENOUS at 12:03

## 2024-06-23 RX ADMIN — POTASSIUM CHLORIDE 40 MEQ: 29.8 INJECTION, SOLUTION INTRAVENOUS at 20:34

## 2024-06-23 RX ADMIN — MIDODRINE HYDROCHLORIDE 10 MG: 5 TABLET ORAL at 12:05

## 2024-06-23 RX ADMIN — CEPHALEXIN 500 MG: 250 FOR SUSPENSION ORAL at 12:05

## 2024-06-23 RX ADMIN — GABAPENTIN 100 MG: 100 CAPSULE ORAL at 21:02

## 2024-06-23 RX ADMIN — METHOCARBAMOL 500 MG: 500 TABLET ORAL at 17:41

## 2024-06-23 RX ADMIN — CEPHALEXIN 500 MG: 250 FOR SUSPENSION ORAL at 00:39

## 2024-06-23 RX ADMIN — CALCIUM GLUCONATE 2 G: 20 INJECTION, SOLUTION INTRAVENOUS at 17:54

## 2024-06-23 RX ADMIN — DIBASIC SODIUM PHOSPHATE, MONOBASIC POTASSIUM PHOSPHATE AND MONOBASIC SODIUM PHOSPHATE 2 TABLET: 852; 155; 130 TABLET ORAL at 12:06

## 2024-06-23 RX ADMIN — GABAPENTIN 100 MG: 100 CAPSULE ORAL at 09:13

## 2024-06-23 RX ADMIN — MIDODRINE HYDROCHLORIDE 10 MG: 5 TABLET ORAL at 20:41

## 2024-06-23 RX ADMIN — DEXTROSE 150 ML/HR: 5 SOLUTION INTRAVENOUS at 19:04

## 2024-06-23 RX ADMIN — DIBASIC SODIUM PHOSPHATE, MONOBASIC POTASSIUM PHOSPHATE AND MONOBASIC SODIUM PHOSPHATE 2 TABLET: 852; 155; 130 TABLET ORAL at 17:41

## 2024-06-23 RX ADMIN — CALCIUM GLUCONATE 2 G: 20 INJECTION, SOLUTION INTRAVENOUS at 09:12

## 2024-06-23 RX ADMIN — DEXTROSE 150 ML/HR: 5 SOLUTION INTRAVENOUS at 05:52

## 2024-06-23 RX ADMIN — CEPHALEXIN 500 MG: 250 FOR SUSPENSION ORAL at 17:43

## 2024-06-23 RX ADMIN — DIBASIC SODIUM PHOSPHATE, MONOBASIC POTASSIUM PHOSPHATE AND MONOBASIC SODIUM PHOSPHATE 2 TABLET: 852; 155; 130 TABLET ORAL at 09:20

## 2024-06-23 RX ADMIN — ENOXAPARIN SODIUM 30 MG: 30 INJECTION SUBCUTANEOUS at 20:42

## 2024-06-23 RX ADMIN — METHOCARBAMOL 500 MG: 500 TABLET ORAL at 12:05

## 2024-06-23 RX ADMIN — GABAPENTIN 100 MG: 100 CAPSULE ORAL at 17:54

## 2024-06-23 RX ADMIN — ACETAMINOPHEN 975 MG: 325 TABLET, FILM COATED ORAL at 17:54

## 2024-06-23 RX ADMIN — Medication 1000 UNITS: at 09:13

## 2024-06-23 RX ADMIN — POTASSIUM CHLORIDE 20 MEQ: 14.9 INJECTION, SOLUTION INTRAVENOUS at 09:12

## 2024-06-23 RX ADMIN — MAGNESIUM SULFATE HEPTAHYDRATE 2 G: 40 INJECTION, SOLUTION INTRAVENOUS at 09:12

## 2024-06-23 RX ADMIN — POTASSIUM CHLORIDE 20 MEQ: 14.9 INJECTION, SOLUTION INTRAVENOUS at 17:41

## 2024-06-23 NOTE — PROGRESS NOTES
NEPHROLOGY PROGRESS NOTE   Luis Forrester 53 y.o. male MRN: [id number]  Unit/Bed#: [facility] 204-01 Encounter: [id number]      ASSESSMENT & PLAN    Assessment:     53-year-old male with a past medical history of hypothyroidism who presented after having a syncopal episode found to have cord compression and a subdural hematoma placated now by polyuria     Plan:     Polyuria  Seems to have improved now  Hyponatremia  This is likely multifactorial  Had an abnormal TSH now is on levothyroxine  Jermaine on Keppra which is likely contributing  Gabapentin can cause hyponatremia  Had an overcorrection of the serum sodium yesterday we will trying to get serum sodium into the low 130s by this evening.  Will hold off on DDAVP as patient had a pretty strong response to this in the past.  Will keep on D5W at 150 cc an hour with a goal sodium of 130-1 33 by this evening.  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  He is resting comfortably has no acute chest pain or shortness of breath    12 point review of systems was otherwise negative besides what is mentioned above.    Medications:    Current Facility-Administered Medications:     acetaminophen (TYLENOL) tablet 975 mg, 975 mg, Oral, Q8H [facility], Abimbola Crump PA-C, 650 mg at 06/22/24 2101    albuterol (PROVENTIL HFA,VENTOLIN HFA) inhaler 2 puff, 2 puff, Inhalation, Q4H PRN, Abimbola Crump PA-C    calcitriol (ROCALTROL) capsule 0.25 mcg, 0.25 mcg, Oral, Daily, Abimbola Crump  BRONWYN, 0.25 mcg at 06/22/24 0859    cephalexin (KEFLEX) oral suspension 500 mg, 500 mg, Oral, Q6H ASHLYN, Gala Yu MD, 500 mg at 06/23/24 0039    Cholecalciferol (VITAMIN D3) tablet 1,000 Units, 1,000 Units, Oral, Daily, Abimbola Crump PA-C, 1,000 Units at 06/23/24 0913    dextrose 5 % infusion, 150 mL/hr, Intravenous, Continuous, Capri Burton MD, Last Rate: 150 mL/hr at 06/23/24 0552, 150 mL/hr at 06/23/24 0552    enoxaparin (LOVENOX) subcutaneous injection 30 mg, 30 mg, Subcutaneous, Q12H ASHLYN, Abimbola Crump PA-C, 30 mg at 06/23/24 0912    gabapentin (NEURONTIN) capsule 100 mg, 100 mg, Oral, TID, Abimbola Crump PA-C, 100 mg at 06/23/24 0913    HYDROmorphone HCl (DILAUDID) injection 0.2 mg, 0.2 mg, Intravenous, Q2H PRN, Abimbola Crump PA-C, 0.2 mg at 06/21/24 1433    levothyroxine tablet 125 mcg, 125 mcg, Oral, Early Morning, Abimbola Crump PA-C, 125 mcg at 06/22/24 0539    magnesium sulfate 2 g/50 mL IVPB (premix) 2 g, 2 g, Intravenous, Once, ÓSCAR Mejia, Last Rate: 25 mL/hr at 06/23/24 0912, 2 g at 06/23/24 0912    methocarbamol (ROBAXIN) tablet 500 mg, 500 mg, Oral, Q6H ASHLYN, Abimbola Crump PA-C, 500 mg at 06/22/24 1736    midodrine (PROAMATINE) tablet 10 mg, 10 mg, Oral, Q8H, ÓSCAR Mejia, 10 mg at 06/22/24 2101    naloxone (NARCAN) 0.04 mg/mL syringe 0.04 mg, 0.04 mg, Intravenous, Q1MIN PRN, Abimbola Crump PA-C    ondansetron (ZOFRAN) injection 4 mg, 4 mg, Intravenous, Q4H PRN, Abimbola Crump PA-C    oxyCODONE (ROXICODONE) split tablet 2.5 mg, 2.5 mg, Oral, Q4H PRN, 2.5 mg at 06/18/24 2000 **OR** oxyCODONE (ROXICODONE) IR tablet 5 mg, 5 mg, Oral, Q4H PRN, Abimbola Crump PA-C, 5 mg at 06/22/24 1901    polyethylene glycol (MIRALAX) packet 17 g, 17 g, Oral, Daily, Abimbola Crump PA-C, 17 g at 06/22/24 0812    potassium chloride 20 mEq IVPB (premix), 20 mEq, Intravenous, Q4H, ÓSCAR Mejia, Last Rate: 50 mL/hr at 06/23/24 0912, 20 mEq at 06/23/24  0912    potassium-sodium phosphateS (K-PHOS,PHOSPHA 250) -250 mg tablet 2 tablet, 2 tablet, Oral, 4x Daily (with meals and at bedtime), ÓSCAR Mejia, 2 tablet at 06/23/24 0920    senna-docusate sodium (SENOKOT S) 8.6-50 mg per tablet 2 tablet, 2 tablet, Oral, BID, ÓSCAR Mejia, 2 tablet at 06/22/24 1736    OBJECTIVE:    Vitals:    06/22/24 1700 06/22/24 1910 06/23/24 0015 06/23/24 0710   BP: 125/65 92/62 103/65    BP Location:       Pulse: 82 76 68 74   Resp: 16   16   Temp:  97.9 °F (36.6 °C) 99 °F (37.2 °C) 98.3 °F (36.8 °C)   TempSrc: Oral Oral Oral Oral   SpO2: 97% 97% 97%    Weight:       Height:            Temp:  [97.9 °F (36.6 °C)-99 °F (37.2 °C)] 98.3 °F (36.8 °C)  HR:  [68-84] 74  Resp:  [13-28] 16  BP: ()/(56-67) 103/65  SpO2:  [97 %-98 %] 97 %     Body mass index is 28.08 kg/m².    Weight (last 2 days)       None            I/O last 3 completed shifts:  In: 2393 [P.O.:475; I.V.:1818; IV Piggyback:100]  Out: 3115 [Urine:3100; Blood:15]    I/O this shift:  In: 120 [P.O.:120]  Out: -       Physical exam:    General: no acute distress, cooperative  Eyes: conjunctivae pink, anicteric sclerae  ENT: lips and mucous membranes moist, no exudates, normal external ears  Neck: ROM intact, no JVD  Chest: No respiratory distress, no accessory muscle use  CVS: normal rate, non pericardial friction rub  Abdomen: soft, non-tender, non-distended, normoactive bowel sounds  Extremities: no edema of both legs  Skin: no rash  Neuro: awake, alert, oriented, grossly intact  Psych:  Pleasant affect    Invasive Devices:      Lab Results:   Results from last 7 days   Lab Units 06/23/24  0546 06/22/24  2104 06/22/24  1505 06/22/24  0539 06/21/24  2020 06/21/24  0853 06/21/24  0543 06/20/24  1439 06/20/24  0428 06/19/24  1434 06/19/24  0519 06/18/24  1829 06/18/24  1650 06/17/24  0107 06/16/24  1143   WBC Thousand/uL 8.08  --   --  8.66  --   --  9.15  --  12.92*  --  14.45*  --   --    < >  --   "  HEMOGLOBIN g/dL 11.0*  --   --  10.4*  --   --  10.7*  --  12.2  --  13.2  --   --    < >  --    I STAT HEMOGLOBIN g/dl  --   --   --   --   --   --   --   --   --   --   --   --   --   --  12.2   HEMATOCRIT % 31.9*  --   --  30.7*  --   --  32.2*  --  36.4*  --  39.1  --   --    < >  --    HEMATOCRIT, ISTAT %  --   --   --   --   --   --   --   --   --   --   --   --   --   --  36*   PLATELETS Thousands/uL 165  --   --  164  --   --  189  --  212  --  232  --   --    < >  --    POTASSIUM mmol/L 3.1* 3.2* 3.5 2.9* 3.5   < > 3.8   < > 3.8   < > 3.9 4.0  --    < >  --    CHLORIDE mmol/L 99 100 99 91* 91*   < > 93*   < > 94*   < > 93* 95*  --    < >  --    CO2 mmol/L 26 23 21 22 25   < > 26   < > 30   < > 31 31  --    < >  --    CO2, I-STAT mmol/L  --   --   --   --   --   --   --   --   --   --   --   --   --   --  25   BUN mg/dL 7 10 10 10 11   < > 12   < > 12   < > 11 10  --    < >  --    CREATININE mg/dL 0.50* 0.52* 0.51* 0.48* 0.49*   < > 0.50*   < > 0.62   < > 0.67 0.70  --    < >  --    CALCIUM mg/dL 7.0* 7.1* 7.1* 6.6* 6.8*   < > 6.3*   < > 6.6*   < > 6.7* 7.1*  --    < >  --    MAGNESIUM mg/dL 1.9  --   --  1.6*  --   --  1.7*  --  1.7*  --  2.0 2.2  --    < >  --    PHOSPHORUS mg/dL 1.9*  --   --  2.4*  --   --  2.7  --  3.9  --   --  4.9*  --    < >  --    GLUCOSE, ISTAT mg/dl  --   --   --   --   --   --   --   --   --   --   --   --   --   --  152*   NITRITE UA   --   --   --   --   --   --   --   --   --   --   --   --  Negative  --   --    LEUKOCYTES UA   --   --   --   --   --   --   --   --   --   --   --   --  Negative  --   --    BLOOD UA   --   --   --   --   --   --   --   --   --   --   --   --  Negative  --   --     < > = values in this interval not displayed.         Portions of the record may have been created with voice recognition software. Occasional wrong word or \"sound a like\" substitutions may have occurred due to the inherent limitations of voice recognition software. Read the chart " carefully and recognize, using context, where substitutions have occurred.If you have any questions, please contact the dictating provider.

## 2024-06-23 NOTE — PLAN OF CARE
Problem: PAIN - ADULT  Goal: Verbalizes/displays adequate comfort level or baseline comfort level  Description: Interventions:  - Encourage patient to monitor pain and request assistance  - Assess pain using appropriate pain scale  - Administer analgesics based on type and severity of pain and evaluate response  - Implement non-pharmacological measures as appropriate and evaluate response  - Consider cultural and social influences on pain and pain management  - Notify physician/advanced practitioner if interventions unsuccessful or patient reports new pain  Outcome: Progressing     Problem: INFECTION - ADULT  Goal: Absence or prevention of progression during hospitalization  Description: INTERVENTIONS:  - Assess and monitor for signs and symptoms of infection  - Monitor lab/diagnostic results  - Monitor all insertion sites, i.e. indwelling lines, tubes, and drains  - Monitor endotracheal if appropriate and nasal secretions for changes in amount and color  - Preston appropriate cooling/warming therapies per order  - Administer medications as ordered  - Instruct and encourage patient and family to use good hand hygiene technique  - Identify and instruct in appropriate isolation precautions for identified infection/condition  Outcome: Progressing

## 2024-06-23 NOTE — PROGRESS NOTES
[facility]  Progress Note: Critical Care  Name: Luis Forrester 53 y.o. male I MRN: [id number]  Unit/Bed#: [facility] 204-01 I Date of Admission: 6/15/2024   Date of Service: 6/23/2024 I Hospital Day: 8    Assessment & Plan   Neuro:   Diagnosis: SAH, SDH, C3-4, C5-6 severe canal stenosis with mild cord compression, encephalopathy  Plan: Status post C2-T1 decompression fusion with neurosurgery  Continue scheduled neurochecks  MAP pushes completed  Keppra course completed   Maintain seizure precautions  Neurosurgery signed off  Diagnosis: Posttraumatic pain  Plan: Scheduled Tylenol, gabapentin, Robaxin  APS following  Maintain delirium precautions  As needed oxycodone, Dilaudid     CV:   Diagnosis: Syncope, sick sinus syndrome  Plan: Continue midodrine  Status post pacemaker 6/21     Pulm:  No active issues     GI:   No active issues     :   Diagnosis: Hyponatremia  Plan: Status post DDAVP  q 6-hour BMP  Strict intake and output  Nephrology consulted, appreciate recommendations  d5W @ 150 for fast correction      F/E/N:   D5W 150  Replete electrolytes as needed  Cardiovascular diet     Heme/Onc:   Diagnosis: DVT prophylaxis  Plan: Lovenox, SCDs     Endo:   Diagnosis: Hypothyroidism  Plan: Continue Synthroid     ID:   Diagnosis: Surgical prophylaxis  Plan: Continue Keflex     MSK/Skin:   Diagnosis: Ambulatory dysfunction  Plan: PT/OT    Disposition: Stepdown Level 1    ICU Core Measures     A: Assess, Prevent, and Manage Pain Has pain been assessed? Yes  Need for changes to pain regimen? No   B: Both SAT/SAT  N/A   C: Choice of Sedation RASS Goal: 0 Alert and Calm  Need for changes to sedation or analgesia regimen? No   D: Delirium CAM-ICU: Negative   E: Early Mobility  Plan for early mobility? Yes   F: Family Engagement Plan for family engagement today? Yes       Antibiotic Review: Patient on appropriate coverage based on culture data.     Review of Invasive Devices:      Central  access plan: Patient has multiple central venous catheters.       Prophylaxis:  VTE VTE covered by:  enoxaparin, Subcutaneous, 30 mg at 06/22/24 2100       Stress Ulcer  not ordered        Significant 24hr Events     24hr events: hypernatremia overnight requiring increase in D5W      Subjective          Objective                            Vitals I/O      Most Recent Min/Max in 24hrs   Temp 99 °F (37.2 °C) Temp  Min: 97.9 °F (36.6 °C)  Max: 99 °F (37.2 °C)   Pulse 68 Pulse  Min: 66  Max: 84   Resp 16 Resp  Min: 12  Max: 28   /65 BP  Min: 84/47  Max: 125/65   O2 Sat 97 % SpO2  Min: 96 %  Max: 99 %      Intake/Output Summary (Last 24 hours) at 6/23/2024 0518  Last data filed at 6/22/2024 1600  Gross per 24 hour   Intake 1205 ml   Output 2462 ml   Net -1257 ml       Diet Cardiovascular; Cardiac    Invasive Monitoring           Physical Exam   Physical Exam  Vitals and nursing note reviewed.   Eyes:      Extraocular Movements: Extraocular movements intact.      Pupils: Pupils are equal, round, and reactive to light.   Skin:     General: Skin is warm.      Capillary Refill: Capillary refill takes less than 2 seconds.   HENT:      Mouth/Throat:      Mouth: Mucous membranes are moist.   Cardiovascular:      Rate and Rhythm: Normal rate.      Pulses: Normal pulses.   Abdominal: General: There is distension.     Palpations: Abdomen is soft.      Tenderness: There is no abdominal tenderness.   Constitutional:       Appearance: He is well-developed.   Pulmonary:      Breath sounds: Normal breath sounds.   Neurological:      Mental Status: He is alert and oriented to person, place and time.      Comments: Hand  b/l 4/5  Bicep b/l 5/5  BLE 5/5              Diagnostic Studies      EKG:   Imaging:  I have personally reviewed pertinent reports.       Medications:  Scheduled PRN   acetaminophen, 975 mg, Q8H ASHLYN  calcitriol, 0.25 mcg, Daily  cephalexin, 500 mg, Q6H ASHLYN  Cholecalciferol, 1,000 Units, Daily  enoxaparin, 30  mg, Q12H ASHLYN  gabapentin, 100 mg, TID  levothyroxine, 125 mcg, Early Morning  methocarbamol, 500 mg, Q6H ASHLYN  midodrine, 10 mg, Q8H  polyethylene glycol, 17 g, Daily  potassium-sodium phosphateS, 2 tablet, 4x Daily (with meals and at bedtime)  senna-docusate sodium, 2 tablet, BID      albuterol, 2 puff, Q4H PRN  HYDROmorphone, 0.2 mg, Q2H PRN  naloxone, 0.04 mg, Q1MIN PRN  ondansetron, 4 mg, Q4H PRN  oxyCODONE, 2.5 mg, Q4H PRN   Or  oxyCODONE, 5 mg, Q4H PRN       Continuous    dextrose, 150 mL/hr, Last Rate: 150 mL/hr (06/23/24 0036)         Labs:    CBC    Recent Labs     06/21/24  0543 06/22/24  0539   WBC 9.15 8.66   HGB 10.7* 10.4*   HCT 32.2* 30.7*    164     BMP    Recent Labs     06/22/24  1505 06/22/24  2104   SODIUM 136 136   K 3.5 3.2*   CL 99 100   CO2 21 23   AGAP 16* 13   BUN 10 10   CREATININE 0.51* 0.52*   CALCIUM 7.1* 7.1*       Coags    No recent results     Additional Electrolytes  Recent Labs     06/21/24  0543 06/22/24  0539   MG 1.7* 1.6*   PHOS 2.7 2.4*   CAIONIZED 0.88* 0.90*          Blood Gas    No recent results  No recent results LFTs  No recent results    Infectious  No recent results  Glucose  Recent Labs     06/21/24  2020 06/22/24  0539 06/22/24  1505 06/22/24  2104   GLUC 81 71 77 88               ÓSCAR Guzman

## 2024-06-23 NOTE — PROGRESS NOTES
Progress Note - Trauma ICU Transfer   and Tertiary Survery   Luis Forrester 53 y.o. male [id number]   Unit/Bed#: [facility] 204-01 Encounter: [id number]     Assessment & Plan   Summary of Diagnosed Injuries:   Acute:  Syncope  SSS s/p PPM (06/21)  TBI  Multicompartmental ICH  Central Cord Syndrome s/p C2 - T1 decompression and fusion (06/16)  Intermittent Polyuria, Resolved  Hyponatremia  Chronic:  DISH  Posterior osteophyte disc complexes C3-C4 and C5-C6   Patent Foramen Ovale  Hypothyroidism      PLAN:  Neuro/Psych: continue neuro checks. Surgical wound care per Nsx. Keppra seizure px. Multimodal Analgesia prn. Delirium Precautions.  CV: Midodrine to 10mg tid. Cardiac Monitor. MAP goal > 85 x 5d.  Pulm: Pulse Oximetry. Incentive Spirometry. O2 Sat goal > 92%  GI: Bowel Regimen prn.  : Appreciate Nephrology recs. Overcorrection of the serum sodium yesterday. Keep on D5W at 150 cc an hour with a goal sodium of 130-1 33 by this evening. Continue to monitor UO. Monitor I&O.  FEN: Correct electrolytes prn.  Endo: Continue home levothyroxine. BG goal 140-180.  Heme/Onc: Lovenox DVT px. SCD's.  ID: Keflex for surgical wound prophylaxis per Nsx.  MSK/Skin: PM&R, PT/OT when appropriate. Frequent Turning/Repositioning. Pressure injury Prevention.      VTE Prophylaxis:Enoxaparin (Lovenox)     Disposition: [facility]    Code status:  Level 1 - Full Code    Consultants: IP CONSULT TO CASE MANAGEMENT  IP CONSULT TO NEUROSURGERY  IP CONSULT TO ACUTE PAIN SERVICE  IP CONSULT TO ELECTROPHYSIOLOGY  IP CONSULT TO NEPHROLOGY  IP CONSULT TO PHYSICAL MEDICINE REHAB     Subjective   Mechanism of Injury: Fall     HPI/Last 24 hour events:  Hypernatremia overnight requiring increase in D5W     Reason for ICU admission: Upgraded to ICU for closer monitoring and maintenance of MAP >85.     Summary of ICU clinical course: The day following admission to the ICU patient went for C2-T1 decompression fusion with neurosurgery. Due to Hx of multiple  falls syncope work up initiated. Patient became bradycardic and  Electrophysiology was consulted yielding sick sinus and tachybradycardia syndrome. Pacemaker placed 6/21. Course complicated by hyponatremia and Nephrology consulted for aid in management. DDAVP followed by 1.8% sodium chloride, led to overcorrection of sodium.  D5W started physical sodium 130-133. Repeat BMP demonstrated sodium 136.  Patient was then stable for downgrade to [facility] 6/23.    Recent or scheduled procedures: 6/16 C2-T1 decompression fusion with neurosurgery     Outstanding/pending diagnostics: BMP        Objective   Vitals:   Temp:  [97.9 °F (36.6 °C)-99 °F (37.2 °C)] 97.9 °F (36.6 °C)  HR:  [68-82] 74  Resp:  [15-22] 16  BP: ()/(61-66) 103/65    I/O         06/21 0701  06/22 0700 06/22 0701  06/23 0700 06/23 0701  06/24 0700    P.O. 1324 75 120    I.V. (mL/kg) 2405.8 (30.5) 780 (9.9)     IV Piggyback 0 100     Total Intake(mL/kg) 3729.8 (47.3) 955 (12.1) 120 (1.5)    Urine (mL/kg/hr) 3440 (1.8) 2450 (1.3)     Emesis/NG output 0      Stool 0 0     Blood 15      Total Output 3455 2450     Net +274.8 -1495 +120           Unmeasured Urine Occurrence 0 x 2 x 1 x    Unmeasured Stool Occurrence 1 x 1 x     Unmeasured Emesis Occurrence 0 x               Physical Exam:   Vitals and nursing note reviewed.   Eyes:      Extraocular Movements: Extraocular movements intact.      Pupils: Pupils are equal, round, and reactive to light.   Skin:     General: Skin is warm.      Capillary Refill: Capillary refill takes less than 2 seconds.   HENT:      Mouth/Throat:      Mouth: Mucous membranes are moist.   Cardiovascular:      Rate and Rhythm: Normal rate.      Pulses: Normal pulses.   Abdominal: General: There is distension.     Palpations: Abdomen is soft.      Tenderness: There is no abdominal tenderness.   Constitutional:       Appearance: He is well-developed.   Pulmonary:      Breath sounds: Normal breath sounds.   Neurological:      Mental  Status: He is alert and oriented to person, place and time.      Comments: Hand  b/l 4/5  Bicep b/l 5/5  BLE 5/5       Invasive Devices       Central Venous Catheter Line  Duration             CVC Central Lines 06/17/24 Triple 16cm Right Internal jugular 5 days              Peripheral Intravenous Line  Duration             Peripheral IV 06/21/24 Left;Proximal;Ventral (anterior) Forearm 2 days              Drain  Duration             External Urinary Catheter <1 day                   Rationale for remaining devices: IV access       Lab Results: Results: I have personally reviewed all pertinent laboratory/tests results, BMP/CMP:   Lab Results   Component Value Date    SODIUM 135 06/23/2024    K 3.1 (L) 06/23/2024    CL 99 06/23/2024    CO2 26 06/23/2024    BUN 7 06/23/2024    CREATININE 0.50 (L) 06/23/2024    CALCIUM 7.0 (L) 06/23/2024    EGFR 121 06/23/2024   , and CBC:   Lab Results   Component Value Date    WBC 8.08 06/23/2024    HGB 11.0 (L) 06/23/2024    HCT 31.9 (L) 06/23/2024    MCV 94 06/23/2024     06/23/2024    RBC 3.40 (L) 06/23/2024    MCH 32.4 06/23/2024    MCHC 34.5 06/23/2024    RDW 13.4 06/23/2024    MPV 10.2 06/23/2024       Imaging Results: I have personally reviewed pertinent reports.    Chest Xray(s): N/A   FAST exam(s): N/A   CT Scan(s): N/A   Additional Xray(s): N/A     Other Studies: N/A    Code Status: Level 1 - Full Code       Patient seen and evaluated by Critical Care today and deemed to be appropriate for transfer to Med Surg. Spoke to Maria Teresa Griffith from Trauma service regarding transfer. Critical Care can be contacted via [employer] with any questions or concerns.

## 2024-06-24 LAB
ANION GAP SERPL CALCULATED.3IONS-SCNC: 10 MMOL/L (ref 4–13)
ANION GAP SERPL CALCULATED.3IONS-SCNC: 7 MMOL/L (ref 4–13)
ANION GAP SERPL CALCULATED.3IONS-SCNC: 8 MMOL/L (ref 4–13)
ANION GAP SERPL CALCULATED.3IONS-SCNC: 9 MMOL/L (ref 4–13)
BUN SERPL-MCNC: 2 MG/DL (ref 5–25)
BUN SERPL-MCNC: 2 MG/DL (ref 5–25)
BUN SERPL-MCNC: 3 MG/DL (ref 5–25)
BUN SERPL-MCNC: 3 MG/DL (ref 5–25)
CALCIUM SERPL-MCNC: 6.6 MG/DL (ref 8.4–10.2)
CALCIUM SERPL-MCNC: 6.8 MG/DL (ref 8.4–10.2)
CALCIUM SERPL-MCNC: 6.9 MG/DL (ref 8.4–10.2)
CALCIUM SERPL-MCNC: 7.3 MG/DL (ref 8.4–10.2)
CHLORIDE SERPL-SCNC: 101 MMOL/L (ref 96–108)
CHLORIDE SERPL-SCNC: 101 MMOL/L (ref 96–108)
CHLORIDE SERPL-SCNC: 98 MMOL/L (ref 96–108)
CHLORIDE SERPL-SCNC: 98 MMOL/L (ref 96–108)
CO2 SERPL-SCNC: 26 MMOL/L (ref 21–32)
CO2 SERPL-SCNC: 27 MMOL/L (ref 21–32)
CO2 SERPL-SCNC: 28 MMOL/L (ref 21–32)
CO2 SERPL-SCNC: 29 MMOL/L (ref 21–32)
CREAT SERPL-MCNC: 0.49 MG/DL (ref 0.6–1.3)
CREAT SERPL-MCNC: 0.52 MG/DL (ref 0.6–1.3)
CREAT SERPL-MCNC: 0.58 MG/DL (ref 0.6–1.3)
CREAT SERPL-MCNC: 0.61 MG/DL (ref 0.6–1.3)
GFR SERPL CREATININE-BSD FRML MDRD: 112 ML/MIN/1.73SQ M
GFR SERPL CREATININE-BSD FRML MDRD: 114 ML/MIN/1.73SQ M
GFR SERPL CREATININE-BSD FRML MDRD: 120 ML/MIN/1.73SQ M
GFR SERPL CREATININE-BSD FRML MDRD: 123 ML/MIN/1.73SQ M
GLUCOSE SERPL-MCNC: 113 MG/DL (ref 65–140)
GLUCOSE SERPL-MCNC: 115 MG/DL (ref 65–140)
GLUCOSE SERPL-MCNC: 118 MG/DL (ref 65–140)
GLUCOSE SERPL-MCNC: 93 MG/DL (ref 65–140)
GLUCOSE SERPL-MCNC: 97 MG/DL (ref 65–140)
POTASSIUM SERPL-SCNC: 3.1 MMOL/L (ref 3.5–5.3)
POTASSIUM SERPL-SCNC: 3.2 MMOL/L (ref 3.5–5.3)
POTASSIUM SERPL-SCNC: 3.3 MMOL/L (ref 3.5–5.3)
POTASSIUM SERPL-SCNC: 3.3 MMOL/L (ref 3.5–5.3)
SODIUM SERPL-SCNC: 134 MMOL/L (ref 135–147)
SODIUM SERPL-SCNC: 135 MMOL/L (ref 135–147)
SODIUM SERPL-SCNC: 136 MMOL/L (ref 135–147)
SODIUM SERPL-SCNC: 137 MMOL/L (ref 135–147)

## 2024-06-24 PROCEDURE — 99232 SBSQ HOSP IP/OBS MODERATE 35: CPT | Performed by: SURGERY

## 2024-06-24 PROCEDURE — 80048 BASIC METABOLIC PNL TOTAL CA: CPT

## 2024-06-24 PROCEDURE — 97535 SELF CARE MNGMENT TRAINING: CPT

## 2024-06-24 PROCEDURE — 80048 BASIC METABOLIC PNL TOTAL CA: CPT | Performed by: INTERNAL MEDICINE

## 2024-06-24 PROCEDURE — 97530 THERAPEUTIC ACTIVITIES: CPT

## 2024-06-24 PROCEDURE — 92526 ORAL FUNCTION THERAPY: CPT

## 2024-06-24 PROCEDURE — NC001 PR NO CHARGE: Performed by: INTERNAL MEDICINE

## 2024-06-24 PROCEDURE — 99233 SBSQ HOSP IP/OBS HIGH 50: CPT | Performed by: INTERNAL MEDICINE

## 2024-06-24 PROCEDURE — 82948 REAGENT STRIP/BLOOD GLUCOSE: CPT

## 2024-06-24 PROCEDURE — 97112 NEUROMUSCULAR REEDUCATION: CPT

## 2024-06-24 RX ORDER — CALCIUM GLUCONATE 20 MG/ML
2 INJECTION, SOLUTION INTRAVENOUS ONCE
Status: COMPLETED | OUTPATIENT
Start: 2024-06-24 | End: 2024-06-24

## 2024-06-24 RX ORDER — DEXTROSE MONOHYDRATE 50 MG/ML
75 INJECTION, SOLUTION INTRAVENOUS CONTINUOUS
Status: DISCONTINUED | OUTPATIENT
Start: 2024-06-24 | End: 2024-06-24

## 2024-06-24 RX ORDER — POTASSIUM CHLORIDE 14.9 MG/ML
20 INJECTION INTRAVENOUS
Status: DISCONTINUED | OUTPATIENT
Start: 2024-06-24 | End: 2024-06-24

## 2024-06-24 RX ORDER — CEPHALEXIN 500 MG/1
500 CAPSULE ORAL EVERY 6 HOURS SCHEDULED
Status: DISCONTINUED | OUTPATIENT
Start: 2024-06-24 | End: 2024-06-30

## 2024-06-24 RX ORDER — POTASSIUM CHLORIDE 20 MEQ/1
40 TABLET, EXTENDED RELEASE ORAL ONCE
Status: COMPLETED | OUTPATIENT
Start: 2024-06-24 | End: 2024-06-24

## 2024-06-24 RX ORDER — DESMOPRESSIN ACETATE 4 UG/ML
0.5 INJECTION, SOLUTION INTRAVENOUS; SUBCUTANEOUS ONCE
Status: COMPLETED | OUTPATIENT
Start: 2024-06-24 | End: 2024-06-24

## 2024-06-24 RX ADMIN — ACETAMINOPHEN 975 MG: 325 TABLET, FILM COATED ORAL at 01:00

## 2024-06-24 RX ADMIN — SENNOSIDES AND DOCUSATE SODIUM 2 TABLET: 50; 8.6 TABLET ORAL at 17:16

## 2024-06-24 RX ADMIN — MIDODRINE HYDROCHLORIDE 10 MG: 5 TABLET ORAL at 03:45

## 2024-06-24 RX ADMIN — MIDODRINE HYDROCHLORIDE 10 MG: 5 TABLET ORAL at 19:42

## 2024-06-24 RX ADMIN — CEPHALEXIN 500 MG: 250 FOR SUSPENSION ORAL at 06:12

## 2024-06-24 RX ADMIN — POTASSIUM CHLORIDE 40 MEQ: 1500 TABLET, EXTENDED RELEASE ORAL at 18:06

## 2024-06-24 RX ADMIN — DEXTROSE 75 ML/HR: 5 SOLUTION INTRAVENOUS at 06:00

## 2024-06-24 RX ADMIN — SENNOSIDES AND DOCUSATE SODIUM 2 TABLET: 50; 8.6 TABLET ORAL at 10:52

## 2024-06-24 RX ADMIN — GABAPENTIN 100 MG: 100 CAPSULE ORAL at 22:00

## 2024-06-24 RX ADMIN — ACETAMINOPHEN 975 MG: 325 TABLET, FILM COATED ORAL at 13:11

## 2024-06-24 RX ADMIN — GABAPENTIN 100 MG: 100 CAPSULE ORAL at 17:16

## 2024-06-24 RX ADMIN — Medication 1000 UNITS: at 10:53

## 2024-06-24 RX ADMIN — METHOCARBAMOL 500 MG: 500 TABLET ORAL at 01:00

## 2024-06-24 RX ADMIN — METHOCARBAMOL 500 MG: 500 TABLET ORAL at 17:16

## 2024-06-24 RX ADMIN — MIDODRINE HYDROCHLORIDE 10 MG: 5 TABLET ORAL at 10:53

## 2024-06-24 RX ADMIN — ACETAMINOPHEN 975 MG: 325 TABLET, FILM COATED ORAL at 06:06

## 2024-06-24 RX ADMIN — CEPHALEXIN 500 MG: 500 CAPSULE ORAL at 23:00

## 2024-06-24 RX ADMIN — CEPHALEXIN 500 MG: 500 CAPSULE ORAL at 13:11

## 2024-06-24 RX ADMIN — CEPHALEXIN 500 MG: 500 CAPSULE ORAL at 17:16

## 2024-06-24 RX ADMIN — METHOCARBAMOL 500 MG: 500 TABLET ORAL at 12:53

## 2024-06-24 RX ADMIN — CALCITRIOL CAPSULES 0.25 MCG 0.25 MCG: 0.25 CAPSULE ORAL at 10:53

## 2024-06-24 RX ADMIN — LEVOTHYROXINE SODIUM 125 MCG: 125 TABLET ORAL at 06:06

## 2024-06-24 RX ADMIN — POLYETHYLENE GLYCOL 3350 17 G: 17 POWDER, FOR SOLUTION ORAL at 10:55

## 2024-06-24 RX ADMIN — CEPHALEXIN 500 MG: 250 FOR SUSPENSION ORAL at 01:40

## 2024-06-24 RX ADMIN — ACETAMINOPHEN 975 MG: 325 TABLET, FILM COATED ORAL at 22:00

## 2024-06-24 RX ADMIN — ENOXAPARIN SODIUM 30 MG: 30 INJECTION SUBCUTANEOUS at 22:00

## 2024-06-24 RX ADMIN — METHOCARBAMOL 500 MG: 500 TABLET ORAL at 06:06

## 2024-06-24 RX ADMIN — ENOXAPARIN SODIUM 30 MG: 30 INJECTION SUBCUTANEOUS at 10:52

## 2024-06-24 RX ADMIN — POTASSIUM CHLORIDE 20 MEQ: 14.9 INJECTION, SOLUTION INTRAVENOUS at 12:52

## 2024-06-24 RX ADMIN — CALCIUM GLUCONATE 2 G: 20 INJECTION, SOLUTION INTRAVENOUS at 13:59

## 2024-06-24 RX ADMIN — DESMOPRESSIN ACETATE 0.52 MCG: 4 SOLUTION INTRAVENOUS at 05:52

## 2024-06-24 RX ADMIN — DEXTROSE 150 ML/HR: 5 SOLUTION INTRAVENOUS at 01:55

## 2024-06-24 RX ADMIN — METHOCARBAMOL 500 MG: 500 TABLET ORAL at 23:00

## 2024-06-24 RX ADMIN — GABAPENTIN 100 MG: 100 CAPSULE ORAL at 10:52

## 2024-06-24 NOTE — [facility] ADMISSION
[facility]  met with patient and his S.O. at bedside. Reviewed [facility] program, acute rehab criteria and approval process, insurance authorization process, as well as [facility] locations and preferences. Patient's preferred [facility] location is [facility] and second choice is  [facility].  [facility] Rehab folder left with patient and all questions answered. Patient was made aware that [facility]C Reviewer will keep their  updated regarding referral status.

## 2024-06-24 NOTE — PROGRESS NOTES
addendum:     sodium continues to rise despite on 150 ml of d5w 3.8 liters of urine output and given continued rise in sodium with no improvement one hypotonic fluid would recommend:     ddavp 0.5 mcg  decrease d5w 75 ml/hour   repeat bmp in 2 hours   goal sodium in low 130s over next 24 hours     discussed with trauma teams

## 2024-06-24 NOTE — WOUND OSTOMY CARE
"Consult Note - Wound   Luis Forrester 53 y.o. male MRN: [id number]  Unit/Bed#: [facility] 605-01 Encounter: [id number]        History and Present Illness:  Patient is a 52 yo male that was admitted to [facility]  for treatment of TBI. Patient has a PMH of arthritis, hypoparathyroidism. Patient is alert and oriented times three and agreeable to assessment. Patient is assist of one for turning and repositioning, assist for care, independent with meals. Patient is continent of bowel and bladder. On assessment, patient is in bed with pillows for support and offloading, daughter at bedside for assessment.    Wound Care was consulted for sacrum.     Assessment Findings:   B/L heels are dry intact and graeme with no skin loss or wounds present. Recommend preventative Hydraguard Cream and proper offloading/ repositioning.      B/L sacro-buttocks is dry, intact, pink in color and blanches. No skin loss or wounds present. Recommend preventative hydragaurd to area.     Right perineum - small oval shaped area of erythema and induration with pinpoint opening. Small to moderate amount of mixed tan and bloody drainage present. Francisca wound is indurated, warm, hyperpigmented. Patient notes abscess has been there \"awhile.\"     No fluctuance, odor, warmth/temperature differences, redness, or purulence noted to the above noted wounds and skin areas assessed. New dressings applied per orders listed below. Patient tolerated well- no s/s of non-verbal pain or discomfort observed during the encounter. Bedside nurse aware of plan of care. See flow sheets for more detailed assessment findings.      Orders listed below and wound care will continue to follow, call or Secure Chat with questions.     Skin care plans:  1-Hydraguard to bilateral sacrum, buttock and heels BID and PRN  2-Elevate heels to offload pressure.  3-Ehob cushion in chair when out of bed.  4-Moisturize skin daily with skin nourishing cream.  5-Turn/reposition q2h for pressure " re-distribution on skin.  6- Apply small square silicone foam border dressing to abscess to manage drainage. Change every other day or as needed for soilage / dislodgement.     Wounds:    Wound Perineum (Active)   Wound Image   06/24/24 1442   Wound Description Drainage;Pink;Swelling 06/24/24 1442   Francisca-wound Assessment Swelling 06/24/24 1442   Wound Length (cm) 1 cm 06/24/24 1442   Wound Width (cm) 0.4 cm 06/24/24 1442   Wound Depth (cm) 0.1 cm 06/24/24 1442   Wound Surface Area (cm^2) 0.4 cm^2 06/24/24 1442   Wound Volume (cm^3) 0.04 cm^3 06/24/24 1442   Calculated Wound Volume (cm^3) 0.04 cm^3 06/24/24 1442   Drainage Amount Small 06/24/24 1442   Drainage Description Tan;Bloody 06/24/24 1442   Dressing Foam, Silicon (eg. Allevyn, etc) 06/24/24 1442   Wound packed? No 06/24/24 1442   Dressing Changed New 06/23/24 2200   Dressing Status Clean;Dry;Intact 06/23/24 2200               Jenny Vega RN, BSN, CCRN

## 2024-06-24 NOTE — PLAN OF CARE
Problem: Prexisting or High Potential for Compromised Skin Integrity  Goal: Skin integrity is maintained or improved  Description: INTERVENTIONS:  - Identify patients at risk for skin breakdown  - Assess and monitor skin integrity  - Assess and monitor nutrition and hydration status  - Monitor labs   - Assess for incontinence   - Turn and reposition patient  - Assist with mobility/ambulation  - Relieve pressure over bony prominences  - Avoid friction and shearing  - Provide appropriate hygiene as needed including keeping skin clean and dry  - Evaluate need for skin moisturizer/barrier cream  - Collaborate with interdisciplinary team   - Patient/family teaching  - Consider wound care consult   Outcome: Progressing     Problem: PAIN - ADULT  Goal: Verbalizes/displays adequate comfort level or baseline comfort level  Description: Interventions:  - Encourage patient to monitor pain and request assistance  - Assess pain using appropriate pain scale  - Administer analgesics based on type and severity of pain and evaluate response  - Implement non-pharmacological measures as appropriate and evaluate response  - Consider cultural and social influences on pain and pain management  - Notify physician/advanced practitioner if interventions unsuccessful or patient reports new pain  Outcome: Progressing     Problem: INFECTION - ADULT  Goal: Absence or prevention of progression during hospitalization  Description: INTERVENTIONS:  - Assess and monitor for signs and symptoms of infection  - Monitor lab/diagnostic results  - Monitor all insertion sites, i.e. indwelling lines, tubes, and drains  - Monitor endotracheal if appropriate and nasal secretions for changes in amount and color  - [place] appropriate cooling/warming therapies per order  - Administer medications as ordered  - Instruct and encourage patient and family to use good hand hygiene technique  - Identify and instruct in appropriate isolation precautions for  identified infection/condition  Outcome: Progressing     Problem: SAFETY ADULT  Goal: Patient will remain free of falls  Description: INTERVENTIONS:  - Educate patient/family on patient safety including physical limitations  - Instruct patient to call for assistance with activity   - Consult OT/PT to assist with strengthening/mobility   - Keep Call bell within reach  - Keep bed low and locked with side rails adjusted as appropriate  - Keep care items and personal belongings within reach  - Initiate and maintain comfort rounds  - Make Fall Risk Sign visible to staff  - Apply yellow socks and bracelet for high fall risk patients  - Consider moving patient to room near nurses station  Outcome: Progressing  Goal: Maintain or return to baseline ADL function  Description: INTERVENTIONS:  -  Assess patient's ability to carry out ADLs; assess patient's baseline for ADL function and identify physical deficits which impact ability to perform ADLs (bathing, care of mouth/teeth, toileting, grooming, dressing, etc.)  - Assess/evaluate cause of self-care deficits   - Assess range of motion  - Assess patient's mobility; develop plan if impaired  - Assess patient's need for assistive devices and provide as appropriate  - Encourage maximum independence but intervene and supervise when necessary  - Involve family in performance of ADLs  - Assess for home care needs following discharge   - Consider OT consult to assist with ADL evaluation and planning for discharge  - Provide patient education as appropriate  Outcome: Progressing  Goal: Maintains/Returns to pre admission functional level  Description: INTERVENTIONS:  - Perform AM-PAC 6 Click Basic Mobility/ Daily Activity assessment daily.  - Set and communicate daily mobility goal to care team and patient/family/caregiver.   - Collaborate with rehabilitation services on mobility goals if consulted  - Out of bed for toileting  - Record patient progress and toleration of activity level    Outcome: Progressing     Problem: DISCHARGE PLANNING  Goal: Discharge to home or other facility with appropriate resources  Description: INTERVENTIONS:  - Identify barriers to discharge w/patient and caregiver  - Arrange for needed discharge resources and transportation as appropriate  - Identify discharge learning needs (meds, wound care, etc.)  - Arrange for interpretive services to assist at discharge as needed  - Refer to Case Management Department for coordinating discharge planning if the patient needs post-hospital services based on physician/advanced practitioner order or complex needs related to functional status, cognitive ability, or social support system  Outcome: Progressing     Problem: Knowledge Deficit  Goal: Patient/family/caregiver demonstrates understanding of disease process, treatment plan, medications, and discharge instructions  Description: Complete learning assessment and assess knowledge base.  Interventions:  - Provide teaching at level of understanding  - Provide teaching via preferred learning methods  Outcome: Progressing     Problem: NEUROSENSORY - ADULT  Goal: Achieves stable or improved neurological status  Description: INTERVENTIONS  - Monitor and report changes in neurological status  - Monitor vital signs such as temperature, blood pressure, glucose, and any other labs ordered   - Initiate measures to prevent increased intracranial pressure  - Monitor for seizure activity and implement precautions if appropriate      Outcome: Progressing  Goal: Remains free of injury related to seizures activity  Description: INTERVENTIONS  - Maintain airway, patient safety  and administer oxygen as ordered  - Monitor patient for seizure activity, document and report duration and description of seizure to physician/advanced practitioner  - If seizure occurs,  ensure patient safety during seizure  - Reorient patient post seizure  - Seizure pads on all 4 side rails  - Instruct patient/family to  notify RN of any seizure activity including if an aura is experienced  - Instruct patient/family to call for assistance with activity based on nursing assessment  - Administer anti-seizure medications if ordered    Outcome: Progressing  Goal: Achieves maximal functionality and self care  Description: INTERVENTIONS  - Monitor swallowing and airway patency with patient fatigue and changes in neurological status  - Encourage and assist patient to increase activity and self care.   - Encourage visually impaired, hearing impaired and aphasic patients to use assistive/communication devices  Outcome: Progressing     Problem: CARDIOVASCULAR - ADULT  Goal: Maintains optimal cardiac output and hemodynamic stability  Description: INTERVENTIONS:  - Monitor I/O, vital signs and rhythm  - Monitor for S/S and trends of decreased cardiac output  - Administer and titrate ordered vasoactive medications to optimize hemodynamic stability  - Assess quality of pulses, skin color and temperature  - Assess for signs of decreased coronary artery perfusion  - Instruct patient to report change in severity of symptoms  Outcome: Progressing  Goal: Absence of cardiac dysrhythmias or at baseline rhythm  Description: INTERVENTIONS:  - Continuous cardiac monitoring, vital signs, obtain 12 lead EKG if ordered  - Administer antiarrhythmic and heart rate control medications as ordered  - Monitor electrolytes and administer replacement therapy as ordered  Outcome: Progressing     Problem: RESPIRATORY - ADULT  Goal: Achieves optimal ventilation and oxygenation  Description: INTERVENTIONS:  - Assess for changes in respiratory status  - Assess for changes in mentation and behavior  - Position to facilitate oxygenation and minimize respiratory effort  - Oxygen administered by appropriate delivery if ordered  - Initiate smoking cessation education as indicated  - Encourage broncho-pulmonary hygiene including cough, deep breathe, Incentive Spirometry  -  Assess the need for suctioning and aspirate as needed  - Assess and instruct to report SOB or any respiratory difficulty  - Respiratory Therapy support as indicated  Outcome: Progressing     Problem: GASTROINTESTINAL - ADULT  Goal: Minimal or absence of nausea and/or vomiting  Description: INTERVENTIONS:  - Administer IV fluids if ordered to ensure adequate hydration  - Maintain NPO status until nausea and vomiting are resolved  - Nasogastric tube if ordered  - Administer ordered antiemetic medications as needed  - Provide nonpharmacologic comfort measures as appropriate  - Advance diet as tolerated, if ordered  - Consider nutrition services referral to assist patient with adequate nutrition and appropriate food choices  Outcome: Progressing  Goal: Maintains or returns to baseline bowel function  Description: INTERVENTIONS:  - Assess bowel function  - Encourage oral fluids to ensure adequate hydration  - Administer IV fluids if ordered to ensure adequate hydration  - Administer ordered medications as needed  - Encourage mobilization and activity  - Consider nutritional services referral to assist patient with adequate nutrition and appropriate food choices  Outcome: Progressing  Goal: Maintains adequate nutritional intake  Description: INTERVENTIONS:  - Monitor percentage of each meal consumed  - Identify factors contributing to decreased intake, treat as appropriate  - Assist with meals as needed  - Monitor I&O, weight, and lab values if indicated  - Obtain nutrition services referral as needed  Outcome: Progressing  Goal: Oral mucous membranes remain intact  Description: INTERVENTIONS  - Assess oral mucosa and hygiene practices  - Implement preventative oral hygiene regimen  - Implement oral medicated treatments as ordered  - Initiate Nutrition services referral as needed  Outcome: Progressing     Problem: GENITOURINARY - ADULT  Goal: Maintains or returns to baseline urinary function  Description:  INTERVENTIONS:  - Assess urinary function  - Encourage oral fluids to ensure adequate hydration if ordered  - Administer IV fluids as ordered to ensure adequate hydration  - Administer ordered medications as needed  - Offer frequent toileting  - Follow urinary retention protocol if ordered  Outcome: Progressing  Goal: Absence of urinary retention  Description: INTERVENTIONS:  - Assess patient’s ability to void and empty bladder  - Monitor I/O  - Bladder scan as needed  - Discuss with physician/AP medications to alleviate retention as needed  - Discuss catheterization for long term situations as appropriate  Outcome: Progressing

## 2024-06-24 NOTE — PLAN OF CARE
Problem: Prexisting or High Potential for Compromised Skin Integrity  Goal: Skin integrity is maintained or improved  Description: INTERVENTIONS:  - Identify patients at risk for skin breakdown  - Assess and monitor skin integrity  - Assess and monitor nutrition and hydration status  - Monitor labs   - Assess for incontinence   - Turn and reposition patient  - Assist with mobility/ambulation  - Relieve pressure over bony prominences  - Avoid friction and shearing  - Provide appropriate hygiene as needed including keeping skin clean and dry  - Evaluate need for skin moisturizer/barrier cream  - Collaborate with interdisciplinary team   - Patient/family teaching  - Consider wound care consult   Outcome: Progressing     Problem: PAIN - ADULT  Goal: Verbalizes/displays adequate comfort level or baseline comfort level  Description: Interventions:  - Encourage patient to monitor pain and request assistance  - Assess pain using appropriate pain scale  - Administer analgesics based on type and severity of pain and evaluate response  - Implement non-pharmacological measures as appropriate and evaluate response  - Consider cultural and social influences on pain and pain management  - Notify physician/advanced practitioner if interventions unsuccessful or patient reports new pain  Outcome: Progressing     Problem: INFECTION - ADULT  Goal: Absence or prevention of progression during hospitalization  Description: INTERVENTIONS:  - Assess and monitor for signs and symptoms of infection  - Monitor lab/diagnostic results  - Monitor all insertion sites, i.e. indwelling lines, tubes, and drains  - Monitor endotracheal if appropriate and nasal secretions for changes in amount and color  - [place] appropriate cooling/warming therapies per order  - Administer medications as ordered  - Instruct and encourage patient and family to use good hand hygiene technique  - Identify and instruct in appropriate isolation precautions for  identified infection/condition  Outcome: Progressing     Problem: SAFETY ADULT  Goal: Patient will remain free of falls  Description: INTERVENTIONS:  - Educate patient/family on patient safety including physical limitations  - Instruct patient to call for assistance with activity   - Consult OT/PT to assist with strengthening/mobility   - Keep Call bell within reach  - Keep bed low and locked with side rails adjusted as appropriate  - Keep care items and personal belongings within reach  - Initiate and maintain comfort rounds  - Make Fall Risk Sign visible to staff  - Apply yellow socks and bracelet for high fall risk patients  - Consider moving patient to room near nurses station  Outcome: Progressing  Goal: Maintain or return to baseline ADL function  Description: INTERVENTIONS:  -  Assess patient's ability to carry out ADLs; assess patient's baseline for ADL function and identify physical deficits which impact ability to perform ADLs (bathing, care of mouth/teeth, toileting, grooming, dressing, etc.)  - Assess/evaluate cause of self-care deficits   - Assess range of motion  - Assess patient's mobility; develop plan if impaired  - Assess patient's need for assistive devices and provide as appropriate  - Encourage maximum independence but intervene and supervise when necessary  - Involve family in performance of ADLs  - Assess for home care needs following discharge   - Consider OT consult to assist with ADL evaluation and planning for discharge  - Provide patient education as appropriate  Outcome: Progressing  Goal: Maintains/Returns to pre admission functional level  Description: INTERVENTIONS:  - Perform AM-PAC 6 Click Basic Mobility/ Daily Activity assessment daily.  - Set and communicate daily mobility goal to care team and patient/family/caregiver.   - Collaborate with rehabilitation services on mobility goals if consulted  - Out of bed for toileting  - Record patient progress and toleration of activity level    Outcome: Progressing     Problem: DISCHARGE PLANNING  Goal: Discharge to home or other facility with appropriate resources  Description: INTERVENTIONS:  - Identify barriers to discharge w/patient and caregiver  - Arrange for needed discharge resources and transportation as appropriate  - Identify discharge learning needs (meds, wound care, etc.)  - Arrange for interpretive services to assist at discharge as needed  - Refer to Case Management Department for coordinating discharge planning if the patient needs post-hospital services based on physician/advanced practitioner order or complex needs related to functional status, cognitive ability, or social support system  Outcome: Progressing     Problem: Knowledge Deficit  Goal: Patient/family/caregiver demonstrates understanding of disease process, treatment plan, medications, and discharge instructions  Description: Complete learning assessment and assess knowledge base.  Interventions:  - Provide teaching at level of understanding  - Provide teaching via preferred learning methods  Outcome: Progressing     Problem: NEUROSENSORY - ADULT  Goal: Achieves stable or improved neurological status  Description: INTERVENTIONS  - Monitor and report changes in neurological status  - Monitor vital signs such as temperature, blood pressure, glucose, and any other labs ordered   - Initiate measures to prevent increased intracranial pressure  - Monitor for seizure activity and implement precautions if appropriate      Outcome: Progressing  Goal: Remains free of injury related to seizures activity  Description: INTERVENTIONS  - Maintain airway, patient safety  and administer oxygen as ordered  - Monitor patient for seizure activity, document and report duration and description of seizure to physician/advanced practitioner  - If seizure occurs,  ensure patient safety during seizure  - Reorient patient post seizure  - Seizure pads on all 4 side rails  - Instruct patient/family to  notify RN of any seizure activity including if an aura is experienced  - Instruct patient/family to call for assistance with activity based on nursing assessment  - Administer anti-seizure medications if ordered    Outcome: Progressing  Goal: Achieves maximal functionality and self care  Description: INTERVENTIONS  - Monitor swallowing and airway patency with patient fatigue and changes in neurological status  - Encourage and assist patient to increase activity and self care.   - Encourage visually impaired, hearing impaired and aphasic patients to use assistive/communication devices  Outcome: Progressing     Problem: CARDIOVASCULAR - ADULT  Goal: Maintains optimal cardiac output and hemodynamic stability  Description: INTERVENTIONS:  - Monitor I/O, vital signs and rhythm  - Monitor for S/S and trends of decreased cardiac output  - Administer and titrate ordered vasoactive medications to optimize hemodynamic stability  - Assess quality of pulses, skin color and temperature  - Assess for signs of decreased coronary artery perfusion  - Instruct patient to report change in severity of symptoms  Outcome: Progressing  Goal: Absence of cardiac dysrhythmias or at baseline rhythm  Description: INTERVENTIONS:  - Continuous cardiac monitoring, vital signs, obtain 12 lead EKG if ordered  - Administer antiarrhythmic and heart rate control medications as ordered  - Monitor electrolytes and administer replacement therapy as ordered  Outcome: Progressing     Problem: RESPIRATORY - ADULT  Goal: Achieves optimal ventilation and oxygenation  Description: INTERVENTIONS:  - Assess for changes in respiratory status  - Assess for changes in mentation and behavior  - Position to facilitate oxygenation and minimize respiratory effort  - Oxygen administered by appropriate delivery if ordered  - Initiate smoking cessation education as indicated  - Encourage broncho-pulmonary hygiene including cough, deep breathe, Incentive Spirometry  -  Assess the need for suctioning and aspirate as needed  - Assess and instruct to report SOB or any respiratory difficulty  - Respiratory Therapy support as indicated  Outcome: Progressing     Problem: GASTROINTESTINAL - ADULT  Goal: Minimal or absence of nausea and/or vomiting  Description: INTERVENTIONS:  - Administer IV fluids if ordered to ensure adequate hydration  - Maintain NPO status until nausea and vomiting are resolved  - Nasogastric tube if ordered  - Administer ordered antiemetic medications as needed  - Provide nonpharmacologic comfort measures as appropriate  - Advance diet as tolerated, if ordered  - Consider nutrition services referral to assist patient with adequate nutrition and appropriate food choices  Outcome: Progressing  Goal: Maintains or returns to baseline bowel function  Description: INTERVENTIONS:  - Assess bowel function  - Encourage oral fluids to ensure adequate hydration  - Administer IV fluids if ordered to ensure adequate hydration  - Administer ordered medications as needed  - Encourage mobilization and activity  - Consider nutritional services referral to assist patient with adequate nutrition and appropriate food choices  Outcome: Progressing  Goal: Maintains adequate nutritional intake  Description: INTERVENTIONS:  - Monitor percentage of each meal consumed  - Identify factors contributing to decreased intake, treat as appropriate  - Assist with meals as needed  - Monitor I&O, weight, and lab values if indicated  - Obtain nutrition services referral as needed  Outcome: Progressing  Goal: Oral mucous membranes remain intact  Description: INTERVENTIONS  - Assess oral mucosa and hygiene practices  - Implement preventative oral hygiene regimen  - Implement oral medicated treatments as ordered  - Initiate Nutrition services referral as needed  Outcome: Progressing     Problem: GENITOURINARY - ADULT  Goal: Maintains or returns to baseline urinary function  Description:  INTERVENTIONS:  - Assess urinary function  - Encourage oral fluids to ensure adequate hydration if ordered  - Administer IV fluids as ordered to ensure adequate hydration  - Administer ordered medications as needed  - Offer frequent toileting  - Follow urinary retention protocol if ordered  Outcome: Progressing  Goal: Absence of urinary retention  Description: INTERVENTIONS:  - Assess patient’s ability to void and empty bladder  - Monitor I/O  - Bladder scan as needed  - Discuss with physician/AP medications to alleviate retention as needed  - Discuss catheterization for long term situations as appropriate  Outcome: Progressing

## 2024-06-24 NOTE — ASSESSMENT & PLAN NOTE
Drop appears associated to administration of DDAVP  Also with polyuria, questionable DI  Sodium has corrected with IV fluids  Neurologically appears intact  Appreciate nephrology input and recommendations-goal of 130-134 today

## 2024-06-24 NOTE — OCCUPATIONAL THERAPY NOTE
Occupational Therapy Treatment Note:      06/24/24 1535   OT Last Visit   OT Visit Date 06/24/24   Note Type   Note Type Treatment   Pain Assessment   Pain Assessment Tool FLACC   Pain Rating: FLACC (Rest) - Face 0   Pain Rating: FLACC (Rest) - Legs 0   Pain Rating: FLACC (Rest) - Activity 0   Pain Rating: FLACC (Rest) - Cry 0   Pain Rating: FLACC (Rest) - Consolability 0   Score: FLACC (Rest) 0   Pain Rating: FLACC (Activity) - Face 1   Pain Rating: FLACC (Activity) - Legs 1   Pain Rating: FLACC (Activity) - Activity 1   Pain Rating: FLACC (Activity) - Cry 0   Pain Rating: FLACC (Activity) - Consolability 0   Score: FLACC (Activity) 3   Restrictions/Precautions   Other Precautions Chair Alarm;Bed Alarm;Fall Risk;Spinal precautions   ADL   Where Assessed Edge of bed   Grooming Assistance 5  Supervision/Setup   UB Dressing Assistance 4  Minimal Assistance   LB Dressing Assistance 2  Maximal Assistance   Toileting Assistance  2  Maximal Assistance   Functional Standing Tolerance   Time f- balance c rw during clothing management, 1 min stance at this time   Bed Mobility   Supine to Sit 3  Moderate assistance   Transfers   Sit to Stand 3  Moderate assistance   Additional items   (mod vc's for hand placement / safety techniques)   Stand to Sit 3  Moderate assistance   Additional items   (rw)   Functional Mobility   Functional Mobility 3  Moderate assistance   Additional items Rolling walker  (very short distance , max vc's, pt's b/p maintained position at low 100's / 70's when in sitting/ after standing etc. see emr)   Cognition   Overall Cognitive Status WFL   Comments min vc's for new learning and new instruction fair carryover   Activity Tolerance   Activity Tolerance Patient limited by fatigue   Assessment   Assessment pt participated in pm ot session and was seen focusing on adl tasks and grooming seated eob. pt was able to perform grooming sba and is max asst lb and min asst ub at this time.  pt was mod asst for  functional transfers. pts supportive dtr was present this session. will follow focusing on goals from ie. continue to recommend rehab   Plan   Treatment Interventions ADL retraining;Functional transfer training;Endurance training;Patient/family training;Equipment evaluation/education;Activityengagement   Goal Expiration Date 07/01/24   OT Treatment Day 1   OT Frequency 3-5x/wk   Discharge Recommendation   Rehab Resource Intensity Level, OT I (Maximum Resource Intensity)     April A Storm

## 2024-06-24 NOTE — PLAN OF CARE
Problem: PHYSICAL THERAPY ADULT  Goal: Performs mobility at highest level of function for planned discharge setting.  See evaluation for individualized goals.  Description: Treatment/Interventions: Functional transfer training, LE strengthening/ROM, Elevations, Therapeutic exercise, Endurance training, Cognitive reorientation, Equipment eval/education, Bed mobility, Gait training, Spoke to nursing, OT  Equipment Recommended: Other (Comment) (TBD)       See flowsheet documentation for full assessment, interventions and recommendations.  Outcome: Progressing  Note: Prognosis: Fair  Problem List: Decreased strength, Decreased endurance, Impaired balance, Decreased mobility, Decreased coordination, Decreased cognition, Decreased safety awareness, Pain  Assessment: Pt demonstrated improved functional endurance & mobility this session, performing all transfers with decreased assist compared to eval, and eventually progressing to short distance gait training within room with use of RW without LE buckling.  He does require extended time to perform all transfers today due to generalized weakness, impaired muscle coordination, balance & endurance that all limit him compared to his baseline.  Seated rests taken after each transfer task to recover.  He also requires instructions for UE use to safely perform sit to stand transfers with inconsistant carryover which will require further practice to ensure safe mobility practices as he continues to recover.  Plan to progress to longer distance gait trials next session pending stable vitals & ongoing medical management of principle problems.  PT POC & d/c recommendation remain appropriate at this time.  Barriers to Discharge: Inaccessible home environment     Rehab Resource Intensity Level, PT: I (Maximum Resource Intensity)    See flowsheet documentation for full assessment.

## 2024-06-24 NOTE — PHYSICAL THERAPY NOTE
Physical Therapy Progress Note     06/24/24 1540   PT Last Visit   PT Visit Date 06/24/24   Note Type   Note Type Treatment   Pain Assessment   Pain Score No Pain   Restrictions/Precautions   Other Precautions Cognitive;Chair Alarm;Bed Alarm;Pain;Fall Risk;Spinal precautions;Multiple lines   Subjective   Subjective pt encountered supine in bed, pleasant and agreeable to treatment this PM. Reports he normally works day/evening shifts at work & sleeps late, preferring later treatment sessions if possible.  He reports no complaints at rest & has controlled pain with activity.  Does endorse dizziness with position change, but this did not worsen with prolonged time in standing.   Bed Mobility   Supine to Sit 3  Moderate assistance   Additional items Assist x 1;Increased time required;LE management;HOB elevated;Bedrails   Transfers   Sit to Stand 3  Moderate assistance   Additional items Assist x 1;Armrests;Increased time required   Stand to Sit 3  Moderate assistance   Additional items Assist x 1;Armrests;Increased time required;Verbal cues   Ambulation/Elevation   Gait pattern Ataxia;Short stride;Inconsistent shay;Shuffling;Decreased foot clearance;Foward flexed;Improper Weight shift;Poor UE support   Gait Assistance 3  Moderate assist   Additional items Assist x 1   Assistive Device Rolling walker   Distance 3' bed to chair, then 7' forwards in room   Balance   Static Sitting Fair   Dynamic Sitting Fair -   Static Standing Poor   Dynamic Standing Poor   Activity Tolerance   Activity Tolerance Patient tolerated treatment well;Patient limited by fatigue;Patient limited by pain   Nurse Made Aware LUCRECIA Horne   Assessment   Prognosis Fair   Problem List Decreased strength;Decreased endurance;Impaired balance;Decreased mobility;Decreased coordination;Decreased cognition;Decreased safety awareness;Pain   Assessment Pt demonstrated improved functional endurance & mobility this session, performing all transfers with  decreased assist compared to eval, and eventually progressing to short distance gait training within room with use of RW without LE buckling.  He does require extended time to perform all transfers today due to generalized weakness, impaired muscle coordination, balance & endurance that all limit him compared to his baseline.  Seated rests taken after each transfer task to recover.  He also requires instructions for UE use to safely perform sit to stand transfers with inconsistant carryover which will require further practice to ensure safe mobility practices as he continues to recover.  Plan to progress to longer distance gait trials next session pending stable vitals & ongoing medical management of principle problems.  PT POC & d/c recommendation remain appropriate at this time.   Goals   Patient Goals to feel better   [facility] Expiration Date 06/27/24   PT Treatment Day 1   Plan   Treatment/Interventions Functional transfer training;LE strengthening/ROM;Therapeutic exercise;Endurance training;Patient/family training;Equipment eval/education;Gait training;Bed mobility   PT Frequency 3-5x/wk   Discharge Recommendation   Rehab Resource Intensity Level, PT I (Maximum Resource Intensity)   Equipment Recommended Walker   Walker Package Recommended Wheeled walker   AM-PAC Basic Mobility Inpatient   Turning in Flat Bed Without Bedrails 3   Lying on Back to Sitting on Edge of Flat Bed Without Bedrails 2   Moving Bed to Chair 2   Standing Up From Chair Using Arms 2   Walk in Room 2   Climb 3-5 Stairs With Railing 1   Basic Mobility Inpatient Raw Score 12   Basic Mobility Standardized Score 32.23   [facility] Highest Level Of Mobility   -HLM Goal 4: Move to chair/commode   -HLM Achieved 6: Walk 10 steps or more       David Moulton PTA    [facility] Basic Mobility Raw Score less than 17 suggests pt would benefit from post acute rehab.  Please also refer to the recommendation of the Physical Therapist for safe discharge  planning.

## 2024-06-24 NOTE — ASSESSMENT & PLAN NOTE
Continue frequent neurological checks.   STAT CT head with any neurological decline including drop GCS of 2pts within 1 hr.  Seizure prophylaxis per trauma team-completed  Hold all full antiplatelet and anticoagulation medications for 2 weeks

## 2024-06-24 NOTE — ASSESSMENT & PLAN NOTE
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  - CT scan of the head from 6/15/2024 demonstrated: New acute trace subarachnoid hemorrhage in bilateral parafalcine regions. New acute trace parafalcine subdural hematoma.  -  Additional imaging with CTA of the head and neck 6/15/2024 demonstrated: Negative CTA head traumatic vascular injury, aneurysm, large vessel occlusion, high-grade stenosis, or dissection. Partially imaged ectatic right carotid bifurcation and proximal cervical internal carotid artery with retropharyngeal course, similar to CT neck with contrast 10/12/2011.  - Admit as level 2 step-down with HOT protocol.  The patient may require ICU level of care during hospital encounter if the patient has clinical decompensation or worsening of traumatic brain injury.  - Neurosurgery evaluation and recommendations appreciated.  - Hold anti-platelet and anticoagulant medications for least 2 weeks and/or until cleared by Neurosurgery.   - Patient is not on any chronic antiplatelet or anticoagulant medications at baseline.  - Continue Keppra for 7 days for seizure prophylaxis - completed  - Monitor neurologic exam.  - Continue symptomatic management with analgesia as needed.  - Tentative plan for repeat CT scan of the head in 12-24 hours or sooner if patient has clinical decline or drop in GCS > or equal to 2.  - PT and OT evaluation and treatment as indicated.

## 2024-06-24 NOTE — PROGRESS NOTES
NEPHROLOGY PROGRESS NOTE   Luis Forrester 53 y.o. male MRN: [id number]  Unit/Bed#: [facility] 605-01 Encounter: [id number]      ASSESSMENT/PLAN:  Hyponatremia   Sodium dropped to a low of 125 due to DDAVP   Quickly corrected with IVF and was started on D5W. Given DDAVP again early this am for polyuria   Sodium 136 this morning which is appropriate   D/c D5W   Repeat BMP at 1 PM to make sure sodium did not drop significantly in the setting of DDAVP  Polyuria   Seems to be improving  Status post DDAVP earlier this morning  Hypokalemia  Last potassium 3.2  Hypocalcemia  Uncorrected calcium 6.6  On calcitriol 0.25 mcg daily -- PTH 7.3 so consider discontinuing   Status post calcium gluconate yesterday  Hypophosphatemia  S/p replacement  Hypotension  On midodrine 10 mg p.o. every 8 hours  SDH   Cord compression due to degenerative spondylolisthesis and congenital canal stenosis  Sick sinus syndrome status post pacemaker    Plan Summary:   D/c D5W   Consider d/c calcitriol and start tums instead   Replace K and calcium   Encourage po intake   Repeat BMP at 1pm   Follow up calcium, mag and phos     SUBJECTIVE:  Appetite poor. No chest pain or shortness of breath. States he wants to do PT this afternoon.     OBJECTIVE:  Current Weight: Weight - Scale: 78.9 kg (174 lb)  Vitals:    06/24/24 0720   BP: 117/74   Pulse: 59   Resp:    Temp: 98.3 °F (36.8 °C)   SpO2: 98%       Intake/Output Summary (Last 24 hours) at 6/24/2024 1122  Last data filed at 6/24/2024 0600  Gross per 24 hour   Intake 240 ml   Output 4250 ml   Net -4010 ml       General:  appears comfortable and in no acute distress   Skin:  No rash  Eyes:  Sclerae anicteric, no periorbital edema   ENT:  Moist mucous membranes  Neck:  Trachea midline, symmetric   Chest:  Clear to auscultation bilaterally with no wheezes, rales or rhonchi  CVS:  Regular rate and rhythm  Abdomen:  Soft, nontender, nondistended  Neuro:  Awake and alert  Psych:  Appropriate affect  Extremities:  no lower extremity edema   : poon in place with clear urine output       Medications:  Scheduled Meds:  Current Facility-Administered Medications   Medication Dose Route Frequency Provider Last Rate    acetaminophen  975 mg Oral Q8H [facility] Gala Yu MD      albuterol  2 puff Inhalation Q4H PRN Gala Yu MD      calcitriol  0.25 mcg Oral Daily Gala Yu MD      cephalexin  500 mg Oral Q6H [facility] Gala Yu MD      Cholecalciferol  1,000 Units Oral Daily Gala Yu MD      enoxaparin  30 mg Subcutaneous Q12H [facility] Gala Yu MD      gabapentin  100 mg Oral TID Gala Yu MD      HYDROmorphone  0.2 mg Intravenous Q2H PRN Gala Yu MD      levothyroxine  125 mcg Oral Early Morning Gala Yu MD      methocarbamol  500 mg Oral Q6H [facility] Gala Yu MD      midodrine  10 mg Oral Q8H Gala Yu MD      naloxone  0.04 mg Intravenous Q1MIN PRN Gala Yu MD      ondansetron  4 mg Intravenous Q4H PRN Gala Yu MD      oxyCODONE  2.5 mg Oral Q4H PRN Gala Yu MD      Or    oxyCODONE  5 mg Oral Q4H PRN Gala Yu MD      polyethylene glycol  17 g Oral Daily Gala Yu MD      senna-docusate sodium  2 tablet Oral BID Gala Yu MD         PRN Meds:.  albuterol    HYDROmorphone    naloxone    ondansetron    oxyCODONE **OR** oxyCODONE      Laboratory Results:  Results from last 7 days   Lab Units 06/24/24  0832 06/24/24  0149 06/23/24  1352 06/23/24  0546 06/22/24  2104 06/22/24  1505 06/22/24  0539 06/21/24  0853 06/21/24  0543 06/20/24  1439 06/20/24  0428 06/19/24  1434 06/19/24  0519 06/18/24  1829 06/18/24  1457 06/18/24  0439   WBC Thousand/uL  --   --   --  8.08  --   --  8.66  --  9.15  --  12.92*  --  14.45*  --   --  16.27*   HEMOGLOBIN g/dL  --   --   --  11.0*  --   --  10.4*  --  10.7*  --  12.2  --  13.2  --   --  12.5   HEMATOCRIT %  --   --   --   31.9*  --   --  30.7*  --  32.2*  --  36.4*  --  39.1  --   --  38.2   PLATELETS Thousands/uL  --   --   --  165  --   --  164  --  189  --  212  --  232  --   --  236   SODIUM mmol/L 136 137 136 135 136 136 126*   < > 128*   < > 131*   < > 132* 134*   < > 135   POTASSIUM mmol/L 3.2* 3.1* 3.1* 3.1* 3.2* 3.5 2.9*   < > 3.8   < > 3.8   < > 3.9 4.0   < > 3.7   CHLORIDE mmol/L 101 101 101 99 100 99 91*   < > 93*   < > 94*   < > 93* 95*   < > 97   CO2 mmol/L 26 29 29 26 23 21 22   < > 26   < > 30   < > 31 31   < > 31   BUN mg/dL 2* 2* 5 7 10 10 10   < > 12   < > 12   < > 11 10   < > 10   CREATININE mg/dL 0.61 0.49* 0.46* 0.50* 0.52* 0.51* 0.48*   < > 0.50*   < > 0.62   < > 0.67 0.70   < > 0.72   CALCIUM mg/dL 6.6* 6.9* 7.0* 7.0* 7.1* 7.1* 6.6*   < > 6.3*   < > 6.6*   < > 6.7* 7.1*   < > 6.7*   MAGNESIUM mg/dL  --   --   --  1.9  --   --  1.6*  --  1.7*  --  1.7*  --  2.0 2.2  --  1.5*   PHOSPHORUS mg/dL  --   --   --  1.9*  --   --  2.4*  --  2.7  --  3.9  --   --  4.9*  --  3.1    < > = values in this interval not displayed.

## 2024-06-24 NOTE — PLAN OF CARE
Problem: OCCUPATIONAL THERAPY ADULT  Goal: Performs self-care activities at highest level of function for planned discharge setting.  See evaluation for individualized goals.  Description: Treatment Interventions: ADL retraining, Functional transfer training, UE strengthening/ROM, Endurance training, Patient/family training, Equipment evaluation/education, Fine motor coordination activities, Activityengagement          See flowsheet documentation for full assessment, interventions and recommendations.   Outcome: Progressing  Note: Limitation: Decreased ADL status, Decreased UE ROM, Decreased UE strength, Decreased Safe judgement during ADL, Decreased endurance, Decreased fine motor control, Decreased self-care trans, Decreased high-level ADLs  Prognosis: Good  Assessment: pt participated in pm ot session and was seen focusing on adl tasks and grooming seated eob. pt was able to perform grooming sba and is max asst lb and min asst ub at this time.  pt was mod asst for functional transfers. pts supportive dtr was present this session. will follow focusing on goals from ie. continue to recommend rehab     Rehab Resource Intensity Level, OT: I (Maximum Resource Intensity)        April A Storm

## 2024-06-24 NOTE — PROGRESS NOTES
[facility]  Progress Note  Name: Luis Forrester I  MRN: [id number]  [facility] I Date of Admission: 6/15/2024   Date of Service: 6/24/2024 I Hospital Day: 9    Assessment & Plan   Hyponatremia  Assessment & Plan  Drop appears associated to administration of DDAVP  Also with polyuria, questionable DI  Sodium has corrected with IV fluids  Neurologically appears intact  Appreciate nephrology input and recommendations-goal of 130-134 today    s/p Medtronic dual chamber PPM 6/21/2024  Assessment & Plan  As above    SSS (sick sinus syndrome) ([facility])  Assessment & Plan  Status post PPM on 6/21  EPS has signed off on 6/22    SDH (subdural hematoma) ([facility])  Assessment & Plan  Continue frequent neurological checks.   STAT CT head with any neurological decline including drop GCS of 2pts within 1 hr.  Seizure prophylaxis per trauma team-completed  Hold all full antiplatelet and anticoagulation medications for 2 weeks      Hypothyroidism  Assessment & Plan  - Patient with chronic history of hypothyroidism.  - Continue home medication regimen including levothyroxine.  - Outpatient follow-up routine.    * TBI (traumatic brain injury) ([facility])  Assessment & Plan  - Traumatic brain injury with new acute appearing trace subarachnoid hemorrhage in the bilateral parafalcine regions as well as a new appearing trace parafalcine subdural hematoma, present on admission.  - CT scan of the head from 6/15/2024 demonstrated: New acute trace subarachnoid hemorrhage in bilateral parafalcine regions. New acute trace parafalcine subdural hematoma.  -  Additional imaging with CTA of the head and neck 6/15/2024 demonstrated: Negative CTA head traumatic vascular injury, aneurysm, large vessel occlusion, high-grade stenosis, or dissection. Partially imaged ectatic right carotid bifurcation and proximal cervical internal carotid artery with retropharyngeal course, similar to CT neck with contrast  10/12/2011.  - Admit as level 2 step-down with HOT protocol.  The patient may require ICU level of care during hospital encounter if the patient has clinical decompensation or worsening of traumatic brain injury.  - Neurosurgery evaluation and recommendations appreciated.  - Hold anti-platelet and anticoagulant medications for least 2 weeks and/or until cleared by Neurosurgery.   - Patient is not on any chronic antiplatelet or anticoagulant medications at baseline.  - Continue Keppra for 7 days for seizure prophylaxis - completed  - Monitor neurologic exam.  - Continue symptomatic management with analgesia as needed.  - Tentative plan for repeat CT scan of the head in 12-24 hours or sooner if patient has clinical decline or drop in GCS > or equal to 2.  - PT and OT evaluation and treatment as indicated.             Bowel Regimen: MiraLAX, Senokot  VTE Prophylaxis:Sequential compression device (Venodyne)  and Enoxaparin (Lovenox)     Disposition: Anticipate rehab    Subjective   Chief Complaint: Multiple syncopal episodes over 6 months    Subjective: No new complaints.  Accompanied by family.  Moved out of the ICU yesterday     Objective   Vitals:   Temp:  [97.2 °F (36.2 °C)-98.4 °F (36.9 °C)] 98.3 °F (36.8 °C)  HR:  [59-86] 73  Resp:  [18] 18  BP: (103-118)/(67-76) 107/67    I/O         06/22 0701  06/23 0700 06/23 0701  06/24 0700 06/24 0701  06/25 0700    P.O. 75 300     I.V. (mL/kg) 780 (9.9)      IV Piggyback 100      Total Intake(mL/kg) 955 (12.1) 300 (3.8)     Urine (mL/kg/hr) 2450 (1.3) 4250 (2.2) 450 (0.5)    Emesis/NG output       Stool 0 0 0    Blood       Total Output 2450 4250 450    Net -1495 -3950 -450           Unmeasured Urine Occurrence 2 x 1 x     Unmeasured Stool Occurrence 1 x 2 x 2 x             Physical Exam:   GENERAL APPEARANCE: Awake.  Interactive.  Nontoxic  NEURO: Nonfocal  HEENT: Oral mucosa moist  CV: Regular rate and rhythm  LUNGS: Clear to auscultation  GI: Soft.  Flat.   Nontender.  SKIN: Warm and dry    Invasive Devices       Peripheral Intravenous Line  Duration             Peripheral IV 06/21/24 Left;Proximal;Ventral (anterior) Forearm 3 days              Drain  Duration             External Urinary Catheter 1 day                          Lab Results: Results: I have personally reviewed all pertinent laboratory/tests results  Imaging: I have personally reviewed pertinent reports.

## 2024-06-24 NOTE — SPEECH THERAPY NOTE
"Speech Language/Pathology    Speech/Language Pathology Progress Note    Patient Name: Luis Forrester  Today's Date: 6/24/2024     Problem List  Principal Problem:    TBI (traumatic brain injury) ([facility])  Active Problems:    Hypothyroidism    SDH (subdural hematoma) ([facility])    SSS (sick sinus syndrome) ([facility])    s/p Medtronic dual chamber PPM 6/21/2024    Hyponatremia       Past Medical History  Past Medical History:   Diagnosis Date    Arthritis     Chronic cough     Disease of thyroid gland     Hypoparathyroidism ([facility])     continue taking calcium and vitamin D, will check CMP, PTH level and Vitamin D level    Pneumonia of right middle lobe due to Streptococcus pneumoniae ([facility]) 11/30/2018    s/p Medtronic dual chamber PPM 6/21/2024 06/21/2024        Past Surgical History  Past Surgical History:   Procedure Laterality Date    CARDIAC ELECTROPHYSIOLOGY PROCEDURE N/A 6/21/2024    Procedure: Cardiac pacer implant;  Surgeon: Kfoi Pettit MD;  Location: [facility] CARDIAC [facility]ATH [facility];  Service: Cardiology    DECOMPRESSION SPINE CERVICAL POSTERIOR N/A 6/16/2024    Procedure: DECOMPRESSION SPINE CERVICAL POSTERIOR C2-T1 with fusion navigated with Oarm;  Surgeon: Endy Velasquez MD;  Location: [facility] MAIN OR;  Service: Neurosurgery    FRACTURE SURGERY      Open Treatment of Each Proximal Finger Phalanx         Subjective:  \"I'm just having a hard time swallowing\" Patient awake and alert. Daughter is at bedside.     Objective:  RN, patient and daughter relay difficulty swallowing. Patient reports he is having trouble with pills, encouraged to crush or dissolve as able. He is unable to describe difficulty swallowing, but daughter reports coughing episodes with [place] toast and chicken fingers. This is new since hospital admission and recent surgeries. He is assessed with regular fruit and thin liquids today. Mastication time is prolonged with piecemeal deglutition. Laryngeal rise is adequate upon palpation. Multiple swallows are observed " to clear solids. He takes thin liquids via straw. Liquid wash appears to help clear any residue. The patient does not present with any overt s/s aspiration. Dicussed different diet options with patient. Agreeable to trial level 3 solids to see if change eases intake. Discussed possibility of VBS, if warranted.     Assessment:  Patient had min prolonged mastication and multiple swallows to clear regular fruit.     Plan/Recommendations:  Recommend diet change to level 3 solids with thin liquids. Crush medications. Consider VBS as warranted. Continue ST.

## 2024-06-24 NOTE — DISCHARGE INSTR - OTHER ORDERS
Skin care plans:  1-Hydraguard to bilateral sacrum, buttock and heels BID and PRN  2-Elevate heels to offload pressure.  3-Ehob cushion in chair when out of bed.  4-Moisturize skin daily with skin nourishing cream.  5-Turn/reposition q2h for pressure re-distribution on skin.

## 2024-06-25 ENCOUNTER — APPOINTMENT (INPATIENT)
Dept: RADIOLOGY | Facility: HOSPITAL | Age: 54
DRG: 912 | End: 2024-06-25
Payer: COMMERCIAL

## 2024-06-25 PROBLEM — R14.0 ABDOMINAL DISTENSION: Status: ACTIVE | Noted: 2024-06-25

## 2024-06-25 LAB
ALBUMIN SERPL BCG-MCNC: 3.1 G/DL (ref 3.5–5)
ALP SERPL-CCNC: 70 U/L (ref 34–104)
ALT SERPL W P-5'-P-CCNC: 19 U/L (ref 7–52)
ANION GAP SERPL CALCULATED.3IONS-SCNC: 11 MMOL/L (ref 4–13)
ANION GAP SERPL CALCULATED.3IONS-SCNC: 11 MMOL/L (ref 4–13)
ANION GAP SERPL CALCULATED.3IONS-SCNC: 8 MMOL/L (ref 4–13)
AST SERPL W P-5'-P-CCNC: 33 U/L (ref 13–39)
BASOPHILS # BLD AUTO: 0.04 THOUSANDS/ÂΜL (ref 0–0.1)
BASOPHILS NFR BLD AUTO: 1 % (ref 0–1)
BILIRUB SERPL-MCNC: 0.43 MG/DL (ref 0.2–1)
BUN SERPL-MCNC: 3 MG/DL (ref 5–25)
BUN SERPL-MCNC: 5 MG/DL (ref 5–25)
BUN SERPL-MCNC: 5 MG/DL (ref 5–25)
CA-I BLD-SCNC: 0.92 MMOL/L (ref 1.12–1.32)
CALCIUM ALBUM COR SERPL-MCNC: 7.7 MG/DL (ref 8.3–10.1)
CALCIUM SERPL-MCNC: 7 MG/DL (ref 8.4–10.2)
CALCIUM SERPL-MCNC: 7.1 MG/DL (ref 8.4–10.2)
CALCIUM SERPL-MCNC: 7.3 MG/DL (ref 8.4–10.2)
CHLORIDE SERPL-SCNC: 100 MMOL/L (ref 96–108)
CHLORIDE SERPL-SCNC: 102 MMOL/L (ref 96–108)
CHLORIDE SERPL-SCNC: 99 MMOL/L (ref 96–108)
CO2 SERPL-SCNC: 28 MMOL/L (ref 21–32)
CREAT SERPL-MCNC: 0.55 MG/DL (ref 0.6–1.3)
CREAT SERPL-MCNC: 0.55 MG/DL (ref 0.6–1.3)
CREAT SERPL-MCNC: 0.56 MG/DL (ref 0.6–1.3)
EOSINOPHIL # BLD AUTO: 0.31 THOUSAND/ÂΜL (ref 0–0.61)
EOSINOPHIL NFR BLD AUTO: 4 % (ref 0–6)
ERYTHROCYTE [DISTWIDTH] IN BLOOD BY AUTOMATED COUNT: 13.9 % (ref 11.6–15.1)
GFR SERPL CREATININE-BSD FRML MDRD: 116 ML/MIN/1.73SQ M
GFR SERPL CREATININE-BSD FRML MDRD: 117 ML/MIN/1.73SQ M
GFR SERPL CREATININE-BSD FRML MDRD: 117 ML/MIN/1.73SQ M
GLUCOSE SERPL-MCNC: 86 MG/DL (ref 65–140)
GLUCOSE SERPL-MCNC: 89 MG/DL (ref 65–140)
GLUCOSE SERPL-MCNC: 91 MG/DL (ref 65–140)
HCT VFR BLD AUTO: 33.2 % (ref 36.5–49.3)
HGB BLD-MCNC: 11.3 G/DL (ref 12–17)
IMM GRANULOCYTES # BLD AUTO: 0.1 THOUSAND/UL (ref 0–0.2)
IMM GRANULOCYTES NFR BLD AUTO: 1 % (ref 0–2)
LYMPHOCYTES # BLD AUTO: 1.6 THOUSANDS/ÂΜL (ref 0.6–4.47)
LYMPHOCYTES NFR BLD AUTO: 21 % (ref 14–44)
MAGNESIUM SERPL-MCNC: 1.7 MG/DL (ref 1.9–2.7)
MAGNESIUM SERPL-MCNC: 2.1 MG/DL (ref 1.9–2.7)
MCH RBC QN AUTO: 31.7 PG (ref 26.8–34.3)
MCHC RBC AUTO-ENTMCNC: 34 G/DL (ref 31.4–37.4)
MCV RBC AUTO: 93 FL (ref 82–98)
MONOCYTES # BLD AUTO: 0.91 THOUSAND/ÂΜL (ref 0.17–1.22)
MONOCYTES NFR BLD AUTO: 12 % (ref 4–12)
NEUTROPHILS # BLD AUTO: 4.7 THOUSANDS/ÂΜL (ref 1.85–7.62)
NEUTS SEG NFR BLD AUTO: 61 % (ref 43–75)
NRBC BLD AUTO-RTO: 0 /100 WBCS
PHOSPHATE SERPL-MCNC: 4.1 MG/DL (ref 2.7–4.5)
PLATELET # BLD AUTO: 206 THOUSANDS/UL (ref 149–390)
PMV BLD AUTO: 9.7 FL (ref 8.9–12.7)
POTASSIUM SERPL-SCNC: 3.2 MMOL/L (ref 3.5–5.3)
POTASSIUM SERPL-SCNC: 3.3 MMOL/L (ref 3.5–5.3)
POTASSIUM SERPL-SCNC: 3.9 MMOL/L (ref 3.5–5.3)
PROT SERPL-MCNC: 6.2 G/DL (ref 6.4–8.4)
RBC # BLD AUTO: 3.57 MILLION/UL (ref 3.88–5.62)
SODIUM SERPL-SCNC: 136 MMOL/L (ref 135–147)
SODIUM SERPL-SCNC: 138 MMOL/L (ref 135–147)
SODIUM SERPL-SCNC: 141 MMOL/L (ref 135–147)
WBC # BLD AUTO: 7.66 THOUSAND/UL (ref 4.31–10.16)

## 2024-06-25 PROCEDURE — 84100 ASSAY OF PHOSPHORUS: CPT | Performed by: SURGERY

## 2024-06-25 PROCEDURE — 74018 RADEX ABDOMEN 1 VIEW: CPT

## 2024-06-25 PROCEDURE — 92526 ORAL FUNCTION THERAPY: CPT

## 2024-06-25 PROCEDURE — 80048 BASIC METABOLIC PNL TOTAL CA: CPT

## 2024-06-25 PROCEDURE — 99232 SBSQ HOSP IP/OBS MODERATE 35: CPT | Performed by: SURGERY

## 2024-06-25 PROCEDURE — 83735 ASSAY OF MAGNESIUM: CPT | Performed by: INTERNAL MEDICINE

## 2024-06-25 PROCEDURE — 85025 COMPLETE CBC W/AUTO DIFF WBC: CPT | Performed by: SURGERY

## 2024-06-25 PROCEDURE — 83735 ASSAY OF MAGNESIUM: CPT | Performed by: SURGERY

## 2024-06-25 PROCEDURE — 99232 SBSQ HOSP IP/OBS MODERATE 35: CPT | Performed by: INTERNAL MEDICINE

## 2024-06-25 PROCEDURE — 80053 COMPREHEN METABOLIC PANEL: CPT | Performed by: SURGERY

## 2024-06-25 PROCEDURE — 80048 BASIC METABOLIC PNL TOTAL CA: CPT | Performed by: INTERNAL MEDICINE

## 2024-06-25 PROCEDURE — 82330 ASSAY OF CALCIUM: CPT | Performed by: SURGERY

## 2024-06-25 RX ORDER — MAGNESIUM SULFATE HEPTAHYDRATE 40 MG/ML
2 INJECTION, SOLUTION INTRAVENOUS ONCE
Status: DISCONTINUED | OUTPATIENT
Start: 2024-06-25 | End: 2024-06-25

## 2024-06-25 RX ORDER — POTASSIUM CHLORIDE 20 MEQ/1
40 TABLET, EXTENDED RELEASE ORAL ONCE
Status: COMPLETED | OUTPATIENT
Start: 2024-06-25 | End: 2024-06-25

## 2024-06-25 RX ORDER — MAGNESIUM SULFATE HEPTAHYDRATE 40 MG/ML
2 INJECTION, SOLUTION INTRAVENOUS ONCE
Status: COMPLETED | OUTPATIENT
Start: 2024-06-25 | End: 2024-06-25

## 2024-06-25 RX ORDER — POTASSIUM CHLORIDE 14.9 MG/ML
20 INJECTION INTRAVENOUS
Status: COMPLETED | OUTPATIENT
Start: 2024-06-25 | End: 2024-06-26

## 2024-06-25 RX ADMIN — POTASSIUM CHLORIDE 20 MEQ: 14.9 INJECTION, SOLUTION INTRAVENOUS at 14:35

## 2024-06-25 RX ADMIN — CEPHALEXIN 500 MG: 500 CAPSULE ORAL at 18:21

## 2024-06-25 RX ADMIN — POTASSIUM CHLORIDE 20 MEQ: 14.9 INJECTION, SOLUTION INTRAVENOUS at 23:45

## 2024-06-25 RX ADMIN — GABAPENTIN 100 MG: 100 CAPSULE ORAL at 21:38

## 2024-06-25 RX ADMIN — ACETAMINOPHEN 975 MG: 325 TABLET, FILM COATED ORAL at 14:33

## 2024-06-25 RX ADMIN — SENNOSIDES AND DOCUSATE SODIUM 2 TABLET: 50; 8.6 TABLET ORAL at 18:21

## 2024-06-25 RX ADMIN — CALCIUM GLUCONATE 3 G: 98 INJECTION, SOLUTION INTRAVENOUS at 21:38

## 2024-06-25 RX ADMIN — CEPHALEXIN 500 MG: 500 CAPSULE ORAL at 05:57

## 2024-06-25 RX ADMIN — GABAPENTIN 100 MG: 100 CAPSULE ORAL at 18:24

## 2024-06-25 RX ADMIN — SENNOSIDES AND DOCUSATE SODIUM 2 TABLET: 50; 8.6 TABLET ORAL at 10:51

## 2024-06-25 RX ADMIN — METHOCARBAMOL 500 MG: 500 TABLET ORAL at 18:21

## 2024-06-25 RX ADMIN — ACETAMINOPHEN 975 MG: 325 TABLET, FILM COATED ORAL at 21:38

## 2024-06-25 RX ADMIN — MIDODRINE HYDROCHLORIDE 10 MG: 5 TABLET ORAL at 18:22

## 2024-06-25 RX ADMIN — POTASSIUM CHLORIDE 20 MEQ: 14.9 INJECTION, SOLUTION INTRAVENOUS at 18:19

## 2024-06-25 RX ADMIN — POLYETHYLENE GLYCOL 3350 17 G: 17 POWDER, FOR SOLUTION ORAL at 10:53

## 2024-06-25 RX ADMIN — MAGNESIUM SULFATE HEPTAHYDRATE 2 G: 40 INJECTION, SOLUTION INTRAVENOUS at 14:34

## 2024-06-25 RX ADMIN — MIDODRINE HYDROCHLORIDE 10 MG: 5 TABLET ORAL at 04:00

## 2024-06-25 RX ADMIN — OXYCODONE HYDROCHLORIDE 5 MG: 5 TABLET ORAL at 10:56

## 2024-06-25 RX ADMIN — METHOCARBAMOL 500 MG: 500 TABLET ORAL at 14:33

## 2024-06-25 RX ADMIN — GABAPENTIN 100 MG: 100 CAPSULE ORAL at 10:50

## 2024-06-25 RX ADMIN — METHOCARBAMOL 500 MG: 500 TABLET ORAL at 23:41

## 2024-06-25 RX ADMIN — ACETAMINOPHEN 975 MG: 325 TABLET, FILM COATED ORAL at 05:52

## 2024-06-25 RX ADMIN — POTASSIUM CHLORIDE 40 MEQ: 1500 TABLET, EXTENDED RELEASE ORAL at 14:33

## 2024-06-25 RX ADMIN — POTASSIUM CHLORIDE 20 MEQ: 14.9 INJECTION, SOLUTION INTRAVENOUS at 21:30

## 2024-06-25 RX ADMIN — CEPHALEXIN 500 MG: 500 CAPSULE ORAL at 23:41

## 2024-06-25 RX ADMIN — ENOXAPARIN SODIUM 30 MG: 30 INJECTION SUBCUTANEOUS at 10:53

## 2024-06-25 RX ADMIN — LEVOTHYROXINE SODIUM 125 MCG: 125 TABLET ORAL at 05:50

## 2024-06-25 RX ADMIN — MIDODRINE HYDROCHLORIDE 10 MG: 5 TABLET ORAL at 10:51

## 2024-06-25 RX ADMIN — Medication 1000 UNITS: at 10:50

## 2024-06-25 RX ADMIN — METHOCARBAMOL 500 MG: 500 TABLET ORAL at 05:52

## 2024-06-25 RX ADMIN — ENOXAPARIN SODIUM 30 MG: 30 INJECTION SUBCUTANEOUS at 21:38

## 2024-06-25 RX ADMIN — CEPHALEXIN 500 MG: 500 CAPSULE ORAL at 14:33

## 2024-06-25 NOTE — PROGRESS NOTES
[facility]  Progress Note  Name: Luis Forrester I  MRN: [id number]  Unit/Bed#: [facility] 605-01 I Date of Admission: 6/15/2024   Date of Service: 6/25/2024 I Hospital Day: 10    Assessment & Plan   Abdominal distension  Assessment & Plan  Abdominal distention following PPM placement  -Patient and wife report worsening abdominal distention today  -Patient reports multiple liquid bowel movements and passing flatus at this time  -KUB    Hyponatremia  Assessment & Plan  Drop appears associated to administration of DDAVP  Also with polyuria, questionable DI  Sodium has corrected with IV fluids  Neurologically appears intact  Appreciate nephrology input and recommendations-goal of 130-134 today  -DDAVP dose given 6/24    s/p Medtronic dual chamber PPM 6/21/2024  Assessment & Plan  As above    SSS (sick sinus syndrome) ([facility])  Assessment & Plan  Status post PPM on 6/21  EPS has signed off on 6/22    SDH (subdural hematoma) ([facility])  Assessment & Plan  Continue frequent neurological checks.   STAT CT head with any neurological decline including drop GCS of 2pts within 1 hr.  Seizure prophylaxis per trauma team-completed  Hold all full antiplatelet and anticoagulation medications for 2 weeks      Hypothyroidism  Assessment & Plan  - Patient with chronic history of hypothyroidism.  - Continue home medication regimen including levothyroxine.  - Outpatient follow-up routine.    * TBI (traumatic brain injury) ([facility])  Assessment & Plan  - Traumatic brain injury with new acute appearing trace subarachnoid hemorrhage in the bilateral parafalcine regions as well as a new appearing trace parafalcine subdural hematoma, present on admission.  - CT scan of the head from 6/15/2024 demonstrated: New acute trace subarachnoid hemorrhage in bilateral parafalcine regions. New acute trace parafalcine subdural hematoma.  -  Additional imaging with CTA of the head and neck 6/15/2024 demonstrated: Negative CTA head  traumatic vascular injury, aneurysm, large vessel occlusion, high-grade stenosis, or dissection. Partially imaged ectatic right carotid bifurcation and proximal cervical internal carotid artery with retropharyngeal course, similar to CT neck with contrast 10/12/2011.  - Admit as level 2 step-down with HOT protocol.  The patient may require ICU level of care during hospital encounter if the patient has clinical decompensation or worsening of traumatic brain injury.  - Neurosurgery evaluation and recommendations appreciated.  - Hold anti-platelet and anticoagulant medications for least 2 weeks and/or until cleared by Neurosurgery.   - Patient is not on any chronic antiplatelet or anticoagulant medications at baseline.  - Continue Keppra for 7 days for seizure prophylaxis - completed  - Monitor neurologic exam.  - Continue symptomatic management with analgesia as needed.  - Tentative plan for repeat CT scan of the head in 12-24 hours or sooner if patient has clinical decline or drop in GCS > or equal to 2.  - PT and OT evaluation and treatment as indicated.             Bowel Regimen: MiraLAX, Senokot  VTE Prophylaxis:Enoxaparin (Lovenox)     Disposition: Pending clinical improvement    Subjective     Subjective: No events overnight.  Patient reports pain in his posterior neck.  Patient is ambulating out of bed with PT.  Patient reports abdominal distention over the past few days.  Patient reports multiple liquid bowel movements and passing flatus currently.  Patient denies nausea vomiting fevers chills and shortness of breath.  Patient is motor and sensory intact in the lower extremities and upper extremities bilaterally     Objective   Vitals:   Temp:  [98.1 °F (36.7 °C)-99.9 °F (37.7 °C)] 98.3 °F (36.8 °C)  HR:  [63-80] 80  Resp:  [18] 18  BP: (107-118)/(68-76) 109/73    I/O         06/23 0701  06/24 0700 06/24 0701  06/25 0700 06/25 0701  06/26 0700    P.O. 300 360     I.V. (mL/kg)       IV Piggyback       Total  "Intake(mL/kg) 300 (3.8) 360 (4.6)     Urine (mL/kg/hr) 4250 (2.2) 1150 (0.6) 1080 (1.2)    Stool 0 0 0    Total Output 4250 1150 1080    Net -3950 -790 -1080           Unmeasured Urine Occurrence 1 x 1 x     Unmeasured Stool Occurrence 2 x 3 x 2 x             Physical Exam:   GENERAL APPEARANCE: Resting comfortably, no acute distress  NEURO: Oriented to person place and time  HEENT: Moist mucous membranes.  Posterior neck incision clean dry and intact with sutures  CV: Well-perfused regular rate  LUNGS: Normal work of breathing on room air  GI: Soft, nontender, distended  MSK: No lower extremity edema bilaterally  SKIN: Warm and dry    Invasive Devices       Peripheral Intravenous Line  Duration             Peripheral IV 06/25/24 Right Antecubital <1 day              Drain  Duration             External Urinary Catheter 2 days                          Lab Results: BMP/CMP:   Lab Results   Component Value Date    SODIUM 138 06/25/2024    K 3.2 (L) 06/25/2024    CL 99 06/25/2024    CO2 28 06/25/2024    BUN 5 06/25/2024    CREATININE 0.55 (L) 06/25/2024    CALCIUM 7.1 (L) 06/25/2024    EGFR 117 06/25/2024    and CBC: No results found for: \"WBC\", \"HGB\", \"HCT\", \"MCV\", \"PLT\", \"ADJUSTEDWBC\", \"RBC\", \"MCH\", \"MCHC\", \"RDW\", \"MPV\", \"NRBC\"  Imaging: I have personally reviewed pertinent reports.     Other Studies:     Oscar Cesar MD  PGY1       "

## 2024-06-25 NOTE — ASSESSMENT & PLAN NOTE
Abdominal distention following PPM placement  -Patient and wife report worsening abdominal distention today  -Patient reports multiple liquid bowel movements and passing flatus at this time  -KUB

## 2024-06-25 NOTE — ASSESSMENT & PLAN NOTE
Drop appears associated to administration of DDAVP  Also with polyuria, questionable DI  Sodium has corrected with IV fluids  Neurologically appears intact  Appreciate nephrology input and recommendations-goal of 130-134 today  -DDAVP dose given 6/24

## 2024-06-25 NOTE — PLAN OF CARE
Problem: Prexisting or High Potential for Compromised Skin Integrity  Goal: Skin integrity is maintained or improved  Description: INTERVENTIONS:  - Identify patients at risk for skin breakdown  - Assess and monitor skin integrity  - Assess and monitor nutrition and hydration status  - Monitor labs   - Assess for incontinence   - Turn and reposition patient  - Assist with mobility/ambulation  - Relieve pressure over bony prominences  - Avoid friction and shearing  - Provide appropriate hygiene as needed including keeping skin clean and dry  - Evaluate need for skin moisturizer/barrier cream  - Collaborate with interdisciplinary team   - Patient/family teaching  - Consider wound care consult   Outcome: Progressing     Problem: PAIN - ADULT  Goal: Verbalizes/displays adequate comfort level or baseline comfort level  Description: Interventions:  - Encourage patient to monitor pain and request assistance  - Assess pain using appropriate pain scale  - Administer analgesics based on type and severity of pain and evaluate response  - Implement non-pharmacological measures as appropriate and evaluate response  - Consider cultural and social influences on pain and pain management  - Notify physician/advanced practitioner if interventions unsuccessful or patient reports new pain  Outcome: Progressing     Problem: INFECTION - ADULT  Goal: Absence or prevention of progression during hospitalization  Description: INTERVENTIONS:  - Assess and monitor for signs and symptoms of infection  - Monitor lab/diagnostic results  - Monitor all insertion sites, i.e. indwelling lines, tubes, and drains  - Monitor endotracheal if appropriate and nasal secretions for changes in amount and color  - [place] appropriate cooling/warming therapies per order  - Administer medications as ordered  - Instruct and encourage patient and family to use good hand hygiene technique  - Identify and instruct in appropriate isolation precautions for  identified infection/condition  Outcome: Progressing     Problem: SAFETY ADULT  Goal: Patient will remain free of falls  Description: INTERVENTIONS:  - Educate patient/family on patient safety including physical limitations  - Instruct patient to call for assistance with activity   - Consult OT/PT to assist with strengthening/mobility   - Keep Call bell within reach  - Keep bed low and locked with side rails adjusted as appropriate  - Keep care items and personal belongings within reach  - Initiate and maintain comfort rounds  - Make Fall Risk Sign visible to staff  - Apply yellow socks and bracelet for high fall risk patients  - Consider moving patient to room near nurses station  Outcome: Progressing  Goal: Maintain or return to baseline ADL function  Description: INTERVENTIONS:  -  Assess patient's ability to carry out ADLs; assess patient's baseline for ADL function and identify physical deficits which impact ability to perform ADLs (bathing, care of mouth/teeth, toileting, grooming, dressing, etc.)  - Assess/evaluate cause of self-care deficits   - Assess range of motion  - Assess patient's mobility; develop plan if impaired  - Assess patient's need for assistive devices and provide as appropriate  - Encourage maximum independence but intervene and supervise when necessary  - Involve family in performance of ADLs  - Assess for home care needs following discharge   - Consider OT consult to assist with ADL evaluation and planning for discharge  - Provide patient education as appropriate  Outcome: Progressing  Goal: Maintains/Returns to pre admission functional level  Description: INTERVENTIONS:  - Perform AM-PAC 6 Click Basic Mobility/ Daily Activity assessment daily.  - Set and communicate daily mobility goal to care team and patient/family/caregiver.   - Collaborate with rehabilitation services on mobility goals if consulted  - Out of bed for toileting  - Record patient progress and toleration of activity level    Outcome: Progressing     Problem: DISCHARGE PLANNING  Goal: Discharge to home or other facility with appropriate resources  Description: INTERVENTIONS:  - Identify barriers to discharge w/patient and caregiver  - Arrange for needed discharge resources and transportation as appropriate  - Identify discharge learning needs (meds, wound care, etc.)  - Arrange for interpretive services to assist at discharge as needed  - Refer to Case Management Department for coordinating discharge planning if the patient needs post-hospital services based on physician/advanced practitioner order or complex needs related to functional status, cognitive ability, or social support system  Outcome: Progressing     Problem: Knowledge Deficit  Goal: Patient/family/caregiver demonstrates understanding of disease process, treatment plan, medications, and discharge instructions  Description: Complete learning assessment and assess knowledge base.  Interventions:  - Provide teaching at level of understanding  - Provide teaching via preferred learning methods  Outcome: Progressing     Problem: NEUROSENSORY - ADULT  Goal: Achieves stable or improved neurological status  Description: INTERVENTIONS  - Monitor and report changes in neurological status  - Monitor vital signs such as temperature, blood pressure, glucose, and any other labs ordered   - Initiate measures to prevent increased intracranial pressure  - Monitor for seizure activity and implement precautions if appropriate      Outcome: Progressing  Goal: Remains free of injury related to seizures activity  Description: INTERVENTIONS  - Maintain airway, patient safety  and administer oxygen as ordered  - Monitor patient for seizure activity, document and report duration and description of seizure to physician/advanced practitioner  - If seizure occurs,  ensure patient safety during seizure  - Reorient patient post seizure  - Seizure pads on all 4 side rails  - Instruct patient/family to  notify RN of any seizure activity including if an aura is experienced  - Instruct patient/family to call for assistance with activity based on nursing assessment  - Administer anti-seizure medications if ordered    Outcome: Progressing  Goal: Achieves maximal functionality and self care  Description: INTERVENTIONS  - Monitor swallowing and airway patency with patient fatigue and changes in neurological status  - Encourage and assist patient to increase activity and self care.   - Encourage visually impaired, hearing impaired and aphasic patients to use assistive/communication devices  Outcome: Progressing     Problem: CARDIOVASCULAR - ADULT  Goal: Maintains optimal cardiac output and hemodynamic stability  Description: INTERVENTIONS:  - Monitor I/O, vital signs and rhythm  - Monitor for S/S and trends of decreased cardiac output  - Administer and titrate ordered vasoactive medications to optimize hemodynamic stability  - Assess quality of pulses, skin color and temperature  - Assess for signs of decreased coronary artery perfusion  - Instruct patient to report change in severity of symptoms  Outcome: Progressing  Goal: Absence of cardiac dysrhythmias or at baseline rhythm  Description: INTERVENTIONS:  - Continuous cardiac monitoring, vital signs, obtain 12 lead EKG if ordered  - Administer antiarrhythmic and heart rate control medications as ordered  - Monitor electrolytes and administer replacement therapy as ordered  Outcome: Progressing     Problem: RESPIRATORY - ADULT  Goal: Achieves optimal ventilation and oxygenation  Description: INTERVENTIONS:  - Assess for changes in respiratory status  - Assess for changes in mentation and behavior  - Position to facilitate oxygenation and minimize respiratory effort  - Oxygen administered by appropriate delivery if ordered  - Initiate smoking cessation education as indicated  - Encourage broncho-pulmonary hygiene including cough, deep breathe, Incentive Spirometry  -  Assess the need for suctioning and aspirate as needed  - Assess and instruct to report SOB or any respiratory difficulty  - Respiratory Therapy support as indicated  Outcome: Progressing     Problem: GASTROINTESTINAL - ADULT  Goal: Minimal or absence of nausea and/or vomiting  Description: INTERVENTIONS:  - Administer IV fluids if ordered to ensure adequate hydration  - Maintain NPO status until nausea and vomiting are resolved  - Nasogastric tube if ordered  - Administer ordered antiemetic medications as needed  - Provide nonpharmacologic comfort measures as appropriate  - Advance diet as tolerated, if ordered  - Consider nutrition services referral to assist patient with adequate nutrition and appropriate food choices  Outcome: Progressing  Goal: Maintains or returns to baseline bowel function  Description: INTERVENTIONS:  - Assess bowel function  - Encourage oral fluids to ensure adequate hydration  - Administer IV fluids if ordered to ensure adequate hydration  - Administer ordered medications as needed  - Encourage mobilization and activity  - Consider nutritional services referral to assist patient with adequate nutrition and appropriate food choices  Outcome: Progressing  Goal: Maintains adequate nutritional intake  Description: INTERVENTIONS:  - Monitor percentage of each meal consumed  - Identify factors contributing to decreased intake, treat as appropriate  - Assist with meals as needed  - Monitor I&O, weight, and lab values if indicated  - Obtain nutrition services referral as needed  Outcome: Progressing  Goal: Oral mucous membranes remain intact  Description: INTERVENTIONS  - Assess oral mucosa and hygiene practices  - Implement preventative oral hygiene regimen  - Implement oral medicated treatments as ordered  - Initiate Nutrition services referral as needed  Outcome: Progressing     Problem: GENITOURINARY - ADULT  Goal: Maintains or returns to baseline urinary function  Description:  INTERVENTIONS:  - Assess urinary function  - Encourage oral fluids to ensure adequate hydration if ordered  - Administer IV fluids as ordered to ensure adequate hydration  - Administer ordered medications as needed  - Offer frequent toileting  - Follow urinary retention protocol if ordered  Outcome: Progressing  Goal: Absence of urinary retention  Description: INTERVENTIONS:  - Assess patient’s ability to void and empty bladder  - Monitor I/O  - Bladder scan as needed  - Discuss with physician/AP medications to alleviate retention as needed  - Discuss catheterization for long term situations as appropriate  Outcome: Progressing     Problem: Nutrition/Hydration-ADULT  Goal: Nutrient/Hydration intake appropriate for improving, restoring or maintaining nutritional needs  Description: Monitor and assess patient's nutrition/hydration status for malnutrition. Collaborate with interdisciplinary team and initiate plan and interventions as ordered.  Monitor patient's weight and dietary intake as ordered or per policy. Utilize nutrition screening tool and intervene as necessary. Determine patient's food preferences and provide high-protein, high-caloric foods as appropriate.     INTERVENTIONS:  - Monitor oral intake, urinary output, labs, and treatment plans  - Assess nutrition and hydration status and recommend course of action  - Evaluate amount of meals eaten  - Assist patient with eating if necessary   - Allow adequate time for meals  - Recommend/ encourage appropriate diets, oral nutritional supplements, and vitamin/mineral supplements  - Order, calculate, and assess calorie counts as needed  - Recommend, monitor, and adjust tube feedings and TPN/PPN based on assessed needs  - Assess need for intravenous fluids  - Provide specific nutrition/hydration education as appropriate  - Include patient/family/caregiver in decisions related to nutrition  Outcome: Progressing

## 2024-06-25 NOTE — PROGRESS NOTES
"    Progress Note - Nephrology   Luis Forrester 53 y.o. male MRN: [id number]  Unit/Bed#: [facility] 605-01 Encounter: [id number]      Assessment / Plan:  1.  Hyponatremia-serum sodium dropped in the setting of DDAVP to 125, quickly corrected with IV fluid, DDAVP given  morning for polyuria with serum sodium now 141.  Monitor off D5W.    -goal sNa normal, at normal range, continue to monitor UOP and BMP  2.  Polyuria-urine output has slowed down in the setting of DDAVP.  Patient has had significant response of this in the past.  Will need to monitor serum sodium and urine output closely. Acceptable UOP so far today.  3.  Hypokalemia and hypomagnesemia in the setting of diarrhea and polyuria-last potassium 3.3 and stable, despite oral repletion, low magnesium noted. Will provide 2g IV mag sulfate and repeat BMP along with mag this evening. Replete and monitor mag. Will provide 40meq oral KCl now.  4.  Hypocalcemia-post IV calcium, on oral vitamin D 1000u daily, monitor off calcitriol, check CMP  5. Hyperphosphatemia-status post repletion, last phos 1.9, f/u am phos  6.  Hypotension-BP acceptable, on midodrine 10 mg p.o. 3 times daily  7.  Subdural hemorrhage,-management per primary team  8. 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 SSS s/p pacer         Subjective:   Denies chest pain, shortness breath, difficulty with urination or dizziness.  He is drinking to thirst.  No complaints.      Objective:     Vitals: Blood pressure 114/70, pulse 63, temperature 99 °F (37.2 °C), resp. rate 18, height 5' 6\" (1.676 m), weight 78.9 kg (174 lb), SpO2 96%.,Body mass index is 28.08 kg/m².Temp (24hrs), Av.9 °F (37.2 °C), Min:98.1 °F (36.7 °C), Max:99.9 °F (37.7 °C)      Weight (last 2 days)       None              Intake/Output Summary (Last 24 hours) at 2024 1347  Last data filed at 2024 1011  Gross per 24 hour   Intake 360 ml   Output 1830 ml   Net -1470 ml     I/O " last 24 hours:  In: 360 [P.O.:360]  Out: 1830 [Urine:1830]        Physical Exam:   Physical Exam  Vitals reviewed.   Constitutional:       General: He is not in acute distress.     Appearance: Normal appearance. He is well-developed. He is not diaphoretic.   HENT:      Head: Normocephalic and atraumatic.      Nose: Nose normal.      Mouth/Throat:      Mouth: Mucous membranes are dry.      Pharynx: No oropharyngeal exudate.   Eyes:      General: No scleral icterus.        Right eye: No discharge.         Left eye: No discharge.      Comments: eyeglasses   Neck:      Thyroid: No thyromegaly.   Cardiovascular:      Rate and Rhythm: Normal rate and regular rhythm.      Heart sounds: Normal heart sounds.   Pulmonary:      Effort: Pulmonary effort is normal.      Breath sounds: Normal breath sounds. No wheezing or rales.   Abdominal:      General: Bowel sounds are normal. There is no distension.      Palpations: Abdomen is soft.      Tenderness: There is no abdominal tenderness.   Genitourinary:     Comments: External urinary catheter  Musculoskeletal:         General: No swelling. Normal range of motion.      Cervical back: Neck supple.   Lymphadenopathy:      Cervical: No cervical adenopathy.   Skin:     General: Skin is warm and dry.      Coloration: Skin is not jaundiced.      Findings: No rash.   Neurological:      General: No focal deficit present.      Mental Status: He is alert.      Comments: awake   Psychiatric:      Comments: Odd affect but pleasant         Invasive Devices       Peripheral Intravenous Line  Duration             Peripheral IV 06/21/24 Left;Proximal;Ventral (anterior) Forearm 4 days    Peripheral IV 06/25/24 Right Antecubital <1 day              Drain  Duration             External Urinary Catheter 2 days                    Medications:    Scheduled Meds:  Current Facility-Administered Medications   Medication Dose Route Frequency Provider Last Rate    acetaminophen  975 mg Oral Q8H ASHLYN Cedeño  MD Aimee      albuterol  2 puff Inhalation Q4H PRN Gala Yu MD      cephalexin  500 mg Oral Q6H [facility] Gala Yu MD      Cholecalciferol  1,000 Units Oral Daily Gala Yu MD      enoxaparin  30 mg Subcutaneous Q12H [facility] Gala Yu MD      gabapentin  100 mg Oral TID Gala Yu MD      HYDROmorphone  0.2 mg Intravenous Q2H PRN Gala Yu MD      levothyroxine  125 mcg Oral Early Morning Gala Yu MD      methocarbamol  500 mg Oral Q6H [facility] Gala Yu MD      midodrine  10 mg Oral Q8H Gala Yu MD      naloxone  0.04 mg Intravenous Q1MIN PRN Gala Yu MD      ondansetron  4 mg Intravenous Q4H PRN Gala Yu MD      oxyCODONE  2.5 mg Oral Q4H PRN Gala Yu MD      Or    oxyCODONE  5 mg Oral Q4H PRN Gala Yu MD      polyethylene glycol  17 g Oral Daily Gala Yu MD      senna-docusate sodium  2 tablet Oral BID Gala Yu MD         PRN Meds:.  albuterol    HYDROmorphone    naloxone    ondansetron    oxyCODONE **OR** oxyCODONE    Continuous Infusions:         LAB RESULTS:      Results from last 7 days   Lab Units 06/25/24  0607 06/24/24  2115 06/24/24  1317 06/24/24  0832 06/24/24  0149 06/23/24  1352 06/23/24  0546 06/22/24  1505 06/22/24  0539 06/21/24  0853 06/21/24  0543 06/20/24  1439 06/20/24  0428 06/19/24  1434 06/19/24  0519 06/18/24  1829   WBC Thousand/uL  --   --   --   --   --   --  8.08  --  8.66  --  9.15  --  12.92*  --  14.45*  --    HEMOGLOBIN g/dL  --   --   --   --   --   --  11.0*  --  10.4*  --  10.7*  --  12.2  --  13.2  --    HEMATOCRIT %  --   --   --   --   --   --  31.9*  --  30.7*  --  32.2*  --  36.4*  --  39.1  --    PLATELETS Thousands/uL  --   --   --   --   --   --  165  --  164  --  189  --  212  --  232  --    SEGS PCT %  --   --   --   --   --   --   --   --   --   --  66  --  71  --  67  --    LYMPHO PCT %  --   --   --    "--   --   --   --   --   --   --  16  --  14  --  18  --    MONO PCT %  --   --   --   --   --   --   --   --   --   --  12  --  12  --  11  --    EOS PCT %  --   --   --   --   --   --   --   --   --   --  5  --  1  --  2  --    POTASSIUM mmol/L 3.3* 3.3* 3.3* 3.2* 3.1* 3.1* 3.1*   < > 2.9*   < > 3.8   < > 3.8   < > 3.9 4.0   CHLORIDE mmol/L 102 98 98 101 101 101 99   < > 91*   < > 93*   < > 94*   < > 93* 95*   CO2 mmol/L 28 27 28 26 29 29 26   < > 22   < > 26   < > 30   < > 31 31   BUN mg/dL 3* 3* 3* 2* 2* 5 7   < > 10   < > 12   < > 12   < > 11 10   CREATININE mg/dL 0.55* 0.58* 0.52* 0.61 0.49* 0.46* 0.50*   < > 0.48*   < > 0.50*   < > 0.62   < > 0.67 0.70   CALCIUM mg/dL 7.3* 7.3* 6.8* 6.6* 6.9* 7.0* 7.0*   < > 6.6*   < > 6.3*   < > 6.6*   < > 6.7* 7.1*   MAGNESIUM mg/dL 1.7*  --   --   --   --   --  1.9  --  1.6*  --  1.7*  --  1.7*  --  2.0 2.2   PHOSPHORUS mg/dL  --   --   --   --   --   --  1.9*  --  2.4*  --  2.7  --  3.9  --   --  4.9*    < > = values in this interval not displayed.       CUTURES:  No results found for: \"BLOODCX\", \"URINECX\"              Portions of the record may have been created with voice recognition software. Occasional wrong word or \"sound a like\" substitutions may have occurred due to the inherent limitations of voice recognition software. Read the chart carefully and recognize, using context, where substitutions have occurred.If you have any questions, please contact the dictating provider.    "

## 2024-06-25 NOTE — PLAN OF CARE
Problem: Prexisting or High Potential for Compromised Skin Integrity  Goal: Skin integrity is maintained or improved  Description: INTERVENTIONS:  - Identify patients at risk for skin breakdown  - Assess and monitor skin integrity  - Assess and monitor nutrition and hydration status  - Monitor labs   - Assess for incontinence   - Turn and reposition patient  - Assist with mobility/ambulation  - Relieve pressure over bony prominences  - Avoid friction and shearing  - Provide appropriate hygiene as needed including keeping skin clean and dry  - Evaluate need for skin moisturizer/barrier cream  - Collaborate with interdisciplinary team   - Patient/family teaching  - Consider wound care consult   Outcome: Progressing     Problem: PAIN - ADULT  Goal: Verbalizes/displays adequate comfort level or baseline comfort level  Description: Interventions:  - Encourage patient to monitor pain and request assistance  - Assess pain using appropriate pain scale  - Administer analgesics based on type and severity of pain and evaluate response  - Implement non-pharmacological measures as appropriate and evaluate response  - Consider cultural and social influences on pain and pain management  - Notify physician/advanced practitioner if interventions unsuccessful or patient reports new pain  Outcome: Progressing     Problem: INFECTION - ADULT  Goal: Absence or prevention of progression during hospitalization  Description: INTERVENTIONS:  - Assess and monitor for signs and symptoms of infection  - Monitor lab/diagnostic results  - Monitor all insertion sites, i.e. indwelling lines, tubes, and drains  - Monitor endotracheal if appropriate and nasal secretions for changes in amount and color  - [place] appropriate cooling/warming therapies per order  - Administer medications as ordered  - Instruct and encourage patient and family to use good hand hygiene technique  - Identify and instruct in appropriate isolation precautions for  identified infection/condition  Outcome: Progressing     Problem: SAFETY ADULT  Goal: Patient will remain free of falls  Description: INTERVENTIONS:  - Educate patient/family on patient safety including physical limitations  - Instruct patient to call for assistance with activity   - Consult OT/PT to assist with strengthening/mobility   - Keep Call bell within reach  - Keep bed low and locked with side rails adjusted as appropriate  - Keep care items and personal belongings within reach  - Initiate and maintain comfort rounds  - Make Fall Risk Sign visible to staff  - Apply yellow socks and bracelet for high fall risk patients  - Consider moving patient to room near nurses station  Outcome: Progressing  Goal: Maintain or return to baseline ADL function  Description: INTERVENTIONS:  -  Assess patient's ability to carry out ADLs; assess patient's baseline for ADL function and identify physical deficits which impact ability to perform ADLs (bathing, care of mouth/teeth, toileting, grooming, dressing, etc.)  - Assess/evaluate cause of self-care deficits   - Assess range of motion  - Assess patient's mobility; develop plan if impaired  - Assess patient's need for assistive devices and provide as appropriate  - Encourage maximum independence but intervene and supervise when necessary  - Involve family in performance of ADLs  - Assess for home care needs following discharge   - Consider OT consult to assist with ADL evaluation and planning for discharge  - Provide patient education as appropriate  Outcome: Progressing  Goal: Maintains/Returns to pre admission functional level  Description: INTERVENTIONS:  - Perform AM-PAC 6 Click Basic Mobility/ Daily Activity assessment daily.  - Set and communicate daily mobility goal to care team and patient/family/caregiver.   - Collaborate with rehabilitation services on mobility goals if consulted  - Out of bed for toileting  - Record patient progress and toleration of activity level    Outcome: Progressing     Problem: DISCHARGE PLANNING  Goal: Discharge to home or other facility with appropriate resources  Description: INTERVENTIONS:  - Identify barriers to discharge w/patient and caregiver  - Arrange for needed discharge resources and transportation as appropriate  - Identify discharge learning needs (meds, wound care, etc.)  - Arrange for interpretive services to assist at discharge as needed  - Refer to Case Management Department for coordinating discharge planning if the patient needs post-hospital services based on physician/advanced practitioner order or complex needs related to functional status, cognitive ability, or social support system  Outcome: Progressing     Problem: Knowledge Deficit  Goal: Patient/family/caregiver demonstrates understanding of disease process, treatment plan, medications, and discharge instructions  Description: Complete learning assessment and assess knowledge base.  Interventions:  - Provide teaching at level of understanding  - Provide teaching via preferred learning methods  Outcome: Progressing     Problem: NEUROSENSORY - ADULT  Goal: Achieves stable or improved neurological status  Description: INTERVENTIONS  - Monitor and report changes in neurological status  - Monitor vital signs such as temperature, blood pressure, glucose, and any other labs ordered   - Initiate measures to prevent increased intracranial pressure  - Monitor for seizure activity and implement precautions if appropriate      Outcome: Progressing  Goal: Remains free of injury related to seizures activity  Description: INTERVENTIONS  - Maintain airway, patient safety  and administer oxygen as ordered  - Monitor patient for seizure activity, document and report duration and description of seizure to physician/advanced practitioner  - If seizure occurs,  ensure patient safety during seizure  - Reorient patient post seizure  - Seizure pads on all 4 side rails  - Instruct patient/family to  notify RN of any seizure activity including if an aura is experienced  - Instruct patient/family to call for assistance with activity based on nursing assessment  - Administer anti-seizure medications if ordered    Outcome: Progressing  Goal: Achieves maximal functionality and self care  Description: INTERVENTIONS  - Monitor swallowing and airway patency with patient fatigue and changes in neurological status  - Encourage and assist patient to increase activity and self care.   - Encourage visually impaired, hearing impaired and aphasic patients to use assistive/communication devices  Outcome: Progressing     Problem: CARDIOVASCULAR - ADULT  Goal: Maintains optimal cardiac output and hemodynamic stability  Description: INTERVENTIONS:  - Monitor I/O, vital signs and rhythm  - Monitor for S/S and trends of decreased cardiac output  - Administer and titrate ordered vasoactive medications to optimize hemodynamic stability  - Assess quality of pulses, skin color and temperature  - Assess for signs of decreased coronary artery perfusion  - Instruct patient to report change in severity of symptoms  Outcome: Progressing  Goal: Absence of cardiac dysrhythmias or at baseline rhythm  Description: INTERVENTIONS:  - Continuous cardiac monitoring, vital signs, obtain 12 lead EKG if ordered  - Administer antiarrhythmic and heart rate control medications as ordered  - Monitor electrolytes and administer replacement therapy as ordered  Outcome: Progressing     Problem: RESPIRATORY - ADULT  Goal: Achieves optimal ventilation and oxygenation  Description: INTERVENTIONS:  - Assess for changes in respiratory status  - Assess for changes in mentation and behavior  - Position to facilitate oxygenation and minimize respiratory effort  - Oxygen administered by appropriate delivery if ordered  - Initiate smoking cessation education as indicated  - Encourage broncho-pulmonary hygiene including cough, deep breathe, Incentive Spirometry  -  Assess the need for suctioning and aspirate as needed  - Assess and instruct to report SOB or any respiratory difficulty  - Respiratory Therapy support as indicated  Outcome: Progressing     Problem: GASTROINTESTINAL - ADULT  Goal: Minimal or absence of nausea and/or vomiting  Description: INTERVENTIONS:  - Administer IV fluids if ordered to ensure adequate hydration  - Maintain NPO status until nausea and vomiting are resolved  - Nasogastric tube if ordered  - Administer ordered antiemetic medications as needed  - Provide nonpharmacologic comfort measures as appropriate  - Advance diet as tolerated, if ordered  - Consider nutrition services referral to assist patient with adequate nutrition and appropriate food choices  Outcome: Progressing  Goal: Maintains or returns to baseline bowel function  Description: INTERVENTIONS:  - Assess bowel function  - Encourage oral fluids to ensure adequate hydration  - Administer IV fluids if ordered to ensure adequate hydration  - Administer ordered medications as needed  - Encourage mobilization and activity  - Consider nutritional services referral to assist patient with adequate nutrition and appropriate food choices  Outcome: Progressing  Goal: Maintains adequate nutritional intake  Description: INTERVENTIONS:  - Monitor percentage of each meal consumed  - Identify factors contributing to decreased intake, treat as appropriate  - Assist with meals as needed  - Monitor I&O, weight, and lab values if indicated  - Obtain nutrition services referral as needed  Outcome: Progressing  Goal: Oral mucous membranes remain intact  Description: INTERVENTIONS  - Assess oral mucosa and hygiene practices  - Implement preventative oral hygiene regimen  - Implement oral medicated treatments as ordered  - Initiate Nutrition services referral as needed  Outcome: Progressing     Problem: GENITOURINARY - ADULT  Goal: Maintains or returns to baseline urinary function  Description:  INTERVENTIONS:  - Assess urinary function  - Encourage oral fluids to ensure adequate hydration if ordered  - Administer IV fluids as ordered to ensure adequate hydration  - Administer ordered medications as needed  - Offer frequent toileting  - Follow urinary retention protocol if ordered  Outcome: Progressing  Goal: Absence of urinary retention  Description: INTERVENTIONS:  - Assess patient’s ability to void and empty bladder  - Monitor I/O  - Bladder scan as needed  - Discuss with physician/AP medications to alleviate retention as needed  - Discuss catheterization for long term situations as appropriate  Outcome: Progressing     Problem: Nutrition/Hydration-ADULT  Goal: Nutrient/Hydration intake appropriate for improving, restoring or maintaining nutritional needs  Description: Monitor and assess patient's nutrition/hydration status for malnutrition. Collaborate with interdisciplinary team and initiate plan and interventions as ordered.  Monitor patient's weight and dietary intake as ordered or per policy. Utilize nutrition screening tool and intervene as necessary. Determine patient's food preferences and provide high-protein, high-caloric foods as appropriate.     INTERVENTIONS:  - Monitor oral intake, urinary output, labs, and treatment plans  - Assess nutrition and hydration status and recommend course of action  - Evaluate amount of meals eaten  - Assist patient with eating if necessary   - Allow adequate time for meals  - Recommend/ encourage appropriate diets, oral nutritional supplements, and vitamin/mineral supplements  - Order, calculate, and assess calorie counts as needed  - Recommend, monitor, and adjust tube feedings and TPN/PPN based on assessed needs  - Assess need for intravenous fluids  - Provide specific nutrition/hydration education as appropriate  - Include patient/family/caregiver in decisions related to nutrition  Outcome: Progressing

## 2024-06-25 NOTE — PHYSICAL THERAPY NOTE
Physical Therapy Cancellation Note     06/25/24 1640   Note Type   Note Type Cancelled Session   Cancel Reasons Other  (attempted to see twice this PM.  Pt initially eating late lunch when 1st attempted, then was getting washed by RN staff s/p bed level toileting & about to get STAT blood work per PCA.  PT to follow and treat when appropriate)         David Moulton, PTA

## 2024-06-25 NOTE — SPEECH THERAPY NOTE
"Speech Language/Pathology    Speech/Language Pathology Progress Note    Patient Name: Luis Forrester  Today's Date: 6/25/2024     Problem List  Principal Problem:    TBI (traumatic brain injury) (Formerly [facility])  Active Problems:    Hypothyroidism    SDH (subdural hematoma) (Formerly [facility])    SSS (sick sinus syndrome) (Formerly [facility])    s/p Medtronic dual chamber PPM 6/21/2024    Hyponatremia       Past Medical History  Past Medical History:   Diagnosis Date    Arthritis     Chronic cough     Disease of thyroid gland     Hypoparathyroidism ([facility])     continue taking calcium and vitamin D, will check CMP, PTH level and Vitamin D level    Pneumonia of right middle lobe due to Streptococcus pneumoniae ([facility]) 11/30/2018    s/p Medtronic dual chamber PPM 6/21/2024 06/21/2024        Past Surgical History  Past Surgical History:   Procedure Laterality Date    CARDIAC ELECTROPHYSIOLOGY PROCEDURE N/A 6/21/2024    Procedure: Cardiac pacer implant;  Surgeon: Kofi Pettit MD;  Location: [facility];  Service: Cardiology    DECOMPRESSION SPINE CERVICAL POSTERIOR N/A 6/16/2024    Procedure: DECOMPRESSION SPINE CERVICAL POSTERIOR C2-T1 with fusion navigated with Oarm;  Surgeon: Endy Velasquez MD;  Location: [facility] MAIN OR;  Service: Neurosurgery    FRACTURE SURGERY      Open Treatment of Each Proximal Finger Phalanx         Subjective:  \"It just feels like its getting stuck\" Patient is awake and alert.     Objective:  The patient is seen for dysphagia therapy at lunch meal. Lunch tray arrived and patient only ordered ice cream and thin liquid. With encouragement he is agreeable to mac and cheese and pretzels. He feeds himself. Mastication time is prolonged, with piecemeal deglutition. Suspect decreased laryngeal rise. Patient has multiple swallows to clear, and c/o food \"sticking\" with each swallow. Patient consistently uses liquid wash to help clear. No overt s/s aspiration observed. Patient re-educated on current diet (level 3) vs regular diet. He " "is agreeable to continue this diet and trial upgrades in therapy. Patient does admit to being a picky eater at baseline. Question if patient is experiencing post op swelling. Can consider VBS if current concerns continue.    Assessment:  Patient continues to c/o food \"sticking\" but clears with liquid wash.    Plan/Recommendations:  Continue level 3 diet and thin liquids. Continue ST to further assess tolerance and trial upgrades. Consider VBS as warranted.      "

## 2024-06-25 NOTE — QUICK NOTE
Patient seen for SB/LB distention on KUB. Repeat KUB stable. On exam patient has been tolerating PO and having diarrhea x2 today. No nausea/emesis. OK for noncarbonated clears tonight, AM KUB and if still doing well will re-advance diet. HoB > 30 degrees / aspiration precautions. Check labs now to r/o new leukocytosis or significant electrolyte abnormalities.

## 2024-06-26 ENCOUNTER — APPOINTMENT (INPATIENT)
Dept: RADIOLOGY | Facility: HOSPITAL | Age: 54
DRG: 912 | End: 2024-06-26
Payer: COMMERCIAL

## 2024-06-26 LAB
ANION GAP SERPL CALCULATED.3IONS-SCNC: 10 MMOL/L (ref 4–13)
ANION GAP SERPL CALCULATED.3IONS-SCNC: 6 MMOL/L (ref 4–13)
BASOPHILS # BLD AUTO: 0.03 THOUSANDS/ÂΜL (ref 0–0.1)
BASOPHILS NFR BLD AUTO: 0 % (ref 0–1)
BUN SERPL-MCNC: 4 MG/DL (ref 5–25)
BUN SERPL-MCNC: 4 MG/DL (ref 5–25)
CA-I BLD-SCNC: 1 MMOL/L (ref 1.12–1.32)
CALCIUM SERPL-MCNC: 7.6 MG/DL (ref 8.4–10.2)
CALCIUM SERPL-MCNC: 7.7 MG/DL (ref 8.4–10.2)
CHLORIDE SERPL-SCNC: 101 MMOL/L (ref 96–108)
CHLORIDE SERPL-SCNC: 102 MMOL/L (ref 96–108)
CO2 SERPL-SCNC: 27 MMOL/L (ref 21–32)
CO2 SERPL-SCNC: 30 MMOL/L (ref 21–32)
CREAT SERPL-MCNC: 0.55 MG/DL (ref 0.6–1.3)
CREAT SERPL-MCNC: 0.55 MG/DL (ref 0.6–1.3)
EOSINOPHIL # BLD AUTO: 0.31 THOUSAND/ÂΜL (ref 0–0.61)
EOSINOPHIL NFR BLD AUTO: 5 % (ref 0–6)
ERYTHROCYTE [DISTWIDTH] IN BLOOD BY AUTOMATED COUNT: 14 % (ref 11.6–15.1)
GFR SERPL CREATININE-BSD FRML MDRD: 117 ML/MIN/1.73SQ M
GFR SERPL CREATININE-BSD FRML MDRD: 117 ML/MIN/1.73SQ M
GLUCOSE SERPL-MCNC: 87 MG/DL (ref 65–140)
GLUCOSE SERPL-MCNC: 98 MG/DL (ref 65–140)
HCT VFR BLD AUTO: 34.5 % (ref 36.5–49.3)
HGB BLD-MCNC: 11.4 G/DL (ref 12–17)
IMM GRANULOCYTES # BLD AUTO: 0.1 THOUSAND/UL (ref 0–0.2)
IMM GRANULOCYTES NFR BLD AUTO: 2 % (ref 0–2)
LYMPHOCYTES # BLD AUTO: 1.33 THOUSANDS/ÂΜL (ref 0.6–4.47)
LYMPHOCYTES NFR BLD AUTO: 20 % (ref 14–44)
MAGNESIUM SERPL-MCNC: 2.1 MG/DL (ref 1.9–2.7)
MCH RBC QN AUTO: 31.9 PG (ref 26.8–34.3)
MCHC RBC AUTO-ENTMCNC: 33 G/DL (ref 31.4–37.4)
MCV RBC AUTO: 97 FL (ref 82–98)
MONOCYTES # BLD AUTO: 0.84 THOUSAND/ÂΜL (ref 0.17–1.22)
MONOCYTES NFR BLD AUTO: 12 % (ref 4–12)
NEUTROPHILS # BLD AUTO: 4.16 THOUSANDS/ÂΜL (ref 1.85–7.62)
NEUTS SEG NFR BLD AUTO: 61 % (ref 43–75)
NRBC BLD AUTO-RTO: 0 /100 WBCS
PHOSPHATE SERPL-MCNC: 4 MG/DL (ref 2.7–4.5)
PLATELET # BLD AUTO: 219 THOUSANDS/UL (ref 149–390)
PMV BLD AUTO: 9.9 FL (ref 8.9–12.7)
POTASSIUM SERPL-SCNC: 3.7 MMOL/L (ref 3.5–5.3)
POTASSIUM SERPL-SCNC: 3.9 MMOL/L (ref 3.5–5.3)
RBC # BLD AUTO: 3.57 MILLION/UL (ref 3.88–5.62)
SODIUM SERPL-SCNC: 137 MMOL/L (ref 135–147)
SODIUM SERPL-SCNC: 139 MMOL/L (ref 135–147)
WBC # BLD AUTO: 6.77 THOUSAND/UL (ref 4.31–10.16)

## 2024-06-26 PROCEDURE — 97530 THERAPEUTIC ACTIVITIES: CPT

## 2024-06-26 PROCEDURE — 82330 ASSAY OF CALCIUM: CPT | Performed by: SURGERY

## 2024-06-26 PROCEDURE — NC001 PR NO CHARGE: Performed by: INTERNAL MEDICINE

## 2024-06-26 PROCEDURE — 84100 ASSAY OF PHOSPHORUS: CPT | Performed by: INTERNAL MEDICINE

## 2024-06-26 PROCEDURE — 73130 X-RAY EXAM OF HAND: CPT

## 2024-06-26 PROCEDURE — 99233 SBSQ HOSP IP/OBS HIGH 50: CPT | Performed by: SURGERY

## 2024-06-26 PROCEDURE — 85025 COMPLETE CBC W/AUTO DIFF WBC: CPT | Performed by: SURGERY

## 2024-06-26 PROCEDURE — 80048 BASIC METABOLIC PNL TOTAL CA: CPT | Performed by: INTERNAL MEDICINE

## 2024-06-26 PROCEDURE — 74018 RADEX ABDOMEN 1 VIEW: CPT

## 2024-06-26 PROCEDURE — 83735 ASSAY OF MAGNESIUM: CPT | Performed by: INTERNAL MEDICINE

## 2024-06-26 PROCEDURE — 97116 GAIT TRAINING THERAPY: CPT

## 2024-06-26 RX ORDER — BISACODYL 5 MG/1
5 TABLET, DELAYED RELEASE ORAL DAILY PRN
Status: DISCONTINUED | OUTPATIENT
Start: 2024-06-26 | End: 2024-06-30

## 2024-06-26 RX ORDER — CALCIUM GLUCONATE 20 MG/ML
2 INJECTION, SOLUTION INTRAVENOUS ONCE
Status: COMPLETED | OUTPATIENT
Start: 2024-06-26 | End: 2024-06-26

## 2024-06-26 RX ORDER — POTASSIUM CHLORIDE 20 MEQ/1
40 TABLET, EXTENDED RELEASE ORAL ONCE
Status: COMPLETED | OUTPATIENT
Start: 2024-06-26 | End: 2024-06-26

## 2024-06-26 RX ADMIN — GABAPENTIN 100 MG: 100 CAPSULE ORAL at 21:49

## 2024-06-26 RX ADMIN — GABAPENTIN 100 MG: 100 CAPSULE ORAL at 08:13

## 2024-06-26 RX ADMIN — METHOCARBAMOL 500 MG: 500 TABLET ORAL at 05:11

## 2024-06-26 RX ADMIN — ACETAMINOPHEN 975 MG: 325 TABLET, FILM COATED ORAL at 05:11

## 2024-06-26 RX ADMIN — ACETAMINOPHEN 975 MG: 325 TABLET, FILM COATED ORAL at 14:57

## 2024-06-26 RX ADMIN — MIDODRINE HYDROCHLORIDE 10 MG: 5 TABLET ORAL at 04:10

## 2024-06-26 RX ADMIN — ACETAMINOPHEN 975 MG: 325 TABLET, FILM COATED ORAL at 21:40

## 2024-06-26 RX ADMIN — ENOXAPARIN SODIUM 30 MG: 30 INJECTION SUBCUTANEOUS at 21:48

## 2024-06-26 RX ADMIN — LEVOTHYROXINE SODIUM 125 MCG: 125 TABLET ORAL at 05:11

## 2024-06-26 RX ADMIN — METHOCARBAMOL 500 MG: 500 TABLET ORAL at 11:26

## 2024-06-26 RX ADMIN — CALCIUM GLUCONATE 2 G: 20 INJECTION, SOLUTION INTRAVENOUS at 07:35

## 2024-06-26 RX ADMIN — Medication 1000 UNITS: at 08:13

## 2024-06-26 RX ADMIN — CEPHALEXIN 500 MG: 500 CAPSULE ORAL at 18:12

## 2024-06-26 RX ADMIN — CEPHALEXIN 500 MG: 500 CAPSULE ORAL at 05:11

## 2024-06-26 RX ADMIN — GABAPENTIN 100 MG: 100 CAPSULE ORAL at 15:44

## 2024-06-26 RX ADMIN — METHOCARBAMOL 500 MG: 500 TABLET ORAL at 18:13

## 2024-06-26 RX ADMIN — ENOXAPARIN SODIUM 30 MG: 30 INJECTION SUBCUTANEOUS at 08:13

## 2024-06-26 RX ADMIN — POTASSIUM CHLORIDE 40 MEQ: 1500 TABLET, EXTENDED RELEASE ORAL at 07:31

## 2024-06-26 RX ADMIN — MIDODRINE HYDROCHLORIDE 10 MG: 5 TABLET ORAL at 18:15

## 2024-06-26 RX ADMIN — MIDODRINE HYDROCHLORIDE 10 MG: 5 TABLET ORAL at 11:26

## 2024-06-26 RX ADMIN — CEPHALEXIN 500 MG: 500 CAPSULE ORAL at 11:26

## 2024-06-26 NOTE — ASSESSMENT & PLAN NOTE
Abdominal distention following PPM placement  -Patient and wife report worsening abdominal distention today  -Patient reports multiple liquid bowel movements and passing flatus at this time  -KUB repeated 6/26, Concern for ileus   -Replete electrolytes as needed   -Monitor bowel function

## 2024-06-26 NOTE — PROGRESS NOTES
Pt now denying anymore hand pain. Tylenol given as scheduled. Frequent loose stools. Therapy at bedside to help move patient to chair. Vss. Call bell within reach, chair locked.

## 2024-06-26 NOTE — PLAN OF CARE
Problem: PHYSICAL THERAPY ADULT  Goal: Performs mobility at highest level of function for planned discharge setting.  See evaluation for individualized goals.  Description: Treatment/Interventions: Functional transfer training, LE strengthening/ROM, Elevations, Therapeutic exercise, Endurance training, Cognitive reorientation, Equipment eval/education, Bed mobility, Gait training, Spoke to nursing, OT  Equipment Recommended: Other (Comment) (TBD)       See flowsheet documentation for full assessment, interventions and recommendations.  Outcome: Progressing  Note: Prognosis: Fair  Problem List: Decreased strength, Decreased endurance, Impaired balance, Decreased mobility, Decreased coordination, Decreased cognition, Decreased safety awareness, Pain  Assessment: Pt demonstrated improved functional endurance & mobility this session, performing all transfers with decreased assist overall, then ambulated up to household distances with use of RW & no overt LOB despite gait deviations as noted above.  Pt requires instructions for hand placement & sequencing of all tasks with inconsistant follow through, which will likely benefit from ongoing reinforcement to ensure safe negotiation of his environment.  PT POC & d/c recommendations remain appropriate at this time.  Brief discussion held with pt during session regarding importance of mobility & expected recovery pending ongoing medical improvements.  Barriers to Discharge: Inaccessible home environment     Rehab Resource Intensity Level, PT: I (Maximum Resource Intensity)    See flowsheet documentation for full assessment.

## 2024-06-26 NOTE — PHYSICAL THERAPY NOTE
Physical Therapy Progress Note     06/26/24 1515   PT Last Visit   PT Visit Date 06/26/24   Note Type   Note Type Treatment   Pain Assessment   Pain Score No Pain   Restrictions/Precautions   Other Precautions Cognitive;Chair Alarm;Bed Alarm;Pain;Fall Risk;Spinal precautions   Subjective   Subjective pt encountered supine in bed, completing bed level toileting.  Observed to have liquid BM which was cleaned prior to initiating mobility.  Pt reports no pain & only transient dizziness upon sitting that resided before ambualtion.   Bed Mobility   Supine to Sit 3  Moderate assistance   Additional items Assist x 1;HOB elevated;Increased time required   Transfers   Sit to Stand 4  Minimal assistance   Additional items Assist x 1;Armrests;Increased time required;Verbal cues   Stand to Sit 4  Minimal assistance   Additional items Assist x 1;Armrests;Increased time required;Verbal cues   Stand pivot 4  Minimal assistance   Additional items Assist x 1;Armrests;Increased time required;Verbal cues   Ambulation/Elevation   Gait pattern Ataxia;Short stride;Foward flexed;Inconsistent shay;Shuffling;Decreased foot clearance;Narrow YARIEL;Improper Weight shift   Gait Assistance 4  Minimal assist   Additional items Assist x 1   Assistive Device Rolling walker   Distance 50'x  2   Balance   Static Sitting Fair   Static Standing Poor +   Ambulatory Poor   Activity Tolerance   Activity Tolerance Patient tolerated treatment well;Patient limited by fatigue   Nurse Made Aware LUCRECIA Ponce   Assessment   Prognosis Fair   Problem List Decreased strength;Decreased endurance;Impaired balance;Decreased mobility;Decreased coordination;Decreased cognition;Decreased safety awareness;Pain   Assessment Pt demonstrated improved functional endurance & mobility this session, performing all transfers with decreased assist overall, then ambulated up to household distances with use of RW & no overt LOB despite gait deviations as noted above.  Pt requires  instructions for hand placement & sequencing of all tasks with inconsistant follow through, which will likely benefit from ongoing reinforcement to ensure safe negotiation of his environment.  PT POC & d/c recommendations remain appropriate at this time.  Brief discussion held with pt during session regarding importance of mobility & expected recovery pending ongoing medical improvements.   Goals   Patient Goals to get back to normal   STG Expiration Date 06/27/24   PT Treatment Day 2   Plan   Treatment/Interventions Functional transfer training;LE strengthening/ROM;Elevations;Therapeutic exercise;Endurance training;Equipment eval/education;Patient/family training;Bed mobility;Gait training   Progress Progressing toward goals   PT Frequency 3-5x/wk   Discharge Recommendation   Rehab Resource Intensity Level, PT I (Maximum Resource Intensity)   Equipment Recommended Walker   Walker Package Recommended Wheeled walker   AM-PAC Basic Mobility Inpatient   Turning in Flat Bed Without Bedrails 3   Lying on Back to Sitting on Edge of Flat Bed Without Bedrails 2   Moving Bed to Chair 2   Standing Up From Chair Using Arms 2   Walk in Room 2   Climb 3-5 Stairs With Railing 1   Basic Mobility Inpatient Raw Score 12   Basic Mobility Standardized Score 32.23   [facility] Highest Level Of Mobility   -HLM Goal 4: Move to chair/commode   -HLM Achieved 7: Walk 25 feet or more       David Moulton PTA    [facility] Basic Mobility Raw Score less than 17 suggests pt would benefit from post acute rehab.  Please also refer to the recommendation of the Physical Therapist for safe discharge planning.

## 2024-06-26 NOTE — PROGRESS NOTES
[facility]  Progress Note  Name: Luis Forrester I  MRN: [id number]  Unit/Bed#: [facility] 605-01 I Date of Admission: 6/15/2024   Date of Service: 6/26/2024 I Hospital Day: 11    Assessment & Plan   Abdominal distension  Assessment & Plan  Abdominal distention following PPM placement  -Patient and wife report worsening abdominal distention today  -Patient reports multiple liquid bowel movements and passing flatus at this time  -KUB repeated 6/26, Concern for ileus   -Replete electrolytes as needed   -Monitor bowel function     Hyponatremia  Assessment & Plan  Drop appears associated to administration of DDAVP  Also with polyuria, questionable DI  Sodium has corrected with IV fluids  Neurologically appears intact  Appreciate nephrology input and recommendations-goal of 130-134 today  -DDAVP dose given 6/24    s/p Medtronic dual chamber PPM 6/21/2024  Assessment & Plan  As above    SSS (sick sinus syndrome) ([facility])  Assessment & Plan  Status post PPM on 6/21  EPS has signed off on 6/22    SDH (subdural hematoma) ([facility])  Assessment & Plan  Continue frequent neurological checks.   STAT CT head with any neurological decline including drop GCS of 2pts within 1 hr.  Seizure prophylaxis per trauma team-completed  Hold all full antiplatelet and anticoagulation medications for 2 weeks      Hypothyroidism  Assessment & Plan  - Patient with chronic history of hypothyroidism.  - Continue home medication regimen including levothyroxine.  - Outpatient follow-up routine.    * TBI (traumatic brain injury) ([facility])  Assessment & Plan  - Traumatic brain injury with new acute appearing trace subarachnoid hemorrhage in the bilateral parafalcine regions as well as a new appearing trace parafalcine subdural hematoma, present on admission.  - CT scan of the head from 6/15/2024 demonstrated: New acute trace subarachnoid hemorrhage in bilateral parafalcine regions. New acute trace parafalcine subdural  hematoma.  -  Additional imaging with CTA of the head and neck 6/15/2024 demonstrated: Negative CTA head traumatic vascular injury, aneurysm, large vessel occlusion, high-grade stenosis, or dissection. Partially imaged ectatic right carotid bifurcation and proximal cervical internal carotid artery with retropharyngeal course, similar to CT neck with contrast 10/12/2011.  - Admit as level 2 step-down with HOT protocol.  The patient may require ICU level of care during hospital encounter if the patient has clinical decompensation or worsening of traumatic brain injury.  - Neurosurgery evaluation and recommendations appreciated.  - Hold anti-platelet and anticoagulant medications for least 2 weeks and/or until cleared by Neurosurgery.   - Patient is not on any chronic antiplatelet or anticoagulant medications at baseline.  - Continue Keppra for 7 days for seizure prophylaxis - completed  - Monitor neurologic exam.  - Continue symptomatic management with analgesia as needed.  - Tentative plan for repeat CT scan of the head in 12-24 hours or sooner if patient has clinical decline or drop in GCS > or equal to 2.  - PT and OT evaluation and treatment as indicated.             Bowel Regimen: Senokot, MiraLAX, Dulcolax suppositories  VTE Prophylaxis:Enoxaparin (Lovenox)     Disposition pending clinical improvement    Subjective     Subjective: Patient seen and examined at bedside.  Patient expressed no acute concerns at.  Family expressed concerns regarding abdominal distention.  Patient is having bowel movements and passing flatus at this time.  Patient denies abdominal pain.  Patient denies nausea vomiting fevers chills and shortness of breath and is tolerating a liquid diet.     Objective   Vitals:   Temp:  [99 °F (37.2 °C)-99.5 °F (37.5 °C)] 99 °F (37.2 °C)  HR:  [63-64] 63  Resp:  [16-18] 18  BP: (101-112)/(63-71) 106/69    I/O         06/24 0701  06/25 0700 06/25 0701  06/26 0700 06/26 0701  06/27 0700    P.O. 360 960  240    IV Piggyback  200     Total Intake(mL/kg) 360 (4.6) 1160 (14.7) 240 (3)    Urine (mL/kg/hr) 1150 (0.6) 1705 (0.9) 1550 (1.8)    Stool 0 0 0    Total Output 1150 1705 1550    Net -[phone]           Unmeasured Urine Occurrence 1 x      Unmeasured Stool Occurrence 3 x 2 x 1 x             Physical Exam:   GENERAL APPEARANCE: Resting comfortably, no acute distress  NEURO: Motor and sensory intact in the upper and lower extremities bilaterally  HEENT: Mucous membranes  CV: Well-perfused regular rate  LUNGS: Normal work of breathing on room air  GI: Soft, nontender, distended  MSK: No lower extremity edema bilaterally  SKIN: Posterior neck incision clean dry and intact    Invasive Devices       Peripheral Intravenous Line  Duration             Peripheral IV 06/25/24 Right Antecubital 1 day    Peripheral IV 06/25/24 Dorsal (posterior);Right Wrist <1 day                          Lab Results: Results: I have personally reviewed all pertinent laboratory/tests results, BMP/CMP:   Lab Results   Component Value Date    SODIUM 137 06/26/2024    K 3.9 06/26/2024     06/26/2024    CO2 30 06/26/2024    BUN 4 (L) 06/26/2024    CREATININE 0.55 (L) 06/26/2024    CALCIUM 7.6 (L) 06/26/2024    AST 33 06/25/2024    ALT 19 06/25/2024    ALKPHOS 70 06/25/2024    EGFR 117 06/26/2024   , and CBC:   Lab Results   Component Value Date    WBC 6.77 06/26/2024    HGB 11.4 (L) 06/26/2024    HCT 34.5 (L) 06/26/2024    MCV 97 06/26/2024     06/26/2024    RBC 3.57 (L) 06/26/2024    MCH 31.9 06/26/2024    MCHC 33.0 06/26/2024    RDW 14.0 06/26/2024    MPV 9.9 06/26/2024    NRBC 0 06/26/2024     Imaging: I have personally reviewed pertinent reports.     Other Studies:     Oscar Cesar MD  PGY1

## 2024-06-27 LAB
ANION GAP SERPL CALCULATED.3IONS-SCNC: 10 MMOL/L (ref 4–13)
ANION GAP SERPL CALCULATED.3IONS-SCNC: 11 MMOL/L (ref 4–13)
ANION GAP SERPL CALCULATED.3IONS-SCNC: 12 MMOL/L (ref 4–13)
ANION GAP SERPL CALCULATED.3IONS-SCNC: 12 MMOL/L (ref 4–13)
BUN SERPL-MCNC: 5 MG/DL (ref 5–25)
BUN SERPL-MCNC: 5 MG/DL (ref 5–25)
BUN SERPL-MCNC: 6 MG/DL (ref 5–25)
BUN SERPL-MCNC: 6 MG/DL (ref 5–25)
CA-I BLD-SCNC: 0.99 MMOL/L (ref 1.12–1.32)
CALCIUM SERPL-MCNC: 7.3 MG/DL (ref 8.4–10.2)
CALCIUM SERPL-MCNC: 7.4 MG/DL (ref 8.4–10.2)
CALCIUM SERPL-MCNC: 7.6 MG/DL (ref 8.4–10.2)
CALCIUM SERPL-MCNC: 7.6 MG/DL (ref 8.4–10.2)
CHLORIDE SERPL-SCNC: 100 MMOL/L (ref 96–108)
CHLORIDE SERPL-SCNC: 100 MMOL/L (ref 96–108)
CHLORIDE SERPL-SCNC: 101 MMOL/L (ref 96–108)
CHLORIDE SERPL-SCNC: 98 MMOL/L (ref 96–108)
CO2 SERPL-SCNC: 26 MMOL/L (ref 21–32)
CO2 SERPL-SCNC: 26 MMOL/L (ref 21–32)
CO2 SERPL-SCNC: 27 MMOL/L (ref 21–32)
CO2 SERPL-SCNC: 27 MMOL/L (ref 21–32)
CREAT SERPL-MCNC: 0.52 MG/DL (ref 0.6–1.3)
CREAT SERPL-MCNC: 0.58 MG/DL (ref 0.6–1.3)
CREAT SERPL-MCNC: 0.6 MG/DL (ref 0.6–1.3)
CREAT SERPL-MCNC: 0.61 MG/DL (ref 0.6–1.3)
ERYTHROCYTE [DISTWIDTH] IN BLOOD BY AUTOMATED COUNT: 14 % (ref 11.6–15.1)
GFR SERPL CREATININE-BSD FRML MDRD: 112 ML/MIN/1.73SQ M
GFR SERPL CREATININE-BSD FRML MDRD: 113 ML/MIN/1.73SQ M
GFR SERPL CREATININE-BSD FRML MDRD: 114 ML/MIN/1.73SQ M
GFR SERPL CREATININE-BSD FRML MDRD: 120 ML/MIN/1.73SQ M
GLUCOSE SERPL-MCNC: 86 MG/DL (ref 65–140)
GLUCOSE SERPL-MCNC: 91 MG/DL (ref 65–140)
GLUCOSE SERPL-MCNC: 94 MG/DL (ref 65–140)
GLUCOSE SERPL-MCNC: 95 MG/DL (ref 65–140)
HCT VFR BLD AUTO: 36 % (ref 36.5–49.3)
HGB BLD-MCNC: 11.9 G/DL (ref 12–17)
MAGNESIUM SERPL-MCNC: 2 MG/DL (ref 1.9–2.7)
MCH RBC QN AUTO: 32.2 PG (ref 26.8–34.3)
MCHC RBC AUTO-ENTMCNC: 33.1 G/DL (ref 31.4–37.4)
MCV RBC AUTO: 97 FL (ref 82–98)
PHOSPHATE SERPL-MCNC: 4.3 MG/DL (ref 2.7–4.5)
PLATELET # BLD AUTO: 221 THOUSANDS/UL (ref 149–390)
PMV BLD AUTO: 9.6 FL (ref 8.9–12.7)
POTASSIUM SERPL-SCNC: 3.7 MMOL/L (ref 3.5–5.3)
POTASSIUM SERPL-SCNC: 3.7 MMOL/L (ref 3.5–5.3)
POTASSIUM SERPL-SCNC: 3.8 MMOL/L (ref 3.5–5.3)
POTASSIUM SERPL-SCNC: 4.2 MMOL/L (ref 3.5–5.3)
RBC # BLD AUTO: 3.7 MILLION/UL (ref 3.88–5.62)
SODIUM SERPL-SCNC: 136 MMOL/L (ref 135–147)
SODIUM SERPL-SCNC: 137 MMOL/L (ref 135–147)
SODIUM SERPL-SCNC: 138 MMOL/L (ref 135–147)
SODIUM SERPL-SCNC: 139 MMOL/L (ref 135–147)
WBC # BLD AUTO: 9.31 THOUSAND/UL (ref 4.31–10.16)

## 2024-06-27 PROCEDURE — 99233 SBSQ HOSP IP/OBS HIGH 50: CPT | Performed by: SURGERY

## 2024-06-27 PROCEDURE — 85027 COMPLETE CBC AUTOMATED: CPT

## 2024-06-27 PROCEDURE — 97535 SELF CARE MNGMENT TRAINING: CPT

## 2024-06-27 PROCEDURE — 82330 ASSAY OF CALCIUM: CPT

## 2024-06-27 PROCEDURE — 80048 BASIC METABOLIC PNL TOTAL CA: CPT | Performed by: INTERNAL MEDICINE

## 2024-06-27 PROCEDURE — 92526 ORAL FUNCTION THERAPY: CPT

## 2024-06-27 PROCEDURE — 83735 ASSAY OF MAGNESIUM: CPT

## 2024-06-27 PROCEDURE — 84100 ASSAY OF PHOSPHORUS: CPT

## 2024-06-27 RX ORDER — POTASSIUM CHLORIDE 20 MEQ/1
20 TABLET, EXTENDED RELEASE ORAL ONCE
Status: COMPLETED | OUTPATIENT
Start: 2024-06-27 | End: 2024-06-27

## 2024-06-27 RX ADMIN — SENNOSIDES AND DOCUSATE SODIUM 2 TABLET: 50; 8.6 TABLET ORAL at 09:37

## 2024-06-27 RX ADMIN — MIDODRINE HYDROCHLORIDE 10 MG: 5 TABLET ORAL at 21:58

## 2024-06-27 RX ADMIN — CEPHALEXIN 500 MG: 500 CAPSULE ORAL at 18:47

## 2024-06-27 RX ADMIN — CEPHALEXIN 500 MG: 500 CAPSULE ORAL at 00:24

## 2024-06-27 RX ADMIN — GABAPENTIN 100 MG: 100 CAPSULE ORAL at 09:37

## 2024-06-27 RX ADMIN — GABAPENTIN 100 MG: 100 CAPSULE ORAL at 21:58

## 2024-06-27 RX ADMIN — METHOCARBAMOL 500 MG: 500 TABLET ORAL at 00:24

## 2024-06-27 RX ADMIN — LEVOTHYROXINE SODIUM 125 MCG: 125 TABLET ORAL at 06:39

## 2024-06-27 RX ADMIN — METHOCARBAMOL 500 MG: 500 TABLET ORAL at 06:38

## 2024-06-27 RX ADMIN — OXYCODONE HYDROCHLORIDE 5 MG: 5 TABLET ORAL at 18:35

## 2024-06-27 RX ADMIN — ACETAMINOPHEN 975 MG: 325 TABLET, FILM COATED ORAL at 13:43

## 2024-06-27 RX ADMIN — Medication 1000 UNITS: at 09:37

## 2024-06-27 RX ADMIN — METHOCARBAMOL 500 MG: 500 TABLET ORAL at 18:46

## 2024-06-27 RX ADMIN — ENOXAPARIN SODIUM 30 MG: 30 INJECTION SUBCUTANEOUS at 09:36

## 2024-06-27 RX ADMIN — OXYCODONE HYDROCHLORIDE 5 MG: 5 TABLET ORAL at 13:43

## 2024-06-27 RX ADMIN — ACETAMINOPHEN 975 MG: 325 TABLET, FILM COATED ORAL at 21:58

## 2024-06-27 RX ADMIN — GABAPENTIN 100 MG: 100 CAPSULE ORAL at 18:46

## 2024-06-27 RX ADMIN — POTASSIUM CHLORIDE 20 MEQ: 1500 TABLET, EXTENDED RELEASE ORAL at 14:16

## 2024-06-27 RX ADMIN — OXYCODONE HYDROCHLORIDE 5 MG: 5 TABLET ORAL at 08:38

## 2024-06-27 RX ADMIN — ENOXAPARIN SODIUM 30 MG: 30 INJECTION SUBCUTANEOUS at 21:57

## 2024-06-27 RX ADMIN — METHOCARBAMOL 500 MG: 500 TABLET ORAL at 14:11

## 2024-06-27 RX ADMIN — CEPHALEXIN 500 MG: 500 CAPSULE ORAL at 14:11

## 2024-06-27 RX ADMIN — CEPHALEXIN 500 MG: 500 CAPSULE ORAL at 06:45

## 2024-06-27 RX ADMIN — MIDODRINE HYDROCHLORIDE 10 MG: 5 TABLET ORAL at 03:49

## 2024-06-27 RX ADMIN — POLYETHYLENE GLYCOL 3350 17 G: 17 POWDER, FOR SOLUTION ORAL at 09:36

## 2024-06-27 RX ADMIN — MIDODRINE HYDROCHLORIDE 10 MG: 5 TABLET ORAL at 14:11

## 2024-06-27 RX ADMIN — ACETAMINOPHEN 975 MG: 325 TABLET, FILM COATED ORAL at 06:37

## 2024-06-27 RX ADMIN — POTASSIUM CHLORIDE 20 MEQ: 1500 TABLET, EXTENDED RELEASE ORAL at 14:15

## 2024-06-27 RX ADMIN — SENNOSIDES AND DOCUSATE SODIUM 2 TABLET: 50; 8.6 TABLET ORAL at 18:46

## 2024-06-27 NOTE — OCCUPATIONAL THERAPY NOTE
Occupational Therapy Treatment Note:      06/27/24 1015   OT Last Visit   OT Visit Date 06/27/24   Note Type   Note Type Treatment   Pain Assessment   Pain Assessment Tool 0-10   Pain Score No Pain   Pain Rating: FLACC (Rest) - Face 0   Pain Rating: FLACC (Rest) - Legs 0   Pain Rating: FLACC (Rest) - Activity 0   Pain Rating: FLACC (Rest) - Cry 0   Pain Rating: FLACC (Rest) - Consolability 0   Score: FLACC (Rest) 0   Pain Rating: FLACC (Activity) - Face 0   Pain Rating: FLACC (Activity) - Legs 0   Pain Rating: FLACC (Activity) - Activity 0   Pain Rating: FLACC (Activity) - Cry 0   Pain Rating: FLACC (Activity) - Consolability 0   Score: FLACC (Activity) 0   Restrictions/Precautions   Other Precautions Cognitive;Chair Alarm;Fall Risk;Spinal precautions   ADL   Where Assessed Chair   Grooming Assistance 4  Minimal Assistance   Grooming Deficit   (pt seeks assistance, has fine motor deficits effecting grooming)   UB Bathing Assistance 4  Minimal Assistance   UB Bathing Deficit   (pt requires cues to attempt tasks himself)   LB Bathing Assistance 4  Minimal Assistance   UB Dressing Assistance 4  Minimal Assistance   LB Dressing Assistance 2  Maximal Assistance   LB Dressing Comments pt held onto dressing stick with pants attatched, little effort noted stating i cant. with distraction and encouragement pt attempted more of task however continues to require max asst. limited by body stiffness and limited dexterity bilaterally   Toileting Assistance  2  Maximal Assistance   Toileting Comments pt refused bathing buttocks, requests to do so during next bm no loose stools this session   Functional Standing Tolerance   Time f minus balance with rw during spt   Bed Mobility   Supine to Sit 3  Moderate assistance   Additional items   (vc's for log roll)   Transfers   Sit to Stand 4  Minimal assistance   Stand to Sit 4  Minimal assistance   Stand pivot 4  Minimal assistance   Additional items   (rw, vc's required)   Cognition  "  Arousal/Participation Cooperative   Attention Within functional limits   Memory Decreased recall of precautions   Following Commands Follows multistep commands with increased time or repetition   Comments pt with fast speech, difficult to understand at times. encourqagement required to attempt tasks requested in therapy. \"oh, i am allowed to do that\" when asked to wash own face. would recommend ongoing cognitive assessment as needed   Activity Tolerance   Activity Tolerance Patient tolerated treatment well   Assessment   Assessment pt participated in am ot session and was seen focusing on adls seated in chair. pt requires persistent encouragement to remain in chair, to attempt adl  tasks himself. pt noted to require set up of tray and coffee this am. pt was noted to have difficulty swallowing whole pill c nsg this date. coughing x 1 c coffee. pt is limited bilaterally with strength and coordination. pt reports he has been staying at his s/o's home lately. 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 will follow focusing on goals from ie. pt may require wide sock aide for l foot secondary to structure of foot. pt requested slow introduction to lhae therfore only dressing stick was shown. pt with minimal effort/ allowing chair to recline with limited sight of tool and b ue rom deficits.   Plan   Treatment Interventions ADL retraining;Functional transfer training;Endurance training;Patient/family training;Equipment evaluation/education;Activityengagement   Goal Expiration Date 07/01/24   OT Treatment Day 2   OT Frequency 3-5x/wk   Discharge Recommendation   Rehab Resource Intensity Level, OT I (Maximum Resource Intensity)   AM-PAC Daily Activity Inpatient   Lower Body Dressing 2   Bathing 2   Toileting 2   Upper Body Dressing 2   Grooming 3   Eating 3   Daily Activity Raw Score 14   Daily Activity Standardized Score (Calc for Raw Score " >=11) 33.39   AM-PAC Applied Cognition Inpatient   Following a Speech/Presentation 3   Understanding Ordinary Conversation 4   Taking Medications 3   Remembering Where Things Are Placed or Put Away 3   Remembering List of 4-5 Errands 3   Taking Care of Complicated Tasks 3   Applied Cognition Raw Score 19   Applied Cognition Standardized Score 39.77   April A Storm

## 2024-06-27 NOTE — PLAN OF CARE
Problem: Prexisting or High Potential for Compromised Skin Integrity  Goal: Skin integrity is maintained or improved  Description: INTERVENTIONS:  - Identify patients at risk for skin breakdown  - Assess and monitor skin integrity  - Assess and monitor nutrition and hydration status  - Monitor labs   - Assess for incontinence   - Turn and reposition patient  - Assist with mobility/ambulation  - Relieve pressure over bony prominences  - Avoid friction and shearing  - Provide appropriate hygiene as needed including keeping skin clean and dry  - Evaluate need for skin moisturizer/barrier cream  - Collaborate with interdisciplinary team   - Patient/family teaching  - Consider wound care consult   Outcome: Progressing     Problem: PAIN - ADULT  Goal: Verbalizes/displays adequate comfort level or baseline comfort level  Description: Interventions:  - Encourage patient to monitor pain and request assistance  - Assess pain using appropriate pain scale  - Administer analgesics based on type and severity of pain and evaluate response  - Implement non-pharmacological measures as appropriate and evaluate response  - Consider cultural and social influences on pain and pain management  - Notify physician/advanced practitioner if interventions unsuccessful or patient reports new pain  Outcome: Progressing     Problem: SAFETY ADULT  Goal: Patient will remain free of falls  Description: INTERVENTIONS:  - Educate patient/family on patient safety including physical limitations  - Instruct patient to call for assistance with activity   - Consult OT/PT to assist with strengthening/mobility   - Keep Call bell within reach  - Keep bed low and locked with side rails adjusted as appropriate  - Keep care items and personal belongings within reach  - Initiate and maintain comfort rounds  - Make Fall Risk Sign visible to staff  - Apply yellow socks and bracelet for high fall risk patients  - Consider moving patient to room near nurses  station  Outcome: Progressing

## 2024-06-27 NOTE — ASSESSMENT & PLAN NOTE
Abdominal distention following PPM placement  -Patient and wife report worsening abdominal distention today  -Patient reports multiple liquid bowel movements daily and passing flatus at this time  -KUB repeated 6/26, Concern for ileus   -Replete electrolytes as needed   -Monitor bowel function   -No nausea or vomiting   -Abdomen distended, soft, non tender 6/27  -Advanced to regular diet 6/27

## 2024-06-27 NOTE — PLAN OF CARE
Problem: OCCUPATIONAL THERAPY ADULT  Goal: Performs self-care activities at highest level of function for planned discharge setting.  See evaluation for individualized goals.  Description: Treatment Interventions: ADL retraining, Functional transfer training, UE strengthening/ROM, Endurance training, Patient/family training, Equipment evaluation/education, Fine motor coordination activities, Activityengagement          See flowsheet documentation for full assessment, interventions and recommendations.   Outcome: Progressing  Note: Limitation: Decreased ADL status, Decreased UE ROM, Decreased UE strength, Decreased Safe judgement during ADL, Decreased endurance, Decreased fine motor control, Decreased self-care trans, Decreased high-level ADLs  Prognosis: Good  Assessment: pt participated in am ot session and was seen focusing on adls seated in chair. pt requires persistent encouragement to remain in chair, to attempt adl  tasks himself. pt noted to require set up of tray and coffee this am. pt was noted to have difficulty swallowing whole pill c nsg this date. coughing x 1 c coffee. pt is limited bilaterally with strength and coordination. pt reports he has been staying at his s/o's home lately. 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 will follow focusing on goals from ie. pt may require wide sock aide for l foot secondary to structure of foot. pt requested slow introduction to lhae therfore only dressing stick was shown. pt with minimal effort/ allowing chair to recline with limited sight of tool and b ue rom deficits.     Rehab Resource Intensity Level, OT: I (Maximum Resource Intensity)     April A Storm

## 2024-06-27 NOTE — SPEECH THERAPY NOTE
"Speech Language/Pathology  Speech-Language Pathology Progress Note      Patient Name: Luis Forrester    Today's Date: 6/27/2024      Subjective:  The pt had coughing with pills and liquid this date. He continues to have the sensation of residual and difficulty swallowing.  His Nurse will provide medications in ice cream vs. Puree as pt is currently on a Full Liquid diet per MD due to abdominal issues and r/o ileus. His daughter was present during the session. He was seen for Swallowing Therapy with his tray.      Objective:  Trials: ice cream bites, thin water via straw total 1oz; Pt was able to tolerate only minimal amounts due to pain with upright positioning of 75*.  He indicated he could not tolerate a higher positioning.  Reviewed precautions of upright positioning for meals.   ORAL/PHARYNGEAL:  Adequate oral transit with trials.  No oral stasis.  Swallow initiation is mildly delayed.  Laryngeal movement is reduced upon palpation.  No overt s/s aspiration or penetration however pt reports residual and need to double swallow and wash ice cream with liquid.  Cannot r/o swelling s/p posterior C2-T1 fusion.  Pt points to the mid throat as the area where he feels \"sticking\".      Assessment:  The pt is tolerating full liquids with a continued sensation of items \"sticking\".  He is able to utilize strategies to improve clearance.  Difficulty with pills this date therefore they will be crushed going forward.  Maximal support provided.  Education given to pt and daughter regarding swallowing strategies.      Plan:  Full Liquid diet- pt previously on Dysphagia 3 and thin liquids prior to downgrade by MD.   Medications crushed in ice cream or items on full liquid diet.   Will follow for Swallow Therapy and trial puree and solids when clearance.  Consider MBS as warranted once diet can be upgraded.   Aspiration Precautions: Small bites and sips.  Alternate solids and liquids. Double swallow. Encourage close to 90* " positioning.

## 2024-06-27 NOTE — PROGRESS NOTES
[facility]  Progress Note  Name: Luis Forrester I  MRN: [id number]  Unit/Bed#: [facility] 605-01 I Date of Admission: 6/15/2024   Date of Service: 6/27/2024 I Hospital Day: 12    Assessment & Plan   * TBI (traumatic brain injury) ([facility])  Assessment & Plan  - Traumatic brain injury with new acute appearing trace subarachnoid hemorrhage in the bilateral parafalcine regions as well as a new appearing trace parafalcine subdural hematoma, present on admission.  - CT scan of the head from 6/15/2024 demonstrated: New acute trace subarachnoid hemorrhage in bilateral parafalcine regions. New acute trace parafalcine subdural hematoma.  -  Additional imaging with CTA of the head and neck 6/15/2024 demonstrated: Negative CTA head traumatic vascular injury, aneurysm, large vessel occlusion, high-grade stenosis, or dissection. Partially imaged ectatic right carotid bifurcation and proximal cervical internal carotid artery with retropharyngeal course, similar to CT neck with contrast 10/12/2011.  - Admit as level 2 step-down with HOT protocol.  The patient may require ICU level of care during hospital encounter if the patient has clinical decompensation or worsening of traumatic brain injury.  - Neurosurgery evaluation and recommendations appreciated.  - Hold anti-platelet and anticoagulant medications for least 2 weeks and/or until cleared by Neurosurgery.   - Patient is not on any chronic antiplatelet or anticoagulant medications at baseline.  - Continue Keppra for 7 days for seizure prophylaxis - completed  - Monitor neurologic exam.  - Continue symptomatic management with analgesia as needed.  - Tentative plan for repeat CT scan of the head in 12-24 hours or sooner if patient has clinical decline or drop in GCS > or equal to 2.  - PT and OT evaluation and treatment as indicated.    Abdominal distension  Assessment & Plan  Abdominal distention following PPM placement  -Patient and wife report  worsening abdominal distention today  -Patient reports multiple liquid bowel movements daily and passing flatus at this time  -KUB repeated 6/26, Concern for ileus   -Replete electrolytes as needed   -Monitor bowel function   -No nausea or vomiting   -Abdomen distended, soft, non tender 6/27  -Advanced to regular diet 6/27    Hyponatremia  Assessment & Plan  Drop appears associated to administration of DDAVP  Also with polyuria, questionable DI  Sodium has corrected with IV fluids  Neurologically appears intact  Appreciate nephrology input and recommendations-goal of 130-134 today  -DDAVP dose given 6/24    s/p Medtronic dual chamber PPM 6/21/2024  Assessment & Plan  As above    SSS (sick sinus syndrome) ([facility])  Assessment & Plan  Status post PPM on 6/21  EPS has signed off on 6/22    SDH (subdural hematoma) ([facility])  Assessment & Plan  Continue frequent neurological checks.   STAT CT head with any neurological decline including drop GCS of 2pts within 1 hr.  Seizure prophylaxis per trauma team-completed  Hold all full antiplatelet and anticoagulation medications for 2 weeks      Hypothyroidism  Assessment & Plan  - Patient with chronic history of hypothyroidism.  - Continue home medication regimen including levothyroxine.  - Outpatient follow-up routine.             Bowel Regimen: MiraLAX, Senokot, Dulcolax suppository  VTE Prophylaxis:Enoxaparin (Lovenox)     Disposition: Rehabilitation    Subjective     Subjective: Patient denies acute concerns today.  Patient reports new cough overnight.     Objective   Vitals:   Temp:  [99.1 °F (37.3 °C)] 99.1 °F (37.3 °C)  HR:  [66-67] 66  Resp:  [18] 18  BP: (102-107)/(65-68) 107/68    I/O         06/25 0701  06/26 0700 06/26 0701  06/27 0700 06/27 0701  06/28 0700    P.O. 960 240 118    IV Piggyback 200      Total Intake(mL/kg) 1160 (14.7) 240 (3) 118 (1.5)    Urine (mL/kg/hr) 1705 (0.9) 2350 (1.2) 1125 (1.3)    Stool 0 0 0    Total Output 1705 2350 1125    Net -545 -2110 -1007            Unmeasured Urine Occurrence   1 x    Unmeasured Stool Occurrence 2 x 1 x 3 x             Physical Exam:   GENERAL APPEARANCE: Resting comfortably, no acute distress  NEURO: Motor and sensory intact in the upper and lower extremities bilaterally.  Oriented to person place and time  HEENT: Mucous membranes  CV: Well-perfused regular rate  LUNGS: Normal work of breathing on room air  GI: Soft, nontender, distended  MSK: No lower extremity edema bilaterally  SKIN: Warm and dry    Invasive Devices       Peripheral Intravenous Line  Duration             Peripheral IV 06/25/24 Right Antecubital 2 days    Peripheral IV 06/25/24 Dorsal (posterior);Right Wrist 1 day                          Lab Results: Results: I have personally reviewed all pertinent laboratory/tests results, BMP/CMP:   Lab Results   Component Value Date    SODIUM 137 06/27/2024    K 3.7 06/27/2024    CL 98 06/27/2024    CO2 27 06/27/2024    BUN 6 06/27/2024    CREATININE 0.52 (L) 06/27/2024    CALCIUM 7.4 (L) 06/27/2024    EGFR 120 06/27/2024   , and CBC:   Lab Results   Component Value Date    WBC 9.31 06/27/2024    HGB 11.9 (L) 06/27/2024    HCT 36.0 (L) 06/27/2024    MCV 97 06/27/2024     06/27/2024    RBC 3.70 (L) 06/27/2024    MCH 32.2 06/27/2024    MCHC 33.1 06/27/2024    RDW 14.0 06/27/2024    MPV 9.6 06/27/2024     Imaging: I have personally reviewed pertinent reports.     Other Studies:     Oscar Cesar MD  [id number]

## 2024-06-28 PROBLEM — Z12.11 ENCOUNTER FOR COLORECTAL CANCER SCREENING: Status: RESOLVED | Noted: 2024-05-29 | Resolved: 2024-06-28

## 2024-06-28 PROBLEM — H61.23 BILATERAL IMPACTED CERUMEN: Status: RESOLVED | Noted: 2021-10-13 | Resolved: 2024-06-28

## 2024-06-28 PROBLEM — Z12.12 ENCOUNTER FOR COLORECTAL CANCER SCREENING: Status: RESOLVED | Noted: 2024-05-29 | Resolved: 2024-06-28

## 2024-06-28 LAB
ANION GAP SERPL CALCULATED.3IONS-SCNC: 10 MMOL/L (ref 4–13)
ANION GAP SERPL CALCULATED.3IONS-SCNC: 11 MMOL/L (ref 4–13)
ANION GAP SERPL CALCULATED.3IONS-SCNC: 12 MMOL/L (ref 4–13)
ANION GAP SERPL CALCULATED.3IONS-SCNC: 9 MMOL/L (ref 4–13)
BUN SERPL-MCNC: 6 MG/DL (ref 5–25)
BUN SERPL-MCNC: 6 MG/DL (ref 5–25)
BUN SERPL-MCNC: 7 MG/DL (ref 5–25)
BUN SERPL-MCNC: 8 MG/DL (ref 5–25)
CALCIUM SERPL-MCNC: 7.4 MG/DL (ref 8.4–10.2)
CALCIUM SERPL-MCNC: 7.5 MG/DL (ref 8.4–10.2)
CALCIUM SERPL-MCNC: 8.1 MG/DL (ref 8.4–10.2)
CALCIUM SERPL-MCNC: 8.3 MG/DL (ref 8.4–10.2)
CHLORIDE SERPL-SCNC: 101 MMOL/L (ref 96–108)
CHLORIDE SERPL-SCNC: 101 MMOL/L (ref 96–108)
CHLORIDE SERPL-SCNC: 98 MMOL/L (ref 96–108)
CHLORIDE SERPL-SCNC: 99 MMOL/L (ref 96–108)
CO2 SERPL-SCNC: 25 MMOL/L (ref 21–32)
CO2 SERPL-SCNC: 26 MMOL/L (ref 21–32)
CO2 SERPL-SCNC: 27 MMOL/L (ref 21–32)
CO2 SERPL-SCNC: 28 MMOL/L (ref 21–32)
CREAT SERPL-MCNC: 0.56 MG/DL (ref 0.6–1.3)
CREAT SERPL-MCNC: 0.62 MG/DL (ref 0.6–1.3)
CREAT SERPL-MCNC: 0.62 MG/DL (ref 0.6–1.3)
CREAT SERPL-MCNC: 0.65 MG/DL (ref 0.6–1.3)
ERYTHROCYTE [DISTWIDTH] IN BLOOD BY AUTOMATED COUNT: 14 % (ref 11.6–15.1)
GFR SERPL CREATININE-BSD FRML MDRD: 109 ML/MIN/1.73SQ M
GFR SERPL CREATININE-BSD FRML MDRD: 111 ML/MIN/1.73SQ M
GFR SERPL CREATININE-BSD FRML MDRD: 111 ML/MIN/1.73SQ M
GFR SERPL CREATININE-BSD FRML MDRD: 116 ML/MIN/1.73SQ M
GLUCOSE SERPL-MCNC: 83 MG/DL (ref 65–140)
GLUCOSE SERPL-MCNC: 83 MG/DL (ref 65–140)
GLUCOSE SERPL-MCNC: 89 MG/DL (ref 65–140)
GLUCOSE SERPL-MCNC: 90 MG/DL (ref 65–140)
HCT VFR BLD AUTO: 38 % (ref 36.5–49.3)
HGB BLD-MCNC: 12.5 G/DL (ref 12–17)
MCH RBC QN AUTO: 31.9 PG (ref 26.8–34.3)
MCHC RBC AUTO-ENTMCNC: 32.9 G/DL (ref 31.4–37.4)
MCV RBC AUTO: 97 FL (ref 82–98)
PLATELET # BLD AUTO: 242 THOUSANDS/UL (ref 149–390)
PMV BLD AUTO: 9.4 FL (ref 8.9–12.7)
POTASSIUM SERPL-SCNC: 3.5 MMOL/L (ref 3.5–5.3)
POTASSIUM SERPL-SCNC: 3.6 MMOL/L (ref 3.5–5.3)
POTASSIUM SERPL-SCNC: 3.7 MMOL/L (ref 3.5–5.3)
POTASSIUM SERPL-SCNC: 3.7 MMOL/L (ref 3.5–5.3)
RBC # BLD AUTO: 3.92 MILLION/UL (ref 3.88–5.62)
SODIUM SERPL-SCNC: 135 MMOL/L (ref 135–147)
SODIUM SERPL-SCNC: 136 MMOL/L (ref 135–147)
SODIUM SERPL-SCNC: 138 MMOL/L (ref 135–147)
SODIUM SERPL-SCNC: 138 MMOL/L (ref 135–147)
WBC # BLD AUTO: 9.37 THOUSAND/UL (ref 4.31–10.16)

## 2024-06-28 PROCEDURE — 92526 ORAL FUNCTION THERAPY: CPT

## 2024-06-28 PROCEDURE — 80048 BASIC METABOLIC PNL TOTAL CA: CPT

## 2024-06-28 PROCEDURE — 99233 SBSQ HOSP IP/OBS HIGH 50: CPT | Performed by: SURGERY

## 2024-06-28 PROCEDURE — 85027 COMPLETE CBC AUTOMATED: CPT

## 2024-06-28 PROCEDURE — 97116 GAIT TRAINING THERAPY: CPT

## 2024-06-28 PROCEDURE — 97530 THERAPEUTIC ACTIVITIES: CPT

## 2024-06-28 PROCEDURE — 80048 BASIC METABOLIC PNL TOTAL CA: CPT | Performed by: INTERNAL MEDICINE

## 2024-06-28 RX ORDER — CALCIUM GLUCONATE 20 MG/ML
2 INJECTION, SOLUTION INTRAVENOUS ONCE
Status: COMPLETED | OUTPATIENT
Start: 2024-06-28 | End: 2024-06-28

## 2024-06-28 RX ORDER — POTASSIUM CHLORIDE 20 MEQ/1
40 TABLET, EXTENDED RELEASE ORAL ONCE
Status: COMPLETED | OUTPATIENT
Start: 2024-06-28 | End: 2024-06-28

## 2024-06-28 RX ADMIN — CEPHALEXIN 500 MG: 500 CAPSULE ORAL at 05:35

## 2024-06-28 RX ADMIN — POTASSIUM CHLORIDE 40 MEQ: 1500 TABLET, EXTENDED RELEASE ORAL at 12:26

## 2024-06-28 RX ADMIN — LEVOTHYROXINE SODIUM 125 MCG: 125 TABLET ORAL at 05:35

## 2024-06-28 RX ADMIN — METHOCARBAMOL 500 MG: 500 TABLET ORAL at 01:14

## 2024-06-28 RX ADMIN — MIDODRINE HYDROCHLORIDE 10 MG: 5 TABLET ORAL at 03:49

## 2024-06-28 RX ADMIN — OXYCODONE HYDROCHLORIDE 5 MG: 5 TABLET ORAL at 12:58

## 2024-06-28 RX ADMIN — CEPHALEXIN 500 MG: 500 CAPSULE ORAL at 18:59

## 2024-06-28 RX ADMIN — METHOCARBAMOL 500 MG: 500 TABLET ORAL at 05:35

## 2024-06-28 RX ADMIN — ACETAMINOPHEN 975 MG: 325 TABLET, FILM COATED ORAL at 05:35

## 2024-06-28 RX ADMIN — OXYCODONE HYDROCHLORIDE 5 MG: 5 TABLET ORAL at 17:13

## 2024-06-28 RX ADMIN — GABAPENTIN 100 MG: 100 CAPSULE ORAL at 19:00

## 2024-06-28 RX ADMIN — CALCIUM GLUCONATE 2 G: 20 INJECTION, SOLUTION INTRAVENOUS at 12:26

## 2024-06-28 RX ADMIN — GABAPENTIN 100 MG: 100 CAPSULE ORAL at 10:33

## 2024-06-28 RX ADMIN — OXYCODONE HYDROCHLORIDE 5 MG: 5 TABLET ORAL at 07:44

## 2024-06-28 RX ADMIN — METHOCARBAMOL 500 MG: 500 TABLET ORAL at 18:59

## 2024-06-28 RX ADMIN — CEPHALEXIN 500 MG: 500 CAPSULE ORAL at 12:26

## 2024-06-28 RX ADMIN — MIDODRINE HYDROCHLORIDE 10 MG: 5 TABLET ORAL at 18:59

## 2024-06-28 RX ADMIN — Medication 1000 UNITS: at 10:33

## 2024-06-28 RX ADMIN — CEPHALEXIN 500 MG: 500 CAPSULE ORAL at 01:14

## 2024-06-28 RX ADMIN — SENNOSIDES AND DOCUSATE SODIUM 2 TABLET: 50; 8.6 TABLET ORAL at 10:33

## 2024-06-28 RX ADMIN — ENOXAPARIN SODIUM 30 MG: 30 INJECTION SUBCUTANEOUS at 10:32

## 2024-06-28 RX ADMIN — MIDODRINE HYDROCHLORIDE 10 MG: 5 TABLET ORAL at 12:27

## 2024-06-28 RX ADMIN — METHOCARBAMOL 500 MG: 500 TABLET ORAL at 12:26

## 2024-06-28 NOTE — PROGRESS NOTES
PHYSICAL MEDICINE AND REHABILITATION   PREADMISSION ASSESSMENT     Projected IGC and Rehabilitation Diagnoses:  Impairment of mobility, safety and Activities of Daily Living (ADLs) due to Spinal Cord Dysfunction:  Traumatic:  04.230  Other Traumatic traumatic spinal cord injury-cervical myelopathy  Etiologic: cervical myelopathy s/p C3-C7 cervical laminectomy with bilateral screw placement and fusion C2 to-T1 on 6/16   Date of Onset: 6/15/24   Date of surgery: 6/16/24-C3-C7 cervical laminectomy with bilateral screw placement and fusion C2-T1    PATIENT INFORMATION  Name: Luis Forrester Phone #: [phone] (home)   Address: [address] [place] [address]  [place] [address]  YOB: 1970 Age: 53 y.o. #   Marital Status:   Ethnicity: Not  or  or Wolof  Employment Status: currently employed  Extended Emergency Contact Information  Primary Emergency Contact: MitzyTrinity  Mobile Phone: [phone]  Relation: Daughter  Preferred language: English  Secondary Emergency Contact: Rebecca Fiore  Mobile Phone: [phone]  Relation: Sister In-Law  Mother: Marni Fiore  Mobile Phone: [phone]  Advance Directive: Level 1 Full Code (no ACP docs)    INSURANCE/COVERAGE:     [place] / [place] / Product Type: Medical Assistance Fee for Service /  #[id number] Secondary Payer: Self Pay   Payer Contact:  Payer Contact:   Contact Phone:  Contact Phone:     Authorization #: n/a  Coverage Dates: n/a  LCD: n/a  MEDICARE #: n/a  Medicare Days: n/a  Medical Record #: [id number]    REFERRAL SOURCE:   Referring provider: Papa Diamond,*  Referring facility: [facility]  Room: [facility]-01  PCP: Joceline Shook PA-C PCP phone number: [phone]    MEDICAL INFORMATION  HPI:  This is a 53-year-old male who presented to [facility] ER on 6/15/24 after being found down on the floor at home by  his daughter.  Patient stated he got home around 2 am and went in to use the bathroom.  He does not recall what happened but he woke up on the floor unable to get up on his own in the hallway.  He yelled for his daughter who got up to use the bathroom and called 911 because she was unable to get him up on her own.  EMS helped get him up and then his daughter called a second time and that is when he was brought into the hospital.  In the ER, patient stated that the right side of his body was bothering him.  He reported decreased sensation in his right posterior thigh, right leg weakness and inability to open up his right hand since his fall.  It was suspected that patient had a syncopal episode that lead to the fall.  Of note patient's daughter states patient 3 syncopal episode in the past 6 months where he sustained a fall.  CT head showed a new acute trace subarachnoid hemorrhage in bilateral parafalcine regions and a new acute trace parafalcine subdural hematoma.  CTA head and neck was negative.  CTA head showed traumatic vascular injury, aneurysm, large vessel occlusion, high-grade stenosis, or dissection. Partially imaged ectatic right carotid bifurcation and proximal cervical internal carotid artery with retropharyngeal course, similar to CT neck with contrast 10/12/2011.  MRI cervical spine and showed congenital canal stenosis with superimposed degenerative spondylosis most pronounced at C3-4 and C5-C6 with moderate to severe canal stenosis and mild cord compression; evaluation of cord signal was limited due to artifact; no definite abnormal cord signal but mild cord edema would be difficult to exclude; severe bilateral foraminal stenosis seen at C3-4 and C5-C6; mild to moderate canal stenosis at other levels.  He is s/p C3-C7 cervical laminectomy with bilateral screw placement and fusion C2-T1 on 6/16.  He was recommended 2 weeks of antibiotics for infectious prophylaxis per neurosurgery.  He had his Hemovac  drain removed on 6/19.  Patient noted to have symptomatic junctional bradycardia with asymptomatic wide-complex tachycardia on telemetry for which EP was consulted.  They felt presentation mixture of sick sinus syndrome and wide-complex tachycardia likely SVT with aberrancy and felt reasonable to place dual-chamber pacemaker given history of multiple syncopal episodes and now documented bradycardia.  Pacer was placed on 6/21.  Course also notable for hyponatremia and polyuria for which nephrology was consulted.  Patient did receive 2 doses of DDAVP as well as intermittent IV fluids.  Na is now WNL and nephrology has signed off.  Additionally, patient experienced abdominal distension which worsened.  Imaging showed variable moderate distention of the small and large bowel.  Question ileus.  Repeat imaging showed persistent gaseous distention of portions of the small and large bowel similar to the prior study.  The distribution of the gaseous distention favored ileus although obstruction could not be conclusively excluded.  Patient was having loose stools.  He was made NPO and diet was slowly advanced.  On 6/28, patient developed difficulty swallowing pills.  Video swallow was done and patient was made NPO with keofed tube feeds.  Repeat video swallow was completed on 7/3 and it was recommended that he remain NPO.  A third video swallow was completed on 7/8 and patient failed again with continued NPO recommendation.  Patient was initially agreeable to having a PEG placed and then changed his mind and refused.  After further discussion, he was agreeable and PEG was placed on 7/10.  He was started on trickle feeds and per nutrition recommendations, he is now on bolus feeds.  Patient also c/o bilateral hand pain during his hospitalization.  X-rays were completed and negative.  Pain improved.  Patient worked with therapy and was recommended for rehab following her hospitalization.  He has demonstrated that he can tolerate  three hours of therapy, five days a week and is now medically cleared for discharge to the [facility].     Past Medical History:   Past Surgical History:   Allergies:     Past Medical History:   Diagnosis Date    Arthritis     Chronic cough     Disease of thyroid gland     Hypoparathyroidism (HCC)     continue taking calcium and vitamin D, will check CMP, PTH level and Vitamin D level    Pneumonia of right middle lobe due to Streptococcus pneumoniae (HCC) 11/30/2018    s/p Medtronic dual chamber PPM 6/21/2024 06/21/2024    Past Surgical History:   Procedure Laterality Date    CARDIAC ELECTROPHYSIOLOGY PROCEDURE N/A 6/21/2024    Procedure: Cardiac pacer implant;  Surgeon: Kofi Pettit MD;  [facility];  Service: Cardiology    DECOMPRESSION SPINE CERVICAL POSTERIOR N/A 6/16/2024    Procedure: DECOMPRESSION SPINE CERVICAL POSTERIOR C2-T1 with fusion navigated with Oarm;  Surgeon: Endy Velasquez MD;  Location: [facility];  Service: Neurosurgery    FRACTURE SURGERY      Open Treatment of Each Proximal Finger Phalanx    GASTROSTOMY TUBE PLACEMENT N/A 7/10/2024    Procedure: INSERTION PEG TUBE;  Surgeon: Darcy Reinoso MD;  Location: [facility];  Service: General     Allergies   Allergen Reactions    Chlorine Rash         Medical/functional conditions requiring inpatient rehabilitation:   Acute trace subarachnoid hemorrhage in bilateral parafalcine regions  Acute trace parafalcine subdural hematoma  Cervical myelopathy s/p C3-C7 cervical laminectomy with bilateral screw placement and fusion C2 to-T1  Abdominal distension with imaging showed moderate distention of the small and large bowel  Hyponatremia  Syncope secondary to SSS s/p Medtronic dual chamber PPM  Electrolyte imbalance  Persistent dysphagia s/p PEG  Impaired mobility  Impaired self care    Risk for medical/clinical complications: risk for fall, risk for skin breakdown, risk for ileus or obstruction, risk for DVT/PE, risk for infection,  risk for complications with incision, risk for uncontrolled pain, risk for recurring hyponatremia    Comorbidities/Surgeries in the last 100 days:   6/16/24-s/p C3-C7 cervical laminectomy with bilateral screw placement and fusion C2-T1  6/21/24-s/p Medtronic dual chamber PPM  7/10/24-s/p PEG placement  Hypothyroidism  Arthritis   Chronic cough  Hypoparathyroidism  Hyperlipidemia      CURRENT VITAL SIGNS:   Temp:  [97.6 °F (36.4 °C)-98.6 °F (37 °C)] 97.6 °F (36.4 °C)  HR:  [60-86] 86  Resp:  [16-18] 17  BP: ()/(58-73) 103/68   Intake/Output Summary (Last 24 hours) at 7/12/2024 1143  Last data filed at 7/12/2024 1100  Gross per 24 hour   Intake 954 ml   Output --   Net 954 ml          LABORATORY RESULTS:      Lab Results   Component Value Date    HGB 12.6 07/11/2024    HCT 37.4 07/11/2024    WBC 13.65 (H) 07/11/2024     Lab Results   Component Value Date    BUN 13 07/11/2024    BUN 11 01/05/2015     (L) 01/05/2015    K 4.1 07/11/2024    K 3.6 01/05/2015    CL 93 (L) 07/11/2024    CL 97 (L) 01/05/2015    GLUCOSE 152 (H) 06/16/2024    GLUCOSE 90 01/05/2015    CREATININE 0.67 07/11/2024    CREATININE 0.91 01/05/2015     Lab Results   Component Value Date    PROTIME 13.0 06/16/2024    INR 0.99 06/16/2024        DIAGNOSTIC STUDIES:  XR spine cervical 2 or 3 vw injury    Result Date: 6/21/2024  Impression: No acute osseous abnormality. Expected postoperative appearance of posterior fusion of C2-T1. Workstation performed: [id number]     CT head wo contrast    Result Date: 6/18/2024  Impression: 1. Near completely resolved parafalcine subarachnoid hemorrhage with trace residual subarachnoid hemorrhage in the frontoparietal regions medially. No mass effect or hydrocephalus. 2. No large territorial infarction. Workstation performed: [id number]     XR chest portable ICU    Result Date: 6/18/2024  Impression: No acute cardiopulmonary disease. Right IJ line terminates in the SVC. Workstation performed: [id number]      XR spine cervical 2 or 3 vw injury    Result Date: 6/17/2024  Impression: Fluoroscopy provided for procedure guidance. Please refer to the separate procedure note for additional details. Workstation performed: [id number]     CT head wo contrast    Result Date: 6/16/2024  Impression: Trace subarachnoid hemorrhage within the interhemispheric fissure is slightly decreased in conspicuity No parenchymal or subdural hematoma. Workstation performed: [id number]     XR chest portable ICU    Result Date: 6/16/2024  Impression: No acute cardiopulmonary disease. No central line. No pneumothorax. Workstation performed: [id number]     MRI cervical spine wo contrast    Result Date: 6/15/2024  Impression: Congenital canal stenosis with superimposed degenerative spondylosis . Most pronounced at C3-4 and C5-C6 with moderate to severe canal stenosis and mild cord compression. Evaluation of cord signal is limited due to artifact. No definite abnormal cord signal but mild cord edema would be difficult to exclude Severe bilateral foraminal stenosis also seen at C3-4 and C5-C6. Mild to moderate canal stenosis at other levels Workstation performed: [id number]     CT spine cervical without contrast    Result Date: 6/15/2024  Impression: No cervical spine fracture or traumatic malalignment. Posterior osteophyte disc complexes C3-C4 and C5-C6 contributes to at least moderate canal stenosis and severe bilateral foraminal narrowing at these levels. Consider nonemergent follow-up MRI cervical spine without contrast for further evaluation. Ectatic right carotid bifurcation and proximal cervical internal carotid artery with retropharyngeal course, similar to CT neck with contrast 10/12/2011. Please see same day CTA head with contrast, CT head without contrast, CT thoracic spine without contrast for further evaluation. The study was marked in EPIC for immediate notification. Workstation performed: [id number]     CTA head w wo contrast    Result  Date: 6/15/2024  Impression: Negative CTA head traumatic vascular injury, aneurysm, large vessel occlusion, high-grade stenosis, or dissection. Partially imaged ectatic right carotid bifurcation and proximal cervical internal carotid artery with retropharyngeal course, similar to CT neck with contrast 10/12/2011. Additional chronic/incidental findings as detailed above. Please see earlier same day CT head without contrast and concurrent CT cervical spine without contrast. Workstation performed: [id number]     XR hand 3+ views RIGHT    Result Date: 6/15/2024  Impression: 1. No acute osseous abnormality. 2. Degenerative changes as described. Resident: ALLYSON AWAN I, the attending radiologist, have reviewed the images and agree with the final report above. Workstation performed: [id number]     XR hand 3+ views LEFT    Result Date: 6/15/2024  Impression: No acute osseous abnormality. Degenerative changes as described. Resident: ALLYSON AWAN I, the attending radiologist, have reviewed the images and agree with the final report above. Workstation performed: [id number]     CT spine thoracic without contrast    Result Date: 6/15/2024  Impression: No acute osseous abnormality of thoracic spine. Diffuse idiopathic skeletal hyperostosis (DISH). I personally discussed this study with Travis Powell on 6/15/2024 10:08 AM. Workstation performed: [id number]     CT head wo contrast    Result Date: 6/15/2024  Impression: New acute trace subarachnoid hemorrhage in bilateral parafalcine regions. Recommend dedicated CTA head with contrast for further evaluation. New acute trace parafalcine subdural hematoma. I personally discussed this study with Travis Powell on 6/15/2024 10:08 AM. Workstation performed: [id number]       PRECAUTIONS/SPECIAL NEEDS:  Pain Management, Bladder Incontinence: # of accidents 1, Bowel Incontinence: # of accidents 1, Aspiration Risk/Precautions, Dietary Restrictions: NPO-has PEG tube and is receiving bolus  TF (Jevity 1.5) 120 (6x/day at 7:00 am, 11:00 am, 1:00 pm, 4:00 pm, 6:00 pm, 9:00 pm), Hard of Hearing, Visually Impaired, Language Preference: English, and fall precautions, seizure precautions, spinal precautions, patient had a pacer placed in left upper chest wall this admission    MEDICATIONS:     Current Facility-Administered Medications:     acetaminophen (TYLENOL) oral suspension 975 mg, 975 mg, Oral, Q8H, Toshia Vasquez MD, 975 mg at 07/12/24 1042    albuterol (PROVENTIL HFA,VENTOLIN HFA) inhaler 2 puff, 2 puff, Inhalation, Q4H PRN, Toshia Vasquez MD    benzocaine (HURRICAINE) 20 % mucosal spray 2 spray, 2 spray, Mucosal, 4x Daily PRN, Toshia Vasquez MD, 2 spray at 07/11/24 2228    bisacodyl (DULCOLAX) rectal suppository 10 mg, 10 mg, Rectal, Daily PRN, Toshia Vasquez MD    dextromethorphan-guaiFENesin (ROBITUSSIN DM) oral syrup 10 mL, 10 mL, Oral, Q4H PRN, Toshia Vasquez MD, 10 mL at 07/10/24 0142    enoxaparin (LOVENOX) subcutaneous injection 30 mg, 30 mg, Subcutaneous, Q12H ASHLYN, Toshia Vasquez MD, 30 mg at 07/12/24 1042    gabapentin (NEURONTIN) oral solution 100 mg, 100 mg, Oral, TID, Toshia Vasquez MD, 100 mg at 07/12/24 1040    HYDROmorphone HCl (DILAUDID) injection 0.2 mg, 0.2 mg, Intravenous, Q2H PRN, Toshia Vasquez MD, 0.2 mg at 07/02/24 1619    levothyroxine (Synthroid) suspension 125 mcg, 125 mcg, Oral, Early Morning, Toshia Vasquez MD, 125 mcg at 07/12/24 0634    melatonin tablet 3 mg, 3 mg, Oral, HS, Toshia Vasquez MD, 3 mg at 07/11/24 2230    naloxone (NARCAN) 0.04 mg/mL syringe 0.04 mg, 0.04 mg, Intravenous, Q1MIN PRN, Toshia Vasquez MD    nystatin (MYCOSTATIN) powder, , Topical, BID, Toshia Vasquez MD, Given at 07/12/24 1046    ondansetron (ZOFRAN) injection 4 mg, 4 mg, Intravenous, Q4H PRN, Toshia Vasquez MD    oxyCODONE (ROXICODONE) oral solution 2.5 mg, 2.5 mg, Oral, Q4H PRN, 2.5 mg at 07/07/24 1941 **OR** oxyCODONE (ROXICODONE) oral solution 5 mg, 5 mg, Oral, Q4H PRN, Toshia Vasquez MD, 5 mg at 07/09/24 9902    polyethylene glycol (MIRALAX)  packet 17 g, 17 g, Oral, Daily, Felicitas Palm,     saliva substitute (MOUTH KOTE) mucosal solution 5 spray, 5 spray, Mouth/Throat, 4x Daily PRN, Toshia Vasquez MD    SKIN INTEGRITY:   Back incision  Left upper chest wall pacer site  Wound on perineum  PEG site    PRIOR LEVEL OF FUNCTION:  He lives in a(n) apartment  Luis Forrester is  and lives with their daughter.  Self Care: Independent, Indoor Mobility: Independent, Stairs (in/outdoor): Independent, and Cognition: Independent    FALLS IN THE LAST 6 MONTHS: 2    HOME ENVIRONMENT:  The living area:  patient lives in a one level apartment  There are 4 steps to enter the home.    The patient will have 24 hour supervision/physical assistance available upon discharge.    PREVIOUS DME:  Equipment in home (previous DME): None    FUNCTIONAL STATUS:  Physical Therapy Occupational Therapy Speech Therapy   07/11/24 1530    PT Last Visit   PT Visit Date 07/11/24   Note Type   Note Type Treatment   Pain Assessment   Pain Score No Pain   Restrictions/Precautions   Other Precautions Cognitive;Chair Alarm;Pain;Spinal precautions;Fall Risk   Subjective   Subjective pt encountered supine in bed, completing session with SLP.  Reports feeling better this PM since procedure.  Reports no change in status with activity today.   Bed Mobility   Supine to Sit 4  Minimal assistance   Additional items Assist x 1;HOB elevated;Increased time required   Transfers   Sit to Stand 4  Minimal assistance   Additional items Assist x 1;Armrests;Increased time required   Stand to Sit 4  Minimal assistance   Additional items Assist x 1;Armrests;Increased time required   Ambulation/Elevation   Gait pattern Short stride;Foward flexed;Inconsistent shay;Shuffling;Decreased foot clearance;Narrow YARIEL;Improper Weight shift;Poor UE support   Gait Assistance 4  Minimal assist  (mod A with SPC)   Additional items Assist x 1   Assistive Device Rolling walker   Distance 150'x  2 with RW, then  25' x 2 with SPC   Balance   Static Sitting Fair   Static Standing Fair -   Ambulatory Poor +   Activity Tolerance   Activity Tolerance Patient tolerated treatment well;Patient limited by fatigue   Nurse Made Aware yes   Assessment   Prognosis Good   Problem List Decreased strength;Decreased range of motion;Decreased endurance;Impaired balance;Decreased mobility;Decreased safety awareness;Orthopedic restrictions   Assessment Pt continues to perform transfers & community distance ambualtion with min A as noted above, doing so without LOB, although he does have some difficulty with dynamic turns & attempts to perform dynamic trunk movements when observing surroundings when negotiating hallways.  Pt appropriate for trial with SPC today to further challenge his balance & reduce reliance on UEs for support, which he did successfully as noted above, but demonsrated slower pace & shorter stride with narrowed YARIEL comparateively.  Pt remains most appropriate with use of RW at this time, but with that said, he is ambulating with improved posture & dynamic weight shifting compared to prior sessions and will continue to benefit from ambulation with LRAD within more complex environments to ensure safe return to highest level of fucntional endurance.  PT POC & d/c recommendations remain appropriate at this time to address deficits.    07/12/24 1010    OT Last Visit   OT Visit Date 07/12/24   Note Type   Note Type Treatment   Pain Assessment   Pain Assessment Tool FLACC   Pain Rating: FLACC (Rest) - Face 0   Pain Rating: FLACC (Rest) - Legs 0   Pain Rating: FLACC (Rest) - Activity 0   Pain Rating: FLACC (Rest) - Cry 0   Pain Rating: FLACC (Rest) - Consolability 0   Score: FLACC (Rest) 0   Pain Rating: FLACC (Activity) - Face 1   Pain Rating: FLACC (Activity) - Legs 0   Pain Rating: FLACC (Activity) - Activity 0   Pain Rating: FLACC (Activity) - Cry 0   Pain Rating: FLACC (Activity) - Consolability 0   Score: FLACC (Activity) 1    Restrictions/Precautions   Weight Bearing Precautions Per Order No   Other Precautions Impulsive;Cognitive;Chair Alarm;Bed Alarm;Aspiration;Fall Risk;Pain;Spinal precautions  (peg tube and yankower for oral secretions)   ADL   Where Assessed Chair   Grooming Assistance 3  Moderate Assistance  (sba hands face, min asst oral and hair and max asst shaving with rzor and shaving cream)   UB Bathing Assistance 4  Minimal Assistance   LB Bathing Assistance 4  Minimal Assistance   LB Bathing Deficit    (needs encouragement to attempt upper legs)   UB Dressing Assistance 4  Minimal Assistance   LB Dressing Assistance 3  Moderate Assistance   LB Dressing Deficit    (using lhae ie dressing stick and rigid s.a.)   Toileting Assistance  2  Maximal Assistance   Toileting Deficit    (asst for wiping buttocks (ta) after loose stools, asst for clothing management (mod))   Functional Standing Tolerance   Time f+ balance c rw, short quick steps, uncoordinated c rw.   Bed Mobility   Supine to Sit 3  Moderate assistance   Additional items    (flat bed no rails, poor carryover log roll type)   Transfers   Sit to Stand 4  Minimal assistance   Stand to Sit 4  Minimal assistance   Toilet transfer    (min asst and mod vc's)   Functional Mobility   Functional Mobility 4  Minimal assistance   Additional Comments to from bathroom   Additional items Rolling walker   Coordination   Fine Motor pt unable to press shaving cream with h index finger, needing asst. he was able to brush teeth and apply paste c min asst for bottle loosening. fine motor deficits effect fastners when dressing etc   Cognition   Overall Cognitive Status Impaired   Arousal/Participation Alert   Attention Attends with cues to redirect   Memory Decreased recall of precautions;Decreased recall of recent events;Decreased short term memory   Following Commands Follows one step commands without difficulty   Comments limited carryover thus far with rw education. he also reports  little knowlegde of peg tube and feeding sx etc. will need repetitive trainning for same. fair minus safety with rw. remains quick to ask for asst vs attempt things himself though minimal improvement was noted in this area. no difficulty sequencing routine adls   Activity Tolerance   Activity Tolerance Patient tolerated treatment well   Assessment   Assessment pt participated in am ot session and was seen focusing on  full adls, toileting, clothing management and fine motor coordination. pt was s/min asst for transfers and functional mobility with rw, fair minus safety / carryover displayed.  pt is mildly impulsive overall. he was min asst ub bathing and dressing/ mod/max asst lb dressing with dressing stick and rigid sock aide. (will need wider sa)  pt required max asst hair washing and shaving with encouragement was able to hold razor and swipe face grossly. pt unable to dispense faom etc. fine motor remains a deficit as well as dyn balance. pt with brighter affect this session. cooperative and motivated. dtr arrived to visit. pt states this week they are moving into his s/o's apt.  he reports she is retired. step to get between rooms of home but not to enter    Speech Language/Pathology     Speech/Language Pathology Progress Note     Patient Name: Luis Forrester  Today's Date: 7/11/2024     Problem List  Principal Problem:    TBI (traumatic brain injury) ([facility])  Active Problems:    Hypothyroidism    SDH (subdural hematoma) ([facility])    SSS (sick sinus syndrome) ([facility])    s/p Medtronic dual chamber PPM 6/21/2024    Dysphagia    Cervical stenosis of spinal canal        Past Medical History  Medical History        Past Medical History:   Diagnosis Date    Arthritis      Chronic cough      Disease of thyroid gland      Hypoparathyroidism ([facility])       continue taking calcium and vitamin D, will check CMP, PTH level and Vitamin D level    Pneumonia of right middle lobe due to Streptococcus pneumoniae ([facility]) 11/30/2018    s/p  "Medtronic dual chamber PPM 2024            Past Surgical History  Surgical History         Past Surgical History:   Procedure Laterality Date    CARDIAC ELECTROPHYSIOLOGY PROCEDURE N/A 2024     Procedure: Cardiac pacer implant;  Surgeon: Kofi Pettit MD;  [facility];  Service: Cardiology    DECOMPRESSION SPINE CERVICAL POSTERIOR N/A 2024     Procedure: DECOMPRESSION SPINE CERVICAL POSTERIOR C2-T1 with fusion navigated with Oarm;  Surgeon: Endy Velasquez MD;  Location: [facility];  Service: Neurosurgery    FRACTURE SURGERY         Open Treatment of Each Proximal Finger Phalanx    GASTROSTOMY TUBE PLACEMENT N/A 7/10/2024     Procedure: INSERTION PEG TUBE;  Surgeon: Darcy Reinoso MD;  Location: [facility];  Service: General               Subjective:  \"These are going down well\" Patient is awake and alert.      Objective:  Patient is s/p PEG placement yesterday. He has been NPO. The patient is seen for f/u dysphagia therapy and is assessed with ice chips. Patient and daughter report oral care was completed earlier today. Patient educated to thoroughly chew ice chips and \"swallow hard\". Laryngeal rise is palpated and appears adequate. The patient is observed with cough x2 of 10 trials. Voice is slightly wet at times, but O2 remains stable.      Assessment:  The patient tolerated ice chips well.      Plan/Recommendations:  Continue with PEG for nutrition and meds. Continue ST to further trial ice chips and swallow strengthening at bedside.      CARE SCORES:  Self Care:  Eatin: Dependent  Oral hygiene: 03: Partial/moderate assistance  Toilet hygiene: 02: Substantial/maximal assistance  Shower/bathing self: 03: Partial/moderate assistance  Upper body dressin: Partial/moderate assistance  Lower body dressin: Partial/moderate assistance  Putting on/taking off footwear: 09: Not applicable  Transfers:  Roll left and right: 09: Not applicable  Sit to lying: " 09: Not applicable  Lying to sitting on side of bed: 03: Partial/moderate assistance  Sit to stand: 03: Partial/moderate assistance  Chair/bed to chair transfer: 03: Partial/moderate assistance  Toilet transfer: 09: Not applicable  Mobility:  Walk 10 ft: 03: Partial/moderate assistance  Walk 50 ft with two turns: 03: Partial/moderate assistance  Walk 150ft: 03: Partial/moderate assistance    CURRENT GAP IN FUNCTION  Prior to Admission: Functional Status: Patient was independent with mobility/ambulation, transfers, ADL's, IADL's.    Expected functional outcomes: It is expected that with skilled acute rehabilitation services the patient will progress to Independent for self care and Independent for mobility     Estimated length of stay: 10 to 14 days    Anticipated Post-Discharge Disposition/Treatment  Disposition:  significant other's home which has 1 WARD and 1 step to bedroom and an additional step to kitchen/bathroom area  Outpatient Services: Physical Therapy (PT) and Occupational Therapy (OT)    BARRIERS TO DISCHARGE  Decreased ADL status;Decreased UE ROM;Decreased Safe judgement during ADL;Decreased endurance;Decreased fine motor control;Decreased self-care trans;Decreased high-level ADLs   Decreased strength;Impaired balance;Decreased mobility;Decreased coordination;Decreased cognition;Decreased safety awareness;Pain     INTERVENTIONS FOR DISCHARGE  ADL retraining;Functional transfer training;UE strengthening/ROM;Endurance training;Patient/family training;Equipment evaluation/education;Fine motor coordination activities;Activity engagement;LE strengthening/ROM;Elevations;Therapeutic exercise;Bed mobility, Gait training      REQUIRED THERAPY:  Patient will require PT and OT 90 minutes each per day, five days per week to achieve rehab goals.     REQUIRED FUNCTIONAL AND MEDICAL MANAGEMENT FOR INPATIENT REHABILITATION:  Skin:  patient will need close monitoring of his skin for any further breakdown secondary to  decreased mobility.  He will also need close monitoring of his back incision, left upper chest incision and wound on perineum for any complications, Pain Management: Overall pain is well controlled, Deep Vein Thrombosis (DVT) Prophylaxis:  low molecular weight heparin and SCD's while in bed, PT and OT interventions, any labs, imaging, consults and medication adjustments patient may need while on [facility]    RECOMMENDED LEVEL OF CARE:   This is a 53-year-old male who presented to [facility] ER on 6/15/24 after being found down on the floor at home by his daughter.  Patient stated he got home around 2 am and went in to use the bathroom.  He does not recall what happened but he woke up on the floor unable to get up on his own in the hallway.  He yelled for his daughter who got up to use the bathroom and called 911 because she was unable to get him up on her own.  EMS helped get him up and then his daughter called a second time and that is when he was brought into the hospital.  In the ER, patient stated that the right side of his body was bothering him.  He reported decreased sensation in his right posterior thigh, right leg weakness and inability to open up his right hand since his fall.  It was suspected that patient had a syncopal episode that lead to the fall.  Of note patient's daughter states patient 3 syncopal episode in the past 6 months where he sustained a fall.  CT head showed a new acute trace subarachnoid hemorrhage in bilateral parafalcine regions and a new acute trace parafalcine subdural hematoma.  CTA head and neck was negative.  CTA head showed traumatic vascular injury, aneurysm, large vessel occlusion, high-grade stenosis, or dissection. Partially imaged ectatic right carotid bifurcation and proximal cervical internal carotid artery with retropharyngeal course, similar to CT neck with contrast 10/12/2011.  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  He is s/p C3-C7 cervical laminectomy with bilateral screw placement and fusion C2-T1 on 6/16.  He was recommended 2 weeks of antibiotics for infectious prophylaxis per neurosurgery.  He had his Hemovac drain removed on 6/19.  Patient noted to have symptomatic junctional bradycardia with asymptomatic wide-complex tachycardia on telemetry for which EP was consulted.  They felt presentation mixture of sick sinus syndrome and wide-complex tachycardia likely SVT with aberrancy and felt reasonable to place dual-chamber pacemaker given history of multiple syncopal episodes and now documented bradycardia.  Pacer was placed on 6/21.  Course also notable for hyponatremia and polyuria for which nephrology was consulted.  Patient did receive 2 doses of DDAVP as well as intermittent IV fluids.  Na is now WNL and nephrology has signed off.  Additionally, patient experienced abdominal distension which worsened.  Imaging showed variable moderate distention of the small and large bowel.  Question ileus.  Repeat imaging showed persistent gaseous distention of portions of the small and large bowel similar to the prior study.  The distribution of the gaseous distention favored ileus although obstruction could not be conclusively excluded.  Patient was having loose stools.  He was made NPO and diet was slowly advanced.  On 6/28, patient developed difficulty swallowing pills.  Video swallow was done and patient was made NPO with keofed tube feeds.  Repeat video swallow was completed on 7/3 and it was recommended that he remain NPO.  A third video swallow was completed on 7/8 and  patient failed again with continued NPO recommendation.  Patient was initially agreeable to having a PEG placed and then changed his mind and refused.  After further discussion, he was agreeable and PEG was placed on 7/10.  He was started on trickle feeds and per nutrition recommendations, he is now on bolus feeds.  Patient also c/o bilateral hand pain during his hospitalization.  X-rays were completed and negative.  Pain improved.  Patient worked with therapy and was recommended for rehab following her hospitalization.  He has demonstrated that he can tolerate three hours of therapy, five days a week and is now medically cleared for discharge to the [facility].     Prior to admission, patient was independent with his ADLs and IADLs.  He ambulated without an AD.  He is now functioning below his baseline requiring min to dependent assistance to complete his ADLs.  With PT, he is requiring min x 1 assistance for bed mobility and transfers.  He ambulated 150 feet x 2 with RW then 25 feet x 2 with a SPC.  Gait pattern: short stride, forward flexed, inconsistent shay, shuffling, decreased foot clearance, narrow YARIEL, improper weight shift and poor UE support.    Patient requires close oversight by a physician, PM&R management, 24/7 rehabilitation nursing care and specialized interdisciplinary therapy services in preparation for discharge which can only be provided in the inpatient acute rehabilitation setting.  While on [facility], this patient will need routine neuro checks and close monitoring for s/p of aspiration as well as close monitoring of his VS, I/Os, abdominal distention, back incision, pacer site, PEG site, tolerance of tube feeds, skin, pain level, electrolytes and his overall condition.  Inpatient acute rehabilitation is recommended for this patient to maximize his overall strength, endurance, self-care and mobility upon discharge to his significant other's home with the support of significant other and his daughter.

## 2024-06-28 NOTE — PLAN OF CARE
Problem: Prexisting or High Potential for Compromised Skin Integrity  Goal: Skin integrity is maintained or improved  Description: INTERVENTIONS:  - Identify patients at risk for skin breakdown  - Assess and monitor skin integrity  - Assess and monitor nutrition and hydration status  - Monitor labs   - Assess for incontinence   - Turn and reposition patient  - Assist with mobility/ambulation  - Relieve pressure over bony prominences  - Avoid friction and shearing  - Provide appropriate hygiene as needed including keeping skin clean and dry  - Evaluate need for skin moisturizer/barrier cream  - Collaborate with interdisciplinary team   - Patient/family teaching  - Consider wound care consult   Outcome: Progressing     Problem: PAIN - ADULT  Goal: Verbalizes/displays adequate comfort level or baseline comfort level  Description: Interventions:  - Encourage patient to monitor pain and request assistance  - Assess pain using appropriate pain scale  - Administer analgesics based on type and severity of pain and evaluate response  - Implement non-pharmacological measures as appropriate and evaluate response  - Consider cultural and social influences on pain and pain management  - Notify physician/advanced practitioner if interventions unsuccessful or patient reports new pain  Outcome: Progressing     Problem: INFECTION - ADULT  Goal: Absence or prevention of progression during hospitalization  Description: INTERVENTIONS:  - Assess and monitor for signs and symptoms of infection  - Monitor lab/diagnostic results  - Monitor all insertion sites, i.e. indwelling lines, tubes, and drains  - Monitor endotracheal if appropriate and nasal secretions for changes in amount and color  - [place] appropriate cooling/warming therapies per order  - Administer medications as ordered  - Instruct and encourage patient and family to use good hand hygiene technique  - Identify and instruct in appropriate isolation precautions for  identified infection/condition  Outcome: Progressing     Problem: SAFETY ADULT  Goal: Patient will remain free of falls  Description: INTERVENTIONS:  - Educate patient/family on patient safety including physical limitations  - Instruct patient to call for assistance with activity   - Consult OT/PT to assist with strengthening/mobility   - Keep Call bell within reach  - Keep bed low and locked with side rails adjusted as appropriate  - Keep care items and personal belongings within reach  - Initiate and maintain comfort rounds  - Make Fall Risk Sign visible to staff  - Apply yellow socks and bracelet for high fall risk patients  - Consider moving patient to room near nurses station  Outcome: Progressing  Goal: Maintain or return to baseline ADL function  Description: INTERVENTIONS:  -  Assess patient's ability to carry out ADLs; assess patient's baseline for ADL function and identify physical deficits which impact ability to perform ADLs (bathing, care of mouth/teeth, toileting, grooming, dressing, etc.)  - Assess/evaluate cause of self-care deficits   - Assess range of motion  - Assess patient's mobility; develop plan if impaired  - Assess patient's need for assistive devices and provide as appropriate  - Encourage maximum independence but intervene and supervise when necessary  - Involve family in performance of ADLs  - Assess for home care needs following discharge   - Consider OT consult to assist with ADL evaluation and planning for discharge  - Provide patient education as appropriate  Outcome: Progressing  Goal: Maintains/Returns to pre admission functional level  Description: INTERVENTIONS:  - Perform AM-PAC 6 Click Basic Mobility/ Daily Activity assessment daily.  - Set and communicate daily mobility goal to care team and patient/family/caregiver.   - Collaborate with rehabilitation services on mobility goals if consulted  - Out of bed for toileting  - Record patient progress and toleration of activity level    Outcome: Progressing     Problem: DISCHARGE PLANNING  Goal: Discharge to home or other facility with appropriate resources  Description: INTERVENTIONS:  - Identify barriers to discharge w/patient and caregiver  - Arrange for needed discharge resources and transportation as appropriate  - Identify discharge learning needs (meds, wound care, etc.)  - Arrange for interpretive services to assist at discharge as needed  - Refer to Case Management Department for coordinating discharge planning if the patient needs post-hospital services based on physician/advanced practitioner order or complex needs related to functional status, cognitive ability, or social support system  Outcome: Progressing     Problem: Knowledge Deficit  Goal: Patient/family/caregiver demonstrates understanding of disease process, treatment plan, medications, and discharge instructions  Description: Complete learning assessment and assess knowledge base.  Interventions:  - Provide teaching at level of understanding  - Provide teaching via preferred learning methods  Outcome: Progressing     Problem: NEUROSENSORY - ADULT  Goal: Achieves stable or improved neurological status  Description: INTERVENTIONS  - Monitor and report changes in neurological status  - Monitor vital signs such as temperature, blood pressure, glucose, and any other labs ordered   - Initiate measures to prevent increased intracranial pressure  - Monitor for seizure activity and implement precautions if appropriate      Outcome: Progressing  Goal: Remains free of injury related to seizures activity  Description: INTERVENTIONS  - Maintain airway, patient safety  and administer oxygen as ordered  - Monitor patient for seizure activity, document and report duration and description of seizure to physician/advanced practitioner  - If seizure occurs,  ensure patient safety during seizure  - Reorient patient post seizure  - Seizure pads on all 4 side rails  - Instruct patient/family to  notify RN of any seizure activity including if an aura is experienced  - Instruct patient/family to call for assistance with activity based on nursing assessment  - Administer anti-seizure medications if ordered    Outcome: Progressing  Goal: Achieves maximal functionality and self care  Description: INTERVENTIONS  - Monitor swallowing and airway patency with patient fatigue and changes in neurological status  - Encourage and assist patient to increase activity and self care.   - Encourage visually impaired, hearing impaired and aphasic patients to use assistive/communication devices  Outcome: Progressing     Problem: CARDIOVASCULAR - ADULT  Goal: Maintains optimal cardiac output and hemodynamic stability  Description: INTERVENTIONS:  - Monitor I/O, vital signs and rhythm  - Monitor for S/S and trends of decreased cardiac output  - Administer and titrate ordered vasoactive medications to optimize hemodynamic stability  - Assess quality of pulses, skin color and temperature  - Assess for signs of decreased coronary artery perfusion  - Instruct patient to report change in severity of symptoms  Outcome: Progressing  Goal: Absence of cardiac dysrhythmias or at baseline rhythm  Description: INTERVENTIONS:  - Continuous cardiac monitoring, vital signs, obtain 12 lead EKG if ordered  - Administer antiarrhythmic and heart rate control medications as ordered  - Monitor electrolytes and administer replacement therapy as ordered  Outcome: Progressing     Problem: RESPIRATORY - ADULT  Goal: Achieves optimal ventilation and oxygenation  Description: INTERVENTIONS:  - Assess for changes in respiratory status  - Assess for changes in mentation and behavior  - Position to facilitate oxygenation and minimize respiratory effort  - Oxygen administered by appropriate delivery if ordered  - Initiate smoking cessation education as indicated  - Encourage broncho-pulmonary hygiene including cough, deep breathe, Incentive Spirometry  -  Assess the need for suctioning and aspirate as needed  - Assess and instruct to report SOB or any respiratory difficulty  - Respiratory Therapy support as indicated  Outcome: Progressing     Problem: GASTROINTESTINAL - ADULT  Goal: Minimal or absence of nausea and/or vomiting  Description: INTERVENTIONS:  - Administer IV fluids if ordered to ensure adequate hydration  - Maintain NPO status until nausea and vomiting are resolved  - Nasogastric tube if ordered  - Administer ordered antiemetic medications as needed  - Provide nonpharmacologic comfort measures as appropriate  - Advance diet as tolerated, if ordered  - Consider nutrition services referral to assist patient with adequate nutrition and appropriate food choices  Outcome: Progressing  Goal: Maintains or returns to baseline bowel function  Description: INTERVENTIONS:  - Assess bowel function  - Encourage oral fluids to ensure adequate hydration  - Administer IV fluids if ordered to ensure adequate hydration  - Administer ordered medications as needed  - Encourage mobilization and activity  - Consider nutritional services referral to assist patient with adequate nutrition and appropriate food choices  Outcome: Progressing  Goal: Maintains adequate nutritional intake  Description: INTERVENTIONS:  - Monitor percentage of each meal consumed  - Identify factors contributing to decreased intake, treat as appropriate  - Assist with meals as needed  - Monitor I&O, weight, and lab values if indicated  - Obtain nutrition services referral as needed  Outcome: Progressing  Goal: Oral mucous membranes remain intact  Description: INTERVENTIONS  - Assess oral mucosa and hygiene practices  - Implement preventative oral hygiene regimen  - Implement oral medicated treatments as ordered  - Initiate Nutrition services referral as needed  Outcome: Progressing     Problem: GENITOURINARY - ADULT  Goal: Maintains or returns to baseline urinary function  Description:  INTERVENTIONS:  - Assess urinary function  - Encourage oral fluids to ensure adequate hydration if ordered  - Administer IV fluids as ordered to ensure adequate hydration  - Administer ordered medications as needed  - Offer frequent toileting  - Follow urinary retention protocol if ordered  Outcome: Progressing  Goal: Absence of urinary retention  Description: INTERVENTIONS:  - Assess patient’s ability to void and empty bladder  - Monitor I/O  - Bladder scan as needed  - Discuss with physician/AP medications to alleviate retention as needed  - Discuss catheterization for long term situations as appropriate  Outcome: Progressing     Problem: Nutrition/Hydration-ADULT  Goal: Nutrient/Hydration intake appropriate for improving, restoring or maintaining nutritional needs  Description: Monitor and assess patient's nutrition/hydration status for malnutrition. Collaborate with interdisciplinary team and initiate plan and interventions as ordered.  Monitor patient's weight and dietary intake as ordered or per policy. Utilize nutrition screening tool and intervene as necessary. Determine patient's food preferences and provide high-protein, high-caloric foods as appropriate.     INTERVENTIONS:  - Monitor oral intake, urinary output, labs, and treatment plans  - Assess nutrition and hydration status and recommend course of action  - Evaluate amount of meals eaten  - Assist patient with eating if necessary   - Allow adequate time for meals  - Recommend/ encourage appropriate diets, oral nutritional supplements, and vitamin/mineral supplements  - Order, calculate, and assess calorie counts as needed  - Recommend, monitor, and adjust tube feedings and TPN/PPN based on assessed needs  - Assess need for intravenous fluids  - Provide specific nutrition/hydration education as appropriate  - Include patient/family/caregiver in decisions related to nutrition  Outcome: Progressing

## 2024-06-28 NOTE — SPEECH THERAPY NOTE
"Speech Language/Pathology    Speech/Language Pathology Progress Note    Patient Name: Luis Forrester  Today's Date: 6/28/2024     Problem List  Principal Problem:    TBI (traumatic brain injury) ([facility])  Active Problems:    Hypothyroidism    SDH (subdural hematoma) ([facility])    SSS (sick sinus syndrome) ([facility])    s/p Medtronic dual chamber PPM 6/21/2024    Hyponatremia    Abdominal distension       Past Medical History  Past Medical History:   Diagnosis Date    Arthritis     Chronic cough     Disease of thyroid gland     Hypoparathyroidism ([facility])     continue taking calcium and vitamin D, will check CMP, PTH level and Vitamin D level    Pneumonia of right middle lobe due to Streptococcus pneumoniae ([facility]) 11/30/2018    s/p Medtronic dual chamber PPM 6/21/2024 06/21/2024        Past Surgical History  Past Surgical History:   Procedure Laterality Date    CARDIAC ELECTROPHYSIOLOGY PROCEDURE N/A 6/21/2024    Procedure: Cardiac pacer implant;  Surgeon: Kofi Pettit MD;  Location: [facility];  Service: Cardiology    DECOMPRESSION SPINE CERVICAL POSTERIOR N/A 6/16/2024    Procedure: DECOMPRESSION SPINE CERVICAL POSTERIOR C2-T1 with fusion navigated with Oarm;  Surgeon: Endy Velasquez MD;  Location: [facility]IN OR;  Service: Neurosurgery    FRACTURE SURGERY      Open Treatment of Each Proximal Finger Phalanx         Subjective:  Pt seen for dysphagia tx. Pt was positioned mostly upright in recliner, with neck supported by pillows. \"I choked on chicken tenders last night\".     Objective:  Family was present for the session. Pt's diet was advanced to regular from full liquid yesterday. Daughter and pt report that pt attempted to eat chicken tenders last night, and choked/coughed while attempting to swallow. He was eventually able to swallow the , with multiple swallows and multiple sips of liquid. After that diet was changed back to full liquids. Pt reports he is still having difficulty swallowing even the " full liquids.     Pt was seen for dysphagia tx in conjunction with his lunch tray (full liquid). Pt stated he is full and doesn't want to eat much, but his girlfriend states he didn't eat breakfast. Pt drank a few sips of thin liquid, and ate several bites of ice cream. Oral phase appeared WNL, with prompt bolus formation and transfer. Observed pharyngeal swallows/hyolaryngeal movement; pt swallows multiple times per bite/sip; he reports that he must do this to clear the bolus from the pharynx. He also c/o intermittent pain in his neck due to surgery during the swallow (nurse aware). Hyolaryngeal movement appears reduced. There was wet vocal quality intermittently before, during, and after swallowing the ice cream and liquids; this clears with a cued cough.     Discussed possibility of VBS with pt, in order to further assess the swallow. Pt is agreeable. Messaged with surgery resident regarding timing of VBS, since there was recent concern for ileus, plus with neck swelling from recent surgery; ie would he like to proceed with VBS soon or wait a few days. Physician plans to keep pt on current diet for now and plans discuss VBS timing with his team today. Reviewed aspiration precautions with pt and his family; all verbalized understanding.     Assessment:  Suspect pharyngeal residue, due to multiple swallows per bite/sips. Risk of aspiration is elevated; there was intermittent wet vocal quality but no overt coughing today. At this time current diet appears appropriate. Pt is not appropriate for diet advancement yet.     Plan/Recommendations:  Continue full liquid diet. Aspiration precautions, eat slowly, small bites/sips, occasional throat clear to clear any penetrated material.  VBS when ok with physician.

## 2024-06-28 NOTE — PROGRESS NOTES
[facility]  Progress Note  Name: Luis Forrester I  MRN: [id number]  Unit/Bed#: [facility] 605-01 I Date of Admission: 6/15/2024   Date of Service: 6/28/2024 I Hospital Day: 13    Assessment & Plan   * TBI (traumatic brain injury) ([facility])  Assessment & Plan  - Traumatic brain injury with new acute appearing trace subarachnoid hemorrhage in the bilateral parafalcine regions as well as a new appearing trace parafalcine subdural hematoma, present on admission.  - CT scan of the head from 6/15/2024 demonstrated: New acute trace subarachnoid hemorrhage in bilateral parafalcine regions. New acute trace parafalcine subdural hematoma.  -  Additional imaging with CTA of the head and neck 6/15/2024 demonstrated: Negative CTA head traumatic vascular injury, aneurysm, large vessel occlusion, high-grade stenosis, or dissection. Partially imaged ectatic right carotid bifurcation and proximal cervical internal carotid artery with retropharyngeal course, similar to CT neck with contrast 10/12/2011.  - Admit as level 2 step-down with HOT protocol.  The patient may require ICU level of care during hospital encounter if the patient has clinical decompensation or worsening of traumatic brain injury.  - Neurosurgery evaluation and recommendations appreciated.  - Hold anti-platelet and anticoagulant medications for least 2 weeks and/or until cleared by Neurosurgery.   - Patient is not on any chronic antiplatelet or anticoagulant medications at baseline.  - Continue Keppra for 7 days for seizure prophylaxis - completed  - Monitor neurologic exam.  - Continue symptomatic management with analgesia as needed.  - Tentative plan for repeat CT scan of the head in 12-24 hours or sooner if patient has clinical decline or drop in GCS > or equal to 2.  - PT and OT evaluation and treatment as indicated.    Abdominal distension  Assessment & Plan  Abdominal distention following PPM placement  -Patient and wife report  worsening abdominal distention today  -Replete electrolytes as needed   -Patient reports multiple liquid bowel movements daily and passing flatus at this time  -Monitor bowel function   -Speech cleared for FLD 6/27  -No nausea or vomiting   -Abdomen mildly distended, soft, non tender 6/28  -Continue FLD    Hyponatremia  Assessment & Plan  Appreciate nephrology input  Sodium has corrected       s/p Medtronic dual chamber PPM 6/21/2024  Assessment & Plan  See SSS plan    SSS (sick sinus syndrome) ([facility])  Assessment & Plan  Status post PPM on 6/21  EPS has signed off on 6/22    SDH (subdural hematoma) ([facility])  Assessment & Plan  Continue frequent neurological checks.   STAT CT head with any neurological decline including drop GCS of 2pts within 1 hr.  Seizure prophylaxis per trauma team-completed  Hold all full antiplatelet and anticoagulation medications for 2 weeks      Hypothyroidism  Assessment & Plan  - Patient with chronic history of hypothyroidism.  - Continue home medication regimen including levothyroxine.  - Outpatient follow-up routine.             Bowel Regimen: SENOKOT, Miralax  VTE Prophylaxis:Enoxaparin (Lovenox)     Disposition: Rehab, level 1 per PT    Subjective     Subjective: Patient expressed no acute concerns. Patient reports his pain is controlled. Patient is motor an sensory intact. No nausea/ vomiting/ fevers/ chills or SOB. Patient tolerated CLD. Patient reports no abdominal pain. Patient reports liquid BM and flatus.      Objective   Vitals:   Temp:  [98.5 °F (36.9 °C)-99.7 °F (37.6 °C)] 98.5 °F (36.9 °C)  HR:  [68-70] 70  Resp:  [17-18] 17  BP: (105-106)/(68-71) 106/71    I/O         06/26 0701  06/27 0700 06/27 0701  06/28 0700    P.O. 240 476    IV Piggyback      Total Intake(mL/kg) 240 (3) 476 (6)    Urine (mL/kg/hr) 2350 (1.2) 1125 (1)    Stool 0 0    Total Output 2350 1125    Net -2110 -649          Unmeasured Urine Occurrence  1 x    Unmeasured Stool Occurrence 1 x 3 x              Physical Exam:   GENERAL APPEARANCE: No acute distress, resting comfortably   NEURO: Motor and sensory intact  HEENT: moist mucus membranes   CV: well perfused RR  LUNGS: normal work of breathing   GI: soft, non tender, minimally distended   MSK: no lower extremity edema   SKIN: Posterior neck incision clean dry and intact    Invasive Devices       Peripheral Intravenous Line  Duration             Peripheral IV 06/25/24 Right Antecubital 3 days    Peripheral IV 06/25/24 Dorsal (posterior);Right Wrist 2 days                          Lab Results: Results: I have personally reviewed all pertinent laboratory/tests results, BMP/CMP:   Lab Results   Component Value Date    SODIUM 138 06/28/2024    SODIUM 136 06/28/2024    K 3.7 06/28/2024    K 3.6 06/28/2024     06/28/2024     06/28/2024    CO2 27 06/28/2024    CO2 26 06/28/2024    BUN 6 06/28/2024    BUN 6 06/28/2024    CREATININE 0.56 (L) 06/28/2024    CREATININE 0.62 06/28/2024    CALCIUM 7.5 (L) 06/28/2024    CALCIUM 7.4 (L) 06/28/2024    EGFR 116 06/28/2024    EGFR 111 06/28/2024   , and CBC:   Lab Results   Component Value Date    WBC 9.37 06/28/2024    HGB 12.5 06/28/2024    HCT 38.0 06/28/2024    MCV 97 06/28/2024     06/28/2024    RBC 3.92 06/28/2024    MCH 31.9 06/28/2024    MCHC 32.9 06/28/2024    RDW 14.0 06/28/2024    MPV 9.4 06/28/2024     Imaging: I have personally reviewed pertinent reports.     Other Studies:

## 2024-06-28 NOTE — PHYSICAL THERAPY NOTE
Physical Therapy Progress Note     06/28/24 1138   PT Last Visit   PT Visit Date 06/28/24   Note Type   Note Type Treatment   Pain Assessment   Pain Assessment Tool FLACC   Pain Rating: FLACC (Rest) - Face 1   Pain Rating: FLACC (Rest) - Legs 0   Pain Rating: FLACC (Rest) - Activity 0   Pain Rating: FLACC (Rest) - Cry 1   Pain Rating: FLACC (Rest) - Consolability 0   Score: FLACC (Rest) 2   Pain Rating: FLACC (Activity) - Face 1   Pain Rating: FLACC (Activity) - Legs 1   Pain Rating: FLACC (Activity) - Activity 1   Pain Rating: FLACC (Activity) - Cry 1   Pain Rating: FLACC (Activity) - Consolability 0   Score: FLACC (Activity) 4   Restrictions/Precautions   Other Precautions Cognitive;Chair Alarm;Bed Alarm;Pain;Fall Risk;Spinal precautions   Subjective   Subjective Pt encountered supine in bed, pleasant and agreeable to treatment.  Reports no new complaints with activity.   Bed Mobility   Supine to Sit 3  Moderate assistance   Additional items Assist x 1;HOB elevated;Increased time required;Verbal cues   Transfers   Sit to Stand 4  Minimal assistance   Additional items Assist x 1;Armrests;Increased time required  (mod A from low seat s/p 1st gait trial)   Stand to Sit 4  Minimal assistance   Additional items Assist x 1;Armrests;Increased time required   Ambulation/Elevation   Gait pattern Short stride;Foward flexed;Inconsistent shay;Shuffling;Decreased foot clearance;Ataxia;Improper Weight shift;Poor UE support   Gait Assistance 4  Minimal assist   Additional items Assist x 1   Assistive Device Rolling walker   Distance 50' x 2   Balance   Static Sitting Fair   Static Standing Poor +   Ambulatory Poor   Activity Tolerance   Activity Tolerance Patient tolerated treatment well;Patient limited by fatigue;Patient limited by pain   Nurse Made Aware LUCRECIA Headley   Assessment   Prognosis Fair   Problem List Decreased strength;Decreased endurance;Impaired balance;Decreased mobility;Decreased coordination;Decreased  cognition;Decreased safety awareness;Pain   Assessment Pt continues to require Ax1 for all transfers & OOB mobility tasks at this time, but continues to ambulate up to household distances with use of RW today, but continues to demonstate gait deviations as note above that place him at high fall risk when paired with impaired balance & limited insight to deficits.  He completes tasks without change in status, but does require seated rests to recover.  Briefly discussed expected healing process, but deferred to attending team for more firm time table & eventual return to activity.  Pt instructed to participate in daily mobiilty over the weekend with staff to maintain mobiilty if pt is unable to follow up either day.  Pt verbalized understanding to the same.  PT POC & d/c recommendations remain appropriate at this time.   Goals   Patient Goals to be independent again   STG Expiration Date 06/27/24   PT Treatment Day 3   Plan   Treatment/Interventions Functional transfer training;LE strengthening/ROM;Elevations;Therapeutic exercise;Endurance training;Patient/family training;Bed mobility;Equipment eval/education;Gait training   Progress Progressing toward goals   PT Frequency 3-5x/wk   Discharge Recommendation   Rehab Resource Intensity Level, PT I (Maximum Resource Intensity)   Equipment Recommended Walker   Walker Package Recommended Wheeled walker   AM-PAC Basic Mobility Inpatient   Turning in Flat Bed Without Bedrails 3   Lying on Back to Sitting on Edge of Flat Bed Without Bedrails 2   Moving Bed to Chair 2   Standing Up From Chair Using Arms 2   Walk in Room 3   Climb 3-5 Stairs With Railing 1   Basic Mobility Inpatient Raw Score 13   Basic Mobility Standardized Score 33.99   [facility] Highest Level Of Mobility   -[facility] Goal 4: Move to chair/commode   -[facility] Achieved 7: Walk 25 feet or more       David Moulton PTA    An [facility] Basic Mobility Raw Score less than 17 suggests pt would benefit from post acute  rehab.  Please also refer to the recommendation of the Physical Therapist for safe discharge planning.

## 2024-06-28 NOTE — CASE MANAGEMENT
Case Management Discharge Planning Note    Patient name Luis Forrester  Location [facility] 605-01 MRN [id number]  : 1970 Date 2024       Current Admission Date: 6/15/2024  Current Admission Diagnosis:TBI (traumatic brain injury) ([facility])   Patient Active Problem List    Diagnosis Date Noted Date Diagnosed    Abdominal distension 2024     Hyponatremia 2024     SSS (sick sinus syndrome) ([facility]) 2024     s/p Medtronic dual chamber PPM 2024     TBI (traumatic brain injury) ([facility]) 06/15/2024     Syncope and collapse 06/15/2024     Bilateral hand pain 06/15/2024     SDH (subdural hematoma) ([facility]) 06/15/2024     Right hand weakness 06/15/2024     Encounter for colorectal cancer screening 2024     Encounter for immunization 2024     Vitamin D deficiency 2024     Bilateral impacted cerumen 10/13/2021     Reactive airway disease 03/15/2018     Osteoarthritis of both hands 2015     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     Chronic low back pain 2015     Mass of parotid gland 2015     Lumbar radiculopathy 2015     Hyperlipidemia 2014     Hypothyroidism 2014     Allergic rhinitis 2012       LOS (days): 13  Geometric Mean LOS (GMLOS) (days): 10  Days to GMLOS:-3.1     OBJECTIVE:  Risk of Unplanned Readmission Score: 17.12         Current admission status: Inpatient   Preferred Pharmacy:   [id number] - [place], [place] [address]  [place] [address]  Phone: [phone] Fax: [phone]    Primary Care Provider: Joceline Shook PA-C    Primary Insurance: [place] [employer]  Secondary Insurance:     DISCHARGE DETAILS:    Pt not medically stable for d/c.   Pt being followed by [facility] for potential admission

## 2024-06-28 NOTE — [facility] ADMISSION
Pre-approved for [facility] pending medical stability, LOF at discharge and bed availability.  [facility] admissions will continue to follow patient for progression of care and discharge readiness.    [facility] is following for advancement of diet and tolerance of same.

## 2024-06-29 PROBLEM — R13.10 DYSPHAGIA: Status: ACTIVE | Noted: 2024-06-29

## 2024-06-29 LAB
ANION GAP SERPL CALCULATED.3IONS-SCNC: 11 MMOL/L (ref 4–13)
ANION GAP SERPL CALCULATED.3IONS-SCNC: 13 MMOL/L (ref 4–13)
ANION GAP SERPL CALCULATED.3IONS-SCNC: 14 MMOL/L (ref 4–13)
BUN SERPL-MCNC: 10 MG/DL (ref 5–25)
BUN SERPL-MCNC: 8 MG/DL (ref 5–25)
BUN SERPL-MCNC: 9 MG/DL (ref 5–25)
CALCIUM SERPL-MCNC: 7.7 MG/DL (ref 8.4–10.2)
CALCIUM SERPL-MCNC: 7.8 MG/DL (ref 8.4–10.2)
CALCIUM SERPL-MCNC: 8 MG/DL (ref 8.4–10.2)
CHLORIDE SERPL-SCNC: 100 MMOL/L (ref 96–108)
CHLORIDE SERPL-SCNC: 98 MMOL/L (ref 96–108)
CHLORIDE SERPL-SCNC: 99 MMOL/L (ref 96–108)
CO2 SERPL-SCNC: 23 MMOL/L (ref 21–32)
CO2 SERPL-SCNC: 23 MMOL/L (ref 21–32)
CO2 SERPL-SCNC: 25 MMOL/L (ref 21–32)
CREAT SERPL-MCNC: 0.57 MG/DL (ref 0.6–1.3)
CREAT SERPL-MCNC: 0.59 MG/DL (ref 0.6–1.3)
CREAT SERPL-MCNC: 0.65 MG/DL (ref 0.6–1.3)
ERYTHROCYTE [DISTWIDTH] IN BLOOD BY AUTOMATED COUNT: 13.8 % (ref 11.6–15.1)
GFR SERPL CREATININE-BSD FRML MDRD: 109 ML/MIN/1.73SQ M
GFR SERPL CREATININE-BSD FRML MDRD: 113 ML/MIN/1.73SQ M
GFR SERPL CREATININE-BSD FRML MDRD: 115 ML/MIN/1.73SQ M
GLUCOSE SERPL-MCNC: 117 MG/DL (ref 65–140)
GLUCOSE SERPL-MCNC: 75 MG/DL (ref 65–140)
GLUCOSE SERPL-MCNC: 84 MG/DL (ref 65–140)
HCT VFR BLD AUTO: 37.1 % (ref 36.5–49.3)
HGB BLD-MCNC: 12.6 G/DL (ref 12–17)
MCH RBC QN AUTO: 32.1 PG (ref 26.8–34.3)
MCHC RBC AUTO-ENTMCNC: 34 G/DL (ref 31.4–37.4)
MCV RBC AUTO: 94 FL (ref 82–98)
PLATELET # BLD AUTO: 225 THOUSANDS/UL (ref 149–390)
PMV BLD AUTO: 9.4 FL (ref 8.9–12.7)
POTASSIUM SERPL-SCNC: 3.2 MMOL/L (ref 3.5–5.3)
POTASSIUM SERPL-SCNC: 3.9 MMOL/L (ref 3.5–5.3)
POTASSIUM SERPL-SCNC: 4.6 MMOL/L (ref 3.5–5.3)
RBC # BLD AUTO: 3.93 MILLION/UL (ref 3.88–5.62)
SODIUM SERPL-SCNC: 134 MMOL/L (ref 135–147)
SODIUM SERPL-SCNC: 134 MMOL/L (ref 135–147)
SODIUM SERPL-SCNC: 138 MMOL/L (ref 135–147)
WBC # BLD AUTO: 12.47 THOUSAND/UL (ref 4.31–10.16)

## 2024-06-29 PROCEDURE — 99232 SBSQ HOSP IP/OBS MODERATE 35: CPT | Performed by: SURGERY

## 2024-06-29 PROCEDURE — 80048 BASIC METABOLIC PNL TOTAL CA: CPT | Performed by: INTERNAL MEDICINE

## 2024-06-29 PROCEDURE — 92526 ORAL FUNCTION THERAPY: CPT

## 2024-06-29 PROCEDURE — 85027 COMPLETE CBC AUTOMATED: CPT

## 2024-06-29 RX ORDER — BENZONATATE 100 MG/1
200 CAPSULE ORAL 3 TIMES DAILY PRN
Status: DISCONTINUED | OUTPATIENT
Start: 2024-06-29 | End: 2024-06-30

## 2024-06-29 RX ORDER — CALCIUM GLUCONATE 20 MG/ML
2 INJECTION, SOLUTION INTRAVENOUS ONCE
Status: COMPLETED | OUTPATIENT
Start: 2024-06-29 | End: 2024-06-29

## 2024-06-29 RX ORDER — POTASSIUM CHLORIDE 20MEQ/15ML
40 LIQUID (ML) ORAL ONCE
Status: DISCONTINUED | OUTPATIENT
Start: 2024-06-29 | End: 2024-06-29

## 2024-06-29 RX ORDER — DEXAMETHASONE SODIUM PHOSPHATE 10 MG/ML
10 INJECTION, SOLUTION INTRAMUSCULAR; INTRAVENOUS ONCE
Status: COMPLETED | OUTPATIENT
Start: 2024-06-29 | End: 2024-06-29

## 2024-06-29 RX ORDER — POTASSIUM CHLORIDE 14.9 MG/ML
20 INJECTION INTRAVENOUS
Status: COMPLETED | OUTPATIENT
Start: 2024-06-29 | End: 2024-06-29

## 2024-06-29 RX ADMIN — METHOCARBAMOL 500 MG: 500 TABLET ORAL at 01:29

## 2024-06-29 RX ADMIN — METHOCARBAMOL 500 MG: 500 TABLET ORAL at 06:38

## 2024-06-29 RX ADMIN — CALCIUM GLUCONATE 2 G: 20 INJECTION, SOLUTION INTRAVENOUS at 08:45

## 2024-06-29 RX ADMIN — METHOCARBAMOL 500 MG: 500 TABLET ORAL at 23:45

## 2024-06-29 RX ADMIN — GABAPENTIN 100 MG: 100 CAPSULE ORAL at 21:02

## 2024-06-29 RX ADMIN — GABAPENTIN 100 MG: 100 CAPSULE ORAL at 17:48

## 2024-06-29 RX ADMIN — CEPHALEXIN 500 MG: 500 CAPSULE ORAL at 06:38

## 2024-06-29 RX ADMIN — CEPHALEXIN 500 MG: 500 CAPSULE ORAL at 01:29

## 2024-06-29 RX ADMIN — POTASSIUM CHLORIDE 20 MEQ: 14.9 INJECTION, SOLUTION INTRAVENOUS at 11:00

## 2024-06-29 RX ADMIN — MIDODRINE HYDROCHLORIDE 10 MG: 5 TABLET ORAL at 12:26

## 2024-06-29 RX ADMIN — METHOCARBAMOL 500 MG: 500 TABLET ORAL at 17:48

## 2024-06-29 RX ADMIN — ENOXAPARIN SODIUM 30 MG: 30 INJECTION SUBCUTANEOUS at 08:45

## 2024-06-29 RX ADMIN — ACETAMINOPHEN 975 MG: 325 TABLET, FILM COATED ORAL at 21:02

## 2024-06-29 RX ADMIN — MIDODRINE HYDROCHLORIDE 10 MG: 5 TABLET ORAL at 04:45

## 2024-06-29 RX ADMIN — POTASSIUM CHLORIDE 20 MEQ: 14.9 INJECTION, SOLUTION INTRAVENOUS at 09:00

## 2024-06-29 RX ADMIN — ACETAMINOPHEN 975 MG: 325 TABLET, FILM COATED ORAL at 06:38

## 2024-06-29 RX ADMIN — LEVOTHYROXINE SODIUM 125 MCG: 125 TABLET ORAL at 06:38

## 2024-06-29 RX ADMIN — ENOXAPARIN SODIUM 30 MG: 30 INJECTION SUBCUTANEOUS at 20:55

## 2024-06-29 RX ADMIN — CEPHALEXIN 500 MG: 500 CAPSULE ORAL at 23:45

## 2024-06-29 RX ADMIN — Medication 1000 UNITS: at 08:45

## 2024-06-29 RX ADMIN — DEXAMETHASONE SODIUM PHOSPHATE 10 MG: 10 INJECTION, SOLUTION INTRAMUSCULAR; INTRAVENOUS at 13:32

## 2024-06-29 RX ADMIN — CEPHALEXIN 500 MG: 500 CAPSULE ORAL at 17:48

## 2024-06-29 RX ADMIN — METHOCARBAMOL 500 MG: 500 TABLET ORAL at 12:26

## 2024-06-29 RX ADMIN — CEPHALEXIN 500 MG: 500 CAPSULE ORAL at 12:26

## 2024-06-29 RX ADMIN — GABAPENTIN 100 MG: 100 CAPSULE ORAL at 08:45

## 2024-06-29 RX ADMIN — BENZONATATE 200 MG: 100 CAPSULE ORAL at 20:53

## 2024-06-29 RX ADMIN — MIDODRINE HYDROCHLORIDE 10 MG: 5 TABLET ORAL at 20:52

## 2024-06-29 NOTE — PLAN OF CARE
Problem: Prexisting or High Potential for Compromised Skin Integrity  Goal: Skin integrity is maintained or improved  Description: INTERVENTIONS:  - Identify patients at risk for skin breakdown  - Assess and monitor skin integrity  - Assess and monitor nutrition and hydration status  - Monitor labs   - Assess for incontinence   - Turn and reposition patient  - Assist with mobility/ambulation  - Relieve pressure over bony prominences  - Avoid friction and shearing  - Provide appropriate hygiene as needed including keeping skin clean and dry  - Evaluate need for skin moisturizer/barrier cream  - Collaborate with interdisciplinary team   - Patient/family teaching  - Consider wound care consult   Outcome: Progressing     Problem: PAIN - ADULT  Goal: Verbalizes/displays adequate comfort level or baseline comfort level  Description: Interventions:  - Encourage patient to monitor pain and request assistance  - Assess pain using appropriate pain scale  - Administer analgesics based on type and severity of pain and evaluate response  - Implement non-pharmacological measures as appropriate and evaluate response  - Consider cultural and social influences on pain and pain management  - Notify physician/advanced practitioner if interventions unsuccessful or patient reports new pain  Outcome: Progressing     Problem: INFECTION - ADULT  Goal: Absence or prevention of progression during hospitalization  Description: INTERVENTIONS:  - Assess and monitor for signs and symptoms of infection  - Monitor lab/diagnostic results  - Monitor all insertion sites, i.e. indwelling lines, tubes, and drains  - Monitor endotracheal if appropriate and nasal secretions for changes in amount and color  - [place] appropriate cooling/warming therapies per order  - Administer medications as ordered  - Instruct and encourage patient and family to use good hand hygiene technique  - Identify and instruct in appropriate isolation precautions for  identified infection/condition  Outcome: Progressing     Problem: SAFETY ADULT  Goal: Patient will remain free of falls  Description: INTERVENTIONS:  - Educate patient/family on patient safety including physical limitations  - Instruct patient to call for assistance with activity   - Consult OT/PT to assist with strengthening/mobility   - Keep Call bell within reach  - Keep bed low and locked with side rails adjusted as appropriate  - Keep care items and personal belongings within reach  - Initiate and maintain comfort rounds  - Make Fall Risk Sign visible to staff  - Apply yellow socks and bracelet for high fall risk patients  - Consider moving patient to room near nurses station  Outcome: Progressing  Goal: Maintain or return to baseline ADL function  Description: INTERVENTIONS:  -  Assess patient's ability to carry out ADLs; assess patient's baseline for ADL function and identify physical deficits which impact ability to perform ADLs (bathing, care of mouth/teeth, toileting, grooming, dressing, etc.)  - Assess/evaluate cause of self-care deficits   - Assess range of motion  - Assess patient's mobility; develop plan if impaired  - Assess patient's need for assistive devices and provide as appropriate  - Encourage maximum independence but intervene and supervise when necessary  - Involve family in performance of ADLs  - Assess for home care needs following discharge   - Consider OT consult to assist with ADL evaluation and planning for discharge  - Provide patient education as appropriate  Outcome: Progressing  Goal: Maintains/Returns to pre admission functional level  Description: INTERVENTIONS:  - Perform AM-PAC 6 Click Basic Mobility/ Daily Activity assessment daily.  - Set and communicate daily mobility goal to care team and patient/family/caregiver.   - Collaborate with rehabilitation services on mobility goals if consulted  - Out of bed for toileting  - Record patient progress and toleration of activity level    Outcome: Progressing     Problem: DISCHARGE PLANNING  Goal: Discharge to home or other facility with appropriate resources  Description: INTERVENTIONS:  - Identify barriers to discharge w/patient and caregiver  - Arrange for needed discharge resources and transportation as appropriate  - Identify discharge learning needs (meds, wound care, etc.)  - Arrange for interpretive services to assist at discharge as needed  - Refer to Case Management Department for coordinating discharge planning if the patient needs post-hospital services based on physician/advanced practitioner order or complex needs related to functional status, cognitive ability, or social support system  Outcome: Progressing     Problem: Knowledge Deficit  Goal: Patient/family/caregiver demonstrates understanding of disease process, treatment plan, medications, and discharge instructions  Description: Complete learning assessment and assess knowledge base.  Interventions:  - Provide teaching at level of understanding  - Provide teaching via preferred learning methods  Outcome: Progressing     Problem: NEUROSENSORY - ADULT  Goal: Achieves stable or improved neurological status  Description: INTERVENTIONS  - Monitor and report changes in neurological status  - Monitor vital signs such as temperature, blood pressure, glucose, and any other labs ordered   - Initiate measures to prevent increased intracranial pressure  - Monitor for seizure activity and implement precautions if appropriate      Outcome: Progressing  Goal: Remains free of injury related to seizures activity  Description: INTERVENTIONS  - Maintain airway, patient safety  and administer oxygen as ordered  - Monitor patient for seizure activity, document and report duration and description of seizure to physician/advanced practitioner  - If seizure occurs,  ensure patient safety during seizure  - Reorient patient post seizure  - Seizure pads on all 4 side rails  - Instruct patient/family to  notify RN of any seizure activity including if an aura is experienced  - Instruct patient/family to call for assistance with activity based on nursing assessment  - Administer anti-seizure medications if ordered    Outcome: Progressing  Goal: Achieves maximal functionality and self care  Description: INTERVENTIONS  - Monitor swallowing and airway patency with patient fatigue and changes in neurological status  - Encourage and assist patient to increase activity and self care.   - Encourage visually impaired, hearing impaired and aphasic patients to use assistive/communication devices  Outcome: Progressing     Problem: CARDIOVASCULAR - ADULT  Goal: Maintains optimal cardiac output and hemodynamic stability  Description: INTERVENTIONS:  - Monitor I/O, vital signs and rhythm  - Monitor for S/S and trends of decreased cardiac output  - Administer and titrate ordered vasoactive medications to optimize hemodynamic stability  - Assess quality of pulses, skin color and temperature  - Assess for signs of decreased coronary artery perfusion  - Instruct patient to report change in severity of symptoms  Outcome: Progressing  Goal: Absence of cardiac dysrhythmias or at baseline rhythm  Description: INTERVENTIONS:  - Continuous cardiac monitoring, vital signs, obtain 12 lead EKG if ordered  - Administer antiarrhythmic and heart rate control medications as ordered  - Monitor electrolytes and administer replacement therapy as ordered  Outcome: Progressing     Problem: RESPIRATORY - ADULT  Goal: Achieves optimal ventilation and oxygenation  Description: INTERVENTIONS:  - Assess for changes in respiratory status  - Assess for changes in mentation and behavior  - Position to facilitate oxygenation and minimize respiratory effort  - Oxygen administered by appropriate delivery if ordered  - Initiate smoking cessation education as indicated  - Encourage broncho-pulmonary hygiene including cough, deep breathe, Incentive Spirometry  -  Assess the need for suctioning and aspirate as needed  - Assess and instruct to report SOB or any respiratory difficulty  - Respiratory Therapy support as indicated  Outcome: Progressing     Problem: GASTROINTESTINAL - ADULT  Goal: Minimal or absence of nausea and/or vomiting  Description: INTERVENTIONS:  - Administer IV fluids if ordered to ensure adequate hydration  - Maintain NPO status until nausea and vomiting are resolved  - Nasogastric tube if ordered  - Administer ordered antiemetic medications as needed  - Provide nonpharmacologic comfort measures as appropriate  - Advance diet as tolerated, if ordered  - Consider nutrition services referral to assist patient with adequate nutrition and appropriate food choices  Outcome: Progressing  Goal: Maintains or returns to baseline bowel function  Description: INTERVENTIONS:  - Assess bowel function  - Encourage oral fluids to ensure adequate hydration  - Administer IV fluids if ordered to ensure adequate hydration  - Administer ordered medications as needed  - Encourage mobilization and activity  - Consider nutritional services referral to assist patient with adequate nutrition and appropriate food choices  Outcome: Progressing  Goal: Maintains adequate nutritional intake  Description: INTERVENTIONS:  - Monitor percentage of each meal consumed  - Identify factors contributing to decreased intake, treat as appropriate  - Assist with meals as needed  - Monitor I&O, weight, and lab values if indicated  - Obtain nutrition services referral as needed  Outcome: Progressing  Goal: Oral mucous membranes remain intact  Description: INTERVENTIONS  - Assess oral mucosa and hygiene practices  - Implement preventative oral hygiene regimen  - Implement oral medicated treatments as ordered  - Initiate Nutrition services referral as needed  Outcome: Progressing     Problem: GENITOURINARY - ADULT  Goal: Maintains or returns to baseline urinary function  Description:  INTERVENTIONS:  - Assess urinary function  - Encourage oral fluids to ensure adequate hydration if ordered  - Administer IV fluids as ordered to ensure adequate hydration  - Administer ordered medications as needed  - Offer frequent toileting  - Follow urinary retention protocol if ordered  Outcome: Progressing  Goal: Absence of urinary retention  Description: INTERVENTIONS:  - Assess patient’s ability to void and empty bladder  - Monitor I/O  - Bladder scan as needed  - Discuss with physician/AP medications to alleviate retention as needed  - Discuss catheterization for long term situations as appropriate  Outcome: Progressing     Problem: Nutrition/Hydration-ADULT  Goal: Nutrient/Hydration intake appropriate for improving, restoring or maintaining nutritional needs  Description: Monitor and assess patient's nutrition/hydration status for malnutrition. Collaborate with interdisciplinary team and initiate plan and interventions as ordered.  Monitor patient's weight and dietary intake as ordered or per policy. Utilize nutrition screening tool and intervene as necessary. Determine patient's food preferences and provide high-protein, high-caloric foods as appropriate.     INTERVENTIONS:  - Monitor oral intake, urinary output, labs, and treatment plans  - Assess nutrition and hydration status and recommend course of action  - Evaluate amount of meals eaten  - Assist patient with eating if necessary   - Allow adequate time for meals  - Recommend/ encourage appropriate diets, oral nutritional supplements, and vitamin/mineral supplements  - Order, calculate, and assess calorie counts as needed  - Recommend, monitor, and adjust tube feedings and TPN/PPN based on assessed needs  - Assess need for intravenous fluids  - Provide specific nutrition/hydration education as appropriate  - Include patient/family/caregiver in decisions related to nutrition  Outcome: Progressing

## 2024-06-29 NOTE — PROGRESS NOTES
[facility]  Progress Note  Name: Luis Forrester I  MRN: [id number]  Unit/Bed#: [facility] 605-01 I Date of Admission: 6/15/2024   Date of Service: 6/29/2024 I Hospital Day: 14    Assessment & Plan   Dysphagia  Assessment & Plan  - Reported difficulty swallowing pills on 6/28  -Per speech, continue full liquid diet and plan for VBS    Abdominal distension  Assessment & Plan  Abdominal distention following PPM placement  -Patient and wife report worsening abdominal distention today  -Replete electrolytes as needed   -Patient reports multiple liquid bowel movements daily and passing flatus at this time  -Monitor bowel function   -Speech cleared for FLD 6/27  -No nausea or vomiting   -Abdomen mildly distended, soft, non tender 6/28  -Continue FLD    Hyponatremia  Assessment & Plan  - Appreciate nephrology input  - Sodium has corrected       s/p [employer] dual chamber PPM 6/21/2024  Assessment & Plan  - See SSS plan    SSS (sick sinus syndrome) ([facility])  Assessment & Plan  - Status post PPM on 6/21  - EPS has signed off on 6/22    SDH (subdural hematoma) ([facility])  Assessment & Plan  Continue frequent neurological checks.   STAT CT head with any neurological decline including drop GCS of 2pts within 1 hr.  Seizure prophylaxis per trauma team-completed  Hold all full antiplatelet and anticoagulation medications for 2 weeks      Hypothyroidism  Assessment & Plan  - Patient with chronic history of hypothyroidism.  - Continue home medication regimen including levothyroxine.  - Outpatient follow-up routine.    * TBI (traumatic brain injury) ([facility])  Assessment & Plan  - Traumatic brain injury with new acute appearing trace subarachnoid hemorrhage in the bilateral parafalcine regions as well as a new appearing trace parafalcine subdural hematoma, present on admission.  - CT scan of the head from 6/15/2024 demonstrated: New acute trace subarachnoid hemorrhage in bilateral parafalcine regions. New acute  trace parafalcine subdural hematoma.  -  Additional imaging with CTA of the head and neck 6/15/2024 demonstrated: Negative CTA head traumatic vascular injury, aneurysm, large vessel occlusion, high-grade stenosis, or dissection. Partially imaged ectatic right carotid bifurcation and proximal cervical internal carotid artery with retropharyngeal course, similar to CT neck with contrast 10/12/2011.  - Admit as level 2 step-down with HOT protocol.  The patient may require ICU level of care during hospital encounter if the patient has clinical decompensation or worsening of traumatic brain injury.  - Neurosurgery evaluation and recommendations appreciated.  - Hold anti-platelet and anticoagulant medications for least 2 weeks and/or until cleared by Neurosurgery.   - Patient is not on any chronic antiplatelet or anticoagulant medications at baseline.  - Continue Keppra for 7 days for seizure prophylaxis - completed  - Monitor neurologic exam.  - Continue symptomatic management with analgesia as needed.- PT and OT evaluation and treatment as indicated.             Bowel Regimen: Senokot  VTE Prophylaxis:Enoxaparin (Lovenox)     Disposition: Inpatient pending medical clearance    Subjective     Subjective: Patient reports feeling well this morning.  He denies any significant pain.  He is not having any chest pain, shortness of breath, nausea, vomiting, new weakness, numbness or tingling.  He is concerned about his swallowing and says that he does feel like stuff is getting stuck in his esophagus when he tries to swallow.  He says he did well on his full liquid diet otherwise.     Objective   Vitals:   Temp:  [100 °F (37.8 °C)] 100 °F (37.8 °C)  HR:  [65-70] 70  Resp:  [17] 17  BP: (107-109)/(70-71) 107/70    I/O         06/27 0701  06/28 0700 06/28 0701  06/29 0700    P.O. 476 120    Total Intake(mL/kg) 476 (6) 120 (1.5)    Urine (mL/kg/hr) 1675 (0.9)     Stool 0     Total Output 1675     Net -1199 +120          Unmeasured  Urine Occurrence 1 x 1 x    Unmeasured Stool Occurrence 3 x 3 x             Physical Exam:   GENERAL APPEARANCE: Well-appearing, nontoxic  NEURO: 5 out of 5 strength in the bilateral upper and lower extremities.  Sensation grossly intact.  HEENT: MMM  CV: RRR  LUNGS: Lungs clear to auscultation bilaterally  GI: Soft nontender    Invasive Devices       Peripheral Intravenous Line  Duration             Peripheral IV 06/25/24 Dorsal (posterior);Right Wrist 3 days    Peripheral IV 06/29/24 Dorsal (posterior);Left Wrist <1 day                          Lab Results: Results: I have personally reviewed all pertinent laboratory/tests results, BMP/CMP:   Lab Results   Component Value Date    SODIUM 138 06/29/2024    K 3.2 (L) 06/29/2024    CL 99 06/29/2024    CO2 25 06/29/2024    BUN 8 06/29/2024    CREATININE 0.65 06/29/2024    CALCIUM 7.7 (L) 06/29/2024    EGFR 109 06/29/2024   , and CBC:   Lab Results   Component Value Date    WBC 12.47 (H) 06/29/2024    HGB 12.6 06/29/2024    HCT 37.1 06/29/2024    MCV 94 06/29/2024     06/29/2024    RBC 3.93 06/29/2024    MCH 32.1 06/29/2024    MCHC 34.0 06/29/2024    RDW 13.8 06/29/2024    MPV 9.4 06/29/2024     Imaging: I have personally reviewed pertinent reports.     Other Studies: NA

## 2024-06-29 NOTE — ASSESSMENT & PLAN NOTE
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  - CT scan of the head from 6/15/2024 demonstrated: New acute trace subarachnoid hemorrhage in bilateral parafalcine regions. New acute trace parafalcine subdural hematoma.  -  Additional imaging with CTA of the head and neck 6/15/2024 demonstrated: Negative CTA head traumatic vascular injury, aneurysm, large vessel occlusion, high-grade stenosis, or dissection. Partially imaged ectatic right carotid bifurcation and proximal cervical internal carotid artery with retropharyngeal course, similar to CT neck with contrast 10/12/2011.  - Admit as level 2 step-down with HOT protocol.  The patient may require ICU level of care during hospital encounter if the patient has clinical decompensation or worsening of traumatic brain injury.  - Neurosurgery evaluation and recommendations appreciated.  - Hold anti-platelet and anticoagulant medications for least 2 weeks and/or until cleared by Neurosurgery.   - Patient is not on any chronic antiplatelet or anticoagulant medications at baseline.  - Continue Keppra for 7 days for seizure prophylaxis - completed  - Monitor neurologic exam.  - Continue symptomatic management with analgesia as needed.- PT and OT evaluation and treatment as indicated.

## 2024-06-29 NOTE — SPEECH THERAPY NOTE
Speech Language/Pathology    Speech-Language Pathology Progress Note      Patient Name: Luis Forrester    Today's Date: 6/29/2024      Subjective:  Intake remains reduced due to symptoms pt is describing of odynophagia, pain with upright positioning, and discomfort with swallowing.  His attempts to consume more regular solids even with finely cut/chopped items have resulted in choking/coughing.  Apparently even medications crushed is difficult for the patient.  A MBS/VBS swallow study was ordered.  Goal to objectify the pts swallowing function.  He is aware of the process but this was reviewed.  Described the importance of determining the etiology of his swallowing difficulty and the care that would be taken to ensure his comfort with an upright positioning.  He wishes to defer the study today.      Objective:  Trials: ice cream bites with meds., bites of yogurt,  thin water via straw total 2oz; Pt was able to tolerate only minimal amounts due to pain with upright positioning of 80*.  He indicated he could not tolerate a higher positioning.  Reviewed precautions of upright positioning for meals. His daughter fed him.  He describes odynophagia of level 5 today.   ORAL/PHARYNGEAL:  Adequate oral transit with trials.  No oral stasis.  Swallow initiation is mildly delayed.  Laryngeal movement is reduced upon palpation.  No overt s/s aspiration or penetration however pt reports residual and need to multi swallow and wash with liquid.  Cannot r/o swelling s/p posterior C2-T1 fusion.       Assessment:  Reviewed the results and recommendations with the Trauma PA, pt, Nurse, and family.  Reviewed that pt deferred the MBS today due to reasons described below.  Requested consideration for imaging to r/o swelling.  Will complete the MBS as pt will allow.  Maximal support provided.  Education given to pt, significant other, and daughter regarding swallowing strategies.       Plan:  Full Liquid diet   Medications crushed in ice  cream or puree   MBS ordered- pt deferred for today d/t positioning limitations, odynophagia, and recent choking/difficulty swallowing finely cut .  Aspiration Precautions: Small bites and sips.  Alternate solids and liquids. Double swallow. Encourage close to 90* positioning.   Will follow for Swallow Therapy.

## 2024-06-29 NOTE — ASSESSMENT & PLAN NOTE
Abdominal distention following PPM placement  -Patient and wife report worsening abdominal distention today  -Replete electrolytes as needed   -Patient reports multiple liquid bowel movements daily and passing flatus at this time  -Monitor bowel function   -Speech cleared for FLD 6/27  -No nausea or vomiting   -Abdomen mildly distended, soft, non tender 6/28  -Continue FLD

## 2024-06-29 NOTE — PROGRESS NOTES
Pastoral Care Progress Note    2024  Patient: Luis Forrester : 1970  Admission Date & Time: 6/15/2024 0736  MRN: [id number] CSN: [id number]         24 1400   Clinical Encounter Type   Visited With Patient and family together   Referral From    Bahai Encounters   Bahai Needs Prayer   Patient Spiritual Encounters   Spiritual Assessment 3   Suffering Severity 4   Fear Level 3   Feelings of Loneliness Daughter with patient   Coping 4   Social Interaction Cooperates in daily activities   Spiritual Encounter Notes Jain background-- non-churchgoer   Family Spiritual Encounters   Family Coping Accepting   Family Normalization 4   Family Participation in Care 5   Family Support During Treatment 5                    Chaplaincy Interventions Utilized:   Empowerment: Clarified, confirmed, or reviewed information from treatment team , Encouraged focus on present, Encouraged self-care, Normalized experience of patient/family, and Provided chaplaincy education    Exploration: Explored hope, Facilitated story telling, and Identified, evaluated & reinforced appropriate coping strategies    Collaboration: Consulted with interdisciplinary team    Relationship Building: Cultivated a relationship of care and support, Listened empathically, Hospitality, and Provided silent and supportive presence        Chaplaincy Outcomes Achieved:  Debriefed/defused experience, Distress reduced, Emotional resources utilized, Expressed gratitude, Expressed humor, Identified meaningful connections, Progressed toward focus on present, and Reported decreased pain      Spiritual Coping Strategies Utilized:   Spiritual comfort and Positive spiritual reframing      Received referral from Jitendra VIEIRA over Epic Secure Chat that patient requested  visit.  Upon arrival, sat with patient and daughter Trinity.  Patient and Trinity able to reframe and see positives in difficult circumstances.   "Patient frustrated with lack of independence (never missed work in the past, and hates not being able to drive), but coming to greater acceptance of help from daughter, significant other, and friends and co-workers (works at [employer] on [address] [place] [address]).  Though financial hurdles will come, appear to have decent support system.  Prayer offered.  Patient said thank you for visit without accepting a verbalized prayer from me when I offered.  I told him \"I'll be praying\" for him, Trinity, and his family and friends; patient thanked me and held out his hand for hand shake, which I did.  Told them  services are always available if needed.    NOTE:  PATIENT LIKES TO BE CALLED \"CLAU,\" NOT LUISITO.  This was just a clarification and did not seem to affect visit negatively.    Nothing further at this time.  JNICOLE, 6/29/24 @1458hrs.    "

## 2024-06-29 NOTE — ASSESSMENT & PLAN NOTE
- Reported difficulty swallowing pills on 6/28  -Per speech, continue full liquid diet and plan for VBS

## 2024-06-30 ENCOUNTER — APPOINTMENT (INPATIENT)
Dept: RADIOLOGY | Facility: HOSPITAL | Age: 54
DRG: 912 | End: 2024-06-30
Payer: COMMERCIAL

## 2024-06-30 LAB
ANION GAP SERPL CALCULATED.3IONS-SCNC: 11 MMOL/L (ref 4–13)
ANION GAP SERPL CALCULATED.3IONS-SCNC: 12 MMOL/L (ref 4–13)
ANION GAP SERPL CALCULATED.3IONS-SCNC: 13 MMOL/L (ref 4–13)
BUN SERPL-MCNC: 11 MG/DL (ref 5–25)
BUN SERPL-MCNC: 13 MG/DL (ref 5–25)
BUN SERPL-MCNC: 15 MG/DL (ref 5–25)
CALCIUM SERPL-MCNC: 7.4 MG/DL (ref 8.4–10.2)
CALCIUM SERPL-MCNC: 7.6 MG/DL (ref 8.4–10.2)
CALCIUM SERPL-MCNC: 7.9 MG/DL (ref 8.4–10.2)
CHLORIDE SERPL-SCNC: 100 MMOL/L (ref 96–108)
CHLORIDE SERPL-SCNC: 98 MMOL/L (ref 96–108)
CHLORIDE SERPL-SCNC: 99 MMOL/L (ref 96–108)
CO2 SERPL-SCNC: 24 MMOL/L (ref 21–32)
CO2 SERPL-SCNC: 25 MMOL/L (ref 21–32)
CO2 SERPL-SCNC: 25 MMOL/L (ref 21–32)
CREAT SERPL-MCNC: 0.56 MG/DL (ref 0.6–1.3)
CREAT SERPL-MCNC: 0.68 MG/DL (ref 0.6–1.3)
CREAT SERPL-MCNC: 0.71 MG/DL (ref 0.6–1.3)
GFR SERPL CREATININE-BSD FRML MDRD: 105 ML/MIN/1.73SQ M
GFR SERPL CREATININE-BSD FRML MDRD: 107 ML/MIN/1.73SQ M
GFR SERPL CREATININE-BSD FRML MDRD: 116 ML/MIN/1.73SQ M
GLUCOSE SERPL-MCNC: 100 MG/DL (ref 65–140)
GLUCOSE SERPL-MCNC: 118 MG/DL (ref 65–140)
GLUCOSE SERPL-MCNC: 118 MG/DL (ref 65–140)
POTASSIUM SERPL-SCNC: 3.6 MMOL/L (ref 3.5–5.3)
POTASSIUM SERPL-SCNC: 4 MMOL/L (ref 3.5–5.3)
POTASSIUM SERPL-SCNC: 4 MMOL/L (ref 3.5–5.3)
SODIUM SERPL-SCNC: 134 MMOL/L (ref 135–147)
SODIUM SERPL-SCNC: 136 MMOL/L (ref 135–147)
SODIUM SERPL-SCNC: 137 MMOL/L (ref 135–147)

## 2024-06-30 PROCEDURE — 92611 MOTION FLUOROSCOPY/SWALLOW: CPT

## 2024-06-30 PROCEDURE — 71045 X-RAY EXAM CHEST 1 VIEW: CPT

## 2024-06-30 PROCEDURE — 99233 SBSQ HOSP IP/OBS HIGH 50: CPT | Performed by: SURGERY

## 2024-06-30 PROCEDURE — 74230 X-RAY XM SWLNG FUNCJ C+: CPT

## 2024-06-30 PROCEDURE — 80048 BASIC METABOLIC PNL TOTAL CA: CPT | Performed by: INTERNAL MEDICINE

## 2024-06-30 RX ORDER — BISACODYL 10 MG
10 SUPPOSITORY, RECTAL RECTAL DAILY PRN
Status: DISCONTINUED | OUTPATIENT
Start: 2024-06-30 | End: 2024-07-12 | Stop reason: HOSPADM

## 2024-06-30 RX ORDER — GABAPENTIN 250 MG/5ML
100 SOLUTION ORAL 3 TIMES DAILY
Status: DISCONTINUED | OUTPATIENT
Start: 2024-06-30 | End: 2024-07-12 | Stop reason: HOSPADM

## 2024-06-30 RX ORDER — OXYCODONE HCL 5 MG/5 ML
2.5 SOLUTION, ORAL ORAL EVERY 4 HOURS PRN
Status: DISCONTINUED | OUTPATIENT
Start: 2024-06-30 | End: 2024-07-12 | Stop reason: HOSPADM

## 2024-06-30 RX ORDER — SODIUM CHLORIDE, SODIUM GLUCONATE, SODIUM ACETATE, POTASSIUM CHLORIDE, MAGNESIUM CHLORIDE, SODIUM PHOSPHATE, DIBASIC, AND POTASSIUM PHOSPHATE .53; .5; .37; .037; .03; .012; .00082 G/100ML; G/100ML; G/100ML; G/100ML; G/100ML; G/100ML; G/100ML
75 INJECTION, SOLUTION INTRAVENOUS CONTINUOUS
Status: DISCONTINUED | OUTPATIENT
Start: 2024-06-30 | End: 2024-07-02

## 2024-06-30 RX ORDER — ACETAMINOPHEN 10 MG/ML
1000 INJECTION, SOLUTION INTRAVENOUS EVERY 8 HOURS SCHEDULED
Status: DISCONTINUED | OUTPATIENT
Start: 2024-06-30 | End: 2024-07-08

## 2024-06-30 RX ORDER — DEXAMETHASONE SODIUM PHOSPHATE 10 MG/ML
10 INJECTION, SOLUTION INTRAMUSCULAR; INTRAVENOUS DAILY
Status: COMPLETED | OUTPATIENT
Start: 2024-06-30 | End: 2024-07-02

## 2024-06-30 RX ORDER — CEPHALEXIN 250 MG/5ML
500 POWDER, FOR SUSPENSION ORAL EVERY 6 HOURS SCHEDULED
Status: DISCONTINUED | OUTPATIENT
Start: 2024-06-30 | End: 2024-07-03

## 2024-06-30 RX ORDER — METHOCARBAMOL 100 MG/ML
500 INJECTION, SOLUTION INTRAMUSCULAR; INTRAVENOUS EVERY 8 HOURS SCHEDULED
Status: COMPLETED | OUTPATIENT
Start: 2024-06-30 | End: 2024-07-03

## 2024-06-30 RX ORDER — SENNOSIDES 8.8 MG/5ML
8.8 LIQUID ORAL
Status: DISCONTINUED | OUTPATIENT
Start: 2024-06-30 | End: 2024-07-10

## 2024-06-30 RX ORDER — OXYCODONE HCL 5 MG/5 ML
5 SOLUTION, ORAL ORAL EVERY 4 HOURS PRN
Status: DISCONTINUED | OUTPATIENT
Start: 2024-06-30 | End: 2024-07-12 | Stop reason: HOSPADM

## 2024-06-30 RX ORDER — GUAIFENESIN/DEXTROMETHORPHAN 100-10MG/5
10 SYRUP ORAL EVERY 4 HOURS PRN
Status: DISCONTINUED | OUTPATIENT
Start: 2024-06-30 | End: 2024-07-12 | Stop reason: HOSPADM

## 2024-06-30 RX ADMIN — ENOXAPARIN SODIUM 30 MG: 30 INJECTION SUBCUTANEOUS at 22:11

## 2024-06-30 RX ADMIN — METHOCARBAMOL 500 MG: 100 INJECTION INTRAMUSCULAR; INTRAVENOUS at 22:09

## 2024-06-30 RX ADMIN — GUAIFENESIN AND DEXTROMETHORPHAN 10 ML: 100; 10 SYRUP ORAL at 02:57

## 2024-06-30 RX ADMIN — METHOCARBAMOL 500 MG: 100 INJECTION INTRAMUSCULAR; INTRAVENOUS at 16:23

## 2024-06-30 RX ADMIN — GABAPENTIN 100 MG: 250 SOLUTION ORAL at 21:58

## 2024-06-30 RX ADMIN — BENZONATATE 200 MG: 100 CAPSULE ORAL at 01:50

## 2024-06-30 RX ADMIN — ACETAMINOPHEN 1000 MG: 10 INJECTION INTRAVENOUS at 22:17

## 2024-06-30 RX ADMIN — GABAPENTIN 100 MG: 100 CAPSULE ORAL at 09:49

## 2024-06-30 RX ADMIN — ENOXAPARIN SODIUM 30 MG: 30 INJECTION SUBCUTANEOUS at 09:48

## 2024-06-30 RX ADMIN — MIDODRINE HYDROCHLORIDE 10 MG: 5 TABLET ORAL at 02:57

## 2024-06-30 RX ADMIN — METHOCARBAMOL 500 MG: 500 TABLET ORAL at 05:28

## 2024-06-30 RX ADMIN — ACETAMINOPHEN 975 MG: 325 TABLET, FILM COATED ORAL at 05:28

## 2024-06-30 RX ADMIN — ACETAMINOPHEN 1000 MG: 10 INJECTION INTRAVENOUS at 16:22

## 2024-06-30 RX ADMIN — SODIUM CHLORIDE, SODIUM GLUCONATE, SODIUM ACETATE, POTASSIUM CHLORIDE, MAGNESIUM CHLORIDE, SODIUM PHOSPHATE, DIBASIC, AND POTASSIUM PHOSPHATE 75 ML/HR: .53; .5; .37; .037; .03; .012; .00082 INJECTION, SOLUTION INTRAVENOUS at 16:36

## 2024-06-30 RX ADMIN — Medication 1000 UNITS: at 09:49

## 2024-06-30 RX ADMIN — LEVOTHYROXINE SODIUM 125 MCG: 125 TABLET ORAL at 05:29

## 2024-06-30 RX ADMIN — CEPHALEXIN 500 MG: 500 CAPSULE ORAL at 05:29

## 2024-06-30 RX ADMIN — DEXAMETHASONE SODIUM PHOSPHATE 10 MG: 10 INJECTION, SOLUTION INTRAMUSCULAR; INTRAVENOUS at 16:22

## 2024-06-30 NOTE — PROGRESS NOTES
[facility]  Progress Note  Name: Luis Forrester I  MRN: [id number]  Unit/Bed#: [facility] 605-01 I Date of Admission: 6/15/2024   Date of Service: 6/30/2024 I Hospital Day: 15    Assessment & Plan   Dysphagia  Assessment & Plan  - Reported difficulty swallowing pills on 6/28  -Per speech, continue full liquid diet and plan for VBS    Abdominal distension  Assessment & Plan  Abdominal distention following PPM placement  -Patient and wife report worsening abdominal distention today  -Replete electrolytes as needed   -Patient reports multiple liquid bowel movements daily and passing flatus at this time  -Monitor bowel function   -Speech cleared for FLD 6/27  -No nausea or vomiting   -Abdomen mildly distended, soft, non tender 6/28  -Continue FLD    Hyponatremia  Assessment & Plan  - Appreciate nephrology input  - Sodium has corrected, continue to monitor      s/p Medtronic dual chamber PPM 6/21/2024  Assessment & Plan  - See SSS plan    SSS (sick sinus syndrome) ([facility])  Assessment & Plan  - Status post PPM on 6/21  - EPS has signed off on 6/22    SDH (subdural hematoma) ([facility])  Assessment & Plan  Continue frequent neurological checks.   STAT CT head with any neurological decline including drop GCS of 2pts within 1 hr.  Seizure prophylaxis per trauma team-completed  Hold all full antiplatelet and anticoagulation medications for 2 weeks      Hypothyroidism  Assessment & Plan  - Patient with chronic history of hypothyroidism.  - Continue home medication regimen including levothyroxine.  - Outpatient follow-up routine.    * TBI (traumatic brain injury) ([facility])  Assessment & Plan  - Traumatic brain injury with new acute appearing trace subarachnoid hemorrhage in the bilateral parafalcine regions as well as a new appearing trace parafalcine subdural hematoma, present on admission.  - CT scan of the head from 6/15/2024 demonstrated: New acute trace subarachnoid hemorrhage in bilateral  parafalcine regions. New acute trace parafalcine subdural hematoma.  -  Additional imaging with CTA of the head and neck 6/15/2024 demonstrated: Negative CTA head traumatic vascular injury, aneurysm, large vessel occlusion, high-grade stenosis, or dissection. Partially imaged ectatic right carotid bifurcation and proximal cervical internal carotid artery with retropharyngeal course, similar to CT neck with contrast 10/12/2011.  - Admit as level 2 step-down with HOT protocol.  The patient may require ICU level of care during hospital encounter if the patient has clinical decompensation or worsening of traumatic brain injury.  - Neurosurgery evaluation and recommendations appreciated.  - Hold anti-platelet and anticoagulant medications for least 2 weeks and/or until cleared by Neurosurgery.   - Patient is not on any chronic antiplatelet or anticoagulant medications at baseline.  - Continue Keppra for 7 days for seizure prophylaxis - completed  - Monitor neurologic exam.  - Continue symptomatic management with analgesia as needed.- PT and OT evaluation and treatment as indicated.             Bowel Regimen: Senokot, Miralax  VTE Prophylaxis:Sequential compression device (Venodyne)  and Enoxaparin (Lovenox)     Disposition: Inpatient pending medical clearance    Subjective     Subjective: Patient reports feeling overall okay this morning.  He says that he is having a slight cough but is not endorsing any chest pain or shortness of breath.  He says that he continues to have difficulty with swallowing solids.  He cannot recall refusing the VBS yesterday but says that he is willing to do so today.     Objective   Vitals:   Temp:  [98.7 °F (37.1 °C)-100.3 °F (37.9 °C)] 98.7 °F (37.1 °C)  HR:  [69-83] 77  Resp:  [16-18] 16  BP: (108-113)/(69-71) 113/71    I/O         06/28 0701  06/29 0700 06/29 0701  06/30 0700 06/30 0701  07/01 0700    P.O. 120      Total Intake(mL/kg) 120 (1.5)      Urine (mL/kg/hr)  1200 (0.6)     Stool        Total Output  1200     Net +120 -1200            Unmeasured Urine Occurrence 1 x 2 x     Unmeasured Stool Occurrence 3 x               Physical Exam:   GENERAL APPEARANCE: Tired appearing, nontoxic  NEURO: Grossly intact  HEENT: MMM  CV: RRR  LUNGS: Coarse lung sounds most predominantly noted in the right lower lung fields.  Upper airway secretions also noted on auscultation  GI: Soft and nontender  : Not assessed  MSK: No lower extremity swelling  SKIN: No gross skin breakdown    Invasive Devices       Peripheral Intravenous Line  Duration             Peripheral IV 06/29/24 Dorsal (posterior);Left Wrist 1 day                          Lab Results: Results: I have personally reviewed all pertinent laboratory/tests results and BMP/CMP:   Lab Results   Component Value Date    SODIUM 136 06/30/2024    K 4.0 06/30/2024     06/30/2024    CO2 25 06/30/2024    BUN 11 06/30/2024    CREATININE 0.56 (L) 06/30/2024    CALCIUM 7.9 (L) 06/30/2024    EGFR 116 06/30/2024     Imaging: I have personally reviewed pertinent reports.     Other Studies: NA

## 2024-06-30 NOTE — NUTRITION
06/30/24 1449   Recommendations/Interventions   Recommendations to Provider Recommend adjusting TF rate to 75 ml X 22 hours to provide 1650 total volume, 1980 kcal, 91 g protien, 1331 ml FW. Adjust water flush to 250 ml Q 6 hours

## 2024-06-30 NOTE — PROCEDURES
Video Swallow Study      Patient Name: Luis Forrester  Today's Date: 6/30/2024        Past Medical History  Past Medical History:   Diagnosis Date    Arthritis     Chronic cough     Disease of thyroid gland     Hypoparathyroidism (HCC)     continue taking calcium and vitamin D, will check CMP, PTH level and Vitamin D level    Pneumonia of right middle lobe due to Streptococcus pneumoniae (HCC) 11/30/2018    s/p Medtronic dual chamber PPM 6/21/2024 06/21/2024        Past Surgical History  Past Surgical History:   Procedure Laterality Date    CARDIAC ELECTROPHYSIOLOGY PROCEDURE N/A 6/21/2024    Procedure: Cardiac pacer implant;  Surgeon: Kofi Pettit MD;  Location: [facility];  Service: Cardiology    DECOMPRESSION SPINE CERVICAL POSTERIOR N/A 6/16/2024    Procedure: DECOMPRESSION SPINE CERVICAL POSTERIOR C2-T1 with fusion navigated with Oarm;  Surgeon: Endy Velasquez MD;  Location: [facility];  Service: Neurosurgery    FRACTURE SURGERY      Open Treatment of Each Proximal Finger Phalanx         Modified (Video) Barium Swallow Study    Summary:  Images are on PACS for review.     Oral stage: Mild impairment, including reduced bolus control and ?mild difficulty initiating bolus transfer (vs hesitation in anticipation of dysphagia).     Pharyngeal stage: Severe impairment, characterized by reduced BOT constriction, reduced PPW constriction, and poor hyolaryngeal movement; this results in minimal inversion of the epiglottis, and reduced opening of the UES, and mod-severe pharyngeal residue, especially in the pyriforms. Pt attempts to clear the residue with multiple swallows, but this results in aspiration from the residue, mostly posterior aspiration. Residue increases with thicker consistencies, with pudding having the most residue. With the first bite of pudding pt gagged and retropulsed the bolus back into his mouth and expectorated it. There is deep laryngeal  penetration with all consistencies assessed today (thin, NTL, HTL, puree), with undercoating of the epiglottis. There was at least mild aspiration of all consistencies; at times aspiration was after the swallow from residue in the pyriforms/pharynx. Response to aspiration was weak coughing. Frequent wet, gurgly vocal quality occurred during the exam; cued cough was briefly effective at clearing but wet quality quickly returned. Pt has limited neck mobility due to recent surgery; he attempted to complete a chin tuck but this was ineffective. Pt is currently at high risk for aspiration of all consistencies. RN reports increasing cough and a fever today.     Per gross esophageal screen: Brief scan shows possible mild retropulsion of the bolus.         Recommendations:  Diet: NPO, consider short term alternate nutrition for now.   Liquids: NPO  Meds: Non oral  Strategies:  Frequent oral care  Upright position  F/u ST tx: Yes  Therapy Prognosis: Fair  Prognosis considerations: age, comorbidities, therapeutic potential  Aspiration Precautions  Reflux Precautions    Results reviewed with: pt, nursing, physician, family  Aspiration precautions posted.  Repeat MBS in a few days  If a dedicated assessment of the esophagus is desired:  Consider esophagram/routine barium swallow w/ barium tablet administration   or EGD      Goals:  Pt will tolerate least restrictive diet w/out s/s aspiration or oral/pharyngeal difficulties.      Patient's goal: Pt doesn't state.     Pt is a 54 y/o male seen today for VBS. Pt is alert, appears somewhat confused, frequently repeats himself. Pt has had dysphagia since his neck surgery during this admission (Status post C2-T1 decompression fusion with neurosurgery). He has had neck pain an inability to sit upright, therefore had declined to participate in VBS until today. He reports continued dysphagia sx, requiring multiple swallows in order to clear even small boluses. He has frequent wet vocal  quality. RN reports that pt is having increased coughing today and a low grade fever. Significant cervical hardware present.       H&P/pertinent provider notes: (PMH noted above)  Luis Forrester is a 53 y.o. male who presents after a syncope episode overnight.  On presentation, he notes pain in both hands as well as his mid to upper back.  He notes that the syncope episodes have happened a few times over the last 6 months.  He notes that they happen unexpectedly and randomly with no preceding symptoms.  Last night event happened after he returned home around 1 AM.  He blacked out and does not recall how long he was out before being found by his daughter.    Special Studies:  CXR today-results pending      Previous MBS:  No      Does the pt have pain? No  If yes, was nursing made aware/was it addressed?      Food allergies: No   Current diet: Full liquid   Premorbid diet: Regular and thin liquids   Dentition: Natural, some missing   O2 requirement: Room air   Oral mech: Grossly intact   Vocal quality/speech: Wet and gurgly frequently   Cognitive status: Appears confused, repeats himself frequently, reduced short term memory     Precautions: Fall, aspiration      Consistencies administered: Pudding, thin, nectar, honey thick liquids. Liquids presented via teaspoon, cup. Solid food/cookie and barium tablet were not attempted due to severity of dysphagia.     Pt was viewed seated laterally and in AP at 90 degrees.      Oral stage:  Mild impairment  Lip closure: WNL  Mastication: NA  Bolus formation: Adequate  Bolus control: Reduced  Transfer: Adequate, ?difficulty initiating transfer at times  Residue: Mild    Pharyngeal stage:  Severe impairment  Swallow initiation: Prompt to min delayed  Velar elevation: Adequate  Laryngeal elevation: Poor  Anterior hyoid excursion: Poor  Epiglottic inversion: Minimal movement  Laryngeal vestibular closure: Reduced  Pharyngeal stripping wave/constriction: Reduced  UES/PES opening:  Reduced  Tongue base retraction: Reduced  Vallecular retention: Mild-mod  Pyriform retention: Severe  PPW coating: +  CP prominence: -  Retropulsion from prominence: NA  Penetration: yes, with all consistencies assessed today  Epiglottic undercoat: yes  Aspiration: yes, with all consistencies assessed today, mostly from residue from penetration, or from residue in the pyriforms while completing dry swallows.  Strategies:  Chin tuck was attempted, but pt has limited neck mobility due to surgery, and minimal chin tuck position was not effective at preventing penetration/aspiration.   Effortful swallows were not effective at improving pharyngeal clearance of the bolus.   Multiple dry swallows were somewhat effective at clearing pharyngeal residue.   Response to aspiration: Coughing, cough is somewhat weak.    Screening of Esophageal stage:  Very brief scan of the esophagus showed potential mild retropulsion of the bolus.

## 2024-06-30 NOTE — QUICK NOTE
Emerald placed by myself and Dr. Matias bedside.  Placement was confirmed with a chest x-ray and tube was advanced and secured at 60cm.  Patient tolerated procedure well.  Patient will be kept NPO until reevaluated with a VBS by speech therapy.  All medications changed to liquid and IV form as indicated at this time. Will order tube feeds and start on gentle IV fluids.

## 2024-07-01 LAB
ANION GAP SERPL CALCULATED.3IONS-SCNC: 11 MMOL/L (ref 4–13)
ANION GAP SERPL CALCULATED.3IONS-SCNC: 11 MMOL/L (ref 4–13)
ANION GAP SERPL CALCULATED.3IONS-SCNC: 14 MMOL/L (ref 4–13)
BASOPHILS # BLD AUTO: 0.01 THOUSANDS/ÂΜL (ref 0–0.1)
BASOPHILS NFR BLD AUTO: 0 % (ref 0–1)
BUN SERPL-MCNC: 12 MG/DL (ref 5–25)
BUN SERPL-MCNC: 12 MG/DL (ref 5–25)
BUN SERPL-MCNC: 13 MG/DL (ref 5–25)
CALCIUM SERPL-MCNC: 6.5 MG/DL (ref 8.4–10.2)
CALCIUM SERPL-MCNC: 6.8 MG/DL (ref 8.4–10.2)
CALCIUM SERPL-MCNC: 7.1 MG/DL (ref 8.4–10.2)
CHLORIDE SERPL-SCNC: 101 MMOL/L (ref 96–108)
CHLORIDE SERPL-SCNC: 101 MMOL/L (ref 96–108)
CHLORIDE SERPL-SCNC: 102 MMOL/L (ref 96–108)
CO2 SERPL-SCNC: 23 MMOL/L (ref 21–32)
CO2 SERPL-SCNC: 25 MMOL/L (ref 21–32)
CO2 SERPL-SCNC: 26 MMOL/L (ref 21–32)
CREAT SERPL-MCNC: 0.5 MG/DL (ref 0.6–1.3)
CREAT SERPL-MCNC: 0.54 MG/DL (ref 0.6–1.3)
CREAT SERPL-MCNC: 0.57 MG/DL (ref 0.6–1.3)
EOSINOPHIL # BLD AUTO: 0 THOUSAND/ÂΜL (ref 0–0.61)
EOSINOPHIL NFR BLD AUTO: 0 % (ref 0–6)
ERYTHROCYTE [DISTWIDTH] IN BLOOD BY AUTOMATED COUNT: 13.7 % (ref 11.6–15.1)
GFR SERPL CREATININE-BSD FRML MDRD: 115 ML/MIN/1.73SQ M
GFR SERPL CREATININE-BSD FRML MDRD: 118 ML/MIN/1.73SQ M
GFR SERPL CREATININE-BSD FRML MDRD: 121 ML/MIN/1.73SQ M
GLUCOSE SERPL-MCNC: 161 MG/DL (ref 65–140)
GLUCOSE SERPL-MCNC: 165 MG/DL (ref 65–140)
GLUCOSE SERPL-MCNC: 168 MG/DL (ref 65–140)
HCT VFR BLD AUTO: 34.9 % (ref 36.5–49.3)
HGB BLD-MCNC: 11.7 G/DL (ref 12–17)
IMM GRANULOCYTES # BLD AUTO: 0.14 THOUSAND/UL (ref 0–0.2)
IMM GRANULOCYTES NFR BLD AUTO: 1 % (ref 0–2)
LYMPHOCYTES # BLD AUTO: 1.03 THOUSANDS/ÂΜL (ref 0.6–4.47)
LYMPHOCYTES NFR BLD AUTO: 9 % (ref 14–44)
MCH RBC QN AUTO: 31.5 PG (ref 26.8–34.3)
MCHC RBC AUTO-ENTMCNC: 33.5 G/DL (ref 31.4–37.4)
MCV RBC AUTO: 94 FL (ref 82–98)
MONOCYTES # BLD AUTO: 0.33 THOUSAND/ÂΜL (ref 0.17–1.22)
MONOCYTES NFR BLD AUTO: 3 % (ref 4–12)
NEUTROPHILS # BLD AUTO: 9.59 THOUSANDS/ÂΜL (ref 1.85–7.62)
NEUTS SEG NFR BLD AUTO: 87 % (ref 43–75)
NRBC BLD AUTO-RTO: 0 /100 WBCS
PLATELET # BLD AUTO: 193 THOUSANDS/UL (ref 149–390)
PMV BLD AUTO: 10.8 FL (ref 8.9–12.7)
POTASSIUM SERPL-SCNC: 3.8 MMOL/L (ref 3.5–5.3)
POTASSIUM SERPL-SCNC: 3.8 MMOL/L (ref 3.5–5.3)
POTASSIUM SERPL-SCNC: 4.2 MMOL/L (ref 3.5–5.3)
RBC # BLD AUTO: 3.71 MILLION/UL (ref 3.88–5.62)
SODIUM SERPL-SCNC: 137 MMOL/L (ref 135–147)
SODIUM SERPL-SCNC: 138 MMOL/L (ref 135–147)
SODIUM SERPL-SCNC: 139 MMOL/L (ref 135–147)
WBC # BLD AUTO: 11.1 THOUSAND/UL (ref 4.31–10.16)

## 2024-07-01 PROCEDURE — 99233 SBSQ HOSP IP/OBS HIGH 50: CPT | Performed by: EMERGENCY MEDICINE

## 2024-07-01 PROCEDURE — 80048 BASIC METABOLIC PNL TOTAL CA: CPT | Performed by: INTERNAL MEDICINE

## 2024-07-01 PROCEDURE — 85025 COMPLETE CBC W/AUTO DIFF WBC: CPT

## 2024-07-01 RX ADMIN — METHOCARBAMOL 500 MG: 100 INJECTION INTRAMUSCULAR; INTRAVENOUS at 05:26

## 2024-07-01 RX ADMIN — ENOXAPARIN SODIUM 30 MG: 30 INJECTION SUBCUTANEOUS at 21:29

## 2024-07-01 RX ADMIN — METHOCARBAMOL 500 MG: 100 INJECTION INTRAMUSCULAR; INTRAVENOUS at 21:28

## 2024-07-01 RX ADMIN — CEPHALEXIN 500 MG: 250 FOR SUSPENSION ORAL at 05:24

## 2024-07-01 RX ADMIN — CEPHALEXIN 500 MG: 250 FOR SUSPENSION ORAL at 23:40

## 2024-07-01 RX ADMIN — METHOCARBAMOL 500 MG: 100 INJECTION INTRAMUSCULAR; INTRAVENOUS at 13:59

## 2024-07-01 RX ADMIN — GABAPENTIN 100 MG: 250 SOLUTION ORAL at 07:53

## 2024-07-01 RX ADMIN — ACETAMINOPHEN 1000 MG: 10 INJECTION INTRAVENOUS at 05:30

## 2024-07-01 RX ADMIN — CEPHALEXIN 500 MG: 250 FOR SUSPENSION ORAL at 11:47

## 2024-07-01 RX ADMIN — GABAPENTIN 100 MG: 250 SOLUTION ORAL at 17:07

## 2024-07-01 RX ADMIN — ENOXAPARIN SODIUM 30 MG: 30 INJECTION SUBCUTANEOUS at 07:53

## 2024-07-01 RX ADMIN — ACETAMINOPHEN 1000 MG: 10 INJECTION INTRAVENOUS at 21:29

## 2024-07-01 RX ADMIN — CEPHALEXIN 500 MG: 250 FOR SUSPENSION ORAL at 17:07

## 2024-07-01 RX ADMIN — DEXAMETHASONE SODIUM PHOSPHATE 10 MG: 10 INJECTION, SOLUTION INTRAMUSCULAR; INTRAVENOUS at 07:53

## 2024-07-01 RX ADMIN — CEPHALEXIN 500 MG: 250 FOR SUSPENSION ORAL at 00:24

## 2024-07-01 RX ADMIN — SODIUM CHLORIDE, SODIUM GLUCONATE, SODIUM ACETATE, POTASSIUM CHLORIDE, MAGNESIUM CHLORIDE, SODIUM PHOSPHATE, DIBASIC, AND POTASSIUM PHOSPHATE 75 ML/HR: .53; .5; .37; .037; .03; .012; .00082 INJECTION, SOLUTION INTRAVENOUS at 07:54

## 2024-07-01 RX ADMIN — ACETAMINOPHEN 1000 MG: 10 INJECTION INTRAVENOUS at 13:59

## 2024-07-01 RX ADMIN — GABAPENTIN 100 MG: 250 SOLUTION ORAL at 21:34

## 2024-07-01 RX ADMIN — LEVOTHYROXINE SODIUM 125 MCG: 100 TABLET ORAL at 05:24

## 2024-07-01 RX ADMIN — SODIUM CHLORIDE, SODIUM GLUCONATE, SODIUM ACETATE, POTASSIUM CHLORIDE, MAGNESIUM CHLORIDE, SODIUM PHOSPHATE, DIBASIC, AND POTASSIUM PHOSPHATE 75 ML/HR: .53; .5; .37; .037; .03; .012; .00082 INJECTION, SOLUTION INTRAVENOUS at 22:11

## 2024-07-01 NOTE — CASE MANAGEMENT
Case Management Discharge Planning Note    Patient name Luis Forrester  Location [facility] 605/[facility] 605-01 MRN [id number]  : 1970 Date 2024       Current Admission Date: 6/15/2024  Current Admission Diagnosis:TBI (traumatic brain injury) ([facility])   Patient Active Problem List    Diagnosis Date Noted Date Diagnosed    Dysphagia 2024     Abdominal distension 2024     Hyponatremia 2024     SSS (sick sinus syndrome) ([facility]) 2024     s/p Medtronic dual chamber PPM 2024     TBI (traumatic brain injury) ([facility]) 06/15/2024     Syncope and collapse 06/15/2024     Bilateral hand pain 06/15/2024     SDH (subdural hematoma) ([facility]) 06/15/2024     Right hand weakness 06/15/2024     Encounter for immunization 2024     Vitamin D deficiency 2024     Reactive airway disease 03/15/2018     Osteoarthritis of both hands 2015     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     Chronic low back pain 2015     Mass of parotid gland 2015     Lumbar radiculopathy 2015     Hyperlipidemia 2014     Hypothyroidism 2014     Allergic rhinitis 2012       LOS (days): 16  Geometric Mean LOS (GMLOS) (days): 10  Days to GMLOS:-6.1     OBJECTIVE:  Risk of Unplanned Readmission Score: 17.52         Current admission status: Inpatient   Preferred Pharmacy:   [id number] - [place], [place]  [address]  [place] [address]  Phone: [phone] Fax: [phone]    Primary Care Provider: Joceline Shook PA-C    Primary Insurance: [place] [employer]  Secondary Insurance:     DISCHARGE DETAILS:    Noted that pt has Keofed.   CM will continue to follow for d/c planning  Pt's accepted to [facility] but will need definitive feeding access.

## 2024-07-01 NOTE — ASSESSMENT & PLAN NOTE
- Reported difficulty swallowing pills on 6/28  -VBS done 6/30 > strict NPO  - Plan for course of decadron and repeat VBS on 7/3 pending response to decadron

## 2024-07-01 NOTE — RESTORATIVE TECHNICIAN NOTE
Restorative Technician Note      Patient Name: Luis Forrester     Restorative Tech Visit Date: 07/01/24  Note Type: Mobility  Patient Position Upon Consult: Bedside chair  Activity Performed: Ambulated  Assistive Device: Roller walker (+ Chair follow)  Patient Position at End of Consult: Supine; All needs within reach; Bed/Chair alarm activated    DELMA Urena

## 2024-07-01 NOTE — PLAN OF CARE
Problem: Prexisting or High Potential for Compromised Skin Integrity  Goal: Skin integrity is maintained or improved  Description: INTERVENTIONS:  - Identify patients at risk for skin breakdown  - Assess and monitor skin integrity  - Assess and monitor nutrition and hydration status  - Monitor labs   - Assess for incontinence   - Turn and reposition patient  - Assist with mobility/ambulation  - Relieve pressure over bony prominences  - Avoid friction and shearing  - Provide appropriate hygiene as needed including keeping skin clean and dry  - Evaluate need for skin moisturizer/barrier cream  - Collaborate with interdisciplinary team   - Patient/family teaching  - Consider wound care consult   Outcome: Progressing     Problem: PAIN - ADULT  Goal: Verbalizes/displays adequate comfort level or baseline comfort level  Description: Interventions:  - Encourage patient to monitor pain and request assistance  - Assess pain using appropriate pain scale  - Administer analgesics based on type and severity of pain and evaluate response  - Implement non-pharmacological measures as appropriate and evaluate response  - Consider cultural and social influences on pain and pain management  - Notify physician/advanced practitioner if interventions unsuccessful or patient reports new pain  Outcome: Progressing     Problem: INFECTION - ADULT  Goal: Absence or prevention of progression during hospitalization  Description: INTERVENTIONS:  - Assess and monitor for signs and symptoms of infection  - Monitor lab/diagnostic results  - Monitor all insertion sites, i.e. indwelling lines, tubes, and drains  - Monitor endotracheal if appropriate and nasal secretions for changes in amount and color  - [place] appropriate cooling/warming therapies per order  - Administer medications as ordered  - Instruct and encourage patient and family to use good hand hygiene technique  - Identify and instruct in appropriate isolation precautions for  identified infection/condition  Outcome: Progressing     Problem: SAFETY ADULT  Goal: Patient will remain free of falls  Description: INTERVENTIONS:  - Educate patient/family on patient safety including physical limitations  - Instruct patient to call for assistance with activity   - Consult OT/PT to assist with strengthening/mobility   - Keep Call bell within reach  - Keep bed low and locked with side rails adjusted as appropriate  - Keep care items and personal belongings within reach  - Initiate and maintain comfort rounds  - Make Fall Risk Sign visible to staff  - Apply yellow socks and bracelet for high fall risk patients  - Consider moving patient to room near nurses station  Outcome: Progressing  Goal: Maintain or return to baseline ADL function  Description: INTERVENTIONS:  -  Assess patient's ability to carry out ADLs; assess patient's baseline for ADL function and identify physical deficits which impact ability to perform ADLs (bathing, care of mouth/teeth, toileting, grooming, dressing, etc.)  - Assess/evaluate cause of self-care deficits   - Assess range of motion  - Assess patient's mobility; develop plan if impaired  - Assess patient's need for assistive devices and provide as appropriate  - Encourage maximum independence but intervene and supervise when necessary  - Involve family in performance of ADLs  - Assess for home care needs following discharge   - Consider OT consult to assist with ADL evaluation and planning for discharge  - Provide patient education as appropriate  Outcome: Progressing  Goal: Maintains/Returns to pre admission functional level  Description: INTERVENTIONS:  - Perform AM-PAC 6 Click Basic Mobility/ Daily Activity assessment daily.  - Set and communicate daily mobility goal to care team and patient/family/caregiver.   - Collaborate with rehabilitation services on mobility goals if consulted  - Out of bed for toileting  - Record patient progress and toleration of activity level    Outcome: Progressing     Problem: DISCHARGE PLANNING  Goal: Discharge to home or other facility with appropriate resources  Description: INTERVENTIONS:  - Identify barriers to discharge w/patient and caregiver  - Arrange for needed discharge resources and transportation as appropriate  - Identify discharge learning needs (meds, wound care, etc.)  - Arrange for interpretive services to assist at discharge as needed  - Refer to Case Management Department for coordinating discharge planning if the patient needs post-hospital services based on physician/advanced practitioner order or complex needs related to functional status, cognitive ability, or social support system  Outcome: Progressing     Problem: Knowledge Deficit  Goal: Patient/family/caregiver demonstrates understanding of disease process, treatment plan, medications, and discharge instructions  Description: Complete learning assessment and assess knowledge base.  Interventions:  - Provide teaching at level of understanding  - Provide teaching via preferred learning methods  Outcome: Progressing     Problem: NEUROSENSORY - ADULT  Goal: Achieves stable or improved neurological status  Description: INTERVENTIONS  - Monitor and report changes in neurological status  - Monitor vital signs such as temperature, blood pressure, glucose, and any other labs ordered   - Initiate measures to prevent increased intracranial pressure  - Monitor for seizure activity and implement precautions if appropriate      Outcome: Progressing  Goal: Remains free of injury related to seizures activity  Description: INTERVENTIONS  - Maintain airway, patient safety  and administer oxygen as ordered  - Monitor patient for seizure activity, document and report duration and description of seizure to physician/advanced practitioner  - If seizure occurs,  ensure patient safety during seizure  - Reorient patient post seizure  - Seizure pads on all 4 side rails  - Instruct patient/family to  notify RN of any seizure activity including if an aura is experienced  - Instruct patient/family to call for assistance with activity based on nursing assessment  - Administer anti-seizure medications if ordered    Outcome: Progressing  Goal: Achieves maximal functionality and self care  Description: INTERVENTIONS  - Monitor swallowing and airway patency with patient fatigue and changes in neurological status  - Encourage and assist patient to increase activity and self care.   - Encourage visually impaired, hearing impaired and aphasic patients to use assistive/communication devices  Outcome: Progressing     Problem: CARDIOVASCULAR - ADULT  Goal: Maintains optimal cardiac output and hemodynamic stability  Description: INTERVENTIONS:  - Monitor I/O, vital signs and rhythm  - Monitor for S/S and trends of decreased cardiac output  - Administer and titrate ordered vasoactive medications to optimize hemodynamic stability  - Assess quality of pulses, skin color and temperature  - Assess for signs of decreased coronary artery perfusion  - Instruct patient to report change in severity of symptoms  Outcome: Progressing  Goal: Absence of cardiac dysrhythmias or at baseline rhythm  Description: INTERVENTIONS:  - Continuous cardiac monitoring, vital signs, obtain 12 lead EKG if ordered  - Administer antiarrhythmic and heart rate control medications as ordered  - Monitor electrolytes and administer replacement therapy as ordered  Outcome: Progressing     Problem: RESPIRATORY - ADULT  Goal: Achieves optimal ventilation and oxygenation  Description: INTERVENTIONS:  - Assess for changes in respiratory status  - Assess for changes in mentation and behavior  - Position to facilitate oxygenation and minimize respiratory effort  - Oxygen administered by appropriate delivery if ordered  - Initiate smoking cessation education as indicated  - Encourage broncho-pulmonary hygiene including cough, deep breathe, Incentive Spirometry  -  Assess the need for suctioning and aspirate as needed  - Assess and instruct to report SOB or any respiratory difficulty  - Respiratory Therapy support as indicated  Outcome: Progressing     Problem: GASTROINTESTINAL - ADULT  Goal: Minimal or absence of nausea and/or vomiting  Description: INTERVENTIONS:  - Administer IV fluids if ordered to ensure adequate hydration  - Maintain NPO status until nausea and vomiting are resolved  - Nasogastric tube if ordered  - Administer ordered antiemetic medications as needed  - Provide nonpharmacologic comfort measures as appropriate  - Advance diet as tolerated, if ordered  - Consider nutrition services referral to assist patient with adequate nutrition and appropriate food choices  Outcome: Progressing  Goal: Maintains or returns to baseline bowel function  Description: INTERVENTIONS:  - Assess bowel function  - Encourage oral fluids to ensure adequate hydration  - Administer IV fluids if ordered to ensure adequate hydration  - Administer ordered medications as needed  - Encourage mobilization and activity  - Consider nutritional services referral to assist patient with adequate nutrition and appropriate food choices  Outcome: Progressing  Goal: Maintains adequate nutritional intake  Description: INTERVENTIONS:  - Monitor percentage of each meal consumed  - Identify factors contributing to decreased intake, treat as appropriate  - Assist with meals as needed  - Monitor I&O, weight, and lab values if indicated  - Obtain nutrition services referral as needed  Outcome: Progressing  Goal: Oral mucous membranes remain intact  Description: INTERVENTIONS  - Assess oral mucosa and hygiene practices  - Implement preventative oral hygiene regimen  - Implement oral medicated treatments as ordered  - Initiate Nutrition services referral as needed  Outcome: Progressing     Problem: GENITOURINARY - ADULT  Goal: Maintains or returns to baseline urinary function  Description:  INTERVENTIONS:  - Assess urinary function  - Encourage oral fluids to ensure adequate hydration if ordered  - Administer IV fluids as ordered to ensure adequate hydration  - Administer ordered medications as needed  - Offer frequent toileting  - Follow urinary retention protocol if ordered  Outcome: Progressing  Goal: Absence of urinary retention  Description: INTERVENTIONS:  - Assess patient’s ability to void and empty bladder  - Monitor I/O  - Bladder scan as needed  - Discuss with physician/AP medications to alleviate retention as needed  - Discuss catheterization for long term situations as appropriate  Outcome: Progressing     Problem: Nutrition/Hydration-ADULT  Goal: Nutrient/Hydration intake appropriate for improving, restoring or maintaining nutritional needs  Description: Monitor and assess patient's nutrition/hydration status for malnutrition. Collaborate with interdisciplinary team and initiate plan and interventions as ordered.  Monitor patient's weight and dietary intake as ordered or per policy. Utilize nutrition screening tool and intervene as necessary. Determine patient's food preferences and provide high-protein, high-caloric foods as appropriate.     INTERVENTIONS:  - Monitor oral intake, urinary output, labs, and treatment plans  - Assess nutrition and hydration status and recommend course of action  - Evaluate amount of meals eaten  - Assist patient with eating if necessary   - Allow adequate time for meals  - Recommend/ encourage appropriate diets, oral nutritional supplements, and vitamin/mineral supplements  - Order, calculate, and assess calorie counts as needed  - Recommend, monitor, and adjust tube feedings and TPN/PPN based on assessed needs  - Assess need for intravenous fluids  - Provide specific nutrition/hydration education as appropriate  - Include patient/family/caregiver in decisions related to nutrition  Outcome: Progressing

## 2024-07-01 NOTE — WOUND OSTOMY CARE
Progress Note - Wound   Luis Forrester 53 y.o. male MRN: [id number]  Unit/Bed#: [facility] 605-01 Encounter: [id number]        Assessment:   Patient seen today for wound care follow up assessment. Patient's wound to buttocks is now resolved, recommend hydraguard to area. Wound care will sign off at this time.      Skin care plans:  1-Hydraguard to bilateral sacrum, buttock and heels BID and PRN  2-Elevate heels to offload pressure.  3-Ehob cushion in chair when out of bed.  4-Moisturize skin daily with skin nourishing cream.  5-Turn/reposition q2h for pressure re-distribution on skin.      Wound Perineum (Active)   Wound Image   07/01/24 1013   Wound Description Epithelialization 07/01/24 1013   Francisca-wound Assessment Clean;Dry;Intact 07/01/24 1013   Wound Length (cm) 0 cm 07/01/24 1013   Wound Width (cm) 0 cm 07/01/24 1013   Wound Depth (cm) 0 cm 07/01/24 1013   Wound Surface Area (cm^2) 0 cm^2 07/01/24 1013   Wound Volume (cm^3) 0 cm^3 07/01/24 1013   Calculated Wound Volume (cm^3) 0 cm^3 07/01/24 1013   Change in Wound Size % 100 07/01/24 1013   Wound Site Closure MARY 07/01/24 1013   Drainage Amount None 07/01/24 1013   Drainage Description Tan;Bloody 06/30/24 0102   Non-staged Wound Description Not applicable 07/01/24 1013   Treatments Cleansed 07/01/24 1013   Dressing Moisture barrier 07/01/24 1013   Wound packed? No 07/01/24 1013   Packing- # removed 0 07/01/24 1013   Packing- # inserted 0 07/01/24 1013   Dressing Changed New 07/01/24 1013   Patient Tolerance Tolerated well 07/01/24 1013   Dressing Status Clean;Dry;Intact 07/01/24 1013         Kylee Olvera BSN, RN, CWOCN

## 2024-07-01 NOTE — PROGRESS NOTES
[facility]  Progress Note  Name: Luis Forrester I  MRN: [id number]  Unit/Bed#: [facility] 605-01 I Date of Admission: 6/15/2024   Date of Service: 7/1/2024 I Hospital Day: 16    Assessment & Plan   Dysphagia  Assessment & Plan  - Reported difficulty swallowing pills on 6/28  -VBS done 6/30 > strict NPO  - Plan for course of decadron and repeat VBS on 7/3 pending response to decadron    Abdominal distension  Assessment & Plan  Abdominal distention following PPM placement  -Patient and wife report worsening abdominal distention today  -Replete electrolytes as needed   -Patient reports multiple liquid bowel movements daily and passing flatus at this time  -Monitor bowel function   -Speech cleared for FLD 6/27  -No nausea or vomiting   -Abdomen mildly distended, soft, non tender 6/28    Hyponatremia  Assessment & Plan  - Appreciate nephrology input  - Sodium has corrected, continue to monitor      s/p Medtronic dual chamber PPM 6/21/2024  Assessment & Plan  - See SSS plan    SSS (sick sinus syndrome) ([facility])  Assessment & Plan  - Status post PPM on 6/21  - EPS has signed off on 6/22    SDH (subdural hematoma) ([facility])  Assessment & Plan  Continue frequent neurological checks.   STAT CT head with any neurological decline including drop GCS of 2pts within 1 hr.  Seizure prophylaxis per trauma team-completed  Hold all full antiplatelet and anticoagulation medications for 2 weeks      Hypothyroidism  Assessment & Plan  - Patient with chronic history of hypothyroidism.  - Continue home medication regimen including levothyroxine.  - Outpatient follow-up routine.    * TBI (traumatic brain injury) ([facility])  Assessment & Plan  - Traumatic brain injury with new acute appearing trace subarachnoid hemorrhage in the bilateral parafalcine regions as well as a new appearing trace parafalcine subdural hematoma, present on admission.  - CT scan of the head from 6/15/2024 demonstrated: New acute trace  subarachnoid hemorrhage in bilateral parafalcine regions. New acute trace parafalcine subdural hematoma.  -  Additional imaging with CTA of the head and neck 6/15/2024 demonstrated: Negative CTA head traumatic vascular injury, aneurysm, large vessel occlusion, high-grade stenosis, or dissection. Partially imaged ectatic right carotid bifurcation and proximal cervical internal carotid artery with retropharyngeal course, similar to CT neck with contrast 10/12/2011.  - Admit as level 2 step-down with HOT protocol.  The patient may require ICU level of care during hospital encounter if the patient has clinical decompensation or worsening of traumatic brain injury.  - Neurosurgery evaluation and recommendations appreciated.  - Hold anti-platelet and anticoagulant medications for least 2 weeks and/or until cleared by Neurosurgery.   - Patient is not on any chronic antiplatelet or anticoagulant medications at baseline.  - Continue Keppra for 7 days for seizure prophylaxis - completed  - Monitor neurologic exam.  - Continue symptomatic management with analgesia as needed.- PT and OT evaluation and treatment as indicated.             Bowel Regimen: Senna, Miralax  VTE Prophylaxis:Enoxaparin (Lovenox)     Disposition: Inpatient pending medical clearance    Subjective     Subjective: Patient states that he has no new complaints this morning.  Keofed placed yesterday after VPS showed significant difficulty swallowing and aspiration.  He is now on tube feeds at 50 mL/h with goal of 70.     Objective   Vitals:   Temp:  [98.5 °F (36.9 °C)-99.7 °F (37.6 °C)] 99.7 °F (37.6 °C)  HR:  [] 95  BP: (102-122)/(61-84) 106/61    I/O         06/29 0701  06/30 0700 06/30 0701  07/01 0700 07/01 0701  07/02 0700    P.O.  0     NG/GT  410     Feedings  160     Total Intake(mL/kg)  570 (7.2)     Urine (mL/kg/hr) 1200 (0.6) 500 (0.3)     Stool  0     Total Output 1200 500     Net -1200 +70            Unmeasured Urine Occurrence 2 x 3 x      Unmeasured Stool Occurrence  4 x     Unmeasured Emesis Occurrence  0 x              Physical Exam:   GENERAL APPEARANCE: Well-appearing, nontoxic  NEURO: Strength and sensation intact in the bilateral upper and lower extremities  HEENT: MMM, Keofed in place  CV: RRR  LUNGS: Clear to auscultation bilaterally  GI: Soft nontender abdomen  : Not assessed  MSK: No pain to palpation of the bilateral lower extremities  SKIN: No obvious skin breakdown    Invasive Devices       Peripheral Intravenous Line  Duration             Peripheral IV 06/29/24 Dorsal (posterior);Left Wrist 2 days              Drain  Duration             NG/OG/Enteral Tube Right nare -- days                          Lab Results: Results: I have personally reviewed all pertinent laboratory/tests results, BMP/CMP:   Lab Results   Component Value Date    SODIUM 138 07/01/2024    K 4.2 07/01/2024     07/01/2024    CO2 23 07/01/2024    BUN 13 07/01/2024    CREATININE 0.57 (L) 07/01/2024    CALCIUM 7.1 (L) 07/01/2024    EGFR 115 07/01/2024   , and CBC:   Lab Results   Component Value Date    WBC 11.10 (H) 07/01/2024    HGB 11.7 (L) 07/01/2024    HCT 34.9 (L) 07/01/2024    MCV 94 07/01/2024     07/01/2024    RBC 3.71 (L) 07/01/2024    MCH 31.5 07/01/2024    MCHC 33.5 07/01/2024    RDW 13.7 07/01/2024    MPV 10.8 07/01/2024    NRBC 0 07/01/2024     Imaging: I have personally reviewed pertinent reports.     Other Studies: NA

## 2024-07-01 NOTE — ASSESSMENT & PLAN NOTE
Abdominal distention following PPM placement  -Patient and wife report worsening abdominal distention today  -Replete electrolytes as needed   -Patient reports multiple liquid bowel movements daily and passing flatus at this time  -Monitor bowel function   -Speech cleared for FLD 6/27  -No nausea or vomiting   -Abdomen mildly distended, soft, non tender 6/28

## 2024-07-02 ENCOUNTER — TELEPHONE (OUTPATIENT)
Dept: INTERNAL MEDICINE CLINIC | Facility: CLINIC | Age: 54
End: 2024-07-02

## 2024-07-02 PROBLEM — E87.1 HYPONATREMIA: Status: RESOLVED | Noted: 2024-06-23 | Resolved: 2024-07-02

## 2024-07-02 PROBLEM — R14.0 ABDOMINAL DISTENSION: Status: RESOLVED | Noted: 2024-06-25 | Resolved: 2024-07-02

## 2024-07-02 LAB
ANION GAP SERPL CALCULATED.3IONS-SCNC: 10 MMOL/L (ref 4–13)
BUN SERPL-MCNC: 11 MG/DL (ref 5–25)
CALCIUM SERPL-MCNC: 6.1 MG/DL (ref 8.4–10.2)
CHLORIDE SERPL-SCNC: 103 MMOL/L (ref 96–108)
CO2 SERPL-SCNC: 26 MMOL/L (ref 21–32)
CREAT SERPL-MCNC: 0.5 MG/DL (ref 0.6–1.3)
GFR SERPL CREATININE-BSD FRML MDRD: 121 ML/MIN/1.73SQ M
GLUCOSE SERPL-MCNC: 111 MG/DL (ref 65–140)
POTASSIUM SERPL-SCNC: 3.2 MMOL/L (ref 3.5–5.3)
SODIUM SERPL-SCNC: 139 MMOL/L (ref 135–147)

## 2024-07-02 PROCEDURE — 97129 THER IVNTJ 1ST 15 MIN: CPT

## 2024-07-02 PROCEDURE — 97130 THER IVNTJ EA ADDL 15 MIN: CPT

## 2024-07-02 PROCEDURE — 80048 BASIC METABOLIC PNL TOTAL CA: CPT | Performed by: INTERNAL MEDICINE

## 2024-07-02 PROCEDURE — 97168 OT RE-EVAL EST PLAN CARE: CPT

## 2024-07-02 PROCEDURE — 99232 SBSQ HOSP IP/OBS MODERATE 35: CPT | Performed by: SURGERY

## 2024-07-02 PROCEDURE — 97164 PT RE-EVAL EST PLAN CARE: CPT

## 2024-07-02 RX ORDER — POTASSIUM CHLORIDE 20MEQ/15ML
40 LIQUID (ML) ORAL 2 TIMES DAILY
Status: COMPLETED | OUTPATIENT
Start: 2024-07-02 | End: 2024-07-02

## 2024-07-02 RX ORDER — CALCIUM GLUCONATE 20 MG/ML
2 INJECTION, SOLUTION INTRAVENOUS ONCE
Status: COMPLETED | OUTPATIENT
Start: 2024-07-02 | End: 2024-07-02

## 2024-07-02 RX ADMIN — METHOCARBAMOL 500 MG: 100 INJECTION INTRAMUSCULAR; INTRAVENOUS at 21:06

## 2024-07-02 RX ADMIN — GUAIFENESIN AND DEXTROMETHORPHAN 10 ML: 100; 10 SYRUP ORAL at 22:43

## 2024-07-02 RX ADMIN — ENOXAPARIN SODIUM 30 MG: 30 INJECTION SUBCUTANEOUS at 21:05

## 2024-07-02 RX ADMIN — CEPHALEXIN 500 MG: 250 FOR SUSPENSION ORAL at 23:36

## 2024-07-02 RX ADMIN — GABAPENTIN 100 MG: 250 SOLUTION ORAL at 08:37

## 2024-07-02 RX ADMIN — POTASSIUM CHLORIDE 40 MEQ: 1.5 SOLUTION ORAL at 16:17

## 2024-07-02 RX ADMIN — LEVOTHYROXINE SODIUM 125 MCG: 100 TABLET ORAL at 06:19

## 2024-07-02 RX ADMIN — POTASSIUM CHLORIDE 40 MEQ: 1.5 SOLUTION ORAL at 08:37

## 2024-07-02 RX ADMIN — GABAPENTIN 100 MG: 250 SOLUTION ORAL at 21:05

## 2024-07-02 RX ADMIN — METHOCARBAMOL 500 MG: 100 INJECTION INTRAMUSCULAR; INTRAVENOUS at 08:37

## 2024-07-02 RX ADMIN — GABAPENTIN 100 MG: 250 SOLUTION ORAL at 16:11

## 2024-07-02 RX ADMIN — ACETAMINOPHEN 1000 MG: 10 INJECTION INTRAVENOUS at 06:08

## 2024-07-02 RX ADMIN — ENOXAPARIN SODIUM 30 MG: 30 INJECTION SUBCUTANEOUS at 08:38

## 2024-07-02 RX ADMIN — HYDROMORPHONE HYDROCHLORIDE 0.2 MG: 0.2 INJECTION, SOLUTION INTRAMUSCULAR; INTRAVENOUS; SUBCUTANEOUS at 16:19

## 2024-07-02 RX ADMIN — METHOCARBAMOL 500 MG: 100 INJECTION INTRAMUSCULAR; INTRAVENOUS at 16:06

## 2024-07-02 RX ADMIN — ACETAMINOPHEN 1000 MG: 10 INJECTION INTRAVENOUS at 14:03

## 2024-07-02 RX ADMIN — SODIUM CHLORIDE, SODIUM GLUCONATE, SODIUM ACETATE, POTASSIUM CHLORIDE, MAGNESIUM CHLORIDE, SODIUM PHOSPHATE, DIBASIC, AND POTASSIUM PHOSPHATE 75 ML/HR: .53; .5; .37; .037; .03; .012; .00082 INJECTION, SOLUTION INTRAVENOUS at 11:08

## 2024-07-02 RX ADMIN — CEPHALEXIN 500 MG: 250 FOR SUSPENSION ORAL at 16:11

## 2024-07-02 RX ADMIN — DEXAMETHASONE SODIUM PHOSPHATE 10 MG: 10 INJECTION, SOLUTION INTRAMUSCULAR; INTRAVENOUS at 08:38

## 2024-07-02 RX ADMIN — CALCIUM GLUCONATE 2 G: 20 INJECTION, SOLUTION INTRAVENOUS at 08:38

## 2024-07-02 RX ADMIN — CEPHALEXIN 500 MG: 250 FOR SUSPENSION ORAL at 12:25

## 2024-07-02 RX ADMIN — CEPHALEXIN 500 MG: 250 FOR SUSPENSION ORAL at 06:19

## 2024-07-02 RX ADMIN — ACETAMINOPHEN 1000 MG: 10 INJECTION INTRAVENOUS at 21:00

## 2024-07-02 NOTE — ASSESSMENT & PLAN NOTE
- Reported difficulty swallowing pills on 6/28  - VBS done 6/30 > strict NPO, keofed placed for feeding  - Plan for course of decadron and repeat VBS on 7/3

## 2024-07-02 NOTE — ASSESSMENT & PLAN NOTE
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  - CT scan of the head from 6/15/2024 demonstrated: New acute trace subarachnoid hemorrhage in bilateral parafalcine regions. New acute trace parafalcine subdural hematoma.  -  Additional imaging with CTA of the head and neck 6/15/2024 demonstrated: Negative CTA head traumatic vascular injury, aneurysm, large vessel occlusion, high-grade stenosis, or dissection. Partially imaged ectatic right carotid bifurcation and proximal cervical internal carotid artery with retropharyngeal course, similar to CT neck with contrast 10/12/2011.  - Neurosurgery evaluation and recommendations appreciated.  - Hold anti-platelet and anticoagulant medications for least 2 weeks and/or until cleared by Neurosurgery.   - Patient is not on any chronic antiplatelet or anticoagulant medications at baseline.  - Continue Keppra for 7 days for seizure prophylaxis - completed  - Monitor neurologic exam.  - Continue symptomatic management with analgesia as needed.- PT and OT evaluation and treatment as indicated.

## 2024-07-02 NOTE — PLAN OF CARE
Problem: Prexisting or High Potential for Compromised Skin Integrity  Goal: Skin integrity is maintained or improved  Description: INTERVENTIONS:  - Identify patients at risk for skin breakdown  - Assess and monitor skin integrity  - Assess and monitor nutrition and hydration status  - Monitor labs   - Assess for incontinence   - Turn and reposition patient  - Assist with mobility/ambulation  - Relieve pressure over bony prominences  - Avoid friction and shearing  - Provide appropriate hygiene as needed including keeping skin clean and dry  - Evaluate need for skin moisturizer/barrier cream  - Collaborate with interdisciplinary team   - Patient/family teaching  - Consider wound care consult   Outcome: Progressing     Problem: PAIN - ADULT  Goal: Verbalizes/displays adequate comfort level or baseline comfort level  Description: Interventions:  - Encourage patient to monitor pain and request assistance  - Assess pain using appropriate pain scale  - Administer analgesics based on type and severity of pain and evaluate response  - Implement non-pharmacological measures as appropriate and evaluate response  - Consider cultural and social influences on pain and pain management  - Notify physician/advanced practitioner if interventions unsuccessful or patient reports new pain  Outcome: Progressing     Problem: INFECTION - ADULT  Goal: Absence or prevention of progression during hospitalization  Description: INTERVENTIONS:  - Assess and monitor for signs and symptoms of infection  - Monitor lab/diagnostic results  - Monitor all insertion sites, i.e. indwelling lines, tubes, and drains  - Monitor endotracheal if appropriate and nasal secretions for changes in amount and color  - [place] appropriate cooling/warming therapies per order  - Administer medications as ordered  - Instruct and encourage patient and family to use good hand hygiene technique  - Identify and instruct in appropriate isolation precautions for  identified infection/condition  Outcome: Progressing     Problem: SAFETY ADULT  Goal: Patient will remain free of falls  Description: INTERVENTIONS:  - Educate patient/family on patient safety including physical limitations  - Instruct patient to call for assistance with activity   - Consult OT/PT to assist with strengthening/mobility   - Keep Call bell within reach  - Keep bed low and locked with side rails adjusted as appropriate  - Keep care items and personal belongings within reach  - Initiate and maintain comfort rounds  - Make Fall Risk Sign visible to staff  - Apply yellow socks and bracelet for high fall risk patients  - Consider moving patient to room near nurses station  Outcome: Progressing  Goal: Maintain or return to baseline ADL function  Description: INTERVENTIONS:  -  Assess patient's ability to carry out ADLs; assess patient's baseline for ADL function and identify physical deficits which impact ability to perform ADLs (bathing, care of mouth/teeth, toileting, grooming, dressing, etc.)  - Assess/evaluate cause of self-care deficits   - Assess range of motion  - Assess patient's mobility; develop plan if impaired  - Assess patient's need for assistive devices and provide as appropriate  - Encourage maximum independence but intervene and supervise when necessary  - Involve family in performance of ADLs  - Assess for home care needs following discharge   - Consider OT consult to assist with ADL evaluation and planning for discharge  - Provide patient education as appropriate  Outcome: Progressing  Goal: Maintains/Returns to pre admission functional level  Description: INTERVENTIONS:  - Perform AM-PAC 6 Click Basic Mobility/ Daily Activity assessment daily.  - Set and communicate daily mobility goal to care team and patient/family/caregiver.   - Collaborate with rehabilitation services on mobility goals if consulted  - Out of bed for toileting  - Record patient progress and toleration of activity level    Outcome: Progressing     Problem: DISCHARGE PLANNING  Goal: Discharge to home or other facility with appropriate resources  Description: INTERVENTIONS:  - Identify barriers to discharge w/patient and caregiver  - Arrange for needed discharge resources and transportation as appropriate  - Identify discharge learning needs (meds, wound care, etc.)  - Arrange for interpretive services to assist at discharge as needed  - Refer to Case Management Department for coordinating discharge planning if the patient needs post-hospital services based on physician/advanced practitioner order or complex needs related to functional status, cognitive ability, or social support system  Outcome: Progressing     Problem: Knowledge Deficit  Goal: Patient/family/caregiver demonstrates understanding of disease process, treatment plan, medications, and discharge instructions  Description: Complete learning assessment and assess knowledge base.  Interventions:  - Provide teaching at level of understanding  - Provide teaching via preferred learning methods  Outcome: Progressing     Problem: NEUROSENSORY - ADULT  Goal: Achieves stable or improved neurological status  Description: INTERVENTIONS  - Monitor and report changes in neurological status  - Monitor vital signs such as temperature, blood pressure, glucose, and any other labs ordered   - Initiate measures to prevent increased intracranial pressure  - Monitor for seizure activity and implement precautions if appropriate      Outcome: Progressing  Goal: Remains free of injury related to seizures activity  Description: INTERVENTIONS  - Maintain airway, patient safety  and administer oxygen as ordered  - Monitor patient for seizure activity, document and report duration and description of seizure to physician/advanced practitioner  - If seizure occurs,  ensure patient safety during seizure  - Reorient patient post seizure  - Seizure pads on all 4 side rails  - Instruct patient/family to  notify RN of any seizure activity including if an aura is experienced  - Instruct patient/family to call for assistance with activity based on nursing assessment  - Administer anti-seizure medications if ordered    Outcome: Progressing  Goal: Achieves maximal functionality and self care  Description: INTERVENTIONS  - Monitor swallowing and airway patency with patient fatigue and changes in neurological status  - Encourage and assist patient to increase activity and self care.   - Encourage visually impaired, hearing impaired and aphasic patients to use assistive/communication devices  Outcome: Progressing     Problem: CARDIOVASCULAR - ADULT  Goal: Maintains optimal cardiac output and hemodynamic stability  Description: INTERVENTIONS:  - Monitor I/O, vital signs and rhythm  - Monitor for S/S and trends of decreased cardiac output  - Administer and titrate ordered vasoactive medications to optimize hemodynamic stability  - Assess quality of pulses, skin color and temperature  - Assess for signs of decreased coronary artery perfusion  - Instruct patient to report change in severity of symptoms  Outcome: Progressing  Goal: Absence of cardiac dysrhythmias or at baseline rhythm  Description: INTERVENTIONS:  - Continuous cardiac monitoring, vital signs, obtain 12 lead EKG if ordered  - Administer antiarrhythmic and heart rate control medications as ordered  - Monitor electrolytes and administer replacement therapy as ordered  Outcome: Progressing     Problem: RESPIRATORY - ADULT  Goal: Achieves optimal ventilation and oxygenation  Description: INTERVENTIONS:  - Assess for changes in respiratory status  - Assess for changes in mentation and behavior  - Position to facilitate oxygenation and minimize respiratory effort  - Oxygen administered by appropriate delivery if ordered  - Initiate smoking cessation education as indicated  - Encourage broncho-pulmonary hygiene including cough, deep breathe, Incentive Spirometry  -  Assess the need for suctioning and aspirate as needed  - Assess and instruct to report SOB or any respiratory difficulty  - Respiratory Therapy support as indicated  Outcome: Progressing     Problem: GASTROINTESTINAL - ADULT  Goal: Minimal or absence of nausea and/or vomiting  Description: INTERVENTIONS:  - Administer IV fluids if ordered to ensure adequate hydration  - Maintain NPO status until nausea and vomiting are resolved  - Nasogastric tube if ordered  - Administer ordered antiemetic medications as needed  - Provide nonpharmacologic comfort measures as appropriate  - Advance diet as tolerated, if ordered  - Consider nutrition services referral to assist patient with adequate nutrition and appropriate food choices  Outcome: Progressing  Goal: Maintains or returns to baseline bowel function  Description: INTERVENTIONS:  - Assess bowel function  - Encourage oral fluids to ensure adequate hydration  - Administer IV fluids if ordered to ensure adequate hydration  - Administer ordered medications as needed  - Encourage mobilization and activity  - Consider nutritional services referral to assist patient with adequate nutrition and appropriate food choices  Outcome: Progressing  Goal: Maintains adequate nutritional intake  Description: INTERVENTIONS:  - Monitor percentage of each meal consumed  - Identify factors contributing to decreased intake, treat as appropriate  - Assist with meals as needed  - Monitor I&O, weight, and lab values if indicated  - Obtain nutrition services referral as needed  Outcome: Progressing  Goal: Oral mucous membranes remain intact  Description: INTERVENTIONS  - Assess oral mucosa and hygiene practices  - Implement preventative oral hygiene regimen  - Implement oral medicated treatments as ordered  - Initiate Nutrition services referral as needed  Outcome: Progressing     Problem: GENITOURINARY - ADULT  Goal: Maintains or returns to baseline urinary function  Description:  INTERVENTIONS:  - Assess urinary function  - Encourage oral fluids to ensure adequate hydration if ordered  - Administer IV fluids as ordered to ensure adequate hydration  - Administer ordered medications as needed  - Offer frequent toileting  - Follow urinary retention protocol if ordered  Outcome: Progressing  Goal: Absence of urinary retention  Description: INTERVENTIONS:  - Assess patient’s ability to void and empty bladder  - Monitor I/O  - Bladder scan as needed  - Discuss with physician/AP medications to alleviate retention as needed  - Discuss catheterization for long term situations as appropriate  Outcome: Progressing     Problem: Nutrition/Hydration-ADULT  Goal: Nutrient/Hydration intake appropriate for improving, restoring or maintaining nutritional needs  Description: Monitor and assess patient's nutrition/hydration status for malnutrition. Collaborate with interdisciplinary team and initiate plan and interventions as ordered.  Monitor patient's weight and dietary intake as ordered or per policy. Utilize nutrition screening tool and intervene as necessary. Determine patient's food preferences and provide high-protein, high-caloric foods as appropriate.     INTERVENTIONS:  - Monitor oral intake, urinary output, labs, and treatment plans  - Assess nutrition and hydration status and recommend course of action  - Evaluate amount of meals eaten  - Assist patient with eating if necessary   - Allow adequate time for meals  - Recommend/ encourage appropriate diets, oral nutritional supplements, and vitamin/mineral supplements  - Order, calculate, and assess calorie counts as needed  - Recommend, monitor, and adjust tube feedings and TPN/PPN based on assessed needs  - Assess need for intravenous fluids  - Provide specific nutrition/hydration education as appropriate  - Include patient/family/caregiver in decisions related to nutrition  Outcome: Progressing

## 2024-07-02 NOTE — OCCUPATIONAL THERAPY NOTE
Occupational Therapy RE-Evaluation +TX     Patient Name: Luis Forrester  Today's Date: 7/2/2024  Problem List  Principal Problem:    TBI (traumatic brain injury) ([facility])  Active Problems:    Hypothyroidism    SDH (subdural hematoma) ([facility])    SSS (sick sinus syndrome) ([facility])    s/p Medtronic dual chamber PPM 6/21/2024    Dysphagia    Past Medical History  Past Medical History:   Diagnosis Date    Arthritis     Chronic cough     Disease of thyroid gland     Hypoparathyroidism ([facility])     continue taking calcium and vitamin D, will check CMP, PTH level and Vitamin D level    Pneumonia of right middle lobe due to Streptococcus pneumoniae ([facility]) 11/30/2018    s/p Medtronic dual chamber PPM 6/21/2024 06/21/2024     Past Surgical History  Past Surgical History:   Procedure Laterality Date    CARDIAC ELECTROPHYSIOLOGY PROCEDURE N/A 6/21/2024    Procedure: Cardiac pacer implant;  Surgeon: Kofi Pettit MD;  Location: [facility];  Service: Cardiology    DECOMPRESSION SPINE CERVICAL POSTERIOR N/A 6/16/2024    Procedure: DECOMPRESSION SPINE CERVICAL POSTERIOR C2-T1 with fusion navigated with Oarm;  Surgeon: Endy Velasquez MD;  Location: [facility];  Service: Neurosurgery    FRACTURE SURGERY      Open Treatment of Each Proximal Finger Phalanx         07/02/24 1355   OT Last Visit   OT Visit Date 07/02/24   Note Type   Note type Re-Evaluation  (+ F/U TX)   Pain Assessment   Pain Assessment Tool 0-10   Pain Score No Pain   Hospital Pain Intervention(s) Repositioned;Ambulation/increased activity;Emotional support   Restrictions/Precautions   Weight Bearing Precautions Per Order No   Braces or Orthoses   (NONE)   Other Precautions Cognitive;Chair Alarm;Bed Alarm;Multiple lines;Fall Risk;Pain  (KEOFED)   Home Living   Type of Home Apartment   Home Layout One level   Bathroom Shower/Tub Walk-in shower   Bathroom Toilet Standard   Bathroom Accessibility Accessible   Prior Function   Level of [place] Independent with  "ADLs;Independent with functional mobility   Lives With Significant other;Daughter   Receives Help From Family   IADLs Independent with meal prep;Independent with medication management;Family/Friend/Other provides transportation   Falls in the last 6 months 1 to 4   Vocational Part time employment   Lifestyle   Autonomy I I adls and mobility -reports he does not drive - shares homemaking with family   Reciprocal Relationships supportive family   Service to Others works PT at [employer]   Intrinsic Gratification active pta   Subjective   Subjective \"CAN YOU DO IT FOR ME, I CAN'T DO IT\"- IN REGARDS TO GROOMING TASK   ADL   Eating Assistance 4  Minimal Assistance   Grooming Assistance 3  Moderate Assistance   UB Bathing Assistance 3  Moderate Assistance   LB Bathing Assistance 3  Moderate Assistance   UB Dressing Assistance 3  Moderate Assistance   LB Dressing Assistance 3  Moderate Assistance   Toileting Assistance  3  Moderate Assistance   Functional Assistance 3  Moderate Assistance   Bed Mobility   Supine to Sit 4  Minimal assistance   Additional items Assist x 1;Increased time required;Verbal cues;LE management   Sit to Supine Unable to assess  (PT LEFT OOB WITH ALL NEEDS IN REACH + ALARM ON)   Transfers   Sit to Stand 4  Minimal assistance   Additional items Assist x 1;Increased time required;Verbal cues   Stand to Sit 4  Minimal assistance   Additional items Assist x 1;Increased time required;Verbal cues   Functional Mobility   Functional Mobility 4  Minimal assistance   Additional items Rolling walker   Balance   Static Sitting Fair   Static Standing Fair -   Ambulatory Poor +   Activity Tolerance   Activity Tolerance Patient tolerated treatment well;Patient limited by fatigue   Medical Staff Made Aware PT SEEN FOR PARTIAL CO-SESSION WITH SKILLED PHYSICAL THERAPIST 2' CLINICALLY UNSTABLE PRESENTATION, NEW PRECAUTIONS/LIMITATIONS, LIMITED ACTIVITY TOLERANCE AND PRESENT IMPAIRMENTS WHICH ARE A REGRESSION FROM THE " PT'S BASELINE AND IMPACTING OVERALL OCCUPATIONAL PERFORMANCE.   Nurse Made Aware APPROPRIATE TO SEE   RUE Assessment   RUE Assessment X   LUE Assessment   LUE Assessment X   Cognition   Overall Cognitive Status Impaired   Arousal/Participation Alert;Cooperative   Attention Attends with cues to redirect   Orientation Level Oriented X4   Memory Decreased recall of precautions;Decreased recall of recent events;Decreased short term memory   Following Commands Follows one step commands with increased time or repetition   Assessment   Limitation Decreased ADL status;Decreased Safe judgement during ADL;Decreased cognition;Decreased endurance;Decreased self-care trans;Decreased high-level ADLs   Prognosis Good   Assessment 54 YO Male SEEN FOR INITIAL OCCUPATIONAL THERAPY REEVAL 2' EXPIRATION OF GOALS AND NEED FOR REASSESSMENT OF PT'S ABILITIES. PROBLEMS LIST INCLUDES TBI, SDH, SSS S/P PPM ON 6/21 AND DYSPHAGIA WITH KEOFED TUBE IN PLACE. PT ALSO S/P DECOMPRESSION SPINE CERVICAL POSTERIOR C2-T1 WITH FUSION ON 6/16/24. SEE INITIAL OT EVAL FOR FURTHER DETAILS. PT CURRENTLY REQUIRES OVERALL MOD A WITH ADLS AND MIN A WITH TRANSFERS / FUNCTIONAL MOBILITY WITH USE OF RW. PT IS LIMITED 2' PAIN, FATIGUE, IMPAIRED BALANCE, FALL RISK , LIMITED SAFETY AWARENESS/INSIGHT/JUDGEMENT, SPINAL PRECAUTIONS, OVERALL WEAKNESS/DECONDITIONING , INACCESSIBLE HOME ENVIRONMENT, and OVERALL LIMITED ACTIVITY TOLERANCE. PT EDUCATED ON SPINAL PRECAUTIONS, DEEP BREATHING TECHNIQUES T/O ACTIVITY, SLOWING OF PACE, ENERGY CONSERVATION TECHNIQUES FOR CARRY OVER UPON D/C, INCREASED FAMILY SUPPORT, and CONTINUE PARTICIPATION IN SELF-CARE/MOBILITY WITH STAFF WHILE IN THE HOSPITAL . The patient's raw score on the AM-PAC Daily Activity Inpatient Short Form is 13. A raw score of less than 19 suggests the patient may benefit from discharge to post-acute rehabilitation services. Please refer to the recommendation of the Occupational Therapist for safe discharge  "planning.  FROM AN OCCUPATIONAL THERAPY PERSPECTIVE, CONT TO RECOMMEND LEVEL I RESOURCES. GOAL DATE EXTENDED +20 DAYS. SEE BELOW FOR UPDATED GOALS.   Goals   Patient Goals TO REST   LTG Time Frame   (+20 DAYS)   Long Term Goal #1 SEE BELOW FOR UPDATED GOALS   Plan   Treatment Interventions ADL retraining;Endurance training;UE strengthening/ROM;Functional transfer training;Cognitive reorientation;Patient/family training;Equipment evaluation/education;Compensatory technique education;Fine motor coordination activities;Energy conservation;Activityengagement   Goal Expiration Date 07/22/24   OT Treatment Day 3   OT Frequency 3-5x/wk   Discharge Recommendation   Rehab Resource Intensity Level, OT I (Maximum Resource Intensity)   AM-PAC Daily Activity Inpatient   Lower Body Dressing 2   Bathing 2   Toileting 2   Upper Body Dressing 2   Grooming 2   Eating 3   Daily Activity Raw Score 13   Daily Activity Standardized Score (Calc for Raw Score >=11) 32.03   AM-PAC Applied Cognition Inpatient   Following a Speech/Presentation 3   Understanding Ordinary Conversation 4   Taking Medications 2   Remembering Where Things Are Placed or Put Away 3   Remembering List of 4-5 Errands 2   Taking Care of Complicated Tasks 2   Applied Cognition Raw Score 16   Applied Cognition Standardized Score 35.03   MOCA   Version 7.1   Visuopatial/Executive 2  (PT NOTED TO BE LOOKING AT CLOCK ON WALL WHILE COMPLETING THE CLOCK DRAWING FOR REFERENCE)   Naming 2   Memory 0   Attention: Digits 1   Attention: Letters 0   Attention: Serial 2  (\"33, 16, 9, 2\")   Language: Repeat 0   Language: Fluency 0   Abstraction 1   Delayed Recall 3   Orientation 6   Does patient have less than or equal to 12 years of education? 0   MOCA Total Score 17   Additional Treatment Session   Start Time 1330   End Time 1355   Treatment Assessment PT SEEN FOR F/U TX SESSION WITH FOCUS ON FORMAL COGNITIVE ASSESSMENT. PT COMPLETED THE BOLA COGNITIVE ASSESSMENT WITH G " ATTENTION TO TASK HOWEVER REQUIRED ENCOURAGEMENT TO PARTICIPATE/CONTINUE T/O. PT SCORED 17/30 INDICATING A MODERATE COGNITIVE IMPAIRMENT. SEE ABOVE. CONT TO RECOMMEND LEVEL I RESOURCES UPON D/C.   Additional Treatment Day 4           -Pt will increase bed mobility to MOD I to participate in functional activities with G tolerance and balance.  -Pt will improve functional mobility and transfers to MOD I on/off all surfaces w/ G balance/safety including toileting.  -Pt will participate in lt grooming task with MOD I after set-up standing at sink ~3-5 minutes with G safety and balance.   -Pt will increase independence in all ADLS to MOD I with G balance sitting upright in chair.  -Pt will improve activity tolerance to G for 30 min txment sessions w/ G carry over of learned energy conservation techniques.  -Pt will improve independence in lt homemaking activities to MOD I without requiring cues for safety.  -Pt will demonstrate G carryover of learned safety techniques and proper body mechanics in functional and leisure activities with use of DME.  -Pt will complete additional cognitive assessment with 100% attention to task in order to assist with safe d/c plan.   -Pt will follow 100% simple 2-step commands and be A&O x4 consistently with environmental cues to increase participation in functional activities.     Documentation completed by BRITTANIE Robles, OTR/L  MOCA Certified ID# [id number]

## 2024-07-02 NOTE — NUTRITION
Pt triggered for follow-up.     Pt on current EN rec of jevity 1.2 continuous running at goal rate of 70 mL/hr.     Previous RD recommended changing rate to 75 mL for 22 hour cyclic instead, likely related to hold time of levothyroxine medication. Pt having diarrhea.     Recommend new TF regiment to include banatrol for diarrhea management and a 22 hour cyclic style: Jevity 1.2 cyclic for 22 hours with one banatrol added at a goal rate of 75 mL to provide total 1650 volume, 2025 kcals, 94 g protein, 1332 mL free water. If you continue flushes of 250 mL every 4 hours, it addes 1000 mL water to 1332 mL free water of tf to equate to a total water intake of 2332 mL/day.

## 2024-07-02 NOTE — PLAN OF CARE
Problem: INFECTION - ADULT  Goal: Absence or prevention of progression during hospitalization  Description: INTERVENTIONS:  - Assess and monitor for signs and symptoms of infection  - Monitor lab/diagnostic results  - Monitor all insertion sites, i.e. indwelling lines, tubes, and drains  - Monitor endotracheal if appropriate and nasal secretions for changes in amount and color  - [place] appropriate cooling/warming therapies per order  - Administer medications as ordered  - Instruct and encourage patient and family to use good hand hygiene technique  - Identify and instruct in appropriate isolation precautions for identified infection/condition  Outcome: Progressing     Problem: PAIN - ADULT  Goal: Verbalizes/displays adequate comfort level or baseline comfort level  Description: Interventions:  - Encourage patient to monitor pain and request assistance  - Assess pain using appropriate pain scale  - Administer analgesics based on type and severity of pain and evaluate response  - Implement non-pharmacological measures as appropriate and evaluate response  - Consider cultural and social influences on pain and pain management  - Notify physician/advanced practitioner if interventions unsuccessful or patient reports new pain  Outcome: Progressing     Problem: SAFETY ADULT  Goal: Maintain or return to baseline ADL function  Description: INTERVENTIONS:  -  Assess patient's ability to carry out ADLs; assess patient's baseline for ADL function and identify physical deficits which impact ability to perform ADLs (bathing, care of mouth/teeth, toileting, grooming, dressing, etc.)  - Assess/evaluate cause of self-care deficits   - Assess range of motion  - Assess patient's mobility; develop plan if impaired  - Assess patient's need for assistive devices and provide as appropriate  - Encourage maximum independence but intervene and supervise when necessary  - Involve family in performance of ADLs  - Assess for home care  needs following discharge   - Consider OT consult to assist with ADL evaluation and planning for discharge  - Provide patient education as appropriate  Outcome: Progressing     Problem: DISCHARGE PLANNING  Goal: Discharge to home or other facility with appropriate resources  Description: INTERVENTIONS:  - Identify barriers to discharge w/patient and caregiver  - Arrange for needed discharge resources and transportation as appropriate  - Identify discharge learning needs (meds, wound care, etc.)  - Arrange for interpretive services to assist at discharge as needed  - Refer to Case Management Department for coordinating discharge planning if the patient needs post-hospital services based on physician/advanced practitioner order or complex needs related to functional status, cognitive ability, or social support system  Outcome: Progressing

## 2024-07-02 NOTE — PLAN OF CARE
Problem: Prexisting or High Potential for Compromised Skin Integrity  Goal: Skin integrity is maintained or improved  Description: INTERVENTIONS:  - Identify patients at risk for skin breakdown  - Assess and monitor skin integrity  - Assess and monitor nutrition and hydration status  - Monitor labs   - Assess for incontinence   - Turn and reposition patient  - Assist with mobility/ambulation  - Relieve pressure over bony prominences  - Avoid friction and shearing  - Provide appropriate hygiene as needed including keeping skin clean and dry  - Evaluate need for skin moisturizer/barrier cream  - Collaborate with interdisciplinary team   - Patient/family teaching  - Consider wound care consult   Outcome: Progressing     Problem: PAIN - ADULT  Goal: Verbalizes/displays adequate comfort level or baseline comfort level  Description: Interventions:  - Encourage patient to monitor pain and request assistance  - Assess pain using appropriate pain scale  - Administer analgesics based on type and severity of pain and evaluate response  - Implement non-pharmacological measures as appropriate and evaluate response  - Consider cultural and social influences on pain and pain management  - Notify physician/advanced practitioner if interventions unsuccessful or patient reports new pain  Outcome: Progressing     Problem: INFECTION - ADULT  Goal: Absence or prevention of progression during hospitalization  Description: INTERVENTIONS:  - Assess and monitor for signs and symptoms of infection  - Monitor lab/diagnostic results  - Monitor all insertion sites, i.e. indwelling lines, tubes, and drains  - Monitor endotracheal if appropriate and nasal secretions for changes in amount and color  - [place] appropriate cooling/warming therapies per order  - Administer medications as ordered  - Instruct and encourage patient and family to use good hand hygiene technique  - Identify and instruct in appropriate isolation precautions for  identified infection/condition  Outcome: Progressing     Problem: SAFETY ADULT  Goal: Patient will remain free of falls  Description: INTERVENTIONS:  - Educate patient/family on patient safety including physical limitations  - Instruct patient to call for assistance with activity   - Consult OT/PT to assist with strengthening/mobility   - Keep Call bell within reach  - Keep bed low and locked with side rails adjusted as appropriate  - Keep care items and personal belongings within reach  - Initiate and maintain comfort rounds  - Make Fall Risk Sign visible to staff  - Apply yellow socks and bracelet for high fall risk patients  - Consider moving patient to room near nurses station  Outcome: Progressing  Goal: Maintain or return to baseline ADL function  Description: INTERVENTIONS:  -  Assess patient's ability to carry out ADLs; assess patient's baseline for ADL function and identify physical deficits which impact ability to perform ADLs (bathing, care of mouth/teeth, toileting, grooming, dressing, etc.)  - Assess/evaluate cause of self-care deficits   - Assess range of motion  - Assess patient's mobility; develop plan if impaired  - Assess patient's need for assistive devices and provide as appropriate  - Encourage maximum independence but intervene and supervise when necessary  - Involve family in performance of ADLs  - Assess for home care needs following discharge   - Consider OT consult to assist with ADL evaluation and planning for discharge  - Provide patient education as appropriate  Outcome: Progressing  Goal: Maintains/Returns to pre admission functional level  Description: INTERVENTIONS:  - Perform AM-PAC 6 Click Basic Mobility/ Daily Activity assessment daily.  - Set and communicate daily mobility goal to care team and patient/family/caregiver.   - Collaborate with rehabilitation services on mobility goals if consulted  - Out of bed for toileting  - Record patient progress and toleration of activity level    Outcome: Progressing     Problem: DISCHARGE PLANNING  Goal: Discharge to home or other facility with appropriate resources  Description: INTERVENTIONS:  - Identify barriers to discharge w/patient and caregiver  - Arrange for needed discharge resources and transportation as appropriate  - Identify discharge learning needs (meds, wound care, etc.)  - Arrange for interpretive services to assist at discharge as needed  - Refer to Case Management Department for coordinating discharge planning if the patient needs post-hospital services based on physician/advanced practitioner order or complex needs related to functional status, cognitive ability, or social support system  Outcome: Progressing     Problem: Knowledge Deficit  Goal: Patient/family/caregiver demonstrates understanding of disease process, treatment plan, medications, and discharge instructions  Description: Complete learning assessment and assess knowledge base.  Interventions:  - Provide teaching at level of understanding  - Provide teaching via preferred learning methods  Outcome: Progressing     Problem: NEUROSENSORY - ADULT  Goal: Achieves stable or improved neurological status  Description: INTERVENTIONS  - Monitor and report changes in neurological status  - Monitor vital signs such as temperature, blood pressure, glucose, and any other labs ordered   - Initiate measures to prevent increased intracranial pressure  - Monitor for seizure activity and implement precautions if appropriate      Outcome: Progressing  Goal: Remains free of injury related to seizures activity  Description: INTERVENTIONS  - Maintain airway, patient safety  and administer oxygen as ordered  - Monitor patient for seizure activity, document and report duration and description of seizure to physician/advanced practitioner  - If seizure occurs,  ensure patient safety during seizure  - Reorient patient post seizure  - Seizure pads on all 4 side rails  - Instruct patient/family to  notify RN of any seizure activity including if an aura is experienced  - Instruct patient/family to call for assistance with activity based on nursing assessment  - Administer anti-seizure medications if ordered    Outcome: Progressing  Goal: Achieves maximal functionality and self care  Description: INTERVENTIONS  - Monitor swallowing and airway patency with patient fatigue and changes in neurological status  - Encourage and assist patient to increase activity and self care.   - Encourage visually impaired, hearing impaired and aphasic patients to use assistive/communication devices  Outcome: Progressing     Problem: CARDIOVASCULAR - ADULT  Goal: Maintains optimal cardiac output and hemodynamic stability  Description: INTERVENTIONS:  - Monitor I/O, vital signs and rhythm  - Monitor for S/S and trends of decreased cardiac output  - Administer and titrate ordered vasoactive medications to optimize hemodynamic stability  - Assess quality of pulses, skin color and temperature  - Assess for signs of decreased coronary artery perfusion  - Instruct patient to report change in severity of symptoms  Outcome: Progressing  Goal: Absence of cardiac dysrhythmias or at baseline rhythm  Description: INTERVENTIONS:  - Continuous cardiac monitoring, vital signs, obtain 12 lead EKG if ordered  - Administer antiarrhythmic and heart rate control medications as ordered  - Monitor electrolytes and administer replacement therapy as ordered  Outcome: Progressing     Problem: RESPIRATORY - ADULT  Goal: Achieves optimal ventilation and oxygenation  Description: INTERVENTIONS:  - Assess for changes in respiratory status  - Assess for changes in mentation and behavior  - Position to facilitate oxygenation and minimize respiratory effort  - Oxygen administered by appropriate delivery if ordered  - Initiate smoking cessation education as indicated  - Encourage broncho-pulmonary hygiene including cough, deep breathe, Incentive Spirometry  -  Assess the need for suctioning and aspirate as needed  - Assess and instruct to report SOB or any respiratory difficulty  - Respiratory Therapy support as indicated  Outcome: Progressing     Problem: GASTROINTESTINAL - ADULT  Goal: Minimal or absence of nausea and/or vomiting  Description: INTERVENTIONS:  - Administer IV fluids if ordered to ensure adequate hydration  - Maintain NPO status until nausea and vomiting are resolved  - Nasogastric tube if ordered  - Administer ordered antiemetic medications as needed  - Provide nonpharmacologic comfort measures as appropriate  - Advance diet as tolerated, if ordered  - Consider nutrition services referral to assist patient with adequate nutrition and appropriate food choices  Outcome: Progressing  Goal: Maintains or returns to baseline bowel function  Description: INTERVENTIONS:  - Assess bowel function  - Encourage oral fluids to ensure adequate hydration  - Administer IV fluids if ordered to ensure adequate hydration  - Administer ordered medications as needed  - Encourage mobilization and activity  - Consider nutritional services referral to assist patient with adequate nutrition and appropriate food choices  Outcome: Progressing  Goal: Maintains adequate nutritional intake  Description: INTERVENTIONS:  - Monitor percentage of each meal consumed  - Identify factors contributing to decreased intake, treat as appropriate  - Assist with meals as needed  - Monitor I&O, weight, and lab values if indicated  - Obtain nutrition services referral as needed  Outcome: Progressing  Goal: Oral mucous membranes remain intact  Description: INTERVENTIONS  - Assess oral mucosa and hygiene practices  - Implement preventative oral hygiene regimen  - Implement oral medicated treatments as ordered  - Initiate Nutrition services referral as needed  Outcome: Progressing     Problem: GENITOURINARY - ADULT  Goal: Maintains or returns to baseline urinary function  Description:  INTERVENTIONS:  - Assess urinary function  - Encourage oral fluids to ensure adequate hydration if ordered  - Administer IV fluids as ordered to ensure adequate hydration  - Administer ordered medications as needed  - Offer frequent toileting  - Follow urinary retention protocol if ordered  Outcome: Progressing  Goal: Absence of urinary retention  Description: INTERVENTIONS:  - Assess patient’s ability to void and empty bladder  - Monitor I/O  - Bladder scan as needed  - Discuss with physician/AP medications to alleviate retention as needed  - Discuss catheterization for long term situations as appropriate  Outcome: Progressing     Problem: Nutrition/Hydration-ADULT  Goal: Nutrient/Hydration intake appropriate for improving, restoring or maintaining nutritional needs  Description: Monitor and assess patient's nutrition/hydration status for malnutrition. Collaborate with interdisciplinary team and initiate plan and interventions as ordered.  Monitor patient's weight and dietary intake as ordered or per policy. Utilize nutrition screening tool and intervene as necessary. Determine patient's food preferences and provide high-protein, high-caloric foods as appropriate.     INTERVENTIONS:  - Monitor oral intake, urinary output, labs, and treatment plans  - Assess nutrition and hydration status and recommend course of action  - Evaluate amount of meals eaten  - Assist patient with eating if necessary   - Allow adequate time for meals  - Recommend/ encourage appropriate diets, oral nutritional supplements, and vitamin/mineral supplements  - Order, calculate, and assess calorie counts as needed  - Recommend, monitor, and adjust tube feedings and TPN/PPN based on assessed needs  - Assess need for intravenous fluids  - Provide specific nutrition/hydration education as appropriate  - Include patient/family/caregiver in decisions related to nutrition  Outcome: Progressing

## 2024-07-02 NOTE — PROGRESS NOTES
[facility]  Progress Note  Name: Luis Forrester I  MRN: [id number]  [facility] Date of Admission: 6/15/2024   Date of Service: 7/2/2024 I Hospital Day: 17    Assessment & Plan   Dysphagia  Assessment & Plan  - Reported difficulty swallowing pills on 6/28  - VBS done 6/30 > strict NPO, keofed placed for feeding  - Plan for course of decadron and repeat VBS on 7/3     s/p Medtronic dual chamber PPM 6/21/2024  Assessment & Plan  - See SSS plan    SSS (sick sinus syndrome) ([facility])  Assessment & Plan  - Status post PPM on 6/21  - EPS has signed off on 6/22    SDH (subdural hematoma) ([facility])  Assessment & Plan  Continue frequent neurological checks.   STAT CT head with any neurological decline including drop GCS of 2pts within 1 hr.  Seizure prophylaxis per trauma team-completed  Hold all full antiplatelet and anticoagulation medications for 2 weeks  Follow up with Neurosurgery      Hypothyroidism  Assessment & Plan  - Patient with chronic history of hypothyroidism.  - Continue home medication regimen including levothyroxine.  - Outpatient follow-up routine.    * TBI (traumatic brain injury) ([facility])  Assessment & Plan  - Traumatic brain injury with new acute appearing trace subarachnoid hemorrhage in the bilateral parafalcine regions as well as a new appearing trace parafalcine subdural hematoma, present on admission.  - CT scan of the head from 6/15/2024 demonstrated: New acute trace subarachnoid hemorrhage in bilateral parafalcine regions. New acute trace parafalcine subdural hematoma.  -  Additional imaging with CTA of the head and neck 6/15/2024 demonstrated: Negative CTA head traumatic vascular injury, aneurysm, large vessel occlusion, high-grade stenosis, or dissection. Partially imaged ectatic right carotid bifurcation and proximal cervical internal carotid artery with retropharyngeal course, similar to CT neck with contrast 10/12/2011.  - Neurosurgery evaluation and  "recommendations appreciated.  - Hold anti-platelet and anticoagulant medications for least 2 weeks and/or until cleared by Neurosurgery.   - Patient is not on any chronic antiplatelet or anticoagulant medications at baseline.  - Continue Keppra for 7 days for seizure prophylaxis - completed  - Monitor neurologic exam.  - Continue symptomatic management with analgesia as needed.- PT and OT evaluation and treatment as indicated.    Hyponatremia-resolved as of 7/2/2024  Assessment & Plan  - Appreciate nephrology input  - Sodium has corrected, continue to monitor               Bowel Regimen: miralax  VTE Prophylaxis:Enoxaparin (Lovenox)     Disposition: VBS 7/3     Subjective   Chief Complaint: \"I'm fine\"    Subjective: pt has no complaints today     Objective   Vitals:   Temp:  [98.5 °F (36.9 °C)-99.2 °F (37.3 °C)] 98.5 °F (36.9 °C)  HR:  [68-78] 78  Resp:  [18] 18  BP: ()/(56-65) 96/61    I/O         06/30 0701  07/01 0700 07/01 0701  07/02 0700 07/02 0701  07/03 0700    P.O. 0 0 0    I.V. (mL/kg)  2070 (26.2)     NG/ 750     IV Piggyback  100     Feedings 160 640     Total Intake(mL/kg) 570 (7.2) 3560 (45.1) 0 (0)    Urine (mL/kg/hr) 500 (0.3) 3275 (1.7)     Stool 0 0     Total Output 500 3275     Net +70 +285 0           Unmeasured Urine Occurrence 3 x 2 x     Unmeasured Stool Occurrence 4 x 4 x     Unmeasured Emesis Occurrence 0 x               Physical Exam:   GENERAL APPEARANCE: Patient in no acute distress.  HEENT: NCAT; PERRL, EOMs intact; Mucous membranes moist; keofed in place  CV: Regular rate and rhythm; no murmur/gallops/rubs appreciated.  CHEST / LUNGS: Clear to auscultation; no wheezes/rales/rhonci.  ABD: NABS; soft; non-distended; non-tender.  : Voiding  EXT: +2 pulses bilaterally upper & lower extremities; no edema.  NEURO: GCS 15; no focal neurologic deficits; neurovascularly intact.  SKIN: Warm, dry and well perfused; no rash; no jaundice.      Invasive Devices       Peripheral " Intravenous Line  Duration             Peripheral IV 07/02/24 Right Wrist <1 day              Drain  Duration             NG/OG/Enteral Tube Right nare -- days                          Lab Results: Results: I have personally reviewed all pertinent laboratory/tests results  Imaging: I have personally reviewed pertinent reports.     Other Studies: n/a

## 2024-07-02 NOTE — PLAN OF CARE
Problem: OCCUPATIONAL THERAPY ADULT  Goal: Performs self-care activities at highest level of function for planned discharge setting.  See evaluation for individualized goals.  Description: Treatment Interventions: ADL retraining, Functional transfer training, UE strengthening/ROM, Endurance training, Patient/family training, Equipment evaluation/education, Fine motor coordination activities, Activityengagement          See flowsheet documentation for full assessment, interventions and recommendations.   Outcome: Progressing  Note: Limitation: Decreased ADL status, Decreased Safe judgement during ADL, Decreased cognition, Decreased endurance, Decreased self-care trans, Decreased high-level ADLs  Prognosis: Good  Assessment: 52 YO Male SEEN FOR INITIAL OCCUPATIONAL THERAPY REEVAL 2' EXPIRATION OF GOALS AND NEED FOR REASSESSMENT OF PT'S ABILITIES. PROBLEMS LIST INCLUDES TBI, SDH, SSS S/P PPM ON 6/21 AND DYSPHAGIA WITH KEOFED TUBE IN PLACE. PT ALSO S/P DECOMPRESSION SPINE CERVICAL POSTERIOR C2-T1 WITH FUSION ON 6/16/24. SEE INITIAL OT EVAL FOR FURTHER DETAILS. PT CURRENTLY REQUIRES OVERALL MOD A WITH ADLS AND MIN A WITH TRANSFERS / FUNCTIONAL MOBILITY WITH USE OF RW. PT IS LIMITED 2' PAIN, FATIGUE, IMPAIRED BALANCE, FALL RISK , LIMITED SAFETY AWARENESS/INSIGHT/JUDGEMENT, SPINAL PRECAUTIONS, OVERALL WEAKNESS/DECONDITIONING , INACCESSIBLE HOME ENVIRONMENT, and OVERALL LIMITED ACTIVITY TOLERANCE. PT EDUCATED ON SPINAL PRECAUTIONS, DEEP BREATHING TECHNIQUES T/O ACTIVITY, SLOWING OF PACE, ENERGY CONSERVATION TECHNIQUES FOR CARRY OVER UPON D/C, INCREASED FAMILY SUPPORT, and CONTINUE PARTICIPATION IN SELF-CARE/MOBILITY WITH STAFF WHILE IN THE HOSPITAL . The patient's raw score on the AM-PAC Daily Activity Inpatient Short Form is 13. A raw score of less than 19 suggests the patient may benefit from discharge to post-acute rehabilitation services. Please refer to the recommendation of the Occupational Therapist for safe  discharge planning.  FROM AN OCCUPATIONAL THERAPY PERSPECTIVE, CONT TO RECOMMEND LEVEL I RESOURCES. GOAL DATE EXTENDED +20 DAYS. SEE BELOW FOR UPDATED GOALS.     Rehab Resource Intensity Level, OT: I (Maximum Resource Intensity)

## 2024-07-02 NOTE — PLAN OF CARE
Problem: PHYSICAL THERAPY ADULT  Goal: Performs mobility at highest level of function for planned discharge setting.  See evaluation for individualized goals.  Description: Treatment/Interventions: Functional transfer training, LE strengthening/ROM, Elevations, Therapeutic exercise, Endurance training, Cognitive reorientation, Equipment eval/education, Bed mobility, Gait training, Spoke to nursing, OT  Equipment Recommended: Other (Comment) (TBD)       See flowsheet documentation for full assessment, interventions and recommendations.  Note: Prognosis: Good  Problem List: Decreased strength, Decreased range of motion, Decreased endurance, Impaired balance, Decreased mobility, Decreased safety awareness, Orthopedic restrictions  Assessment: Patient really presented to [facility] on 6/15/2024.  Patient was admitted with a primary diagnosis of TBI, subdural hematoma, sick sinus syndrome s/p PPM on 6/21, dysphagia, hypothyroidism.  During this admission patient also underwent DECOMPRESSION SPINE CERVICAL POSTERIOR C2-T1 with fusion navigated with [employer] which was performed on 6/16/2024.  Patient seen for PT reevaluation today due to expiration mobility goals, to assess patient's current functional mobility status, and reestablish PT goals.  Patient was able to perform all bed mobility with min a x 1.  Patient performed all transfers with min a x 1 which is approximately same level assistance required compared to previous session.  Patient continues to require cues for safety during all transfers.  Patient tolerated increased ambulation distances with min a x 1 with use of a rolling walker, but distances continue to be limited by fatigue and weakness.  Patient also required cueing during gait training for safety.  Overall patient continues to present with decreased bilateral lower extremity strength, decreased endurance, gait deviations, decreased access dynamic balance, and will continue to benefit from skilled PT services  during hospital stay.  At conclusion of PT reevaluation patient was assisted back to chair with all needs within reach.  D/C recommendation limiting cleared is rehab  Barriers to Discharge: Inaccessible home environment     Rehab Resource Intensity Level, PT: I (Maximum Resource Intensity)    See flowsheet documentation for full assessment.

## 2024-07-02 NOTE — CASE MANAGEMENT
Case Management Discharge Planning Note    Patient name Luis Forrester  Location [facility]-01 MRN [id number]  : 1970 Date 2024       Current Admission Date: 6/15/2024  Current Admission Diagnosis:TBI (traumatic brain injury) ([facility])   Patient Active Problem List    Diagnosis Date Noted Date Diagnosed    Dysphagia 2024     Abdominal distension 2024     Hyponatremia 2024     SSS (sick sinus syndrome) ([facility]) 2024     s/p Medtronic dual chamber PPM 2024     TBI (traumatic brain injury) ([facility]) 06/15/2024     Syncope and collapse 06/15/2024     Bilateral hand pain 06/15/2024     SDH (subdural hematoma) ([facility]) 06/15/2024     Right hand weakness 06/15/2024     Encounter for immunization 2024     Vitamin D deficiency 2024     Reactive airway disease 03/15/2018     Osteoarthritis of both hands 2015     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     Chronic low back pain 2015     Mass of parotid gland 2015     Lumbar radiculopathy 2015     Hyperlipidemia 2014     Hypothyroidism 2014     Allergic rhinitis 2012       LOS (days): 17  Geometric Mean LOS (GMLOS) (days): 10  Days to GMLOS:-6.9     OBJECTIVE:  Risk of Unplanned Readmission Score: 18.1         Current admission status: Inpatient   Preferred Pharmacy:   [id number] [place] [address]  [place] [address]  Phone: [phone] Fax: [phone]    Primary Care Provider: Joceline Shook PA-C    [place] Insurance[place] [employer]  Secondary Insurance:     DISCHARGE DETAILS:    Plan for pt's VBS tomorrow  Pt remains in agreement for d/c to [facility] when tolerating a definitive diet.

## 2024-07-02 NOTE — PHYSICAL THERAPY NOTE
PHYSICAL THERAPY RE-EVALUATION          Patient Name: Luis Forrester  Today's Date: 7/2/2024 07/02/24 1410   Note Type   Note type Re-Evaluation   Pain Assessment   Pain Assessment Tool 0-10   Pain Score No Pain   Restrictions/Precautions   Weight Bearing Precautions Per Order No   Braces or Orthoses   (none required per neurosx)   Other Precautions Cognitive;Chair Alarm;Bed Alarm;Multiple lines;Fall Risk;Spinal precautions  (Keofed tube)   Home Living   Type of Home Apartment   Home Layout One level;Stairs to enter with rails  (4 WARD)   Home Equipment   (none as per pt)   Additional Comments Pt reports living with 18y/o daughter who is able to assist if needed. Pt also has local significant other who is able to assist   Prior Function   Level of [place] Independent with functional mobility   Lives With Significant other   Receives Help From Family   Falls in the last 6 months 1 to 4   Comments Pt denies the use of an AD for ambulation PTA   General   Family/Caregiver Present No   Cognition   Arousal/Participation Alert   Attention Attends with cues to redirect   Orientation Level Oriented X4   Memory Decreased recall of precautions   Following Commands Follows one step commands without difficulty   RLE Assessment   RLE Assessment WFL   Strength RLE   RLE Overall Strength 4-/5   LLE Assessment   LLE Assessment WFL   Strength LLE   LLE Overall Strength 4-/5   Bed Mobility   Supine to Sit 4  Minimal assistance   Additional items Assist x 1;Increased time required;Verbal cues   Transfers   Sit to Stand 4  Minimal assistance   Additional items Assist x 1;Increased time required;Verbal cues   Stand to Sit 4  Minimal assistance   Additional items Assist x 1;Increased time required;Verbal cues   Ambulation/Elevation   Gait pattern Excessively slow;Short stride;Foward flexed;Inconsistent shay   Gait Assistance 4  Minimal assist    Additional items Assist x 1   Assistive Device Rolling walker   Distance 65ft x 2  (standing rest break)   Balance   Static Sitting Fair   Static Standing Fair -   Ambulatory Poor +   Activity Tolerance   Activity Tolerance Patient tolerated treatment well   Medical Staff Made Aware James OT; Luz SPT; OT present for co re-evaluation due to pts current medical presentation   Nurse Made Aware Pt appropriate to be seen and mobilize per nsg   Assessment   Prognosis Good   Problem List Decreased strength;Decreased range of motion;Decreased endurance;Impaired balance;Decreased mobility;Decreased safety awareness;Orthopedic restrictions   Assessment Patient really presented to [facility] on 6/15/2024.  Patient was admitted with a primary diagnosis of TBI, subdural hematoma, sick sinus syndrome s/p PPM on 6/21, dysphagia, hypothyroidism.  During this admission patient also underwent DECOMPRESSION SPINE CERVICAL POSTERIOR C2-T1 with fusion navigated with Oarm which was performed on 6/16/2024.  Patient seen for PT reevaluation today due to expiration mobility goals, to assess patient's current functional mobility status, and reestablish PT goals.  Patient was able to perform all bed mobility with min a x 1.  Patient performed all transfers with min a x 1 which is approximately same level assistance required compared to previous session.  Patient continues to require cues for safety during all transfers.  Patient tolerated increased ambulation distances with min a x 1 with use of a rolling walker, but distances continue to be limited by fatigue and weakness.  Patient also required cueing during gait training for safety.  Overall patient continues to present with decreased bilateral lower extremity strength, decreased endurance, gait deviations, decreased access dynamic balance, and will continue to benefit from skilled PT services during hospital stay.  At conclusion of PT reevaluation patient was assisted back to chair with  "all needs within reach.  D/C recommendation limiting cleared is rehab   Goals   Patient Goals \"to get better\"   [facility] Expiration Date 07/16/24   Short Term Goal #1 In 14 days pt will complete: 1) Bed mobility skills with mod I to increase safety and independence as well as decrease caregiver burden. 2) Functional transfers with mod I to promote increased independence, safety, and QOL. 3) Ambulate 200' using least restrictive AD with mod I without LOB and stable vitals so that pt can negotiate previous living environment safely and promote independence with functional mobility and return to PLOF. 4) Stair training up/ down 3 step/s using rail/s with mod I so that pt can enter/negotiate previous living environment safely and decrease fall risk. 5) Improve balance grades by 1/2 grade to increase safety with all mobility and decrease fall risk.  6) Improve BLE strength by 1/2 grade to help increase overall functional mobility and decrease fall risk.   Plan   Treatment/Interventions Functional transfer training;LE strengthening/ROM;Elevations;Therapeutic exercise;Endurance training;Equipment eval/education;Patient/family training;Bed mobility;Gait training;Spoke to nursing;OT   PT Frequency 3-5x/wk   Discharge Recommendation   Rehab Resource Intensity Level, PT I (Maximum Resource Intensity)   Equipment Recommended Walker   Walker Package Recommended Wheeled walker   AM-PAC Basic Mobility Inpatient   Turning in Flat Bed Without Bedrails 3   Lying on Back to Sitting on Edge of Flat Bed Without Bedrails 3   Moving Bed to Chair 3   Standing Up From Chair Using Arms 3   Walk in Room 2   Climb 3-5 Stairs With Railing 2   Basic Mobility Inpatient Raw Score 16   Basic Mobility Standardized Score 38.32   [facility] Highest Level Of Mobility   -HLM Goal 5: Stand one or more mins   -HLM Achieved 7: Walk 25 feet or more   Modified Anson Scale   Modified Anson Scale 4   Barthel Index   Feeding 10   Bathing 0   Grooming Score 5 "   Dressing Score 5   Bladder Score 10   Bowels Score 10   Toilet Use Score 5   Transfers (Bed/Chair) Score 10   Mobility (Level Surface) Score 10   Stairs Score 0   Barthel Index Score 65   Portions of the documentation may have been created using voice recognition software.Occasional wrong word or sound alike substitutions may have occurred due to the inherent limitations of the voice recognition software. Read the chart carefully and recognize, using context, where substitutions have occurred.    Luis Roth, PT, DPT

## 2024-07-02 NOTE — TELEPHONE ENCOUNTER
Patient called requesting to speak to Joceline, explained she was with a patient and that I could relay the information to her. He has been in the hospital for 3 weeks, his next surgery is going to be putting in a pacemaker and feeding tube. He wanted to know if she could fill out paperwork for SSD/SSI since he is going to be out of work for a while. He had some questions about the SSD and I told him that Joceline could possibly refer him to the social workers to assist him with this process. He said he would either mail the paperwork to us or bring it in when he gets out of the hospital.

## 2024-07-02 NOTE — ASSESSMENT & PLAN NOTE
Continue frequent neurological checks.   STAT CT head with any neurological decline including drop GCS of 2pts within 1 hr.  Seizure prophylaxis per trauma team-completed  Hold all full antiplatelet and anticoagulation medications for 2 weeks  Follow up with Neurosurgery

## 2024-07-03 ENCOUNTER — APPOINTMENT (INPATIENT)
Dept: RADIOLOGY | Facility: HOSPITAL | Age: 54
DRG: 912 | End: 2024-07-03
Payer: COMMERCIAL

## 2024-07-03 PROBLEM — M48.02 CERVICAL STENOSIS OF SPINAL CANAL: Status: ACTIVE | Noted: 2024-07-03

## 2024-07-03 LAB
ANION GAP SERPL CALCULATED.3IONS-SCNC: 9 MMOL/L (ref 4–13)
BASOPHILS # BLD AUTO: 0.03 THOUSANDS/ÂΜL (ref 0–0.1)
BASOPHILS NFR BLD AUTO: 0 % (ref 0–1)
BUN SERPL-MCNC: 11 MG/DL (ref 5–25)
CALCIUM SERPL-MCNC: 6.7 MG/DL (ref 8.4–10.2)
CHLORIDE SERPL-SCNC: 104 MMOL/L (ref 96–108)
CO2 SERPL-SCNC: 27 MMOL/L (ref 21–32)
CREAT SERPL-MCNC: 0.53 MG/DL (ref 0.6–1.3)
EOSINOPHIL # BLD AUTO: 0 THOUSAND/ÂΜL (ref 0–0.61)
EOSINOPHIL NFR BLD AUTO: 0 % (ref 0–6)
ERYTHROCYTE [DISTWIDTH] IN BLOOD BY AUTOMATED COUNT: 14.2 % (ref 11.6–15.1)
GFR SERPL CREATININE-BSD FRML MDRD: 119 ML/MIN/1.73SQ M
GLUCOSE SERPL-MCNC: 131 MG/DL (ref 65–140)
HCT VFR BLD AUTO: 33.7 % (ref 36.5–49.3)
HGB BLD-MCNC: 11.1 G/DL (ref 12–17)
IMM GRANULOCYTES # BLD AUTO: 0.26 THOUSAND/UL (ref 0–0.2)
IMM GRANULOCYTES NFR BLD AUTO: 2 % (ref 0–2)
LYMPHOCYTES # BLD AUTO: 2.19 THOUSANDS/ÂΜL (ref 0.6–4.47)
LYMPHOCYTES NFR BLD AUTO: 16 % (ref 14–44)
MCH RBC QN AUTO: 31.6 PG (ref 26.8–34.3)
MCHC RBC AUTO-ENTMCNC: 32.9 G/DL (ref 31.4–37.4)
MCV RBC AUTO: 96 FL (ref 82–98)
MONOCYTES # BLD AUTO: 1.39 THOUSAND/ÂΜL (ref 0.17–1.22)
MONOCYTES NFR BLD AUTO: 10 % (ref 4–12)
NEUTROPHILS # BLD AUTO: 9.54 THOUSANDS/ÂΜL (ref 1.85–7.62)
NEUTS SEG NFR BLD AUTO: 72 % (ref 43–75)
NRBC BLD AUTO-RTO: 0 /100 WBCS
PLATELET # BLD AUTO: 243 THOUSANDS/UL (ref 149–390)
PMV BLD AUTO: 10 FL (ref 8.9–12.7)
POTASSIUM SERPL-SCNC: 3.5 MMOL/L (ref 3.5–5.3)
RBC # BLD AUTO: 3.51 MILLION/UL (ref 3.88–5.62)
SODIUM SERPL-SCNC: 140 MMOL/L (ref 135–147)
WBC # BLD AUTO: 13.41 THOUSAND/UL (ref 4.31–10.16)

## 2024-07-03 PROCEDURE — 74230 X-RAY XM SWLNG FUNCJ C+: CPT

## 2024-07-03 PROCEDURE — 85025 COMPLETE CBC W/AUTO DIFF WBC: CPT | Performed by: SURGERY

## 2024-07-03 PROCEDURE — 80048 BASIC METABOLIC PNL TOTAL CA: CPT | Performed by: SURGERY

## 2024-07-03 PROCEDURE — 92611 MOTION FLUOROSCOPY/SWALLOW: CPT

## 2024-07-03 PROCEDURE — 74018 RADEX ABDOMEN 1 VIEW: CPT

## 2024-07-03 PROCEDURE — 99254 IP/OBS CNSLTJ NEW/EST MOD 60: CPT | Performed by: SURGERY

## 2024-07-03 PROCEDURE — 99233 SBSQ HOSP IP/OBS HIGH 50: CPT | Performed by: SURGERY

## 2024-07-03 RX ADMIN — ACETAMINOPHEN 1000 MG: 10 INJECTION INTRAVENOUS at 14:06

## 2024-07-03 RX ADMIN — ACETAMINOPHEN 1000 MG: 10 INJECTION INTRAVENOUS at 21:11

## 2024-07-03 RX ADMIN — GABAPENTIN 100 MG: 250 SOLUTION ORAL at 17:19

## 2024-07-03 RX ADMIN — GABAPENTIN 100 MG: 250 SOLUTION ORAL at 07:43

## 2024-07-03 RX ADMIN — CEPHALEXIN 500 MG: 250 FOR SUSPENSION ORAL at 05:54

## 2024-07-03 RX ADMIN — ACETAMINOPHEN 1000 MG: 10 INJECTION INTRAVENOUS at 05:57

## 2024-07-03 RX ADMIN — GUAIFENESIN AND DEXTROMETHORPHAN 10 ML: 100; 10 SYRUP ORAL at 02:49

## 2024-07-03 RX ADMIN — ENOXAPARIN SODIUM 30 MG: 30 INJECTION SUBCUTANEOUS at 21:05

## 2024-07-03 RX ADMIN — GABAPENTIN 100 MG: 250 SOLUTION ORAL at 21:07

## 2024-07-03 RX ADMIN — METHOCARBAMOL 500 MG: 100 INJECTION INTRAMUSCULAR; INTRAVENOUS at 07:41

## 2024-07-03 RX ADMIN — LEVOTHYROXINE SODIUM 125 MCG: 100 TABLET ORAL at 05:54

## 2024-07-03 RX ADMIN — ENOXAPARIN SODIUM 30 MG: 30 INJECTION SUBCUTANEOUS at 07:43

## 2024-07-03 NOTE — CASE MANAGEMENT
Case Management Discharge Planning Note    Patient name Luis Forrester  Location [facility] 605/[facility] 605-01 MRN [id number]  : 1970 Date 7/3/2024       Current Admission Date: 6/15/2024  Current Admission Diagnosis:TBI (traumatic brain injury) ([facility])   Patient Active Problem List    Diagnosis Date Noted Date Diagnosed    Dysphagia 2024     SSS (sick sinus syndrome) ([facility]) 2024     s/p Medtronic dual chamber PPM 2024     TBI (traumatic brain injury) ([facility]) 06/15/2024     Syncope and collapse 06/15/2024     Bilateral hand pain 06/15/2024     SDH (subdural hematoma) ([facility]) 06/15/2024     Right hand weakness 06/15/2024     Encounter for immunization 2024     Vitamin D deficiency 2024     Reactive airway disease 03/15/2018     Osteoarthritis of both hands 2015     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     Chronic low back pain 2015     Mass of parotid gland 2015     Lumbar radiculopathy 2015     Hyperlipidemia 2014     Hypothyroidism 2014     Allergic rhinitis 2012       LOS (days): 18  Geometric Mean LOS (GMLOS) (days): 10  Days to GMLOS:-8     OBJECTIVE:  Risk of Unplanned Readmission Score: 18.17         Current admission status: Inpatient   Preferred Pharmacy:   [id number] - [place] [address]  Phone: [phone] Fax: [phone]    Primary Care Provider: Joceline Shook PA-C    [place]  Secondary Insurance:     DISCHARGE DETAILS:    CM informed [facility] that the pt would be medically stable today if he does well with his MBS

## 2024-07-03 NOTE — PLAN OF CARE
Problem: Prexisting or High Potential for Compromised Skin Integrity  Goal: Skin integrity is maintained or improved  Description: INTERVENTIONS:  - Identify patients at risk for skin breakdown  - Assess and monitor skin integrity  - Assess and monitor nutrition and hydration status  - Monitor labs   - Assess for incontinence   - Turn and reposition patient  - Assist with mobility/ambulation  - Relieve pressure over bony prominences  - Avoid friction and shearing  - Provide appropriate hygiene as needed including keeping skin clean and dry  - Evaluate need for skin moisturizer/barrier cream  - Collaborate with interdisciplinary team   - Patient/family teaching  - Consider wound care consult   Outcome: Progressing     Problem: PAIN - ADULT  Goal: Verbalizes/displays adequate comfort level or baseline comfort level  Description: Interventions:  - Encourage patient to monitor pain and request assistance  - Assess pain using appropriate pain scale  - Administer analgesics based on type and severity of pain and evaluate response  - Implement non-pharmacological measures as appropriate and evaluate response  - Consider cultural and social influences on pain and pain management  - Notify physician/advanced practitioner if interventions unsuccessful or patient reports new pain  Outcome: Progressing     Problem: INFECTION - ADULT  Goal: Absence or prevention of progression during hospitalization  Description: INTERVENTIONS:  - Assess and monitor for signs and symptoms of infection  - Monitor lab/diagnostic results  - Monitor all insertion sites, i.e. indwelling lines, tubes, and drains  - Monitor endotracheal if appropriate and nasal secretions for changes in amount and color  - [place] appropriate cooling/warming therapies per order  - Administer medications as ordered  - Instruct and encourage patient and family to use good hand hygiene technique  - Identify and instruct in appropriate isolation precautions for  identified infection/condition  Outcome: Progressing

## 2024-07-03 NOTE — QUICK NOTE
Discussed the patient's VBS results thoroughly with him and his family at bedside. I told them that he can not have any foods, drinks, medications, or anything else by mouth because of severe aspiration risk.  I was clear in stating that this will not change the next few days as he has failed two video barium swallow studies. We discussed the options of stopping further medical intervention via hospice or continuing to pursue medical intervention. Patient and his family wish to continue full medical intervention including replacing the Keofed for tube feeds and consultation for G-tube surgical placement. All of their questions and concerns were answered.     Keofed replaced at bedside with Dr. Grigsby  Patient tolerated it well  Position confirmed with X-ray  Secured at 60 cm at the right nare  Stylet removed  Start tube feeds  ACS consult placed

## 2024-07-03 NOTE — ASSESSMENT & PLAN NOTE
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 Most pronounced at C3-4 and C5-C6 with moderate to severe canal stenosis and mild cord compression.  No definite abnormal cord signal but mild cord edema would be difficult to exclude. Severe bilateral foraminal stenosis also seen at C3-4 and C5-C6\"    - Appreciate neurosurgery consult and recommendations  - 6/16/24 Posterior cervical laminectomies C3-7, Bilateral C2, C7 and T1 pedicle screw placement, Bilateral C3, C4, C5  lateral mass screw placement. Arthrodesis C2-T1   - no brace required  - Keflex x 2 weeks for surgical prophylaxis.  - Pain control - APS following - input appreciated  - PT/OT  - DVT ppx  - NSG Signed off, plan for outpatient 2 week and 6 week post op visits  "

## 2024-07-03 NOTE — PROCEDURES
Speech Pathology - Modified Barium Swallow Study    Patient Name: Luis Forrester    Today's Date: 7/3/2024     Problem List  Principal Problem:    TBI (traumatic brain injury) ([facility])  Active Problems:    Hypothyroidism    SDH (subdural hematoma) ([facility])    SSS (sick sinus syndrome) ([facility])    s/p Medtronic dual chamber PPM 6/21/2024    Dysphagia      Past Medical History  Past Medical History:   Diagnosis Date    Arthritis     Chronic cough     Disease of thyroid gland     Hypoparathyroidism ([facility])     continue taking calcium and vitamin D, will check CMP, PTH level and Vitamin D level    Pneumonia of right middle lobe due to Streptococcus pneumoniae ([facility]) 11/30/2018    s/p Medtronic dual chamber PPM 6/21/2024 06/21/2024       Past Surgical History  Past Surgical History:   Procedure Laterality Date    CARDIAC ELECTROPHYSIOLOGY PROCEDURE N/A 6/21/2024    Procedure: Cardiac pacer implant;  Surgeon: Kofi Pettit MD;  Location: [facility];  Service: Cardiology    DECOMPRESSION SPINE CERVICAL POSTERIOR N/A 6/16/2024    Procedure: DECOMPRESSION SPINE CERVICAL POSTERIOR C2-T1 with fusion navigated with Oarm;  Surgeon: Endy Velasquez MD;  Location: [facility];  Service: Neurosurgery    FRACTURE SURGERY      Open Treatment of Each Proximal Finger Phalanx       Assessment Summary:    Pt presents with mild oral and a severe pharyngeal dysphagia. Results are ultimately unchanged from previous VBS on 6/30. The patient has good retrieval of items via tsp and cup. Transfer time is min prolonged and patient continues with some difficulty initiating transfer. Pharyngeal dysphagia characterized by decreased BOT retraction, decreased epiglottic inversion, decreased hyolaryngeal movement and poor pharyngeal peristalsis. The patient presents with significant valleculae and pyriform retention, especially with solids. The patient attempts multiple swallows to clear, but is unsuccessful. Intermittent penetration to the cords  "and aspiration events are observed with all trials. Some aspiration is observed during the swallow, and other aspiration events are posterior from pharyngeal retention. The patient does not have cough response to aspiration. Vocal quality is wet during study. He continues with poor neck ROM and is unable to fully complete chin tuck and other movement strategies. Brief scan of the esophagus does reveal some possible stasis.  Note: Images are available for review in PACS as desired.    Recommendations:   Recommended Diet: NPO. May need to consider more permanent means of nutrition   Recommended Form of Medications:  non oral    Consider referral to: GI for possible PEG   [facility] Dysphagia therapy recommended: continue in acute care as able. Consider repeat VBS in a few weeks    Results Reviewed with: patient, PA, and family   Pt/Family Education: initiated. Pt and caregivers would benefit from/require continued education.    Patient Stated Goal: \"Am I better yet?\"     Dysphagia Goals per SLP:  to be determined     General Information;  Current concerns for dysphagia include recent VBS with aspiration.  MBS was recommended to assess oropharyngeal stage swallowing skills at this time.     Prior MBS: 6/30/24  Summary:  Images are on PACS for review.    Oral stage: Mild impairment, including reduced bolus control and ?mild difficulty initiating bolus transfer (vs hesitation in anticipation of dysphagia).    Pharyngeal stage: Severe impairment, characterized by reduced BOT constriction, reduced PPW constriction, and poor hyolaryngeal movement; this results in minimal inversion of the epiglottis, and reduced opening of the UES, and mod-severe pharyngeal residue, especially in the pyriforms. Pt attempts to clear the residue with multiple swallows, but this results in aspiration from the residue, mostly posterior aspiration. Residue increases with thicker consistencies, with pudding having the most residue. With the first bite of " pudding pt gagged and retropulsed the bolus back into his mouth and expectorated it. There is deep laryngeal penetration with all consistencies assessed today (thin, NTL, HTL, puree), with undercoating of the epiglottis. There was at least mild aspiration of all consistencies; at times aspiration was after the swallow from residue in the pyriforms/pharynx. Response to aspiration was weak coughing. Frequent wet, gurgly vocal quality occurred during the exam; cued cough was briefly effective at clearing but wet quality quickly returned. Pt has limited neck mobility due to recent surgery; he attempted to complete a chin tuck but this was ineffective. Pt is currently at high risk for aspiration of all consistencies. RN reports increasing cough and a fever today.    Per gross esophageal screen: Brief scan shows possible mild retropulsion of the bolus.    Recommendations:  Diet: NPO, consider short term alternate nutrition for now.   Liquids: NPO  Meds: Non oral  Strategies:  Frequent oral care  Upright position  F/u ST tx: Yes  Therapy Prognosis: Fair  Prognosis considerations: age, comorbidities, therapeutic potential  Aspiration Precautions  Reflux Precautions    Oral Mechanism Exam  Not formally assessed. No weakness observed. Patient has natural dentition. He is on RA    Pt was viewed sitting upright in the lateral position. Due to concerns for patient safety / patient refusal, trials provided deviated from the MBSImP Validated Protocol. Pt was given thin liquids via tsp and cup, nectar thick liquids via tsp and pudding.     Initial view observations/comments: Clear view of the upper airway      8-Point Penetration-Aspiration Scale   Thin liquid 8 - Material enters the airway, passes below the vocal folds, and no effort is made to eject     Nectar thick liquid 8 - Material enters the airway, passes below the vocal folds, and no effort is made to eject     Honey thick liquid N/A   Puree (pudding) 8 - Material enters  the airway, passes below the vocal folds, and no effort is made to eject     Solid N/A     Strategies and Efficacy: unable to complete chin tuck due to poor ROM    Aspiration Response and Efficacy:  no cough response. Vocal quality is wet during study     MBS IMP Rating    ORAL Impairment  Compinent 1--Lip Closure  Judged at any point during the swallow.  0 - No labial escape    Component 2--Tongue Control During Bolus Hold  Judged on held liquid boluses only and prior to productive tongue movement.   0 - Cohesive bolus between tongue to palatal seal    Component 3--Bolus Preparation/Mastication  Judged only during presentation of 1/2 shortbread cookie coated in pudding.   N/A    Component 4--Bolus Transport/Lingual Motion  Judged after first productive tongue movement for oral bolus transport.  2 - Slowed tongue motion     Component 5--Oral Residue  Judged after first swallow or after the last swallow of the sequential swallow task.  2 - Residue collection on oral structures   Location   B - Palate and C - Tongue    Component 6--Initiation of Pharyngeal Swallow  Judged at first movement of the brisk superior-anterior hyoid trajectory.  0 - Bolus head at posterior angle of ramus (first hyoid excursion)      PHARYNGEAL Impairment  Component 7--Soft Palate Elevation  Judged during maximum displacement of soft palate.  0 - No bolus between the soft palate (SP)/pharyngeal wall (PW)    Component 8--Laryngeal Elevation  Judged when epiglottis is in its most horizontal position.  2 - Minimal superior movement of thyroid cartilage with minimal approximation of arytenoids to epiglottic petiole    Component 9--Anterior Hyoid Excursion  Judged at height of swallow/maximal anterior hyoid displacement.  1 - Partial anterior movement    Component 10--Epiglottic Movement  Judged at height of swallow/maximal anterior hyoid displacement.  1 - Partial inversion    Component 11--Laryngeal Vestibular Closure  Judged at height of  swallow/maximal anterior hyoid displacement.  1 - Incomplete; narrow column air/contrast in laryngeal vestibule    Component 12--Pharyngeal Stripping Wave  Judged during the full duration of the pharyngeal swallow.  2 - Absent    Component 13--Pharyngeal Contraction  Judged in AP view at rest and throughout maximum movement of structures.  N/A    Component 14--Pharyngoesophageal Segment Opening  Judged during maximum distension of PES and throughout opening and closure.  2 - Minimal distension/minimal duration; marked obstruction of flow    Component 15--Tongue Base (TB) Retraction  Judged during maximum retraction of the tongue base.  3 - Wide column of contrast or air between TB and PW    Component 16--Pharyngeal Residue  Judged after first swallow or after the last swallow of the sequential swallow task.  4 - Minimal to no pharyngeal clearance   Location   B - Valleculae, C - Pharyngeal wall, and E - Pyriform sinuses      ESOPHAGEAL Impairment  Component 17--Esophageal Clearance Upright Position  Judged in AP view during bolus transit through the oral cavity to the LES  1 - Esophageal retention

## 2024-07-03 NOTE — CONSULTS
Consultation - General Surgery   Luis Forrester 53 y.o. male MRN: [id number]  Unit/Bed#: [facility] 605-01 Encounter: [id number]    Assessment & Plan     Assessment:  Luis Forrester is a 53 y.o. male with PMHx of hypothyroidism and hypoparathyroidism who initially presented for syncopal episodes in the setting of new SAH and SDH. S/p posterior cervical laminectomies C3-7, bilateral C2, C7 and T1 pedicle screw placement, bilateral C3, C4, C5 lateral mass screw placement, arthrodesis C2-T1 (6/16/24).     Plan:  -General surgery will follow  -Plan for OR for PEG tube placement scheduling TBD     History of Present Illness     HPI:  Luis Forrester is a 53 y.o. male with PMHx of hypothyroidism and hypoparathyroidism who initially presented for three syncopal episodes over the previous 6 months. CT head performed in the ED revealed new acute appearing trace subarachnoid hemorrhage in the bilateral parafalcine regions as well as a new appearing trace parafalcine subdural hematoma. S/p posterior cervical laminectomies C3-7, bilateral C2, C7 and T1 pedicle screw placement, bilateral C3, C4, C5 lateral mass screw placement, arthrodesis C2-T1 (6/16/24).    Patient reports having difficulty swallowing pills starting 6/28. Patient failed initial VBS on 6/30, was kept full NPO. Patient failed repeat VBS performed on 7/3. Denies abdominal pain, fever, chills, nausea, vomiting, shortness of breath.       Inpatient consult to Acute Care Surgery  Consult performed by: Jailyn Vargas MD  Consult ordered by: Hal Bull PA-C        Review of Systems   Constitutional:  Positive for appetite change. Negative for fever.   HENT:  Positive for sore throat and trouble swallowing.    Respiratory:  Positive for cough.    Gastrointestinal:  Positive for abdominal distention.   All other systems reviewed and are negative.      Historical Information   Past Medical History:   Diagnosis Date    Arthritis     Chronic cough     Disease of  "thyroid gland     Hypoparathyroidism (HCC)     continue taking calcium and vitamin D, will check CMP, PTH level and Vitamin D level    Pneumonia of right middle lobe due to Streptococcus pneumoniae (HCC) 11/30/2018    s/p Medtronic dual chamber PPM 6/21/2024 06/21/2024     Past Surgical History:   Procedure Laterality Date    CARDIAC ELECTROPHYSIOLOGY PROCEDURE N/A 6/21/2024    Procedure: Cardiac pacer implant;  Surgeon: Kofi Pettit MD;  Location: [facility] CARDIAC CATH [facility];  Service: Cardiology    DECOMPRESSION SPINE CERVICAL POSTERIOR N/A 6/16/2024    Procedure: DECOMPRESSION SPINE CERVICAL POSTERIOR C2-T1 with fusion navigated with Oarm;  Surgeon: Endy Velasquez MD;  Location: [facility] MAIN OR;  Service: Neurosurgery    FRACTURE SURGERY      Open Treatment of Each Proximal Finger Phalanx     Social History   Social History     Substance and Sexual Activity   Alcohol Use Yes    Comment: occasionally     Social History     Substance and Sexual Activity   Drug Use No     E-Cigarette/Vaping    E-Cigarette Use Never User      E-Cigarette/Vaping Substances    Nicotine No     THC No     CBD No     Flavoring No     Other No     Unknown No      Social History     Tobacco Use   Smoking Status Former   Smokeless Tobacco Never   Tobacco Comments    pt \"tried it when he was a teenager but did not like them\"       Meds/Allergies   current meds:   Current Facility-Administered Medications   Medication Dose Route Frequency    acetaminophen (Ofirmev) injection 1,000 mg  1,000 mg Intravenous Q8H ASHLYN    albuterol (PROVENTIL HFA,VENTOLIN HFA) inhaler 2 puff  2 puff Inhalation Q4H PRN    benzocaine (HURRICAINE) 20 % mucosal spray 2 spray  2 spray Mucosal 4x Daily PRN    bisacodyl (DULCOLAX) rectal suppository 10 mg  10 mg Rectal Daily PRN    dextromethorphan-guaiFENesin (ROBITUSSIN DM) oral syrup 10 mL  10 mL Oral Q4H PRN    enoxaparin (LOVENOX) subcutaneous injection 30 mg  30 mg Subcutaneous Q12H ASHLYN    gabapentin (NEURONTIN) oral " "solution 100 mg  100 mg Oral TID    HYDROmorphone HCl (DILAUDID) injection 0.2 mg  0.2 mg Intravenous Q2H PRN    levothyroxine (Synthroid) suspension 125 mcg  125 mcg Oral Early Morning    naloxone (NARCAN) 0.04 mg/mL syringe 0.04 mg  0.04 mg Intravenous Q1MIN PRN    ondansetron (ZOFRAN) injection 4 mg  4 mg Intravenous Q4H PRN    oxyCODONE (ROXICODONE) oral solution 2.5 mg  2.5 mg Oral Q4H PRN    Or    oxyCODONE (ROXICODONE) oral solution 5 mg  5 mg Oral Q4H PRN    polyethylene glycol (MIRALAX) packet 17 g  17 g Oral Daily    senna oral syrup 8.8 mg  8.8 mg Per NG Tube HS     Allergies   Allergen Reactions    Chlorine Rash       Objective   First Vitals:   Blood Pressure: 107/57 (06/15/24 0741)  Pulse: 70 (06/15/24 0741)  Temperature: 98.4 °F (36.9 °C) (06/15/24 0741)  Temp Source: Oral (06/15/24 0741)  Respirations: 16 (06/15/24 0741)  Height: 5' 6\" (167.6 cm) (06/15/24 1225)  Weight - Scale: 79 kg (174 lb 3.2 oz) (06/15/24 1225)  SpO2: 98 % (06/15/24 0741)    Current Vitals:   Blood Pressure: 108/63 (07/03/24 1412)  Pulse: 65 (07/03/24 1412)  Temperature: 99.1 °F (37.3 °C) (07/03/24 1412)  Temp Source: Oral (07/03/24 1011)  Respirations: 18 (07/03/24 1412)  Height: 5' 6\" (167.6 cm) (06/17/24 0830)  Weight - Scale: 78.9 kg (174 lb) (06/17/24 0830)  SpO2: 96 % (07/03/24 1412)      Intake/Output Summary (Last 24 hours) at 7/3/2024 1644  Last data filed at 7/3/2024 1300  Gross per 24 hour   Intake 1235 ml   Output 800 ml   Net 435 ml       Invasive Devices       Peripheral Intravenous Line  Duration             Peripheral IV 07/02/24 Right Wrist 1 day              Drain  Duration             NG/OG/Enteral Tube Enteral Feeding Tube Right nare <1 day                    Physical Exam  General: NAD, alert, awake  HEENT: Atraumatic, Keofed in place  Cardiovascular: Normal rate   Respiratory: Not in respiratory distress  Abdominal: Soft, distended, no tenderness to palpation, no guarding or rigidity    Lab Results: CBC: "   Lab Results   Component Value Date    WBC 13.41 (H) 07/03/2024    HGB 11.1 (L) 07/03/2024    HCT 33.7 (L) 07/03/2024    MCV 96 07/03/2024     07/03/2024    RBC 3.51 (L) 07/03/2024    MCH 31.6 07/03/2024    MCHC 32.9 07/03/2024    RDW 14.2 07/03/2024    MPV 10.0 07/03/2024    NRBC 0 07/03/2024   , CMP:   Lab Results   Component Value Date    SODIUM 140 07/03/2024    K 3.5 07/03/2024     07/03/2024    CO2 27 07/03/2024    BUN 11 07/03/2024    CREATININE 0.53 (L) 07/03/2024    CALCIUM 6.7 (L) 07/03/2024    EGFR 119 07/03/2024     Jailyn Vargas MD  Surgery PGY-1

## 2024-07-03 NOTE — TELEPHONE ENCOUNTER
"Patient left vm,   \" I need to get a hold of Joceline. This is Luis Lee [phone] and please return my call as soon as possible. I've been in hospital for over three weeks. I'd like to try to apply for our SB if I could already, so give me a call as soon as you can. [phone]. Thank you. \"  "

## 2024-07-03 NOTE — PROGRESS NOTES
"[facility]  Progress Note  Name: Luis Forrester I  MRN: [id number]  Unit/Bed#: [facility] 605-01 I Date of Admission: 6/15/2024   Date of Service: 7/3/2024 I Hospital Day: 18    Assessment & Plan   Cervical stenosis of spinal canal  Assessment & Plan  MRI C-spine \"congenital canal stenosis with superimposed degenerative spondylosis. Most pronounced at C3-4 and C5-C6 with moderate to severe canal stenosis and mild cord compression.  No definite abnormal cord signal but mild cord edema would be difficult to exclude. Severe bilateral foraminal stenosis also seen at C3-4 and C5-C6\"    - Appreciate neurosurgery consult and recommendations  - 6/16/24 Posterior cervical laminectomies C3-7, Bilateral C2, C7 and T1 pedicle screw placement, Bilateral C3, C4, C5  lateral mass screw placement. Arthrodesis C2-T1   - no brace required  - Keflex x 2 weeks for surgical prophylaxis.  - Pain control - APS following - input appreciated  - PT/OT  - DVT ppx  - NSG Signed off, plan for outpatient 2 week and 6 week post op visits    Dysphagia  Assessment & Plan  - Reported difficulty swallowing pills on 6/28  - VBS done 6/30 > strict NPO, keofed placed for feeding  - Pt failed repeat VBS on 7/3, recommended full NPO  - Goals of care conversation had with patient and family at bedside and he wants all medical interventions including tube feeds. He is aware that by placing another feeding tube, he will be evaluated for PEG tube placement and continue to receive artificial feeds and nothing by mouth.   - Replace keofed and restart tube feeds per nutrition recommendations  - ACS consult placed for evaluation for PEG placement     s/p Medtronic dual chamber PPM 6/21/2024  Assessment & Plan  - See SSS plan    SSS (sick sinus syndrome) ([facility])  Assessment & Plan  - Status post PPM on 6/21  - EPS has signed off on 6/22    SDH (subdural hematoma) ([facility])  Assessment & Plan  Continue frequent neurological checks. "   STAT CT head with any neurological decline including drop GCS of 2pts within 1 hr.  Seizure prophylaxis per trauma team-completed  Hold all full antiplatelet and anticoagulation medications for 2 weeks  Follow up with Neurosurgery      Hypothyroidism  Assessment & Plan  - Patient with chronic history of hypothyroidism.  - Continue home medication regimen including levothyroxine.  - Outpatient follow-up routine.    * TBI (traumatic brain injury) ([facility])  Assessment & Plan  - Traumatic brain injury with new acute appearing trace subarachnoid hemorrhage in the bilateral parafalcine regions as well as a new appearing trace parafalcine subdural hematoma, present on admission.  - CT scan of the head from 6/15/2024 demonstrated: New acute trace subarachnoid hemorrhage in bilateral parafalcine regions. New acute trace parafalcine subdural hematoma.  -  Additional imaging with CTA of the head and neck 6/15/2024 demonstrated: Negative CTA head traumatic vascular injury, aneurysm, large vessel occlusion, high-grade stenosis, or dissection. Partially imaged ectatic right carotid bifurcation and proximal cervical internal carotid artery with retropharyngeal course, similar to CT neck with contrast 10/12/2011.  - Neurosurgery evaluation and recommendations appreciated.  - Hold anti-platelet and anticoagulant medications for least 2 weeks and/or until cleared by Neurosurgery.   - Patient is not on any chronic antiplatelet or anticoagulant medications at baseline.  - Continue Keppra for 7 days for seizure prophylaxis - completed  - Monitor neurologic exam.  - Continue symptomatic management with analgesia as needed.- PT and OT evaluation and treatment as indicated.    Hyponatremia-resolved as of 7/2/2024  Assessment & Plan  - Appreciate nephrology input  - Sodium has corrected, continue to monitor               Bowel Regimen: miralax  VTE Prophylaxis:Enoxaparin (Lovenox)     Disposition: Replace Keofed. ACS consult  "placed    Subjective   Chief Complaint: \"I can't believe it\"    Subjective: Patient is surprised that he is not swallowing well because he does not feel like he is choking when attempting to swallow. However he is anticipating getting another feeding tube and receiving a feeding tube through the stomach.     Objective   Vitals:   Temp:  [97.6 °F (36.4 °C)-98.7 °F (37.1 °C)] 97.6 °F (36.4 °C)  HR:  [61-92] 68  Resp:  [17-18] 18  BP: ()/(57-76) 105/60    I/O         07/01 0701  07/02 0700 07/02 0701  07/03 0700 07/03 0701  07/04 0700    P.O. 0 0     I.V. (mL/kg) 2070 (26.2) 1063.8 (13.5)     NG/ 500     IV Piggyback 100      Feedings 640 560     Total Intake(mL/kg) 3560 (45.1) 2123.8 (26.9)     Urine (mL/kg/hr) 3275 (1.7) 1225 (0.6)     Stool 0 0     Total Output 3275 1225     Net +285 +898.8            Unmeasured Urine Occurrence 2 x      Unmeasured Stool Occurrence 4 x 1 x              Physical Exam:   GENERAL APPEARANCE: Patient in no acute distress.  HEENT: NCAT; PERRL, EOMs intact; Mucous membranes moist  CV: Regular rate and rhythm; no murmur/gallops/rubs appreciated.  CHEST / LUNGS: Clear to auscultation; no wheezes/rales/rhonci.  ABD: NABS; soft; non-distended; non-tender.  : Voiding  EXT: +2 pulses bilaterally upper & lower extremities; no edema.  NEURO: GCS 15; no focal neurologic deficits; neurovascularly intact.  SKIN: Warm, dry and well perfused; no rash; no jaundice.      Invasive Devices       Peripheral Intravenous Line  Duration             Peripheral IV 07/02/24 Right Wrist 1 day                          Lab Results: Results: I have personally reviewed all pertinent laboratory/tests results  Imaging: I have personally reviewed pertinent reports.     Other Studies: n/a       "

## 2024-07-04 ENCOUNTER — APPOINTMENT (INPATIENT)
Dept: RADIOLOGY | Facility: HOSPITAL | Age: 54
DRG: 912 | End: 2024-07-04
Payer: COMMERCIAL

## 2024-07-04 LAB
ANION GAP SERPL CALCULATED.3IONS-SCNC: 11 MMOL/L (ref 4–13)
BASOPHILS # BLD AUTO: 0.03 THOUSANDS/ÂΜL (ref 0–0.1)
BASOPHILS NFR BLD AUTO: 0 % (ref 0–1)
BUN SERPL-MCNC: 11 MG/DL (ref 5–25)
CALCIUM SERPL-MCNC: 6.4 MG/DL (ref 8.4–10.2)
CHLORIDE SERPL-SCNC: 103 MMOL/L (ref 96–108)
CO2 SERPL-SCNC: 25 MMOL/L (ref 21–32)
CREAT SERPL-MCNC: 0.5 MG/DL (ref 0.6–1.3)
EOSINOPHIL # BLD AUTO: 0.13 THOUSAND/ÂΜL (ref 0–0.61)
EOSINOPHIL NFR BLD AUTO: 1 % (ref 0–6)
ERYTHROCYTE [DISTWIDTH] IN BLOOD BY AUTOMATED COUNT: 14.2 % (ref 11.6–15.1)
GFR SERPL CREATININE-BSD FRML MDRD: 121 ML/MIN/1.73SQ M
GLUCOSE SERPL-MCNC: 91 MG/DL (ref 65–140)
HCT VFR BLD AUTO: 34.6 % (ref 36.5–49.3)
HGB BLD-MCNC: 11.7 G/DL (ref 12–17)
IMM GRANULOCYTES # BLD AUTO: 0.18 THOUSAND/UL (ref 0–0.2)
IMM GRANULOCYTES NFR BLD AUTO: 2 % (ref 0–2)
LYMPHOCYTES # BLD AUTO: 2.16 THOUSANDS/ÂΜL (ref 0.6–4.47)
LYMPHOCYTES NFR BLD AUTO: 18 % (ref 14–44)
MCH RBC QN AUTO: 32.1 PG (ref 26.8–34.3)
MCHC RBC AUTO-ENTMCNC: 33.8 G/DL (ref 31.4–37.4)
MCV RBC AUTO: 95 FL (ref 82–98)
MONOCYTES # BLD AUTO: 1.14 THOUSAND/ÂΜL (ref 0.17–1.22)
MONOCYTES NFR BLD AUTO: 9 % (ref 4–12)
NEUTROPHILS # BLD AUTO: 8.51 THOUSANDS/ÂΜL (ref 1.85–7.62)
NEUTS SEG NFR BLD AUTO: 70 % (ref 43–75)
NRBC BLD AUTO-RTO: 0 /100 WBCS
PLATELET # BLD AUTO: 243 THOUSANDS/UL (ref 149–390)
PMV BLD AUTO: 9.4 FL (ref 8.9–12.7)
POTASSIUM SERPL-SCNC: 3.5 MMOL/L (ref 3.5–5.3)
RBC # BLD AUTO: 3.64 MILLION/UL (ref 3.88–5.62)
SODIUM SERPL-SCNC: 139 MMOL/L (ref 135–147)
WBC # BLD AUTO: 12.15 THOUSAND/UL (ref 4.31–10.16)

## 2024-07-04 PROCEDURE — 80048 BASIC METABOLIC PNL TOTAL CA: CPT | Performed by: SURGERY

## 2024-07-04 PROCEDURE — 71045 X-RAY EXAM CHEST 1 VIEW: CPT

## 2024-07-04 PROCEDURE — 85025 COMPLETE CBC W/AUTO DIFF WBC: CPT | Performed by: SURGERY

## 2024-07-04 PROCEDURE — 99232 SBSQ HOSP IP/OBS MODERATE 35: CPT | Performed by: SURGERY

## 2024-07-04 PROCEDURE — 99024 POSTOP FOLLOW-UP VISIT: CPT | Performed by: STUDENT IN AN ORGANIZED HEALTH CARE EDUCATION/TRAINING PROGRAM

## 2024-07-04 RX ADMIN — ENOXAPARIN SODIUM 30 MG: 30 INJECTION SUBCUTANEOUS at 08:29

## 2024-07-04 RX ADMIN — GABAPENTIN 100 MG: 250 SOLUTION ORAL at 15:18

## 2024-07-04 RX ADMIN — LEVOTHYROXINE SODIUM 125 MCG: 100 TABLET ORAL at 05:49

## 2024-07-04 RX ADMIN — GABAPENTIN 100 MG: 250 SOLUTION ORAL at 08:29

## 2024-07-04 RX ADMIN — ACETAMINOPHEN 1000 MG: 10 INJECTION INTRAVENOUS at 22:43

## 2024-07-04 RX ADMIN — ACETAMINOPHEN 1000 MG: 10 INJECTION INTRAVENOUS at 05:01

## 2024-07-04 RX ADMIN — ACETAMINOPHEN 1000 MG: 10 INJECTION INTRAVENOUS at 15:00

## 2024-07-04 RX ADMIN — GABAPENTIN 100 MG: 250 SOLUTION ORAL at 22:43

## 2024-07-04 RX ADMIN — ENOXAPARIN SODIUM 30 MG: 30 INJECTION SUBCUTANEOUS at 22:43

## 2024-07-04 NOTE — PROGRESS NOTES
"[facility]  Progress Note  Name: Luis Forrester I  MRN: [id number]  Unit/Bed#: [facility] 605-01 I Date of Admission: 6/15/2024   Date of Service: 7/4/2024 I Hospital Day: 19    Assessment & Plan   Cervical stenosis of spinal canal  Assessment & Plan  MRI C-spine \"congenital canal stenosis with superimposed degenerative spondylosis. Most pronounced at C3-4 and C5-C6 with moderate to severe canal stenosis and mild cord compression.  No definite abnormal cord signal but mild cord edema would be difficult to exclude. Severe bilateral foraminal stenosis also seen at C3-4 and C5-C6\"    - Appreciate neurosurgery consult and recommendations  - 6/16/24 Posterior cervical laminectomies C3-7, Bilateral C2, C7 and T1 pedicle screw placement, Bilateral C3, C4, C5  lateral mass screw placement. Arthrodesis C2-T1   - no brace required  - Keflex x 2 weeks for surgical prophylaxis.  - Pain control - APS following - input appreciated  - PT/OT  - DVT ppx  - NSG Signed off, plan for outpatient 2 week and 6 week post op visits    Dysphagia  Assessment & Plan  - Reported difficulty swallowing pills on 6/28  - VBS done 6/30 > strict NPO, keofed placed for feeding  - Pt failed repeat VBS on 7/3, recommended full NPO  - Goals of care conversation had with patient and family at bedside and he wants all medical interventions including tube feeds. He is aware that by placing another feeding tube, he will be evaluated for PEG tube placement and continue to receive artificial feeds and nothing by mouth.   - Replace keofed and restart tube feeds per nutrition recommendations  - ACS consult placed for evaluation for PEG placement     s/p Medtronic dual chamber PPM 6/21/2024  Assessment & Plan  - See SSS plan    SSS (sick sinus syndrome) ([facility])  Assessment & Plan  - Status post PPM on 6/21  - EPS has signed off on 6/22    SDH (subdural hematoma) ([facility])  Assessment & Plan  Continue frequent neurological checks. "   STAT CT head with any neurological decline including drop GCS of 2pts within 1 hr.  Seizure prophylaxis per trauma team-completed  Hold all full antiplatelet and anticoagulation medications for 2 weeks  Follow up with Neurosurgery      Hypothyroidism  Assessment & Plan  - Patient with chronic history of hypothyroidism.  - Continue home medication regimen including levothyroxine.  - Outpatient follow-up routine.    * TBI (traumatic brain injury) ([facility])  Assessment & Plan  - Traumatic brain injury with new acute appearing trace subarachnoid hemorrhage in the bilateral parafalcine regions as well as a new appearing trace parafalcine subdural hematoma, present on admission.  - CT scan of the head from 6/15/2024 demonstrated: New acute trace subarachnoid hemorrhage in bilateral parafalcine regions. New acute trace parafalcine subdural hematoma.  -  Additional imaging with CTA of the head and neck 6/15/2024 demonstrated: Negative CTA head traumatic vascular injury, aneurysm, large vessel occlusion, high-grade stenosis, or dissection. Partially imaged ectatic right carotid bifurcation and proximal cervical internal carotid artery with retropharyngeal course, similar to CT neck with contrast 10/12/2011.  - Neurosurgery evaluation and recommendations appreciated.  - Hold anti-platelet and anticoagulant medications for least 2 weeks and/or until cleared by Neurosurgery.   - Patient is not on any chronic antiplatelet or anticoagulant medications at baseline.  - Continue Keppra for 7 days for seizure prophylaxis - completed  - Monitor neurologic exam.  - Continue symptomatic management with analgesia as needed.- PT and OT evaluation and treatment as indicated.             Bowel Regimen: senna miralax  VTE Prophylaxis:Enoxaparin (Lovenox)     Disposition:  Patient is recommended for ARC pending PEG placement    Subjective   Chief Complaint: Patient does not endorse any new complaints    Subjective: Patient states he was just  hospitalized for cervical spine surgery. Patient did not initially remember falling and TBI. However in discussing further patient remembered falling. Was unclear about PEG tube. Discussed PEG tube and surgery consult.      Objective   Vitals:   Temp:  [97.9 °F (36.6 °C)-99.5 °F (37.5 °C)] 99.5 °F (37.5 °C)  HR:  [63-68] 66  Resp:  [16-18] 18  BP: (102-115)/(62-72) 115/72    I/O         07/02 0701  07/03 0700 07/03 0701 07/04 0700 07/04 0701 07/05 0700    P.O. 0 0     I.V. (mL/kg) 1063.8 (13.5)      NG/  1810    IV Piggyback       Feedings     Total Intake(mL/kg) 2123.8 (26.9) 175 (2.2) 4485 (56.8)    Urine (mL/kg/hr) 1225 (0.6) 300 (0.2)     Stool 0 0     Total Output 1225 300     Net +898.8 -125 +4485           Unmeasured Urine Occurrence  2 x     Unmeasured Stool Occurrence 1 x 2 x              Physical Exam:   GENERAL APPEARANCE: NAD appears comfortable lying in bed  NEURO: Intact GCS of 14-15, alert and oriented x 3  HEENT: PERRL  CV: Regular rate and rhythm no murmur, gallop, rub appreciated  LUNGS: Bilateral rhonchi somewhat clears with cough but not completely  GI: Abdomen soft nontender nondistended positive bowel sounds x 4  : Voiding  MSK: Moves all extremities  SKIN: Intact skin    Invasive Devices       Peripheral Intravenous Line  Duration             Peripheral IV 07/02/24 Right Wrist 2 days              Drain  Duration             NG/OG/Enteral Tube Enteral Feeding Tube Right nare <1 day                          Lab Results: Results: I have personally reviewed all pertinent laboratory/tests results, BMP/CMP:   Lab Results   Component Value Date    SODIUM 139 07/04/2024    K 3.5 07/04/2024     07/04/2024    CO2 25 07/04/2024    BUN 11 07/04/2024    CREATININE 0.50 (L) 07/04/2024    CALCIUM 6.4 (L) 07/04/2024    EGFR 121 07/04/2024   , and CBC:   Lab Results   Component Value Date    WBC 12.15 (H) 07/04/2024    HGB 11.7 (L) 07/04/2024    HCT 34.6 (L) 07/04/2024    MCV 95  07/04/2024     07/04/2024    RBC 3.64 (L) 07/04/2024    MCH 32.1 07/04/2024    MCHC 33.8 07/04/2024    RDW 14.2 07/04/2024    MPV 9.4 07/04/2024    NRBC 0 07/04/2024     Imaging: I have personally reviewed pertinent reports.   none  Other Studies: CXR shows feeding tube in stomach and no acute pulmonary or cardiac disease

## 2024-07-04 NOTE — PROGRESS NOTES
08:00 TF on hold until STAT x-ray completed to confirm placement. Audile air injection heard in stomach. Trauma team at bedside, concerned tube may have moved. Pt denies any pain, bed locked, call bell within reach. AM labs sent. See flowsheet for details    11:25 Pt ambulating to bathroom with RN, assist x 1 with walker. Denies any pain. TF infusing as ordered, strict NPO. Loose stools remain frequent. Mobilized to chair by RN. Chair locked/alarm on, call bell within reach, vss.    12:23 patient alarmed, attempting to get out of chair without staff. Bed alarm now on. Patient back in bed, was out of bed for 45 minutes. Refusing to move back to chair today. Will chart accordingly

## 2024-07-04 NOTE — ASSESSMENT & PLAN NOTE
- Reported difficulty swallowing pills on 6/28  - VBS done 6/30 > strict NPO, keofed placed for feeding  - Pt failed repeat VBS on 7/3, recommended full NPO  - Goals of care conversation had with patient and family at bedside and he wants all medical interventions including tube feeds. He is aware that by placing another feeding tube, he will be evaluated for PEG tube placement and continue to receive artificial feeds and nothing by mouth.   - Replace keofed and restart tube feeds per nutrition recommendations  - ACS consult placed for evaluation for PEG placement

## 2024-07-04 NOTE — PROGRESS NOTES
[facility]  Progress Note  Name: Luis Forrester I  MRN: [id number]  [facility] I Date of Admission: 6/15/2024   Date of Service: 7/4/2024 I Hospital Day: 19    Assessment & Plan   Cervical stenosis of spinal canal  Assessment & Plan  POD18 PCDF C2-T1  (Dr. Lopez, 6/16/2024)  Patient describes query syncopal episode and falls  Found to have new right hand weakness and pain with bilateral Mohit's and right-sided wrist weakness on presentation    Imaging:  MRI cervical spine without 6/15/2024:Congenital canal stenosis with superimposed degenerative spondylosis .Most pronounced at C3-4 and C5-C6 with moderate to severe canal stenosis and mild cord compression. Evaluation of cord signal is limited due to artifact. No definite abnormal cord signal but mild cord edema would be difficult to exclude. Severe bilateral foraminal stenosis also seen at C3-4 and C5-C6.Mild to moderate canal stenosis at other levels  Postop cervical Xrays 6/20/2024 -expected postoperative appearance of posterior fusion C2-T1.    Plan:  Continue to monitor neurological exam. Improving hand strength and coordination.  Incision healing well. Sutures removed without complication. Patient tolerated well  Dressing placed which can be removed in 24H  Medical management and pain control per primary team  Mobilize with physical and Occupational Therapy - no collar required  Planning for dc to [facility] when medically stable   DVT prophylaxis: Bilateral SCDs and lovenox    Neurosurgery will sign off.  Plan for outpatient follow-up for 6-week postoperative visit with Dr. Lopez with repeat cervical spine Xrays (8/2/2024). Call with any further questions or concerns.    Dysphagia  Assessment & Plan  Failed VBS x 2  Planning for PEG tube placement 7/5/2024    SSS (sick sinus syndrome) ([facility])  Assessment & Plan  S/p PPM 6/21/24    SDH (subdural hematoma) ([facility])  Assessment & Plan  Parafalcine SDH with bilateral  "SAH  Patient presented after being found down by his daughter, patient is unsure what happened other than he got up to go to the bathroom.  Patient denies any blood thinner use.    Imaging:  CT head without 6/15/24:New acute trace subarachnoid hemorrhage in bilateral parafalcine regions. Recommend dedicated CTA head with contrast for further evaluation.New acute trace parafalcine subdural hematoma.  Repeat CT head wo 6/15/2024: Trace subarachnoid hemorrhage within the interhemispheric fissure decreased.  No parenchymal subdural hematoma.  CT head without 6/18/2024:Near completely resolved parafalcine subarachnoid hemorrhage with trace residual subarachnoid hemorrhage in the frontoparietal regions medially. No mass effect or hydrocephalus.No large territorial infarction.    Plan:  Continue frequent neurological checks.   STAT CT head with any neurological decline including drop GCS of 2pts within 1 hr.  Seizure prophylaxis per trauma team  Hold all full antiplatelet and anticoagulation medications for 2 weeks  Medical management and pain control per primary team  Mobilize with PT/OT  DVT PPX: SCDs. Lovenox    Patient can follow-up as needed for this problem       Subjective/Objective   Chief Complaint: \" Do not ask\"/2-week postoperative visit    Subjective: Patient is doing well from a cervical standpoint.  He denies any significant neck pain.  The discomfort on the dorsum of his hands bilaterally is improving and focal to his small location.  He endorses improvement in the strength and coordination of his hands with better manipulation of his cell phone.  Unfortunately, patient has unable to pass swallowing studies and is planned for PEG tomorrow.    Objective: Lying in bed.  NAD.    I/O         07/02 0701  07/03 0700 07/03 0701  07/04 0700 07/04 0701  07/05 0700    P.O. 0 0     I.V. (mL/kg) 1063.8 (13.5)      NG/  1810    IV Piggyback       Feedings     Total Intake(mL/kg) 2123.8 (26.9) 175 (2.2) " "4485 (56.8)    Urine (mL/kg/hr) 1225 (0.6) 300 (0.2)     Stool 0 0     Total Output 1225 300     Net +898.8 -125 +4485           Unmeasured Urine Occurrence  2 x     Unmeasured Stool Occurrence 1 x 2 x             Invasive Devices       Peripheral Intravenous Line  Duration             Peripheral IV 07/02/24 Right Wrist 2 days              Drain  Duration             NG/OG/Enteral Tube Enteral Feeding Tube Right nare <1 day                    Physical Exam:  Vitals: Blood pressure 115/72, pulse 66, temperature 99.5 °F (37.5 °C), resp. rate 18, height 5' 6\" (1.676 m), weight 78.9 kg (174 lb), SpO2 96%.,Body mass index is 28.08 kg/m².    General appearance: alert, appears stated age, cooperative and no distress  Head: Normocephalic, without obvious abnormality  Eyes: Tracks appropriately.  Neck: Incision healing well.  See photo below.  Lungs: non labored breathing  Heart: regular heart rate  Neurologic:   Mental status: Alert, oriented, thought content appropriate  Cranial nerves: grossly intact (Cranial nerves II-XII)  Sensory: normal to crude touch x 4  Motor: moving all extremities except  R4+, L4, IO 4/5  Reflexes: b/l santoyo    Lab Results:  Results from last 7 days   Lab Units 07/04/24  0808 07/03/24  0455 07/01/24  0524   WBC Thousand/uL 12.15* 13.41* 11.10*   HEMOGLOBIN g/dL 11.7* 11.1* 11.7*   HEMATOCRIT % 34.6* 33.7* 34.9*   PLATELETS Thousands/uL 243 243 193   SEGS PCT % 70 72 87*   MONO PCT % 9 10 3*   EOS PCT % 1 0 0     Results from last 7 days   Lab Units 07/04/24  0808 07/03/24  0455 07/02/24  0627   SODIUM mmol/L 139 140 139   POTASSIUM mmol/L 3.5 3.5 3.2*   CHLORIDE mmol/L 103 104 103   CO2 mmol/L 25 27 26   BUN mg/dL 11 11 11   CREATININE mg/dL 0.50* 0.53* 0.50*   CALCIUM mg/dL 6.4* 6.7* 6.1*                 No results found for: \"TROPONINT\"  ABG:No results found for: \"PHART\", \"NIT1SIT\", \"PO2ART\", \"LBO9FJJ\", \"U2QCFPQT\", \"BEART\", \"SOURCE\"    Imaging Studies: I have personally reviewed pertinent " reports.   and I have personally reviewed pertinent films in PACS    XR spine cervical 2 or 3 vw injury    Result Date: 6/21/2024  Impression: No acute osseous abnormality. Expected postoperative appearance of posterior fusion of C2-T1. Workstation performed: [id number]     CT head wo contrast    Result Date: 6/18/2024  Impression: 1. Near completely resolved parafalcine subarachnoid hemorrhage with trace residual subarachnoid hemorrhage in the frontoparietal regions medially. No mass effect or hydrocephalus. 2. No large territorial infarction. Workstation performed: [id number]     XR chest portable ICU    Result Date: 6/18/2024  Impression: No acute cardiopulmonary disease. Right IJ line terminates in the SVC. Workstation performed: [id number]     XR spine cervical 2 or 3 vw injury    Result Date: 6/17/2024  Impression: Fluoroscopy provided for procedure guidance. Please refer to the separate procedure note for additional details. Workstation performed: [id number]     CT head wo contrast    Result Date: 6/16/2024  Impression: Trace subarachnoid hemorrhage within the interhemispheric fissure is slightly decreased in conspicuity No parenchymal or subdural hematoma. Workstation performed: [id number]     XR chest portable ICU    Result Date: 6/16/2024  Impression: No acute cardiopulmonary disease. No central line. No pneumothorax. Workstation performed: [id number]     MRI cervical spine wo contrast    Result Date: 6/15/2024  Impression: Congenital canal stenosis with superimposed degenerative spondylosis . Most pronounced at C3-4 and C5-C6 with moderate to severe canal stenosis and mild cord compression. Evaluation of cord signal is limited due to artifact. No definite abnormal cord signal but mild cord edema would be difficult to exclude Severe bilateral foraminal stenosis also seen at C3-4 and C5-C6. Mild to moderate canal stenosis at other levels Workstation performed: [id number]     CT spine cervical without  contrast    Result Date: 6/15/2024  Impression: No cervical spine fracture or traumatic malalignment. Posterior osteophyte disc complexes C3-C4 and C5-C6 contributes to at least moderate canal stenosis and severe bilateral foraminal narrowing at these levels. Consider nonemergent follow-up MRI cervical spine without contrast for further evaluation. Ectatic right carotid bifurcation and proximal cervical internal carotid artery with retropharyngeal course, similar to CT neck with contrast 10/12/2011. Please see same day CTA head with contrast, CT head without contrast, CT thoracic spine without contrast for further evaluation. The study was marked in EPIC for immediate notification. Workstation performed: [id number]     CTA head w wo contrast    Result Date: 6/15/2024  Impression: Negative CTA head traumatic vascular injury, aneurysm, large vessel occlusion, high-grade stenosis, or dissection. Partially imaged ectatic right carotid bifurcation and proximal cervical internal carotid artery with retropharyngeal course, similar to CT neck with contrast 10/12/2011. Additional chronic/incidental findings as detailed above. Please see earlier same day CT head without contrast and concurrent CT cervical spine without contrast. Workstation performed: [id number]     XR hand 3+ views RIGHT    Result Date: 6/15/2024  Impression: 1. No acute osseous abnormality. 2. Degenerative changes as described. Resident: ALLYSON AWAN I, the attending radiologist, have reviewed the images and agree with the final report above. Workstation performed: [id number]     XR hand 3+ views LEFT    Result Date: 6/15/2024  Impression: No acute osseous abnormality. Degenerative changes as described. Resident: ALLYSON AWAN I, the attending radiologist, have reviewed the images and agree with the final report above. Workstation performed: [id number]     CT spine thoracic without contrast    Result Date: 6/15/2024  Impression: No acute osseous  abnormality of thoracic spine. Diffuse idiopathic skeletal hyperostosis (DISH). I personally discussed this study with Tarvis Powell on 6/15/2024 10:08 AM. Workstation performed: [id number]     CT head wo contrast    Result Date: 6/15/2024  Impression: New acute trace subarachnoid hemorrhage in bilateral parafalcine regions. Recommend dedicated CTA head with contrast for further evaluation. New acute trace parafalcine subdural hematoma. I personally discussed this study with Tarvis Powell on 6/15/2024 10:08 AM. Workstation performed: [id number]     VTE Pharmacologic Prophylaxis: Enoxaparin (Lovenox)    VTE Mechanical Prophylaxis: sequential compression device

## 2024-07-04 NOTE — ASSESSMENT & PLAN NOTE
POD18 PCDF C2-T1  (Dr. Lopez, 6/16/2024)  Patient describes query syncopal episode and falls  Found to have new right hand weakness and pain with bilateral Mohit's and right-sided wrist weakness on presentation    Imaging:  MRI cervical spine without 6/15/2024:Congenital canal stenosis with superimposed degenerative spondylosis .Most pronounced at C3-4 and C5-C6 with moderate to severe canal stenosis and mild cord compression. Evaluation of cord signal is limited due to artifact. No definite abnormal cord signal but mild cord edema would be difficult to exclude. Severe bilateral foraminal stenosis also seen at C3-4 and C5-C6.Mild to moderate canal stenosis at other levels  Postop cervical Xrays 6/20/2024 -expected postoperative appearance of posterior fusion C2-T1.    Plan:  Continue to monitor neurological exam. Improving hand strength and coordination.  Incision healing well. Sutures removed without complication. Patient tolerated well  Dressing placed which can be removed in 24H  Medical management and pain control per primary team  Mobilize with physical and Occupational Therapy - no collar required  Planning for dc to [facility] when medically stable   DVT prophylaxis: Bilateral SCDs and lovenox    Neurosurgery will sign off.  Plan for outpatient follow-up for 6-week postoperative visit with Dr. Lopez with repeat cervical spine Xrays (8/2/2024). Call with any further questions or concerns.

## 2024-07-05 ENCOUNTER — APPOINTMENT (INPATIENT)
Dept: RADIOLOGY | Facility: HOSPITAL | Age: 54
DRG: 912 | End: 2024-07-05
Payer: COMMERCIAL

## 2024-07-05 LAB
ANION GAP SERPL CALCULATED.3IONS-SCNC: 11 MMOL/L (ref 4–13)
BASOPHILS # BLD AUTO: 0.03 THOUSANDS/ÂΜL (ref 0–0.1)
BASOPHILS NFR BLD AUTO: 0 % (ref 0–1)
BUN SERPL-MCNC: 11 MG/DL (ref 5–25)
CALCIUM SERPL-MCNC: 7 MG/DL (ref 8.4–10.2)
CHLORIDE SERPL-SCNC: 101 MMOL/L (ref 96–108)
CO2 SERPL-SCNC: 27 MMOL/L (ref 21–32)
CREAT SERPL-MCNC: 0.53 MG/DL (ref 0.6–1.3)
EOSINOPHIL # BLD AUTO: 0.23 THOUSAND/ÂΜL (ref 0–0.61)
EOSINOPHIL NFR BLD AUTO: 2 % (ref 0–6)
ERYTHROCYTE [DISTWIDTH] IN BLOOD BY AUTOMATED COUNT: 14.3 % (ref 11.6–15.1)
GFR SERPL CREATININE-BSD FRML MDRD: 119 ML/MIN/1.73SQ M
GLUCOSE SERPL-MCNC: 88 MG/DL (ref 65–140)
HCT VFR BLD AUTO: 35.7 % (ref 36.5–49.3)
HGB BLD-MCNC: 11.9 G/DL (ref 12–17)
IMM GRANULOCYTES # BLD AUTO: 0.14 THOUSAND/UL (ref 0–0.2)
IMM GRANULOCYTES NFR BLD AUTO: 1 % (ref 0–2)
LYMPHOCYTES # BLD AUTO: 1.97 THOUSANDS/ÂΜL (ref 0.6–4.47)
LYMPHOCYTES NFR BLD AUTO: 19 % (ref 14–44)
MCH RBC QN AUTO: 31.7 PG (ref 26.8–34.3)
MCHC RBC AUTO-ENTMCNC: 33.3 G/DL (ref 31.4–37.4)
MCV RBC AUTO: 95 FL (ref 82–98)
MONOCYTES # BLD AUTO: 0.91 THOUSAND/ÂΜL (ref 0.17–1.22)
MONOCYTES NFR BLD AUTO: 9 % (ref 4–12)
NEUTROPHILS # BLD AUTO: 7.3 THOUSANDS/ÂΜL (ref 1.85–7.62)
NEUTS SEG NFR BLD AUTO: 69 % (ref 43–75)
NRBC BLD AUTO-RTO: 0 /100 WBCS
PLATELET # BLD AUTO: 250 THOUSANDS/UL (ref 149–390)
PMV BLD AUTO: 9.9 FL (ref 8.9–12.7)
POTASSIUM SERPL-SCNC: 3.6 MMOL/L (ref 3.5–5.3)
RBC # BLD AUTO: 3.75 MILLION/UL (ref 3.88–5.62)
SODIUM SERPL-SCNC: 139 MMOL/L (ref 135–147)
WBC # BLD AUTO: 10.58 THOUSAND/UL (ref 4.31–10.16)

## 2024-07-05 PROCEDURE — 74018 RADEX ABDOMEN 1 VIEW: CPT

## 2024-07-05 PROCEDURE — 99233 SBSQ HOSP IP/OBS HIGH 50: CPT | Performed by: STUDENT IN AN ORGANIZED HEALTH CARE EDUCATION/TRAINING PROGRAM

## 2024-07-05 PROCEDURE — 99232 SBSQ HOSP IP/OBS MODERATE 35: CPT | Performed by: SURGERY

## 2024-07-05 PROCEDURE — 99255 IP/OBS CONSLTJ NEW/EST HI 80: CPT | Performed by: FAMILY MEDICINE

## 2024-07-05 PROCEDURE — 85025 COMPLETE CBC W/AUTO DIFF WBC: CPT

## 2024-07-05 PROCEDURE — 80048 BASIC METABOLIC PNL TOTAL CA: CPT

## 2024-07-05 RX ORDER — LANOLIN ALCOHOL/MO/W.PET/CERES
3 CREAM (GRAM) TOPICAL
Status: DISCONTINUED | OUTPATIENT
Start: 2024-07-05 | End: 2024-07-12 | Stop reason: HOSPADM

## 2024-07-05 RX ADMIN — GUAIFENESIN AND DEXTROMETHORPHAN 10 ML: 100; 10 SYRUP ORAL at 19:19

## 2024-07-05 RX ADMIN — ACETAMINOPHEN 1000 MG: 10 INJECTION INTRAVENOUS at 21:37

## 2024-07-05 RX ADMIN — GABAPENTIN 100 MG: 250 SOLUTION ORAL at 17:45

## 2024-07-05 RX ADMIN — ENOXAPARIN SODIUM 30 MG: 30 INJECTION SUBCUTANEOUS at 09:45

## 2024-07-05 RX ADMIN — Medication 3 MG: at 21:36

## 2024-07-05 RX ADMIN — GUAIFENESIN AND DEXTROMETHORPHAN 10 ML: 100; 10 SYRUP ORAL at 13:58

## 2024-07-05 RX ADMIN — ENOXAPARIN SODIUM 30 MG: 30 INJECTION SUBCUTANEOUS at 21:36

## 2024-07-05 RX ADMIN — ACETAMINOPHEN 1000 MG: 10 INJECTION INTRAVENOUS at 13:57

## 2024-07-05 RX ADMIN — ACETAMINOPHEN 1000 MG: 10 INJECTION INTRAVENOUS at 05:03

## 2024-07-05 RX ADMIN — GABAPENTIN 100 MG: 250 SOLUTION ORAL at 21:37

## 2024-07-05 NOTE — NURSING NOTE
At 2300 found patient with Keofed tube in wrong location. Tape was off the patient and tube was at 38. Jevity was then turned off and trauma provider was made aware  Chest xray was done and Keofed was in wrong position.    At 0221 provider asked for it to be advanced 15cm, which would be 53cm in total. Xray was order to confirm location before use, but trauma resident said to keep tube feed off due to OR visit today.

## 2024-07-05 NOTE — PROGRESS NOTES
"Progress Note - General Surgery  Luis Forrester 53 y.o. male MRN: [id number]  Unit/Bed#: [facility] 605-01 Encounter: [id number]    Assessment:  54 yo male that gen surg consulted for peg placement, patient no longer wants peg    Afebrile.VSS.    Plan:  Continue keofed per primary team  Patient refusing procedure  Please contact general surgery if there is change in plan  Case for today was cancelled    Subjective/Objective     Subjective:   Informed that patient does not want surgery. When asked why, he states he \"wants to heal\". When asked to clarify what that means, he cannot given a particular reason. OR case cancelled today because of patient refusal. Wont give reason, continues to defer to his girlfriend.    Objective:     Blood pressure 107/66, pulse 69, temperature 98.2 °F (36.8 °C), resp. rate 18, height 5' 6\" (1.676 m), weight 78.9 kg (174 lb), SpO2 94%.,Body mass index is 28.08 kg/m².    No intake or output data in the 24 hours ending 07/05/24 1000    Invasive Devices       Peripheral Intravenous Line  Duration             Peripheral IV 07/02/24 Right Wrist 3 days              Drain  Duration             NG/OG/Enteral Tube Enteral Feeding Tube Right nare 1 day                    General: NAD  HENT: NCAT MMM, keofed in place  Neck: supple, no JVD  CV: nl rate  Lungs: nl wob. No resp distress  ABD: Soft, nontender, nondistended  Extrem: No CCE  Neuro: AAOx3       Scheduled Meds:  Current Facility-Administered Medications   Medication Dose Route Frequency Provider Last Rate    acetaminophen  1,000 mg Intravenous Q8H [facility] Rosalie Blackburn PA-C 1,000 mg (07/05/24 2574)    albuterol  2 puff Inhalation Q4H PRN Gala Yu MD      benzocaine  2 spray Mucosal 4x Daily PRN Leonidas Ramirez MD      bisacodyl  10 mg Rectal Daily PRN Rosalie Blackburn PA-C      dextromethorphan-guaiFENesin  10 mL Oral Q4H PRN Catarino Cheung MD      enoxaparin  30 mg Subcutaneous Q12H [facility] Gala Yu MD      " gabapentin  100 mg Oral TID Rosalie Blackburn PA-C      HYDROmorphone  0.2 mg Intravenous Q2H PRN Gala Yu MD      levothyroxine  125 mcg Oral Early Morning Rosalie Blackburn PA-C      naloxone  0.04 mg Intravenous Q1MIN PRN Gala Yu MD      ondansetron  4 mg Intravenous Q4H PRN Gala Yu MD      oxyCODONE  2.5 mg Oral Q4H PRN Rosalie Blackburn PA-C      Or    oxyCODONE  5 mg Oral Q4H PRN Rosalie Blackburn PA-C      polyethylene glycol  17 g Oral Daily Gala Yu MD      senna  8.8 mg Per NG Tube HS Rosalie Blackburn PA-C       Continuous Infusions:   PRN Meds:.  albuterol    benzocaine    bisacodyl    dextromethorphan-guaiFENesin    HYDROmorphone    naloxone    ondansetron    oxyCODONE **OR** oxyCODONE    Labs:  CBC - WBC/Hgb/Hct/Plts: 10.58*/11.9*/35.7*/250 (07/05 0510)  CHEM - BUN/Cr/glu/ALT/AST/amyl/lip:11/0.53*/--/--/--/--/-- (07/05 0510)     LYTES - Na/K/Cl/CO2: 139/3.6/101/27 (07/05 0510)    Lab, Imaging and other studies:I have personally reviewed pertinent lab results.    VTE Pharmacologic Prophylaxis: Enoxaparin (Lovenox)  VTE Mechanical Prophylaxis: sequential compression device      Dexter Velazco MD  7/5/2024 10:00 AM

## 2024-07-05 NOTE — [facility] ADMISSION
[facility] is continuing to follow for PEG placement, initiation of tube feeds and tolerance of same and updated PT and OT notes with full ADL.

## 2024-07-05 NOTE — PLAN OF CARE
Problem: Prexisting or High Potential for Compromised Skin Integrity  Goal: Skin integrity is maintained or improved  Description: INTERVENTIONS:  - Identify patients at risk for skin breakdown  - Assess and monitor skin integrity  - Assess and monitor nutrition and hydration status  - Monitor labs   - Assess for incontinence   - Turn and reposition patient  - Assist with mobility/ambulation  - Relieve pressure over bony prominences  - Avoid friction and shearing  - Provide appropriate hygiene as needed including keeping skin clean and dry  - Evaluate need for skin moisturizer/barrier cream  - Collaborate with interdisciplinary team   - Patient/family teaching  - Consider wound care consult   Outcome: Progressing     Problem: PAIN - ADULT  Goal: Verbalizes/displays adequate comfort level or baseline comfort level  Description: Interventions:  - Encourage patient to monitor pain and request assistance  - Assess pain using appropriate pain scale  - Administer analgesics based on type and severity of pain and evaluate response  - Implement non-pharmacological measures as appropriate and evaluate response  - Consider cultural and social influences on pain and pain management  - Notify physician/advanced practitioner if interventions unsuccessful or patient reports new pain  Outcome: Progressing     Problem: INFECTION - ADULT  Goal: Absence or prevention of progression during hospitalization  Description: INTERVENTIONS:  - Assess and monitor for signs and symptoms of infection  - Monitor lab/diagnostic results  - Monitor all insertion sites, i.e. indwelling lines, tubes, and drains  - Monitor endotracheal if appropriate and nasal secretions for changes in amount and color  - [place] appropriate cooling/warming therapies per order  - Administer medications as ordered  - Instruct and encourage patient and family to use good hand hygiene technique  - Identify and instruct in appropriate isolation precautions for  identified infection/condition  Outcome: Progressing     Problem: SAFETY ADULT  Goal: Patient will remain free of falls  Description: INTERVENTIONS:  - Educate patient/family on patient safety including physical limitations  - Instruct patient to call for assistance with activity   - Consult OT/PT to assist with strengthening/mobility   - Keep Call bell within reach  - Keep bed low and locked with side rails adjusted as appropriate  - Keep care items and personal belongings within reach  - Initiate and maintain comfort rounds  - Make Fall Risk Sign visible to staff  - Apply yellow socks and bracelet for high fall risk patients  - Consider moving patient to room near nurses station  Outcome: Progressing  Goal: Maintain or return to baseline ADL function  Description: INTERVENTIONS:  -  Assess patient's ability to carry out ADLs; assess patient's baseline for ADL function and identify physical deficits which impact ability to perform ADLs (bathing, care of mouth/teeth, toileting, grooming, dressing, etc.)  - Assess/evaluate cause of self-care deficits   - Assess range of motion  - Assess patient's mobility; develop plan if impaired  - Assess patient's need for assistive devices and provide as appropriate  - Encourage maximum independence but intervene and supervise when necessary  - Involve family in performance of ADLs  - Assess for home care needs following discharge   - Consider OT consult to assist with ADL evaluation and planning for discharge  - Provide patient education as appropriate  Outcome: Progressing  Goal: Maintains/Returns to pre admission functional level  Description: INTERVENTIONS:  - Perform AM-PAC 6 Click Basic Mobility/ Daily Activity assessment daily.  - Set and communicate daily mobility goal to care team and patient/family/caregiver.   - Collaborate with rehabilitation services on mobility goals if consulted  - Out of bed for toileting  - Record patient progress and toleration of activity level    Outcome: Progressing     Problem: DISCHARGE PLANNING  Goal: Discharge to home or other facility with appropriate resources  Description: INTERVENTIONS:  - Identify barriers to discharge w/patient and caregiver  - Arrange for needed discharge resources and transportation as appropriate  - Identify discharge learning needs (meds, wound care, etc.)  - Arrange for interpretive services to assist at discharge as needed  - Refer to Case Management Department for coordinating discharge planning if the patient needs post-hospital services based on physician/advanced practitioner order or complex needs related to functional status, cognitive ability, or social support system  Outcome: Progressing     Problem: Knowledge Deficit  Goal: Patient/family/caregiver demonstrates understanding of disease process, treatment plan, medications, and discharge instructions  Description: Complete learning assessment and assess knowledge base.  Interventions:  - Provide teaching at level of understanding  - Provide teaching via preferred learning methods  Outcome: Progressing     Problem: NEUROSENSORY - ADULT  Goal: Achieves stable or improved neurological status  Description: INTERVENTIONS  - Monitor and report changes in neurological status  - Monitor vital signs such as temperature, blood pressure, glucose, and any other labs ordered   - Initiate measures to prevent increased intracranial pressure  - Monitor for seizure activity and implement precautions if appropriate      Outcome: Progressing  Goal: Remains free of injury related to seizures activity  Description: INTERVENTIONS  - Maintain airway, patient safety  and administer oxygen as ordered  - Monitor patient for seizure activity, document and report duration and description of seizure to physician/advanced practitioner  - If seizure occurs,  ensure patient safety during seizure  - Reorient patient post seizure  - Seizure pads on all 4 side rails  - Instruct patient/family to  notify RN of any seizure activity including if an aura is experienced  - Instruct patient/family to call for assistance with activity based on nursing assessment  - Administer anti-seizure medications if ordered    Outcome: Progressing  Goal: Achieves maximal functionality and self care  Description: INTERVENTIONS  - Monitor swallowing and airway patency with patient fatigue and changes in neurological status  - Encourage and assist patient to increase activity and self care.   - Encourage visually impaired, hearing impaired and aphasic patients to use assistive/communication devices  Outcome: Progressing     Problem: CARDIOVASCULAR - ADULT  Goal: Maintains optimal cardiac output and hemodynamic stability  Description: INTERVENTIONS:  - Monitor I/O, vital signs and rhythm  - Monitor for S/S and trends of decreased cardiac output  - Administer and titrate ordered vasoactive medications to optimize hemodynamic stability  - Assess quality of pulses, skin color and temperature  - Assess for signs of decreased coronary artery perfusion  - Instruct patient to report change in severity of symptoms  Outcome: Progressing  Goal: Absence of cardiac dysrhythmias or at baseline rhythm  Description: INTERVENTIONS:  - Continuous cardiac monitoring, vital signs, obtain 12 lead EKG if ordered  - Administer antiarrhythmic and heart rate control medications as ordered  - Monitor electrolytes and administer replacement therapy as ordered  Outcome: Progressing     Problem: RESPIRATORY - ADULT  Goal: Achieves optimal ventilation and oxygenation  Description: INTERVENTIONS:  - Assess for changes in respiratory status  - Assess for changes in mentation and behavior  - Position to facilitate oxygenation and minimize respiratory effort  - Oxygen administered by appropriate delivery if ordered  - Initiate smoking cessation education as indicated  - Encourage broncho-pulmonary hygiene including cough, deep breathe, Incentive Spirometry  -  Assess the need for suctioning and aspirate as needed  - Assess and instruct to report SOB or any respiratory difficulty  - Respiratory Therapy support as indicated  Outcome: Progressing     Problem: GASTROINTESTINAL - ADULT  Goal: Minimal or absence of nausea and/or vomiting  Description: INTERVENTIONS:  - Administer IV fluids if ordered to ensure adequate hydration  - Maintain NPO status until nausea and vomiting are resolved  - Nasogastric tube if ordered  - Administer ordered antiemetic medications as needed  - Provide nonpharmacologic comfort measures as appropriate  - Advance diet as tolerated, if ordered  - Consider nutrition services referral to assist patient with adequate nutrition and appropriate food choices  Outcome: Progressing  Goal: Maintains or returns to baseline bowel function  Description: INTERVENTIONS:  - Assess bowel function  - Encourage oral fluids to ensure adequate hydration  - Administer IV fluids if ordered to ensure adequate hydration  - Administer ordered medications as needed  - Encourage mobilization and activity  - Consider nutritional services referral to assist patient with adequate nutrition and appropriate food choices  Outcome: Progressing  Goal: Maintains adequate nutritional intake  Description: INTERVENTIONS:  - Monitor percentage of each meal consumed  - Identify factors contributing to decreased intake, treat as appropriate  - Assist with meals as needed  - Monitor I&O, weight, and lab values if indicated  - Obtain nutrition services referral as needed  Outcome: Progressing  Goal: Oral mucous membranes remain intact  Description: INTERVENTIONS  - Assess oral mucosa and hygiene practices  - Implement preventative oral hygiene regimen  - Implement oral medicated treatments as ordered  - Initiate Nutrition services referral as needed  Outcome: Progressing     Problem: GENITOURINARY - ADULT  Goal: Maintains or returns to baseline urinary function  Description:  INTERVENTIONS:  - Assess urinary function  - Encourage oral fluids to ensure adequate hydration if ordered  - Administer IV fluids as ordered to ensure adequate hydration  - Administer ordered medications as needed  - Offer frequent toileting  - Follow urinary retention protocol if ordered  Outcome: Progressing  Goal: Absence of urinary retention  Description: INTERVENTIONS:  - Assess patient’s ability to void and empty bladder  - Monitor I/O  - Bladder scan as needed  - Discuss with physician/AP medications to alleviate retention as needed  - Discuss catheterization for long term situations as appropriate  Outcome: Progressing     Problem: Nutrition/Hydration-ADULT  Goal: Nutrient/Hydration intake appropriate for improving, restoring or maintaining nutritional needs  Description: Monitor and assess patient's nutrition/hydration status for malnutrition. Collaborate with interdisciplinary team and initiate plan and interventions as ordered.  Monitor patient's weight and dietary intake as ordered or per policy. Utilize nutrition screening tool and intervene as necessary. Determine patient's food preferences and provide high-protein, high-caloric foods as appropriate.     INTERVENTIONS:  - Monitor oral intake, urinary output, labs, and treatment plans  - Assess nutrition and hydration status and recommend course of action  - Evaluate amount of meals eaten  - Assist patient with eating if necessary   - Allow adequate time for meals  - Recommend/ encourage appropriate diets, oral nutritional supplements, and vitamin/mineral supplements  - Order, calculate, and assess calorie counts as needed  - Recommend, monitor, and adjust tube feedings and TPN/PPN based on assessed needs  - Assess need for intravenous fluids  - Provide specific nutrition/hydration education as appropriate  - Include patient/family/caregiver in decisions related to nutrition  Outcome: Progressing

## 2024-07-05 NOTE — CONSULTS
Consultation - Palliative and Supportive Care   Luis Forrester 53 y.o. male [id number]    Patient Active Problem List   Diagnosis    Allergic rhinitis    Arterial ectasia (HCC)    Chronic low back pain    Hyperlipidemia    Hypothyroidism    Lumbar radiculopathy    Mass of parotid gland    Osteoarthritis of both hands    Reactive airway disease    Vitamin D deficiency    Encounter for immunization    TBI (traumatic brain injury) ([facility])    Syncope and collapse    Bilateral hand pain    SDH (subdural hematoma) (HCC)    Right hand weakness    SSS (sick sinus syndrome) ([facility])    s/p Medtronic dual chamber PPM 6/21/2024    Dysphagia    Cervical stenosis of spinal canal     Active issues specifically addressed today include:   - protein calorie malnutrition  - dysphagia  - frail elder     Plan:  1. Symptom management - defer to primary team    2. Goals - full treatment, no limits   - Pt with limited insight, but supported well by family.   - Family and pt agree NOT to place PEG at this moment; request a trial of supportive NG feeds thru weekend, and a family mtg Mon / Tues to discuss.    = Additional family contacts are added to list to receive updates, and participate in meeting: sister, mother, JUVE, daughter.     Code Status: FULL - Level 1   Decisional apparatus:  Patient competence deferred on my exam today.  If competence is lost, patient's substitute decision maker would default to daughter by [place] 169.   Advance Directive / Living Will / POLST:  none on file    Henri Dacosta MD  Palliative and Supportive Care  Clinic/Answering Service: [phone]  You can find me on [employer] Secure Chat!        I have reviewed the patient's controlled substance dispensing history in the Prescription Drug Monitoring Program in compliance with the [place] regulations before prescribing any controlled substances.         We appreciate the invitation to be involved in this patient's care.  We will return Monday 7/8, and assist with  planning and holding a family meeting..  Please do not hesitate to reach our on call provider through our clinic answering service at [phone] should you have acute symptom control concerns.    Henri Dacosta MD  Palliative and Supportive Care  Clinic/Answering Service: [phone]  You can find me on [employer] Secure Chat!       IDENTIFICATION:  Inpatient consult to Palliative Care  Consult performed by: Henri Dacosta MD  Consult ordered by: Hal Bull PA-C        Physician Requesting Consult: Papa Diamond,*  Reason for Consult / Principal Problem: goals  Hx and PE limited by: pt somnolnece    NARRATIVE AND INTERVAL HISTORY:       Luis Forrester is a 53 y.o. male who presents with fall on 6/15/24.  His stay has been cmplicated by the sequellae of the fall, including a large SDH that is now asso with dysphagia and failure on multiple swallow studies.  He has been cleared to get out of a C Collar for an evolving canal stenosis on top of congenital disease.  We are consulted to help support a descision for or against full cares, given family's reluctance to use a PEG upon rec of the primary team.        On my visit this AM, there are no family at bedside, and the pt is not appropriately responsive.  Nursing reports and charts are reviewed.  Bedside rounding performed with RN.   Later, a phone call is placed to his preferred family contact, Rebecca.      Rebecca is JUVE and a nurse, and can accurately identify the overall picture that has slowly evolved for her brother in law.  Importantly, he was always a strong, able person who never made any advance plans nor gave indications about his health cares; other family members were always the sicker ones.  This entire scenario is very surprising, and so his nearest family member -- his daughter -- has been overwhelmed as well with guidance of decisions.    Rebecca feels that the family is likely united in a full cares / rehab plan, but that the use of  "a 'permanent' indwelling device like a PEG feels like a very important step to take.  They would prefer to use limited invasive cares, such as NG, to feed him thru weekend, and re-evaluate on Monday or Tuesday when the family have had more time to consider implications, complications, options, and goals.      Rebecca is able to identify that prolonged NG feeds are not a long-term soln, and even beyond 10 days, an NG is likely to lead to nasal injury, sinusitis, and aspiration.  She feels another 3 days on a tube that was just placed 2 days ago is not unreasonable.    Past Medical History:   Diagnosis Date    Arthritis     Chronic cough     Disease of thyroid gland     Hypoparathyroidism (HCC)     continue taking calcium and vitamin D, will check CMP, PTH level and Vitamin D level    Pneumonia of right middle lobe due to Streptococcus pneumoniae (HCC) 11/30/2018    s/p Medtronic dual chamber PPM 6/21/2024 06/21/2024     Past Surgical History:   Procedure Laterality Date    CARDIAC ELECTROPHYSIOLOGY PROCEDURE N/A 6/21/2024    Procedure: Cardiac pacer implant;  Surgeon: Kofi Pettit MD;  Location: [facility];  Service: Cardiology    DECOMPRESSION SPINE CERVICAL POSTERIOR N/A 6/16/2024    Procedure: DECOMPRESSION SPINE CERVICAL POSTERIOR C2-T1 with fusion navigated with Oarm;  Surgeon: Endy Velasquez MD;  Location: [facility];  Service: Neurosurgery    FRACTURE SURGERY      Open Treatment of Each Proximal Finger Phalanx     Social History     Socioeconomic History    Marital status:      Spouse name: Not on file    Number of children: 1    Years of education: Not on file    Highest education level: Not on file   Occupational History    Occupation:    Tobacco Use    Smoking status: Former    Smokeless tobacco: Never    Tobacco comments:     pt \"tried it when he was a teenager but did not like them\"   Vaping Use    Vaping status: Never Used   Substance and Sexual Activity    Alcohol use: " Yes     Comment: occasionally    Drug use: No    Sexual activity: Not Currently   Other Topics Concern    Not on file   Social History Narrative    Not on file     Social Determinants of Health     Financial Resource Strain: Low Risk  (2/23/2024)    Overall Financial Resource Strain (CARDIA)     Difficulty of Paying Living Expenses: Not hard at all   Food Insecurity: No Food Insecurity (6/17/2024)    Hunger Vital Sign     Worried About Running Out of Food in the Last Year: Never true     Ran Out of Food in the Last Year: Never true   Transportation Needs: No Transportation Needs (6/17/2024)    PRAPARE - Transportation     Lack of Transportation (Medical): No     Lack of Transportation (Non-Medical): No   Physical Activity: Not on file   Stress: Not on file   Social Connections: Not on file   Intimate Partner Violence: Not on file   Housing Stability: Low Risk  (6/17/2024)    Housing Stability Vital Sign     Unable to Pay for Housing in the Last Year: No     Number of Times Moved in the Last Year: 0     Homeless in the Last Year: No     Family History   Problem Relation Age of Onset    No Known Problems Mother     No Known Problems Father     No Known Problems Sister     No Known Problems Brother     No Known Problems Son     Learning disabilities Daughter     Asthma Daughter     Seizures Daughter     No Known Problems Maternal Grandmother     No Known Problems Maternal Grandfather     No Known Problems Paternal Grandmother     No Known Problems Paternal Grandfather     No Known Problems Maternal Aunt     No Known Problems Maternal Uncle     No Known Problems Paternal Aunt     No Known Problems Paternal Uncle     No Known Problems Cousin        MEDICATIONS / ALLERGIES:    all current active meds have been reviewed and current meds:   Current Facility-Administered Medications   Medication Dose Route Frequency    acetaminophen (Ofirmev) injection 1,000 mg  1,000 mg Intravenous Q8H ASHLYN    albuterol (PROVENTIL  HFA,VENTOLIN HFA) inhaler 2 puff  2 puff Inhalation Q4H PRN    benzocaine (HURRICAINE) 20 % mucosal spray 2 spray  2 spray Mucosal 4x Daily PRN    bisacodyl (DULCOLAX) rectal suppository 10 mg  10 mg Rectal Daily PRN    dextromethorphan-guaiFENesin (ROBITUSSIN DM) oral syrup 10 mL  10 mL Oral Q4H PRN    enoxaparin (LOVENOX) subcutaneous injection 30 mg  30 mg Subcutaneous Q12H ASHLYN    gabapentin (NEURONTIN) oral solution 100 mg  100 mg Oral TID    HYDROmorphone HCl (DILAUDID) injection 0.2 mg  0.2 mg Intravenous Q2H PRN    levothyroxine (Synthroid) suspension 125 mcg  125 mcg Oral Early Morning    naloxone (NARCAN) 0.04 mg/mL syringe 0.04 mg  0.04 mg Intravenous Q1MIN PRN    ondansetron (ZOFRAN) injection 4 mg  4 mg Intravenous Q4H PRN    oxyCODONE (ROXICODONE) oral solution 2.5 mg  2.5 mg Oral Q4H PRN    Or    oxyCODONE (ROXICODONE) oral solution 5 mg  5 mg Oral Q4H PRN    polyethylene glycol (MIRALAX) packet 17 g  17 g Oral Daily    senna oral syrup 8.8 mg  8.8 mg Per NG Tube HS       Allergies   Allergen Reactions    Chlorine Rash       OBJECTIVE:    Physical Exam  Physical Exam  Constitutional:       General: He is not in acute distress.     Appearance: He is not diaphoretic.      Comments: Frail   HENT:      Head: Normocephalic and atraumatic.      Right Ear: External ear normal.      Left Ear: External ear normal.   Eyes:      General:         Right eye: No discharge.         Left eye: No discharge.      Conjunctiva/sclera: Conjunctivae normal.      Pupils: Pupils are equal, round, and reactive to light.   Neck:      Trachea: No tracheal deviation.   Cardiovascular:      Rate and Rhythm: Regular rhythm. Tachycardia present.   Pulmonary:      Effort: Pulmonary effort is normal. No respiratory distress.      Breath sounds: No stridor.   Abdominal:      Palpations: Abdomen is soft.      Comments: scaphoid   Skin:     General: Skin is warm and dry.      Findings: No erythema or rash.   Neurological:      Comments:  "Deferred; pt appears very somnolent at this moment         Lab Results: I have personally reviewed pertinent labs., CBC:   Lab Results   Component Value Date    WBC 10.58 (H) 07/05/2024    HGB 11.9 (L) 07/05/2024    HCT 35.7 (L) 07/05/2024    MCV 95 07/05/2024     07/05/2024    RBC 3.75 (L) 07/05/2024    MCH 31.7 07/05/2024    MCHC 33.3 07/05/2024    RDW 14.3 07/05/2024    MPV 9.9 07/05/2024    NRBC 0 07/05/2024   , CMP:   Lab Results   Component Value Date    SODIUM 139 07/05/2024    K 3.6 07/05/2024     07/05/2024    CO2 27 07/05/2024    BUN 11 07/05/2024    CREATININE 0.53 (L) 07/05/2024    CALCIUM 7.0 (L) 07/05/2024    EGFR 119 07/05/2024   , BMP:  Lab Results   Component Value Date    SODIUM 139 07/05/2024    K 3.6 07/05/2024     07/05/2024    CO2 27 07/05/2024    BUN 11 07/05/2024    CREATININE 0.53 (L) 07/05/2024    GLUC 88 07/05/2024    CALCIUM 7.0 (L) 07/05/2024    AGAP 11 07/05/2024    EGFR 119 07/05/2024   , PT/PTT:No results found for: \"PT\", \"PTT\"  GFR appropriate for opioids of all types, if needed  Imaging Studies: reviewed reports  EKG, Pathology, and Other Studies: reviewed reports    I have spent a total time of 40+ minutes in caring for this patient on the day of the visit/encounter including chart review; symptom pursuit; supportive listening; anticipatory guidance. review and assessment of decisional apparatus and capacity, applicable legal codes and extant documents;     "

## 2024-07-05 NOTE — PROGRESS NOTES
"[facility]  Progress Note  Name: Luis Forrester I  MRN: [id number]  Unit/Bed#: [facility] 605-01 I Date of Admission: 6/15/2024   Date of Service: 7/5/2024 I Hospital Day: 20    Assessment & Plan   Cervical stenosis of spinal canal  Assessment & Plan  MRI C-spine \"congenital canal stenosis with superimposed degenerative spondylosis. Most pronounced at C3-4 and C5-C6 with moderate to severe canal stenosis and mild cord compression.  No definite abnormal cord signal but mild cord edema would be difficult to exclude. Severe bilateral foraminal stenosis also seen at C3-4 and C5-C6\"    - Appreciate neurosurgery consult and recommendations  - 6/16/24 Posterior cervical laminectomies C3-7, Bilateral C2, C7 and T1 pedicle screw placement, Bilateral C3, C4, C5  lateral mass screw placement. Arthrodesis C2-T1   - no brace required  - Keflex x 2 weeks for surgical prophylaxis.  - Pain control - APS following - input appreciated  - PT/OT  - DVT ppx  - NSG Signed off, plan for outpatient 2 week and 6 week post op visits    Dysphagia  Assessment & Plan  - Reported difficulty swallowing pills on 6/28  - VBS done 6/30 > strict NPO, keofed placed for feeding  - Pt failed repeat VBS on 7/3, recommended full NPO  - Goals of care conversation had with patient and family at bedside and he wants all medical interventions including tube feeds. He is aware that by placing another feeding tube, he will be evaluated for PEG tube placement and continue to receive artificial feeds and nothing by mouth.   - Replace keofed and restart tube feeds per nutrition recommendations  - ACS consult placed for evaluation for PEG placement , patient refused surgery for PEG  - Discussed with family at bedside and via phone options, will placed palliative care consult    s/p Medtronic dual chamber PPM 6/21/2024  Assessment & Plan  - See SSS plan    SSS (sick sinus syndrome) (HCC)  Assessment & Plan  - Status post PPM on " 6/21  - EPS has signed off on 6/22    SDH (subdural hematoma) ([facility])  Assessment & Plan  Continue frequent neurological checks.   STAT CT head with any neurological decline including drop GCS of 2pts within 1 hr.  Seizure prophylaxis per trauma team-completed  Hold all full antiplatelet and anticoagulation medications for 2 weeks  Follow up with Neurosurgery      Hypothyroidism  Assessment & Plan  - Patient with chronic history of hypothyroidism.  - Continue home medication regimen including levothyroxine.  - Outpatient follow-up routine.    * TBI (traumatic brain injury) ([facility])  Assessment & Plan  - Traumatic brain injury with new acute appearing trace subarachnoid hemorrhage in the bilateral parafalcine regions as well as a new appearing trace parafalcine subdural hematoma, present on admission.  - CT scan of the head from 6/15/2024 demonstrated: New acute trace subarachnoid hemorrhage in bilateral parafalcine regions. New acute trace parafalcine subdural hematoma.  -  Additional imaging with CTA of the head and neck 6/15/2024 demonstrated: Negative CTA head traumatic vascular injury, aneurysm, large vessel occlusion, high-grade stenosis, or dissection. Partially imaged ectatic right carotid bifurcation and proximal cervical internal carotid artery with retropharyngeal course, similar to CT neck with contrast 10/12/2011.  - Neurosurgery evaluation and recommendations appreciated.  - Hold anti-platelet and anticoagulant medications for least 2 weeks and/or until cleared by Neurosurgery.   - Patient is not on any chronic antiplatelet or anticoagulant medications at baseline.  - Continue Keppra for 7 days for seizure prophylaxis - completed  - Monitor neurologic exam.  - Continue symptomatic management with analgesia as needed.- PT and OT evaluation and treatment as indicated.             Bowel Regimen: senna,  Miralax  VTE Prophylaxis:Enoxaparin (Lovenox)     Disposition: ARC following but will need definitive feeding  plan. Patient currently refused PEG tube which was scheduled for today. Palliative care consulted. Family discussions regarding options under way.    Subjective   Chief Complaint: None    Subjective: Patient offers no new complaints. Exam stable. Continues with Tube feeds due to aspiration with swallow. Discussed with patient, grand dtr and dtr who is a nurse options regarding PEG tube and TF's. Unknown duration of PEG tube, could possibly be permanent versus temporary.  Dr. Patel did discuss with patient and family members that the PEG tube may require to be placed and remain intact for a minimum of 8 weeks until the area is healed.  And then he would be reassessed if he does not require the PEG tube can be removed.  Patient and family asked appropriate questions regarding duration and length and necessity of PEG tube.  Due to VBS patient is at high risk for aspiration with anything by mouth he is aspirating with his VBS.  Dr. Ullrich discussed palliative care consult to discuss options with family.     Objective   Vitals:   Temp:  [98.2 °F (36.8 °C)-99.3 °F (37.4 °C)] 99.3 °F (37.4 °C)  HR:  [57-75] 75  Resp:  [16-18] 18  BP: (100-131)/() 105/66    I/O         07/03 0701  07/04 0700 07/04 0701  07/05 0700 07/05 0701  07/06 0700    P.O. 0  0    I.V. (mL/kg)       NG/GT  1810     Feedings 175 2675     Total Intake(mL/kg) 175 (2.2) 4485 (56.8) 0 (0)    Urine (mL/kg/hr) 300 (0.2)      Stool 0      Total Output 300      Net -125 +4485 0           Unmeasured Urine Occurrence 2 x  2 x    Unmeasured Stool Occurrence 2 x  1 x             Physical Exam:   GENERAL APPEARANCE: NAD appears comfortable lying in bed  NEURO: Intact, GCS 15 alert and oriented  HEENT: PERRL, tube feed via nares intact  CV: RRR  LUNGS: CTA B/L throughout  GI: Abdomen soft, NT, ND, +BS x 4  : Voiding  MSK: PRINCE's  SKIN: Intact    Invasive Devices       Peripheral Intravenous Line  Duration             Peripheral IV 07/02/24 Right Wrist 3  days              Drain  Duration             NG/OG/Enteral Tube Enteral Feeding Tube Right nare 1 day                          Lab Results: Results: I have personally reviewed all pertinent laboratory/tests results, BMP/CMP:   Lab Results   Component Value Date    SODIUM 139 07/05/2024    K 3.6 07/05/2024     07/05/2024    CO2 27 07/05/2024    BUN 11 07/05/2024    CREATININE 0.53 (L) 07/05/2024    CALCIUM 7.0 (L) 07/05/2024    EGFR 119 07/05/2024   , and CBC:   Lab Results   Component Value Date    WBC 10.58 (H) 07/05/2024    HGB 11.9 (L) 07/05/2024    HCT 35.7 (L) 07/05/2024    MCV 95 07/05/2024     07/05/2024    RBC 3.75 (L) 07/05/2024    MCH 31.7 07/05/2024    MCHC 33.3 07/05/2024    RDW 14.3 07/05/2024    MPV 9.9 07/05/2024    NRBC 0 07/05/2024     Imaging: I have personally reviewed pertinent reports.     Other Studies: none

## 2024-07-05 NOTE — ASSESSMENT & PLAN NOTE
- Reported difficulty swallowing pills on 6/28  - VBS done 6/30 > strict NPO, keofed placed for feeding  - Pt failed repeat VBS on 7/3, recommended full NPO  - Goals of care conversation had with patient and family at bedside and he wants all medical interventions including tube feeds. He is aware that by placing another feeding tube, he will be evaluated for PEG tube placement and continue to receive artificial feeds and nothing by mouth.   - Replace keofed and restart tube feeds per nutrition recommendations  - ACS consult placed for evaluation for PEG placement , patient refused surgery for PEG  - Discussed with family at bedside and via phone options, will placed palliative care consult

## 2024-07-06 ENCOUNTER — TELEPHONE (OUTPATIENT)
Dept: OTHER | Facility: OTHER | Age: 54
End: 2024-07-06

## 2024-07-06 LAB
ANION GAP SERPL CALCULATED.3IONS-SCNC: 11 MMOL/L (ref 4–13)
BASOPHILS # BLD AUTO: 0.02 THOUSANDS/ÂΜL (ref 0–0.1)
BASOPHILS NFR BLD AUTO: 0 % (ref 0–1)
BUN SERPL-MCNC: 12 MG/DL (ref 5–25)
CALCIUM SERPL-MCNC: 7.2 MG/DL (ref 8.4–10.2)
CHLORIDE SERPL-SCNC: 98 MMOL/L (ref 96–108)
CO2 SERPL-SCNC: 26 MMOL/L (ref 21–32)
CREAT SERPL-MCNC: 0.58 MG/DL (ref 0.6–1.3)
EOSINOPHIL # BLD AUTO: 0.39 THOUSAND/ÂΜL (ref 0–0.61)
EOSINOPHIL NFR BLD AUTO: 4 % (ref 0–6)
ERYTHROCYTE [DISTWIDTH] IN BLOOD BY AUTOMATED COUNT: 14.1 % (ref 11.6–15.1)
GFR SERPL CREATININE-BSD FRML MDRD: 114 ML/MIN/1.73SQ M
GLUCOSE SERPL-MCNC: 137 MG/DL (ref 65–140)
HCT VFR BLD AUTO: 36.7 % (ref 36.5–49.3)
HGB BLD-MCNC: 12.1 G/DL (ref 12–17)
IMM GRANULOCYTES # BLD AUTO: 0.13 THOUSAND/UL (ref 0–0.2)
IMM GRANULOCYTES NFR BLD AUTO: 1 % (ref 0–2)
LYMPHOCYTES # BLD AUTO: 1.73 THOUSANDS/ÂΜL (ref 0.6–4.47)
LYMPHOCYTES NFR BLD AUTO: 16 % (ref 14–44)
MCH RBC QN AUTO: 31.4 PG (ref 26.8–34.3)
MCHC RBC AUTO-ENTMCNC: 33 G/DL (ref 31.4–37.4)
MCV RBC AUTO: 95 FL (ref 82–98)
MONOCYTES # BLD AUTO: 0.75 THOUSAND/ÂΜL (ref 0.17–1.22)
MONOCYTES NFR BLD AUTO: 7 % (ref 4–12)
NEUTROPHILS # BLD AUTO: 7.63 THOUSANDS/ÂΜL (ref 1.85–7.62)
NEUTS SEG NFR BLD AUTO: 72 % (ref 43–75)
NRBC BLD AUTO-RTO: 0 /100 WBCS
PLATELET # BLD AUTO: 237 THOUSANDS/UL (ref 149–390)
PMV BLD AUTO: 10 FL (ref 8.9–12.7)
POTASSIUM SERPL-SCNC: 4.2 MMOL/L (ref 3.5–5.3)
RBC # BLD AUTO: 3.85 MILLION/UL (ref 3.88–5.62)
SODIUM SERPL-SCNC: 135 MMOL/L (ref 135–147)
WBC # BLD AUTO: 10.65 THOUSAND/UL (ref 4.31–10.16)

## 2024-07-06 PROCEDURE — 97116 GAIT TRAINING THERAPY: CPT

## 2024-07-06 PROCEDURE — 97530 THERAPEUTIC ACTIVITIES: CPT

## 2024-07-06 PROCEDURE — 80048 BASIC METABOLIC PNL TOTAL CA: CPT | Performed by: NURSE PRACTITIONER

## 2024-07-06 PROCEDURE — 85025 COMPLETE CBC W/AUTO DIFF WBC: CPT | Performed by: NURSE PRACTITIONER

## 2024-07-06 RX ADMIN — LEVOTHYROXINE SODIUM 125 MCG: 100 TABLET ORAL at 05:16

## 2024-07-06 RX ADMIN — GUAIFENESIN AND DEXTROMETHORPHAN 10 ML: 100; 10 SYRUP ORAL at 19:24

## 2024-07-06 RX ADMIN — GABAPENTIN 100 MG: 250 SOLUTION ORAL at 08:39

## 2024-07-06 RX ADMIN — GUAIFENESIN AND DEXTROMETHORPHAN 10 ML: 100; 10 SYRUP ORAL at 13:55

## 2024-07-06 RX ADMIN — ACETAMINOPHEN 1000 MG: 10 INJECTION INTRAVENOUS at 22:02

## 2024-07-06 RX ADMIN — ACETAMINOPHEN 1000 MG: 10 INJECTION INTRAVENOUS at 13:52

## 2024-07-06 RX ADMIN — SENNOSIDES 8.8 MG: 8.8 LIQUID ORAL at 22:01

## 2024-07-06 RX ADMIN — ENOXAPARIN SODIUM 30 MG: 30 INJECTION SUBCUTANEOUS at 22:02

## 2024-07-06 RX ADMIN — GUAIFENESIN AND DEXTROMETHORPHAN 10 ML: 100; 10 SYRUP ORAL at 04:36

## 2024-07-06 RX ADMIN — ENOXAPARIN SODIUM 30 MG: 30 INJECTION SUBCUTANEOUS at 08:39

## 2024-07-06 RX ADMIN — GABAPENTIN 100 MG: 250 SOLUTION ORAL at 22:01

## 2024-07-06 RX ADMIN — GABAPENTIN 100 MG: 250 SOLUTION ORAL at 16:47

## 2024-07-06 RX ADMIN — Medication 3 MG: at 22:01

## 2024-07-06 RX ADMIN — ACETAMINOPHEN 1000 MG: 10 INJECTION INTRAVENOUS at 05:02

## 2024-07-06 NOTE — PHYSICAL THERAPY NOTE
Physical Therapy Progress Note     07/06/24 1415   PT Last Visit   PT Visit Date 07/06/24   Note Type   Note Type Treatment   Pain Assessment   Pain Score No Pain   Restrictions/Precautions   Other Precautions Cognitive;Chair Alarm;Bed Alarm;Fall Risk;Pain;Spinal precautions;Multiple lines;Aspiration  (Keofed)   Subjective   Subjective pt encountered supine in bed, pleasant and agreeable to treatment.  Reports no new complaints when prompted.   Bed Mobility   Supine to Sit 4  Minimal assistance   Additional items Assist x 1;Increased time required;HOB elevated   Transfers   Sit to Stand 4  Minimal assistance   Additional items Assist x 1   Stand to Sit 4  Minimal assistance   Additional items Assist x 1   Ambulation/Elevation   Gait pattern Short stride;Foward flexed;Inconsistent shay;Decreased foot clearance;Narrow YARIEL;Poor UE support   Gait Assistance 4  Minimal assist   Additional items Assist x 1   Assistive Device Rolling walker   Distance 100', 200'   Stair Management Assistance 4  Minimal assist   Additional items Assist x 1   Stair Management Technique Two rails;Step to pattern;Foreward   Number of Stairs 3   Balance   Static Sitting Fair   Static Standing Fair -   Ambulatory Poor +   Activity Tolerance   Activity Tolerance Patient tolerated treatment well;Patient limited by fatigue   Nurse Made Aware LUCRECIA Wade   Assessment   Prognosis Good   Problem List Decreased strength;Decreased range of motion;Decreased endurance;Impaired balance;Decreased mobility;Decreased safety awareness;Orthopedic restrictions   Assessment Pt demosntrated improved functional endurance, mobility & balance this session, ambualting up to community distances with use of RW & no LOB, then negotaited steps as noted above, doing so without issue while ascending, but does demosntrate impaired eccentric control when descending steps that requires increased time & UE support to ensure safety.  He denies change in status with increased  activity, only reporting LE muscle fatigue upon return to room.  pt given further education regarding importance of daily mobity given his ongoing improvements & goals to maximzie functional endurance & independence with these tasks.  PT POC & d/c recommendations remain appropriate at this time until pt is better able to perform tasks with improved balance, endurance & motor control to reduce long term risk for falls as he continues to recover from acute conditions.   Goals   Patient Goals to get better   STG Expiration Date 07/16/24   PT Treatment Day 4   Plan   Treatment/Interventions Functional transfer training;LE strengthening/ROM;Elevations;Therapeutic exercise;Endurance training;Patient/family training;Equipment eval/education;Bed mobility;Gait training   Progress Progressing toward goals   PT Frequency 3-5x/wk   Discharge Recommendation   Rehab Resource Intensity Level, PT I (Maximum Resource Intensity)   Equipment Recommended Walker   Walker Package Recommended Wheeled walker   AM-PAC Basic Mobility Inpatient   Turning in Flat Bed Without Bedrails 3   Lying on Back to Sitting on Edge of Flat Bed Without Bedrails 3   Moving Bed to Chair 3   Standing Up From Chair Using Arms 3   Walk in Room 3   Climb 3-5 Stairs With Railing 3   Basic Mobility Inpatient Raw Score 18   Basic Mobility Standardized Score 41.05   Turning Head Towards Sound 4   Follow Simple Instructions 4   Low Function Basic Mobility Raw Score  26   Low Function Basic Mobility Standardized Score  41.2   [facility] Highest Level Of Mobility   JH-HLM Goal 6: Walk 10 steps or more   JH-HLM Achieved 8: Walk 250 feet ot more       David Moulton PTA    [facility] Basic Mobility Raw Score less than 17 suggests pt would benefit from post acute rehab.  Please also refer to the recommendation of the Physical Therapist for safe discharge planning.

## 2024-07-06 NOTE — ASSESSMENT & PLAN NOTE
- Reported difficulty swallowing pills on 6/28  - VBS done 6/30 > strict NPO, keofed placed for feeding  - Pt failed repeat VBS on 7/3, recommended full NPO  - Goals of care conversation had with patient and family at bedside and he wants all medical interventions including tube feeds. He is aware that by placing another feeding tube, he will be evaluated for PEG tube placement and continue to receive artificial feeds and nothing by mouth.   - Replace keofed and restart tube feeds per nutrition recommendations  - ACS consult placed for evaluation for PEG placement , patient refused surgery for PEG  - Discussed with family at bedside and via phone options, will placed palliative care consult  - Patient will remain on tube feeds throughout weekend, palliative care to reconvene with family for meeting on Tuesday, will reach out to family to set up on Monday per note.

## 2024-07-06 NOTE — PROGRESS NOTES
"[facility]  Progress Note  Name: Luis Forrester I  MRN: [id number]  Unit/Bed#: [facility] 605-01 I Date of Admission: 6/15/2024   Date of Service: 7/6/2024 I Hospital Day: 21    Assessment & Plan   Cervical stenosis of spinal canal  Assessment & Plan  MRI C-spine \"congenital canal stenosis with superimposed degenerative spondylosis. Most pronounced at C3-4 and C5-C6 with moderate to severe canal stenosis and mild cord compression.  No definite abnormal cord signal but mild cord edema would be difficult to exclude. Severe bilateral foraminal stenosis also seen at C3-4 and C5-C6\"    - Appreciate neurosurgery consult and recommendations  - 6/16/24 Posterior cervical laminectomies C3-7, Bilateral C2, C7 and T1 pedicle screw placement, Bilateral C3, C4, C5  lateral mass screw placement. Arthrodesis C2-T1   - no brace required  - Keflex x 2 weeks for surgical prophylaxis.  - Pain control - APS following - input appreciated  - PT/OT  - DVT ppx  - NSG Signed off, plan for outpatient 2 week and 6 week post op visits    Dysphagia  Assessment & Plan  - Reported difficulty swallowing pills on 6/28  - VBS done 6/30 > strict NPO, keofed placed for feeding  - Pt failed repeat VBS on 7/3, recommended full NPO  - Goals of care conversation had with patient and family at bedside and he wants all medical interventions including tube feeds. He is aware that by placing another feeding tube, he will be evaluated for PEG tube placement and continue to receive artificial feeds and nothing by mouth.   - Replace keofed and restart tube feeds per nutrition recommendations  - ACS consult placed for evaluation for PEG placement , patient refused surgery for PEG  - Discussed with family at bedside and via phone options, will placed palliative care consult  - Patient will remain on tube feeds throughout weekend, palliative care to reconvene with family for meeting on Tuesday, will reach out to family to set up " on Monday per note.    s/p Medtronic dual chamber PPM 6/21/2024  Assessment & Plan  - See SSS plan    SSS (sick sinus syndrome) ([facility])  Assessment & Plan  - Status post PPM on 6/21  - EPS has signed off on 6/22    SDH (subdural hematoma) ([facility])  Assessment & Plan  Continue frequent neurological checks.   STAT CT head with any neurological decline including drop GCS of 2pts within 1 hr.  Seizure prophylaxis per trauma team-completed  Hold all full antiplatelet and anticoagulation medications for 2 weeks  Follow up with Neurosurgery      Hypothyroidism  Assessment & Plan  - Patient with chronic history of hypothyroidism.  - Continue home medication regimen including levothyroxine.  - Outpatient follow-up routine.    * TBI (traumatic brain injury) ([facility])  Assessment & Plan  - Traumatic brain injury with new acute appearing trace subarachnoid hemorrhage in the bilateral parafalcine regions as well as a new appearing trace parafalcine subdural hematoma, present on admission.  - CT scan of the head from 6/15/2024 demonstrated: New acute trace subarachnoid hemorrhage in bilateral parafalcine regions. New acute trace parafalcine subdural hematoma.  -  Additional imaging with CTA of the head and neck 6/15/2024 demonstrated: Negative CTA head traumatic vascular injury, aneurysm, large vessel occlusion, high-grade stenosis, or dissection. Partially imaged ectatic right carotid bifurcation and proximal cervical internal carotid artery with retropharyngeal course, similar to CT neck with contrast 10/12/2011.  - Neurosurgery evaluation and recommendations appreciated.  - Hold anti-platelet and anticoagulant medications for least 2 weeks and/or until cleared by Neurosurgery.   - Patient is not on any chronic antiplatelet or anticoagulant medications at baseline.  - Continue Keppra for 7 days for seizure prophylaxis - completed  - Monitor neurologic exam.  - Continue symptomatic management with analgesia as needed.- PT and OT  evaluation and treatment as indicated.    Hyponatremia-resolved as of 7/2/2024  Assessment & Plan  - Appreciate nephrology input  - Sodium has corrected, continue to monitor               Bowel Regimen: senna, Miralax  VTE Prophylaxis:Enoxaparin (Lovenox)     Disposition: ARC accepted however pending plan for feeding    Subjective   Chief Complaint: None    Subjective: Patient doing well this am. Had a shave and hair cut feels well. Has been continuing to cough and use suction due to decreased swallowing ability. Ongoing discussions with Palliative care regarding PEG tube     Objective   Vitals:   Temp:  [97.9 °F (36.6 °C)-99.7 °F (37.6 °C)] 97.9 °F (36.6 °C)  HR:  [57-75] 61  Resp:  [16-18] 16  BP: (100-115)/(64-70) 112/65    I/O         07/04 0701  07/05 0700 07/05 0701  07/06 0700 07/06 0701  07/07 0700    P.O.  0     NG/GT 1810 250 250    Feedings 2675  280    Total Intake(mL/kg) 4485 (56.8) 250 (3.4) 530 (7.2)    Urine (mL/kg/hr)       Stool       Total Output       Net +4485 +250 +530           Unmeasured Urine Occurrence  3 x 1 x    Unmeasured Stool Occurrence  1 x 1 x             Physical Exam:   GENERAL APPEARANCE: NAD appears comfortable lying in bed  NEURO: Intact, GCS 15 alert and oriented x 3  HEENT: PERRL, Keofed at 58/60 by nares intact  CV: RRR  LUNGS: CTA B/L throughout  GI: Abdomen soft, NT, ND +BS 4  : Voiding  MSK: PRINCE's  SKIN: Intact,    Invasive Devices       Peripheral Intravenous Line  Duration             Peripheral IV 07/06/24 Left Forearm <1 day              Drain  Duration             NG/OG/Enteral Tube Enteral Feeding Tube Right nare 2 days                          Lab Results: Results: I have personally reviewed all pertinent laboratory/tests results, BMP/CMP:   Lab Results   Component Value Date    SODIUM 135 07/06/2024    K 4.2 07/06/2024    CL 98 07/06/2024    CO2 26 07/06/2024    BUN 12 07/06/2024    CREATININE 0.58 (L) 07/06/2024    CALCIUM 7.2 (L) 07/06/2024    EGFR 114  07/06/2024   , and CBC:   Lab Results   Component Value Date    WBC 10.65 (H) 07/06/2024    HGB 12.1 07/06/2024    HCT 36.7 07/06/2024    MCV 95 07/06/2024     07/06/2024    RBC 3.85 (L) 07/06/2024    MCH 31.4 07/06/2024    MCHC 33.0 07/06/2024    RDW 14.1 07/06/2024    MPV 10.0 07/06/2024    NRBC 0 07/06/2024     Imaging: I have personally reviewed pertinent reports.     Other Studies: none

## 2024-07-06 NOTE — PLAN OF CARE
Problem: PAIN - ADULT  Goal: Verbalizes/displays adequate comfort level or baseline comfort level  Description: Interventions:  - Encourage patient to monitor pain and request assistance  - Assess pain using appropriate pain scale  - Administer analgesics based on type and severity of pain and evaluate response  - Implement non-pharmacological measures as appropriate and evaluate response  - Consider cultural and social influences on pain and pain management  - Notify physician/advanced practitioner if interventions unsuccessful or patient reports new pain  Outcome: Progressing     Problem: Prexisting or High Potential for Compromised Skin Integrity  Goal: Skin integrity is maintained or improved  Description: INTERVENTIONS:  - Identify patients at risk for skin breakdown  - Assess and monitor skin integrity  - Assess and monitor nutrition and hydration status  - Monitor labs   - Assess for incontinence   - Turn and reposition patient  - Assist with mobility/ambulation  - Relieve pressure over bony prominences  - Avoid friction and shearing  - Provide appropriate hygiene as needed including keeping skin clean and dry  - Evaluate need for skin moisturizer/barrier cream  - Collaborate with interdisciplinary team   - Patient/family teaching  - Consider wound care consult   Outcome: Progressing     Problem: SAFETY ADULT  Goal: Maintain or return to baseline ADL function  Description: INTERVENTIONS:  -  Assess patient's ability to carry out ADLs; assess patient's baseline for ADL function and identify physical deficits which impact ability to perform ADLs (bathing, care of mouth/teeth, toileting, grooming, dressing, etc.)  - Assess/evaluate cause of self-care deficits   - Assess range of motion  - Assess patient's mobility; develop plan if impaired  - Assess patient's need for assistive devices and provide as appropriate  - Encourage maximum independence but intervene and supervise when necessary  - Involve family in  performance of ADLs  - Assess for home care needs following discharge   - Consider OT consult to assist with ADL evaluation and planning for discharge  - Provide patient education as appropriate  Outcome: Progressing     Problem: SAFETY ADULT  Goal: Maintains/Returns to pre admission functional level  Description: INTERVENTIONS:  - Perform AM-PAC 6 Click Basic Mobility/ Daily Activity assessment daily.  - Set and communicate daily mobility goal to care team and patient/family/caregiver.   - Collaborate with rehabilitation services on mobility goals if consulted  - Out of bed for toileting  - Record patient progress and toleration of activity level   Outcome: Progressing

## 2024-07-06 NOTE — PLAN OF CARE
Problem: PHYSICAL THERAPY ADULT  Goal: Performs mobility at highest level of function for planned discharge setting.  See evaluation for individualized goals.  Description: Treatment/Interventions: Functional transfer training, LE strengthening/ROM, Elevations, Therapeutic exercise, Endurance training, Cognitive reorientation, Equipment eval/education, Bed mobility, Gait training, Spoke to nursing, OT  Equipment Recommended: Other (Comment) (TBD)       See flowsheet documentation for full assessment, interventions and recommendations.  Outcome: Progressing  Note: Prognosis: Good  Problem List: Decreased strength, Decreased range of motion, Decreased endurance, Impaired balance, Decreased mobility, Decreased safety awareness, Orthopedic restrictions  Assessment: Pt demosntrated improved functional endurance, mobility & balance this session, ambualting up to community distances with use of RW & no LOB, then negotaited steps as noted above, doing so without issue while ascending, but does demosntrate impaired eccentric control when descending steps that requires increased time & UE support to ensure safety.  He denies change in status with increased activity, only reporting LE muscle fatigue upon return to room.  pt given further education regarding importance of daily mobity given his ongoing improvements & goals to maximzie functional endurance & independence with these tasks.  PT POC & d/c recommendations remain appropriate at this time until pt is better able to perform tasks with improved balance, endurance & motor control to reduce long term risk for falls as he continues to recover from acute conditions.  Barriers to Discharge: Inaccessible home environment     Rehab Resource Intensity Level, PT: I (Maximum Resource Intensity)    See flowsheet documentation for full assessment.

## 2024-07-06 NOTE — TELEPHONE ENCOUNTER
"Pt's sister stated, \" I am calling to leave a message for Olesya Álvarez. Our family is Available for a meeting Tuesday July 9,2024 at 9 am.\"      Please follow up, thank you!  "

## 2024-07-07 LAB
ANION GAP SERPL CALCULATED.3IONS-SCNC: 10 MMOL/L (ref 4–13)
BUN SERPL-MCNC: 11 MG/DL (ref 5–25)
CALCIUM SERPL-MCNC: 7.3 MG/DL (ref 8.4–10.2)
CHLORIDE SERPL-SCNC: 99 MMOL/L (ref 96–108)
CO2 SERPL-SCNC: 27 MMOL/L (ref 21–32)
CREAT SERPL-MCNC: 0.52 MG/DL (ref 0.6–1.3)
ERYTHROCYTE [DISTWIDTH] IN BLOOD BY AUTOMATED COUNT: 14 % (ref 11.6–15.1)
GFR SERPL CREATININE-BSD FRML MDRD: 120 ML/MIN/1.73SQ M
GLUCOSE SERPL-MCNC: 119 MG/DL (ref 65–140)
HCT VFR BLD AUTO: 36.9 % (ref 36.5–49.3)
HGB BLD-MCNC: 12.2 G/DL (ref 12–17)
MCH RBC QN AUTO: 31.8 PG (ref 26.8–34.3)
MCHC RBC AUTO-ENTMCNC: 33.1 G/DL (ref 31.4–37.4)
MCV RBC AUTO: 96 FL (ref 82–98)
PLATELET # BLD AUTO: 222 THOUSANDS/UL (ref 149–390)
PMV BLD AUTO: 10 FL (ref 8.9–12.7)
POTASSIUM SERPL-SCNC: 3.9 MMOL/L (ref 3.5–5.3)
RBC # BLD AUTO: 3.84 MILLION/UL (ref 3.88–5.62)
SODIUM SERPL-SCNC: 136 MMOL/L (ref 135–147)
WBC # BLD AUTO: 8.54 THOUSAND/UL (ref 4.31–10.16)

## 2024-07-07 PROCEDURE — 99232 SBSQ HOSP IP/OBS MODERATE 35: CPT | Performed by: SURGERY

## 2024-07-07 PROCEDURE — 85027 COMPLETE CBC AUTOMATED: CPT | Performed by: NURSE PRACTITIONER

## 2024-07-07 PROCEDURE — 80048 BASIC METABOLIC PNL TOTAL CA: CPT | Performed by: NURSE PRACTITIONER

## 2024-07-07 RX ADMIN — OXYCODONE HYDROCHLORIDE 2.5 MG: 5 SOLUTION ORAL at 19:41

## 2024-07-07 RX ADMIN — GUAIFENESIN AND DEXTROMETHORPHAN 10 ML: 100; 10 SYRUP ORAL at 02:08

## 2024-07-07 RX ADMIN — ACETAMINOPHEN 1000 MG: 10 INJECTION INTRAVENOUS at 06:20

## 2024-07-07 RX ADMIN — GUAIFENESIN AND DEXTROMETHORPHAN 10 ML: 100; 10 SYRUP ORAL at 14:53

## 2024-07-07 RX ADMIN — ENOXAPARIN SODIUM 30 MG: 30 INJECTION SUBCUTANEOUS at 21:19

## 2024-07-07 RX ADMIN — GABAPENTIN 100 MG: 250 SOLUTION ORAL at 16:53

## 2024-07-07 RX ADMIN — ENOXAPARIN SODIUM 30 MG: 30 INJECTION SUBCUTANEOUS at 09:01

## 2024-07-07 RX ADMIN — Medication 3 MG: at 21:19

## 2024-07-07 RX ADMIN — LEVOTHYROXINE SODIUM 125 MCG: 100 TABLET ORAL at 06:21

## 2024-07-07 RX ADMIN — ACETAMINOPHEN 1000 MG: 10 INJECTION INTRAVENOUS at 13:45

## 2024-07-07 RX ADMIN — ACETAMINOPHEN 1000 MG: 10 INJECTION INTRAVENOUS at 21:19

## 2024-07-07 RX ADMIN — GABAPENTIN 100 MG: 250 SOLUTION ORAL at 09:01

## 2024-07-07 RX ADMIN — GABAPENTIN 100 MG: 250 SOLUTION ORAL at 21:19

## 2024-07-07 NOTE — PROGRESS NOTES
"[facility]  Progress Note  Name: Luis Forrester I  MRN: [id number]  Unit/Bed#: [facility] 605-01 I Date of Admission: 6/15/2024   Date of Service: 7/7/2024 I Hospital Day: 22    Assessment & Plan   Cervical stenosis of spinal canal  Assessment & Plan  MRI C-spine \"congenital canal stenosis with superimposed degenerative spondylosis. Most pronounced at C3-4 and C5-C6 with moderate to severe canal stenosis and mild cord compression.  No definite abnormal cord signal but mild cord edema would be difficult to exclude. Severe bilateral foraminal stenosis also seen at C3-4 and C5-C6\"    - Appreciate neurosurgery consult and recommendations  - 6/16/24 Posterior cervical laminectomies C3-7, Bilateral C2, C7 and T1 pedicle screw placement, Bilateral C3, C4, C5  lateral mass screw placement. Arthrodesis C2-T1   - no brace required  - Keflex x 2 weeks for surgical prophylaxis.  - Pain control - APS following - input appreciated  - PT/OT  - DVT ppx  - NSG Signed off, plan for outpatient 2 week and 6 week post op visits    Dysphagia  Assessment & Plan  - Reported difficulty swallowing pills on 6/28  - VBS done 6/30 > strict NPO, keofed placed for feeding  - Pt failed repeat VBS on 7/3, recommended full NPO  - Goals of care conversation had with patient and family at bedside and he wants all medical interventions including tube feeds. He is aware that by placing another feeding tube, he will be evaluated for PEG tube placement and continue to receive artificial feeds and nothing by mouth.   - Replace keofed and restart tube feeds per nutrition recommendations  - ACS consult placed for evaluation for PEG placement , patient refused surgery for PEG  - Discussed with family at bedside and via phone options, will placed palliative care consult  - Patient will remain on tube feeds throughout weekend, palliative care to reconvene with family for meeting on Tuesday, will reach out to family to set up " on Monday per note.    s/p Medtronic dual chamber PPM 6/21/2024  Assessment & Plan  - See SSS plan    SSS (sick sinus syndrome) ([facility])  Assessment & Plan  - Status post PPM on 6/21  - EPS has signed off on 6/22    SDH (subdural hematoma) ([facility])  Assessment & Plan  Continue frequent neurological checks.   STAT CT head with any neurological decline including drop GCS of 2pts within 1 hr.  Seizure prophylaxis per trauma team-completed  Hold all full antiplatelet and anticoagulation medications for 2 weeks  Follow up with Neurosurgery      Hypothyroidism  Assessment & Plan  - Patient with chronic history of hypothyroidism.  - Continue home medication regimen including levothyroxine.  - Outpatient follow-up routine.    * TBI (traumatic brain injury) ([facility])  Assessment & Plan  - Traumatic brain injury with new acute appearing trace subarachnoid hemorrhage in the bilateral parafalcine regions as well as a new appearing trace parafalcine subdural hematoma, present on admission.  - CT scan of the head from 6/15/2024 demonstrated: New acute trace subarachnoid hemorrhage in bilateral parafalcine regions. New acute trace parafalcine subdural hematoma.  -  Additional imaging with CTA of the head and neck 6/15/2024 demonstrated: Negative CTA head traumatic vascular injury, aneurysm, large vessel occlusion, high-grade stenosis, or dissection. Partially imaged ectatic right carotid bifurcation and proximal cervical internal carotid artery with retropharyngeal course, similar to CT neck with contrast 10/12/2011.  - Neurosurgery evaluation and recommendations appreciated.  - Hold anti-platelet and anticoagulant medications for least 2 weeks and/or until cleared by Neurosurgery.   - Patient is not on any chronic antiplatelet or anticoagulant medications at baseline.  - Continue Keppra for 7 days for seizure prophylaxis - completed  - Monitor neurologic exam.  - Continue symptomatic management with analgesia as needed.- PT and OT  evaluation and treatment as indicated.             Bowel Regimen: Senna miralax  VTE Prophylaxis:Enoxaparin (Lovenox)     Disposition: TBD after discussion with palliative care this week. Patient refused PEG tube however it appears the patient and family, dtr is a nurse would like to wait to see how patient progresses and also discuss with Palliative care for decision making, however it appears in talking with patient he does not want to take anything orally if he would aspirate so the decision is wait vs PEG.    Subjective   Chief Complaint: Patient offers no new complaints this am    Subjective: Patient is doing well today. He is sitting up in the chair. Denies HA, dizziness, lightheadedness, SOB, CP, abdominal pain. Tolerating a diet.     Objective   Vitals:   Temp:  [98.7 °F (37.1 °C)-99.7 °F (37.6 °C)] 98.7 °F (37.1 °C)  HR:  [60-71] 68  Resp:  [18] 18  BP: ()/(56-67) 111/67    I/O         07/05 0701  07/06 0700 07/06 0701  07/07 0700 07/07 0701  07/08 0700    P.O. 0      NG/ 750     IV Piggyback 100 100     Feedings  840     Total Intake(mL/kg) 350 (4.7) 1690 (22.4)     Urine (mL/kg/hr)  100 (0.1)     Total Output  100     Net +350 +1590            Unmeasured Urine Occurrence 3 x 1 x     Unmeasured Stool Occurrence 1 x 1 x              Physical Exam:   GENERAL APPEARANCE: NAD appears comfortable sitting up in a chair  NEURO: inatct, GCS 15 alert and oriented x 3  HEENT: PERRL  COR: RRR  LUNGS: CTA B/L throughout  GI: Abdomen soft, NT, ND, +BS x 4  : Voiding  MSK: PRINCE's  SKIN: Intact    Invasive Devices       Peripheral Intravenous Line  Duration             Peripheral IV 07/06/24 Left Forearm 1 day              Drain  Duration             NG/OG/Enteral Tube Enteral Feeding Tube Right nare 3 days                          Lab Results: Results: I have personally reviewed all pertinent laboratory/tests results, BMP/CMP:   Lab Results   Component Value Date    SODIUM 136 07/07/2024    K 3.9 07/07/2024     CL 99 07/07/2024    CO2 27 07/07/2024    BUN 11 07/07/2024    CREATININE 0.52 (L) 07/07/2024    CALCIUM 7.3 (L) 07/07/2024    EGFR 120 07/07/2024   , and CBC:   Lab Results   Component Value Date    WBC 8.54 07/07/2024    HGB 12.2 07/07/2024    HCT 36.9 07/07/2024    MCV 96 07/07/2024     07/07/2024    RBC 3.84 (L) 07/07/2024    MCH 31.8 07/07/2024    MCHC 33.1 07/07/2024    RDW 14.0 07/07/2024    MPV 10.0 07/07/2024     Imaging: I have personally reviewed pertinent reports.     Other Studies: none

## 2024-07-08 ENCOUNTER — APPOINTMENT (INPATIENT)
Dept: RADIOLOGY | Facility: HOSPITAL | Age: 54
DRG: 912 | End: 2024-07-08
Payer: COMMERCIAL

## 2024-07-08 PROCEDURE — 99232 SBSQ HOSP IP/OBS MODERATE 35: CPT | Performed by: STUDENT IN AN ORGANIZED HEALTH CARE EDUCATION/TRAINING PROGRAM

## 2024-07-08 PROCEDURE — 92611 MOTION FLUOROSCOPY/SWALLOW: CPT

## 2024-07-08 PROCEDURE — 92526 ORAL FUNCTION THERAPY: CPT

## 2024-07-08 PROCEDURE — 74230 X-RAY XM SWLNG FUNCJ C+: CPT

## 2024-07-08 RX ORDER — ACETAMINOPHEN 160 MG/5ML
975 SUSPENSION ORAL EVERY 8 HOURS
Status: DISCONTINUED | OUTPATIENT
Start: 2024-07-08 | End: 2024-07-12 | Stop reason: HOSPADM

## 2024-07-08 RX ADMIN — ENOXAPARIN SODIUM 30 MG: 30 INJECTION SUBCUTANEOUS at 22:04

## 2024-07-08 RX ADMIN — GABAPENTIN 100 MG: 250 SOLUTION ORAL at 22:02

## 2024-07-08 RX ADMIN — GUAIFENESIN AND DEXTROMETHORPHAN 10 ML: 100; 10 SYRUP ORAL at 05:39

## 2024-07-08 RX ADMIN — ACETAMINOPHEN 975 MG: 650 SUSPENSION ORAL at 17:02

## 2024-07-08 RX ADMIN — ACETAMINOPHEN 1000 MG: 10 INJECTION INTRAVENOUS at 05:39

## 2024-07-08 RX ADMIN — GABAPENTIN 100 MG: 250 SOLUTION ORAL at 08:26

## 2024-07-08 RX ADMIN — GABAPENTIN 100 MG: 250 SOLUTION ORAL at 17:00

## 2024-07-08 RX ADMIN — GUAIFENESIN AND DEXTROMETHORPHAN 10 ML: 100; 10 SYRUP ORAL at 17:06

## 2024-07-08 RX ADMIN — LEVOTHYROXINE SODIUM 125 MCG: 100 TABLET ORAL at 08:26

## 2024-07-08 RX ADMIN — ENOXAPARIN SODIUM 30 MG: 30 INJECTION SUBCUTANEOUS at 08:26

## 2024-07-08 RX ADMIN — Medication 3 MG: at 22:03

## 2024-07-08 RX ADMIN — GUAIFENESIN AND DEXTROMETHORPHAN 10 ML: 100; 10 SYRUP ORAL at 01:13

## 2024-07-08 NOTE — CASE MANAGEMENT
Case Management Discharge Planning Note    Patient name Luis Forrester  [facility] MRN [id number]  : 1970 Date 2024       Current Admission Date: 6/15/2024  Current Admission Diagnosis:TBI (traumatic brain injury) ([facility])   Patient Active Problem List    Diagnosis Date Noted Date Diagnosed    Cervical stenosis of spinal canal 2024     Dysphagia 2024     SSS (sick sinus syndrome) ([facility]) 2024     s/p Medtronic dual chamber PPM 2024     TBI (traumatic brain injury) ([facility]) 06/15/2024     Syncope and collapse 06/15/2024     Bilateral hand pain 06/15/2024     SDH (subdural hematoma) ([facility]) 06/15/2024     Right hand weakness 06/15/2024     Encounter for immunization 2024     Vitamin D deficiency 2024     Reactive airway disease 03/15/2018     Osteoarthritis of both hands 2015     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     Chronic low back pain 2015     Mass of parotid gland 2015     Lumbar radiculopathy 2015     Hyperlipidemia 2014     Hypothyroidism 2014     Allergic rhinitis 2012       LOS (days): 23  Geometric Mean LOS (GMLOS) (days): 10  Days to GMLOS:-13.2     OBJECTIVE:  Risk of Unplanned Readmission Score: 17.4         Current admission status: Inpatient   Preferred Pharmacy:   [id number] - [place], [place]  [address]  [place] [address]  Phone: [phone] Fax: [phone]    Primary Care Provider: Joceline Shook PA-C    Primary Insurance: [place] [employer]  Secondary Insurance:     DISCHARGE DETAILS:    Plan remains for Palliative Care meeting tomorrow with family. CM will continue to follow for d/c needs.

## 2024-07-08 NOTE — ASSESSMENT & PLAN NOTE
- Reported difficulty swallowing pills on 6/28  - VBS done 6/30 > strict NPO, keofed placed for feeding  - Pt failed repeat VBS on 7/3, recommended full NPO  - Goals of care conversation had with patient and family at bedside and he wants all medical interventions including tube feeds. He is aware that by placing another feeding tube, he will be evaluated for PEG tube placement and continue to receive artificial feeds and nothing by mouth.   - Replace keofed and restart tube feeds per nutrition recommendations  - ACS consult placed for evaluation for PEG placement , patient refused surgery for PEG  - Discussed with family at bedside and via phone options, will placed palliative care consult  - Patient will remain on tube feeds throughout weekend, palliative care to reconvene with family for meeting on at 0900 on Tuesday 7/9

## 2024-07-08 NOTE — QUICK NOTE
7/8/2024 4:34 PM -  Luis Forrester's chart and case were reviewed by Shannan Lomeli PA-C.  Mode of review included electronic chart check, and communication with primary team.      Per review, patient failed VBS once again. Primary team spoke with patient and relayed that patient is agreeable to PEG tube. Red surgery is re-consulted. Attempted to call Rebecca (sister-in-law) along with Dr. Dacosta to notify her of plan given previously arranged family meeting for tomorrow at 9am. No answer. Left message on machine. In the event that family arrives for family meeting, may use this visit to discuss PEG tube expectations/planning as requested/appropriate.       For urgent issues or any questions/concerns, please notify on-call provider via [employer] Secure Chat.  You may also call our answering service 24/7 at [phone].    Shannan Lomeli PA-C  Palliative and Supportive Care  Clinic/Answering Service: [phone]  You can find me on [employer] Chat!

## 2024-07-08 NOTE — SPEECH THERAPY NOTE
Reviewed results of recent VBS from this morning with patient's daughter and spouse. Also video chat with patient's sister. Discussed continued high risk of aspiration and continued recommendation for NPO status with alternate means of nutrition. Patient should consider PEG and continued ST in rehab setting. All questions answered. Family appear to have adequate understanding. Will attend palliative care meeting tomorrow as able.

## 2024-07-08 NOTE — TREATMENT PLAN
7/8/2024 9:08 AM -  Pt's family called over weekend, requesting team meeting with family at 0900 on 7/9, Tuesday.      Our team will work today with attending Trauma team if this timing is workable, and if other teammates should be including (GI or Neuro).

## 2024-07-08 NOTE — PROGRESS NOTES
"[facility]  Progress Note  Name: Luis Forrester I  MRN: [id number]  Unit/Bed#: [facility] 605-01 I Date of Admission: 6/15/2024   Date of Service: 7/8/2024 I Hospital Day: 23    Assessment & Plan   Cervical stenosis of spinal canal  Assessment & Plan  MRI C-spine \"congenital canal stenosis with superimposed degenerative spondylosis. Most pronounced at C3-4 and C5-C6 with moderate to severe canal stenosis and mild cord compression.  No definite abnormal cord signal but mild cord edema would be difficult to exclude. Severe bilateral foraminal stenosis also seen at C3-4 and C5-C6\"    - Appreciate neurosurgery consult and recommendations  - 6/16/24 Posterior cervical laminectomies C3-7, Bilateral C2, C7 and T1 pedicle screw placement, Bilateral C3, C4, C5  lateral mass screw placement. Arthrodesis C2-T1   - no brace required  - Keflex x 2 weeks for surgical prophylaxis.  - Pain control - APS following - input appreciated  - PT/OT  - DVT ppx  - NSG Signed off, plan for outpatient 2 week and 6 week post op visits    Dysphagia  Assessment & Plan  - Reported difficulty swallowing pills on 6/28  - VBS done 6/30 > strict NPO, keofed placed for feeding  - Pt failed repeat VBS on 7/3, recommended full NPO  - Goals of care conversation had with patient and family at bedside and he wants all medical interventions including tube feeds. He is aware that by placing another feeding tube, he will be evaluated for PEG tube placement and continue to receive artificial feeds and nothing by mouth.   - Replace keofed and restart tube feeds per nutrition recommendations  - ACS consult placed for evaluation for PEG placement , patient refused surgery for PEG  - Discussed with family at bedside and via phone options, will placed palliative care consult  - Patient will remain on tube feeds throughout weekend, palliative care to reconvene with family for meeting on at 0900 on Tuesday 7/9    s/p Medtronic dual " chamber PPM 6/21/2024  Assessment & Plan  - See SSS plan    SSS (sick sinus syndrome) ([facility])  Assessment & Plan  - Status post PPM on 6/21  - EPS has signed off on 6/22    SDH (subdural hematoma) ([facility])  Assessment & Plan  Continue frequent neurological checks.   STAT CT head with any neurological decline including drop GCS of 2pts within 1 hr.  Seizure prophylaxis per trauma team-completed  Hold all full antiplatelet and anticoagulation medications for 2 weeks  Follow up with Neurosurgery      Hypothyroidism  Assessment & Plan  - Patient with chronic history of hypothyroidism.  - Continue home medication regimen including levothyroxine.  - Outpatient follow-up routine.    * TBI (traumatic brain injury) ([facility])  Assessment & Plan  - Traumatic brain injury with new acute appearing trace subarachnoid hemorrhage in the bilateral parafalcine regions as well as a new appearing trace parafalcine subdural hematoma, present on admission.  - CT scan of the head from 6/15/2024 demonstrated: New acute trace subarachnoid hemorrhage in bilateral parafalcine regions. New acute trace parafalcine subdural hematoma.  -  Additional imaging with CTA of the head and neck 6/15/2024 demonstrated: Negative CTA head traumatic vascular injury, aneurysm, large vessel occlusion, high-grade stenosis, or dissection. Partially imaged ectatic right carotid bifurcation and proximal cervical internal carotid artery with retropharyngeal course, similar to CT neck with contrast 10/12/2011.  - Neurosurgery evaluation and recommendations appreciated.  - Hold anti-platelet and anticoagulant medications for least 2 weeks and/or until cleared by Neurosurgery.   - Patient is not on any chronic antiplatelet or anticoagulant medications at baseline.  - Continue Keppra for 7 days for seizure prophylaxis - completed  - Monitor neurologic exam.  - Continue symptomatic management with analgesia as needed.- PT and OT evaluation and treatment as indicated.        "  Patient underwent VBS today failed  Dr. Giles spoke with patient, patient would like to go ahead with PEG tube  Red Surgery consulted for timing and consent.    Bowel Regimen: senna, miralax  VTE Prophylaxis:Enoxaparin (Lovenox)     Disposition: TBD patient to have PEG tube then [facility] would accept    Subjective   Chief Complaint: None    Subjective: Patient feeling well. Had VBS today and failed. Patient aware and would like to proceed with PEG. Red surgery called.   Palliative care also will meet with family tomorrow am.     Objective   Vitals:   Temp:  [95.2 °F (35.1 °C)-98.3 °F (36.8 °C)] 98.1 °F (36.7 °C)  HR:  [60-63] 63  Resp:  [16-18] 16  BP: ()/(61-78) 111/68    I/O         07/06 0701  07/07 0700 07/07 0701  07/08 0700 07/08 0701  07/09 0700    P.O.   0    NG/GT 1000 500     IV Piggyback 200      Feedings 1120 560     Total Intake(mL/kg) 2320 (30.7) 1060 (14) 0 (0)    Urine (mL/kg/hr) 100 (0.1) 300 (0.2) 525 (0.8)    Stool   0    Total Output 100 300 525    Net +2220 +760 -525           Unmeasured Urine Occurrence 2 x 2 x 2 x    Unmeasured Stool Occurrence 1 x 1 x 2 x             Physical Exam:   GENERAL APPEARANCE: NAD appears comfortable lying in bed  NEURO: Intact GCS 15   HEENT: PERRL  CV: RRR  LUNGS: CTA B/L throughout  GI: Abdomen soft, NT, ND, +BS x 4  : Voiding  MSK: PRINCE's  SKIN: Intact    Invasive Devices       Peripheral Intravenous Line  Duration             Peripheral IV 07/06/24 Left Forearm 2 days              Drain  Duration             NG/OG/Enteral Tube Enteral Feeding Tube Right nare 5 days                          Lab Results: Results: I have personally reviewed all pertinent laboratory/tests results, BMP/CMP: No results found for: \"SODIUM\", \"K\", \"CL\", \"CO2\", \"ANIONGAP\", \"BUN\", \"CREATININE\", \"GLUCOSE\", \"CALCIUM\", \"AST\", \"ALT\", \"ALKPHOS\", \"PROT\", \"BILITOT\", \"EGFR\", and CBC: No results found for: \"WBC\", \"HGB\", \"HCT\", \"MCV\", \"PLT\", \"ADJUSTEDWBC\", \"RBC\", \"MCH\", \"MCHC\", \"RDW\", \"MPV\", " "\"NRBC\"  Imaging: I have personally reviewed pertinent reports.     Other Studies: none       "

## 2024-07-08 NOTE — PROCEDURES
Speech Pathology - Modified Barium Swallow Study    Patient Name: Luis Forerster    Today's Date: 7/8/2024     Problem List  Principal Problem:    TBI (traumatic brain injury) ([facility])  Active Problems:    Hypothyroidism    SDH (subdural hematoma) ([facility])    SSS (sick sinus syndrome) ([facility])    s/p Medtronic dual chamber PPM 6/21/2024    Dysphagia    Cervical stenosis of spinal canal      Past Medical History  Past Medical History:   Diagnosis Date    Arthritis     Chronic cough     Disease of thyroid gland     Hypoparathyroidism ([facility])     continue taking calcium and vitamin D, will check CMP, PTH level and Vitamin D level    Pneumonia of right middle lobe due to Streptococcus pneumoniae ([facility]) 11/30/2018    s/p Medtronic dual chamber PPM 6/21/2024 06/21/2024       Past Surgical History  Past Surgical History:   Procedure Laterality Date    CARDIAC ELECTROPHYSIOLOGY PROCEDURE N/A 6/21/2024    Procedure: Cardiac pacer implant;  Surgeon: Kofi Pettit MD;  Location: [facility];  Service: Cardiology    DECOMPRESSION SPINE CERVICAL POSTERIOR N/A 6/16/2024    Procedure: DECOMPRESSION SPINE CERVICAL POSTERIOR C2-T1 with fusion navigated with Oarm;  Surgeon: Endy Velasquez MD;  Location: [facility];  Service: Neurosurgery    FRACTURE SURGERY      Open Treatment of Each Proximal Finger Phalanx       Assessment Summary:    Results are compared to 2 previous VBS with ultimately unchanged results. The patient continues to present with a mild (now possible mild-moderate) oral dysphagia with a severe pharyngeal dysphagia. NGT is present for this study. The patient has prolonged transfer time, with tongue pumping observed to transfer pudding. Swallow is timely. Epiglottic inversion is absent. The patient continues with poor pharyngeal peristalsis with resulting significant valleculae and pyriform retention, especially more with thick liquids and pudding. Airway protection is reduced. The patient is observed with  consistent penetration to the cords with all trials, with intermittent posterior transient aspiration and episodes of aspiration with thin liquids via straw. The patient is observed attempting multiple swallows to help clear material, but is unsuccessful. Regurgitation into oral cavity with pudding is observed x1, which helps clear pharyngeal retention.Patient does have throat clearing with penetration events. He has somewhat increased neck ROM, but is still unable to fully attempt chin tuck strategy. Brief scan of the esophagus today is unremarkable.  PACS as desired.    Recommendations:   Recommended Diet: NPO with tube feeds   Recommended Form of Medications:  non oral     Consider referral to: GI for PEG. Will have thorough discussion with patient and patient's family to further reveal VBS and show images    SLP Dysphagia therapy recommended: as able in acute care     Results Reviewed with: patient, PA, and family   Pt/Family Education: initiated. Pt and caregivers would benefit from/require continued education.    General Information;  Repeat MBS was recommended to assess oropharyngeal stage swallowing skills at this time.     Prior MBS:  7/3/24:   Assessment Summary:    Pt presents with mild oral and a severe pharyngeal dysphagia. Results are ultimately unchanged from previous VBS on 6/30. The patient has good retrieval of items via tsp and cup. Transfer time is min prolonged and patient continues with some difficulty initiating transfer. Pharyngeal dysphagia characterized by decreased BOT retraction, decreased epiglottic inversion, decreased hyolaryngeal movement and poor pharyngeal peristalsis. The patient presents with significant valleculae and pyriform retention, especially with solids. The patient attempts multiple swallows to clear, but is unsuccessful. Intermittent penetration to the cords and aspiration events are observed with all trials. Some aspiration is observed during the swallow, and other  aspiration events are posterior from pharyngeal retention. The patient does not have cough response to aspiration. Vocal quality is wet during study. He continues with poor neck ROM and is unable to fully complete chin tuck and other movement strategies. Brief scan of the esophagus does reveal some possible stasis.  Note: Images are available for review in PACS as desired.   Recommendations:   Recommended Diet: NPO. May need to consider more permanent means of nutrition   Recommended Form of Medications:  non oral    Consider referral to: GI for possible PEG   SLP Dysphagia therapy recommended: continue in acute care as able. Consider repeat VBS in a few weeks    6/30/24:   Summary:  Images are on PACS for review.    Oral stage: Mild impairment, including reduced bolus control and ?mild difficulty initiating bolus transfer (vs hesitation in anticipation of dysphagia).    Pharyngeal stage: Severe impairment, characterized by reduced BOT constriction, reduced PPW constriction, and poor hyolaryngeal movement; this results in minimal inversion of the epiglottis, and reduced opening of the UES, and mod-severe pharyngeal residue, especially in the pyriforms. Pt attempts to clear the residue with multiple swallows, but this results in aspiration from the residue, mostly posterior aspiration. Residue increases with thicker consistencies, with pudding having the most residue. With the first bite of pudding pt gagged and retropulsed the bolus back into his mouth and expectorated it. There is deep laryngeal penetration with all consistencies assessed today (thin, NTL, HTL, puree), with undercoating of the epiglottis. There was at least mild aspiration of all consistencies; at times aspiration was after the swallow from residue in the pyriforms/pharynx. Response to aspiration was weak coughing. Frequent wet, gurgly vocal quality occurred during the exam; cued cough was briefly effective at clearing but wet quality quickly  returned. Pt has limited neck mobility due to recent surgery; he attempted to complete a chin tuck but this was ineffective. Pt is currently at high risk for aspiration of all consistencies. RN reports increasing cough and a fever today.    Per gross esophageal screen: Brief scan shows possible mild retropulsion of the bolus.    Recommendations:  Diet: NPO, consider short term alternate nutrition for now.   Liquids: NPO  Meds: Non oral  Strategies:  Frequent oral care  Upright position  F/u ST tx: Yes  Therapy Prognosis: Fair  Prognosis considerations: age, comorbidities, therapeutic potential  Aspiration Precautions  Reflux Precautions    Oral Mechanism Exam  Not assessed a this time   Respiratory Status: on RA      Pt was viewed sitting upright in the lateral position. Due to concerns for patient safety / patient refusal, trials provided deviated from the MBSImP Validated Protocol. Pt was given 5-mL thin liquid x2, 20-mL cup sip thin, 5-mL nectar thick, 20-mL cup sip nectar thick, 5-mL honey thick, and 5-mL pudding. Pt was also given thin liquids by straw.     Initial view observations/comments: Hardware and NGT in place      8-Point Penetration-Aspiration Scale   Thin liquid 8 - Material enters the airway, passes below the vocal folds, and no effort is made to eject     Nectar thick liquid 6 - Material enters the airway, passes below the vocal folds and is ejected into the larynx or out of the airway     Honey thick liquid 6 - Material enters the airway, passes below the vocal folds and is ejected into the larynx or out of the airway     Puree (pudding) 6 - Material enters the airway, passes below the vocal folds and is ejected into the larynx or out of the airway     Solid N/A     Strategies and Efficacy: chin tuck attempted. Unable to fully complete     Aspiration Response and Efficacy: throat clear with penetration to the cords. No response to aspiration     MBS IMP Rating    ORAL Impairment  Compinent 1--Lip  Closure  Judged at any point during the swallow.  0 - No labial escape    Component 2--Tongue Control During Bolus Hold  Judged on held liquid boluses only and prior to productive tongue movement.   0 - Cohesive bolus between tongue to palatal seal    Component 3--Bolus Preparation/Mastication  Judged only during presentation of 1/2 shortbread cookie coated in pudding.   N/A    Component 4--Bolus Transport/Lingual Motion  Judged after first productive tongue movement for oral bolus transport.  3 - Repetitive/disorganized tongue motion    Component 5--Oral Residue  Judged after first swallow or after the last swallow of the sequential swallow task.  1 - Trace residue lining oral structures   Location   C - Tongue    Component 6--Initiation of Pharyngeal Swallow  Judged at first movement of the brisk superior-anterior hyoid trajectory.  0 - Bolus head at posterior angle of ramus (first hyoid excursion)      PHARYNGEAL Impairment  Component 7--Soft Palate Elevation  Judged during maximum displacement of soft palate.  0 - No bolus between the soft palate (SP)/pharyngeal wall (PW)    Component 8--Laryngeal Elevation  Judged when epiglottis is in its most horizontal position.  1 - Partial superior movement of thyroid cartilage/partial approximation of arytenoids to epiglottic petiole    Component 9--Anterior Hyoid Excursion  Judged at height of swallow/maximal anterior hyoid displacement.  1 - Partial anterior movement    Component 10--Epiglottic Movement  Judged at height of swallow/maximal anterior hyoid displacement.  2 - No inversion    Component 11--Laryngeal Vestibular Closure  Judged at height of swallow/maximal anterior hyoid displacement.  2 - None; wide column air/contrast in laryngeal vestibule    Component 12--Pharyngeal Stripping Wave  Judged during the full duration of the pharyngeal swallow.  1 - Present - diminished    Component 13--Pharyngeal Contraction  Judged in AP view at rest and throughout maximum  movement of structures.  N/A    Component 14--Pharyngoesophageal Segment Opening  Judged during maximum distension of PES and throughout opening and closure.  2 - Minimal distension/minimal duration; marked obstruction of flow    Component 15--Tongue Base (TB) Retraction  Judged during maximum retraction of the tongue base.  3 - Wide column of contrast or air between TB and PW    Component 16--Pharyngeal Residue  Judged after first swallow or after the last swallow of the sequential swallow task.  3 - Majority of contrast within or on pharyngeal structures   Location   B - Valleculae and E - Pyriform sinuses      ESOPHAGEAL Impairment  Component 17--Esophageal Clearance Upright Position  Judged in AP view during bolus transit through the oral cavity to the LES  0 - Complete clearance; esophageal coating

## 2024-07-08 NOTE — PLAN OF CARE
Problem: Prexisting or High Potential for Compromised Skin Integrity  Goal: Skin integrity is maintained or improved  Description: INTERVENTIONS:  - Identify patients at risk for skin breakdown  - Assess and monitor skin integrity  - Assess and monitor nutrition and hydration status  - Monitor labs   - Assess for incontinence   - Turn and reposition patient  - Assist with mobility/ambulation  - Relieve pressure over bony prominences  - Avoid friction and shearing  - Provide appropriate hygiene as needed including keeping skin clean and dry  - Evaluate need for skin moisturizer/barrier cream  - Collaborate with interdisciplinary team   - Patient/family teaching  - Consider wound care consult   Outcome: Progressing     Problem: PAIN - ADULT  Goal: Verbalizes/displays adequate comfort level or baseline comfort level  Description: Interventions:  - Encourage patient to monitor pain and request assistance  - Assess pain using appropriate pain scale  - Administer analgesics based on type and severity of pain and evaluate response  - Implement non-pharmacological measures as appropriate and evaluate response  - Consider cultural and social influences on pain and pain management  - Notify physician/advanced practitioner if interventions unsuccessful or patient reports new pain  Outcome: Progressing     Problem: INFECTION - ADULT  Goal: Absence or prevention of progression during hospitalization  Description: INTERVENTIONS:  - Assess and monitor for signs and symptoms of infection  - Monitor lab/diagnostic results  - Monitor all insertion sites, i.e. indwelling lines, tubes, and drains  - Monitor endotracheal if appropriate and nasal secretions for changes in amount and color  - [place] appropriate cooling/warming therapies per order  - Administer medications as ordered  - Instruct and encourage patient and family to use good hand hygiene technique  - Identify and instruct in appropriate isolation precautions for  identified infection/condition  Outcome: Progressing     Problem: SAFETY ADULT  Goal: Patient will remain free of falls  Description: INTERVENTIONS:  - Educate patient/family on patient safety including physical limitations  - Instruct patient to call for assistance with activity   - Consult OT/PT to assist with strengthening/mobility   - Keep Call bell within reach  - Keep bed low and locked with side rails adjusted as appropriate  - Keep care items and personal belongings within reach  - Initiate and maintain comfort rounds  - Make Fall Risk Sign visible to staff  - Apply yellow socks and bracelet for high fall risk patients  - Consider moving patient to room near nurses station  Outcome: Progressing  Goal: Maintain or return to baseline ADL function  Description: INTERVENTIONS:  -  Assess patient's ability to carry out ADLs; assess patient's baseline for ADL function and identify physical deficits which impact ability to perform ADLs (bathing, care of mouth/teeth, toileting, grooming, dressing, etc.)  - Assess/evaluate cause of self-care deficits   - Assess range of motion  - Assess patient's mobility; develop plan if impaired  - Assess patient's need for assistive devices and provide as appropriate  - Encourage maximum independence but intervene and supervise when necessary  - Involve family in performance of ADLs  - Assess for home care needs following discharge   - Consider OT consult to assist with ADL evaluation and planning for discharge  - Provide patient education as appropriate  Outcome: Progressing  Goal: Maintains/Returns to pre admission functional level  Description: INTERVENTIONS:  - Perform AM-PAC 6 Click Basic Mobility/ Daily Activity assessment daily.  - Set and communicate daily mobility goal to care team and patient/family/caregiver.   - Collaborate with rehabilitation services on mobility goals if consulted  - Out of bed for toileting  - Record patient progress and toleration of activity level    Outcome: Progressing     Problem: DISCHARGE PLANNING  Goal: Discharge to home or other facility with appropriate resources  Description: INTERVENTIONS:  - Identify barriers to discharge w/patient and caregiver  - Arrange for needed discharge resources and transportation as appropriate  - Identify discharge learning needs (meds, wound care, etc.)  - Arrange for interpretive services to assist at discharge as needed  - Refer to Case Management Department for coordinating discharge planning if the patient needs post-hospital services based on physician/advanced practitioner order or complex needs related to functional status, cognitive ability, or social support system  Outcome: Progressing     Problem: Knowledge Deficit  Goal: Patient/family/caregiver demonstrates understanding of disease process, treatment plan, medications, and discharge instructions  Description: Complete learning assessment and assess knowledge base.  Interventions:  - Provide teaching at level of understanding  - Provide teaching via preferred learning methods  Outcome: Progressing     Problem: NEUROSENSORY - ADULT  Goal: Achieves stable or improved neurological status  Description: INTERVENTIONS  - Monitor and report changes in neurological status  - Monitor vital signs such as temperature, blood pressure, glucose, and any other labs ordered   - Initiate measures to prevent increased intracranial pressure  - Monitor for seizure activity and implement precautions if appropriate      Outcome: Progressing  Goal: Remains free of injury related to seizures activity  Description: INTERVENTIONS  - Maintain airway, patient safety  and administer oxygen as ordered  - Monitor patient for seizure activity, document and report duration and description of seizure to physician/advanced practitioner  - If seizure occurs,  ensure patient safety during seizure  - Reorient patient post seizure  - Seizure pads on all 4 side rails  - Instruct patient/family to  notify RN of any seizure activity including if an aura is experienced  - Instruct patient/family to call for assistance with activity based on nursing assessment  - Administer anti-seizure medications if ordered    Outcome: Progressing  Goal: Achieves maximal functionality and self care  Description: INTERVENTIONS  - Monitor swallowing and airway patency with patient fatigue and changes in neurological status  - Encourage and assist patient to increase activity and self care.   - Encourage visually impaired, hearing impaired and aphasic patients to use assistive/communication devices  Outcome: Progressing     Problem: CARDIOVASCULAR - ADULT  Goal: Maintains optimal cardiac output and hemodynamic stability  Description: INTERVENTIONS:  - Monitor I/O, vital signs and rhythm  - Monitor for S/S and trends of decreased cardiac output  - Administer and titrate ordered vasoactive medications to optimize hemodynamic stability  - Assess quality of pulses, skin color and temperature  - Assess for signs of decreased coronary artery perfusion  - Instruct patient to report change in severity of symptoms  Outcome: Progressing  Goal: Absence of cardiac dysrhythmias or at baseline rhythm  Description: INTERVENTIONS:  - Continuous cardiac monitoring, vital signs, obtain 12 lead EKG if ordered  - Administer antiarrhythmic and heart rate control medications as ordered  - Monitor electrolytes and administer replacement therapy as ordered  Outcome: Progressing     Problem: RESPIRATORY - ADULT  Goal: Achieves optimal ventilation and oxygenation  Description: INTERVENTIONS:  - Assess for changes in respiratory status  - Assess for changes in mentation and behavior  - Position to facilitate oxygenation and minimize respiratory effort  - Oxygen administered by appropriate delivery if ordered  - Initiate smoking cessation education as indicated  - Encourage broncho-pulmonary hygiene including cough, deep breathe, Incentive Spirometry  -  Assess the need for suctioning and aspirate as needed  - Assess and instruct to report SOB or any respiratory difficulty  - Respiratory Therapy support as indicated  Outcome: Progressing     Problem: GASTROINTESTINAL - ADULT  Goal: Minimal or absence of nausea and/or vomiting  Description: INTERVENTIONS:  - Administer IV fluids if ordered to ensure adequate hydration  - Maintain NPO status until nausea and vomiting are resolved  - Nasogastric tube if ordered  - Administer ordered antiemetic medications as needed  - Provide nonpharmacologic comfort measures as appropriate  - Advance diet as tolerated, if ordered  - Consider nutrition services referral to assist patient with adequate nutrition and appropriate food choices  Outcome: Progressing  Goal: Maintains or returns to baseline bowel function  Description: INTERVENTIONS:  - Assess bowel function  - Encourage oral fluids to ensure adequate hydration  - Administer IV fluids if ordered to ensure adequate hydration  - Administer ordered medications as needed  - Encourage mobilization and activity  - Consider nutritional services referral to assist patient with adequate nutrition and appropriate food choices  Outcome: Progressing  Goal: Maintains adequate nutritional intake  Description: INTERVENTIONS:  - Monitor percentage of each meal consumed  - Identify factors contributing to decreased intake, treat as appropriate  - Assist with meals as needed  - Monitor I&O, weight, and lab values if indicated  - Obtain nutrition services referral as needed  Outcome: Progressing  Goal: Oral mucous membranes remain intact  Description: INTERVENTIONS  - Assess oral mucosa and hygiene practices  - Implement preventative oral hygiene regimen  - Implement oral medicated treatments as ordered  - Initiate Nutrition services referral as needed  Outcome: Progressing     Problem: GENITOURINARY - ADULT  Goal: Maintains or returns to baseline urinary function  Description:  INTERVENTIONS:  - Assess urinary function  - Encourage oral fluids to ensure adequate hydration if ordered  - Administer IV fluids as ordered to ensure adequate hydration  - Administer ordered medications as needed  - Offer frequent toileting  - Follow urinary retention protocol if ordered  Outcome: Progressing  Goal: Absence of urinary retention  Description: INTERVENTIONS:  - Assess patient’s ability to void and empty bladder  - Monitor I/O  - Bladder scan as needed  - Discuss with physician/AP medications to alleviate retention as needed  - Discuss catheterization for long term situations as appropriate  Outcome: Progressing     Problem: Nutrition/Hydration-ADULT  Goal: Nutrient/Hydration intake appropriate for improving, restoring or maintaining nutritional needs  Description: Monitor and assess patient's nutrition/hydration status for malnutrition. Collaborate with interdisciplinary team and initiate plan and interventions as ordered.  Monitor patient's weight and dietary intake as ordered or per policy. Utilize nutrition screening tool and intervene as necessary. Determine patient's food preferences and provide high-protein, high-caloric foods as appropriate.     INTERVENTIONS:  - Monitor oral intake, urinary output, labs, and treatment plans  - Assess nutrition and hydration status and recommend course of action  - Evaluate amount of meals eaten  - Assist patient with eating if necessary   - Allow adequate time for meals  - Recommend/ encourage appropriate diets, oral nutritional supplements, and vitamin/mineral supplements  - Order, calculate, and assess calorie counts as needed  - Recommend, monitor, and adjust tube feedings and TPN/PPN based on assessed needs  - Assess need for intravenous fluids  - Provide specific nutrition/hydration education as appropriate  - Include patient/family/caregiver in decisions related to nutrition  Outcome: Progressing

## 2024-07-09 ENCOUNTER — ANESTHESIA EVENT (INPATIENT)
Dept: PERIOP | Facility: HOSPITAL | Age: 54
DRG: 912 | End: 2024-07-09
Payer: COMMERCIAL

## 2024-07-09 PROCEDURE — 99497 ADVNCD CARE PLAN 30 MIN: CPT | Performed by: PHYSICIAN ASSISTANT

## 2024-07-09 PROCEDURE — 97535 SELF CARE MNGMENT TRAINING: CPT

## 2024-07-09 PROCEDURE — 99232 SBSQ HOSP IP/OBS MODERATE 35: CPT | Performed by: STUDENT IN AN ORGANIZED HEALTH CARE EDUCATION/TRAINING PROGRAM

## 2024-07-09 PROCEDURE — 99232 SBSQ HOSP IP/OBS MODERATE 35: CPT | Performed by: SURGERY

## 2024-07-09 PROCEDURE — 92526 ORAL FUNCTION THERAPY: CPT

## 2024-07-09 RX ORDER — NYSTATIN 100000 [USP'U]/G
POWDER TOPICAL 2 TIMES DAILY
Status: DISCONTINUED | OUTPATIENT
Start: 2024-07-09 | End: 2024-07-12 | Stop reason: HOSPADM

## 2024-07-09 RX ORDER — XYLITOL/YERBA SANTA
5 AEROSOL, SPRAY WITH PUMP (ML) MUCOUS MEMBRANE 4 TIMES DAILY PRN
Status: DISCONTINUED | OUTPATIENT
Start: 2024-07-09 | End: 2024-07-12 | Stop reason: HOSPADM

## 2024-07-09 RX ADMIN — Medication 3 MG: at 21:34

## 2024-07-09 RX ADMIN — ACETAMINOPHEN 975 MG: 650 SUSPENSION ORAL at 00:05

## 2024-07-09 RX ADMIN — NYSTATIN: 100000 POWDER TOPICAL at 16:43

## 2024-07-09 RX ADMIN — GABAPENTIN 100 MG: 250 SOLUTION ORAL at 09:42

## 2024-07-09 RX ADMIN — GUAIFENESIN AND DEXTROMETHORPHAN 10 ML: 100; 10 SYRUP ORAL at 16:50

## 2024-07-09 RX ADMIN — ACETAMINOPHEN 975 MG: 650 SUSPENSION ORAL at 16:36

## 2024-07-09 RX ADMIN — GUAIFENESIN AND DEXTROMETHORPHAN 10 ML: 100; 10 SYRUP ORAL at 21:34

## 2024-07-09 RX ADMIN — ENOXAPARIN SODIUM 30 MG: 30 INJECTION SUBCUTANEOUS at 09:42

## 2024-07-09 RX ADMIN — OXYCODONE HYDROCHLORIDE 5 MG: 5 SOLUTION ORAL at 21:34

## 2024-07-09 RX ADMIN — LEVOTHYROXINE SODIUM 125 MCG: 100 TABLET ORAL at 05:55

## 2024-07-09 RX ADMIN — GABAPENTIN 100 MG: 250 SOLUTION ORAL at 16:36

## 2024-07-09 RX ADMIN — GABAPENTIN 100 MG: 250 SOLUTION ORAL at 21:37

## 2024-07-09 RX ADMIN — ACETAMINOPHEN 975 MG: 650 SUSPENSION ORAL at 09:42

## 2024-07-09 RX ADMIN — ENOXAPARIN SODIUM 30 MG: 30 INJECTION SUBCUTANEOUS at 21:34

## 2024-07-09 NOTE — ANESTHESIA PREPROCEDURE EVALUATION
Procedure:  INSERTION PEG TUBE (Abdomen)    Relevant Problems   ANESTHESIA (within normal limits)      CARDIO   (+) Hyperlipidemia   (+) SSS (sick sinus syndrome) (HCC)      ENDO   (+) Hypothyroidism      GI/HEPATIC   (+) Dysphagia      HEMATOLOGY (within normal limits)      MUSCULOSKELETAL   (+) Chronic low back pain   (+) Osteoarthritis of both hands      NEURO/PSYCH   (+) Chronic low back pain   (+) Right hand weakness   (+) SDH (subdural hematoma) (HCC)      PULMONARY (within normal limits)      Other   (+) s/p Medtronic dual chamber PPM 6/21/2024     S/p Medtronic Dual chamber PPM 6/2024    TTE 6/17/2024:    Left Ventricle: Left ventricular cavity size is normal. Wall thickness is normal. The left ventricular ejection fraction is 60%. Systolic function is normal. Wall motion is normal. Diastolic function is normal.    Right Ventricle: Right ventricular cavity size is mildly dilated.    Right Atrium: The atrium is mildly dilated.    Atrial Septum: There is a probable patent foramen ovale noted at rest with predominant left to right shunting using color Doppler. 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  It is predominately within the right atrial cavity.    Aorta: The aortic root is mildly dilated. The aortic root is 4.30 cm.    Lab Results   Component Value Date    WBC 8.54 07/07/2024    HGB 12.2 07/07/2024    HCT 36.9 07/07/2024    MCV 96 07/07/2024     07/07/2024     Lab Results   Component Value Date    SODIUM 136 07/07/2024    K 3.9 07/07/2024    CL 99 07/07/2024    CO2 27 07/07/2024    BUN 11 07/07/2024    CREATININE 0.52 (L) 07/07/2024    GLUC 119 07/07/2024    CALCIUM 7.3 (L) 07/07/2024     Lab Results   Component Value Date    INR 0.99 06/16/2024    PROTIME 13.0 06/16/2024     Lab Results   Component Value Date    HGBA1C 5.6 01/19/2021          Physical Exam    Airway    Mallampati score: II  TM Distance: >3 FB  Neck ROM: limited     Dental        Cardiovascular  Cardiovascular exam  normal    Pulmonary  Pulmonary exam normal     Other Findings        Anesthesia Plan  ASA Score- 3     Anesthesia Type- general with ASA Monitors.         Additional Monitors:     Airway Plan: ETT.           Plan Factors-Exercise tolerance (METS): >4 METS.    Chart reviewed.   Existing labs reviewed. Patient summary reviewed.                  Induction- intravenous.    Postoperative Plan-     Perioperative Resuscitation Plan - Level 1 - Full Code.       Informed Consent- Anesthetic plan and risks discussed with patient.  I personally reviewed this patient with the CRNA. Discussed and agreed on the Anesthesia Plan with the CRNA..

## 2024-07-09 NOTE — ASSESSMENT & PLAN NOTE
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 Most pronounced at C3-4 and C5-C6 with moderate to severe canal stenosis and mild cord compression.  No definite abnormal cord signal but mild cord edema would be difficult to exclude. Severe bilateral foraminal stenosis also seen at C3-4 and C5-C6\"    - Appreciate neurosurgery consult and recommendations  - 6/16/24 Posterior cervical laminectomies C3-7, Bilateral C2, C7 and T1 pedicle screw placement, Bilateral C3, C4, C5  lateral mass screw placement. Arthrodesis C2-T1   - no brace required  - Keflex x 2 weeks for surgical prophylaxis. Completed  - Pain control - APS following - input appreciated  - PT/OT  - DVT ppx  - NSG Signed off, plan for outpatient 2 week and 6 week post op visits  "

## 2024-07-09 NOTE — ASSESSMENT & PLAN NOTE
- Reported difficulty swallowing pills on 6/28  - VBS done 6/30 > strict NPO, keofed placed for feeding  - Pt failed VBS on 6/30, 7/3, 7/8 - recommended full NPO  - Currently receiving tube feeds via Keofed  - Palliative care consult for goals of care and education regarding G tube placement  - 7/9 -  Goals of care conversation had with patient and family at bedside and over the phone- he wants all medical interventions including tube feeds and PEG placement. Patient and family were educated regarding PEG placement and all of their questions and concerns answered to their satisfaction  - ACS consult placed for evaluation for PEG placement , NPO and hold all tube feeds after midnight tonight for PEG tomorrow morning

## 2024-07-09 NOTE — SOCIAL WORK
The Palliative met with the pt and his daughter at bedside.  The pt daughter inquired about services for the pt once he dc from rehab.  It was explained the SW at the rehab will review with the family the pt recommended needs and the family will be able to discuss any additional needs.  The pt daughter stated she understood.  The pt currently has no anticipated dc date.  He is scheduled for surgery.  It was explained he will be re-assessed after the surgery.      The pt daughter understood there will be an assessment done prior to the pt DC from rehab and she will  be able to address her service needs as well as DME at that time.

## 2024-07-09 NOTE — PROGRESS NOTES
"Progress Note - General Surgery   Luis Forrester 53 y.o. male MRN: [id number]  Unit/Bed#: [facility] 605-01 Encounter: [id number]    Assessment:  53-year-old male who originally presented for syncopal episodes in the setting of new SAH and SDH who has failed VBS and is candidate for PEG tube placement    AVSS  Plan:  -Will tentatively plan for PEG tube 7/10  -Patient will need to be consented with risks and benefits explained  -Patient will need preoperative labs 7/9 and n.p.o. for procedure  -Please reach out to on-call [facility] red surgery service resident/AP role for further questions or concerns    Subjective/Objective     Subjective: patient seen and examined.  SYLVIA.  Patient states that he is agreeable to PEG at this time.    Objective:     Blood pressure 109/65, pulse 63, temperature 99.4 °F (37.4 °C), resp. rate 18, height 5' 6\" (1.676 m), weight 75.5 kg (166 lb 7.2 oz), SpO2 94%.,Body mass index is 26.87 kg/m².      Intake/Output Summary (Last 24 hours) at 7/9/2024 0123  Last data filed at 7/8/2024 2100  Gross per 24 hour   Intake 2235 ml   Output 1325 ml   Net 910 ml       Invasive Devices       Peripheral Intravenous Line  Duration             Peripheral IV 07/06/24 Left Forearm 2 days              Drain  Duration             NG/OG/Enteral Tube Enteral Feeding Tube Right nare 5 days                    Physical Exam:   General - no acute distress, responsive and cooperative  CV - warm, regular rate  Pulm - normal work of breathing, no respiratory distress  Abd - soft, nondistended, nontender  Neuro - m/s grossly intact, cn grossly intact  Ext - moving all extremities   "

## 2024-07-09 NOTE — PLAN OF CARE
PT on TF has been having watery diarrhea will trial banatrol plus BID, also adjusted TF to account for hold time for levothyroxine, recommended hold time 1hr before and 1hr after administration. Recommend Jevity 1.2@75mL/hr x 22hrs, add 2 packs of banatrol plus each pack mixed with 120mL, provides total volume 1650mL, 2060cal, 92g pro, 1332mL, will keep water flushes to equal fluid amount to similar order, 200mL every 4hrs provides, with additional water from mixing banatrol provides total of 2772mL. Continue to monitor labs check P, weight and TF tolerance for any further adjustments.       Problem: Nutrition/Hydration-ADULT  Goal: Nutrient/Hydration intake appropriate for improving, restoring or maintaining nutritional needs  Description: Monitor and assess patient's nutrition/hydration status for malnutrition. Collaborate with interdisciplinary team and initiate plan and interventions as ordered.  Monitor patient's weight and dietary intake as ordered or per policy. Utilize nutrition screening tool and intervene as necessary. Determine patient's food preferences and provide high-protein, high-caloric foods as appropriate.     INTERVENTIONS:  - Monitor oral intake, urinary output, labs, and treatment plans  - Assess nutrition and hydration status and recommend course of action  - Evaluate amount of meals eaten  - Assist patient with eating if necessary   - Allow adequate time for meals  - Recommend/ encourage appropriate diets, oral nutritional supplements, and vitamin/mineral supplements  - Order, calculate, and assess calorie counts as needed  - Recommend, monitor, and adjust tube feedings and TPN/PPN based on assessed needs  - Assess need for intravenous fluids  - Provide specific nutrition/hydration education as appropriate  - Include patient/family/caregiver in decisions related to nutrition  Outcome: Not Progressing

## 2024-07-09 NOTE — PLAN OF CARE
Problem: SAFETY ADULT  Goal: Maintain or return to baseline ADL function  Description: INTERVENTIONS:  -  Assess patient's ability to carry out ADLs; assess patient's baseline for ADL function and identify physical deficits which impact ability to perform ADLs (bathing, care of mouth/teeth, toileting, grooming, dressing, etc.)  - Assess/evaluate cause of self-care deficits   - Assess range of motion  - Assess patient's mobility; develop plan if impaired  - Assess patient's need for assistive devices and provide as appropriate  - Encourage maximum independence but intervene and supervise when necessary  - Involve family in performance of ADLs  - Assess for home care needs following discharge   - Consider OT consult to assist with ADL evaluation and planning for discharge  - Provide patient education as appropriate  Outcome: Progressing     Problem: PAIN - ADULT  Goal: Verbalizes/displays adequate comfort level or baseline comfort level  Description: Interventions:  - Encourage patient to monitor pain and request assistance  - Assess pain using appropriate pain scale  - Administer analgesics based on type and severity of pain and evaluate response  - Implement non-pharmacological measures as appropriate and evaluate response  - Consider cultural and social influences on pain and pain management  - Notify physician/advanced practitioner if interventions unsuccessful or patient reports new pain  Outcome: Progressing     Problem: Knowledge Deficit  Goal: Patient/family/caregiver demonstrates understanding of disease process, treatment plan, medications, and discharge instructions  Description: Complete learning assessment and assess knowledge base.  Interventions:  - Provide teaching at level of understanding  - Provide teaching via preferred learning methods  Outcome: Progressing     Problem: CARDIOVASCULAR - ADULT  Goal: Absence of cardiac dysrhythmias or at baseline rhythm  Description: INTERVENTIONS:  - Continuous  cardiac monitoring, vital signs, obtain 12 lead EKG if ordered  - Administer antiarrhythmic and heart rate control medications as ordered  - Monitor electrolytes and administer replacement therapy as ordered  Outcome: Progressing

## 2024-07-09 NOTE — PROGRESS NOTES
"[facility]  Progress Note  Name: Luis Forrester I  MRN: [id number]  Unit/Bed#: [facility] 605-01 I Date of Admission: 6/15/2024   Date of Service: 7/9/2024 I Hospital Day: 24    Assessment & Plan   Dysphagia  Assessment & Plan  - Reported difficulty swallowing pills on 6/28  - VBS done 6/30 > strict NPO, keofed placed for feeding  - Pt failed VBS on 6/30, 7/3, 7/8 - recommended full NPO  - Currently receiving tube feeds via Keofed  - Palliative care consult for goals of care and education regarding G tube placement  - 7/9 -  Goals of care conversation had with patient and family at bedside and over the phone- he wants all medical interventions including tube feeds and PEG placement. Patient and family were educated regarding PEG placement and all of their questions and concerns answered to their satisfaction  - ACS consult placed for evaluation for PEG placement , NPO and hold all tube feeds after midnight tonight for PEG tomorrow morning      Cervical stenosis of spinal canal  Assessment & Plan  MRI C-spine \"congenital canal stenosis with superimposed degenerative spondylosis. Most pronounced at C3-4 and C5-C6 with moderate to severe canal stenosis and mild cord compression.  No definite abnormal cord signal but mild cord edema would be difficult to exclude. Severe bilateral foraminal stenosis also seen at C3-4 and C5-C6\"    - Appreciate neurosurgery consult and recommendations  - 6/16/24 Posterior cervical laminectomies C3-7, Bilateral C2, C7 and T1 pedicle screw placement, Bilateral C3, C4, C5  lateral mass screw placement. Arthrodesis C2-T1   - no brace required  - Keflex x 2 weeks for surgical prophylaxis. Completed  - Pain control - APS following - input appreciated  - PT/OT  - DVT ppx  - NSG Signed off, plan for outpatient 2 week and 6 week post op visits    s/p Medtronic dual chamber PPM 6/21/2024  Assessment & Plan  - See SSS plan    SSS (sick sinus syndrome) " ([facility])  Assessment & Plan  - Status post PPM on 6/21  - EPS has signed off on 6/22    SDH (subdural hematoma) ([facility])  Assessment & Plan  Continue frequent neurological checks.   STAT CT head with any neurological decline including drop GCS of 2pts within 1 hr.  Seizure prophylaxis per trauma team-completed  Hold all full antiplatelet and anticoagulation medications for 2 weeks  Follow up with Neurosurgery      Hypothyroidism  Assessment & Plan  - Patient with chronic history of hypothyroidism.  - Continue home medication regimen including levothyroxine.  - Outpatient follow-up routine.    * TBI (traumatic brain injury) ([facility])  Assessment & Plan  - Traumatic brain injury with new acute appearing trace subarachnoid hemorrhage in the bilateral parafalcine regions as well as a new appearing trace parafalcine subdural hematoma, present on admission.  - CT scan of the head from 6/15/2024 demonstrated: New acute trace subarachnoid hemorrhage in bilateral parafalcine regions. New acute trace parafalcine subdural hematoma.  -  Additional imaging with CTA of the head and neck 6/15/2024 demonstrated: Negative CTA head traumatic vascular injury, aneurysm, large vessel occlusion, high-grade stenosis, or dissection. Partially imaged ectatic right carotid bifurcation and proximal cervical internal carotid artery with retropharyngeal course, similar to CT neck with contrast 10/12/2011.  - Neurosurgery evaluation and recommendations appreciated.  - Hold anti-platelet and anticoagulant medications for least 2 weeks and/or until cleared by Neurosurgery.   - Patient is not on any chronic antiplatelet or anticoagulant medications at baseline.  - Continue Keppra for 7 days for seizure prophylaxis - completed  - Monitor neurologic exam.  - Continue symptomatic management with analgesia as needed.- PT and OT evaluation and treatment as indicated.    Hyponatremia-resolved as of 7/2/2024  Assessment & Plan  - Appreciate nephrology input  -  "Sodium has corrected, continue to monitor               Bowel Regimen: miralax  VTE Prophylaxis:Enoxaparin (Lovenox)     Disposition: Family meeting with trauma, palliative care, and speech therapist was completed and thorough. Family and patient educated regarding PEG and they anticipate PEG placement tomorrow morning. All of their questions and concerns answered to their satisfaction    Subjective   Chief Complaint: \"I'm just waking up\"    Subjective: Patient reports he is feeling fine and that he is just waking up. No pain, tolerating tube feeds, voiding.     Objective   Vitals:   Temp:  [98.1 °F (36.7 °C)-99.5 °F (37.5 °C)] 99.5 °F (37.5 °C)  HR:  [63-81] 65  Resp:  [16-20] 18  BP: (106-111)/(65-68) 106/66    I/O         07/07 0701  07/08 0700 07/08 0701  07/09 0700 07/09 0701  07/10 0700    P.O.  0     NG/ 720     IV Piggyback       Feedings 560 1515     Total Intake(mL/kg) 1060 (14) 2235 (29.4)     Urine (mL/kg/hr) 300 (0.2) 1325 (0.7)     Stool  0     Total Output 300 1325     Net +760 +910            Unmeasured Urine Occurrence 2 x 3 x     Unmeasured Stool Occurrence 1 x 3 x              Physical Exam:   GENERAL APPEARANCE: Patient in no acute distress.  HEENT: NCAT; PERRL, EOMs intact; Mucous membranes moist; Keofed in place  CV: Regular rate and rhythm; no murmur/gallops/rubs appreciated.  CHEST / LUNGS: Clear to auscultation; no wheezes/rales/rhonci.  ABD: NABS; soft; non-distended; non-tender.  : Voiding  EXT: +2 pulses bilaterally upper & lower extremities; no edema.  NEURO: GCS 15; no focal neurologic deficits; neurovascularly intact.  SKIN: Warm, dry and well perfused; no rash; no jaundice.      Invasive Devices       Peripheral Intravenous Line  Duration             Peripheral IV 07/06/24 Left Forearm 3 days              Drain  Duration             NG/OG/Enteral Tube Enteral Feeding Tube Right nare 5 days                          Lab Results: Results: I have personally reviewed all pertinent " laboratory/tests results  Imaging: I have personally reviewed pertinent reports.     Other Studies: multiple VBS

## 2024-07-09 NOTE — PHYSICAL THERAPY NOTE
Physical Therapy Cancellation Note     07/09/24 4446   Note Type   Note Type Cancelled Session   Cancel Reasons Refusal  (attempted to see pt this PM, but pt declined participation in mobilty, stating he recently worked with OT & was not up for further activity at this time.  pt tentatively scheduled for PEG tube insertion tomorrow according to EMR)         David Moulton, PTA

## 2024-07-09 NOTE — OCCUPATIONAL THERAPY NOTE
Occupational Therapy Treatment Note:      07/09/24 1355   OT Last Visit   OT Visit Date 07/09/24   Note Type   Note Type Treatment   Pain Assessment   Pain Assessment Tool 0-10   Pain Score No Pain   Restrictions/Precautions   Other Precautions Cognitive;Aspiration;Fall Risk;Spinal precautions   ADL   Where Assessed Chair   Grooming Assistance 5  Supervision/Setup   UB Dressing Assistance 4  Minimal Assistance   LB Dressing Assistance 3  Moderate Assistance   Toileting Assistance  2  Maximal Assistance   Functional Standing Tolerance   Time f- balance c rw for oral care in stance sinkside and for various transfers to from 3 different surfaces ie eob, low barrel chair and low arm chair. poor safety and limited carryover c hand placement   Bed Mobility   Supine to Sit 3  Moderate assistance   Transfers   Sit to Stand 4  Minimal assistance   Additional items   (rw)   Stand to Sit 4  Minimal assistance   Functional Mobility   Functional Mobility 4  Minimal assistance   Additional items Rolling walker   Cognition   Overall Cognitive Status Impaired   Arousal/Participation Alert   Attention Attends with cues to redirect   Memory Decreased recall of precautions;Decreased recall of recent events;Decreased short term memory   Following Commands Follows one step commands without difficulty   Activity Tolerance   Activity Tolerance Patient tolerated treatment well   Assessment   Assessment pt participated in pm ot session and was seen focusing on adls seated , grooming in stance, functional mobility and transfers to from various armed surfaces. pt required mod vc's for all transfers. pt tolerated session well, balance and ue function have improved from previous session. pt was more talkative, less lehtargic this session. pts supportive dtr was present. pt with keofed tube.   Plan   Treatment Interventions ADL retraining;Functional transfer training;Endurance training;Patient/family training;Equipment  evaluation/education;Activityengagement   Goal Expiration Date 07/22/24   OT Treatment Day 3   OT Frequency 3-5x/wk   Discharge Recommendation   Rehab Resource Intensity Level, OT I (Maximum Resource Intensity)   AM-PAC Daily Activity Inpatient   Lower Body Dressing 2   Bathing 3   Toileting 3   Upper Body Dressing 3   Grooming 3   Eating 4   Daily Activity Raw Score 18   Daily Activity Standardized Score (Calc for Raw Score >=11) 38.66   AM-PAC Applied Cognition Inpatient   Following a Speech/Presentation 3   Understanding Ordinary Conversation 4   Taking Medications 2   Remembering Where Things Are Placed or Put Away 2   Remembering List of 4-5 Errands 2   Taking Care of Complicated Tasks 2   Applied Cognition Raw Score 15   Applied Cognition Standardized Score 33.54

## 2024-07-09 NOTE — SPEECH THERAPY NOTE
Attended family meeting. Patient present along with spouse, daughter and family via phone. Palliative care discussed PEG tube placement procedure, tube feed recs and rehab options. Discussed plans with patient and family. Will continue ST after PEG placement to trial ice chips and complete oral motor exercises as able. Patient will also continue with ST in rehab setting. All family questions answered at this time.

## 2024-07-09 NOTE — PLAN OF CARE
Problem: Prexisting or High Potential for Compromised Skin Integrity  Goal: Skin integrity is maintained or improved  Description: INTERVENTIONS:  - Identify patients at risk for skin breakdown  - Assess and monitor skin integrity  - Assess and monitor nutrition and hydration status  - Monitor labs   - Assess for incontinence   - Turn and reposition patient  - Assist with mobility/ambulation  - Relieve pressure over bony prominences  - Avoid friction and shearing  - Provide appropriate hygiene as needed including keeping skin clean and dry  - Evaluate need for skin moisturizer/barrier cream  - Collaborate with interdisciplinary team   - Patient/family teaching  - Consider wound care consult   Outcome: Progressing     Problem: PAIN - ADULT  Goal: Verbalizes/displays adequate comfort level or baseline comfort level  Description: Interventions:  - Encourage patient to monitor pain and request assistance  - Assess pain using appropriate pain scale  - Administer analgesics based on type and severity of pain and evaluate response  - Implement non-pharmacological measures as appropriate and evaluate response  - Consider cultural and social influences on pain and pain management  - Notify physician/advanced practitioner if interventions unsuccessful or patient reports new pain  Outcome: Progressing     Problem: INFECTION - ADULT  Goal: Absence or prevention of progression during hospitalization  Description: INTERVENTIONS:  - Assess and monitor for signs and symptoms of infection  - Monitor lab/diagnostic results  - Monitor all insertion sites, i.e. indwelling lines, tubes, and drains  - Monitor endotracheal if appropriate and nasal secretions for changes in amount and color  - O'Brien appropriate cooling/warming therapies per order  - Administer medications as ordered  - Instruct and encourage patient and family to use good hand hygiene technique  - Identify and instruct in appropriate isolation precautions for  identified infection/condition  Outcome: Progressing     Problem: SAFETY ADULT  Goal: Patient will remain free of falls  Description: INTERVENTIONS:  - Educate patient/family on patient safety including physical limitations  - Instruct patient to call for assistance with activity   - Consult OT/PT to assist with strengthening/mobility   - Keep Call bell within reach  - Keep bed low and locked with side rails adjusted as appropriate  - Keep care items and personal belongings within reach  - Initiate and maintain comfort rounds  - Make Fall Risk Sign visible to staff  - Apply yellow socks and bracelet for high fall risk patients  - Consider moving patient to room near nurses station  Outcome: Progressing  Goal: Maintain or return to baseline ADL function  Description: INTERVENTIONS:  -  Assess patient's ability to carry out ADLs; assess patient's baseline for ADL function and identify physical deficits which impact ability to perform ADLs (bathing, care of mouth/teeth, toileting, grooming, dressing, etc.)  - Assess/evaluate cause of self-care deficits   - Assess range of motion  - Assess patient's mobility; develop plan if impaired  - Assess patient's need for assistive devices and provide as appropriate  - Encourage maximum independence but intervene and supervise when necessary  - Involve family in performance of ADLs  - Assess for home care needs following discharge   - Consider OT consult to assist with ADL evaluation and planning for discharge  - Provide patient education as appropriate  Outcome: Progressing  Goal: Maintains/Returns to pre admission functional level  Description: INTERVENTIONS:  - Perform AM-PAC 6 Click Basic Mobility/ Daily Activity assessment daily.  - Set and communicate daily mobility goal to care team and patient/family/caregiver.   - Collaborate with rehabilitation services on mobility goals if consulted  - Out of bed for toileting  - Record patient progress and toleration of activity level    Outcome: Progressing     Problem: DISCHARGE PLANNING  Goal: Discharge to home or other facility with appropriate resources  Description: INTERVENTIONS:  - Identify barriers to discharge w/patient and caregiver  - Arrange for needed discharge resources and transportation as appropriate  - Identify discharge learning needs (meds, wound care, etc.)  - Arrange for interpretive services to assist at discharge as needed  - Refer to Case Management Department for coordinating discharge planning if the patient needs post-hospital services based on physician/advanced practitioner order or complex needs related to functional status, cognitive ability, or social support system  Outcome: Progressing     Problem: Knowledge Deficit  Goal: Patient/family/caregiver demonstrates understanding of disease process, treatment plan, medications, and discharge instructions  Description: Complete learning assessment and assess knowledge base.  Interventions:  - Provide teaching at level of understanding  - Provide teaching via preferred learning methods  Outcome: Progressing     Problem: NEUROSENSORY - ADULT  Goal: Achieves stable or improved neurological status  Description: INTERVENTIONS  - Monitor and report changes in neurological status  - Monitor vital signs such as temperature, blood pressure, glucose, and any other labs ordered   - Initiate measures to prevent increased intracranial pressure  - Monitor for seizure activity and implement precautions if appropriate      Outcome: Progressing  Goal: Remains free of injury related to seizures activity  Description: INTERVENTIONS  - Maintain airway, patient safety  and administer oxygen as ordered  - Monitor patient for seizure activity, document and report duration and description of seizure to physician/advanced practitioner  - If seizure occurs,  ensure patient safety during seizure  - Reorient patient post seizure  - Seizure pads on all 4 side rails  - Instruct patient/family to  notify RN of any seizure activity including if an aura is experienced  - Instruct patient/family to call for assistance with activity based on nursing assessment  - Administer anti-seizure medications if ordered    Outcome: Progressing  Goal: Achieves maximal functionality and self care  Description: INTERVENTIONS  - Monitor swallowing and airway patency with patient fatigue and changes in neurological status  - Encourage and assist patient to increase activity and self care.   - Encourage visually impaired, hearing impaired and aphasic patients to use assistive/communication devices  Outcome: Progressing     Problem: CARDIOVASCULAR - ADULT  Goal: Maintains optimal cardiac output and hemodynamic stability  Description: INTERVENTIONS:  - Monitor I/O, vital signs and rhythm  - Monitor for S/S and trends of decreased cardiac output  - Administer and titrate ordered vasoactive medications to optimize hemodynamic stability  - Assess quality of pulses, skin color and temperature  - Assess for signs of decreased coronary artery perfusion  - Instruct patient to report change in severity of symptoms  Outcome: Progressing  Goal: Absence of cardiac dysrhythmias or at baseline rhythm  Description: INTERVENTIONS:  - Continuous cardiac monitoring, vital signs, obtain 12 lead EKG if ordered  - Administer antiarrhythmic and heart rate control medications as ordered  - Monitor electrolytes and administer replacement therapy as ordered  Outcome: Progressing     Problem: RESPIRATORY - ADULT  Goal: Achieves optimal ventilation and oxygenation  Description: INTERVENTIONS:  - Assess for changes in respiratory status  - Assess for changes in mentation and behavior  - Position to facilitate oxygenation and minimize respiratory effort  - Oxygen administered by appropriate delivery if ordered  - Initiate smoking cessation education as indicated  - Encourage broncho-pulmonary hygiene including cough, deep breathe, Incentive Spirometry  -  Assess the need for suctioning and aspirate as needed  - Assess and instruct to report SOB or any respiratory difficulty  - Respiratory Therapy support as indicated  Outcome: Progressing     Problem: GASTROINTESTINAL - ADULT  Goal: Minimal or absence of nausea and/or vomiting  Description: INTERVENTIONS:  - Administer IV fluids if ordered to ensure adequate hydration  - Maintain NPO status until nausea and vomiting are resolved  - Nasogastric tube if ordered  - Administer ordered antiemetic medications as needed  - Provide nonpharmacologic comfort measures as appropriate  - Advance diet as tolerated, if ordered  - Consider nutrition services referral to assist patient with adequate nutrition and appropriate food choices  Outcome: Progressing  Goal: Maintains or returns to baseline bowel function  Description: INTERVENTIONS:  - Assess bowel function  - Encourage oral fluids to ensure adequate hydration  - Administer IV fluids if ordered to ensure adequate hydration  - Administer ordered medications as needed  - Encourage mobilization and activity  - Consider nutritional services referral to assist patient with adequate nutrition and appropriate food choices  Outcome: Progressing  Goal: Maintains adequate nutritional intake  Description: INTERVENTIONS:  - Monitor percentage of each meal consumed  - Identify factors contributing to decreased intake, treat as appropriate  - Assist with meals as needed  - Monitor I&O, weight, and lab values if indicated  - Obtain nutrition services referral as needed  Outcome: Progressing  Goal: Oral mucous membranes remain intact  Description: INTERVENTIONS  - Assess oral mucosa and hygiene practices  - Implement preventative oral hygiene regimen  - Implement oral medicated treatments as ordered  - Initiate Nutrition services referral as needed  Outcome: Progressing     Problem: GENITOURINARY - ADULT  Goal: Maintains or returns to baseline urinary function  Description:  INTERVENTIONS:  - Assess urinary function  - Encourage oral fluids to ensure adequate hydration if ordered  - Administer IV fluids as ordered to ensure adequate hydration  - Administer ordered medications as needed  - Offer frequent toileting  - Follow urinary retention protocol if ordered  Outcome: Progressing  Goal: Absence of urinary retention  Description: INTERVENTIONS:  - Assess patient’s ability to void and empty bladder  - Monitor I/O  - Bladder scan as needed  - Discuss with physician/AP medications to alleviate retention as needed  - Discuss catheterization for long term situations as appropriate  Outcome: Progressing     Problem: Nutrition/Hydration-ADULT  Goal: Nutrient/Hydration intake appropriate for improving, restoring or maintaining nutritional needs  Description: Monitor and assess patient's nutrition/hydration status for malnutrition. Collaborate with interdisciplinary team and initiate plan and interventions as ordered.  Monitor patient's weight and dietary intake as ordered or per policy. Utilize nutrition screening tool and intervene as necessary. Determine patient's food preferences and provide high-protein, high-caloric foods as appropriate.     INTERVENTIONS:  - Monitor oral intake, urinary output, labs, and treatment plans  - Assess nutrition and hydration status and recommend course of action  - Evaluate amount of meals eaten  - Assist patient with eating if necessary   - Allow adequate time for meals  - Recommend/ encourage appropriate diets, oral nutritional supplements, and vitamin/mineral supplements  - Order, calculate, and assess calorie counts as needed  - Recommend, monitor, and adjust tube feedings and TPN/PPN based on assessed needs  - Assess need for intravenous fluids  - Provide specific nutrition/hydration education as appropriate  - Include patient/family/caregiver in decisions related to nutrition  Outcome: Progressing

## 2024-07-09 NOTE — CASE MANAGEMENT
Case Management Discharge Planning Note    Patient name Luis Forrester  Location [facility] MRN [id number]  : 1970 Date 2024       Current Admission Date: 6/15/2024  Current Admission Diagnosis:TBI (traumatic brain injury) ([facility])   Patient Active Problem List    Diagnosis Date Noted Date Diagnosed    Cervical stenosis of spinal canal 2024     Dysphagia 2024     SSS (sick sinus syndrome) ([facility]) 2024     s/p Medtronic dual chamber PPM 2024     TBI (traumatic brain injury) ([facility]) 06/15/2024     Syncope and collapse 06/15/2024     Bilateral hand pain 06/15/2024     SDH (subdural hematoma) ([facility]) 06/15/2024     Right hand weakness 06/15/2024     Encounter for immunization 2024     Vitamin D deficiency 2024     Reactive airway disease 03/15/2018     Osteoarthritis of both hands 2015     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     Chronic low back pain 2015     Mass of parotid gland 2015     Lumbar radiculopathy 2015     Hyperlipidemia 2014     Hypothyroidism 2014     Allergic rhinitis 2012       LOS (days): 24  Geometric Mean LOS (GMLOS) (days): 10  Days to GMLOS:-14.2     OBJECTIVE:  Risk of Unplanned Readmission Score: 17.61         Current admission status: Inpatient   Preferred Pharmacy:   [id number] - [place], [place]  [address]  [place] [address]  Phone: [phone] Fax: [phone]    Primary Care Provider: Joceline Shook PA-C    [place] Insurance: [place] [employer]  Secondary Insurance:     DISCHARGE DETAILS:    Pt's plan is for PEG tube tomorrow.  [facility] is aware  Possible d/c thursday

## 2024-07-09 NOTE — ACP (ADVANCE CARE PLANNING)
Record of Family Meeting - Palliative and Supportive Care   Luis Forrester 53 y.o. male [id number]      Recommendations and Plan:  Plan for PEG 7/10  When medically clear, proceed to rehab (patient and family hopeful for [facility])  Continue working with SLP and advance to least restrictive diet if possible.  Continue anticipatory guidance for family pending clinical course        A family meeting was held for Leopoldo Forrester. This meeting was necessary for determine the appropriate course of treatment.  Time of Meetinam  Meeting Location: patient's room  Participants:    Leopoldo  Significant other, Aislinn  Daughter, Trinity AN, Rebecca  Sister, Lillian  Shannan Lomeli PA-C palliative  Tia Castaneda, SLP  Hal Bull PA-C trauma    Patient Participation: present but allows family to lead    Patient Support System: family as above     Advanced Directive of POLST available: no    Topics of Discussion:  Reviewed expectations of PEG tube procedure and recovery  Set expectation that surgery team will discuss more in depth  Patient and his family inquire about the permanence of PEG  Discussed this is dependent on return of swallow function and that he will continue to work with SLP to optimize. Hopeful for improvement with time/swelling decreases.  Discussed PEG must be in place for minimum of 8 weeks prior to discontinuation.  Patient and his family are hopeful for d/c to [facility] for rehab when stable post-procedure  Ultimately Leopoldo is hopeful to return home. Family is receptive to support and teaching that enables this plan but also open to discussing subacute rehab following [facility] if absolutely necessary.    Other Content of Meeting:  Time spent providing supportive listening  Questions answered to family's satisfaction    Time Involved in Meetin minute, beginning at approximately  9pm and ending at approximately 9:37 following update to RN and documentation. .     Shannan Lomeli PA-C   Palliative and Supportive  [facility], [place]  Clinic/Answering Service: [phone]  You can find me on Epic Secure Chat!     This note was not shared with the patient due to privacy exception: note includes other individuals

## 2024-07-09 NOTE — PLAN OF CARE
Problem: OCCUPATIONAL THERAPY ADULT  Goal: Performs self-care activities at highest level of function for planned discharge setting.  See evaluation for individualized goals.  Description: Treatment Interventions: ADL retraining, Functional transfer training, UE strengthening/ROM, Endurance training, Patient/family training, Equipment evaluation/education, Fine motor coordination activities, Activityengagement          See flowsheet documentation for full assessment, interventions and recommendations.   Outcome: Progressing  Note: Limitation: Decreased ADL status, Decreased Safe judgement during ADL, Decreased cognition, Decreased endurance, Decreased self-care trans, Decreased high-level ADLs  Prognosis: Good  Assessment: pt participated in pm ot session and was seen focusing on adls seated , grooming in stance, functional mobility and transfers to from various armed surfaces. pt required mod vc's for all transfers. pt tolerated session well, balance and ue function have improved from previous session. pt was more talkative, less lehtargic this session. pts supportive dtr was present. pt with keofed tube.     Rehab Resource Intensity Level, OT: I (Maximum Resource Intensity)        April A Storm

## 2024-07-10 ENCOUNTER — ANESTHESIA (INPATIENT)
Dept: PERIOP | Facility: HOSPITAL | Age: 54
DRG: 912 | End: 2024-07-10
Payer: COMMERCIAL

## 2024-07-10 LAB
ANION GAP SERPL CALCULATED.3IONS-SCNC: 12 MMOL/L (ref 4–13)
BASOPHILS # BLD AUTO: 0.07 THOUSANDS/ÂΜL (ref 0–0.1)
BASOPHILS NFR BLD AUTO: 1 % (ref 0–1)
BUN SERPL-MCNC: 12 MG/DL (ref 5–25)
CALCIUM SERPL-MCNC: 7.4 MG/DL (ref 8.4–10.2)
CHLORIDE SERPL-SCNC: 95 MMOL/L (ref 96–108)
CO2 SERPL-SCNC: 30 MMOL/L (ref 21–32)
CREAT SERPL-MCNC: 0.51 MG/DL (ref 0.6–1.3)
EOSINOPHIL # BLD AUTO: 0.36 THOUSAND/ÂΜL (ref 0–0.61)
EOSINOPHIL NFR BLD AUTO: 4 % (ref 0–6)
ERYTHROCYTE [DISTWIDTH] IN BLOOD BY AUTOMATED COUNT: 13.9 % (ref 11.6–15.1)
GFR SERPL CREATININE-BSD FRML MDRD: 121 ML/MIN/1.73SQ M
GLUCOSE SERPL-MCNC: 90 MG/DL (ref 65–140)
HCT VFR BLD AUTO: 40 % (ref 36.5–49.3)
HGB BLD-MCNC: 12.8 G/DL (ref 12–17)
IMM GRANULOCYTES # BLD AUTO: 0.11 THOUSAND/UL (ref 0–0.2)
IMM GRANULOCYTES NFR BLD AUTO: 1 % (ref 0–2)
LYMPHOCYTES # BLD AUTO: 1.87 THOUSANDS/ÂΜL (ref 0.6–4.47)
LYMPHOCYTES NFR BLD AUTO: 21 % (ref 14–44)
MCH RBC QN AUTO: 30.8 PG (ref 26.8–34.3)
MCHC RBC AUTO-ENTMCNC: 32 G/DL (ref 31.4–37.4)
MCV RBC AUTO: 96 FL (ref 82–98)
MONOCYTES # BLD AUTO: 0.98 THOUSAND/ÂΜL (ref 0.17–1.22)
MONOCYTES NFR BLD AUTO: 11 % (ref 4–12)
NEUTROPHILS # BLD AUTO: 5.33 THOUSANDS/ÂΜL (ref 1.85–7.62)
NEUTS SEG NFR BLD AUTO: 62 % (ref 43–75)
NRBC BLD AUTO-RTO: 0 /100 WBCS
PLATELET # BLD AUTO: 225 THOUSANDS/UL (ref 149–390)
PMV BLD AUTO: 10.1 FL (ref 8.9–12.7)
POTASSIUM SERPL-SCNC: 3.9 MMOL/L (ref 3.5–5.3)
RBC # BLD AUTO: 4.15 MILLION/UL (ref 3.88–5.62)
SODIUM SERPL-SCNC: 137 MMOL/L (ref 135–147)
WBC # BLD AUTO: 8.72 THOUSAND/UL (ref 4.31–10.16)

## 2024-07-10 PROCEDURE — 99232 SBSQ HOSP IP/OBS MODERATE 35: CPT | Performed by: SURGERY

## 2024-07-10 PROCEDURE — 99232 SBSQ HOSP IP/OBS MODERATE 35: CPT | Performed by: STUDENT IN AN ORGANIZED HEALTH CARE EDUCATION/TRAINING PROGRAM

## 2024-07-10 PROCEDURE — 3E0G76Z INTRODUCTION OF NUTRITIONAL SUBSTANCE INTO UPPER GI, VIA NATURAL OR ARTIFICIAL OPENING: ICD-10-PCS | Performed by: STUDENT IN AN ORGANIZED HEALTH CARE EDUCATION/TRAINING PROGRAM

## 2024-07-10 PROCEDURE — 85025 COMPLETE CBC W/AUTO DIFF WBC: CPT | Performed by: SURGERY

## 2024-07-10 PROCEDURE — 80048 BASIC METABOLIC PNL TOTAL CA: CPT | Performed by: SURGERY

## 2024-07-10 PROCEDURE — 0DH63UZ INSERTION OF FEEDING DEVICE INTO STOMACH, PERCUTANEOUS APPROACH: ICD-10-PCS | Performed by: SURGERY

## 2024-07-10 PROCEDURE — 43246 EGD PLACE GASTROSTOMY TUBE: CPT | Performed by: SURGERY

## 2024-07-10 DEVICE — PERCUTANEOUS ENDOSCOPIC GASTROSTOMY KIT ENFIT
Type: IMPLANTABLE DEVICE | Site: ABDOMEN | Status: FUNCTIONAL
Brand: ENDOVIVE SAFETY PEG KIT

## 2024-07-10 RX ORDER — LIDOCAINE HYDROCHLORIDE 10 MG/ML
INJECTION, SOLUTION EPIDURAL; INFILTRATION; INTRACAUDAL; PERINEURAL AS NEEDED
Status: DISCONTINUED | OUTPATIENT
Start: 2024-07-10 | End: 2024-07-10

## 2024-07-10 RX ORDER — FENTANYL CITRATE 50 UG/ML
INJECTION, SOLUTION INTRAMUSCULAR; INTRAVENOUS AS NEEDED
Status: DISCONTINUED | OUTPATIENT
Start: 2024-07-10 | End: 2024-07-10

## 2024-07-10 RX ORDER — HYDROMORPHONE HCL IN WATER/PF 6 MG/30 ML
0.2 PATIENT CONTROLLED ANALGESIA SYRINGE INTRAVENOUS
Status: DISCONTINUED | OUTPATIENT
Start: 2024-07-10 | End: 2024-07-10 | Stop reason: HOSPADM

## 2024-07-10 RX ORDER — CEFAZOLIN SODIUM 2 G/50ML
SOLUTION INTRAVENOUS AS NEEDED
Status: DISCONTINUED | OUTPATIENT
Start: 2024-07-10 | End: 2024-07-10

## 2024-07-10 RX ORDER — MIDAZOLAM HYDROCHLORIDE 2 MG/2ML
INJECTION, SOLUTION INTRAMUSCULAR; INTRAVENOUS AS NEEDED
Status: DISCONTINUED | OUTPATIENT
Start: 2024-07-10 | End: 2024-07-10

## 2024-07-10 RX ORDER — DIPHENHYDRAMINE HYDROCHLORIDE 50 MG/ML
12.5 INJECTION INTRAMUSCULAR; INTRAVENOUS ONCE AS NEEDED
Status: DISCONTINUED | OUTPATIENT
Start: 2024-07-10 | End: 2024-07-10 | Stop reason: HOSPADM

## 2024-07-10 RX ORDER — ONDANSETRON 2 MG/ML
4 INJECTION INTRAMUSCULAR; INTRAVENOUS ONCE AS NEEDED
Status: DISCONTINUED | OUTPATIENT
Start: 2024-07-10 | End: 2024-07-10 | Stop reason: HOSPADM

## 2024-07-10 RX ORDER — LIDOCAINE HYDROCHLORIDE 10 MG/ML
INJECTION, SOLUTION EPIDURAL; INFILTRATION; INTRACAUDAL; PERINEURAL AS NEEDED
Status: DISCONTINUED | OUTPATIENT
Start: 2024-07-10 | End: 2024-07-10 | Stop reason: HOSPADM

## 2024-07-10 RX ORDER — SODIUM CHLORIDE, SODIUM LACTATE, POTASSIUM CHLORIDE, CALCIUM CHLORIDE 600; 310; 30; 20 MG/100ML; MG/100ML; MG/100ML; MG/100ML
INJECTION, SOLUTION INTRAVENOUS CONTINUOUS PRN
Status: DISCONTINUED | OUTPATIENT
Start: 2024-07-10 | End: 2024-07-10

## 2024-07-10 RX ORDER — PROPOFOL 10 MG/ML
INJECTION, EMULSION INTRAVENOUS AS NEEDED
Status: DISCONTINUED | OUTPATIENT
Start: 2024-07-10 | End: 2024-07-10

## 2024-07-10 RX ORDER — ROCURONIUM BROMIDE 10 MG/ML
INJECTION, SOLUTION INTRAVENOUS AS NEEDED
Status: DISCONTINUED | OUTPATIENT
Start: 2024-07-10 | End: 2024-07-10

## 2024-07-10 RX ORDER — FENTANYL CITRATE/PF 50 MCG/ML
25 SYRINGE (ML) INJECTION
Status: DISCONTINUED | OUTPATIENT
Start: 2024-07-10 | End: 2024-07-10 | Stop reason: HOSPADM

## 2024-07-10 RX ADMIN — PROPOFOL 150 MG: 10 INJECTION, EMULSION INTRAVENOUS at 14:00

## 2024-07-10 RX ADMIN — ACETAMINOPHEN 975 MG: 650 SUSPENSION ORAL at 01:06

## 2024-07-10 RX ADMIN — MIDAZOLAM 2 MG: 1 INJECTION INTRAMUSCULAR; INTRAVENOUS at 13:55

## 2024-07-10 RX ADMIN — SUGAMMADEX 200 MG: 100 INJECTION, SOLUTION INTRAVENOUS at 14:37

## 2024-07-10 RX ADMIN — ROCURONIUM BROMIDE 50 MG: 10 INJECTION, SOLUTION INTRAVENOUS at 14:00

## 2024-07-10 RX ADMIN — LIDOCAINE HYDROCHLORIDE 50 MG: 10 INJECTION, SOLUTION EPIDURAL; INFILTRATION; INTRACAUDAL; PERINEURAL at 14:00

## 2024-07-10 RX ADMIN — ACETAMINOPHEN 975 MG: 650 SUSPENSION ORAL at 08:55

## 2024-07-10 RX ADMIN — NYSTATIN: 100000 POWDER TOPICAL at 21:03

## 2024-07-10 RX ADMIN — SODIUM CHLORIDE, SODIUM LACTATE, POTASSIUM CHLORIDE, AND CALCIUM CHLORIDE: .6; .31; .03; .02 INJECTION, SOLUTION INTRAVENOUS at 13:55

## 2024-07-10 RX ADMIN — NYSTATIN: 100000 POWDER TOPICAL at 09:00

## 2024-07-10 RX ADMIN — CEFAZOLIN SODIUM 2000 MG: 2 SOLUTION INTRAVENOUS at 14:10

## 2024-07-10 RX ADMIN — GABAPENTIN 100 MG: 250 SOLUTION ORAL at 08:54

## 2024-07-10 RX ADMIN — FENTANYL CITRATE 50 MCG: 50 INJECTION INTRAMUSCULAR; INTRAVENOUS at 14:00

## 2024-07-10 RX ADMIN — TOPICAL ANESTHETIC 2 SPRAY: 200 SPRAY DENTAL; PERIODONTAL at 03:21

## 2024-07-10 RX ADMIN — PHENYLEPHRINE HYDROCHLORIDE 100 MCG: 10 INJECTION INTRAVENOUS at 14:07

## 2024-07-10 RX ADMIN — ENOXAPARIN SODIUM 30 MG: 30 INJECTION SUBCUTANEOUS at 21:03

## 2024-07-10 RX ADMIN — LEVOTHYROXINE SODIUM 125 MCG: 100 TABLET ORAL at 06:05

## 2024-07-10 RX ADMIN — PHENYLEPHRINE HYDROCHLORIDE 100 MCG: 10 INJECTION INTRAVENOUS at 14:00

## 2024-07-10 RX ADMIN — GUAIFENESIN AND DEXTROMETHORPHAN 10 ML: 100; 10 SYRUP ORAL at 01:42

## 2024-07-10 NOTE — PROGRESS NOTES
Pt transported to OR for PEG placement. TF on hold since midnight. Vss, daughter at bedside. Left arm IV due to replace, attempt x1 prior to transport arrival, will attempt when patient returns.

## 2024-07-10 NOTE — ANESTHESIA POSTPROCEDURE EVALUATION
Post-Op Assessment Note    CV Status:  Stable  Pain Score: 0    Pain management: adequate    Multimodal analgesia used between 6 hours prior to anesthesia start to PACU discharge    Mental Status:  Alert and awake   Hydration Status:  Euvolemic and stable   PONV Controlled:  Controlled   Airway Patency:  Patent  Two or more mitigation strategies used for obstructive sleep apnea   Post Op Vitals Reviewed: Yes    No anethesia notable event occurred.    Staff: CRNA               BP   168/88   Temp   97.6   Pulse  89   Resp   12   SpO2   99

## 2024-07-10 NOTE — QUICK NOTE
Post Op Check:    Saw patient at the bedside once they arrived to the floor.     Patients pain is well controlled. He denies chest pain and shortness of breath. He is experiencing nausea but no episodes of emesis.    Physical Exam:   GEN: NAD, awake, alert  Chest: Normal work of breathing, no respiratory distress  Abd: Soft, non-tender, diffuse distention. PEG tube in place.     Plan:  Diet NPO  Continue to monitor  Pain and nausea control PRN    Jailyn Vargas MD  Surgery, PGY-1

## 2024-07-10 NOTE — OP NOTE
OPERATIVE REPORT  PATIENT NAME: Luis Forrester    :  1970  MRN: [id number]  Pt Location:  OR ROOM 08    SURGERY DATE: 7/10/2024    Surgeons and Role:     * Darcy Reinoso MD - Primary     * King Carpenter DO - Assisting     * Toshia Vasquez MD - Assisting    Preop Diagnosis:  Dysphagia, unspecified type [R13.10]    Post-Op Diagnosis Codes:     * Dysphagia, unspecified type [R13.10]    Procedure(s):  INSERTION PEG TUBE    Specimen(s):  * No specimens in log *    Estimated Blood Loss:   Minimal    Drains:  NG/OG/Enteral Tube Enteral Feeding Tube Right nare (Active)   Placement Reverification X-ray 24   Site Assessment Clean;Dry;Intact 24   External Tube Length (cm) 60 cm 24   Marking at Nare (cm) - For ongoing assessment of tube placement 65 cm 24   Enteral feeding tube interventions Flushed 24   Status Tube feed infusing 24   Drainage Appearance None 24   Intake (mL) 470 mL 24   Number of days: 7       Gastrostomy/Enterostomy Percutaneous Endoscopic Gastrostomy (PEG) 20 Fr. LUQ (Active)   Number of days: 0       Anesthesia Type:   General    Operative Indications:  Dysphagia, unspecified type [R13.10]      Operative Findings:  PEG placed 3.5 cm at the skin        Complications:   None    Procedure and Technique:  The patient was brought to the operating room and identified via hospital identifier and verbally.  He was transferred to the operating room table and positioned supine.  General endotracheal anesthesia was induced.  Preoperative antibiotics were administered.  The abdomen was prepped and draped in the usual sterile fashion.  A timeout was performed confirming the correct patient, procedure, and site.  All parties were in agreement.      Endoscope was inserted to the oropharynx and advanced into the stomach. No gross abnormalities were observed.  One-to-one palpation was performed under direct  visualization.  Finding needle was inserted via a safe track technique under direct visualization with aspiration of air once entering the lumen of the stomach.  The needle was grasped with a snare through the endoscope.  Wire was passed through the needle and grasped with the snare and brought out through the patient's mouth with the endoscope.  PEG tube was placed using a pull technique.    PEG tube bumper was noted to be 3.5 cm at the level of the skin.  It was visualized with the endoscope spinning without tension.  Band-Aid was placed on the outer surface level.  Air was aspirated from the stomach as the endoscope was removed. Abdominal binder was placed.  Patient was allowed to awaken, extubated, and transferred to the PACU having tolerated the procedure well.  All instrument counts were correct, and radiofrequency wanting was negative.  Dr. Reinoso was present for the entire procedure.       Patient Disposition:  PACU         SIGNATURE: Toshia Vasquez MD  DATE: July 10, 2024  TIME: 2:48 PM

## 2024-07-10 NOTE — PHYSICAL THERAPY NOTE
Physical Therapy Cancellation Note       07/10/24 1559   Note Type   Note Type Cancelled Session   Cancel Reasons Patient to operating room  (pt off floor for PEG tube inserttion today, PT will continue to follow and treat)         David Moulton, PTA

## 2024-07-10 NOTE — PROGRESS NOTES
"Progress Note - General Surgery   Luis Forrester 53 y.o. male MRN: [id number]  Unit/Bed#: [facility] 605-01 Encounter: [id number]    Assessment:  Luis Forrester is a 53 y.o. male who originally presented for syncopal episodes in the setting of new SAH and SDH who has failed VBS and is a candidate for PEG tube placement.     Plan:  - NPO  - PEG tube placement in OR today 7/10  - patient consented with the risks and benefits explained    Subjective/Objective     Subjective: NAEO. Vital signs stable. Patient resting comfortably this morning with no concerns. Patient passing flatus, having bowel movement and urinating without difficulty.  Denies fever, chills, nausea, vomiting. Today's procedure of placement of PEG discussed and patient had no questions.    Objective:     Blood pressure 111/71, pulse 58, temperature 99 °F (37.2 °C), resp. rate 18, height 5' 6\" (1.676 m), weight 76.1 kg (167 lb 12.3 oz), SpO2 96%.,Body mass index is 27.08 kg/m².      Intake/Output Summary (Last 24 hours) at 7/10/2024 0909  Last data filed at 7/10/2024 0900  Gross per 24 hour   Intake 200 ml   Output --   Net 200 ml       Invasive Devices       Peripheral Intravenous Line  Duration             Peripheral IV 07/06/24 Left Forearm 4 days              Drain  Duration             NG/OG/Enteral Tube Enteral Feeding Tube Right nare 6 days                    Physical Exam: General appearance: alert and oriented, in no acute distress  Lungs: normal work of breathing  Heart: well perfused   Abdomen: soft, non tender, non distended  Skin: Skin color, texture, turgor normal. No rashes or lesions    Lab, Imaging and other studies:I have personally reviewed pertinent lab results.    VTE Pharmacologic Prophylaxis: Enoxaparin (Lovenox)  VTE Mechanical Prophylaxis: sequential compression device    Marla Agrawal MD  General Surgery Resident     "

## 2024-07-10 NOTE — PROGRESS NOTES
"[facility]  Progress Note  Name: Luis Forrester I  MRN: [id number]  Unit/Bed#: OR POOL I Date of Admission: 6/15/2024   Date of Service: 7/10/2024 I Hospital Day: 25    Assessment & Plan   Cervical stenosis of spinal canal  Assessment & Plan  MRI C-spine \"congenital canal stenosis with superimposed degenerative spondylosis. Most pronounced at C3-4 and C5-C6 with moderate to severe canal stenosis and mild cord compression.  No definite abnormal cord signal but mild cord edema would be difficult to exclude. Severe bilateral foraminal stenosis also seen at C3-4 and C5-C6\"    - Appreciate neurosurgery consult and recommendations  - 6/16/24 Posterior cervical laminectomies C3-7, Bilateral C2, C7 and T1 pedicle screw placement, Bilateral C3, C4, C5  lateral mass screw placement. Arthrodesis C2-T1   - no brace required  - Keflex x 2 weeks for surgical prophylaxis. Completed  - Pain control - APS following - input appreciated  - PT/OT  - DVT ppx  - NSG Signed off, plan for outpatient 2 week and 6 week post op visits    Dysphagia  Assessment & Plan  - Reported difficulty swallowing pills on 6/28  - VBS done 6/30 > strict NPO, keofed placed for feeding  - Pt failed VBS on 6/30, 7/3, 7/8 - recommended full NPO  - Currently receiving tube feeds via Keofed  - Palliative care consult for goals of care and education regarding G tube placement  - 7/9 -  Goals of care conversation had with patient and family at bedside and over the phone- he wants all medical interventions including tube feeds and PEG placement. Patient and family were educated regarding PEG placement and all of their questions and concerns answered to their satisfaction  - ACS consult placed for evaluation for PEG placement , NPO and hold all tube feeds after midnight tonight for PEG tomorrow morning      s/p Medtronic dual chamber PPM 6/21/2024  Assessment & Plan  - See SSS plan    SSS (sick sinus syndrome) " ([facility])  Assessment & Plan  - Status post PPM on 6/21  - EPS has signed off on 6/22    SDH (subdural hematoma) ([facility])  Assessment & Plan  Continue frequent neurological checks.   STAT CT head with any neurological decline including drop GCS of 2pts within 1 hr.  Seizure prophylaxis per trauma team-completed  Hold all full antiplatelet and anticoagulation medications for 2 weeks  Follow up with Neurosurgery      Hypothyroidism  Assessment & Plan  - Patient with chronic history of hypothyroidism.  - Continue home medication regimen including levothyroxine.  - Outpatient follow-up routine.    * TBI (traumatic brain injury) ([facility])  Assessment & Plan  - Traumatic brain injury with new acute appearing trace subarachnoid hemorrhage in the bilateral parafalcine regions as well as a new appearing trace parafalcine subdural hematoma, present on admission.  - CT scan of the head from 6/15/2024 demonstrated: New acute trace subarachnoid hemorrhage in bilateral parafalcine regions. New acute trace parafalcine subdural hematoma.  -  Additional imaging with CTA of the head and neck 6/15/2024 demonstrated: Negative CTA head traumatic vascular injury, aneurysm, large vessel occlusion, high-grade stenosis, or dissection. Partially imaged ectatic right carotid bifurcation and proximal cervical internal carotid artery with retropharyngeal course, similar to CT neck with contrast 10/12/2011.  - Neurosurgery evaluation and recommendations appreciated.  - Hold anti-platelet and anticoagulant medications for least 2 weeks and/or until cleared by Neurosurgery.   - Patient is not on any chronic antiplatelet or anticoagulant medications at baseline.  - Continue Keppra for 7 days for seizure prophylaxis - completed  - Monitor neurologic exam.  - Continue symptomatic management with analgesia as needed.- PT and OT evaluation and treatment as indicated.             Bowel Regimen: Senna  VTE Prophylaxis:Enoxaparin (Lovenox)     Disposition:  "rehab    Subjective   Chief Complaint: none    Subjective: \"I am okay\"     Objective   Vitals:   Temp:  [98.1 °F (36.7 °C)-99 °F (37.2 °C)] 98.9 °F (37.2 °C)  HR:  [58-61] 60  Resp:  [16-18] 18  BP: ()/(57-74) 112/74    I/O         07/08 0701 07/09 0700 07/09 0701  07/10 0700 07/10 0701 07/11 0700    P.O. 0 0 0    NG/ 200     Feedings 1515      Total Intake(mL/kg) 2235 (29.4) 200 (2.6) 0 (0)    Urine (mL/kg/hr) 1325 (0.7)      Stool 0      Total Output 1325      Net +910 +200 0           Unmeasured Urine Occurrence 3 x 1 x 3 x    Unmeasured Stool Occurrence 3 x 2 x 0 x             Physical Exam:   GENERAL APPEARANCE: sleepy  NEURO: GCS - 15  HEENT: EO's intact  CV: RRR  LUNGS: bascially CTA bilaterally  GI: NPO for OR for Peg  : voiding  MSK: moving around  SKIN: warm and dry    Invasive Devices       Peripheral Intravenous Line  Duration             Peripheral IV 07/06/24 Left Forearm 4 days              Drain  Duration             NG/OG/Enteral Tube Enteral Feeding Tube Right nare 6 days                          Lab Results:    Latest Reference Range & Units 07/10/24 04:44   Sodium 135 - 147 mmol/L 137   Potassium 3.5 - 5.3 mmol/L 3.9   Chloride 96 - 108 mmol/L 95 (L)   Carbon Dioxide 21 - 32 mmol/L 30   ANION GAP 4 - 13 mmol/L 12   BUN 5 - 25 mg/dL 12   Creatinine 0.60 - 1.30 mg/dL 0.51 (L)   GLUCOSE 65 - 140 mg/dL 90   Calcium 8.4 - 10.2 mg/dL 7.4 (L)   (L): Data is abnormally low   Latest Reference Range & Units 07/10/24 04:44   WBC 4.31 - 10.16 Thousand/uL 8.72   RBC 3.88 - 5.62 Million/uL 4.15   Hemoglobin 12.0 - 17.0 g/dL 12.8   Hematocrit 36.5 - 49.3 % 40.0   MCV 82 - 98 fL 96   MCH 26.8 - 34.3 pg 30.8   MCHC 31.4 - 37.4 g/dL 32.0   RDW 11.6 - 15.1 % 13.9   Platelet Count 149 - 390 Thousands/uL 225   MPV 8.9 - 12.7 fL 10.1   nRBC /100 WBCs 0   Segmented % 43 - 75 % 62   Lymphocytes % 14 - 44 % 21   Monocytes % 4 - 12 % 11   Eosinophils % 0 - 6 % 4   Basophils % 0 - 1 % 1   Immature Grans % 0 " - 2 % 1   Absolute Immature Grans 0.00 - 0.20 Thousand/uL 0.11   Absolute Neutrophils 1.85 - 7.62 Thousands/µL 5.33     Imaging: none   Other Studies: none

## 2024-07-11 ENCOUNTER — PATIENT OUTREACH (OUTPATIENT)
Dept: INTERNAL MEDICINE CLINIC | Facility: CLINIC | Age: 54
End: 2024-07-11

## 2024-07-11 LAB
ANION GAP SERPL CALCULATED.3IONS-SCNC: 18 MMOL/L (ref 4–13)
BASOPHILS # BLD AUTO: 0.05 THOUSANDS/ÂΜL (ref 0–0.1)
BASOPHILS NFR BLD AUTO: 0 % (ref 0–1)
BUN SERPL-MCNC: 13 MG/DL (ref 5–25)
CALCIUM SERPL-MCNC: 7.3 MG/DL (ref 8.4–10.2)
CHLORIDE SERPL-SCNC: 93 MMOL/L (ref 96–108)
CO2 SERPL-SCNC: 30 MMOL/L (ref 21–32)
CREAT SERPL-MCNC: 0.67 MG/DL (ref 0.6–1.3)
EOSINOPHIL # BLD AUTO: 0.02 THOUSAND/ÂΜL (ref 0–0.61)
EOSINOPHIL NFR BLD AUTO: 0 % (ref 0–6)
ERYTHROCYTE [DISTWIDTH] IN BLOOD BY AUTOMATED COUNT: 14.2 % (ref 11.6–15.1)
GFR SERPL CREATININE-BSD FRML MDRD: 108 ML/MIN/1.73SQ M
GLUCOSE SERPL-MCNC: 94 MG/DL (ref 65–140)
HCT VFR BLD AUTO: 37.4 % (ref 36.5–49.3)
HGB BLD-MCNC: 12.6 G/DL (ref 12–17)
IMM GRANULOCYTES # BLD AUTO: 0.09 THOUSAND/UL (ref 0–0.2)
IMM GRANULOCYTES NFR BLD AUTO: 1 % (ref 0–2)
LYMPHOCYTES # BLD AUTO: 1.76 THOUSANDS/ÂΜL (ref 0.6–4.47)
LYMPHOCYTES NFR BLD AUTO: 13 % (ref 14–44)
MCH RBC QN AUTO: 32.2 PG (ref 26.8–34.3)
MCHC RBC AUTO-ENTMCNC: 33.7 G/DL (ref 31.4–37.4)
MCV RBC AUTO: 96 FL (ref 82–98)
MONOCYTES # BLD AUTO: 0.96 THOUSAND/ÂΜL (ref 0.17–1.22)
MONOCYTES NFR BLD AUTO: 7 % (ref 4–12)
NEUTROPHILS # BLD AUTO: 10.77 THOUSANDS/ÂΜL (ref 1.85–7.62)
NEUTS SEG NFR BLD AUTO: 79 % (ref 43–75)
NRBC BLD AUTO-RTO: 0 /100 WBCS
PLATELET # BLD AUTO: 227 THOUSANDS/UL (ref 149–390)
PMV BLD AUTO: 10.4 FL (ref 8.9–12.7)
POTASSIUM SERPL-SCNC: 4.1 MMOL/L (ref 3.5–5.3)
RBC # BLD AUTO: 3.91 MILLION/UL (ref 3.88–5.62)
SODIUM SERPL-SCNC: 141 MMOL/L (ref 135–147)
WBC # BLD AUTO: 13.65 THOUSAND/UL (ref 4.31–10.16)

## 2024-07-11 PROCEDURE — 99232 SBSQ HOSP IP/OBS MODERATE 35: CPT | Performed by: STUDENT IN AN ORGANIZED HEALTH CARE EDUCATION/TRAINING PROGRAM

## 2024-07-11 PROCEDURE — 97116 GAIT TRAINING THERAPY: CPT

## 2024-07-11 PROCEDURE — 85025 COMPLETE CBC W/AUTO DIFF WBC: CPT | Performed by: STUDENT IN AN ORGANIZED HEALTH CARE EDUCATION/TRAINING PROGRAM

## 2024-07-11 PROCEDURE — 92526 ORAL FUNCTION THERAPY: CPT

## 2024-07-11 PROCEDURE — 80048 BASIC METABOLIC PNL TOTAL CA: CPT | Performed by: STUDENT IN AN ORGANIZED HEALTH CARE EDUCATION/TRAINING PROGRAM

## 2024-07-11 PROCEDURE — 99232 SBSQ HOSP IP/OBS MODERATE 35: CPT | Performed by: SURGERY

## 2024-07-11 PROCEDURE — 97530 THERAPEUTIC ACTIVITIES: CPT

## 2024-07-11 RX ORDER — POLYETHYLENE GLYCOL 3350 17 G/17G
17 POWDER, FOR SOLUTION ORAL DAILY
Status: DISCONTINUED | OUTPATIENT
Start: 2024-07-11 | End: 2024-07-12 | Stop reason: HOSPADM

## 2024-07-11 RX ADMIN — ACETAMINOPHEN 975 MG: 650 SUSPENSION ORAL at 17:04

## 2024-07-11 RX ADMIN — LEVOTHYROXINE SODIUM 125 MCG: 100 TABLET ORAL at 06:32

## 2024-07-11 RX ADMIN — NYSTATIN: 100000 POWDER TOPICAL at 09:31

## 2024-07-11 RX ADMIN — ENOXAPARIN SODIUM 30 MG: 30 INJECTION SUBCUTANEOUS at 09:31

## 2024-07-11 RX ADMIN — TOPICAL ANESTHETIC 2 SPRAY: 200 SPRAY DENTAL; PERIODONTAL at 22:28

## 2024-07-11 RX ADMIN — GABAPENTIN 100 MG: 250 SOLUTION ORAL at 09:30

## 2024-07-11 RX ADMIN — Medication 3 MG: at 22:30

## 2024-07-11 RX ADMIN — ACETAMINOPHEN 975 MG: 650 SUSPENSION ORAL at 09:30

## 2024-07-11 RX ADMIN — ENOXAPARIN SODIUM 30 MG: 30 INJECTION SUBCUTANEOUS at 22:29

## 2024-07-11 RX ADMIN — GABAPENTIN 100 MG: 250 SOLUTION ORAL at 22:29

## 2024-07-11 RX ADMIN — GABAPENTIN 100 MG: 250 SOLUTION ORAL at 17:04

## 2024-07-11 RX ADMIN — NYSTATIN: 100000 POWDER TOPICAL at 17:10

## 2024-07-11 NOTE — PLAN OF CARE
Problem: PHYSICAL THERAPY ADULT  Goal: Performs mobility at highest level of function for planned discharge setting.  See evaluation for individualized goals.  Description: Treatment/Interventions: Functional transfer training, LE strengthening/ROM, Elevations, Therapeutic exercise, Endurance training, Cognitive reorientation, Equipment eval/education, Bed mobility, Gait training, Spoke to nursing, OT  Equipment Recommended: Other (Comment) (TBD)       See flowsheet documentation for full assessment, interventions and recommendations.  Outcome: Progressing  Note: Prognosis: Good  Problem List: Decreased strength, Decreased range of motion, Decreased endurance, Impaired balance, Decreased mobility, Decreased safety awareness, Orthopedic restrictions  Assessment: Pt continues to perform transfers & community distance ambualtion with min A as noted above, doing so without LOB, although he does have some difficulty with dynamic turns & attempts to perform dynamic trunk movements when observing surroundings when negotiating hallways.  Pt appropriate for trial with SPC today to further challenge his balance & reduce reliance on UEs for support, which he did successfully as noted above, but demonsrated slower pace & shorter stride with narrowed YARIEL comparateively.  Pt remains most appropriate with use of RW at this time, but with that said, he is ambulating with improved posture & dynamic weight shifting compared to prior sessions and will continue to benefit from ambulation with LRAD within more complex environments to ensure safe return to highest level of fucntional endurance.  PT POC & d/c recommendations remain appropriate at this time to address deficits.  Barriers to Discharge: Inaccessible home environment     Rehab Resource Intensity Level, PT: I (Maximum Resource Intensity)    See flowsheet documentation for full assessment.

## 2024-07-11 NOTE — PLAN OF CARE
Problem: Nutrition/Hydration-ADULT  Goal: Nutrient/Hydration intake appropriate for improving, restoring or maintaining nutritional needs  Description: Monitor and assess patient's nutrition/hydration status for malnutrition. Collaborate with interdisciplinary team and initiate plan and interventions as ordered.  Monitor patient's weight and dietary intake as ordered or per policy. Utilize nutrition screening tool and intervene as necessary. Determine patient's food preferences and provide high-protein, high-caloric foods as appropriate.     INTERVENTIONS:  - Monitor oral intake, urinary output, labs, and treatment plans  - Assess nutrition and hydration status and recommend course of action  - Evaluate amount of meals eaten  - Assist patient with eating if necessary   - Allow adequate time for meals  - Recommend/ encourage appropriate diets, oral nutritional supplements, and vitamin/mineral supplements  - Order, calculate, and assess calorie counts as needed  - Recommend, monitor, and adjust tube feedings and TPN/PPN based on assessed needs  - Assess need for intravenous fluids  - Provide specific nutrition/hydration education as appropriate  - Include patient/family/caregiver in decisions related to nutrition  Outcome: Progressing   PT now s/p PEG recommend trial of bolus feeds, initiate Jevity 1.5@120mL 6 x day, advance as tolerated to goal of 237mL 6 x day provides total volume 1422mL, 2133cal, 91g pro, 1080mL, water flushes 75mL before and after each bolus provides total of 1980mL. Will monitor TF tolerance, labs, weight. If continues with watery diarrhea may need to switch to Vital 1.5.

## 2024-07-11 NOTE — SPEECH THERAPY NOTE
"Speech Language/Pathology    Speech/Language Pathology Progress Note    Patient Name: Luis Forrester  Today's Date: 7/11/2024     Problem List  Principal Problem:    TBI (traumatic brain injury) ([facility])  Active Problems:    Hypothyroidism    SDH (subdural hematoma) ([facility])    SSS (sick sinus syndrome) ([facility])    s/p Medtronic dual chamber PPM 6/21/2024    Dysphagia    Cervical stenosis of spinal canal       Past Medical History  Past Medical History:   Diagnosis Date    Arthritis     Chronic cough     Disease of thyroid gland     Hypoparathyroidism ([facility])     continue taking calcium and vitamin D, will check CMP, PTH level and Vitamin D level    Pneumonia of right middle lobe due to Streptococcus pneumoniae ([facility]) 11/30/2018    s/p Medtronic dual chamber PPM 6/21/2024 06/21/2024        Past Surgical History  Past Surgical History:   Procedure Laterality Date    CARDIAC ELECTROPHYSIOLOGY PROCEDURE N/A 6/21/2024    Procedure: Cardiac pacer implant;  Surgeon: Kofi Pettit MD;  Location: [facility];  Service: Cardiology    DECOMPRESSION SPINE CERVICAL POSTERIOR N/A 6/16/2024    Procedure: DECOMPRESSION SPINE CERVICAL POSTERIOR C2-T1 with fusion navigated with Oarm;  Surgeon: Endy Velasquez MD;  Location: [facility] MAIN OR;  Service: Neurosurgery    FRACTURE SURGERY      Open Treatment of Each Proximal Finger Phalanx    GASTROSTOMY TUBE PLACEMENT N/A 7/10/2024    Procedure: INSERTION PEG TUBE;  Surgeon: Darcy Reinoso MD;  Location: [facility] MAIN OR;  Service: General         Subjective:  \"These are going down well\" Patient is awake and alert.     Objective:  Patient is s/p PEG placement yesterday. He has been NPO. The patient is seen for f/u dysphagia therapy and is assessed with ice chips. Patient and daughter report oral care was completed earlier today. Patient educated to thoroughly chew ice chips and \"swallow hard\". Laryngeal rise is palpated and appears adequate. The patient is observed with cough x2 of 10 " trials. Voice is slightly wet at times, but O2 remains stable.     Assessment:  The patient tolerated ice chips well.     Plan/Recommendations:  Continue with PEG for nutrition and meds. Continue ST to further trial ice chips and swallow strengthening at bedside.

## 2024-07-11 NOTE — CONSULTS
PT now s/p PEG recommend trial of bolus feeds, initiate Jevity 1.5@120mL 6 x day, advance as tolerated to goal of 237mL 6 x day provides total volume 1422mL, 2133cal, 91g pro, 1080mL, water flushes 75mL before and after each bolus provides total of 1980mL. Will monitor TF tolerance, labs, weight. If continues with watery diarrhea may need to switch to Vital 1.5.

## 2024-07-11 NOTE — PHYSICAL THERAPY NOTE
Physical Therapy Progress Note     07/11/24 1530   PT Last Visit   PT Visit Date 07/11/24   Note Type   Note Type Treatment   Pain Assessment   Pain Score No Pain   Restrictions/Precautions   Other Precautions Cognitive;Chair Alarm;Pain;Spinal precautions;Fall Risk   Subjective   Subjective pt encountered supine in bed, completing session with SLP.  Reports feeling better this PM since procedure.  Reports no change in status with activity today.   Bed Mobility   Supine to Sit 4  Minimal assistance   Additional items Assist x 1;HOB elevated;Increased time required   Transfers   Sit to Stand 4  Minimal assistance   Additional items Assist x 1;Armrests;Increased time required   Stand to Sit 4  Minimal assistance   Additional items Assist x 1;Armrests;Increased time required   Ambulation/Elevation   Gait pattern Short stride;Foward flexed;Inconsistent shay;Shuffling;Decreased foot clearance;Narrow YARIEL;Improper Weight shift;Poor UE support   Gait Assistance 4  Minimal assist  (mod A with SPC)   Additional items Assist x 1   Assistive Device Rolling walker   Distance 150'x  2 with RW, then 25' x 2 with SPC   Balance   Static Sitting Fair   Static Standing Fair -   Ambulatory Poor +   Activity Tolerance   Activity Tolerance Patient tolerated treatment well;Patient limited by fatigue   Nurse Made Aware yes   Assessment   Prognosis Good   Problem List Decreased strength;Decreased range of motion;Decreased endurance;Impaired balance;Decreased mobility;Decreased safety awareness;Orthopedic restrictions   Assessment Pt continues to perform transfers & community distance ambualtion with min A as noted above, doing so without LOB, although he does have some difficulty with dynamic turns & attempts to perform dynamic trunk movements when observing surroundings when negotiating hallways.  Pt appropriate for trial with SPC today to further challenge his balance & reduce reliance on UEs for support, which he did successfully as  noted above, but demonsrated slower pace & shorter stride with narrowed YARIEL comparateively.  Pt remains most appropriate with use of RW at this time, but with that said, he is ambulating with improved posture & dynamic weight shifting compared to prior sessions and will continue to benefit from ambulation with LRAD within more complex environments to ensure safe return to highest level of fucntional endurance.  PT POC & d/c recommendations remain appropriate at this time to address deficits.   Goals   Patient Goals to keep getting better   STG Expiration Date 07/16/24   PT Treatment Day 5   Plan   Treatment/Interventions Functional transfer training;LE strengthening/ROM;Elevations;Therapeutic exercise;Endurance training;Patient/family training;Equipment eval/education;Bed mobility;Gait training   Progress Progressing toward goals   Discharge Recommendation   Rehab Resource Intensity Level, PT I (Maximum Resource Intensity)   Equipment Recommended Walker   Walker Package Recommended Wheeled walker   AM-PAC Basic Mobility Inpatient   Turning in Flat Bed Without Bedrails 4   Lying on Back to Sitting on Edge of Flat Bed Without Bedrails 3   Moving Bed to Chair 3   Standing Up From Chair Using Arms 3   Walk in Room 3   Climb 3-5 Stairs With Railing 3   Basic Mobility Inpatient Raw Score 19   Basic Mobility Standardized Score 42.48   [facility] Highest Level Of Mobility   -HLM Goal 6: Walk 10 steps or more   JH-HLM Achieved 7: Walk 25 feet or more       David Moulton PTA    An [facility] Basic Mobility Raw Score less than 17 suggests pt would benefit from post acute rehab.  Please also refer to the recommendation of the Physical Therapist for safe discharge planning.

## 2024-07-11 NOTE — PROGRESS NOTES
"Progress Note - General Surgery  Luis Forrester 53 y.o. male MRN: [id number]  Unit/Bed#: [facility] 605-01 Encounter: [id number]    Assessment:  Luis Forrester is a 53 y.o. male admitted to trauma service for syncopal episodes in the setting of new SAH and SDH who failed VBS and is now s/p PEG tube placement w/ with Dr. Reinoso on 7/10.    Afebrile, VSS. Patient did well overnight w/o concern of nausea/emesis. Trickle TF were initiated last night approximately 0230. PEG tube 3.5cm at skin.    UOP: 6x unmeasured occurances  0x stools (last BM )  Cr: 0.67, from 0.51 yesterday    WBC: 13.7, from 8.7 yesterday  HB.6, from 12.8 yesterday      Plan:  - PEG okay for meds  - Diet: advance feeds to goal  - remainder of care via primary team  - surgery to follow until tolerating goal TF    Shiva Baker MD  General Surgery  24  8:01 AM      Subjective/Objective   Interval History:   NAEON. AVSS. Events as noted above.    Objective:     Blood pressure 103/74, pulse 72, temperature 98.8 °F (37.1 °C), resp. rate 18, height 5' 6\" (1.676 m), weight 75.7 kg (166 lb 14.2 oz), SpO2 95%.,Body mass index is 26.94 kg/m².      Intake/Output Summary (Last 24 hours) at 2024 0801  Last data filed at 2024 0300  Gross per 24 hour   Intake 810 ml   Output 300 ml   Net 510 ml       Invasive Devices       Peripheral Intravenous Line  Duration             Peripheral IV 24 Right;Ventral (anterior) Wrist <1 day              Drain  Duration             NG/OG/Enteral Tube Enteral Feeding Tube Right nare 7 days    Gastrostomy/Enterostomy Percutaneous Endoscopic Gastrostomy (PEG) 20 Fr. LUQ <1 day                    Physical Exam:   General: well-developed, well nourished male in NAD, appears comfortable  Skin: Warm, dry, anicteric. No rash, erythema, ecchymoses, or abrasions.  HEENT: Normocephalic, atraumatic. EOMI. Normal external nose and ears. MMM.  Neck: supple, soft; trachea midline. Full ROM  CV: regular rate, " grossly well perfused  Pulm: no increased WOB, symmetric chest rise/fall with respirations  Abd: Soft, non-tender, non-distended, incisions with c/d/i dressings. PEG tube w/o surrounding erythema, induration, or bleeding.  MSK: Symmetric, no edema, no tenderness, no deformities observed  Neuro: AOx3, GCS 15    Lab, Imaging and other studies:I have personally reviewed pertinent lab results.  , CBC:   Lab Results   Component Value Date    WBC 13.65 (H) 07/11/2024    HGB 12.6 07/11/2024    HCT 37.4 07/11/2024    MCV 96 07/11/2024     07/11/2024    RBC 3.91 07/11/2024    MCH 32.2 07/11/2024    MCHC 33.7 07/11/2024    RDW 14.2 07/11/2024    MPV 10.4 07/11/2024    NRBC 0 07/11/2024   , CMP:   Lab Results   Component Value Date    SODIUM 141 07/11/2024    K 4.1 07/11/2024    CL 93 (L) 07/11/2024    CO2 30 07/11/2024    BUN 13 07/11/2024    CREATININE 0.67 07/11/2024    CALCIUM 7.3 (L) 07/11/2024    EGFR 108 07/11/2024

## 2024-07-11 NOTE — PROGRESS NOTES
SW received Clinical team request to assist pt who had left message asking for info and help on how to apply for SS Disability.  [facility]IC[facility] notes [facility] is in the [facility].    SW did call in order to attempt to give basic info and to have him ask the [place] PT/CM Team as well.  SW will be available to assist once pt has been discharged back to home.    SW received call from pt and SW reviewed the Process of how to apply for SSI/SSD.  Pt is anxious to start the process ASAP as he is worried about no income.    Pt shares he will be possibly going to [facility] tomorrow.  SW encouraged him to speak with the [facility] SW/CM for additional help.  Pt to also f/u with this SWer if needed upon DC.

## 2024-07-11 NOTE — ASSESSMENT & PLAN NOTE
- Reported difficulty swallowing pills on 6/28  - VBS done 6/30 > strict NPO, keofed placed for feeding  - Pt failed VBS on 6/30, 7/3, 7/8 - recommended full NPO  - Currently receiving tube feeds via Keofed  - Palliative care consult for goals of care and education regarding G tube placement  - 7/9 -  Goals of care conversation had with patient and family at bedside and over the phone- he wants all medical interventions including tube feeds and PEG placement. Patient and family were educated regarding PEG placement and all of their questions and concerns answered to their satisfaction  - 7/10 s/p PEG tube placement. Nutrition consult placed for tube feed adjustments.

## 2024-07-11 NOTE — PLAN OF CARE
Problem: Prexisting or High Potential for Compromised Skin Integrity  Goal: Skin integrity is maintained or improved  Description: INTERVENTIONS:  - Identify patients at risk for skin breakdown  - Assess and monitor skin integrity  - Assess and monitor nutrition and hydration status  - Monitor labs   - Assess for incontinence   - Turn and reposition patient  - Assist with mobility/ambulation  - Relieve pressure over bony prominences  - Avoid friction and shearing  - Provide appropriate hygiene as needed including keeping skin clean and dry  - Evaluate need for skin moisturizer/barrier cream  - Collaborate with interdisciplinary team   - Patient/family teaching  - Consider wound care consult   Outcome: Progressing     Problem: PAIN - ADULT  Goal: Verbalizes/displays adequate comfort level or baseline comfort level  Description: Interventions:  - Encourage patient to monitor pain and request assistance  - Assess pain using appropriate pain scale  - Administer analgesics based on type and severity of pain and evaluate response  - Implement non-pharmacological measures as appropriate and evaluate response  - Consider cultural and social influences on pain and pain management  - Notify physician/advanced practitioner if interventions unsuccessful or patient reports new pain  Outcome: Progressing     Problem: INFECTION - ADULT  Goal: Absence or prevention of progression during hospitalization  Description: INTERVENTIONS:  - Assess and monitor for signs and symptoms of infection  - Monitor lab/diagnostic results  - Monitor all insertion sites, i.e. indwelling lines, tubes, and drains  - Monitor endotracheal if appropriate and nasal secretions for changes in amount and color  - Richey appropriate cooling/warming therapies per order  - Administer medications as ordered  - Instruct and encourage patient and family to use good hand hygiene technique  - Identify and instruct in appropriate isolation precautions for  identified infection/condition  Outcome: Progressing     Problem: SAFETY ADULT  Goal: Patient will remain free of falls  Description: INTERVENTIONS:  - Educate patient/family on patient safety including physical limitations  - Instruct patient to call for assistance with activity   - Consult OT/PT to assist with strengthening/mobility   - Keep Call bell within reach  - Keep bed low and locked with side rails adjusted as appropriate  - Keep care items and personal belongings within reach  - Initiate and maintain comfort rounds  - Make Fall Risk Sign visible to staff  - Apply yellow socks and bracelet for high fall risk patients  - Consider moving patient to room near nurses station  Outcome: Progressing  Goal: Maintain or return to baseline ADL function  Description: INTERVENTIONS:  -  Assess patient's ability to carry out ADLs; assess patient's baseline for ADL function and identify physical deficits which impact ability to perform ADLs (bathing, care of mouth/teeth, toileting, grooming, dressing, etc.)  - Assess/evaluate cause of self-care deficits   - Assess range of motion  - Assess patient's mobility; develop plan if impaired  - Assess patient's need for assistive devices and provide as appropriate  - Encourage maximum independence but intervene and supervise when necessary  - Involve family in performance of ADLs  - Assess for home care needs following discharge   - Consider OT consult to assist with ADL evaluation and planning for discharge  - Provide patient education as appropriate  Outcome: Progressing  Goal: Maintains/Returns to pre admission functional level  Description: INTERVENTIONS:  - Perform AM-PAC 6 Click Basic Mobility/ Daily Activity assessment daily.  - Set and communicate daily mobility goal to care team and patient/family/caregiver.   - Collaborate with rehabilitation services on mobility goals if consulted  - Out of bed for toileting  - Record patient progress and toleration of activity level    Outcome: Progressing     Problem: DISCHARGE PLANNING  Goal: Discharge to home or other facility with appropriate resources  Description: INTERVENTIONS:  - Identify barriers to discharge w/patient and caregiver  - Arrange for needed discharge resources and transportation as appropriate  - Identify discharge learning needs (meds, wound care, etc.)  - Arrange for interpretive services to assist at discharge as needed  - Refer to Case Management Department for coordinating discharge planning if the patient needs post-hospital services based on physician/advanced practitioner order or complex needs related to functional status, cognitive ability, or social support system  Outcome: Progressing     Problem: Knowledge Deficit  Goal: Patient/family/caregiver demonstrates understanding of disease process, treatment plan, medications, and discharge instructions  Description: Complete learning assessment and assess knowledge base.  Interventions:  - Provide teaching at level of understanding  - Provide teaching via preferred learning methods  Outcome: Progressing     Problem: NEUROSENSORY - ADULT  Goal: Achieves stable or improved neurological status  Description: INTERVENTIONS  - Monitor and report changes in neurological status  - Monitor vital signs such as temperature, blood pressure, glucose, and any other labs ordered   - Initiate measures to prevent increased intracranial pressure  - Monitor for seizure activity and implement precautions if appropriate      Outcome: Progressing  Goal: Remains free of injury related to seizures activity  Description: INTERVENTIONS  - Maintain airway, patient safety  and administer oxygen as ordered  - Monitor patient for seizure activity, document and report duration and description of seizure to physician/advanced practitioner  - If seizure occurs,  ensure patient safety during seizure  - Reorient patient post seizure  - Seizure pads on all 4 side rails  - Instruct patient/family to  notify RN of any seizure activity including if an aura is experienced  - Instruct patient/family to call for assistance with activity based on nursing assessment  - Administer anti-seizure medications if ordered    Outcome: Progressing  Goal: Achieves maximal functionality and self care  Description: INTERVENTIONS  - Monitor swallowing and airway patency with patient fatigue and changes in neurological status  - Encourage and assist patient to increase activity and self care.   - Encourage visually impaired, hearing impaired and aphasic patients to use assistive/communication devices  Outcome: Progressing     Problem: CARDIOVASCULAR - ADULT  Goal: Maintains optimal cardiac output and hemodynamic stability  Description: INTERVENTIONS:  - Monitor I/O, vital signs and rhythm  - Monitor for S/S and trends of decreased cardiac output  - Administer and titrate ordered vasoactive medications to optimize hemodynamic stability  - Assess quality of pulses, skin color and temperature  - Assess for signs of decreased coronary artery perfusion  - Instruct patient to report change in severity of symptoms  Outcome: Progressing  Goal: Absence of cardiac dysrhythmias or at baseline rhythm  Description: INTERVENTIONS:  - Continuous cardiac monitoring, vital signs, obtain 12 lead EKG if ordered  - Administer antiarrhythmic and heart rate control medications as ordered  - Monitor electrolytes and administer replacement therapy as ordered  Outcome: Progressing     Problem: RESPIRATORY - ADULT  Goal: Achieves optimal ventilation and oxygenation  Description: INTERVENTIONS:  - Assess for changes in respiratory status  - Assess for changes in mentation and behavior  - Position to facilitate oxygenation and minimize respiratory effort  - Oxygen administered by appropriate delivery if ordered  - Initiate smoking cessation education as indicated  - Encourage broncho-pulmonary hygiene including cough, deep breathe, Incentive Spirometry  -  Assess the need for suctioning and aspirate as needed  - Assess and instruct to report SOB or any respiratory difficulty  - Respiratory Therapy support as indicated  Outcome: Progressing     Problem: GASTROINTESTINAL - ADULT  Goal: Minimal or absence of nausea and/or vomiting  Description: INTERVENTIONS:  - Administer IV fluids if ordered to ensure adequate hydration  - Maintain NPO status until nausea and vomiting are resolved  - Nasogastric tube if ordered  - Administer ordered antiemetic medications as needed  - Provide nonpharmacologic comfort measures as appropriate  - Advance diet as tolerated, if ordered  - Consider nutrition services referral to assist patient with adequate nutrition and appropriate food choices  Outcome: Progressing  Goal: Maintains or returns to baseline bowel function  Description: INTERVENTIONS:  - Assess bowel function  - Encourage oral fluids to ensure adequate hydration  - Administer IV fluids if ordered to ensure adequate hydration  - Administer ordered medications as needed  - Encourage mobilization and activity  - Consider nutritional services referral to assist patient with adequate nutrition and appropriate food choices  Outcome: Progressing  Goal: Maintains adequate nutritional intake  Description: INTERVENTIONS:  - Monitor percentage of each meal consumed  - Identify factors contributing to decreased intake, treat as appropriate  - Assist with meals as needed  - Monitor I&O, weight, and lab values if indicated  - Obtain nutrition services referral as needed  Outcome: Progressing  Goal: Oral mucous membranes remain intact  Description: INTERVENTIONS  - Assess oral mucosa and hygiene practices  - Implement preventative oral hygiene regimen  - Implement oral medicated treatments as ordered  - Initiate Nutrition services referral as needed  Outcome: Progressing     Problem: GENITOURINARY - ADULT  Goal: Maintains or returns to baseline urinary function  Description:  INTERVENTIONS:  - Assess urinary function  - Encourage oral fluids to ensure adequate hydration if ordered  - Administer IV fluids as ordered to ensure adequate hydration  - Administer ordered medications as needed  - Offer frequent toileting  - Follow urinary retention protocol if ordered  Outcome: Progressing  Goal: Absence of urinary retention  Description: INTERVENTIONS:  - Assess patient’s ability to void and empty bladder  - Monitor I/O  - Bladder scan as needed  - Discuss with physician/AP medications to alleviate retention as needed  - Discuss catheterization for long term situations as appropriate  Outcome: Progressing     Problem: Nutrition/Hydration-ADULT  Goal: Nutrient/Hydration intake appropriate for improving, restoring or maintaining nutritional needs  Description: Monitor and assess patient's nutrition/hydration status for malnutrition. Collaborate with interdisciplinary team and initiate plan and interventions as ordered.  Monitor patient's weight and dietary intake as ordered or per policy. Utilize nutrition screening tool and intervene as necessary. Determine patient's food preferences and provide high-protein, high-caloric foods as appropriate.     INTERVENTIONS:  - Monitor oral intake, urinary output, labs, and treatment plans  - Assess nutrition and hydration status and recommend course of action  - Evaluate amount of meals eaten  - Assist patient with eating if necessary   - Allow adequate time for meals  - Recommend/ encourage appropriate diets, oral nutritional supplements, and vitamin/mineral supplements  - Order, calculate, and assess calorie counts as needed  - Recommend, monitor, and adjust tube feedings and TPN/PPN based on assessed needs  - Assess need for intravenous fluids  - Provide specific nutrition/hydration education as appropriate  - Include patient/family/caregiver in decisions related to nutrition  Outcome: Progressing

## 2024-07-11 NOTE — RESTORATIVE TECHNICIAN NOTE
Restorative Technician Note      Patient Name: Luis Forrester     Restorative Tech Visit Date: 07/11/24  Note Type: Mobility  Patient Position Upon Consult: Supine  Activity Performed: Ambulated  Assistive Device: Roller walker (+ Chair follow)  Patient Position at End of Consult: Bedside chair; All needs within reach; Bed/Chair alarm activated    DELMA Urena

## 2024-07-11 NOTE — QUICK NOTE
7/11/2024 9:28 AM -  Luis Forrester's chart and case were reviewed by Shannan Lomeli PA-C.  Mode of review included electronic chart check, and communication with primary team.       Per review, symptoms remain controlled on current regimen and no changes are made at this time.  Please continue the regimen in place, and review our last note for details.  For dispo plan, please review Case Management notes.     Palliative will sign off at this time. Symptoms well controlled and goals are well defined.      Patient does not require outpatient PSC follow-up.       For urgent issues or any questions/concerns, please notify on-call provider via [facility] Secure Chat.  You may also call our answering service 24/7 at [phone].    Shannan Lomeli PA-C  Palliative and Supportive Care  Clinic/Answering Service: [phone]  You can find me on OBOOK!

## 2024-07-11 NOTE — QUICK NOTE
Trauma: Postop Check    Procedure: PEG tube placement    Subjective:  No acute distress.  Resting comfortably in bed.     Objective  Vitals:    07/10/24 1709 07/10/24 1807 07/10/24 1943 07/10/24 2221   BP: 116/78 116/78 115/76 107/71   Pulse: 92 90 86    Resp: 18 18 18 18   Temp: 98.2 °F (36.8 °C) 98.9 °F (37.2 °C) 100.1 °F (37.8 °C) 100.2 °F (37.9 °C)   TempSrc:       SpO2: 91% 96% 92%    Weight:       Height:             GENERAL: No acute distress  CV: Regular rate and rhythm  LUNGS: Nonlabored respirations, CTA B/L  ABDOMEN: Abdomen soft, non-tender, nondistended. PEG tube in place    Assessment and Plan:  Status post PEG tube placement        Incentive spirometry  DVT prophylaxis  Analgesia  Start trickle tube feeds tonight  Nutrition consult in morning    Travis Powell DO  PGY-2

## 2024-07-11 NOTE — PROGRESS NOTES
"[facility]  Progress Note  Name: Luis Forrester I  MRN: [id number]  Unit/Bed#: [facility] 605-01 I Date of Admission: 6/15/2024   Date of Service: 7/11/2024 I Hospital Day: 26    Assessment & Plan   Cervical stenosis of spinal canal  Assessment & Plan  MRI C-spine \"congenital canal stenosis with superimposed degenerative spondylosis. Most pronounced at C3-4 and C5-C6 with moderate to severe canal stenosis and mild cord compression.  No definite abnormal cord signal but mild cord edema would be difficult to exclude. Severe bilateral foraminal stenosis also seen at C3-4 and C5-C6\"    - Appreciate neurosurgery consult and recommendations  - 6/16/24 Posterior cervical laminectomies C3-7, Bilateral C2, C7 and T1 pedicle screw placement, Bilateral C3, C4, C5  lateral mass screw placement. Arthrodesis C2-T1   - no brace required  - Keflex x 2 weeks for surgical prophylaxis. Completed  - Pain control - APS following - input appreciated  - PT/OT  - DVT ppx  - NSG Signed off, plan for outpatient 2 week and 6 week post op visits    Dysphagia  Assessment & Plan  - Reported difficulty swallowing pills on 6/28  - VBS done 6/30 > strict NPO, keofed placed for feeding  - Pt failed VBS on 6/30, 7/3, 7/8 - recommended full NPO  - Currently receiving tube feeds via Keofed  - Palliative care consult for goals of care and education regarding G tube placement  - 7/9 -  Goals of care conversation had with patient and family at bedside and over the phone- he wants all medical interventions including tube feeds and PEG placement. Patient and family were educated regarding PEG placement and all of their questions and concerns answered to their satisfaction  - 7/10 s/p PEG tube placement. Nutrition consult placed for tube feed adjustments.       s/p Medtronic dual chamber PPM 6/21/2024  Assessment & Plan  - See SSS plan    SSS (sick sinus syndrome) (HCC)  Assessment & Plan  - Status post PPM on 6/21  - EPS " has signed off on 6/22    SDH (subdural hematoma) ([facility])  Assessment & Plan  Continue frequent neurological checks.   STAT CT head with any neurological decline including drop GCS of 2pts within 1 hr.  Seizure prophylaxis per trauma team-completed  Hold all full antiplatelet and anticoagulation medications for 2 weeks  Follow up with Neurosurgery      Hypothyroidism  Assessment & Plan  - Patient with chronic history of hypothyroidism.  - Continue home medication regimen including levothyroxine.  - Outpatient follow-up routine.    * TBI (traumatic brain injury) ([facility])  Assessment & Plan  - Traumatic brain injury with new acute appearing trace subarachnoid hemorrhage in the bilateral parafalcine regions as well as a new appearing trace parafalcine subdural hematoma, present on admission.  - CT scan of the head from 6/15/2024 demonstrated: New acute trace subarachnoid hemorrhage in bilateral parafalcine regions. New acute trace parafalcine subdural hematoma.  -  Additional imaging with CTA of the head and neck 6/15/2024 demonstrated: Negative CTA head traumatic vascular injury, aneurysm, large vessel occlusion, high-grade stenosis, or dissection. Partially imaged ectatic right carotid bifurcation and proximal cervical internal carotid artery with retropharyngeal course, similar to CT neck with contrast 10/12/2011.  - Neurosurgery evaluation and recommendations appreciated.  - Hold anti-platelet and anticoagulant medications for least 2 weeks and/or until cleared by Neurosurgery.   - Patient is not on any chronic antiplatelet or anticoagulant medications at baseline.  - Continue Keppra for 7 days for seizure prophylaxis - completed  - Monitor neurologic exam.  - Continue symptomatic management with analgesia as needed.- PT and OT evaluation and treatment as indicated.             Bowel Regimen: Senna  VTE Prophylaxis:Enoxaparin (Lovenox)     Disposition: rehab    Subjective   Chief Complaint: Patient with no  complaints    Subjective: Patient denies nausea or vomiting this morning     Objective   Vitals:   Temp:  [98 °F (36.7 °C)-100.2 °F (37.9 °C)] 98.8 °F (37.1 °C)  HR:  [60-92] 72  Resp:  [12-32] 18  BP: (103-168)/(68-88) 103/74    I/O         07/09 0701  07/10 0700 07/10 0701 07/11 0700    P.O. 0 0    I.V. (mL/kg)  800 (10.5)    NG/     Total Intake(mL/kg) 200 (2.6) 800 (10.5)    Urine (mL/kg/hr)  0 (0)    Emesis/NG output  0    Total Output  0    Net +200 +800          Unmeasured Urine Occurrence 1 x 6 x    Unmeasured Stool Occurrence 2 x 0 x             Physical Exam:   GENERAL APPEARANCE: resting comfortably in bed  NEURO: GCS 15  HEENT: Mucous membranes moist, EOMI  CV: Regular rate and rhythm  LUNGS: Clear to auscultation bilaterally  ABD: soft, nontender, PEG tube in place C/D/I  : voiding  MSK: moving all extremities  SKIN: warm, dry    Invasive Devices       Peripheral Intravenous Line  Duration             Peripheral IV 07/11/24 Right;Ventral (anterior) Wrist <1 day              Drain  Duration             NG/OG/Enteral Tube Enteral Feeding Tube Right nare 7 days    Gastrostomy/Enterostomy Percutaneous Endoscopic Gastrostomy (PEG) 20 Fr. LUQ <1 day                          Lab Results: Results: I have personally reviewed all pertinent laboratory/tests results  Imaging: I have personally reviewed pertinent reports.     Other Studies: n/a

## 2024-07-12 ENCOUNTER — HOSPITAL ENCOUNTER (INPATIENT)
Facility: HOSPITAL | Age: 54
LOS: 13 days | Discharge: HOME HEALTH | DRG: 862 | End: 2024-07-25
Attending: INTERNAL MEDICINE | Admitting: INTERNAL MEDICINE
Payer: COMMERCIAL

## 2024-07-12 VITALS
WEIGHT: 166.01 LBS | RESPIRATION RATE: 17 BRPM | HEIGHT: 66 IN | TEMPERATURE: 97.6 F | HEART RATE: 86 BPM | SYSTOLIC BLOOD PRESSURE: 103 MMHG | BODY MASS INDEX: 26.68 KG/M2 | OXYGEN SATURATION: 97 % | DIASTOLIC BLOOD PRESSURE: 68 MMHG

## 2024-07-12 DIAGNOSIS — Z91.89 AT RISK FOR VENOUS THROMBOEMBOLISM (VTE): ICD-10-CM

## 2024-07-12 DIAGNOSIS — D69.6 THROMBOCYTOPENIA (HCC): ICD-10-CM

## 2024-07-12 DIAGNOSIS — R13.10 DYSPHAGIA: ICD-10-CM

## 2024-07-12 DIAGNOSIS — R29.898 RIGHT HAND WEAKNESS: ICD-10-CM

## 2024-07-12 DIAGNOSIS — Z91.89 AT RISK FOR ALTERED BOWEL ELIMINATION: ICD-10-CM

## 2024-07-12 DIAGNOSIS — S06.5XAA SDH (SUBDURAL HEMATOMA) (HCC): ICD-10-CM

## 2024-07-12 DIAGNOSIS — J45.909 REACTIVE AIRWAY DISEASE WITHOUT COMPLICATION, UNSPECIFIED ASTHMA SEVERITY, UNSPECIFIED WHETHER PERSISTENT: ICD-10-CM

## 2024-07-12 DIAGNOSIS — E03.8 OTHER SPECIFIED HYPOTHYROIDISM: ICD-10-CM

## 2024-07-12 DIAGNOSIS — S09.90XA CLOSED HEAD INJURY, INITIAL ENCOUNTER: ICD-10-CM

## 2024-07-12 DIAGNOSIS — G95.9 CERVICAL MYELOPATHY (HCC): Primary | ICD-10-CM

## 2024-07-12 PROBLEM — R52 PAIN: Status: ACTIVE | Noted: 2024-07-12

## 2024-07-12 PROBLEM — D72.829 LEUKOCYTOSIS: Status: ACTIVE | Noted: 2024-07-12

## 2024-07-12 PROBLEM — B37.0 THRUSH: Status: ACTIVE | Noted: 2024-07-12

## 2024-07-12 PROCEDURE — 99223 1ST HOSP IP/OBS HIGH 75: CPT | Performed by: PHYSICIAN ASSISTANT

## 2024-07-12 PROCEDURE — NC001 PR NO CHARGE

## 2024-07-12 PROCEDURE — 99255 IP/OBS CONSLTJ NEW/EST HI 80: CPT

## 2024-07-12 PROCEDURE — NC001 PR NO CHARGE: Performed by: STUDENT IN AN ORGANIZED HEALTH CARE EDUCATION/TRAINING PROGRAM

## 2024-07-12 PROCEDURE — 99221 1ST HOSP IP/OBS SF/LOW 40: CPT | Performed by: OTOLARYNGOLOGY

## 2024-07-12 PROCEDURE — 97535 SELF CARE MNGMENT TRAINING: CPT

## 2024-07-12 PROCEDURE — 99239 HOSP IP/OBS DSCHRG MGMT >30: CPT | Performed by: NURSE PRACTITIONER

## 2024-07-12 RX ORDER — HYDROMORPHONE HCL IN WATER/PF 6 MG/30 ML
0.2 PATIENT CONTROLLED ANALGESIA SYRINGE INTRAVENOUS EVERY 2 HOUR PRN
Refills: 0 | Status: CANCELLED | OUTPATIENT
Start: 2024-07-12 | End: 2024-07-22

## 2024-07-12 RX ORDER — GUAIFENESIN/DEXTROMETHORPHAN 100-10MG/5
10 SYRUP ORAL EVERY 4 HOURS PRN
Qty: 60 ML | Refills: 0 | Status: ON HOLD | OUTPATIENT
Start: 2024-07-12

## 2024-07-12 RX ORDER — GUAIFENESIN/DEXTROMETHORPHAN 100-10MG/5
10 SYRUP ORAL EVERY 4 HOURS PRN
Status: DISCONTINUED | OUTPATIENT
Start: 2024-07-12 | End: 2024-07-25 | Stop reason: HOSPADM

## 2024-07-12 RX ORDER — GABAPENTIN 250 MG/5ML
100 SOLUTION ORAL 3 TIMES DAILY
Status: DISCONTINUED | OUTPATIENT
Start: 2024-07-12 | End: 2024-07-18

## 2024-07-12 RX ORDER — OXYCODONE HCL 5 MG/5 ML
2.5 SOLUTION, ORAL ORAL EVERY 4 HOURS PRN
Status: ON HOLD
Start: 2024-07-12 | End: 2024-07-22

## 2024-07-12 RX ORDER — NYSTATIN 100000 [USP'U]/G
POWDER TOPICAL 2 TIMES DAILY
Qty: 2 G | Refills: 0 | Status: ON HOLD | OUTPATIENT
Start: 2024-07-12

## 2024-07-12 RX ORDER — XYLITOL/YERBA SANTA
5 AEROSOL, SPRAY WITH PUMP (ML) MUCOUS MEMBRANE 4 TIMES DAILY PRN
Qty: 50 ML | Refills: 0 | Status: ON HOLD | OUTPATIENT
Start: 2024-07-12

## 2024-07-12 RX ORDER — BISACODYL 10 MG
10 SUPPOSITORY, RECTAL RECTAL DAILY PRN
Status: DISCONTINUED | OUTPATIENT
Start: 2024-07-12 | End: 2024-07-24

## 2024-07-12 RX ORDER — NYSTATIN 100000 [USP'U]/G
POWDER TOPICAL 2 TIMES DAILY
Status: DISCONTINUED | OUTPATIENT
Start: 2024-07-12 | End: 2024-07-25 | Stop reason: HOSPADM

## 2024-07-12 RX ORDER — OXYCODONE HCL 5 MG/5 ML
2.5 SOLUTION, ORAL ORAL EVERY 4 HOURS PRN
Status: DISCONTINUED | OUTPATIENT
Start: 2024-07-12 | End: 2024-07-24

## 2024-07-12 RX ORDER — OXYCODONE HCL 5 MG/5 ML
5 SOLUTION, ORAL ORAL EVERY 4 HOURS PRN
Status: DISCONTINUED | OUTPATIENT
Start: 2024-07-12 | End: 2024-07-12

## 2024-07-12 RX ORDER — GUAIFENESIN/DEXTROMETHORPHAN 100-10MG/5
10 SYRUP ORAL EVERY 4 HOURS PRN
Status: DISCONTINUED | OUTPATIENT
Start: 2024-07-12 | End: 2024-07-12

## 2024-07-12 RX ORDER — ACETAMINOPHEN 160 MG/5ML
975 SUSPENSION ORAL EVERY 8 HOURS
Status: DISCONTINUED | OUTPATIENT
Start: 2024-07-12 | End: 2024-07-12

## 2024-07-12 RX ORDER — XYLITOL/YERBA SANTA
5 AEROSOL, SPRAY WITH PUMP (ML) MUCOUS MEMBRANE 4 TIMES DAILY PRN
Status: DISCONTINUED | OUTPATIENT
Start: 2024-07-12 | End: 2024-07-25 | Stop reason: HOSPADM

## 2024-07-12 RX ORDER — BISACODYL 10 MG
10 SUPPOSITORY, RECTAL RECTAL DAILY PRN
Qty: 12 SUPPOSITORY | Refills: 0 | Status: ON HOLD | OUTPATIENT
Start: 2024-07-12

## 2024-07-12 RX ORDER — LANOLIN ALCOHOL/MO/W.PET/CERES
3 CREAM (GRAM) TOPICAL
Status: DISCONTINUED | OUTPATIENT
Start: 2024-07-12 | End: 2024-07-25 | Stop reason: HOSPADM

## 2024-07-12 RX ORDER — ALBUTEROL SULFATE 90 UG/1
2 AEROSOL, METERED RESPIRATORY (INHALATION) EVERY 4 HOURS PRN
Status: DISCONTINUED | OUTPATIENT
Start: 2024-07-12 | End: 2024-07-25 | Stop reason: HOSPADM

## 2024-07-12 RX ORDER — GABAPENTIN 250 MG/5ML
100 SOLUTION ORAL 3 TIMES DAILY
Qty: 30 ML | Refills: 0 | Status: ON HOLD | OUTPATIENT
Start: 2024-07-12

## 2024-07-12 RX ORDER — BISACODYL 10 MG
10 SUPPOSITORY, RECTAL RECTAL DAILY PRN
Status: DISCONTINUED | OUTPATIENT
Start: 2024-07-12 | End: 2024-07-12

## 2024-07-12 RX ORDER — LANOLIN ALCOHOL/MO/W.PET/CERES
3 CREAM (GRAM) TOPICAL
Status: DISCONTINUED | OUTPATIENT
Start: 2024-07-12 | End: 2024-07-12

## 2024-07-12 RX ORDER — POLYETHYLENE GLYCOL 3350 17 G/17G
17 POWDER, FOR SOLUTION ORAL DAILY
Status: DISCONTINUED | OUTPATIENT
Start: 2024-07-13 | End: 2024-07-16

## 2024-07-12 RX ORDER — GABAPENTIN 250 MG/5ML
100 SOLUTION ORAL 3 TIMES DAILY
Status: DISCONTINUED | OUTPATIENT
Start: 2024-07-12 | End: 2024-07-12

## 2024-07-12 RX ORDER — POLYETHYLENE GLYCOL 3350 17 G/17G
17 POWDER, FOR SOLUTION ORAL DAILY
Status: DISCONTINUED | OUTPATIENT
Start: 2024-07-13 | End: 2024-07-12

## 2024-07-12 RX ORDER — OXYCODONE HCL 5 MG/5 ML
2.5 SOLUTION, ORAL ORAL EVERY 4 HOURS PRN
Status: DISCONTINUED | OUTPATIENT
Start: 2024-07-12 | End: 2024-07-12

## 2024-07-12 RX ORDER — ACETAMINOPHEN 160 MG/5ML
975 SUSPENSION ORAL EVERY 8 HOURS
Status: DISCONTINUED | OUTPATIENT
Start: 2024-07-12 | End: 2024-07-22

## 2024-07-12 RX ORDER — ONDANSETRON 4 MG/1
4 TABLET, ORALLY DISINTEGRATING ORAL EVERY 6 HOURS PRN
Status: DISCONTINUED | OUTPATIENT
Start: 2024-07-12 | End: 2024-07-25 | Stop reason: HOSPADM

## 2024-07-12 RX ORDER — POLYETHYLENE GLYCOL 3350 17 G/17G
17 POWDER, FOR SOLUTION ORAL DAILY PRN
Status: DISCONTINUED | OUTPATIENT
Start: 2024-07-12 | End: 2024-07-24

## 2024-07-12 RX ORDER — ENOXAPARIN SODIUM 100 MG/ML
30 INJECTION SUBCUTANEOUS EVERY 12 HOURS SCHEDULED
Qty: 8.4 ML | Refills: 0 | Status: ON HOLD | OUTPATIENT
Start: 2024-07-12 | End: 2024-07-26

## 2024-07-12 RX ORDER — ENOXAPARIN SODIUM 100 MG/ML
30 INJECTION SUBCUTANEOUS EVERY 12 HOURS SCHEDULED
Status: DISCONTINUED | OUTPATIENT
Start: 2024-07-12 | End: 2024-07-24

## 2024-07-12 RX ORDER — ACETAMINOPHEN 160 MG/5ML
975 SUSPENSION ORAL EVERY 8 HOURS
Qty: 30 ML | Refills: 0 | Status: ON HOLD | OUTPATIENT
Start: 2024-07-12

## 2024-07-12 RX ADMIN — ACETAMINOPHEN 975 MG: 650 SUSPENSION ORAL at 21:48

## 2024-07-12 RX ADMIN — NYSTATIN: 100000 POWDER TOPICAL at 10:46

## 2024-07-12 RX ADMIN — ENOXAPARIN SODIUM 30 MG: 30 INJECTION SUBCUTANEOUS at 10:42

## 2024-07-12 RX ADMIN — GABAPENTIN 100 MG: 250 SOLUTION ORAL at 16:49

## 2024-07-12 RX ADMIN — NYSTATIN 500000 UNITS: 100000 SUSPENSION ORAL at 18:31

## 2024-07-12 RX ADMIN — Medication 3 MG: at 21:48

## 2024-07-12 RX ADMIN — GABAPENTIN 100 MG: 250 SOLUTION ORAL at 10:40

## 2024-07-12 RX ADMIN — NYSTATIN 500000 UNITS: 100000 SUSPENSION ORAL at 21:48

## 2024-07-12 RX ADMIN — ACETAMINOPHEN 975 MG: 650 SUSPENSION ORAL at 01:02

## 2024-07-12 RX ADMIN — ENOXAPARIN SODIUM 30 MG: 30 INJECTION SUBCUTANEOUS at 21:48

## 2024-07-12 RX ADMIN — ACETAMINOPHEN 975 MG: 650 SUSPENSION ORAL at 10:42

## 2024-07-12 RX ADMIN — GABAPENTIN 100 MG: 250 SOLUTION ORAL at 21:49

## 2024-07-12 RX ADMIN — LEVOTHYROXINE SODIUM 125 MCG: 100 TABLET ORAL at 06:34

## 2024-07-12 NOTE — DISCHARGE SUMMARY
"  Discharge Summary - Luis Forrester 53 y.o. male MRN: [id number]    [facility] Encounter: [id number]    Admission Date:   Admission Orders (From admission, onward)       Ordered        06/15/24 1155  Inpatient Admission  Once                            Admitting Diagnosis: Subarachnoid hemorrhage (HCC) [I60.9]  Syncope and collapse [R55]  Subdural hematoma (HCC) [S06.5XAA]  Closed head injury, initial encounter [S09.90XA]  Fall, initial encounter [W19.XXXA]  Other injury of unspecified body region, initial encounter [T14.8XXA]    HPI: perPA-c  \"Luis Forrester is a 53 y.o. male who presents after a syncope episode overnight.  On presentation, he notes pain in both hands as well as his mid to upper back.  He notes that the syncope episodes have happened a few times over the last 6 months.  He notes that they happen unexpectedly and randomly with no preceding symptoms.  Last night event happened after he returned home around 1 AM.  He blacked out and does not recall how long he was out before being found by his daughter. \"    Procedures Performed:   Orders Placed This Encounter   Procedures    Cardiac ep lab eps/ablations    Central Line    Critical Care       Summary of Hospital Course: 54 y/o male admitted with syncope, found to have b/l hand pain on admission, as well as mid back pain.  Syncopal episode and blacked out Neurosurgery consulted as well as Acute Pain Service.  With Syncopal episode Cardiology consulted and Electrophysiology.  Dual chamber PPM needed.  Will follow up with Neurosurgery and EP / Cardiology, Nephrology and Nutrition services as well as Speech pathology.  For details of his stay, please refer to medical records.      Significant Findings, Care, Treatment and Services Provided: XR spine cervical 2 or 3 vw injury    Result Date: 6/21/2024  Impression: No acute osseous abnormality. Expected postoperative appearance of posterior fusion of C2-T1. Workstation performed: " [id number]     CT head wo contrast    Result Date: 6/18/2024  Impression: 1. Near completely resolved parafalcine subarachnoid hemorrhage with trace residual subarachnoid hemorrhage in the frontoparietal regions medially. No mass effect or hydrocephalus. 2. No large territorial infarction. Workstation performed: [id number]     XR chest portable ICU    Result Date: 6/18/2024  Impression: No acute cardiopulmonary disease. Right IJ line terminates in the SVC. Workstation performed: [id number]     XR spine cervical 2 or 3 vw injury    Result Date: 6/17/2024  Impression: Fluoroscopy provided for procedure guidance. Please refer to the separate procedure note for additional details. Workstation performed: [id number]     CT head wo contrast    Result Date: 6/16/2024  Impression: Trace subarachnoid hemorrhage within the interhemispheric fissure is slightly decreased in conspicuity No parenchymal or subdural hematoma. Workstation performed: [id number]     XR chest portable ICU    Result Date: 6/16/2024  Impression: No acute cardiopulmonary disease. No central line. No pneumothorax. Workstation performed: [id number]     MRI cervical spine wo contrast    Result Date: 6/15/2024  Impression: Congenital canal stenosis with superimposed degenerative spondylosis . Most pronounced at C3-4 and C5-C6 with moderate to severe canal stenosis and mild cord compression. Evaluation of cord signal is limited due to artifact. No definite abnormal cord signal but mild cord edema would be difficult to exclude Severe bilateral foraminal stenosis also seen at C3-4 and C5-C6. Mild to moderate canal stenosis at other levels Workstation performed: [id number]     CT spine cervical without contrast    Result Date: 6/15/2024  Impression: No cervical spine fracture or traumatic malalignment. Posterior osteophyte disc complexes C3-C4 and C5-C6 contributes to at least moderate canal stenosis and severe bilateral foraminal narrowing at these levels.  Consider nonemergent follow-up MRI cervical spine without contrast for further evaluation. Ectatic right carotid bifurcation and proximal cervical internal carotid artery with retropharyngeal course, similar to CT neck with contrast 10/12/2011. Please see same day CTA head with contrast, CT head without contrast, CT thoracic spine without contrast for further evaluation. The study was marked in EPIC for immediate notification. Workstation performed: [id number]     CTA head w wo contrast    Result Date: 6/15/2024  Impression: Negative CTA head traumatic vascular injury, aneurysm, large vessel occlusion, high-grade stenosis, or dissection. Partially imaged ectatic right carotid bifurcation and proximal cervical internal carotid artery with retropharyngeal course, similar to CT neck with contrast 10/12/2011. Additional chronic/incidental findings as detailed above. Please see earlier same day CT head without contrast and concurrent CT cervical spine without contrast. Workstation performed: [id number]     XR hand 3+ views RIGHT    Result Date: 6/15/2024  Impression: 1. No acute osseous abnormality. 2. Degenerative changes as described. Resident: ALLYSON AWAN I, the attending radiologist, have reviewed the images and agree with the final report above. Workstation performed: [id number]     XR hand 3+ views LEFT    Result Date: 6/15/2024  Impression: No acute osseous abnormality. Degenerative changes as described. Resident: ALLYSON AWAN I, the attending radiologist, have reviewed the images and agree with the final report above. Workstation performed: [id number]     CT spine thoracic without contrast    Result Date: 6/15/2024  Impression: No acute osseous abnormality of thoracic spine. Diffuse idiopathic skeletal hyperostosis (DISH). I personally discussed this study with Travis Powell on 6/15/2024 10:08 AM. Workstation performed: [id number]     CT head wo contrast    Result Date: 6/15/2024  Impression: New acute trace  subarachnoid hemorrhage in bilateral parafalcine regions. Recommend dedicated CTA head with contrast for further evaluation. New acute trace parafalcine subdural hematoma. I personally discussed this study with Travis Powell on 6/15/2024 10:08 AM. Workstation performed: [id number]         Complications: none    Discharge Diagnosis: TBI  SDH  SSS  S/P Metronic dual chamber PPM  Dysphagia  Cervical stenosis of cervical canal    Medical Problems       Resolved Problems  Date Reviewed: 7/10/2024            Resolved    Hyponatremia 7/2/2024     Resolved by  Hal Bull PA-C    Abdominal distension 7/2/2024     Resolved by  Hal Bull PA-C          Condition at Discharge: stable         Discharge instructions/Information to patient and family:   See after visit summary for information provided to patient and family.      Provisions for Follow-Up Care:  See after visit summary for information related to follow-up care and any pertinent home health orders.      PCP: Joceline Shook PA-C    Disposition:  ARC    Planned Readmission: No      Discharge Statement   I spent 35 minutes discharging the patient. This time was spent on the day of discharge. I had direct contact with the patient on the day of discharge. Additional documentation is required if more than 30 minutes were spent on discharge.     Discharge Medications:  See after visit summary for reconciled discharge medications provided to patient and family.

## 2024-07-12 NOTE — CONSULTS
Consultation - [facility]/ENT   Luis Forrester 53 y.o. male MRN: [id number]  Unit/Bed#: [facility] 972-01 Encounter: [id number]        Assessment:  Patient with recent SDH and bilateral SAH with residual R hand weakness and recent C3-C7 laminectomy with C2-T1 fusion 6/16/24 for spinal cord compression found to have dysphagia during hospitalization. He now has a PEG for nutrition. Unclear etiology of patient's dysphagia. It may be from his neurological dysfunction, although symptoms developed about a week after surgery. Patient declined laryngoscopy. Recommend continue working with speech and outpatient ENT evaluation with laryngoscopy.     Plan:  -No acute ENT interventions planned.  -Continue working with speech therapy  -Can consider reflux ppx Pantoprazole 40 AM   -Outpatient ENT evaluation. Can re-engage is patient sooner if patient desires laryngoscopy.     ENT will sign off. Please contact us with any questions    Marychuy Ritchie MD PGY-2  [facility] Otolaryngology - Head and Neck Surgery  Available on Epic Secure Chat.  Please contact ENT Resident PingStamp Role for any questions or concerns.      History of Present Illness   Physician Requesting Consult: Kylie Trivedi MD  Reason for Consult / Principal Problem: dysphagia  HPI: Luis Forrester is a 53 y.o. year old male who presents with dysphagia. Patient had a syncopal episode on 6/15. He was found to have parafalcine SDH and bilateral SAH. C-spine MRI revealed cord compression and he underwent C3-7 laminectomies on 6/16. After another syncopal event on 6/19 he had a pacemaker placed. Several days later, he complained about trouble swallowing. Multiple MBS showed severe pharyngeal aspiration. He ultimately had a PEG tube placed. He is working with speech therapy and now tolerating ice chips.     Patient seen and examined. Patient reports he was initially coughing and choking on food. He is not having trouble managing his secretions. Prior to hospitalization  "he had no difficulty swallowing. He denies difficulty breathing.         Review of systems:  10 Point ROS was performed and negative except as above or otherwise noted in the medical record.    Historical Information   Past Medical History:   Diagnosis Date    Arthritis     Chronic cough     Disease of thyroid gland     Hypoparathyroidism (HCC)     continue taking calcium and vitamin D, will check CMP, PTH level and Vitamin D level    Pneumonia of right middle lobe due to Streptococcus pneumoniae (HCC) 11/30/2018    s/p [employer]tronic dual chamber PPM 6/21/2024 06/21/2024     Past Surgical History:   Procedure Laterality Date    CARDIAC ELECTROPHYSIOLOGY PROCEDURE N/A 6/21/2024    Procedure: Cardiac pacer implant;  Surgeon: Kofi Pettit MD;  Location: [facility];  Service: Cardiology    DECOMPRESSION SPINE CERVICAL POSTERIOR N/A 6/16/2024    Procedure: DECOMPRESSION SPINE CERVICAL POSTERIOR C2-T1 with fusion navigated with Oarm;  Surgeon: Endy Velasquez MD;  Location: [facility] MAIN OR;  Service: Neurosurgery    FRACTURE SURGERY      Open Treatment of Each Proximal Finger Phalanx    GASTROSTOMY TUBE PLACEMENT N/A 7/10/2024    Procedure: INSERTION PEG TUBE;  Surgeon: Darcy Reinoso MD;  Location: [facility] MAIN OR;  Service: General     Social History   Social History     Substance and Sexual Activity   Alcohol Use Yes    Comment: occasionally     Social History     Substance and Sexual Activity   Drug Use No     Social History     Tobacco Use   Smoking Status Former   Smokeless Tobacco Never   Tobacco Comments    pt \"tried it when he was a teenager but did not like them\"     Family History: non-contributory    Meds/Allergies   all current active meds have been reviewed    Allergies   Allergen Reactions    Chlorine Rash       Objective     Vitals:    07/12/24 1436   BP: 92/55   Pulse: 65   Resp: 18   Temp: 98.1 °F (36.7 °C)   SpO2: 94%         Physical Exam   Constitutional: Well-developed and well-nourished, no " apparent distress, non-toxic appearance.   Voice: Normal voice quality.  Head: Normocephalic, atraumatic.    Face: Symmetric  Ears: External ears normal.  Nose: External appearance normal  Pulmonary/Chest: Normal effort and rate. No respiratory distress. No stertor or stridor  Musculoskeletal: Normal range of motion.   Neurological: Right hand weakness  Skin: Skin is warm and dry.   Psychiatric: Normal mood and affect.    PROCEDURES:  Patient declined laryngoscopy     Intake/Output Summary (Last 24 hours) at 7/12/2024 1730  Last data filed at 7/12/2024 1659  Gross per 24 hour   Intake 315 ml   Output --   Net 315 ml       Invasive Devices       Peripheral Intravenous Line  Duration             Peripheral IV 07/11/24 Right;Ventral (anterior) Wrist 1 day              Drain  Duration             Gastrostomy/Enterostomy Percutaneous Endoscopic Gastrostomy (PEG) 20 Fr. LUQ 2 days                    Lab Results: I have personally reviewed pertinent lab results.    Imaging Studies: I have personally reviewed pertinent reports.    EKG, Pathology, and Other Studies: I have personally reviewed pertinent reports.      Code Status: Level 1 - Full Code  Advance Directive and Living Will:      Power of :    POLST:      None

## 2024-07-12 NOTE — ASSESSMENT & PLAN NOTE
Pt has a 6 month history of syncopal events.  Found to be bradycardic in the 40s.  Outpt follow-up with EP

## 2024-07-12 NOTE — PLAN OF CARE
Problem: Prexisting or High Potential for Compromised Skin Integrity  Goal: Skin integrity is maintained or improved  Description: INTERVENTIONS:  - Identify patients at risk for skin breakdown  - Assess and monitor skin integrity  - Assess and monitor nutrition and hydration status  - Monitor labs   - Assess for incontinence   - Turn and reposition patient  - Assist with mobility/ambulation  - Relieve pressure over bony prominences  - Avoid friction and shearing  - Provide appropriate hygiene as needed including keeping skin clean and dry  - Evaluate need for skin moisturizer/barrier cream  - Collaborate with interdisciplinary team   - Patient/family teaching  - Consider wound care consult   Outcome: Progressing     Problem: PAIN - ADULT  Goal: Verbalizes/displays adequate comfort level or baseline comfort level  Description: Interventions:  - Encourage patient to monitor pain and request assistance  - Assess pain using appropriate pain scale  - Administer analgesics based on type and severity of pain and evaluate response  - Implement non-pharmacological measures as appropriate and evaluate response  - Consider cultural and social influences on pain and pain management  - Notify physician/advanced practitioner if interventions unsuccessful or patient reports new pain  Outcome: Progressing     Problem: INFECTION - ADULT  Goal: Absence or prevention of progression during hospitalization  Description: INTERVENTIONS:  - Assess and monitor for signs and symptoms of infection  - Monitor lab/diagnostic results  - Monitor all insertion sites, i.e. indwelling lines, tubes, and drains  - Monitor endotracheal if appropriate and nasal secretions for changes in amount and color  - [place] appropriate cooling/warming therapies per order  - Administer medications as ordered  - Instruct and encourage patient and family to use good hand hygiene technique  - Identify and instruct in appropriate isolation precautions for  identified infection/condition  Outcome: Progressing     Problem: SAFETY ADULT  Goal: Patient will remain free of falls  Description: INTERVENTIONS:  - Educate patient/family on patient safety including physical limitations  - Instruct patient to call for assistance with activity   - Consult OT/PT to assist with strengthening/mobility   - Keep Call bell within reach  - Keep bed low and locked with side rails adjusted as appropriate  - Keep care items and personal belongings within reach  - Initiate and maintain comfort rounds  - Make Fall Risk Sign visible to staff  - Apply yellow socks and bracelet for high fall risk patients  - Consider moving patient to room near nurses station  Outcome: Progressing  Goal: Maintain or return to baseline ADL function  Description: INTERVENTIONS:  -  Assess patient's ability to carry out ADLs; assess patient's baseline for ADL function and identify physical deficits which impact ability to perform ADLs (bathing, care of mouth/teeth, toileting, grooming, dressing, etc.)  - Assess/evaluate cause of self-care deficits   - Assess range of motion  - Assess patient's mobility; develop plan if impaired  - Assess patient's need for assistive devices and provide as appropriate  - Encourage maximum independence but intervene and supervise when necessary  - Involve family in performance of ADLs  - Assess for home care needs following discharge   - Consider OT consult to assist with ADL evaluation and planning for discharge  - Provide patient education as appropriate  Outcome: Progressing  Goal: Maintains/Returns to pre admission functional level  Description: INTERVENTIONS:  - Perform AM-PAC 6 Click Basic Mobility/ Daily Activity assessment daily.  - Set and communicate daily mobility goal to care team and patient/family/caregiver.   - Collaborate with rehabilitation services on mobility goals if consulted  - Out of bed for toileting  - Record patient progress and toleration of activity level    Outcome: Progressing     Problem: SAFETY ADULT  Goal: Patient will remain free of falls  Description: INTERVENTIONS:  - Educate patient/family on patient safety including physical limitations  - Instruct patient to call for assistance with activity   - Consult OT/PT to assist with strengthening/mobility   - Keep Call bell within reach  - Keep bed low and locked with side rails adjusted as appropriate  - Keep care items and personal belongings within reach  - Initiate and maintain comfort rounds  - Make Fall Risk Sign visible to staff  - Apply yellow socks and bracelet for high fall risk patients  - Consider moving patient to room near nurses station  Outcome: Progressing  Goal: Maintain or return to baseline ADL function  Description: INTERVENTIONS:  -  Assess patient's ability to carry out ADLs; assess patient's baseline for ADL function and identify physical deficits which impact ability to perform ADLs (bathing, care of mouth/teeth, toileting, grooming, dressing, etc.)  - Assess/evaluate cause of self-care deficits   - Assess range of motion  - Assess patient's mobility; develop plan if impaired  - Assess patient's need for assistive devices and provide as appropriate  - Encourage maximum independence but intervene and supervise when necessary  - Involve family in performance of ADLs  - Assess for home care needs following discharge   - Consider OT consult to assist with ADL evaluation and planning for discharge  - Provide patient education as appropriate  Outcome: Progressing  Goal: Maintains/Returns to pre admission functional level  Description: INTERVENTIONS:  - Perform AM-PAC 6 Click Basic Mobility/ Daily Activity assessment daily.  - Set and communicate daily mobility goal to care team and patient/family/caregiver.   - Collaborate with rehabilitation services on mobility goals if consulted  - Out of bed for toileting  - Record patient progress and toleration of activity level   Outcome: Progressing      Problem: DISCHARGE PLANNING  Goal: Discharge to home or other facility with appropriate resources  Description: INTERVENTIONS:  - Identify barriers to discharge w/patient and caregiver  - Arrange for needed discharge resources and transportation as appropriate  - Identify discharge learning needs (meds, wound care, etc.)  - Arrange for interpretive services to assist at discharge as needed  - Refer to Case Management Department for coordinating discharge planning if the patient needs post-hospital services based on physician/advanced practitioner order or complex needs related to functional status, cognitive ability, or social support system  Outcome: Progressing     Problem: Knowledge Deficit  Goal: Patient/family/caregiver demonstrates understanding of disease process, treatment plan, medications, and discharge instructions  Description: Complete learning assessment and assess knowledge base.  Interventions:  - Provide teaching at level of understanding  - Provide teaching via preferred learning methods  Outcome: Progressing     Problem: NEUROSENSORY - ADULT  Goal: Achieves stable or improved neurological status  Description: INTERVENTIONS  - Monitor and report changes in neurological status  - Monitor vital signs such as temperature, blood pressure, glucose, and any other labs ordered   - Initiate measures to prevent increased intracranial pressure  - Monitor for seizure activity and implement precautions if appropriate      Outcome: Progressing  Goal: Remains free of injury related to seizures activity  Description: INTERVENTIONS  - Maintain airway, patient safety  and administer oxygen as ordered  - Monitor patient for seizure activity, document and report duration and description of seizure to physician/advanced practitioner  - If seizure occurs,  ensure patient safety during seizure  - Reorient patient post seizure  - Seizure pads on all 4 side rails  - Instruct patient/family to notify RN of any seizure  activity including if an aura is experienced  - Instruct patient/family to call for assistance with activity based on nursing assessment  - Administer anti-seizure medications if ordered    Outcome: Progressing  Goal: Achieves maximal functionality and self care  Description: INTERVENTIONS  - Monitor swallowing and airway patency with patient fatigue and changes in neurological status  - Encourage and assist patient to increase activity and self care.   - Encourage visually impaired, hearing impaired and aphasic patients to use assistive/communication devices  Outcome: Progressing     Problem: CARDIOVASCULAR - ADULT  Goal: Maintains optimal cardiac output and hemodynamic stability  Description: INTERVENTIONS:  - Monitor I/O, vital signs and rhythm  - Monitor for S/S and trends of decreased cardiac output  - Administer and titrate ordered vasoactive medications to optimize hemodynamic stability  - Assess quality of pulses, skin color and temperature  - Assess for signs of decreased coronary artery perfusion  - Instruct patient to report change in severity of symptoms  Outcome: Progressing  Goal: Absence of cardiac dysrhythmias or at baseline rhythm  Description: INTERVENTIONS:  - Continuous cardiac monitoring, vital signs, obtain 12 lead EKG if ordered  - Administer antiarrhythmic and heart rate control medications as ordered  - Monitor electrolytes and administer replacement therapy as ordered  Outcome: Progressing     Problem: RESPIRATORY - ADULT  Goal: Achieves optimal ventilation and oxygenation  Description: INTERVENTIONS:  - Assess for changes in respiratory status  - Assess for changes in mentation and behavior  - Position to facilitate oxygenation and minimize respiratory effort  - Oxygen administered by appropriate delivery if ordered  - Initiate smoking cessation education as indicated  - Encourage broncho-pulmonary hygiene including cough, deep breathe, Incentive Spirometry  - Assess the need for  suctioning and aspirate as needed  - Assess and instruct to report SOB or any respiratory difficulty  - Respiratory Therapy support as indicated  Outcome: Progressing     Problem: GASTROINTESTINAL - ADULT  Goal: Minimal or absence of nausea and/or vomiting  Description: INTERVENTIONS:  - Administer IV fluids if ordered to ensure adequate hydration  - Maintain NPO status until nausea and vomiting are resolved  - Nasogastric tube if ordered  - Administer ordered antiemetic medications as needed  - Provide nonpharmacologic comfort measures as appropriate  - Advance diet as tolerated, if ordered  - Consider nutrition services referral to assist patient with adequate nutrition and appropriate food choices  Outcome: Progressing  Goal: Maintains or returns to baseline bowel function  Description: INTERVENTIONS:  - Assess bowel function  - Encourage oral fluids to ensure adequate hydration  - Administer IV fluids if ordered to ensure adequate hydration  - Administer ordered medications as needed  - Encourage mobilization and activity  - Consider nutritional services referral to assist patient with adequate nutrition and appropriate food choices  Outcome: Progressing  Goal: Maintains adequate nutritional intake  Description: INTERVENTIONS:  - Monitor percentage of each meal consumed  - Identify factors contributing to decreased intake, treat as appropriate  - Assist with meals as needed  - Monitor I&O, weight, and lab values if indicated  - Obtain nutrition services referral as needed  Outcome: Progressing  Goal: Oral mucous membranes remain intact  Description: INTERVENTIONS  - Assess oral mucosa and hygiene practices  - Implement preventative oral hygiene regimen  - Implement oral medicated treatments as ordered  - Initiate Nutrition services referral as needed  Outcome: Progressing     Problem: GENITOURINARY - ADULT  Goal: Maintains or returns to baseline urinary function  Description: INTERVENTIONS:  - Assess urinary  function  - Encourage oral fluids to ensure adequate hydration if ordered  - Administer IV fluids as ordered to ensure adequate hydration  - Administer ordered medications as needed  - Offer frequent toileting  - Follow urinary retention protocol if ordered  Outcome: Progressing  Goal: Absence of urinary retention  Description: INTERVENTIONS:  - Assess patient’s ability to void and empty bladder  - Monitor I/O  - Bladder scan as needed  - Discuss with physician/AP medications to alleviate retention as needed  - Discuss catheterization for long term situations as appropriate  Outcome: Progressing     Problem: Nutrition/Hydration-ADULT  Goal: Nutrient/Hydration intake appropriate for improving, restoring or maintaining nutritional needs  Description: Monitor and assess patient's nutrition/hydration status for malnutrition. Collaborate with interdisciplinary team and initiate plan and interventions as ordered.  Monitor patient's weight and dietary intake as ordered or per policy. Utilize nutrition screening tool and intervene as necessary. Determine patient's food preferences and provide high-protein, high-caloric foods as appropriate.     INTERVENTIONS:  - Monitor oral intake, urinary output, labs, and treatment plans  - Assess nutrition and hydration status and recommend course of action  - Evaluate amount of meals eaten  - Assist patient with eating if necessary   - Allow adequate time for meals  - Recommend/ encourage appropriate diets, oral nutritional supplements, and vitamin/mineral supplements  - Order, calculate, and assess calorie counts as needed  - Recommend, monitor, and adjust tube feedings and TPN/PPN based on assessed needs  - Assess need for intravenous fluids  - Provide specific nutrition/hydration education as appropriate  - Include patient/family/caregiver in decisions related to nutrition  Outcome: Progressing

## 2024-07-12 NOTE — ARC ADMISSION
Patient is medically cleared for discharge and is approved for admission to [facility] today.  Patient will admit to [facility] room 972 and has a 1300 transport time.  Report can be called to [phone] or patient's nurse can contact the [facility] charge nurse through Secure Chat for report.  CM has been updated with same in [place].

## 2024-07-12 NOTE — H&P
[facility]  H&P  Name: Luis Forrester 53 y.o. male I MRN: [id number]  Unit/Bed#: -01 I Date of Admission: 7/12/2024   Date of Service: 7/12/2024 I Hospital Day: 0      Assessment & Plan   * Cervical myelopathy (HCC)  Assessment & Plan  S/p C3-7 laminectomy with C2-T1 fusion  Completed post-op abx.  No collar necessary.  Monitor incision  NS follow-up around 8/2/24 for 6 week post-op appt.  Therapy and pain control per PMR    Leukocytosis  Assessment & Plan  Mild. Likely reactive. No signs of infx.    Dysphagia  Assessment & Plan  S/p PEG placement.  SLP to follow.  Pt tolerating tube feeds so far.  Nutrition to follow.    s/p Medtronic dual chamber PPM 6/21/2024  Assessment & Plan  Pt has a 6 month history of syncopal events.  Found to be bradycardic in the 40s.  Outpt follow-up with EP    SDH (subdural hematoma) (HCC)  Assessment & Plan  No on antiplatelet or AC as an outpt.  Repeat head CT for any change in mental status.    Syncope and collapse  Assessment & Plan  No events since PPM placement.    Hypothyroidism  Assessment & Plan  Continue levothyroxine.  Due for repeat TSH around  8/2/24.                History of Present Illness:   Luis Forrester is a 53 y.o. male, with hypothyroidism, prior episodes of syncope, and hypoparathyroidism, who presented 6/15/24 after a syncopal event. He reported Rt sided weakness as well. Imaging revealed parafalcine SDH with bilat SAH. MRI of the C-spine revealed moderate to severe canal stenosis with mild cord compression. He underwent C3-C7 laminectomy with C2-T1 fusion 6/16/24. He had another syncopal event 6/19/24 when NS attempted to remove the hemovac drain. He was seen by EP due to multiple syncopal events and documented bradycardia into the 40s. He had a PPM placed. He was also seen by Nephrology due to hyponatremia and polyuria. He was started on 1.8% saline. TSH was abnormal and levothyroxine was started as well. He  "complained of difficulty swallowing and was seen by speech. VBS revealed aspiration and he had a Keofed placed. VBS was repeated 7/3/24 and 7/8/24 but pt continued to aspirate. He initially refused PEG tube placement and met with Palliative Care. He was later agreeable to PEG placement and had the procedure done 7/10/24. He was started on trickle feeds and the progressed to bolus tube feeds 6x daily. He was determined to be stable for admission to the [facility] 7/12/24    Review of Systems: A 10 point ROS was performed; negative except as noted above.       Social History:    Substance Use History:   Social History     Substance and Sexual Activity   Alcohol Use Yes    Comment: occasionally     Social History     Tobacco Use   Smoking Status Former   Smokeless Tobacco Never   Tobacco Comments    pt \"tried it when he was a teenager but did not like them\"     Social History     Substance and Sexual Activity   Drug Use No       Past Medical History:    Past Medical History:   Diagnosis Date    Arthritis     Chronic cough     Disease of thyroid gland     Hypoparathyroidism (HCC)     continue taking calcium and vitamin D, will check CMP, PTH level and Vitamin D level    Pneumonia of right middle lobe due to Streptococcus pneumoniae (HCC) 11/30/2018    s/p Medtronic dual chamber PPM 6/21/2024 06/21/2024         Review of Scheduled Meds:  Current Facility-Administered Medications   Medication Dose Route Frequency Provider Last Rate    acetaminophen  975 mg Oral Q8H Kim Li PA-C      albuterol  2 puff Inhalation Q4H PRN Kim Li PA-C      benzocaine  2 spray Mucosal 4x Daily PRN Kim Li PA-C      bisacodyl  10 mg Rectal Daily PRN Kim Li PA-C      dextromethorphan-guaiFENesin  10 mL Oral Q4H PRN Kim Li PA-C      enoxaparin  30 mg Subcutaneous Q12H ASHLYN Kim Li PA-C      gabapentin  100 mg Oral TID Kim Li PA-C      [START ON 7/13/2024] " levothyroxine  125 mcg Oral Early Morning Kim Martinez-Hamzah PA-C      melatonin  3 mg Oral HS Kim Michelle-Hamzah, PA-C      nystatin   Topical BID Kim Michelle-Hamzah, PA-C      ondansetron  4 mg Per PEG Tube Q6H PRN Kim Michelle-Hamzah, PA-C      oxyCODONE  2.5 mg Oral Q4H PRN Kim Michelle-Hamzah, PA-C      Or    oxyCODONE  5 mg Oral Q4H PRN Kim Michelle-Hamzah, PA-C      [START ON 7/13/2024] polyethylene glycol  17 g Oral Daily Kim Michelle-Hamzah, PA-C      saliva substitute  5 spray Mouth/Throat 4x Daily PRN Kim Michelle-Hamzah, PA-C         Physical Exam:  Temp:  [97.6 °F (36.4 °C)-98.6 °F (37 °C)] 98.1 °F (36.7 °C)  HR:  [60-86] 65  Resp:  [17-18] 18  BP: ()/(55-73) 92/55  SpO2:  [94 %-97 %] 94 %    General: alert, no apparent distress, cooperative, and comfortable  HEENT:  Head: Normocephalic, no lesions, without obvious abnormality.  Eye: Normal external eye, conjunctiva, lidsc cornea  Ears: Normal external ears  Nose: Normal external nose, mucus membranes  CARDIAC:  regular rate and rhythm, S1, S2 normal, no murmur, click, rub or gallop  LUNGS:  no abnormal respiratory pattern, no retractions noted, non-labored breathing   ABDOMEN:  soft, non-tender, non-distended and PEG site without erythema.  EXTREMITIES:  extremities normal, warm and well-perfused; no cyanosis, clubbing, or edema  NEURO:  clear speech, following all commands, oriented x 3  PSYCH:  Alert and oriented, appropriate affect.  INCISION:  healing well    Laboratory:    Results from last 7 days   Lab Units 07/11/24  0442 07/10/24  0444 07/07/24  0632   HEMOGLOBIN g/dL 12.6 12.8 12.2   HEMATOCRIT % 37.4 40.0 36.9   WBC Thousand/uL 13.65* 8.72 8.54     Results from last 7 days   Lab Units 07/11/24  0442 07/10/24  0444 07/07/24  0632   BUN mg/dL 13 12 11   SODIUM mmol/L 141 137 136   POTASSIUM mmol/L 4.1 3.9 3.9   CHLORIDE mmol/L 93* 95* 99   CREATININE mg/dL 0.67 0.51* 0.52*            Wt Readings from Last 1 Encounters:   07/12/24 69.3  "kg (152 lb 12.5 oz)     Estimated body mass index is 23.93 kg/m² as calculated from the following:    Height as of this encounter: 5' 7\" (1.702 m).    Weight as of this encounter: 69.3 kg (152 lb 12.5 oz).    Imaging:  No orders to display       Level 1 - Full Code    Counseling / Coordination of Care:   Total floor / unit time spent today 60 minutes. and Greater than 50% of total time was spent with the patient and / or family counseling and / or coordination of care.      ** Please Note: Fluency Direct voice to text software may have been used in the creation of this document. **        "

## 2024-07-12 NOTE — OCCUPATIONAL THERAPY NOTE
Occupational Therapy Treatment Note:      07/12/24 1010   OT Last Visit   OT Visit Date 07/12/24   Note Type   Note Type Treatment   Pain Assessment   Pain Assessment Tool FLACC   Pain Rating: FLACC (Rest) - Face 0   Pain Rating: FLACC (Rest) - Legs 0   Pain Rating: FLACC (Rest) - Activity 0   Pain Rating: FLACC (Rest) - Cry 0   Pain Rating: FLACC (Rest) - Consolability 0   Score: FLACC (Rest) 0   Pain Rating: FLACC (Activity) - Face 1   Pain Rating: FLACC (Activity) - Legs 0   Pain Rating: FLACC (Activity) - Activity 0   Pain Rating: FLACC (Activity) - Cry 0   Pain Rating: FLACC (Activity) - Consolability 0   Score: FLACC (Activity) 1   Restrictions/Precautions   Weight Bearing Precautions Per Order No   Other Precautions Impulsive;Cognitive;Chair Alarm;Bed Alarm;Aspiration;Fall Risk;Pain;Spinal precautions  (peg tube and yankower for oral secretions)   ADL   Where Assessed Chair   Grooming Assistance 3  Moderate Assistance  (sba hands face, min asst oral and hair and max asst shaving with rzor and shaving cream)   UB Bathing Assistance 4  Minimal Assistance   LB Bathing Assistance 4  Minimal Assistance   LB Bathing Deficit   (needs encouragement to attempt upper legs)   UB Dressing Assistance 4  Minimal Assistance   LB Dressing Assistance 3  Moderate Assistance   LB Dressing Deficit   (using lhae ie dressing stick and rigid s.a.)   Toileting Assistance  2  Maximal Assistance   Toileting Deficit   (asst for wiping buttocks (ta) after loose stools, asst for clothing management (mod))   Functional Standing Tolerance   Time f+ balance c rw, short quick steps, uncoordinated c rw.   Bed Mobility   Supine to Sit 3  Moderate assistance   Additional items   (flat bed no rails, poor carryover log roll type)   Transfers   Sit to Stand 4  Minimal assistance   Stand to Sit 4  Minimal assistance   Toilet transfer   (min asst and mod vc's)   Functional Mobility   Functional Mobility 4  Minimal assistance   Additional Comments  to from bathroom   Additional items Rolling walker   Coordination   Fine Motor pt unable to press shaving cream with h index finger, needing asst. he was able to brush teeth and apply paste c min asst for bottle loosening. fine motor deficits effect fastners when dressing etc   Cognition   Overall Cognitive Status Impaired   Arousal/Participation Alert   Attention Attends with cues to redirect   Memory Decreased recall of precautions;Decreased recall of recent events;Decreased short term memory   Following Commands Follows one step commands without difficulty   Comments limited carryover thus far with rw education. he also reports little knowlegde of peg tube and feeding sx etc. will need repetitive trainning for same. fair minus safety with rw. remains quick to ask for asst vs attempt things himself though minimal improvement was noted in this area. no difficulty sequencing routine adls   Activity Tolerance   Activity Tolerance Patient tolerated treatment well   Assessment   Assessment pt participated in am ot session and was seen focusing on  full adls, toileting, clothing management and fine motor coordination. pt was s/min asst for transfers and functional mobility with rw, fair minus safety / carryover displayed.  pt is mildly impulsive overall. he was min asst ub bathing and dressing/ mod/max asst lb dressing with dressing stick and rigid sock aide. (will need wider sa)  pt required max asst hair washing and shaving with encouragement was able to hold razor and swipe face grossly. pt unable to dispense faom etc. fine motor remains a deficit as well as dyn balance. pt with brighter affect this session. cooperative and motivated. dtr arrived to visit. pt states this week they are moving into his s/o's apt.  he reports she is retired. step to get between rooms of home but not to enter   Plan   Treatment Interventions ADL retraining;Functional transfer training;Endurance training;Patient/family  training;Activityengagement   Goal Expiration Date 07/22/24   OT Treatment Day 4   OT Frequency 3-5x/wk   Discharge Recommendation   Rehab Resource Intensity Level, OT I (Maximum Resource Intensity)   AM-PAC Daily Activity Inpatient   Lower Body Dressing 2   Bathing 2   Toileting 2  (unable to wipe after bm, a for clothing management)   Upper Body Dressing 2   Grooming 3  (asst for some containers, to press shaving cream bottle to dispense)   Eating 3   Daily Activity Raw Score 14   Daily Activity Standardized Score (Calc for Raw Score >=11) 33.39   AM-PAC Applied Cognition Inpatient   Following a Speech/Presentation 3   Understanding Ordinary Conversation 4   Taking Medications 3   Remembering Where Things Are Placed or Put Away 3   Remembering List of 4-5 Errands 2   Taking Care of Complicated Tasks 2   Applied Cognition Raw Score 17   Applied Cognition Standardized Score 36.52   April A Storm

## 2024-07-12 NOTE — CASE MANAGEMENT
Case Management Discharge Planning Note    Patient name Luis Forrester  Location [facility] 605-01 MRN [id number]  : 1970 Date 2024       Current Admission Date: 6/15/2024  Current Admission Diagnosis:TBI (traumatic brain injury) ([facility])   Patient Active Problem List    Diagnosis Date Noted Date Diagnosed    Cervical stenosis of spinal canal 2024     Dysphagia 2024     SSS (sick sinus syndrome) ([facility]) 2024     s/p Medtronic dual chamber PPM 2024     TBI (traumatic brain injury) ([facility]) 06/15/2024     Syncope and collapse 06/15/2024     Bilateral hand pain 06/15/2024     SDH (subdural hematoma) ([facility]) 06/15/2024     Right hand weakness 06/15/2024     Encounter for immunization 2024     Vitamin D deficiency 2024     Reactive airway disease 03/15/2018     Osteoarthritis of both hands 2015     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     Chronic low back pain 2015     Mass of parotid gland 2015     Lumbar radiculopathy 2015     Hyperlipidemia 2014     Hypothyroidism 2014     Allergic rhinitis 2012       LOS (days): 27  Geometric Mean LOS (GMLOS) (days): 10  Days to GMLOS:-17     OBJECTIVE:  Risk of Unplanned Readmission Score: 18.34         Current admission status: Inpatient   Preferred Pharmacy:   [id number] - [place]  [address] [address]  Phone: [phone] Fax: [phone]    Primary Care Provider: Joceline Shook PA-C    [place] Insurance[place] [employer]  Secondary Insurance:     DISCHARGE DETAILS:    Pt will d/c to [facility] 972 @ 1200  Report to:  [phone] or secure chat the [facility] charge nurse.  It is Ele Forth today.

## 2024-07-12 NOTE — CONSULTS
"PHYSICAL MEDICINE AND REHABILITATION CONSULTATION NOTE  Luis Forrester 53 y.o. male MRN: [id number]  Unit/Bed#: [facility] 972-01 Encounter: [id number]     Referring provider:  Kim Li PA-C   Rehab Diagnosis: Spinal Cord Dysfunction:  Traumatic:  04.230  Other Traumatic See below  Etiologic Diagnosis:  cervical myelopathy s/p C3-C7 cervical laminectomy with bilateral screw placement and fusion C2 to-T1 on 6/16      History of Present Illness:   Per my prior consult during acute course  \"53-year-old male with PMH of asthma, HLD, hypothyroidism, who was having intermittent syncopal episodes over the last year who was found down by daughter after presumable other syncopal episode now complaining of short-term mid and upper back pain as well as pain limiting in hands and weakness of the upper extremities.  Patient was brought to hospital where CT head showed acute trace subarachnoid hemorrhage in the bilateral parafalcine regions.  CT of the T-spine showed DISH without acute or osseous abnormalities.  CTA head and neck did not show acute traumatic vascular injury, high-grade stenosis or dissection.  CT C spine showed no cervical fx or traumatic malalignment with mod canal and severe b/l multilevel NF stenosis.  MRI C spine shows moderate to severe canal stenosis with mild cord compression most pronounced at C3-4 and C5-6.  It also showed severe bilateral foraminal stenosis at same levels along with congenital canal stenosis with superimposed degenerative spondylosis.  Neurosurgery consulted on patient and diagnosed with parafalcine subdural hemorrhage with bilateral subarachnoid hemorrhage as well as operative intervention for the cervical stenosis with myelopathy and upper extremity weakness.  Patient underwent C3-C7 cervical laminectomy with bilateral screw placement and fusion C2 to-T1 on 6/16.  Patient recommended maps greater than 85 for 5 days and has been on 2 vasopressors.  He had his Hemovac drain " "removed on 6/19.  He was recommended 2 weeks of antibiotics for infectious prophylaxis per neurosurgery.  He has not required to have a cervical collar at this time.  Acute pain service consulted and have been managing with multimodal pain regiment including IV opiates.  Patient noted to have symptomatic junctional bradycardia with asymptomatic wide-complex tachycardia on telemetry for which EP was consulted.  They felt presentation mixture of sick sinus syndrome and wide-complex tachycardia likely SVT with aberrancy and felt reasonable to place dual-chamber pacemaker given history of multiple syncopal episodes and now documented bradycardia. Course also notable for hyponatremia and polyuria for which nephrology was consulted (and now improved).  Patient did receive 2 doses of DDAVP as well as intermittent IV fluids.\"    Course also notable for abdominal distension which was favored to be ileus as well as dysphagia and patient failing multiple VBS.  He is noted to have mild oral and moderate pharyngeal dysphagia and eventually was recommended PEG tube and to continue NPO diet.  Patient was evaluated by skilled therapies and was found to have significant decline in ADLs and ambulation and appears appropriate for admission to [facility].     Chief complaint:  Want to get better     Subjective: On evaluation, patient reports some neck pain although improving.  He notes occasional pain in the hands but also improving.  He denies worsening strength, sensation in arms or legs.  He denies bowel or bladder incontinence.  He denies other new complaints.     Review of Systems: A 10 point ROS was performed; negative except as noted above.     * Cervical myelopathy (HCC)  Assessment & Plan  Recent with residual neck pain, distal RUE weakness, impaired mobility   - MRI cervical spine without 6/15/2024:Congenital canal stenosis with superimposed degenerative spondylosis .Most pronounced at C3-4 and " C5-C6 with moderate to severe canal stenosis and mild cord compression. Evaluation of cord signal is limited due to artifact. No definite abnormal cord signal but mild cord edema would be difficult to exclude. Severe bilateral foraminal stenosis also seen at C3-4 and C5-C6.Mild to moderate canal stenosis at other levels  - S/P PCDF C2-T1 on 6/16 by NeuroSx Dr Lopez  - Monitor incision site   - No collar required  - Activity Restrictions: No heavy lifting greater than 5 - 10lbs. No strenuous activities. No driving while requiring cervical collar, anticipated six weeks. No significant neck movement.  - May shower 3 days after surgery, but do not soak in a tub and no swimming, use mild antimicrobial soap and water. Pat incision dry after showering.  - Monitor for neuro changes, dysphagia (has significant on tube feeds), neurogenic bowel/bladder, spasticity  - Recommend acute comprehensive interdisciplinary inpatient rehabilitation to include intensive skilled therapies (PT, OT) as outlined with oversight and management by rehabilitation physician as well as inpatient rehab level nursing, case management and weekly interdisciplinary team meetings.   - Optimal pain management  - Fall precautions   - Follow-up with Spine Sx and if needed PMR after d/c       Dysphagia  Assessment & Plan  - Recommend ENT c/s for ongoing prolonged dysphagia  - Current diet: NPO - failed multiple VBS    - Mild oral and moderate pharyngeal dysphagia  - NGT feeds, nutrition c/s  - SLP evaluate and treat decline in swallowing.  - Ensure adequate hydration  - Nutrition consult to assist with management     SDH (subdural hematoma) (HCC)  Assessment & Plan  Following syncope and being found down   CTH 6/15 - New acute trace subarachnoid hemorrhage in bilateral parafalcine regions. Recommend dedicated CTA head with contrast for further evaluation.  New acute trace parafalcine subdural hematoma.  CTH 6/18 - 1. Near completely resolved parafalcine  subarachnoid hemorrhage with trace residual subarachnoid hemorrhage in the frontoparietal regions medially. No mass effect or hydrocephalus. 2. No large territorial infarction.  - Cleared for lovenox 30 SQ BID for VTE prophylaxis by prior providers     - Current/recent mood/affect/behavior/cog - acceptable, largely euthymic  - Remains at risk for increased confusion/delirium, restlessness, agitation, and fall - continue to monitor for concerns/changes  - Current psychotropics and potentially sedating medications:   Gabapentin 100mg TID   Melatonin 3mg HS   Oxy 2.5mg Q4h PRN (not using recently)   - Monitor for need for 1:1 (Notify MD of potentially dangerous behavior such as significant impulsivity and trying to stand/get out of bed/chair unattended that could lead to fall/injury); High fall precautions with frequent rounding, patient close to nursing station if possible, frequent toileting/frequent offering urinal/bedpan (only if too immobile for safe toileting);  bed/chair alarm on at all times (high setting if needed); fall junior on side of bed (at discretion of nursing/therapy); do not leave unattended in bathroom, patient education; call bell availability; Sleep/agitation log, frequent redirection, reorientation, reassurance; Family access to patient if helpful  - No recent reports of dangerous/risky behavior requiring 1:1 at this point but monitor closely  - Recommend acute comprehensive interdisciplinary inpatient rehabilitation to include intensive skilled therapies (PT, OT, ST) with oversight and management by rehabilitation physician.  Inpatient rehab level nursing, case management and weekly interdisciplinary team meetings. Provide patient and (if available) caregiver/family teaching regarding brain injury/residual disability management.    - For routine restlessness, anxiety, irritability focus on non-pharmacologic management    - Obtain MOCA and if needed ACLS during ARC course   - Please assess iADLs -  ability to manage medications, meal prep, finances, obtaining appropriate transport from community, managing emergencies, and overall safety in home environment.  - Provide therapy, compensatory strategies, and if available and necessary provide caregiver training.  Notify MD of impairments or inability to perform iADLs adequately.   - Monitor neuro-exam, wakefulness, mood, cognition, insight into deficits and safety awareness   - Monitor and ensure optimal management electrolytes, nutrition, and hydration  - Monitor for signs or symptoms of infection, medication intolerances, other systemic etiologies  - Additional labs, imaging, specialist follow-up as needed   - Patient/family/caregiver education and training   - Overstimulation precautions, frequent re-orientation, re-direction, re-assurance  - Optimal mood, pain, and sleep management  - Sleep log and agitation monitoring    - Limit sedating medications when possible  - Follow-up with neurosx after discharge if needed and if needed during ARC course as well as PCP      Syncope and collapse  Assessment & Plan  Believed to be related to arrhythmias/bradycardia s/p PPM placement   Monitor vitals  OP cards/PCP     At risk for altered bowel elimination  Assessment & Plan  Course also notable for abdominal distension which was favored to be ileus   - Recent occasional loose stools now on PEG tube feeds  - Monitor    Thrush  Assessment & Plan  Nystatin pain and suction QID x7 d  Oral care   Monitor     Pain  Assessment & Plan  APAP 975mg TID   Gabapentin 100mg TID   Oxycodone 2.5mg oral soltn Q4H PRN  Monitor for oversedation, AMS/delirium, and respiratory depression   If being administered - hold opiates, muscle relaxants, benzodiazepines, and gabapentin for oversedation, AMS, or RR<12.  Counseled on and continue to encourage deep breathing/relaxation/behavioral pain management techniques      At risk for venous thromboembolism (VTE)  Assessment & Plan  SCDs,  ambulation, and lovenox       Leukocytosis  Assessment & Plan  Up to 13.6 7/11  Internal medicine consult and management during ARC course  Patient afebrile, other vitals unremarkable > continue to monitor   No clear signs or symptoms of infection or VTE but will continue to monitor  Monitor CBC intermittently         s/p Medtronic dual chamber PPM 6/21/2024  Assessment & Plan  Noted to have symptomatic junctional bradycardia with asymptomatic wide-complex tachycardia on telemetry for which EP was consulted.  They felt presentation mixture of sick sinus syndrome and wide-complex tachycardia likely SVT with aberrancy and felt reasonable to place dual-chamber pacemaker given history of multiple syncopal episodes and now documented bradycardia.  2/2 bradycardia and recurrent syncopal episodes  OP EP follow-up     Hypothyroidism  Assessment & Plan  Levothyroxine         Disposition:   GF's Home with [facility] 12 days from admission     Follow-up:   PCP  ENT  NeuroSx  Cards/EP    Restrictions include:  Fall precautions and see above     ---------------------------------------------------------------------------------------------------------------------      OBJECTIVE:    Physical Exam:  Temp:  [97.6 °F (36.4 °C)-98.6 °F (37 °C)] 98.2 °F (36.8 °C)  HR:  [65-86] 72  Resp:  [17-18] 17  BP: ()/(55-73) 99/64  General: Awake, alert in NAD  HENT: NCAT, MMM, cervical incision healing well   Neck: Supple, no lymphadenopathy  Respiratory: Unlabored breathing, breath sounds equal, Lungs CTA, no wheezes, rales, or rhonchi  Cardiovascular: Regular rate and rhythm, no murmurs, rubs, or gallops  Gastrointestinal: Soft, non-tender, non-distended, normoactive bowel sounds  Genitourinary: No poon  SkiN/MSK/Extremities:  Distal extremities appropriately warm, normal cap refill distally, no cyanosis or calf edema, no calf tenderness to palpation  Neurologic/Psych:   MENTAL STATUS: awake, oriented to person, place, time, and  "situation  Affect: Slightly down    CN II-XII: grossly intact   Strength/MMT:  distal RUE 4+ to 5-/5 prox RUE 5-/5, distal LUE 4+/5, prox LUE 5-/5, BLE near 5/5    Laboratory:    Results from last 7 days   Lab Units 07/11/24  0442 07/10/24  0444 07/07/24  0632   HEMOGLOBIN g/dL 12.6 12.8 12.2   HEMATOCRIT % 37.4 40.0 36.9   WBC Thousand/uL 13.65* 8.72 8.54     Results from last 7 days   Lab Units 07/11/24  0442 07/10/24  0444 07/07/24  0632   BUN mg/dL 13 12 11   POTASSIUM mmol/L 4.1 3.9 3.9   CHLORIDE mmol/L 93* 95* 99   CREATININE mg/dL 0.67 0.51* 0.52*            Wt Readings from Last 1 Encounters:   07/12/24 69.3 kg (152 lb 12.5 oz)     Estimated body mass index is 23.93 kg/m² as calculated from the following:    Height as of this encounter: 5' 7\" (1.702 m).    Weight as of this encounter: 69.3 kg (152 lb 12.5 oz).    Imaging: reviewed    Per EMR:  Past Medical History:   Past Surgical History:   Family History:   Social history:   Past Medical History:   Diagnosis Date    Arthritis     Chronic cough     Disease of thyroid gland     Hypoparathyroidism (HCC)     continue taking calcium and vitamin D, will check CMP, PTH level and Vitamin D level    Pneumonia of right middle lobe due to Streptococcus pneumoniae (HCC) 11/30/2018    s/p Medtronic dual chamber PPM 6/21/2024 06/21/2024    Past Surgical History:   Procedure Laterality Date    CARDIAC ELECTROPHYSIOLOGY PROCEDURE N/A 6/21/2024    Procedure: Cardiac pacer implant;  Surgeon: Kofi Pettit MD;  Location: [facility];  Service: Cardiology    DECOMPRESSION SPINE CERVICAL POSTERIOR N/A 6/16/2024    Procedure: DECOMPRESSION SPINE CERVICAL POSTERIOR C2-T1 with fusion navigated with Oarm;  Surgeon: Endy Velasquez MD;  Location: [facility] MAIN OR;  Service: Neurosurgery    FRACTURE SURGERY      Open Treatment of Each Proximal Finger Phalanx    GASTROSTOMY TUBE PLACEMENT N/A 7/10/2024    Procedure: INSERTION PEG TUBE;  Surgeon: Darcy Reinoso MD;  " "Location: [facility] MAIN [facility];  Service: General     Family History   Problem Relation Age of Onset    No Known Problems Mother     No Known Problems Father     No Known Problems Sister     No Known Problems Brother     No Known Problems Son     Learning disabilities Daughter     Asthma Daughter     Seizures Daughter     No Known Problems Maternal Grandmother     No Known Problems Maternal Grandfather     No Known Problems Paternal Grandmother     No Known Problems Paternal Grandfather     No Known Problems Maternal Aunt     No Known Problems Maternal Uncle     No Known Problems Paternal Aunt     No Known Problems Paternal Uncle     No Known Problems Cousin       Social History     Socioeconomic History    Marital status:      Spouse name: Not on file    Number of children: 1    Years of education: Not on file    Highest education level: Not on file   Occupational History    Occupation:    Tobacco Use    Smoking status: Former    Smokeless tobacco: Never    Tobacco comments:     pt \"tried it when he was a teenager but did not like them\"   Vaping Use    Vaping status: Never Used   Substance and Sexual Activity    Alcohol use: Yes     Comment: occasionally    Drug use: No    Sexual activity: Not Currently   Other Topics Concern    Not on file   Social History Narrative    Not on file     Social Determinants of Health     Financial Resource Strain: Low Risk  (2/23/2024)    Overall Financial Resource Strain (CARDIA)     Difficulty of Paying Living Expenses: Not hard at all   Food Insecurity: No Food Insecurity (6/17/2024)    Hunger Vital Sign     Worried About Running Out of Food in the Last Year: Never true     Ran Out of Food in the Last Year: Never true   Transportation Needs: No Transportation Needs (6/17/2024)    PRAPARE - Transportation     Lack of Transportation (Medical): No     Lack of Transportation (Non-Medical): No   Physical Activity: Not on file   Stress: Not on file   Social Connections: Not " on file   Intimate Partner Violence: Not on file   Housing Stability: Low Risk  (6/17/2024)    Housing Stability Vital Sign     Unable to Pay for Housing in the Last Year: No     Number of Times Moved in the Last Year: 0     Homeless in the Last Year: No          Current Medical Diagnosis Medications Allergies   Patient Active Problem List   Diagnosis    Allergic rhinitis    Arterial ectasia (HCC)    Chronic low back pain    Hyperlipidemia    Hypothyroidism    Lumbar radiculopathy    Mass of parotid gland    Osteoarthritis of both hands    Reactive airway disease    Vitamin D deficiency    Encounter for immunization    TBI (traumatic brain injury) ([facility])    Syncope and collapse    Bilateral hand pain    SDH (subdural hematoma) ([facility])    Right hand weakness    SSS (sick sinus syndrome) ([facility])    s/p Medtronic dual chamber PPM 6/21/2024    Dysphagia    Cervical stenosis of spinal canal    Cervical myelopathy ([facility])    Leukocytosis    At risk for venous thromboembolism (VTE)    Pain    Thrush    At risk for altered bowel elimination      Current Facility-Administered Medications:     acetaminophen (TYLENOL) oral suspension 975 mg, 975 mg, Per PEG Tube, Q8H, Kim Li PA-C, 975 mg at 07/12/24 2148    albuterol (PROVENTIL HFA,VENTOLIN HFA) inhaler 2 puff, 2 puff, Inhalation, Q4H PRN, Kim Li PA-C    benzocaine (HURRICAINE) 20 % mucosal spray 2 spray, 2 spray, Mucosal, 4x Daily PRN, Kim Li PA-C    bisacodyl (DULCOLAX) rectal suppository 10 mg, 10 mg, Rectal, Daily PRN, Luke Suggs MD    dextromethorphan-guaiFENesin (ROBITUSSIN DM) oral syrup 10 mL, 10 mL, Per PEG Tube, Q4H PRN, Kim Li PA-C    enoxaparin (LOVENOX) subcutaneous injection 30 mg, 30 mg, Subcutaneous, Q12H ASHLYN, CLEMENTE Rhodes-LUCY, 30 mg at 07/12/24 2148    gabapentin (NEURONTIN) oral solution 100 mg, 100 mg, Per PEG Tube, TID, CLEMENTE Rhodes-C, 100 mg at 07/12/24 2149    [START ON  7/13/2024] levothyroxine (Synthroid) suspension 125 mcg, 125 mcg, Per PEG Tube, Early Morning, Kim Li PA-C    melatonin tablet 3 mg, 3 mg, Per PEG Tube, HS, Kim Li PA-C, 3 mg at 07/12/24 2148    nystatin (MYCOSTATIN) oral suspension 500,000 Units, 500,000 Units, Swish & Spit, 4x Daily, Luke Suggs MD, 500,000 Units at 07/12/24 2148    nystatin (MYCOSTATIN) powder, , Topical, BID, Kim Li PA-C    ondansetron (ZOFRAN-ODT) dispersible tablet 4 mg, 4 mg, Per PEG Tube, Q6H PRN, Kim Li PA-C    oxyCODONE (ROXICODONE) oral solution 2.5 mg, 2.5 mg, Oral, Q4H PRN, Luke Suggs MD    [START ON 7/13/2024] polyethylene glycol (MIRALAX) packet 17 g, 17 g, Per PEG Tube, Daily, Kim Li PA-C    polyethylene glycol (MIRALAX) packet 17 g, 17 g, Oral, Daily PRN, Luke Suggs MD    saliva substitute (MOUTH KOTE) mucosal solution 5 spray, 5 spray, Mouth/Throat, 4x Daily PRN, Kim Li PA-C Allergies   Allergen Reactions    Chlorine Rash            Prior Level of Function and social history:    He lives in a(n) apartment  Luis Forrester is  and lives with their daughter.  Self Care: Independent, Indoor Mobility: Independent, Stairs (in/outdoor): Independent, and Cognition: Independent     FALLS IN THE LAST 6 MONTHS: 2     HOME ENVIRONMENT:  The living area:  patient lives in a one level apartment  There are 4 steps to enter the home.    The patient will have 24 hour supervision/physical assistance available upon discharge.     - Plans on moving in with GF at her house with 1+1 step and otherwise 1 floor set-up.     Current Level of Function:    Mobility:  Transfers min assist  Ambulation/Mobility min assist     ADLs:  Mod assist LBD, grooming  Min assist UBB, LBB, UBD    Potential Barriers to Discharge:  Co-morbidities (see above), new functional deficits, impaired balance, impaired coordination, deconditioning, dysphagia, PEG  status    Tolerance for three hours of therapy per therapy day: adequate     Rehabilitation Prognosis: good       Rehabilitation Plan/Therapy Interventions: This patient will have close medical and rehabilitation oversight from a physical medicine and rehabilitation physician and if needed an internal medicine physician to manage the complexity of medical issues to optimize health, ability to participate in therapy, quality of life, and functional outcomes. This patient requires 24 hour rehabilitation nursing to address bowel and bladder management, (pain management if listed below), medication administration, positioning/skin monitoring, fall/injury prevention, and VTE prophylaxis.      Physical, occupational and speech therapy: Patient requires PT and OT to improve functional mobility, transfers, upper and lower body strengthening, conditioning, balance, and gait training with appropriate assistive device. Patient also requires ST to evaluate and if appropriate treat deficits in speech, swallowing, and cognition. PT, OT, ST will be provided approximately 5 times per week for 60 minutes per day. Skilled therapists and nursing will also provide patient/family safety education and training.  / will work to ensure proper communication between patient (+/- family) and staff regarding the overall rehabilitation and medical process while patient is in the acute rehabilitation center.  / will work with patient (and if applicable family and community resources) to optimize safe discharge.    Discharge Planning:    Estimated length of stay:   12 days.    Family/Patient Goals:  Patient/family's goals:  Return to Gfs home    Mobility Goals:   Largely mod indep    Activities of Daily Living (ADLs) Goals:  Largely mod indep    Cognition / Communication:  Return to prior level of function    Rehabilitation and discharge goals discussed with the patient and/or family.    Case  "Managment and Social Work to review patient/family resources and to coordinate Discharge Planning.    Patient and Family Education and Training:  Rehabilitation and discharge goals discussed with the patient and/or family.  Patient/family education/training needs to be discussed in weekly team meeting.    Other equipment: To be determined    Medical Necessity Criteria for ARC Admission:  The preadmission screen, post-admission physical evaluation, overall plan of care and admissions order demonstrate a reasonable expectation that the following criteria were met at the time of admission to the [facility].  (See \"Specific areas of management and oversight in ARC setting\" for additional details on medical necessity as outlined below).  The patient requires active and ongoing therapeutic intervention of multiple therapy disciplines (physical therapy, occupational therapy, speech-language pathology, or prosthetics/orthotics), one of which is physical or occupational therapy.    Patient requires an intensive rehabilitation therapy program, as defined in Chapter 1, section 110.2.2 of the CMS Medicare Policy Manual. This intensive rehabilitation therapy program will consist of at least 3 hours of therapy per day at least 5 days per week or at least 15 hours of intensive rehabilitation therapy within a 7 consecutive day period, beginning with the date of admission to the [facility].    The patient is reasonably expected to actively participate in, and benefit significantly from, the intensive rehabilitation therapy program as defined in Chapter 1, section 110.2.2 of the CMS Medicare Policy Manual at this time of admission to the [facility]. He can reasonably be expected to make measurable improvement (that will be of practical value to improve the patient’s functional capacity or adaptation to impairments) as a result of the rehabilitation treatment, as defined in section 110.3, and such improvement can be expected to be made within the " prescribed period of time. As noted in the CMS Medicare Policy Manual, the patient need not be expected to achieve complete independence in the domain of self-care nor be expected to return to his or her prior level of functioning in order to meet this standard.  The patient must require physician supervision by a rehabilitation physician. As such, a rehabilitation physician will conduct face-to-face visits with the patient at least 3 days per week throughout the patient’s stay in the ARC to assess the patient both medically and functionally, as well as to modify the course of treatment as needed to maximize the patient’s capacity to benefit from the rehabilitation process.  The patient requires an intensive and coordinated interdisciplinary approach to providing rehabilitation, as defined in Chapter 1, section 110.2.5 of the CMS Medicare Policy Manual. This will be achieved through periodic team conferences, conducted at least once in a 7-day period, and comprising of an interdisciplinary team of medical professionals consisting of: a rehabilitation physician, registered nurse,  and/or , and a licensed/certified therapist from each therapy discipline involved in treating the patient.     Changes Since Pre-admission Assessment: None -This patient's participation in rehab continues to be reasonable, necessary and appropriate.    CMS Required Post-Admission Physician Evaluation Elements  History and Physical, including medical history, functional history and active comorbidities as in above text.     Post-Admission Physician Evaluation:  The patient has the potential to make improvement and is in need of physical, occupational, and/or therapy services. The patient may also need nutritional services. Given the patient's complex medical condition and risk of further medical complications, rehabilitative services cannot be safely provided at a lower level of care, such as a skilled nursing  facility. I have reviewed the patient's functional and medical status at the time of the preadmission screening and they are the same as on the day of this admission. I acknowledge that I have personally performed a full physical examination on this patient within 24 hours of admission. The patient demonstrated understanding the rehabilitation program and the discharge process after we discussed them.     Agree in entirety: yes  Minor adaptions: none    Major changes: none    Specific areas of management and oversight in [facility] setting:  Cardiopulmonary function: Ensure cardiopulmonary stability and optimize cardiopulmonary function not only at rest but with activity as patient's activity level significantly increases in acute rehab compared with prior to transfer in preparation for safe discharge from [facility].  Must closely and frequently monitor blood pressure and HR to ensure adequate cardiac output during ADLs and ambulation as patient is at increased risk for orthostatic hypotension/syncope and potential injury if not monitored for and managed adequately.    Blood pressure management:    Frequent monitoring of blood pressure with appropriate adjustments in blood pressure medication management to optimize blood pressure control and prevent/limit renal complications.   Monitoring impact of blood pressure and side-effects of blood pressure medications at rest and with activity.  Pain management:  Pain will improve with frequent evaluation of pain, careful adjustments in medications, frequent re-evaluation of patient's pain and medical/neurologic status to ensure optimal pain control, avoidance of potential serious and even life-threatening side-effects and drug interactions, as well as weaning pain medications as soon as possible to decrease risk of short and long-term use.     Neurologic Disorder: cervical myelopathy causing impaired mobility, ADLs, and gait:  intensive skilled therapies with physical therapy and  occupational therapy with close oversight and management by rehab specialized physician in acute rehabilitation setting to most expeditiously and effectively improve functional mobility, transfers, upper and lower body strengthening, conditioning, balance, and gait training with appropriate assistive device.  Patient will have optimal supervision and management of patient's underlying neurologic disorder with specialized rehabilitation physician during this period of recovery to ensure most expeditious and optimal recovery with decreased risks of fall/injury and other complications including acute worsening of neuro disorder, decrease risk of VTE, PNA, and skin ulceration.  Dysphagia: improve swallowing through SLP evaluation with intensive treatments, optimal nutrition, and fluid intake.  Adjust diet and swallowing precautions as indicated to decrease risk of aspiration pneumonia and respiratory distress.   Close oversight and management by rehab specialized physician.    Inpatient rehabilitation education/teaching:  To be provided to patient and typically family/caregiver (if able to be identified) by all skilled therapists, rehab nursing, case management, and rehab specialized physician to ensure optimal recovery and decrease risks of complications in both acute rehabilitation setting as well as after discharge.   Bowel dysfunction: Appropriate bowel management with appropriate toileting program from rehab nursing and staff with oversight management by rehabilitation physicians which include adjustments in medications to optimize appropriate bowel function and prevent/decrease risk of constipation and bowel obstruction.    Skin management: Increased risk of skin wounds/breakdown/rashes due to recent immobility and co-morbidities.   Appropriate skin checks from nursing and as needed physician.  Frequent turning, application of appropriate barrier creams/dressings, cushions.  Patient/caregiver education.  Optimal  bowel/bladder hygiene and mobilization.      ** Please Note: Fluency Direct voice to text software may have been used in the creation of this document. **    75 minutes or greater spent for this encounter which included a combination of face-to-face time with patient and non-face-to-face time which in part specifically includes management of C myelopathy, SDH, dysphagia.  Face-to-face time included extended discussion with patient regarding current condition, medical history, mood, medical/rehabilitation management, and disposition.  Non face-to-face time included coordination of care with patient's co-managing AP and/or physician(s) thru communication and review of their recent documentation as well as reviewing vitals, bowel/bladder function, recent labs, diagnostic imaging, and notes from therapy, CM, and nursing.      Luke Suggs MD, MS  [facility]  Physical Medicine and Rehabilitation  Brain Injury Medicine

## 2024-07-12 NOTE — TREATMENT PLAN
Individualized Plan of Care - [facility] [place]  Luis Forrester 53 y.o. male MRN: [id number]  Unit/Bed#: [facility] 972-01 Encounter: [id number]     PATIENT INFORMATION  ADMISSION DATE: 7/12/2024  1:14 PM SARAN CATEGORY:Spinal Cord Dysfunction:  Traumatic:  04.230  Other Traumatic See below   ADMISSION DIAGNOSIS: cervical myelopathy s/p C3-C7 cervical laminectomy with bilateral screw placement and fusion C2 to-T1 on 6/16    EXPECTED LOS: 12 days     MEDICAL/FUNCTIONAL PROGNOSIS  Pre-admit screens and post-admit evaluations reviewed and are consistent.     Based on my assessment of the patient's medical conditions and current functional status, the prognosis for attaining medical and functional goals or the IRF stay is:    Good.    Patient is appropriate for acute rehabilitation.    Medical Goals:   Patient will be medically stable for discharge back to community setting upon completion or acute rehab program and patient/family will be able to manage medical conditions and comorbid conditions with medications and appropriate follow up upon completion of rehab program.  Cardiopulmonary function: Ensure cardiopulmonary stability and optimize cardiopulmonary function not only at rest but with activity as patient's activity level significantly increases in acute rehab compared with prior to transfer in preparation for safe discharge from [facility].  Must closely and frequently monitor blood pressure and HR to ensure adequate cardiac output during ADLs and ambulation as patient is at increased risk for orthostatic hypotension/syncope and potential injury if not monitored for and managed adequately.    Blood pressure management:    Frequent monitoring of blood pressure with appropriate adjustments in blood pressure medication management to optimize blood pressure control and prevent/limit renal complications.   Monitoring impact of blood pressure and side-effects of blood pressure medications at rest and with activity.  Pain  management:  Pain will improve with frequent evaluation of pain, careful adjustments in medications, frequent re-evaluation of patient's pain and medical/neurologic status to ensure optimal pain control, avoidance of potential serious and even life-threatening side-effects and drug interactions, as well as weaning pain medications as soon as possible to decrease risk of short and long-term use.     Neurologic Disorder: cervical myelopathy causing impaired mobility, ADLs, and gait:  intensive skilled therapies with physical therapy and occupational therapy with close oversight and management by rehab specialized physician in acute rehabilitation setting to most expeditiously and effectively improve functional mobility, transfers, upper and lower body strengthening, conditioning, balance, and gait training with appropriate assistive device.  Patient will have optimal supervision and management of patient's underlying neurologic disorder with specialized rehabilitation physician during this period of recovery to ensure most expeditious and optimal recovery with decreased risks of fall/injury and other complications including acute worsening of neuro disorder, decrease risk of VTE, PNA, and skin ulceration.  Dysphagia: improve swallowing through SLP evaluation with intensive treatments, optimal nutrition, and fluid intake.  Adjust diet and swallowing precautions as indicated to decrease risk of aspiration pneumonia and respiratory distress.   Close oversight and management by rehab specialized physician.    Inpatient rehabilitation education/teaching:  To be provided to patient and typically family/caregiver (if able to be identified) by all skilled therapists, rehab nursing, case management, and rehab specialized physician to ensure optimal recovery and decrease risks of complications in both acute rehabilitation setting as well as after discharge.   Bowel dysfunction: Appropriate bowel management with appropriate  toileting program from rehab nursing and staff with oversight management by rehabilitation physicians which include adjustments in medications to optimize appropriate bowel function and prevent/decrease risk of constipation and bowel obstruction.    Skin management: Increased risk of skin wounds/breakdown/rashes due to recent immobility and co-morbidities.   Appropriate skin checks from nursing and as needed physician.  Frequent turning, application of appropriate barrier creams/dressings, cushions.  Patient/caregiver education.  Optimal bowel/bladder hygiene and mobilization.        ANTICIPATED DISCHARGE DISPOSITION AND SERVICES  COMMUNITY SETTING:     home.    ANTICIPATED FOLLOW-UP SERVICE:   Outpatient Therapy PT, OT, ST    DISCIPLINE SPECIFIC PLANS:  Required Disciplines & Services: PT, OT, ST, Rehabilitation Physician, Rehabillitation Nursing, Case Management, Dietary, Psychology    REQUIRED THERAPY (expected):  Therapy Hours per Day Days per Week Tx Days (Days in [facility])   Physical Therapy 1 5 12   Occupational Therapy 1 5 12   Speech/Language Therapy 1 5 9   NOTE: Additional therapy time(s) may be added as appropriate to meet patient needs and to achieve functional goals.    ANTICIPATED FUNCTIONAL OUTCOMES:  ADL: Patient will be largely modified independent with ADLs upon completion of rehab program    Bladder/Bowel: Patient will be modified independent with bladder/bowel management upon completion of rehab program   Transfers: Patient will be modified independent with transfers upon completion of rehab program   Locomotion: Patient will be at or near modified independent with locomotion (wheelchair or ambulation) upon completion of rehab program   Cognitive: Patient will be near prior level of cognitive function upon completion of rehab program     DISCHARGE PLANNING NEEDS  Equipment needs: To be determined at team conference.      REHAB ANTICIPATED PARTICIPATION RESTRICTIONS:  Uncertain at this time.  To be  determined closer to discharge.

## 2024-07-12 NOTE — ASSESSMENT & PLAN NOTE
S/p C3-7 laminectomy with C2-T1 fusion  Completed post-op abx.  No collar necessary.  Monitor incision  NS follow-up around 8/2/24 for 6 week post-op appt.  Therapy and pain control per PMR

## 2024-07-12 NOTE — PROGRESS NOTES
"[facility]  Progress Note  Name: Luis Forrester I  MRN: [id number]  Unit/Bed#: -01 I Date of Admission: 7/12/2024   Date of Service: 7/12/2024 I Hospital Day: 0    Cervical stenosis of spinal canal  Assessment & Plan  MRI C-spine \"congenital canal stenosis with superimposed degenerative spondylosis. Most pronounced at C3-4 and C5-C6 with moderate to severe canal stenosis and mild cord compression.  No definite abnormal cord signal but mild cord edema would be difficult to exclude. Severe bilateral foraminal stenosis also seen at C3-4 and C5-C6\"     - Appreciate neurosurgery consult and recommendations  - 6/16/24 Posterior cervical laminectomies C3-7, Bilateral C2, C7 and T1 pedicle screw placement, Bilateral C3, C4, C5  lateral mass screw placement. Arthrodesis C2-T1   - no brace required  - Keflex x 2 weeks for surgical prophylaxis. Completed  - Pain control - APS following - input appreciated  - PT/OT  - DVT ppx  - NSG Signed off, plan for outpatient 2 week and 6 week post op visits     Dysphagia  Assessment & Plan  - Reported difficulty swallowing pills on 6/28  - VBS done 6/30 > strict NPO, keofed placed for feeding  - Pt failed VBS on 6/30, 7/3, 7/8 - recommended full NPO  - Currently receiving tube feeds via Keofed  - Palliative care consult for goals of care and education regarding G tube placement  - 7/9 -  Goals of care conversation had with patient and family at bedside and over the phone- he wants all medical interventions including tube feeds and PEG placement. Patient and family were educated regarding PEG placement and all of their questions and concerns answered to their satisfaction  - 7/10 s/p PEG tube placement. Nutrition consult placed for tube feed adjustments.   - Tfs advanced to goal  -Plan for D/c today        s/p Medtronic dual chamber PPM 6/21/2024  Assessment & Plan  - See SSS plan     SSS (sick sinus syndrome) ([facility])  Assessment & Plan  - " Status post PPM on 6/21  - EPS has signed off on 6/22     SDH (subdural hematoma) ([facility])  Assessment & Plan  Continue frequent neurological checks.   STAT CT head with any neurological decline including drop GCS of 2pts within 1 hr.  Seizure prophylaxis per trauma team-completed  Hold all full antiplatelet and anticoagulation medications for 2 weeks  Follow up with Neurosurgery        Hypothyroidism  Assessment & Plan  - Patient with chronic history of hypothyroidism.  - Continue home medication regimen including levothyroxine.  - Outpatient follow-up routine.     * TBI (traumatic brain injury) ([facility])  Assessment & Plan  - Traumatic brain injury with new acute appearing trace subarachnoid hemorrhage in the bilateral parafalcine regions as well as a new appearing trace parafalcine subdural hematoma, present on admission.  - CT scan of the head from 6/15/2024 demonstrated: New acute trace subarachnoid hemorrhage in bilateral parafalcine regions. New acute trace parafalcine subdural hematoma.  -  Additional imaging with CTA of the head and neck 6/15/2024 demonstrated: Negative CTA head traumatic vascular injury, aneurysm, large vessel occlusion, high-grade stenosis, or dissection. Partially imaged ectatic right carotid bifurcation and proximal cervical internal carotid artery with retropharyngeal course, similar to CT neck with contrast 10/12/2011.  - Neurosurgery evaluation and recommendations appreciated.  - Hold anti-platelet and anticoagulant medications for least 2 weeks and/or until cleared by Neurosurgery.          - Patient is not on any chronic antiplatelet or anticoagulant medications at baseline.  - Continue Keppra for 7 days for seizure prophylaxis - completed  - Monitor neurologic exam.  - Continue symptomatic management with analgesia as needed.- PT and OT evaluation and treatment as indicated.            Bowel Regimen: Senna  VTE Prophylaxis:Enoxaparin (Lovenox)     Disposition: rehab    Subjective   Chief  Complaint: Patient with no complaints    Subjective: Patient denies nausea or vomiting this morning; having bowel function     Objective   Vitals:   Temp:  [97.6 °F (36.4 °C)-98.6 °F (37 °C)] 97.6 °F (36.4 °C)  HR:  [60-86] 86  Resp:  [16-18] 17  BP: ()/(58-73) 103/68    I/O         07/09 0701  07/10 0700 07/10 0701  07/11 0700    P.O. 0 0    I.V. (mL/kg)  800 (10.5)    NG/     Total Intake(mL/kg) 200 (2.6) 800 (10.5)    Urine (mL/kg/hr)  0 (0)    Emesis/NG output  0    Total Output  0    Net +200 +800          Unmeasured Urine Occurrence 1 x 6 x    Unmeasured Stool Occurrence 2 x 0 x             Physical Exam:   GENERAL APPEARANCE: resting comfortably in bed  NEURO: GCS 15  HEENT: Mucous membranes moist, EOMI  CV: Regular rate and rhythm  LUNGS: Clear to auscultation bilaterally  ABD: soft, nontender, nondistended; PEG tube in place C/D/I  : voiding  MSK: moving all extremities  SKIN: warm, dry    Invasive Devices       Peripheral Intravenous Line  Duration             Peripheral IV 07/11/24 Right;Ventral (anterior) Wrist 1 day              Drain  Duration             Gastrostomy/Enterostomy Percutaneous Endoscopic Gastrostomy (PEG) 20 Fr. LUQ 1 day                          Lab Results: Results: I have personally reviewed all pertinent laboratory/tests results  Imaging: I have personally reviewed pertinent reports.     Other Studies: n/a

## 2024-07-12 NOTE — PLAN OF CARE
Problem: OCCUPATIONAL THERAPY ADULT  Goal: Performs self-care activities at highest level of function for planned discharge setting.  See evaluation for individualized goals.  Description: Treatment Interventions: ADL retraining, Functional transfer training, UE strengthening/ROM, Endurance training, Patient/family training, Equipment evaluation/education, Fine motor coordination activities, Activityengagement          See flowsheet documentation for full assessment, interventions and recommendations.   7/12/2024 1041 by KETTY Doty  Outcome: Progressing  Note: Limitation: Decreased ADL status, Decreased Safe judgement during ADL, Decreased cognition, Decreased endurance, Decreased self-care trans, Decreased high-level ADLs  Prognosis: Good  Assessment: pt participated in am ot session and was seen focusing on  full adls, toileting, clothing management and fine motor coordination. pt was s/min asst for transfers and functional mobility with rw, fair minus safety / carryover displayed.  pt is mildly impulsive overall. he was min asst ub bathing and dressing/ mod/max asst lb dressing with dressing stick and rigid sock aide. (will need wider sa)  pt required max asst hair washing and shaving with encouragement was able to hold razor and swipe face grossly. pt unable to dispense faom etc. fine motor remains a deficit as well as dyn balance. pt with brighter affect this session. cooperative and motivated. dtr arrived to visit. pt states this week they are moving into his s/o's apt.  he reports she is retired. step to get between rooms of home but not to enter     Rehab Resource Intensity Level, OT: I (Maximum Resource Intensity)       7/12/2024 1014 by KETTY Doty  Note: Limitation: Decreased ADL status, Decreased Safe judgement during ADL, Decreased cognition, Decreased endurance, Decreased self-care trans, Decreased high-level ADLs  Prognosis: Good  Assessment: pt participated in pm ot session and was  seen focusing on adls seated , grooming in stance, functional mobility and transfers to from various armed surfaces. pt required mod vc's for all transfers. pt tolerated session well, balance and ue function have improved from previous session. pt was more talkative, less lehtargic this session. pts supportive dtr was present. pt with keofed tube.     Rehab Resource Intensity Level, OT: I (Maximum Resource Intensity)     April A Storm

## 2024-07-13 PROCEDURE — 99232 SBSQ HOSP IP/OBS MODERATE 35: CPT | Performed by: PHYSICIAN ASSISTANT

## 2024-07-13 PROCEDURE — 97167 OT EVAL HIGH COMPLEX 60 MIN: CPT

## 2024-07-13 PROCEDURE — 97530 THERAPEUTIC ACTIVITIES: CPT

## 2024-07-13 PROCEDURE — 97163 PT EVAL HIGH COMPLEX 45 MIN: CPT

## 2024-07-13 PROCEDURE — 97110 THERAPEUTIC EXERCISES: CPT

## 2024-07-13 RX ADMIN — ACETAMINOPHEN 975 MG: 650 SUSPENSION ORAL at 05:46

## 2024-07-13 RX ADMIN — GUAIFENESIN AND DEXTROMETHORPHAN 10 ML: 100; 10 SYRUP ORAL at 05:50

## 2024-07-13 RX ADMIN — NYSTATIN: 100000 POWDER TOPICAL at 17:53

## 2024-07-13 RX ADMIN — NYSTATIN 500000 UNITS: 100000 SUSPENSION ORAL at 08:43

## 2024-07-13 RX ADMIN — Medication 3 MG: at 21:01

## 2024-07-13 RX ADMIN — GABAPENTIN 100 MG: 250 SOLUTION ORAL at 16:02

## 2024-07-13 RX ADMIN — ENOXAPARIN SODIUM 30 MG: 30 INJECTION SUBCUTANEOUS at 08:43

## 2024-07-13 RX ADMIN — NYSTATIN 500000 UNITS: 100000 SUSPENSION ORAL at 13:12

## 2024-07-13 RX ADMIN — ACETAMINOPHEN 975 MG: 650 SUSPENSION ORAL at 21:02

## 2024-07-13 RX ADMIN — POLYETHYLENE GLYCOL 3350 17 G: 17 POWDER, FOR SOLUTION ORAL at 08:44

## 2024-07-13 RX ADMIN — NYSTATIN 500000 UNITS: 100000 SUSPENSION ORAL at 21:07

## 2024-07-13 RX ADMIN — GABAPENTIN 100 MG: 250 SOLUTION ORAL at 08:44

## 2024-07-13 RX ADMIN — ACETAMINOPHEN 975 MG: 650 SUSPENSION ORAL at 13:12

## 2024-07-13 RX ADMIN — NYSTATIN 500000 UNITS: 100000 SUSPENSION ORAL at 17:53

## 2024-07-13 RX ADMIN — NYSTATIN 1 APPLICATION: 100000 POWDER TOPICAL at 09:30

## 2024-07-13 RX ADMIN — GABAPENTIN 100 MG: 250 SOLUTION ORAL at 21:00

## 2024-07-13 RX ADMIN — Medication 125 MCG: at 05:49

## 2024-07-13 RX ADMIN — ENOXAPARIN SODIUM 30 MG: 30 INJECTION SUBCUTANEOUS at 20:58

## 2024-07-13 NOTE — PROGRESS NOTES
OT EVALUATION       07/13/24 0700   Patient Data   Rehab Impairment Impairment of mobility, safety and Activities of Daily Living (ADLs) due to Spinal Cord Dysfunction:  Traumatic:  04.230  Other Traumatic traumatic spinal cord injury-cervical myelopathy   Etiologic Diagnosis cervical myelopathy s/p C3-C7 cervical laminectomy with bilateral screw placement and fusion C2 to-T1 on 6/16   Date of Onset 06/15/24   Support System   Name Trinity   Relationship Daughter  (18 y/o)   Able to provide 24 hour supervision Yes   Able to provide physical help? Yes   Multiple Support Systems Yes   Support System 2   Relationship 2 Girlfriend   Able to provide 24 hour supervision 2 Yes   Able to provide physical help? (2) Yes   Home Setup   Type of Home Apartment   Method of Entry Stairs;Hand Rail Right   Number of Stairs 4   First Floor Bathroom Full;Tub;Door   Home Modification Comment Pt reports currently living in apt with daughter (home 24/7). No previous DME. No stairs in home, 4 WARD with unilateral HR. Pt is in the process of moving into girlfriend's home, primary therapist to confirm home setup with pt and caregiver.   Baseline Information   Vocation Work Part Time  ([employer])   Transportation Other (comment)  (walked to work)   Prior IADL Participation   Money Management Manage Checkbook;Estimate Costs   Meal Preparation Full Participation  (Pt reports primary responsibility for Home management, with daughter helping out as needed.)   Laundry Full Participation  (Pt reports primary responsibility for Home management, with daughter helping out as needed.)   Home Cleaning Full Participation  (Pt reports primary responsibility for Home management, with daughter helping out as needed.)   Prior Level of Function   Self-Care 3. Independent - Patient completed the activities by him/herself, with or without an assistive device, with no assistance from a helper.   Indoor-Mobility (Ambulation) 3. Independent - Patient completed the  activities by him/herself, with or without an assistive device, with no assistance from a helper.   Stairs 3. Independent - Patient completed the activities by him/herself, with or without an assistive device, with no assistance from a helper.   Functional Cognition 3. Independent - Patient completed the activities by him/herself, with or without an assistive device, with no assistance from a helper.   Prior Device Used Z. None of the above   Falls in the Last Year   Number of falls in the past 12 months 3   Type of Injury Associated with Fall Major injury  (current admission)   Patient Preference   Nickname (Patient Preference) Leopoldo   Psychosocial   Psychosocial (WDL) WDL   Patient Behaviors/Mood Cooperative   Ability to Express Feelings Able to express   Ability to Express Needs Able to express   Ability to Express Thoughts Able to express   Ability to Understand Others Understands   Restrictions/Precautions   Precautions Aspiration;Bed/chair alarms;Cognitive;Fall Risk;Impulsive;Pacemaker;Seizure;Spinal precautions;Supervision on toilet/commode  (NPO)   Weight Bearing Restrictions Yes  (spinal precautions, pacemaker)   ROM Restrictions Yes  (pacemaker)   Pain Assessment   Pain Assessment Tool 0-10   Pain Score No Pain   Eating Assessment   Comment PEG   Reason if not Attempted Medical concerns   Eating CARE Score 88   Oral Hygiene   Type of Assistance Needed Physical assistance   Physical Assistance Level 25% or less   Comment To perform at PLOF. pt performs with setup seated in WC at sink   Oral Hygiene CARE Score 3   Grooming   Able To Wash/Dry Face;Brush/Clean Teeth;Comb/Brush Hair   Limitation Noted In Problem Solving  (requires max verbal encouragement to initiate tasks)   Findings seated in WC at sink   Tub/Shower Transfer   Reason Not Assessed Sponge Bath   Shower/Bathe Self   Type of Assistance Needed Physical assistance   Physical Assistance Level 51%-75%   Comment To perform at PLOF. Pt completed sponge  bathing, seated in WC to wash/dry face, BUE, chest abdomen and upper thighs. Pt in stance with UE support of sink to wash groin/perineal area, TA to wash buttocks and lower legs/feet secondary to impaired strength.   Shower/Bathe Self CARE Score 2   Dressing/Undressing Clothing   Type of Assistance Needed Incidental touching   Physical Assistance Level No physical assistance   Comment To don simulated t-shirt (tied hospital gown) overhead seated in WC. Pt requires verbal cuing to problem solve and to recall precautions. No physical assistance to thread BUE and overhead.   Upper Body Dressing CARE Score 4   Type of Assistance Needed Physical assistance   Physical Assistance Level 26%-50%   Comment Pt requires A to thread BLE seated in WC, supervision to accelerate over hips in standing, no AD.   Lower Body Dressing CARE Score 3   Putting On/Taking Off Footwear   Type of Assistance Needed Physical assistance   Physical Assistance Level Total assistance   Comment to don B socks. Pt attempted figure 4 technique however unable to complete secondary to impaired strength.   Putting On/Taking Off Footwear CARE Score 1   Toileting Hygiene   Type of Assistance Needed Physical assistance   Physical Assistance Level Total assistance   Comment TA for hygiene after BM in stance with UE support of RW.   Toileting Hygiene CARE Score 1   Toilet Transfer   Type of Assistance Needed Physical assistance   Physical Assistance Level 26%-50%   Comment to perform PLOF no AD. pt completes ambulaory transfer with use of RW and Min A.   Toilet Transfer CARE Score 3   Transfer Bed/Chair/Wheelchair   Type of Assistance Needed Physical assistance   Physical Assistance Level 26%-50%   Comment to perform PLOF no AD. pt completes ambulaory transfer with use of RW and Min A.   Chair/Bed-to-Chair Transfer CARE Score 3   Roll Left and Right   Type of Assistance Needed Physical assistance   Physical Assistance Level 25% or less   Comment to perform at  PLOF without use of hospital bed features. Pt performs with CGA and use of bedrail.   Roll Left and Right CARE Score 3   Sit to Lying   Type of Assistance Needed Physical assistance   Physical Assistance Level 25% or less   Comment without use of hospital bed features   Sit to Lying CARE Score 3   Lying to Sitting on Side of Bed   Type of Assistance Needed Physical assistance   Physical Assistance Level 25% or less   Comment to perform at PLOF without use of hospital bed features. Pt performs with CGA and use of bedrail.   Lying to Sitting on Side of Bed CARE Score 3   Sit to Stand   Type of Assistance Needed Physical assistance   Physical Assistance Level 26%-50%   Comment to perform at PLOF no AD. CGA with use of RW   Sit to Stand CARE Score 3   Comprehension   QI: Comprehension 3. Usually Understands: Understands most conversations, but misses some part/intent of message. Requires cues at times to understand.   Expression   QI: Expression 3. Exhibits some difficulty with expressing needs and ideas (e.g., some words or finishing thoughts) or speech is not clear   RUE Assessment   RUE Assessment X  (Limitations in [facility] digit flexion/extension)   Strength - RUE   R Gross Arm Strength 3+/5   LUE Assessment   LUE Assessment X  (Limitations in C digit flexion/extension)   Strength - LUE   L Gross Arm Strength 3+/5   Cognition   Overall Cognitive Status Impaired   Arousal/Participation Alert;Cooperative   Attention Attends with cues to redirect   Orientation Level Oriented X4   Memory Decreased recall of precautions;Decreased recall of recent events;Decreased short term memory   Following Commands Follows one step commands with increased time or repetition   Comments Pt presents anxious with limited carryover with education, verbal cuing required for initiation of tasks   Vision   Vision Comments Pt wears glasses   Discharge Information   Vocational Plan Return to work;Part Time   Barriers to Return to Vocation  "Strength;Endurance   Patient's Discharge Plan home with support   Patient's Rehab Expectations \"To get better\"   Barriers to Discharge Home Decreased Strength;Decreased Endurance;Safety Considerations;Limited Family Support;Unsafe Home Setup   Impressions Pt is a 54 y.o male admitted with chief complaint of cervical myelopathy s/p C3-C7 cervical laminectomy with bilateral screw placement and fusion C2 to-T1 on 6/16 . Pt has a past medical history significant for: Hypothyroidism, Arthritis , Chronic cough, Hypoparathyroidism, Hyperlipidemia, Hyponatremia, Syncope secondary to SSS s/p [employer] dual chamber PPM, Electrolyte imbalance  PRECAUTIONS: fall precautions, seizure precautions, spinal precautions, patient had a pacer placed in left upper chest wall this admission. Pt presents with the following impairments: strength, balance, endurance. Pt would benefit from skilled occupational therapy services with focus on activity tolerance, toileting, bathing and functional transfers to ensure safe discharge and participation in functional activities to increase independence. Patient and caregiver education to be completed as appropriate. Anticipate 7 to 10 day ELOS to meet independent level goals.   OT Therapy Minutes   OT Time In 0700   OT Time Out 0830   OT Total Time (minutes) 90   OT Mode of treatment - Individual (minutes) 90   OT Mode of treatment - Concurrent (minutes) 0   OT Mode of treatment - Group (minutes) 0   OT Mode of treatment - Co-treat (minutes) 0   OT Mode of Treatment - Total time(minutes) 90 minutes   OT Cumulative Minutes 90   Cumulative Minutes   Cumulative therapy minutes 90     "

## 2024-07-13 NOTE — ASSESSMENT & PLAN NOTE
- Recommend ENT c/s for ongoing prolonged dysphagia   - Patient declined laryngoscope initially, he is now open to it.   - ENT not convinced of laryngeal component of dysphagia. Recommending outpatient follow-up for that.   - Our SLP cannot do FEES, and ENT does not do FEES either. Perhaps outpatient SLP.   - Current diet: NPO - failed multiple VBS    - Mild oral and moderate pharyngeal dysphagia  - NGT feeds, nutrition c/s   - Family training for tube feeds   - SLP evaluate and treat decline in swallowing.  - Ensure adequate hydration  - Nutrition consult to assist with management   - Outpatient f/u with ENT   7/23- SLP swallowing assessment: oral stage mild, pharyngeal stage mod-severe, esophageal stage no overt sx. Continue to maintain Npo status/ feeding through PEG  7/25- SLP recs are maintain NPO and tube feeds with PEG. Oral care w/ suction Q4 and PO trials w/ SLP supervision only. Patient will benefit from o/p SLP and repeat VBSS after multiple sessions of therapy.

## 2024-07-13 NOTE — ASSESSMENT & PLAN NOTE
Believed to be related to arrhythmias/bradycardia s/p PPM placement   Monitor vitals  OP cards/PCP

## 2024-07-13 NOTE — ASSESSMENT & PLAN NOTE
Following syncope and being found down   CTH 6/15 - New acute trace subarachnoid hemorrhage in bilateral parafalcine regions. Recommend dedicated CTA head with contrast for further evaluation.  New acute trace parafalcine subdural hematoma.  CTH 6/18 - 1. Near completely resolved parafalcine subarachnoid hemorrhage with trace residual subarachnoid hemorrhage in the frontoparietal regions medially. No mass effect or hydrocephalus. 2. No large territorial infarction.  - Cleared for lovenox 30 SQ BID for VTE prophylaxis by prior providers     - Current/recent mood/affect/behavior/cog - no major behavioral issues. Significantly impaired memory, problem solving.   - Remains at risk for increased confusion/delirium, restlessness, agitation, and fall - continue to monitor for concerns/changes  - Current psychotropics and potentially sedating medications:   Melatonin 3mg HS   - Follow-up with neurosx after discharge if needed and if needed during ARC course as well as PCP

## 2024-07-13 NOTE — PROGRESS NOTES
OT LONG TERM GOALS       07/13/24 0700   Rehab Team Goals   ADL Team Goal Patient will be independent with ADLs with least restrictive device upon completion of rehab program   Rehab Team Interventions   OT Interventions Self Care;Home Management;Therapeutic Exercise;Energy Conservation;Patient/Family Education   Eating Goal   Eating Goal 06. Independent - Patient completes the activity by him/herself with no assistance from a helper.   Status Ongoing;Target goal - one week;Target goal - two weeks   Interventions Neuromuscular Education;Optimal Position;Compensation Strategies;Dysphagia Education   Grooming Goal   Oral Hygiene Goal 06. Independent - Patient completes the activity by him/herself with no assistance from a helper.   Status Ongoing;Target goal - one week;Target goal - two weeks   Intervention Tolerance Work;Balance Work;Assistive Device   Tub/Shower Transfer Goal   Method Tub   Status Ongoing;Target goal - one week;Target goal - two weeks   Interventions ADL Training;Assistive Device   Bathing Goal   Shower/bathe self Goal 04. Supervision or touching assistance- [place] provides VERBAL CUES or supervision throughout activity.   Status Ongoing;Target goal - one week;Target goal - two weeks   Intervention ADL Training;Assistive Device;Neuromuscular Education;Therapeutic Exercise   Upper Body Dressing Goal   Upper body dressing Goal 06. Independent - Patient completes the activity by him/herself with no assistance from a helper.   Status Ongoing;Target goal - one week;Target goal - two weeks   Intervention Balance Work;Neuromuscular Education;Tolerance Work;Therapeutic Exercise   Lower Body Dressing Goal   Lower body dressing Goal 06. Independent - Patient completes the activity by him/herself with no assistance from a helper.   Putting on/taking off footwear Goal 06. Independent - Patient completes the activity by him/herself with no assistance from a helper.   Status Ongoing;Target goal - one week;Target  goal - two weeks   Intervention Assistive Device;Balance Work;Neuromuscular Education;Therapeutic Exercise;Tolerance Work   Toileting Transfer Goal   Toilet transfer Goal 06. Independent - Patient completes the activity by him/herself with no assistance from a helper.   Status Ongoing;Target goal - one week;Target goal - two weeks   Intervention ADL Training;Balance Work;Assistive Device   Toileting Goal   Toileting hygiene Goal 06. Independent - Patient completes the activity by him/herself with no assistance from a helper.   Status Ongoing;Target goal - two weeks;Target goal - one week   Intervention ADL Training;Balance Work;Assistive Device   Meal Prep and Kitchen Mobility   Assist Level Independent   Status Ongoing;Target goal - two weeks;Target goal - one week

## 2024-07-13 NOTE — ASSESSMENT & PLAN NOTE
S/p PEG placement.  SLP to follow.  Pt tolerating tube feeds so far.  Nutrition to follow, appreciate new recommendations regarding tube feeds, orders updated with new recommendations

## 2024-07-13 NOTE — ASSESSMENT & PLAN NOTE
Recent with residual neck pain, distal RUE weakness, impaired mobility   - MRI cervical spine without 6/15/2024:Congenital canal stenosis with superimposed degenerative spondylosis .Most pronounced at C3-4 and C5-C6 with moderate to severe canal stenosis and mild cord compression. Evaluation of cord signal is limited due to artifact. No definite abnormal cord signal but mild cord edema would be difficult to exclude. Severe bilateral foraminal stenosis also seen at C3-4 and C5-C6.Mild to moderate canal stenosis at other levels  - S/P PCDF C2-T1 on 6/16 by NeuroSx Dr Lopez  - Monitor incision site   - No collar required  - Admission: Examines C3 AIS D (some impairment L c4 pinprick, but overall very good sensation), proprioception in ankles intact, and strength quite good with weakness starting in finger flexors bilaterally.  - Activity Restrictions: No heavy lifting greater than 5 - 10lbs. No strenuous activities. No driving while requiring cervical collar, anticipated six weeks. No significant neck movement.  - May shower 3 days after surgery, but do not soak in a tub and no swimming, use mild antimicrobial soap and water. Pat incision dry after showering.  - Monitor for neuro changes, dysphagia (has significant on tube feeds), neurogenic bowel/bladder, spasticity  - Recommend acute comprehensive interdisciplinary inpatient rehabilitation to include intensive skilled therapies (PT, OT) as outlined with oversight and management by rehabilitation physician as well as inpatient rehab level nursing, case management and weekly interdisciplinary team meetings.   - Optimal pain management  - Fall precautions   - Examines C8 AIS D prior to discharge with overall improved light touch/pinprink sensation and overall good strength except bilateral finger flexion  - Follow-up with Spine Sx and if needed PMR after d/c

## 2024-07-13 NOTE — ASSESSMENT & PLAN NOTE
SCDs, ambulation, and lovenox   Recommend discharge home with 8 weeks lovenox for DVT Ppx given possible acute SCI.  Will need lovenox training prior to discharge  -Thru 8/10

## 2024-07-13 NOTE — PLAN OF CARE
Problem: INFECTION - ADULT  Goal: Absence or prevention of progression during hospitalization  Description: INTERVENTIONS:  - Assess and monitor for signs and symptoms of infection  - Monitor lab/diagnostic results  - Monitor all insertion sites, i.e. indwelling lines, tubes, and drains  - Monitor endotracheal if appropriate and nasal secretions for changes in amount and color  - [place] appropriate cooling/warming therapies per order  - Administer medications as ordered  - Instruct and encourage patient and family to use good hand hygiene technique  - Identify and instruct in appropriate isolation precautions for identified infection/condition  Outcome: Progressing

## 2024-07-13 NOTE — CONSULTS
Consult for TF    Advance TF Jevity 1.5 as tolerated to goal of 237mL 6 x day provides total volume 1422mL, 2133cal, 91g pro, 1080mL, water flushes 75mL before and after each bolus provides total of 1980mL, consider adding banatrol plus BID (would provide additional 80cal). If no improvement with watery stool switch to Vital 1.5 237mL bolus feed 6 x day provides 1422mL, 2133ca, 96g pro, 1086mL, 75mL before and after each bolus provides total of 1986mL. Will monitor TF tolerance, labs, weight, please obtain current.

## 2024-07-13 NOTE — PROGRESS NOTES
PT ARC GOALS        07/13/24 0930   Rehab Team Goals   Transfer Team Goal Patient will be independent with transfers with least restrictive device upon completion of rehab program   Locomotion Team Goal Patient will be independent with locomotion with least restrictive device upon completion of rehab program   Rehab Team Interventions   PT Interventions Gait Training;Therapeutic Exercise;Neuromuscualr Reeducation;Transfer Training;Bed Mobility;Patient/Family Education   PT Transfer Goal   Roll left and right Goal 06. Independent - Patient completes the activity by him/herself with no assistance from a helper.   Sit to lying Goal 06. Independent - Patient completes the activity by him/herself with no assistance from a helper.   Lying to sitting on side of bed Goal 06. Independent - Patient completes the activity by him/herself with no assistance from a helper.   Sit to stand Goal 06. Independent - Patient completes the activity by him/herself with no assistance from a helper.   Chair/bed-to-chair transfer Goal 06. Independent - Patient completes the activity by him/herself with no assistance from a helper.   Car Transfer Goal 06. Independent - Patient completes the activity by him/herself with no assistance from a helper.   Assistive Device   (no AD)   Status Target goal - one week;Target goal - two weeks  (7-10 days)   Locomotion Goal   Primary discharge mode of locomotion Walking   Target Walk Distance 300 ft   Assist Device   (LRAD)   Gait Pattern Improvement Decreased foot clearance;Inconsistant Martha   Environment Level Surface;Uneven Surface;Well Lit   Walk 10 feet Goal 06. Independent - Patient completes the activity by him/herself with no assistance from a helper.   Walk 50 feet with 2 turns Goal 06. Independent - Patient completes the activity by him/herself with no assistance from a helper.   Walk 150 feet Goal 06. Independent - Patient completes the activity by him/herself with no assistance from a  helper.   Walking 10 feet on uneven surface 06. Independent - Patient completes the activity by him/herself with no assistance from a helper.   Walking Goal Status Target goal - one week;Target goal - two weeks  (7- 10 days)   Wheel 50 feet with 2 turns Goal 09. Not applicable   Wheel 150 feet Goal 09. Not applicable   Stairs Goal   1 step or curb goal 06. Independent - Patient completes the activity by him/herself with no assistance from a helper.   4 steps Goal 06. Independent - Patient completes the activity by him/herself with no assistance from a helper.   12 steps Goal 09. Not applicable   Number of Stairs 4   Technique Non-reciprocal   Hand Rail Right   Status Target goal - one week;Target goal - two weeks  (7-10 days)   Object Retrieval Goal   Picking up object Goal 06. Independent - Patient completes the activity by him/herself with no assistance from a helper.   Assistive Device  Reacher

## 2024-07-13 NOTE — CASE MANAGEMENT
[facility] received request for authorization from Care Manager.  Authorization request submitted for: Acute Rehab  Facility Name:[facility]   NPI:[id number]  Facility MD: ashley depadua    NPI: [id number]  Authorization initiated by contacting insurance:  [employer]   Via: Fax  Clinicals submitted via fax # [phone]  Pending Reference #:n/a    Care Manager notified: ibis velázquez and ramon espinoza     Updates to authorization status will be noted in chart. Please reach out to CM for updates on any clinical information.

## 2024-07-13 NOTE — ASSESSMENT & PLAN NOTE
Up to 13.6 7/11  Internal medicine consult and management during ARC course  Patient afebrile, other vitals unremarkable > continue to monitor   No clear signs or symptoms of infection or VTE but will continue to monitor  Monitor CBC intermittently

## 2024-07-13 NOTE — PROGRESS NOTES
[facility]  Progress Note  Name: Luis Forrester I  MRN: [id number]  Unit/Bed#: -01 I Date of Admission: 7/12/2024   Date of Service: 7/13/2024 I Hospital Day: 1    Assessment & Plan   Leukocytosis  Assessment & Plan  Mild. Likely reactive. No signs of infx.  Continue to monitor    Dysphagia  Assessment & Plan  S/p PEG placement.  SLP to follow.  Pt tolerating tube feeds so far.  Nutrition to follow, appreciate new recommendations regarding tube feeds, orders updated with new recommendations    s/p Medtronic dual chamber PPM 6/21/2024  Assessment & Plan  Pt has a 6 month history of syncopal events.  Found to be bradycardic in the 40s.  Outpt follow-up with EP    SDH (subdural hematoma) (HCC)  Assessment & Plan  NoT on antiplatelet or AC as an outpt.  Repeat head CT for any change in mental status.    Syncope and collapse  Assessment & Plan  No events since PPM placement.    Hypothyroidism  Assessment & Plan  Continue levothyroxine.  Due for repeat TSH around  8/2/24.    * Cervical myelopathy (HCC)  Assessment & Plan  S/p C3-7 laminectomy with C2-T1 fusion  Completed post-op abx.  No collar necessary.  Monitor incision  NS follow-up around 8/2/24 for 6 week post-op appt.  Therapy and pain control per PMR               The above assessment and plan was reviewed and updated as determined by my evaluation of the patient on 7/13/2024.    Labs:   Results from last 7 days   Lab Units 07/11/24  0442 07/10/24  0444   WBC Thousand/uL 13.65* 8.72   HEMOGLOBIN g/dL 12.6 12.8   HEMATOCRIT % 37.4 40.0   PLATELETS Thousands/uL 227 225     Results from last 7 days   Lab Units 07/11/24  0442 07/10/24  0444   SODIUM mmol/L 141 137   POTASSIUM mmol/L 4.1 3.9   CHLORIDE mmol/L 93* 95*   CO2 mmol/L 30 30   BUN mg/dL 13 12   CREATININE mg/dL 0.67 0.51*   CALCIUM mg/dL 7.3* 7.4*                   Imaging  No orders to display       Review of Scheduled Meds:  Current Facility-Administered  Medications   Medication Dose Route Frequency Provider Last Rate    acetaminophen  975 mg Per PEG Tube Q8H Kim Li PA-C      albuterol  2 puff Inhalation Q4H PRN Kim Li PA-C      benzocaine  2 spray Mucosal 4x Daily PRN Kim Li PA-C      bisacodyl  10 mg Rectal Daily PRN Luke Suggs MD      dextromethorphan-guaiFENesin  10 mL Per PEG Tube Q4H PRN Kim Li PA-C      enoxaparin  30 mg Subcutaneous Q12H ASHLYN Kim Li PA-C      gabapentin  100 mg Per PEG Tube TID Kim Li PA-C      levothyroxine  125 mcg Per PEG Tube Early Morning Kim Li PA-C      melatonin  3 mg Per PEG Tube HS Kim Li PA-C      nystatin  500,000 Units Swish & Spit 4x Daily Luke Suggs MD      nystatin   Topical BID Kim Li PA-C      ondansetron  4 mg Per PEG Tube Q6H PRN Kim Li PA-C      oxyCODONE  2.5 mg Oral Q4H PRN Luke Suggs MD      polyethylene glycol  17 g Per PEG Tube Daily Kim Li PA-C      polyethylene glycol  17 g Oral Daily PRN Luke Suggs MD      saliva substitute  5 spray Mouth/Throat 4x Daily PRN Kim Li PA-C         Subjective/ HPI: Patient seen and examined. Patients overnight issues or events were reviewed with nursing staff. New or overnight issues include the following:     Patient seen resting comfortably in bed. Family at bedside. Tolerating tube feeds. Pain controlled.     ROS:   A 10 point ROS was performed; negative except as noted above.        *Labs /Radiology studies Reviewed  *Medications  reviewed and reconciled as needed  *Please refer to order section for additional ordered labs studies      Physical Examination:  Vitals:   Vitals:    07/13/24 0940 07/13/24 0945 07/13/24 1000 07/13/24 1322   BP: 96/64 (!) 82/64 96/68 100/60   BP Location: Right arm Right arm Right arm Right arm   Pulse:    70   Resp:    16   Temp:    98 °F (36.7 °C)    TempSrc:    Oral   SpO2:    95%   Weight:       Height:           General Appearance: NAD; pleasant  HEENT: PERRLA, conjuctiva normal; mucous membranes moist; face symmetrical  Neck:  Supple  Lungs: clear bilaterally, normal respiratory effort, no retractions, expiratory effort normal, on room air  CV: regular rate and rhythm, no murmurs rubs or gallops noted   ABD: soft non tender, +BS x4  EXT: DP pulses intact, no lymphadenopathy, no edema  Skin: normal turgor, normal texture, no rash  Psych: affect normal, mood normal  Neuro: AAOx3       The above physical exam was reviewed and updated as determined by my evaluation of the patient on 7/13/2024.    Invasive Devices       Peripheral Intravenous Line  Duration             Peripheral IV 07/11/24 Right;Ventral (anterior) Wrist 2 days              Drain  Duration             Gastrostomy/Enterostomy Percutaneous Endoscopic Gastrostomy (PEG) 20 Fr. LUQ 3 days                       VTE Pharmacologic Prophylaxis: Enoxaparin  Code Status: Level 1 - Full Code  Current Length of Stay: 1 day(s)    Total floor / unit time spent today 20 minutes  Coordination of patient's care was performed in conjunction with primary service. Time invested included review of patient's labs, vitals, and management of their comorbidities with continued monitoring, examination of patient as well as answering patient questions, documenting her findings and creating progress note in electronic medical record,  ordering appropriate diagnostic testing.       ** Please Note:  voice to text software may have been used in the creation of this document. Although proof errors in transcription or interpretation are a potential of such software**

## 2024-07-13 NOTE — ASSESSMENT & PLAN NOTE
Course also notable for abdominal distension which was favored to be ileus   - Recent occasional loose stools now on PEG tube feeds  - Not requiring bowel regimen at this time.  - Overall continent.   - Last BM 7/22  - Colace given today. Will discharge on Colace BID prn

## 2024-07-13 NOTE — PROGRESS NOTES
07/13/24 0930   Patient Data   Rehab Impairment Impairment of mobility, safety and Activities of Daily Living (ADLs) due to Spinal Cord Dysfunction: Traumatic: 04.230 Other Traumatic traumatic spinal cord injury-cervical myelopathy   Etiologic Diagnosis cervical myelopathy s/p C3-C7 cervical laminectomy with bilateral screw placement and fusion C2 to-T1 on 6/16   Date of Onset 06/15/24   Support System   Name Trinity Arteaga daughter   Able to provide 24 hour supervision Yes   Able to provide physical help? Yes   Multiple Support Systems Yes   Support System 2   Able to provide 24 hour supervision 2 Yes   Able to provide physical help? (2) Yes   Home Setup   Type of Home Apartment   Method of Entry Stairs;Hand Rail Bilateral   Number of Stairs 4   First Floor Bathroom Full;Tub;Door   Home Modification Comment Pt reports him and his daughter will be moving in with the girlfriend when he discharges from the [facility]. Girlfriend's home does not have any stairs to enter   Baseline Information   Vocation Work Part Time   Transportation Public transportation  (but reports walking as his main mode of transportation)   Prior Level of Function   Self-Care 3. Independent - Patient completed the activities by him/herself, with or without an assistive device, with no assistance from a helper.   Indoor-Mobility (Ambulation) 3. Independent - Patient completed the activities by him/herself, with or without an assistive device, with no assistance from a helper.   Stairs 3. Independent - Patient completed the activities by him/herself, with or without an assistive device, with no assistance from a helper.   Functional Cognition 3. Independent - Patient completed the activities by him/herself, with or without an assistive device, with no assistance from a helper.   Prior Device Used Z. None of the above   Falls in the Last Year   Number of falls in the past 12 months 3   Type of Injury Associated with Fall Major injury  (reason  for current admission. Multiple syncopal episodes at home)   Restrictions/Precautions   Precautions Aphasia;Bed/chair alarms;Cognitive;Fall Risk;Supervision on toilet/commode;Spinal precautions;Seizure;Impulsive;Pacemaker  (NPO, HOB >30 degrees at all times)   Weight Bearing Restrictions Yes  (sternal and pacemaker precautions)   ROM Restrictions Yes  (sternal)   Pain Assessment   Pain Assessment Tool 0-10   Pain Score No Pain   Transfer Bed/Chair/Wheelchair   Limitations Noted In Balance;LE Strength;Sequencing   Adaptive Equipment None   Type of Assistance Needed Physical assistance   Physical Assistance Level 26%-50%   Comment min/modA with no AD, small steps/shuffling when turning   Chair/Bed-to-Chair Transfer CARE Score 3   Roll Left and Right   Type of Assistance Needed Supervision   Comment HOB at 30 degrees per orders   Roll Left and Right CARE Score 4   Sit to Lying   Type of Assistance Needed Supervision   Comment HOB at 30 degrees per orders   Sit to Lying CARE Score 4   Lying to Sitting on Side of Bed   Type of Assistance Needed Physical assistance   Physical Assistance Level 51%-75%   Comment ModA for trunk management, HOB at 30 degrees   Lying to Sitting on Side of Bed CARE Score 2   Sit to Stand   Type of Assistance Needed Physical assistance   Physical Assistance Level 25% or less   Comment Precious with no AD   Sit to Stand CARE Score 3   Picking Up Object   Reason if not Attempted Safety concerns   Picking Up Object CARE Score 88   Car Transfer   Type of Assistance Needed Physical assistance   Physical Assistance Level 26%-50%   Comment Min/ModA to stand from car seat, VCs for safety and sequencing   Car Transfer CARE Score 3   Ambulation   Primary Mode of Locomotion Prior to Admission Walk   Distance Walked (feet) 154 ft  (128 ft)   Assist Device   (none)   Gait Pattern Inconsistant Martha;Decreased foot clearance;Slow Martha;Improper weight shift;Shuffle   Limitations Noted In Endurance;Heel  Strike;Speed;Strength;Swing;Safety   Provided Assistance with: Balance;Direction   Walk Assist Level Minimum Assist   Findings heavier Precious with no AD to mimic baseline status   Walk 10 Feet   Type of Assistance Needed Physical assistance   Physical Assistance Level 26%-50%   Comment heavier Precious, gait belt   Walk 10 Feet CARE Score 3   Walk 50 Feet with Two Turns   Type of Assistance Needed Physical assistance   Physical Assistance Level 26%-50%   Comment heavier Precious, gait belt   Walk 50 Feet with Two Turns CARE Score 3   Walk 150 Feet   Type of Assistance Needed Physical assistance   Physical Assistance Level Total assistance   Comment heavier Precious, no AD to mimic PLOF, WC follow for safety due to fatigue level and pt report of decreased LE strength.   Walk 150 Feet CARE Score 1   Walking 10 Feet on Uneven Surfaces   Type of Assistance Needed Physical assistance   Physical Assistance Level Total assistance   Comment Precious x 2 people, had difficulty navigating over small lip on mat   Walking 10 Feet on Uneven Surfaces CARE Score 1   Wheel 50 Feet with Two Turns   Reason if not Attempted Activity not applicable   Wheel 50 Feet with Two Turns CARE Score 9   Wheel 150 Feet   Reason if not Attempted Activity not applicable   Wheel 150 Feet CARE Score 9   Curb or Single Stair   Style negotiated Curb   Type of Assistance Needed Physical assistance   Physical Assistance Level Total assistance   Comment Min/ModA x2 via HHA for 6 inch curb navigation, VCs for safety and sequencing   1 Step (Curb) CARE Score 1   4 Steps   Type of Assistance Needed Physical assistance   Physical Assistance Level Total assistance   Comment Min/ModA x1 with CGA of second person due to patient having hx of syncopal episodes with no preceeding signs or symptoms, like dizziness. R HR, step to pattern   4 Steps CARE Score 1   12 Steps   Reason if not Attempted Activity not applicable   12 Steps CARE Score 9   Stairs   Type Trainer Steps   # of  Steps 4   Weight Bearing Precautions Cervical  (pacemaker)   Comprehension   QI: Comprehension 3. Usually Understands: Understands most conversations, but misses some part/intent of message. Requires cues at times to understand.   Expression   QI: Expression 4. Express complex messages without difficulty and with speech that is clear and easy to understan   Cognition   Overall Cognitive Status Impaired   Arousal/Participation Alert;Cooperative   Attention Attends with cues to redirect   Orientation Level Oriented X4   Memory Decreased short term memory;Decreased recall of recent events;Decreased recall of precautions   Following Commands Follows one step commands with increased time or repetition   Objective Measure   PT Measure(s) 5x STS with no UE support: 32.72 seconds; TUG with no AD: 27.93 seconds; 10 Meter Walk Test Self Selected Pace: 0.448 m/s   PT Findings due to hx of syncpal episodes, BP was closely monitored throughout the session. At rest, BP wsa 96/64. Upon standing, BP was 82/64. Pt denied dizziness. With activity, BP went back up to 96/68   Therapeutic Exercise   Therapeutic Exercise/Activity Instructed patient in following HEP and provided with printed copy. Access Code: [email or web address]  URL: [email or web address]  Date: 07/13/2024  Prepared by: Charity Kamara    Exercises  - Seated Ankle Pumps  - 1 x daily - 7 x weekly - 3 sets - 10 reps  - Seated Long Arc Quad  - 1 x daily - 7 x weekly - 3 sets - 10 reps  - Seated March  - 1 x daily - 7 x weekly - 3 sets - 10 reps  - Seated Hip Abduction  - 1 x daily - 7 x weekly - 3 sets - 10 reps  (Instructed patient on ARC schedule, expectation during his stay, etc.)   Discharge Information   Vocational Plan Return to work   Barriers to Return to Vocation Strength;Endurance   Patient's Discharge Plan To discharge to supportive girlfriend's home   Patient's Rehab Expectations Pt states he wants to improve his independence   Barriers to Discharge Home  Decreased Cognitive Function;Decreased Strength;Decreased Endurance;Safety Considerations   Impressions Patient is a 56 year old male who presents to [facility] following hospitalization for parafalcine subdural hemorrhage with bilateral subarachnoid hemorrhage as well as operative intervention for the cervical stenosis with myelopathy and upper extremity weakness. Patient underwent C3-C7 cervical laminectomy with bilateral screw placement and fusion C2 to-T1 on 6/16 . PMH significant for asthma, HLD, hypothyroidism, who was having intermittent syncopal episodes over the last year who was found down by daughter after presumable other syncopal episode . Prior to admission, patient was independent for ADLs, non , walking without assistive device. Patient lives with with other family member:daughter  in a apartment  with 4 WARD. On initial evaluation, patient presents decreased strength, decreased ROM, imbalance, decreased endurance, fatigue, cognitive deficits, decreased safety awareness, impulsiveness, spinal precautions, and delayed righting reactions resulting in increased assistance for all functional mobility. Patient requires Supervision to ModA for bed mobility, Min Assistance and Mod Assistance  for transfers, Min Assistance for ambulation, Total Assistance  for stair navigation.  Patient is high risk for falls secondary to deficits found on initial evaluation and via outcome measures. Patient will benefit from physical therapy services to address the deficits identified on evaluation and to maximize safety and independence with all functional mobility and to decrease caregiver burden. Barriers to discharge home at this time include: limited caregiver support/ inaccessible home environment/high risk for falls/impaired cognition. Patient is a good rehabilitation candidate. Patient's estimated length of stay is 7 to 10 days with goals set for [place]. PT plan of care to focus on bed  mobility/transfers/gait training/stair navigation/strength training/balance training/improving activity tolerance.   PT Therapy Minutes   PT Time In 0930   PT Time Out 1100   PT Total Time (minutes) 90   PT Mode of treatment - Individual (minutes) 90   PT Mode of treatment - Concurrent (minutes) 0   PT Mode of treatment - Group (minutes) 0   PT Mode of treatment - Co-treat (minutes) 0   PT Mode of Treatment - Total time(minutes) 90 minutes   PT Cumulative Minutes 90   Cumulative Minutes   Cumulative therapy minutes 180     Charity Kamara, PT, DPT

## 2024-07-14 PROCEDURE — 97110 THERAPEUTIC EXERCISES: CPT

## 2024-07-14 PROCEDURE — 97530 THERAPEUTIC ACTIVITIES: CPT

## 2024-07-14 PROCEDURE — 92523 SPEECH SOUND LANG COMPREHEN: CPT

## 2024-07-14 PROCEDURE — 92610 EVALUATE SWALLOWING FUNCTION: CPT

## 2024-07-14 PROCEDURE — 99232 SBSQ HOSP IP/OBS MODERATE 35: CPT | Performed by: STUDENT IN AN ORGANIZED HEALTH CARE EDUCATION/TRAINING PROGRAM

## 2024-07-14 RX ADMIN — NYSTATIN 500000 UNITS: 100000 SUSPENSION ORAL at 18:04

## 2024-07-14 RX ADMIN — NYSTATIN 500000 UNITS: 100000 SUSPENSION ORAL at 21:00

## 2024-07-14 RX ADMIN — ENOXAPARIN SODIUM 30 MG: 30 INJECTION SUBCUTANEOUS at 21:02

## 2024-07-14 RX ADMIN — ACETAMINOPHEN 975 MG: 650 SUSPENSION ORAL at 05:29

## 2024-07-14 RX ADMIN — GABAPENTIN 100 MG: 250 SOLUTION ORAL at 21:10

## 2024-07-14 RX ADMIN — NYSTATIN 500000 UNITS: 100000 SUSPENSION ORAL at 09:11

## 2024-07-14 RX ADMIN — POLYETHYLENE GLYCOL 3350 17 G: 17 POWDER, FOR SOLUTION ORAL at 09:09

## 2024-07-14 RX ADMIN — ACETAMINOPHEN 975 MG: 650 SUSPENSION ORAL at 21:00

## 2024-07-14 RX ADMIN — Medication 125 MCG: at 05:29

## 2024-07-14 RX ADMIN — ENOXAPARIN SODIUM 30 MG: 30 INJECTION SUBCUTANEOUS at 09:10

## 2024-07-14 RX ADMIN — Medication 3 MG: at 21:08

## 2024-07-14 RX ADMIN — NYSTATIN: 100000 POWDER TOPICAL at 18:04

## 2024-07-14 RX ADMIN — ACETAMINOPHEN 975 MG: 650 SUSPENSION ORAL at 16:18

## 2024-07-14 RX ADMIN — NYSTATIN: 100000 POWDER TOPICAL at 09:09

## 2024-07-14 RX ADMIN — GABAPENTIN 100 MG: 250 SOLUTION ORAL at 09:09

## 2024-07-14 RX ADMIN — GABAPENTIN 100 MG: 250 SOLUTION ORAL at 16:17

## 2024-07-14 NOTE — PLAN OF CARE
Problem: PAIN - ADULT  Goal: Verbalizes/displays adequate comfort level or baseline comfort level  Description: Interventions:  - Encourage patient to monitor pain and request assistance  - Assess pain using appropriate pain scale  - Administer analgesics based on type and severity of pain and evaluate response  - Implement non-pharmacological measures as appropriate and evaluate response  - Consider cultural and social influences on pain and pain management  - Notify physician/advanced practitioner if interventions unsuccessful or patient reports new pain  Outcome: Progressing     Problem: INFECTION - ADULT  Goal: Absence or prevention of progression during hospitalization  Description: INTERVENTIONS:  - Assess and monitor for signs and symptoms of infection  - Monitor lab/diagnostic results  - Monitor all insertion sites, i.e. indwelling lines, tubes, and drains  - Monitor endotracheal if appropriate and nasal secretions for changes in amount and color  - Amber appropriate cooling/warming therapies per order  - Administer medications as ordered  - Instruct and encourage patient and family to use good hand hygiene technique  - Identify and instruct in appropriate isolation precautions for identified infection/condition  Outcome: Progressing     Problem: SAFETY ADULT  Goal: Patient will remain free of falls  Description: INTERVENTIONS:  - Educate patient/family on patient safety including physical limitations  - Instruct patient to call for assistance with activity   - Consult OT/PT to assist with strengthening/mobility   - Keep Call bell within reach  - Keep bed low and locked with side rails adjusted as appropriate  - Keep care items and personal belongings within reach  - Initiate and maintain comfort rounds  - Make Fall Risk Sign visible to staff  - Offer Toileting every 2 Hours, in advance of need  - Initiate/Maintain bed/chair alarm  - Obtain necessary fall risk management equipment: alarm   - Apply  yellow socks and bracelet for high fall risk patients  - Consider moving patient to room near nurses station  Outcome: Progressing

## 2024-07-14 NOTE — PROGRESS NOTES
SLP COGNITION AND DYSPHAGIA EVALUATION     24 0800   Pain Assessment   Pain Assessment Tool 0-10   Pain Score No Pain   Restrictions/Precautions   Precautions Aspiration;Bed/chair alarms;Cognitive;Fall Risk;Impulsive;Pacemaker;Seizure;Spinal precautions;Supervision on toilet/commode  (NPO)   Cognitive Linguisitic Assessments   The [employer] Mental Status ([facility]) Pt completed the [facility] cognitive assessment. Pt total score was 16 which as compared to those with high school education correlates with severe neurocognitive disorder and is indicative of dementia. Breakdown of scores as follows:    Orientation: 3/3  Functional problem solving with math: 0/3  Divergent naming (timed 1 minute- animals): 2/3 (11 animals total)  Word recall: 1/5  Mental flexibility with number order: 2/2  Clock Drawin/4   Following directions given shapes: 2/2  Story recall comprehension questions: 4/8    Total score: 16/30    Based on score, patient will benefit from further skilled SLP services to maximize overall cognitive lingusitic communication abilities for increased independence and decreased burden of care.     Comprehension   Auditory Basic;Complex   Visual Basic;Complex   Findings Please see SLP tx note for further details.   QI: Comprehension 3. Usually Understands: Understands most conversations, but misses some part/intent of message. Requires cues at times to understand.   Comprehension (FIM) 4 - Understands basic info/conversation 75-90% of time   Expression   Verbal Basic;Complex   Non-Verbal Basic;Complex   Intelligibility Sentence   Findings Please see SLP tx note for further details.   QI: Expression 3. Exhibits some difficulty with expressing needs and ideas (e.g., some words or finishing thoughts) or speech is not clear   Expression (FIM) 4 - Expresses basic info/needs 75-90% of time   Social Interaction   Cooperation with staff   Participation Individual   Behaviors observed Appropriate   Findings  Please see SLP tx note for further details.   Social Interaction (FIM) 5 - Interacts appropriately with others 90% of time   Problem Solving   Complex Manages discharge planning   Routine Manages call bell;Manges precautions;Manages ADL   Findings Please see SLP tx note for further details.   Problem solving (FIM) 4 - Solves basic problems 75-89% of time   Memory   Recognize People Yes   Remember Routine Yes   Initiates Tasks Yes   Short-Term Impaired   Long Term Intact   Recalls Precaution Yes   Findings Please see SLP tx note for further details.   Memory (FIM) 2 - Recognizes, recalls/performs 25-49%   Speech/Language/Cognition Assessment   Treatment Assessment Pt was seen for skilled ST evaluation assessing current cognitive linguistic skills in between PO dysphagia trials. Pt demonstrated orientation x5 to month, year, location, [place] and state. Pt reports prior to admission, pt was I but due to recent hospitalization, his daughter has taken responsibility for most ADLs such as cooking, cleaning, laundry, bill and medication management. However, as she is 18 y/o, pt voiced that they would like to move this to pt's girlfriend to assist with ADLs. Pt voiced no insight with cognitive deficits since hospitalization and notes no insight with decrease in memory, attention or problem solving. The SLUMs was completed and the pt had a total score of 16 as compared to those with high school education correlates with severe neurocognitive disorder and is indicative of dementia. Compared to the formalized testing and informal analysis, pt noted deficits with working memory, immediate memory, STM, remote memory, reasoning, sequencing, problem solving, executive function skills and word finding at a higher level. Additionally, skilled ST can assist w/ ADL such as medication management and bill management using cognitive skills necessary to be completed.  At this time, pt continues to benefit from ongoing skilled SLP services to  maximize overall cognitive linguistic skills in attempts to decrease caregiver burden over time.   Eating   Type of Assistance Needed Physical assistance   Physical Assistance Level Total assistance   Eating CARE Score 1   Swallow Assessment   Swallow Treatment Assessment   Bedside Dysphagia Evaluation      Patient Name: Luis Forrester    Today's Date: 7/14/2024     Problem List  Principal Problem:    Cervical myelopathy (HCC)  Active Problems:    Hypothyroidism    Syncope and collapse    SDH (subdural hematoma) (HCC)    s/p Medtronic dual chamber PPM 6/21/2024    Dysphagia    Leukocytosis    At risk for venous thromboembolism (VTE)    Pain    Thrush    At risk for altered bowel elimination      Past Medical History  Past Medical History:   Diagnosis Date    Arthritis     Chronic cough     Disease of thyroid gland     Hypoparathyroidism (HCC)     continue taking calcium and vitamin D, will check CMP, PTH level and Vitamin D level    Pneumonia of right middle lobe due to Streptococcus pneumoniae ([facility]) 11/30/2018    s/p Medtronic dual chamber PPM 6/21/2024 06/21/2024       Past Surgical History  Past Surgical History:   Procedure Laterality Date    CARDIAC ELECTROPHYSIOLOGY PROCEDURE N/A 6/21/2024    Procedure: Cardiac pacer implant;  Surgeon: Kofi Pettit MD;  Location: [facility]AC [facility];  Service: Cardiology    DECOMPRESSION SPINE CERVICAL POSTERIOR N/A 6/16/2024    Procedure: DECOMPRESSION SPINE CERVICAL POSTERIOR C2-T1 with fusion navigated with Oarm;  Surgeon: Endy Velasquez MD;  Location: [facility] MAIN OR;  Service: Neurosurgery    FRACTURE SURGERY      Open Treatment of Each Proximal Finger Phalanx    GASTROSTOMY TUBE PLACEMENT N/A 7/10/2024    Procedure: INSERTION PEG TUBE;  Surgeon: Darcy Reinoso MD;  Location: [facility] MAIN [facility];  Service: General     Summary   Pt presented with s/s suggestive of mild-moderate oral and suspected moderate-severe pharyngeal dysphagia.  Symptoms or concerns included  decreased mastication, decreased bolus formation, suspected decreased control of bolus , and piecemeal deglutition suspected pharyngeal swallow delay, multiple swallows, effortful swallow, and audible swallows.      Pt was seen with trials of ice chips for dysphagia evaluation today. SLP educated pt on current NPO status as pt has many questions regarding SLP role, NPO status, what dysphagia was, diet levels, liquid levels and how it works on the various levels. SLP educated pt on the trial levels works with starting with ice chips and working towards the various diet levels. Pt noting he is open to the various levels and is understanding.     Pt's lip closure was functional around spoon for all trials of ice chips and with HTL. No anterior spillage was noted throughout trials. Pt has good retrieval of items visa tsp. Mastication was prolonged with pt needing increase time to breakdown most ice chips but was effective over time. Bolus formation was disorganized with suspected posterior aspiration as evidenced during recent video swallow studies. Bolus control and transfer is suspected to be decreased with trials of ice chips and with tsp of HTL as transfer time is prolonged but swallow is timely. Swallow promptness is decreased as pt would hesitate in anticipation of dysphagia. Hyolaryngeal elevation was present upon palpation and laryngeal rise appears to be adequate. Some evidence of wet voice at times with pt able to imitate a volitional cough to clear liquids. Pt was noted to cough during trials of ice chips at least 9x and throat clear occasionally. Audible and secondary swallows were also present throughout. It should be noted that although not evidenced during today's evaluation, during recent MBSS pt has presented with 'severe pharyngeal dysphagia characterized by reduced airway production, regurgitation into oral cavity, penetration events, absent epiglottic inversion, poor pharyngeal peristalsis with  resulting significant valleculae and pyriform retention, and during MBSS has been observed with consistent penetration to the cords with all trials, with intermittent posterior transient aspiration and episodes of aspiration with thin liquids via straw.'     Oral care with suction completed at end of trials with pt, with pt being independent and wanting to complete on own cleaning mouth and SLP guiding pt on how to clean teeth and brushing tongue. Pt able to follow verbal and gestural cues and able to clear all secretions.     Risk/s for Aspiration: present       Recommendations:  Recommended Diet: NPO with tube feeds   Recommended Form of Meds:  via peg tube     Aspiration precautions and swallowing strategies: upright posture and oral care with suction 4x a day    Consider consult with:  nutrition  Results reviewed with:  patient, RN-Jane, and PT-Charity  Aspiration precautions posted.    F/u ST tx: Pt will continue to benefit from ongoing skilled dysphagia tx sessions to establish safest least restrictive diet w/o increased oropharyngeal or aspiration sxs as well as monitor ability to carryover swallow strategies independently.      Plan:  -Continue NPO with frequent oral care with suction to improve thrush   -SLP to trial PO items as appropriate/able  -Potential for repeat VBS to reassess progress and prior to initiation PO diet (if able)  -education on aspiration precautions, use pharyngeal strengthening exercises, and potential NMES candidate     Current Medical Status  Pt  is a 53-year-old male who presented to [facility] on 6/15/24 after being found down on the floor at home by his daughter.  Patient stated he got home around 2 am and went in to use the bathroom.  He does not recall what happened but he woke up on the floor unable to get up on his own in the hallway.  He yelled for his daughter who got up to use the bathroom and called 911 because she was unable to get him up on her own.  EMS  helped get him up and then his daughter called a second time and that is when he was brought into the hospital.  In the ER, patient stated that the right side of his body was bothering him.  He reported decreased sensation in his right posterior thigh, right leg weakness and inability to open up his right hand since his fall.  It was suspected that patient had a syncopal episode that lead to the fall.  Of note patient's daughter states patient 3 syncopal episode in the past 6 months where he sustained a fall.  CT head showed a new acute trace subarachnoid hemorrhage in bilateral parafalcine regions and a new acute trace parafalcine subdural hematoma.  CTA head and neck was negative.  CTA head showed traumatic vascular injury, aneurysm, large vessel occlusion, high-grade stenosis, or dissection. Partially imaged ectatic right carotid bifurcation and proximal cervical internal carotid artery with retropharyngeal course, similar to CT neck with contrast 10/12/2011.  MRI cervical spine and showed congenital canal stenosis with superimposed degenerative spondylosis most pronounced at C3-4 and C5-C6 with moderate to severe canal stenosis and mild cord compression; evaluation of cord signal was limited due to artifact; no definite abnormal cord signal but mild cord edema would be difficult to exclude; severe bilateral foraminal stenosis seen at C3-4 and C5-C6; mild to moderate canal stenosis at other levels.  He is s/p C3-C7 cervical laminectomy with bilateral screw placement and fusion C2-T1 on 6/16.  He was recommended 2 weeks of antibiotics for infectious prophylaxis per neurosurgery.  He had his Hemovac drain removed on 6/19.  Patient noted to have symptomatic junctional bradycardia with asymptomatic wide-complex tachycardia on telemetry for which EP was consulted.  They felt presentation mixture of sick sinus syndrome and wide-complex tachycardia likely SVT with aberrancy and felt reasonable to place dual-chamber  pacemaker given history of multiple syncopal episodes and now documented bradycardia.  Pacer was placed on 6/21.  Course also notable for hyponatremia and polyuria for which nephrology was consulted.  Patient did receive 2 doses of DDAVP as well as intermittent IV fluids.  Na is now WNL and nephrology has signed off.  Additionally, patient experienced abdominal distension which worsened.  Imaging showed variable moderate distention of the small and large bowel.  Question ileus.  Repeat imaging showed persistent gaseous distention of portions of the small and large bowel similar to the prior study.  The distribution of the gaseous distention favored ileus although obstruction could not be conclusively excluded.  Patient was having loose stools.  He was made NPO and diet was slowly advanced.  On 6/28, patient developed difficulty swallowing pills.  Video swallow was done and patient was made NPO with keofed tube feeds.  Repeat video swallow was completed on 7/3 and it was recommended that he remain NPO.  A third video swallow was completed on 7/8 and patient failed again with continued NPO recommendation.  Patient was initially agreeable to having a PEG placed and then changed his mind and refused.  After further discussion, he was agreeable and PEG was placed on 7/10.  He was started on trickle feeds and per nutrition recommendations, he is now on bolus feeds.  Patient also c/o bilateral hand pain during his hospitalization.  X-rays were completed and negative.  Pain improved.  Patient worked with therapy and was recommended for rehab following her hospitalization.  He has demonstrated that he can tolerate three hours of therapy, five days a week and is now medically cleared for discharge to the [facility].     Allergies:  No known food allergies    Past medical history:  Please see H&P for details    Special Studies:  6/30/2024 MBSS     Oral stage: Mild impairment, including reduced bolus control and ?mild difficulty  initiating bolus transfer (vs hesitation in anticipation of dysphagia).      Pharyngeal stage: Severe impairment, characterized by reduced BOT constriction, reduced PPW constriction, and poor hyolaryngeal movement; this results in minimal inversion of the epiglottis, and reduced opening of the UES, and mod-severe pharyngeal residue, especially in the pyriforms. Pt attempts to clear the residue with multiple swallows, but this results in aspiration from the residue, mostly posterior aspiration. Residue increases with thicker consistencies, with pudding having the most residue. With the first bite of pudding pt gagged and retropulsed the bolus back into his mouth and expectorated it. There is deep laryngeal penetration with all consistencies assessed today (thin, NTL, HTL, puree), with undercoating of the epiglottis. There was at least mild aspiration of all consistencies; at times aspiration was after the swallow from residue in the pyriforms/pharynx. Response to aspiration was weak coughing. Frequent wet, gurgly vocal quality occurred during the exam; cued cough was briefly effective at clearing but wet quality quickly returned. Pt has limited neck mobility due to recent surgery; he attempted to complete a chin tuck but this was ineffective. Pt is currently at high risk for aspiration of all consistencies. RN reports increasing cough and a fever today.      Per gross esophageal screen: Brief scan shows possible mild retropulsion of the bolus.     7/3/2024 [facility]    Pt presents with mild oral and a severe pharyngeal dysphagia. Results are ultimately unchanged from previous VBS on 6/30. The patient has good retrieval of items via tsp and cup. Transfer time is min prolonged and patient continues with some difficulty initiating transfer. Pharyngeal dysphagia characterized by decreased BOT retraction, decreased epiglottic inversion, decreased hyolaryngeal movement and poor pharyngeal peristalsis. The patient presents with  significant valleculae and pyriform retention, especially with solids. The patient attempts multiple swallows to clear, but is unsuccessful. Intermittent penetration to the cords and aspiration events are observed with all trials. Some aspiration is observed during the swallow, and other aspiration events are posterior from pharyngeal retention. The patient does not have cough response to aspiration. Vocal quality is wet during study. He continues with poor neck ROM and is unable to fully complete chin tuck and other movement strategies. Brief scan of the esophagus does reveal some possible stasis.  Note: Images are available for review in PACS as desired.     7/8/2024 MBSS    Results are compared to 2 previous VBS with ultimately unchanged results. The patient continues to present with a mild (now possible mild-moderate) oral dysphagia with a severe pharyngeal dysphagia. NGT is present for this study. The patient has prolonged transfer time, with tongue pumping observed to transfer pudding. Swallow is timely. Epiglottic inversion is absent. The patient continues with poor pharyngeal peristalsis with resulting significant valleculae and pyriform retention, especially more with thick liquids and pudding. Airway protection is reduced. The patient is observed with consistent penetration to the cords with all trials, with intermittent posterior transient aspiration and episodes of aspiration with thin liquids via straw. The patient is observed attempting multiple swallows to help clear material, but is unsuccessful. Regurgitation into oral cavity with pudding is observed x1, which helps clear pharyngeal retention.Patient does have throat clearing with penetration events. He has somewhat increased neck ROM, but is still unable to fully attempt chin tuck strategy. Brief scan of the esophagus today is unremarkable.  PACS as desired.     Social/Education/Vocational Hx:  Pt lives with family    Swallow Information   Current  Risks for Dysphagia & Aspiration: new neuro event, brain injury, cognitive deficit, positioning issues, spinal precautions, head support  Current Symptoms/Concerns: cough, clear throat, during and after PO, difficulty chewing, wet voicing, chronic cough  Current Diet: NPO   Baseline Diet: regular diet and thin liquids      Baseline Assessment   Behavior/Cognition: alert  Speech/Language Status: able to participate in conversation and able to follow commands  Patient Positioning: upright in bed  Pain Status/Interventions/Response to Interventions: No report of or nonverbal indications of pain.     Swallow Mechanism Exam  Facial: symmetrical  Labial: WFL  Lingual: WFL  Velum: symmetrical  Mandible: adequate ROM  Dentition: adequate  Vocal quality:clear/adequate and hoarse   Volitional Cough:mildly  weak   Respiratory Status: on RA     Consistencies Assessed and Total Amount Consumed:  Consistencies Administered: ice chips and HTL  Materials administered included 11 ice chips and 1 teaspoon of HTL .    Ice Chip Trials  Trial 1-Good lip closure; immediate cough reflex 1x  Trial 2- good lip closure; no coughing or TC; good mastication with loud audible swallows and need for multiple swallows  Trial 3-4 coughing 2x; pt attempting to talk during intake  Trial 5 to 7- no coughing or TC reflex; decreased attempting to talk and noted to focus more on mastication; noted to increase mastication time w/ noted more multiple swallows  Trial 8- immediate cough 2x; suspect decrease bolus control and transfer; getting more fatigued by end; attempting to have more control   Trial 9- no coughing or TC; attempting to take more time and swallow promptness was slightly delayed more due to increase anxiety with coughing pt vocalized  Trial 10-coughing x2 suspect cont decrease with bolus control and transfer  Trial 11-coughing x2 post intake with HTL as well increasing coughing and pt noted he was attempting to talk as he was excited trial  of HTL and forgot he was not suppose to be     HTL Trial  1 TSP-good closure around utensils,  no coughing or throat clearing immediately. SLP provided time after to which pt did not show delayed cough or TC. Then after some time SLP provided 1 ice chip (trial 11) to which then pt began coughing. Pt voiced during trial with HTL (that went down better than I thought)     Oral Stage: mild-moderate  Pt's lip closure was functional around spoon for all trials of ice chips and with HTL. No anterior spillage was noted throughout trials. Pt has good retrieval of items visa tsp. Mastication was prolonged with pt needing increase time to breakdown most ice chips but was effective over time. Bolus formation was disorganized with suspected posterior aspiration as evidenced during recent video swallow studies. Bolus control and transfer is suspected to be decreased with trials of ice chips and with tsp of HTL as transfer time is prolonged but swallow is timely.     Pharyngeal Stage: moderate-severe  Swallow promptness is decreased as pt would hesitate in anticipation of dysphagia. Hyolaryngeal elevation was present upon palpation and laryngeal rise appears to be adequate. Some evidence of wet voice at times with pt able to imitate a volitional cough to clear liquids. Pt was noted to cough during trials of ice chips at least 9x and throat clear occasionally. Audible and secondary swallows were also present throughout. It should be noted that although not evidenced during today's evaluation, during recent MBSS pt has presented with 'severe pharyngeal dysphagia characterized by reduced airway production, regurgitation into oral cavity, penetration events, absent epiglottic inversion, poor pharyngeal peristalsis with resulting significant valleculae and pyriform retention, and during MBSS has been observed with consistent penetration to the cords with all trials, with intermittent posterior transient aspiration and episodes of  aspiration with thin liquids via straw.'     Summary and Recommendations (see above)     Swallow Assessment Prognosis   Prognosis Good   Prognosis Considerations Co-morbidities;Medical diagnosis;Medical prognosis;Previous level of function;Severity of impairments   SLP Therapy Minutes   SLP Time In 0800   SLP Time Out 0900   SLP Total Time (minutes) 60   SLP Mode of treatment - Individual (minutes) 60   SLP Mode of treatment - Concurrent (minutes) 0   SLP Mode of treatment - Group (minutes) 0   SLP Mode of treatment - Co-treat (minutes) 0   SLP Mode of Treatment - Total time(minutes) 60 minutes   SLP Cumulative Minutes 60   Therapy Time missed   Time missed? No

## 2024-07-14 NOTE — PROGRESS NOTES
07/14/24 1330   Pain Assessment   Pain Assessment Tool 0-10   Pain Score No Pain   Restrictions/Precautions   Precautions Aspiration;Bed/chair alarms;Cognitive;Fall Risk;Pacemaker;Spinal precautions;Supervision on toilet/commode  (NPO)   Weight Bearing Restrictions   (sternal and pacemaker precautions)   Cognition   Overall Cognitive Status Impaired   Arousal/Participation Alert;Cooperative   Attention Attends with cues to redirect   Orientation Level Oriented X4   Memory Decreased short term memory;Decreased recall of recent events;Decreased recall of precautions   Following Commands Follows one step commands with increased time or repetition   Sit to Stand   Type of Assistance Needed Physical assistance   Physical Assistance Level 25% or less   Comment Precious, VCs for hand placement and to not blop in the chair   Sit to Stand CARE Score 3   Bed-Chair Transfer   Type of Assistance Needed Physical assistance   Physical Assistance Level 26%-50%   Comment Min/ModA with no AD   Chair/Bed-to-Chair Transfer CARE Score 3   Walk 10 Feet   Type of Assistance Needed Physical assistance   Physical Assistance Level 26%-50%   Comment Precious with no AD and gait belt donned; with RW required Precious/CGA   Walk 10 Feet CARE Score 3   Walk 50 Feet with Two Turns   Type of Assistance Needed Physical assistance   Physical Assistance Level 26%-50%   Comment Precious with no AD and gait belt donned; with RW required Precious/CGA   Walk 50 Feet with Two Turns CARE Score 3   Walk 150 Feet   Type of Assistance Needed Physical assistance   Physical Assistance Level 26%-50%   Comment Precious with no AD and gait belt donned; with RW required Precious/CGA   Walk 150 Feet CARE Score 3   Ambulation   Primary Mode of Locomotion Prior to Admission Walk   Distance Walked (feet) 226 ft  (124 ft, 294 ft, 40 ft)   Gait Pattern Inconsistant Martha;Slow Martha;Decreased foot clearance;Shuffle;Narrow YARIEL;Improper weight shift   Limitations Noted In  Endurance;Safety;Speed;Strength;Swing   Provided Assistance with: Balance;Direction   Walk Assist Level Minimum Assist   Findings Patient was able to ambulate longer distances this session with RW vs with no AD. Pt also noted to have improved gait speed with RW than without. Daughter present and observed pt ambulating with and without AD. Daughter informed PT her father walking looked similar to how he walks now prior to his injury.   Does the patient walk? 2. Yes   Wheel 50 Feet with Two Turns   Reason if not Attempted Activity not applicable   Wheel 50 Feet with Two Turns CARE Score 9   Wheel 150 Feet   Reason if not Attempted Activity not applicable   Wheel 150 Feet CARE Score 9   Curb or Single Stair   Style negotiated Curb   Type of Assistance Needed Physical assistance   Physical Assistance Level Total assistance   Comment Min/ModAx2 people to ascend/descend 6 inch curb step with no AD; Precious x1 to ascend/descend with RW   1 Step (Curb) CARE Score 1   Stairs   Findings (S)  Per daughter, patient's significant other's home has 1 Step into the bedroom and 1 step into the kitchen   Picking Up Object   Type of Assistance Needed Physical assistance   Physical Assistance Level 25% or less   Comment Precious with reacher   Picking Up Object CARE Score 3   Therapeutic Interventions   Strengthening Squats 2x8, Hip Abduction 1.5# AW 2x10 each LE, Hip Extension 1.5# AW 2x10 each LE, Hamstring Curl 1.5# 2x10 each LE, Marching 1.5# 2x10   Other Education on the importance of proper footwear. Pt has slide on slippers at the hospital. Requested family bring in shoes with a good non skid  and a back   Other Comments   Comments Daughter, Trinity, and family friend present during session. Educated family on [facility] operations, like daily schedule, team meetings, etc.   Assessment   Treatment Assessment Patient participated in 60 minute skilled physical therapy session focusing on transfer training, gait training with and without  AD, and LE strengthenig. Pt demonstrated improved gait speed when ambulating with RW vs no AD, therefore, he may benefit from RW at discharge pending progress. Patient demonstrates decreased step length bilaterally and decreased foot clearance, but it seems to be baseline. Patient will benefit from continued skilled PT services to maximize safety and independence with functional mobility.   Family/Caregiver Present DaughterTrinity, present   Problem List Decreased strength;Decreased range of motion;Decreased endurance;Impaired balance;Decreased mobility;Decreased safety awareness;Orthopedic restrictions   Barriers to Discharge Inaccessible home environment   PT Barriers   Functional Limitation Transfers;Walking;Stair negotiation   Plan   Treatment/Interventions Functional transfer training;LE strengthening/ROM;Therapeutic exercise;Endurance training;Patient/family training;Equipment eval/education;Bed mobility;Gait training   Progress Progressing toward goals   Discharge Recommendation   Rehab Resource Intensity Level, PT   (TBD pending progress)   Equipment Recommended   (potential need for RW pending progress)   PT Therapy Minutes   PT Time In 1330   PT Time Out 1430   PT Total Time (minutes) 60   PT Mode of treatment - Individual (minutes) 60   PT Mode of treatment - Concurrent (minutes) 0   PT Mode of treatment - Group (minutes) 0   PT Mode of treatment - Co-treat (minutes) 0   PT Mode of Treatment - Total time(minutes) 60 minutes   PT Cumulative Minutes 150     Charity Kamara, PT, DPT

## 2024-07-14 NOTE — PROGRESS NOTES
07/14/24 1000   Pain Assessment   Pain Assessment Tool 0-10   Pain Score No Pain   Restrictions/Precautions   Precautions Aspiration;Bed/chair alarms;Cognitive;Fall Risk;Pacemaker;Spinal precautions;Supervision on toilet/commode  (NPO)   Weight Bearing Restrictions   (sternal and pacemaker)   ROM Restrictions Yes  (sternal)   Cognition   Overall Cognitive Status Impaired   Arousal/Participation Alert;Cooperative   Attention Attends with cues to redirect   Orientation Level Oriented X4   Memory Decreased short term memory;Decreased recall of recent events;Decreased recall of precautions   Following Commands Follows one step commands with increased time or repetition   Sit to Stand   Type of Assistance Needed Physical assistance   Physical Assistance Level 25% or less   Comment Precious with VCs for proper hand placement   Sit to Stand CARE Score 3   Bed-Chair Transfer   Type of Assistance Needed Physical assistance   Physical Assistance Level 26%-50%   Comment Min/ModA with no AD   Chair/Bed-to-Chair Transfer CARE Score 3   Walk 10 Feet   Type of Assistance Needed Physical assistance   Physical Assistance Level 26%-50%   Comment Precious with no AD and gait belt donned; with RW required Precious/CGA   Walk 10 Feet CARE Score 3   Walk 50 Feet with Two Turns   Type of Assistance Needed Physical assistance   Physical Assistance Level 26%-50%   Comment Precious with no AD and gait belt donned; with RW required Precious/CGA   Walk 50 Feet with Two Turns CARE Score 3   Walk 150 Feet   Type of Assistance Needed Physical assistance   Physical Assistance Level 26%-50%   Comment Precious with no AD and gait belt donned; with RW required Precious/CGA   Walk 150 Feet CARE Score 3   Ambulation   Primary Mode of Locomotion Prior to Admission Walk   Distance Walked (feet) 146 ft  (97 ft)   Assist Device   (no AD and with RW)   Gait Pattern Inconsistant Martha;Slow Martha;Decreased foot clearance;Shuffle;Narrow YARIEL;Improper weight shift   Limitations  Noted In Endurance;Safety;Speed   Provided Assistance with: Balance;Direction   Walk Assist Level Minimum Assist   Does the patient walk? 2. Yes   Wheel 50 Feet with Two Turns   Reason if not Attempted Activity not applicable   Wheel 50 Feet with Two Turns CARE Score 9   Wheel 150 Feet   Reason if not Attempted Activity not applicable   Wheel 150 Feet CARE Score 9   Therapeutic Interventions   Strengthening STS from 23 inch surface 2x8; 6 Inch Step Ups 2x10 each LE with bilateral UEs upport   Equipment Use   NuStep Level 2 x 15 minutes bilateral LEs only   Assessment   Treatment Assessment Patient participated in 60 minute skilled physical therapy session focusing on gait training and overall conditioning/strengthening. Patient reports he was an avid walker prior to his fall. Without AD, patient ambulates with NBOS and decreased step length bilaterally placing him at increased risk for falls. Pt will benefit from continued skilled PT services to maximize safety and independence prior to DC.   Problem List Decreased strength;Decreased range of motion;Decreased endurance;Impaired balance;Decreased mobility;Decreased safety awareness;Orthopedic restrictions   Barriers to Discharge Inaccessible home environment   PT Barriers   Functional Limitation Transfers;Walking;Stair negotiation   Plan   Treatment/Interventions Functional transfer training;LE strengthening/ROM;Therapeutic exercise;Endurance training;Patient/family training;Bed mobility;Gait training   Progress Progressing toward goals   Discharge Recommendation   Rehab Resource Intensity Level, PT   (TBD pending progress)   PT Therapy Minutes   PT Time In 1000   PT Time Out 1100   PT Total Time (minutes) 60   PT Mode of treatment - Individual (minutes) 60   PT Mode of treatment - Concurrent (minutes) 0   PT Mode of treatment - Group (minutes) 0   PT Mode of treatment - Co-treat (minutes) 0   PT Mode of Treatment - Total time(minutes) 60 minutes   PT Cumulative  Minutes 150     Charity Kamara, PT, DPT

## 2024-07-14 NOTE — PROGRESS NOTES
SLP TAA     07/14/24 0800   Patient Data   Rehab Impairment Impairment of mobility, safety and Activities of Daily Living (ADLs) due to Spinal Cord Dysfunction:  Traumatic:  04.230  Other Traumatic traumatic spinal cord injury-cervical myelopathy   Etiologic Diagnosis Cervical Myelopathy s/p C3-C7 cervical laminectomy with bilateral screw placement and fusion C2 to-T1 on 6/16   Date of Onset 06/15/24   Restrictions/Precautions   Precautions Aspiration;Bed/chair alarms;Cognitive;Fall Risk;Impulsive;Pacemaker;Seizure;Spinal precautions;Supervision on toilet/commode  (NPO)   Pain Assessment   Pain Assessment Tool 0-10   Pain Score No Pain   Eating Assessment   Type of Assistance Needed Physical assistance   Physical Assistance Level Total assistance   Eating CARE Score 1   Comprehension   Auditory Basic;Complex   Visual Basic;Complex   Findings Please see SLP tx note for further details.   QI: Comprehension 3. Usually Understands: Understands most conversations, but misses some part/intent of message. Requires cues at times to understand.   Comprehension (FIM) 4 - Understands basic info/conversation 75-90% of time   Expression   Verbal Basic;Complex   Non-Verbal Basic;Complex   Intelligibility Sentence   Findings Please see SLP tx note for further details.   QI: Expression 3. Exhibits some difficulty with expressing needs and ideas (e.g., some words or finishing thoughts) or speech is not clear   Expression (FIM) 4 - Expresses basic info/needs 75-90% of time   Social Interaction   Cooperation with staff   Participation Individual   Behaviors observed Appropriate   Findings Please see SLP tx note for further details.   Social Interaction (FIM) 5 - Interacts appropriately with others 90% of time   Problem Solving   Complex Manages discharge planning   Routine Manages call bell;Manges precautions;Manages ADL   Findings Please see SLP tx note for further details.   Problem solving (FIM) 4 - Solves basic problems 75-89% of  time   Memory   Recognize People Yes   Remember Routine Yes   Initiates Tasks Yes   Short-Term Impaired   Long Term Intact   Recalls Precaution Yes   Findings Please see SLP tx note for further details.   Memory (FIM) 2 - Recognizes, recalls/performs 25-49%   Discharge Information   Vocational Plan Return to work;Part Time   Barriers to Return to Vocation Strength;Endurance   Patient's Discharge Plan home with family support   Patient's Rehab Expectations 'to get home and get better'   Barriers to Discharge Home Decreased Cognitive Function;Decreased Strength;Decreased Endurance;Pain;Safety Considerations   Impressions Pt  is a 53-year-old male who presented to [facility] ER on 6/15/24 after being found down on the floor at home by his daughter.  Patient stated he got home around 2 am and went in to use the bathroom.  He does not recall what happened but he woke up on the floor unable to get up on his own in the hallway.  He yelled for his daughter who got up to use the bathroom and called 911 because she was unable to get him up on her own.  EMS helped get him up and then his daughter called a second time and that is when he was brought into the hospital.  In the ER, patient stated that the right side of his body was bothering him.  He reported decreased sensation in his right posterior thigh, right leg weakness and inability to open up his right hand since his fall.  It was suspected that patient had a syncopal episode that lead to the fall.  Of note patient's daughter states patient 3 syncopal episode in the past 6 months where he sustained a fall.  CT head showed a new acute trace subarachnoid hemorrhage in bilateral parafalcine regions and a new acute trace parafalcine subdural hematoma.  CTA head and neck was negative.  CTA head showed traumatic vascular injury, aneurysm, large vessel occlusion, high-grade stenosis, or dissection. Partially imaged ectatic right carotid bifurcation and proximal  cervical internal carotid artery with retropharyngeal course, similar to CT neck with contrast 10/12/2011.  MRI cervical spine and showed congenital canal stenosis with superimposed degenerative spondylosis most pronounced at C3-4 and C5-C6 with moderate to severe canal stenosis and mild cord compression; evaluation of cord signal was limited due to artifact; no definite abnormal cord signal but mild cord edema would be difficult to exclude; severe bilateral foraminal stenosis seen at C3-4 and C5-C6; mild to moderate canal stenosis at other levels.  He is s/p C3-C7 cervical laminectomy with bilateral screw placement and fusion C2-T1 on 6/16.  He was recommended 2 weeks of antibiotics for infectious prophylaxis per neurosurgery.  He had his Hemovac drain removed on 6/19.  Patient noted to have symptomatic junctional bradycardia with asymptomatic wide-complex tachycardia on telemetry for which EP was consulted.  They felt presentation mixture of sick sinus syndrome and wide-complex tachycardia likely SVT with aberrancy and felt reasonable to place dual-chamber pacemaker given history of multiple syncopal episodes and now documented bradycardia.  Pacer was placed on 6/21.  Course also notable for hyponatremia and polyuria for which nephrology was consulted.  Patient did receive 2 doses of DDAVP as well as intermittent IV fluids.  Na is now WNL and nephrology has signed off.  Additionally, patient experienced abdominal distension which worsened.  Imaging showed variable moderate distention of the small and large bowel.  Question ileus.  Repeat imaging showed persistent gaseous distention of portions of the small and large bowel similar to the prior study.  The distribution of the gaseous distention favored ileus although obstruction could not be conclusively excluded.  Patient was having loose stools.  He was made NPO and diet was slowly advanced.  On 6/28, patient developed difficulty swallowing pills.  Video swallow  was done and patient was made NPO with keofed tube feeds.  Repeat video swallow was completed on 7/3 and it was recommended that he remain NPO.  A third video swallow was completed on 7/8 and patient failed again with continued NPO recommendation.  Patient was initially agreeable to having a PEG placed and then changed his mind and refused.  After further discussion, he was agreeable and PEG was placed on 7/10.  He was started on trickle feeds and per nutrition recommendations, he is now on bolus feeds.  Patient also c/o bilateral hand pain during his hospitalization.  X-rays were completed and negative.  Pain improved.  Patient worked with therapy and was recommended for rehab following her hospitalization.  He has demonstrated that he can tolerate three hours of therapy, five days a week and is now medically cleared for discharge to the [facility].      Pt is a good rehab candidate to achieve min assist upon anticipated discharge home w/ family support/supervision. Current barriers include: working memory, immediate memory, STM, remote memory, reasoning, sequencing, problem solving, executive function skills and word finding at a higher level and decrease independence overall ADLs which will impact pt's safety awareness and functional mobility. Pt estimated length of stay ~2 weeks in [facility]. Pt will benefit from skilled SLP services to maximize overall cognitive and swallow abilities at this time to decrease burden of care for family at time of discharge.    SLP Therapy Minutes   SLP Time In 0800   SLP Time Out 0900   SLP Total Time (minutes) 60   SLP Mode of treatment - Individual (minutes) 60   SLP Mode of treatment - Concurrent (minutes) 0   SLP Mode of treatment - Group (minutes) 0   SLP Mode of treatment - Co-treat (minutes) 0   SLP Mode of Treatment - Total time(minutes) 60 minutes   SLP Cumulative Minutes 60   Cumulative Minutes   Cumulative therapy minutes 240

## 2024-07-14 NOTE — PROGRESS NOTES
"PM&R Coverage Progress Note:    Rehabilitation Diagnosis: Spinal Cord Dysfunction:  Traumatic:  04.230  Other Traumatic See below  Etiologic Diagnosis:  cervical myelopathy s/p C3-C7 cervical laminectomy with bilateral screw placement and fusion C2 to-T1 on 6/16       ASSESSMENT: Stable, therapy eval review      PLAN:    Rehabilitation  Continue current rehabilitation plan of care to maximize function.    Last bowel movement 7/12  Continues on tube feed with no oral diet  Reviewed therapy evaluations    Medical issues  Leukocytosis as of 7/11 at 13.65 but has not been rechecked, plan for Monday labs per internal medicine  Continue current medical plan of care.  Appreciate IM consultants co-management.          SUBJECTIVE: Patient seen face to face.  No acute issues.  Progressing as expected in rehabilitation program.  Started with evaluations yesterday and overall states that he is doing well with no complaints.  He is moving his bowels and voiding and no fever, chills, nausea, vomiting, cough or shortness of breath, diarrhea or constipation.  From a therapy standpoint requiring moderate assist for bed mobility and minimal assistance for transfers as well as closer to min to mod assist for ambulation with use of the gait belt and moderate assist for car transfers        ROS:  A ten point review of systems was completed on 07/14/24 and pertinent positives are listed in subjective section. All other systems reviewed were negative.     OBJECTIVE:   BP 97/60 (BP Location: Right arm)   Pulse 62   Temp (!) 97.3 °F (36.3 °C) (Oral)   Resp 16   Ht 5' 7\" (1.702 m)   Wt 69.3 kg (152 lb 12.5 oz)   SpO2 95%   BMI 23.93 kg/m²     Physical Exam  Vitals reviewed.   Constitutional:       General: He is not in acute distress.  HENT:      Head: Normocephalic and atraumatic.      Right Ear: External ear normal.      Left Ear: External ear normal.      Nose: Nose normal. No rhinorrhea.      Mouth/Throat:      Mouth: Mucous " membranes are moist.      Pharynx: Oropharynx is clear.   Eyes:      General: No scleral icterus.  Cardiovascular:      Rate and Rhythm: Normal rate.   Pulmonary:      Effort: Pulmonary effort is normal. No respiratory distress.   Abdominal:      General: There is no distension.      Palpations: Abdomen is soft.   Musculoskeletal:      Right lower leg: No edema.      Left lower leg: No edema.   Skin:     General: Skin is warm and dry.      Comments: No concerns from posterior cervical incision site   Neurological:      Mental Status: He is alert and oriented to person, place, and time.   Psychiatric:         Mood and Affect: Mood normal.         Behavior: Behavior normal.            Personally reviewed on 07/14/24:   Lab Results   Component Value Date    WBC 13.65 (H) 07/11/2024    HGB 12.6 07/11/2024    HCT 37.4 07/11/2024    MCV 96 07/11/2024     07/11/2024     Lab Results   Component Value Date    SODIUM 141 07/11/2024    K 4.1 07/11/2024    CL 93 (L) 07/11/2024    CO2 30 07/11/2024    BUN 13 07/11/2024    CREATININE 0.67 07/11/2024    GLUC 94 07/11/2024    CALCIUM 7.3 (L) 07/11/2024     Lab Results   Component Value Date    INR 0.99 06/16/2024    PROTIME 13.0 06/16/2024           Current Facility-Administered Medications:     acetaminophen (TYLENOL) oral suspension 975 mg, 975 mg, Per PEG Tube, Q8H, Kim Li PA-C, 975 mg at 07/14/24 0529    albuterol (PROVENTIL HFA,VENTOLIN HFA) inhaler 2 puff, 2 puff, Inhalation, Q4H PRN, Kim Li PA-C    benzocaine (HURRICAINE) 20 % mucosal spray 2 spray, 2 spray, Mucosal, 4x Daily PRN, Kim Li PA-C    bisacodyl (DULCOLAX) rectal suppository 10 mg, 10 mg, Rectal, Daily PRN, Luke Suggs MD    dextromethorphan-guaiFENesin (ROBITUSSIN DM) oral syrup 10 mL, 10 mL, Per PEG Tube, Q4H PRN, Kim Li PA-C, 10 mL at 07/13/24 0550    enoxaparin (LOVENOX) subcutaneous injection 30 mg, 30 mg, Subcutaneous, Q12H Kim GOLDBERG  BRONWYN Li, 30 mg at 07/14/24 0910    gabapentin (NEURONTIN) oral solution 100 mg, 100 mg, Per PEG Tube, TID, Kim Li PA-C, 100 mg at 07/14/24 0909    levothyroxine (Synthroid) suspension 125 mcg, 125 mcg, Per PEG Tube, Early Morning, Kim Li PA-C, 125 mcg at 07/14/24 0529    melatonin tablet 3 mg, 3 mg, Per PEG Tube, HS, Kim Li PA-C, 3 mg at 07/13/24 2101    nystatin (MYCOSTATIN) oral suspension 500,000 Units, 500,000 Units, Swish & Spit, 4x Daily, Luke Suggs MD, 500,000 Units at 07/14/24 0911    nystatin (MYCOSTATIN) powder, , Topical, BID, Kim Li PA-C, Given at 07/14/24 0909    ondansetron (ZOFRAN-ODT) dispersible tablet 4 mg, 4 mg, Per PEG Tube, Q6H PRN, Kim Li PA-C    oxyCODONE (ROXICODONE) oral solution 2.5 mg, 2.5 mg, Oral, Q4H PRN, Luke Suggs MD    polyethylene glycol (MIRALAX) packet 17 g, 17 g, Per PEG Tube, Daily, Kim Li PA-C, 17 g at 07/14/24 0909    polyethylene glycol (MIRALAX) packet 17 g, 17 g, Oral, Daily PRN, Luke Suggs MD    saliva substitute (MOUTH KOTE) mucosal solution 5 spray, 5 spray, Mouth/Throat, 4x Daily PRN, Kim Li PA-C    Past Medical History:   Diagnosis Date    Arthritis     Chronic cough     Disease of thyroid gland     Hypoparathyroidism (HCC)     continue taking calcium and vitamin D, will check CMP, PTH level and Vitamin D level    Pneumonia of right middle lobe due to Streptococcus pneumoniae (HCC) 11/30/2018    s/p Medtronic dual chamber PPM 6/21/2024 06/21/2024       Patient Active Problem List    Diagnosis Date Noted    Cervical myelopathy (HCC) 07/12/2024    Leukocytosis 07/12/2024    At risk for venous thromboembolism (VTE) 07/12/2024    Pain 07/12/2024    Thrush 07/12/2024    At risk for altered bowel elimination 07/12/2024    Cervical stenosis of spinal canal 07/03/2024    Dysphagia 06/29/2024    SSS (sick sinus syndrome) (HCC) 06/21/2024     s/p Medtronic dual chamber PPM 6/21/2024 06/21/2024    TBI (traumatic brain injury) ([facility]) 06/15/2024    Syncope and collapse 06/15/2024    Bilateral hand pain 06/15/2024    SDH (subdural hematoma) ([facility]) 06/15/2024    Right hand weakness 06/15/2024    Encounter for immunization 05/29/2024    Vitamin D deficiency 02/23/2024    Reactive airway disease 03/15/2018    Osteoarthritis of both hands 11/18/2015    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      ** Please Note:  voice to text software may have been used in the creation of this document. Although proof errors in transcription or interpretation are a potential of such software**

## 2024-07-15 ENCOUNTER — TRANSITIONAL CARE MANAGEMENT (OUTPATIENT)
Dept: INTERNAL MEDICINE CLINIC | Facility: CLINIC | Age: 54
End: 2024-07-15

## 2024-07-15 PROBLEM — D72.829 LEUKOCYTOSIS: Status: RESOLVED | Noted: 2024-07-12 | Resolved: 2024-07-15

## 2024-07-15 LAB
ANION GAP SERPL CALCULATED.3IONS-SCNC: 10 MMOL/L (ref 4–13)
BASOPHILS # BLD AUTO: 0.05 THOUSANDS/ÂΜL (ref 0–0.1)
BASOPHILS NFR BLD AUTO: 1 % (ref 0–1)
BUN SERPL-MCNC: 9 MG/DL (ref 5–25)
CALCIUM SERPL-MCNC: 7.6 MG/DL (ref 8.4–10.2)
CHLORIDE SERPL-SCNC: 98 MMOL/L (ref 96–108)
CO2 SERPL-SCNC: 28 MMOL/L (ref 21–32)
CREAT SERPL-MCNC: 0.52 MG/DL (ref 0.6–1.3)
EOSINOPHIL # BLD AUTO: 0.23 THOUSAND/ÂΜL (ref 0–0.61)
EOSINOPHIL NFR BLD AUTO: 4 % (ref 0–6)
ERYTHROCYTE [DISTWIDTH] IN BLOOD BY AUTOMATED COUNT: 13.9 % (ref 11.6–15.1)
GFR SERPL CREATININE-BSD FRML MDRD: 119 ML/MIN/1.73SQ M
GLUCOSE P FAST SERPL-MCNC: 88 MG/DL (ref 65–99)
GLUCOSE SERPL-MCNC: 88 MG/DL (ref 65–140)
HCT VFR BLD AUTO: 36.9 % (ref 36.5–49.3)
HGB BLD-MCNC: 12.1 G/DL (ref 12–17)
IMM GRANULOCYTES # BLD AUTO: 0.08 THOUSAND/UL (ref 0–0.2)
IMM GRANULOCYTES NFR BLD AUTO: 1 % (ref 0–2)
LYMPHOCYTES # BLD AUTO: 1.73 THOUSANDS/ÂΜL (ref 0.6–4.47)
LYMPHOCYTES NFR BLD AUTO: 29 % (ref 14–44)
MCH RBC QN AUTO: 31 PG (ref 26.8–34.3)
MCHC RBC AUTO-ENTMCNC: 32.8 G/DL (ref 31.4–37.4)
MCV RBC AUTO: 95 FL (ref 82–98)
MONOCYTES # BLD AUTO: 0.57 THOUSAND/ÂΜL (ref 0.17–1.22)
MONOCYTES NFR BLD AUTO: 10 % (ref 4–12)
NEUTROPHILS # BLD AUTO: 3.37 THOUSANDS/ÂΜL (ref 1.85–7.62)
NEUTS SEG NFR BLD AUTO: 55 % (ref 43–75)
NRBC BLD AUTO-RTO: 0 /100 WBCS
PLATELET # BLD AUTO: 208 THOUSANDS/UL (ref 149–390)
PMV BLD AUTO: 10.6 FL (ref 8.9–12.7)
POTASSIUM SERPL-SCNC: 3.9 MMOL/L (ref 3.5–5.3)
RBC # BLD AUTO: 3.9 MILLION/UL (ref 3.88–5.62)
SODIUM SERPL-SCNC: 136 MMOL/L (ref 135–147)
WBC # BLD AUTO: 6.03 THOUSAND/UL (ref 4.31–10.16)

## 2024-07-15 PROCEDURE — 97110 THERAPEUTIC EXERCISES: CPT

## 2024-07-15 PROCEDURE — 80048 BASIC METABOLIC PNL TOTAL CA: CPT | Performed by: PHYSICIAN ASSISTANT

## 2024-07-15 PROCEDURE — 85025 COMPLETE CBC W/AUTO DIFF WBC: CPT | Performed by: PHYSICIAN ASSISTANT

## 2024-07-15 PROCEDURE — 92526 ORAL FUNCTION THERAPY: CPT

## 2024-07-15 PROCEDURE — 97112 NEUROMUSCULAR REEDUCATION: CPT

## 2024-07-15 PROCEDURE — 97530 THERAPEUTIC ACTIVITIES: CPT

## 2024-07-15 PROCEDURE — 99232 SBSQ HOSP IP/OBS MODERATE 35: CPT | Performed by: PHYSICAL MEDICINE & REHABILITATION

## 2024-07-15 PROCEDURE — 97116 GAIT TRAINING THERAPY: CPT

## 2024-07-15 RX ADMIN — GABAPENTIN 100 MG: 250 SOLUTION ORAL at 21:55

## 2024-07-15 RX ADMIN — NYSTATIN: 100000 POWDER TOPICAL at 08:00

## 2024-07-15 RX ADMIN — NYSTATIN 500000 UNITS: 100000 SUSPENSION ORAL at 21:53

## 2024-07-15 RX ADMIN — POLYETHYLENE GLYCOL 3350 17 G: 17 POWDER, FOR SOLUTION ORAL at 08:00

## 2024-07-15 RX ADMIN — GABAPENTIN 100 MG: 250 SOLUTION ORAL at 16:05

## 2024-07-15 RX ADMIN — ENOXAPARIN SODIUM 30 MG: 30 INJECTION SUBCUTANEOUS at 08:00

## 2024-07-15 RX ADMIN — NYSTATIN 500000 UNITS: 100000 SUSPENSION ORAL at 18:06

## 2024-07-15 RX ADMIN — ENOXAPARIN SODIUM 30 MG: 30 INJECTION SUBCUTANEOUS at 21:51

## 2024-07-15 RX ADMIN — Medication 3 MG: at 21:52

## 2024-07-15 RX ADMIN — Medication 125 MCG: at 06:19

## 2024-07-15 RX ADMIN — ACETAMINOPHEN 975 MG: 650 SUSPENSION ORAL at 21:52

## 2024-07-15 RX ADMIN — GABAPENTIN 100 MG: 250 SOLUTION ORAL at 08:00

## 2024-07-15 RX ADMIN — NYSTATIN 500000 UNITS: 100000 SUSPENSION ORAL at 11:35

## 2024-07-15 RX ADMIN — ACETAMINOPHEN 975 MG: 650 SUSPENSION ORAL at 06:16

## 2024-07-15 RX ADMIN — NYSTATIN 500000 UNITS: 100000 SUSPENSION ORAL at 08:00

## 2024-07-15 RX ADMIN — ACETAMINOPHEN 975 MG: 650 SUSPENSION ORAL at 13:40

## 2024-07-15 NOTE — CASE MANAGEMENT
Met w/pt and reviewed rehab routine and cm role. Pt resides with his 19 yr old daughter who is currently not working or going to school. She just graduated from  high school in June. Pt states he plans on staying with his girlfriend Aislinn on dc, address is [address] [place] [id number]. Pt states there is 1 kathy in the home to access kitchen and bedroom.  (?)  pt, sig other and daughter do not drive, they walk everywhere. Pt has no prior hx with hhc, dme or outpt therapy services. Pt is on tube feeds and expects to dc home on them. Cm reviewed supplies and ordering process. Cm also explained team mtg process and potential los. Pt confirmed his insurance is currently Blued and he recently applied for SSD. [facility] [address] [facility] needs.

## 2024-07-15 NOTE — PROGRESS NOTES
"   07/15/24 1001   Pain Assessment   Pain Assessment Tool 0-10   Pain Score No Pain   Restrictions/Precautions   Precautions Aspiration;Bed/chair alarms;Cognitive;Fall Risk;Pacemaker;Spinal precautions;Supervision on toilet/commode  (NPO)   Weight Bearing Restrictions No   ROM Restrictions Yes  (spinal precautions and pacemaker precautions)   Lifestyle   Autonomy \"it's so busy, can't I have my session 1 in the morning and 1 in the afternon with a break in between?\"   Sit to Stand   Type of Assistance Needed Physical assistance;Verbal cues   Physical Assistance Level 25% or less   Comment CG/min A with VCs for hand placement especially when performing stand to sit   Sit to Stand CARE Score 3   Bed-Chair Transfer   Type of Assistance Needed Physical assistance   Physical Assistance Level 25% or less   Comment CG/min A with RW; pt reports plan of using RW at time of d/c   Chair/Bed-to-Chair Transfer CARE Score 3   Functional Standing Tolerance   Time 10 minutes standing at table top with UE support and CG for safety.   Activity card matching activity   Comments pt engages in leisure task standing at table top focusing on increasing strength, endurance, and standing chano/bal with pt tolerating standing for 10 minutes consecutively with overall CG for safety. no reports of dizziness/lightheadedness.   Cognition   Overall Cognitive Status Impaired   Arousal/Participation Alert;Cooperative   Attention Attends with cues to redirect   Orientation Level Oriented X4   Memory Decreased short term memory;Decreased recall of recent events;Decreased recall of precautions   Following Commands Follows one step commands with increased time or repetition   Additional Activities   Additional Activities Comments R  strength: 20#, 15#, 15#; L  strength: 18#, 17#, 15#; R 9HPT: 40 sec and L 9HPT: 52 seconds. pt is R hand dominant.   Activity Tolerance   Activity Tolerance Patient tolerated treatment well   Assessment   Treatment " Assessment pt engages in brief 30 minute skilled OT session focusing on func transfers and B/L UE assessment completing  strength and FMC. see above for full func details. pt voicing preference for having 2 90 minute sessions (1 AM and 1 PM) to limit amount of interruptions--placed request in schedule. pt reports plan to use RW at time of d/c, completed basic in room transfers with RW at CG/min A level with mod VCs for hand placement especially for stand to sit transitions. during assessments of hand strength and coordination, no overt differences in hand strength noted, L hand with slower coordination noted than R; pt is R handed. pt remains limited by spinal precautions, pacemaker precautions, impaired strength/endurance/activity tolerance, impaired balance, warranting continued skilled care to focus on ADL retraining, func transfers, standing chano/bal, UE NMR, func cog, safety, and family training, in order to decrease burden of care at d/c.   Prognosis Good   Problem List Decreased strength;Decreased range of motion;Decreased endurance;Impaired balance;Decreased mobility;Decreased safety awareness;Orthopedic restrictions   Plan   Treatment/Interventions ADL retraining;Functional transfer training;Therapeutic exercise;Endurance training;Cognitive reorientation;Patient/family training;Equipment eval/education;Compensatory technique education   OT Therapy Minutes   OT Time In 1000   OT Time Out 1030   OT Total Time (minutes) 30   OT Mode of treatment - Individual (minutes) 30   OT Mode of treatment - Concurrent (minutes) 0   OT Mode of treatment - Group (minutes) 0   OT Mode of treatment - Co-treat (minutes) 0   OT Mode of Treatment - Total time(minutes) 30 minutes   OT Cumulative Minutes 120   Therapy Time missed   Time missed? No

## 2024-07-15 NOTE — PROGRESS NOTES
07/15/24 0830   Pain Assessment   Pain Assessment Tool 0-10   Pain Score No Pain   Restrictions/Precautions   Precautions Aspiration;Bed/chair alarms;Cognitive;Fall Risk;Pacemaker;Spinal precautions;Supervision on toilet/commode  (NPO)   Weight Bearing Restrictions No   ROM Restrictions Yes  (spinal precautions and pacemaker precautions)   Cognition   Overall Cognitive Status Impaired   Arousal/Participation Alert;Cooperative   Comments easily distracted by cell phone during session; needs repetitive cueing for hand placement for safety with all transfers   Subjective   Subjective Pt agreeable to PT session   Sit to Stand   Type of Assistance Needed Physical assistance   Physical Assistance Level 25% or less   Comment CG/minAx1 w/ and w/o RW; needs cueing to abide by pacer precautions during STS; cues for slow eccentric lowering as well   Sit to Stand CARE Score 3   Bed-Chair Transfer   Type of Assistance Needed Physical assistance   Physical Assistance Level 25% or less   Comment CG/minAx1 RW, minAx1 no AD   Chair/Bed-to-Chair Transfer CARE Score 3   Walk 10 Feet   Type of Assistance Needed Physical assistance   Physical Assistance Level 26%-50%   Comment minAx1 no AD; Albina/CGA RW   Walk 10 Feet CARE Score 3   Walk 50 Feet with Two Turns   Type of Assistance Needed Physical assistance   Physical Assistance Level 26%-50%   Comment minAx1 no AD; Albina/CGA RW   Walk 50 Feet with Two Turns CARE Score 3   Walk 150 Feet   Type of Assistance Needed Physical assistance   Physical Assistance Level 26%-50%   Comment minAx1 no AD; Albina/CGA RW   Walk 150 Feet CARE Score 3   Ambulation   Primary Mode of Locomotion Prior to Admission Walk   Distance Walked (feet) 150 ft  (x2, 100'x2 RW; 150'x1, 10'x2 no AD)   Assist Device Roller Walker  (No AD for balance trng)   Gait Pattern Inconsistant Martha;Slow Martha;Decreased foot clearance;Shuffle;Narrow YARIEL;Improper weight shift   Limitations Noted In  "Endurance;Safety;Speed;Strength;Swing   Provided Assistance with: Balance;Direction   Walk Assist Level Minimum Assist   Findings pt reports feeling steadier with use of RW vs no AD; cont to have improved gait speed with RW vs no AD; short shuffled steps noted, cues for longer stride during session   Does the patient walk? 2. Yes   Wheel 50 Feet with Two Turns   Reason if not Attempted Activity not applicable   Wheel 50 Feet with Two Turns CARE Score 9   Wheel 150 Feet   Reason if not Attempted Activity not applicable   Wheel 150 Feet CARE Score 9   Curb or Single Stair   Style negotiated Curb   Type of Assistance Needed Physical assistance   Physical Assistance Level 25% or less   Comment minAx1 with RW up/down 8\" curb step to mimic height of step in s.o.'s house (s.o. Measured step while on facetime with pt during PT session)   1 Step (Curb) CARE Score 3   12 Steps   Reason if not Attempted Activity not applicable   12 Steps CARE Score 9   Stairs   Findings (S)  1 step to enter bedroom/kitchen is 8\" height   Therapeutic Interventions   Flexibility seated b/l HS/gastroc stretch 3x30 sec hold   Equipment Use   NuStep lvl 2, 15 mins, LEs only   Assessment   Treatment Assessment Session focusing on functional mobility trng w/ and w/o RW as well as improving pts endurance and strength. At this time pt reporting he feels more confident with use of RW, did perform mobilities w/o in which step length decreased, gait speed slowed, and pt with increase in imbalance, plan to work on interventions in pm session focusing on improving pt's balance and righting reactions to improve confidence with mobilities as pt reports PTA he was Susan w/o use of AD. Slana Tao on facetime with pt start of session, agreeable for FT on Thurs at 10:00. Aislinn reports she is concerned regarding step in house which she measured at 8\" this session as well as step into tub is 15\" which PT passed along to KETTY Underwood and NINO Holman. At this time pt " cont to function below his baseline, cont with POC to address barriers to dc home to reduce fall risk as well as caregiver burden.   Family/Caregiver Present spouse Aislinn on facetime for first half of session   Problem List Decreased strength;Decreased range of motion;Decreased endurance;Impaired balance;Decreased mobility;Decreased safety awareness;Orthopedic restrictions   Barriers to Discharge Inaccessible home environment   PT Barriers   Functional Limitation Transfers;Walking;Stair negotiation   Plan   Treatment/Interventions Functional transfer training;LE strengthening/ROM;Elevations;Therapeutic exercise;Endurance training   Progress Progressing toward goals   PT Therapy Minutes   PT Time In 0830   PT Time Out 0930   PT Total Time (minutes) 60   PT Mode of treatment - Individual (minutes) 60   PT Mode of treatment - Concurrent (minutes) 0   PT Mode of treatment - Group (minutes) 0   PT Mode of treatment - Co-treat (minutes) 0   PT Mode of Treatment - Total time(minutes) 60 minutes   PT Cumulative Minutes 270   Therapy Time missed   Time missed? No

## 2024-07-15 NOTE — PROGRESS NOTES
07/15/24 1330   Pain Assessment   Pain Assessment Tool 0-10   Pain Score No Pain   Restrictions/Precautions   Precautions Aspiration;Bed/chair alarms;Cognitive;Fall Risk;Pacemaker;Spinal precautions;Supervision on toilet/commode  (NPO s/p PEG)   Eating   Type of Assistance Needed Physical assistance   Physical Assistance Level Total assistance   Comment PO trials w/ SLP only. Otherwise pt is NPO w/ alternative means of nutrition/hydration via PEG   Eating CARE Score 1   Swallow Assessment   Swallow Treatment Assessment   Daily Dysphagia Tx Note     Patient Name: Luis Forrester    Today's Date: 7/15/2024      Current Risks for Dysphagia & Aspiration: Recent intubation; general debilitation; new neuro event;  brain injury; cognitive deficit; positioning issues; cervical spin injury/surgery; head support     Current Symptoms/Concerns: cough, clear throat, during and after PO, difficulty chewing, wet voicing, chronic cough     Current diet:NPO with tube feeds     Premorbid diet::regular diet and thin liquids     Positioning: repositioned in bed which was placed in chair mode    Items administered/Total amount:Consistencies Administered: ice chips x15      Oral stage:mild-moderate  Lip closure: functional around tsp  Anterior spillage: none  Mastication: munching type mastication   Bolus formation: decreased  Bolus control: decreased  Transfer: delayed  Oral residue: none  Pocketing: none         Pharyngeal stage:severe  Swallow promptness: moderate-severe delay noted  Hyolaryngeal elevation: weak excursion upon palpation  Wet voice: increased as ice chips progressed  Throat clear: fairly consistent throughout ice chip trials  Cough: throat clears lead to more spontaneous coughs over time  Secondary swallows: fluctuated from 2-5 swallows per ice chip presented  Audible swallows: increased as trials progressed       Esophageal stage:No overt sxs reported      Summary:     Pt presenting with mild-moderate oral and  severe pharyngeal dysphagia today. Symptoms or concerns included decreased bolus propulsion, decreased mastication, decreased bolus formation, delayed oral intiation, and suspected decreased control of ice chips/trace amount of water w/ ice chips over time as well as  suspected pharyngeal swallow delay, suspected decreased hyolaryngeal elevation upon palpation, suspected pharyngeal residue, multiple swallows, effortful swallow, audible swallows, and increased overt signs/sxs of aspiration as ice chip trials progressed.      Oral care w/ suction was completed prior to initiation of ice chip trials. Pt tolerating w/o increased aspiration sxs, but it was observed that pt was dry in oral cavity. Also does have a fair amount of thrush on tongue. RN was present at beginning of session, providing bolus feeding. RN did report that pt does have solution for thrush at this time. Prior to initiation of ice chip trials, SLP engaging in brief rapport building w/ pt and pt's dtr who was also present for session. Of note, dtr did ask SLP 2x about completing another repeat VFSS this week. SLP providing increased education that there will be a repeat test BUT to allow for swallow therapy to hope to improve overall pharyngeal strengthening/function. Both pt and dtr verbalizing understanding given this education.     As for swallow function, pt's lip seal and bolus retrieval was observed to be functional to where no anterior loss was observed across ice chip trials. Munching type mastication was observed w/ ice chips, suspecting some level of decreased propulsion and decreased bolus control w/ ice chips given piecemeal mastication/transit. Also observed was delay in oral transit of ice chips, especially as ice chip trials progressed. Swallow initiation was delayed across ice chips to where HLE was decreased when elicited (as noted upon palpation). Pt did have 2-4 swallows fairly consistently given initial 8 ice chip trials, but after  break, increased to multiple swallows up to 5 for remaining trials (up to 15 total). Also observed was increased audible swallows as trials progressed which did lead to wetness in voicing near end of trials. Overall, pt did have spontaneous throat clearing on 9 out of 15 ice chips. Increased cough was noted after throat clearing increasing to 7 events over time. Pt is noted to have decreased insight to the reasoning given the ongoing risk for aspiration w/ ice chips at this time.     In addition to completing ice chips today, SLP initiating swallow/pharyngeal exercises. Pt was able to execute all 7 exercises on initial page accurately x5 each. SLP providing education in regard to completing these exercises 5x each and at least 2x per day (once in AM and once in PM). Pt was in agreement and encouraged dtr to help pt to complete when present.        Recommendations:  Diet: NPO w/ alternative means of nutrition/hydration via PEG  Meds: non-oral if possible  Strategies: HOB to be 30 degrees at all times    Oral care w/ suction Q4.  PO trials w/ SLP supervision only  Results reviewed with:  patient, pt's dtr- LUCRECIA Petersen- Jocelynn   Aspiration precautions posted.    F/u ST tx: Pt will continue to benefit from ongoing skilled dysphagia tx sessions to establish the potential for safest least restrictive diet vs pleasure feeding w/o increased oropharyngeal or aspiration sxs and monitor ability to carryover swallow strategies independently.    Plan: PO trials w/ SLP supervision only            Continue to review swallow/pharyngeal strengthening exercises w/ pt and family            Complete d/w pt about the potential for NMES in attempts to maximize swallow function            Repeat VFSS AFTER a fair amount of therapy sessions in hopes for maximizing pharyngeal excursion/strength    Swallow Assessment Prognosis   Prognosis Guarded   Prognosis Considerations Age;Co-morbidities;Medical diagnosis;Medical  prognosis;Family/community support;Previous level of function;New learning ability;Severity of impairments;Ability to carry over   SLP Therapy Minutes   SLP Time In 1330   SLP Time Out 1430   SLP Total Time (minutes) 60   SLP Mode of treatment - Individual (minutes) 60   SLP Mode of treatment - Concurrent (minutes) 0   SLP Mode of treatment - Group (minutes) 0   SLP Mode of treatment - Co-treat (minutes) 0   SLP Mode of Treatment - Total time(minutes) 60 minutes   SLP Cumulative Minutes 120   Therapy Time missed   Time missed? No

## 2024-07-15 NOTE — PLAN OF CARE
Problem: PAIN - ADULT  Goal: Verbalizes/displays adequate comfort level or baseline comfort level  Description: Interventions:  - Encourage patient to monitor pain and request assistance  - Assess pain using appropriate pain scale  - Administer analgesics based on type and severity of pain and evaluate response  - Implement non-pharmacological measures as appropriate and evaluate response  - Consider cultural and social influences on pain and pain management  - Notify physician/advanced practitioner if interventions unsuccessful or patient reports new pain  Outcome: Progressing     Problem: INFECTION - ADULT  Goal: Absence or prevention of progression during hospitalization  Description: INTERVENTIONS:  - Assess and monitor for signs and symptoms of infection  - Monitor lab/diagnostic results  - Monitor all insertion sites, i.e. indwelling lines, tubes, and drains  - Monitor endotracheal if appropriate and nasal secretions for changes in amount and color  - Echo appropriate cooling/warming therapies per order  - Administer medications as ordered  - Instruct and encourage patient and family to use good hand hygiene technique  - Identify and instruct in appropriate isolation precautions for identified infection/condition  Outcome: Progressing     Problem: SAFETY ADULT  Goal: Patient will remain free of falls  Description: INTERVENTIONS:  - Educate patient/family on patient safety including physical limitations  - Instruct patient to call for assistance with activity   - Consult OT/PT to assist with strengthening/mobility   - Keep Call bell within reach  - Keep bed low and locked with side rails adjusted as appropriate  - Keep care items and personal belongings within reach  - Initiate and maintain comfort rounds  - Make Fall Risk Sign visible to staff  - Offer Toileting every 2 Hours, in advance of need  - Initiate/Maintain bed/chair alarm  - Obtain necessary fall risk management equipment: alarms  - Apply  yellow socks and bracelet for high fall risk patients  - Consider moving patient to room near nurses station  Outcome: Progressing  Goal: Maintain or return to baseline ADL function  Description: INTERVENTIONS:  -  Assess patient's ability to carry out ADLs; assess patient's baseline for ADL function and identify physical deficits which impact ability to perform ADLs (bathing, care of mouth/teeth, toileting, grooming, dressing, etc.)  - Assess/evaluate cause of self-care deficits   - Assess range of motion  - Assess patient's mobility; develop plan if impaired  - Assess patient's need for assistive devices and provide as appropriate  - Encourage maximum independence but intervene and supervise when necessary  - Involve family in performance of ADLs  - Assess for home care needs following discharge   - Consider OT consult to assist with ADL evaluation and planning for discharge  - Provide patient education as appropriate  Outcome: Progressing  Goal: Maintains/Returns to pre admission functional level  Description: INTERVENTIONS:  - Perform AM-PAC 6 Click Basic Mobility/ Daily Activity assessment daily.  - Set and communicate daily mobility goal to care team and patient/family/caregiver.   - Collaborate with rehabilitation services on mobility goals if consulted  - Perform Range of Motion 3 times a day.  - Reposition patient every 2 hours.  - Dangle patient 3 times a day  - Stand patient 3 times a day  - Ambulate patient 3 times a day  - Out of bed to chair 3 times a day   - Out of bed for meals 3 times a day  - Out of bed for toileting  - Record patient progress and toleration of activity level   Outcome: Progressing     Problem: DISCHARGE PLANNING  Goal: Discharge to home or other facility with appropriate resources  Description: INTERVENTIONS:  - Identify barriers to discharge w/patient and caregiver  - Arrange for needed discharge resources and transportation as appropriate  - Identify discharge learning needs  (meds, wound care, etc.)  - Arrange for interpretive services to assist at discharge as needed  - Refer to Case Management Department for coordinating discharge planning if the patient needs post-hospital services based on physician/advanced practitioner order or complex needs related to functional status, cognitive ability, or social support system  Outcome: Progressing     Problem: Nutrition/Hydration-ADULT  Goal: Nutrient/Hydration intake appropriate for improving, restoring or maintaining nutritional needs  Description: Monitor and assess patient's nutrition/hydration status for malnutrition. Collaborate with interdisciplinary team and initiate plan and interventions as ordered.  Monitor patient's weight and dietary intake as ordered or per policy. Utilize nutrition screening tool and intervene as necessary. Determine patient's food preferences and provide high-protein, high-caloric foods as appropriate.     INTERVENTIONS:  - Monitor oral intake, urinary output, labs, and treatment plans  - Assess nutrition and hydration status and recommend course of action  - Evaluate amount of meals eaten  - Assist patient with eating if necessary   - Allow adequate time for meals  - Recommend/ encourage appropriate diets, oral nutritional supplements, and vitamin/mineral supplements  - Order, calculate, and assess calorie counts as needed  - Recommend, monitor, and adjust tube feedings and TPN/PPN based on assessed needs  - Assess need for intravenous fluids  - Provide specific nutrition/hydration education as appropriate  - Include patient/family/caregiver in decisions related to nutrition  Outcome: Progressing

## 2024-07-15 NOTE — PROGRESS NOTES
[facility]  Progress Note  Name: Luis Forrester I  MRN: [id number]  Unit/Bed#: -01 I Date of Admission: 7/12/2024   Date of Service: 7/15/2024 I Hospital Day: 3    Assessment & Plan   * Cervical myelopathy (HCC)  Assessment & Plan  S/p C3-7 laminectomy with C2-T1 fusion  Completed post-op abx.  No collar necessary.  Monitor incision  NS follow-up around 8/2/24 for 6 week post-op appt.  Therapy and pain control per PMR    Dysphagia  Assessment & Plan  S/p PEG placement.  SLP to follow.  Pt tolerating tube feeds so far.  Nutrition following.    s/p Medtronic dual chamber PPM 6/21/2024  Assessment & Plan  Pt has a 6 month history of syncopal events.  Found to be bradycardic in the 40s.  Outpt follow-up with EP    SDH (subdural hematoma) (HCC)  Assessment & Plan  Not on antiplatelet or AC as an outpt.  Repeat head CT for any change in mental status.    Syncope and collapse  Assessment & Plan  No events since PPM placement.    Hypothyroidism  Assessment & Plan  Continue levothyroxine.  Due for repeat TSH around  8/2/24.    Leukocytosis-resolved as of 7/15/2024  Assessment & Plan  Resolved.             The above assessment and plan was reviewed and updated as determined by my evaluation of the patient on 7/15/2024.    Labs:   Results from last 7 days   Lab Units 07/15/24  0615 07/11/24  0442   WBC Thousand/uL 6.03 13.65*   HEMOGLOBIN g/dL 12.1 12.6   HEMATOCRIT % 36.9 37.4   PLATELETS Thousands/uL 208 227     Results from last 7 days   Lab Units 07/15/24  0615 07/11/24  0442   SODIUM mmol/L 136 141   POTASSIUM mmol/L 3.9 4.1   CHLORIDE mmol/L 98 93*   CO2 mmol/L 28 30   BUN mg/dL 9 13   CREATININE mg/dL 0.52* 0.67   CALCIUM mg/dL 7.6* 7.3*                   Imaging  No orders to display       Review of Scheduled Meds:  Current Facility-Administered Medications   Medication Dose Route Frequency Provider Last Rate    acetaminophen  975 mg Per PEG Tube Q8H Kim Li  BRONWYN      albuterol  2 puff Inhalation Q4H PRN Kim Li PA-C      benzocaine  2 spray Mucosal 4x Daily PRN Kim Li PA-C      bisacodyl  10 mg Rectal Daily PRN Luke Suggs MD      dextromethorphan-guaiFENesin  10 mL Per PEG Tube Q4H PRN Kim Li PA-C      enoxaparin  30 mg Subcutaneous Q12H ASHLYN Kim Li PA-C      gabapentin  100 mg Per PEG Tube TID Kim Li PA-C      levothyroxine  125 mcg Per PEG Tube Early Morning Kim Li PA-C      melatonin  3 mg Per PEG Tube HS Kim Li PA-C      nystatin  500,000 Units Swish & Spit 4x Daily Luke Suggs MD      nystatin   Topical BID Kim Li PA-C      ondansetron  4 mg Per PEG Tube Q6H PRN Kim Li PA-C      oxyCODONE  2.5 mg Oral Q4H PRN Luke Suggs MD      polyethylene glycol  17 g Per PEG Tube Daily Kim Li PA-C      polyethylene glycol  17 g Oral Daily PRN Luke Suggs MD      saliva substitute  5 spray Mouth/Throat 4x Daily PRN Kim Li PA-C         Subjective/ HPI: Patient seen and examined. Patients overnight issues or events were reviewed with nursing staff. New or overnight issues include the following:     Pt seen in his room with his daughter present. He is tolerating tube feeds without difficulty. He denies any other complaints.    ROS:   A 10 point ROS was performed; negative except as noted above.        *Labs /Radiology studies Reviewed  *Medications  reviewed and reconciled as needed  *Please refer to order section for additional ordered labs studies      Physical Examination:  Vitals:   Vitals:    07/13/24 1322 07/13/24 2134 07/14/24 1312 07/14/24 2044   BP: 100/60 97/60 117/59 107/64   BP Location: Right arm Right arm Right arm Right arm   Pulse: 70 62 77 75   Resp: 16 16 18 18   Temp: 98 °F (36.7 °C) (!) 97.3 °F (36.3 °C) 98.1 °F (36.7 °C) 97.9 °F (36.6 °C)   TempSrc: Oral Oral Oral Oral    SpO2: 95% 95% 95% 97%   Weight:       Height:           General Appearance: NAD; pleasant  HEENT: PERRLA, conjuctiva normal; mucous membranes moist; face symmetrical  Neck:  Supple  Lungs: clear bilaterally, normal respiratory effort, no retractions, expiratory effort normal, on room air  CV: regular rate and rhythm, no murmurs rubs or gallops noted   ABD: soft non tender, +BS x4, PEG intact  EXT: DP pulses intact, no lymphadenopathy, no edema  Skin: normal turgor, normal texture, no rash  Psych: affect normal, mood normal  Neuro: AAOx3       The above physical exam was reviewed and updated as determined by my evaluation of the patient on 7/15/2024.    Invasive Devices       Peripheral Intravenous Line  Duration             Peripheral IV 07/11/24 Right;Ventral (anterior) Wrist 4 days              Drain  Duration             Gastrostomy/Enterostomy Percutaneous Endoscopic Gastrostomy (PEG) 20 Fr. LUQ 5 days                       VTE Pharmacologic Prophylaxis: Enoxaparin  Code Status: Level 1 - Full Code  Current Length of Stay: 3 day(s)    Total floor / unit time spent today  35 minutes   Coordination of patient's care was performed in conjunction with primary service. Time invested included review of patient's labs, vitals, and management of their comorbidities with continued monitoring, examination of patient as well as answering patient questions, documenting her findings and creating progress note in electronic medical record,  ordering appropriate diagnostic testing.       ** Please Note:  voice to text software may have been used in the creation of this document. Although proof errors in transcription or interpretation are a potential of such software**
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
## 2024-07-15 NOTE — CASE MANAGEMENT
Called [facility] ([phone])spoke to Akila who stated that the auth was not received. Request was RESENT to [phone]. Sent update via email chain

## 2024-07-15 NOTE — PROGRESS NOTES
Physical Medicine and Rehabilitation Progress Note  Luis Forrester 54 y.o. male MRN: [id number]  Unit/Bed#: [facility] [place] 972-01 Encounter: [id number]    To Review: 53-year-old male with PMH of asthma, HLD, hypothyroidism, who was having intermittent syncopal episodes over the last year who was found down by daughter after presumable other syncopal episode now complaining of short-term mid and upper back pain as well as pain limiting in hands and weakness of the upper extremities.  Patient was brought to hospital where CT head showed acute trace subarachnoid hemorrhage in the bilateral parafalcine regions.  CT of the T-spine showed DISH without acute or osseous abnormalities.  CTA head and neck did not show acute traumatic vascular injury, high-grade stenosis or dissection.  CT C spine showed no cervical fx or traumatic malalignment with mod canal and severe b/l multilevel NF stenosis.  MRI C spine shows moderate to severe canal stenosis with mild cord compression most pronounced at C3-4 and C5-6.  It also showed severe bilateral foraminal stenosis at same levels along with congenital canal stenosis with superimposed degenerative spondylosis.  Neurosurgery consulted on patient and diagnosed with parafalcine subdural hemorrhage with bilateral subarachnoid hemorrhage as well as operative intervention for the cervical stenosis with myelopathy and upper extremity weakness.  Patient underwent C3-C7 cervical laminectomy with bilateral screw placement and fusion C2 to-T1 on 6/16.  Patient recommended maps greater than 85 for 5 days and has been on 2 vasopressors.  He had his Hemovac drain removed on 6/19.  He was recommended 2 weeks of antibiotics for infectious prophylaxis per neurosurgery.  He has not required to have a cervical collar at this time.  Acute pain service consulted and have been managing with multimodal pain regiment including IV opiates.  Patient noted to have symptomatic junctional bradycardia with asymptomatic  "wide-complex tachycardia on telemetry for which EP was consulted.  They felt presentation mixture of sick sinus syndrome and wide-complex tachycardia likely SVT with aberrancy and felt reasonable to place dual-chamber pacemaker given history of multiple syncopal episodes and now documented bradycardia. Course also notable for hyponatremia and polyuria for which nephrology was consulted (and now improved).  Patient did receive 2 doses of DDAVP as well as intermittent IV fluids.\"     Course also notable for abdominal distension which was favored to be ileus as well as dysphagia and patient failing multiple VBS.  He is noted to have mild oral and moderate pharyngeal dysphagia and eventually was recommended PEG tube and to continue NPO diet.  Patient was evaluated by skilled therapies and was found to have significant decline in ADLs and ambulation and appears appropriate for admission to [facility].    Chief Complaint: Patient open to laryngoscopy    Interval History/Subjective:  No acute events overnight. Last BM was 7/14. Continent of bowel/bladder and denies any issues with urination currently. He is nervous about laryngoscope, but open to this if necessary. He is tolerating tube feeds/bolus feeds. He denies any new CP, SOB, fevers, chills, N/V, abdominal pain. He denies any new numbness/tingling.     ROS:  A 10 point review of systems was negative except for what is noted in the HPI.    Today's Changes:  Will check formal PVRs given risk of neurogenic bladder.  Discussed with patient he would be open to laryngoscopy inpatient to further evaluate his dysphagia.   Expedited isncsci performed and examines C3 AIS D. Sensation actually fairly good in UE, proprioception in ankles intact (bilateral hallux valgus/bunions). Strength quite good, but diminished starting in his finger flexors.   Reviewed labs. Stable/improved.     Total visit time: 35 minutes, with more than 50% spent " counseling/coordinating care. Counseling includes discussion with patient re: progress in therapies, functional issues observed by therapy staff, and discussion with patient regarding their current medical state and wellbeing. Coordination of patient's care was performed in conjunction with Internal Medicine service to monitor patient's labs, vitals, and management of their comorbidities.    Assessment/Plan:    * Cervical myelopathy (HCC)  Assessment & Plan  Recent with residual neck pain, distal RUE weakness, impaired mobility   - MRI cervical spine without 6/15/2024:Congenital canal stenosis with superimposed degenerative spondylosis .Most pronounced at C3-4 and C5-C6 with moderate to severe canal stenosis and mild cord compression. Evaluation of cord signal is limited due to artifact. No definite abnormal cord signal but mild cord edema would be difficult to exclude. Severe bilateral foraminal stenosis also seen at C3-4 and C5-C6.Mild to moderate canal stenosis at other levels  - S/P PCDF C2-T1 on 6/16 by NeuroSx Dr Lopez  - Monitor incision site   - No collar required  - Admission: Examines C3 AIS D (some impairment L c4 pinprick, but overall very good sensation), proprioception in ankles intact, and strength quite good with weakness starting in finger flexors bilaterally.  - Activity Restrictions: No heavy lifting greater than 5 - 10lbs. No strenuous activities. No driving while requiring cervical collar, anticipated six weeks. No significant neck movement.  - May shower 3 days after surgery, but do not soak in a tub and no swimming, use mild antimicrobial soap and water. Pat incision dry after showering.  - Monitor for neuro changes, dysphagia (has significant on tube feeds), neurogenic bowel/bladder, spasticity  - Recommend acute comprehensive interdisciplinary inpatient rehabilitation to include intensive skilled therapies (PT, OT) as outlined with oversight and management by rehabilitation physician as well  as inpatient rehab level nursing, case management and weekly interdisciplinary team meetings.   - Optimal pain management  - Fall precautions   - Follow-up with Spine Sx and if needed PMR after d/c       At risk for altered bowel elimination  Assessment & Plan  Course also notable for abdominal distension which was favored to be ileus   - Recent occasional loose stools now on PEG tube feeds  - Currenlty on miralax daily  - PRN suppository  - Overall continent.   - Monitor    Pain  Assessment & Plan  APAP 975mg TID   Gabapentin 100mg TID   Oxycodone 2.5mg oral soltn Q4H PRN  Monitor for oversedation, AMS/delirium, and respiratory depression   If being administered - hold opiates, muscle relaxants, benzodiazepines, and gabapentin for oversedation, AMS, or RR<12.  Counseled on and continue to encourage deep breathing/relaxation/behavioral pain management techniques      At risk for venous thromboembolism (VTE)  Assessment & Plan  SCDs, ambulation, and lovenox   Recommend discharge home with 8 weeks lovenox for DVT Ppx given possible acute SCI.  Will need lovenox training. Discussed with team.   -Thru 8/10       Dysphagia  Assessment & Plan  - Recommend ENT c/s for ongoing prolonged dysphagia   - Patient declined laryngoscope initially, he is now open to it.   - ENT not convinced of laryngeal component of dysphagia.   - Current diet: NPO - failed multiple VBS    - Mild oral and moderate pharyngeal dysphagia  - NGT feeds, nutrition c/s   - Family training for tube feeds   - SLP evaluate and treat decline in swallowing.  - Ensure adequate hydration  - Nutrition consult to assist with management   - Outpatient f/u with ENT     SDH (subdural hematoma) (HCC)  Assessment & Plan  Following syncope and being found down   CTH 6/15 - New acute trace subarachnoid hemorrhage in bilateral parafalcine regions. Recommend dedicated CTA head with contrast for further evaluation.  New acute trace parafalcine subdural hematoma.  CTH 6/18  - 1. Near completely resolved parafalcine subarachnoid hemorrhage with trace residual subarachnoid hemorrhage in the frontoparietal regions medially. No mass effect or hydrocephalus. 2. No large territorial infarction.  - Cleared for lovenox 30 SQ BID for VTE prophylaxis by prior providers     - Current/recent mood/affect/behavior/cog - acceptable, largely euthymic  - Remains at risk for increased confusion/delirium, restlessness, agitation, and fall - continue to monitor for concerns/changes  - Current psychotropics and potentially sedating medications:   Gabapentin 100mg TID   Melatonin 3mg HS   Oxy 2.5mg Q4h PRN (not using recently)   - Monitor for need for 1:1 (Notify MD of potentially dangerous behavior such as significant impulsivity and trying to stand/get out of bed/chair unattended that could lead to fall/injury); High fall precautions with frequent rounding, patient close to nursing station if possible, frequent toileting/frequent offering urinal/bedpan (only if too immobile for safe toileting);  bed/chair alarm on at all times (high setting if needed); fall junior on side of bed (at discretion of nursing/therapy); do not leave unattended in bathroom, patient education; call bell availability; Sleep/agitation log, frequent redirection, reorientation, reassurance; Family access to patient if helpful  - No recent reports of dangerous/risky behavior requiring 1:1 at this point but monitor closely  - Recommend acute comprehensive interdisciplinary inpatient rehabilitation to include intensive skilled therapies (PT, OT, ST) with oversight and management by rehabilitation physician.  Inpatient rehab level nursing, case management and weekly interdisciplinary team meetings. Provide patient and (if available) caregiver/family teaching regarding brain injury/residual disability management.    - For routine restlessness, anxiety, irritability focus on non-pharmacologic management    - Obtain MOCA and if needed ACLS  during [facility] course   - Please assess iADLs - ability to manage medications, meal prep, finances, obtaining appropriate transport from community, managing emergencies, and overall safety in home environment.  - Provide therapy, compensatory strategies, and if available and necessary provide caregiver training.  Notify MD of impairments or inability to perform iADLs adequately.   - Monitor neuro-exam, wakefulness, mood, cognition, insight into deficits and safety awareness   - Monitor and ensure optimal management electrolytes, nutrition, and hydration  - Monitor for signs or symptoms of infection, medication intolerances, other systemic etiologies  - Additional labs, imaging, specialist follow-up as needed   - Patient/family/caregiver education and training   - Overstimulation precautions, frequent re-orientation, re-direction, re-assurance  - Optimal mood, pain, and sleep management  - Sleep log and agitation monitoring    - Limit sedating medications when possible  - Follow-up with neurosx after discharge if needed and if needed during [facility] course as well as PCP      s/p Medtronic dual chamber PPM 6/21/2024  Assessment & Plan  Noted to have symptomatic junctional bradycardia with asymptomatic wide-complex tachycardia on telemetry for which EP was consulted.  They felt presentation mixture of sick sinus syndrome and wide-complex tachycardia likely SVT with aberrancy and felt reasonable to place dual-chamber pacemaker given history of multiple syncopal episodes and now documented bradycardia.  2/2 bradycardia and recurrent syncopal episodes  Pacer precautions for 6 weeks.   OP EP follow-up     Thrush  Assessment & Plan  Nystatin pain and suction QID x7 d  Oral care   Monitor     Syncope and collapse  Assessment & Plan  Believed to be related to arrhythmias/bradycardia s/p PPM placement   Monitor vitals  OP cards/PCP     Hypothyroidism  Assessment & Plan  Levothyroxine     Leukocytosis-resolved as of  7/15/2024  Assessment & Plan  Up to 13.6 7/11  Internal medicine consult and management during [facility] course  Patient afebrile, other vitals unremarkable > continue to monitor   No clear signs or symptoms of infection or VTE but will continue to monitor  Monitor CBC intermittently             Health Maintenance  #Bladder: Voiding and continent. Checking PVR x3. If all <150cc can discontinue.   #Skin/Pressure Injury Prevention: Turn Q2hr in bed, with weight shifts [id number] in wheelchair.  #GI Prophylaxis: Not indicated  #Code Status: Full Code  #FEN: See Dysphagia above  #Dispo: [facility]OS 2-3 weeks with goals to discharge home with supervision from his girlfriend. Lives in a home with 1+1 step with FFSU available. Was previously completely independent      Objective:    Functional Update:  PT: [place] bed mobility, min-modA transfers, [place] for ambulation 226', total A for curb   OT: [place] oral hygiene, Needs assistance with PEG feeds, modA shower/bathe, CGA Ub dressing, total A footwear, [place] LB dressing, [place] toileting transfers, total A toileting hygiene  SLP: RAY 15/30 - with severe neurocog disorder. modA comprehension, expression, impaired problem solving, memory, attention, executive function.     Allergies per EMR    Physical Exam:  Temp:  [97.9 °F (36.6 °C)-98.1 °F (36.7 °C)] 97.9 °F (36.6 °C)  HR:  [75-77] 75  Resp:  [18] 18  BP: (107-117)/(59-64) 107/64  Oxygen Therapy  SpO2: 97 %      Gen: No acute distress, Well-nourished, well-appearing.  HEENT: Moist mucus membranes  Cardiovascular: Regular rate, rhythm, S1/S2. Distal pulses palpable  Heme/Extr: No edema  Pulmonary: Non-labored breathing  : No poon  GI: Soft, non-tender, non-distended.   MSK: ROM is full in all extremities. Bilateral hallux valgus.   Integumentary: Skin is warm, dry.   Neuro: AAOx3,  Speech is intact. Appropriate to questioning.   Intact LT/PP on the right from C2-T4  Intact LT on the L from C2-T4  PP impaired on L C4, but otherwise intact  from C2-T4 (C4 is impaired, but not absent)  Rectal exam deferred  ISNCSCI Strength:  L: 98458 455*5  R: 23160 455*5  Great toe with hallux valgus, fairly significant BL limiting exam.   Psych: Normal mood and affect.     Diagnostic Studies: reviewed, no new imaging  No results found.    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[facility])    Chronic low back pain    Hyperlipidemia    Hypothyroidism    Lumbar radiculopathy    Mass of parotid gland    Osteoarthritis of both hands    Reactive airway disease    Vitamin D deficiency    Encounter for immunization    TBI (traumatic brain injury) ([facility])    Syncope and collapse    Bilateral hand pain    SDH (subdural hematoma) ([facility])    Right hand weakness    SSS (sick sinus syndrome) ([facility])    s/p Medtronic dual chamber PPM 6/21/2024    Dysphagia    Cervical stenosis of spinal canal    Cervical myelopathy ([facility])    Leukocytosis    At risk for venous thromboembolism (VTE)    Pain    Thrush    At risk for altered bowel elimination         Medications  Current Facility-Administered Medications   Medication Dose Route Frequency Provider Last Rate    acetaminophen  975 mg Per PEG Tube Q8H Kim Li PA-C      albuterol  2 puff Inhalation Q4H PRN Kim Li PA-C      benzocaine  2 spray Mucosal 4x Daily PRN Kim Li PA-C      bisacodyl  10 mg Rectal Daily PRN Luke Suggs MD      dextromethorphan-guaiFENesin  10 mL Per PEG Tube Q4H PRN Kim Li PA-C      enoxaparin  30 mg Subcutaneous Q12H  ASHLYN Kim Li PA-C      gabapentin  100 mg Per PEG Tube TID Kim Li PA-C      levothyroxine  125 mcg Per PEG Tube Early Morning Kim Li PA-C      melatonin  3 mg Per PEG Tube HS Kim Li PA-C      nystatin  500,000 Units Swish & Spit 4x Daily Luke Suggs MD      nystatin   Topical BID Kim Li PA-C      ondansetron  4 mg Per PEG Tube Q6H PRN Kim Li PA-C      oxyCODONE  2.5 mg Oral Q4H PRN Luke Suggs MD      polyethylene glycol  17 g Per PEG Tube Daily Kim Li PA-C      polyethylene glycol  17 g Oral Daily PRN Luke Suggs MD      saliva substitute  5 spray Mouth/Throat 4x Daily PRN Kim Li PA-C            ** Please Note: Fluency Direct voice to text software may have been used in the creation of this document. **

## 2024-07-15 NOTE — PROGRESS NOTES
"   07/15/24 1230   Pain Assessment   Pain Assessment Tool 0-10   Pain Score No Pain   Restrictions/Precautions   Precautions Aspiration;Bed/chair alarms;Cognitive;Fall Risk;Pacemaker;Spinal precautions;Supervision on toilet/commode  (NPO)   Weight Bearing Restrictions No   ROM Restrictions Yes  (spinal precautions and pacemaker precautions)   Cognition   Overall Cognitive Status Impaired   Arousal/Participation Alert;Cooperative   Comments pt perseverating on where wallet is during session, searched with pt and not able to find in room. Pt and dtr cannot remember where wallet was last seen, did discuss with LUCRECIA Cabezas but was not noted on admission that pt had wallet. Encouraged pt to speak with s.o. to see if she has it.   Subjective   Subjective \"Can we move my time to 2:30?\"   Lying to Sitting on Side of Bed   Type of Assistance Needed Physical assistance   Physical Assistance Level 51%-75%   Comment requesting A for trunk assist   Lying to Sitting on Side of Bed CARE Score 2   Sit to Stand   Type of Assistance Needed Physical assistance   Physical Assistance Level 25% or less   Comment w/ and w/o RW   Sit to Stand CARE Score 3   Bed-Chair Transfer   Type of Assistance Needed Physical assistance   Physical Assistance Level 25% or less   Comment w/ RW   Chair/Bed-to-Chair Transfer CARE Score 3   Walk 10 Feet   Type of Assistance Needed Physical assistance   Physical Assistance Level 25% or less   Comment RW   Walk 10 Feet CARE Score 3   Walk 50 Feet with Two Turns   Type of Assistance Needed Physical assistance   Physical Assistance Level 25% or less   Comment RW   Walk 50 Feet with Two Turns CARE Score 3   Ambulation   Primary Mode of Locomotion Prior to Admission Walk   Distance Walked (feet) 100 ft  (x2)   Assist Device Roller Walker   Findings pt requested to amb to/from therapy gym with use of RW this session, dtr Trinity observing   Does the patient walk? 2. Yes   Therapeutic Interventions   Balance alt step " "taps to 2\" step with b/l UEs, progressed to single UE support minAx1 2' total. Sidestepping at HR with single UE support 10'x6 CGA. Backwards walking at HR with single UE support 2x15', CGA. Tandem walking with single UE support on HR 20'x2 minAx1. Obstacle course step to/thru with single UE support 10'x6 [place]/CGA. Sidestepping thru obstacle course 4x10' with single UE support, minAx1 for balance.   Assessment   Treatment Assessment Start of session pt requesting therapy time be changed due to pt fatigue. PT unable to reschedule but did discuss w/ pt having set schedule from now forward for therapy sessions which pt in agreement with as well as pt would like full sessions and not have PT split into 2 seperate sessions. This session focused on balance interventions to reduce fall risk. Pt quickly fatigues needing seated rest however then eager to restart activity even though still fatigued needing cues to take therapeutic rest break to recover as needed. Dtr inquiring about current recommendations in which PT stated at this time pt looks more stable with use of RW. Confirmed that while pts s.o. has RW at home, pt would like his own personal RW to be delivered if recommended upon d/c to [facility], will pass along to lead PT. At this time cont with POC to address barriers to dc home.   Family/Caregiver Present Dtr Trinity present t/o tx   PT Therapy Minutes   PT Time In 1230   PT Time Out 1330   PT Total Time (minutes) 60   PT Mode of treatment - Individual (minutes) 60   PT Mode of treatment - Concurrent (minutes) 0   PT Mode of treatment - Group (minutes) 0   PT Mode of treatment - Co-treat (minutes) 0   PT Mode of Treatment - Total time(minutes) 60 minutes   PT Cumulative Minutes 330   Therapy Time missed   Time missed? No       "

## 2024-07-16 PROCEDURE — 97116 GAIT TRAINING THERAPY: CPT

## 2024-07-16 PROCEDURE — 97110 THERAPEUTIC EXERCISES: CPT

## 2024-07-16 PROCEDURE — 92526 ORAL FUNCTION THERAPY: CPT

## 2024-07-16 PROCEDURE — 97530 THERAPEUTIC ACTIVITIES: CPT

## 2024-07-16 PROCEDURE — 99231 SBSQ HOSP IP/OBS SF/LOW 25: CPT | Performed by: NURSE PRACTITIONER

## 2024-07-16 PROCEDURE — 99232 SBSQ HOSP IP/OBS MODERATE 35: CPT | Performed by: PHYSICAL MEDICINE & REHABILITATION

## 2024-07-16 PROCEDURE — 97535 SELF CARE MNGMENT TRAINING: CPT

## 2024-07-16 PROCEDURE — 97112 NEUROMUSCULAR REEDUCATION: CPT

## 2024-07-16 RX ADMIN — Medication 3 MG: at 22:23

## 2024-07-16 RX ADMIN — ACETAMINOPHEN 975 MG: 650 SUSPENSION ORAL at 13:41

## 2024-07-16 RX ADMIN — ENOXAPARIN SODIUM 30 MG: 30 INJECTION SUBCUTANEOUS at 22:23

## 2024-07-16 RX ADMIN — Medication 125 MCG: at 06:09

## 2024-07-16 RX ADMIN — GABAPENTIN 100 MG: 250 SOLUTION ORAL at 17:00

## 2024-07-16 RX ADMIN — NYSTATIN 500000 UNITS: 100000 SUSPENSION ORAL at 18:17

## 2024-07-16 RX ADMIN — NYSTATIN 500000 UNITS: 100000 SUSPENSION ORAL at 13:41

## 2024-07-16 RX ADMIN — NYSTATIN: 100000 POWDER TOPICAL at 08:12

## 2024-07-16 RX ADMIN — ACETAMINOPHEN 975 MG: 650 SUSPENSION ORAL at 22:23

## 2024-07-16 RX ADMIN — ENOXAPARIN SODIUM 30 MG: 30 INJECTION SUBCUTANEOUS at 08:12

## 2024-07-16 RX ADMIN — ACETAMINOPHEN 975 MG: 650 SUSPENSION ORAL at 06:09

## 2024-07-16 RX ADMIN — NYSTATIN 500000 UNITS: 100000 SUSPENSION ORAL at 08:12

## 2024-07-16 RX ADMIN — GABAPENTIN 100 MG: 250 SOLUTION ORAL at 22:23

## 2024-07-16 RX ADMIN — GABAPENTIN 100 MG: 250 SOLUTION ORAL at 08:11

## 2024-07-16 NOTE — PROGRESS NOTES
"   07/16/24 0830   Pain Assessment   Pain Score No Pain   Restrictions/Precautions   Precautions Aspiration;Bed/chair alarms;Cognitive;Fall Risk;Multiple lines;Spinal precautions;Supervision on toilet/commode  (pacemaker prec, PEG tube)   Weight Bearing Restrictions No   ROM Restrictions Yes  (spinal precautions and pacemaker precautions)   Lifestyle   Autonomy \"No one helps you around here\" pt referring when therapist attempting to inc indep when combing hair   Oral Hygiene   Type of Assistance Needed Supervision   Comment seated EOB utilzied suction oral care   Oral Hygiene CARE Score 4   Tub/Shower Transfer   Findings engaged in dry tub transfer with side stepping technique. pt able to complete at CGA level. Reports tub at home does not have GB or shower chair. therapist recommend shower chair for home for safety and handout provided during session   Lower Body Dressing   Comment (S)  trial LHAE during next ADL session   Sit to Stand   Type of Assistance Needed Incidental touching;Adaptive equipment   Comment CGA wtih RW   Sit to Stand CARE Score 4   Bed-Chair Transfer   Type of Assistance Needed Physical assistance;Adaptive equipment   Physical Assistance Level 25% or less   Comment Albina SPT with RW   Chair/Bed-to-Chair Transfer CARE Score 3   Meal Prep   Meal Preparation (S)  pt reports GF to complete main meal prep,. but can benefit from kitchen mobility with RW next session to assess safety   Health Management   Health Management Level of Assistance Close supervision;Minimal verbal cues  (goal added in ARC ICP)   Health Management reviewed with pt current med list. Educ pt that pills will be via PEG at time of DC which he verbalized uderstanding. Goal of session was med understanding. Pt currently on 4 meds. Able to correctly identify purpose of each med. Agreeablefor use of 3x/day pill organizer. Teresa, pt req min vc to  read label first prior to placing in pill organizer.   Functional Standing Tolerance "   Time 2mins 45sec; 3mins 6sec   Activity ball toss while on airex   Comments engaged pt in fx standing tolerance whilec ompelting ball toss on airex foan to challenge YARIEL, ednruance, weight shifting. utilized posey belt for safety. Pt overallr eq modA to maintain balance while completing ball toss. First trial compelted tossing bach ball, second trial completed with smaller ball with basket as target. pt toelrated wellw ithr est break and reporting he enjoyed completing task   Exercise Tools   Exercise Tools Yes   UE Ergometer Utilized UE ergometer 10mins (5 mins prograde/retrograde) to cont to inc UE strength for improved STS/SPT. tolerated well   Cognition   Overall Cognitive Status Impaired   Arousal/Participation Alert;Cooperative   Attention Attends with cues to redirect   Orientation Level Oriented X4   Memory Decreased short term memory;Decreased recall of recent events;Decreased recall of precautions   Following Commands Follows one step commands with increased time or repetition   Comments mild agitation when therapist asked pt to tryc ombing hair himself, but ablet to redirect   Activity Tolerance   Activity Tolerance Patient tolerated treatment well   Medical Staff Made Aware ortho BP with abd binder sit 105/72; stand 108/69   Assessment   Treatment Assessment Engaged pt in 90mins of skilled OT services with focus on oral care (w/ suction), dry tub transfer, med mngmnt, UE TE and standing balance. Beginning of session pt mildly agitated when therapist attempted to inc independence to comb hair, but redirectable and then pleaseanta remainder of session. pt overall fx at Albina/CGA level for mobility/transfers with RW. Therapist reviewed with pt current med list, but did educ goal is for use of ingestion of pills via PEG tub which educ willb e profided with GF. Pt making steady gains toward goals. Cont OT POC with focus on activity toelrance, meal prep/kitchen mobility, us of LHAE if needed during  bathing/dressing next ADL, activity toelrance, balance to inc fx performance and independence at [place].   Prognosis Good   Problem List Decreased strength;Decreased range of motion;Decreased endurance;Impaired balance;Decreased mobility;Decreased safety awareness;Orthopedic restrictions   Barriers to Discharge Inaccessible home environment   Plan   Treatment/Interventions ADL retraining;Functional transfer training;Therapeutic exercise;Endurance training;Patient/family training;Bed mobility;Compensatory technique education   Progress Progressing toward goals   Discharge Recommendation   Rehab Resource Intensity Level, OT   (pending progress)   Equipment Recommended Shower/Tub chair with back ($)  (3x/day pill organizer)   Additional Comments  handouts provided during OT session   OT Therapy Minutes   OT Time In 0830   OT Time Out 1000   OT Total Time (minutes) 90   OT Mode of treatment - Individual (minutes) 90   OT Mode of treatment - Concurrent (minutes) 0   OT Mode of treatment - Group (minutes) 0   OT Mode of treatment - Co-treat (minutes) 0   OT Mode of Treatment - Total time(minutes) 90 minutes   OT Cumulative Minutes 210   Therapy Time missed   Time missed? No

## 2024-07-16 NOTE — ASSESSMENT & PLAN NOTE
S/p C3-7 laminectomy with C2-T1 fusion due to central cord syndrome following a fall  Completed post-op abx.  No collar necessary.  Monitor incision  NS follow-up around 8/2/24 for 6 week post-op appt.  Therapy and pain control per PMR

## 2024-07-16 NOTE — CASE MANAGEMENT
Called [facility] ([phone]) spoke with Akila who stated that the auth was received and is pending . Pending auth #[id number].

## 2024-07-16 NOTE — PCC SPEECH THERAPY
Pt currently being followed for both dysphagia as well as cognitive tx sessions.  At this time, pt is making slower progress this week.     Upon admission to the [facility], pt completed the SLUMS cognitive assessment. Pt total score was 16/30 which as compared to those with high school education correlates with moderate to severe neurocognitive disorder. Cognitive barriers which present include: decreased attention, ST memory recall , problem solving, reasoning, sequencing, organization of thoughts, judgement, slower processing, and insight, which still impacts pt's overall safety, functional cognitive communication skills as well as functional mobility. The following interventions are used to target these barriers, including verbal problem solving task, visual memory recall tasks, drawing conclusions activities, categorization tasks , picture problem solving activities, verbal reasoning tasks, functional reading tasks , and family education/training.     As for swallow function, pt currently demonstrates mild-moderate oral and severe pharyngeal dysphagia. Symptoms or concerns included decreased mastication, decreased bolus formation, delayed oral intiation, and suspected decreased control of ice chip in addition to  suspected pharyngeal swallow delay, suspected decreased hyolaryngeal elevation upon palpation, multiple swallows, effortful swallow, audible swallows, and latency in overt signs/sxs of aspiration. Dysphagia barriers which present include the following risks for aspiration such as: poor positioning, decreased cognition, ineffective mastication, delay in oral transit, delay in swallow initiation, pharyngeal weakness upon swallowing, multiple swallows due to pharyngeal weakness, and high risk of aspiration give overall severity of pharyngeal stage as per prior VFSS completed in acute care . In order to address the noted barriers, skilled SLP services will address this by targeting the following interventions:  swallow exercise, initiate NMES, proper positioning, diet modification, safe swallow strategies, family education/training, and likely need to completed repeat VFSS after increased dysphagia tx sessions .    Current diet is NPO w/ receiving alternative means of nutrition/hydration via PEG.  PO trials to be completed under SLP supervision only.     Current level of functioning:   Eating: dependent  Comprehension: mod A  Expression: min A   Social Interaction: supervision  Executive functions: mod-max A  Memory: mod-max A    Family training/education has been initiated with pt's dtrTrinity but is also planned to be completed w/ pt's s/oAislinn on 7/17/2024. Recommendations at time of discharge are likely for increased assistance/supervision given I ADL tasks. At this time, pt will continue to benefit from skilled cognitive linguistic tx and dysphagia tx  session to maximize overall functional independence given overall cognitive linguistic skills and swallow abilities  in attempts to decreased caregiver burden over time.       Update from week: 7/23/2024. Pt is being followed for cognitive linguistic tx and dysphagia tx  sessions to where pt is making  slower and steady  progress this week.     Continued cognitive barriers which present include: decreased attention, ST memory recall , problem solving, reasoning, sequencing, organization of thoughts, judgement, slower processing, and insight, which still impacts pt's overall safety, functional cognitive communication skills as well as functional mobility. The following interventions are used to target these barriers, including verbal problem solving task, verbal working memory tasks, drawing conclusions activities, verbal reasoning tasks, written financial management tasks,  written health management tasks, verbal health management tasks, functional reading tasks , and family education/training.     Continued dysphagia barriers which present include the following  risks for aspiration such as: poor positioning, decreased cognition, ineffective mastication, delay in oral transit, delay in swallow initiation, pharyngeal weakness upon swallowing, multiple swallows due to pharyngeal weakness, and increased aspiration sxs w/ limited PO (ice chips) . In order to address the noted barriers, skilled SLP services will address this by targeting the following interventions: swallow exercise, proper positioning, safe swallow strategies, and family education/training.    Current diet remains to be NPO w/ receiving alternative means of nutrition/hydration via PEG. Pt is demonstrating inconsistencies in overall tolerance of limited PO trials (ice chips only) to where pt continues to demonstrate mild-moderate oral and moderate-severe pharyngeal dysphagia. Pt remains to be at high risk for aspiration currently. Established ability to complete pharyngeal swallow exercises in conjunction w/ sessions as well as an HEP for pt. Unable to initiate NMES to pharyngeal musculature due to recent pacemaker placement-->being more cautious currently.     Current level of functioning:   Eating: Total A  Comprehension: min A  Expression: supervision  Social Interaction: supervision   Executive functions: min-mod A  Memory: mod A    Family training/education has been initiated and completed with pt's s/Aislinn lyons in multiple sessions, which was initiated on 7/18/2024. Please refer to not for full details. However, s/o is aware of the high aspiration risk which pt currently presents, to where NO FOOD OR DRINK is to be consumed at home. Also SLP making recommendations due to decreased cognitive skills currently for supervision and assistance given I ADL tasks, in which s/o is in agreement. Recommendations at time of discharge are for HOME ST to continue to target swallow function and cognitive linguistic skill. SLP also recommending for f/u OP ENT consult in addition to OP VFSS to be completed in ~4-6 weeks for  maximizing pt's fullest potential for hopes of initiation of at best modified diet. D/c is planned for 7/25/2024. At this time, pt will continue to benefit from skilled cognitive linguistic tx and dysphagia tx  session to maximize overall functional independence given overall cognitive linguistic skills and swallow abilities  in attempts to decreased caregiver burden over time.

## 2024-07-16 NOTE — PCC OCCUPATIONAL THERAPY
7/16/24  ADL: TA eating, CGA UB dressing, maxA bathing, modA BL dressing  TRANSFER: Albina with RW  D/C PLAN: reteam at this time    Pt barriers include cognition, pacemaker prec, cervical spine prec, strength, activity tolerance, impacting participation in ADL/IADL's.  In order to address fx deficits, skilled OT services have worked on self-care, therapeutic exercise, therapeutic activity, modalities PRN in order to inc fx performance and independence.     Plan for next week: Cont to focus on inc indep with ADLs and overall activity tolerance to inc fx performance and dec CG burden. Will schedule FT when appropriate..    Therapist has discussed POC with pt and areas of focus with pt verbalizing understanding.     Barriers Intervention   cognition SLP, educ, repetition, educ   Pacemaker prec educ   Cervical spine prec Educ, LHAE educ if needed   Activity tolerance Endurance training       7/23/24 update:  Pt is demonstrating good progress with occupational therapy and is progressing toward long term goals for ADL, IADL, and functional transfers/mobility. Pts long term goals for ADLs are Independent with Rolling Walker. Pt continues to present with impairments in activity tolerance, endurance, standing balance/tolerance, UE strength, UE ROM, memory, insight, and safety . Occupational performance remains limited by fatigue, cardiac/sternal precautions, spinal precautions, and risk for falls. Family training/education will be required prior to D/C. Pt will continue to benefit from skilled acute rehab OT services to address above mentioned barriers and maximize functional independence in baseline areas of occupation to meet established treatment goals with overall decreased burden of care. Plan of care to continue to focus on ADL Retraining ,  LHAE education/training, Functional Transfers, Functional Cognition, Short Term memory, Standing tolerance, Standing balance , Fine motor coordination, Fine motor strengthening ,  DME training/education, Family training/education, and community re-integration. Goals for the upcoming week are: progress pt to IRPs as able in prep for d/c home on Thursday, focus on progressing pt to IND with self care tasks (exception of rear hygiene/bathing) in prep for d/c home on Thursday    Anticipate d/c date: Thursday, 7/25/24 with home OT services

## 2024-07-16 NOTE — PROGRESS NOTES
07/16/24 5885   Pain Assessment   Pain Assessment Tool 0-10   Pain Score No Pain   Restrictions/Precautions   Precautions Aspiration;Bed/chair alarms;Cognitive;Fall Risk;Multiple lines;Spinal precautions;Supervision on toilet/commode;Pacemaker  (PEG tube, NPO)   Weight Bearing Restrictions No   ROM Restrictions Yes  (spinal precautions and pacemaker precautions)   Braces or Orthoses   (abdominal binder)   Cognition   Overall Cognitive Status Impaired   Arousal/Participation Alert;Cooperative   Subjective   Subjective Pt agreeable to PT session, no new complaints to report   Sit to Lying   Type of Assistance Needed Supervision   Comment HOB elevated   Sit to Lying CARE Score 4   Sit to Stand   Type of Assistance Needed Incidental touching   Comment CG/CS RW   Sit to Stand CARE Score 4   Bed-Chair Transfer   Type of Assistance Needed Physical assistance   Physical Assistance Level 25% or less   Comment / RW, minAx1 no AD   Chair/Bed-to-Chair Transfer CARE Score 3   Transfer Bed/Chair/Wheelchair   Findings pt cont to require vc's to abide by pacer precautions during STS transfers   Walk 10 Feet   Type of Assistance Needed Physical assistance   Physical Assistance Level 25% or less   Comment w/o AD; CG/CS RW   Walk 10 Feet CARE Score 3   Walk 50 Feet with Two Turns   Type of Assistance Needed Physical assistance   Physical Assistance Level 25% or less   Comment w/o AD; CG/CS RW   Walk 50 Feet with Two Turns CARE Score 3   Walk 150 Feet   Type of Assistance Needed Physical assistance   Physical Assistance Level 25% or less   Comment w/o AD; / RW   Walk 150 Feet CARE Score 3   Ambulation   Primary Mode of Locomotion Prior to Admission Walk   Distance Walked (feet) 150 ft  (x1, 100'x2 RW; 150'x3 no AD)   Assist Device Roller Walker   Gait Pattern Inconsistant Martha;Slow Martha;Decreased foot clearance;Shuffle;Narrow YARIEL;Improper weight shift   Limitations Noted In Endurance;Safety;Speed;Strength;Swing   Walk  "Assist Level Contact Guard;Close Supervision   Findings CG/CS with use of RW which pt claims he will use at home upon d/c; CG/minAx1 w/o AD   Does the patient walk? 2. Yes   Wheel 50 Feet with Two Turns   Reason if not Attempted Activity not applicable   Wheel 50 Feet with Two Turns CARE Score 9   Wheel 150 Feet   Reason if not Attempted Activity not applicable   Wheel 150 Feet CARE Score 9   Curb or Single Stair   Style negotiated Curb   Type of Assistance Needed Incidental touching   Comment CG with RW  8\" curb step; vc's for sequencing   1 Step (Curb) CARE Score 4   12 Steps   Reason if not Attempted Activity not applicable   12 Steps CARE Score 9   Stairs   Type Curb   # of Steps 1   Weight Bearing Precautions Fall Risk   Assist Devices Roller Walker   Findings practiced 8\" curb step to mimic WARD home and w/i home   Therapeutic Interventions   Balance repetitive STS with ball catch/toss when pt came to standing x12 and x7 (to fatigue) with minAx1 occ upon stance for balance   Neuromuscular Re-Education HIGT with b/l HHA minAx2 150'x3; cane step thrus 10'x8 with single UE support on HR CG/minAx1 for balance; sidestepping thru canes with single UE support on HR 4x10', Albina/CGA for balance   Equipment Use   NuStep lvl 3, 15 mins, LEs only for conditioning; avg spm 75, pt completing 0.63 miles   Other Comments   Comments BP start of session 108/70, BP during session 94/60 but pt remains asymptomatic   Assessment   Treatment Assessment Session focusing on functional mobility trng with use of RW, transfer trng, improving pt's activity tolerance, balance, and righting reactions with HIGT in order to reduce fall risk upon d/c. Pt cont to require frequent therapeutic rest breaks between tasks due to fatigue but able to recover with rest. Cont with gait abnormalities including decreased stance b/l, decreased stride length, and decreased foot clearance, improved with b/l HHA for HIGT. Recommend continuing with HIGT to " improve pt's gait, balance and righting reactions. Pt cont to require cueing for hand placement during transfers, recommend ongoing repetitive cueing to improve carryover with transfers. At this time pt feels he is progressing well with mobilities, asking about d/c home potentially next week which PT educated pt it will be discussed in team meeting tomorrow 7/17. Pt does report he would like RW for d/c home which PT passed along to lead PT. At this time cont with POC focusing on HIGT and NPP interventions to improve pt's functional mobility status to reduce fall risk and caregiver burden. Anticipating FT on Thurs 7/18 with pt's love Tao.   Family/Caregiver Present no   Problem List Decreased strength;Decreased range of motion;Decreased endurance;Impaired balance;Decreased mobility;Decreased safety awareness;Orthopedic restrictions   Barriers to Discharge Inaccessible home environment   PT Barriers   Functional Limitation Transfers;Walking;Stair negotiation   Plan   Treatment/Interventions Functional transfer training;LE strengthening/ROM;Elevations;Therapeutic exercise;Endurance training;Gait training;Bed mobility   Progress Progressing toward goals   Discharge Recommendation   Rehab Resource Intensity Level, PT   (tbd)   Equipment Recommended Walker  (pt would like RW for d/c home)   PT Therapy Minutes   PT Time In 1345   PT Time Out 1515   PT Total Time (minutes) 90   PT Mode of treatment - Individual (minutes) 90   PT Mode of treatment - Concurrent (minutes) 0   PT Mode of treatment - Group (minutes) 0   PT Mode of treatment - Co-treat (minutes) 0   PT Mode of Treatment - Total time(minutes) 90 minutes   PT Cumulative Minutes 420   Therapy Time missed   Time missed? No

## 2024-07-16 NOTE — PROGRESS NOTES
Physical Medicine and Rehabilitation Progress Note  Luis Forrester 54 y.o. male MRN: [id number]  Unit/Bed#: [facility] 972-01 Encounter: [id number]    To Review: 53-year-old male with PMH of asthma, HLD, hypothyroidism, who was having intermittent syncopal episodes over the last year who was found down by daughter after presumable other syncopal episode now complaining of short-term mid and upper back pain as well as pain limiting in hands and weakness of the upper extremities.  Patient was brought to hospital where CT head showed acute trace subarachnoid hemorrhage in the bilateral parafalcine regions.  CT of the T-spine showed DISH without acute or osseous abnormalities.  CTA head and neck did not show acute traumatic vascular injury, high-grade stenosis or dissection.  CT C spine showed no cervical fx or traumatic malalignment with mod canal and severe b/l multilevel NF stenosis.  MRI C spine shows moderate to severe canal stenosis with mild cord compression most pronounced at C3-4 and C5-6.  It also showed severe bilateral foraminal stenosis at same levels along with congenital canal stenosis with superimposed degenerative spondylosis.  Neurosurgery consulted on patient and diagnosed with parafalcine subdural hemorrhage with bilateral subarachnoid hemorrhage as well as operative intervention for the cervical stenosis with myelopathy and upper extremity weakness.  Patient underwent C3-C7 cervical laminectomy with bilateral screw placement and fusion C2 to-T1 on 6/16.  Patient recommended maps greater than 85 for 5 days and has been on 2 vasopressors.  He had his Hemovac drain removed on 6/19.  He was recommended 2 weeks of antibiotics for infectious prophylaxis per neurosurgery.  He has not required to have a cervical collar at this time.  Acute pain service consulted and have been managing with multimodal pain regiment including IV opiates.  Patient noted to have symptomatic junctional bradycardia with asymptomatic  "wide-complex tachycardia on telemetry for which EP was consulted.  They felt presentation mixture of sick sinus syndrome and wide-complex tachycardia likely SVT with aberrancy and felt reasonable to place dual-chamber pacemaker given history of multiple syncopal episodes and now documented bradycardia. Course also notable for hyponatremia and polyuria for which nephrology was consulted (and now improved).  Patient did receive 2 doses of DDAVP as well as intermittent IV fluids.\"     Course also notable for abdominal distension which was favored to be ileus as well as dysphagia and patient failing multiple VBS.  He is noted to have mild oral and moderate pharyngeal dysphagia and eventually was recommended PEG tube and to continue NPO diet.  Patient was evaluated by skilled therapies and was found to have significant decline in ADLs and ambulation and appears appropriate for admission to [facility].    Chief Complaint: No new issues.     Interval History/Subjective:  No acute events overnight. Last BM 7/15 and continent. PVRs are low, can discontinue. Remains continent. Tolerating tube feeds. No new CP, SOB, fevers, chills, N/V, abdominal pain.     ROS:  A 10 point review of systems was negative except for what is noted in the HPI.    Today's Changes:  Discussed with patient. He would like to hold off on laryngoscope for now. He may be willing to consider doing it next week if still in the hospital.   May be able to de-escalate gabapentin.  Discussed with nutrition, can stop banatrol. But will also stop miralax and monitor consistency of stools.    Total visit time: 35 minutes, with more than 50% spent counseling/coordinating care. Counseling includes discussion with patient re: progress in therapies, functional issues observed by therapy staff, and discussion with patient regarding their current medical state and wellbeing. Coordination of patient's care was performed in conjunction with " Internal Medicine service to monitor patient's labs, vitals, and management of their comorbidities.    Assessment/Plan:    * Cervical myelopathy (HCC)  Assessment & Plan  Recent with residual neck pain, distal RUE weakness, impaired mobility   - MRI cervical spine without 6/15/2024:Congenital canal stenosis with superimposed degenerative spondylosis .Most pronounced at C3-4 and C5-C6 with moderate to severe canal stenosis and mild cord compression. Evaluation of cord signal is limited due to artifact. No definite abnormal cord signal but mild cord edema would be difficult to exclude. Severe bilateral foraminal stenosis also seen at C3-4 and C5-C6.Mild to moderate canal stenosis at other levels  - S/P PCDF C2-T1 on 6/16 by NeuroSx Dr Lopez  - Monitor incision site   - No collar required  - Admission: Examines C3 AIS D (some impairment L c4 pinprick, but overall very good sensation), proprioception in ankles intact, and strength quite good with weakness starting in finger flexors bilaterally.  - Activity Restrictions: No heavy lifting greater than 5 - 10lbs. No strenuous activities. No driving while requiring cervical collar, anticipated six weeks. No significant neck movement.  - May shower 3 days after surgery, but do not soak in a tub and no swimming, use mild antimicrobial soap and water. Pat incision dry after showering.  - Monitor for neuro changes, dysphagia (has significant on tube feeds), neurogenic bowel/bladder, spasticity  - Recommend acute comprehensive interdisciplinary inpatient rehabilitation to include intensive skilled therapies (PT, OT) as outlined with oversight and management by rehabilitation physician as well as inpatient rehab level nursing, case management and weekly interdisciplinary team meetings.   - Optimal pain management  - Fall precautions   - Follow-up with Spine Sx and if needed PMR after d/c       At risk for altered bowel elimination  Assessment & Plan  Course also notable for  abdominal distension which was favored to be ileus   - Recent occasional loose stools now on PEG tube feeds  - Currenlty on miralax daily  - PRN suppository  - Overall continent.   - Monitor    Pain  Assessment & Plan  APAP 975mg TID   Gabapentin 100mg TID   Oxycodone 2.5mg oral soltn Q4H PRN  Monitor for oversedation, AMS/delirium, and respiratory depression   If being administered - hold opiates, muscle relaxants, benzodiazepines, and gabapentin for oversedation, AMS, or RR<12.  Counseled on and continue to encourage deep breathing/relaxation/behavioral pain management techniques      At risk for venous thromboembolism (VTE)  Assessment & Plan  SCDs, ambulation, and lovenox   Recommend discharge home with 8 weeks lovenox for DVT Ppx given possible acute SCI.  Will need lovenox training. Discussed with team.   -Thru 8/10       Dysphagia  Assessment & Plan  - Recommend ENT c/s for ongoing prolonged dysphagia   - Patient declined laryngoscope initially, he is now open to it.   - ENT not convinced of laryngeal component of dysphagia.   - Current diet: NPO - failed multiple VBS    - Mild oral and moderate pharyngeal dysphagia  - NGT feeds, nutrition c/s   - Family training for tube feeds   - SLP evaluate and treat decline in swallowing.  - Ensure adequate hydration  - Nutrition consult to assist with management   - Outpatient f/u with ENT     SDH (subdural hematoma) (HCC)  Assessment & Plan  Following syncope and being found down   CTH 6/15 - New acute trace subarachnoid hemorrhage in bilateral parafalcine regions. Recommend dedicated CTA head with contrast for further evaluation.  New acute trace parafalcine subdural hematoma.  CTH 6/18 - 1. Near completely resolved parafalcine subarachnoid hemorrhage with trace residual subarachnoid hemorrhage in the frontoparietal regions medially. No mass effect or hydrocephalus. 2. No large territorial infarction.  - Cleared for lovenox 30 SQ BID for VTE prophylaxis by prior  providers     - Current/recent mood/affect/behavior/cog - acceptable, largely euthymic  - Remains at risk for increased confusion/delirium, restlessness, agitation, and fall - continue to monitor for concerns/changes  - Current psychotropics and potentially sedating medications:   Gabapentin 100mg TID   Melatonin 3mg HS   Oxy 2.5mg Q4h PRN (not using recently)   - Monitor for need for 1:1 (Notify MD of potentially dangerous behavior such as significant impulsivity and trying to stand/get out of bed/chair unattended that could lead to fall/injury); High fall precautions with frequent rounding, patient close to nursing station if possible, frequent toileting/frequent offering urinal/bedpan (only if too immobile for safe toileting);  bed/chair alarm on at all times (high setting if needed); fall junior on side of bed (at discretion of nursing/therapy); do not leave unattended in bathroom, patient education; call bell availability; Sleep/agitation log, frequent redirection, reorientation, reassurance; Family access to patient if helpful  - No recent reports of dangerous/risky behavior requiring 1:1 at this point but monitor closely  - Recommend acute comprehensive interdisciplinary inpatient rehabilitation to include intensive skilled therapies (PT, OT, ST) with oversight and management by rehabilitation physician.  Inpatient rehab level nursing, case management and weekly interdisciplinary team meetings. Provide patient and (if available) caregiver/family teaching regarding brain injury/residual disability management.    - For routine restlessness, anxiety, irritability focus on non-pharmacologic management    - Obtain MOCA and if needed ACLS during ARC course   - Please assess iADLs - ability to manage medications, meal prep, finances, obtaining appropriate transport from community, managing emergencies, and overall safety in home environment.  - Provide therapy, compensatory strategies, and if available and necessary  provide caregiver training.  Notify MD of impairments or inability to perform iADLs adequately.   - Monitor neuro-exam, wakefulness, mood, cognition, insight into deficits and safety awareness   - Monitor and ensure optimal management electrolytes, nutrition, and hydration  - Monitor for signs or symptoms of infection, medication intolerances, other systemic etiologies  - Additional labs, imaging, specialist follow-up as needed   - Patient/family/caregiver education and training   - Overstimulation precautions, frequent re-orientation, re-direction, re-assurance  - Optimal mood, pain, and sleep management  - Sleep log and agitation monitoring    - Limit sedating medications when possible  - Follow-up with neurosx after discharge if needed and if needed during ARC course as well as PCP      s/p Medtronic dual chamber PPM 6/21/2024  Assessment & Plan  Noted to have symptomatic junctional bradycardia with asymptomatic wide-complex tachycardia on telemetry for which EP was consulted.  They felt presentation mixture of sick sinus syndrome and wide-complex tachycardia likely SVT with aberrancy and felt reasonable to place dual-chamber pacemaker given history of multiple syncopal episodes and now documented bradycardia.  2/2 bradycardia and recurrent syncopal episodes  Pacer precautions for 6 weeks.   OP EP follow-up     Thrush  Assessment & Plan  Nystatin pain and suction QID x7 d  Oral care   Monitor     Syncope and collapse  Assessment & Plan  Believed to be related to arrhythmias/bradycardia s/p PPM placement   Monitor vitals  OP cards/PCP     Hypothyroidism  Assessment & Plan  Levothyroxine     Leukocytosis-resolved as of 7/15/2024  Assessment & Plan  Up to 13.6 7/11  Internal medicine consult and management during ARC course  Patient afebrile, other vitals unremarkable > continue to monitor   No clear signs or symptoms of infection or VTE but will continue to monitor  Monitor CBC intermittently             Health  Maintenance  #Bladder: Voiding and continent. Checking PVR x3. If all <150cc can discontinue.   #Skin/Pressure Injury Prevention: Turn Q2hr in bed, with weight shifts [id number] in wheelchair.  #GI Prophylaxis: Not indicated  #Code Status: Full Code  #FEN: See Dysphagia above  #Dispo: ELOS 2-3 weeks with goals to discharge home with supervision from his girlfriend. Lives in a home with 1+1 step with FFSU available. Was previously completely independent      Objective:    Functional Update:  PT: [place] bed mobility, min-modA transfers, [place] for ambulation 226', total A for curb   OT: [place] oral hygiene, Needs assistance with PEG feeds, modA shower/bathe, CGA Ub dressing, total A footwear, [place] LB dressing, [place] toileting transfers, total A toileting hygiene  SLP: RAY 15/30 - with severe neurocog disorder. modA comprehension, expression, impaired problem solving, memory, attention, executive function.     Allergies per EMR    Physical Exam:  Temp:  [97.9 °F (36.6 °C)-98.1 °F (36.7 °C)] 98.1 °F (36.7 °C)  HR:  [64-78] 64  Resp:  [17-19] 17  BP: ()/(57-66) 99/66  Oxygen Therapy  SpO2: 96 %    Gen: No acute distress, Well-nourished, well-appearing.  HEENT: Moist mucus membranes, Normocephalic/Atraumatic. Poor dentition  Cardiovascular: Regular rate, rhythm, S1/S2. Distal pulses palpable  Heme/Extr: No edema  Pulmonary: Non-labored breathing. Lungs CTAB  : No poon  GI: Soft, non-tender, non-distended. BS+ + PEG  Integumentary: Skin is warm, dry.  Neuro: AAOx3 Speech is intact. Appropriate to questioning.   Psych: Normal mood and affect.       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[facility])    Chronic low back pain    Hyperlipidemia    Hypothyroidism    Lumbar radiculopathy    Mass of parotid gland    Osteoarthritis of both hands    Reactive airway disease    Vitamin D deficiency    Encounter for immunization    TBI (traumatic brain injury) ([facility])    Syncope and collapse    Bilateral hand pain    SDH (subdural hematoma) ([facility])    Right hand weakness    SSS (sick sinus syndrome) ([facility])    s/p Medtronic dual chamber PPM 6/21/2024    Dysphagia    Cervical stenosis of spinal canal    Cervical myelopathy ([facility])    At risk for venous thromboembolism (VTE)    Pain    Thrush    At risk for altered bowel elimination         Medications  Current Facility-Administered Medications   Medication Dose Route Frequency Provider Last Rate    acetaminophen  975 mg Per PEG Tube Q8H Kim Li PA-C      albuterol  2 puff Inhalation Q4H PRN Kim Li PA-C      benzocaine  2 spray Mucosal 4x Daily PRN Kim Li PA-C      bisacodyl  10 mg Rectal Daily PRN Luke Suggs MD      dextromethorphan-guaiFENesin  10 mL Per PEG Tube Q4H PRN Kim Li PA-C      enoxaparin  30 mg Subcutaneous Q12H ASHLYN Kim Li PA-C      gabapentin  100 mg Per PEG Tube TID Kim Li PA-C      levothyroxine  125 mcg Per PEG Tube Early Morning Kim Li PA-C      melatonin  3 mg Per PEG Tube HS Kim Li PA-C      nystatin  500,000 Units Swish & Spit 4x Daily Luke Suggs MD      nystatin   Topical BID Kim Li PA-C      ondansetron  4 mg Per PEG Tube Q6H PRN Kim Li PA-C      oxyCODONE  2.5 mg Oral Q4H PRN Luke Suggs MD      polyethylene glycol  17 g Per PEG Tube Daily Kim Li PA-C      polyethylene glycol  17 g Oral Daily PRN Luke  Henri Suggs MD      saliva substitute  5 spray Mouth/Throat 4x Daily PRN Kim Li PA-C            ** Please Note: Fluency Direct voice to text software may have been used in the creation of this document. **

## 2024-07-16 NOTE — PROGRESS NOTES
[facility]  Progress Note  Name: Luis Forrester I  MRN: [id number]  Unit/Bed#: -01 I Date of Admission: 7/12/2024   Date of Service: 7/16/2024 I Hospital Day: 4    Assessment & Plan   * Cervical myelopathy (HCC)  Assessment & Plan  S/p C3-7 laminectomy with C2-T1 fusion due to central cord syndrome following a fall  Completed post-op abx.  No collar necessary.  Monitor incision  NS follow-up around 8/2/24 for 6 week post-op appt.  Therapy and pain control per PMR    Dysphagia  Assessment & Plan  S/p PEG placement.  SLP to follow.  Pt tolerating tube feeds so far.  Nutrition following.    s/p Medtronic dual chamber PPM 6/21/2024  Assessment & Plan  Pt has a 6 month history of syncopal events.  Found to be bradycardic in the 40s.  Outpt follow-up with EP    SDH (subdural hematoma) (HCC)  Assessment & Plan  Not on antiplatelet or AC as an outpt.  Repeat head CT for any change in mental status.  This occurred after a syncopal fall in June 2024.     Syncope and collapse  Assessment & Plan  No events since PPM placement.               The above assessment and plan was reviewed and updated as determined by my evaluation of the patient on 7/16/2024.    Labs:   Results from last 7 days   Lab Units 07/15/24  0615 07/11/24  0442   WBC Thousand/uL 6.03 13.65*   HEMOGLOBIN g/dL 12.1 12.6   HEMATOCRIT % 36.9 37.4   PLATELETS Thousands/uL 208 227     Results from last 7 days   Lab Units 07/15/24  0615 07/11/24  0442   SODIUM mmol/L 136 141   POTASSIUM mmol/L 3.9 4.1   CHLORIDE mmol/L 98 93*   CO2 mmol/L 28 30   BUN mg/dL 9 13   CREATININE mg/dL 0.52* 0.67   CALCIUM mg/dL 7.6* 7.3*                   Imaging  No orders to display       Review of Scheduled Meds:  Current Facility-Administered Medications   Medication Dose Route Frequency Provider Last Rate    acetaminophen  975 mg Per PEG Tube Q8H Kim Li PA-C      albuterol  2 puff Inhalation Q4H PRN Kim Li  BRONWYN      benzocaine  2 spray Mucosal 4x Daily PRN Kim Li PA-C      bisacodyl  10 mg Rectal Daily PRN Luke Suggs MD      dextromethorphan-guaiFENesin  10 mL Per PEG Tube Q4H PRN Kim Li PA-C      enoxaparin  30 mg Subcutaneous Q12H ASHLYN Kim Li PA-C      gabapentin  100 mg Per PEG Tube TID Kim Li PA-C      levothyroxine  125 mcg Per PEG Tube Early Morning Kim Li PA-C      melatonin  3 mg Per PEG Tube HS Kim Li PA-C      nystatin  500,000 Units Swish & Spit 4x Daily Luke Suggs MD      nystatin   Topical BID Kim Li PA-C      ondansetron  4 mg Per PEG Tube Q6H PRN Kim Li PA-C      oxyCODONE  2.5 mg Oral Q4H PRN Luke Suggs MD      polyethylene glycol  17 g Per PEG Tube Daily Kim Li PA-C      polyethylene glycol  17 g Oral Daily PRN Luke Suggs MD      saliva substitute  5 spray Mouth/Throat 4x Daily PRN Kim Li PA-C         Subjective/ HPI: Patient seen and examined. Patients overnight issues or events were reviewed with nursing staff. New or overnight issues include the following:     Is happy with his progress. Offers no complaints of CP and SOB this am. No abdominal pains.     ROS:   A 10 point ROS was performed; negative except as noted above.          *Labs /Radiology studies Reviewed  *Medications  reviewed and reconciled as needed  *Please refer to order section for additional ordered labs studies      Physical Examination:  Vitals:   Vitals:    07/15/24 2015 07/16/24 0558 07/16/24 0830 07/16/24 0831   BP: 106/57 99/66 105/72 108/69   BP Location: Right arm Right arm Left arm Left arm   Pulse: 73 64     Resp: 17 17     Temp: 98.1 °F (36.7 °C) 98.1 °F (36.7 °C)     TempSrc: Oral Oral     SpO2: 92% 96%     Weight:       Height:           General Appearance: NAD; pleasant  HEENT: PERRLA, conjuctiva normal; mucous membranes moist; face  symmetrical  Neck:  Supple  Lungs: clear bilaterally, normal respiratory effort, no retractions, expiratory effort normal, on room air  CV: regular rate and rhythm, no murmurs rubs or gallops noted   ABD: soft non tender, +BS x4  EXT: DP pulses intact, no lymphadenopathy, no edema  Skin: normal turgor, normal texture, no rash  Psych: affect normal, mood normal  Neuro: AAOx3       The above physical exam was reviewed and updated as determined by my evaluation of the patient on 7/16/2024.    Invasive Devices       Drain  Duration             Gastrostomy/Enterostomy Percutaneous Endoscopic Gastrostomy (PEG) 20 Fr. LUQ 5 days                       VTE Pharmacologic Prophylaxis: Enoxaparin  Code Status: Level 1 - Full Code  Current Length of Stay: 4 day(s)     Coordination of patient's care was performed in conjunction with consultative service. Time invested included review of patient's labs, vitals, and management of their comorbidities with continued monitoring, examination of patient as well as answering patient questions, documenting her findings and creating progress note in electronic medical record,  ordering appropriate diagnostic testing.       ** Please Note:  voice to text software may have been used in the creation of this document. Although proof errors in transcription or interpretation are a potential of such software**

## 2024-07-16 NOTE — ASSESSMENT & PLAN NOTE
Not on antiplatelet or AC as an outpt.  Repeat head CT for any change in mental status.  This occurred after a syncopal fall in June 2024.

## 2024-07-16 NOTE — PCC PHYSICAL THERAPY
Date of admission:7/12/24  ELOS:7 to 10 days  Goals: RUSSELL with LRAD  Date:7/16/24  Barriers to d/c home at this time include inaccessible home environment, steps inside of home, requires external assistance for all mobilities, positive fall history, and increased fall risk due to impairments. Pt requires external assistance at this time due to the following impairments: decreased strength, imbalance, coordination deficits, decreased endurance, fatigue, cognitive deficits, impulsiveness, spinal precautions, and delayed righting reactions. POC has been focusing on gait training, transfer training, stair training, NPP interventions, neuromuscular re-education, improving pt's activity tolerance and endurance, LE strengthening, balance interventions, and assessment for appropriate AD in order to address barriers to d/c. Over the past week pt has made good progress towards LTGs. Over the next week plan to cont with HIGT/NPP interventions to improve pt's functional mobility status as well as pts balance and righting reactions, cont to progress towards Russell with use of RW as pt currently prefers RW at this point in time, cont with stair trng so that pt can safely access all areas of home, and cont with repetitive trng regarding current precautions to reduce risk for injury. Anticipating discharge home mid next week and with recommendations for home PT . DME needs include RW. Plan for FT on 7/17 with pt's nahum Tao to review pt's current mobility status.    Date:7/23/24  At this time pt has made progress towards goals, however, due to cognitive deficits, recommending pt be S level upon d/c home for safety as when pt assessed for IRPs pt attempting to ambulate w/o use of RW despite ongoing recommendations for use of RW at all times. NAHUM tao aware of recommendations for S upon d/c home and CS outdoors as pt still presents at increased fall risk. HEP has been provided to pt and RW has been ordered. Plan for d/c home 7/25  with recommendations for home PT to cont to improve pt's righting reactions and balance to reduce fall risk and risk for injury.

## 2024-07-16 NOTE — PROGRESS NOTES
07/16/24 1230   Pain Assessment   Pain Assessment Tool 0-10   Pain Score No Pain   Restrictions/Precautions   Precautions Aspiration;Bed/chair alarms;Cognitive;Fall Risk;Impulsive;Pacemaker;Supervision on toilet/commode  (NPO s/p PEG)   Eating   Type of Assistance Needed Physical assistance   Physical Assistance Level Total assistance   Comment SLP providing PO trials   Eating CARE Score 1   Swallow Assessment   Swallow Treatment Assessment   Daily Dysphagia Tx Note     Patient Name: Luis Forrester    Today's Date: 7/16/2024      Current Risks for Dysphagia & Aspiration: Recent intubation; general debilitation; new neuro event;  brain injury; cognitive deficit; positioning issues; cervical spin injury/surgery; head support      Current Symptoms/Concerns: cough, clear throat, during and after PO, difficulty chewing, wet voicing, chronic cough      Current diet:NPO with tube feeds      Premorbid diet::regular diet and thin liquids     Positioning: pt was in bed which was placed in chair mode    Items administered:Consistencies Administered: ice chips x23      Oral stage:mild-moderate  Lip closure: functional around tsp  Anterior spillage: none  Mastication: munching type mastication   Bolus formation: decreased  Bolus control: decreased  Transfer: delayed  Oral residue: none  Pocketing: none         Pharyngeal stage:severe  Swallow promptness: moderate-severe delay noted  Hyolaryngeal elevation: weak excursion upon palpation  Wet voice: increased as ice chips progressed  Throat clear: more noted after ~ 15th ice chip and so on  Cough: later in ice chip presentations  Secondary swallows: fluctuated from 3-4 swallows per ice chip presented  Audible swallows: increased as trials progressed       Esophageal stage:No overt sxs observed      Summary:     Pt presenting with mild-moderate oral and severe pharyngeal dysphagia today. Symptoms or concerns included decreased mastication, decreased bolus formation, delayed  oral intiation, and suspected decreased control of ice chip in addition to  suspected pharyngeal swallow delay, suspected decreased hyolaryngeal elevation upon palpation, multiple swallows, effortful swallow, audible swallows, and latency in overt signs/sxs of aspiration.      Pt was awake, alert and cooperative for trials today, in which pt did request to brush his teeth. SLP providing education in regard to completing oral care prior to ice chip trials. It was noted that he appropriately asked about why he can't use toothbrush/tooth paste, to where increased education provided by SLP in regard to risk of aspiration given increased secretions/saliva, etc. Pt verbalizing understanding after education given. Oral care w/ suction completed prior to ice chip trials today.    As for swallow function, pt's lip seal and bolus retrieval was functional to where no anterior loss was observed with trials. Munching type mastication continued w/ ice chips, suspecting some level of decreased propulsion and decreased bolus control w/ ice chips given piecemeal mastication/transit. Also observed was delay in oral transit of ice chips, especially as ice chip trials progressed. Swallow initiation was delayed across ice chips to where HLE was decreased when elicited (as noted upon palpation). Pt did have 3-4 swallows fairly consistently across ALL ice chip trials. More elicited today was highly audible swallows across all trials. While pt did not exhibit any increased or overt signs/sxs of aspiration given the initial 7 ice chips, pt did have cough on 8th ice chip. Still no increased signs/sxs observed until the 14th and 15th ice chip which pt did have throat clearing which lead to coughing which continued on 18th ice chip and last 3 ice chips presented. Increased wetness in voice was observed in addition to stronger throat clear/coughs. Pt does have decreased insight to the reasoning given the ongoing risk for aspiration w/ ice chips  but continues to benefit from ongoing education which pt does benefit from ongoing education and review.        Recommendations:  Diet: NPO w/ alternative means of nutrition/hydration via PEG  Meds: non-oral if possible  Strategies: HOB to be 30 degrees at all times     Oral care w/ suction Q4.  PO trials w/ SLP supervision only  Results reviewed with:  patient, RN- Jocelynn   Aspiration precautions posted.     F/u ST tx: Pt will continue to benefit from ongoing skilled dysphagia tx sessions to establish the potential for safest least restrictive diet vs pleasure feeding w/o increased oropharyngeal or aspiration sxs and monitor ability to carryover swallow strategies independently.     Plan: PO trials w/ SLP supervision only            Continue to review swallow/pharyngeal strengthening exercises w/ pt and family            Complete d/w pt about the potential for NMES in attempts to maximize swallow function            Repeat VFSS AFTER a fair amount of therapy sessions in hopes for maximizing pharyngeal excursion/strength    Swallow Assessment Prognosis   Prognosis Guarded   Prognosis Considerations Age;Co-morbidities;Medical diagnosis;Medical prognosis;New learning ability;Ability to carry over   SLP Therapy Minutes   SLP Time In 1230   SLP Time Out 1300   SLP Total Time (minutes) 30   SLP Mode of treatment - Individual (minutes) 30   SLP Mode of treatment - Concurrent (minutes) 0   SLP Mode of treatment - Group (minutes) 0   SLP Mode of treatment - Co-treat (minutes) 0   SLP Mode of Treatment - Total time(minutes) 30 minutes   SLP Cumulative Minutes 150   Therapy Time missed   Time missed? No

## 2024-07-17 PROCEDURE — 97530 THERAPEUTIC ACTIVITIES: CPT

## 2024-07-17 PROCEDURE — 97116 GAIT TRAINING THERAPY: CPT

## 2024-07-17 PROCEDURE — 99233 SBSQ HOSP IP/OBS HIGH 50: CPT | Performed by: PHYSICAL MEDICINE & REHABILITATION

## 2024-07-17 PROCEDURE — 99231 SBSQ HOSP IP/OBS SF/LOW 25: CPT | Performed by: NURSE PRACTITIONER

## 2024-07-17 PROCEDURE — 97112 NEUROMUSCULAR REEDUCATION: CPT

## 2024-07-17 PROCEDURE — 97129 THER IVNTJ 1ST 15 MIN: CPT

## 2024-07-17 PROCEDURE — 97535 SELF CARE MNGMENT TRAINING: CPT

## 2024-07-17 PROCEDURE — 92526 ORAL FUNCTION THERAPY: CPT

## 2024-07-17 RX ADMIN — GABAPENTIN 100 MG: 250 SOLUTION ORAL at 21:11

## 2024-07-17 RX ADMIN — ACETAMINOPHEN 975 MG: 650 SUSPENSION ORAL at 21:10

## 2024-07-17 RX ADMIN — NYSTATIN 500000 UNITS: 100000 SUSPENSION ORAL at 17:21

## 2024-07-17 RX ADMIN — NYSTATIN 500000 UNITS: 100000 SUSPENSION ORAL at 08:42

## 2024-07-17 RX ADMIN — ENOXAPARIN SODIUM 30 MG: 30 INJECTION SUBCUTANEOUS at 08:42

## 2024-07-17 RX ADMIN — Medication 125 MCG: at 05:53

## 2024-07-17 RX ADMIN — GABAPENTIN 100 MG: 250 SOLUTION ORAL at 08:43

## 2024-07-17 RX ADMIN — NYSTATIN 500000 UNITS: 100000 SUSPENSION ORAL at 11:36

## 2024-07-17 RX ADMIN — ACETAMINOPHEN 975 MG: 650 SUSPENSION ORAL at 13:14

## 2024-07-17 RX ADMIN — ACETAMINOPHEN 975 MG: 650 SUSPENSION ORAL at 05:53

## 2024-07-17 RX ADMIN — ENOXAPARIN SODIUM 30 MG: 30 INJECTION SUBCUTANEOUS at 21:10

## 2024-07-17 RX ADMIN — Medication 3 MG: at 21:10

## 2024-07-17 RX ADMIN — NYSTATIN: 100000 POWDER TOPICAL at 08:45

## 2024-07-17 RX ADMIN — GABAPENTIN 100 MG: 250 SOLUTION ORAL at 17:21

## 2024-07-17 RX ADMIN — NYSTATIN: 100000 POWDER TOPICAL at 17:22

## 2024-07-17 NOTE — PROGRESS NOTES
"   07/17/24 1230   Pain Assessment   Pain Assessment Tool 0-10   Pain Score No Pain   Comprehension   Comprehension (FIM) 4 - Understands basic info/conversation 75-90% of time   Expression   Expression (FIM) 4 - Expresses basic info/needs 75-90% of time   Social Interaction   Social Interaction (FIM) 5 - Interacts appropriately with others 90% of time   Problem Solving   Problem solving (FIM) 3 - Solves basic problmes 50-74% of time   Memory   Memory (FIM) 3 - Recognizes, recalls/performs 50-74%   Speech/Language/Cognition Assessment   Treatment Assessment In addition to pt's dysphagia session, pt engaged in cognitive session. SLP educated pt on progression of PO diet levels, providing an analogy for thins: \"which pours faster out of a bucket, water or thick mud?\". This was related to the pt's brain's signals telling his swallow muscles to squeeze in a timely manner. Discussed how thickening liquids allows for those brain messages to have some more time to communicate with the muscles as compared to when drinking thin liquids. Pt verbalizing understanding. SLP also discussed purees as easier to manage than breaking down solids and thus is a good starting point when introducing food trials. Pt verbalizing understanding. Following this, SLP assessed pt's orientation, to which he demonstrated temporal and personal orientation. He did then demonstrate inconsistency with STM skills. He did not recall that his AM dysphagia session did not go well, but he did recall tasks completed in OT session this AM as confirmed by discussing with OT-Tia. Pt also engaged in problem solving task where he was provided with a situation and then four possible \"BEST\" things to do to solve the problem/manage the situation. He completed with 6/10 accuracy, showing no benefit to verbal cues for double checking answers and for correcting as pt unable to observe his error or identify the best option. SLP providing appropriate BEST choice, " and pt nodding ot agree but suspect needs additional problem solving tasks completed with assistance in future skilled ST sessions in order to optimize his cognitive-linguistic skills, reduce safety risk, and reduce burden of care at time of discharge.    Swallow Assessment   Swallow Treatment Assessment Daily Dysphagia Tx Note      Patient Name: Luis Forrester     Today's Date: 7/17/2024        Current Risks for Dysphagia & Aspiration: Recent intubation; general debilitation; new neuro event;  brain injury; cognitive deficit; positioning issues; cervical spin injury/surgery; head support      Current Symptoms/Concerns: cough, clear throat, during and after PO, difficulty chewing, wet voicing, chronic cough      Current diet:NPO with tube feeds      Premorbid diet::regular diet and thin liquids      Positioning: pt was in bed with head raised for PO trials     Items administered:Consistencies Administered: ice chips x10        Oral stage:mild  Lip closure: functional  Anterior spillage: none  Mastication: munching type mastication given ice chips  Bolus formation: likely decreased   Bolus control: mildly decreased   Transfer: mildly delayed  Oral residue: none  Pocketing: none           Pharyngeal stage: mild to moderate  Swallow promptness: mild delay across trials  Hyolaryngeal elevation: present but reduced  Wet voice: present 2/10 trials, cleared with VC for additional swallow  Throat clear: x1  Cough: x1 last trial  Secondary swallows: continues with three-four swallows per ice chip, with the exception of one trial, able to complete with two swallows  Audible swallows: throughout all ice chip trials        Esophageal stage: x1 belch towards end of trials           Summary:     Pt presenting with mild oral and mild-moderate pharyngeal dysphagia today. Symptoms or concerns included decreased bolus propulsion, decreased mastication, decreased bolus formation, delayed oral intiation, and suspected decreased  "control of ice chips as well as demonstrating  suspected pharyngeal swallow delay, suspected decreased hyolaryngeal elevation upon palpation, multiple swallows, effortful swallow, audible swallows, intermittent wet voice, throat clearing x1, and cough x1.     Pt seated upright in bed as much as pt's comfort level allowed & enough for safe swallowing. SLP administered ice chips. SLP prior to administration of trials reminded pt and modeled for pt hard swallows. SLP also requested pt vocalize \"ahhh\" after each trial to assess for vocal wetness. Pt able to carryover and complete after each trial. Pt observed to continue with munching pattern, decreased bolus control--though improving for ice chips--and delayed transfers. At the pharyngeal stage, observed to continue with delayed swallow initiation, reduced hyolaryngeal rise upon palpation,  wet voice 2/10 trials--able to clear with verbal cue for additional swallow--, throat clear x1, cough x1 on last trial, several swallows per ice chip (though one trial completed in two swallows), and audible swallows across trials. Belch x1 observed toward end of trials. Pt is suspected to perform better when rested as observed by his AM session compared to this PM session. This later session he had breaks later morning and early afternoon, but this AM, pt did not have break before dysphagia session.    SLP provided education on progression for diet level. See cognitive portion of note for details.      Recommendations:  Diet: NPO w/ alternative means of nutrition/hydration via PEG  Meds: non-oral via PEG  Strategies: HOB to be 30 degrees at all times     Oral care w/ suction Q4.  PO trials w/ SLP supervision only  Results reviewed with:  patient, primary SLP-Nancy  Aspiration precautions posted.     F/u ST tx: Pt will continue to benefit from ongoing skilled dysphagia tx sessions to establish the potential for safest least restrictive diet vs pleasure feeding w/o increased " oropharyngeal or aspiration sxs and monitor ability to carryover swallow strategies independently.     Plan: PO trials w/ SLP supervision only            Continue to review swallow/pharyngeal strengthening exercises w/ pt and family            Repeat VFSS AFTER a fair amount of therapy sessions in hopes for maximizing pharyngeal excursion/strength ---> likely in OP repeat VFSS   SLP Therapy Minutes   SLP Time In 1230   SLP Time Out 1300   SLP Total Time (minutes) 30   SLP Mode of treatment - Individual (minutes) 30   SLP Mode of treatment - Concurrent (minutes) 0   SLP Mode of treatment - Group (minutes) 0   SLP Mode of treatment - Co-treat (minutes) 0   SLP Mode of Treatment - Total time(minutes) 30 minutes   SLP Cumulative Minutes 210   Therapy Time missed   Time missed? No

## 2024-07-17 NOTE — CASE MANAGEMENT
[facility] has received APPROVED authorization.  Insurance:   [employer]   Authorization received for: Acute Rehab  Facility: [facility]   Authorization #:[id number]  Start of Care:7/12  Next Review Date:7/19  Submit next review to: [phone]     Care Manager notified:email chain     Please reach out to  for updates on any clinical information.

## 2024-07-17 NOTE — PROGRESS NOTES
07/17/24 1000   Pain Assessment   Pain Assessment Tool 0-10   Pain Score No Pain   Restrictions/Precautions   Precautions Aspiration;Bed/chair alarms;Cognitive;Fall Risk;Spinal precautions;Supervision on toilet/commode  (NPO s/p PEG)   Eating   Type of Assistance Needed Physical assistance   Physical Assistance Level Total assistance   Comment SLP presenting PO trials otherwise, pt is NPO w/ alternative means of nutrition/hydration via PEG   Eating CARE Score 1   Swallow Assessment   Swallow Treatment Assessment   Daily Dysphagia Tx Note     Patient Name: Luis Forrester    Today's Date: 7/17/2024      Current Risks for Dysphagia & Aspiration: Recent intubation; general debilitation; new neuro event;  brain injury; cognitive deficit; positioning issues; cervical spin injury/surgery; head support      Current Symptoms/Concerns: cough, clear throat, during and after PO, difficulty chewing, wet voicing, chronic cough      Current diet:NPO with tube feeds      Premorbid diet::regular diet and thin liquids      Positioning: pt was in recliner for PO trials    Items administered:Consistencies Administered: ice chips x18      Oral stage:moderate  Lip closure: functional  Anterior spillage: none  Mastication: munching type mastication given ice chips  Bolus formation: likely decreased   Bolus control: more noted today w/ trials   Transfer: delayed  Oral residue: none  Pocketing: none         Pharyngeal stage:severe  Swallow promptness: delayed across ice chips  Hyolaryngeal elevation:  still present but decreased  Wet voice: increased today across trials  Throat clear: delayed throat clearing x4  Cough: increased w/ immediate coughs x7  Secondary swallows: noted 3-4 swallows per ice chips  Audible swallows: throughout all ice chip trials today       Esophageal stage:No overt sxs observed throughout trials        Summary:     Pt presenting with moderate oral and severe pharyngeal dysphagia today. Symptoms or concerns  "included decreased bolus propulsion, decreased mastication, decreased bolus formation, delayed oral intiation, and suspected decreased control of ice chips as well as demonstrating  suspected pharyngeal swallow delay, suspected decreased hyolaryngeal elevation upon palpation, multiple swallows, effortful swallow, audible swallows, and increased wetness, throat clearing and coughing throughout all trials today.        Today, pt was in recliner, but still noting more head flexion still not in neutral positioning. As for trials today, pt did have functional lip seal and bolus retrieval across ice chip presentation today. No anterior loss noted. Still demonstrating munching type mastication given ice chips today which lead to likely decreased bolus control/transit across ice chips today. Formulation is likely decreased across trials but does not exhibit residual w/ trials. Swallow initiation was delayed across trials to where HLE still is decreased across trials to where pt did have highly audible swallows AND multiple swallows per ice chip (3-4 swlalows per ice chip). Increased overt signs/sxs of aspiration noted across trials were observed today. Suspect some level of fatigue given trials today which lead to increased signs/sxs of aspiration today.     During session today, SLP providing education on needing to elicit HARD/effortful swallows like a \"gulp.\" Pt was noted to use this technique fairly consistently across trials, but given just difficulty in coordination of swallow and lack of airway protection which leads to increased signs/sxs of aspiration.   Of note, SLP had d/w pt about the potential for NMES to be initiated in attempts for maximizing recruitment given pharyngeal strength and movement but per current SLP's review given pt's precautions (pacemaker), there is a PRECAUTION given \"muscle re-education by application if external stimulation to the muscled necessary  for pharyngeal contraction\" per the FDA. " Per further discussion w/ pt's primary PMR MD- Dr. DePadua to where currently w/ pt's recent pacemaker placement, will defer use of NMES at this time.      Recommendations:  Diet: NPO w/ alternative means of nutrition/hydration via PEG  Meds: non-oral via PEG  Strategies: HOB to be 30 degrees at all times     Oral care w/ suction Q4.  PO trials w/ SLP supervision only  Results reviewed with:  patient, RN- MD Jocelynn- Dr. DePadua, primary OT/PT  Aspiration precautions posted.     F/u ST tx: Pt will continue to benefit from ongoing skilled dysphagia tx sessions to establish the potential for safest least restrictive diet vs pleasure feeding w/o increased oropharyngeal or aspiration sxs and monitor ability to carryover swallow strategies independently.     Plan: PO trials w/ SLP supervision only            Continue to review swallow/pharyngeal strengthening exercises w/ pt and family            Repeat VFSS AFTER a fair amount of therapy sessions in hopes for maximizing pharyngeal excursion/strength ---> likely in OP repeat VFSS     Swallow Assessment Prognosis   Prognosis Guarded   Prognosis Considerations Age;Co-morbidities;Family/community support;Medical diagnosis;Medical prognosis;Severity of impairments;New learning ability;Ability to carry over   SLP Therapy Minutes   SLP Time In 1000   SLP Time Out 1030   SLP Total Time (minutes) 30   SLP Mode of treatment - Individual (minutes) 30   SLP Mode of treatment - Concurrent (minutes) 0   SLP Mode of treatment - Group (minutes) 0   SLP Mode of treatment - Co-treat (minutes) 0   SLP Mode of Treatment - Total time(minutes) 30 minutes   SLP Cumulative Minutes 180   Therapy Time missed   Time missed? No

## 2024-07-17 NOTE — CASE MANAGEMENT
Met w/pt and reviewed team update and dc for 7/25. Pt expressed surprise but did not admit to the info being positive or negative. Cm reviewed family training scheduled for tomorrow with the ability for multiple sessions to be done if needed. Desean explained the recommendation for contd rn pt ot and speech services on dc.

## 2024-07-17 NOTE — PROGRESS NOTES
Physical Medicine and Rehabilitation Progress Note  Luis Forrester 54 y.o. male MRN: [id number]  Unit/Bed#: [facility] 972-01 Encounter: [id number]    To Review: 53-year-old male with PMH of asthma, HLD, hypothyroidism, who was having intermittent syncopal episodes over the last year who was found down by daughter after presumable other syncopal episode now complaining of short-term mid and upper back pain as well as pain limiting in hands and weakness of the upper extremities.  Patient was brought to hospital where CT head showed acute trace subarachnoid hemorrhage in the bilateral parafalcine regions.  CT of the T-spine showed DISH without acute or osseous abnormalities.  CTA head and neck did not show acute traumatic vascular injury, high-grade stenosis or dissection.  CT C spine showed no cervical fx or traumatic malalignment with mod canal and severe b/l multilevel NF stenosis.  MRI C spine shows moderate to severe canal stenosis with mild cord compression most pronounced at C3-4 and C5-6.  It also showed severe bilateral foraminal stenosis at same levels along with congenital canal stenosis with superimposed degenerative spondylosis.  Neurosurgery consulted on patient and diagnosed with parafalcine subdural hemorrhage with bilateral subarachnoid hemorrhage as well as operative intervention for the cervical stenosis with myelopathy and upper extremity weakness.  Patient underwent C3-C7 cervical laminectomy with bilateral screw placement and fusion C2 to-T1 on 6/16.  Patient recommended maps greater than 85 for 5 days and has been on 2 vasopressors.  He had his Hemovac drain removed on 6/19.  He was recommended 2 weeks of antibiotics for infectious prophylaxis per neurosurgery.  He has not required to have a cervical collar at this time.  Acute pain service consulted and have been managing with multimodal pain regiment including IV opiates.  Patient noted to have symptomatic junctional bradycardia with asymptomatic  "wide-complex tachycardia on telemetry for which EP was consulted.  They felt presentation mixture of sick sinus syndrome and wide-complex tachycardia likely SVT with aberrancy and felt reasonable to place dual-chamber pacemaker given history of multiple syncopal episodes and now documented bradycardia. Course also notable for hyponatremia and polyuria for which nephrology was consulted (and now improved).  Patient did receive 2 doses of DDAVP as well as intermittent IV fluids.\"     Course also notable for abdominal distension which was favored to be ileus as well as dysphagia and patient failing multiple VBS.  He is noted to have mild oral and moderate pharyngeal dysphagia and eventually was recommended PEG tube and to continue NPO diet.  Patient was evaluated by skilled therapies and was found to have significant decline in ADLs and ambulation and appears appropriate for admission to [facility].    Chief Complaint: NO new concerns.     Interval History/Subjective:  No acute events overnight. Asks me the same questions about the laryngoscope. He denies any new CP, SOB, fevers, chills, N/V, abdominal pain. Last BM 7/16. Continent of bowel/bladder.     ROS:  A 10 point review of systems was negative except for what is noted in the HPI.    Today's Changes:  Discussed with patient re: laryngoscope which he would not want to do until next week. I discussed with ENT - they recommend it to be done outpatient. SLP asked if they would do FEES, they state they do not do FEES, SLP does.Our therapists currently do not, would have to be done outpatient. Asked ENT to arrange outpatient f/u for patient.  Patient asked about removing his PEG, explained he had to be able to swallow and get nutrition prior to that. Explained that removing PEG would not improve his swallowing necessarily. Also tract must heal prior to removal. He expressed understanding.  I led and participated in Team Conference today. See " dispo below and separate conference note for more details. I concur with the plan of care as determined in Team Conference.    Total time spent:  50 minutes, with more than 50% spent counseling/coordinating care. Counseling includes discussion with patient re: progress in therapies, functional issues observed by therapy staff, and discussion with patient his/her current medical state/wellbeing. Coordination of patient's care was performed in conjunction with Internal Medicine service to monitor patient's labs, vitals, and management of their comorbidities. In addition, I lead the interdisciplinary team in weekly case conference today and concur with plan as per team meeting. The care of the patient was extensively discussed with all care providers and an appropriate rehabilitation plan was formulated unique for this patient. Barriers were identified preventing progression of therapy and appropriate interventions were discussed with each discipline. Please see the team note for input from all disciplines regarding barriers, intervention, and discharge planning.    Assessment/Plan:    * Cervical myelopathy (HCC)  Assessment & Plan  Recent with residual neck pain, distal RUE weakness, impaired mobility   - MRI cervical spine without 6/15/2024:Congenital canal stenosis with superimposed degenerative spondylosis .Most pronounced at C3-4 and C5-C6 with moderate to severe canal stenosis and mild cord compression. Evaluation of cord signal is limited due to artifact. No definite abnormal cord signal but mild cord edema would be difficult to exclude. Severe bilateral foraminal stenosis also seen at C3-4 and C5-C6.Mild to moderate canal stenosis at other levels  - S/P PCDF C2-T1 on 6/16 by NeuroSx Dr Lopez  - Monitor incision site   - No collar required  - Admission: Examines C3 AIS D (some impairment L c4 pinprick, but overall very good sensation), proprioception in ankles intact, and strength quite good with weakness  starting in finger flexors bilaterally.  - Activity Restrictions: No heavy lifting greater than 5 - 10lbs. No strenuous activities. No driving while requiring cervical collar, anticipated six weeks. No significant neck movement.  - May shower 3 days after surgery, but do not soak in a tub and no swimming, use mild antimicrobial soap and water. Pat incision dry after showering.  - Monitor for neuro changes, dysphagia (has significant on tube feeds), neurogenic bowel/bladder, spasticity  - Recommend acute comprehensive interdisciplinary inpatient rehabilitation to include intensive skilled therapies (PT, OT) as outlined with oversight and management by rehabilitation physician as well as inpatient rehab level nursing, case management and weekly interdisciplinary team meetings.   - Optimal pain management  - Fall precautions   - Follow-up with Spine Sx and if needed PMR after d/c       At risk for altered bowel elimination  Assessment & Plan  Course also notable for abdominal distension which was favored to be ileus   - Recent occasional loose stools now on PEG tube feeds  - Currenlty on miralax daily  - PRN suppository  - Overall continent.   - Monitor    Pain  Assessment & Plan  APAP 975mg TID   Gabapentin 100mg TID   Oxycodone 2.5mg oral soltn Q4H PRN  Monitor for oversedation, AMS/delirium, and respiratory depression   If being administered - hold opiates, muscle relaxants, benzodiazepines, and gabapentin for oversedation, AMS, or RR<12.  Counseled on and continue to encourage deep breathing/relaxation/behavioral pain management techniques      At risk for venous thromboembolism (VTE)  Assessment & Plan  SCDs, ambulation, and lovenox   Recommend discharge home with 8 weeks lovenox for DVT Ppx given possible acute SCI.  Will need lovenox training. Discussed with team.   -Thru 8/10       Dysphagia  Assessment & Plan  - Recommend ENT c/s for ongoing prolonged dysphagia   - Patient declined laryngoscope initially, he  is now open to it.   - ENT not convinced of laryngeal component of dysphagia. Recommending outpatient follow-up for that.   - Our SLP cannot do FEES, and ENT does not do FEES either. Perhaps outpatient SLP.   - Current diet: NPO - failed multiple VBS    - Mild oral and moderate pharyngeal dysphagia  - NGT feeds, nutrition c/s   - Family training for tube feeds   - SLP evaluate and treat decline in swallowing.  - Ensure adequate hydration  - Nutrition consult to assist with management   - Outpatient f/u with ENT     SDH (subdural hematoma) (HCC)  Assessment & Plan  Following syncope and being found down   CTH 6/15 - New acute trace subarachnoid hemorrhage in bilateral parafalcine regions. Recommend dedicated CTA head with contrast for further evaluation.  New acute trace parafalcine subdural hematoma.  CTH 6/18 - 1. Near completely resolved parafalcine subarachnoid hemorrhage with trace residual subarachnoid hemorrhage in the frontoparietal regions medially. No mass effect or hydrocephalus. 2. No large territorial infarction.  - Cleared for lovenox 30 SQ BID for VTE prophylaxis by prior providers     - Current/recent mood/affect/behavior/cog - acceptable, largely euthymic  - Remains at risk for increased confusion/delirium, restlessness, agitation, and fall - continue to monitor for concerns/changes  - Current psychotropics and potentially sedating medications:   Gabapentin 100mg TID   Melatonin 3mg HS   Oxy 2.5mg Q4h PRN (not using recently)   - Monitor for need for 1:1 (Notify MD of potentially dangerous behavior such as significant impulsivity and trying to stand/get out of bed/chair unattended that could lead to fall/injury); High fall precautions with frequent rounding, patient close to nursing station if possible, frequent toileting/frequent offering urinal/bedpan (only if too immobile for safe toileting);  bed/chair alarm on at all times (high setting if needed); fall junior on side of bed (at discretion of  nursing/therapy); do not leave unattended in bathroom, patient education; call bell availability; Sleep/agitation log, frequent redirection, reorientation, reassurance; Family access to patient if helpful  - No recent reports of dangerous/risky behavior requiring 1:1 at this point but monitor closely  - Recommend acute comprehensive interdisciplinary inpatient rehabilitation to include intensive skilled therapies (PT, OT, ST) with oversight and management by rehabilitation physician.  Inpatient rehab level nursing, case management and weekly interdisciplinary team meetings. Provide patient and (if available) caregiver/family teaching regarding brain injury/residual disability management.    - For routine restlessness, anxiety, irritability focus on non-pharmacologic management    - Obtain MOCA and if needed ACLS during ARC course   - Please assess iADLs - ability to manage medications, meal prep, finances, obtaining appropriate transport from community, managing emergencies, and overall safety in home environment.  - Provide therapy, compensatory strategies, and if available and necessary provide caregiver training.  Notify MD of impairments or inability to perform iADLs adequately.   - Monitor neuro-exam, wakefulness, mood, cognition, insight into deficits and safety awareness   - Monitor and ensure optimal management electrolytes, nutrition, and hydration  - Monitor for signs or symptoms of infection, medication intolerances, other systemic etiologies  - Additional labs, imaging, specialist follow-up as needed   - Patient/family/caregiver education and training   - Overstimulation precautions, frequent re-orientation, re-direction, re-assurance  - Optimal mood, pain, and sleep management  - Sleep log and agitation monitoring    - Limit sedating medications when possible  - Follow-up with neurosx after discharge if needed and if needed during ARC course as well as PCP      s/p Medtronic dual chamber PPM  6/21/2024  Assessment & Plan  Noted to have symptomatic junctional bradycardia with asymptomatic wide-complex tachycardia on telemetry for which EP was consulted.  They felt presentation mixture of sick sinus syndrome and wide-complex tachycardia likely SVT with aberrancy and felt reasonable to place dual-chamber pacemaker given history of multiple syncopal episodes and now documented bradycardia.  2/2 bradycardia and recurrent syncopal episodes  Pacer precautions for 6 weeks.   OP EP follow-up     Thrush  Assessment & Plan  Nystatin pain and suction QID x7 d  Oral care   Monitor     Syncope and collapse  Assessment & Plan  Believed to be related to arrhythmias/bradycardia s/p PPM placement   Monitor vitals  OP cards/PCP     Hypothyroidism  Assessment & Plan  Levothyroxine     Leukocytosis-resolved as of 7/15/2024  Assessment & Plan  Up to 13.6 7/11  Internal medicine consult and management during ARC course  Patient afebrile, other vitals unremarkable > continue to monitor   No clear signs or symptoms of infection or VTE but will continue to monitor  Monitor CBC intermittently             Health Maintenance  #Bladder: Voiding and continent. Checking PVR x3. If all <150cc can discontinue.   #Skin/Pressure Injury Prevention: Turn Q2hr in bed, with weight shifts [id number] in wheelchair.  #GI Prophylaxis: Not indicated  #Code Status: Full Code  #FEN: See Dysphagia above  #Dispo: Team 7/17: ADD 7/25 with goal to discharge home with Home PT/OT/SLP/RN. Supervision from his girlfriend who will undergo family training. Lives in a home with 1+1 step with FFSU available. Was previously completely independent  OUTpatient f/u: Neurosurgery, PCP, ENT    Objective:    Functional Update:  PT: CGA transfers, Sup bed mobility, CGA ambulation 150' with RW, CGA stairs   OT: total A eating, Albina grooming, maxA bathing, CGA UB dressing, modA toileting, modA tub/shower transfers, Albina toilet transfers   SLP: RAY 16/30 - with severe  neurocog disorder. NPO given oropharyngeal dysphagia. Dep eating, modA comprehension, Albina expression, Sup social interaction, mod-maxA executive function, mod-maxA memory     Allergies per EMR    Physical Exam:  Temp:  [97.8 °F (36.6 °C)-98.2 °F (36.8 °C)] 98 °F (36.7 °C)  HR:  [72-89] 72  Resp:  [16-19] 17  BP: ()/(58-72) 102/72  Oxygen Therapy  SpO2: 97 %    Gen: No acute distress, Well-nourished, well-appearing.  HEENT: Moist mucus membranes, Normocephalic/Atraumatic  Cardiovascular: Regular rate, rhythm, S1/S2. Distal pulses palpable  Heme/Extr: No edema  Pulmonary: Non-labored breathing. Lungs CTAB  : No poon  GI: Soft, non-tender, non-distended. BS+  Integumentary: Skin is warm, dry.  Neuro: AAOx3, Speech is intact. Appropriate to questioning.  Psych: Normal mood and affect.     Diagnostic Studies: Reviewed, no new imaging    Laboratory:  Reviewed   Results from last 7 days   Lab Units 07/15/24  0615 07/11/24  0442   HEMOGLOBIN g/dL 12.1 12.6   HEMATOCRIT % 36.9 37.4   WBC Thousand/uL 6.03 13.65*     Results from last 7 days   Lab Units 07/15/24  0615 07/11/24  0442   BUN mg/dL 9 13   POTASSIUM mmol/L 3.9 4.1   CHLORIDE mmol/L 98 93*   CREATININE mg/dL 0.52* 0.67            Patient Active Problem List   Diagnosis    Allergic rhinitis    Arterial ectasia (HCC)    Chronic low back pain    Hyperlipidemia    Hypothyroidism    Lumbar radiculopathy    Mass of parotid gland    Osteoarthritis of both hands    Reactive airway disease    Vitamin D deficiency    Encounter for immunization    TBI (traumatic brain injury) ([facility])    Syncope and collapse    Bilateral hand pain    SDH (subdural hematoma) ([facility])    Right hand weakness    SSS (sick sinus syndrome) ([facility])    s/p Medtronic dual chamber PPM 6/21/2024    Dysphagia    Cervical stenosis of spinal canal    Cervical myelopathy ([facility])    At risk for venous thromboembolism (VTE)    Pain    Thrush    At risk for altered bowel elimination         Medications  Current  Facility-Administered Medications   Medication Dose Route Frequency Provider Last Rate    acetaminophen  975 mg Per PEG Tube Q8H Kim Li PA-C      albuterol  2 puff Inhalation Q4H PRN Kim Li PA-C      benzocaine  2 spray Mucosal 4x Daily PRN Kim Li PA-C      bisacodyl  10 mg Rectal Daily PRN Luke Suggs MD      dextromethorphan-guaiFENesin  10 mL Per PEG Tube Q4H PRN Kim Li PA-C      enoxaparin  30 mg Subcutaneous Q12H ASHLYN Kim Li PA-C      gabapentin  100 mg Per PEG Tube TID Kim Li PA-C      levothyroxine  125 mcg Per PEG Tube Early Morning Kim Li PA-C      melatonin  3 mg Per PEG Tube HS Kim Li PA-C      nystatin  500,000 Units Swish & Spit 4x Daily Luke Suggs MD      nystatin   Topical BID Kim Li PA-C      ondansetron  4 mg Per PEG Tube Q6H PRN Kim Li PA-C      oxyCODONE  2.5 mg Oral Q4H PRN Luke Suggs MD      polyethylene glycol  17 g Oral Daily PRN Luke Suggs MD      saliva substitute  5 spray Mouth/Throat 4x Daily PRN Kim Li PA-C            ** Please Note: Fluency Direct voice to text software may have been used in the creation of this document. **

## 2024-07-17 NOTE — PROGRESS NOTES
07/17/24 1400   Pain Assessment   Pain Assessment Tool 0-10   Pain Score No Pain   Restrictions/Precautions   Precautions Aspiration;Bed/chair alarms;Cognitive;Fall Risk;Spinal precautions;Supervision on toilet/commode  (NPO with PEG tube)   Weight Bearing Restrictions No   ROM Restrictions Yes  (spinal precautions and pacemaker precautions)   Braces or Orthoses   (abd binder)   Cognition   Overall Cognitive Status Impaired   Arousal/Participation Alert;Cooperative   Subjective   Subjective Pt ready for PT tx with no new complaints to report. Looking forward to d/c home next week.   Lying to Sitting on Side of Bed   Type of Assistance Needed Supervision   Comment with HOB elevated 30*, heavy reliance on R bedrail but able to perform with increased time   Lying to Sitting on Side of Bed CARE Score 4   Sit to Stand   Type of Assistance Needed Incidental touching   Comment w/o RW CGA with 1 LOB posteriorly when trying to stand, with RW CS; pt still needs cueing for proper hand placement due to pacer precautions; cont with poor control during descent needing vc's for safety as well as to reach back with R hand to control descent   Sit to Stand CARE Score 4   Bed-Chair Transfer   Type of Assistance Needed Incidental touching   Comment w/o AD CGA   Chair/Bed-to-Chair Transfer CARE Score 4   Car Transfer   Comment (S)  reassess next session   Walk 10 Feet   Type of Assistance Needed Incidental touching   Walk 10 Feet CARE Score 4   Walk 50 Feet with Two Turns   Type of Assistance Needed Incidental touching   Walk 50 Feet with Two Turns CARE Score 4   Walk 150 Feet   Type of Assistance Needed Incidental touching   Walk 150 Feet CARE Score 4   Walking 10 Feet on Uneven Surfaces   Comment (S)  reassess next session   Ambulation   Primary Mode of Locomotion Prior to Admission Walk   Distance Walked (feet) 220 ft  (x1, 190'x1, 150'x1, 100'x1 RW; 150'x3 no AD and short distances for HIGT/NPP)   Assist Device Roller Walker  "  Gait Pattern Inconsistant Martha;Slow Martha;Decreased foot clearance;Narrow YARIEL;Shuffle;Improper weight shift   Limitations Noted In Balance;Endurance;Heel Strike;Safety;Speed;Strength;Swing   Provided Assistance with: Balance;Direction   Walk Assist Level Contact Guard;Close Supervision   Findings CG w/o use of AD short distances, not care scoring longer distances as performed for HIGT with b/l HHA; with RW CG/CS for safety; pt cont to report he will use RW upon d/c home   Does the patient walk? 2. Yes   Wheel 50 Feet with Two Turns   Reason if not Attempted Activity not applicable   Wheel 50 Feet with Two Turns CARE Score 9   Wheel 150 Feet   Reason if not Attempted Activity not applicable   Wheel 150 Feet CARE Score 9   Curb or Single Stair   Style negotiated Curb   Type of Assistance Needed Incidental touching   Comment CG 8\" curb step with RW   1 Step (Curb) CARE Score 4   12 Steps   Reason if not Attempted Activity not applicable   12 Steps CARE Score 9   Stairs   Type Curb   # of Steps 1   Weight Bearing Precautions Fall Risk   Assist Devices Roller Walker   Findings cont to require VC's not to move feet when moving RW up/down curb step for safety   Therapeutic Interventions   Flexibility seated b/l HS/gastroc stretch 5' total   Neuromuscular Re-Education HIGT with b/l HHA Albina/CGAx2 150'x3 with 3# AW LLE to increase stance time; b/l  step thrus with single UE support, then to b/l UE support due to fatigue for 2nd set, 3# LLE, 2 sets to fatigue (approx 12-15 reps each side), cues for L foot clearance during descent on step down. Amb 70' with ball toss and catch, min-modAx1 for balance.   Equipment Use   NuStep lvl 3, 10.5 mins, LEs only for conditioning; avg spm 79, 0.46 miles   Other Comments   Comments /90, HR 87, SPO2 97%   Assessment   Treatment Assessment Session focusing on HIGT/NPP interventions to improve gait dynamics and safety with mobilities, gait and transfer trng with RW, and " curb step navigation. Pt cont to demo improvements in mobilities with use of RW which he states he will utilize at home, however, still requires vc's during STS transfers as pt often with very poor control when sitting into chair and unable to recall pacer precautions as pt frequently tries to push up from chair with LUE. Continued to focus on HIGT and NPP interventions to improve pt's balance, righting reactions, gait quality including L stance & step length with frequent therapeutic rest breaks needed between these interventions due to fatigue. At this time planning for FT tomorrow with pt's love Tao who pt will d/c home with, plan to review pt's current mobility status with s.o. D/c date set for 7/25 and DME to be submitted for RW. Cont POC at this time to address barriers to d/c home to reduce fall risk and caregiver burden.   Family/Caregiver Present no   Problem List Decreased strength;Decreased range of motion;Decreased endurance;Impaired balance;Decreased mobility;Decreased safety awareness;Orthopedic restrictions   Barriers to Discharge Inaccessible home environment   PT Barriers   Functional Limitation Transfers;Walking;Stair negotiation   Plan   Treatment/Interventions LE strengthening/ROM;Functional transfer training;Elevations;Therapeutic exercise;Endurance training;Bed mobility;Gait training   Progress Progressing toward goals   Discharge Recommendation   Rehab Resource Intensity Level, PT   (home PT)   Equipment Recommended Walker   PT Therapy Minutes   PT Time In 1400   PT Time Out 1530   PT Total Time (minutes) 90   PT Mode of treatment - Individual (minutes) 90   PT Mode of treatment - Concurrent (minutes) 0   PT Mode of treatment - Group (minutes) 0   PT Mode of treatment - Co-treat (minutes) 0   PT Mode of Treatment - Total time(minutes) 90 minutes   PT Cumulative Minutes 510   Therapy Time missed   Time missed? No

## 2024-07-17 NOTE — PCC CARE MANAGEMENT
Pt has made good progress and dc is scheduled for Thursday. Hhc services in place through [facility] for rn pt ot and speech. Suction machine and tube feed supplies ordered via [facility]. All items to be delivered to pts home or sig other prior to dc. Pts sig ramses Tao has been present for therapy training and nursing education re: tube feeds. Pt will require assist w/transport home.

## 2024-07-17 NOTE — ASSESSMENT & PLAN NOTE
S/p PEG placement.  SLP continues to follow and appreciate their input.   Pt tolerating tube feeds so far and continues to work towards improving swallowing  Nutrition following.

## 2024-07-17 NOTE — PLAN OF CARE
Problem: PAIN - ADULT  Goal: Verbalizes/displays adequate comfort level or baseline comfort level  Description: Interventions:  - Encourage patient to monitor pain and request assistance  - Assess pain using appropriate pain scale  - Administer analgesics based on type and severity of pain and evaluate response  - Implement non-pharmacological measures as appropriate and evaluate response  - Consider cultural and social influences on pain and pain management  - Notify physician/advanced practitioner if interventions unsuccessful or patient reports new pain  Outcome: Progressing     Problem: INFECTION - ADULT  Goal: Absence or prevention of progression during hospitalization  Description: INTERVENTIONS:  - Assess and monitor for signs and symptoms of infection  - Monitor lab/diagnostic results  - Monitor all insertion sites, i.e. indwelling lines, tubes, and drains  - Monitor endotracheal if appropriate and nasal secretions for changes in amount and color  - [place] appropriate cooling/warming therapies per order  - Administer medications as ordered  - Instruct and encourage patient and family to use good hand hygiene technique  - Identify and instruct in appropriate isolation precautions for identified infection/condition  Outcome: Progressing     Problem: SAFETY ADULT  Goal: Patient will remain free of falls  Description: INTERVENTIONS:  - Educate patient/family on patient safety including physical limitations  - Instruct patient to call for assistance with activity   - Consult OT/PT to assist with strengthening/mobility   - Keep Call bell within reach  - Keep bed low and locked with side rails adjusted as appropriate  - Keep care items and personal belongings within reach  - Initiate and maintain comfort rounds  - Make Fall Risk Sign visible to staff  - Offer Toileting every 2 Hours, in advance of need  - Initiate/Maintain bed/chair alarm  - Obtain necessary fall risk management equipment: alarms  - Apply  yellow socks and bracelet for high fall risk patients  - Consider moving patient to room near nurses station  Outcome: Progressing  Goal: Maintain or return to baseline ADL function  Description: INTERVENTIONS:  -  Assess patient's ability to carry out ADLs; assess patient's baseline for ADL function and identify physical deficits which impact ability to perform ADLs (bathing, care of mouth/teeth, toileting, grooming, dressing, etc.)  - Assess/evaluate cause of self-care deficits   - Assess range of motion  - Assess patient's mobility; develop plan if impaired  - Assess patient's need for assistive devices and provide as appropriate  - Encourage maximum independence but intervene and supervise when necessary  - Involve family in performance of ADLs  - Assess for home care needs following discharge   - Consider OT consult to assist with ADL evaluation and planning for discharge  - Provide patient education as appropriate  Outcome: Progressing  Goal: Maintains/Returns to pre admission functional level  Description: INTERVENTIONS:  - Perform AM-PAC 6 Click Basic Mobility/ Daily Activity assessment daily.  - Set and communicate daily mobility goal to care team and patient/family/caregiver.   - Collaborate with rehabilitation services on mobility goals if consulted  - Perform Range of Motion 3 times a day.  - Reposition patient every 2 hours.  - Dangle patient 3 times a day  - Stand patient 3 times a day  - Ambulate patient 3 times a day  - Out of bed to chair 3 times a day   - Out of bed for meals 3 times a day  - Out of bed for toileting  - Record patient progress and toleration of activity level   Outcome: Progressing     Problem: DISCHARGE PLANNING  Goal: Discharge to home or other facility with appropriate resources  Description: INTERVENTIONS:  - Identify barriers to discharge w/patient and caregiver  - Arrange for needed discharge resources and transportation as appropriate  - Identify discharge learning needs  (meds, wound care, etc.)  - Arrange for interpretive services to assist at discharge as needed  - Refer to Case Management Department for coordinating discharge planning if the patient needs post-hospital services based on physician/advanced practitioner order or complex needs related to functional status, cognitive ability, or social support system  Outcome: Progressing     Problem: Nutrition/Hydration-ADULT  Goal: Nutrient/Hydration intake appropriate for improving, restoring or maintaining nutritional needs  Description: Monitor and assess patient's nutrition/hydration status for malnutrition. Collaborate with interdisciplinary team and initiate plan and interventions as ordered.  Monitor patient's weight and dietary intake as ordered or per policy. Utilize nutrition screening tool and intervene as necessary. Determine patient's food preferences and provide high-protein, high-caloric foods as appropriate.     INTERVENTIONS:  - Monitor oral intake, urinary output, labs, and treatment plans  - Assess nutrition and hydration status and recommend course of action  - Evaluate amount of meals eaten  - Assist patient with eating if necessary   - Allow adequate time for meals  - Recommend/ encourage appropriate diets, oral nutritional supplements, and vitamin/mineral supplements  - Order, calculate, and assess calorie counts as needed  - Recommend, monitor, and adjust tube feedings and TPN/PPN based on assessed needs  - Assess need for intravenous fluids  - Provide specific nutrition/hydration education as appropriate  - Include patient/family/caregiver in decisions related to nutrition  Outcome: Progressing

## 2024-07-17 NOTE — PROGRESS NOTES
"OT daily treatment note     07/17/24 8286   Pain Assessment   Pain Assessment Tool 0-10   Pain Score No Pain   Restrictions/Precautions   Precautions Aspiration;Bed/chair alarms;Cognitive;Fall Risk;Pacemaker;Spinal precautions;Supervision on toilet/commode   Braces or Orthoses Other (Comment)  (ABD binder)   Lifestyle   Autonomy \"I feel so good after that shower\"   Oral Hygiene   Type of Assistance Needed Supervision   Physical Assistance Level No physical assistance   Comment seated in wc and able to complete w/ VC for instruction. Pt continues to use suction toothbrush due to NPO   Oral Hygiene CARE Score 4   Grooming   Able To Shave   Limitation Noted In Problem Solving  (VC for initiation and instruction)   Findings pt seated in wc at sink and able to comb hair w/ min A due to ROM limitations. Pt completed shaving face using electric razor and min A and VC for instruction   Shower/Bathe Self   Type of Assistance Needed Supervision   Physical Assistance Level No physical assistance   Comment CS for shower and able to complete seated/ in stance using GB. Pt educated and provided w/ long-handled sponge to clean LB while seated and maintain cervical spinal precautions. VC req for safety and instruction due to dec STM. Pt able to tolerate standing for > 5 min to complete peribathing and wash off w/o LOB or SOB. Pt will be moving in w/ gf following DC and will use a tub shower. shower chair printout was provided for pt in previous session and OT encouraged pt to stand for shower and use chair when washing LB due to precautions.   Shower/Bathe Self CARE Score 4   Upper Body Dressing   Type of Assistance Needed Physical assistance   Physical Assistance Level 25% or less   Comment pt seated in wc req A for donning ABD binder and hospital gown. Pt would benefit from simulation of donning a shirt overhead to practice safe technique to maintain precautions to ensure safe dressing at home.   Upper Body Dressing CARE Score 3 "   Lower Body Dressing   Type of Assistance Needed Physical assistance   Physical Assistance Level 25% or less   Comment Pt educated on use of reacher and dressing stick to safely complete LB dressing. Pt able to thread underwear w/ G use of reacher. pt able to doff pants in stance w/ VC to complete doffing seated to avoid falling. Pt attempted to don pants in stance but OT suggested sitting and using reacher to safely dress. Pt able to don pants seated w/ G carryover and pull up over hips while in stance.   Lower Body Dressing CARE Score 3   Putting On/Taking Off Footwear   Type of Assistance Needed Physical assistance   Physical Assistance Level 25% or less   Comment pt unable to use crossed legs tech for footwear at this time. Pt educated on use fo dressing stick while seated to doff socks. Pt educated on use of sock aid for donning socks and pt able to demo G carryover.   Putting On/Taking Off Footwear CARE Score 3   Sit to Stand   Type of Assistance Needed Supervision   Physical Assistance Level No physical assistance   Comment CS for STS using RW   Sit to Stand CARE Score 4   Bed-Chair Transfer   Type of Assistance Needed Incidental touching   Physical Assistance Level No physical assistance   Comment CG/CA for wc<>bed/recliner using RW\\   Chair/Bed-to-Chair Transfer CARE Score 4   Toileting Hygiene   Type of Assistance Needed Supervision   Physical Assistance Level No physical assistance   Comment CS in stance for cm and hygiene   Toileting Hygiene CARE Score 4   Toilet Transfer   Type of Assistance Needed Supervision   Physical Assistance Level No physical assistance   Comment CS using RW and GB   Toilet Transfer CARE Score 4   Cognition   Overall Cognitive Status Impaired   Arousal/Participation Alert;Cooperative   Attention Attends with cues to redirect   Orientation Level Oriented X4   Memory Decreased short term memory;Decreased recall of recent events;Decreased recall of precautions   Following Commands  Follows one step commands with increased time or repetition   Comments G carryover w/ instruction in short term and may need to ask to recall in future session especially w/ precautions, LHAE, and safety. Pt completed August Cognitive Assessment (MoCA) version 8.1. Pt scored overall 14 / 30  indicating a moderate cognitive deficit. This score indicates pt may have difficulty with completing higher level IADLs including medication mgmt and meal prep. Pt reported his S.O and dtr can assist with meal prep but pt was completing med mgmt IND at baseline. As of now, medications will be provided via PEG tube but still needs to have cognitive ability to know dosing, frequency and purpose of the medications he takes. Pt would benefit from IADL retraining in  upcoming sessions. Please see below for details of MoCA assessment.   Activity Tolerance   Activity Tolerance Patient tolerated treatment well   Assessment   Treatment Assessment Pt enaged in skilled OT session with a focus on ADL retraining, LHAE training, endurance, cog assessment, activity tolerance, and energy conservation tech. Pt is limited by imp balance, cog deficits, dec activity tolerance, imp functional transfers, and dec ind w/ ADL/IADLs. Pt was educated on LHAE during session and would benefit from continued education and practice for future use at home to help maintain safety precautions. Pt demo some perseveration and req VC for safety awareness. Pt demo cog deficits and MoCA was completed to indicate pt demo mod cog impairment. pt will be moving in permanently w/ gf and daughter and family training will be completed tmrw to ensure safe DC. IADL retraining may be helpful in upcoming sessions despite partial participation PTA. Pt benefits form continued OT services to address above barriers.   Prognosis Good   Problem List Decreased strength;Decreased range of motion;Decreased endurance;Impaired balance;Decreased mobility;Decreased safety  awareness;Orthopedic restrictions   Barriers to Discharge Inaccessible home environment   Plan   Treatment/Interventions ADL retraining;Functional transfer training;Therapeutic exercise;Endurance training;Patient/family training;Equipment eval/education;Compensatory technique education   Progress Progressing toward goals   OT Therapy Minutes   OT Time In 0830   OT Time Out 1000   OT Total Time (minutes) 90   OT Mode of treatment - Individual (minutes) 90   OT Mode of treatment - Concurrent (minutes) 0   OT Mode of treatment - Group (minutes) 0   OT Mode of treatment - Co-treat (minutes) 0   OT Mode of Treatment - Total time(minutes) 90 minutes   OT Cumulative Minutes 300   Therapy Time missed   Time missed? No     Visuospatial/executive: 1 /5   Namin/3   Memory:  (worth no points) 4/5 (1st trial), 4/5 (2nd trial)  Attention:  3/ 6   Language: 1 / 3   Abstraction: 1 / 2   Delayed recall: 0 / 5               Memory Index Score (MIS): 5/15  Orientation: 6 / 6      +1 point given for less than or equal to 12 years of education? No      Total score 14/30, indicating a moderate cognitive deficit.      MoCA certified rater ID: [id number]     Tia Ibarra, MS OTR/L, CSRS

## 2024-07-17 NOTE — TEAM CONFERENCE
Acute RehabilitationTeam Conference Note  Date: 7/17/2024   Time: 11:28 AM       Patient Name:  Luis Forrester       Medical Record Number: [id number]   YOB: 1970  Sex: Male          Room/Bed:  [facility]-01  Payor Info:  Payor: [employer] / Plan: [employer] / Product Type: Medicaid HMO /      Admitting Diagnosis: Cervical neuropathy [G54.2]  Subdural hematoma (HCC) [S06.5XAA]  Subarachnoid hematoma (HCC) [S06.6XAA]   Admit Date/Time:  7/12/2024  1:14 PM  Admission Comments: No comment available     Primary Diagnosis:  Cervical myelopathy (HCC)  Principal Problem: Cervical myelopathy (HCC)    Patient Active Problem List    Diagnosis Date Noted    Cervical myelopathy (HCC) 07/12/2024    At risk for venous thromboembolism (VTE) 07/12/2024    Pain 07/12/2024    Thrush 07/12/2024    At risk for altered bowel elimination 07/12/2024    Cervical stenosis of spinal canal 07/03/2024    Dysphagia 06/29/2024    SSS (sick sinus syndrome) ([facility]) 06/21/2024    s/p Medtronic dual chamber PPM 6/21/2024 06/21/2024    TBI (traumatic brain injury) ([facility]) 06/15/2024    Syncope and collapse 06/15/2024    Bilateral hand pain 06/15/2024    SDH (subdural hematoma) ([facility]) 06/15/2024    Right hand weakness 06/15/2024    Encounter for immunization 05/29/2024    Vitamin D deficiency 02/23/2024    Reactive airway disease 03/15/2018    Osteoarthritis of both hands 11/18/2015    Arterial ectasia (HCC) 06/18/2015    Chronic low back pain 06/18/2015    Mass of parotid gland 06/18/2015    Lumbar radiculopathy 01/29/2015    Hyperlipidemia 09/11/2014    Hypothyroidism 09/11/2014    Allergic rhinitis 08/07/2012       Physical Therapy:    Weight Bearing Status: Full Weight Bearing  Transfers: Incidental Touching  Bed Mobility: Supervision  Amulation Distance (ft): 150 feet  Ambulation: Incidental Touching  Assistive Device for Ambulation: Roller Walker  Number of Stairs: 1  Assistive Device for Stairs:  Walker  Stair Assistance: Incidental Touching  Ramp: Minimal Assistance  Assistive Device for Ramp: Roller Walker  Discharge Recommendations: Home with:  DC Home with:: Family Support, First Floor Setup, Home Physical Therapy    Date of admission:7/12/24  ELOS:7 to 10 days  Goals: RUSSELL with LRAD  Date:7/16/24  Barriers to d/c home at this time include inaccessible home environment, steps inside of home, requires external assistance for all mobilities, positive fall history, and increased fall risk due to impairments. Pt requires external assistance at this time due to the following impairments: decreased strength, imbalance, coordination deficits, decreased endurance, fatigue, cognitive deficits, impulsiveness, spinal precautions, and delayed righting reactions. POC has been focusing on gait training, transfer training, stair training, NPP interventions, neuromuscular re-education, improving pt's activity tolerance and endurance, LE strengthening, balance interventions, and assessment for appropriate AD in order to address barriers to d/c. Over the past week pt has made good progress towards LTGs. Over the next week plan to cont with HIGT/NPP interventions to improve pt's functional mobility status as well as pts balance and righting reactions, cont to progress towards Russell with use of RW as pt currently prefers RW at this point in time, cont with stair trng so that pt can safely access all areas of home, and cont with repetitive trng regarding current precautions to reduce risk for injury. Anticipating discharge home mid next week and with recommendations for home PT . DME needs include RW. Plan for FT on 7/17 with pt's love Tao to review pt's current mobility status.           Occupational Therapy:  Eating: Total Assistance  Grooming: Minimal Assistance  Bathing: Maximum Assistance  Bathing: Maximum Assistance  Upper Body Dressing: Incidental Touching  Toileting: Moderate Assistance  Tub/Shower Transfer:  Moderate Assistance  Toilet Transfer: Minimal Assistance  Cognition: Exceptions to WNL  Cognition: Decreased Memory, Decreased Safety  Orientation: Person, Place, Time, Situation  Discharge Recommendations:  (pending progress)       7/16/24  ADL: TA eating, CGA UB dressing, maxA bathing, modA BL dressing  TRANSFER: Albina with RW  D/C PLAN: reteam at this time    Pt barriers include cognition, pacemaker prec, cervical spine prec, strength, activity tolerance, impacting participation in ADL/IADL's.  In order to address fx deficits, skilled OT services have worked on self-care, therapeutic exercise, therapeutic activity, modalities PRN in order to inc fx performance and independence.     Plan for next week: Cont to focus on inc indep with ADLs and overall activity tolerance to inc fx performance and dec CG burden. Will schedule FT when appropriate..    Therapist has discussed POC with pt and areas of focus with pt verbalizing understanding.     Barriers Intervention   cognition SLP, educ, repetition, educ   Pacemaker prec educ   Cervical spine prec Educ, LHAE educ if needed   Activity tolerance Endurance training           Speech Therapy:  Mode of Communication: Verbal  Cognition: Exceptions to WNL  Cognition: Decreased Memory, Decreased Executive Functions, Decreased Attention, Decreased Comprehension, Decreased Safety  Orientation: Person, Place, Time  Swallowing: Exceptions to WNL  Swallowing: Oral Dysphagia, Pharyngeal Dysphagia, Aspiration Risk  Diet Recommendations: NPO, Alternative means of nutrition  Discharge Recommendations:  (pending pt progress)  Pt currently being followed for both dysphagia as well as cognitive tx sessions.  At this time, pt is making slower progress this week.     Upon admission to the [facility], pt completed the SLUMS cognitive assessment. Pt total score was 16/30 which as compared to those with high school education correlates with moderate to severe neurocognitive disorder. Cognitive  barriers which present include: decreased attention, ST memory recall , problem solving, reasoning, sequencing, organization of thoughts, judgement, slower processing, and insight, which still impacts pt's overall safety, functional cognitive communication skills as well as functional mobility. The following interventions are used to target these barriers, including verbal problem solving task, visual memory recall tasks, drawing conclusions activities, categorization tasks , picture problem solving activities, verbal reasoning tasks, functional reading tasks , and family education/training.     As for swallow function, pt currently demonstrates mild-moderate oral and severe pharyngeal dysphagia. Symptoms or concerns included decreased mastication, decreased bolus formation, delayed oral intiation, and suspected decreased control of ice chip in addition to  suspected pharyngeal swallow delay, suspected decreased hyolaryngeal elevation upon palpation, multiple swallows, effortful swallow, audible swallows, and latency in overt signs/sxs of aspiration. Dysphagia barriers which present include the following risks for aspiration such as: poor positioning, decreased cognition, ineffective mastication, delay in oral transit, delay in swallow initiation, pharyngeal weakness upon swallowing, multiple swallows due to pharyngeal weakness, and high risk of aspiration give overall severity of pharyngeal stage as per prior VFSS completed in acute care . In order to address the noted barriers, skilled SLP services will address this by targeting the following interventions: swallow exercise, initiate NMES, proper positioning, diet modification, safe swallow strategies, family education/training, and likely need to completed repeat VFSS after increased dysphagia tx sessions .    Current diet is NPO w/ receiving alternative means of nutrition/hydration via PEG.  PO trials to be completed under SLP supervision only.     Current level  of functioning:   Eating: dependent  Comprehension: mod A  Expression: min A   Social Interaction: supervision  Executive functions: mod-max A  Memory: mod-max A    Family training/education has been initiated with pt's dtrTrinity but is also planned to be completed w/ pt's s/o, Aislinn on 7/17/2024. Recommendations at time of discharge are likely for increased assistance/supervision given I ADL tasks. At this time, pt will continue to benefit from skilled cognitive linguistic tx and dysphagia tx  session to maximize overall functional independence given overall cognitive linguistic skills and swallow abilities  in attempts to decreased caregiver burden over time.       Nursing Notes:  Appetite: Fair  Diet Type: NPO  Tube Feeding Frequency: Bolus  Tube Feeding: Jevity 1.5  Tube Feeding Strength: Full strength  Tube Feeding Bag Changed: No  Tube Feeding Residual Checked: Yes  Tube Feeding Flushes (mL): 150 mL                                                      Pain Score: 0                                  53 y.o. male, with hypothyroidism, prior episodes of syncope, and hypoparathyroidism, who presented 6/15/24 after a syncopal event. He reported Rt sided weakness as well. Imaging revealed parafalcine SDH with bilat SAH. MRI of the C-spine revealed moderate to severe canal stenosis with mild cord compression. He underwent C3-C7 laminectomy with C2-T1 fusion 6/16/24. He had another syncopal event 6/19/24 when NS attempted to remove the hemovac drain. He was seen by EP due to multiple syncopal events and documented bradycardia into the 40s. He had a PPM placed. He was also seen by Nephrology due to hyponatremia and polyuria. He was started on 1.8% saline. TSH was abnormal and levothyroxine was started as well. He complained of difficulty swallowing and was seen by speech. VBS revealed aspiration and he had a Keofed placed. VBS was repeated 7/3/24 and 7/8/24 but pt continued to aspirate. He initially refused PEG  tube placement and met with Palliative Care. He was later agreeable to PEG placement and had the procedure done 7/10/24. He was started on trickle feeds and the progressed to bolus tube feeds 6x daily. He was determined to be stable for admission to the [facility] 7/12/24         This week we will monitor labs, VS and routine skin checks including the PEG tube site as per ordered. We will educate pt/family about repositioning to prevent skin breakdown. We will assist with repositioning and perform skin checks. We will monitor wounds for healing and s/s of infection We monitor for adequate pain control. We will monitor for constipation and medicate pt as ordered. We increase safety awareness and keep pt free from and as well encourage independence with ADLs. Pt reports adequate sleep    Case Management:     Discharge Planning  Living Arrangements: Lives w/ Spouse/significant other, Lives w/ Children  Support Systems: Self, Spouse/significant other, Daughter  Assistance Needed: TBD  Type of Current Residence: Private residence  Current Home Care Services: No  Pt admitted s/p cervical myelopathy with peg tube insertion. Pt resides with his 19 yr old daughter but is expected to dc to his sig other's home for additional support. Pt reports his sig other is able and willing to learn peg tube process. Pt has been made aware of potential los with the need for [facility] services on dc. Awaiting determination from insurance as pt recently became active with managed medicaid plan. Following to assist w/dc recommendations.     Is the patient actively participating in therapies? yes  List any modifications to the treatment plan:     Barriers Interventions   Dysphagia, tube feeds, high aspiration risk Family education, speech , ice chip trials    lovenox Pt and family education   stairs Therapy stair training   Pacemaker precautions x6 , cervical precautions Compensatory strategies, adl training to maintain precautions, LHAE use   Insight,  judgement recall problem solving Safety education techniques, repetitive training education on deficits     Is the patient making expected progress toward goals? yes  List any update or changes to goals:     Medical Goals: Patient will be medically stable for discharge to [facility] restrictive envrionment upon completion of rehab program and Patient will be able to manage medical conditions and comorbid conditions with medications and follow up upon completion of rehab program    Weekly Team Goals:   Rehab Team Goals  ADL Team Goal: Patient will be independent with ADLs with least restrictive device upon completion of rehab program  Transfer Team Goal: Patient will be independent with transfers with least restrictive device upon completion of rehab program  Locomotion Team Goal: Patient will be independent with locomotion with least restrictive device upon completion of rehab program  Cognitive Team Goal: Patient will be independent for basic  tasks and require supervision for complex tasks upon completion of rehab program    Discussion: pt presents with the above barriers and pts swallowing and cognition is biggest barriers. Pt is a high aspiration risk and is ice chip trials only at this time. Pt has cog deficits scoring a 14/30 on the moca. Family training is scheduled for tomorrow with sig other Aislinn re: current deficits and tube feed training. The team is utilizing the above identified interventions to progress pt to goals of independent to supervision with a 10-12 day los. Pt is currently contact guard to supervision with transfers and mobility.with use of a roller wlaker.  Overall adls are min a as of this morning. Pt is min to mod for overall cog function.    Anticipated Discharge Date:  7/25/24  [facility] Team Members Present:The following team members are supervising care for this patient and were present during this Weekly Team Conference.    Physician: Dr. DePadua, MD  : Sonya Minor  MSW  Registered Nurse: Jocelynn Zavala, LUCRECIA  Physical Therapist: LINA MartínezT  Occupational Therapist: Tia Ibarra MS, OTR/L  Speech Therapist: Daiana Wolfe MA, CCC-SLP

## 2024-07-17 NOTE — PCC NURSING
53 y.o. male, with hypothyroidism, prior episodes of syncope, and hypoparathyroidism, who presented 6/15/24 after a syncopal event. He reported Rt sided weakness as well. Imaging revealed parafalcine SDH with bilat SAH. MRI of the C-spine revealed moderate to severe canal stenosis with mild cord compression. He underwent C3-C7 laminectomy with C2-T1 fusion 6/16/24. He had another syncopal event 6/19/24 when NS attempted to remove the hemovac drain. He was seen by EP due to multiple syncopal events and documented bradycardia into the 40s. He had a PPM placed. He was also seen by Nephrology due to hyponatremia and polyuria. He was started on 1.8% saline. TSH was abnormal and levothyroxine was started as well. He complained of difficulty swallowing and was seen by speech. VBS revealed aspiration and he had a Keofed placed. VBS was repeated 7/3/24 and 7/8/24 but pt continued to aspirate. He initially refused PEG tube placement and met with Palliative Care. He was later agreeable to PEG placement and had the procedure done 7/10/24. He was started on trickle feeds and the progressed to bolus tube feeds 6x daily. He was determined to be stable for admission to the [facility] 7/12/24 7/24-Pt tolerating tube feeds with Jevity boluses 6x/day and no oral diet Pts girlfriend and daughter to learn how to administer the tube feed boluses Continue with abdominal binder to prevent extraction of tubing NS follow-up around 8/2/24 for 6 week post-op appt Pain control w prn tylenol and oxycodone. C/O cough- robitussin prn.Pt is continent of bowel and bladder.  Pt requires alarms for safety      This week we will monitor labs, VS and routine skin checks including the PEG tube site as per ordered. We will educate pt/family about repositioning to prevent skin breakdown. We will assist with repositioning and perform skin checks. We will monitor wounds for healing and s/s of infection We monitor for adequate pain control. We will monitor for  constipation and medicate pt as ordered. We increase safety awareness and keep pt free from and as well encourage independence with ADLs. Pt reports adequate sleep

## 2024-07-17 NOTE — CASE MANAGEMENT
Case Management Discharge Planning Note    Patient name Luis Forrester  Location /[facility] 972-01 MRN [id number]  : 1970 Date 2024       Current Admission Date: 2024  Current Admission Diagnosis:Cervical myelopathy (HCC)   Patient Active Problem List    Diagnosis Date Noted Date Diagnosed    Cervical myelopathy (HCC) 2024     At risk for venous thromboembolism (VTE) 2024     Pain 2024     Thrush 2024     At risk for altered bowel elimination 2024     Cervical stenosis of spinal canal 2024     Dysphagia 2024     SSS (sick sinus syndrome) ([facility]) 2024     s/p Medtronic dual chamber PPM 2024     TBI (traumatic brain injury) ([facility]) 06/15/2024     Syncope and collapse 06/15/2024     Bilateral hand pain 06/15/2024     SDH (subdural hematoma) ([facility]) 06/15/2024     Right hand weakness 06/15/2024     Encounter for immunization 2024     Vitamin D deficiency 2024     Reactive airway disease 03/15/2018     Osteoarthritis of both hands 2015     Arterial ectasia ([facility]) 2015     Chronic low back pain 2015     Mass of parotid gland 2015     Lumbar radiculopathy 2015     Hyperlipidemia 2014     Hypothyroidism 2014     Allergic rhinitis 2012       LOS (days): 5  Geometric Mean LOS (GMLOS) (days):   Days to GMLOS:     OBJECTIVE:  Risk of Unplanned Readmission Score: 15.28         Current admission status: Inpatient Rehab   Preferred Pharmacy:   [id number] - [place], [place] - [address]  [place] [address]  Phone: [phone] Fax: [phone]    Primary Care Provider: Joceline Shook PA-C    Primary Insurance: [employer]  Secondary Insurance:     DISCHARGE DETAILS:                                                                                                               Facility Insurance Auth Number: [id number]

## 2024-07-17 NOTE — CASE MANAGEMENT
[facility] has received APPROVED authorization.  Insurance: [employer]   Authorization received for: Acute Rehab  Facility: [facility]   Authorization #:[id number]  Start of Care:7/12  Next Review Date:7/19  Submit next review to: [phone]     Received the above mssg through epic chat from Abimbola MONAE in admissions. Info added to payor source.

## 2024-07-17 NOTE — PLAN OF CARE
Problem: Nutrition/Hydration-ADULT  Goal: Nutrient/Hydration intake appropriate for improving, restoring or maintaining nutritional needs  Description: Monitor and assess patient's nutrition/hydration status for malnutrition. Collaborate with interdisciplinary team and initiate plan and interventions as ordered.  Monitor patient's weight and dietary intake as ordered or per policy. Utilize nutrition screening tool and intervene as necessary. Determine patient's food preferences and provide high-protein, high-caloric foods as appropriate.     INTERVENTIONS:  - Monitor oral intake, urinary output, labs, and treatment plans  - Assess nutrition and hydration status and recommend course of action  - Evaluate amount of meals eaten  - Assist patient with eating if necessary   - Allow adequate time for meals  - Recommend/ encourage appropriate diets, oral nutritional supplements, and vitamin/mineral supplements  - Order, calculate, and assess calorie counts as needed  - Recommend, monitor, and adjust tube feedings and TPN/PPN based on assessed needs  - Assess need for intravenous fluids  - Provide specific nutrition/hydration education as appropriate  - Include patient/family/caregiver in decisions related to nutrition  Outcome: Progressing     Problem: SAFETY ADULT  Goal: Patient will remain free of falls  Description: INTERVENTIONS:  - Educate patient/family on patient safety including physical limitations  - Instruct patient to call for assistance with activity   - Consult OT/PT to assist with strengthening/mobility   - Keep Call bell within reach  - Keep bed low and locked with side rails adjusted as appropriate  - Keep care items and personal belongings within reach  - Initiate and maintain comfort rounds  - Make Fall Risk Sign visible to staff  - Offer Toileting every 2 Hours, in advance of need  - Initiate/Maintain bed/chair alarm  - Obtain necessary fall risk management equipment: non skid footwear  - Apply yellow  socks and bracelet for high fall risk patients  - Consider moving patient to room near nurses station  Outcome: Progressing

## 2024-07-17 NOTE — ASSESSMENT & PLAN NOTE
S/p C3-7 laminectomy with C2-T1 fusion due to central cord syndrome following a fall  Completed post-op abx.  No collar necessary.  Monitor incision  NS follow-up around 8/2/24 for 6 week post-op appt.  Therapy and pain control per PMR  DC 7/25

## 2024-07-18 PROCEDURE — 97530 THERAPEUTIC ACTIVITIES: CPT

## 2024-07-18 PROCEDURE — 99232 SBSQ HOSP IP/OBS MODERATE 35: CPT | Performed by: PHYSICAL MEDICINE & REHABILITATION

## 2024-07-18 PROCEDURE — 97116 GAIT TRAINING THERAPY: CPT

## 2024-07-18 PROCEDURE — 92526 ORAL FUNCTION THERAPY: CPT

## 2024-07-18 RX ORDER — GABAPENTIN 250 MG/5ML
100 SOLUTION ORAL
Status: DISCONTINUED | OUTPATIENT
Start: 2024-07-18 | End: 2024-07-22

## 2024-07-18 RX ADMIN — NYSTATIN 1 APPLICATION: 100000 POWDER TOPICAL at 17:23

## 2024-07-18 RX ADMIN — Medication 125 MCG: at 06:48

## 2024-07-18 RX ADMIN — NYSTATIN 500000 UNITS: 100000 SUSPENSION ORAL at 08:48

## 2024-07-18 RX ADMIN — NYSTATIN 1 APPLICATION: 100000 POWDER TOPICAL at 08:47

## 2024-07-18 RX ADMIN — NYSTATIN 500000 UNITS: 100000 SUSPENSION ORAL at 17:22

## 2024-07-18 RX ADMIN — ENOXAPARIN SODIUM 30 MG: 30 INJECTION SUBCUTANEOUS at 08:48

## 2024-07-18 RX ADMIN — Medication 3 MG: at 21:14

## 2024-07-18 RX ADMIN — ACETAMINOPHEN 975 MG: 650 SUSPENSION ORAL at 21:14

## 2024-07-18 RX ADMIN — ACETAMINOPHEN 975 MG: 650 SUSPENSION ORAL at 13:11

## 2024-07-18 RX ADMIN — ACETAMINOPHEN 975 MG: 650 SUSPENSION ORAL at 06:48

## 2024-07-18 RX ADMIN — NYSTATIN 500000 UNITS: 100000 SUSPENSION ORAL at 11:08

## 2024-07-18 RX ADMIN — ENOXAPARIN SODIUM 30 MG: 30 INJECTION SUBCUTANEOUS at 21:14

## 2024-07-18 RX ADMIN — GABAPENTIN 100 MG: 250 SOLUTION ORAL at 08:48

## 2024-07-18 RX ADMIN — GABAPENTIN 100 MG: 250 SOLUTION ORAL at 21:14

## 2024-07-18 NOTE — PROGRESS NOTES
ST Updates:   Recommendations for discharge: supervision/assistance in completion of I ADL's  Continued Services: home ST  Discharge Date:7/25  Family Education/Training: initiated on 7/18; follow up training needed: yes--> not scheduled yet  Continued Treatment Plan: Dysphagia: pharyngeal exercises and ice chip trials; Cognition: functional problem solving, reasoning, sequencing; ST recall strategies; basic money management       07/18/24 1030   Pain Assessment   Pain Assessment Tool 0-10   Restrictions/Precautions   Precautions Aspiration;Bed/chair alarms;Cognitive;Fall Risk;Pain;Pacemaker;Spinal precautions;Supervision on toilet/commode  (NPO s/p PEG)   Comprehension   Comprehension (FIM) 4 - Understands basic info/conversation 75-90% of time   Expression   Expression (FIM) 4 - Expresses basic info/needs 75-90% of time   Social Interaction   Social Interaction (FIM) 5 - Interacts appropriately with others 90% of time   Problem Solving   Problem solving (FIM) 3 - Solves basic problmes 50-74% of time   Memory   Memory (FIM) 2 - Recognizes, recalls/performs 25-49%   Speech/Language/Cognition Assessmetn   Treatment Assessment Family Education Session:    Session today was focused on family education more so than training w/ pt's s/o Aislinn. SLP introducing self to s/o and providing education to the role of services w/ pt while on the unit. As session was focused on swallow tx, SLP began to provide education in regard to pt's current swallow function. Reviewed the prior VFSS studies at length w/ pt again but more for s/o in regard to how severe his swallow currently is and how at risk for aspiration of ANY food/liquid pt is at risk for at this time. Pt was asking if s/o could provide ice chips at home, to where SLP providing education to both that this is NOT  currently recommended given pt's high risk for aspiration pneumonia. Both did verbalize understanding given this. Recommend for PEG tube feeds as primary  nutrition upon discharge, including medications via PEG. Increased education provided to both in regard to only ice chip trials as pt currently has thrush on tongue which increases bacteria which could be aspirated with actual food trials at this time. D/w both about increased oral care to be completed to ensure decreasing overall bacteria in mouth. SLP to demonstrate oral care in f/u sessions w/ S/o.     SLP educating s/o on risk factors for aspiration: ie-- wetness in voicing, increased cough/throat clearing, changes in breathing rate, beginning to feel sick such as spiking temps, general malaise, etc. Restated to both that pt can aspirate saliva, which can cause these things as well. SLP stating to s/o that if increased wetness in pt's voicing is observed, encouraging throat clearing or coughing. S/o was able to carry this over at end of session. Of note, s/o did ask about repeat VFSS, to where SLP did state that it would occur AFTER discharge from the [facility]. This is a procedure which can be completed as an OP. Recommendations are for continued HOME ST at discharge who will continue to target pharyngeal strengthening exercises. Focus of those exercises currently over time will allow for the hopes of best outcome over time. Pt and s/o verbalizing understanding. Of note, pt stated that he will show s/o the swallow exercises later today (as these are part of his HEP).     Lastly d/w pt and s/o is about pt's current cognitive functioning. SLP reviewed w/ both about completion of cognitive assessment in how currently pt is demonstrating moderate deficits overall, but ST recall is most impacted. Also educated on how functional problem solving, reasoning, insight and judgement are also impacted which can impact safety, mobility and risk for aspiration. Pt's s/o did confirm noticing a decline in memory primarily. Upon asking about pt's completion of I ADL's prior to admission, spouse did confirm that he was completing  medications, finances, scheduling, cooking etc. Current recommendations by SLP are for s/o and/or dtr to complete I ADL's upon discharge, which s/o verbalized agreement w/ these recommendations. SLP did educate that these tasks will be targeted in ongoing sessions and more final recommendations to follow. However, it is likely needed that she will need to complete these tasks at d/c. No further questions were presented by pt nor s/o at this time.    Eating   Type of Assistance Needed Physical assistance   Physical Assistance Level Total assistance   Comment SLP providing PO trials   Eating CARE Score 1   Swallow Assessment   Swallow Treatment Assessment   Daily Dysphagia Tx Note     Patient Name: Luis Forrester    Today's Date: 7/18/2024      Current Risks for Dysphagia & Aspiration: Recent intubation; general debilitation; new neuro event;  brain injury; cognitive deficit; positioning issues; cervical spin injury/surgery; head support      Current Symptoms/Concerns: cough, clear throat, during and after PO, difficulty chewing, wet voicing, chronic cough      Current diet:NPO with tube feeds      Premorbid diet::regular diet and thin liquids     Positioning: Pt was repositioned In bed which then was placed in chair mode for session    Items administered:Consistencies Administered: ice chips x18      Oral stage:mild-moderate  Lip closure: functional  Anterior spillage: none  Mastication: continues w/ munching type   Bolus formation: likely decreased over time  Bolus control: decreased  Transfer: delayed  Oral residue: none  Pocketing: none         Pharyngeal stage:moderate  Swallow promptness: elicited initial swallows but later secondary swallows were more delayed as trials progressed  Hyolaryngeal elevation: still present but overall excursion still significantly decreased  Wet voice: towards end of ice chip trials  Throat clear: on last 2 ice chips  Cough: on last sip  Secondary swallows: 3-4 swallows per ice  "chip  Audible swallows: increased from ice chips 10-18       Esophageal stage:No overt sxs observed w/ trials today        Summary:     Pt presenting with mild-moderate oral and moderate pharyngeal dysphagia today. Symptoms or concerns included decreased bolus propulsion, decreased mastication, decreased bolus formation, delayed oral intiation, and suspected decreased control of ice chips as well as demonstrating suspected pharyngeal swallow delay, suspected decreased hyolaryngeal elevation upon palpation, multiple swallows, effortful swallow, audible swallows, and increased wetness and throat clearing given the later ice chip trials today.       For today, SLP did review w/ pt about \"hard gulps\" when eliciting his swallows given ice chip trials today, which pt was able to elicit w/ fair consistency given swallows, but noted that he was more so verbalizing \"gulp\" which didn't coordinate w/ swallows at times. However, SLP noted more improved tolerance of ice chip trials today to where overt signs/sxs of aspiration were only noted on last 2 ice chip trials. The overt sxs which were noted included wetness in voicing, delayed throat clearing and prompted cough response. Pt still demonstrates munching type mastication given ice chips still, likely w/ decreased bolus control/transit. Swallow initiation still is delayed but can elicit prompter swallows at times. HLE still remains fairly decreased when elicited  upon palpation in addition to increased multiple swallows (3-4 swallows per ice chip today) in addition to exhibiting more audible swallows in later ice chip trials.     SLP completed oral care with suction prior to ice chips. Since this was the only trial presented, no need for oral care post trials. If pt was to have other textures not water or ice, oral care post trials recommended. Refer to above in regard to family education session which occurred today for reasoning in only trialing ice chips an not other PO " textures..      Recommendations:  iet: NPO w/ alternative means of nutrition/hydration via PEG  Meds: non-oral via PEG  Strategies: HOB to be 30 degrees at all times     Oral care w/ suction Q4.  PO trials w/ SLP supervision only  Results reviewed with:  patient, pt's s/o- LUCRECIA Pinto- Angie  Aspiration precautions posted.     F/u ST tx: Pt will continue to benefit from ongoing skilled dysphagia tx sessions to establish the potential for safest least restrictive diet vs pleasure feeding w/o increased oropharyngeal or aspiration sxs and monitor ability to carryover swallow strategies independently.     Plan: PO trials w/ SLP supervision only            Continue to review swallow/pharyngeal strengthening exercises w/ pt and family            Repeat VFSS AFTER a fair amount of therapy sessions in hopes for maximizing pharyngeal excursion/strength ---> likely in OP repeat VFSS   Swallow Assessment Prognosis   Prognosis Guarded   Prognosis Considerations Age;Co-morbidities;Family/community support;Medical prognosis;Medical diagnosis;New learning ability;Ability to carry over;Severity of impairments   SLP Therapy Minutes   SLP Time In 1030   SLP Time Out 1100   SLP Total Time (minutes) 30   SLP Mode of treatment - Individual (minutes) 30   SLP Mode of treatment - Concurrent (minutes) 0   SLP Mode of treatment - Group (minutes) 0   SLP Mode of treatment - Co-treat (minutes) 0   SLP Mode of Treatment - Total time(minutes) 30 minutes   SLP Cumulative Minutes 240   Therapy Time missed   Time missed? No

## 2024-07-18 NOTE — CASE MANAGEMENT
Met w/pt and sig other and provided the requested [employer] application for persons with disability as pt does not meet the age requirement for senior transport. Cm had also printed out the instructions and eligibility info.

## 2024-07-18 NOTE — OCCUPATIONAL THERAPY NOTE
"Pt engaged in memory game activity due to reports of imp memory. Pt initially began w/ 2 pairs and upgraded to 6 pairs by end of game w/ 1 pair being introduced per round. Pt did not demo difficulty w/ task and able to \"find\" all pairs within a reasonable time frame. Pt and Pt's SO, Aislinn, report pt has had issues w/ memory during hospital stay and PTA. Activities that focus on improving memory will facilitate improvements w/ recalling safety precautions and using LHAE for safety w/ ADLs.    Pt engaged in standing activity using card matching activity. Pt instructed to complete card matching game while standing and able to take rest breaks as needed. Pt did not need a rest break and able to tolerate standing for 7 min w/o SOB/LOB. Pt made a few errors w/ card matching and cued to fix b/c unable to spontaneously identify. This activity addresses endurance, standing tolerance, functional reach, and dynamic standing balance which promote ind w/ functional tasks and mobility.    During session, pt was asked to recall pacemaker precautions. Pt was using BUE to push up from bed/chair during STS and prompted review of precautions. During first attempt, pt reported not knowing there was any precautions associated w/ pacemaker. OT reviewed precautions and pt able to recall w/ VC for A. OT informed pt that she would review again later in session. When prompted a second time, pt unable to recall precautions even w/ VC for A. OT reviewed again and offered to provide detailed list of pacemaker precautions and pt agree. OT placed list on RW to help pt recall safety especially when walking. OT asked pt to recall pacemaker precautions throughout sessions with only a few min btw occasions, and pt continues to demo mod difficulty w/ recall. Pt asked to recall spinal precautions and unable to spontaneously recall w/ some confusion. OT reviewed spinal and pacemaker precautions again and reminded why this is significant. Pt " acknowledged and will continue consistent review in upcoming sessions.

## 2024-07-18 NOTE — PROGRESS NOTES
"   07/18/24 1500   Pain Assessment   Pain Assessment Tool 0-10   Pain Score No Pain   Restrictions/Precautions   Precautions Aspiration;Bed/chair alarms;Cognitive;Fall Risk;Pacemaker;Pain;Spinal precautions;Supervision on toilet/commode  (NPO; tube feeds only)   Weight Bearing Restrictions No   ROM Restrictions Yes  (sternal and pacemaker precautions)   Braces or Orthoses   (abd binder)   Cognition   Overall Cognitive Status Impaired   Arousal/Participation Alert;Cooperative   Comments sign placed on pt's RW by therapist to remind pt of pacer/sternal precautions. Pt able to perform all transfers abiding by precautions this session w/o PT vc's   Subjective   Subjective Pt agreeable to PT session   Sit to Lying   Type of Assistance Needed Supervision   Sit to Lying CARE Score 4   Lying to Sitting on Side of Bed   Type of Assistance Needed Supervision   Lying to Sitting on Side of Bed CARE Score 4   Sit to Stand   Type of Assistance Needed Supervision   Comment RW   Sit to Stand CARE Score 4   Bed-Chair Transfer   Type of Assistance Needed Supervision   Comment RW   Chair/Bed-to-Chair Transfer CARE Score 4   Walk 10 Feet   Type of Assistance Needed Supervision   Comment RW   Walk 10 Feet CARE Score 4   Walk 50 Feet with Two Turns   Type of Assistance Needed Physical assistance   Physical Assistance Level 25% or less   Comment primarily CS RW but due to foot catch required minAx1 one one occassion   Walk 50 Feet with Two Turns CARE Score 3   Walk 150 Feet   Type of Assistance Needed Physical assistance   Physical Assistance Level 25% or less   Comment primarily CS RW but due to foot catch required minAx1 one one occassion   Walk 150 Feet CARE Score 3   Ambulation   Primary Mode of Locomotion Prior to Admission Walk   Distance Walked (feet) 220 ft  (X1, 90'X1, 150'X1, 15'X1)   Assist Device Roller Walker   Findings pt with 1 cm LLD as LLE shorter than RLE. Trialed 3/8\" shoe lift in LLE this session and also trialed pt " "shoes for first time. Pt with slight improvements in gait pattern with shoe lift however pt reports he feels no difference and at this time feels as though shoe lift is not helpful therefore removed from pt's shoe for remainder of ambulation trials. Of note with shoes donned pt did have 1 LOB due to foot catch requiring minAx1 from therapist to recover. Please cont to trial ambulation with shoes donned as pt required increased cues for foot clearance with shoes donned vs in slipper socks   Does the patient walk? 2. Yes   Toilet Transfer   Type of Assistance Needed Supervision   Comment RW   Toilet Transfer CARE Score 4   Therapeutic Interventions   Other Pt assessed for leg length discrepency start of session. Noted LLE 1 cm shorter than RLE when pt measured. Did trial heel lift during session but pt reports he does not feel it improves gait therefore removed shoe lift   Assessment   Treatment Assessment Assessed pt for leg length discrepency start of session and focused on trial of heel lift as well as ambulation trials with shoes donned as primarily pt has been ambulating in slipper socks while on [facility]. Pt with 1 cm LLD as LLE shorter than RLE but feels as though heel lift not beneficial therefore removed from shoe at this time. Would recommend continuing ambulation trials with shoes as pt with foot catch while shoes donned and pt reports \"I need to get used to walking in these again.\" At this time cont POC as outlined in previous session note with addition of gait trng in pt's personal shoes to reduce fall risk.   PT Therapy Minutes   PT Time In 1500   PT Time Out 1530   PT Total Time (minutes) 30   PT Mode of treatment - Individual (minutes) 30   PT Mode of treatment - Concurrent (minutes) 0   PT Mode of treatment - Group (minutes) 0   PT Mode of treatment - Co-treat (minutes) 0   PT Mode of Treatment - Total time(minutes) 30 minutes   PT Cumulative Minutes 600   Therapy Time missed   Time missed? No       "

## 2024-07-18 NOTE — PROGRESS NOTES
[facility]  Progress Note  Name: Luis Forrester I  MRN: [id number]  Unit/Bed#: -01 I Date of Admission: 7/12/2024   Date of Service: 7/18/2024 I Hospital Day: 6    Assessment & Plan   * Cervical myelopathy (HCC)  Assessment & Plan  S/p C3-7 laminectomy with C2-T1 fusion due to central cord syndrome following a fall  Completed post-op abx.  No collar necessary.  Monitor incision  NS follow-up around 8/2/24 for 6 week post-op appt.  Therapy and pain control per PMR  DC 7/25    Dysphagia  Assessment & Plan  S/p PEG placement.  SLP continues to follow and appreciate their input.   Pt tolerating tube feeds so far and continues to work towards improving swallowing  Nutrition following.  Outpt follow-up for laryngoscope and FEES.    s/p Medtronic dual chamber PPM 6/21/2024  Assessment & Plan  Pt has a 6 month history of syncopal events.  Found to be bradycardic in the 40s.  Outpt follow-up with EP    SDH (subdural hematoma) (HCC)  Assessment & Plan  Not on antiplatelet or AC as an outpt.  Repeat head CT for any change in mental status.  This occurred after a syncopal fall in June 2024.     Syncope and collapse  Assessment & Plan  No events since PPM placement.    Hypothyroidism  Assessment & Plan  Continue levothyroxine.  Due for repeat TSH around  8/2/24.             The above assessment and plan was reviewed and updated as determined by my evaluation of the patient on 7/18/2024.    Labs:   Results from last 7 days   Lab Units 07/15/24  0615   WBC Thousand/uL 6.03   HEMOGLOBIN g/dL 12.1   HEMATOCRIT % 36.9   PLATELETS Thousands/uL 208     Results from last 7 days   Lab Units 07/15/24  0615   SODIUM mmol/L 136   POTASSIUM mmol/L 3.9   CHLORIDE mmol/L 98   CO2 mmol/L 28   BUN mg/dL 9   CREATININE mg/dL 0.52*   CALCIUM mg/dL 7.6*                   Imaging  No orders to display       Review of Scheduled Meds:  Current Facility-Administered Medications   Medication Dose Route  Frequency Provider Last Rate    acetaminophen  975 mg Per PEG Tube Q8H Kim Li PA-C      albuterol  2 puff Inhalation Q4H PRN Kim Li PA-C      benzocaine  2 spray Mucosal 4x Daily PRN Kim Li PA-C      bisacodyl  10 mg Rectal Daily PRN Luke Suggs MD      dextromethorphan-guaiFENesin  10 mL Per PEG Tube Q4H PRN Kim Li PA-C      enoxaparin  30 mg Subcutaneous Q12H ASHLYN Kim Li PA-C      gabapentin  100 mg Per PEG Tube TID Kim Li PA-C      levothyroxine  125 mcg Per PEG Tube Early Morning Kim Li PA-C      melatonin  3 mg Per PEG Tube HS Kim Li PA-C      nystatin  500,000 Units Swish & Spit 4x Daily Luke Suggs MD      nystatin   Topical BID Kim Li PA-C      ondansetron  4 mg Per PEG Tube Q6H PRN Kim Li PA-C      oxyCODONE  2.5 mg Oral Q4H PRN Luke Suggs MD      polyethylene glycol  17 g Oral Daily PRN Luke Suggs MD      saliva substitute  5 spray Mouth/Throat 4x Daily PRN Kim Li PA-C         Subjective/ HPI: Patient seen and examined. Patients overnight issues or events were reviewed with nursing staff. New or overnight issues include the following:     Pt seen in his room. His significant other is present and states family training is going well. He denies any other complaints.    ROS:   A 10 point ROS was performed; negative except as noted above.        *Labs /Radiology studies Reviewed  *Medications  reviewed and reconciled as needed  *Please refer to order section for additional ordered labs studies      Physical Examination:  Vitals:   Vitals:    07/17/24 0545 07/17/24 1351 07/17/24 2027 07/18/24 0509   BP: 102/72 98/65 98/63 106/63   BP Location: Right arm Right arm Right arm Right arm   Pulse: 72 84 78 70   Resp: 17 16 18 18   Temp: 98 °F (36.7 °C) 98 °F (36.7 °C) 97.9 °F (36.6 °C) 98.3 °F (36.8 °C)   TempSrc: Oral Oral Oral  Oral   SpO2: 97% 96% 96% 96%   Weight: 69.3 kg (152 lb 12.5 oz)      Height:           General Appearance: NAD; pleasant  HEENT: PERRLA, conjuctiva normal; mucous membranes moist; face symmetrical  Neck:  Supple  Lungs: clear bilaterally, normal respiratory effort, no retractions, expiratory effort normal, on room air  CV: regular rate and rhythm, no murmurs rubs or gallops noted   ABD: soft non tender, +BS x4, PEG intact  EXT: DP pulses intact, no lymphadenopathy, no edema  Skin: normal turgor, normal texture, no rash  Psych: affect normal, mood normal  Neuro: AAOx3       The above physical exam was reviewed and updated as determined by my evaluation of the patient on 7/18/2024.    Invasive Devices       Drain  Duration             Gastrostomy/Enterostomy Percutaneous Endoscopic Gastrostomy (PEG) 20 Fr. LUQ 7 days                       VTE Pharmacologic Prophylaxis: Enoxaparin  Code Status: Level 1 - Full Code  Current Length of Stay: 6 day(s)    Total floor / unit time spent today  35 minutes   Coordination of patient's care was performed in conjunction with consulting services. Time invested included review of patient's labs, vitals, and management of their comorbidities with continued monitoring, examination of patient as well as answering patient questions, documenting her findings and creating progress note in electronic medical record,  ordering appropriate diagnostic testing.       ** Please Note:  voice to text software may have been used in the creation of this document. Although proof errors in transcription or interpretation are a potential of such software**

## 2024-07-18 NOTE — PROGRESS NOTES
"   07/18/24 0910   Pain Assessment   Pain Assessment Tool 0-10   Pain Score No Pain   Restrictions/Precautions   Precautions Aspiration;Bed/chair alarms;Cognitive;Fall Risk;Spinal precautions;Supervision on toilet/commode  (NPO with PEG tube)   Weight Bearing Restrictions No   ROM Restrictions Yes  (spinal precautions and pacemaker precautions)   Braces or Orthoses   (abd binder)   Cognition   Overall Cognitive Status Impaired   Arousal/Participation Alert;Cooperative   Comments pt cont with decreased recall regarding pacemaker precautions as well as proper STS technique, often states \"well this is all new for me.\" Cont with repetitive trng to improve carryover and safety   Subjective   Subjective Pt's s.oMacy Tao present for FT session   Sit to Lying   Type of Assistance Needed Supervision   Comment HOB elevated 30* per order; did discuss with pt and s.o. about hospital bed for home however there is no room for a hospital bed. Discussed use of wedges/pillows to abide by current order for 30* which spouse and pt agreeable to   Sit to Lying CARE Score 4   Lying to Sitting on Side of Bed   Type of Assistance Needed Supervision   Comment cont to practice with pt during session as pt unable to recall log rolling technique discussed in previous sessions. Once pt performed log rolling technique able to exit R side of bed with CS. S.o. reports she had to help pt PTA with bed mobility as pt was unable to perform w/o trunk A   Lying to Sitting on Side of Bed CARE Score 4   Sit to Stand   Type of Assistance Needed Supervision   Comment CS RW and no AD; requires cueing for hand placement approx 50% of time along with cues to control descent to chair for safety   Sit to Stand CARE Score 4   Bed-Chair Transfer   Type of Assistance Needed Supervision   Comment CS RW vs no AD   Chair/Bed-to-Chair Transfer CARE Score 4   Car Transfer   Type of Assistance Needed Supervision;Verbal cues   Comment CS with vc's for proper technique; " "s.o reports pt had difficulty getting into SUV lyfts PTA, educated that PT would recommend pt sit in front seat of SUV as it tends to be lower than back seat   Car Transfer CARE Score 4   Walk 10 Feet   Type of Assistance Needed Supervision   Comment CS w/ and w/o RW   Walk 10 Feet CARE Score 4   Walk 50 Feet with Two Turns   Type of Assistance Needed Supervision   Comment CS w/ and w/o RW   Walk 50 Feet with Two Turns CARE Score 4   Walk 150 Feet   Type of Assistance Needed Supervision   Comment CS RW   Walk 150 Feet CARE Score 4   Walking 10 Feet on Uneven Surfaces   Type of Assistance Needed Supervision   Comment CS with RW over floor mat   Walking 10 Feet on Uneven Surfaces CARE Score 4   Ambulation   Primary Mode of Locomotion Prior to Admission Walk   Distance Walked (feet) 220 ft  (x1, 190'x1, 100'x2 RW; 75'x1 no AD)   Assist Device Roller Walker   Findings s.o. reports at baseline pt walks with antalgic gait; had s.o. observe pt ambulate w/o and w/o RW; she reports that pt is near baseline of gait dynamics for ambulation both w/ and w/o RW; educated pt and s.o that at this time recommend use of RW to improve stability and safety with mobilities and to reduce fall risk. Discussed removing throw rugs/cords/tripping hazards around home which spouse will do prior to d/c. Also discussed that PT to order RW for pt for d/c home, s.o. would also like BS which PT alerted OT Tia and added to DME sheet   Does the patient walk? 2. Yes   Wheel 50 Feet with Two Turns   Reason if not Attempted Activity not applicable   Wheel 50 Feet with Two Turns CARE Score 9   Wheel 150 Feet   Reason if not Attempted Activity not applicable   Wheel 150 Feet CARE Score 9   Curb or Single Stair   Style negotiated Curb   Type of Assistance Needed Incidental touching   Comment CG/CS on 8\" curb step performed 3 times this session as pt continues to move LEs while lifting RW in air to transition on/off step despite vc's to keep feet " planted. Reviewed at length this places pt at increased fall risk if pt performs in this manner, s.o. Aislinn made aware to cue pt if needed to safely complete step upon d/c   1 Step (Curb) CARE Score 4   12 Steps   Reason if not Attempted Activity not applicable   12 Steps CARE Score 9   Stairs   Type Curb   # of Steps 1  (x3)   Weight Bearing Precautions Fall Risk   Picking Up Object   Type of Assistance Needed Supervision;Adaptive equipment   Comment with reacher which pt reports he has at home and RW to retrieve marker from floor   Picking Up Object CARE Score 4   Assessment   Treatment Assessment 60 min session focusing on FT with so Aislinn present to observe pt perform functional mobilities. Educated Aislinn on current POC and working towards progressing pt towards Susan with mobilities, at this time recommending RW for pt safety which DME form placed during session for RW and BSC. Aislinn confirms she has shower chair at home, however, unsure on how to put together, PT recommends asking home OT when they come to evaluate pt. Educated s.o. and pt on ongoing HHA services upon d/c. S.o. asking about transport to/from appts, PT spoke with JUAN Hassan who will provide pt and s.o with info regarding [facility]. S.o. reports pt's ambulation both w/ and w/o RW approaching near baseline. Did discuss that at this time pt requires ongoing vc's for hand placement with transfers to abide by pacemaker precautions which were reviewed during session along with spinal precautions. Discussed ordering hospital bed from home as current order is for pt to maintain >30* HOB elevated when in bed, pt and s.o. state there is no room for hospital bed, will use pillows upon d/c home. Pt would benefit from ongoing trials of getting OOB on R side as pt unable to recall log rolling technique and requires increased time to perform. S.o. reports at baseline pt unable to get trunk OOB w/o assist from s.o., pt would benefit from ongoing core  "strengthening TE to improve ability to complete this w/o S.O assist. At this time FT completed with no further questions from s.o. Pt cont to function below his baseline, would benefit from ongoing skilled PT intervention with focus on gait trng w/ and w/o RW, 8\" curb step management so pt can access all areas of home, repetitive transfer trng to improve safety with completion, bed mobility trng, core strengthening, and HIGT/NPP interventions to address balance deficits. PT to assess for leg length discrepency in PM session as pt was told he has one years ago, no shoe lift provided. Did discuss with pt and s.o. about purchasing sneakers for pt for d/c as pt only has slip on shoes or high lace up work boots to wear at this time.   Family/Caregiver Present love Tao   Problem List Decreased strength;Decreased range of motion;Decreased endurance;Impaired balance;Decreased mobility;Decreased safety awareness;Orthopedic restrictions   Barriers to Discharge Inaccessible home environment;Decreased caregiver support   PT Barriers   Functional Limitation Transfers;Walking;Stair negotiation   Plan   Treatment/Interventions Functional transfer training;LE strengthening/ROM;Elevations;Therapeutic exercise;Endurance training;Bed mobility;Gait training   Progress Progressing toward goals   Discharge Recommendation   Rehab Resource Intensity Level, PT   (home PT)   Equipment Recommended Walker   Walker Package Recommended Wheeled walker   Change/add to Walker Package? No   PT Equipment ordered RYAN, [facility] [place]   Date ordered 07/18/24   PT Therapy Minutes   PT Time In 0930   PT Time Out 1030   PT Total Time (minutes) 60   PT Mode of treatment - Individual (minutes) 60   PT Mode of treatment - Concurrent (minutes) 0   PT Mode of treatment - Group (minutes) 0   PT Mode of treatment - Co-treat (minutes) 0   PT Mode of Treatment - Total time(minutes) 60 minutes   PT Cumulative Minutes 570   Therapy Time missed   Time missed? No     "

## 2024-07-18 NOTE — NURSING NOTE
Patient's girlfriend Aislinn was instructed on Lovenox injections and tube feeding administration. She has performed both with verbal step by step instruction and will need further teaching with return demonstration. She has also administered acetaminophen via TF but no crushed medications as she was not present during any other med administration time. Nursing will follow up with patient and Aislinn for further education throughout stay on [facility] prior to discharge.

## 2024-07-18 NOTE — PROGRESS NOTES
Physical Medicine and Rehabilitation Progress Note  Luis Forrester 54 y.o. male MRN: [id number]  Unit/Bed#: [facility] 972-01 Encounter: [id number]    To Review: 53-year-old male with PMH of asthma, HLD, hypothyroidism, who was having intermittent syncopal episodes over the last year who was found down by daughter after presumable other syncopal episode now complaining of short-term mid and upper back pain as well as pain limiting in hands and weakness of the upper extremities.  Patient was brought to hospital where CT head showed acute trace subarachnoid hemorrhage in the bilateral parafalcine regions.  CT of the T-spine showed DISH without acute or osseous abnormalities.  CTA head and neck did not show acute traumatic vascular injury, high-grade stenosis or dissection.  CT C spine showed no cervical fx or traumatic malalignment with mod canal and severe b/l multilevel NF stenosis.  MRI C spine shows moderate to severe canal stenosis with mild cord compression most pronounced at C3-4 and C5-6.  It also showed severe bilateral foraminal stenosis at same levels along with congenital canal stenosis with superimposed degenerative spondylosis.  Neurosurgery consulted on patient and diagnosed with parafalcine subdural hemorrhage with bilateral subarachnoid hemorrhage as well as operative intervention for the cervical stenosis with myelopathy and upper extremity weakness.  Patient underwent C3-C7 cervical laminectomy with bilateral screw placement and fusion C2 to-T1 on 6/16.  Patient recommended maps greater than 85 for 5 days and has been on 2 vasopressors.  He had his Hemovac drain removed on 6/19.  He was recommended 2 weeks of antibiotics for infectious prophylaxis per neurosurgery.  He has not required to have a cervical collar at this time.  Acute pain service consulted and have been managing with multimodal pain regiment including IV opiates.  Patient noted to have symptomatic junctional bradycardia with asymptomatic  "wide-complex tachycardia on telemetry for which EP was consulted.  They felt presentation mixture of sick sinus syndrome and wide-complex tachycardia likely SVT with aberrancy and felt reasonable to place dual-chamber pacemaker given history of multiple syncopal episodes and now documented bradycardia. Course also notable for hyponatremia and polyuria for which nephrology was consulted (and now improved).  Patient did receive 2 doses of DDAVP as well as intermittent IV fluids.\"     Course also notable for abdominal distension which was favored to be ileus as well as dysphagia and patient failing multiple VBS.  He is noted to have mild oral and moderate pharyngeal dysphagia and eventually was recommended PEG tube and to continue NPO diet.  Patient was evaluated by skilled therapies and was found to have significant decline in ADLs and ambulation and appears appropriate for admission to [facility]    Chief Complaint: Girlfriend doing training today.     Interval History/Subjective:  No acute events overnight. Doing well. Sleep is fine. No new CP, SOB, fevers, chills, N/V, abdominal pain. Last BM 7/16. Continent of bowel/bladder.     ROS:  A 10 point review of systems was negative except for what is noted in the HPI.    Today's Changes:  Patient is fine with following up with ENT for further swallow evaluation as an outpatient  Has not needed PRN pain meds, and not complaining of pain. De-escalated gabapentin to 100mg QHS with plan to wean off prior to discharge.  Family training for girlfriend.  Will review [facility] paperwork brought in by girlfriend.  Lovenox training for girlfriend and training re: tube feeds today.     Total visit time: 35 minutes, with more than 50% spent counseling/coordinating care. Counseling includes discussion with patient re: progress in therapies, functional issues observed by therapy staff, and discussion with patient regarding their current medical state and " wellbeing. Coordination of patient's care was performed in conjunction with Internal Medicine service to monitor patient's labs, vitals, and management of their comorbidities.    Assessment/Plan:    * Cervical myelopathy (HCC)  Assessment & Plan  Recent with residual neck pain, distal RUE weakness, impaired mobility   - MRI cervical spine without 6/15/2024:Congenital canal stenosis with superimposed degenerative spondylosis .Most pronounced at C3-4 and C5-C6 with moderate to severe canal stenosis and mild cord compression. Evaluation of cord signal is limited due to artifact. No definite abnormal cord signal but mild cord edema would be difficult to exclude. Severe bilateral foraminal stenosis also seen at C3-4 and C5-C6.Mild to moderate canal stenosis at other levels  - S/P PCDF C2-T1 on 6/16 by NeuroSx Dr Lopez  - Monitor incision site   - No collar required  - Admission: Examines C3 AIS D (some impairment L c4 pinprick, but overall very good sensation), proprioception in ankles intact, and strength quite good with weakness starting in finger flexors bilaterally.  - Activity Restrictions: No heavy lifting greater than 5 - 10lbs. No strenuous activities. No driving while requiring cervical collar, anticipated six weeks. No significant neck movement.  - May shower 3 days after surgery, but do not soak in a tub and no swimming, use mild antimicrobial soap and water. Pat incision dry after showering.  - Monitor for neuro changes, dysphagia (has significant on tube feeds), neurogenic bowel/bladder, spasticity  - Recommend acute comprehensive interdisciplinary inpatient rehabilitation to include intensive skilled therapies (PT, OT) as outlined with oversight and management by rehabilitation physician as well as inpatient rehab level nursing, case management and weekly interdisciplinary team meetings.   - Optimal pain management  - Fall precautions   - Follow-up with Spine Sx and if needed PMR after d/c       At risk for  altered bowel elimination  Assessment & Plan  Course also notable for abdominal distension which was favored to be ileus   - Recent occasional loose stools now on PEG tube feeds  - Currenlty on miralax daily  - PRN suppository  - Overall continent.   - Monitor    Pain  Assessment & Plan  APAP 975mg TID  - will try to make PRN prior to discharge  Gabapentin 100mg QHS - goal to discontinue prior to discharge  Oxycodone 2.5mg oral soltn Q4H PRN - has not used the past week, Likely discontinue at discharge.  Monitor for oversedation, AMS/delirium, and respiratory depression   If being administered - hold opiates, muscle relaxants, benzodiazepines, and gabapentin for oversedation, AMS, or RR<12.  Counseled on and continue to encourage deep breathing/relaxation/behavioral pain management techniques      At risk for venous thromboembolism (VTE)  Assessment & Plan  SCDs, ambulation, and lovenox   Recommend discharge home with 8 weeks lovenox for DVT Ppx given possible acute SCI.  Will need lovenox training. Discussed with team.   -Thru 8/10       Dysphagia  Assessment & Plan  - Recommend ENT c/s for ongoing prolonged dysphagia   - Patient declined laryngoscope initially, he is now open to it.   - ENT not convinced of laryngeal component of dysphagia. Recommending outpatient follow-up for that.   - Our SLP cannot do FEES, and ENT does not do FEES either. Perhaps outpatient SLP.   - Current diet: NPO - failed multiple VBS    - Mild oral and moderate pharyngeal dysphagia  - NGT feeds, nutrition c/s   - Family training for tube feeds   - SLP evaluate and treat decline in swallowing.  - Ensure adequate hydration  - Nutrition consult to assist with management   - Outpatient f/u with ENT     SDH (subdural hematoma) (HCC)  Assessment & Plan  Following syncope and being found down   CTH 6/15 - New acute trace subarachnoid hemorrhage in bilateral parafalcine regions. Recommend dedicated CTA head with contrast for further evaluation.   New acute trace parafalcine subdural hematoma.  CTH 6/18 - 1. Near completely resolved parafalcine subarachnoid hemorrhage with trace residual subarachnoid hemorrhage in the frontoparietal regions medially. No mass effect or hydrocephalus. 2. No large territorial infarction.  - Cleared for lovenox 30 SQ BID for VTE prophylaxis by prior providers     - Current/recent mood/affect/behavior/cog - acceptable, largely euthymic  - Remains at risk for increased confusion/delirium, restlessness, agitation, and fall - continue to monitor for concerns/changes  - Current psychotropics and potentially sedating medications:   Gabapentin 100mg QHS  Melatonin 3mg HS   Oxy 2.5mg Q4h PRN (not using recently)   - Monitor for need for 1:1 (Notify MD of potentially dangerous behavior such as significant impulsivity and trying to stand/get out of bed/chair unattended that could lead to fall/injury); High fall precautions with frequent rounding, patient close to nursing station if possible, frequent toileting/frequent offering urinal/bedpan (only if too immobile for safe toileting);  bed/chair alarm on at all times (high setting if needed); fall junior on side of bed (at discretion of nursing/therapy); do not leave unattended in bathroom, patient education; call bell availability; Sleep/agitation log, frequent redirection, reorientation, reassurance; Family access to patient if helpful  - No recent reports of dangerous/risky behavior requiring 1:1 at this point but monitor closely  - Recommend acute comprehensive interdisciplinary inpatient rehabilitation to include intensive skilled therapies (PT, OT, ST) with oversight and management by rehabilitation physician.  Inpatient rehab level nursing, case management and weekly interdisciplinary team meetings. Provide patient and (if available) caregiver/family teaching regarding brain injury/residual disability management.    - For routine restlessness, anxiety, irritability focus on  non-pharmacologic management    - Obtain MOCA and if needed ACLS during ARC course   - Please assess iADLs - ability to manage medications, meal prep, finances, obtaining appropriate transport from community, managing emergencies, and overall safety in home environment.  - Provide therapy, compensatory strategies, and if available and necessary provide caregiver training.  Notify MD of impairments or inability to perform iADLs adequately.   - Monitor neuro-exam, wakefulness, mood, cognition, insight into deficits and safety awareness   - Monitor and ensure optimal management electrolytes, nutrition, and hydration  - Monitor for signs or symptoms of infection, medication intolerances, other systemic etiologies  - Additional labs, imaging, specialist follow-up as needed   - Patient/family/caregiver education and training   - Overstimulation precautions, frequent re-orientation, re-direction, re-assurance  - Optimal mood, pain, and sleep management  - Sleep log and agitation monitoring    - Limit sedating medications when possible  - Follow-up with neurosx after discharge if needed and if needed during ARC course as well as PCP      s/p Medtronic dual chamber PPM 6/21/2024  Assessment & Plan  Noted to have symptomatic junctional bradycardia with asymptomatic wide-complex tachycardia on telemetry for which EP was consulted.  They felt presentation mixture of sick sinus syndrome and wide-complex tachycardia likely SVT with aberrancy and felt reasonable to place dual-chamber pacemaker given history of multiple syncopal episodes and now documented bradycardia.  2/2 bradycardia and recurrent syncopal episodes  Pacer precautions for 6 weeks.   OP EP follow-up     Thrush  Assessment & Plan  Nystatin pain and suction QID x7 d  Oral care   Monitor     Syncope and collapse  Assessment & Plan  Believed to be related to arrhythmias/bradycardia s/p PPM placement   Monitor vitals  OP cards/PCP     Hypothyroidism  Assessment &  Plan  Levothyroxine     Leukocytosis-resolved as of 7/15/2024  Assessment & Plan  Up to 13.6 7/11  Internal medicine consult and management during ARC course  Patient afebrile, other vitals unremarkable > continue to monitor   No clear signs or symptoms of infection or VTE but will continue to monitor  Monitor CBC intermittently             Health Maintenance  #Bladder: Voiding and continent. Checking PVR x3. If all <150cc can discontinue.   #Skin/Pressure Injury Prevention: Turn Q2hr in bed, with weight shifts [id number] in wheelchair.  #GI Prophylaxis: Not indicated  #Code Status: Full Code  #FEN: See Dysphagia above  #Dispo: Team 7/17: ADD 7/25 with goal to discharge home with Home PT/OT/SLP/RN. Supervision from his girlfriend who will undergo family training. Lives in a home with 1+1 step with FFSU available. Was previously completely independent  OUTpatient f/u: Neurosurgery, PCP, ENT    Objective:    Functional Update:  PT: CGA transfers, Sup bed mobility, CGA ambulation 150' with RW, CGA stairs   OT: total A eating, [place] grooming, maxA bathing, CGA UB dressing, modA toileting, modA tub/shower transfers, [place] toilet transfers   SLP: RAY 16/30 - with severe neurocog disorder. NPO given oropharyngeal dysphagia. Dep eating, modA comprehension, [place] expression, Sup social interaction, mod-maxA executive function, mod-maxA memory     Allergies per EMR    Physical Exam:  Temp:  [97.9 °F (36.6 °C)-98.3 °F (36.8 °C)] 98.3 °F (36.8 °C)  HR:  [70-84] 70  Resp:  [16-18] 18  BP: ()/(63-65) 106/63  Oxygen Therapy  SpO2: 96 %    Gen: No acute distress, Well-nourished, well-appearing.  HEENT: Moist mucus membranes, Normocephalic/Atraumatic. Poor dentition  Cardiovascular: Regular rate, rhythm, S1/S2. Distal pulses palpable  Heme/Extr: No edema  Pulmonary: Non-labored breathing  : No poon  GI: Soft, non-tender, non-distended. BS+. + PEG  Integumentary: Skin is warm, dry.   Neuro: AAOx3, Speech is intact. Appropriate  to questioning.   Psych: Normal mood and affect.       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[facility])    Chronic low back pain    Hyperlipidemia    Hypothyroidism    Lumbar radiculopathy    Mass of parotid gland    Osteoarthritis of both hands    Reactive airway disease    Vitamin D deficiency    Encounter for immunization    TBI (traumatic brain injury) ([facility])    Syncope and collapse    Bilateral hand pain    SDH (subdural hematoma) ([facility])    Right hand weakness    SSS (sick sinus syndrome) ([facility])    s/p Medtronic dual chamber PPM 6/21/2024    Dysphagia    Cervical stenosis of spinal canal    Cervical myelopathy ([facility])    At risk for venous thromboembolism (VTE)    Pain    Thrush    At risk for altered bowel elimination         Medications  Current Facility-Administered Medications   Medication Dose Route Frequency Provider Last Rate    acetaminophen  975 mg Per PEG Tube Q8H Kim Li PA-C      albuterol  2 puff Inhalation Q4H PRN Kim Li PA-C      benzocaine  2 spray Mucosal 4x Daily PRN Kim Li PA-C      bisacodyl  10 mg Rectal Daily PRN Luke Suggs MD      dextromethorphan-guaiFENesin  10 mL Per PEG Tube Q4H PRN Kim Li PA-C      enoxaparin  30 mg Subcutaneous Q12H ASHLYN Kim Li PA-C      gabapentin  100 mg Per PEG Tube TID Kim Li PA-C      levothyroxine  125 mcg Per PEG Tube Early Morning Kim Li PA-C      melatonin  3 mg Per PEG Tube HS Kim Li PA-C      nystatin  500,000 Units Swish & Spit 4x Daily Luke Suggs MD       nystatin   Topical BID Kim Li PA-C      ondansetron  4 mg Per PEG Tube Q6H PRN Kim Li PA-C      oxyCODONE  2.5 mg Oral Q4H PRN Luke Suggs MD      polyethylene glycol  17 g Oral Daily PRN Luke Suggs MD      saliva substitute  5 spray Mouth/Throat 4x Daily PRN Kim Li PA-C            ** Please Note: Fluency Direct voice to text software may have been used in the creation of this document. **

## 2024-07-18 NOTE — PLAN OF CARE
Problem: PAIN - ADULT  Goal: Verbalizes/displays adequate comfort level or baseline comfort level  Description: Interventions:  - Encourage patient to monitor pain and request assistance  - Assess pain using appropriate pain scale  - Administer analgesics based on type and severity of pain and evaluate response  - Implement non-pharmacological measures as appropriate and evaluate response  - Consider cultural and social influences on pain and pain management  - Notify physician/advanced practitioner if interventions unsuccessful or patient reports new pain  Outcome: Progressing     Problem: INFECTION - ADULT  Goal: Absence or prevention of progression during hospitalization  Description: INTERVENTIONS:  - Assess and monitor for signs and symptoms of infection  - Monitor lab/diagnostic results  - Monitor all insertion sites, i.e. indwelling lines, tubes, and drains  - Monitor endotracheal if appropriate and nasal secretions for changes in amount and color  - [place] appropriate cooling/warming therapies per order  - Administer medications as ordered  - Instruct and encourage patient and family to use good hand hygiene technique  - Identify and instruct in appropriate isolation precautions for identified infection/condition  Outcome: Progressing     Problem: SAFETY ADULT  Goal: Patient will remain free of falls  Description: INTERVENTIONS:  - Educate patient/family on patient safety including physical limitations  - Instruct patient to call for assistance with activity   - Consult OT/PT to assist with strengthening/mobility   - Keep Call bell within reach  - Keep bed low and locked with side rails adjusted as appropriate  - Keep care items and personal belongings within reach  - Initiate and maintain comfort rounds  - Make Fall Risk Sign visible to staff  - Offer Toileting every 2 Hours, in advance of need  - Initiate/Maintain bed.chair alarm  - Obtain necessary fall risk management equipment: nonskid socks  -  Apply yellow socks and bracelet for high fall risk patients  - Consider moving patient to room near nurses station  Outcome: Progressing  Goal: Maintain or return to baseline ADL function  Description: INTERVENTIONS:  -  Assess patient's ability to carry out ADLs; assess patient's baseline for ADL function and identify physical deficits which impact ability to perform ADLs (bathing, care of mouth/teeth, toileting, grooming, dressing, etc.)  - Assess/evaluate cause of self-care deficits   - Assess range of motion  - Assess patient's mobility; develop plan if impaired  - Assess patient's need for assistive devices and provide as appropriate  - Encourage maximum independence but intervene and supervise when necessary  - Involve family in performance of ADLs  - Assess for home care needs following discharge   - Consider OT consult to assist with ADL evaluation and planning for discharge  - Provide patient education as appropriate  Outcome: Progressing  Goal: Maintains/Returns to pre admission functional level  Description: INTERVENTIONS:  - Perform AM-PAC 6 Click Basic Mobility/ Daily Activity assessment daily.  - Set and communicate daily mobility goal to care team and patient/family/caregiver.   - Collaborate with rehabilitation services on mobility goals if consulted  - Perform Range of Motion 3 times a day.  - Reposition patient every 2 hours.  - Dangle patient 3 times a day  - Stand patient 3 times a day  - Ambulate patient 3 times a day  - Out of bed to chair 3 times a day   - Out of bed for meals 3 times a day  - Out of bed for toileting  - Record patient progress and toleration of activity level   Outcome: Progressing     Problem: DISCHARGE PLANNING  Goal: Discharge to home or other facility with appropriate resources  Description: INTERVENTIONS:  - Identify barriers to discharge w/patient and caregiver  - Arrange for needed discharge resources and transportation as appropriate  - Identify discharge learning  needs (meds, wound care, etc.)  - Arrange for interpretive services to assist at discharge as needed  - Refer to Case Management Department for coordinating discharge planning if the patient needs post-hospital services based on physician/advanced practitioner order or complex needs related to functional status, cognitive ability, or social support system  Outcome: Progressing     Problem: Nutrition/Hydration-ADULT  Goal: Nutrient/Hydration intake appropriate for improving, restoring or maintaining nutritional needs  Description: Monitor and assess patient's nutrition/hydration status for malnutrition. Collaborate with interdisciplinary team and initiate plan and interventions as ordered.  Monitor patient's weight and dietary intake as ordered or per policy. Utilize nutrition screening tool and intervene as necessary. Determine patient's food preferences and provide high-protein, high-caloric foods as appropriate.     INTERVENTIONS:  - Monitor oral intake, urinary output, labs, and treatment plans  - Assess nutrition and hydration status and recommend course of action  - Evaluate amount of meals eaten  - Assist patient with eating if necessary   - Allow adequate time for meals  - Recommend/ encourage appropriate diets, oral nutritional supplements, and vitamin/mineral supplements  - Order, calculate, and assess calorie counts as needed  - Recommend, monitor, and adjust tube feedings and TPN/PPN based on assessed needs  - Assess need for intravenous fluids  - Provide specific nutrition/hydration education as appropriate  - Include patient/family/caregiver in decisions related to nutrition  Outcome: Progressing

## 2024-07-18 NOTE — ASSESSMENT & PLAN NOTE
S/p PEG placement.  SLP continues to follow and appreciate their input.   Pt tolerating tube feeds so far and continues to work towards improving swallowing  Nutrition following.  Outpt follow-up for laryngoscope and FEES.

## 2024-07-18 NOTE — CASE MANAGEMENT
Exchanged email coorspondence with pts son fannie re: dc planning and recommendations. Family is inquiring about pts ability to perform POA and they have an  who will do an assessment either by video or in person.  Dr. De padua made aware.

## 2024-07-18 NOTE — PROGRESS NOTES
"OT Daily Treatment Note       07/18/24 6529   Pain Assessment   Pain Assessment Tool 0-10   Pain Score No Pain   Restrictions/Precautions   Precautions Aspiration;Bed/chair alarms;Cognitive;Fall Risk;Pacemaker;Pain;Spinal precautions;Supervision on toilet/commode  (NPO; tube feeds only)   ROM Restrictions Yes   Lifestyle   Autonomy \"Im just not used to all of this\"   Putting On/Taking Off Footwear   Comment as apart of FT, practiced donning/doffing socks with use of LHAE. pt able to complete with SUP and min vc for technique. OT recc use of baby powder to facilitiate pt gliding into sock aide which pt and spouse were agreeable to. pt would benefit from cont repetitive LHAE training in upcoming sessions   Sit to Lying   Type of Assistance Needed Supervision   Physical Assistance Level No physical assistance   Sit to Lying CARE Score 4   Lying to Sitting on Side of Bed   Type of Assistance Needed Supervision   Physical Assistance Level No physical assistance   Lying to Sitting on Side of Bed CARE Score 4   Sit to Stand   Type of Assistance Needed Supervision   Physical Assistance Level No physical assistance   Comment CS with RW; min vc req for LUE placement   Sit to Stand CARE Score 4   Bed-Chair Transfer   Type of Assistance Needed Supervision   Physical Assistance Level No physical assistance   Comment SPT with RW   Chair/Bed-to-Chair Transfer CARE Score 4   Functional Standing Tolerance   Comments Pt engaged in standing activity using card matching activity. Pt instructed to complete card matching game while standing and able to take rest breaks as needed. Pt did not need a rest break and able to tolerate standing for 7 min w/o SOB/LOB. Pt made a few errors w/ card matching and cued to fix b/c unable to spontaneously identify. This activity addresses endurance, standing tolerance, functional reach, and dynamic standing balance which promote ind w/ functional tasks and mobility.   Cognition   Overall Cognitive " "Status Impaired   Arousal/Participation Alert;Cooperative   Attention Attends with cues to redirect   Orientation Level Oriented X4   Memory Decreased long term memory;Decreased short term memory;Decreased recall of recent events;Decreased recall of precautions   Following Commands Follows one step commands with increased time or repetition   Comments Pt engaged in memory game activity due to reports of imp memory. Pt initially began w/ 2 pairs and upgraded to 6 pairs by end of game w/ 1 pair being introduced per round. Pt did not demo difficulty w/ task and able to \"find\" all pairs within a reasonable time frame. Pt and Pt's SO, Aislinn, report pt has had issues w/ memory during hospital stay and PTA. Activities that focus on improving memory will facilitate improvements w/ recalling safety precautions and using LHAE for safety w/ ADLs. During session, pt was asked to recall pacemaker precautions. Pt was using BUE to push up from bed/chair during STS and prompted review of precautions. During first attempt, pt reported not knowing there was any precautions associated w/ pacemaker. OT reviewed precautions and pt able to recall w/ VC for A. OT informed pt that she would review again later in session. When prompted a second time, pt unable to recall precautions even w/ VC for A. OT reviewed again and offered to provide detailed list of pacemaker precautions and pt agree. OT placed list on RW to help pt recall safety especially when walking. OT asked pt to recall pacemaker precautions throughout sessions with only a few minutes in between occasions, and pt continues to demo mod difficulty w/ recall. Pt asked to recall spinal precautions and unable to spontaneously recall w/ some confusion. OT reviewed spinal and pacemaker precautions again and reminded why this is significant. Pt acknowledged and will continue consistent review in upcoming sessions.   Assessment   Treatment Assessment Session grossly focused on " formalized family training with pt and his S.MILIND Tao. OT provided edu on pts CLOF (Min A) and edu that goal is for pt to be IND/SUP with ADLs by DC with use of LHAE which pt has been doing daily. Spouse reported she was assisting pt with ADLS at baseline which she is ok to do but is eager for him to be more IND. OT edu that pt self-reports having difficulty with reaching posteriorly but there is no clinical reason why pt cannot use RUE to comb back of hair but pt cont to report he cannot. OT also strongly encouraged that pt have CS during showers seated onc chair which spouse already owns but does not know how to put it together. OT edu that HH OT can assist with building shower chair and until then pt can sponge bathe. Spouse in agreement. OT then demo to spouse how pt will complete the tub transfer (with BUE on wall for support and lateral step in ) which spouse was comfortable with. Spouse then reported concerns re: memory loss at baseline. OT edu on MoCA score and rec that spouse f/u with pts PCP to see if PCP can provide pt with referral to Geriatrics for further evaluation. OT explained that based on pts memory, attention and exec fxing, it is recommended that the spouse handle all IADLs which she was doing at baseline and reports she wishes to cont. At this time, the spouse does NOT want the pt to engage in meal prep activities reporting it is not safe. OT in agreement. Spouse had no further questions or concerns re: above mentioned. OT provided spouse with educational handouts on pacemaker precautions and C/S precautions. Wife was able to recall them with 100% accuracy. Pt then engaged in LHAE training, TF nsg edu with LUCRECIA Reid, interventions to increase memory, standing balance, endurance and recall of precautions. Pt would benefit from cont skilled OT sessions to address the above mentioned barriers.   Prognosis Good   Problem List Decreased strength;Decreased range of motion;Decreased  endurance;Impaired balance;Decreased mobility;Decreased safety awareness;Orthopedic restrictions   Barriers to Discharge Inaccessible home environment;Decreased caregiver support   Plan   Treatment/Interventions ADL retraining;Functional transfer training;Therapeutic exercise;Endurance training;Patient/family training;Compensatory technique education   Progress Progressing toward goals   OT Therapy Minutes   OT Time In 1240   OT Time Out 1410   OT Total Time (minutes) 90   OT Mode of treatment - Individual (minutes) 90   OT Mode of treatment - Concurrent (minutes) 0   OT Mode of treatment - Group (minutes) 0   OT Mode of treatment - Co-treat (minutes) 0   OT Mode of Treatment - Total time(minutes) 90 minutes   OT Cumulative Minutes 390   Therapy Time missed   Time missed? No

## 2024-07-19 ENCOUNTER — HOME HEALTH ADMISSION (OUTPATIENT)
Dept: HOME HEALTH SERVICES | Facility: HOME HEALTHCARE | Age: 54
End: 2024-07-19
Payer: COMMERCIAL

## 2024-07-19 LAB
DME PARACHUTE DELIVERY DATE REQUESTED: NORMAL
DME PARACHUTE ITEM DESCRIPTION: NORMAL
DME PARACHUTE ITEM DESCRIPTION: NORMAL
DME PARACHUTE ORDER STATUS: NORMAL
DME PARACHUTE SUPPLIER NAME: NORMAL
DME PARACHUTE SUPPLIER PHONE: NORMAL

## 2024-07-19 PROCEDURE — 97535 SELF CARE MNGMENT TRAINING: CPT

## 2024-07-19 PROCEDURE — 97530 THERAPEUTIC ACTIVITIES: CPT

## 2024-07-19 PROCEDURE — 92526 ORAL FUNCTION THERAPY: CPT

## 2024-07-19 PROCEDURE — 97116 GAIT TRAINING THERAPY: CPT

## 2024-07-19 PROCEDURE — 97130 THER IVNTJ EA ADDL 15 MIN: CPT

## 2024-07-19 PROCEDURE — 97129 THER IVNTJ 1ST 15 MIN: CPT

## 2024-07-19 PROCEDURE — 99232 SBSQ HOSP IP/OBS MODERATE 35: CPT | Performed by: PHYSICAL MEDICINE & REHABILITATION

## 2024-07-19 PROCEDURE — 97110 THERAPEUTIC EXERCISES: CPT

## 2024-07-19 RX ADMIN — ACETAMINOPHEN 975 MG: 650 SUSPENSION ORAL at 13:14

## 2024-07-19 RX ADMIN — ENOXAPARIN SODIUM 30 MG: 30 INJECTION SUBCUTANEOUS at 22:04

## 2024-07-19 RX ADMIN — ACETAMINOPHEN 975 MG: 650 SUSPENSION ORAL at 05:53

## 2024-07-19 RX ADMIN — NYSTATIN 1 APPLICATION: 100000 POWDER TOPICAL at 17:02

## 2024-07-19 RX ADMIN — NYSTATIN: 100000 POWDER TOPICAL at 08:11

## 2024-07-19 RX ADMIN — NYSTATIN 500000 UNITS: 100000 SUSPENSION ORAL at 13:14

## 2024-07-19 RX ADMIN — Medication 125 MCG: at 05:54

## 2024-07-19 RX ADMIN — ENOXAPARIN SODIUM 30 MG: 30 INJECTION SUBCUTANEOUS at 08:06

## 2024-07-19 RX ADMIN — Medication 3 MG: at 22:05

## 2024-07-19 RX ADMIN — ACETAMINOPHEN 975 MG: 650 SUSPENSION ORAL at 22:05

## 2024-07-19 RX ADMIN — NYSTATIN 500000 UNITS: 100000 SUSPENSION ORAL at 08:06

## 2024-07-19 RX ADMIN — GABAPENTIN 100 MG: 250 SOLUTION ORAL at 22:06

## 2024-07-19 NOTE — PROGRESS NOTES
ST Updates:   Recommendations for discharge: supervision/assistance in completion of I ADL's  Continued Services: home ST  Discharge Date:7/25  Family Education/Training: initiated on 7/18; follow up training needed: yes--> not scheduled yet  Continued Treatment Plan: Dysphagia: pharyngeal exercises and ice chip trials; Cognition: functional problem solving, reasoning, sequencing; ST recall strategies; basic money management     07/19/24 0800   Pain Assessment   Pain Assessment Tool 0-10   Pain Score No Pain   Restrictions/Precautions   Precautions Aspiration;Bed/chair alarms;Cognitive;Fall Risk;Pacemaker;Pain;Spinal precautions;Supervision on toilet/commode;Other (comment)  (NPO; tube feeds only)   Comprehension   Comprehension (FIM) 4 - Understands basic info/conversation 75-90% of time   Expression   Expression (FIM) 4 - Expresses basic info/needs 75-90% of time   Social Interaction   Social Interaction (FIM) 5 - Interacts appropriately with others 90% of time   Problem Solving   Problem solving (FIM) 3 - Solves basic problmes 50-74% of time   Memory   Memory (FIM) 2 - Recognizes, recalls/performs 25-49%   Speech/Language/Cognition Assessment   Treatment Assessment In between trials of ice chips, SLP complete skilled ST session targeting cognitive linguistic communication skills. Target of session was on working memory, visual memory, remote memory, mental math, money management, reasoning and problem solving. First pt was given an ad and asked to memorize each ad for some time. Then after some time, it was taken away and then asked verbal questions about the ad from memory. Pt was able to recall information to complete task w/ 7/15 accuracy which increased to 15/15 accuracy once given semantic cues and leading prompts. Pt voiced difficulty w/ recall with information and noted difficulty with immediate and remote memory, relying heavily on mod A w/ repetition and semantic cues. SLP provided repetition and  semantic cues. After 1st ad, SLP provided education on association and visual memory strategies for pt to use to which pt increase accuracy in recall. SLP provided education for pt on how to use in daily life and how to use such as recall where he left his phone or where he left his remote, etc. Last task targeted money management and problem solving. Pt was given a written amount of month (3 pennies, 3 nickels, 2 dimes) and asked to indicate home much money east set of coins represents. Pt was able to complete task w/ 25/30 accuracy which increased to 30/30 accuracy once given gestural and verbal cues. Pt noted to need increase processing time, verbal cues and leading prompts to complete task. At times, pt's attention was fleeting and pt needed redirection to attend to task to identify what amount he was looking at to see if he had calculated the correct amount as well as understanding if he had been reading the correct sets as well. Pt's problem solving skills fluctuating during task as at times, pt repeated aloud to himself the items he was completing to ensure he was counting out the right amount. Overall, he needed min-mod A w/ repetition and redirection at times. Suggest continue focus of money management as well as remote memory, working memory, problem solving and reasoning are recommended at this time. At this time, pt continues to benefit from ongoing skilled SLP services to maximize overall cognitive linguistic skills in attempts to decrease caregiver burden over time.   Eating   Type of Assistance Needed Physical assistance   Physical Assistance Level Total assistance   Eating CARE Score 1   Swallow Assessment   Swallow Treatment Assessment   Daily Dysphagia Tx Note     Patient Name: Luis Forrester    Today's Date: 7/19/2024    Current Risks for Dysphagia & Aspiration: Recent intubation; general debilitation; new neuro event;  brain injury; cognitive deficit; positioning issues; cervical spin  injury/surgery; head support      Current Symptoms/Concerns: cough, clear throat, during and after PO, difficulty chewing, wet voicing, chronic cough      Current diet:NPO with tube feeds      Premorbid diet::regular diet and thin liquids      Positioning: upright in recliner    Items administered:Consistencies Administered: ice chips  Materials administered included ice chips x12    Oral stage:mild-moderate  Lip closure: functional around spoon  Anterior spillage: none  Mastication: continues with munching  Bolus formation: suspect decreased over time  Bolus control: decreased  Transfer: suspect delay  Oral residue: none  Pocketing: none     Pharyngeal stage:moderate  Swallow promptness: elicits initial swallows but then secondary swallows are observed and are even more delayed as trials progress  Hyolaryngeal elevation: present upon palpation but excursion is significantly decreased and pt present with gurgling and gulping during elevation  Wet voice: conts at end of trial of ice chips trials  Throat clear: on last ice chip  Cough: none  Secondary swallows: 3-4 swallows per ice chip  Audible swallows: increased from ice chips 1-10       Esophageal stage: No overt sxs observed w/ trials today      Summary:     Pt presenting with mild-moderate oral and moderate pharyngeal dysphagia today. Symptoms or concerns included decreased bolus propulsion, decreased mastication, decreased bolus formation, delayed oral intiation, and suspected significant decrease control of ice chips as well as demonstrating suspected pharyngeal swallow delay, suspected decreased hyolaryngeal elevation upon palpation, multiple swallows, effortful swallow, audible swallows, and increased wetness and throat clearing given the later ice chip trials today.     During today's trial of ice chips, pt's was noted to have cont 'hard gulps' when eliciting swallows give ice chips trials to which pt continues to elicit fair and consistent swallows but  continues to verbalize a 'gulping' sound which does not coordinate with swallows at times. Pt was noted to often do this, when SLP would palpate pt's throat more often. Pt was noted to have improvement in tolerance with ice chips trials today to where overt signs/sxs of aspiration were noted with decrease TC and no coughing noted.  Pt often presented with frequent swallowing and audible swallowing during trials but the overt sxs that were observed did have mild affects to voicing with wet voicing by end of session. Pt continues to demonstrate munching type of demonstration and is noted to improve in timeliness in breakdown but continues to have suspected decrease in bolus control and transit. Swallow initiation is suspected to still be delayed but at times, can elicit prompter swallows at times. Hyolaryngeal elevation is present upon palpation but overall excursion is significantly decreased as pt continues to have multiple swallows (3-4 swallows per ice chips noting decrease control bolus) increasing risk of aspiration and penetration and in addition, increasing more audible swallows.     SLP completed oral care with suction with continued education with pt on importance of oral hygiene. Pt asking it is important to brush tongue and SLP providing education on although pt is NPO, it is important to have oral hygiene, especially due to thrush and pt voiced understanding. SLP also provided education on oral medication pt is having to clear thrush on tongue.      Recommendations:  Diet: NPO w/ alternative means of nutrition/hydration via PEG  Meds: non-oral via PEG  Strategies: HOB to be 30 degrees at all times     Oral care w/ suction Q4.  PO trials w/ SLP supervision only  Results reviewed with:  patient, JOSE Jeffrey  Aspiration precautions posted.     F/u ST tx: Pt will continue to benefit from ongoing skilled dysphagia tx sessions to establish the potential for safest least restrictive diet vs pleasure feeding w/o  increased oropharyngeal or aspiration sxs and monitor ability to carryover swallow strategies independently.     Plan: PO trials w/ SLP supervision only            Continue to review swallow/pharyngeal strengthening exercises w/ pt and family            Repeat VFSS AFTER a fair amount of therapy sessions in hopes for maximizing pharyngeal excursion/strength ---> likely in OP repeat VFSS   Swallow Assessment Prognosis   Prognosis Guarded   Prognosis Considerations Co-morbidities;Medical diagnosis;Medical prognosis;Previous level of function;Severity of impairments   SLP Therapy Minutes   SLP Time In 0800   SLP Time Out 0900   SLP Total Time (minutes) 60   SLP Mode of treatment - Individual (minutes) 60   SLP Mode of treatment - Concurrent (minutes) 0   SLP Mode of treatment - Group (minutes) 0   SLP Mode of treatment - Co-treat (minutes) 0   SLP Mode of Treatment - Total time(minutes) 60 minutes   SLP Cumulative Minutes 300   Therapy Time missed   Time missed? No

## 2024-07-19 NOTE — NURSING NOTE
Patient's daughter was instructed on tube feeding administration. She performed 1800 TF with verbal step by step instruction and will need further teaching with return demonstration. Nursing will follow up with patient and family for further education throughout stay on [facility] prior to discharge.

## 2024-07-19 NOTE — PROGRESS NOTES
07/19/24 1400   Pain Assessment   Pain Assessment Tool 0-10   Pain Score No Pain   Restrictions/Precautions   Precautions Aspiration;Bed/chair alarms;Cognitive;Fall Risk;Pacemaker;Pain;Spinal precautions;Supervision on toilet/commode  (NPO; tube feeds only)   ROM Restrictions Yes  (pacemaker)   Braces or Orthoses Other (Comment)  (abd binder)   Subjective   Subjective pt agreeable to perform skilled PT no pain stated at this time   Roll Left and Right   Type of Assistance Needed Supervision   Comment HOB at 30 degrees per orders d/t J -tube   Roll Left and Right CARE Score 4   Sit to Lying   Type of Assistance Needed Supervision   Sit to Lying CARE Score 4   Lying to Sitting on Side of Bed   Type of Assistance Needed Supervision   Lying to Sitting on Side of Bed CARE Score 4   Sit to Stand   Type of Assistance Needed Supervision   Comment RW   Sit to Stand CARE Score 4   Bed-Chair Transfer   Type of Assistance Needed Supervision   Comment RW   Chair/Bed-to-Chair Transfer CARE Score 4   Transfer Bed/Chair/Wheelchair   Adaptive Equipment Roller Walker;None  (gait belt)   Walk 10 Feet   Type of Assistance Needed Supervision   Comment RW   Walk 10 Feet CARE Score 4   Walk 50 Feet with Two Turns   Type of Assistance Needed Physical assistance   Physical Assistance Level 25% or less   Comment HOB at 30 degrees per orders   Walk 50 Feet with Two Turns CARE Score 3   Walk 150 Feet   Type of Assistance Needed Physical assistance   Physical Assistance Level 25% or less   Comment NPP and HIGT gait belt   Walk 150 Feet CARE Score 3   Walking 10 Feet on Uneven Surfaces   Type of Assistance Needed Supervision;Incidental touching   Comment S w RW and CgA w/o AD floor mat   Walking 10 Feet on Uneven Surfaces CARE Score 4   Ambulation   Primary Mode of Locomotion Prior to Admission Walk   Distance Walked (feet) 200 ft  (Gait belt  feet x3 and  feet x2)   Assist Device Hand Hold;Other;Roller Walker   Does the patient  "walk? 2. Yes   Wheel 50 Feet with Two Turns   Reason if not Attempted Activity not applicable   Wheel 50 Feet with Two Turns CARE Score 9   Wheel 150 Feet   Reason if not Attempted Activity not applicable   Wheel 150 Feet CARE Score 9   Curb or Single Stair   Style negotiated Curb   Type of Assistance Needed Incidental touching   Comment CG/CS on 8\" curb step performed 3 times this session as pt continues to move LEs while lifting RW in air to transition on/off step despite vc's to keep feet planted   1 Step (Curb) CARE Score 4   4 Steps   Type of Assistance Needed Physical assistance   Physical Assistance Level 51%-75%   Comment Precious ModA 6 steps 6 icnh   4 Steps CARE Score 2   12 Steps   Reason if not Attempted Activity not applicable   12 Steps CARE Score 9   Picking Up Object   Type of Assistance Needed Supervision;Adaptive equipment   Comment RW with reacher and 3 xmarkers , pt off balance x1   Picking Up Object CARE Score 4   Therapeutic Interventions   Strengthening squats 10 reps , and core strengthening with ball toss   Flexibility manual stretch LE   Balance blue foam standing balance NBOS   Equipment Use   NuStep LVL 3 10 min LE   Other Comments   Comments /70 see vitals   Assessment   Treatment Assessment pt engages in skilled PT and working on gait  mobility w/o RW to inc dynamic movts and overall cosmetic gait activities , Pt cont with ataxic and dec motor function. motor planing and overall high risk for falls w/o RW . Pt instructed with recommend to use RW at home at all time and pt agreeable for safety . Pt change with seated ball toss yellow light and with core strengthening and pt reports he like this . Pt ambulating more w/o AD RW today to inc balance awareness, motor function and learning . Cont working on overall dyamic balanec activities in standing and walking for home DC , pt reports living with his significant and dgter at home for assistance . cont POC and pt in 30* HOB by orders for " precaution d/t J-tube and bed alarm and pt has all needs in reach .   Barriers to Discharge Inaccessible home environment;Decreased caregiver support   Plan   Progress Progressing toward goals   Discharge Recommendation   Rehab Resource Intensity Level, PT   (home PT)   PT Therapy Minutes   PT Time In 1400   PT Time Out 1530   PT Total Time (minutes) 90   PT Mode of treatment - Individual (minutes) 90   PT Mode of treatment - Concurrent (minutes) 0   PT Mode of treatment - Group (minutes) 0   PT Mode of treatment - Co-treat (minutes) 0   PT Mode of Treatment - Total time(minutes) 90 minutes   PT Cumulative Minutes 690   Therapy Time missed   Time missed? No

## 2024-07-19 NOTE — PROGRESS NOTES
[facility]  Progress Note  Name: Luis Forrester I  MRN: [id number]  Unit/Bed#: -01 I Date of Admission: 7/12/2024   Date of Service: 7/19/2024 I Hospital Day: 7    Assessment & Plan   * Cervical myelopathy (HCC)  Assessment & Plan  S/p C3-7 laminectomy with C2-T1 fusion due to central cord syndrome following a fall  Completed post-op abx.  No collar necessary.  Monitor incision  NS follow-up around 8/2/24 for 6 week post-op appt.  Therapy and pain control per PMR  DC 7/25    Dysphagia  Assessment & Plan  S/p PEG placement.  SLP continues to follow and appreciate their input.   Pt tolerating tube feeds so far and continues to work towards improving swallowing  Nutrition following.  Outpt follow-up for laryngoscope and FEES.    s/p Medtronic dual chamber PPM 6/21/2024  Assessment & Plan  Pt has a 6 month history of syncopal events.  Found to be bradycardic in the 40s.  Outpt follow-up with EP    SDH (subdural hematoma) (HCC)  Assessment & Plan  Not on antiplatelet or AC as an outpt.  Repeat head CT for any change in mental status.  This occurred after a syncopal fall in June 2024.     Syncope and collapse  Assessment & Plan  No events since PPM placement.    Hypothyroidism  Assessment & Plan  Continue levothyroxine.  Due for repeat TSH around  8/2/24.             The above assessment and plan was reviewed and updated as determined by my evaluation of the patient on 7/19/2024.    Labs:   Results from last 7 days   Lab Units 07/15/24  0615   WBC Thousand/uL 6.03   HEMOGLOBIN g/dL 12.1   HEMATOCRIT % 36.9   PLATELETS Thousands/uL 208     Results from last 7 days   Lab Units 07/15/24  0615   SODIUM mmol/L 136   POTASSIUM mmol/L 3.9   CHLORIDE mmol/L 98   CO2 mmol/L 28   BUN mg/dL 9   CREATININE mg/dL 0.52*   CALCIUM mg/dL 7.6*                   Imaging  No orders to display       Review of Scheduled Meds:  Current Facility-Administered Medications   Medication Dose Route  Frequency Provider Last Rate    acetaminophen  975 mg Per PEG Tube Q8H Kim Li PA-C      albuterol  2 puff Inhalation Q4H PRN Kim Li PA-C      benzocaine  2 spray Mucosal 4x Daily PRN Kim Li PA-C      bisacodyl  10 mg Rectal Daily PRN Luke Suggs MD      dextromethorphan-guaiFENesin  10 mL Per PEG Tube Q4H PRN Kim Li PA-C      enoxaparin  30 mg Subcutaneous Q12H ASHLYN Kim Li PA-C      gabapentin  100 mg Per PEG Tube HS Ashley Depadua, MD      levothyroxine  125 mcg Per PEG Tube Early Morning Kim Li PA-C      melatonin  3 mg Per PEG Tube HS Kim Li PA-C      nystatin  500,000 Units Swish & Spit 4x Daily Luke Suggs MD      nystatin   Topical BID Kim Li PA-C      ondansetron  4 mg Per PEG Tube Q6H PRN Kim Li PA-C      oxyCODONE  2.5 mg Oral Q4H PRN Luke Suggs MD      polyethylene glycol  17 g Oral Daily PRN Luke Suggs MD      saliva substitute  5 spray Mouth/Throat 4x Daily PRN Kim Li PA-C         Subjective/ HPI: Patient seen and examined. Patients overnight issues or events were reviewed with nursing staff. New or overnight issues include the following:     Pt seen in his room. He is looking forward to [place] next week. He denies any new complaints.    ROS:   A 10 point ROS was performed; negative except as noted above.        *Labs /Radiology studies Reviewed  *Medications  reviewed and reconciled as needed  *Please refer to order section for additional ordered labs studies      Physical Examination:  Vitals:   Vitals:    07/18/24 0509 07/18/24 1431 07/18/24 2037 07/19/24 0557   BP: 106/63 119/87 106/57 103/65   BP Location: Right arm Right arm Right arm Right arm   Pulse: 70 82 79 72   Resp: 18 18 16 17   Temp: 98.3 °F (36.8 °C) 98.1 °F (36.7 °C) 98.1 °F (36.7 °C) 98 °F (36.7 °C)   TempSrc: Oral Oral Oral Oral   SpO2: 96% 95% 95% 95%   Weight:        Height:           General Appearance: NAD; pleasant  HEENT: PERRLA, conjuctiva normal; mucous membranes moist; face symmetrical  Neck:  Supple  Lungs: clear bilaterally, normal respiratory effort, no retractions, expiratory effort normal, on room air  CV: regular rate and rhythm, no murmurs rubs or gallops noted   ABD: soft non tender, +BS x4, PEG intact  EXT: DP pulses intact, no lymphadenopathy, no edema  Skin: normal turgor, normal texture, no rash  Psych: affect normal, mood normal  Neuro: AAOx3       The above physical exam was reviewed and updated as determined by my evaluation of the patient on 7/19/2024.    Invasive Devices       Drain  Duration             Gastrostomy/Enterostomy Percutaneous Endoscopic Gastrostomy (PEG) 20 Fr. LUQ 8 days                       VTE Pharmacologic Prophylaxis: Enoxaparin  Code Status: Level 1 - Full Code  Current Length of Stay: 7 day(s)    Total floor / unit time spent today  35 minutes   Coordination of patient's care was performed in conjunction with consulting services. Time invested included review of patient's labs, vitals, and management of their comorbidities with continued monitoring, examination of patient as well as answering patient questions, documenting her findings and creating progress note in electronic medical record,  ordering appropriate diagnostic testing.       ** Please Note:  voice to text software may have been used in the creation of this document. Although proof errors in transcription or interpretation are a potential of such software**

## 2024-07-19 NOTE — PROGRESS NOTES
"   07/19/24 9143   Pain Assessment   Pain Assessment Tool 0-10   Pain Score No Pain   Restrictions/Precautions   Precautions Aspiration;Bed/chair alarms;Cognitive;Fall Risk;Pacemaker;Pain;Spinal precautions;Supervision on toilet/commode  (NPO; tube feeds only)   Weight Bearing Restrictions No   ROM Restrictions Yes  (pacemaker)   Braces or Orthoses Other (Comment)  (abd binder)   Lifestyle   Autonomy \"I'm just getting used to these\"   Shower/Bathe Self   Type of Assistance Needed Supervision   Physical Assistance Level No physical assistance   Comment full shower routine seated/standing; pt able to bathe all parts with long handled sponge to bathe lower legs/feet. increased time and encouragement to bathe ben/rear. pt able to maintain spinal and pacemaker precautions.   Shower/Bathe Self CARE Score 4   Upper Body Dressing   Type of Assistance Needed Physical assistance   Physical Assistance Level 25% or less   Comment pt able to thread UEs; requires assist for threading head; assist to don ab binder.   Upper Body Dressing CARE Score 3   Lower Body Dressing   Type of Assistance Needed Supervision;Verbal cues   Physical Assistance Level No physical assistance   Comment seated to thread underwear/pants over feet with use of LHR; stands with CS for CM   Lower Body Dressing CARE Score 4   Putting On/Taking Off Footwear   Type of Assistance Needed Supervision;Adaptive equipment   Physical Assistance Level No physical assistance   Comment use of LHAE to doff socks, don socks and dons slip on shoes   Putting On/Taking Off Footwear CARE Score 4   Sit to Stand   Type of Assistance Needed Supervision   Physical Assistance Level No physical assistance   Comment RW   Sit to Stand CARE Score 4   Bed-Chair Transfer   Type of Assistance Needed Supervision   Physical Assistance Level No physical assistance   Comment CS RW   Chair/Bed-to-Chair Transfer CARE Score 4   Toileting Hygiene   Type of Assistance Needed Supervision "   Physical Assistance Level No physical assistance   Comment hygiene/CM; increased encouragement to perform rear hygiene   Toileting Hygiene CARE Score 4   Toilet Transfer   Type of Assistance Needed Supervision   Physical Assistance Level No physical assistance   Comment CS RW to standard toilet   Toilet Transfer CARE Score 4   Exercise Tools   UE Ergometer pt engages in UE Strengthening using table top UBE, completing 5 min x2 on mild resistance with focus on increasing strength/endurance for ADLs/transfers. good tolerance to exercises with rest break as needed to manage fatigue. pt maintains spinal/pacemaker precautions throughout.   Cognition   Overall Cognitive Status Impaired   Arousal/Participation Alert;Cooperative   Attention Attends with cues to redirect   Orientation Level Oriented X4   Memory Decreased long term memory;Decreased short term memory;Decreased recall of recent events;Decreased recall of precautions   Following Commands Follows one step commands with increased time or repetition   Activity Tolerance   Activity Tolerance Patient tolerated treatment well   Assessment   Treatment Assessment pt engages in 90 minute skilled OT session focusing on ADL retraining with LHAE, func transfers and light UE Strengthening. pt continues to demo slow and steady progress toward OT goals. good recall of LHAE with encouragement to use as pt requests that staff assist vs using LHAE, however pt is able to complete in order to inc IND and maintain spinal precautions. pt does seek assistance for most tasks requiring increased encouragement to self complete. family training initiated with SO; recommend continued skilled care to focus on ADL retraining with ongoing use of LHAE, func transfers with RW, func cog including safety, problem solving and decreased STM, in order to decrease burden of care at d/c.   Prognosis Good   Problem List Decreased strength;Decreased range of motion;Decreased endurance;Impaired  balance;Decreased mobility;Decreased safety awareness;Orthopedic restrictions   Plan   Treatment/Interventions ADL retraining;Functional transfer training;Therapeutic exercise;Endurance training;Cognitive reorientation;Patient/family training;Equipment eval/education;Compensatory technique education   OT Therapy Minutes   OT Time In 0930   OT Time Out 1100   OT Total Time (minutes) 90   OT Mode of treatment - Individual (minutes) 90   OT Mode of treatment - Concurrent (minutes) 0   OT Mode of treatment - Group (minutes) 0   OT Mode of treatment - Co-treat (minutes) 0   OT Mode of Treatment - Total time(minutes) 90 minutes   OT Cumulative Minutes 480   Therapy Time missed   Time missed? No

## 2024-07-19 NOTE — PROGRESS NOTES
Physical Medicine and Rehabilitation Progress Note  Luis Forrester 54 y.o. male MRN: [id number]  Unit/Bed#: [facility] (formerly [facility]) 972-01 Encounter: [id number]    To Review: 53-year-old male with PMH of asthma, HLD, hypothyroidism, who was having intermittent syncopal episodes over the last year who was found down by daughter after presumable other syncopal episode now complaining of short-term mid and upper back pain as well as pain limiting in hands and weakness of the upper extremities.  Patient was brought to hospital where CT head showed acute trace subarachnoid hemorrhage in the bilateral parafalcine regions.  CT of the T-spine showed DISH without acute or osseous abnormalities.  CTA head and neck did not show acute traumatic vascular injury, high-grade stenosis or dissection.  CT C spine showed no cervical fx or traumatic malalignment with mod canal and severe b/l multilevel NF stenosis.  MRI C spine shows moderate to severe canal stenosis with mild cord compression most pronounced at C3-4 and C5-6.  It also showed severe bilateral foraminal stenosis at same levels along with congenital canal stenosis with superimposed degenerative spondylosis.  Neurosurgery consulted on patient and diagnosed with parafalcine subdural hemorrhage with bilateral subarachnoid hemorrhage as well as operative intervention for the cervical stenosis with myelopathy and upper extremity weakness.  Patient underwent C3-C7 cervical laminectomy with bilateral screw placement and fusion C2 to-T1 on 6/16.  Patient recommended maps greater than 85 for 5 days and has been on 2 vasopressors.  He had his Hemovac drain removed on 6/19.  He was recommended 2 weeks of antibiotics for infectious prophylaxis per neurosurgery.  He has not required to have a cervical collar at this time.  Acute pain service consulted and have been managing with multimodal pain regiment including IV opiates.  Patient noted to have symptomatic junctional bradycardia with asymptomatic  "wide-complex tachycardia on telemetry for which EP was consulted.  They felt presentation mixture of sick sinus syndrome and wide-complex tachycardia likely SVT with aberrancy and felt reasonable to place dual-chamber pacemaker given history of multiple syncopal episodes and now documented bradycardia. Course also notable for hyponatremia and polyuria for which nephrology was consulted (and now improved).  Patient did receive 2 doses of DDAVP as well as intermittent IV fluids.\"     Course also notable for abdominal distension which was favored to be ileus as well as dysphagia and patient failing multiple VBS.  He is noted to have mild oral and moderate pharyngeal dysphagia and eventually was recommended PEG tube and to continue NPO diet.  Patient was evaluated by skilled therapies and was found to have significant decline in ADLs and ambulation and appears appropriate for admission to [facility].    Chief Complaint: Feeling well.     Interval History/Subjective:  No acute events overnight. Family training going well. No new numbness/tingling/weakness. Tolerating tube feeds. Last BM 7/16. No new CP, SOB, fevers, chills, N/V, abdominal pain. Doing fine on decreased dose of gabapentin.     ROS:  A 10 point review of systems was negative except for what is noted in the HPI.    Today's Changes:  Likely discontinue gabapentin 100mg QHS on Monday if doing all right/no increased pain over the weekend  Long term/SSA/SSD has been filled out by PCP. Will see if there are any sections I need to fill out.   Continue current plan of care.     Total visit time: 35 minutes, with more than 50% spent counseling/coordinating care. Counseling includes discussion with patient re: progress in therapies, functional issues observed by therapy staff, and discussion with patient regarding their current medical state and wellbeing. Coordination of patient's care was performed in conjunction with Internal Medicine " service to monitor patient's labs, vitals, and management of their comorbidities.    Assessment/Plan:    * Cervical myelopathy (HCC)  Assessment & Plan  Recent with residual neck pain, distal RUE weakness, impaired mobility   - MRI cervical spine without 6/15/2024:Congenital canal stenosis with superimposed degenerative spondylosis .Most pronounced at C3-4 and C5-C6 with moderate to severe canal stenosis and mild cord compression. Evaluation of cord signal is limited due to artifact. No definite abnormal cord signal but mild cord edema would be difficult to exclude. Severe bilateral foraminal stenosis also seen at C3-4 and C5-C6.Mild to moderate canal stenosis at other levels  - S/P PCDF C2-T1 on 6/16 by NeuroSx Dr Lopez  - Monitor incision site   - No collar required  - Admission: Examines C3 AIS D (some impairment L c4 pinprick, but overall very good sensation), proprioception in ankles intact, and strength quite good with weakness starting in finger flexors bilaterally.  - Activity Restrictions: No heavy lifting greater than 5 - 10lbs. No strenuous activities. No driving while requiring cervical collar, anticipated six weeks. No significant neck movement.  - May shower 3 days after surgery, but do not soak in a tub and no swimming, use mild antimicrobial soap and water. Pat incision dry after showering.  - Monitor for neuro changes, dysphagia (has significant on tube feeds), neurogenic bowel/bladder, spasticity  - Recommend acute comprehensive interdisciplinary inpatient rehabilitation to include intensive skilled therapies (PT, OT) as outlined with oversight and management by rehabilitation physician as well as inpatient rehab level nursing, case management and weekly interdisciplinary team meetings.   - Optimal pain management  - Fall precautions   - Follow-up with Spine Sx and if needed PMR after d/c       At risk for altered bowel elimination  Assessment & Plan  Course also notable for abdominal distension  which was favored to be ileus   - Recent occasional loose stools now on PEG tube feeds  - Currenlty on miralax daily  - PRN suppository  - Overall continent.   - Monitor    Pain  Assessment & Plan  APAP 975mg TID  - will try to make PRN prior to discharge  Gabapentin 100mg QHS - goal to discontinue prior to discharge  Oxycodone 2.5mg oral soltn Q4H PRN - has not used the past week, Likely discontinue at discharge.  Monitor for oversedation, AMS/delirium, and respiratory depression   If being administered - hold opiates, muscle relaxants, benzodiazepines, and gabapentin for oversedation, AMS, or RR<12.  Counseled on and continue to encourage deep breathing/relaxation/behavioral pain management techniques      At risk for venous thromboembolism (VTE)  Assessment & Plan  SCDs, ambulation, and lovenox   Recommend discharge home with 8 weeks lovenox for DVT Ppx given possible acute SCI.  Will need lovenox training. Discussed with team.   -Thru 8/10       Dysphagia  Assessment & Plan  - Recommend ENT c/s for ongoing prolonged dysphagia   - Patient declined laryngoscope initially, he is now open to it.   - ENT not convinced of laryngeal component of dysphagia. Recommending outpatient follow-up for that.   - Our SLP cannot do FEES, and ENT does not do FEES either. Perhaps outpatient SLP.   - Current diet: NPO - failed multiple VBS    - Mild oral and moderate pharyngeal dysphagia  - NGT feeds, nutrition c/s   - Family training for tube feeds   - SLP evaluate and treat decline in swallowing.  - Ensure adequate hydration  - Nutrition consult to assist with management   - Outpatient f/u with ENT     SDH (subdural hematoma) (HCC)  Assessment & Plan  Following syncope and being found down   CTH 6/15 - New acute trace subarachnoid hemorrhage in bilateral parafalcine regions. Recommend dedicated CTA head with contrast for further evaluation.  New acute trace parafalcine subdural hematoma.  CTH 6/18 - 1. Near completely resolved  parafalcine subarachnoid hemorrhage with trace residual subarachnoid hemorrhage in the frontoparietal regions medially. No mass effect or hydrocephalus. 2. No large territorial infarction.  - Cleared for lovenox 30 SQ BID for VTE prophylaxis by prior providers     - Current/recent mood/affect/behavior/cog - acceptable, largely euthymic  - Remains at risk for increased confusion/delirium, restlessness, agitation, and fall - continue to monitor for concerns/changes  - Current psychotropics and potentially sedating medications:   Gabapentin 100mg QHS  Melatonin 3mg HS   Oxy 2.5mg Q4h PRN (not using recently)   - Monitor for need for 1:1 (Notify MD of potentially dangerous behavior such as significant impulsivity and trying to stand/get out of bed/chair unattended that could lead to fall/injury); High fall precautions with frequent rounding, patient close to nursing station if possible, frequent toileting/frequent offering urinal/bedpan (only if too immobile for safe toileting);  bed/chair alarm on at all times (high setting if needed); fall junior on side of bed (at discretion of nursing/therapy); do not leave unattended in bathroom, patient education; call bell availability; Sleep/agitation log, frequent redirection, reorientation, reassurance; Family access to patient if helpful  - No recent reports of dangerous/risky behavior requiring 1:1 at this point but monitor closely  - Recommend acute comprehensive interdisciplinary inpatient rehabilitation to include intensive skilled therapies (PT, OT, ST) with oversight and management by rehabilitation physician.  Inpatient rehab level nursing, case management and weekly interdisciplinary team meetings. Provide patient and (if available) caregiver/family teaching regarding brain injury/residual disability management.    - For routine restlessness, anxiety, irritability focus on non-pharmacologic management    - Obtain MOCA and if needed ACLS during ARC course   - Please  assess iADLs - ability to manage medications, meal prep, finances, obtaining appropriate transport from community, managing emergencies, and overall safety in home environment.  - Provide therapy, compensatory strategies, and if available and necessary provide caregiver training.  Notify MD of impairments or inability to perform iADLs adequately.   - Monitor neuro-exam, wakefulness, mood, cognition, insight into deficits and safety awareness   - Monitor and ensure optimal management electrolytes, nutrition, and hydration  - Monitor for signs or symptoms of infection, medication intolerances, other systemic etiologies  - Additional labs, imaging, specialist follow-up as needed   - Patient/family/caregiver education and training   - Overstimulation precautions, frequent re-orientation, re-direction, re-assurance  - Optimal mood, pain, and sleep management  - Sleep log and agitation monitoring    - Limit sedating medications when possible  - Follow-up with neurosx after discharge if needed and if needed during ARC course as well as PCP      s/p Medtronic dual chamber PPM 6/21/2024  Assessment & Plan  Noted to have symptomatic junctional bradycardia with asymptomatic wide-complex tachycardia on telemetry for which EP was consulted.  They felt presentation mixture of sick sinus syndrome and wide-complex tachycardia likely SVT with aberrancy and felt reasonable to place dual-chamber pacemaker given history of multiple syncopal episodes and now documented bradycardia.  2/2 bradycardia and recurrent syncopal episodes  Pacer precautions for 6 weeks.   OP EP follow-up     Thrush  Assessment & Plan  Nystatin pain and suction QID x7 d  Oral care   Monitor     Syncope and collapse  Assessment & Plan  Believed to be related to arrhythmias/bradycardia s/p PPM placement   Monitor vitals  OP cards/PCP     Hypothyroidism  Assessment & Plan  Levothyroxine     Leukocytosis-resolved as of 7/15/2024  Assessment & Plan  Up to 13.6  7/11  Internal medicine consult and management during ARC course  Patient afebrile, other vitals unremarkable > continue to monitor   No clear signs or symptoms of infection or VTE but will continue to monitor  Monitor CBC intermittently             Health Maintenance  #Bladder: Voiding and continent. Checking PVR x3. If all <150cc can discontinue.   #Skin/Pressure Injury Prevention: Turn Q2hr in bed, with weight shifts H50-50dym in wheelchair.  #GI Prophylaxis: Not indicated  #Code Status: Full Code  #FEN: See Dysphagia above  #Dispo: Team 7/17: ADD 7/25 with goal to discharge home with Home PT/OT/SLP/RN. Supervision from his girlfriend who will undergo family training. Lives in a home with 1+1 step with FFSU available. Was previously completely independent  OUTpatient f/u: Neurosurgery, PCP, ENT    Objective:    Functional Update:  PT: CGA transfers, Sup bed mobility, CGA ambulation 150' with RW, CGA stairs   OT: total A eating, [place] grooming, maxA bathing, CGA UB dressing, modA toileting, modA tub/shower transfers, [place] toilet transfers   SLP: RAY 16/30 - with severe neurocog disorder. NPO given oropharyngeal dysphagia. Dep eating, modA comprehension, [place] expression, Sup social interaction, mod-maxA executive function, mod-maxA memory     Allergies per EMR    Physical Exam:  Temp:  [98 °F (36.7 °C)-98.1 °F (36.7 °C)] 98 °F (36.7 °C)  HR:  [72-82] 72  Resp:  [16-18] 17  BP: (103-119)/(57-87) 103/65  Oxygen Therapy  SpO2: 95 %    Gen: No acute distress, Well-nourished, well-appearing.  HEENT: Moist mucus membranes, Normocephalic/Atraumatic  Cardiovascular: Regular rate, rhythm, S1/S2. Distal pulses palpable  Heme/Extr: No edema  Pulmonary: Non-labored breathing. Lungs CTAB  : No poon  GI: Soft, non-tender, non-distended. BS+ + G tube. Skin intact underneath PEG bumper.   Integumentary: Skin is warm, dry.   Neuro: AAOx3, Speech is intact. Appropriate to questioning.   Psych: Normal mood and affect.      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[facility])    Chronic low back pain    Hyperlipidemia    Hypothyroidism    Lumbar radiculopathy    Mass of parotid gland    Osteoarthritis of both hands    Reactive airway disease    Vitamin D deficiency    Encounter for immunization    TBI (traumatic brain injury) ([facility])    Syncope and collapse    Bilateral hand pain    SDH (subdural hematoma) ([facility])    Right hand weakness    SSS (sick sinus syndrome) ([facility])    s/p Medtronic dual chamber PPM 6/21/2024    Dysphagia    Cervical stenosis of spinal canal    Cervical myelopathy ([facility])    At risk for venous thromboembolism (VTE)    Pain    Thrush    At risk for altered bowel elimination         Medications  Current Facility-Administered Medications   Medication Dose Route Frequency Provider Last Rate    acetaminophen  975 mg Per PEG Tube Q8H Kim Li PA-C      albuterol  2 puff Inhalation Q4H PRN Kim Li PA-C      benzocaine  2 spray Mucosal 4x Daily PRN Kim Li PA-C      bisacodyl  10 mg Rectal Daily PRN Luke Suggs MD      dextromethorphan-guaiFENesin  10 mL Per PEG Tube Q4H PRN Kim Li PA-C      enoxaparin  30 mg Subcutaneous Q12H [facility] Kim Li PA-C      gabapentin  100 mg Per PEG Tube HS Ashley Depadua, MD      levothyroxine  125 mcg Per PEG Tube Early Morning Kim Li PA-C      melatonin  3 mg Per PEG Tube HS Kim Li PA-C      nystatin  500,000 Units Swish & Spit 4x Daily Luke Suggs MD      nystatin   Topical BID Kim Li PA-C       ondansetron  4 mg Per PEG Tube Q6H PRN Kim Li PA-C      oxyCODONE  2.5 mg Oral Q4H PRN Luke Suggs MD      polyethylene glycol  17 g Oral Daily PRN Luke Suggs MD      saliva substitute  5 spray Mouth/Throat 4x Daily PRN Kim Li PA-C            ** Please Note: [employer] voice to text software may have been used in the creation of this document. **

## 2024-07-19 NOTE — CASE MANAGEMENT
Referral made to [facility] for contd rn pt ot and speech services. Cm provided address of sig other.     4143 clinical update faxed via [employer] to [email or web address], [phone], awaiting determination for coverage through 7/24 and expected dc 7/25    0967  roller walker and commode ordered from [employer] via [employer]. Items requested to be delivered to pts room the day prior to dc.

## 2024-07-20 PROCEDURE — 97129 THER IVNTJ 1ST 15 MIN: CPT

## 2024-07-20 PROCEDURE — 97110 THERAPEUTIC EXERCISES: CPT

## 2024-07-20 PROCEDURE — 97116 GAIT TRAINING THERAPY: CPT

## 2024-07-20 PROCEDURE — 97112 NEUROMUSCULAR REEDUCATION: CPT

## 2024-07-20 PROCEDURE — 97530 THERAPEUTIC ACTIVITIES: CPT

## 2024-07-20 PROCEDURE — 92526 ORAL FUNCTION THERAPY: CPT

## 2024-07-20 RX ADMIN — ENOXAPARIN SODIUM 30 MG: 30 INJECTION SUBCUTANEOUS at 21:23

## 2024-07-20 RX ADMIN — Medication 3 MG: at 21:23

## 2024-07-20 RX ADMIN — Medication 125 MCG: at 06:41

## 2024-07-20 RX ADMIN — GABAPENTIN 100 MG: 250 SOLUTION ORAL at 21:25

## 2024-07-20 RX ADMIN — NYSTATIN: 100000 POWDER TOPICAL at 09:17

## 2024-07-20 RX ADMIN — ENOXAPARIN SODIUM 30 MG: 30 INJECTION SUBCUTANEOUS at 09:17

## 2024-07-20 RX ADMIN — ACETAMINOPHEN 975 MG: 650 SUSPENSION ORAL at 06:40

## 2024-07-20 RX ADMIN — ACETAMINOPHEN 975 MG: 650 SUSPENSION ORAL at 21:23

## 2024-07-20 RX ADMIN — NYSTATIN 1 APPLICATION: 100000 POWDER TOPICAL at 17:42

## 2024-07-20 RX ADMIN — ACETAMINOPHEN 975 MG: 650 SUSPENSION ORAL at 14:54

## 2024-07-20 NOTE — PROGRESS NOTES
[facility]  Progress Note  Name: Luis Forrester I  MRN: [id number]  Unit/Bed#: -01 I Date of Admission: 7/12/2024   Date of Service: 7/20/2024 I Hospital Day: 8    Assessment & Plan   * Cervical myelopathy (HCC)  Assessment & Plan  S/p C3-7 laminectomy with C2-T1 fusion on 6/16/24 due to central cord syndrome following a fall.    Completed post-op abx.  No collar necessary.  Monitor incision  NS follow-up around 8/2/24 for 6 week post-op appt.  Therapy and pain control per PMR  DC 7/25    Dysphagia  Assessment & Plan  S/p PEG placement by Dr Reinoso 7/10/24  Pt tolerating tube feeds with Jevity boluses 6x/day and no oral diet  ST working with him to improving swallowing  Nutrition following.  Outpt follow-up for laryngoscope and FEES.    SDH (subdural hematoma) (HCC)  Assessment & Plan  Not on antiplatelet or AC as an outpt.  Repeat head CT for any change in mental status.  This occurred after a syncopal fall in June 2024.     s/p Medtronic dual chamber PPM 6/21/2024  Assessment & Plan  Pt has a 6 month history of syncopal events.  Found to be bradycardic in the 40s.  Outpt follow-up with EP    Syncope and collapse  Assessment & Plan  No events since PPM placement.    Hypothyroidism  Assessment & Plan  Continue levothyroxine.  Due for repeat TSH around  8/2/24.             The above assessment and plan was reviewed and updated as determined by my evaluation of the patient on 7/20/2024.    Labs:   Results from last 7 days   Lab Units 07/15/24  0615   WBC Thousand/uL 6.03   HEMOGLOBIN g/dL 12.1   HEMATOCRIT % 36.9   PLATELETS Thousands/uL 208     Results from last 7 days   Lab Units 07/15/24  0615   SODIUM mmol/L 136   POTASSIUM mmol/L 3.9   CHLORIDE mmol/L 98   CO2 mmol/L 28   BUN mg/dL 9   CREATININE mg/dL 0.52*   CALCIUM mg/dL 7.6*                   Imaging  No orders to display       Review of Scheduled Meds:  Current Facility-Administered Medications   Medication  Dose Route Frequency Provider Last Rate    acetaminophen  975 mg Per PEG Tube Q8H Kim Li PA-C      albuterol  2 puff Inhalation Q4H PRN Kim Li PA-C      benzocaine  2 spray Mucosal 4x Daily PRN Kim Li PA-C      bisacodyl  10 mg Rectal Daily PRN Luke Suggs MD      dextromethorphan-guaiFENesin  10 mL Per PEG Tube Q4H PRN Kim Li PA-C      enoxaparin  30 mg Subcutaneous Q12H ASHLYN Kim Li PA-C      gabapentin  100 mg Per PEG Tube HS Ashley Depadua, MD      levothyroxine  125 mcg Per PEG Tube Early Morning Kim Li PA-C      melatonin  3 mg Per PEG Tube HS Kim Li PA-C      nystatin   Topical BID Kim Li PA-C      ondansetron  4 mg Per PEG Tube Q6H PRN Kim Li PA-C      oxyCODONE  2.5 mg Oral Q4H PRN Luke Suggs MD      polyethylene glycol  17 g Oral Daily PRN Luke Suggs MD      saliva substitute  5 spray Mouth/Throat 4x Daily PRN Kim Li PA-C         Subjective/ HPI: Patient seen and examined. Patients overnight issues or events were reviewed with nursing staff. New or overnight issues include the following:     No new or overnight issues.  Offers no complaints    ROS:   A 10 point ROS was performed; negative except as noted above.        *Labs /Radiology studies Reviewed  *Medications  reviewed and reconciled as needed  *Please refer to order section for additional ordered labs studies      Physical Examination:  Vitals:   Vitals:    07/19/24 1355 07/19/24 1411 07/19/24 2111 07/20/24 0637   BP: 98/56 110/70 101/59 99/59   BP Location: Right arm Right arm Right arm Right arm   Pulse: 72  67 64   Resp: 18  18 18   Temp: 98.1 °F (36.7 °C)  97.8 °F (36.6 °C) 98 °F (36.7 °C)   TempSrc: Oral  Oral Oral   SpO2: 97%  96% 98%   Weight:       Height:           General Appearance: no distress, non toxic appearing  HEENT: PERRLA, conjuctiva normal; oropharynx clear; mucous  membranes moist   Neck:  Supple, normal ROM  Lungs: CTA, normal respiratory effort, no retractions, expiratory effort normal  CV: regular rate and rhythm; no rubs/murmurs/gallops, PMI normal   ABD: soft; ND/NT; +BS  EXT: no edema  Skin: normal turgor, normal texture, no rashes  Psych: affect normal, mood normal  Neuro: AAO           The above physical exam was reviewed and updated as determined by my evaluation of the patient on 7/20/2024.    Invasive Devices       Drain  Duration             Gastrostomy/Enterostomy Percutaneous Endoscopic Gastrostomy (PEG) 20 Fr. LUQ 9 days                       VTE Pharmacologic Prophylaxis: Enoxaparin  Code Status: Level 1 - Full Code  Current Length of Stay: 8 day(s)    Total floor / unit time spent today 30 minutes  Coordination of patient's care was performed in conjunction with consulting services. Time invested included review of patient's labs, vitals, and management of their comorbidities with continued monitoring, examination of patient as well as answering patient questions, documenting her findings and creating progress note in electronic medical record,  ordering appropriate diagnostic testing.       ** Please Note:  voice to text software may have been used in the creation of this document. Although proof errors in transcription or interpretation are a potential of such software**

## 2024-07-20 NOTE — ASSESSMENT & PLAN NOTE
S/p C3-7 laminectomy with C2-T1 fusion on 6/16/24 due to central cord syndrome following a fall.    Completed post-op abx.  No collar necessary.  Monitor incision  NS follow-up around 8/2/24 for 6 week post-op appt.  Therapy and pain control per PMR  DC 7/25

## 2024-07-20 NOTE — ASSESSMENT & PLAN NOTE
S/p PEG placement by Dr Reinoso 7/10/24  Pt tolerating tube feeds with Jevity boluses 6x/day and no oral diet  ST working with him to improving swallowing  Nutrition following.  Outpt follow-up for laryngoscope and FEES.

## 2024-07-20 NOTE — PROGRESS NOTES
07/20/24 0815   Pain Assessment   Pain Assessment Tool 0-10   Pain Score No Pain   Restrictions/Precautions   Precautions Aspiration;Bed/chair alarms;Cognitive;Fall Risk;Impulsive;Supervision on toilet/commode  (NPO s/p PEG)   Comprehension   Comprehension (FIM) 4 - Understands basic info/conversation 75-90% of time   Expression   Expression (FIM) 5 - Needs help/cues only RARELY (< 10% of the time)   Social Interaction   Social Interaction (FIM) 5 - Interacts appropriately with others 90% of time   Problem Solving   Problem solving (FIM) 4 - Solves basic problems 75-89% of time   Memory   Memory (FIM) 3 - Recognizes, recalls/performs 50-74%   Speech/Language/Cognition Assessmetn   Treatment Assessment In addition to completing dysphagia tx session, SLP targeting functional money management skills. Initiated task by presenting dollar/change amounts no greater than $10, in which pt was to determine total dollars (only in $1's) but then change amounts into total quarters, dimes, nickels and pennies. When providing increased time, pt was able to be 13/15 accurate, increasing to 15/15 upon review of errors. Completed the same task but now larger dollar/change amounts which were no greater than $100, pt was to now determine total $10's, $5's, $1's and then change to quarters, dimes, nickels and pennies. Pt still demonstrating slower processing to complete this task as well, but was 12/15 accurate, but when shown errors, pt was able to increase the correct amounts to 15/15 accuracy. SLP provided update to pt's s/o about this task in session, still continuing to recommend supervision for any financial management tasks upon discharge. At this time, pt will continue to benefit from ongoing skilled SLP services to maximize functional cognitive skills in attempts to decrease caregiver burden over time.   Eating   Type of Assistance Needed Physical assistance   Physical Assistance Level Total assistance   Comment PO trials  provided by SLP only   Eating CARE Score 1   Swallow Assessment   Swallow Treatment Assessment   Daily Dysphagia Tx Note     Patient Name: Luis Forrester    Today's Date: 7/20/2024      Current Risks for Dysphagia & Aspiration: Recent intubation; general debilitation; new neuro event;  brain injury; cognitive deficit; positioning issues; cervical spin injury/surgery; head support      Current Symptoms/Concerns: cough, clear throat, during and after PO, difficulty chewing, wet voicing, chronic cough      Current diet:NPO with tube feeds      Premorbid diet::regular diet and thin liquids     Positioning: upright in recliner    Items administered:Consistencies Administered: ice chips x15      Oral stage:mild-moderate  Lip closure: functional  Anterior spillage: none  Mastication: continues w/ munching type   Bolus formation: likely decreased over time  Bolus control: decreased  Transfer: delayed  Oral residue: none  Pocketing: none         Pharyngeal stage:moderate-severe  Swallow promptness: elicited initial swallows but later secondary swallows were more delayed as trials progressed  Hyolaryngeal elevation: still present but overall excursion still significantly decreased  Wet voice: towards end of ice chip trials  Throat clear: on last 4 ice chips  Cough: on last sip  Secondary swallows: ranged between 2-4 swallows per ice chip  Audible swallows: increased from ice chips 12-15       Esophageal stage:No overt sxs observed w/ trials        Summary:     Pt presenting with mild oral and moderate-severe pharyngeal dysphagia today. Symptoms or concerns included decreased bolus propulsion, decreased mastication, decreased bolus formation, delayed oral intiation, and suspected decreased control of ice chips as well as demonstrating suspected pharyngeal swallow delay, suspected decreased hyolaryngeal elevation upon palpation, multiple swallows, effortful swallow, audible swallows, and worsening signs/sxs of aspiration on  "later ice chip trials.        SLP completed oral care with suction prior to ice chips. Since this was the only trial presented, no need for oral care post trials.     In regard to swallow function today, lip seal and bolus retrieval remains to be functional across ice chips to where no anterior loss was observed. Continues to demonstrate munching type mastication given ice chips which lead to likely decreased control over time due to delay in bolus transit and delay in swallow initiation. No oral residual is observed. Of note, SLP did re-educate pt on \"hard swallows\" to where this was elicited more readily and less of pt stating \"gulp\" when completing. Swallow initiation remains to be delayed, HLE still present but continues to be weaker when elicited which does lead to multiple swallows per ice chip. Today, range of swallows per ice chip was 2-4, pending size of ice chip presented. Pt had audible swallows across all ice chips today. Increased wetness in voicing, delayed throat clearing x4 and strong cough x1.    Of note, pt's s/oAislinn came during session. Since pharyngeal/swallow exercises were not completed in family training session w/ current SLP, today, SLP and pt completing all exercises today. Pt was able to effective demonstrate pharyngeal swallow exercises to where pt able to execute all 7 exercises w/ minimal verbal counts. Pt's s/o appreciative for demonstration and did verbalize that these will be continued at d/c home.     Recommendations:  Diet: NPO w/ alternative means of nutrition/hydration via PEG  Meds: non-oral via PEG  Strategies: HOB to be 30 degrees at all times     Oral care w/ suction Q4.  PO trials w/ SLP supervision only  Results reviewed with:  patient, RN- [facility], pt's S/o- Aislinn  Aspiration precautions posted.     F/u ST tx: Pt will continue to benefit from ongoing skilled dysphagia tx sessions to establish the potential for safest least restrictive diet vs pleasure feeding w/o " increased oropharyngeal or aspiration sxs and monitor ability to carryover swallow strategies independently.     Plan: PO trials w/ SLP supervision only            Continue to review swallow/pharyngeal strengthening exercises w/ pt and family            Repeat VFSS AFTER a fair amount of therapy sessions in hopes for maximizing pharyngeal excursion/strength ---> likely in OP repeat VFSS   Swallow Assessment Prognosis   Prognosis Guarded   Prognosis Considerations Age;Co-morbidities;Medical diagnosis;Medical prognosis;Ability to carry over   SLP Therapy Minutes   SLP Time In 0815   SLP Time Out 0900   SLP Total Time (minutes) 45   SLP Mode of treatment - Individual (minutes) 45   SLP Mode of treatment - Concurrent (minutes) 0   SLP Mode of treatment - Group (minutes) 0   SLP Mode of treatment - Co-treat (minutes) 0   SLP Mode of Treatment - Total time(minutes) 45 minutes   SLP Cumulative Minutes 345   Therapy Time missed   Time missed? No

## 2024-07-20 NOTE — PLAN OF CARE
Problem: INFECTION - ADULT  Goal: Absence or prevention of progression during hospitalization  Description: INTERVENTIONS:  - Assess and monitor for signs and symptoms of infection  - Monitor lab/diagnostic results  - Monitor all insertion sites, i.e. indwelling lines, tubes, and drains  - Monitor endotracheal if appropriate and nasal secretions for changes in amount and color  - [place] appropriate cooling/warming therapies per order  - Administer medications as ordered  - Instruct and encourage patient and family to use good hand hygiene technique  - Identify and instruct in appropriate isolation precautions for identified infection/condition  Outcome: Progressing

## 2024-07-20 NOTE — PROGRESS NOTES
07/20/24 1230   Pain Assessment   Pain Assessment Tool 0-10   Pain Score No Pain   Restrictions/Precautions   Precautions Aspiration;Bed/chair alarms;Cognitive;Fall Risk;Pain;Pacemaker;Supervision on toilet/commode;Spinal precautions  (c/s)   Weight Bearing Restrictions No   ROM Restrictions Yes  (pacemaker)   Braces or Orthoses Other (Comment)  (abdominal binder)   Cognition   Overall Cognitive Status Impaired   Arousal/Participation Alert;Responsive;Cooperative   Attention Attends with cues to redirect   Orientation Level Oriented X4   Memory Decreased long term memory;Decreased short term memory;Decreased recall of recent events;Decreased recall of precautions   Following Commands Follows one step commands without difficulty   Subjective   Subjective Pt reports he is tired from morning therapy, wishes to take it easy this PM   Roll Left and Right   Type of Assistance Needed Supervision   Physical Assistance Level No physical assistance   Comment HOB elevated 30 degrees, bed rail   Roll Left and Right CARE Score 4   Sit to Lying   Type of Assistance Needed Supervision   Physical Assistance Level No physical assistance   Comment HOB elevated 30 degrees, bed rail   Sit to Lying CARE Score 4   Lying to Sitting on Side of Bed   Type of Assistance Needed Supervision   Physical Assistance Level No physical assistance   Comment HOB elevated 30 degrees, bed rail   Lying to Sitting on Side of Bed CARE Score 4   Sit to Stand   Type of Assistance Needed Supervision   Physical Assistance Level No physical assistance   Comment RW   Sit to Stand CARE Score 4   Bed-Chair Transfer   Type of Assistance Needed Supervision   Physical Assistance Level No physical assistance   Comment RW   Chair/Bed-to-Chair Transfer CARE Score 4   Car Transfer   Comment Performed in AM   Walk 10 Feet   Type of Assistance Needed Supervision   Physical Assistance Level No physical assistance   Comment RW   Walk 10 Feet CARE Score 4   Walk 50 Feet  "with Two Turns   Type of Assistance Needed Supervision   Physical Assistance Level No physical assistance   Comment RW   Walk 50 Feet with Two Turns CARE Score 4   Walk 150 Feet   Type of Assistance Needed Supervision   Physical Assistance Level No physical assistance   Comment RW   Walk 150 Feet CARE Score 4   Ambulation   Primary Mode of Locomotion Prior to Admission Walk   Distance Walked (feet) 150 ft  (x2)   Assist Device Roller Walker   Gait Pattern Inconsistant Martha;Slow Martha;Decreased foot clearance;Narrow YARIEL;Shuffle;Improper weight shift   Limitations Noted In Balance;Coordination;Endurance;Heel Strike;Safety;Speed;Strength   Provided Assistance with: Direction   Walk Assist Level Supervision   Findings RW, shuffled gait continues   Does the patient walk? 2. Yes   Wheel 50 Feet with Two Turns   Reason if not Attempted Activity not applicable   Wheel 50 Feet with Two Turns CARE Score 9   Wheel 150 Feet   Reason if not Attempted Activity not applicable   Wheel 150 Feet CARE Score 9   Wheelchair mobility   Does the patient use a wheelchair? 0. No   Findings N/A   Curb or Single Stair   Comment Performed in AM   Toilet Transfer   Type of Assistance Needed Supervision   Physical Assistance Level No physical assistance   Comment RW   Toilet Transfer CARE Score 4   Therapeutic Interventions   Neuromuscular Re-Education Obstacle course consisting of stepping over obstacles, threshold changes, and walking on uneven surfaces with and without cognitive dual task   Other transfer training, gait training, toilet transfer   Equipment Use   NuStep L2, 10 min, LE only   Assessment   Treatment Assessment Pt agreeable to PT this PM, received supine in bed with HOB elevated 30 degrees. Pt continues to tolerate NuStep well, states that he \"likes this because I get stiff laying in bed.\" Challenged pt with obstacle course with focus on stepping over obstacles/thresholds safely with RW, with and without cognitive dual task " of naming items within category. Pt demonstrated multiple self-corrected LOB with cognitive task, educated pt on importance of maintaining focus on safety with mobility during walking at home upon d/c, verbalized understanding. Will continue with current PT POC to improve deficits and promote return to PLOF.   Problem List Decreased strength;Decreased range of motion;Decreased endurance;Impaired balance;Decreased mobility;Decreased safety awareness;Orthopedic restrictions   PT Barriers   Physical Impairment Decreased strength;Decreased range of motion;Decreased endurance;Impaired balance;Decreased coordination;Decreased cognition;Impaired judgement;Decreased safety awareness;Pain   Functional Limitation Transfers;Walking;Stair negotiation   Plan   Treatment/Interventions Functional transfer training;LE strengthening/ROM;Therapeutic exercise;Endurance training;Cognitive reorientation;Patient/family training;Equipment eval/education;Bed mobility;Gait training   Progress Progressing toward goals   PT Therapy Minutes   PT Time In 1230   PT Time Out 1308   PT Total Time (minutes) 38   PT Mode of treatment - Individual (minutes) 38   PT Mode of treatment - Concurrent (minutes) 0   PT Mode of treatment - Group (minutes) 0   PT Mode of treatment - Co-treat (minutes) 0   PT Mode of Treatment - Total time(minutes) 38 minutes   PT Cumulative Minutes 788     Patient remains supine with HOB elevated 30 degrees, all needs in reach.  Alarm in place and activated.  Encouraged use of call bell, patient verbalizes understanding.

## 2024-07-20 NOTE — PROGRESS NOTES
07/20/24 0900   Pain Assessment   Pain Assessment Tool 0-10   Pain Score No Pain   Restrictions/Precautions   Precautions Aspiration;Bed/chair alarms;Cognitive;Fall Risk;Pacemaker;Pain;Spinal precautions;Supervision on toilet/commode   Weight Bearing Restrictions No   ROM Restrictions Yes  (pacemaker)   Braces or Orthoses Other (Comment)  (abdominal binder)   Cognition   Overall Cognitive Status Impaired   Arousal/Participation Alert;Responsive;Cooperative   Attention Attends with cues to redirect   Orientation Level Oriented X4   Memory Decreased long term memory;Decreased short term memory;Decreased recall of recent events;Decreased recall of precautions   Following Commands Follows one step commands without difficulty   Subjective   Subjective Pt is eager to walk outside, reports he has not been outside since surgery   Roll Left and Right   Type of Assistance Needed Supervision   Physical Assistance Level No physical assistance   Comment HOB elevated 30 degrees   Roll Left and Right CARE Score 4   Sit to Lying   Type of Assistance Needed Supervision   Physical Assistance Level No physical assistance   Comment HOB elevated 30 degrees   Sit to Lying CARE Score 4   Lying to Sitting on Side of Bed   Comment Pt OOB   Sit to Stand   Type of Assistance Needed Supervision   Physical Assistance Level No physical assistance   Comment RW   Sit to Stand CARE Score 4   Bed-Chair Transfer   Type of Assistance Needed Supervision   Physical Assistance Level No physical assistance   Comment RW   Chair/Bed-to-Chair Transfer CARE Score 4   Car Transfer   Type of Assistance Needed Supervision   Physical Assistance Level No physical assistance   Comment RW, good sequencing, safety, and control   Car Transfer CARE Score 4   Walk 10 Feet   Type of Assistance Needed Supervision   Physical Assistance Level No physical assistance   Comment RW   Walk 10 Feet CARE Score 4   Walk 50 Feet with Two Turns   Type of Assistance Needed  Supervision   Physical Assistance Level No physical assistance   Comment RW   Walk 50 Feet with Two Turns CARE Score 4   Walk 150 Feet   Type of Assistance Needed Supervision   Physical Assistance Level No physical assistance   Comment RW   Walk 150 Feet CARE Score 4   Walking 10 Feet on Uneven Surfaces   Type of Assistance Needed Supervision   Physical Assistance Level No physical assistance   Comment RW on uneven surfaces outside   Walking 10 Feet on Uneven Surfaces CARE Score 4   Ambulation   Primary Mode of Locomotion Prior to Admission Walk   Distance Walked (feet) 500 ft  (500'+ to outside)   Assist Device Roller Walker   Gait Pattern Inconsistant Martha;Slow Martha;Decreased foot clearance;Ataxic;Narrow YARIEL;Improper weight shift;Shuffle   Limitations Noted In Balance;Coordination;Endurance;Heel Strike;Safety;Speed;Strength   Provided Assistance with: Direction   Walk Assist Level Supervision   Findings S RW, VC for not lifting and walking with RW in air; good LE endurance for long walk from 9th floor to outside   Does the patient walk? 2. Yes   Wheel 50 Feet with Two Turns   Reason if not Attempted Activity not applicable   Wheel 50 Feet with Two Turns CARE Score 9   Wheel 150 Feet   Reason if not Attempted Activity not applicable   Wheel 150 Feet CARE Score 9   Wheelchair mobility   Does the patient use a wheelchair? 0. No   Findings N/A   Curb or Single Stair   Style negotiated Single stair   Type of Assistance Needed Incidental touching;Verbal cues   Physical Assistance Level No physical assistance   Comment CGA up/down 12 steps of  steps with R HR, VC for safety when turning at bottom of stairs; good LE control   1 Step (Curb) CARE Score 4   4 Steps   Type of Assistance Needed Incidental touching;Verbal cues   Physical Assistance Level No physical assistance   Comment CGA up/down 12 steps of  steps with R HR, VC for safety when turning at bottom of stairs; good LE control   4 Steps CARE  Score 4   12 Steps   Type of Assistance Needed Incidental touching;Verbal cues   Physical Assistance Level No physical assistance   Comment CGA up/down 12 steps of  steps with R HR, VC for safety when turning at bottom of stairs; good LE control   12 Steps CARE Score 4   Stairs   Type Curb   Weight Bearing Precautions Fall Risk   Assist Devices Roller Walker   Findings CGA RW; VC for sequencing, with improved safety following second attempt   Picking Up Object   Type of Assistance Needed Supervision   Physical Assistance Level No physical assistance   Comment reacher,  bean bags from varying heights   Picking Up Object CARE Score 4   Toilet Transfer   Comment Pt did not require during session   Therapeutic Interventions   Balance Ambulation on uneven surfaces outside with RW; picking up bean bags with reacher from varying heights   Neuromuscular Re-Education Elevator squats without AD, focusing on eccentric/isometric LE control   Other transfer training, gait training, stair training, curb step, car transfer   Assessment   Treatment Assessment Pt agreeable to PT this AM, received sitting upright in chair. Pt is demonstrating improved LE strength/endurance with prolonged ambulatory distance inside/outside without physical assist. Pt able to recall all pacemaker precautions, but required increased time for recall. Pt continues to require intermittent VC for safety with RW during curb step, and maneuvering obstcales/thresholds, but making steady progress with functional mobility using RW. Will continue with current PT POC to improve deficits and promote return to PLOF.   Problem List Decreased strength;Decreased range of motion;Decreased endurance;Impaired balance;Decreased mobility;Decreased safety awareness;Orthopedic restrictions   PT Barriers   Physical Impairment Decreased strength;Decreased range of motion;Decreased endurance;Impaired balance;Decreased coordination;Decreased cognition;Impaired  judgement;Decreased safety awareness;Pain   Functional Limitation Transfers;Walking;Stair negotiation   Plan   Treatment/Interventions Functional transfer training;LE strengthening/ROM;Therapeutic exercise;Endurance training;Cognitive reorientation;Patient/family training;Equipment eval/education;Bed mobility;Gait training   Progress Progressing toward goals   PT Therapy Minutes   PT Time In 0900   PT Time Out 1000   PT Total Time (minutes) 60   PT Mode of treatment - Individual (minutes) 60   PT Mode of treatment - Concurrent (minutes) 0   PT Mode of treatment - Group (minutes) 0   PT Mode of treatment - Co-treat (minutes) 0   PT Mode of Treatment - Total time(minutes) 60 minutes   PT Cumulative Minutes 750     Patient remains supine in bed with HOB elevated 30 degrees, all needs in reach.  Alarm in place and activated.  Encouraged use of call bell, patient verbalizes understanding.

## 2024-07-21 PROCEDURE — 97110 THERAPEUTIC EXERCISES: CPT

## 2024-07-21 PROCEDURE — 97116 GAIT TRAINING THERAPY: CPT

## 2024-07-21 PROCEDURE — 92526 ORAL FUNCTION THERAPY: CPT

## 2024-07-21 RX ADMIN — GUAIFENESIN AND DEXTROMETHORPHAN 10 ML: 100; 10 SYRUP ORAL at 04:04

## 2024-07-21 RX ADMIN — ACETAMINOPHEN 975 MG: 650 SUSPENSION ORAL at 06:01

## 2024-07-21 RX ADMIN — Medication 125 MCG: at 06:01

## 2024-07-21 RX ADMIN — Medication 3 MG: at 21:30

## 2024-07-21 RX ADMIN — ENOXAPARIN SODIUM 30 MG: 30 INJECTION SUBCUTANEOUS at 21:30

## 2024-07-21 RX ADMIN — ENOXAPARIN SODIUM 30 MG: 30 INJECTION SUBCUTANEOUS at 09:21

## 2024-07-21 RX ADMIN — ACETAMINOPHEN 975 MG: 650 SUSPENSION ORAL at 21:30

## 2024-07-21 RX ADMIN — GABAPENTIN 100 MG: 250 SOLUTION ORAL at 21:30

## 2024-07-21 RX ADMIN — ACETAMINOPHEN 975 MG: 650 SUSPENSION ORAL at 14:31

## 2024-07-21 NOTE — PLAN OF CARE
Problem: PAIN - ADULT  Goal: Verbalizes/displays adequate comfort level or baseline comfort level  Description: Interventions:  - Encourage patient to monitor pain and request assistance  - Assess pain using appropriate pain scale  - Administer analgesics based on type and severity of pain and evaluate response  - Implement non-pharmacological measures as appropriate and evaluate response  - Consider cultural and social influences on pain and pain management  - Notify physician/advanced practitioner if interventions unsuccessful or patient reports new pain  Outcome: Progressing     Problem: INFECTION - ADULT  Goal: Absence or prevention of progression during hospitalization  Description: INTERVENTIONS:  - Assess and monitor for signs and symptoms of infection  - Monitor lab/diagnostic results  - Monitor all insertion sites, i.e. indwelling lines, tubes, and drains  - Monitor endotracheal if appropriate and nasal secretions for changes in amount and color  - [place] appropriate cooling/warming therapies per order  - Administer medications as ordered  - Instruct and encourage patient and family to use good hand hygiene technique  - Identify and instruct in appropriate isolation precautions for identified infection/condition  Outcome: Progressing     Problem: SAFETY ADULT  Goal: Patient will remain free of falls  Description: INTERVENTIONS:  - Educate patient/family on patient safety including physical limitations  - Instruct patient to call for assistance with activity   - Consult OT/PT to assist with strengthening/mobility   - Keep Call bell within reach  - Keep bed low and locked with side rails adjusted as appropriate  - Keep care items and personal belongings within reach  - Initiate and maintain comfort rounds  - Make Fall Risk Sign visible to staff  - Offer Toileting every  Hours, in advance of need  - Initiate/Maintain alarm  - Obtain necessary fall risk management equipment:   - Apply yellow socks and  bracelet for high fall risk patients  - Consider moving patient to room near nurses station  Outcome: Progressing  Goal: Maintain or return to baseline ADL function  Description: INTERVENTIONS:  -  Assess patient's ability to carry out ADLs; assess patient's baseline for ADL function and identify physical deficits which impact ability to perform ADLs (bathing, care of mouth/teeth, toileting, grooming, dressing, etc.)  - Assess/evaluate cause of self-care deficits   - Assess range of motion  - Assess patient's mobility; develop plan if impaired  - Assess patient's need for assistive devices and provide as appropriate  - Encourage maximum independence but intervene and supervise when necessary  - Involve family in performance of ADLs  - Assess for home care needs following discharge   - Consider OT consult to assist with ADL evaluation and planning for discharge  - Provide patient education as appropriate  Outcome: Progressing  Goal: Maintains/Returns to pre admission functional level  Description: INTERVENTIONS:  - Perform AM-PAC 6 Click Basic Mobility/ Daily Activity assessment daily.  - Set and communicate daily mobility goal to care team and patient/family/caregiver.   - Collaborate with rehabilitation services on mobility goals if consulted  - Perform Range of Motion  times a day.  - Reposition patient every  hours.  - Dangle patient  times a day  - Stand patient  times a day  - Ambulate patient  times a day  - Out of bed to chair  times a day   - Out of bed for meals  times a day  - Out of bed for toileting  - Record patient progress and toleration of activity level   Outcome: Progressing     Problem: DISCHARGE PLANNING  Goal: Discharge to home or other facility with appropriate resources  Description: INTERVENTIONS:  - Identify barriers to discharge w/patient and caregiver  - Arrange for needed discharge resources and transportation as appropriate  - Identify discharge learning needs (meds, wound care, etc.)  -  Arrange for interpretive services to assist at discharge as needed  - Refer to Case Management Department for coordinating discharge planning if the patient needs post-hospital services based on physician/advanced practitioner order or complex needs related to functional status, cognitive ability, or social support system  Outcome: Progressing     Problem: Nutrition/Hydration-ADULT  Goal: Nutrient/Hydration intake appropriate for improving, restoring or maintaining nutritional needs  Description: Monitor and assess patient's nutrition/hydration status for malnutrition. Collaborate with interdisciplinary team and initiate plan and interventions as ordered.  Monitor patient's weight and dietary intake as ordered or per policy. Utilize nutrition screening tool and intervene as necessary. Determine patient's food preferences and provide high-protein, high-caloric foods as appropriate.     INTERVENTIONS:  - Monitor oral intake, urinary output, labs, and treatment plans  - Assess nutrition and hydration status and recommend course of action  - Evaluate amount of meals eaten  - Assist patient with eating if necessary   - Allow adequate time for meals  - Recommend/ encourage appropriate diets, oral nutritional supplements, and vitamin/mineral supplements  - Order, calculate, and assess calorie counts as needed  - Recommend, monitor, and adjust tube feedings and TPN/PPN based on assessed needs  - Assess need for intravenous fluids  - Provide specific nutrition/hydration education as appropriate  - Include patient/family/caregiver in decisions related to nutrition  Outcome: Progressing

## 2024-07-21 NOTE — PROGRESS NOTES
07/21/24 1030   Pain Assessment   Pain Assessment Tool 0-10   Pain Score No Pain   Restrictions/Precautions   Precautions Aspiration;Bed/chair alarms;Cognitive;Fall Risk;Pain;Pacemaker;Spinal precautions;Supervision on toilet/commode  (NPO s/p PEG)   Eating   Type of Assistance Needed Physical assistance   Physical Assistance Level Total assistance   Comment SLP providing pt ice chip trials   Eating CARE Score 1   Swallow Assessment   Swallow Treatment Assessment   Daily Dysphagia Tx Note     Patient Name: Luis Forrester    Today's Date: 7/21/2024      Current Risks for Dysphagia & Aspiration: Recent intubation; general debilitation; new neuro event;  brain injury; cognitive deficit; positioning issues; cervical spin injury/surgery; head support      Current Symptoms/Concerns: cough, clear throat, during and after PO, difficulty chewing, wet voicing, chronic cough      Current diet:NPO with tube feeds      Premorbid diet::regular diet and thin liquids     Positioning: upright in recliner    Items administered:Consistencies Administered: ice chips x18      Oral stage:mild  Lip closure: functional around tsp  Anterior spillage: none  Mastication: munching type mastication   Bolus formation: mildly decreased  Bolus control: likely decreased  Transfer: delayed  Oral residue: none  Pocketing: none         Pharyngeal stage:moderate-severe  Swallow promptness: delayed across trials, noting increased delay w/ secondary swallows  Hyolaryngeal elevation: present but still decreased upon palpation  Wet voice: increased today w/ ice chips, BUT pt aware at times when talking w/ ice chip  Throat clear: weaker given initial occurrences, stronger as ice chips progressed  Cough: after throat clearing on later trials  Secondary swallows: 2-3 swallows per ice chip  Audible swallows: increased today       Esophageal stage:No overt sxs observed w/ ice chips today        Summary:     Pt presenting with mild oral and moderate-severe  "pharyngeal dysphagia today. Symptoms or concerns included decreased mastication, decreased bolus formation, delayed oral intiation, and suspected decreased control of ice chips over time  in addition to suspected pharyngeal swallow delay, suspected decreased hyolaryngeal elevation upon palpation, multiple swallows, effortful swallow, audible swallows, and increasing sxs of aspiration as ice chip trials progress.      Pt requesting to get OOB into recliner for session today. Pt also reporting that oral care was not completed this AM (as SLP is aware of oral care items left at bedside  yesterday's session and new package not present at bedside. SLP retrieving oral care w/ suction kits as well as replacing canister/tubing as this likely was not changed from ST session which current SLP had completed yesterday. Pt does continue to demonstrate tolerance of oral care w/ suction w/o increased aspiration sxs.     Otherwise for swallow function, pt's lip seal and bolus retrieval remains to be functional across ice chips to where no anterior loss was observed. Continues to demonstrate munching type mastication given ice chips which lead to likely decreased control over time due to delay in bolus transit and delay in swallow initiation. No oral residual is observed.  Swallow initiation remains to be delayed, HLE still present but continues to be weaker when elicited which does lead to multiple swallows per ice chip. Today, range of swallows per ice chip was 2-3, pending size of ice chip presented. Pt had audible swallows across all ice chips today. Pt did exhibit wetness in voicing between ice chips x3 to where SLP did have to prompt pt to elicit throat clear. Increased education provided to pt about prompted throat clears when hearing his voice being  \"under water.\"  Pt was able to demonstrate improvement in carryover when needing to throat clear for remainder of trials. However, spontaneous throat clears occurring w/ " last 3 ice chips which lead to stronger cough events as well.      Recommendations:  Diet: NPO w/ alternative means of nutrition/hydration via PEG  Meds: non-oral via PEG  Strategies: HOB to be 30 degrees at all times     Oral care w/ suction Q4.  PO trials w/ SLP supervision only  Results reviewed with:  patient  Aspiration precautions posted.     F/u ST tx: Pt will continue to benefit from ongoing skilled dysphagia tx sessions to establish the potential for safest least restrictive diet vs pleasure feeding w/o increased oropharyngeal or aspiration sxs and monitor ability to carryover swallow strategies independently.     Plan: PO trials w/ SLP supervision only            Continue to review swallow/pharyngeal strengthening exercises w/ pt and family            Repeat VFSS AFTER a fair amount of therapy sessions in hopes for maximizing pharyngeal excursion/strength ---> likely in OP repeat VFSS   Swallow Assessment Prognosis   Prognosis Guarded   Prognosis Considerations Age;Co-morbidities;Medical prognosis;Medical diagnosis;New learning ability;Ability to carry over   SLP Therapy Minutes   SLP Time In 1030   SLP Time Out 1100   SLP Total Time (minutes) 30   SLP Mode of treatment - Individual (minutes) 30   SLP Mode of treatment - Concurrent (minutes) 0   SLP Mode of treatment - Group (minutes) 0   SLP Mode of treatment - Co-treat (minutes) 0   SLP Mode of Treatment - Total time(minutes) 30 minutes   SLP Cumulative Minutes 375   Therapy Time missed   Time missed? No        normal...

## 2024-07-21 NOTE — PROGRESS NOTES
[facility]  Progress Note  Name: Luis Forrester I  MRN: [id number]  Unit/Bed#: -01 I Date of Admission: 7/12/2024   Date of Service: 7/21/2024 I Hospital Day: 9    Assessment & Plan   * Cervical myelopathy (HCC)  Assessment & Plan  S/p C3-7 laminectomy with C2-T1 fusion on 6/16/24 due to central cord syndrome following a fall.    Completed post-op abx.  No collar necessary.  Monitor incision  NS follow-up around 8/2/24 for 6 week post-op appt.  Therapy and pain control per PMR  DC 7/25    Dysphagia  Assessment & Plan  S/p PEG placement by Dr Reinoso 7/10/24  Pt tolerating tube feeds with Jevity boluses 6x/day and no oral diet  ST working with him to improving swallowing  Nutrition following.  Outpt follow-up for laryngoscope and FEES.    SDH (subdural hematoma) (HCC)  Assessment & Plan  Not on antiplatelet or AC as an outpt.  Repeat head CT for any change in mental status.  This occurred after a syncopal fall in June 2024.     s/p [employer] dual chamber PPM 6/21/2024  Assessment & Plan  Pt has a 6 month history of syncopal events.  Found to be bradycardic in the 40s.  Outpt follow-up with EP    Syncope and collapse  Assessment & Plan  No events since PPM placement but his systolic BPs do run in the 90's  No c/o dizziness    Hypothyroidism  Assessment & Plan  Continue levothyroxine.  Due for repeat TSH around  8/2/24.           The above assessment and plan was reviewed and updated as determined by my evaluation of the patient on 7/21/2024.    Labs:   Results from last 7 days   Lab Units 07/15/24  0615   WBC Thousand/uL 6.03   HEMOGLOBIN g/dL 12.1   HEMATOCRIT % 36.9   PLATELETS Thousands/uL 208     Results from last 7 days   Lab Units 07/15/24  0615   SODIUM mmol/L 136   POTASSIUM mmol/L 3.9   CHLORIDE mmol/L 98   CO2 mmol/L 28   BUN mg/dL 9   CREATININE mg/dL 0.52*   CALCIUM mg/dL 7.6*                   Imaging  No orders to display       Review of Scheduled  Meds:  Current Facility-Administered Medications   Medication Dose Route Frequency Provider Last Rate    acetaminophen  975 mg Per PEG Tube Q8H Kim Li PA-C      albuterol  2 puff Inhalation Q4H PRN Kim Li PA-C      benzocaine  2 spray Mucosal 4x Daily PRN Kim Li PA-C      bisacodyl  10 mg Rectal Daily PRN Luke Suggs MD      dextromethorphan-guaiFENesin  10 mL Per PEG Tube Q4H PRN Kim Li PA-C      enoxaparin  30 mg Subcutaneous Q12H ASHLYN Kim Li PA-C      gabapentin  100 mg Per PEG Tube HS Ashley Depadua, MD      levothyroxine  125 mcg Per PEG Tube Early Morning Kim Li PA-C      melatonin  3 mg Per PEG Tube HS Kim Li PA-C      nystatin   Topical BID Kim Li PA-C      ondansetron  4 mg Per PEG Tube Q6H PRN Kim Li PA-C      oxyCODONE  2.5 mg Oral Q4H PRN Luke Suggs MD      polyethylene glycol  17 g Oral Daily PRN Luke Suggs MD      saliva substitute  5 spray Mouth/Throat 4x Daily PRN Kim Li PA-C         Subjective/ HPI: Patient seen and examined. Patients overnight issues or events were reviewed with nursing staff. New or overnight issues include the following:     No new or overnight issues.  Offers no complaints    ROS:   A 10 point ROS was performed; negative except as noted above.        *Labs /Radiology studies Reviewed  *Medications  reviewed and reconciled as needed  *Please refer to order section for additional ordered labs studies      Physical Examination:  Vitals:   Vitals:    07/20/24 0637 07/20/24 1403 07/20/24 2048 07/21/24 0626   BP: 99/59 94/62 98/61 96/68   BP Location: Right arm Right arm Right arm Right arm   Pulse: 64 80 73 80   Resp: 18 16 16 18   Temp: 98 °F (36.7 °C) 98 °F (36.7 °C) 97.8 °F (36.6 °C) 97.9 °F (36.6 °C)   TempSrc: Oral Oral Oral Oral   SpO2: 98% 96% 96% 94%   Weight:       Height:           General Appearance: no  distress, nontoxic appearing  HEENT:  External ear normal.  Nose normal w/o drainage. Mucous membranes are moist. Oropharynx is clear. Conjunctiva clear w/o icterus or redness.  Neck:  Supple, normal ROM  Lungs: BBS without crackles/wheeze/rhonchi; respirations unlabored with normal inspiratory/expiratory effort.  No retractions noted.  On RA  CV: regular rate and rhythm; no rubs/murmurs/gallops, PMI normal   ABD: Abdomen is soft.  Bowel sounds all quadrants.  Nontender with no distention.    EXT: no edema  Skin: normal turgor, normal texture, no rashes  Psych: affect normal, mood normal  Neuro: AAO         The above physical exam was reviewed and updated as determined by my evaluation of the patient on 7/21/2024.    Invasive Devices       Drain  Duration             Gastrostomy/Enterostomy Percutaneous Endoscopic Gastrostomy (PEG) 20 Fr. LUQ 10 days                       VTE Pharmacologic Prophylaxis: Enoxaparin  Code Status: Level 1 - Full Code  Current Length of Stay: 9 day(s)    Total floor / unit time spent today 30 minutes  Coordination of patient's care was performed in conjunction with consulting services. Time invested included review of patient's labs, vitals, and management of their comorbidities with continued monitoring, examination of patient as well as answering patient questions, documenting her findings and creating progress note in electronic medical record,  ordering appropriate diagnostic testing.       ** Please Note:  voice to text software may have been used in the creation of this document. Although proof errors in transcription or interpretation are a potential of such software**

## 2024-07-21 NOTE — PROGRESS NOTES
07/21/24 1100   Pain Assessment   Pain Assessment Tool 0-10   Pain Score No Pain   Restrictions/Precautions   Precautions Aspiration;Bed/chair alarms;Cognitive;Fall Risk;Pain;Pacemaker;Supervision on toilet/commode;Spinal precautions  (c/s)   Weight Bearing Restrictions No   ROM Restrictions Yes  (pacemaker)   Braces or Orthoses Other (Comment)  (abdominal binder)   Cognition   Overall Cognitive Status Impaired   Arousal/Participation Alert;Responsive   Attention Attends with cues to redirect   Orientation Level Oriented X4   Memory Decreased long term memory;Decreased short term memory;Decreased recall of recent events;Decreased recall of precautions   Following Commands Follows one step commands without difficulty   Subjective   Subjective Patient reported no updates at beginning of session   Roll Left and Right   Type of Assistance Needed Incidental touching   Comment monitor pacemaker precautions   Roll Left and Right CARE Score 4   Sit to Lying   Type of Assistance Needed Incidental touching   Comment monitor pacemaker precautions   Sit to Lying CARE Score 4   Lying to Sitting on Side of Bed   Type of Assistance Needed Incidental touching   Comment monitor pacemaker precautions   Lying to Sitting on Side of Bed CARE Score 4   Sit to Stand   Type of Assistance Needed Physical assistance   Physical Assistance Level 25% or less   Comment min assist x1   Sit to Stand CARE Score 3   Walk 10 Feet   Type of Assistance Needed Supervision   Comment RW   Walk 10 Feet CARE Score 4   Walk 50 Feet with Two Turns   Type of Assistance Needed Supervision   Comment RW   Walk 50 Feet with Two Turns CARE Score 4   Walk 150 Feet   Type of Assistance Needed Supervision   Comment RW   Walk 150 Feet CARE Score 4   Ambulation   Primary Mode of Locomotion Prior to Admission Walk   Assist Device Roller Walker   Does the patient walk? 2. Yes   Wheel 50 Feet with Two Turns   Reason if not Attempted Activity not applicable   Wheel 50  Feet with Two Turns CARE Score 9   Wheel 150 Feet   Reason if not Attempted Activity not applicable   Wheel 150 Feet CARE Score 9   Wheelchair mobility   Does the patient use a wheelchair? 0. No   Therapeutic Interventions   Other Ambulation, transfers, functional activities per objective   Equipment Use   NuStep 10 minutes, level 1, B LE only; .50 miles; 90 spm   Assessment   Treatment Assessment Patient tolerated session well today, patient challenged with endurance and function today, maintaining status of ambulation support today. 30 minute session today focused on mobility and functional tasks. Occasional rest breaks needed today to assist with patient's function and recovery. Patient requires skilled PT in order to maximize function. Abided by all precautions and reviewed with patient today.   Family/Caregiver Present No   PT Family training done with: no   Problem List Decreased strength;Decreased range of motion;Decreased endurance;Impaired balance;Decreased mobility;Decreased safety awareness;Orthopedic restrictions   PT Barriers   Physical Impairment Decreased strength;Decreased range of motion;Decreased endurance;Impaired balance;Decreased coordination;Decreased cognition;Impaired judgement;Decreased safety awareness;Pain   Functional Limitation Transfers;Walking;Stair negotiation   Plan   Treatment/Interventions Functional transfer training;LE strengthening/ROM;Therapeutic exercise;Endurance training;Cognitive reorientation;Patient/family training;Equipment eval/education;Bed mobility;Gait training   Progress Progressing toward goals   PT Therapy Minutes   PT Time In 1230   PT Time Out 1300   PT Total Time (minutes) 30   PT Mode of treatment - Individual (minutes) 30   PT Mode of treatment - Concurrent (minutes) 0   PT Mode of treatment - Group (minutes) 0   PT Mode of treatment - Co-treat (minutes) 0   PT Mode of Treatment - Total time(minutes) 30 minutes   PT Cumulative Minutes 818   Therapy Time  missed   Time missed? No

## 2024-07-22 PROBLEM — L05.91 PILONIDAL CYST: Status: ACTIVE | Noted: 2024-07-22

## 2024-07-22 PROCEDURE — 99232 SBSQ HOSP IP/OBS MODERATE 35: CPT | Performed by: PHYSICAL MEDICINE & REHABILITATION

## 2024-07-22 PROCEDURE — 97530 THERAPEUTIC ACTIVITIES: CPT

## 2024-07-22 PROCEDURE — 97110 THERAPEUTIC EXERCISES: CPT

## 2024-07-22 PROCEDURE — 99231 SBSQ HOSP IP/OBS SF/LOW 25: CPT | Performed by: SURGERY

## 2024-07-22 PROCEDURE — 97535 SELF CARE MNGMENT TRAINING: CPT

## 2024-07-22 PROCEDURE — 97116 GAIT TRAINING THERAPY: CPT

## 2024-07-22 RX ORDER — ACETAMINOPHEN 160 MG/5ML
975 SUSPENSION ORAL EVERY 8 HOURS PRN
Status: DISCONTINUED | OUTPATIENT
Start: 2024-07-22 | End: 2024-07-24

## 2024-07-22 RX ORDER — ENOXAPARIN SODIUM 100 MG/ML
30 INJECTION SUBCUTANEOUS EVERY 12 HOURS SCHEDULED
Qty: 9.9 ML | Refills: 0 | Status: CANCELLED | OUTPATIENT
Start: 2024-07-25 | End: 2024-08-11

## 2024-07-22 RX ADMIN — ACETAMINOPHEN 975 MG: 650 SUSPENSION ORAL at 14:23

## 2024-07-22 RX ADMIN — NYSTATIN: 100000 POWDER TOPICAL at 09:54

## 2024-07-22 RX ADMIN — ACETAMINOPHEN 975 MG: 650 SUSPENSION ORAL at 06:24

## 2024-07-22 RX ADMIN — Medication 3 MG: at 21:57

## 2024-07-22 RX ADMIN — ENOXAPARIN SODIUM 30 MG: 30 INJECTION SUBCUTANEOUS at 09:54

## 2024-07-22 RX ADMIN — NYSTATIN: 100000 POWDER TOPICAL at 18:15

## 2024-07-22 RX ADMIN — Medication 125 MCG: at 06:24

## 2024-07-22 RX ADMIN — ENOXAPARIN SODIUM 30 MG: 30 INJECTION SUBCUTANEOUS at 20:23

## 2024-07-22 NOTE — PROGRESS NOTES
"   07/22/24 5280   Pain Assessment   Pain Assessment Tool 0-10   Pain Score No Pain   Restrictions/Precautions   Precautions Aspiration;Bed/chair alarms;Cognitive;Fall Risk;Spinal precautions;Cardiac/sternal;Pacemaker;Supervision on toilet/commode   ROM Restrictions Yes  (pacemaker)   Braces or Orthoses Other (Comment)  (ABD binder)   Lifestyle   Autonomy \"sure I'm up for therapy\"   Eating   Type of Assistance Needed Physical assistance   Physical Assistance Level Total assistance   Comment nursing complete tube feeding w/ pt in bed   Eating CARE Score 1   Sit to Lying   Type of Assistance Needed Supervision   Physical Assistance Level No physical assistance   Comment with use of bed rails and HOB raised   Sit to Lying CARE Score 4   Lying to Sitting on Side of Bed   Type of Assistance Needed Supervision   Physical Assistance Level No physical assistance   Comment use of bed rails   Lying to Sitting on Side of Bed CARE Score 4   Sit to Stand   Type of Assistance Needed Supervision   Physical Assistance Level No physical assistance   Comment DS for STS using RW w/ proper hand placement   Sit to Stand CARE Score 4   Bed-Chair Transfer   Type of Assistance Needed Supervision   Physical Assistance Level No physical assistance   Comment DS using RW   Chair/Bed-to-Chair Transfer CARE Score 4   Exercise Tools   UE Ergometer Pt engaged in ergometer exercise while seated w/ min resistance. Pt completed 10 min prograde and 10 min retrograde. Pt able to tolerate exercise w/o rest break and no SOB. Pt enjoys UB strengthening exercises and strengthening will faciliate ind w/ functional tasks and transfers.   Cognition   Overall Cognitive Status Impaired   Arousal/Participation Alert;Responsive   Attention Attends with cues to redirect   Orientation Level Oriented X4   Memory Decreased long term memory;Decreased short term memory;Decreased recall of recent events;Decreased recall of precautions   Following Commands Follows one " "step commands without difficulty   Comments Pt continues to demo imp memory evidenced by poor recal of spinal and pacemaker precautions. When prompted to recall cervical spinal precautions, pt says \"I didn't even know I had any precautions.\" OT provided spinal and pt unable to recall in 5 min.Pt able to recall 2/3 pacemaker precautions w/o looking at cheat sheet. Pt able to recall 3/3 pacemaker precautions and 1/2 spinal precautions at end of session. Pt continues to report inability to reach back of head to comb hair and able to demo for OT. Pt reports this is a new problem following neck surgery and no pain or soreness reported limiting ROM. OT will continue to review precautions and promote ind w/ combing back of head.   Additional Activities   Additional Activities Other (Comment)   Additional Activities Comments Pt engaged in standing balance activity to improve endurance, dynamic standing balance, and activity tolerance. Pt completed in stance w/ RW playing bean bag toss. Pt able to tolerate standing 3-4 min in stance w/ seated rest breaks. No LOB or dizziness noted throughout activity.   Assessment   Treatment Assessment Pt engaged in skilled OT session w/ focus on UB strengthening, balance, endurance, activity tolerance, functional transfers. Pt is limited by dec endurance, weakness, limited UE ROM, cog deficits, dec safety awareness, and imp memory. Pt engaged in functional mobility and strengthening exercises to improve ind w/ functional tasks. Pt continues to req practice w/ recall of spinal and pacemaker precautions. Pt consistently reports not knowing he had precautions during OT sessions and will continued to need A to maintain safety. Pt demo improved dynamic standing balance and endurance w/ activities. Pt will be assessed for IRP during tmrw OT session using RW. Pt is progressing and plan to meet all LTG by thursday MS.   Prognosis Good   Problem List Decreased strength;Decreased range of " motion;Decreased endurance;Impaired balance;Decreased mobility;Decreased coordination;Decreased cognition;Impaired judgement;Decreased safety awareness;Decreased skin integrity;Orthopedic restrictions   Barriers to Discharge Inaccessible home environment;Decreased caregiver support   Plan   Treatment/Interventions ADL retraining;Functional transfer training;Therapeutic exercise;Endurance training;Cognitive reorientation;Patient/family training;Equipment eval/education;Compensatory technique education   Progress Progressing toward goals   OT Therapy Minutes   OT Time In 1330   OT Time Out 1430   OT Total Time (minutes) 60   OT Mode of treatment - Individual (minutes) 60   OT Mode of treatment - Concurrent (minutes) 0   OT Mode of treatment - Group (minutes) 0   OT Mode of treatment - Co-treat (minutes) 0   OT Mode of Treatment - Total time(minutes) 60 minutes   OT Cumulative Minutes 630   Therapy Time missed   Time missed? No

## 2024-07-22 NOTE — CONSULTS
Consultation - General Surgery  Luis Forrester 54 y.o. male MRN: [id number]  Unit/Bed#: [facility] 972-01 Encounter: [id number]        Assessment & Plan     Assessment:  53 yo M now currently in ARC s/p recent trauma admission and PEG tube placement, consult placed for further assessment of pilonidal cyst.    Plan:  No signs of local or systemic infection at this time  Some minimal blanching around the pilonidal cyst  No active drainage during exam  Will continue to monitor for next 24-48 hours  No abx at this time  Most likely can follow up with general surgery in the outpatient setting for elective removal of the pilonidal cyst    History of Present Illness     HPI:  Luis Forrester is a 54 y.o. male who is currently in ARC after a recent trauma admission and PEG tube placement by our general surgery team. The patient states he has had a pilonidal cyst for years and wanted to get it evaluated but never had a chance and wanted an inpatient evaluation by our surgery team. He denies having nausea, vomiting, fevers, chills, chest pain, shortness of breath. He is tolerating tube feeds, having regular bowel function, and voiding without difficulty. PMHx/PSHX includes arthritis, hypoparathyroidism, and cardiac pacer implant along with decompression of cervical spine with C2-T1 fusion for cervical canal stenosis. The patient is currently afebrile and hemodynamically normal. Most recent lab work demonstrates an normal WBC. See above for assessment and plan:    Review of Systems  See above, otherwise negative.    Historical Information   Past Medical History:   Diagnosis Date    Arthritis     Chronic cough     Disease of thyroid gland     Hypoparathyroidism (HCC)     continue taking calcium and vitamin D, will check CMP, PTH level and Vitamin D level    Pneumonia of right middle lobe due to Streptococcus pneumoniae (HCC) 11/30/2018    s/p Medtronic dual chamber PPM 6/21/2024 06/21/2024     Past Surgical History:   Procedure  "Laterality Date    CARDIAC ELECTROPHYSIOLOGY PROCEDURE N/A 6/21/2024    Procedure: Cardiac pacer implant;  Surgeon: Kofi Pettit MD;  Location: [facility];  Service: Cardiology    DECOMPRESSION SPINE CERVICAL POSTERIOR N/A 6/16/2024    Procedure: DECOMPRESSION SPINE CERVICAL POSTERIOR C2-T1 with fusion navigated with Oarm;  Surgeon: Endy Vealsquez MD;  Location: [facility] MAIN OR;  Service: Neurosurgery    FRACTURE SURGERY      Open Treatment of Each Proximal Finger Phalanx    GASTROSTOMY TUBE PLACEMENT N/A 7/10/2024    Procedure: INSERTION PEG TUBE;  Surgeon: Darcy Reinoso MD;  Location: [facility] MAIN [facility];  Service: General     Social History   Social History     Substance and Sexual Activity   Alcohol Use Yes    Comment: occasionally     Social History     Substance and Sexual Activity   Drug Use No     Social History     Tobacco Use   Smoking Status Former   Smokeless Tobacco Never   Tobacco Comments    pt \"tried it when he was a teenager but did not like them\"     Family History: non-contributory    Meds/Allergies   all medications and allergies reviewed  Allergies   Allergen Reactions    Chlorine Rash       Objective   First Vitals:   Blood Pressure: 92/55 (07/12/24 1436)  Pulse: 65 (07/12/24 1436)  Temperature: 98.1 °F (36.7 °C) (07/12/24 1436)  Temp Source: Oral (07/12/24 1436)  Respirations: 18 (07/12/24 1436)  Height: 5' 7\" (170.2 cm) (07/12/24 1436)  Weight - Scale: 69.3 kg (152 lb 12.5 oz) (07/12/24 1436)  SpO2: 94 % (07/12/24 1436)    Current Vitals:   Blood Pressure: 110/72 (07/22/24 1324)  Pulse: 84 (07/22/24 1324)  Temperature: 98.1 °F (36.7 °C) (07/22/24 1324)  Temp Source: Oral (07/22/24 1324)  Respirations: 18 (07/22/24 1324)  Height: 5' 7\" (170.2 cm) (07/12/24 1436)  Weight - Scale: 69.3 kg (152 lb 12.5 oz) (07/17/24 0545)  SpO2: 94 % (07/22/24 1324)      Intake/Output Summary (Last 24 hours) at 7/22/2024 6438  Last data filed at 7/22/2024 1815  Gross per 24 hour   Intake 2322 ml   Output " --   Net 2322 ml       Invasive Devices       Drain  Duration             Gastrostomy/Enterostomy Percutaneous Endoscopic Gastrostomy (PEG) 20 Fr. LUQ 12 days                    Physical Exam:  General: No acute distress  Neuro: Alert, oriented to person and place  Eyes: Extraocular movements intact  CV: Well perfused, regular rate and rhythm  Lungs: Normal work of breathing, no increased respiratory effort  Abdomen: Soft, non-tender, non-distended. PEG tube in place  Gluteal region: Pilonidal cyst appreciated. Open cyst with no drainage. No fluctuance and no obvious abscess.  Extremities: No edema, clubbing or cyanosis  Skin: Warm, dry  Lymph: No palpable lymph nodes  Psych: Normal mentation      Lab Results: I have personally reviewed pertinent lab results.    Imaging: I have personally reviewed pertinent reports.    EKG, Pathology, and Other Studies: I have personally reviewed pertinent reports.      Code Status: Level 1 - Full Code  Advance Directive and Living Will:      Power of :    POLST:      Dexter Velazco MD  General Surgery Resident

## 2024-07-22 NOTE — PROGRESS NOTES
07/22/24 1200   Pain Assessment   Pain Assessment Tool 0-10   Pain Score No Pain   Restrictions/Precautions   Precautions Aspiration;Bed/chair alarms;Cognitive;Fall Risk;Pacemaker;Supervision on toilet/commode;Spinal precautions  (NPO tube feeds only, C/S precautions)   ROM Restrictions Yes  (spinal precautions, pacemaker precautions)   General   Change In Medical/Functional Status please assess for IRPs with RW next session   Cognition   Overall Cognitive Status Impaired   Arousal/Participation Alert;Responsive   Subjective   Subjective Pt with no new complaints to report, looking forward to d/c home Thurs 7/25. Asked PT to brush hair start of session   Sit to Stand   Type of Assistance Needed Set-up / clean-up   Comment RW   Sit to Stand CARE Score 5   Bed-Chair Transfer   Type of Assistance Needed Set-up / clean-up   Comment RW   Chair/Bed-to-Chair Transfer CARE Score 5   Walk 10 Feet   Type of Assistance Needed Set-up / clean-up   Comment RW   Walk 10 Feet CARE Score 5   Walk 50 Feet with Two Turns   Type of Assistance Needed Set-up / clean-up   Comment RW   Walk 50 Feet with Two Turns CARE Score 5   Walk 150 Feet   Type of Assistance Needed Supervision   Comment RW   Walk 150 Feet CARE Score 4   Walking 10 Feet on Uneven Surfaces   Type of Assistance Needed Supervision   Comment CS with RW outdoors, 1 LOB to L when going over uneven ramped surface   Walking 10 Feet on Uneven Surfaces CARE Score 4   Ambulation   Primary Mode of Locomotion Prior to Admission Walk   Distance Walked (feet) 800 ft  (x1, 600'x1, 100x2)   Assist Device Roller Walker   Gait Pattern Inconsistant Martha;Slow Martha;Decreased foot clearance;Narrow YARIEL;Shuffle;Improper weight shift   Limitations Noted In Balance;Coordination;Endurance;Heel Strike;Safety;Speed;Strength   Walk Assist Level Distant Supervision;Supervision   Findings sneakers donned for all ambulation this session. DS for household distances with use of RW, recommend CS  "outdoors as pt with decreased safety awareness over uneven surfaces, would benefit from ongoing ambulation trials outdoors especially on ramped surfaces   Does the patient walk? 2. Yes   Wheel 50 Feet with Two Turns   Reason if not Attempted Activity not applicable   Wheel 50 Feet with Two Turns CARE Score 9   Wheel 150 Feet   Reason if not Attempted Activity not applicable   Wheel 150 Feet CARE Score 9   Curb or Single Stair   Style negotiated Curb   Type of Assistance Needed Supervision   Comment CS outdoors on curb step   1 Step (Curb) CARE Score 4   4 Steps   Type of Assistance Needed Supervision   Comment CS with R HR, pt prefered retro descent this session, cueing for foot placement during descent for safety   4 Steps CARE Score 4   12 Steps   Type of Assistance Needed Supervision   Comment CS with R HR, pt prefered retro descent this session, cueing for foot placement during descent for safety   12 Steps CARE Score 4   Stairs   Type Stairs;Curb   # of Steps 12   Weight Bearing Precautions Fall Risk   Assist Devices Roller Walker;Single Rail   Therapeutic Interventions   Other pt requesting PT brush hair start and end of session. PT encouraged pt to perform on his own in which pt reports \"I can't do it because of my restrictions.\" Educated pt that while pt cannot reach overhead due to pacer precautionso on L pt is still able to brush hair with R arm. Pt reports that he did believe restrictions limited him from brushing hair but also states that his arm feels tight and he can't reach back of head now due to lack of ER. Passed along to KETTY Underwood to work on with pt   Equipment Use   NuStep lvl 2, 15 mins, B/L LEs for conditioning; pt requesting to perform 20 mins next session   Assessment   Treatment Assessment Session focusing on outdoor mobility trng as well as assessing pt perform mobilities in room for eventual progression to IRPs next session. At this time pt DS for transfers and household distance " ambulation with use of RW. Would recommend ongoing practice for outdoor mobility as pt reports he often walks places and pt cont with decreased safety awareness outdoors as pt with LOB on ramped area needing vc's to slow down for safety and to be mindful of uneven surfaces. At this time planning for d/c home 7/25, plan to capture all care scores in upcoming session in prep for d/c as well as assess for IRPs with RW and cont with outdoor mobility trng.   Family/Caregiver Present no   Problem List Decreased strength;Decreased range of motion;Decreased endurance;Impaired balance;Decreased mobility;Decreased coordination;Decreased cognition;Impaired judgement;Decreased safety awareness;Decreased skin integrity;Orthopedic restrictions   Barriers to Discharge Inaccessible home environment;Decreased caregiver support   PT Barriers   Functional Limitation Transfers;Walking;Stair negotiation   Plan   Treatment/Interventions Functional transfer training;LE strengthening/ROM;Elevations;Therapeutic exercise;Endurance training;Bed mobility;Gait training   Progress Progressing toward goals   Discharge Recommendation   Rehab Resource Intensity Level, PT   (home PT)   Equipment Recommended Walker   PT Therapy Minutes   PT Time In 1200   PT Time Out 1300   PT Total Time (minutes) 60   PT Mode of treatment - Individual (minutes) 60   PT Mode of treatment - Concurrent (minutes) 0   PT Mode of treatment - Group (minutes) 0   PT Mode of treatment - Co-treat (minutes) 0   PT Mode of Treatment - Total time(minutes) 60 minutes   PT Cumulative Minutes 878   Therapy Time missed   Time missed? No

## 2024-07-22 NOTE — CASE MANAGEMENT
Tube feed order and supplies placed with [facility] via parachute for items to be scheduled for delivery on 7/24, the day before pts dc.

## 2024-07-22 NOTE — NURSING NOTE
Patient had increasing confusion as day progressed, at 1700 patient used call light, when this RN went to room he was confused and unable to express need. Offered urinal. As night progressed patient was restless and was attempting to get up and out of bed, he removed alleyvn bandage to heel and threw it across the room, from 1830 to 1930 patient had numerous attempts to get up out of bed, one time was found up in room standing w/out any socks or pants on. Patient was not redirectable at that time, oncoming RN to reach out to provider on call to see if changing from VRC to 1:1 care would be appropriate for patients safety.

## 2024-07-22 NOTE — PROGRESS NOTES
"[facility]  Progress Note  Name: Luis Forrester I  MRN: [id number]  Unit/Bed#: -01 I Date of Admission: 7/12/2024   Date of Service: 7/22/2024 I Hospital Day: 10    Assessment & Plan   * Cervical myelopathy (HCC)  Assessment & Plan  S/p C3-7 laminectomy with C2-T1 fusion on 6/16/24 due to central cord syndrome following a fall.    Completed post-op abx.  No collar necessary.  Monitor incision  NS follow-up around 8/2/24 for 6 week post-op appt.  Therapy and pain control per PMR  DC 7/25    Dysphagia  Assessment & Plan  S/p PEG placement by Dr Reinoso 7/10/24  Pt tolerating tube feeds with Jevity boluses 6x/day and no oral diet  ST working with him to improving swallowing  Nutrition following.  Outpt follow-up for laryngoscope and FEES.    s/p Medtronic dual chamber PPM 6/21/2024  Assessment & Plan  Pt has a 6 month history of syncopal events.  Found to be bradycardic in the 40s.  Outpt follow-up with EP    SDH (subdural hematoma) (HCC)  Assessment & Plan  Not on antiplatelet or AC as an outpt.  Repeat head CT for any change in mental status.  This occurred after a syncopal fall in June 2024.     Syncope and collapse  Assessment & Plan  No events since PPM placement but his systolic BPs do run in the 90's  No c/o dizziness    Hypothyroidism  Assessment & Plan  Continue levothyroxine.  Due for repeat TSH around  8/2/24.             The above assessment and plan was reviewed and updated as determined by my evaluation of the patient on 7/22/2024.    Labs:             Invalid input(s): \"LABGLOM\", \"CMP\"                Imaging  No orders to display       Review of Scheduled Meds:  Current Facility-Administered Medications   Medication Dose Route Frequency Provider Last Rate    acetaminophen  975 mg Per PEG Tube Q8H Kim Li PA-C      albuterol  2 puff Inhalation Q4H PRN Kim Li PA-C      benzocaine  2 spray Mucosal 4x Daily PRN Kim Li, " BRONWYN      bisacodyl  10 mg Rectal Daily PRN Luke Suggs MD      dextromethorphan-guaiFENesin  10 mL Per PEG Tube Q4H PRN Kim Li PA-C      enoxaparin  30 mg Subcutaneous Q12H ASHLYN Kim Li PA-C      levothyroxine  125 mcg Per PEG Tube Early Morning Kim Li PA-C      melatonin  3 mg Per PEG Tube HS Kim Li PA-C      nystatin   Topical BID Kim Li PA-C      ondansetron  4 mg Per PEG Tube Q6H PRN Kim Li PA-C      oxyCODONE  2.5 mg Oral Q4H PRN Luke Suggs MD      polyethylene glycol  17 g Oral Daily PRN Luke Suggs MD      saliva substitute  5 spray Mouth/Throat 4x Daily PRN Kim Li PA-C         Subjective/ HPI: Patient seen and examined. Patients overnight issues or events were reviewed with nursing staff. New or overnight issues include the following:     Pt seen in his room. He denies any current complaints.    ROS:   A 10 point ROS was performed; negative except as noted above.        *Labs /Radiology studies Reviewed  *Medications  reviewed and reconciled as needed  *Please refer to order section for additional ordered labs studies      Physical Examination:  Vitals:   Vitals:    07/21/24 0626 07/21/24 1431 07/21/24 2055 07/22/24 0548   BP: 96/68 100/62 91/63 106/61   BP Location: Right arm Right arm Right arm Right arm   Pulse: 80 74 75 79   Resp: 18 16 16 17   Temp: 97.9 °F (36.6 °C) 98 °F (36.7 °C) 98 °F (36.7 °C) 98.2 °F (36.8 °C)   TempSrc: Oral Oral Oral Oral   SpO2: 94% 95% 98% 97%   Weight:       Height:           General Appearance: NAD; pleasant  HEENT: PERRLA, conjuctiva normal; mucous membranes moist; face symmetrical  Neck:  Supple  Lungs: clear bilaterally, normal respiratory effort, no retractions, expiratory effort normal, on room air  CV: regular rate and rhythm, no murmurs rubs or gallops noted   ABD: soft non tender, +BS x4, PEG intact  EXT: DP pulses intact, no lymphadenopathy,  no edema  Skin: normal turgor, normal texture, no rash  Psych: affect normal, mood normal  Neuro: Awake and alert     The above physical exam was reviewed and updated as determined by my evaluation of the patient on 7/22/2024.    Invasive Devices       Drain  Duration             Gastrostomy/Enterostomy Percutaneous Endoscopic Gastrostomy (PEG) 20 Fr. LUQ 11 days                       VTE Pharmacologic Prophylaxis: Enoxaparin  Code Status: Level 1 - Full Code  Current Length of Stay: 10 day(s)    Total floor / unit time spent today  35 minutes   Coordination of patient's care was performed in conjunction with consulting services. Time invested included review of patient's labs, vitals, and management of their comorbidities with continued monitoring, examination of patient as well as answering patient questions, documenting her findings and creating progress note in electronic medical record,  ordering appropriate diagnostic testing.       ** Please Note:  voice to text software may have been used in the creation of this document. Although proof errors in transcription or interpretation are a potential of such software**

## 2024-07-22 NOTE — PROGRESS NOTES
Physical Medicine and Rehabilitation Progress Note  Luis Forrester 54 y.o. male MRN: [id number]  Unit/Bed#: [facility] 972-01 Encounter: [id number]    To Review: 53-year-old male with PMH of asthma, HLD, hypothyroidism, who was having intermittent syncopal episodes over the last year who was found down by daughter after presumable other syncopal episode now complaining of short-term mid and upper back pain as well as pain limiting in hands and weakness of the upper extremities.  Patient was brought to hospital where CT head showed acute trace subarachnoid hemorrhage in the bilateral parafalcine regions.  CT of the T-spine showed DISH without acute or osseous abnormalities.  CTA head and neck did not show acute traumatic vascular injury, high-grade stenosis or dissection.  CT C spine showed no cervical fx or traumatic malalignment with mod canal and severe b/l multilevel NF stenosis.  MRI C spine shows moderate to severe canal stenosis with mild cord compression most pronounced at C3-4 and C5-6.  It also showed severe bilateral foraminal stenosis at same levels along with congenital canal stenosis with superimposed degenerative spondylosis.  Neurosurgery consulted on patient and diagnosed with parafalcine subdural hemorrhage with bilateral subarachnoid hemorrhage as well as operative intervention for the cervical stenosis with myelopathy and upper extremity weakness.  Patient underwent C3-C7 cervical laminectomy with bilateral screw placement and fusion C2 to-T1 on 6/16.  Patient recommended maps greater than 85 for 5 days and has been on 2 vasopressors.  He had his Hemovac drain removed on 6/19.  He was recommended 2 weeks of antibiotics for infectious prophylaxis per neurosurgery.  He has not required to have a cervical collar at this time.  Acute pain service consulted and have been managing with multimodal pain regiment including IV opiates.  Patient noted to have symptomatic junctional bradycardia with asymptomatic  "wide-complex tachycardia on telemetry for which EP was consulted.  They felt presentation mixture of sick sinus syndrome and wide-complex tachycardia likely SVT with aberrancy and felt reasonable to place dual-chamber pacemaker given history of multiple syncopal episodes and now documented bradycardia. Course also notable for hyponatremia and polyuria for which nephrology was consulted (and now improved).  Patient did receive 2 doses of DDAVP as well as intermittent IV fluids.\"     Course also notable for abdominal distension which was favored to be ileus as well as dysphagia and patient failing multiple VBS.  He is noted to have mild oral and moderate pharyngeal dysphagia and eventually was recommended PEG tube and to continue NPO diet.  Patient was evaluated by skilled therapies and was found to have significant decline in ADLs and ambulation and appears appropriate for admission to [facility].    Chief Complaint: Feeling well.     Interval History/Subjective:  No acute events overnight. Per nursing, patient has bright red bleeding from a R gluteal cyst. Patient reports that he has had a cyst in the R gluteal fold for many years: it will occasionally bleed from time to time, but it usually self-resolves. Patient states he has never seen a doctor to address this. No new numbness/tingling/weakness. Tolerating tube feeds. Last BM 7/21. No new CP, SOB, fevers, chills, N/V, abdominal pain.     ROS:  A 10 point review of systems was negative except for what is noted in the HPI.    Today's Changes:  Will reach out to general surgery regarding bleeding pilonidal cyst  Long term/SSA/SSD has been filled out by PCP. Will give forms to  for safekeeping during hospitalization  Continue current plan of care.     Assessment/Plan:    * Cervical myelopathy (HCC)  Assessment & Plan  Recent with residual neck pain, distal RUE weakness, impaired mobility   - MRI cervical spine without " 6/15/2024:Congenital canal stenosis with superimposed degenerative spondylosis .Most pronounced at C3-4 and C5-C6 with moderate to severe canal stenosis and mild cord compression. Evaluation of cord signal is limited due to artifact. No definite abnormal cord signal but mild cord edema would be difficult to exclude. Severe bilateral foraminal stenosis also seen at C3-4 and C5-C6.Mild to moderate canal stenosis at other levels  - S/P PCDF C2-T1 on 6/16 by NeuroSx Dr Lopez  - Monitor incision site   - No collar required  - Admission: Examines C3 AIS D (some impairment L c4 pinprick, but overall very good sensation), proprioception in ankles intact, and strength quite good with weakness starting in finger flexors bilaterally.  - Activity Restrictions: No heavy lifting greater than 5 - 10lbs. No strenuous activities. No driving while requiring cervical collar, anticipated six weeks. No significant neck movement.  - May shower 3 days after surgery, but do not soak in a tub and no swimming, use mild antimicrobial soap and water. Pat incision dry after showering.  - Monitor for neuro changes, dysphagia (has significant on tube feeds), neurogenic bowel/bladder, spasticity  - Recommend acute comprehensive interdisciplinary inpatient rehabilitation to include intensive skilled therapies (PT, OT) as outlined with oversight and management by rehabilitation physician as well as inpatient rehab level nursing, case management and weekly interdisciplinary team meetings.   - Optimal pain management  - Fall precautions   - Follow-up with Spine Sx and if needed PMR after d/c       At risk for altered bowel elimination  Assessment & Plan  Course also notable for abdominal distension which was favored to be ileus   - Recent occasional loose stools now on PEG tube feeds  - Currenlty on miralax daily  - PRN suppository  - Overall continent.   - Monitor    Pain  Assessment & Plan  APAP 975mg TID  - will try to make PRN prior to  discharge  Off Gabapentin now  Oxycodone 2.5mg oral soltn Q4H PRN - has not used the past week, Likely discontinue at discharge.  Monitor for oversedation, AMS/delirium, and respiratory depression   If being administered - hold opiates, muscle relaxants, benzodiazepines, and gabapentin for oversedation, AMS, or RR<12.  Counseled on and continue to encourage deep breathing/relaxation/behavioral pain management techniques      Dysphagia  Assessment & Plan  - Recommend ENT c/s for ongoing prolonged dysphagia   - Patient declined laryngoscope initially, he is now open to it.   - ENT not convinced of laryngeal component of dysphagia. Recommending outpatient follow-up for that.   - Our SLP cannot do FEES, and ENT does not do FEES either. Perhaps outpatient SLP.   - Current diet: NPO - failed multiple VBS    - Mild oral and moderate pharyngeal dysphagia  - NGT feeds, nutrition c/s   - Family training for tube feeds   - SLP evaluate and treat decline in swallowing.  - Ensure adequate hydration  - Nutrition consult to assist with management   - Outpatient f/u with ENT     SDH (subdural hematoma) (HCC)  Assessment & Plan  Following syncope and being found down   CTH 6/15 - New acute trace subarachnoid hemorrhage in bilateral parafalcine regions. Recommend dedicated CTA head with contrast for further evaluation.  New acute trace parafalcine subdural hematoma.  CTH 6/18 - 1. Near completely resolved parafalcine subarachnoid hemorrhage with trace residual subarachnoid hemorrhage in the frontoparietal regions medially. No mass effect or hydrocephalus. 2. No large territorial infarction.  - Cleared for lovenox 30 SQ BID for VTE prophylaxis by prior providers     - Current/recent mood/affect/behavior/cog - acceptable, largely euthymic  - Remains at risk for increased confusion/delirium, restlessness, agitation, and fall - continue to monitor for concerns/changes  - Current psychotropics and potentially sedating medications:    Gabapentin 100mg QHS  Melatonin 3mg HS   Oxy 2.5mg Q4h PRN (not using recently)   - Monitor for need for 1:1 (Notify MD of potentially dangerous behavior such as significant impulsivity and trying to stand/get out of bed/chair unattended that could lead to fall/injury); High fall precautions with frequent rounding, patient close to nursing station if possible, frequent toileting/frequent offering urinal/bedpan (only if too immobile for safe toileting);  bed/chair alarm on at all times (high setting if needed); fall junior on side of bed (at discretion of nursing/therapy); do not leave unattended in bathroom, patient education; call bell availability; Sleep/agitation log, frequent redirection, reorientation, reassurance; Family access to patient if helpful  - No recent reports of dangerous/risky behavior requiring 1:1 at this point but monitor closely  - Recommend acute comprehensive interdisciplinary inpatient rehabilitation to include intensive skilled therapies (PT, OT, ST) with oversight and management by rehabilitation physician.  Inpatient rehab level nursing, case management and weekly interdisciplinary team meetings. Provide patient and (if available) caregiver/family teaching regarding brain injury/residual disability management.    - For routine restlessness, anxiety, irritability focus on non-pharmacologic management    - Obtain MOCA and if needed ACLS during ARC course   - Please assess iADLs - ability to manage medications, meal prep, finances, obtaining appropriate transport from community, managing emergencies, and overall safety in home environment.  - Provide therapy, compensatory strategies, and if available and necessary provide caregiver training.  Notify MD of impairments or inability to perform iADLs adequately.   - Monitor neuro-exam, wakefulness, mood, cognition, insight into deficits and safety awareness   - Monitor and ensure optimal management electrolytes, nutrition, and hydration  -  Monitor for signs or symptoms of infection, medication intolerances, other systemic etiologies  - Additional labs, imaging, specialist follow-up as needed   - Patient/family/caregiver education and training   - Overstimulation precautions, frequent re-orientation, re-direction, re-assurance  - Optimal mood, pain, and sleep management  - Sleep log and agitation monitoring    - Limit sedating medications when possible  - Follow-up with neurosx after discharge if needed and if needed during ARC course as well as PCP      Thrush  Assessment & Plan  Nystatin pain and suction QID x7 d  Oral care   Monitor     At risk for venous thromboembolism (VTE)  Assessment & Plan  SCDs, ambulation, and lovenox   Recommend discharge home with 8 weeks lovenox for DVT Ppx given possible acute SCI.  Will need lovenox training. Discussed with team.   -Thru 8/10       s/p Medtronic dual chamber PPM 6/21/2024  Assessment & Plan  Noted to have symptomatic junctional bradycardia with asymptomatic wide-complex tachycardia on telemetry for which EP was consulted.  They felt presentation mixture of sick sinus syndrome and wide-complex tachycardia likely SVT with aberrancy and felt reasonable to place dual-chamber pacemaker given history of multiple syncopal episodes and now documented bradycardia.  2/2 bradycardia and recurrent syncopal episodes  Pacer precautions for 6 weeks.   OP EP follow-up     Syncope and collapse  Assessment & Plan  Believed to be related to arrhythmias/bradycardia s/p PPM placement   Monitor vitals  OP cards/PCP     Hypothyroidism  Assessment & Plan  Levothyroxine     Leukocytosis-resolved as of 7/15/2024  Assessment & Plan  Up to 13.6 7/11  Internal medicine consult and management during ARC course  Patient afebrile, other vitals unremarkable > continue to monitor   No clear signs or symptoms of infection or VTE but will continue to monitor  Monitor CBC intermittently             Health Maintenance  #Bladder: Voiding and  "continent. Checking PVR x3. If all <150cc can discontinue.   #Skin/Pressure Injury Prevention: Turn Q2hr in bed, with weight shifts [id number] in wheelchair.  #GI Prophylaxis: Not indicated  #Code Status: Full Code  #FEN: See Dysphagia above  #Dispo: Team 7/17: ADD 7/25 with goal to discharge home with Home PT/OT/SLP/RN. Supervision from his girlfriend who will undergo family training. Lives in a home with 1+1 step with FFSU available. Was previously completely independent  OUTpatient f/u: Neurosurgery, PCP, ENT    Objective:    Functional Update:  PT: Set up transfers, Sup bed mobility, CGA ambulation 800' with RW, CS stairs   OT: TotalA eating, Sup grooming, Sup bathing, Sup-[place] UB/LB dressing, modA toileting, RW S tub/shower transfers, Sup toilet transfers   SLP: RAY 16/30 - with severe neurocog disorder. NPO given oropharyngeal dysphagia. Dep eating, modA comprehension, [place] expression, Sup social interaction, mod-maxA executive function, mod-maxA memory     Allergies per EMR    Physical Exam:  Temp:  [98 °F (36.7 °C)-98.2 °F (36.8 °C)] 98.1 °F (36.7 °C)  HR:  [75-84] 84  Resp:  [16-18] 18  BP: ()/(61-72) 110/72  Oxygen Therapy  SpO2: 94 %    Gen: No acute distress, Well-nourished, well-appearing.  HEENT: Moist mucus membranes, Normocephalic/Atraumatic  Cardiovascular: Regular rate, rhythm, S1/S2.  Heme/Extr: No edema  Pulmonary: Non-labored breathing. Lungs CTAB  : No poon  GI: Soft, non-tender, non-distended. + G tube.  Integumentary: Skin is warm, dry. + Wound on right intergluteal cleft, with no active bleeding.  Neuro: AAOx3, Speech is intact. Appropriate to questioning.   Psych: Normal mood and affect.     Diagnostic Studies: Reviewed, no new imaging    Laboratory:  Reviewed         Invalid input(s): \"PLTCT\"          Invalid input(s): \"CA\"           Patient Active Problem List   Diagnosis    Allergic rhinitis    Arterial ectasia (HCC)    Chronic low back pain    Hyperlipidemia    Hypothyroidism "    Lumbar radiculopathy    Mass of parotid gland    Osteoarthritis of both hands    Reactive airway disease    Vitamin D deficiency    Encounter for immunization    TBI (traumatic brain injury) ([facility])    Syncope and collapse    Bilateral hand pain    SDH (subdural hematoma) ([facility])    Right hand weakness    SSS (sick sinus syndrome) ([facility])    s/p Medtronic dual chamber PPM 6/21/2024    Dysphagia    Cervical stenosis of spinal canal    Cervical myelopathy ([facility])    At risk for venous thromboembolism (VTE)    Pain    Thrush    At risk for altered bowel elimination    Pilonidal cyst         Medications  Current Facility-Administered Medications   Medication Dose Route Frequency Provider Last Rate    acetaminophen  975 mg Per PEG Tube Q8H Kimjen Li PA-C      albuterol  2 puff Inhalation Q4H PRN Kim Li PA-C      benzocaine  2 spray Mucosal 4x Daily PRN Kim Li PA-C      bisacodyl  10 mg Rectal Daily PRN Luke Suggs MD      dextromethorphan-guaiFENesin  10 mL Per PEG Tube Q4H PRN Kim Li PA-C      enoxaparin  30 mg Subcutaneous Q12H ASHLYN Kim Li PA-C      levothyroxine  125 mcg Per PEG Tube Early Morning Kim Li PA-C      melatonin  3 mg Per PEG Tube HS Kim Li PA-C      nystatin   Topical BID Kim Li PA-C      ondansetron  4 mg Per PEG Tube Q6H PRN Kim Li PA-C      oxyCODONE  2.5 mg Oral Q4H PRN Luke Suggs MD      polyethylene glycol  17 g Oral Daily PRN Luke Suggs MD      saliva substitute  5 spray Mouth/Throat 4x Daily PRN Kim Li PA-C            ** Please Note: Fluency Direct voice to text software may have been used in the creation of this document. **

## 2024-07-22 NOTE — PLAN OF CARE
Problem: PAIN - ADULT  Goal: Verbalizes/displays adequate comfort level or baseline comfort level  Description: Interventions:  - Encourage patient to monitor pain and request assistance  - Assess pain using appropriate pain scale  - Administer analgesics based on type and severity of pain and evaluate response  - Implement non-pharmacological measures as appropriate and evaluate response  - Consider cultural and social influences on pain and pain management  - Notify physician/advanced practitioner if interventions unsuccessful or patient reports new pain  Outcome: Progressing     Problem: INFECTION - ADULT  Goal: Absence or prevention of progression during hospitalization  Description: INTERVENTIONS:  - Assess and monitor for signs and symptoms of infection  - Monitor lab/diagnostic results  - Monitor all insertion sites, i.e. indwelling lines, tubes, and drains  - Monitor endotracheal if appropriate and nasal secretions for changes in amount and color  - [place] appropriate cooling/warming therapies per order  - Administer medications as ordered  - Instruct and encourage patient and family to use good hand hygiene technique  - Identify and instruct in appropriate isolation precautions for identified infection/condition  Outcome: Progressing     Problem: SAFETY ADULT  Goal: Patient will remain free of falls  Description: INTERVENTIONS:  - Educate patient/family on patient safety including physical limitations  - Instruct patient to call for assistance with activity   - Consult OT/PT to assist with strengthening/mobility   - Keep Call bell within reach  - Keep bed low and locked with side rails adjusted as appropriate  - Keep care items and personal belongings within reach  - Initiate and maintain comfort rounds  - Make Fall Risk Sign visible to staff  - Offer Toileting every 2 Hours, in advance of need  - Initiate/Maintain bed/chair alarm  - Obtain necessary fall risk management equipment: alarms  - Apply  yellow socks and bracelet for high fall risk patients  - Consider moving patient to room near nurses station  Outcome: Progressing

## 2024-07-22 NOTE — PROGRESS NOTES
"   07/22/24 0840   Pain Assessment   Pain Assessment Tool 0-10   Pain Score No Pain   Restrictions/Precautions   Precautions Aspiration;Bed/chair alarms;Cognitive;Fall Risk;Pacemaker;Supervision on toilet/commode;Spinal precautions  (NPO tube feeds only, C/S precautions)   Weight Bearing Restrictions Yes  (spinal precautions, pacemaker precautions)   ROM Restrictions Yes  (spinal precautions, pacemaker precautions)   Braces or Orthoses Other (Comment)  (abdominal binder)   Lifestyle   Autonomy \"I'm doing okay. Not bad for a Monday.\"   Oral Hygiene   Type of Assistance Needed Supervision   Physical Assistance Level No physical assistance   Comment Seated, oral hygiene completed with S with VC for instruction utilizing suction toothbrush. Seated, patient requires min A to brush back of hair because pt reports \"I can't reach\" despite multisensory cuing on ways to complete while maintaing precautions   Oral Hygiene CARE Score 4   Shower/Bathe Self   Type of Assistance Needed Supervision   Physical Assistance Level No physical assistance   Comment Shower completed  seated/standing on this date.  Pt able to bathe all parts with long handled sponge as needed to maintain precautions. Patient requires multisensory cues to show pt how to wash groin/buttock as pt believes he cannot complete without breaking precautions reporting, \"I can't! I can't!\". Patient maintains spinal/pacemaker precautions throughout.   Shower/Bathe Self CARE Score 4   Tub/Shower Transfer   Findings Functional mobility to/from bathroom and shower transfer on/off shower chair with grab bars and RW S.   Upper Body Dressing   Type of Assistance Needed Physical assistance   Physical Assistance Level 26%-50%   Comment S to doff shirt, min A to don OH and A with abdominal binder   Upper Body Dressing CARE Score 3   Lower Body Dressing   Type of Assistance Needed Physical assistance   Physical Assistance Level 25% or less   Comment Seated, pt able manage " underwear/pants off feet. Min A to don underwear over LLE and S to don pants over BLEs utilizing reacher as needed to maintain precautions. CS for CM in stance with RW.   Lower Body Dressing CARE Score 3   Putting On/Taking Off Footwear   Type of Assistance Needed Supervision   Physical Assistance Level No physical assistance   Comment Seated, S to do hospital socks utilizing dressing stick and S to don hospital socks utilizing sock aid with increased time.   Putting On/Taking Off Footwear CARE Score 4   Sit to Lying   Type of Assistance Needed Incidental touching   Physical Assistance Level No physical assistance   Sit to Lying CARE Score 4   Lying to Sitting on Side of Bed   Type of Assistance Needed Incidental touching   Physical Assistance Level No physical assistance   Lying to Sitting on Side of Bed CARE Score 4   Sit to Stand   Type of Assistance Needed Supervision   Physical Assistance Level No physical assistance   Comment RW   Sit to Stand CARE Score 4   Bed-Chair Transfer   Type of Assistance Needed Supervision   Physical Assistance Level No physical assistance   Comment RW   Chair/Bed-to-Chair Transfer CARE Score 4   Toileting Hygiene   Type of Assistance Needed Physical assistance   Physical Assistance Level 26%-50%   Comment A for buttocks hygiene as pt insists he cannot do and needs help   Toileting Hygiene CARE Score 3   Toilet Transfer   Type of Assistance Needed Supervision   Physical Assistance Level No physical assistance   Comment RW   Toilet Transfer CARE Score 4   Activity Tolerance   Activity Tolerance Patient tolerated treatment well   Medical Staff Made Aware Upon arrival , therapist noticed blood on sheets. LUCRECIA Montes made aware and assesses- determines  from buttocks. Dr Depadua assesses and determines from cyst which pt confirms he has had. LUCRECIA Montes also made aware pt voided large, formed BM however despite hygiene having residual BM throughout session requiring frequent hygiene.  "  Assessment   Treatment Assessment Patient engaged in 90 min treatment session with focus on ADL retraining, functional transfers/mobility with RW, use of LHAE, and education of pacemaker/spinal precautions..Upon arrival, pt has blood on sheets, LUCRECIA Montes and Dr. Depadua made aware  and assesses and determines from cyst on buttocks, which pt confirms he previously had that occasionally bleeds.  CLOF pt completes shower ADL primarily with min A utilizing LHAE in room to maintain precautions. Patient requests assistance for some tasks reporting \"he can't do\" d/t precautions, however therapist continues education on precautions and cues on ways to complete within precautions- poor carryover/understanding as pt prefers assistance and pt would benefit from continued education/training. Functional transfers/short functional mobility with RW S. Continue OT plan of care with focus on ADL retraining with use of LHAE, functional transfers with RW, and for functional cognition for safety, problem solving, and STM.   Prognosis Good   Problem List Decreased strength;Decreased range of motion;Decreased endurance;Impaired balance;Decreased mobility;Decreased coordination;Decreased cognition;Impaired judgement;Decreased safety awareness;Decreased skin integrity;Orthopedic restrictions   Barriers to Discharge Inaccessible home environment;Decreased caregiver support   Plan   Treatment/Interventions ADL retraining;Functional transfer training;Therapeutic exercise;Endurance training;Cognitive reorientation;Equipment eval/education;Bed mobility;Compensatory technique education;Spoke to MD;Spoke to nursing   Progress Progressing toward goals   OT Therapy Minutes   OT Time In 0840   OT Time Out 1010   OT Total Time (minutes) 90   OT Mode of treatment - Individual (minutes) 90   OT Mode of treatment - Concurrent (minutes) 0   OT Mode of treatment - Group (minutes) 0   OT Mode of treatment - Co-treat (minutes) 0   OT Mode of Treatment - " Total time(minutes) 90 minutes   OT Cumulative Minutes 570   Therapy Time missed   Time missed? No

## 2024-07-22 NOTE — ASSESSMENT & PLAN NOTE
Pt has a 6 month history of syncopal events.  Found to be bradycardic in the 40s.  Outpt follow-up with EP  Left chest incision is healed at this time

## 2024-07-22 NOTE — DISCHARGE SUMMARY
Discharge Summary   Luis Forrester 54 y.o. male MRN: [id number]  Unit/Bed#: [facility] 972-01 Encounter: [id number]  Admission Date: 7/12/2024     Discharge Date: 7/25/24    Discharging Provider: Kim Li    PCP:  [phone]      HPI: Refer to previous hospitalization for complete record    Summary of [facility] Course: Luis Forrester is a 52yo male, with hypothyroidism, hypoparathyroidism, and history of syncope, who was admitted to the [facility] after hospitalization following a syncopal event complicated by a parafalcine SDH and bilat SAH as well as moderate to severe cervical spinal canal stenosis. He underwent C3-C7 laminectomy with C2-T1 fusion. He also had a PPM placed due to a syncopal event with HR in the 40s during hospitalization. He also developed worsening dysphagia and a PEG tube was placed during his hospitalization. He underwent an intensive inpatient rehabilitation program. He was seen by ENT during his rehab stay. He refused flexible laryngoscopy but was agreeable to following with ENT after discharge. General surgery was consulted for input on a possible pilonidal cyst, for which they did not think immediate intervention was indicated. Patient is agreeable to following up with Gen Surg outpatient. Pt did progress through the rehabilitation program and pt is cleared for DC from both PMR and medicine standpoint.     Problem List/Comorbidities:    * Cervical myelopathy (HCC)  Assessment & Plan  S/p C3-7 laminectomy with C2-T1 fusion on 6/16/24 due to central cord syndrome following a fall.    Completed post-op abx.  No collar necessary  We discussed not laying flat in the setting of tube feeds  He has preference for no pillows behind neck , most of the time   Monitor incision  NS follow-up around 8/2/24 for 6 week post-op appt.  Therapy and pain control per PMR   DC 7/25    Pilonidal cyst  Assessment & Plan  Pt reports recurrent history.  Surgery saw pt.  No signs of infection.  Outpt follow-up with  "surgery for elective excision.    Dysphagia  Assessment & Plan  S/p PEG placement by Dr Reinoso 7/10/24  Pt tolerating tube feeds with Jevity boluses 6x/day and no oral diet  Pts girlfriend here today reports did well yesterday with feeds , except she knocked over the canister  Continue with abdominal binder to prevent extraction of tubing   ST working with him to improving swallowing  Nutrition following.  Outpt follow-up for laryngoscope and FEES.  7/24 pt with reported cough over night. He denies mucus. Dry mouth. We discussed not laying flat over night at least 45 degree angle .   Continue to monitor for silent aspiration     s/p [employer] dual chamber PPM 6/21/2024  Assessment & Plan  Pt has a 6 month history of syncopal events.  Found to be bradycardic in the 40s.  Outpt follow-up with EP  Left chest incision is healed at this time     SDH (subdural hematoma) (HCC)  Assessment & Plan  Not on antiplatelet or AC as an outpt.  Repeat head CT for any change in mental status.  This occurred after a syncopal fall in June 2024.   Impulsive     Syncope and collapse  Assessment & Plan  No events since PPM placement but his systolic BPs do run in the 90's  No c/o dizziness    Hypothyroidism  Assessment & Plan  Continue levothyroxine.  Due for repeat TSH around  8/2/24.        Discharge Physical Examination:  BP 99/62 (BP Location: Right arm)   Pulse 78   Temp 98 °F (36.7 °C) (Oral)   Resp 18   Ht 5' 7\" (1.702 m)   Wt 74.8 kg (164 lb 14.5 oz)   SpO2 96%   BMI 25.83 kg/m²     General Appearance: no distress, conversive  HEENT: PERRLA, conjuctiva normal; oropharynx clear; mucous membranes moist;   Neck:  No lymphadenopathy or thyromegaly  Lungs: CTA, normal respiratory effort, no retractions, expiratory effort normal  CV: regular rate and rhythm , PMI normal   ABD: soft non tender, no masses , no hepatic or splenomegaly. PEG intact  EXT: DP pulses intact, no lymphadenopathy, no edema  Skin: normal turgor, normal " texture, no rash  Psych: affect normal, mood normal  Neuro: Awake and alert.    Significant Findings, Care, Treatment and Services Provided: Acute comprehensive interdisciplinary inpatient rehabilitation including PT, OT, SLP, RN, CM, SW, dietary, psychology, etc.      Physiatry Additions/Comorbidities:    * Cervical myelopathy (HCC)  Assessment & Plan  Recent with residual neck pain, distal RUE weakness, impaired mobility   - MRI cervical spine without 6/15/2024:Congenital canal stenosis with superimposed degenerative spondylosis .Most pronounced at C3-4 and C5-C6 with moderate to severe canal stenosis and mild cord compression. Evaluation of cord signal is limited due to artifact. No definite abnormal cord signal but mild cord edema would be difficult to exclude. Severe bilateral foraminal stenosis also seen at C3-4 and C5-C6.Mild to moderate canal stenosis at other levels  - S/P PCDF C2-T1 on 6/16 by NeuroSx Dr Lopez  - Admission: Examines C3 AIS D (some impairment L c4 pinprick, but overall very good sensation), proprioception in ankles intact, and strength quite good with weakness starting in finger flexors bilaterally.  - Discharge: C8 AIS D, improved pinprick and light touch sensation intact from C2-T2, with weakness beginning at finger flexors at 4/5. HI are 3/5. Central cord pattern with LE less involved than UE.   - Completed acute comprehensive interdisciplinary inpatient rehabilitation, including intensive skilled therapies (PT, OT) as outlined with oversight and management by rehabilitation physician as well as inpatient rehab level nursing, case management and weekly interdisciplinary team meetings.   - pain management was optimized  - Patient will follow-up with Spine Sx and if needed PMR after discharge      At risk for altered bowel elimination  Assessment & Plan  Course also notable for abdominal distension which was favored to be ileus   - Loose stools resolved   - Now at times constipated.  Discharging home on Docusate BID.   - PRN suppository  - Overall continent prior to discharge     Pain  Assessment & Plan  Tylenol PRN at discharge  Off Gabapentin now  Oxycodone 2.5mg oral soltn Q4H PRN - has not used the past week,discontinued at discharge  Counseled on pain management and educated him to continue to deep breathing/relaxation/behavioral pain management techniques        Dysphagia  Assessment & Plan  - ENT was consulted for ongoing prolonged dysphagia         - Patient declined laryngoscope initially, he is now open to it.         - ENT not convinced of laryngeal component of dysphagia. Recommending outpatient follow-up for that.         - Our SLP cannot do FEES, and ENT does not do FEES either. Perhaps outpatient SLP.   - Current diet: NPO - failed multiple VBS          - Mild oral and moderate pharyngeal dysphagia  - NGT feeds, nutrition was following patient during [facility] stay  - Family training was done for tube feeding at home  - Patient will follow up outpatient with ENT and SLP   - Will also have home RN.       SDH (subdural hematoma) (HCC)  Assessment & Plan  Following syncope and being found down   CTH 6/15 - New acute trace subarachnoid hemorrhage in bilateral parafalcine regions. Recommend dedicated CTA head with contrast for further evaluation.  New acute trace parafalcine subdural hematoma.  CTH 6/18 - 1. Near completely resolved parafalcine subarachnoid hemorrhage with trace residual subarachnoid hemorrhage in the frontoparietal regions medially. No mass effect or hydrocephalus. 2. No large territorial infarction.  - Cleared for lovenox 30 SQ BID for VTE prophylaxis by prior providers      - Current/recent mood/affect/behavior/cog - acceptable, largely euthymic  - Remains at risk for increased confusion/delirium, restlessness, agitation, and fall - continue to monitor for concerns/changes  - Current psychotropics and potentially sedating medications:   Melatonin 3mg HS       - No recent  reports of dangerous/risky behavior requiring 1:1 during [facility] stay  - Completed acute comprehensive interdisciplinary inpatient rehabilitation including intensive skilled therapies (PT, OT, ST) with oversight and management by rehabilitation physician.    - Mood, pain, and sleep management was optimized  - Patient will follow-up with neurosx after discharge if needed, as well as PCP      At risk for venous thromboembolism (VTE)  Assessment & Plan  Recommend discharge home with 8 weeks lovenox for DVT Ppx given possible acute SCI.  Continue anticoagulation through 8/10/24  Monitor Platelet count in 1 week    Pilonidal cyst  Assessment & Plan  Noted bright red blood from a right gluteal cleft pinhole lesion  Pt has a history of this cyst for years; states it occasionally bleeds at times, but this self-resolves  General surgery consult: No evidence of infection at this time. Continue closely monitor for signs of worsening infection cellulitis.      - Patient will f/u for elective removal of pilonidal cyst  - Gen surgery referral info given at discharge      Acute Rehabilitation Center Course: Patient participated in a comprehensive interdisciplinary inpatient rehabilitation program which included involvment of Rehab MD, therapies (PT, OT, and/or SLP), RN, CM, SW, dietary, and psychology services.     Etiologic/Rehabilitation Diagnosis: Impairment of mobility, safety and Activities of Daily Living (ADLs) due to Spinal Cord Dysfunction:  Traumatic:  04.230  Other Traumatic cervical myelopathy s/p C3-C7 cervical laminectomy with bilateral screw placement and fusion C2 to-T1 on 6/16    Functional Status Upon Admission to [facility]:  PT: Albina transfers and Albina ambulation/mobility  OT: ModA LBD and grooming, Albina UBB, LBB, UBD    Functional Status Upon Discharge from [facility]:   PT: Ind transfers with RW, Sup car transfer, Sup 150' ambulation with RW, RW with stairs  OT: TotalA eating, Ind oral hygiene, Sup Shower, Ind UBD, Ind LBD,  Ind footwear, Ind bed mobility, Albina toileting, Ind toilet transfer    Condition at Discharge: good     Rehab Physician Signature:   Ashley de Padua, MD  Physical Medicine and Rehabilitation    Discharge instructions/Information to patient and family:   See after visit summary for information provided to patient and family.      Provisions for Follow-Up Care:  See after visit summary for information related to follow-up care and any pertinent home health orders.      Future Appointments   Date Time Provider Department Center   7/25/2024  1:00 PM Montrell Banda RN VN  [facility]   8/2/2024  9:30 AM Endy Velasquez MD [facility]   10/3/2024  4:00 PM DEVICE IN PERSON [place] [facility]   1/7/2025  7:15 AM DEVICE REMOTE [place] [facility]   4/8/2025  7:15 AM DEVICE REMOTE [place] [facility]   7/8/2025  7:15 AM DEVICE REMOTE [place] [facility]           Disposition: Home    Planned Readmission: No    Discharge Statement   I spent 60 minutes or more discharging the patient.  I had direct contact with the patient on the day of discharge. Greater than 50% of the total time was spent examining patient, answering all patient questions, arranging and discussing plan of care with patient and care team as well as directly providing post-discharge instructions. Additional time then spent on discharge activities.    Discharge Medications:  See after visit summary for reconciled discharge medications provided to patient and family.

## 2024-07-23 PROCEDURE — 99231 SBSQ HOSP IP/OBS SF/LOW 25: CPT | Performed by: SURGERY

## 2024-07-23 PROCEDURE — 97530 THERAPEUTIC ACTIVITIES: CPT

## 2024-07-23 PROCEDURE — 92526 ORAL FUNCTION THERAPY: CPT

## 2024-07-23 PROCEDURE — 97112 NEUROMUSCULAR REEDUCATION: CPT

## 2024-07-23 PROCEDURE — 97129 THER IVNTJ 1ST 15 MIN: CPT

## 2024-07-23 PROCEDURE — 97110 THERAPEUTIC EXERCISES: CPT

## 2024-07-23 PROCEDURE — 99232 SBSQ HOSP IP/OBS MODERATE 35: CPT | Performed by: PHYSICAL MEDICINE & REHABILITATION

## 2024-07-23 PROCEDURE — 97535 SELF CARE MNGMENT TRAINING: CPT

## 2024-07-23 PROCEDURE — 99232 SBSQ HOSP IP/OBS MODERATE 35: CPT | Performed by: NURSE PRACTITIONER

## 2024-07-23 PROCEDURE — 97130 THER IVNTJ EA ADDL 15 MIN: CPT

## 2024-07-23 RX ADMIN — NYSTATIN 1 APPLICATION: 100000 POWDER TOPICAL at 08:00

## 2024-07-23 RX ADMIN — ENOXAPARIN SODIUM 30 MG: 30 INJECTION SUBCUTANEOUS at 21:18

## 2024-07-23 RX ADMIN — Medication 125 MCG: at 06:52

## 2024-07-23 RX ADMIN — GUAIFENESIN AND DEXTROMETHORPHAN 10 ML: 100; 10 SYRUP ORAL at 04:47

## 2024-07-23 RX ADMIN — GUAIFENESIN AND DEXTROMETHORPHAN 10 ML: 100; 10 SYRUP ORAL at 18:08

## 2024-07-23 RX ADMIN — GUAIFENESIN AND DEXTROMETHORPHAN 10 ML: 100; 10 SYRUP ORAL at 23:40

## 2024-07-23 RX ADMIN — NYSTATIN: 100000 POWDER TOPICAL at 17:22

## 2024-07-23 RX ADMIN — ENOXAPARIN SODIUM 30 MG: 30 INJECTION SUBCUTANEOUS at 08:01

## 2024-07-23 RX ADMIN — Medication 3 MG: at 21:18

## 2024-07-23 NOTE — PROGRESS NOTES
"   07/23/24 9960   Pain Assessment   Pain Assessment Tool 0-10   Pain Score No Pain   Restrictions/Precautions   Precautions Aspiration;Bed/chair alarms;Cardiac/sternal;Cognitive;Fall Risk;Pacemaker;Supervision on toilet/commode;Spinal precautions  (NPO with PEG tube)   Weight Bearing Restrictions Yes   ROM Restrictions Yes  (pacer and spinal precautions)   General   Change In Medical/Functional Status (S)  pt assessed for IRPs by KETTY Underwood in a.m., however, not granted as pt abandoned RW during ambulation in bathroom despite ongoing recommendation to use at all times upon d/c and pt also found brushing teeth with standard toothbrush and toothpaste despite having oral care routine due to being NPO. Discussed during session with linda Tao that PT recommends S for all mobilities at this time for pt safety, Aislinn to provide to pt upon dc   Cognition   Overall Cognitive Status Impaired   Arousal/Participation Alert;Responsive   Subjective   Subjective \"I'm tired, I've been up since 5\"   Sit to Lying   Comment please assess w/o bedrails next session   Lying to Sitting on Side of Bed   Comment please assess w/o bedrails next session   Sit to Stand   Type of Assistance Needed Independent   Comment RW   Sit to Stand CARE Score 6   Bed-Chair Transfer   Type of Assistance Needed Independent   Comment RW   Chair/Bed-to-Chair Transfer CARE Score 6   Car Transfer   Type of Assistance Needed Supervision   Comment pt with LOB posteriorly when trying to stand from car this session, needed vc's to push up with RUE from car and not from RW   Car Transfer CARE Score 4   Walk 10 Feet   Type of Assistance Needed Set-up / clean-up   Comment RW   Walk 10 Feet CARE Score 5   Walk 50 Feet with Two Turns   Type of Assistance Needed Set-up / clean-up   Comment RW   Walk 50 Feet with Two Turns CARE Score 5   Walk 150 Feet   Type of Assistance Needed Supervision   Comment RW   Walk 150 Feet CARE Score 4   Walking 10 Feet on Uneven Surfaces " "  Type of Assistance Needed Supervision   Comment over floor mat with airex underneath with RW x2 trials; did not perform outdoor ambulation this session due to fatigue but can perform again next session   Walking 10 Feet on Uneven Surfaces CARE Score 4   Ambulation   Primary Mode of Locomotion Prior to Admission Walk   Distance Walked (feet) 225 ft  (x2)   Assist Device Roller Walker   Findings gait distances limited due to fatigue; recommend DS in house for pt safety to be sure pt using RW upon d/c, in community recommend CS for safety as pt with increased gait speed at times increasing his fall risk. Cont to recommend RW at all times for d/c   Does the patient walk? 2. Yes   Curb or Single Stair   Style negotiated Curb   Type of Assistance Needed Supervision   Comment S 8\" curb step x2 trials, vcs to not move feet when RW being lifted up/down curb for safety   1 Step (Curb) CARE Score 4   4 Steps   Comment please perform next session   12 Steps   Comment please perform next session   Stairs   Type Curb   # of Steps 2   Weight Bearing Precautions Fall Risk   Assist Devices Roller Walker   Picking Up Object   Type of Assistance Needed Set-up / clean-up   Comment with reacher and RW to retrieve marker from floor   Picking Up Object CARE Score 5   Therapeutic Interventions   Balance ambulation 30'x2 no AD CS provided; 30'x1 amb w/o AD holding cup of water in R hand CS, then 30'x1 with water  mug in R hand, plate in L hand, no LOB CS. decreased step length noted and increased imbalance w/o use of AD; cont to recommend RW for d/c.   Equipment Use   NuStep lvl 2, 20 mins, LEs only for conditioning; avg spm 94, pt completed 1.04 miles  Seated b/l HS/gastroc stretch post nu step 5' total   Assessment   Treatment Assessment Session focused on capturing care scores in prep for d/c as well as improving pt's righting reactions and dynamic balance to reduce fall risk. At this time pt not granted IRPs as pt demo'd poor safety " "awareness with in room mobility with KETTY Underwood in earlier session. Pt also asked during PT session when told to ambulate to trial car transfer \"should I use my walker?\" despite ongoing daily recommendations to use RW at all times upon d/c. Love Tao was present during session and made aware of pt's current fall risk especially if pt does not use RW upon d/c. Aislinn to provide S to pt upon d/c to be sure he uses RW and also recommended CS outdoors due to poor safety awareness that PT noted when outdoor mobility trialed on 7/22. At this time planning for d/c home 7/25 with support from love Tao. Pt has HEP provided to him, RW to be delviered prior to d/c home. Please capture all care scores in upcoming session as well as perform outdoor mobiity pending pt's fatigue levels as pt does walk outdoors frequently as s.o. and dtr do not drive.   Problem List Decreased strength;Decreased range of motion;Decreased endurance;Impaired balance;Decreased mobility;Decreased coordination;Decreased cognition;Impaired judgement;Decreased safety awareness;Decreased skin integrity;Orthopedic restrictions   PT Barriers   Functional Limitation Stair negotiation;Walking   Plan   Progress Progressing toward goals   Discharge Recommendation   Rehab Resource Intensity Level, PT   (home PT)   Equipment Recommended Walker   PT Therapy Minutes   PT Time In 1330   PT Time Out 1500   PT Total Time (minutes) 90   PT Mode of treatment - Individual (minutes) 90   PT Mode of treatment - Concurrent (minutes) 0   PT Mode of treatment - Group (minutes) 0   PT Mode of treatment - Co-treat (minutes) 0   PT Mode of Treatment - Total time(minutes) 90 minutes   PT Cumulative Minutes 968   Therapy Time missed   Time missed? No     "

## 2024-07-23 NOTE — PROGRESS NOTES
Physical Medicine and Rehabilitation Progress Note  Luis Forrester 54 y.o. male MRN: [id number]  Unit/Bed#: [facility] 972-01 Encounter: [id number]    To Review: 53-year-old male with PMH of asthma, HLD, hypothyroidism, who was having intermittent syncopal episodes over the last year who was found down by daughter after presumable other syncopal episode now complaining of short-term mid and upper back pain as well as pain limiting in hands and weakness of the upper extremities.  Patient was brought to hospital where CT head showed acute trace subarachnoid hemorrhage in the bilateral parafalcine regions.  CT of the T-spine showed DISH without acute or osseous abnormalities.  CTA head and neck did not show acute traumatic vascular injury, high-grade stenosis or dissection.  CT C spine showed no cervical fx or traumatic malalignment with mod canal and severe b/l multilevel NF stenosis.  MRI C spine shows moderate to severe canal stenosis with mild cord compression most pronounced at C3-4 and C5-6.  It also showed severe bilateral foraminal stenosis at same levels along with congenital canal stenosis with superimposed degenerative spondylosis.  Neurosurgery consulted on patient and diagnosed with parafalcine subdural hemorrhage with bilateral subarachnoid hemorrhage as well as operative intervention for the cervical stenosis with myelopathy and upper extremity weakness.  Patient underwent C3-C7 cervical laminectomy with bilateral screw placement and fusion C2 to-T1 on 6/16.  Patient recommended maps greater than 85 for 5 days and has been on 2 vasopressors.  He had his Hemovac drain removed on 6/19.  He was recommended 2 weeks of antibiotics for infectious prophylaxis per neurosurgery.  He has not required to have a cervical collar at this time.  Acute pain service consulted and have been managing with multimodal pain regiment including IV opiates.  Patient noted to have symptomatic junctional bradycardia with asymptomatic  "wide-complex tachycardia on telemetry for which EP was consulted.  They felt presentation mixture of sick sinus syndrome and wide-complex tachycardia likely SVT with aberrancy and felt reasonable to place dual-chamber pacemaker given history of multiple syncopal episodes and now documented bradycardia. Course also notable for hyponatremia and polyuria for which nephrology was consulted (and now improved).  Patient did receive 2 doses of DDAVP as well as intermittent IV fluids.\"     Course also notable for abdominal distension which was favored to be ileus as well as dysphagia and patient failing multiple VBS.  He is noted to have mild oral and moderate pharyngeal dysphagia and eventually was recommended PEG tube and to continue NPO diet.  Patient was evaluated by skilled therapies and was found to have significant decline in ADLs and ambulation and appears appropriate for admission to [facility].    Chief Complaint: No new issues    Interval History/Subjective:  No acute overnight events. Patient reports the site of his cyst does not hurt more and he didn't notice any more bleeding from the site. Informed him that surgery will follow up with him outpatient for further management, and patient understands. He denies any fevers, chills, chest pain, sob, abdominal pain, nausea or vomiting at this time. His last BM was 7/22 and was continent. Informed patient that his LTD paperwork will be given to case management for safekeeping.     ROS:  10 point review of systems is otherwise negative except for what is noted above.    Today's Changes:  Surgery does not think intervention is required this hospitalization for pilonidal cyst. Will provide follow-up instructions in discharge paperwork for patient to follow up  LTD paperwork filed with CM    Assessment/Plan:    * Cervical myelopathy (HCC)  Assessment & Plan  Recent with residual neck pain, distal RUE weakness, impaired mobility   - MRI " cervical spine without 6/15/2024:Congenital canal stenosis with superimposed degenerative spondylosis .Most pronounced at C3-4 and C5-C6 with moderate to severe canal stenosis and mild cord compression. Evaluation of cord signal is limited due to artifact. No definite abnormal cord signal but mild cord edema would be difficult to exclude. Severe bilateral foraminal stenosis also seen at C3-4 and C5-C6.Mild to moderate canal stenosis at other levels  - S/P PCDF C2-T1 on 6/16 by NeuroSx Dr Lopez  - Monitor incision site   - No collar required  - Admission: Examines C3 AIS D (some impairment L c4 pinprick, but overall very good sensation), proprioception in ankles intact, and strength quite good with weakness starting in finger flexors bilaterally.  - Activity Restrictions: No heavy lifting greater than 5 - 10lbs. No strenuous activities. No driving while requiring cervical collar, anticipated six weeks. No significant neck movement.  - May shower 3 days after surgery, but do not soak in a tub and no swimming, use mild antimicrobial soap and water. Pat incision dry after showering.  - Monitor for neuro changes, dysphagia (has significant on tube feeds), neurogenic bowel/bladder, spasticity  - Recommend acute comprehensive interdisciplinary inpatient rehabilitation to include intensive skilled therapies (PT, OT) as outlined with oversight and management by rehabilitation physician as well as inpatient rehab level nursing, case management and weekly interdisciplinary team meetings.   - Optimal pain management  - Fall precautions   - Follow-up with Spine Sx and if needed PMR after d/c       Pilonidal cyst  Assessment & Plan  Noted bright red blood from a right gluteal cleft pinhole lesion  Pt has a history of this cyst for years; states it occasionally bleeds at times, but this self-resolves  General surgery consult: No evidence of infection at this time. Continue closely monitor for signs of worsening infection  cellulitis.     - Will require f/u for elective removal of pilonidal cyst  - Gen surgery referral info will be given at discharge        At risk for altered bowel elimination  Assessment & Plan  Course also notable for abdominal distension which was favored to be ileus   - Recent occasional loose stools now on PEG tube feeds  - Currenlty on miralax daily  - PRN suppository  - Overall continent.   - Last BM 7/22  - Monitor    Pain  Assessment & Plan  APAP 975mg TID  - will try to make PRN prior to discharge  Off Gabapentin now  Oxycodone 2.5mg oral soltn Q4H PRN - has not used the past week, Likely discontinue at discharge.  Monitor for oversedation, AMS/delirium, and respiratory depression   If being administered - hold opiates, muscle relaxants, benzodiazepines, and gabapentin for oversedation, AMS, or RR<12.  Counseled on and continue to encourage deep breathing/relaxation/behavioral pain management techniques      Dysphagia  Assessment & Plan  - Recommend ENT c/s for ongoing prolonged dysphagia   - Patient declined laryngoscope initially, he is now open to it.   - ENT not convinced of laryngeal component of dysphagia. Recommending outpatient follow-up for that.   - Our SLP cannot do FEES, and ENT does not do FEES either. Perhaps outpatient SLP.   - Current diet: NPO - failed multiple VBS    - Mild oral and moderate pharyngeal dysphagia  - NGT feeds, nutrition c/s   - Family training for tube feeds   - SLP evaluate and treat decline in swallowing.  - Ensure adequate hydration  - Nutrition consult to assist with management   - Outpatient f/u with ENT   7/23- SLP swallowing assessment: oral stage mild, pharyngeal stage mod-severe, esophageal stage no overt sx. Continue to maintain Npo status/ feeding through PEG    SDH (subdural hematoma) (HCC)  Assessment & Plan  Following syncope and being found down   CTH 6/15 - New acute trace subarachnoid hemorrhage in bilateral parafalcine regions. Recommend dedicated CTA head  with contrast for further evaluation.  New acute trace parafalcine subdural hematoma.  CTH 6/18 - 1. Near completely resolved parafalcine subarachnoid hemorrhage with trace residual subarachnoid hemorrhage in the frontoparietal regions medially. No mass effect or hydrocephalus. 2. No large territorial infarction.  - Cleared for lovenox 30 SQ BID for VTE prophylaxis by prior providers     - Current/recent mood/affect/behavior/cog - acceptable, largely euthymic  - Remains at risk for increased confusion/delirium, restlessness, agitation, and fall - continue to monitor for concerns/changes  - Current psychotropics and potentially sedating medications:   Gabapentin 100mg QHS  Melatonin 3mg HS   Oxy 2.5mg Q4h PRN (not using recently)   - Monitor for need for 1:1 (Notify MD of potentially dangerous behavior such as significant impulsivity and trying to stand/get out of bed/chair unattended that could lead to fall/injury); High fall precautions with frequent rounding, patient close to nursing station if possible, frequent toileting/frequent offering urinal/bedpan (only if too immobile for safe toileting);  bed/chair alarm on at all times (high setting if needed); fall junior on side of bed (at discretion of nursing/therapy); do not leave unattended in bathroom, patient education; call bell availability; Sleep/agitation log, frequent redirection, reorientation, reassurance; Family access to patient if helpful  - No recent reports of dangerous/risky behavior requiring 1:1 at this point but monitor closely  - Recommend acute comprehensive interdisciplinary inpatient rehabilitation to include intensive skilled therapies (PT, OT, ST) with oversight and management by rehabilitation physician.  Inpatient rehab level nursing, case management and weekly interdisciplinary team meetings. Provide patient and (if available) caregiver/family teaching regarding brain injury/residual disability management.    - For routine restlessness,  anxiety, irritability focus on non-pharmacologic management    - Obtain MOCA and if needed ACLS during ARC course   - Please assess iADLs - ability to manage medications, meal prep, finances, obtaining appropriate transport from community, managing emergencies, and overall safety in home environment.  - Provide therapy, compensatory strategies, and if available and necessary provide caregiver training.  Notify MD of impairments or inability to perform iADLs adequately.   - Monitor neuro-exam, wakefulness, mood, cognition, insight into deficits and safety awareness   - Monitor and ensure optimal management electrolytes, nutrition, and hydration  - Monitor for signs or symptoms of infection, medication intolerances, other systemic etiologies  - Additional labs, imaging, specialist follow-up as needed   - Patient/family/caregiver education and training   - Overstimulation precautions, frequent re-orientation, re-direction, re-assurance  - Optimal mood, pain, and sleep management  - Sleep log and agitation monitoring    - Limit sedating medications when possible  - Follow-up with neurosx after discharge if needed and if needed during ARC course as well as PCP      Thrush  Assessment & Plan  Nystatin pain and suction QID x7 d  Oral care   Monitor     At risk for venous thromboembolism (VTE)  Assessment & Plan  SCDs, ambulation, and lovenox   Recommend discharge home with 8 weeks lovenox for DVT Ppx given possible acute SCI.  Will need lovenox training prior to discharge  -Thru 8/10       s/p Medtronic dual chamber PPM 6/21/2024  Assessment & Plan  Noted to have symptomatic junctional bradycardia with asymptomatic wide-complex tachycardia on telemetry for which EP was consulted.  They felt presentation mixture of sick sinus syndrome and wide-complex tachycardia likely SVT with aberrancy and felt reasonable to place dual-chamber pacemaker given history of multiple syncopal episodes and now documented bradycardia.  2/2  bradycardia and recurrent syncopal episodes  Pacer precautions for 6 weeks.   OP EP follow-up     Syncope and collapse  Assessment & Plan  Believed to be related to arrhythmias/bradycardia s/p PPM placement   Monitor vitals  OP cards/PCP     Hypothyroidism  Assessment & Plan  Levothyroxine     Leukocytosis-resolved as of 7/15/2024  Assessment & Plan  Up to 13.6 7/11  Internal medicine consult and management during ARC course  Patient afebrile, other vitals unremarkable > continue to monitor   No clear signs or symptoms of infection or VTE but will continue to monitor  Monitor CBC intermittently             Health Maintenance  #Bladder: Voiding and continent.  #Skin/Pressure Injury Prevention: Turn Q2hr in bed, with weight shifts [id number] in wheelchair.  #GI Prophylaxis: Not indicated  #Code Status: Full Code  #FEN: See Dysphagia above. Tube feeds via PEG  #Dispo: Team 7/17: ADD 7/25 with goal to discharge home with Home PT/OT/SLP/RN. Supervision from his girlfriend who will undergo family training. Lives in a home with 1+1 step with FFSU available. Was previously completely independent  OUTpatient f/u: Neurosurgery, PCP, ENT, Gen Surg    Objective:    Functional Update:  PT: Setup with RW for transfers, Sup 150' with RW, ambulated 800'   OT: totalA eating, [place] oral hygiene, Sup shower, [place] UBD, Sup LBD, Setup footwear, Ind bed mobility, Ind toileting/toilet transfer  SLP: oral stage mild, pharyngeal stage mod-severe, esophageal stage no overt sx. Maintain PEG    Allergies per EMR    Physical Exam:  Temp:  [97.8 °F (36.6 °C)-98.4 °F (36.9 °C)] 97.8 °F (36.6 °C)  HR:  [] 107  Resp:  [18-20] 18  BP: ()/(28-64) 117/28  Oxygen Therapy  SpO2: 97 %      Physical Exam  Vitals reviewed.   Constitutional:       Appearance: Normal appearance.   HENT:      Head: Normocephalic and atraumatic.      Right Ear: External ear normal.      Left Ear: External ear normal.      Nose: Nose normal.      Mouth/Throat:       "Mouth: Mucous membranes are moist.      Pharynx: Oropharynx is clear.   Eyes:      Conjunctiva/sclera: Conjunctivae normal.   Cardiovascular:      Rate and Rhythm: Normal rate and regular rhythm.      Pulses: Normal pulses.      Heart sounds: Normal heart sounds.   Pulmonary:      Effort: Pulmonary effort is normal.      Breath sounds: Normal breath sounds.   Abdominal:      General: Bowel sounds are normal.      Palpations: Abdomen is soft.      Tenderness: There is no abdominal tenderness.      Comments: +PEG in place   Musculoskeletal:      Comments: Moving all extremities spontaneously within functional limits   Skin:     General: Skin is warm and dry.      Capillary Refill: Capillary refill takes less than 2 seconds.   Neurological:      Mental Status: He is alert and oriented to person, place, and time.   Psychiatric:         Mood and Affect: Mood normal.         Behavior: Behavior normal.          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[facility])    Chronic low back pain    Hyperlipidemia    Hypothyroidism    Lumbar radiculopathy    Mass of parotid gland    Osteoarthritis of both hands    Reactive airway disease    Vitamin D deficiency    Encounter for immunization    TBI (traumatic brain injury) ([facility])    Syncope and collapse    Bilateral hand pain    SDH (subdural hematoma) ([facility])    Right hand weakness    SSS (sick sinus syndrome) ([facility])    s/p Medtronic dual chamber PPM 6/21/2024    Dysphagia    Cervical stenosis of spinal canal    Cervical myelopathy ([facility])    At risk for venous thromboembolism (VTE)    Pain    Thrush    At risk for altered bowel elimination    Pilonidal cyst         Medications  Current Facility-Administered Medications   Medication Dose Route Frequency Provider Last Rate    acetaminophen  975 mg " Per PEG Tube Q8H PRN Ashley Depadua, MD      albuterol  2 puff Inhalation Q4H PRN Kim Li PA-C      benzocaine  2 spray Mucosal 4x Daily PRN Kim Li PA-C      bisacodyl  10 mg Rectal Daily PRN Luke Suggs MD      dextromethorphan-guaiFENesin  10 mL Per PEG Tube Q4H PRN Kim Li PA-C      enoxaparin  30 mg Subcutaneous Q12H ASHLYN Kim Li PA-C      levothyroxine  125 mcg Per PEG Tube Early Morning Kim Li PA-C      melatonin  3 mg Per PEG Tube HS Kim Li PA-C      nystatin   Topical BID Kim Li PA-C      ondansetron  4 mg Per PEG Tube Q6H PRN Kim Li PA-C      oxyCODONE  2.5 mg Oral Q4H PRN Luke Suggs MD      polyethylene glycol  17 g Oral Daily PRN Luke Suggs MD      saliva substitute  5 spray Mouth/Throat 4x Daily PRN Kim Li PA-C            ** Please Note: Fluency Direct voice to text software may have been used in the creation of this document. **

## 2024-07-23 NOTE — PROGRESS NOTES
"   07/23/24 0900   Pain Assessment   Pain Assessment Tool 0-10   Pain Score No Pain   Restrictions/Precautions   Precautions Aspiration;Bed/chair alarms;Cardiac/sternal;Cognitive;Fall Risk;Pacemaker;Supervision on toilet/commode;Spinal precautions   Braces or Orthoses Other (Comment)  (ABD binder)   Lifestyle   Autonomy \"I feel like I'm in halfway\"   Eating   Type of Assistance Needed Physical assistance   Physical Assistance Level Total assistance   Comment tube feeding w/ nursing in bed and Aislinn present today to practice   Eating CARE Score 1   Oral Hygiene   Type of Assistance Needed Physical assistance   Physical Assistance Level 25% or less   Comment pt completes OH w/ suction toothbrush ONLY and pt in stance at sink and began brushing teeth w/ standard toothbrush. Pt immediately warned to spit out remaining toothpaste and re-educated on why he is not allowed to use standard toothbrush. OT discarded remiaining toothbrush and toothpaste to resist temptation when in BR. OT informed pt SO Aislinn about importance of using suction toothbrush and discard pt home toothbrush and hide other OH items to prevent temptation. Pt presents w/ continued imp memory and benefits from max VC as reminders and signs to maintain safety.   Oral Hygiene CARE Score 3   Shower/Bathe Self   Type of Assistance Needed Supervision   Physical Assistance Level No physical assistance   Comment pt completed shower while seated/standing to promote ind w/ bathing. Pt able to complete majority of shower seated using long handled sponge for LB and in stance using GB for peribathing. Pt req Sup for showers and will continue DS for home to maintain safety due to imp memory.   Shower/Bathe Self CARE Score 4   Upper Body Dressing   Type of Assistance Needed Physical assistance   Physical Assistance Level 25% or less   Comment pt req A w/ doffing shirt overhead due to limited UE ROM and maintain safety precautions. Pt able to don shirt and asked for A " but encouraged to complete to best of his ability and pt able to complete ind.   Upper Body Dressing CARE Score 3   Lower Body Dressing   Type of Assistance Needed Supervision;Verbal cues;Adaptive equipment   Physical Assistance Level No physical assistance   Comment Pt able to don/doff pants while seated but attempts to complete in stance due to PLOF but encouraged to thread pants while seated and finish pulling up in stance w/ use of RW or GB. Pt attempts to don pants w/o AE but tempted to break precautions. Pt given reacher to help w/ threading and able to thread w/ G carryover. overall mod VCs for sitting to thread and to recall use of initiation of LHAE in order to maintain precautions, otherwise no physical assist needed.   Lower Body Dressing CARE Score 4   Putting On/Taking Off Footwear   Type of Assistance Needed Set-up / clean-up;Adaptive equipment   Physical Assistance Level No physical assistance   Comment pt seated and able to use sock aid to don socks w/ min VC and use of baby powder   Putting On/Taking Off Footwear CARE Score 5   Lying to Sitting on Side of Bed   Type of Assistance Needed Independent   Physical Assistance Level No physical assistance   Comment able to use bed rails w/ G tech and maintain precautions; may need A when at home due to dependence on bed rails for A   Lying to Sitting on Side of Bed CARE Score 6   Sit to Stand   Type of Assistance Needed Independent   Physical Assistance Level No physical assistance   Comment completes w/ RW   Sit to Stand CARE Score 6   Bed-Chair Transfer   Type of Assistance Needed Independent   Physical Assistance Level No physical assistance   Comment able to use RW w/ VC to remind pt to use RW at all times when walking   Chair/Bed-to-Chair Transfer CARE Score 6   Toileting Hygiene   Type of Assistance Needed Independent   Physical Assistance Level No physical assistance   Comment Aislinn made commnet that pt could not wipe backside and OT informed pt  can complete w/ R side and only has precautions on L side for reaching back. informed aislinn and pt to encourage ind w/ toileting hygiene   Toileting Hygiene CARE Score 6   Toilet Transfer   Type of Assistance Needed Independent   Physical Assistance Level No physical assistance   Comment uses RW and GB; Aislinn informed OT that she purchased raised toilet seated for home   Toilet Transfer CARE Score 6   Functional Standing Tolerance   Time 10 min   Activity [employer] Game   Comments Pt engaed in standing tolerance activity using the [employer] game and able to complete in stance w/ RW. Pt utilized rest breaks as needed but tolerated w/ 2 breaks. Pt completed game w/ Aislinn and OT present to observe dynamic standing balance. No LOB/fatigue was noted throughout game. This activity improves standing tolerance, balance, endurance, and functional reach which facilitate ind w/ functional tasks   Exercise Tools   UE Ergometer Pt engaged in UB strengthening using UE ergometer at tabletop w/  min resistance. Pt tolerated 10 min prograde to improve UE strength, endurance, and activity tolerance to faciliate ind w/ functional tasks   Cognition   Overall Cognitive Status Impaired   Arousal/Participation Alert;Responsive   Attention Attends with cues to redirect   Orientation Level Oriented X4   Memory Decreased long term memory;Decreased short term memory;Decreased recall of recent events;Decreased recall of precautions   Following Commands Follows one step commands without difficulty   Comments Pt asked to recall cervical spinal and pacemaker precautions and pt able to recall 2/3 pacemaker and 0/3 cervical spinal. OT re-educated and asked pt at end of session to recall and unable to make improvements. Pt benefits from signs for reminders on precautions and Aislinn offered to make a few when at home. Pt demo imp memory and will need SUP when at home for certain tasks to maintain safety. Pt demo some difficulty w/ adjustments  "to new lifestyle evidenced by several demo of walking w/o RW during session and using standard toothbrush. Pt states \"I do this because I have my routine I have been doing my whole life\" and OT emphasized the sig of current situation and the precautions put into place to prevent further decline. Aislinn has been educated and will provide pt w/ reminders about safety precautions and AE use.   Additional Activities   Additional Activities Other (Comment)   Additional Activities Comments Pt assessed for IRP and at this time will not be granted prior to DC. Pt informed that he would be assessed at beginning of session and what IRP entails to which pt was content. throughout session, pt demo dec safety awareness w/ multiple tasks. When entering BR to complete shower, pt immediately parked RW at entrance and walked into BR w/o RW. OT reminded pt that he needs to using RW at all times due to imp balance at this time and pt acknowledged. Pt continued to forget to use RW in BR and in room throughout session w/ mod VC for reminders. Pt was in stance at sink brushing hair and began brushing teeth w/ standard toothbrush and OT informed pt to spit out everything asap. OT informed pt that he must be using suction toothbrush at all times and has been doing this everyday. Pt presents as confused when reminded of safety and continues to req VC for safety precautions. With these observations, pt was informed that he will not be granted IRP due to memory deficits and will continue w/ SUP for all tasks in ARC and bed/chair alarms. Pt was receptive of information and agrees it is safest option at this moment. OT elaborated further on why IRP are not appropriate at this time and educated Aislinn about removing all toothbrushes at home, reminding pt of precautions and using RW, and making signs to prevent temptations.   Activity Tolerance   Activity Tolerance Patient tolerated treatment well   Assessment   Treatment Assessment Pt engaged in " skilled OT session w/ focus on ADL retraining, LHAE, balance, activity tolerance, endurance, safety awareness, and energy conservation tech. Pt is limited by cog and memory deficits, imp balance, dec endurance, and weakness. Pt was not formally assessed for IRP due to dec safety awareness demo in session. Pt demo continued imp memory and req SUP for ADLs and functional mobility while in ARC and will need DS at home to complete functional tasks. Aislinn was present during end of session and provided w/ additional education for pt pertaining to oral hygiene, safety precautions, and AD use. Aislinn presented idea to create signs throughout home for pt to help remember NOT to do certain things and OT agreed w/ idea. Pt will be DC home on thursday and progressing towards LTG.   OT Family training done with: Aislinn pt's SO   Prognosis Good   Problem List Decreased strength;Decreased range of motion;Decreased endurance;Impaired balance;Decreased mobility;Decreased coordination;Decreased cognition;Impaired judgement;Decreased safety awareness;Decreased skin integrity;Orthopedic restrictions   Barriers to Discharge Inaccessible home environment;Decreased caregiver support   Plan   Treatment/Interventions ADL retraining;Functional transfer training;Endurance training;Therapeutic exercise;Cognitive reorientation;Equipment eval/education;Patient/family training;Compensatory technique education   Progress Progressing toward goals   OT Therapy Minutes   OT Time In 0900   OT Time Out 1035   OT Total Time (minutes) 95   OT Mode of treatment - Individual (minutes) 95   OT Mode of treatment - Concurrent (minutes) 0   OT Mode of treatment - Group (minutes) 0   OT Mode of treatment - Co-treat (minutes) 0   OT Mode of Treatment - Total time(minutes) 95 minutes   OT Cumulative Minutes 725   Therapy Time missed   Time missed? No

## 2024-07-23 NOTE — ASSESSMENT & PLAN NOTE
Noted bright red blood from a right gluteal cleft pinhole lesion  Pt has a history of this cyst for years; states it occasionally bleeds at times, but this self-resolves  General surgery consult: No evidence of infection at this time. Continue closely monitor for signs of worsening infection cellulitis.     - Will require f/u for elective removal of pilonidal cyst  - Gen surgery referral info will be given at discharge

## 2024-07-23 NOTE — PROGRESS NOTES
"Progress Note - General Surgery   Luis Forrester 54 y.o. male MRN: [id number]  Unit/Bed#: [facility] 972-01 Encounter: [id number]    Assessment:  Luis Forrester is a 54 y.o. male now currently in ARC s/p recent trauma admission and PEG tube placement, consult placed for further assessment of pilonidal cyst.     Plan:  - No signs of infection at this time  - some minimal blanching around the pilonidal cyst, no acute drainage at this time  - no abx at this time   - no surgical intervention at this time  - most likely can follow up with general surgery in the outpatient setting for elective removal of the pilonidal cyst    Subjective/Objective   Chief Complaint: pilonidal cyst     Subjective: NAEON. Vital signs stable. Patient reporting no pain at this time just occasional drainage. States he has had had this pilonidal cyst for many years that does not normally bother him just drains occasionally. Denies fever, chills, nausea, vomiting, changes in bowel or bladder function.    Objective:     Blood pressure 92/64, pulse 80, temperature 98.4 °F (36.9 °C), temperature source Oral, resp. rate 20, height 5' 7\" (1.702 m), weight 69.3 kg (152 lb 12.5 oz), SpO2 96%.,Body mass index is 23.93 kg/m².      Intake/Output Summary (Last 24 hours) at 7/23/2024 0553  Last data filed at 7/23/2024 0201  Gross per 24 hour   Intake 2322 ml   Output --   Net 2322 ml       Invasive Devices       Drain  Duration             Gastrostomy/Enterostomy Percutaneous Endoscopic Gastrostomy (PEG) 20 Fr. LUQ 12 days                    Physical Exam: General appearance: alert and oriented, in no acute distress  Lungs: normal work of breathing  Heart: well perfused, normal rate  Abdomen: soft, non-tender; bowel sounds normal; no masses,  no organomegaly  Rectal: pilonidal cyst noted with minimal blanching, no pain aat site and no noted drainage    Lab, Imaging and other studies:no new labs  VTE Pharmacologic Prophylaxis: Enoxaparin (Lovenox)  VTE " Mechanical Prophylaxis: sequential compression device    Marla Agrawal MD  General Surgery Resident

## 2024-07-23 NOTE — PROGRESS NOTES
07/22/24 1900   Clinical Encounter Type   Visited With Patient and family together   Synagogue Encounters   Synagogue Needs Prayer

## 2024-07-23 NOTE — ASSESSMENT & PLAN NOTE
S/p PEG placement by Dr Reinoso 7/10/24  Pt tolerating tube feeds with Jevity boluses 6x/day and no oral diet  Pts girlfriend and daughter are here today to learn how to administer the tube feed boluses   Continue with abdominal binder to prevent extraction of tubing   ST working with him to improving swallowing  Nutrition following.  Outpt follow-up for laryngoscope and FEES.

## 2024-07-23 NOTE — NURSING NOTE
Pt girlfriend Aislinn successfully completed 1 feeding at 1100 with minimal cueing and 1300 feeding with no cueing. Will continue to reinforce teaching.

## 2024-07-23 NOTE — PROGRESS NOTES
"[facility]  Progress Note  Name: Luis Forrester I  MRN: [id number]  Unit/Bed#: -01 I Date of Admission: 7/12/2024   Date of Service: 7/23/2024 I Hospital Day: 11    Assessment & Plan   Pilonidal cyst  Assessment & Plan  Pt reports recurrent history.  PMR asking for surgery input.    Dysphagia  Assessment & Plan  S/p PEG placement by Dr Reinoso 7/10/24  Pt tolerating tube feeds with Jevity boluses 6x/day and no oral diet  Pts girlfriend and daughter are here today to learn how to administer the tube feed boluses   Continue with abdominal binder to prevent extraction of tubing   ST working with him to improving swallowing  Nutrition following.  Outpt follow-up for laryngoscope and FEES.    s/p Medtronic dual chamber PPM 6/21/2024  Assessment & Plan  Pt has a 6 month history of syncopal events.  Found to be bradycardic in the 40s.  Outpt follow-up with EP  Left chest incision is healed at this time     SDH (subdural hematoma) (HCC)  Assessment & Plan  Not on antiplatelet or AC as an outpt.  Repeat head CT for any change in mental status.  This occurred after a syncopal fall in June 2024.     Syncope and collapse  Assessment & Plan  No events since PPM placement but his systolic BPs do run in the 90's  No c/o dizziness    Hypothyroidism  Assessment & Plan  Continue levothyroxine.  Due for repeat TSH around  8/2/24.    * Cervical myelopathy (HCC)  Assessment & Plan  S/p C3-7 laminectomy with C2-T1 fusion on 6/16/24 due to central cord syndrome following a fall.    Completed post-op abx.  No collar necessary.  Monitor incision  NS follow-up around 8/2/24 for 6 week post-op appt.  Therapy and pain control per PMR  DC 7/25    Leukocytosis-resolved as of 7/15/2024  Assessment & Plan  Resolved.             The above assessment and plan was reviewed and updated as determined by my evaluation of the patient on 7/23/2024.    Labs:             Invalid input(s): \"LABGLOM\", \"CMP\"    "             Imaging  No orders to display       Review of Scheduled Meds:  Current Facility-Administered Medications   Medication Dose Route Frequency Provider Last Rate    acetaminophen  975 mg Per PEG Tube Q8H PRN Ashley Depadua, MD      albuterol  2 puff Inhalation Q4H PRN Kim Li PA-C      benzocaine  2 spray Mucosal 4x Daily PRN Kim Li PA-C      bisacodyl  10 mg Rectal Daily PRN Luke Suggs MD      dextromethorphan-guaiFENesin  10 mL Per PEG Tube Q4H PRN Kim Li PA-C      enoxaparin  30 mg Subcutaneous Q12H ASHLYN Kim Li PA-C      levothyroxine  125 mcg Per PEG Tube Early Morning Kim Li PA-C      melatonin  3 mg Per PEG Tube HS Kim Li PA-C      nystatin   Topical BID Kim Li PA-C      ondansetron  4 mg Per PEG Tube Q6H PRN Kim Li PA-C      oxyCODONE  2.5 mg Oral Q4H PRN Luke Suggs MD      polyethylene glycol  17 g Oral Daily PRN Luke Suggs MD      saliva substitute  5 spray Mouth/Throat 4x Daily PRN Kim Li PA-C         Subjective/ HPI: Patient seen and examined. Patients overnight issues or events were reviewed with nursing staff. New or overnight issues include the following:     Pt is doing well. He does have some discomfort in his neck. Pillow moved to assist with comfort. At time of visit pts girlfriend arrived she is nervous but learning how to do the tube feed boluses. He states he does feel full is having bm .     ROS:   A 10 point ROS was performed; negative except as noted above.        *Labs /Radiology studies Reviewed  *Medications  reviewed and reconciled as needed  *Please refer to order section for additional ordered labs studies      Physical Examination:  Vitals:   Vitals:    07/22/24 0548 07/22/24 1324 07/22/24 2020 07/23/24 0618   BP: 106/61 110/72 92/64 99/63   BP Location: Right arm Right arm Right arm Right arm   Pulse: 79 84 80 78   Resp: 17  18 20 18   Temp: 98.2 °F (36.8 °C) 98.1 °F (36.7 °C) 98.4 °F (36.9 °C) 98.2 °F (36.8 °C)   TempSrc: Oral Oral Oral Oral   SpO2: 97% 94% 96% 97%   Weight:       Height:           General Appearance: NAD; pleasant  HEENT: PERRLA, conjuctiva normal; mucous membranes moist; face symmetrical  Neck:  Supple, pt with limited rom in neck dt surgery   Lungs: clear bilaterally, normal respiratory effort, no retractions, expiratory effort normal, on room air  Anterior left chest with scar now over pacer site no drainage no erythema or drainage noted   CV: regular rate and rhythm, no murmurs rubs or gallops noted   ABD: soft non tender, +BS x4, positive peg tube left upper quadrant intact clamped. No erythema no edema or drainage no tenderness . Abdominal binder in place   EXT: DP pulses intact, no lymphadenopathy, no edema  Skin: normal turgor, normal texture, no rash  Psych: affect normal, mood normal  Neuro: AAOx3       The above physical exam was reviewed and updated as determined by my evaluation of the patient on 7/23/2024.    Invasive Devices       Drain  Duration             Gastrostomy/Enterostomy Percutaneous Endoscopic Gastrostomy (PEG) 20 Fr. LUQ 12 days                       VTE Pharmacologic Prophylaxis: Enoxaparin  Code Status: Level 1 - Full Code  Current Length of Stay: 11 day(s)    Total floor / unit time spent today 30 minutes  Coordination of patient's care was performed in conjunction with consulting services. Time invested included review of patient's labs, vitals, and management of their comorbidities with continued monitoring, examination of patient as well as answering patient questions, documenting her findings and creating progress note in electronic medical record,  ordering appropriate diagnostic testing.       ** Please Note:  voice to text software may have been used in the creation of this document. Although proof errors in transcription or interpretation are a potential of such software**     Other

## 2024-07-23 NOTE — CASE MANAGEMENT
Received fax confirmation of contd stay from [facility]. Lcd 7/25 with expected dc 7/25.     0953 cm was asked by speech therapy to add a suction machine to pts home dme order. Order placed with [facility] via paracte.

## 2024-07-23 NOTE — PLAN OF CARE
Problem: PAIN - ADULT  Goal: Verbalizes/displays adequate comfort level or baseline comfort level  Description: Interventions:  - Encourage patient to monitor pain and request assistance  - Assess pain using appropriate pain scale  - Administer analgesics based on type and severity of pain and evaluate response  - Implement non-pharmacological measures as appropriate and evaluate response  - Consider cultural and social influences on pain and pain management  - Notify physician/advanced practitioner if interventions unsuccessful or patient reports new pain  Outcome: Progressing     Problem: INFECTION - ADULT  Goal: Absence or prevention of progression during hospitalization  Description: INTERVENTIONS:  - Assess and monitor for signs and symptoms of infection  - Monitor lab/diagnostic results  - Monitor all insertion sites, i.e. indwelling lines, tubes, and drains  - Monitor endotracheal if appropriate and nasal secretions for changes in amount and color  - [place] appropriate cooling/warming therapies per order  - Administer medications as ordered  - Instruct and encourage patient and family to use good hand hygiene technique  - Identify and instruct in appropriate isolation precautions for identified infection/condition  Outcome: Progressing     Problem: SAFETY ADULT  Goal: Patient will remain free of falls  Description: INTERVENTIONS:  - Educate patient/family on patient safety including physical limitations  - Instruct patient to call for assistance with activity   - Consult OT/PT to assist with strengthening/mobility   - Keep Call bell within reach  - Keep bed low and locked with side rails adjusted as appropriate  - Keep care items and personal belongings within reach  - Initiate and maintain comfort rounds  - Make Fall Risk Sign visible to staff  - Offer Toileting every 2 Hours, in advance of need  - Initiate/Maintain bed/chair alarm  - Obtain necessary fall risk management equipment: nonskid socks  -  Apply yellow socks and bracelet for high fall risk patients  - Consider moving patient to room near nurses station  Outcome: Progressing  Goal: Maintain or return to baseline ADL function  Description: INTERVENTIONS:  -  Assess patient's ability to carry out ADLs; assess patient's baseline for ADL function and identify physical deficits which impact ability to perform ADLs (bathing, care of mouth/teeth, toileting, grooming, dressing, etc.)  - Assess/evaluate cause of self-care deficits   - Assess range of motion  - Assess patient's mobility; develop plan if impaired  - Assess patient's need for assistive devices and provide as appropriate  - Encourage maximum independence but intervene and supervise when necessary  - Involve family in performance of ADLs  - Assess for home care needs following discharge   - Consider OT consult to assist with ADL evaluation and planning for discharge  - Provide patient education as appropriate  Outcome: Progressing  Goal: Maintains/Returns to pre admission functional level  Description: INTERVENTIONS:  - Perform AM-PAC 6 Click Basic Mobility/ Daily Activity assessment daily.  - Set and communicate daily mobility goal to care team and patient/family/caregiver.   - Collaborate with rehabilitation services on mobility goals if consulted  - Perform Range of Motion 3 times a day.  - Reposition patient every 2 hours.  - Dangle patient 3 times a day  - Stand patient 3 times a day  - Ambulate patient 3 times a day  - Out of bed to chair 3 times a day   - Out of bed for meals 3 times a day  - Out of bed for toileting  - Record patient progress and toleration of activity level   Outcome: Progressing     Problem: DISCHARGE PLANNING  Goal: Discharge to home or other facility with appropriate resources  Description: INTERVENTIONS:  - Identify barriers to discharge w/patient and caregiver  - Arrange for needed discharge resources and transportation as appropriate  - Identify discharge learning  needs (meds, wound care, etc.)  - Arrange for interpretive services to assist at discharge as needed  - Refer to Case Management Department for coordinating discharge planning if the patient needs post-hospital services based on physician/advanced practitioner order or complex needs related to functional status, cognitive ability, or social support system  Outcome: Progressing     Problem: Nutrition/Hydration-ADULT  Goal: Nutrient/Hydration intake appropriate for improving, restoring or maintaining nutritional needs  Description: Monitor and assess patient's nutrition/hydration status for malnutrition. Collaborate with interdisciplinary team and initiate plan and interventions as ordered.  Monitor patient's weight and dietary intake as ordered or per policy. Utilize nutrition screening tool and intervene as necessary. Determine patient's food preferences and provide high-protein, high-caloric foods as appropriate.     INTERVENTIONS:  - Monitor oral intake, urinary output, labs, and treatment plans  - Assess nutrition and hydration status and recommend course of action  - Evaluate amount of meals eaten  - Assist patient with eating if necessary   - Allow adequate time for meals  - Recommend/ encourage appropriate diets, oral nutritional supplements, and vitamin/mineral supplements  - Order, calculate, and assess calorie counts as needed  - Recommend, monitor, and adjust tube feedings and TPN/PPN based on assessed needs  - Assess need for intravenous fluids  - Provide specific nutrition/hydration education as appropriate  - Include patient/family/caregiver in decisions related to nutrition  Outcome: Progressing

## 2024-07-23 NOTE — PROGRESS NOTES
07/23/24 0800   Pain Assessment   Pain Assessment Tool 0-10   Restrictions/Precautions   Precautions Aspiration;Bed/chair alarms;Cognitive;Fall Risk;Impulsive;Pacemaker;Supervision on toilet/commode;Spinal precautions   Comprehension   Comprehension (FIM) 5 - Understands basic directions and conversation   Expression   Expression (FIM) 5 - Needs help/cues only RARELY (< 10% of the time)   Social Interaction   Social Interaction (FIM) 5 - Interacts appropriately with others 90% of time   Problem Solving   Problem solving (FIM) 4 - Solves basic problems 75-89% of time   Memory   Memory (FIM) 3 - Recognizes, recalls/performs 50-74%   Speech/Language/Cognition Assessmetn   Treatment Assessment In addition to dysphagia tx session, SLP engaging pt in completing cognitive tasks during session today. Initiated w/ mock credit card bill in which pt was having difficulty initially in comprehension of task. Pt thinking that he had to fill information in on the bill itself. SLP had to initiate task by having pt focus on the initial question which pt was able to answer accordingly. Once pt did this, improvement in comprehension was observed for this task in which he was 10/10 accurate in completing.     Targeting organization/categorization and reasoning skills, SLP provided pt w/ 4 words and pt was to determine the word which did not belong. It was noted that pt was 17/20 accurate in completing this task but did appropriately ask SLP questions for those 3 items which pt did have more difficulty in determining answer. SLP providing pt w/ probing questions to where pt was able to determine the remaining 3 words which did not belong.     Lastly, SLP targeting verbal evaluating solutions task, in which SLP provided pt w/ a situation/problem and then a given solution to the problem. Pt was to identify whether the solution provided was good vs bad and if it was bad, pt to provide a better solution to the problem. Pt did require  "increased repetition of each of the problems/solutions given, where pt was noted to fluctuate in his ability to ID good vs bad solution, where pt was 6/9 accurate. Pt's ability to then provide solutions was 5/9 accurate. Overall, pt did require probing questions to increase accuracy in recognizing bad solutions and then providing more appropriate means for a solution. Of note, pt did verbalize how this task was \"trickier\" than other ones presented in prior sessions. At this time, pt will continue to benefit form ongoing skilled SLP services targeting functional cognitive skills in hopes for decreasing burden of care for family over time.    Eating   Type of Assistance Needed Physical assistance   Physical Assistance Level Total assistance   Comment SLP providing PO trials to pt   Eating CARE Score 1   Swallow Assessment   Swallow Treatment Assessment   Daily Dysphagia Tx Note     Patient Name: Luis Forrester    Today's Date: 7/23/2024      Current Risks for Dysphagia & Aspiration: Recent intubation; general debilitation; new neuro event;  brain injury; cognitive deficit; positioning issues; cervical spin injury/surgery; head support      Current Symptoms/Concerns: cough, clear throat, during and after PO, difficulty chewing, wet voicing, chronic cough      Current diet:NPO with tube feeds      Premorbid diet::regular diet and thin liquids      Positioning: upright in recliner     Items administered:Consistencies Administered: ice chips x20; water by tsp (1/2 tsp amount) x3      Oral stage:mild-moderate  Lip closure: functional around tsp  Anterior spillage: none  Mastication: munching type mastication continues  Bolus formation: decreased  Bolus control: likely decreased, more observed on later trials  Transfer: delayed  Oral residue: none  Pocketing: none         Pharyngeal stage:moderate-severe  Swallow promptness: more delayed today  Hyolaryngeal elevation: while still present, decreased upon excursion   Wet " voice: minimal today   Throat clear: x4 out of 20 ice chips  Cough: x5 out of 20 ice chips  Secondary swallows: ongoing where today was 2-3 swallows per ice chip  Audible swallows: occurring w/ all trials       Esophageal stage:No overt sxs observed w/ trials        Summary:     Pt presenting with mild-moderate oral and moderate-severe pharyngeal dysphagia today. Symptoms or concerns included decreased mastication, decreased bolus formation, delayed oral intiation, and suspected decreased control of ice chips over time in addition to suspected pharyngeal swallow delay, suspected decreased hyolaryngeal elevation upon palpation, multiple swallows, effortful swallow, audible swallows, and more overt sxs of aspiration across ice chip trials today.      Pt's lip seal and bolus retrieval remains to be functional across ice chips to where no anterior loss was observed. Continues to demonstrate munching type mastication given ice chips which lead to likely decreased control over time due to delay in bolus transit and delay in swallow initiation. Also suspect decreased bolus control w/ water by tsp trials today. No oral residual is observed. Swallow initiation remains to be delayed, HLE still present but continues to be weaker when elicited which does lead to multiple swallows per ice chip/water by tsp. Today, range of swallows per ice chip was 2-3 swallows which were highly audible across all trials today. While less wetness in voice was observe, pt did present w/ more overt and strong throat clearing and stronger coughing occurrences given ice chips today. However, pt did exhibit increased throat clear and cough (total of 9 events out of 20 ice chips) today. Pt also demonstrating strong throat clear/cough after tsp presentation of water.    SLP completed oral care w/ suction prior to trials today w/ good tolerance and no overt signs/sxs of aspiration.     Recommendations:  Diet: NPO w/ alternative means of  nutrition/hydration via PEG  Meds: non-oral via PEG  Strategies: HOB to be 30 degrees at all times     Oral care w/ suction Q4.  PO trials w/ SLP supervision only  Results reviewed with:  patient  Aspiration precautions posted.     F/u ST tx: Pt will continue to benefit from ongoing skilled dysphagia tx sessions to establish the potential for safest least restrictive diet vs pleasure feeding w/o increased oropharyngeal or aspiration sxs and monitor ability to carryover swallow strategies independently.     Plan: PO trials w/ SLP supervision only            Continue to review swallow/pharyngeal strengthening exercises w/ pt and family            Repeat VFSS AFTER a fair amount of therapy sessions in hopes for maximizing pharyngeal excursion/strength ---> likely in OP repeat VFSS   Swallow Assessment Prognosis   Prognosis Guarded   Prognosis Considerations Age;Co-morbidities;Family/community support;Medical diagnosis;Medical prognosis;New learning ability;Ability to carry over   SLP Therapy Minutes   SLP Time In 0800   SLP Time Out 0900   SLP Total Time (minutes) 60   SLP Mode of treatment - Individual (minutes) 60   SLP Mode of treatment - Concurrent (minutes) 0   SLP Mode of treatment - Group (minutes) 0   SLP Mode of treatment - Co-treat (minutes) 0   SLP Mode of Treatment - Total time(minutes) 60 minutes   SLP Cumulative Minutes 435   Therapy Time missed   Time missed? No

## 2024-07-24 PROBLEM — R52 PAIN: Status: RESOLVED | Noted: 2024-07-12 | Resolved: 2024-07-24

## 2024-07-24 LAB

## 2024-07-24 PROCEDURE — 99233 SBSQ HOSP IP/OBS HIGH 50: CPT | Performed by: PHYSICAL MEDICINE & REHABILITATION

## 2024-07-24 PROCEDURE — 99231 SBSQ HOSP IP/OBS SF/LOW 25: CPT | Performed by: NURSE PRACTITIONER

## 2024-07-24 PROCEDURE — 97535 SELF CARE MNGMENT TRAINING: CPT

## 2024-07-24 PROCEDURE — 92526 ORAL FUNCTION THERAPY: CPT

## 2024-07-24 PROCEDURE — 97530 THERAPEUTIC ACTIVITIES: CPT

## 2024-07-24 PROCEDURE — 97110 THERAPEUTIC EXERCISES: CPT

## 2024-07-24 RX ORDER — ENOXAPARIN SODIUM 100 MG/ML
40 INJECTION SUBCUTANEOUS
Status: DISCONTINUED | OUTPATIENT
Start: 2024-07-25 | End: 2024-07-24

## 2024-07-24 RX ORDER — ENOXAPARIN SODIUM 100 MG/ML
40 INJECTION SUBCUTANEOUS
Status: DISCONTINUED | OUTPATIENT
Start: 2024-07-25 | End: 2024-07-25 | Stop reason: HOSPADM

## 2024-07-24 RX ADMIN — ALBUTEROL SULFATE 2 PUFF: 90 AEROSOL, METERED RESPIRATORY (INHALATION) at 06:36

## 2024-07-24 RX ADMIN — Medication 125 MCG: at 06:33

## 2024-07-24 RX ADMIN — ENOXAPARIN SODIUM 30 MG: 30 INJECTION SUBCUTANEOUS at 08:36

## 2024-07-24 RX ADMIN — NYSTATIN: 100000 POWDER TOPICAL at 17:00

## 2024-07-24 RX ADMIN — GUAIFENESIN AND DEXTROMETHORPHAN 10 ML: 100; 10 SYRUP ORAL at 04:28

## 2024-07-24 RX ADMIN — ACETAMINOPHEN 975 MG: 650 SUSPENSION ORAL at 02:29

## 2024-07-24 RX ADMIN — ALBUTEROL SULFATE 2 PUFF: 90 AEROSOL, METERED RESPIRATORY (INHALATION) at 02:26

## 2024-07-24 RX ADMIN — GUAIFENESIN AND DEXTROMETHORPHAN 10 ML: 100; 10 SYRUP ORAL at 21:18

## 2024-07-24 RX ADMIN — Medication 3 MG: at 21:18

## 2024-07-24 RX ADMIN — GUAIFENESIN AND DEXTROMETHORPHAN 10 ML: 100; 10 SYRUP ORAL at 17:23

## 2024-07-24 RX ADMIN — NYSTATIN 1 APPLICATION: 100000 POWDER TOPICAL at 08:37

## 2024-07-24 NOTE — CASE MANAGEMENT
Met w/pt while he was walking in the [place] and reviewed dc plans for tomorrow. Cm confirmed knowledge provided by [employer] that his sig other has made arrangements for tube feeding and suction machine supplies to be delivered this afternoon. Cm following to help with transport home.

## 2024-07-24 NOTE — ASSESSMENT & PLAN NOTE
S/p PEG placement by Dr Reinoso 7/10/24  Pt tolerating tube feeds with Jevity boluses 6x/day and no oral diet  Pts girlfriend here today reports did well yesterday with feeds , except she knocked over the canister  Continue with abdominal binder to prevent extraction of tubing   ST working with him to improving swallowing  Nutrition following.  Outpt follow-up for laryngoscope and FEES.  7/24 pt with reported cough over night. He denies mucus. Dry mouth. We discussed not laying flat over night at least 45 degree angle .   Continue to monitor for silent aspiration sputum or tube feeds.

## 2024-07-24 NOTE — DISCHARGE INSTR - AVS FIRST PAGE
DISCHARGE INSTRUCTIONS: [facility] [place]    Bring these instructions with you to your Outpatient Physician appointments so they can order and follow-up any additional lab work or imaging recommended at time of discharge.    Resume follow-up with all your prior providers that you have established care prior to this hospitalization.  Discuss with primary care physician (PCP) if you have additional questions.     It  is you or your caregivers responsibility to obtain follow-up MEDICATION REFILLS  As indicated through your Primary Care Physician (PCP) and other outpatient specialty provider(s) after discharge.  Please follow-up with your PCP as soon as possible after discharge to set-up follow-up management and when appropriate refills.      You have been determined to have some cognitive impairments. - It is YOUR CAREGIVER'S RESPONSIBILITY to ensure appropriate follow-up which includes:  - APPOINTMENTS are scheduled and safe transportation is arranged  - LABS and IMAGING are completed  - MEDICATION MANAGEMENT at home is carried out appropriately     You remain a fall and injury risk which could be severe.  - Your risk of fall has decreased however since admission to acute rehab.  Caregiver training has been completed with our staff.  - Appropriate supervision +/- assistance as instructed during your rehab course is recommended to decrease risk of fall and injury.    - Continue skilled therapy as discussed after discharge to further decrease this risk    If you (or your health care proxy) have any questions or concerns regarding your acute rehabilitation stay including issues with medications, rehabilitation, and follow-up plan, please call:          [facility] Unit in [place] at [phone] or [phone].    Should you develop fevers, chills, new weakness, changes in sensation, difficulty speaking, facial weakness, confusion, shortness of breath,  "chest pain, or other concerning symptoms please call 911 and/or obtain transportation to nearest ER immediately.    Should you develop worsening pain, swelling, or drainage notify your surgeon right away or obtain transportation to nearest ER for evaluation.    Should you develop feelings of significant hopelessness, severe depression, severe anxiety, or suicide you should call 911 or obtain transportation to nearest ER.    24-7 Nationwide suicide and crisis lifeline call \"158\"  National Suicide Prevention Lifeline is 1-192.362.1575 and is available 24 hrs/7 days a week.   [facility] Crisis [phone] is available 24 hrs/7 days for mental health  [facility] Crisis [phone] is available 24 hrs/7 days for mental health   [facility] Crisis [phone]    PHYSICIANS to see:  Please see your doctors listed in the follow up providers section of your discharge paperwork, and take the discharge paperwork with you to your appointments.    LAB WORK recommended after discharge:  TSH to be done around 8/2/24.    Also, obtain the following lab orders and follow-up management through primary care physician and outpatient specialists.     CBC to monitor your platelet count at next visit within 1 weeks. Also get repeat TSH around 8/2/24.     Excessive delay in labs and appropriate follow-up could potentially increase your risk of complications which could be severe and even life-threatening.  It is you or your caregiver's responsibility to ensure these tests are ordered by your outpatient providers and followed up with accordingly.      Call [facility] to locate the closest open outpatient lab center where you can have your lab work drawn.  Contact them at [phone]    These labs must be interpreted by your outpatient primary care physician (PCP) and/or other outpatient specialists as discussed.  It is your (or your caregiver's) responsibility to ensure these " "labs are drawn and followed up by the appropriate outpatient providers.  Contact these providers after discharge to review labs and adjust management as indicated and to order any additional labs.  Your acute rehab physicians will not be able to follow-up labs once discharged from Acute Rehab setting      Excessive delay in labs and appropriate follow-up could potentially increase your risk of complications which could be severe and even life-threatening.      IMAGING to follow-up:  Follow-up imaging as discussed with your recent physicians or at discretion of your outpatient physicians to be determined at time of your appointments.    Excessive delay in imaging and appropriate follow-up could potentially increase your risk of complications which could be severe and even life-threatening.  It is you or your caregiver's responsibility to ensure these tests are ordered by your outpatient providers and followed up with accordingly.      IMAGING, ADDITIONAL FINDINGS and ISSUES to follow-up:  Check under the \"DISCHARGE PROVIDER\" section of these DISCHARGE INSTRUCTIONS for provider contact information and specific issues as well.      SKIN CARE INSTRUCTIONS to follow:  Monitor incision(s) for increased redness, swelling, pain/tenderness, discharge/pus and promptly notify your surgeon should these develop.  - Should you develop significant pain, swelling, or drainage obtain transportation to nearest emergency room for immediate evaluation if unable to reach your surgeon promptly   - Should you develop uncontrolled pain, fever, chills, sweats, changes in strength, sensation, or color of this area obtain transportation to nearest emergency room for immediate evaluation.      Monitor skin for increased redness or breadown and promptly notify your physician should these develop    If instructed while in [facility] - be sure you stand +/- walk every 1-2 hours and if advised use appropriate supervision/assistance to optimally offload " your buttock/sacral region.  While seated or lying in bed shift positions and from side to side often.  Can use barrier type cream such as Hydragaurd 2 times per day and as needed.    Turn patient (yourself) fully every 2 hours while in bed.      WOUND CARE INSTRUCTIONS to follow:  Home health nursing will assist you with wound management.  You may contact them with issues as well once this service is set-up.    If [facility] is your provider their phone number is [phone].    If you are unable to get in touch with [facility] you may contact your  at [phone].      Due to the following you are at increased risk of skin breakdown/wounds/worsening wounds:  - Impaired mobility  - Impaired nutrition/intake/low albumin  - Medical co-morbidities    Buttocks/Sacrum  Turn as full as possible off sacrum/buttocks every 2 hours  Use Cushion while in chair or wheelchair  Weight shift every 10-15 minutes while in chair  Keep skin clean and dry as possible.  Remove wet or soiled clothing/linens promptly  Use barrier cream or similar 2 times per day   Monitor skin for increased breakdown which you are at risk of and notify nursing, PCP, or other physician providers should this occur right away    Heels/bony edges of feet  Float heels off edge of pillow for added pressure relief.  Monitor heels and bony protuberances and notify physician or nursing for any increased redness, bogginess, or breakdown    Ensure appropriate wound care to optimize chances for healing and decrease risks of complications.    Your wound(s) remains at risk for worsening and infection.    - Should you develop significant pain, swelling, or drainage obtain transportation to nearest emergency room for immediate evaluation if unable to reach your surgeon promptly   - Should you develop uncontrolled pain, fever, chills, sweats, changes in strength, sensation, or color of this area obtain transportation to nearest emergency room  for immediate evaluation.      Driving restrictions:    You are recommended against driving until cleared by an outpatient physician and cleared through a formal driving evaluation.  Driving at current time can increase your risk of injury and can increase risk of injury of others.      **As outlined in [place] state law, healthcare personnel are required to report every person over 15 years of age diagnosed as having a condition that could impair their ability to drive, with the exception of medical condition expected to last less than 90 days (most commonly orthopedic fractures and post orthopedic surgery conditions without other co-morbidities).  Your medical condition hopefully will have adequately improved by that time but at this time it is not certain.      **You should NOT operate a motor vehicle while under the influence of an opiate medication, muscle relaxant, or other sedative.  Doing so may lead to an accident resulting in serious injury or death to yourself or others.  You have agreed to avoid driving when under the influence of this medication.      Work restrictions:  You should NOT return to work (if working) until cleared by an outpatient physician.    You should not operate heavy machinery (if applicable) until cleared by an outpatient physician.      Alcohol restrictions:  You are recommended to not drink alcohol at this time unless cleared by an outpatient physician.  Drinking alcohol in your current functional condition can increase your risk of injury which could be severe.  Drinking alcohol given your current health problems can lead to increased medical complications which could be severe.    Combining alcohol with your current medications can increase your risk of injury which could be severe.      Smoking restrictions:  You are recommended to not smoke nicotine.  Smoking increases your risk of heart attack, stroke, emphysema/COPD, and lung cancer.      Cannabis restrictions:  You are  recommended to not smoke/ingest/consume/use cannabis/marijuana at this time unless cleared by an outpatient physician.  Cannabis use/intoxication in your current functional condition can increase your risk of injury which could be severe.  Cannabis use/intoxication given your current health problems may lead to increased medical complications and sub-optimal functional recovery    Illicit drug use restrictions:   You should not use cocaine, crack, speed/methamphetamine, heroin, LSD, ecstasy or other illicit substances as they can increase your risk of injury and medical complication which could be severe and lead to sub-optimal functional recovery.    MEDICATIONS:  Please see a full list of your medications outlined in the After Visit Summary that is attached to these Discharge Instructions.  Please note changes may have been made to your medications please refer to your discharge paperwork for your current medications and take this list with you to all your doctors appointments for your doctors to review.  Please do not resume a home medication unless the medication reconciliation sheet indicates to do so, please do not assume that a medication that you were given a prescription for is the same as a medication you have at home based on both medications having the same name as dosages and frequency may have changed.      Unless specifically noted in your medication list provided to you in your discharge paper work do not resume prior vitamins, minerals, or supplements you may have been taking prior to your hospitalization unless instructed by an outpatient physician in the future.  Discuss with your primary care at next visit if applicable.      AVOID blood thinners and NSAIDs (Non-steroidal anti-inflammatory drugs):    NSAID Warning:  Please avoid NSAID (including but not limited to advil, aleve, motrin, naproxen, ibuprofen, mobic, meloxicam, diclofenac etc) medications as NSAID medications may increase your risk  of bleeding (which can be life-threatening), stroke, worsening kidney disease, heart attack, developing/worsening GI ulcers, worsening heart failure, worsening liver (cirrhosis) disease, potentially delay bone healing.      Blood thinners prescribed to optimize long and short term health and decrease risk of complications but with risks related to bleeding and other complications.      Lovenox (Enoxaparin) Instructions:  You have been prescribed Lovenox (Enoxaparin).   This medication is used to prevent clots which can cause severe disability and even death    This medications was started prior your acute rehabilitation course as recommended by your prior physicians.  It was continued during your acute rehabilitation course.  This medication will need to be managed by your primary care physician after discharge.  Follow-up with this provider as soon as possible to ensure appropriate use.    This is a strong anticoagulant (blood thinner).  It is being recommended for use by you (or your family) to decrease the risk of clotting which can be severely disabling and even life-threatening.  Even when provided as recommended it can cause severe disabling and even life-threatening bleeding.  Too much or too little of this blood thinner further increases your risk of this medication causing serious and even life-threatening complications such as severe bleeding, clots, strokes, and death.  With that said, at this time based on best available evidence and consensus agreement, your physicians recommend you take Lovenox (Enoxaparin) medication based on your overall risks and benefits in your specific medical situation.      If you (or your family/caregiver) notice black stools, bloody stools, vomit blood, develop new weakness, slurred speech, confusion or have any other concerning symptoms call 911 or obtain transportation to nearest emergency room immediately.     Please complete the remainder of your course of lovenox  (enoxaparin) as prescribed. Last day is 8/10/2024.    Pacemaker:  You recently had an implantable pacemaker inserted.  Contact cardiology and the cardiology device clinic as outlined under follow-up providers with any questions and to ensure appropriate follow-up after discharge.        Acetaminophen (Tylenol) Dosing Warning:    You may have up to 3000 mg of acetaminophen (Tylenol) from all sources spread out over a 24 hours period.  Do not have more than that, as this can increase your risk of liver injury which can be serious.    Bowel management:  - Ideally you would have 1 well formed BM every 1-2 days.  Adjust bowel regimen based on that goal or as close to that as possible  - Take docusate twice daily as prescribed. Discontinue or hold for loose stools  - Can add miralax as needed over the counter if no BM in 2-3 days.   - If still constipated you can take either oral or by rectum dulcolax (but not at the same time)  - If still constipated after dulcolax than you may temporarily take mag citrate x1 (but should try to avoid this if possible as this can occasionally cause abnormal electrolytes and other complications and notify your physician if you need to take)  - If unable to go for more than 4 days notify your physician for additional recommendations    - Follow-up with your primary care physician at next visit  - If you develop significant abdominal pain, nausea/vomiting, or other concerns seek medical attention right away.        Difficulty swallowing and Tube feeding:  - You are at increased risk of pneumonia (aspiration) and choking but by following plan this decreases your risk until your swallowing improving  - Notify physician of increased cough, shortness of breath, fever, or other concerns as soon as possible  - You are at increased risk of stomach and bowel dysfunction.    - If you have high tube feed residuals as discussed during your rehab course hold that tube feed until that improved.  If this  does not improve notify your physician for additional recommendations.  - If you develop vomiting hold tube feeds and notify your physician as well  - If you stop having bowel movement or develop increased abdominal pain notify your physician  - If skin around tube becomes increasing painful, red or tender notify your physician    - Currently recommending nothing by mouth unless speech therapy has cleared patient/family in the future.  Ice chips only if speech therapy has cleared you with your family.    - You will continue to work with speech therapy and hopefully can advance to oral diet in the future.  Additional studies and management may be needed.  Follow-up with your outpatient providers for additional management.    Tube feeding - assistance from home health nursing as well     Administer 6 boluses (feeds) per day as following:    [place] 1 can at 0700, 1100, 1300 (1pm), 1600 (4pm), 1800 (6PM) and 2100 (9pm).     Provide 75mL water flush after each bolus and 60mL water flush after medications.    Please ensure head of bed remains upright during feeds and for at least 60 minutes after boluses.    Hold Tube feeds for residual >400ml    Cognitive impairment - based on recent assessments, you may have a degree of impaired cognition and are recommended to follow-up with neurology to discuss potential additional work-up and management.  Your family has been recommended to assist you with oversight and supervision to decrease risk of fall and injury, optimize day-to-day activities, and to ensure appropriate medical management.    For pain - try to use over the counter topicals (creams/gels) as discussed or acetaminophen 1-2 regular or extra-strength Tylenol up to 2-3 times per day.  Could consider heat or ice as well maximum 20 minutes at a time.  Do not use heat or ice if using topicals at the same time.  Notify primary care and/or orthopedics for poorly controlled pain for additional recommendations.       Please note a summary of your hospital stay with relevant information for your doctors will try to be sent to them.  Please confirm with your doctors at your follow up visits that they have received this summary and have them contact [facility] Medical Records if they have not received them along with any other medical records they may require.     [facility] Phone Number:  [phone]

## 2024-07-24 NOTE — ASSESSMENT & PLAN NOTE
S/p C3-7 laminectomy with C2-T1 fusion on 6/16/24 due to central cord syndrome following a fall.    Completed post-op abx.  No collar necessary  We discussed not laying flat in the setting of tube feeds  He has preference for no pillows behind neck , most of the time   Monitor incision  NS follow-up around 8/2/24 for 6 week post-op appt.  Therapy and pain control per PMR   DC 7/25

## 2024-07-24 NOTE — PROGRESS NOTES
"OT daily treatment note     07/24/24 4628   Pain Assessment   Pain Assessment Tool 0-10   Pain Score No Pain   Restrictions/Precautions   Precautions Aspiration;Bed/chair alarms;Cardiac/sternal;Cognitive;Fall Risk;Pacemaker;Spinal precautions;Supervision on toilet/commode   Lifestyle   Autonomy \"why can't you just do it for me\"   Eating   Type of Assistance Needed Physical assistance   Physical Assistance Level Total assistance   Comment tube feeding   Eating CARE Score 1   Oral Hygiene   Type of Assistance Needed Independent   Physical Assistance Level No physical assistance   Comment at EOB with suction toothbrush   Oral Hygiene CARE Score 6   Shower/Bathe Self   Type of Assistance Needed Supervision   Physical Assistance Level No physical assistance   Comment DS when completing shower and able to sit for majority of shower and stand as needed. Pt able to utilize long handled sponge for backside and lower feet/legs.   Shower/Bathe Self CARE Score 4   Upper Body Dressing   Type of Assistance Needed Independent   Physical Assistance Level No physical assistance   Comment able to complete while seated and maintain precautions   Upper Body Dressing CARE Score 6   Lower Body Dressing   Type of Assistance Needed Independent   Physical Assistance Level No physical assistance   Comment able to thread underwear/pants while seated and cm in stance; uses reacher as needed for threading   Lower Body Dressing CARE Score 6   Putting On/Taking Off Footwear   Type of Assistance Needed Independent   Physical Assistance Level No physical assistance   Comment able to doff socks in stance w/ support from GB/RW and don socks while seated using sock aid   Putting On/Taking Off Footwear CARE Score 6   Lying to Sitting on Side of Bed   Type of Assistance Needed Independent   Physical Assistance Level No physical assistance   Comment able to use bed rails as needed and push up from bed w/ inc time and encouragement   Lying to Sitting on " "Side of Bed CARE Score 6   Sit to Stand   Type of Assistance Needed Independent   Physical Assistance Level No physical assistance   Comment w/ use of RW   Sit to Stand CARE Score 6   Bed-Chair Transfer   Type of Assistance Needed Independent   Physical Assistance Level No physical assistance   Comment w/ use of RW   Chair/Bed-to-Chair Transfer CARE Score 6   Toileting Hygiene   Type of Assistance Needed Physical assistance   Physical Assistance Level 25% or less   Comment pt able to complete urination and cm in stance but refuses to attempt to wipe backside while seated. Pt was very argumentative during session and became agitated when encouraged to try wiping backside. Pt reports he cannot reach due to neck surgery but no pain/soreness when reaching just states \"I can't do it.\" Pt's SO, Aislinn has agreed to help in previous sessions but OT encouraged pt to try reaching back w/ R hand everyday to improve ROM and eventually complete ind   Toileting Hygiene CARE Score 3   Toilet Transfer   Type of Assistance Needed Independent   Physical Assistance Level No physical assistance   Comment w/ use of GB and RW   Toilet Transfer CARE Score 6   Exercise Tools   Exercise Tools Yes   UE Ergometer Pt engaged in UB strengthening using yellow theraband and provided w/ HEP for home. Pt educated on each exercise for 10x 2 sets and pt demo G carryover. Pt reports he will utilize HEP while at home following DC tmrw. Pt benefits from continued UB strengthening upon DC to improve strength and endurance to facilitate ind w/ functional tasks.   Cognition   Overall Cognitive Status Impaired   Arousal/Participation Alert;Responsive   Attention Attends with cues to redirect   Orientation Level Oriented X4   Memory Decreased long term memory;Decreased short term memory;Decreased recall of recent events;Decreased recall of precautions   Following Commands Follows one step commands without difficulty   Comments Pt continues demo dec recall " of precautions and unable to spontaneously recall cervical spinal precautions. OT provided w/ cheat sheets to help remember precautions and instructed pt to refer to sheet when finding it difficult to remember. Pt acknowledged. Pt participated in pipe tree activity while standing to improve functional cog, endurance, and standing tolerance. Pt completed figure #1 while seated and able to complete w/o errors. Pt completed figure #2 & #3 while standing unsuported at tabletop for 2-3 min and rest breaks in btwn. Pt able to correct 1 error in fig #2 and unable to identify error in fig #3. Pt had difficulty w/ problem solving when pieces did not fit and req VC for A to complete.   Activity Tolerance   Activity Tolerance Patient tolerated treatment well   Assessment   Treatment Assessment Pt engaged in skilled OT session w/ focus on ADL retraining, functional transfers, AE training, balance, standing tolerance, endurance, functional cog and UB strengthening. Pt provided w/ HEP following DC tmrw and encouraged to practice shoulder ROM w/ backside reaching w/ R arm ONLY. Pt continues to need A w/ backside wiping when toileting and agitated when encouraged to complete ind/best of his ability. Aislinn was present during previous OT session and agreed to remind pt to use RW at all times and provide signs in home as reminders for pt due to memory deficits. Pt has moved in w/ SO, Aislinn and will DC home with her and daughter tmrw. Pt has met LTG w/ exception to toileting hygiene which Aislinn has agreed to help w/. Pt has made G progress w/ therapy and will DC w/ RW and LHAE.   OT Family training done with: Aislinn LONGO   Assessment of family training Aislinn will provide SUP for pt at home and provide A as needed w/ encouragement to have pt complete to best of his ability on his own   OT Therapy Minutes   OT Time In 0830   OT Time Out 1000   OT Total Time (minutes) 90   OT Mode of treatment - Individual (minutes) 90   OT Mode of  treatment - Concurrent (minutes) 0   OT Mode of treatment - Group (minutes) 0   OT Mode of treatment - Co-treat (minutes) 0   OT Mode of Treatment - Total time(minutes) 90 minutes   OT Cumulative Minutes 815   Therapy Time missed   Time missed? No

## 2024-07-24 NOTE — PROGRESS NOTES
07/24/24 1230   Pain Assessment   Pain Assessment Tool 0-10   Pain Score No Pain   Restrictions/Precautions   Precautions Aspiration;Bed/chair alarms;Cognitive;Fall Risk;Pain;Spinal precautions;Supervision on toilet/commode  (NPO s/p PEG)   Eating   Type of Assistance Needed Physical assistance   Physical Assistance Level Total assistance   Eating CARE Score 1   Swallow Assessment   Swallow Treatment Assessment   Daily Dysphagia Tx Note     Patient Name: Luis Forrester    Today's Date: 7/24/2024      Current Risks for Dysphagia & Aspiration: Recent intubation; general debilitation; new neuro event;  brain injury; cognitive deficit; positioning issues; cervical spin injury/surgery; head support      Current Symptoms/Concerns: cough, clear throat, during and after PO, difficulty chewing, wet voicing, chronic cough      Current diet:NPO with tube feeds      Premorbid diet::regular diet and thin liquids     Positioning: Pt was in bed which was then placed in chair mode    Items administered:Consistencies Administered: ice chips x10      Oral stage:mild  Lip closure: functional around tsp  Anterior spillage: none  Mastication: munching type mastication continues  Bolus formation: decreased  Bolus control: likely decreased, more observed on later trials  Transfer: delayed  Oral residue: none  Pocketing: none         Pharyngeal stage:moderate-severe  Swallow promptness: more delayed today  Hyolaryngeal elevation: while still present, decreased upon excursion   Wet voice:none  Throat clear: none  Cough:none  Secondary swallows: ongoing where today was 2-3 swallows per ice chip  Audible swallows: occurring w/ all trials       Esophageal stage:No sxs observed given trials        Summary:     Pt presenting with mild oral and moderate-severe pharyngeal dysphagia today. Symptoms or concerns included slower mastication, decreased bolus formation, delayed oral intiation, and suspected decreased control of ice chips over time  "in addition to suspected pharyngeal swallow delay, suspected decreased hyolaryngeal elevation upon palpation, multiple swallows, effortful swallow, audible swallows, and no overt cough in PM session.      Today, pt did exhibit improvement given tolerance of ice chip trials but still remains at risk for decreased bolus control/transfer ice chips, to where pt was elicit delayed swallow and still decreased HLE upon palpation. Also observed was multiple swallows per ice chips and continues w/ audible swallows across ice chip trials.     SLP completed oral care w/ suction prior to trials today w/ good tolerance and no overt signs/sxs of aspiration. Pt's s/oAislinn was present for session today, in which SLP provided education and training on how to complete oral care w/ pt upon discharge home. SLP using the oral suction kit, swabs and mouthwash. Educated on how to dip swabs into mouthwash, not allow to \"soak\" swabs. Once removing swab from mouth wash, squeeze against the side of the cup to remove EXTRA mouthwash. SLP then demo how to complete by starting on R side rubbing swab along cheek, teeth and gum line (both top and bottom). Then using suction after this section to clear extra secretions. Complete the same routine to L side in same sequence, then suction. Then pt to bite teeth together for the front to get teeth, gum lines and buccal areas then suction again. Last area is tongue and roof of mouth then suction. After ice chip trials SLP had s/o complete the oral care w/ swab as pt used the suction. The team work which both s/o and pt completed this task was accurate, only needed a cue to complete on tongue/roof of mouth. No further questions were brought up by pt nor s/o and s/o reported confidence in completing oral care. SLP to provide steps in D/c instructions.        Recommendations:  Diet: NPO w/ alternative means of nutrition/hydration via PEG  Meds: non-oral via PEG  Strategies: HOB to be 30 degrees at all " times     Oral care w/ suction Q4.  PO trials w/ SLP supervision only  Results reviewed with:  patient, RNMarti Cabezas, pt's s/o, Aislinn  Aspiration precautions posted.     F/u ST tx: Pt will continue to benefit from ongoing skilled dysphagia tx sessions to establish the potential for safest least restrictive diet vs pleasure feeding w/o increased oropharyngeal or aspiration sxs and monitor ability to carryover swallow strategies independently.     Plan: PO trials w/ SLP supervision only            Continue to review swallow/pharyngeal strengthening exercises w/ pt and family            Family traing w/ s/o in regard to completion of oral care w/ suction            Repeat VFSS AFTER a fair amount of therapy sessions in hopes for maximizing pharyngeal excursion/strength ---> likely in OP repeat VFSS   Swallow Assessment Prognosis   Prognosis Guarded   Prognosis Considerations Age;Co-morbidities;Medical diagnosis;Medical prognosis;Severity of impairments;New learning ability;Ability to carry over   SLP Therapy Minutes   SLP Time In 1230   SLP Time Out 1300   SLP Total Time (minutes) 30   SLP Mode of treatment - Individual (minutes) 30   SLP Mode of treatment - Concurrent (minutes) 0   SLP Mode of treatment - Group (minutes) 0   SLP Mode of treatment - Co-treat (minutes) 0   SLP Mode of Treatment - Total time(minutes) 30 minutes   SLP Cumulative Minutes 495   Therapy Time missed   Time missed? No

## 2024-07-24 NOTE — TEAM CONFERENCE
Acute RehabilitationTeam Conference Note  Date: 7/24/2024   Time: 11:25 AM       Patient Name:  Luis Forrester       Medical Record Number: [id number]   YOB: 1970  Sex: Male          [facility]  Payor Info:  Payor: [employer] / Plan: [employer] / Product Type: Medicaid HMO /      Admitting Diagnosis: Cervical neuropathy [G54.2]  Subdural hematoma (HCC) [S06.5XAA]  Subarachnoid hematoma (HCC) [S06.6XAA]   Admit Date/Time:  7/12/2024  1:14 PM  Admission Comments: No comment available     Primary Diagnosis:  Cervical myelopathy ([facility])  Principal Problem: Cervical myelopathy (HCC)    Patient Active Problem List    Diagnosis Date Noted    Pilonidal cyst 07/22/2024    Cervical myelopathy (HCC) 07/12/2024    At risk for venous thromboembolism (VTE) 07/12/2024    Pain 07/12/2024    Thrush 07/12/2024    At risk for altered bowel elimination 07/12/2024    Cervical stenosis of spinal canal 07/03/2024    Dysphagia 06/29/2024    SSS (sick sinus syndrome) ([facility]) 06/21/2024    s/p Medtronic dual chamber PPM 6/21/2024 06/21/2024    TBI (traumatic brain injury) ([facility]) 06/15/2024    Syncope and collapse 06/15/2024    Bilateral hand pain 06/15/2024    SDH (subdural hematoma) ([facility]) 06/15/2024    Right hand weakness 06/15/2024    Encounter for immunization 05/29/2024    Vitamin D deficiency 02/23/2024    Reactive airway disease 03/15/2018    Osteoarthritis of both hands 11/18/2015    Arterial ectasia (HCC) 06/18/2015    Chronic low back pain 06/18/2015    Mass of parotid gland 06/18/2015    Lumbar radiculopathy 01/29/2015    Hyperlipidemia 09/11/2014    Hypothyroidism 09/11/2014    Allergic rhinitis 08/07/2012       Physical Therapy:    Weight Bearing Status: Full Weight Bearing  Transfers: Supervision  Bed Mobility: Supervision  Amulation Distance (ft): 225 feet  Ambulation: Supervision  Assistive Device for Ambulation: Roller Walker  Wheelchair Mobility Distance:  (n/a)  Number of  Stairs: 4  Assistive Device for Stairs: Right Hand Rail  Stair Assistance: Supervision  Ramp: Supervision  Assistive Device for Ramp: Roller Walker  Discharge Recommendations: Home with:  DC Home with:: Family Support, First Floor Setup, Home Physical Therapy    Date of admission:7/12/24  ELOS:7 to 10 days  Goals: RUSSELL with LRAD  Date:7/16/24  Barriers to d/c home at this time include inaccessible home environment, steps inside of home, requires external assistance for all mobilities, positive fall history, and increased fall risk due to impairments. Pt requires external assistance at this time due to the following impairments: decreased strength, imbalance, coordination deficits, decreased endurance, fatigue, cognitive deficits, impulsiveness, spinal precautions, and delayed righting reactions. POC has been focusing on gait training, transfer training, stair training, NPP interventions, neuromuscular re-education, improving pt's activity tolerance and endurance, LE strengthening, balance interventions, and assessment for appropriate AD in order to address barriers to d/c. Over the past week pt has made good progress towards LTGs. Over the next week plan to cont with HIGT/NPP interventions to improve pt's functional mobility status as well as pts balance and righting reactions, cont to progress towards Russell with use of RW as pt currently prefers RW at this point in time, cont with stair trng so that pt can safely access all areas of home, and cont with repetitive trng regarding current precautions to reduce risk for injury. Anticipating discharge home mid next week and with recommendations for home PT . DME needs include RW. Plan for FT on 7/17 with pt's love Tao to review pt's current mobility status.    Date:7/23/24  At this time pt has made progress towards goals, however, due to cognitive deficits, recommending pt be S level upon d/c home for safety as when pt assessed for IRPs pt attempting to ambulate w/o  use of RW despite ongoing recommendations for use of RW at all times. NAHUM borrego aware of recommendations for S upon d/c home and CS outdoors as pt still presents at increased fall risk. HEP has been provided to pt and RW has been ordered. Plan for d/c home 7/25 with recommendations for home PT to cont to improve pt's righting reactions and balance to reduce fall risk and risk for injury.       Occupational Therapy:  Eating: Total Assistance (NPO)  Grooming: Supervision  Bathing: Minimal Assistance  Bathing: Minimal Assistance  Upper Body Dressing: Minimal Assistance  Lower Body Dressing: Supervision (LHAE)  Toileting: Minimal Assistance  Tub/Shower Transfer: Incidental Touching  Toilet Transfer: Supervision  Cognition: Exceptions to WNL  Cognition: Decreased Memory, Decreased Safety, Decreased Attention  Orientation: Person, Place, Time, Situation  Discharge Recommendations: Home with:  DC Home with:: 24 Hour Assistance, Family Support       7/16/24  ADL: TA eating, CGA UB dressing, maxA bathing, modA BL dressing  TRANSFER: [place]ina with RW  D/C PLAN: reteam at this time    Pt barriers include cognition, pacemaker prec, cervical spine prec, strength, activity tolerance, impacting participation in ADL/IADL's.  In order to address fx deficits, skilled OT services have worked on self-care, therapeutic exercise, therapeutic activity, modalities PRN in order to inc fx performance and independence.     Plan for next week: Cont to focus on inc indep with ADLs and overall activity tolerance to inc fx performance and dec CG burden. Will schedule FT when appropriate..    Therapist has discussed POC with pt and areas of focus with pt verbalizing understanding.     Barriers Intervention   cognition SLP, educ, repetition, educ   Pacemaker prec educ   Cervical spine prec Educ, LHAE educ if needed   Activity tolerance Endurance training       7/23/24 update:  Pt is demonstrating good progress with occupational therapy and is  progressing toward long term goals for ADL, IADL, and functional transfers/mobility. Pts long term goals for ADLs are Independent with Rolling Walker. Pt continues to present with impairments in activity tolerance, endurance, standing balance/tolerance, UE strength, UE ROM, memory, insight, and safety . Occupational performance remains limited by fatigue, cardiac/sternal precautions, spinal precautions, and risk for falls. Family training/education will be required prior to D/C. Pt will continue to benefit from skilled acute rehab OT services to address above mentioned barriers and maximize functional independence in baseline areas of occupation to meet established treatment goals with overall decreased burden of care. Plan of care to continue to focus on ADL Retraining ,  LHAE education/training, Functional Transfers, Functional Cognition, Short Term memory, Standing tolerance, Standing balance , Fine motor coordination, Fine motor strengthening , DME training/education, Family training/education, and community re-integration. Goals for the upcoming week are: progress pt to IRPs as able in prep for d/c home on Thursday, focus on progressing pt to IND with self care tasks (exception of rear hygiene/bathing) in prep for d/c home on Thursday    Anticipate d/c date: Thursday, 7/25/24 with home OT services      Speech Therapy:  Mode of Communication: Verbal  Cognition: Exceptions to WNL  Cognition: Decreased Memory, Decreased Executive Functions, Decreased Attention, Decreased Comprehension, Decreased Safety, Impulsive  Orientation: Person, Place, Time  Swallowing: Exceptions to WNL  Swallowing: Oral Dysphagia, Pharyngeal Dysphagia, Aspiration Risk  Diet Recommendations: Alternative means of nutrition, NPO  Discharge Recommendations: Home with:  DC Home with:: 24 Hour Supervision, 24 Hour Assisteance, Family Support, Home Speech Therapy  Pt currently being followed for both dysphagia as well as cognitive tx sessions.   At this time, pt is making slower progress this week.     Upon admission to the [facility], pt completed the SLUMS cognitive assessment. Pt total score was 16/30 which as compared to those with high school education correlates with moderate to severe neurocognitive disorder. Cognitive barriers which present include: decreased attention, ST memory recall , problem solving, reasoning, sequencing, organization of thoughts, judgement, slower processing, and insight, which still impacts pt's overall safety, functional cognitive communication skills as well as functional mobility. The following interventions are used to target these barriers, including verbal problem solving task, visual memory recall tasks, drawing conclusions activities, categorization tasks , picture problem solving activities, verbal reasoning tasks, functional reading tasks , and family education/training.     As for swallow function, pt currently demonstrates mild-moderate oral and severe pharyngeal dysphagia. Symptoms or concerns included decreased mastication, decreased bolus formation, delayed oral intiation, and suspected decreased control of ice chip in addition to  suspected pharyngeal swallow delay, suspected decreased hyolaryngeal elevation upon palpation, multiple swallows, effortful swallow, audible swallows, and latency in overt signs/sxs of aspiration. Dysphagia barriers which present include the following risks for aspiration such as: poor positioning, decreased cognition, ineffective mastication, delay in oral transit, delay in swallow initiation, pharyngeal weakness upon swallowing, multiple swallows due to pharyngeal weakness, and high risk of aspiration give overall severity of pharyngeal stage as per prior VFSS completed in acute care . In order to address the noted barriers, skilled SLP services will address this by targeting the following interventions: swallow exercise, initiate NMES, proper positioning, diet modification, safe swallow  strategies, family education/training, and likely need to completed repeat VFSS after increased dysphagia tx sessions .    Current diet is NPO w/ receiving alternative means of nutrition/hydration via PEG.  PO trials to be completed under SLP supervision only.     Current level of functioning:   Eating: dependent  Comprehension: mod A  Expression: min A   Social Interaction: supervision  Executive functions: mod-max A  Memory: mod-max A    Family training/education has been initiated with pt's dtrTrinity but is also planned to be completed w/ pt's s/oAislinn on 7/17/2024. Recommendations at time of discharge are likely for increased assistance/supervision given I ADL tasks. At this time, pt will continue to benefit from skilled cognitive linguistic tx and dysphagia tx  session to maximize overall functional independence given overall cognitive linguistic skills and swallow abilities  in attempts to decreased caregiver burden over time.       Update from week: 7/23/2024. Pt is being followed for cognitive linguistic tx and dysphagia tx  sessions to where pt is making  slower and steady  progress this week.     Continued cognitive barriers which present include: decreased attention, ST memory recall , problem solving, reasoning, sequencing, organization of thoughts, judgement, slower processing, and insight, which still impacts pt's overall safety, functional cognitive communication skills as well as functional mobility. The following interventions are used to target these barriers, including verbal problem solving task, verbal working memory tasks, drawing conclusions activities, verbal reasoning tasks, written financial management tasks,  written health management tasks, verbal health management tasks, functional reading tasks , and family education/training.     Continued dysphagia barriers which present include the following risks for aspiration such as: poor positioning, decreased cognition, ineffective  mastication, delay in oral transit, delay in swallow initiation, pharyngeal weakness upon swallowing, multiple swallows due to pharyngeal weakness, and increased aspiration sxs w/ limited PO (ice chips) . In order to address the noted barriers, skilled SLP services will address this by targeting the following interventions: swallow exercise, proper positioning, safe swallow strategies, and family education/training.    Current diet remains to be NPO w/ receiving alternative means of nutrition/hydration via PEG. Pt is demonstrating inconsistencies in overall tolerance of limited PO trials (ice chips only) to where pt continues to demonstrate mild-moderate oral and moderate-severe pharyngeal dysphagia. Pt remains to be at high risk for aspiration currently. Established ability to complete pharyngeal swallow exercises in conjunction w/ sessions as well as an HEP for pt. Unable to initiate NMES to pharyngeal musculature due to recent pacemaker placement-->being more cautious currently.     Current level of functioning:   Eating: Total A  Comprehension: min A  Expression: supervision  Social Interaction: supervision   Executive functions: min-mod A  Memory: mod A    Family training/education has been initiated and completed with pt's s/Aislinn lyons in multiple sessions, which was initiated on 7/18/2024. Please refer to not for full details. However, s/o is aware of the high aspiration risk which pt currently presents, to where NO FOOD OR DRINK is to be consumed at home. Also SLP making recommendations due to decreased cognitive skills currently for supervision and assistance given I ADL tasks, in which s/o is in agreement. Recommendations at time of discharge are for HOME ST to continue to target swallow function and cognitive linguistic skill. SLP also recommending for f/u OP ENT consult in addition to OP VFSS to be completed in ~4-6 weeks for maximizing pt's fullest potential for hopes of initiation of at best modified  diet. D/c is planned for 7/25/2024. At this time, pt will continue to benefit from skilled cognitive linguistic tx and dysphagia tx  session to maximize overall functional independence given overall cognitive linguistic skills and swallow abilities  in attempts to decreased caregiver burden over time.       Nursing Notes:  Appetite: Good  Diet Type: NPO  Tube Feeding Frequency: Bolus  Tube Feeding: Pivot 1.5  Tube Feeding Strength: Full strength  Tube Feeding Bag Changed: No  Tube Feeding Residual Checked: Yes  Tube Feeding Flushes (mL): 150 mL    Diet Patient/Family Education Complete: No (ongoing)                         Type of Wound Patient/Family Education: Yes  Bladder: Continent     Bladder Patient/Family Education: Yes  Bowel: Continent     Bowel Patient/Family Education: Yes  Pain Location/Orientation: Location: Neck, Other (Comment)  Pain Score: 0                          Pain Patient/Family Education: Yes  Medication Management/Safety  Injectable: Lovenox (will not need at d/c)  Safe Administration: No  Reason for non-safe administration: ongoing peg tube education  Medication Patient/Family Education Complete: No (ongoing)    53 y.o. male, with hypothyroidism, prior episodes of syncope, and hypoparathyroidism, who presented 6/15/24 after a syncopal event. He reported Rt sided weakness as well. Imaging revealed parafalcine SDH with bilat SAH. MRI of the C-spine revealed moderate to severe canal stenosis with mild cord compression. He underwent C3-C7 laminectomy with C2-T1 fusion 6/16/24. He had another syncopal event 6/19/24 when NS attempted to remove the hemovac drain. He was seen by EP due to multiple syncopal events and documented bradycardia into the 40s. He had a PPM placed. He was also seen by Nephrology due to hyponatremia and polyuria. He was started on 1.8% saline. TSH was abnormal and levothyroxine was started as well. He complained of difficulty swallowing and was seen by speech. VBS revealed  aspiration and he had a Keofed placed. VBS was repeated 7/3/24 and 7/8/24 but pt continued to aspirate. He initially refused PEG tube placement and met with Palliative Care. He was later agreeable to PEG placement and had the procedure done 7/10/24. He was started on trickle feeds and the progressed to bolus tube feeds 6x daily. He was determined to be stable for admission to the [facility] 7/12/24 7/24-Pt tolerating tube feeds with Jevity boluses 6x/day and no oral diet Pts girlfriend and daughter to learn how to administer the tube feed boluses Continue with abdominal binder to prevent extraction of tubing NS follow-up around 8/2/24 for 6 week post-op appt Pain control w prn tylenol and oxycodone. C/O cough- robitussin prn.Pt is continent of bowel and bladder.  Pt requires alarms for safety      This week we will monitor labs, VS and routine skin checks including the PEG tube site as per ordered. We will educate pt/family about repositioning to prevent skin breakdown. We will assist with repositioning and perform skin checks. We will monitor wounds for healing and s/s of infection We monitor for adequate pain control. We will monitor for constipation and medicate pt as ordered. We increase safety awareness and keep pt free from and as well encourage independence with ADLs. Pt reports adequate sleep    Case Management:     Discharge Planning  Living Arrangements: Lives w/ Spouse/significant other, Lives w/ Children  Support Systems: Self, Spouse/significant other, Daughter  Assistance Needed: TBD  Type of Current Residence: Private residence  Current Home Care Services: No  Pt has made good progress and dc is scheduled for Thursday. [facility] services in place through [facility] for rn pt ot and speech. Suction machine and tube feed supplies ordered via [employer]. All items to be delivered to pts home or sig other prior to dc. Pts sig ramses Tao has been present for therapy training and nursing education re: tube feeds.  Pt will require assist w/transport home.     Is the patient actively participating in therapies? yes  List any modifications to the treatment plan:     Barriers Interventions   All functional barriers resolved                      Is the patient making expected progress toward goals? yes  List any update or changes to goals:     Medical Goals: Patient will be medically stable for discharge to [facility] upon completion of rehab program and Patient will be able to manage medical conditions and comorbid conditions with medications and follow up upon completion of rehab program    Weekly Team Goals:   Rehab Team Goals  ADL Team Goal: Patient will be independent with ADLs with least restrictive device upon completion of rehab program  Transfer Team Goal: Patient will be independent with transfers with least restrictive device upon completion of rehab program  Locomotion Team Goal: Patient will be independent with locomotion with least restrictive device upon completion of rehab program  Cognitive Team Goal: Patient will be independent for basic  tasks and require supervision for complex tasks upon completion of rehab program    Discussion: pt has made good progress and is expected to dc to home tomorrow. Pts sig other has been educated on pts functional ability and speech is following up again this afternoon with suction training. Pt is having tube feeds and suction equipment at home. Pt and sig other have received education/training from nursing on lovenox injections. Due to pts cognition he is attaining supervision goals and not independent. Pt will require assist with transport home. [facility] for rn pt ot and speech is in place.     Anticipated Discharge Date:  7/25/24  [facility] Team Members Present:The following team members are supervising care for this patient and were present during this Weekly Team Conference.    Physician: Dr. DePadua, MD  : QUENTIN Esparza  Registered Nurse:  Parris Velez, RN  Physical Therapist: LINA MartínezT  Occupational Therapist: Tia Ibarra MS, OTR/L  Speech Therapist: Daiana Wolfe MA, CCC-SLP

## 2024-07-24 NOTE — PROGRESS NOTES
07/24/24 1000   Pain Assessment   Pain Assessment Tool 0-10   Pain Score No Pain   Restrictions/Precautions   Precautions Aspiration;Bed/chair alarms;Cognitive;Fall Risk;Impulsive;Spinal precautions;Supervision on toilet/commode  (NPO s/p PEG)   Eating   Type of Assistance Needed Physical assistance   Physical Assistance Level Total assistance   Comment PO trials provided by SLP .   Eating CARE Score 1   Swallow Assessment   Swallow Treatment Assessment   Daily Dysphagia Tx Note     Patient Name: Luis Forrester    Today's Date: 7/24/2024      Current Risks for Dysphagia & Aspiration: Recent intubation; general debilitation; new neuro event;  brain injury; cognitive deficit; positioning issues; cervical spin injury/surgery; head support      Current Symptoms/Concerns: cough, clear throat, during and after PO, difficulty chewing, wet voicing, chronic cough      Current diet:NPO with tube feeds      Premorbid diet::regular diet and thin liquids     Positioning: Pt was in bed which was then placed in chair mode    Items administered:Consistencies Administered: ice chips x17      Oral stage:mild  Lip closure: functional around tsp  Anterior spillage: none  Mastication: munching type mastication continues  Bolus formation: decreased  Bolus control: likely decreased, more observed on later trials  Transfer: delayed  Oral residue: none  Pocketing: none         Pharyngeal stage:moderate-severe  Swallow promptness: more delayed today  Hyolaryngeal elevation: while still present, decreased upon excursion   Wet voice:none  Throat clear:   Cough: x1 out of 17 ice chips  Secondary swallows: ongoing where today was 2-3 swallows per ice chip  Audible swallows: occurring w/ all trials       Esophageal stage:No sxs observed given trials        Summary:     Pt presenting with mild oral and moderate-severe pharyngeal dysphagia today. Symptoms or concerns included slower mastication, decreased bolus formation, delayed oral  intiation, and suspected decreased control of ice chips over time in addition to suspected pharyngeal swallow delay, suspected decreased hyolaryngeal elevation upon palpation, multiple swallows, effortful swallow, audible swallows, and cough x1 today given all ice chips.      Today, pt did exhibit improvement given tolerance of ice chip trials but still remains at risk for decreased bolus control/transfer ice chips, to where pt was elicit delayed swallow and still decreased HLE upon palpation. Also observed was multiple swallows per ice chips, but had cough only 1 time out of all ice chip trials today. Audible swallows continue to occur across ice chip trials.     SLP completed oral care w/ suction prior to trials today w/ good tolerance and no overt signs/sxs of aspiration. Also in conjunction w/ ice chips, SLP and pt completing pharyngeal swallow exercises. Pt was able to complete all 7 exercises 5 times each w/ a 5 second hold count. Pt did require some visual cues for improved execution of exercise and well as benefiting from verbal counting prompts.     Pt did state that s/o will be in later today to where SLP will plan to complete family training w/ s/o in how to complete oral care w/ suction for home.     Recommendations:  Diet: NPO w/ alternative means of nutrition/hydration via PEG  Meds: non-oral via PEG  Strategies: HOB to be 30 degrees at all times     Oral care w/ suction Q4.  PO trials w/ SLP supervision only  Results reviewed with:  patient, MD- Dr. De Padua, primary PT/OT, RN- [facility]  Aspiration precautions posted.     F/u ST tx: Pt will continue to benefit from ongoing skilled dysphagia tx sessions to establish the potential for safest least restrictive diet vs pleasure feeding w/o increased oropharyngeal or aspiration sxs and monitor ability to carryover swallow strategies independently.     Plan: PO trials w/ SLP supervision only            Continue to review swallow/pharyngeal strengthening  exercises w/ pt and family            Family traing w/ s/o in regard to completion of oral care w/ suction            Repeat VFSS AFTER a fair amount of therapy sessions in hopes for maximizing pharyngeal excursion/strength ---> likely in OP repeat VFSS   Swallow Assessment Prognosis   Prognosis Guarded   Prognosis Considerations Age;Co-morbidities;Medical diagnosis;Medical prognosis;Severity of impairments;New learning ability;Ability to carry over   SLP Therapy Minutes   SLP Time In 1000   SLP Time Out 1030   SLP Total Time (minutes) 30   SLP Mode of treatment - Individual (minutes) 30   SLP Mode of treatment - Concurrent (minutes) 0   SLP Mode of treatment - Group (minutes) 0   SLP Mode of treatment - Co-treat (minutes) 0   SLP Mode of Treatment - Total time(minutes) 30 minutes   SLP Cumulative Minutes 465   Therapy Time missed   Time missed? No

## 2024-07-24 NOTE — NURSING NOTE
Pt c/o coughing overnight.  Poor sleep from this.  Given robitussin/ tylenol and MDI prn as ordered throughout the night. Pt w HOB elevsted.  Denies post nasl drip. Denies mucus w cough

## 2024-07-24 NOTE — PROGRESS NOTES
07/24/24 1400   Pain Assessment   Pain Assessment Tool 0-10   Pain Score No Pain   Cognition   Overall Cognitive Status Impaired   Arousal/Participation Alert;Responsive   Attention Attends with cues to redirect   Orientation Level Oriented X4   Memory Decreased long term memory;Decreased short term memory;Decreased recall of recent events;Decreased recall of precautions   Following Commands Follows one step commands without difficulty   Roll Left and Right   Type of Assistance Needed Supervision   Roll Left and Right CARE Score 4   Sit to Lying   Type of Assistance Needed Supervision   Sit to Lying CARE Score 4   Lying to Sitting on Side of Bed   Type of Assistance Needed Physical assistance   Physical Assistance Level 26%-50%   Comment without use of bed rail required Min/ModA for trunk management   Lying to Sitting on Side of Bed CARE Score 3   Sit to Stand   Type of Assistance Needed Independent   Comment with    Sit to Stand CARE Score 6   Bed-Chair Transfer   Type of Assistance Needed Independent   Comment with    Chair/Bed-to-Chair Transfer CARE Score 6   Car Transfer   Type of Assistance Needed Supervision   Comment CS with    Car Transfer CARE Score 4   Walk 10 Feet   Type of Assistance Needed Set-up / clean-up   Comment    Walk 10 Feet CARE Score 5   Walk 50 Feet with Two Turns   Type of Assistance Needed Set-up / clean-up   Comment    Walk 50 Feet with Two Turns CARE Score 5   Walk 150 Feet   Type of Assistance Needed Supervision   Comment    Walk 150 Feet CARE Score 4   Walking 10 Feet on Uneven Surfaces   Type of Assistance Needed Supervision   Comment RW   Walking 10 Feet on Uneven Surfaces CARE Score 4   Ambulation   Primary Mode of Locomotion Prior to Admission Walk   Distance Walked (feet) 350 ft  (x2, 84 ft)   Assist Device Roller Walker   Gait Pattern Inconsistant Martha;Slow Martha;Decreased foot clearance;Narrow YARIEL;Improper weight shift   Limitations Noted In Safety;Swing;Heel  Strike   Provided Assistance with: Direction   Walk Assist Level Distant Supervision   Findings Practiced ambulating with and without sneakers. Decreased L foot clearance when sneakers are on   Does the patient walk? 2. Yes   Wheel 50 Feet with Two Turns   Reason if not Attempted Activity not applicable   Wheel 50 Feet with Two Turns CARE Score 9   Wheel 150 Feet   Reason if not Attempted Activity not applicable   Wheel 150 Feet CARE Score 9   Curb or Single Stair   Style negotiated Curb   Type of Assistance Needed Supervision   Comment CS to ascend/descend 6 inch curb step with RW   1 Step (Curb) CARE Score 4   4 Steps   Type of Assistance Needed Supervision   Comment to ascend/descend with single handrail   4 Steps CARE Score 4   12 Steps   Type of Assistance Needed Supervision   Comment to ascend/descend with single handrail   12 Steps CARE Score 4   Picking Up Object   Type of Assistance Needed Set-up / clean-up   Comment with reacher and RW   Picking Up Object CARE Score 5   Therapeutic Interventions   Strengthening Access Code: [email or web address]  URL: [email or web address]  Date: 07/24/2024  Prepared by: Charity Kamara    Exercises  - Mini Squat with Counter Support  - 1 x daily - 7 x weekly - 3 sets - 10 reps  - Standing Hip Abduction with Counter Support  - 1 x daily - 7 x weekly - 3 sets - 10 reps  - Standing Hip Extension with Counter Support  - 1 x daily - 7 x weekly - 3 sets - 10 reps  - Standing March with Counter Support  - 1 x daily - 7 x weekly - 3 sets - 10 reps  - Seated Long Arc Quad  - 1 x daily - 7 x weekly - 3 sets - 10 reps  - Seated March  - 1 x daily - 7 x weekly - 3 sets - 10 reps  - Seated Isometric Hip Adduction with Ball  - 1 x daily - 7 x weekly - 3 sets - 10 reps  - Seated Hip Abduction  - 1 x daily - 7 x weekly - 3 sets - 10 reps   Equipment Use   NuStep Level 2 x15 minutes   Other Comments   Comments Reviewed following recommendations with patient: use walker at all times, wear  non skis footwear. Informed pt RW and commode will be delivered to his romm prior to DC tomorrow.   Assessment   Treatment Assessment Patient participated in 90 minute skilled physical therapy session focusing on capturing care scores, instructing pt on HEP for home and reviewing recommendations with patient. On discharge, patient will require distant supervision when ambulating with RW due to impaired safety awareness. Significant other, Aislinn, will be able to provide that level of assistance on discharge. Patient to receive home health RN, PT, OT and ST services   Problem List Decreased strength;Decreased range of motion;Decreased endurance;Impaired balance;Decreased mobility;Decreased coordination;Decreased cognition;Impaired judgement;Decreased safety awareness;Decreased skin integrity;Orthopedic restrictions   Barriers to Discharge Inaccessible home environment;Decreased caregiver support   PT Barriers   Physical Impairment Decreased strength;Decreased range of motion;Decreased endurance;Impaired balance;Decreased coordination;Decreased cognition;Impaired judgement;Decreased safety awareness;Pain   Functional Limitation Stair negotiation;Walking   Plan   Treatment/Interventions Functional transfer training;LE strengthening/ROM;Therapeutic exercise;Endurance training;Patient/family training;Equipment eval/education;Bed mobility;Gait training   Progress Progressing toward goals   Discharge Recommendation   Rehab Resource Intensity Level, PT   (home PT)   PT Therapy Minutes   PT Time In 1400   PT Time Out 1530   PT Total Time (minutes) 90   PT Mode of treatment - Individual (minutes) 90   PT Mode of treatment - Concurrent (minutes) 0   PT Mode of treatment - Group (minutes) 0   PT Mode of treatment - Co-treat (minutes) 0   PT Mode of Treatment - Total time(minutes) 90 minutes   PT Cumulative Minutes 1058     Charity Kamara, PT, DPT

## 2024-07-24 NOTE — ASSESSMENT & PLAN NOTE
Not on antiplatelet or AC as an outpt.  Repeat head CT for any change in mental status.  This occurred after a syncopal fall in June 2024.   Impulsive

## 2024-07-24 NOTE — DISCHARGE INSTR - OTHER ORDERS
SLP Recommendations:     No food or drink by mouth. ONLY WITH SPEECH THERAPY.  Complete pharyngeal swallow exercises 2-3 times total a day. Each exercise should be completed 5-10 times each.  Medications through PEG tube.  Oral care with suction to be completed 4 time a day (morning-noon-evening-bedtime)    How to complete oral care with suction:   Use mouthwash and swab, oral care kit/canister   1- Dip swab into mouthwash but do not SOAK   2- Squeeze out swab prior to placing into mouth   3- Start on RIGHT side rubbing swab along cheek, teeth and gum line (both top and bottom)--> suction   4- Complete on LEFT side  rubbing swab along cheek, teeth and gum line (both top and bottom)--> suction   5- Complete in front bite teeth together for the front to get teeth, gum lines and buccal areas---> suction   6- Complete on tongue and roof of mouth-->suction

## 2024-07-24 NOTE — PROGRESS NOTES
[facility]  Progress Note  Name: Luis Forrester I  MRN: [id number]  Unit/Bed#: -01 I Date of Admission: 7/12/2024   Date of Service: 7/24/2024 I Hospital Day: 12    Assessment & Plan   Pilonidal cyst  Assessment & Plan  Pt reports recurrent history.  PMR asking for surgery input.    Dysphagia  Assessment & Plan  S/p PEG placement by Dr Reinoso 7/10/24  Pt tolerating tube feeds with Jevity boluses 6x/day and no oral diet  Pts girlfriend here today reports did well yesterday with feeds , except she knocked over the canister  Continue with abdominal binder to prevent extraction of tubing   ST working with him to improving swallowing  Nutrition following.  Outpt follow-up for laryngoscope and FEES.  7/24 pt with reported cough over night. He denies mucus. Dry mouth. We discussed not laying flat over night at least 45 degree angle .   Continue to monitor for silent aspiration sputum or tube feeds.     s/p Medtronic dual chamber PPM 6/21/2024  Assessment & Plan  Pt has a 6 month history of syncopal events.  Found to be bradycardic in the 40s.  Outpt follow-up with EP  Left chest incision is healed at this time     SDH (subdural hematoma) (HCC)  Assessment & Plan  Not on antiplatelet or AC as an outpt.  Repeat head CT for any change in mental status.  This occurred after a syncopal fall in June 2024.   Impulsive     Syncope and collapse  Assessment & Plan  No events since PPM placement but his systolic BPs do run in the 90's  No c/o dizziness    Hypothyroidism  Assessment & Plan  Continue levothyroxine.  Due for repeat TSH around  8/2/24.    * Cervical myelopathy (HCC)  Assessment & Plan  S/p C3-7 laminectomy with C2-T1 fusion on 6/16/24 due to central cord syndrome following a fall.    Completed post-op abx.  No collar necessary  We discussed not laying flat in the setting of tube feeds  He has preference for no pillows behind neck , most of the time   Monitor incision  NS  "follow-up around 8/2/24 for 6 week post-op appt.  Therapy and pain control per PMR   DC 7/25    Leukocytosis-resolved as of 7/15/2024  Assessment & Plan  Resolved.             The above assessment and plan was reviewed and updated as determined by my evaluation of the patient on 7/24/2024.    Labs:             Invalid input(s): \"LABGLOM\", \"CMP\"                Imaging  No orders to display       Review of Scheduled Meds:  Current Facility-Administered Medications   Medication Dose Route Frequency Provider Last Rate    albuterol  2 puff Inhalation Q4H PRN Kim Michelle-Hamzah, PA-C      benzocaine  2 spray Mucosal 4x Daily PRN Kim Michelle-Hamzah, PA-C      dextromethorphan-guaiFENesin  10 mL Per PEG Tube Q4H PRN Kim Martinez-Hamzah, PA-C      [START ON 7/25/2024] enoxaparin  40 mg Subcutaneous Q24H SCH Ashley Depadua, MD      levothyroxine  125 mcg Per PEG Tube Early Morning Kim Michelle-Hamzah, PA-C      melatonin  3 mg Per PEG Tube HS Kim Michelle-Hamzah, PA-C      nystatin   Topical BID Kim Michelle-Hamzah, PA-C      ondansetron  4 mg Per PEG Tube Q6H PRN Kim Michelle-Hamzah, PA-C      saliva substitute  5 spray Mouth/Throat 4x Daily PRN Kim Michelle-Hamzah, PA-C         Subjective/ HPI: Patient seen and examined. Patients overnight issues or events were reviewed with nursing staff. New or overnight issues include the following:     Pt is laying in bed as Aislinn arrives. He reports he has been coughing over night seems little better this am. He states he was taking cough medicine last night. He doesn't feel like he was laying flat. We discussed using an incentive . He demonstrated its use . Will monitor overnight     ROS:   A 10 point ROS was performed; negative except as noted above.        *Labs /Radiology studies Reviewed  *Medications  reviewed and reconciled as needed  *Please refer to order section for additional ordered labs studies      Physical Examination:  Vitals:   Vitals:    07/23/24 2109 07/24/24 0439 " 07/24/24 0500 07/24/24 1359   BP: 99/61 105/68  113/68   BP Location: Right arm Right arm  Right arm   Pulse: 80 75  87   Resp: 18 18  18   Temp: 98.2 °F (36.8 °C) 97.7 °F (36.5 °C)  98.2 °F (36.8 °C)   TempSrc: Oral Oral  Oral   SpO2: 97% 94%  96%   Weight:   74.8 kg (164 lb 14.5 oz)    Height:           General Appearance: NAD; pleasant  HEENT: PERRLA,  Neck:  limited range of motion   Lungs: clear bilaterally, normal respiratory effort, no retractions, expiratory effort normal, on room air. Pt reports cough non heard during exam   CV: regular rate and rhythm, no murmurs rubs or gallops noted   ABD: soft non tender, +BS x4, PEG tube insertion site no erythema or drainage noted no tenderness noted abdominal binder in place.   EXT: DP pulses intact, no lymphadenopathy, no edema  Skin: normal turgor, normal texture, no rash  Psych: affect normal, mood normal  Neuro: AAOx3 , impulsive at times       The above physical exam was reviewed and updated as determined by my evaluation of the patient on 7/24/2024.    Invasive Devices       Drain  Duration             Gastrostomy/Enterostomy Percutaneous Endoscopic Gastrostomy (PEG) 20 Fr. LUQ 14 days                       VTE Pharmacologic Prophylaxis: Enoxaparin  Code Status: Level 1 - Full Code  Current Length of Stay: 12 day(s)    Total floor / unit time spent today 20 minutes  Coordination of patient's care was performed in conjunction with consulting services. Time invested included review of patient's labs, vitals, and management of their comorbidities with continued monitoring, examination of patient as well as answering patient questions, documenting her findings and creating progress note in electronic medical record,  ordering appropriate diagnostic testing.       ** Please Note:  voice to text software may have been used in the creation of this document. Although proof errors in transcription or interpretation are a potential of such software**

## 2024-07-24 NOTE — PROGRESS NOTES
Physical Medicine and Rehabilitation Progress Note  Luis Forrester 54 y.o. male MRN: [id number]  Unit/Bed#: [facility] 972-01 Encounter: [id number]    To Review: 53-year-old male with PMH of asthma, HLD, hypothyroidism, who was having intermittent syncopal episodes over the last year who was found down by daughter after presumable other syncopal episode now complaining of short-term mid and upper back pain as well as pain limiting in hands and weakness of the upper extremities.  Patient was brought to hospital where CT head showed acute trace subarachnoid hemorrhage in the bilateral parafalcine regions.  CT of the T-spine showed DISH without acute or osseous abnormalities.  CTA head and neck did not show acute traumatic vascular injury, high-grade stenosis or dissection.  CT C spine showed no cervical fx or traumatic malalignment with mod canal and severe b/l multilevel NF stenosis.  MRI C spine shows moderate to severe canal stenosis with mild cord compression most pronounced at C3-4 and C5-6.  It also showed severe bilateral foraminal stenosis at same levels along with congenital canal stenosis with superimposed degenerative spondylosis.  Neurosurgery consulted on patient and diagnosed with parafalcine subdural hemorrhage with bilateral subarachnoid hemorrhage as well as operative intervention for the cervical stenosis with myelopathy and upper extremity weakness.  Patient underwent C3-C7 cervical laminectomy with bilateral screw placement and fusion C2 to-T1 on 6/16.  Patient recommended maps greater than 85 for 5 days and has been on 2 vasopressors.  He had his Hemovac drain removed on 6/19.  He was recommended 2 weeks of antibiotics for infectious prophylaxis per neurosurgery.  He has not required to have a cervical collar at this time.  Acute pain service consulted and have been managing with multimodal pain regiment including IV opiates.  Patient noted to have symptomatic junctional bradycardia with asymptomatic  "wide-complex tachycardia on telemetry for which EP was consulted.  They felt presentation mixture of sick sinus syndrome and wide-complex tachycardia likely SVT with aberrancy and felt reasonable to place dual-chamber pacemaker given history of multiple syncopal episodes and now documented bradycardia. Course also notable for hyponatremia and polyuria for which nephrology was consulted (and now improved).  Patient did receive 2 doses of DDAVP as well as intermittent IV fluids.\"     Course also notable for abdominal distension which was favored to be ileus as well as dysphagia and patient failing multiple VBS.  He is noted to have mild oral and moderate pharyngeal dysphagia and eventually was recommended PEG tube and to continue NPO diet.  Patient was evaluated by skilled therapies and was found to have significant decline in ADLs and ambulation and appears appropriate for admission to [facility].    Chief Complaint: Cough last night.     Interval History/Subjective:  No acute events overnight. Had some coughing last night. No new CP, SOB, fevers, chills, N/V. Tolerating tube feeds. Denies tasting feeds. Denies any continuing cough this AM. Otherwise doing well. Last BM was 7/22 and remains continent of bowel/bladder.     ROS:  A 10 point review of systems was negative except for what is noted in the HPI.    Today's Changes:  Will start to prepare discharge for tomorrow.  Monitor respiratory status today.   Can discontinue PRN oxy, suppository, miralax in preparation for discharge.  Recommending total 8 weeks on lovenox traumatic incomplete tetraplegia/cervical myelopathy after a fall. Finishes on 8/10. Girlfriend has received training.  I led and participated in Team Conference today. See dispo below and separate conference note for more details. I concur with the plan of care as determined in Team Conference.    Total time spent:  50 minutes, with more than 50% spent " counseling/coordinating care. Counseling includes discussion with patient re: progress in therapies, functional issues observed by therapy staff, and discussion with patient his/her current medical state/wellbeing. Coordination of patient's care was performed in conjunction with Internal Medicine service to monitor patient's labs, vitals, and management of their comorbidities. In addition, I lead the interdisciplinary team in weekly case conference today and concur with plan as per team meeting. The care of the patient was extensively discussed with all care providers and an appropriate rehabilitation plan was formulated unique for this patient. Barriers were identified preventing progression of therapy and appropriate interventions were discussed with each discipline. Please see the team note for input from all disciplines regarding barriers, intervention, and discharge planning.      Assessment/Plan:    * Cervical myelopathy (HCC)  Assessment & Plan  Recent with residual neck pain, distal RUE weakness, impaired mobility   - MRI cervical spine without 6/15/2024:Congenital canal stenosis with superimposed degenerative spondylosis .Most pronounced at C3-4 and C5-C6 with moderate to severe canal stenosis and mild cord compression. Evaluation of cord signal is limited due to artifact. No definite abnormal cord signal but mild cord edema would be difficult to exclude. Severe bilateral foraminal stenosis also seen at C3-4 and C5-C6.Mild to moderate canal stenosis at other levels  - S/P PCDF C2-T1 on 6/16 by NeuroSx Dr Lopez  - Monitor incision site   - No collar required  - Admission: Examines C3 AIS D (some impairment L c4 pinprick, but overall very good sensation), proprioception in ankles intact, and strength quite good with weakness starting in finger flexors bilaterally.  - Activity Restrictions: No heavy lifting greater than 5 - 10lbs. No strenuous activities. No driving while requiring cervical collar, anticipated  six weeks. No significant neck movement.  - May shower 3 days after surgery, but do not soak in a tub and no swimming, use mild antimicrobial soap and water. Pat incision dry after showering.  - Monitor for neuro changes, dysphagia (has significant on tube feeds), neurogenic bowel/bladder, spasticity  - Recommend acute comprehensive interdisciplinary inpatient rehabilitation to include intensive skilled therapies (PT, OT) as outlined with oversight and management by rehabilitation physician as well as inpatient rehab level nursing, case management and weekly interdisciplinary team meetings.   - Optimal pain management  - Fall precautions   - Follow-up with Spine Sx and if needed PMR after d/c       At risk for altered bowel elimination  Assessment & Plan  Course also notable for abdominal distension which was favored to be ileus   - Recent occasional loose stools now on PEG tube feeds  - Not requiring bowel regimen at this time.  - Overall continent.   - Last BM 7/22  - Monitor    At risk for venous thromboembolism (VTE)  Assessment & Plan  SCDs, ambulation, and lovenox   Recommend discharge home with 8 weeks lovenox for DVT Ppx given possible acute SCI.  Will need lovenox training prior to discharge  -Thru 8/10       Dysphagia  Assessment & Plan  - Recommend ENT c/s for ongoing prolonged dysphagia   - Patient declined laryngoscope initially, he is now open to it.   - ENT not convinced of laryngeal component of dysphagia. Recommending outpatient follow-up for that.   - Our SLP cannot do FEES, and ENT does not do FEES either. Perhaps outpatient SLP.   - Current diet: NPO - failed multiple VBS    - Mild oral and moderate pharyngeal dysphagia  - NGT feeds, nutrition c/s   - Family training for tube feeds   - SLP evaluate and treat decline in swallowing.  - Ensure adequate hydration  - Nutrition consult to assist with management   - Outpatient f/u with ENT   7/23- SLP swallowing assessment: oral stage mild, pharyngeal  stage mod-severe, esophageal stage no overt sx. Continue to maintain Npo status/ feeding through PEG    SDH (subdural hematoma) (HCC)  Assessment & Plan  Following syncope and being found down   CTH 6/15 - New acute trace subarachnoid hemorrhage in bilateral parafalcine regions. Recommend dedicated CTA head with contrast for further evaluation.  New acute trace parafalcine subdural hematoma.  CTH 6/18 - 1. Near completely resolved parafalcine subarachnoid hemorrhage with trace residual subarachnoid hemorrhage in the frontoparietal regions medially. No mass effect or hydrocephalus. 2. No large territorial infarction.  - Cleared for lovenox 30 SQ BID for VTE prophylaxis by prior providers     - Current/recent mood/affect/behavior/cog - no major behavioral issues. Significantly impaired memory, problem solving.   - Remains at risk for increased confusion/delirium, restlessness, agitation, and fall - continue to monitor for concerns/changes  - Current psychotropics and potentially sedating medications:   Melatonin 3mg HS   - Follow-up with neurosx after discharge if needed and if needed during ARC course as well as PCP      Pain-resolved as of 7/24/2024  Assessment & Plan  Tylenol PRN  Off Gabapentin now  Discontinued oxycodone - has not used.    s/p Medtronic dual chamber PPM 6/21/2024  Assessment & Plan  Noted to have symptomatic junctional bradycardia with asymptomatic wide-complex tachycardia on telemetry for which EP was consulted.  They felt presentation mixture of sick sinus syndrome and wide-complex tachycardia likely SVT with aberrancy and felt reasonable to place dual-chamber pacemaker given history of multiple syncopal episodes and now documented bradycardia.  2/2 bradycardia and recurrent syncopal episodes  Pacer precautions for 6 weeks.   OP EP follow-up     Pilonidal cyst  Assessment & Plan  Noted bright red blood from a right gluteal cleft pinhole lesion  Pt has a history of this cyst for years; states it  occasionally bleeds at times, but this self-resolves  General surgery consult: No evidence of infection at this time. Continue closely monitor for signs of worsening infection cellulitis.     - Will require f/u for elective removal of pilonidal cyst  - Gen surgery referral info will be given at discharge        Thrush  Assessment & Plan  Nystatin pain and suction QID x7 d  Oral care   Monitor     Syncope and collapse  Assessment & Plan  Believed to be related to arrhythmias/bradycardia s/p PPM placement   Monitor vitals  OP cards/PCP     Hypothyroidism  Assessment & Plan  Levothyroxine     Leukocytosis-resolved as of 7/15/2024  Assessment & Plan  Up to 13.6 7/11  Internal medicine consult and management during ARC course  Patient afebrile, other vitals unremarkable > continue to monitor   No clear signs or symptoms of infection or VTE but will continue to monitor  Monitor CBC intermittently             Health Maintenance  #Bladder: Voiding and continent.  #Skin/Pressure Injury Prevention: Turn Q2hr in bed, with weight shifts [id number] in wheelchair.  #GI Prophylaxis: Not indicated  #Code Status: Full Code  #FEN: See Dysphagia above. Tube feeds via PEG  #Dispo: Team 7/24: ADD 7/25 with goal to discharge home with Home PT/OT/SLP/RN. Supervision from his girlfriend who will undergo family training. Lives in a home with 1+1 step with FFSU available. Was previously completely independent  OUTpatient f/u: Neurosurgery, PCP, ENT, Gen Surg    Objective:    Functional Update:  PT: Sup transfers, Sup bed mobility, Sup ambulation 225' with RW, Sup stairs   OT: Total A eating, Sup grooming, Albina bathing, [place] UB dressing, Sup LB dressing, [place] toileting, CGA tub/shower transfers, Sup toilet transfers   SLP: Continues to remain NPO with mild-mod oral and mod-severe pharyngeal dysphagia. No NMES due to pacer. Continues with decreased attention, recall, problem solving, reasoning, sequencing, organization of thoughts, judgment,  "slower processing, insight.     Allergies per EMR    Physical Exam:  Temp:  [97.7 °F (36.5 °C)-98.2 °F (36.8 °C)] 97.7 °F (36.5 °C)  HR:  [] 75  Resp:  [18] 18  BP: ()/(28-68) 105/68  Oxygen Therapy  SpO2: 94 %    Gen: No acute distress, Well-nourished, well-appearing.  HEENT: Moist mucus membranes, Normocephalic/Atraumatic  Cardiovascular: Regular rate, rhythm, S1/S2. Distal pulses palpable  Heme/Extr: No edema  Pulmonary: Non-labored breathing. Lungs CTAB  : No poon  GI: Soft, non-tender, non-distended. BS+. + PEG  MSK: He holds his neck stiffly.   Integumentary: Skin is warm, dry.   Neuro: AAOx3, Speech is intact. Appropriate to questioning. Markedly impaired recall.   Psych: Normal mood and affect.     Diagnostic Studies:   Reviewed, no new imaging    Laboratory:   Reviewed, no new labs.         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)    Chronic low back pain    Hyperlipidemia    Hypothyroidism    Lumbar radiculopathy    Mass of parotid gland    Osteoarthritis of both hands    Reactive airway disease    Vitamin D deficiency    Encounter for immunization    TBI (traumatic brain injury) ([facility])    Syncope and collapse    Bilateral hand pain    SDH (subdural hematoma) ([facility])    Right hand weakness    SSS (sick sinus syndrome) ([facility])    s/p Medtronic dual chamber PPM 6/21/2024    Dysphagia    Cervical stenosis of spinal canal    Cervical myelopathy ([facility])    At risk for venous thromboembolism (VTE)    Pain    Thrush    At risk for altered bowel elimination    Pilonidal cyst         Medications  Current Facility-Administered Medications   Medication Dose Route Frequency Provider Last Rate    acetaminophen  975 mg Per PEG Tube Q8H PRN Ashley Depadua, MD      albuterol  2 puff Inhalation Q4H PRN Kim Li PA-C      benzocaine  2 spray Mucosal 4x Daily PRN Kim Li PA-C      " bisacodyl  10 mg Rectal Daily PRN Luke Suggs MD      dextromethorphan-guaiFENesin  10 mL Per PEG Tube Q4H PRN Kim Li PA-C      enoxaparin  30 mg Subcutaneous Q12H ASHLYN Kim Li PA-C      levothyroxine  125 mcg Per PEG Tube Early Morning Kim Li PA-C      melatonin  3 mg Per PEG Tube HS Kim Li PA-C      nystatin   Topical BID Kimjen Li PA-C      ondansetron  4 mg Per PEG Tube Q6H PRN Kim Li PA-C      oxyCODONE  2.5 mg Oral Q4H PRN Luke Suggs MD      polyethylene glycol  17 g Oral Daily PRN Luke Suggs MD      saliva substitute  5 spray Mouth/Throat 4x Daily PRN Kim Li PA-C            ** Please Note: Fluency Direct voice to text software may have been used in the creation of this document. **

## 2024-07-25 ENCOUNTER — HOME CARE VISIT (OUTPATIENT)
Dept: HOME HEALTH SERVICES | Facility: HOME HEALTHCARE | Age: 54
End: 2024-07-25

## 2024-07-25 ENCOUNTER — HOME CARE VISIT (OUTPATIENT)
Dept: HOME HEALTH SERVICES | Facility: HOME HEALTHCARE | Age: 54
End: 2024-07-25
Payer: COMMERCIAL

## 2024-07-25 VITALS
DIASTOLIC BLOOD PRESSURE: 62 MMHG | RESPIRATION RATE: 18 BRPM | BODY MASS INDEX: 25.88 KG/M2 | OXYGEN SATURATION: 96 % | HEIGHT: 67 IN | WEIGHT: 164.9 LBS | HEART RATE: 78 BPM | TEMPERATURE: 98 F | SYSTOLIC BLOOD PRESSURE: 99 MMHG

## 2024-07-25 VITALS
RESPIRATION RATE: 16 BRPM | SYSTOLIC BLOOD PRESSURE: 112 MMHG | OXYGEN SATURATION: 95 % | TEMPERATURE: 96.1 F | DIASTOLIC BLOOD PRESSURE: 70 MMHG

## 2024-07-25 DIAGNOSIS — S09.90XA CLOSED HEAD INJURY, INITIAL ENCOUNTER: ICD-10-CM

## 2024-07-25 PROBLEM — B37.0 THRUSH: Status: RESOLVED | Noted: 2024-07-12 | Resolved: 2024-07-25

## 2024-07-25 LAB
ALBUMIN SERPL BCG-MCNC: 3.4 G/DL (ref 3.5–5)
ALP SERPL-CCNC: 76 U/L (ref 34–104)
ALT SERPL W P-5'-P-CCNC: 19 U/L (ref 7–52)
ANION GAP SERPL CALCULATED.3IONS-SCNC: 11 MMOL/L (ref 4–13)
AST SERPL W P-5'-P-CCNC: 19 U/L (ref 13–39)
BASOPHILS # BLD AUTO: 0.04 THOUSANDS/ÂΜL (ref 0–0.1)
BASOPHILS NFR BLD AUTO: 1 % (ref 0–1)
BILIRUB SERPL-MCNC: 0.38 MG/DL (ref 0.2–1)
BUN SERPL-MCNC: 12 MG/DL (ref 5–25)
CALCIUM ALBUM COR SERPL-MCNC: 8.1 MG/DL (ref 8.3–10.1)
CALCIUM SERPL-MCNC: 7.6 MG/DL (ref 8.4–10.2)
CHLORIDE SERPL-SCNC: 99 MMOL/L (ref 96–108)
CO2 SERPL-SCNC: 29 MMOL/L (ref 21–32)
CREAT SERPL-MCNC: 0.56 MG/DL (ref 0.6–1.3)
DME PARACHUTE DELIVERY DATE ACTUAL: NORMAL
DME PARACHUTE DELIVERY DATE EXPECTED: NORMAL
DME PARACHUTE DELIVERY DATE REQUESTED: NORMAL
DME PARACHUTE ITEM DESCRIPTION: NORMAL
DME PARACHUTE ORDER STATUS: NORMAL
DME PARACHUTE SUPPLIER NAME: NORMAL
DME PARACHUTE SUPPLIER PHONE: NORMAL
EOSINOPHIL # BLD AUTO: 0.21 THOUSAND/ÂΜL (ref 0–0.61)
EOSINOPHIL NFR BLD AUTO: 4 % (ref 0–6)
ERYTHROCYTE [DISTWIDTH] IN BLOOD BY AUTOMATED COUNT: 14.3 % (ref 11.6–15.1)
GFR SERPL CREATININE-BSD FRML MDRD: 115 ML/MIN/1.73SQ M
GLUCOSE SERPL-MCNC: 125 MG/DL (ref 65–140)
HCT VFR BLD AUTO: 38.2 % (ref 36.5–49.3)
HGB BLD-MCNC: 12.1 G/DL (ref 12–17)
IMM GRANULOCYTES # BLD AUTO: 0.04 THOUSAND/UL (ref 0–0.2)
IMM GRANULOCYTES NFR BLD AUTO: 1 % (ref 0–2)
LYMPHOCYTES # BLD AUTO: 1.44 THOUSANDS/ÂΜL (ref 0.6–4.47)
LYMPHOCYTES NFR BLD AUTO: 24 % (ref 14–44)
MAGNESIUM SERPL-MCNC: 1.8 MG/DL (ref 1.9–2.7)
MCH RBC QN AUTO: 31 PG (ref 26.8–34.3)
MCHC RBC AUTO-ENTMCNC: 31.7 G/DL (ref 31.4–37.4)
MCV RBC AUTO: 98 FL (ref 82–98)
MONOCYTES # BLD AUTO: 0.52 THOUSAND/ÂΜL (ref 0.17–1.22)
MONOCYTES NFR BLD AUTO: 9 % (ref 4–12)
NEUTROPHILS # BLD AUTO: 3.83 THOUSANDS/ÂΜL (ref 1.85–7.62)
NEUTS SEG NFR BLD AUTO: 61 % (ref 43–75)
NRBC BLD AUTO-RTO: 0 /100 WBCS
PLATELET # BLD AUTO: 147 THOUSANDS/UL (ref 149–390)
PMV BLD AUTO: 10.6 FL (ref 8.9–12.7)
POTASSIUM SERPL-SCNC: 3.8 MMOL/L (ref 3.5–5.3)
PROT SERPL-MCNC: 6.7 G/DL (ref 6.4–8.4)
RBC # BLD AUTO: 3.9 MILLION/UL (ref 3.88–5.62)
SODIUM SERPL-SCNC: 139 MMOL/L (ref 135–147)
WBC # BLD AUTO: 6.08 THOUSAND/UL (ref 4.31–10.16)

## 2024-07-25 PROCEDURE — G0299 HHS/HOSPICE OF RN EA 15 MIN: HCPCS

## 2024-07-25 PROCEDURE — 80053 COMPREHEN METABOLIC PANEL: CPT | Performed by: NURSE PRACTITIONER

## 2024-07-25 PROCEDURE — 400013 VN SOC

## 2024-07-25 PROCEDURE — 99232 SBSQ HOSP IP/OBS MODERATE 35: CPT | Performed by: PHYSICAL MEDICINE & REHABILITATION

## 2024-07-25 PROCEDURE — 83735 ASSAY OF MAGNESIUM: CPT | Performed by: NURSE PRACTITIONER

## 2024-07-25 PROCEDURE — 85025 COMPLETE CBC W/AUTO DIFF WBC: CPT | Performed by: NURSE PRACTITIONER

## 2024-07-25 RX ORDER — DOCUSATE SODIUM 100 MG/1
100 CAPSULE, LIQUID FILLED ORAL 2 TIMES DAILY
Qty: 60 CAPSULE | Refills: 0 | Status: SHIPPED | OUTPATIENT
Start: 2024-07-25 | End: 2024-07-25

## 2024-07-25 RX ORDER — XYLITOL/YERBA SANTA
5 AEROSOL, SPRAY WITH PUMP (ML) MUCOUS MEMBRANE 4 TIMES DAILY PRN
Qty: 50 ML | Refills: 0 | Status: SHIPPED | OUTPATIENT
Start: 2024-07-25 | End: 2024-07-26 | Stop reason: SDUPTHER

## 2024-07-25 RX ORDER — ENOXAPARIN SODIUM 100 MG/ML
40 INJECTION SUBCUTANEOUS
Qty: 6.8 ML | Refills: 0 | Status: SHIPPED | OUTPATIENT
Start: 2024-07-26 | End: 2024-08-12

## 2024-07-25 RX ORDER — NYSTATIN 100000 [USP'U]/G
POWDER TOPICAL 2 TIMES DAILY
Qty: 2 G | Refills: 0 | Status: SHIPPED | OUTPATIENT
Start: 2024-07-25

## 2024-07-25 RX ORDER — LEVOTHYROXINE SODIUM 0.12 MG/1
125 TABLET ORAL DAILY
Start: 2024-07-25

## 2024-07-25 RX ORDER — DOCUSATE SODIUM 100 MG/1
100 CAPSULE, LIQUID FILLED ORAL 2 TIMES DAILY
Status: DISCONTINUED | OUTPATIENT
Start: 2024-07-25 | End: 2024-07-25

## 2024-07-25 RX ORDER — ACETAMINOPHEN 160 MG/5ML
640 SUSPENSION ORAL EVERY 6 HOURS PRN
Qty: 236 ML | Refills: 0 | Status: SHIPPED | OUTPATIENT
Start: 2024-07-25

## 2024-07-25 RX ORDER — ACETAMINOPHEN 160 MG/5ML
640 SUSPENSION ORAL EVERY 6 HOURS PRN
Qty: 30 ML | Refills: 0 | Status: SHIPPED | OUTPATIENT
Start: 2024-07-25 | End: 2024-07-25

## 2024-07-25 RX ORDER — LANOLIN ALCOHOL/MO/W.PET/CERES
3 CREAM (GRAM) TOPICAL
Qty: 30 TABLET | Refills: 0 | Status: SHIPPED | OUTPATIENT
Start: 2024-07-25

## 2024-07-25 RX ORDER — GUAIFENESIN/DEXTROMETHORPHAN 100-10MG/5
10 SYRUP ORAL 4 TIMES DAILY PRN
Qty: 237 ML | Refills: 0 | Status: SHIPPED | OUTPATIENT
Start: 2024-07-25 | End: 2024-07-29 | Stop reason: SDUPTHER

## 2024-07-25 RX ADMIN — GUAIFENESIN AND DEXTROMETHORPHAN 10 ML: 100; 10 SYRUP ORAL at 01:35

## 2024-07-25 RX ADMIN — ENOXAPARIN SODIUM 40 MG: 40 INJECTION SUBCUTANEOUS at 10:43

## 2024-07-25 RX ADMIN — Medication 125 MCG: at 06:39

## 2024-07-25 RX ADMIN — GUAIFENESIN AND DEXTROMETHORPHAN 10 ML: 100; 10 SYRUP ORAL at 06:39

## 2024-07-25 RX ADMIN — ALBUTEROL SULFATE 2 PUFF: 90 AEROSOL, METERED RESPIRATORY (INHALATION) at 03:55

## 2024-07-25 RX ADMIN — NYSTATIN: 100000 POWDER TOPICAL at 10:43

## 2024-07-25 NOTE — CASE COMMUNICATION
Medication discrepancies or Major drug interactions: Missing all medications except levothyroxine and melatonin. Girlfriend confirmed with pharmacy and to  today.     Abnormal clinical findings:  MSW to assist with transportation. Dyspnea with moderate exertion.   This report is informational only, no response is needed  [facility] has Admitted your patient to Home Health service with the following disciplines: SN, PT, OT, ST  and MSW  Patient stated goals of care: eat again  Potential barriers to goal achievement: TBI  Primary focus of home health care:GI  Anticipated visit pattern and next visit date: 3w3 6 PRN for TF complications  Thank you for allowing us to participate in the care of your patient.      Montrell Banda RN

## 2024-07-25 NOTE — ASSESSMENT & PLAN NOTE
S/p PEG placement by Dr Reinoso 7/10/24  Pt tolerating tube feeds with Jevity boluses 6x/day and no oral diet  Pts girlfriend here today reports did well yesterday with feeds , except she knocked over the canister  Continue with abdominal binder to prevent extraction of tubing   ST working with him to improving swallowing  Nutrition following.  Outpt follow-up for laryngoscope and FEES.  7/24 pt with reported cough over night. He denies mucus. Dry mouth. We discussed not laying flat over night at least 45 degree angle .   Continue to monitor for silent aspiration

## 2024-07-25 NOTE — NURSING NOTE
Patient recvd verbal and written AVS instructions, review of all prescriptions was completed, patient and spouse initially expressed concern over payment of the RX but after discussion with QUENTIN Hassan they said they could afford the RX's. All the medication dose and times were reviewed, as well as bolus feeding instructions and DME equipment. Opportunity provided to ask questions regarding discharge and home care. Patient and his girlfriend indicated understanding and had no further questions. All personal items were packed and patient was assisted down to the lobby in a wheelchair where a Lyft that the hospital prearranged was waiting to take the patient home.

## 2024-07-25 NOTE — PROGRESS NOTES
Physical Medicine and Rehabilitation Progress Note  Luis Forrester 54 y.o. male MRN: [id number]  Unit/Bed#: [facility] 972-01 Encounter: [id number]    To Review: 53-year-old male with PMH of asthma, HLD, hypothyroidism, who was having intermittent syncopal episodes over the last year who was found down by daughter after presumable other syncopal episode now complaining of short-term mid and upper back pain as well as pain limiting in hands and weakness of the upper extremities.  Patient was brought to hospital where CT head showed acute trace subarachnoid hemorrhage in the bilateral parafalcine regions.  CT of the T-spine showed DISH without acute or osseous abnormalities.  CTA head and neck did not show acute traumatic vascular injury, high-grade stenosis or dissection.  CT C spine showed no cervical fx or traumatic malalignment with mod canal and severe b/l multilevel NF stenosis.  MRI C spine shows moderate to severe canal stenosis with mild cord compression most pronounced at C3-4 and C5-6.  It also showed severe bilateral foraminal stenosis at same levels along with congenital canal stenosis with superimposed degenerative spondylosis.  Neurosurgery consulted on patient and diagnosed with parafalcine subdural hemorrhage with bilateral subarachnoid hemorrhage as well as operative intervention for the cervical stenosis with myelopathy and upper extremity weakness.  Patient underwent C3-C7 cervical laminectomy with bilateral screw placement and fusion C2 to-T1 on 6/16.  Patient recommended maps greater than 85 for 5 days and has been on 2 vasopressors.  He had his Hemovac drain removed on 6/19.  He was recommended 2 weeks of antibiotics for infectious prophylaxis per neurosurgery.  He has not required to have a cervical collar at this time.  Acute pain service consulted and have been managing with multimodal pain regiment including IV opiates.  Patient noted to have symptomatic junctional bradycardia with asymptomatic  "wide-complex tachycardia on telemetry for which EP was consulted.  They felt presentation mixture of sick sinus syndrome and wide-complex tachycardia likely SVT with aberrancy and felt reasonable to place dual-chamber pacemaker given history of multiple syncopal episodes and now documented bradycardia. Course also notable for hyponatremia and polyuria for which nephrology was consulted (and now improved).  Patient did receive 2 doses of DDAVP as well as intermittent IV fluids.\"     Course also notable for abdominal distension which was favored to be ileus as well as dysphagia and patient failing multiple VBS.  He is noted to have mild oral and moderate pharyngeal dysphagia and eventually was recommended PEG tube and to continue NPO diet.  Patient was evaluated by skilled therapies and was found to have significant decline in ADLs and ambulation and appears appropriate for admission to [facility].    Chief Complaint: no new issues, doing well    Interval History/Subjective:  No acute overnight events. Patient reports he is feeling well this morning. He denies any fevers, chills, chest pain, sob, abdominal pain, nausea, or vomiting this morning. He reports that he had a BM yesterday, however, after conferring with nurse, he did not have a bowel movement since 7/22/24. He is agreeable with discharge on Colace as needed. Otherwise, he is motivated for discharge later today.    ROS:  10 point review of systems is otherwise negative except for what is noted above.    Today's Changes:  Start Colace due to lack of bowel movement x3 days  Medications and tube feeds will be sent to pharmacy/home. He is ready for discharge    Assessment/Plan:    * Cervical myelopathy (HCC)  Assessment & Plan  Recent with residual neck pain, distal RUE weakness, impaired mobility   - MRI cervical spine without 6/15/2024:Congenital canal stenosis with superimposed degenerative spondylosis .Most pronounced at C3-4 and " C5-C6 with moderate to severe canal stenosis and mild cord compression. Evaluation of cord signal is limited due to artifact. No definite abnormal cord signal but mild cord edema would be difficult to exclude. Severe bilateral foraminal stenosis also seen at C3-4 and C5-C6.Mild to moderate canal stenosis at other levels  - S/P PCDF C2-T1 on 6/16 by NeuroSx Dr Lopez  - Monitor incision site   - No collar required  - Admission: Examines C3 AIS D (some impairment L c4 pinprick, but overall very good sensation), proprioception in ankles intact, and strength quite good with weakness starting in finger flexors bilaterally.  - Activity Restrictions: No heavy lifting greater than 5 - 10lbs. No strenuous activities. No driving while requiring cervical collar, anticipated six weeks. No significant neck movement.  - May shower 3 days after surgery, but do not soak in a tub and no swimming, use mild antimicrobial soap and water. Pat incision dry after showering.  - Monitor for neuro changes, dysphagia (has significant on tube feeds), neurogenic bowel/bladder, spasticity  - Recommend acute comprehensive interdisciplinary inpatient rehabilitation to include intensive skilled therapies (PT, OT) as outlined with oversight and management by rehabilitation physician as well as inpatient rehab level nursing, case management and weekly interdisciplinary team meetings.   - Optimal pain management  - Fall precautions   - Examines C8 AIS D prior to discharge with overall improved light touch/pinprink sensation and overall good strength except bilateral finger flexion  - Follow-up with Spine Sx and if needed PMR after d/c       At risk for altered bowel elimination  Assessment & Plan  Course also notable for abdominal distension which was favored to be ileus   - Recent occasional loose stools now on PEG tube feeds  - Not requiring bowel regimen at this time.  - Overall continent.   - Last BM 7/22  - Colace given today. Will discharge on  Colace BID prn    Dysphagia  Assessment & Plan  - Recommend ENT c/s for ongoing prolonged dysphagia   - Patient declined laryngoscope initially, he is now open to it.   - ENT not convinced of laryngeal component of dysphagia. Recommending outpatient follow-up for that.   - Our SLP cannot do FEES, and ENT does not do FEES either. Perhaps outpatient SLP.   - Current diet: NPO - failed multiple VBS    - Mild oral and moderate pharyngeal dysphagia  - NGT feeds, nutrition c/s   - Family training for tube feeds   - SLP evaluate and treat decline in swallowing.  - Ensure adequate hydration  - Nutrition consult to assist with management   - Outpatient f/u with ENT   7/23- SLP swallowing assessment: oral stage mild, pharyngeal stage mod-severe, esophageal stage no overt sx. Continue to maintain Npo status/ feeding through PEG  7/25- SLP recs are maintain NPO and tube feeds with PEG. Oral care w/ suction Q4 and PO trials w/ SLP supervision only. Patient will benefit from o/p SLP and repeat VBSS after multiple sessions of therapy.     SDH (subdural hematoma) (HCC)  Assessment & Plan  Following syncope and being found down   CTH 6/15 - New acute trace subarachnoid hemorrhage in bilateral parafalcine regions. Recommend dedicated CTA head with contrast for further evaluation.  New acute trace parafalcine subdural hematoma.  CTH 6/18 - 1. Near completely resolved parafalcine subarachnoid hemorrhage with trace residual subarachnoid hemorrhage in the frontoparietal regions medially. No mass effect or hydrocephalus. 2. No large territorial infarction.  - Cleared for lovenox 30 SQ BID for VTE prophylaxis by prior providers     - Current/recent mood/affect/behavior/cog - no major behavioral issues. Significantly impaired memory, problem solving.   - Remains at risk for increased confusion/delirium, restlessness, agitation, and fall - continue to monitor for concerns/changes  - Current psychotropics and potentially sedating medications:    Melatonin 3mg HS   - Follow-up with neurosx after discharge if needed and if needed during ARC course as well as PCP      Pain-resolved as of 7/24/2024  Assessment & Plan  Tylenol PRN  Off Gabapentin now  Discontinued oxycodone - has not used.    Pilonidal cyst  Assessment & Plan  Noted bright red blood from a right gluteal cleft pinhole lesion  Pt has a history of this cyst for years; states it occasionally bleeds at times, but this self-resolves  General surgery consult: No evidence of infection at this time. Continue closely monitor for signs of worsening infection cellulitis.     - Will require f/u for elective removal of pilonidal cyst  - Gen surgery referral info will be given at discharge        At risk for venous thromboembolism (VTE)  Assessment & Plan  SCDs, ambulation, and lovenox   Recommend discharge home with 8 weeks lovenox for DVT Ppx given possible acute SCI.  Will need lovenox training prior to discharge  -Thru 8/10       s/p Medtronic dual chamber PPM 6/21/2024  Assessment & Plan  Noted to have symptomatic junctional bradycardia with asymptomatic wide-complex tachycardia on telemetry for which EP was consulted.  They felt presentation mixture of sick sinus syndrome and wide-complex tachycardia likely SVT with aberrancy and felt reasonable to place dual-chamber pacemaker given history of multiple syncopal episodes and now documented bradycardia.  2/2 bradycardia and recurrent syncopal episodes  Pacer precautions for 6 weeks.   OP EP follow-up     Syncope and collapse  Assessment & Plan  Believed to be related to arrhythmias/bradycardia s/p PPM placement   Monitor vitals  OP cards/PCP     Hypothyroidism  Assessment & Plan  Levothyroxine     Thrush-resolved as of 7/25/2024  Assessment & Plan  Nystatin pain and suction QID x7 d  Oral care   Monitor     Leukocytosis-resolved as of 7/15/2024  Assessment & Plan  Up to 13.6 7/11  Internal medicine consult and management during ARC course  Patient  afebrile, other vitals unremarkable > continue to monitor   No clear signs or symptoms of infection or VTE but will continue to monitor  Monitor CBC intermittently             Health Maintenance  #Bowel: Start Colace BID.  #Bladder: voiding and continent  #Skin/Pressure Injury Prevention: Turn Q2hr in bed, with weight shifts [id number] in wheelchair.  #DVT Prophylaxis: Lovenox  #GI Prophylaxis: no indicated  #Code Status: full Code  #FEN: Tube feeds via PEG  #Dispo: Discharge home today with Home Health PT/OT/SLP/RN.      Objective:    Functional Update:  PT: Sup bed mobility,   OT: totalA eating, Ind oral hygiene, Sup bathing, Ind UBD, Ind LBD, Ind footwear, Ind transfers with RW, [place] toileting, Ind toilet transfer with GB and RW  SLP: totalA eating. Cog impaired: alert, oriented, attention with cues required, follows one step commands w/o difficulty, decreased LTM, dec STM, decr recall of recent events/precautions    Allergies per EMR    Physical Exam:  Temp:  [98 °F (36.7 °C)-98.2 °F (36.8 °C)] 98 °F (36.7 °C)  HR:  [78-87] 78  Resp:  [18] 18  BP: ()/(62-70) 99/62  Oxygen Therapy  SpO2: 96 %    Physical Exam  Vitals reviewed.   Constitutional:       Appearance: Normal appearance.   HENT:      Head: Normocephalic and atraumatic.      Right Ear: External ear normal.      Left Ear: External ear normal.      Nose: Nose normal.      Mouth/Throat:      Mouth: Mucous membranes are moist.      Pharynx: Oropharynx is clear.   Eyes:      Conjunctiva/sclera: Conjunctivae normal.   Cardiovascular:      Rate and Rhythm: Normal rate and regular rhythm.      Pulses: Normal pulses.      Heart sounds: Normal heart sounds.   Pulmonary:      Effort: Pulmonary effort is normal.      Breath sounds: Normal breath sounds.   Abdominal:      General: Bowel sounds are normal. There is no distension.      Palpations: Abdomen is soft.      Tenderness: There is no abdominal tenderness.      Comments: + PEG    Musculoskeletal:       Right lower leg: No edema.      Left lower leg: No edema.      Comments: Sensation intact to light touch and pinprick in C2-T2 bilaterally  + bunions on bilateral feet, making L EHL not tested. R EHL 5/5.   And 4/5 in bilateral finger flexion. Remainder strength testing 5/5   Skin:     General: Skin is warm and dry.      Capillary Refill: Capillary refill takes less than 2 seconds.   Neurological:      Mental Status: He is alert and oriented to person, place, and time.   Psychiatric:         Mood and Affect: Mood normal.         Behavior: Behavior normal.          Diagnostic Studies: reviewed, no new imaging    Laboratory:    Reviewed, labs significant for Ca 7.6 and Mag 1.8. 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[facility])    Chronic low back pain    Hyperlipidemia    Hypothyroidism    Lumbar radiculopathy    Mass of parotid gland    Osteoarthritis of both hands    Reactive airway disease    Vitamin D deficiency    Encounter for immunization    TBI (traumatic brain injury) ([facility])    Syncope and collapse    Bilateral hand pain    SDH (subdural hematoma) ([facility])    Right hand weakness    SSS (sick sinus syndrome) ([facility])    s/p Medtronic dual chamber PPM 6/21/2024    Dysphagia    Cervical stenosis of spinal canal    Cervical myelopathy ([facility])    At risk for venous thromboembolism (VTE)    At risk for altered bowel elimination    Pilonidal cyst         Medications  Current Facility-Administered Medications   Medication Dose Route Frequency Provider Last Rate    albuterol  2 puff Inhalation Q4H PRN Kim Li PA-C      benzocaine  2 spray Mucosal 4x Daily PRN Kim  BRONWYN Li      dextromethorphan-guaiFENesin  10 mL Per PEG Tube Q4H PRN Kim Li PA-C      docusate  100 mg Per PEG Tube BID Ashley Depadua, MD      enoxaparin  40 mg Subcutaneous Q24H [facility] Ashley Depadua, MD      levothyroxine  125 mcg Per PEG Tube Early Morning Kim Li PA-C      melatonin  3 mg Per PEG Tube HS Kim Li PA-C      nystatin   Topical BID Kim Li PA-C      ondansetron  4 mg Per PEG Tube Q6H PRN Kim Li PA-C      saliva substitute  5 spray Mouth/Throat 4x Daily PRN Kim Li PA-C            ** Please Note: Fluency Direct voice to text software may have been used in the creation of this document. **

## 2024-07-25 NOTE — ASSESSMENT & PLAN NOTE
Pt reports recurrent history.  Surgery saw pt.  No signs of infection.  Outpt follow-up with surgery for elective excision.

## 2024-07-25 NOTE — CASE MANAGEMENT
Pt and sig other ready for dc. Anita requested for 12pm via roundtrip to take them to sig others address, [address] [place] [address].

## 2024-07-25 NOTE — ASSESSMENT & PLAN NOTE
S/p C3-7 laminectomy with C2-T1 fusion on 6/16/24 due to central cord syndrome following a fall.    Completed post-op abx.  No collar necessary  We discussed not laying flat in the setting of tube feeds  He has preference for no pillows behind neck , most of the time   Monitor incision  NS follow-up around 8/2/24 for 6 week post-op appt.  Therapy and pain control per PMR   DC 7/25   23.2

## 2024-07-26 ENCOUNTER — HOME CARE VISIT (OUTPATIENT)
Dept: HOME HEALTH SERVICES | Facility: HOME HEALTHCARE | Age: 54
End: 2024-07-26
Payer: COMMERCIAL

## 2024-07-26 VITALS
OXYGEN SATURATION: 96 % | TEMPERATURE: 97.5 F | DIASTOLIC BLOOD PRESSURE: 78 MMHG | SYSTOLIC BLOOD PRESSURE: 104 MMHG | HEART RATE: 79 BPM | RESPIRATION RATE: 14 BRPM

## 2024-07-26 VITALS — TEMPERATURE: 98.2 F | SYSTOLIC BLOOD PRESSURE: 100 MMHG | DIASTOLIC BLOOD PRESSURE: 66 MMHG | HEART RATE: 88 BPM

## 2024-07-26 DIAGNOSIS — Z91.89 AT RISK FOR ALTERED BOWEL ELIMINATION: ICD-10-CM

## 2024-07-26 DIAGNOSIS — S09.90XA CLOSED HEAD INJURY, INITIAL ENCOUNTER: ICD-10-CM

## 2024-07-26 PROCEDURE — G0299 HHS/HOSPICE OF RN EA 15 MIN: HCPCS

## 2024-07-26 PROCEDURE — G0151 HHCP-SERV OF PT,EA 15 MIN: HCPCS

## 2024-07-26 RX ORDER — XYLITOL/YERBA SANTA
5 AEROSOL, SPRAY WITH PUMP (ML) MUCOUS MEMBRANE 4 TIMES DAILY PRN
Qty: 50 ML | Refills: 0 | Status: SHIPPED | OUTPATIENT
Start: 2024-07-26 | End: 2024-07-29 | Stop reason: SDUPTHER

## 2024-07-26 NOTE — CASE COMMUNICATION
Colace was filled by pharmacy as gel capules please reorder as liquid docusate (COLACE) 50 mg/5 mL liquid 10 mL (100 mg total) by Per G Tube route 2 (two) times a day please. Patients phramacy did not fill saliva substitute (MOUTH KOTE) Apply 5 sprays to the mouth or throat 4 (four) times a day as needed (for comfort) please reorder both to pharmacy [facility].     Girlfriend independent with TF and Medication administr ation. No SN visit needed until Monday

## 2024-07-26 NOTE — PROGRESS NOTES
07/26/24 0832   Hello, [Guardian’s Name / Patient’s Name], this is [Caller Name] from [facility], and our clinical care team wanted to check on you / your child after your recent visit to the hospital. It will only take 3-5 minutes. Is this a good time?   Discharge Call Type/ Specific Diagnosis: ARC General   ARC Discharge Follow- Up   ARC Follow- Up Time Frame 5 Day follow up   Call Complete   Attempted Number of Calls 1   Discharge phone call complete? Left Message

## 2024-07-26 NOTE — CASE MANAGEMENT
Team dc summary - pt made good progress and returned home w/sig other with contd [facility] services through [facility] for rn pt ot and speech. Pt received a roller walker and commode through [employer] prior to dc. Pts sig other present for multiple family training sessions with nursing and therapy. They required assist for transport home and Lyft was  utilized. Sig other present for dc.

## 2024-07-29 ENCOUNTER — HOME CARE VISIT (OUTPATIENT)
Dept: HOME HEALTH SERVICES | Facility: HOME HEALTHCARE | Age: 54
End: 2024-07-29
Payer: COMMERCIAL

## 2024-07-29 ENCOUNTER — TELEPHONE (OUTPATIENT)
Dept: CARDIOLOGY CLINIC | Facility: CLINIC | Age: 54
End: 2024-07-29

## 2024-07-29 VITALS
DIASTOLIC BLOOD PRESSURE: 60 MMHG | HEART RATE: 64 BPM | SYSTOLIC BLOOD PRESSURE: 118 MMHG | TEMPERATURE: 96.9 F | OXYGEN SATURATION: 95 % | RESPIRATION RATE: 18 BRPM

## 2024-07-29 VITALS — SYSTOLIC BLOOD PRESSURE: 100 MMHG | HEART RATE: 88 BPM | DIASTOLIC BLOOD PRESSURE: 70 MMHG | OXYGEN SATURATION: 96 %

## 2024-07-29 DIAGNOSIS — J45.909 REACTIVE AIRWAY DISEASE WITHOUT COMPLICATION, UNSPECIFIED ASTHMA SEVERITY, UNSPECIFIED WHETHER PERSISTENT: ICD-10-CM

## 2024-07-29 DIAGNOSIS — S09.90XA CLOSED HEAD INJURY, INITIAL ENCOUNTER: ICD-10-CM

## 2024-07-29 DIAGNOSIS — Z91.89 AT RISK FOR ALTERED BOWEL ELIMINATION: ICD-10-CM

## 2024-07-29 PROCEDURE — G0151 HHCP-SERV OF PT,EA 15 MIN: HCPCS

## 2024-07-29 PROCEDURE — G0299 HHS/HOSPICE OF RN EA 15 MIN: HCPCS

## 2024-07-29 PROCEDURE — G0155 HHCP-SVS OF CSW,EA 15 MIN: HCPCS

## 2024-07-29 RX ORDER — XYLITOL/YERBA SANTA
5 AEROSOL, SPRAY WITH PUMP (ML) MUCOUS MEMBRANE 4 TIMES DAILY PRN
Qty: 59 ML | Refills: 5 | Status: SHIPPED | OUTPATIENT
Start: 2024-07-29

## 2024-07-29 RX ORDER — GUAIFENESIN/DEXTROMETHORPHAN 100-10MG/5
10 SYRUP ORAL 4 TIMES DAILY PRN
Qty: 237 ML | Refills: 0 | Status: SHIPPED | OUTPATIENT
Start: 2024-07-29

## 2024-07-29 NOTE — Clinical Note
hello I saw this pt in the home today.  was dc from hospital with robitussin Dm and was taking prn for cough with relief.    pt is requesting another script of this med to be sent to pharmacy as he continues to cough at night.

## 2024-07-29 NOTE — TELEPHONE ENCOUNTER
Left vm to schedule hospital follow up. Upon further investigation on patient's discharge paperwork, no additional appointments needed. Patient is to come in on 10/3 for device check.

## 2024-07-30 ENCOUNTER — HOME CARE VISIT (OUTPATIENT)
Dept: HOME HEALTH SERVICES | Facility: HOME HEALTHCARE | Age: 54
End: 2024-07-30
Payer: COMMERCIAL

## 2024-07-30 PROCEDURE — G0153 HHCP-SVS OF S/L PATH,EA 15MN: HCPCS

## 2024-07-30 NOTE — CASE COMMUNICATION
Correct patient's street address to [facility]. I believe the mispelling is messing up the visit verification attempts.

## 2024-07-31 ENCOUNTER — HOME CARE VISIT (OUTPATIENT)
Dept: HOME HEALTH SERVICES | Facility: HOME HEALTHCARE | Age: 54
End: 2024-07-31
Payer: COMMERCIAL

## 2024-07-31 VITALS — SYSTOLIC BLOOD PRESSURE: 100 MMHG | OXYGEN SATURATION: 97 % | DIASTOLIC BLOOD PRESSURE: 64 MMHG | HEART RATE: 90 BPM

## 2024-07-31 VITALS
TEMPERATURE: 97.6 F | HEART RATE: 72 BPM | RESPIRATION RATE: 18 BRPM | OXYGEN SATURATION: 96 % | SYSTOLIC BLOOD PRESSURE: 102 MMHG | DIASTOLIC BLOOD PRESSURE: 64 MMHG

## 2024-07-31 PROCEDURE — G0180 MD CERTIFICATION HHA PATIENT: HCPCS | Performed by: PHYSICAL MEDICINE & REHABILITATION

## 2024-07-31 PROCEDURE — G0152 HHCP-SERV OF OT,EA 15 MIN: HCPCS | Performed by: OCCUPATIONAL THERAPIST

## 2024-07-31 PROCEDURE — G0299 HHS/HOSPICE OF RN EA 15 MIN: HCPCS

## 2024-08-01 ENCOUNTER — HOME CARE VISIT (OUTPATIENT)
Dept: HOME HEALTH SERVICES | Facility: HOME HEALTHCARE | Age: 54
End: 2024-08-01
Payer: COMMERCIAL

## 2024-08-01 VITALS
OXYGEN SATURATION: 97 % | DIASTOLIC BLOOD PRESSURE: 66 MMHG | SYSTOLIC BLOOD PRESSURE: 106 MMHG | TEMPERATURE: 97.6 F | HEART RATE: 91 BPM

## 2024-08-01 PROCEDURE — G0151 HHCP-SERV OF PT,EA 15 MIN: HCPCS

## 2024-08-02 ENCOUNTER — HOME CARE VISIT (OUTPATIENT)
Dept: HOME HEALTH SERVICES | Facility: HOME HEALTHCARE | Age: 54
End: 2024-08-02
Payer: COMMERCIAL

## 2024-08-02 NOTE — CASE COMMUNICATION
This message is informative only.  No action required.     HH ST eval made 7.30.24.    Impression. NPO w tube feeding. due to reported mild oral stage dysphagia and severe pharyngeal stage dysphagia.   ....................  Results of last VBS w sp 7.8.24 Impression. Mild oral stage impairment.  Severe pharyngeal stage dysphagia w observable consistent penetration to the vocal cords with all consistencies with intermittent posterior tra nsient aspiration and episodes of aspiration with thin liquids via straw.  Multiple swallows were unsuccessful to clear material.  See study for full details.   ....................   Cognitive linguistic deficits.    Moderate in immediate memory and mild.moderate in STM.  Oriented x3   DNT other areas due to fatigue.    Observed speech intelligibility to be WFL in conversational speech. He reported periods of unintelligibility since ritchie rgery due to difficulty moving his lips.   Observed language  to be WFL in conversational speech   Hearing. WFL   Vision.  Reportedly WFL  w glasses.          Rec. Sp tx. 1x wk x 1 wk, 2x wk x 3 wks.   Cont informal eval  Practice assignments given  Cont  NPO status w tube feeding  Start trial po feeding of ice chips as tolerated w SLP only as tolerated   Continue  oral care 3x per day with [employer] self oral care toothbrushes. w ritchie ctioning after use of oral care toothbrush and toothettes.  Aspiration precautions--Moniter temp and lung condition.   Contact  if change in either one  Reflux precautions  Repeat VBS w speech as needed

## 2024-08-05 ENCOUNTER — TELEPHONE (OUTPATIENT)
Dept: INTERNAL MEDICINE CLINIC | Facility: CLINIC | Age: 54
End: 2024-08-05

## 2024-08-05 ENCOUNTER — HOSPITAL ENCOUNTER (OUTPATIENT)
Dept: RADIOLOGY | Facility: HOSPITAL | Age: 54
Discharge: HOME | End: 2024-08-05
Payer: COMMERCIAL

## 2024-08-05 ENCOUNTER — APPOINTMENT (OUTPATIENT)
Dept: LAB | Facility: CLINIC | Age: 54
End: 2024-08-05
Payer: COMMERCIAL

## 2024-08-05 ENCOUNTER — HOME CARE VISIT (OUTPATIENT)
Dept: HOME HEALTH SERVICES | Facility: HOME HEALTHCARE | Age: 54
End: 2024-08-05
Payer: COMMERCIAL

## 2024-08-05 VITALS
OXYGEN SATURATION: 95 % | DIASTOLIC BLOOD PRESSURE: 70 MMHG | TEMPERATURE: 97.9 F | HEART RATE: 81 BPM | SYSTOLIC BLOOD PRESSURE: 108 MMHG

## 2024-08-05 DIAGNOSIS — E78.2 MIXED HYPERLIPIDEMIA: ICD-10-CM

## 2024-08-05 DIAGNOSIS — E03.8 OTHER SPECIFIED HYPOTHYROIDISM: ICD-10-CM

## 2024-08-05 DIAGNOSIS — M48.02 CERVICAL STENOSIS OF SPINE: ICD-10-CM

## 2024-08-05 DIAGNOSIS — D69.6 THROMBOCYTOPENIA (HCC): ICD-10-CM

## 2024-08-05 DIAGNOSIS — Z98.890 POSTOPERATIVE STATE: ICD-10-CM

## 2024-08-05 DIAGNOSIS — Z12.5 PROSTATE CANCER SCREENING: ICD-10-CM

## 2024-08-05 DIAGNOSIS — R29.898 RIGHT HAND WEAKNESS: ICD-10-CM

## 2024-08-05 DIAGNOSIS — E55.9 VITAMIN D DEFICIENCY: ICD-10-CM

## 2024-08-05 LAB
25(OH)D3 SERPL-MCNC: 42.5 NG/ML (ref 30–100)
ALBUMIN SERPL BCG-MCNC: 3.8 G/DL (ref 3.5–5)
ALP SERPL-CCNC: 98 U/L (ref 34–104)
ALT SERPL W P-5'-P-CCNC: 50 U/L (ref 7–52)
ANION GAP SERPL CALCULATED.3IONS-SCNC: 11 MMOL/L (ref 4–13)
AST SERPL W P-5'-P-CCNC: 38 U/L (ref 13–39)
BASOPHILS # BLD AUTO: 0.06 THOUSANDS/ÂΜL (ref 0–0.1)
BASOPHILS NFR BLD AUTO: 1 % (ref 0–1)
BILIRUB SERPL-MCNC: 0.49 MG/DL (ref 0.2–1)
BUN SERPL-MCNC: 13 MG/DL (ref 5–25)
CALCIUM SERPL-MCNC: 8.3 MG/DL (ref 8.4–10.2)
CHLORIDE SERPL-SCNC: 97 MMOL/L (ref 96–108)
CHOLEST SERPL-MCNC: 221 MG/DL
CO2 SERPL-SCNC: 31 MMOL/L (ref 21–32)
CREAT SERPL-MCNC: 0.69 MG/DL (ref 0.6–1.3)
DME PARACHUTE DELIVERY DATE ACTUAL: NORMAL
DME PARACHUTE DELIVERY DATE REQUESTED: NORMAL
DME PARACHUTE ITEM DESCRIPTION: NORMAL
DME PARACHUTE ITEM DESCRIPTION: NORMAL
DME PARACHUTE ORDER STATUS: NORMAL
DME PARACHUTE SUPPLIER NAME: NORMAL
DME PARACHUTE SUPPLIER PHONE: NORMAL
EOSINOPHIL # BLD AUTO: 0.24 THOUSAND/ÂΜL (ref 0–0.61)
EOSINOPHIL NFR BLD AUTO: 4 % (ref 0–6)
ERYTHROCYTE [DISTWIDTH] IN BLOOD BY AUTOMATED COUNT: 13.6 % (ref 11.6–15.1)
GFR SERPL CREATININE-BSD FRML MDRD: 106 ML/MIN/1.73SQ M
GLUCOSE P FAST SERPL-MCNC: 83 MG/DL (ref 65–99)
HCT VFR BLD AUTO: 44.6 % (ref 36.5–49.3)
HDLC SERPL-MCNC: 37 MG/DL
HGB BLD-MCNC: 14.5 G/DL (ref 12–17)
IMM GRANULOCYTES # BLD AUTO: 0.06 THOUSAND/UL (ref 0–0.2)
IMM GRANULOCYTES NFR BLD AUTO: 1 % (ref 0–2)
LDLC SERPL CALC-MCNC: 149 MG/DL (ref 0–100)
LYMPHOCYTES # BLD AUTO: 1.33 THOUSANDS/ÂΜL (ref 0.6–4.47)
LYMPHOCYTES NFR BLD AUTO: 19 % (ref 14–44)
MCH RBC QN AUTO: 30.9 PG (ref 26.8–34.3)
MCHC RBC AUTO-ENTMCNC: 32.5 G/DL (ref 31.4–37.4)
MCV RBC AUTO: 95 FL (ref 82–98)
MONOCYTES # BLD AUTO: 0.87 THOUSAND/ÂΜL (ref 0.17–1.22)
MONOCYTES NFR BLD AUTO: 13 % (ref 4–12)
NEUTROPHILS # BLD AUTO: 4.33 THOUSANDS/ÂΜL (ref 1.85–7.62)
NEUTS SEG NFR BLD AUTO: 62 % (ref 43–75)
NONHDLC SERPL-MCNC: 184 MG/DL
NRBC BLD AUTO-RTO: 0 /100 WBCS
PLATELET # BLD AUTO: 219 THOUSANDS/UL (ref 149–390)
PMV BLD AUTO: 11.4 FL (ref 8.9–12.7)
POTASSIUM SERPL-SCNC: 4 MMOL/L (ref 3.5–5.3)
PROT SERPL-MCNC: 7.8 G/DL (ref 6.4–8.4)
PSA SERPL-MCNC: 2.3 NG/ML (ref 0–4)
RBC # BLD AUTO: 4.7 MILLION/UL (ref 3.88–5.62)
SODIUM SERPL-SCNC: 139 MMOL/L (ref 135–147)
T4 FREE SERPL-MCNC: 0.94 NG/DL (ref 0.61–1.12)
TRIGL SERPL-MCNC: 175 MG/DL
TSH SERPL DL<=0.05 MIU/L-ACNC: 22.69 UIU/ML (ref 0.45–4.5)
WBC # BLD AUTO: 6.89 THOUSAND/UL (ref 4.31–10.16)

## 2024-08-05 PROCEDURE — 80061 LIPID PANEL: CPT

## 2024-08-05 PROCEDURE — 85025 COMPLETE CBC W/AUTO DIFF WBC: CPT

## 2024-08-05 PROCEDURE — 36415 COLL VENOUS BLD VENIPUNCTURE: CPT

## 2024-08-05 PROCEDURE — 80053 COMPREHEN METABOLIC PANEL: CPT

## 2024-08-05 PROCEDURE — G0103 PSA SCREENING: HCPCS

## 2024-08-05 PROCEDURE — 84443 ASSAY THYROID STIM HORMONE: CPT

## 2024-08-05 PROCEDURE — 84439 ASSAY OF FREE THYROXINE: CPT

## 2024-08-05 PROCEDURE — 82306 VITAMIN D 25 HYDROXY: CPT

## 2024-08-05 PROCEDURE — G0151 HHCP-SERV OF PT,EA 15 MIN: HCPCS

## 2024-08-05 PROCEDURE — 72040 X-RAY EXAM NECK SPINE 2-3 VW: CPT

## 2024-08-05 NOTE — TELEPHONE ENCOUNTER
Patient's friend Aislinn Albarran wanted to let his provider know that he doesn't use the Bag and pole that were sent for his feeding. She puts the food in the tube and doesn't use the other items, wanted them to stop sending the other items because they aren't being used.

## 2024-08-06 ENCOUNTER — HOME CARE VISIT (OUTPATIENT)
Dept: HOME HEALTH SERVICES | Facility: HOME HEALTHCARE | Age: 54
End: 2024-08-06
Payer: COMMERCIAL

## 2024-08-06 ENCOUNTER — TELEPHONE (OUTPATIENT)
Dept: INTERNAL MEDICINE CLINIC | Facility: CLINIC | Age: 54
End: 2024-08-06

## 2024-08-06 VITALS
HEART RATE: 72 BPM | DIASTOLIC BLOOD PRESSURE: 62 MMHG | RESPIRATION RATE: 18 BRPM | SYSTOLIC BLOOD PRESSURE: 98 MMHG | TEMPERATURE: 97.6 F | OXYGEN SATURATION: 98 %

## 2024-08-06 DIAGNOSIS — S06.5XAA SDH (SUBDURAL HEMATOMA) (HCC): ICD-10-CM

## 2024-08-06 DIAGNOSIS — R05.8 OTHER COUGH: Primary | ICD-10-CM

## 2024-08-06 PROCEDURE — G0153 HHCP-SVS OF S/L PATH,EA 15MN: HCPCS

## 2024-08-06 PROCEDURE — G0299 HHS/HOSPICE OF RN EA 15 MIN: HCPCS

## 2024-08-06 RX ORDER — BENZONATATE 100 MG/1
100 CAPSULE ORAL 3 TIMES DAILY
Qty: 90 CAPSULE | Refills: 0 | Status: SHIPPED | OUTPATIENT
Start: 2024-08-06

## 2024-08-06 RX ORDER — LANOLIN ALCOHOL/MO/W.PET/CERES
9 CREAM (GRAM) TOPICAL
Qty: 90 TABLET | Refills: 0 | Status: SHIPPED | OUTPATIENT
Start: 2024-08-06

## 2024-08-06 NOTE — Clinical Note
hello.  i am with the patient today in the home.     he continues to have a dry cough that is persistent and is taking cough medicine every 4 hours.   questioning if there is any other med we can introduce to help with the cough.  all meds are being administered via peg tube fyi.    because of cough pt having a difficult time sleeping and family has been giving pt 9mg of melatonin (three 3mg tabs) at night via peg tube with relief. checking if it is ok to increase dose.    finally, paperwork for pt to use lanta van is being mailed to pts house and they will drop off into office to be filled out once it is received.      ene Walsh RN  [facility]

## 2024-08-06 NOTE — TELEPHONE ENCOUNTER
Patient is requesting a higher dose for   dextromethorphan-guaiFENesin (ROBITUSSIN DM)  mg/5 mL syrup. He said his cough is not going away.    Please advise.

## 2024-08-06 NOTE — CASE COMMUNICATION
I could send for Tessalon Perles which are small capsules if you think those would fit through the tube?

## 2024-08-06 NOTE — Clinical Note
I think it would fit through the tube, and suppose its worth trying to see if he get relief.  ty    ----- Message -----  From: Joceline Shook PA-C  Sent: 8/6/2024  11:11 AM EDT  To: Yvette Walsh RN            ----- Message -----  From: Yvette Walsh RN  Sent: 8/6/2024  11:01 AM EDT  To: BRONWYN Dutton.  i am with the patient today in the home.     he continues to have a dry cough that is persistent and is taking cough medicine every 4 hours.   questioning if there is any other med we can introduce to help with the cough.  all meds are being administered via peg tube fyi.    because of cough pt having a difficult time sleeping and family has been giving pt 9mg of melatonin (three 3mg tabs) at night via peg tube with relief. checking if it is ok to increase dose.    finally, paperwork for pt to use lanta van is being mailed to pts house and they will drop off into office to be filled out once it is received.      ene Walsh RN  [facility]

## 2024-08-07 ENCOUNTER — HOME CARE VISIT (OUTPATIENT)
Dept: HOME HEALTH SERVICES | Facility: HOME HEALTHCARE | Age: 54
End: 2024-08-07
Payer: COMMERCIAL

## 2024-08-07 ENCOUNTER — TELEPHONE (OUTPATIENT)
Dept: INTERNAL MEDICINE CLINIC | Facility: CLINIC | Age: 54
End: 2024-08-07

## 2024-08-07 VITALS
TEMPERATURE: 96.3 F | OXYGEN SATURATION: 96 % | SYSTOLIC BLOOD PRESSURE: 106 MMHG | HEART RATE: 94 BPM | DIASTOLIC BLOOD PRESSURE: 70 MMHG

## 2024-08-07 VITALS — OXYGEN SATURATION: 96 % | DIASTOLIC BLOOD PRESSURE: 70 MMHG | SYSTOLIC BLOOD PRESSURE: 99 MMHG | HEART RATE: 95 BPM

## 2024-08-07 PROCEDURE — G0152 HHCP-SERV OF OT,EA 15 MIN: HCPCS | Performed by: OCCUPATIONAL THERAPIST

## 2024-08-07 PROCEDURE — G0151 HHCP-SERV OF PT,EA 15 MIN: HCPCS

## 2024-08-07 NOTE — TELEPHONE ENCOUNTER
Patient daughter wants to know if after the Lovenox that was prescribed in the hospital is done, will PCP order again?    Patient has about 5 days left.

## 2024-08-07 NOTE — TELEPHONE ENCOUNTER
Patient daughter called.     The medication Benzonatate that was picked up is not capsule form. The patient is unable to swallow.     Spoke with pharmacy, only way  makes this medication is in capsule form but unable to open it up to put on food.      What other option does patient have?

## 2024-08-07 NOTE — TELEPHONE ENCOUNTER
Per the hospital notes, it states to give for a total of 17 doses. I would contact cardiology to see if any further anticoagulation is needed after.

## 2024-08-07 NOTE — TELEPHONE ENCOUNTER
I had already spoken with the visiting nurse when I prescribed it. She informed me that it should be able to fit in his PEG tube.

## 2024-08-08 ENCOUNTER — HOME CARE VISIT (OUTPATIENT)
Dept: HOME HEALTH SERVICES | Facility: HOME HEALTHCARE | Age: 54
End: 2024-08-08
Payer: COMMERCIAL

## 2024-08-08 ENCOUNTER — TELEPHONE (OUTPATIENT)
Age: 54
End: 2024-08-08

## 2024-08-08 VITALS
HEART RATE: 68 BPM | TEMPERATURE: 97.6 F | SYSTOLIC BLOOD PRESSURE: 110 MMHG | RESPIRATION RATE: 18 BRPM | DIASTOLIC BLOOD PRESSURE: 62 MMHG | OXYGEN SATURATION: 98 %

## 2024-08-08 PROCEDURE — G0299 HHS/HOSPICE OF RN EA 15 MIN: HCPCS

## 2024-08-08 NOTE — TELEPHONE ENCOUNTER
Pts partner called in concerned the medication won't fit through the PEG tube. She stated the nurse is coming in today and they are going to see how it goes. If anything and it doesn't work out they'll give a callback to see if it needs to be changed.

## 2024-08-08 NOTE — TELEPHONE ENCOUNTER
Called and spoke to patient's spouse Aislinn (patient verbalized permission) per Aislinn patient does not use all the supplies sent to him and would like for the supplies they do not used to be stopped. I advised patient to call the company and make them aware as they can stop the supplies the patient is not using. Per Aislinn she did verbalized to them and per the company the PCP office would need to reach out. I made Aislinn aware that is incorrect information and to attempt again and if they need anything signed off they can fax it over to our office. She understood and had no questions at this time.

## 2024-08-08 NOTE — TELEPHONE ENCOUNTER
Aislinn pt S/O called pt gave verbal consent to speak to her and they confirms appt 8/12 and xray completed 8/5

## 2024-08-09 ENCOUNTER — HOME CARE VISIT (OUTPATIENT)
Dept: HOME HEALTH SERVICES | Facility: HOME HEALTHCARE | Age: 54
End: 2024-08-09
Payer: COMMERCIAL

## 2024-08-09 DIAGNOSIS — S06.9XAD TRAUMATIC BRAIN INJURY, WITH UNKNOWN LOSS OF CONSCIOUSNESS STATUS, SUBSEQUENT ENCOUNTER: Primary | ICD-10-CM

## 2024-08-09 PROCEDURE — G0153 HHCP-SVS OF S/L PATH,EA 15MN: HCPCS

## 2024-08-09 RX ORDER — BLOOD PRESSURE TEST KIT
KIT MISCELLANEOUS DAILY
Qty: 1 KIT | Refills: 0 | Status: SHIPPED | OUTPATIENT
Start: 2024-08-09

## 2024-08-09 RX ORDER — ACETAMINOPHEN 160 MG/5ML
LIQUID ORAL
Qty: 30 ML | Refills: 0 | OUTPATIENT
Start: 2024-08-09

## 2024-08-09 NOTE — CASE COMMUNICATION
Note.  Daughter Trinity  reported that Pt got light headed when he stood up and she left message requesting high blood pressure medication with office of Dr. Joceline Shook.

## 2024-08-10 ENCOUNTER — HOME CARE VISIT (OUTPATIENT)
Dept: HOME HEALTH SERVICES | Facility: HOME HEALTHCARE | Age: 54
End: 2024-08-10
Payer: COMMERCIAL

## 2024-08-12 ENCOUNTER — TELEPHONE (OUTPATIENT)
Dept: INTERNAL MEDICINE CLINIC | Facility: CLINIC | Age: 54
End: 2024-08-12

## 2024-08-12 ENCOUNTER — OFFICE VISIT (OUTPATIENT)
Dept: NEUROSURGERY | Facility: CLINIC | Age: 54
End: 2024-08-12

## 2024-08-12 VITALS
OXYGEN SATURATION: 96 % | HEIGHT: 67 IN | SYSTOLIC BLOOD PRESSURE: 98 MMHG | DIASTOLIC BLOOD PRESSURE: 62 MMHG | HEART RATE: 93 BPM | WEIGHT: 164 LBS | RESPIRATION RATE: 16 BRPM | TEMPERATURE: 98.3 F | BODY MASS INDEX: 25.74 KG/M2

## 2024-08-12 DIAGNOSIS — Z98.1 S/P CERVICAL SPINAL FUSION: ICD-10-CM

## 2024-08-12 DIAGNOSIS — Z09 FOLLOW-UP EXAMINATION, FOLLOWING OTHER SURGERY: Primary | ICD-10-CM

## 2024-08-12 PROCEDURE — 99024 POSTOP FOLLOW-UP VISIT: CPT | Performed by: STUDENT IN AN ORGANIZED HEALTH CARE EDUCATION/TRAINING PROGRAM

## 2024-08-12 NOTE — TELEPHONE ENCOUNTER
Received call from patient's daughter stating her father is lightheaded when getting up and requesting if he should be on a blood pressure medication.     Spoke with patient, daughter and spouse on the phone. Introduced self and role. Patient stated he gets lightheaded when he goes from laying flat to sitting up quickly. Patient's daughter stated the visiting nurse is going to show them how to use his blood pressure cuff. Patient aware to write down blood pressure numbers in a book to keep for physician to review.     Patient also stated he needs documentation for Prasad Roy approval. Patient and family aware to bring paperwork with them to appointment.     Patient asking to also get his ears cleaned. Patient aware will note request for physician to review.     Patient set up for an appointment on 8/19/24 at 1440.

## 2024-08-12 NOTE — PROGRESS NOTES
Office Note - Neurosurgery   Luis Forrester 54 y.o. male MRN: [id number]      Assessment and Plan:    This is a 54-year-old male who presented in June with central cord syndrome after a syncopal episode found to have cervical stenosis status post C2-T1 posterior cervical decompression and fusion, presenting for 6-week postsurgical follow-up visit.  X-rays of his cervical spine demonstrate hardware in place without any evidence of hardware failure.  The patient is slowly improving in regards to his strength.  I encouraged him to continue with physical therapy as this is the only way to get him back to his baseline.  I gave him a referral to outpatient physical therapy.  Pain is not a big issue for him however I told him to continue to monitor it.  I will bring him back in 6 weeks for follow-up visit with x-rays of his cervical spine.    History, physical examination and diagnostic tests were reviewed and questions answered. Diagnosis, care plan and treatment options were discussed. The patient and spouse/SO understand instructions and will follow up as directed.    Follow-up: 6 weeks with Xrays C spine    I spent approximately 15 minutes with the patient. This time was dedicated to acquiring the patient's history, performing physical examination, reviewing pertinent imaging and discussing the treatment plan.    Diagnoses and all orders for this visit:    Follow-up examination, following other surgery  -     XR spine cervical 2 or 3 vw injury; Future  -     Ambulatory Referral to Physical Therapy; Future    S/P cervical spinal fusion  -     XR spine cervical 2 or 3 vw injury; Future  -     Ambulatory Referral to Physical Therapy; Future        Subjective/Objective     Chief Complaint    Luis Forrester is a 54 y.o. male presenting for his 6-week postsurgical follow-up visit.    HPI    The patient states he is doing very well.  His bilateral upper extremity weakness is slowly improving.  He has been undergoing  "in-home physical therapy.  He has been ambulating with a walker and states this is also improving.  He reports neck pain around the incision.  The pain does not radiate into his arms.      YULIET HORVATH personally reviewed and updated.    Review of Systems   Gastrointestinal:         G tube in place   Musculoskeletal:  Positive for gait problem (ambulating with walker) and neck stiffness.   Neurological:  Positive for weakness (bi/legs and hand weakness).       Family History    Family History   Problem Relation Age of Onset    No Known Problems Mother     No Known Problems Father     No Known Problems Sister     No Known Problems Brother     No Known Problems Son     Learning disabilities Daughter     Asthma Daughter     Seizures Daughter     No Known Problems Maternal Grandmother     No Known Problems Maternal Grandfather     No Known Problems Paternal Grandmother     No Known Problems Paternal Grandfather     No Known Problems Maternal Aunt     No Known Problems Maternal Uncle     No Known Problems Paternal Aunt     No Known Problems Paternal Uncle     No Known Problems Cousin        Social History    Social History     Socioeconomic History    Marital status:      Spouse name: Not on file    Number of children: 1    Years of education: Not on file    Highest education level: Not on file   Occupational History    Occupation:    Tobacco Use    Smoking status: Never     Passive exposure: Never    Smokeless tobacco: Never    Tobacco comments:     pt \"tried it when he was a teenager but did not like them\"   Vaping Use    Vaping status: Never Used   Substance and Sexual Activity    Alcohol use: Yes     Comment: occasionally    Drug use: No    Sexual activity: Not Currently   Other Topics Concern    Not on file   Social History Narrative    Not on file     Social Determinants of Health     Financial Resource Strain: Low Risk  (2/23/2024)    Overall Financial Resource Strain ([facility])     Difficulty of " Paying Living Expenses: Not hard at all   Food Insecurity: No Food Insecurity (7/25/2024)    Hunger Vital Sign     Worried About Running Out of Food in the Last Year: Never true     Ran Out of Food in the Last Year: Never true   Transportation Needs: No Transportation Needs (7/25/2024)    PRAPARE - Transportation     Lack of Transportation (Medical): No     Lack of Transportation (Non-Medical): No   Physical Activity: Not on file   Stress: Not on file   Social Connections: Not on file   Intimate Partner Violence: Not on file   Housing Stability: Low Risk  (7/25/2024)    Housing Stability Vital Sign     Unable to Pay for Housing in the Last Year: No     Number of Times Moved in the Last Year: 0     Homeless in the Last Year: No       Past Medical History    Past Medical History:   Diagnosis Date    Arthritis     Chronic cough     Disease of thyroid gland     Hypoparathyroidism (HCC)     continue taking calcium and vitamin D, will check CMP, PTH level and Vitamin D level    Pneumonia of right middle lobe due to Streptococcus pneumoniae (HCC) 11/30/2018    s/p Medtronic dual chamber PPM 6/21/2024 06/21/2024       Surgical History    Past Surgical History:   Procedure Laterality Date    CARDIAC ELECTROPHYSIOLOGY PROCEDURE N/A 6/21/2024    Procedure: Cardiac pacer implant;  Surgeon: Kofi Pettit MD;  Location: [facility];  Service: Cardiology    DECOMPRESSION SPINE CERVICAL POSTERIOR N/A 6/16/2024    Procedure: DECOMPRESSION SPINE CERVICAL POSTERIOR C2-T1 with fusion navigated with Oarm;  Surgeon: Endy Velasquez MD;  Location: [facility];  Service: Neurosurgery    FRACTURE SURGERY      Open Treatment of Each Proximal Finger Phalanx    GASTROSTOMY TUBE PLACEMENT N/A 7/10/2024    Procedure: INSERTION PEG TUBE;  Surgeon: Darcy Reinoso MD;  Location: [facility];  Service: General       Medications      Current Outpatient Medications:     acetaminophen (TYLENOL) 160 mg/5 mL suspension, Take 20 mL (640 mg  "total) by mouth every 6 (six) hours as needed for mild pain (Patient taking differently: 20 mL by Per G Tube route every 6 (six) hours as needed for mild pain.), Disp: 236 mL, Rfl: 0    benzonatate (TESSALON PERLES) 100 mg capsule, Take 1 capsule (100 mg total) by mouth 3 (three) times a day, Disp: 90 capsule, Rfl: 0    Blood Pressure KIT, Use in the morning, Disp: 1 kit, Rfl: 0    dextromethorphan-guaiFENesin (ROBITUSSIN DM)  mg/5 mL syrup, Take 10 mL by mouth 4 (four) times a day as needed for cough, Disp: 237 mL, Rfl: 0    docusate (COLACE) 50 mg/5 mL liquid, 10 mL (100 mg total) by Per G Tube route 2 (two) times a day, Disp: 273 mL, Rfl: 5    enoxaparin (LOVENOX) 40 mg/0.4 mL, Inject 0.4 mL (40 mg total) under the skin every 24 hours for 17 doses Do not start before July 26, 2024., Disp: 6.8 mL, Rfl: 0    levothyroxine 125 mcg tablet, 1 tablet (125 mcg total) by Per G Tube route daily One tablet daily, Disp: , Rfl:     melatonin 3 mg, 3 tablets (9 mg total) by Per PEG Tube route daily at bedtime, Disp: 90 tablet, Rfl: 0    nystatin (MYCOSTATIN) powder, Apply topically 2 (two) times a day (Patient taking differently: Apply 1 Application topically 2 (two) times a day. Bilateral groin), Disp: 2 g, Rfl: 0    saliva substitute (MOUTH KOTE), Apply 5 sprays to the mouth or throat 4 (four) times a day as needed (for comfort), Disp: 59 mL, Rfl: 5    Allergies    Allergies   Allergen Reactions    Chlorine Rash       The following portions of the patient's history were reviewed and updated as appropriate: allergies, current medications, past family history, past medical history, past social history, past surgical history, and problem list.    Investigations    I personally reviewed the XRAY results with the patient:      Physical Exam    Vitals:  Blood pressure 98/62, pulse 93, temperature 98.3 °F (36.8 °C), temperature source Tympanic, resp. rate 16, height 5' 7\" (1.702 m), weight 74.4 kg (164 lb), SpO2 96%.,Body " mass index is 25.69 kg/m².    General:  Normal, well developed, not in distress/pain     Skin:   No issues, no rashes noted     Musculoskeletal:   5/5 strength throughout all muscle groups  No tenderness to palpation of the spine       Neurologic Exam:  Awake and alert  Oriented x3  Speech clear and fluent  PRINCE   Sensation to light touch and pin prick intact throughout  No santoyo's  No clonus  2+ patellar reflexes     Gait:   normal gait

## 2024-08-12 NOTE — CASE COMMUNICATION
This was addressed on 8/9/24 when I spoke to the patient over the phone. His BP readings have been normal per visiting nurse, OT, and PT notes. I recommend that they start checking his BP 2 times daily and when experiencing any symptoms. Record and bring log into office or [facility]. Daughter agreed. ER precautions were given.

## 2024-08-13 ENCOUNTER — HOME CARE VISIT (OUTPATIENT)
Dept: HOME HEALTH SERVICES | Facility: HOME HEALTHCARE | Age: 54
End: 2024-08-13
Payer: COMMERCIAL

## 2024-08-13 VITALS
OXYGEN SATURATION: 96 % | HEART RATE: 92 BPM | TEMPERATURE: 97.6 F | SYSTOLIC BLOOD PRESSURE: 102 MMHG | DIASTOLIC BLOOD PRESSURE: 70 MMHG

## 2024-08-13 VITALS — OXYGEN SATURATION: 93 % | DIASTOLIC BLOOD PRESSURE: 79 MMHG | SYSTOLIC BLOOD PRESSURE: 97 MMHG

## 2024-08-13 VITALS — DIASTOLIC BLOOD PRESSURE: 70 MMHG | SYSTOLIC BLOOD PRESSURE: 96 MMHG | OXYGEN SATURATION: 97 % | HEART RATE: 89 BPM

## 2024-08-13 VITALS — TEMPERATURE: 96.7 F

## 2024-08-13 PROCEDURE — G0151 HHCP-SERV OF PT,EA 15 MIN: HCPCS

## 2024-08-13 PROCEDURE — G0152 HHCP-SERV OF OT,EA 15 MIN: HCPCS | Performed by: OCCUPATIONAL THERAPIST

## 2024-08-13 PROCEDURE — G0299 HHS/HOSPICE OF RN EA 15 MIN: HCPCS

## 2024-08-13 PROCEDURE — G0153 HHCP-SVS OF S/L PATH,EA 15MN: HCPCS

## 2024-08-15 ENCOUNTER — HOME CARE VISIT (OUTPATIENT)
Dept: HOME HEALTH SERVICES | Facility: HOME HEALTHCARE | Age: 54
End: 2024-08-15
Payer: COMMERCIAL

## 2024-08-15 ENCOUNTER — TELEPHONE (OUTPATIENT)
Dept: INTERNAL MEDICINE CLINIC | Facility: CLINIC | Age: 54
End: 2024-08-15

## 2024-08-15 VITALS
TEMPERATURE: 97.9 F | OXYGEN SATURATION: 96 % | SYSTOLIC BLOOD PRESSURE: 76 MMHG | HEART RATE: 88 BPM | DIASTOLIC BLOOD PRESSURE: 52 MMHG

## 2024-08-15 VITALS — OXYGEN SATURATION: 96 % | SYSTOLIC BLOOD PRESSURE: 85 MMHG | DIASTOLIC BLOOD PRESSURE: 62 MMHG | HEART RATE: 79 BPM

## 2024-08-15 VITALS — SYSTOLIC BLOOD PRESSURE: 97 MMHG | DIASTOLIC BLOOD PRESSURE: 70 MMHG

## 2024-08-15 DIAGNOSIS — I73.9 CLAUDICATION (HCC): ICD-10-CM

## 2024-08-15 DIAGNOSIS — I95.89 OTHER SPECIFIED HYPOTENSION: Primary | ICD-10-CM

## 2024-08-15 PROCEDURE — G0152 HHCP-SERV OF OT,EA 15 MIN: HCPCS | Performed by: OCCUPATIONAL THERAPIST

## 2024-08-15 PROCEDURE — G0151 HHCP-SERV OF PT,EA 15 MIN: HCPCS

## 2024-08-15 PROCEDURE — G0153 HHCP-SVS OF S/L PATH,EA 15MN: HCPCS

## 2024-08-15 RX ORDER — MIDODRINE HYDROCHLORIDE 5 MG/1
5 TABLET ORAL
Qty: 90 TABLET | Refills: 0 | Status: SHIPPED | OUTPATIENT
Start: 2024-08-15

## 2024-08-15 NOTE — CASE COMMUNICATION
This message is informative only.  No action required.    FYI  Helped Daughter set up notebook to record bp and any noted observations ie dizzy feeling.

## 2024-08-15 NOTE — TELEPHONE ENCOUNTER
I received a message from patient's home physical therapist, Freddie. Concern over a BP of 76/52. I spoke with his daughter Trinity who has been monitoring his BP daily. Typically daily on average 90s/50s-60s while sitting or laying. Sometimes when going from sitting to standing and other movements, he does get lightheaded and dizzy momentarily. I do see he was on midodrine during his admission. Will have him restart midodrine 5 mg TID. Recent head CTA unremarkable for stenosis. Freddie also concerned about arterial insufficiency as patient experiences claudication like symptoms with his PT sessions. Will order arterial duplex. He has an appointment with me on 8/19 that we can follow up on the above.

## 2024-08-16 ENCOUNTER — HOME CARE VISIT (OUTPATIENT)
Dept: HOME HEALTH SERVICES | Facility: HOME HEALTHCARE | Age: 54
End: 2024-08-16
Payer: COMMERCIAL

## 2024-08-16 ENCOUNTER — TELEPHONE (OUTPATIENT)
Age: 54
End: 2024-08-16

## 2024-08-16 NOTE — TELEPHONE ENCOUNTER
Pt significant other called after missing call from office, pt S?O rescheduled 10/4 to 10/28/24 at

## 2024-08-19 ENCOUNTER — HOME CARE VISIT (OUTPATIENT)
Dept: HOME HEALTH SERVICES | Facility: HOME HEALTHCARE | Age: 54
End: 2024-08-19
Payer: COMMERCIAL

## 2024-08-19 VITALS
SYSTOLIC BLOOD PRESSURE: 120 MMHG | HEART RATE: 64 BPM | DIASTOLIC BLOOD PRESSURE: 68 MMHG | TEMPERATURE: 97.6 F | OXYGEN SATURATION: 98 % | RESPIRATION RATE: 18 BRPM

## 2024-08-19 PROCEDURE — G0299 HHS/HOSPICE OF RN EA 15 MIN: HCPCS

## 2024-08-20 ENCOUNTER — HOME CARE VISIT (OUTPATIENT)
Dept: HOME HEALTH SERVICES | Facility: HOME HEALTHCARE | Age: 54
End: 2024-08-20
Payer: COMMERCIAL

## 2024-08-20 ENCOUNTER — TELEPHONE (OUTPATIENT)
Age: 54
End: 2024-08-20

## 2024-08-20 VITALS — OXYGEN SATURATION: 98 % | HEART RATE: 75 BPM | SYSTOLIC BLOOD PRESSURE: 94 MMHG | DIASTOLIC BLOOD PRESSURE: 70 MMHG

## 2024-08-20 VITALS
OXYGEN SATURATION: 98 % | DIASTOLIC BLOOD PRESSURE: 78 MMHG | TEMPERATURE: 97.2 F | SYSTOLIC BLOOD PRESSURE: 102 MMHG | HEART RATE: 90 BPM

## 2024-08-20 VITALS — SYSTOLIC BLOOD PRESSURE: 99 MMHG | HEART RATE: 90 BPM | OXYGEN SATURATION: 98 % | DIASTOLIC BLOOD PRESSURE: 78 MMHG

## 2024-08-20 PROCEDURE — G0151 HHCP-SERV OF PT,EA 15 MIN: HCPCS

## 2024-08-20 PROCEDURE — G0152 HHCP-SERV OF OT,EA 15 MIN: HCPCS | Performed by: OCCUPATIONAL THERAPIST

## 2024-08-20 PROCEDURE — G0153 HHCP-SVS OF S/L PATH,EA 15MN: HCPCS

## 2024-08-20 NOTE — CASE COMMUNICATION
Patient never scheduled an appointment with cardiology. PT recommended scheduling as per recommendation from hospital DC AVS.

## 2024-08-20 NOTE — CASE COMMUNICATION
Reassessment visit 8/22. I have a feeling I'll be discharging him from PT next week if everything checks out well.

## 2024-08-20 NOTE — TELEPHONE ENCOUNTER
PT SIGNIFICANT OTHER HANNAH, ON CONSENT, CB FROM A MISSED CALL. HANNAH WAS NOT SURE WHAT THE CALL WAS ABOUT AND SAID SHE WOULD CALL BACK NSX WITH ANY QUESTIONS.

## 2024-08-21 DIAGNOSIS — I49.5 SSS (SICK SINUS SYNDROME) (HCC): Primary | ICD-10-CM

## 2024-08-22 ENCOUNTER — HOME CARE VISIT (OUTPATIENT)
Dept: HOME HEALTH SERVICES | Facility: HOME HEALTHCARE | Age: 54
End: 2024-08-22
Payer: COMMERCIAL

## 2024-08-22 VITALS — OXYGEN SATURATION: 95 % | SYSTOLIC BLOOD PRESSURE: 103 MMHG | HEART RATE: 65 BPM | DIASTOLIC BLOOD PRESSURE: 80 MMHG

## 2024-08-22 VITALS
SYSTOLIC BLOOD PRESSURE: 102 MMHG | TEMPERATURE: 97.7 F | DIASTOLIC BLOOD PRESSURE: 68 MMHG | HEART RATE: 84 BPM | OXYGEN SATURATION: 98 %

## 2024-08-22 PROCEDURE — G0151 HHCP-SERV OF PT,EA 15 MIN: HCPCS

## 2024-08-22 PROCEDURE — G0153 HHCP-SVS OF S/L PATH,EA 15MN: HCPCS

## 2024-08-23 NOTE — CASE COMMUNICATION
Patient's BP reading was low this morning as reported by patient's daughter. 87/67 taken electronically. PT takes BP manually at 102/68. Patient is asymptomatic.

## 2024-08-27 ENCOUNTER — HOME CARE VISIT (OUTPATIENT)
Dept: HOME HEALTH SERVICES | Facility: HOME HEALTHCARE | Age: 54
End: 2024-08-27
Payer: COMMERCIAL

## 2024-08-27 VITALS
OXYGEN SATURATION: 98 % | TEMPERATURE: 98.4 F | HEART RATE: 94 BPM | SYSTOLIC BLOOD PRESSURE: 100 MMHG | DIASTOLIC BLOOD PRESSURE: 72 MMHG

## 2024-08-27 PROCEDURE — G0151 HHCP-SERV OF PT,EA 15 MIN: HCPCS

## 2024-08-27 PROCEDURE — G0153 HHCP-SVS OF S/L PATH,EA 15MN: HCPCS

## 2024-08-28 ENCOUNTER — TELEPHONE (OUTPATIENT)
Dept: INTERNAL MEDICINE CLINIC | Facility: CLINIC | Age: 54
End: 2024-08-28

## 2024-08-28 VITALS — OXYGEN SATURATION: 97 % | SYSTOLIC BLOOD PRESSURE: 99 MMHG | DIASTOLIC BLOOD PRESSURE: 65 MMHG | HEART RATE: 93 BPM

## 2024-08-28 DIAGNOSIS — R13.19 OTHER DYSPHAGIA: Primary | ICD-10-CM

## 2024-08-28 NOTE — TELEPHONE ENCOUNTER
Received call from Monica with [facility]. Monica stating she is going to continue seeing him two times a week for four weeks and placing order in EPIC.     Speech therapist requesting an order for a repeat swallow study. Therapist requesting to determine how much patient can be fed safely.     Please review and advise.

## 2024-08-29 ENCOUNTER — HOME CARE VISIT (OUTPATIENT)
Dept: HOME HEALTH SERVICES | Facility: HOME HEALTHCARE | Age: 54
End: 2024-08-29
Payer: COMMERCIAL

## 2024-08-29 VITALS
RESPIRATION RATE: 18 BRPM | TEMPERATURE: 97.6 F | OXYGEN SATURATION: 98 % | DIASTOLIC BLOOD PRESSURE: 64 MMHG | HEART RATE: 84 BPM | SYSTOLIC BLOOD PRESSURE: 108 MMHG

## 2024-08-29 PROCEDURE — G0299 HHS/HOSPICE OF RN EA 15 MIN: HCPCS

## 2024-08-29 PROCEDURE — G0153 HHCP-SVS OF S/L PATH,EA 15MN: HCPCS

## 2024-08-29 NOTE — CASE COMMUNICATION
Thank you for placing the order.  Checking to make the insurance would cover the cost of the repeat VBS w speech.  Would you know that answer.  If not, how can we find out?

## 2024-08-29 NOTE — CASE COMMUNICATION
Doug JohnsonMacy Shook,   I would like to request an order for a repeat VBS w sp due to Pt making swallowing progress.  And, was checking to make sure insurance would cover the cost.   Please advise.

## 2024-08-30 NOTE — CASE COMMUNICATION
Patient can call his insurance company to see if it is covered. The testing facility will also process the order prior to the day of testing to see if test is covered.

## 2024-08-30 NOTE — CASE COMMUNICATION
This message is informative only.  No action required.     Pt reassessed for 30 day summary.  He is making progress but has not reached maximum   potential.        Impression. NPO w tube feeding. due to reported mild oral stage dysphagia and severe pharyngeal stage dysphagia.   During Trial po feedings by SLP of ice chips and sips of thin liquids.observed possible mild oral stage dysphagia and mild pharyngeal stage dysphagia.   Note.  P t swallowed 10 sips half tsp thin water w no s s of aspiration for the first time which is an increase from coughing on a few sips thin water.    Pt is able to eat 20 ice chips wo s s of aspiration w compensatory techniques. which is an increase from 3 ice chips w coughing  .   Note. Pt's SO Aislinn did not want trials po feedings of puree until results of VBS w sp obtained.    ..................   Results of last VBS w sp 7.8.24 Impress ion. Mild oral stage impairment. Severe pharyngeal stage dysphagia w observable consistent penetration to the vocal cords with all consistencies with intermittent posterior transient aspiration and episodes of aspiration with thin liquids via straw. Multiple swallows were unsuccessful to clear material. See study for full details.   ....................   Cognitive linguistic deficits in the following areas.  Mild.Moderate which is an i ncrease from  Moderate in immediate memory and mild which is an increase from  mild.moderate in STM.  Today 8.27.24 observed Pt to have the following cognitive linguistic deficits:  Moderate in problem solving and mild.moderate in organization.      Observed speech intelligibility to be WFL in conversational speech.   Observed language to be WFL in conversational speech Hearing. WFL Vision. Reportedly WFL w glasses.     Rec. Cont sp tx  1x wk and 2x wk x 3 wks.   Cont informal eval   Practice assignments given   Cont NPO status w tube feeding   Cont trial po feedings by SLP as tolerated and have family moniter daily po  feedings of up to 10 ice chips as tolerated   Continue oral care 3x per day. w suctioning after use of oral care toothbrush and toothettes.   Aspiration precautions--Moniter temp and lung condition. Contact  if change in either one   Reflux precaution s   Repeat VBS w speech scheduled 9.10.24 at 10 am at [facility]

## 2024-09-03 ENCOUNTER — HOME CARE VISIT (OUTPATIENT)
Dept: HOME HEALTH SERVICES | Facility: HOME HEALTHCARE | Age: 54
End: 2024-09-03
Payer: COMMERCIAL

## 2024-09-03 VITALS
OXYGEN SATURATION: 96 % | TEMPERATURE: 97.6 F | HEART RATE: 87 BPM | DIASTOLIC BLOOD PRESSURE: 72 MMHG | SYSTOLIC BLOOD PRESSURE: 114 MMHG

## 2024-09-03 PROCEDURE — G0151 HHCP-SERV OF PT,EA 15 MIN: HCPCS

## 2024-09-03 PROCEDURE — G0153 HHCP-SVS OF S/L PATH,EA 15MN: HCPCS

## 2024-09-04 ENCOUNTER — TELEPHONE (OUTPATIENT)
Age: 54
End: 2024-09-04

## 2024-09-04 VITALS — SYSTOLIC BLOOD PRESSURE: 91 MMHG | TEMPERATURE: 98.7 F | DIASTOLIC BLOOD PRESSURE: 61 MMHG

## 2024-09-06 ENCOUNTER — HOME CARE VISIT (OUTPATIENT)
Dept: HOME HEALTH SERVICES | Facility: HOME HEALTHCARE | Age: 54
End: 2024-09-06
Payer: COMMERCIAL

## 2024-09-06 VITALS — HEART RATE: 80 BPM | OXYGEN SATURATION: 98 % | TEMPERATURE: 97.1 F

## 2024-09-06 VITALS
HEART RATE: 94 BPM | OXYGEN SATURATION: 98 % | RESPIRATION RATE: 18 BRPM | SYSTOLIC BLOOD PRESSURE: 100 MMHG | DIASTOLIC BLOOD PRESSURE: 68 MMHG | TEMPERATURE: 97.9 F

## 2024-09-06 PROCEDURE — G0153 HHCP-SVS OF S/L PATH,EA 15MN: HCPCS

## 2024-09-06 PROCEDURE — G0299 HHS/HOSPICE OF RN EA 15 MIN: HCPCS

## 2024-09-09 DIAGNOSIS — E03.8 OTHER SPECIFIED HYPOTHYROIDISM: ICD-10-CM

## 2024-09-09 RX ORDER — LEVOTHYROXINE SODIUM 125 UG/1
125 TABLET ORAL DAILY
Qty: 90 TABLET | Refills: 3 | Status: SHIPPED | OUTPATIENT
Start: 2024-09-09 | End: 2024-09-19 | Stop reason: DRUGHIGH

## 2024-09-09 NOTE — TELEPHONE ENCOUNTER
Received call from patient stating pharmacy is requesting a refill of his levothyroxine.     Script sent to pharmacy for refill.

## 2024-09-10 ENCOUNTER — HOSPITAL ENCOUNTER (OUTPATIENT)
Dept: RADIOLOGY | Facility: HOSPITAL | Age: 54
Discharge: HOME | End: 2024-09-10
Payer: COMMERCIAL

## 2024-09-10 ENCOUNTER — HOME CARE VISIT (OUTPATIENT)
Dept: HOME HEALTH SERVICES | Facility: HOME HEALTHCARE | Age: 54
End: 2024-09-10
Payer: COMMERCIAL

## 2024-09-10 VITALS — SYSTOLIC BLOOD PRESSURE: 97 MMHG | DIASTOLIC BLOOD PRESSURE: 76 MMHG

## 2024-09-10 DIAGNOSIS — R13.19 OTHER DYSPHAGIA: ICD-10-CM

## 2024-09-10 PROCEDURE — 74230 X-RAY XM SWLNG FUNCJ C+: CPT

## 2024-09-10 PROCEDURE — 92611 MOTION FLUOROSCOPY/SWALLOW: CPT

## 2024-09-10 PROCEDURE — G0153 HHCP-SVS OF S/L PATH,EA 15MN: HCPCS

## 2024-09-10 NOTE — PROCEDURES
Speech Pathology - Modified Barium Swallow Study    Patient Name: Luis Forrester    Today's Date: 9/10/2024     Problem List  Active Problems:  There are no active Hospital Problems.      Past Medical History  Past Medical History:   Diagnosis Date    Arthritis     Chronic cough     Disease of thyroid gland     Hypoparathyroidism (HCC)     continue taking calcium and vitamin D, will check CMP, PTH level and Vitamin D level    Pneumonia of right middle lobe due to Streptococcus pneumoniae (HCC) 11/30/2018    s/p Medtronic dual chamber PPM 6/21/2024 06/21/2024       Past Surgical History  Past Surgical History:   Procedure Laterality Date    CARDIAC ELECTROPHYSIOLOGY PROCEDURE N/A 6/21/2024    Procedure: Cardiac pacer implant;  Surgeon: Kofi Pettit MD;  Location: [facility];  Service: Cardiology    DECOMPRESSION SPINE CERVICAL POSTERIOR N/A 6/16/2024    Procedure: DECOMPRESSION SPINE CERVICAL POSTERIOR C2-T1 with fusion navigated with Oarm;  Surgeon: Endy Velasquez MD;  Location: [facility];  Service: Neurosurgery    FRACTURE SURGERY      Open Treatment of Each Proximal Finger Phalanx    GASTROSTOMY TUBE PLACEMENT N/A 7/10/2024    Procedure: INSERTION PEG TUBE;  Surgeon: Darcy Reinoso MD;  Location: [facility];  Service: General     Assessment Summary:    Pt presents with mild-moderate oropharyngeal dysphagia characterized by prolonged holding, delayed transfer, oral residue, delayed swallow , pharyngeal residue, and occasional penetration and epiglottic undercoating with liquids.  Pt had good retrival of items from the teaspoon and cup. During the oral stage tongue rocking occurred as the patient tried to swallow thicker liquids. Pt had prolonged holding. Pt had residue on the tongue and soft palate. During intake of the cookie, pt had prolonged mastication. Swallowing was delayed starting at the vallecula. Pt had pharyngeal residue coating the pharyngeal wall, tongue base, and vallecula,  but is much improved from previous studies. Multiple swallows does not clear residue. Aspiration observed x1 with thin liquids and x1 with nectar thick. Aspiration events are silent. Cued coughing helped expel bolus from airway. Attempted chin tuck which does not eliminate penetration events. Attempted barium tablet whole in pudding, pt unable to transfer and swallow and spit out.  During AP view, esophageal retention was noticed with pudding. Note: Images are available for review in PACS as desired    Recommendations:   Recommended Diet: regular diet and thin liquids (begin with puree/soft and advance slowly)  Recommended Form of Medications: crushed with puree   Aspiration precautions and compensatory swallowing strategies: upright posture, only feed when fully alert, small bites/sips, and alternating bites and sips *Cue patient to cough during intake*  Continue home care SLP    Results Reviewed with: patient and family   Pt/Family Education: initiated. Pt and caregivers would benefit from/require continued education.    Patient Stated Goal: None    General Information;  From [facility] DC note 7/25/24  Recommendations at time of discharge are for HOME ST to continue to target swallow function and cognitive linguistic skill. SLP also recommending for f/u OP ENT consult in addition to OP VFSS to be completed in ~4-6 weeks for maximizing pt's fullest potential for hopes of initiation of at best modified diet.      Current concerns for dysphagia include aspiration on previous studies.  MBS was recommended to assess oropharyngeal stage swallowing skills at this time.     Prior MBS:  7/8    Results are compared to 2 previous VBS with ultimately unchanged results. The patient continues to present with a mild (now possible mild-moderate) oral dysphagia with a severe pharyngeal dysphagia. NGT is present for this study. The patient has prolonged transfer time, with tongue pumping observed to transfer pudding. Swallow is timely.  Epiglottic inversion is absent. The patient continues with poor pharyngeal peristalsis with resulting significant valleculae and pyriform retention, especially more with thick liquids and pudding. Airway protection is reduced. The patient is observed with consistent penetration to the cords with all trials, with intermittent posterior transient aspiration and episodes of aspiration with thin liquids via straw. The patient is observed attempting multiple swallows to help clear material, but is unsuccessful. Regurgitation into oral cavity with pudding is observed x1, which helps clear pharyngeal retention.Patient does have throat clearing with penetration events. He has somewhat increased neck ROM, but is still unable to fully attempt chin tuck strategy. Brief scan of the esophagus today is unremarkable.  PACS as desired.     Recommendations:   Recommended Diet: NPO with tube feeds   Recommended Form of Medications:  non oral     Consider referral to: GI for PEG. Will have thorough discussion with patient and patient's family to further reveal VBS and show images    SLP Dysphagia therapy recommended: as able in acute care      7/3/24:   Pt presents with mild oral and a severe pharyngeal dysphagia. Results are ultimately unchanged from previous VBS on 6/30. The patient has good retrieval of items via tsp and cup. Transfer time is min prolonged and patient continues with some difficulty initiating transfer. Pharyngeal dysphagia characterized by decreased BOT retraction, decreased epiglottic inversion, decreased hyolaryngeal movement and poor pharyngeal peristalsis. The patient presents with significant valleculae and pyriform retention, especially with solids. The patient attempts multiple swallows to clear, but is unsuccessful. Intermittent penetration to the cords and aspiration events are observed with all trials. Some aspiration is observed during the swallow, and other aspiration events are posterior from  pharyngeal retention. The patient does not have cough response to aspiration. Vocal quality is wet during study. He continues with poor neck ROM and is unable to fully complete chin tuck and other movement strategies. Brief scan of the esophagus does reveal some possible stasis.  Note: Images are available for review in PACS as desired.   Recommendations:   Recommended Diet: NPO. May need to consider more permanent means of nutrition   Recommended Form of Medications:  non oral    Consider referral to: GI for possible PEG   SLP Dysphagia therapy recommended: continue in acute care as able. Consider repeat VBS in a few weeks     6/30/24:   Images are on PACS for review.    Oral stage: Mild impairment, including reduced bolus control and ?mild difficulty initiating bolus transfer (vs hesitation in anticipation of dysphagia).    Pharyngeal stage: Severe impairment, characterized by reduced BOT constriction, reduced PPW constriction, and poor hyolaryngeal movement; this results in minimal inversion of the epiglottis, and reduced opening of the UES, and mod-severe pharyngeal residue, especially in the pyriforms. Pt attempts to clear the residue with multiple swallows, but this results in aspiration from the residue, mostly posterior aspiration. Residue increases with thicker consistencies, with pudding having the most residue. With the first bite of pudding pt gagged and retropulsed the bolus back into his mouth and expectorated it. There is deep laryngeal penetration with all consistencies assessed today (thin, NTL, HTL, puree), with undercoating of the epiglottis. There was at least mild aspiration of all consistencies; at times aspiration was after the swallow from residue in the pyriforms/pharynx. Response to aspiration was weak coughing. Frequent wet, gurgly vocal quality occurred during the exam; cued cough was briefly effective at clearing but wet quality quickly returned. Pt has limited neck mobility due to  recent surgery; he attempted to complete a chin tuck but this was ineffective. Pt is currently at high risk for aspiration of all consistencies. RN reports increasing cough and a fever today.    Per gross esophageal screen: Brief scan shows possible mild retropulsion of the bolus.    Recommendations:  Diet: NPO, consider short term alternate nutrition for now.   Liquids: NPO  Meds: Non oral  Strategies:  Frequent oral care  Upright position  F/u ST tx: Yes  Therapy Prognosis: Fair  Prognosis considerations: age, comorbidities, therapeutic potential  Aspiration Precautions  Reflux Precautions    Oral Mechanism Exam  Not formally assessed at this time. Patient has natural dentition.  Respiratory status: RA    Pt was viewed sitting upright in the lateral and AP positions. Trials administered were consistent with [facility] Protocol: Pt was given 5-mL thin liquid x2, 20-mL cup sip thin, 40-mL sequential swallow thin, 5-mL nectar thick, 20-mL cup sip nectar thick, 40-mL sequential swallow nectar thick, 5-mL honey thick, 5-mL pudding, ½ cookie coated with 3-mL pudding, 5-mL nectar thick in the AP position, and 5-mL pudding in the AP position. Pt was also given thin and nectar thick liquids by straw, as well as a barium tablet with (pudding).     Initial view observations/comments: Hardware    8-Point Penetration-Aspiration Scale   Thin liquid 6 - Material enters the airway, passes below the vocal folds and is ejected into the larynx or out of the airway     Nectar thick liquid 6 - Material enters the airway, passes below the vocal folds and is ejected into the larynx or out of the airway     Honey thick liquid 1 - Material does not enter the airway   Puree (pudding) 1 - Material does not enter the airway   Solid 1 - Material does not enter the airway     Strategies and Efficacy: Cough    Aspiration Response and Efficacy: Silent aspiration, throat cough/throat clear.     MBS IMP Rating  ORAL Impairment  Compinent  1--Lip Closure  Judged at any point during the swallow.  0 - No labial escape    Component 2--Tongue Control During Bolus Hold  Judged on held liquid boluses only and prior to productive tongue movement.   1 - Escape to lateral buccal cavity/floor of mouth (FOM)    Component 3--Bolus Preparation/Mastication  Judged only during presentation of 1/2 shortbread cookie coated in pudding.   1 - Slow prolonged chewing/mashing with complete re-collection    Component 4--Bolus Transport/Lingual Motion  Judged after first productive tongue movement for oral bolus transport.  0 - Brisk tongue motion    Component 5--Oral Residue  Judged after first swallow or after the last swallow of the sequential swallow task.  1 - Trace residue lining oral structures   Location   A - Floor of Mouth, B - Palate, C - Tongue, and D - Lateral Sulci    Component 6--Initiation of Pharyngeal Swallow  Judged at first movement of the brisk superior-anterior hyoid trajectory.  1 - Bolus Head in valleculae      PHARYNGEAL Impairment  Component 7--Soft Palate Elevation  Judged during maximum displacement of soft palate.  1 - Trace column of contrast or air between the SP and PW    Component 8--Laryngeal Elevation  Judged when epiglottis is in its most horizontal position.  0 - Complete superior movement of thyroid cartilage with complete approximation of arytenoids to epiglottic petiole    Component 9--Anterior Hyoid Excursion  Judged at height of swallow/maximal anterior hyoid displacement.  0 - Complete anterior movement    Component 10--Epiglottic Movement  Judged at height of swallow/maximal anterior hyoid displacement.  0 - Complete inversion    Component 11--Laryngeal Vestibular Closure  Judged at height of swallow/maximal anterior hyoid displacement.  1 - Incomplete; narrow column air/contrast in laryngeal vestibule    Component 12--Pharyngeal Stripping Wave  Judged during the full duration of the pharyngeal swallow.  1 - Present -  diminished    Component 13--Pharyngeal Contraction  Judged in AP view at rest and throughout maximum movement of structures.  0 - Complete    Component 14--Pharyngoesophageal Segment Opening  Judged during maximum distension of PES and throughout opening and closure.  0 - Complete distension and complete duration; no obstruction of flow    Component 15--Tongue Base (TB) Retraction  Judged during maximum retraction of the tongue base.  0 - No contrast between TB and posterior pharyngeal wall (PW)    Component 16--Pharyngeal Residue  Judged after first swallow or after the last swallow of the sequential swallow task.  1 - Trace residue within or on pharyngeal structures   Location   A - Tongue Base, B - Valleculae, C - Pharyngeal wall, D - Aryepiglottic folds, and E - Pyriform sinuses    ESOPHAGEAL Impairment  Component 17--Esophageal Clearance Upright Position  Judged in AP view during bolus transit through the oral cavity to the LES  1 - Esophageal retention

## 2024-09-12 ENCOUNTER — HOME CARE VISIT (OUTPATIENT)
Dept: HOME HEALTH SERVICES | Facility: HOME HEALTHCARE | Age: 54
End: 2024-09-12
Payer: COMMERCIAL

## 2024-09-12 ENCOUNTER — APPOINTMENT (OUTPATIENT)
Dept: LAB | Facility: CLINIC | Age: 54
End: 2024-09-12
Payer: COMMERCIAL

## 2024-09-12 ENCOUNTER — TELEPHONE (OUTPATIENT)
Dept: INTERNAL MEDICINE CLINIC | Facility: CLINIC | Age: 54
End: 2024-09-12

## 2024-09-12 ENCOUNTER — OFFICE VISIT (OUTPATIENT)
Dept: INTERNAL MEDICINE CLINIC | Facility: CLINIC | Age: 54
End: 2024-09-12

## 2024-09-12 VITALS
TEMPERATURE: 98.7 F | DIASTOLIC BLOOD PRESSURE: 74 MMHG | SYSTOLIC BLOOD PRESSURE: 103 MMHG | HEART RATE: 88 BPM | WEIGHT: 157 LBS | BODY MASS INDEX: 24.59 KG/M2

## 2024-09-12 VITALS — SYSTOLIC BLOOD PRESSURE: 114 MMHG | TEMPERATURE: 99.5 F | DIASTOLIC BLOOD PRESSURE: 94 MMHG

## 2024-09-12 DIAGNOSIS — Z95.0 S/P PLACEMENT OF CARDIAC PACEMAKER: ICD-10-CM

## 2024-09-12 DIAGNOSIS — L05.91 PILONIDAL CYST: ICD-10-CM

## 2024-09-12 DIAGNOSIS — M79.641 BILATERAL HAND PAIN: ICD-10-CM

## 2024-09-12 DIAGNOSIS — E78.2 MIXED HYPERLIPIDEMIA: ICD-10-CM

## 2024-09-12 DIAGNOSIS — I49.5 SSS (SICK SINUS SYNDROME) (HCC): Primary | ICD-10-CM

## 2024-09-12 DIAGNOSIS — R13.19 OTHER DYSPHAGIA: ICD-10-CM

## 2024-09-12 DIAGNOSIS — E03.8 OTHER SPECIFIED HYPOTHYROIDISM: ICD-10-CM

## 2024-09-12 DIAGNOSIS — J45.20 MILD INTERMITTENT REACTIVE AIRWAY DISEASE WITHOUT COMPLICATION: ICD-10-CM

## 2024-09-12 DIAGNOSIS — G95.9 CERVICAL MYELOPATHY (HCC): ICD-10-CM

## 2024-09-12 DIAGNOSIS — S06.9XAD TRAUMATIC BRAIN INJURY, WITH UNKNOWN LOSS OF CONSCIOUSNESS STATUS, SUBSEQUENT ENCOUNTER: ICD-10-CM

## 2024-09-12 DIAGNOSIS — R29.898 RIGHT HAND WEAKNESS: ICD-10-CM

## 2024-09-12 DIAGNOSIS — R55 SYNCOPE AND COLLAPSE: ICD-10-CM

## 2024-09-12 DIAGNOSIS — Z91.89 AT RISK FOR ALTERED BOWEL ELIMINATION: ICD-10-CM

## 2024-09-12 DIAGNOSIS — I95.0 IDIOPATHIC HYPOTENSION: ICD-10-CM

## 2024-09-12 DIAGNOSIS — S06.5XAA SDH (SUBDURAL HEMATOMA) (HCC): ICD-10-CM

## 2024-09-12 DIAGNOSIS — E55.9 VITAMIN D DEFICIENCY: ICD-10-CM

## 2024-09-12 DIAGNOSIS — M79.642 BILATERAL HAND PAIN: ICD-10-CM

## 2024-09-12 DIAGNOSIS — M48.02 CERVICAL STENOSIS OF SPINAL CANAL: ICD-10-CM

## 2024-09-12 DIAGNOSIS — Z91.89 AT RISK FOR VENOUS THROMBOEMBOLISM (VTE): ICD-10-CM

## 2024-09-12 DIAGNOSIS — R05.8 OTHER COUGH: ICD-10-CM

## 2024-09-12 PROBLEM — I95.9 ARTERIAL HYPOTENSION: Status: ACTIVE | Noted: 2024-09-12

## 2024-09-12 PROBLEM — Z23 ENCOUNTER FOR IMMUNIZATION: Status: RESOLVED | Noted: 2024-05-29 | Resolved: 2024-09-12

## 2024-09-12 PROBLEM — I95.1 ORTHOSTATIC HYPOTENSION: Status: ACTIVE | Noted: 2024-09-12

## 2024-09-12 PROBLEM — R05.9 COUGH: Status: ACTIVE | Noted: 2018-03-15

## 2024-09-12 PROBLEM — S09.90XA CLOSED HEAD INJURY: Status: RESOLVED | Noted: 2024-09-12 | Resolved: 2024-09-12

## 2024-09-12 PROBLEM — S09.90XA CLOSED HEAD INJURY: Status: ACTIVE | Noted: 2024-09-12

## 2024-09-12 LAB
T4 FREE SERPL-MCNC: 1.93 NG/DL (ref 0.61–1.12)
TSH SERPL DL<=0.05 MIU/L-ACNC: 0.06 UIU/ML (ref 0.45–4.5)

## 2024-09-12 PROCEDURE — 84439 ASSAY OF FREE THYROXINE: CPT

## 2024-09-12 PROCEDURE — 99214 OFFICE O/P EST MOD 30 MIN: CPT | Performed by: PHYSICIAN ASSISTANT

## 2024-09-12 PROCEDURE — 36415 COLL VENOUS BLD VENIPUNCTURE: CPT

## 2024-09-12 PROCEDURE — 84443 ASSAY THYROID STIM HORMONE: CPT

## 2024-09-12 PROCEDURE — G0153 HHCP-SVS OF S/L PATH,EA 15MN: HCPCS

## 2024-09-12 RX ORDER — BENZONATATE 100 MG/1
100 CAPSULE ORAL 3 TIMES DAILY
Qty: 90 CAPSULE | Refills: 0 | Status: SHIPPED | OUTPATIENT
Start: 2024-09-12

## 2024-09-12 RX ORDER — PHENOL 1.4 %
10 AEROSOL, SPRAY (ML) MUCOUS MEMBRANE
Qty: 90 TABLET | Refills: 0 | Status: SHIPPED | OUTPATIENT
Start: 2024-09-12

## 2024-09-12 RX ORDER — ACETAMINOPHEN 160 MG/5ML
640 SUSPENSION ORAL EVERY 6 HOURS PRN
Qty: 236 ML | Refills: 0 | Status: SHIPPED | OUTPATIENT
Start: 2024-09-12

## 2024-09-12 RX ORDER — MIDODRINE HYDROCHLORIDE 5 MG/1
5 TABLET ORAL
Qty: 90 TABLET | Refills: 0 | Status: SHIPPED | OUTPATIENT
Start: 2024-09-12

## 2024-09-12 NOTE — TELEPHONE ENCOUNTER
Folder Color- purple    Name of Form- Pras[employer] disability professional verification    Form to be filled out by- mercedes vasquez    Form completed and given back to patient. Copy made for scanning bin.     Patient made aware of 10 business day policy.      Also emailed a copy to email address on paperwork.

## 2024-09-12 NOTE — ASSESSMENT & PLAN NOTE
S/p C3-7 laminectomy with C2-T1 fusion on 6/16/24 due to central cord syndrome following a fall.  Had follow up with neurosurgery 8/2/24. Next scheduled follow up 10/28.

## 2024-09-12 NOTE — ASSESSMENT & PLAN NOTE
PEG placement 7/10/24. Swallowing function has improved, but not at baseline. Does have speech therapy services. Recommend consult with ENT for possible laryngoscope and FEES.   Orders:    Ambulatory Referral to Otolaryngology; Future

## 2024-09-12 NOTE — ASSESSMENT & PLAN NOTE
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 Most pronounced at C3-4 and C5-C6 with moderate to severe canal stenosis and mild cord compression.  No definite abnormal cord signal but mild cord edema would be difficult to exclude. Severe bilateral foraminal stenosis also seen at C3-4 and C5-C6.   Evaluated by neurosurgery and on 6/16/24 surgical management was recommended - posterior cervical laminectomies C3-7, Bilateral C2, C7 and T1 pedicle screw placement, Bilateral C3, C4, C5  lateral mass screw placement. Arthrodesis C2-T1.  Completed course of prophylactic antibiotics and anticoagulants.   He is continuing to undergo home PT and OT. Takes Tylenol as needed for pain.   Followed by neurosurgery, next visit 10/28. Reminded patient that they want an updated cervical spine x-ray prior.

## 2024-09-12 NOTE — ASSESSMENT & PLAN NOTE
CT scan of the head 6/15/2024: New acute trace subarachnoid hemorrhage in bilateral parafalcine regions. New acute trace parafalcine subdural hematoma.  CTA of the head and neck 6/15/2024: Negative CTA head traumatic vascular injury, aneurysm, large vessel occlusion, high-grade stenosis, or dissection. Partially imaged ectatic right carotid bifurcation and proximal cervical internal carotid artery with retropharyngeal course, similar to CT neck with contrast 10/12/2011.  He was evaluated by neurosurgery. He was given 7 day course of Keppra for seizure prophylaxis.   Repeat CT head on 6/18/24: almost completely resolved SDH.

## 2024-09-12 NOTE — PROGRESS NOTES
Ambulatory Visit  Name: Luis Forrester      : 1970      MRN: [id number]  Encounter Provider: Joceline Shook PA-C  Encounter Date: 2024   Encounter department: [facility]    Assessment & Plan  SSS (sick sinus syndrome) (HCC)  S/P PPM 24        s/p Medtronic dual chamber PPM 2024         Idiopathic hypotension  History of hypotension since admissions. I restarted midodrine about a month ago as he was experiencing symptomatic hypotension at home. BP has significantly improved. No long experiencing episodes of dizziness and lightheadedness. Ensure getting enough fluids throughout the day. Has a cardiology follow up .    Orders:    midodrine (PROAMATINE) 5 mg tablet; Take 1 tablet (5 mg total) by mouth 3 (three) times a day before meals    Syncope and collapse  No events after PPM placement. Blood pressure has improved.        SDH (subdural hematoma) (HCC)  SDH secondary to a fall 6/15/24. Now resolved.   Orders:    melatonin 10 MG TABS; 1 tablet (10 mg total) by Per PEG Tube route daily at bedtime    Traumatic brain injury, with unknown loss of consciousness status, subsequent encounter  CT scan of the head 6/15/2024: New acute trace subarachnoid hemorrhage in bilateral parafalcine regions. New acute trace parafalcine subdural hematoma.  CTA of the head and neck 6/15/2024: Negative CTA head traumatic vascular injury, aneurysm, large vessel occlusion, high-grade stenosis, or dissection. Partially imaged ectatic right carotid bifurcation and proximal cervical internal carotid artery with retropharyngeal course, similar to CT neck with contrast 10/12/2011.  He was evaluated by neurosurgery. He was given 7 day course of Keppra for seizure prophylaxis.   Repeat CT head on 24: almost completely resolved SDH.        Cervical myelopathy (HCC)  S/p C3-7 laminectomy with C2-T1 fusion on 24 due to central cord syndrome following a fall.  Had follow up  with neurosurgery 8/2/24. Next scheduled follow up 10/28.        Cervical stenosis of spinal canal  MRI C-spine 6/15/24: congenital canal stenosis with superimposed degenerative spondylosis. Most pronounced at C3-4 and C5-C6 with moderate to severe canal stenosis and mild cord compression.  No definite abnormal cord signal but mild cord edema would be difficult to exclude. Severe bilateral foraminal stenosis also seen at C3-4 and C5-C6.   Evaluated by neurosurgery and on 6/16/24 surgical management was recommended - posterior cervical laminectomies C3-7, Bilateral C2, C7 and T1 pedicle screw placement, Bilateral C3, C4, C5  lateral mass screw placement. Arthrodesis C2-T1.  Completed course of prophylactic antibiotics and anticoagulants.   He is continuing to undergo home PT and OT. Takes Tylenol as needed for pain.   Followed by neurosurgery, next visit 10/28. Reminded patient that they want an updated cervical spine x-ray prior.  Orders:    Diclofenac Sodium (VOLTAREN) 1 %; Apply 2 g topically 4 (four) times a day    acetaminophen (TYLENOL) 160 mg/5 mL suspension; Take 20 mL (640 mg total) by mouth every 6 (six) hours as needed for mild pain    Bilateral hand pain  Has resolved.        Right hand weakness  Improved since admission. Continue PT/OT.       Other dysphagia  PEG placement 7/10/24. Swallowing function has improved, but not at baseline. Does have speech therapy services. Recommend consult with ENT for possible laryngoscope and FEES.   Orders:    Ambulatory Referral to Otolaryngology; Future    Other specified hypothyroidism  Lab Results   Component Value Date    [id number] 22.693 (H) 08/05/2024      After last labs, instructed patient to make sure he is taking the levothyroxine on an empty stomach for at least 60 mins, in the morning. Will recheck today. Current regimen: levothyroxine 125 mcg daily.   Orders:    TSH, 3rd generation with Free T4 reflex; Future    Mixed hyperlipidemia  Lab Results    Component Value Date    CHOLESTEROL 221 (H) 08/05/2024    CHOLESTEROL 253 (H) 05/02/2023    CHOLESTEROL 202 (H) 01/19/2021     Lab Results   Component Value Date    HDL 37 (L) 08/05/2024    HDL 46 05/02/2023    HDL 36 (L) 01/19/2021     Lab Results   Component Value Date    TRIG 175 (H) 08/05/2024    TRIG 188 (H) 05/02/2023    TRIG 88 01/19/2021     Lab Results   Component Value Date    NONHDLC 184 08/05/2024    NONHDLC 207 05/02/2023    NONHDLC 146 06/27/2019      Lab Results   Component Value Date    LDLCALC 149 (H) 08/05/2024      Cholesterol levels have improved since last year. Reviewed low fat diet recommendations        Mild intermittent reactive airway disease without complication  Cough since admission. No notable cough throughout visit. Possibly due to asthma. Also discussed allergies and GERD contributors. He denies any allergy or GERD related symptoms at this time. No wheezing or SOB. Will consider restarting a maintenance inhaler or Singulair if no improvement at follow up. Will also consider CT chest and PFTS.        Other cough  See asthma plan. Continue Tessalon Perles 100 mg as needed three times daily.   Orders:    benzonatate (TESSALON PERLES) 100 mg capsule; Take 1 capsule (100 mg total) by mouth 3 (three) times a day    Pilonidal cyst  Recurrent pilonidal cyst. Recommend consult with general surgery. He declines at this time.        At risk for altered bowel elimination  Was on opioids but has since been discontinued. Moving bowels regularly. Continue docusate 100 mg BID as needed.        At risk for venous thromboembolism (VTE)  Completed course of Lovenox injections. Likely no further anticoagulation is needed. Has consult with cardiology 9/17/24.        Vitamin D deficiency  History of low vitamin D. Currently normal.         Depression Screening and Follow-up Plan: Patient was screened for depression during today's encounter. They screened negative with a PHQ-2 score of 0.      History of  Present Illness     Patient is a 53 year old male with a PMH of hypothyroidism presenting today for follow up. He would like to discuss what has happened to him since his last visit and his hospitalizations. He originally went to the ED on 6/15 after a syncopal episode at home. He got up in the middle of the night to use the bathroom and he must have passed out. He was found by his daughter. He is not sure on the amount of time he was unconscious. He was having pain in his bilateral upper to mid back, neck, and hands at presentation. States prior to this episode he had brief episodes of syncope, but was never evaluated. He does not recall much of what happened. States his memory has been poor. He had surgery on his neck and later had a pacemaker put in. Still has pain in his neck and back sometimes, but this is controlled with Tylenol. No further syncopal episodes. Some weakness in his hands, but primarily his legs since the accident. He now uses a walker. He has PT, OT, and ST services at home. He has had difficulty swallowing since as well. The ST is helping him with this. Because if this he is eating and drinking through his PEG tube. He did go to rehab after this, but can not recall any events during the admission.         History obtained from : patient and patient's Significant Other  Review of Systems  Past Medical History   Past Medical History:   Diagnosis Date    Arthritis     Chronic cough     Disease of thyroid gland     Hypoparathyroidism (HCC)     continue taking calcium and vitamin D, will check CMP, PTH level and Vitamin D level    Pneumonia of right middle lobe due to Streptococcus pneumoniae (HCC) 11/30/2018    s/p Medtronic dual chamber PPM 6/21/2024 06/21/2024     Past Surgical History:   Procedure Laterality Date    CARDIAC ELECTROPHYSIOLOGY PROCEDURE N/A 6/21/2024    Procedure: Cardiac pacer implant;  Surgeon: Kofi Pettit MD;  Location: [facility];  Service: Cardiology     DECOMPRESSION SPINE CERVICAL POSTERIOR N/A 6/16/2024    Procedure: DECOMPRESSION SPINE CERVICAL POSTERIOR C2-T1 with fusion navigated with Oarm;  Surgeon: Endy Velasquez MD;  Location: [facility];  Service: Neurosurgery    FRACTURE SURGERY      Open Treatment of Each Proximal Finger Phalanx    GASTROSTOMY TUBE PLACEMENT N/A 7/10/2024    Procedure: INSERTION PEG TUBE;  Surgeon: Darcy Reinoso MD;  Location: [facility];  Service: General     Family History   Problem Relation Age of Onset    No Known Problems Mother     No Known Problems Father     No Known Problems Sister     No Known Problems Brother     No Known Problems Son     Learning disabilities Daughter     Asthma Daughter     Seizures Daughter     No Known Problems Maternal Grandmother     No Known Problems Maternal Grandfather     No Known Problems Paternal Grandmother     No Known Problems Paternal Grandfather     No Known Problems Maternal Aunt     No Known Problems Maternal Uncle     No Known Problems Paternal Aunt     No Known Problems Paternal Uncle     No Known Problems Cousin      Current Outpatient Medications on File Prior to Visit   Medication Sig Dispense Refill    docusate (COLACE) 50 mg/5 mL liquid 10 mL (100 mg total) by Per G Tube route 2 (two) times a day 273 mL 5    levothyroxine 125 mcg tablet 1 tablet (125 mcg total) by Per G Tube route daily One tablet daily 90 tablet 3    saliva substitute (MOUTH KOTE) Apply 5 sprays to the mouth or throat 4 (four) times a day as needed (for comfort) 59 mL 5    [DISCONTINUED] acetaminophen (TYLENOL) 160 mg/5 mL suspension Take 20 mL (640 mg total) by mouth every 6 (six) hours as needed for mild pain (Patient taking differently: 640 mg by Per G Tube route every 6 (six) hours as needed for mild pain) 236 mL 0    [DISCONTINUED] benzonatate (TESSALON PERLES) 100 mg capsule Take 1 capsule (100 mg total) by mouth 3 (three) times a day 90 capsule 0    [DISCONTINUED] Blood Pressure KIT Use in the  morning 1 kit 0    [DISCONTINUED] dextromethorphan-guaiFENesin (ROBITUSSIN DM)  mg/5 mL syrup Take 10 mL by mouth 4 (four) times a day as needed for cough 237 mL 0    [DISCONTINUED] enoxaparin (LOVENOX) 40 mg/0.4 mL Inject 0.4 mL (40 mg total) under the skin every 24 hours for 17 doses Do not start before July 26, 2024. 6.8 mL 0    [DISCONTINUED] melatonin 3 mg 3 tablets (9 mg total) by Per PEG Tube route daily at bedtime 90 tablet 0    [DISCONTINUED] midodrine (PROAMATINE) 5 mg tablet Take 1 tablet (5 mg total) by mouth 3 (three) times a day before meals 90 tablet 0    [DISCONTINUED] nystatin (MYCOSTATIN) powder Apply topically 2 (two) times a day (Patient taking differently: Apply 1 Application topically 2 (two) times a day. Bilateral groin) 2 g 0     No current facility-administered medications on file prior to visit.     Allergies   Allergen Reactions    Chlorine Rash      Current Outpatient Medications on File Prior to Visit   Medication Sig Dispense Refill    docusate (COLACE) 50 mg/5 mL liquid 10 mL (100 mg total) by Per G Tube route 2 (two) times a day 273 mL 5    levothyroxine 125 mcg tablet 1 tablet (125 mcg total) by Per G Tube route daily One tablet daily 90 tablet 3    saliva substitute (MOUTH KOTE) Apply 5 sprays to the mouth or throat 4 (four) times a day as needed (for comfort) 59 mL 5    [DISCONTINUED] acetaminophen (TYLENOL) 160 mg/5 mL suspension Take 20 mL (640 mg total) by mouth every 6 (six) hours as needed for mild pain (Patient taking differently: 640 mg by Per G Tube route every 6 (six) hours as needed for mild pain) 236 mL 0    [DISCONTINUED] benzonatate (TESSALON PERLES) 100 mg capsule Take 1 capsule (100 mg total) by mouth 3 (three) times a day 90 capsule 0    [DISCONTINUED] Blood Pressure KIT Use in the morning 1 kit 0    [DISCONTINUED] dextromethorphan-guaiFENesin (ROBITUSSIN DM)  mg/5 mL syrup Take 10 mL by mouth 4 (four) times a day as needed for cough 237 mL 0     "[DISCONTINUED] enoxaparin (LOVENOX) 40 mg/0.4 mL Inject 0.4 mL (40 mg total) under the skin every 24 hours for 17 doses Do not start before July 26, 2024. 6.8 mL 0    [DISCONTINUED] melatonin 3 mg 3 tablets (9 mg total) by Per PEG Tube route daily at bedtime 90 tablet 0    [DISCONTINUED] midodrine (PROAMATINE) 5 mg tablet Take 1 tablet (5 mg total) by mouth 3 (three) times a day before meals 90 tablet 0    [DISCONTINUED] nystatin (MYCOSTATIN) powder Apply topically 2 (two) times a day (Patient taking differently: Apply 1 Application topically 2 (two) times a day. Bilateral groin) 2 g 0     No current facility-administered medications on file prior to visit.      Social History     Tobacco Use    Smoking status: Never     Passive exposure: Never    Smokeless tobacco: Never    Tobacco comments:     pt \"tried it when he was a teenager but did not like them\"   Vaping Use    Vaping status: Never Used   Substance and Sexual Activity    Alcohol use: Yes     Comment: occasionally    Drug use: No    Sexual activity: Not Currently         Objective     /74 (BP Location: Left arm, Patient Position: Sitting, Cuff Size: Standard)   Pulse 88   Temp 98.7 °F (37.1 °C) (Temporal)   Wt 71.2 kg (157 lb)   BMI 24.59 kg/m²     Physical Exam  Administrative Statements   I have spent a total time of 40 minutes in caring for this patient on the day of the visit/encounter including Diagnostic results, Prognosis, Risks and benefits of tx options, Instructions for management, Patient and family education, Importance of tx compliance, Risk factor reductions, Impressions, Counseling / Coordination of care, Reviewing / ordering tests, medicine, procedures  , and Obtaining or reviewing history  .  "

## 2024-09-12 NOTE — ASSESSMENT & PLAN NOTE
Completed course of Lovenox injections. Likely no further anticoagulation is needed. Has consult with cardiology 9/17/24.

## 2024-09-12 NOTE — ASSESSMENT & PLAN NOTE
Lab Results   Component Value Date    [id number] 22.693 (H) 08/05/2024      After last labs, instructed patient to make sure he is taking the levothyroxine on an empty stomach for at least 60 mins, in the morning. Will recheck today. Current regimen: levothyroxine 125 mcg daily.   Orders:    TSH, 3rd generation with Free T4 reflex; Future

## 2024-09-12 NOTE — ASSESSMENT & PLAN NOTE
Lab Results   Component Value Date    CHOLESTEROL 221 (H) 08/05/2024    CHOLESTEROL 253 (H) 05/02/2023    CHOLESTEROL 202 (H) 01/19/2021     Lab Results   Component Value Date    HDL 37 (L) 08/05/2024    HDL 46 05/02/2023    HDL 36 (L) 01/19/2021     Lab Results   Component Value Date    TRIG 175 (H) 08/05/2024    TRIG 188 (H) 05/02/2023    TRIG 88 01/19/2021     Lab Results   Component Value Date    NONHDLC 184 08/05/2024    NONHDLC 207 05/02/2023    NONHDLC 146 06/27/2019      Lab Results   Component Value Date    LDLCALC 149 (H) 08/05/2024      Cholesterol levels have improved since last year. Reviewed low fat diet recommendations.

## 2024-09-12 NOTE — ASSESSMENT & PLAN NOTE
Lab Results   Component Value Date    XKA9KCRXTBAT 22.693 (H) 08/05/2024      After last labs, instructed patient to make sure he is taking the levothyroxine on an empty stomach for at least 60 mins, in the morning. Will recheck today. Current regimen: levothyroxine 125 mcg daily.

## 2024-09-12 NOTE — ASSESSMENT & PLAN NOTE
Lab Results   Component Value Date    CHOLESTEROL 221 (H) 08/05/2024    CHOLESTEROL 253 (H) 05/02/2023    CHOLESTEROL 202 (H) 01/19/2021     Lab Results   Component Value Date    HDL 37 (L) 08/05/2024    HDL 46 05/02/2023    HDL 36 (L) 01/19/2021     Lab Results   Component Value Date    TRIG 175 (H) 08/05/2024    TRIG 188 (H) 05/02/2023    TRIG 88 01/19/2021     Lab Results   Component Value Date    NONHDLC 184 08/05/2024    NONHDLC 207 05/02/2023    NONHDLC 146 06/27/2019      Lab Results   Component Value Date    LDLCALC 149 (H) 08/05/2024      Cholesterol levels have improved since last year. Reviewed low fat diet recommendations

## 2024-09-12 NOTE — ASSESSMENT & PLAN NOTE
Cough since admission. No notable cough throughout visit. Possibly due to asthma. Also discussed allergies and GERD contributors. He denies any allergy or GERD related symptoms at this time. No wheezing or SOB. Will consider restarting a maintenance inhaler or Singulair if no improvement at follow up. Will also consider CT chest and PFTS.

## 2024-09-12 NOTE — ASSESSMENT & PLAN NOTE
See asthma plan. Continue Tessalon Perles 100 mg as needed three times daily.   Orders:    benzonatate (TESSALON PERLES) 100 mg capsule; Take 1 capsule (100 mg total) by mouth 3 (three) times a day

## 2024-09-12 NOTE — ASSESSMENT & PLAN NOTE
SDH secondary to a fall 6/15/24. Now resolved.   Orders:    melatonin 10 MG TABS; 1 tablet (10 mg total) by Per PEG Tube route daily at bedtime

## 2024-09-12 NOTE — ASSESSMENT & PLAN NOTE
History of hypotension since admissions. I restarted midodrine about a month ago as he was experiencing symptomatic hypotension at home. BP has significantly improved. No long experiencing episodes of dizziness and lightheadedness. Ensure getting enough fluids throughout the day. Has a cardiology follow up 9/17.    Orders:    midodrine (PROAMATINE) 5 mg tablet; Take 1 tablet (5 mg total) by mouth 3 (three) times a day before meals

## 2024-09-12 NOTE — ASSESSMENT & PLAN NOTE
Assessed with  Ruth [id number].    No events after PPM placement. Blood pressure has improved.    Regular Contractions

## 2024-09-12 NOTE — ASSESSMENT & PLAN NOTE
Was on opioids but has since been discontinued. Moving bowels regularly. Continue docusate 100 mg BID as needed.

## 2024-09-12 NOTE — ASSESSMENT & PLAN NOTE
PEG placement 7/10/24. Swallowing function has improved, but not at baseline. Does have speech therapy services. Recommend consult with ENT for possible laryngoscope and FEES.

## 2024-09-13 ENCOUNTER — HOME CARE VISIT (OUTPATIENT)
Dept: HOME HEALTH SERVICES | Facility: HOME HEALTHCARE | Age: 54
End: 2024-09-13
Payer: COMMERCIAL

## 2024-09-13 VITALS
SYSTOLIC BLOOD PRESSURE: 110 MMHG | DIASTOLIC BLOOD PRESSURE: 66 MMHG | OXYGEN SATURATION: 96 % | HEART RATE: 93 BPM | TEMPERATURE: 97.4 F

## 2024-09-13 PROCEDURE — G0151 HHCP-SERV OF PT,EA 15 MIN: HCPCS

## 2024-09-16 ENCOUNTER — TELEPHONE (OUTPATIENT)
Age: 54
End: 2024-09-16

## 2024-09-16 ENCOUNTER — TELEPHONE (OUTPATIENT)
Dept: INTERNAL MEDICINE CLINIC | Facility: CLINIC | Age: 54
End: 2024-09-16

## 2024-09-16 NOTE — TELEPHONE ENCOUNTER
Caller: Aislinn/ Girlfriend     Doctor: Dr. Pettit     Reason for call: Patient's girlfriend called to reschedule device appt on 10/3 at 4pm, stated it is very late in the day. Please call the girlfriend, Aislinn to reschedule.     Call back#: [phone]

## 2024-09-16 NOTE — TELEPHONE ENCOUNTER
Received an order to schedule ENT appt. LMOM for patient to offer 9/26/24 at 1:15 or 2:15. (Can add to the 1:20 or 2:20 slot)

## 2024-09-17 ENCOUNTER — HOME CARE VISIT (OUTPATIENT)
Dept: HOME HEALTH SERVICES | Facility: HOME HEALTHCARE | Age: 54
End: 2024-09-17
Payer: COMMERCIAL

## 2024-09-17 VITALS
DIASTOLIC BLOOD PRESSURE: 62 MMHG | OXYGEN SATURATION: 96 % | SYSTOLIC BLOOD PRESSURE: 102 MMHG | TEMPERATURE: 97.6 F | HEART RATE: 75 BPM

## 2024-09-17 PROCEDURE — G0151 HHCP-SERV OF PT,EA 15 MIN: HCPCS

## 2024-09-17 PROCEDURE — G0153 HHCP-SVS OF S/L PATH,EA 15MN: HCPCS

## 2024-09-17 NOTE — CASE COMMUNICATION
NOMNC signed for Thursday 9/19.    Supervision level of assist for all mobility coding except for walking 10ft independently,

## 2024-09-18 VITALS — TEMPERATURE: 99.7 F

## 2024-09-19 ENCOUNTER — HOME CARE VISIT (OUTPATIENT)
Dept: HOME HEALTH SERVICES | Facility: HOME HEALTHCARE | Age: 54
End: 2024-09-19
Payer: COMMERCIAL

## 2024-09-19 ENCOUNTER — TELEPHONE (OUTPATIENT)
Dept: INTERNAL MEDICINE CLINIC | Facility: CLINIC | Age: 54
End: 2024-09-19

## 2024-09-19 VITALS
SYSTOLIC BLOOD PRESSURE: 110 MMHG | HEART RATE: 77 BPM | DIASTOLIC BLOOD PRESSURE: 77 MMHG | OXYGEN SATURATION: 95 % | TEMPERATURE: 99 F

## 2024-09-19 DIAGNOSIS — E03.8 OTHER SPECIFIED HYPOTHYROIDISM: Primary | ICD-10-CM

## 2024-09-19 PROCEDURE — G0153 HHCP-SVS OF S/L PATH,EA 15MN: HCPCS

## 2024-09-19 RX ORDER — LEVOTHYROXINE SODIUM 112 UG/1
112 TABLET ORAL
Qty: 90 TABLET | Refills: 3 | Status: ON HOLD | OUTPATIENT
Start: 2024-09-19

## 2024-09-19 NOTE — TELEPHONE ENCOUNTER
TSH levels too low and T4 too high. Reviewed giving levothyroxine on an empty stomach first thing in the morning for at least an hour. Will decrease his dose to 112 mcg once daily from 125 mcg. Patient agreeable. All questions answered.

## 2024-09-19 NOTE — TELEPHONE ENCOUNTER
Patient is returning pcp call. He said he heard her message regarding his results. He said he would like to speak with her. He also wanted to know if she would be sending a new prescription to the pharmacy.

## 2024-09-21 NOTE — CASE COMMUNICATION
This message is informative only.  No action required.    Pt DCed from [facility] and [facility] services 9.19.24.    Impression.  Impression of repeat VBS w sp 9.10.24. --Mild.moderate oropharyngeal dysphagia characterized by prolonged holding delayed transfer, oral residue, delayed swallow,   pharyngeal residue, and occasional penetration and epiglottic undercoating with liquids. Pt. had prolonged mastication on a cookie. Swallowing was delayed s tarting at the vallecula, but Pt is much improved from previous studies. Multiple swallows do not clear residue. Aspiration observed 1x on thin liquids and 1x on NTL. Aspiration events are silent. Cued coughing helped expel bolus from airway. Attempted chin tuck which does not eliminate penetration events. Pt unable to tranfer pill and spit it out. During AP view, esophageal retention was noticed with pudding. See study for full details .   Rec. of repeat VBS w sp 9.10.24   reg diet w thin liquids (starting with puree/soft and advance slowly)   medication crushed w puree   aspiration precautions   compensatory techniques--upright posture, feed when fully alert, small bites and sips, and alternating bites and sips   CUE PT TO COUGH DURING INTAKE   ....................   Impression 9.19.24  Observed Pt to have mild oral stage dysphagia characterized by mildly labored mas tication.  Possible mild pharyngeal stage dysphagia on regular dense foods wo compensatory tehniques.  WFL pharyngeal stage dysphagia on soft regular diet foods and thin liquids w compensatory techniques--small bites/sips slowly w chin tuck and double swallows per bite.       Cognitive linguistic deficits. Progress made but continues to have Mild.Moderate which is an increase from Moderate in immediate memory and mild which is an increa se from mild.moderate in STM.   mild.moderate which is an increase from Moderate in problem solving and progress made but continues to be mild.moderate in organization.       Rec.   DC  Sp tx   Cont Out Pt Sp tx. for swallowing and memory--Sp tx reportedly is scheduled next week at DrMacys office--Joceline Shook PA-C   Practice assignments given   Gradually decrease tube feeding w Dr's supervision.  Cont. 3 to 4 1.5 containers Jevity jam y as needed (decrease from 6 containers daily)  Cont po feedings of soft, moist regular foods w compensatory techniques--small bites w chin tuck w at least 2 swallows per bite   Cont thin liquids w compensatory techniques--single sips and sitting upright   Cont oral care 2x daily  Aspiration precautions--Moniter temp and lung condition. Contact DrMacy if change in either one   Reflux precautions--eat small amounts slowly vs. large meals    Record foods eaten daily for DrMacy to be aware of po intake  Moniter weight as needed   Refer if change in status

## 2024-09-27 ENCOUNTER — APPOINTMENT (EMERGENCY)
Dept: RADIOLOGY | Facility: HOSPITAL | Age: 54
DRG: 201 | End: 2024-09-27
Payer: COMMERCIAL

## 2024-09-27 ENCOUNTER — HOSPITAL ENCOUNTER (INPATIENT)
Facility: HOSPITAL | Age: 54
LOS: 5 days | Discharge: HOME HEALTH | DRG: 201 | End: 2024-10-03
Attending: EMERGENCY MEDICINE | Admitting: INTERNAL MEDICINE
Payer: COMMERCIAL

## 2024-09-27 ENCOUNTER — NURSE TRIAGE (OUTPATIENT)
Dept: OTHER | Facility: OTHER | Age: 54
End: 2024-09-27

## 2024-09-27 DIAGNOSIS — S06.9XAD TRAUMATIC BRAIN INJURY, WITH UNKNOWN LOSS OF CONSCIOUSNESS STATUS, SUBSEQUENT ENCOUNTER: ICD-10-CM

## 2024-09-27 DIAGNOSIS — R55 SYNCOPE: Primary | ICD-10-CM

## 2024-09-27 DIAGNOSIS — E03.8 OTHER SPECIFIED HYPOTHYROIDISM: ICD-10-CM

## 2024-09-27 DIAGNOSIS — M48.02 CERVICAL STENOSIS OF SPINAL CANAL: ICD-10-CM

## 2024-09-27 DIAGNOSIS — S06.5XAA SDH (SUBDURAL HEMATOMA) (HCC): ICD-10-CM

## 2024-09-27 LAB
4HR DELTA HS TROPONIN: -1 NG/L
ALBUMIN SERPL BCG-MCNC: 3.9 G/DL (ref 3.5–5)
ALP SERPL-CCNC: 82 U/L (ref 34–104)
ALT SERPL W P-5'-P-CCNC: 53 U/L (ref 7–52)
ANION GAP SERPL CALCULATED.3IONS-SCNC: 12 MMOL/L (ref 4–13)
AST SERPL W P-5'-P-CCNC: 46 U/L (ref 13–39)
BASOPHILS # BLD AUTO: 0.06 THOUSANDS/ΜL (ref 0–0.1)
BASOPHILS NFR BLD AUTO: 1 % (ref 0–1)
BILIRUB SERPL-MCNC: 0.58 MG/DL (ref 0.2–1)
BUN SERPL-MCNC: 13 MG/DL (ref 5–25)
CALCIUM SERPL-MCNC: 8.1 MG/DL (ref 8.4–10.2)
CARDIAC TROPONIN I PNL SERPL HS: 4 NG/L
CARDIAC TROPONIN I PNL SERPL HS: 5 NG/L
CHLORIDE SERPL-SCNC: 99 MMOL/L (ref 96–108)
CO2 SERPL-SCNC: 24 MMOL/L (ref 21–32)
CREAT SERPL-MCNC: 0.72 MG/DL (ref 0.6–1.3)
EOSINOPHIL # BLD AUTO: 0.15 THOUSAND/ΜL (ref 0–0.61)
EOSINOPHIL NFR BLD AUTO: 2 % (ref 0–6)
ERYTHROCYTE [DISTWIDTH] IN BLOOD BY AUTOMATED COUNT: 13.1 % (ref 11.6–15.1)
GFR SERPL CREATININE-BSD FRML MDRD: 104 ML/MIN/1.73SQ M
GLUCOSE SERPL-MCNC: 80 MG/DL (ref 65–140)
HCT VFR BLD AUTO: 42.1 % (ref 36.5–49.3)
HGB BLD-MCNC: 14.1 G/DL (ref 12–17)
IMM GRANULOCYTES # BLD AUTO: 0.04 THOUSAND/UL (ref 0–0.2)
IMM GRANULOCYTES NFR BLD AUTO: 0 % (ref 0–2)
LYMPHOCYTES # BLD AUTO: 2.21 THOUSANDS/ΜL (ref 0.6–4.47)
LYMPHOCYTES NFR BLD AUTO: 23 % (ref 14–44)
MAGNESIUM SERPL-MCNC: 1.9 MG/DL (ref 1.9–2.7)
MCH RBC QN AUTO: 30.7 PG (ref 26.8–34.3)
MCHC RBC AUTO-ENTMCNC: 33.5 G/DL (ref 31.4–37.4)
MCV RBC AUTO: 92 FL (ref 82–98)
MONOCYTES # BLD AUTO: 1.04 THOUSAND/ΜL (ref 0.17–1.22)
MONOCYTES NFR BLD AUTO: 11 % (ref 4–12)
NEUTROPHILS # BLD AUTO: 6.23 THOUSANDS/ΜL (ref 1.85–7.62)
NEUTS SEG NFR BLD AUTO: 63 % (ref 43–75)
NRBC BLD AUTO-RTO: 0 /100 WBCS
PLATELET # BLD AUTO: 204 THOUSANDS/UL (ref 149–390)
PMV BLD AUTO: 10.7 FL (ref 8.9–12.7)
POTASSIUM SERPL-SCNC: 3.5 MMOL/L (ref 3.5–5.3)
PROT SERPL-MCNC: 7.2 G/DL (ref 6.4–8.4)
RBC # BLD AUTO: 4.59 MILLION/UL (ref 3.88–5.62)
SODIUM SERPL-SCNC: 135 MMOL/L (ref 135–147)
WBC # BLD AUTO: 9.73 THOUSAND/UL (ref 4.31–10.16)

## 2024-09-27 PROCEDURE — 99285 EMERGENCY DEPT VISIT HI MDM: CPT | Performed by: EMERGENCY MEDICINE

## 2024-09-27 PROCEDURE — 36415 COLL VENOUS BLD VENIPUNCTURE: CPT

## 2024-09-27 PROCEDURE — NC001 PR NO CHARGE: Performed by: INTERNAL MEDICINE

## 2024-09-27 PROCEDURE — 84484 ASSAY OF TROPONIN QUANT: CPT | Performed by: EMERGENCY MEDICINE

## 2024-09-27 PROCEDURE — 96361 HYDRATE IV INFUSION ADD-ON: CPT

## 2024-09-27 PROCEDURE — 85025 COMPLETE CBC W/AUTO DIFF WBC: CPT | Performed by: EMERGENCY MEDICINE

## 2024-09-27 PROCEDURE — 99222 1ST HOSP IP/OBS MODERATE 55: CPT | Performed by: INTERNAL MEDICINE

## 2024-09-27 PROCEDURE — 93005 ELECTROCARDIOGRAM TRACING: CPT

## 2024-09-27 PROCEDURE — 80053 COMPREHEN METABOLIC PANEL: CPT | Performed by: EMERGENCY MEDICINE

## 2024-09-27 PROCEDURE — 99285 EMERGENCY DEPT VISIT HI MDM: CPT

## 2024-09-27 PROCEDURE — 70450 CT HEAD/BRAIN W/O DYE: CPT

## 2024-09-27 PROCEDURE — 83735 ASSAY OF MAGNESIUM: CPT

## 2024-09-27 PROCEDURE — 71045 X-RAY EXAM CHEST 1 VIEW: CPT

## 2024-09-27 PROCEDURE — 96360 HYDRATION IV INFUSION INIT: CPT

## 2024-09-27 RX ORDER — MIDODRINE HYDROCHLORIDE 5 MG/1
5 TABLET ORAL
Status: DISCONTINUED | OUTPATIENT
Start: 2024-09-28 | End: 2024-09-27

## 2024-09-27 RX ORDER — MIDODRINE HYDROCHLORIDE 5 MG/1
5 TABLET ORAL
Status: DISCONTINUED | OUTPATIENT
Start: 2024-09-28 | End: 2024-10-03 | Stop reason: HOSPADM

## 2024-09-27 RX ORDER — ACETAMINOPHEN 160 MG/5ML
640 SUSPENSION ORAL EVERY 6 HOURS PRN
Status: DISCONTINUED | OUTPATIENT
Start: 2024-09-27 | End: 2024-09-28

## 2024-09-27 RX ORDER — LEVOTHYROXINE SODIUM 112 UG/1
112 TABLET ORAL
Status: DISCONTINUED | OUTPATIENT
Start: 2024-09-28 | End: 2024-09-27

## 2024-09-27 RX ORDER — BENZONATATE 100 MG/1
100 CAPSULE ORAL 3 TIMES DAILY
Status: DISCONTINUED | OUTPATIENT
Start: 2024-09-27 | End: 2024-09-28

## 2024-09-27 RX ORDER — LEVOTHYROXINE SODIUM 112 UG/1
112 TABLET ORAL
Status: DISCONTINUED | OUTPATIENT
Start: 2024-09-28 | End: 2024-09-30

## 2024-09-27 RX ORDER — ACETAMINOPHEN 160 MG/5ML
650 SUSPENSION ORAL ONCE
Status: DISCONTINUED | OUTPATIENT
Start: 2024-09-27 | End: 2024-09-27

## 2024-09-27 RX ORDER — LANOLIN ALCOHOL/MO/W.PET/CERES
9 CREAM (GRAM) TOPICAL
Status: DISCONTINUED | OUTPATIENT
Start: 2024-09-27 | End: 2024-10-02

## 2024-09-27 RX ADMIN — Medication 9 MG: at 23:34

## 2024-09-27 RX ADMIN — ACETAMINOPHEN 640 MG: 160 SUSPENSION ORAL at 23:34

## 2024-09-27 RX ADMIN — SODIUM CHLORIDE 1000 ML: 0.9 INJECTION, SOLUTION INTRAVENOUS at 18:19

## 2024-09-27 RX ADMIN — BENZONATATE 100 MG: 100 CAPSULE ORAL at 23:50

## 2024-09-27 NOTE — ED ATTENDING ATTESTATION
I saw and evaluated the patient. I have discussed the patient with the resident physician and agree with the resident's findings, assessment and plan as documented in the resident physician's note, unless otherwise documented below. All available laboratory and imaging studies were reviewed by myself.  I was present for key portions of any procedure(s) performed by the resident and I was immediately available to provide assistance.     I agree with the current assessment done in the Emergency Department. I have conducted an independent evaluation of this patient    Final Diagnosis:  1. Syncope    2. SDH (subdural hematoma) (HCC)    3. Traumatic brain injury, with unknown loss of consciousness status, subsequent encounter             Chief Complaint   Patient presents with    Syncope     Reports had syncopal episode today while waiting for lyft.      This is a 54 y.o. male with history of TBI with SAH and SDH in June 2024, mild central cord syndrome, prior syncope, hypoparathyroidism, SSS post dual chamber PPM, presenting for evaluation of syncope.  He is here with his daughter who witnessed episode. Patient had gone to a bar and was waiting for a ride when he syncopized. Daughter assisted him into a chair so he did not fall. Says that he was unconscious for 10-15 minutes and turning cyanotic. She felt for a pulse and could feel a faint one. No chest compressions given. Was incontinent of urine. No prodrome. No heart palpitations, chest pain, or SOB. No seizure-like activity, tongue biting, loss of bowel function. No history of seizures. Denies fever, chills, cough,  n/v/d, abdominal pain, headache, any other complaints.    PMH:   has a past medical history of Arthritis, Chronic cough, Disease of thyroid gland, Hypoparathyroidism (HCC), Pneumonia of right middle lobe due to Streptococcus pneumoniae (HCC) (11/30/2018), and s/p Medtronic dual chamber PPM 6/21/2024 (06/21/2024).    PSH:   has a past surgical history  "that includes Fracture surgery; DECOMPRESSION SPINE CERVICAL POSTERIOR (N/A, 6/16/2024); Cardiac electrophysiology procedure (N/A, 6/21/2024); and Gastrostomy tube placement (N/A, 7/10/2024).    Social:  Social History     Substance and Sexual Activity   Alcohol Use Yes    Comment: occasionally     Social History     Tobacco Use   Smoking Status Never    Passive exposure: Never   Smokeless Tobacco Never   Tobacco Comments    pt \"tried it when he was a teenager but did not like them\"     Social History     Substance and Sexual Activity   Drug Use No     PE:  Vitals:    09/28/24 2236 09/29/24 0300 09/29/24 0700 09/29/24 1141   BP: 103/59 96/65 109/63 119/63   BP Location: Right arm Right arm Right arm Right arm   Pulse: 70 78 78 96   Resp: 18 18 17 18   Temp: 98 °F (36.7 °C) 98 °F (36.7 °C) 98.1 °F (36.7 °C) 97.6 °F (36.4 °C)   TempSrc: Oral Oral Oral Oral   SpO2: 99% 99% 97% 95%   Weight:       Height:             Physical exam:  GENERAL APPEARANCE: Appears comfortable, no acute distress, calm and cooperative   NEURO: GCS 15, no gross focal deficits, cranial nerves grossly intact, clear fluent speech, no facial asymmetry   HEENT: Normocephalic, atraumatic, moist mucous membranes   Neck: Supple, full ROM  CV: RRR, no murmurs, rubs, or gallops  LUNGS: CTAB, no wheezing, rales, or rhonchi  GI: Abdomen soft, non-tender, no rebound or guarding   MSK: Extremities non-tender, no pitting edema  SKIN: Warm and dry      Assessment and plan: This is a 54 y.o. male with history of TBI with SAH and SDH in June 2024, mild central cord syndrome, prior syncope, hypoparathyroidism, SSS post dual chamber PPM, presenting for evaluation of syncope. Within ddx consider vasovagal, orthostatic, cardiogenic, neurogenic, metabolic, etiologies. Will obtain workup to assess, plan to admit given prolonged episode with cyanosis.         Code Status: Level 1 - Full Code  Advance Directive and Living Will:      Power of :    POLST:  "     Medications   melatonin tablet 9 mg (9 mg Per PEG Tube Given 9/28/24 2140)   midodrine (PROAMATINE) tablet 5 mg (5 mg Per PEG Tube Given 9/29/24 0534)   levothyroxine tablet 112 mcg (112 mcg Per PEG Tube Given 9/29/24 0534)   enoxaparin (LOVENOX) subcutaneous injection 40 mg (40 mg Subcutaneous Given 9/28/24 0816)   benzonatate (TESSALON PERLES) capsule 100 mg (100 mg Oral Given 9/28/24 2151)   acetaminophen (TYLENOL) oral suspension 640 mg (has no administration in time range)   sodium chloride 0.9 % bolus 1,000 mL (0 mL Intravenous Stopped 9/28/24 0352)   potassium chloride oral solution 20 mEq (20 mEq Per PEG Tube Given 9/28/24 0816)     CT head without contrast   Final Result      No acute intracranial abnormality.                  Workstation performed: [id number]         XR chest 1 view portable   ED Interpretation   No acute cardiopulmonary disease        Final Result      No acute cardiopulmonary disease.            Workstation performed: [id number]         MRI inpatient order    (Results Pending)     Orders Placed This Encounter   Procedures    XR chest 1 view portable    CT head without contrast    MRI inpatient order    CBC and differential    Comprehensive metabolic panel    HS Troponin 0hr (reflex protocol)    Magnesium    HS Troponin I 2hr    HS Troponin I 4hr    Basic metabolic panel    CBC and differential    Calcium, ionized    CBC and differential    Comprehensive metabolic panel    TSH, 3rd generation    PTH, intact    Magnesium    Phosphorus    Vitamin D 25 hydroxy    Diet Enteral/Parenteral; Tube Feeding with Oral Diet; Jevity 1.5; Bolus; 237; (3x/day) - 8:00AM,12:00PM,5:00PM; 150; Water; Before and After each Bolus; Regular; Regular House    Vital Signs per unit routine    Up and OOB as tolerated    I/O    Ambulate patient    Orthostatic blood pressure    Please arrange for icd/pacemaker interrogation    Nursing dysphagia Screen    Orthostatic blood pressure    Calorie count    24 Hour  Telemetry Monitoring    Level 1-Full Code: all life saving measures are indicated    Inpatient consult to Case Management    Inpatient consult to Neurology    OT eval and treat    PT eval and treat    ECG 12 lead    ECG 12 lead    EEG awake or drowsy routine    Place in Observation    INPATIENT ADMISSION     Labs Reviewed   COMPREHENSIVE METABOLIC PANEL - Abnormal       Result Value Ref Range Status    Sodium 135  135 - 147 mmol/L Final    Potassium 3.5  3.5 - 5.3 mmol/L Final    Chloride 99  96 - 108 mmol/L Final    CO2 24  21 - 32 mmol/L Final    ANION GAP 12  4 - 13 mmol/L Final    BUN 13  5 - 25 mg/dL Final    Creatinine 0.72  0.60 - 1.30 mg/dL Final    Comment: Standardized to IDMS reference method    Glucose 80  65 - 140 mg/dL Final    Comment: If the patient is fasting, the ADA then defines impaired fasting glucose as > 100 mg/dL and diabetes as > or equal to 123 mg/dL.    Calcium 8.1 (*) 8.4 - 10.2 mg/dL Final    AST 46 (*) 13 - 39 U/L Final    ALT 53 (*) 7 - 52 U/L Final    Comment: Specimen collection should occur prior to Sulfasalazine administration due to the potential for falsely depressed results.     Alkaline Phosphatase 82  34 - 104 U/L Final    Total Protein 7.2  6.4 - 8.4 g/dL Final    Albumin 3.9  3.5 - 5.0 g/dL Final    Total Bilirubin 0.58  0.20 - 1.00 mg/dL Final    Comment: Use of this assay is not recommended for patients undergoing treatment with eltrombopag due to the potential for falsely elevated results.  N-acetyl-p-benzoquinone imine (metabolite of Acetaminophen) will generate erroneously low results in samples for patients that have taken an overdose of Acetaminophen.    eGFR 104  ml/min/1.73sq m Final    Narrative:     National Kidney Disease Foundation guidelines for Chronic Kidney Disease (CKD):     Stage 1 with normal or high GFR (GFR > 90 mL/min/1.73 square meters)    Stage 2 Mild CKD (GFR = 60-89 mL/min/1.73 square meters)    Stage 3A Moderate CKD (GFR = 45-59 mL/min/1.73 square  "meters)    Stage 3B Moderate CKD (GFR = 30-44 mL/min/1.73 square meters)    Stage 4 Severe CKD (GFR = 15-29 mL/min/1.73 square meters)    Stage 5 End Stage CKD (GFR <15 mL/min/1.73 square meters)  Note: GFR calculation is accurate only with a steady state creatinine   BASIC METABOLIC PANEL - Abnormal    Sodium 137  135 - 147 mmol/L Final    Potassium 3.8  3.5 - 5.3 mmol/L Final    Chloride 104  96 - 108 mmol/L Final    CO2 24  21 - 32 mmol/L Final    ANION GAP 9  4 - 13 mmol/L Final    BUN 12  5 - 25 mg/dL Final    Creatinine 0.58 (*) 0.60 - 1.30 mg/dL Final    Comment: Standardized to IDMS reference method    Glucose 78  65 - 140 mg/dL Final    Comment: If the patient is fasting, the ADA then defines impaired fasting glucose as > 100 mg/dL and diabetes as > or equal to 123 mg/dL.    Glucose, Fasting 78  65 - 99 mg/dL Final    Calcium 7.4 (*) 8.4 - 10.2 mg/dL Final    eGFR 113  ml/min/1.73sq m Final    Narrative:     National Kidney Disease Foundation guidelines for Chronic Kidney Disease (CKD):     Stage 1 with normal or high GFR (GFR > 90 mL/min/1.73 square meters)    Stage 2 Mild CKD (GFR = 60-89 mL/min/1.73 square meters)    Stage 3A Moderate CKD (GFR = 45-59 mL/min/1.73 square meters)    Stage 3B Moderate CKD (GFR = 30-44 mL/min/1.73 square meters)    Stage 4 Severe CKD (GFR = 15-29 mL/min/1.73 square meters)    Stage 5 End Stage CKD (GFR <15 mL/min/1.73 square meters)  Note: GFR calculation is accurate only with a steady state creatinine   HS TROPONIN I 0HR - Normal    hs TnI 0hr 5  \"Refer to ACS Flowchart\"- see link ng/L Final    Comment:                                              Initial (time 0) result  If >=50 ng/L, Myocardial injury suggested ;  Type of myocardial injury and treatment strategy  to be determined.  If 5-49 ng/L, a delta result at 2 hours and or 4 hours will be needed to further evaluate.  If <4 ng/L, and chest pain has been >3 hours since onset, patient may qualify for discharge based on " "the HEART score in the ED.  If <5 ng/L and <3hours since onset of chest pain, a delta result at 2 hours will be needed to further evaluate.    HS Troponin 99th Percentile URL of a Health Population=12 ng/L with a 95% Confidence Interval of 8-18 ng/L.    Second Troponin (time 2 hours)  If calculated delta >= 20 ng/L,  Myocardial injury suggested ; Type of myocardial injury and treatment strategy to be determined.  If 5-49 ng/L and the calculated delta is 5-19 ng/L, consult medical service for evaluation.  Continue evaluation for ischemia on ecg and other possible etiology and repeat hs troponin at 4 hours.  If delta is <5 ng/L at 2 hours, consider discharge based on risk stratification via the HEART score (if in ED), or ELI risk score in IP/Observation.    HS Troponin 99th Percentile URL of a Health Population=12 ng/L with a 95% Confidence Interval of 8-18 ng/L.   MAGNESIUM - Normal    Magnesium 1.9  1.9 - 2.7 mg/dL Final   HS TROPONIN I 4HR - Normal    hs TnI 4hr 4  \"Refer to ACS Flowchart\"- see link ng/L Final    Comment:                                              Initial (time 0) result  If >=50 ng/L, Myocardial injury suggested ;  Type of myocardial injury and treatment strategy  to be determined.  If 5-49 ng/L, a delta result at 2 hours and or 4 hours will be needed to further evaluate.  If <4 ng/L, and chest pain has been >3 hours since onset, patient may qualify for discharge based on the HEART score in the ED.  If <5 ng/L and <3hours since onset of chest pain, a delta result at 2 hours will be needed to further evaluate.    HS Troponin 99th Percentile URL of a Health Population=12 ng/L with a 95% Confidence Interval of 8-18 ng/L.    Second Troponin (time 2 hours)  If calculated delta >= 20 ng/L,  Myocardial injury suggested ; Type of myocardial injury and treatment strategy to be determined.  If 5-49 ng/L and the calculated delta is 5-19 ng/L, consult medical service for evaluation.  Continue evaluation " for ischemia on ecg and other possible etiology and repeat hs troponin at 4 hours.  If delta is <5 ng/L at 2 hours, consider discharge based on risk stratification via the HEART score (if in ED), or ELI risk score in IP/Observation.    HS Troponin 99th Percentile URL of a Health Population=12 ng/L with a 95% Confidence Interval of 8-18 ng/L.    Delta 4hr hsTnI -1  <20 ng/L Final   CBC AND DIFFERENTIAL    WBC 9.73  4.31 - 10.16 Thousand/uL Final    RBC 4.59  3.88 - 5.62 Million/uL Final    Hemoglobin 14.1  12.0 - 17.0 g/dL Final    Hematocrit 42.1  36.5 - 49.3 % Final    MCV 92  82 - 98 fL Final    MCH 30.7  26.8 - 34.3 pg Final    MCHC 33.5  31.4 - 37.4 g/dL Final    RDW 13.1  11.6 - 15.1 % Final    MPV 10.7  8.9 - 12.7 fL Final    Platelets 204  149 - 390 Thousands/uL Final    nRBC 0  /100 WBCs Final    Segmented % 63  43 - 75 % Final    Immature Grans % 0  0 - 2 % Final    Lymphocytes % 23  14 - 44 % Final    Monocytes % 11  4 - 12 % Final    Eosinophils Relative 2  0 - 6 % Final    Basophils Relative 1  0 - 1 % Final    Absolute Neutrophils 6.23  1.85 - 7.62 Thousands/µL Final    Absolute Immature Grans 0.04  0.00 - 0.20 Thousand/uL Final    Absolute Lymphocytes 2.21  0.60 - 4.47 Thousands/µL Final    Absolute Monocytes 1.04  0.17 - 1.22 Thousand/µL Final    Eosinophils Absolute 0.15  0.00 - 0.61 Thousand/µL Final    Basophils Absolute 0.06  0.00 - 0.10 Thousands/µL Final   CBC AND DIFFERENTIAL    WBC 7.43  4.31 - 10.16 Thousand/uL Final    RBC 4.06  3.88 - 5.62 Million/uL Final    Hemoglobin 12.6  12.0 - 17.0 g/dL Final    Hematocrit 37.4  36.5 - 49.3 % Final    MCV 92  82 - 98 fL Final    MCH 31.0  26.8 - 34.3 pg Final    MCHC 33.7  31.4 - 37.4 g/dL Final    RDW 13.2  11.6 - 15.1 % Final    MPV 10.9  8.9 - 12.7 fL Final    Platelets 161  149 - 390 Thousands/uL Final    nRBC 0  /100 WBCs Final    Segmented % 54  43 - 75 % Final    Immature Grans % 1  0 - 2 % Final    Lymphocytes % 31  14 - 44 % Final     Monocytes % 12  4 - 12 % Final    Eosinophils Relative 2  0 - 6 % Final    Basophils Relative 0  0 - 1 % Final    Absolute Neutrophils 4.04  1.85 - 7.62 Thousands/µL Final    Absolute Immature Grans 0.04  0.00 - 0.20 Thousand/uL Final    Absolute Lymphocytes 2.32  0.60 - 4.47 Thousands/µL Final    Absolute Monocytes 0.87  0.17 - 1.22 Thousand/µL Final    Eosinophils Absolute 0.13  0.00 - 0.61 Thousand/µL Final    Basophils Absolute 0.03  0.00 - 0.10 Thousands/µL Final   HS TROPONIN I 2HR   LIGHT BLUE TOP         Time reflects when diagnosis was documented in both MDM as applicable and the Disposition within this note       Time User Action Codes Description Comment    9/27/2024  7:03 PM Lizet Powell Add [R55] Syncope     9/28/2024  9:57 AM Reinaldo Perez Add [S06.5XAA] SDH (subdural hematoma) (HCC)     9/28/2024  9:57 AM Reinaldo Perez Add [S06.9XAD] Traumatic brain injury, with unknown loss of consciousness status, subsequent encounter           ED Disposition       ED Disposition   Admit    Condition   Stable    Date/Time   Fri Sep 27, 2024  9:31 PM    Comment   Case was discussed with SOD and the patient's admission status was agreed to be Admission Status: observation status to the service of Dr. Mcmillan.               Follow-up Information    None       Current Discharge Medication List        CONTINUE these medications which have NOT CHANGED    Details   acetaminophen (TYLENOL) 160 mg/5 mL suspension Take 20 mL (640 mg total) by mouth every 6 (six) hours as needed for mild pain  Qty: 236 mL, Refills: 0    Associated Diagnoses: Cervical stenosis of spinal canal      benzonatate (TESSALON PERLES) 100 mg capsule Take 1 capsule (100 mg total) by mouth 3 (three) times a day  Qty: 90 capsule, Refills: 0    Associated Diagnoses: Other cough      levothyroxine 112 mcg tablet Take 1 tablet (112 mcg total) by mouth daily in the early morning  Qty: 90 tablet, Refills: 3    Associated Diagnoses: Other specified  hypothyroidism      melatonin 10 MG TABS 1 tablet (10 mg total) by Per PEG Tube route daily at bedtime  Qty: 90 tablet, Refills: 0    Associated Diagnoses: SDH (subdural hematoma) (HCC)      midodrine (PROAMATINE) 5 mg tablet Take 1 tablet (5 mg total) by mouth 3 (three) times a day before meals  Qty: 90 tablet, Refills: 0    Associated Diagnoses: Idiopathic hypotension      saliva substitute (MOUTH KOTE) Apply 5 sprays to the mouth or throat 4 (four) times a day as needed (for comfort)  Qty: 59 mL, Refills: 5    Associated Diagnoses: Closed head injury, initial encounter      Diclofenac Sodium (VOLTAREN) 1 % Apply 2 g topically 4 (four) times a day  Qty: 150 g, Refills: 11    Associated Diagnoses: Cervical stenosis of spinal canal      docusate (COLACE) 50 mg/5 mL liquid 10 mL (100 mg total) by Per G Tube route 2 (two) times a day  Qty: 273 mL, Refills: 5    Associated Diagnoses: At risk for altered bowel elimination           No discharge procedures on file.  Prior to Admission Medications   Prescriptions Last Dose Informant Patient Reported? Taking?   Diclofenac Sodium (VOLTAREN) 1 % Unknown  No No   Sig: Apply 2 g topically 4 (four) times a day   acetaminophen (TYLENOL) 160 mg/5 mL suspension 2024  No Yes   Sig: Take 20 mL (640 mg total) by mouth every 6 (six) hours as needed for mild pain   benzonatate (TESSALON PERLES) 100 mg capsule 2024  No Yes   Sig: Take 1 capsule (100 mg total) by mouth 3 (three) times a day   docusate (COLACE) 50 mg/5 mL liquid Not Taking  No No   Sig: 10 mL (100 mg total) by Per G Tube route 2 (two) times a day   Patient not taking: Reported on 2024   levothyroxine 112 mcg tablet 2024  No Yes   Sig: Take 1 tablet (112 mcg total) by mouth daily in the early morning   melatonin 10 MG TABS 2024  No Yes   Si tablet (10 mg total) by Per PEG Tube route daily at bedtime   midodrine (PROAMATINE) 5 mg tablet 2024  No Yes   Sig: Take 1 tablet (5 mg total) by  "mouth 3 (three) times a day before meals   saliva substitute (MOUTH KOTE) 9/26/2024  No Yes   Sig: Apply 5 sprays to the mouth or throat 4 (four) times a day as needed (for comfort)      Facility-Administered Medications: None         Portions of the record may have been created with voice recognition software. Occasional wrong word or \"sound a like\" substitutions may have occurred due to the inherent limitations of voice recognition software. Read the chart carefully and recognize, using context, where substitutions have occurred.    Electronically signed by:  Felicitas James    "

## 2024-09-27 NOTE — Clinical Note
Case was discussed with Dr. Castro and the patient's admission status was agreed to be Admission Status: observation status to the service of Dr. Castro.

## 2024-09-28 LAB
ANION GAP SERPL CALCULATED.3IONS-SCNC: 9 MMOL/L (ref 4–13)
ATRIAL RATE: 108 BPM
BASOPHILS # BLD AUTO: 0.03 THOUSANDS/ΜL (ref 0–0.1)
BASOPHILS NFR BLD AUTO: 0 % (ref 0–1)
BUN SERPL-MCNC: 12 MG/DL (ref 5–25)
CALCIUM SERPL-MCNC: 7.4 MG/DL (ref 8.4–10.2)
CHLORIDE SERPL-SCNC: 104 MMOL/L (ref 96–108)
CO2 SERPL-SCNC: 24 MMOL/L (ref 21–32)
CREAT SERPL-MCNC: 0.58 MG/DL (ref 0.6–1.3)
EOSINOPHIL # BLD AUTO: 0.13 THOUSAND/ΜL (ref 0–0.61)
EOSINOPHIL NFR BLD AUTO: 2 % (ref 0–6)
ERYTHROCYTE [DISTWIDTH] IN BLOOD BY AUTOMATED COUNT: 13.2 % (ref 11.6–15.1)
GFR SERPL CREATININE-BSD FRML MDRD: 113 ML/MIN/1.73SQ M
GLUCOSE P FAST SERPL-MCNC: 78 MG/DL (ref 65–99)
GLUCOSE SERPL-MCNC: 78 MG/DL (ref 65–140)
HCT VFR BLD AUTO: 37.4 % (ref 36.5–49.3)
HGB BLD-MCNC: 12.6 G/DL (ref 12–17)
IMM GRANULOCYTES # BLD AUTO: 0.04 THOUSAND/UL (ref 0–0.2)
IMM GRANULOCYTES NFR BLD AUTO: 1 % (ref 0–2)
LYMPHOCYTES # BLD AUTO: 2.32 THOUSANDS/ΜL (ref 0.6–4.47)
LYMPHOCYTES NFR BLD AUTO: 31 % (ref 14–44)
MCH RBC QN AUTO: 31 PG (ref 26.8–34.3)
MCHC RBC AUTO-ENTMCNC: 33.7 G/DL (ref 31.4–37.4)
MCV RBC AUTO: 92 FL (ref 82–98)
MONOCYTES # BLD AUTO: 0.87 THOUSAND/ΜL (ref 0.17–1.22)
MONOCYTES NFR BLD AUTO: 12 % (ref 4–12)
NEUTROPHILS # BLD AUTO: 4.04 THOUSANDS/ΜL (ref 1.85–7.62)
NEUTS SEG NFR BLD AUTO: 54 % (ref 43–75)
NRBC BLD AUTO-RTO: 0 /100 WBCS
P AXIS: 80 DEGREES
PLATELET # BLD AUTO: 161 THOUSANDS/UL (ref 149–390)
PMV BLD AUTO: 10.9 FL (ref 8.9–12.7)
POTASSIUM SERPL-SCNC: 3.8 MMOL/L (ref 3.5–5.3)
PR INTERVAL: 146 MS
QRS AXIS: 78 DEGREES
QRSD INTERVAL: 82 MS
QT INTERVAL: 334 MS
QTC INTERVAL: 447 MS
RBC # BLD AUTO: 4.06 MILLION/UL (ref 3.88–5.62)
SODIUM SERPL-SCNC: 137 MMOL/L (ref 135–147)
T WAVE AXIS: 72 DEGREES
VENTRICULAR RATE: 108 BPM
WBC # BLD AUTO: 7.43 THOUSAND/UL (ref 4.31–10.16)

## 2024-09-28 PROCEDURE — 99244 OFF/OP CNSLTJ NEW/EST MOD 40: CPT | Performed by: PSYCHIATRY & NEUROLOGY

## 2024-09-28 PROCEDURE — 93010 ELECTROCARDIOGRAM REPORT: CPT | Performed by: INTERNAL MEDICINE

## 2024-09-28 PROCEDURE — 80048 BASIC METABOLIC PNL TOTAL CA: CPT

## 2024-09-28 PROCEDURE — 99232 SBSQ HOSP IP/OBS MODERATE 35: CPT | Performed by: INTERNAL MEDICINE

## 2024-09-28 PROCEDURE — 85025 COMPLETE CBC W/AUTO DIFF WBC: CPT

## 2024-09-28 PROCEDURE — 83970 ASSAY OF PARATHORMONE: CPT

## 2024-09-28 PROCEDURE — 97530 THERAPEUTIC ACTIVITIES: CPT

## 2024-09-28 PROCEDURE — 36415 COLL VENOUS BLD VENIPUNCTURE: CPT

## 2024-09-28 PROCEDURE — 97163 PT EVAL HIGH COMPLEX 45 MIN: CPT

## 2024-09-28 RX ORDER — ACETAMINOPHEN 160 MG/5ML
640 SUSPENSION ORAL EVERY 6 HOURS PRN
Status: DISCONTINUED | OUTPATIENT
Start: 2024-09-28 | End: 2024-10-03 | Stop reason: HOSPADM

## 2024-09-28 RX ORDER — BENZONATATE 100 MG/1
100 CAPSULE ORAL 3 TIMES DAILY PRN
Status: DISCONTINUED | OUTPATIENT
Start: 2024-09-28 | End: 2024-10-03 | Stop reason: HOSPADM

## 2024-09-28 RX ORDER — ENOXAPARIN SODIUM 100 MG/ML
40 INJECTION SUBCUTANEOUS DAILY
Status: DISCONTINUED | OUTPATIENT
Start: 2024-09-28 | End: 2024-10-03 | Stop reason: HOSPADM

## 2024-09-28 RX ORDER — POTASSIUM CHLORIDE 20MEQ/15ML
20 LIQUID (ML) ORAL ONCE
Status: COMPLETED | OUTPATIENT
Start: 2024-09-28 | End: 2024-09-28

## 2024-09-28 RX ADMIN — MIDODRINE HYDROCHLORIDE 5 MG: 5 TABLET ORAL at 13:14

## 2024-09-28 RX ADMIN — Medication 9 MG: at 21:40

## 2024-09-28 RX ADMIN — MIDODRINE HYDROCHLORIDE 5 MG: 5 TABLET ORAL at 17:35

## 2024-09-28 RX ADMIN — ENOXAPARIN SODIUM 40 MG: 40 INJECTION SUBCUTANEOUS at 08:16

## 2024-09-28 RX ADMIN — POTASSIUM CHLORIDE 20 MEQ: 1.5 SOLUTION ORAL at 08:16

## 2024-09-28 RX ADMIN — BENZONATATE 100 MG: 100 CAPSULE ORAL at 21:51

## 2024-09-28 RX ADMIN — LEVOTHYROXINE SODIUM 112 MCG: 112 TABLET ORAL at 05:52

## 2024-09-28 NOTE — ASSESSMENT & PLAN NOTE
Assessment and Plan:  -Dual-chamber PPM placed in June of 2024  -Mildly tachycardic on exam and telemetry  -Continue monitoring VS

## 2024-09-28 NOTE — ASSESSMENT & PLAN NOTE
Assessment:  -Patient denies having any prodrome, or any weakness following events. After all four events, patient was confused. Patient has been missing midodrine third doses several days, including before this incident. Patient urinated himself after this fall, but EMS workers did not see any tongue bites. His lips and face turned blue during this event.  -Patient and daughter report that this is the fourth time he has synopsized, the first one of which  was around six months ago. Note in 6/2024 references that the patient had six months of syncope prior, so this may have gone on nine months   -6/2024 echocardiogram reveals trace tricuspid, mitral, and pulmonic regurgitation, with mild aortic root dilatation. EF is 60% and systolic/diastolic function is normal   -Vasovagal and orthostatic syncope unlikely, neurologic and cardiac etiologies possible  Plan:  -Patient underwent interrogation of pacemaker, follow up results   -Can likely hold off on new echocardiogram in setting of relatively normal recent echo   -Orthostatics ordered  -Consider EEG with neurology consult   -Consider Zio patch/Holter monitor  -Continue midodrine 10 mg TID

## 2024-09-28 NOTE — ASSESSMENT & PLAN NOTE
Assessment and Plan:  -Patient had fall 6/15/2024, and is status post C3-7 laminectomy with C2-T1 fusion on 6/16/24. MRI 6/15/2024 revealed Congenital canal stenosis with superimposed degenerative spondylosis .  Most pronounced at C3-4 and C5-C6 with moderate to severe canal stenosis and mild cord compression. The patient was described as having central cord syndrome   -Patient has reduced ability to sit up during exam, but has intact strength and sensation of the extremities along with no urinary issues  -PT/OT consulted

## 2024-09-28 NOTE — ASSESSMENT & PLAN NOTE
Assessment and Plan:  -Chronic and may be secondary to allergic rhinitis  -Patient takes benzonatate 100 mg capsule TID at home, continue

## 2024-09-28 NOTE — ASSESSMENT & PLAN NOTE
Assessment and Plan:  -Chronic and may be secondary to allergic rhinitis  -Patient takes benzonatate 100 mg capsule TID at home, continue on a prn basis

## 2024-09-28 NOTE — ASSESSMENT & PLAN NOTE
Assessment and Plan:  -Occurred during the patient third fall this year 6/15/2024. CT imaging showed signs of SAH in the bilateral parafalcine regions and potential SDH   -Patient did hit his head and had bleeding behind his ear during his second fall, but did not get medically evaluated   -Patient did not hit head on the fall prior to this admission, and noncon CT head shows no acute bleeding   -Neurologic exam intact

## 2024-09-28 NOTE — TELEPHONE ENCOUNTER
"Communicated with Samuel RN to confirm she was caring for patient and family at bedside had some concerns regarding respiratory rate and whether or not he has pacemaker. Was finally able to get in touch with Aislinn (girlfriend) and reassured her that inpatient staff is monitoring him closely and can find all the information they need through their channels. She is not currently at the bedside, daughter is.     Reason for Disposition  • [1] Caller is not with the adult (patient) AND [2] probable NON-URGENT symptoms    Answer Assessment - Initial Assessment Questions  1. REASON FOR CALL: \"What is the main reason for your call?\" or \"How can I best help you?\"      Pt's girlfriend called worried about his respiratory rate being in the 20s and whether or not he has a pacemaker.    He is currently admitted in bed 6 of the [facility]- pending admission    Protocols used: Information Only Call - No Triage-Adult-    "

## 2024-09-28 NOTE — ASSESSMENT & PLAN NOTE
54-year-old male with recent SAH/SDH s/p suspected fall in 06/2024, SSS s/p pacemaker 06/2024, s/p C3-C7 laminectomy with C2-T1 fusion 06/2024, recurrent syncopal episodes, arthritis, hypothyroidism, who presented on 9/27 after having a syncopal episode.  The episode was witnessed by the daughter, who reported that patient was standing when she noted that he was holding onto his walker and had deeper breathing.  Patient's daughter assisted him to sit into a chair, where he lost consciousness, had his eyes rolled back, and his face turned blue.  The event lasted approximately 10-15 minutes before patient woke up and was confused and was noted to have an episode of urinary incontinence.  Of note, patient is ordered midodrine 3 times daily but has only been receiving it daily due to family forgetting to give it to him.  BP on presentation 98/61.  Neurology consulted for further evaluation of syncope. Low suspicion for seizure at this time but will work up as noted below.    Workup:  -CT head: No acute intracranial abnormality.  -Pacemaker was interrogated and patient was noted to have SVT with HR in 140s on 9/27  -Ortho BPs: lying 102/59, sitting 100/59, standing 108/60    Plan:  -MRI brain w/wo contrast seizure protocol  -Routine EEG  -Will hold off on starting AED at this time due to lower concern for seizure  -Will need to clarify with patient if he drives  -Fall precautions  -PT/OT  -Monitor neuroexam; notify with any changes  -Medical management and supportive care per primary team. Correction of any metabolic or infectious disturbances.

## 2024-09-28 NOTE — ASSESSMENT & PLAN NOTE
Assessment:  -Patient denies having any prodrome, or any weakness following events. After all four events, patient was confused. Patient has been missing midodrine third doses several days, including before this incident. Patient urinated himself after this fall, but EMS workers did not see any tongue bites. His lips and face turned blue during this event.  -Patient and daughter report that this is the fourth time he has synopsized, the first one of which  was around six months ago. Note in 6/2024 references that the patient had six months of syncope prior, so this may have gone on nine months   -6/2024 echocardiogram reveals trace tricuspid, mitral, and pulmonic regurgitation, with mild aortic root dilatation. EF is 60% and systolic/diastolic function is normal   -Vasovagal and orthostatic syncope unlikely, neurologic and cardiac etiologies possible  Plan:  -Can hold off on new echocardiogram in setting of relatively normal recent echo   -Orthostatics  -Pacemaker interrogated and is intact.  Patient did have an episode of SVT with rates into the 140s on 9/27  - Neurology consulted who recommends MRI brain w/wo contrast and routine EEG  -Continue midodrine 10 mg TID  - Continue tele

## 2024-09-28 NOTE — ASSESSMENT & PLAN NOTE
Assessment and Plan:  -Patient had fall 6/15/2024, and is status post C3-7 laminectomy with C2-T1 fusion on 6/16/24. MRI 6/15/2024 revealed Congenital canal stenosis with superimposed degenerative spondylosis .  Most pronounced at C3-4 and C5-C6 with moderate to severe canal stenosis and mild cord compression. The patient was described as having central cord syndrome   -Patient has reduced ability to sit up during exam, but has intact strength and sensation of the extremities along with no urinary issues  -PT/OT consulted   Per VO by MD. all other ROS negative except as per HPI

## 2024-09-28 NOTE — PLAN OF CARE
Problem: PHYSICAL THERAPY ADULT  Goal: Performs mobility at highest level of function for planned discharge setting.  See evaluation for individualized goals.  Description: Treatment/Interventions: ADL retraining, Functional transfer training, LE strengthening/ROM, Elevations, Therapeutic exercise, Endurance training, Bed mobility, Gait training, Spoke to nursing, OT          See flowsheet documentation for full assessment, interventions and recommendations.  Note: Prognosis: Fair  Problem List: Decreased range of motion, Decreased strength, Decreased endurance, Impaired balance, Decreased mobility  Assessment: PT orders received and acknowledged. Patient was seen today for high complexity PT evaluation. High complexity evaluation due to Ongoing medical management for primary dx, Decreased activity tolerance compared to baseline, Fall risk, Continuous pulse oximetry monitoring  Patient is a 54 y.o. male  who was [facility] to [facility] on 9/27/[facility]24  with Syncope and collapse . Pt presents to [facility] following a fall at home . At baseline, pt resides with significant other and daughter in house and was independent prior to hospital admission. Currently, upon initial examination, pt  is requiring min assist x1 for bed mobility skills;  min assist x1 for functional transfers and  min assist x1 for ambulation with RW.  Patient currently presents below baseline with limitations in gait, balance, and transfers. Patient will benefit from continued PT services while in hospital in order to address remaining limitations. The patients AM-PAC Basic Mobility Inpatient Short From Raw Score is 18 . Based on AM-PAC scoring and patient presentation, PT currently recommending Level III (Minimum Resource Intensity). Please also refer to the recommendation of the Physical Therapist for safe discharge planning.  Barriers to Discharge: None     Rehab Resource Intensity Level, PT: III (Minimum Resource Intensity)    See flowsheet  documentation for full assessment.

## 2024-09-28 NOTE — TELEPHONE ENCOUNTER
"Regarding: questions whether or not patient has a pacemaker  ----- Message from Breanne GARNETT sent at 9/27/2024 10:42 PM EDT -----  \" Luis was admitted to the hospital today due to passing out and I have a concern about whether or not he had a pacemaker put in and his respirations went down to 20. \"    "

## 2024-09-28 NOTE — CASE MANAGEMENT
Case Management Discharge Planning Note    Patient name Luis Forrester  Location [facility]-01 MRN [id number]  : 1970 Date 2024       Current Admission Date: 2024  Current Admission Diagnosis:Syncope and collapse   Patient Active Problem List    Diagnosis Date Noted Date Diagnosed    Idiopathic hypotension 2024     Pilonidal cyst 2024     Cervical myelopathy (HCC) 2024     At risk for venous thromboembolism (VTE) 2024     At risk for altered bowel elimination 2024     Cervical stenosis of spinal canal 2024     Dysphagia 2024     SSS (sick sinus syndrome) ([facility]) 2024     s/p Medtronic dual chamber PPM 2024     TBI (traumatic brain injury) ([facility]) 06/15/2024     Syncope and collapse 06/15/2024     Bilateral hand pain 06/15/2024     SDH (subdural hematoma) ([facility]) 06/15/2024     Right hand weakness 06/15/2024     Vitamin D deficiency 2024     Cough 03/15/2018     Reactive airway disease 03/15/2018     Osteoarthritis of both hands 2015     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     Chronic low back pain 2015     Lumbar radiculopathy 2015     Hyperlipidemia 2014     Hypothyroidism 2014     Allergic rhinitis 2012       LOS (days): 0  Geometric Mean LOS (GMLOS) (days):   Days to GMLOS:     OBJECTIVE:  Risk of Unplanned Readmission Score: 13.18         Current admission status: Inpatient   Preferred Pharmacy:   [id number] - [place], [place] - [address]  [place] [address]  Phone: [phone] Fax: [phone]    Primary Care Provider: Joceline Shook PA-C    Primary Insurance: [employer]  Secondary Insurance:     DISCHARGE DETAILS:             Additional Comments: LETICIA MARTINEZ was made aware by provider that pt was requesting assistance with disability application. LETICIA MARTINEZ met with pt who explained that he had completed his disability application a few months ago  but has not heard anything. LETICIA MARTINEZ explained that it does take 6-8 months to hear back from them after the application is completed. LETICIA MARTINEZ also provided the number for [facility] to reach out to for assistance if denied. Pt also inquired if his dtr and sig other could be his paid caregivers. LETICIA MARTINEZ explained that he would need to apply for waiver services. LETICIA MARTINEZ provided number for [facility] for assistance. LETICIA MARTINEZ also provided information on applying for SSD. Pt denied futhur questions.

## 2024-09-28 NOTE — ASSESSMENT & PLAN NOTE
Assessment and Plan:  -TSH 2 weeks ago 0.056, levothyroxine dose adjusted down   -Continue levothyroxine 112 mcg tablet per PEG tube

## 2024-09-28 NOTE — PROGRESS NOTES
"    INTERNAL MEDICINE RESIDENCY SENIOR ADMISSION NOTE     Name: Luis Forrester   Age & Sex: 54 y.o. male   MRN: [id number]  Unit/Bed#: ED 06   Encounter: [id number]  Primary Care Provider: Joceline Shook PA-C    Admit to team: SOD Team A    Patient seen and examined. Reviewed H&P per Dr. Lowe. Agree with the assessment and plan with any exception/addition as noted below:    Active Problems:  There are no active Hospital Problems.      Code Status: Level 1 - Full Code  Admission Status: OBSERVATION  Disposition: Patient requires Med/Surg with Telemetry  Expected Length of Stay: Less than 2 midnights       HPI:  Luis Forrester is a 53yo M with PMH of hypothyroidism, chronic cough, HLD, chronic low back pain, recent hx of syncope s/p PPM, recent SAH/SDH, cervical stenosis of spinal canal s/p C3-7 laminectomy with C2-T1 fusion, dysphagia s/p PEG presenting to [facility] on 9/27/2024 with syncopal episode around 4PM. History obtained by patient and daughter, Trinity, who was with him at the time. Patient and daughter were standing outside of a restaurant waiting for a Lyft, when she suddenly noticed that he started holding onto his walker and had deeper breathing. She then grabbed his arm and pulled a chair up and he sat down and lost consciousness.  She noticed that his eyes rolled back and his face turned bluish-purple. No headstrike or fall. EMS was called and a nurse friend felt his pulse which she noted was \" shallow\". Daughter notes that he was unconscious for 10 to 15 minutes.  When he did awaken, he was confused and noticed that he had an episode of urinary incontinence.  He does not note a prodrome or feeling lightheaded or dizzy prior to this episode. Daughter also mentions that she has been forgetting to give midodrine TID and has only been giving it once daily. Denies fevers, chills, nausea, vomiting, LH, dizziness, CP, SOB, or palpitations.   SH: never smoker, social, drinker; no STDs, lives at home; " "ambulates with walker; partner helps with ADLs, unable to complete on own; \"waddle\" gait since recent surgery     Daughter notes that patient has had 4 episodes of syncope over the last 9 months with unknown etiology.  All episodes did not have a prodrome and resulted in confusion afterward. The first episode was when he was walking outside with girlfriend; daughter was not present, so hx unclear. He did not seek medical attention at that time. The second episode was when he had bronchitis with constant coughing.  Per daughter, he did call PCP at that time who had attributed it to the coughing.  He did hit his head and had some bleeding from his ear.  Also declined hospital admission at that time. (I cannot find records of this in the notes; the only thing I see is a phone call in 5/2024 asking for medication for bronchitis). The third time was when he fell and syncopized overnight leading to the prolonged hospitalization. Daughter found him down at 5 AM and called EMS. Pt had no memory of syncopal episode. Presented to ED with back pain and b/l wrist pain. FTH new acute trace SAH and new acute trace parafalcine SDH. MRI showed congenital canal stenosis with superimposed degenerative spondylosis with areas of severe canal stenosis and mild cord compression with questionable cord abnormality. 6/16/2024 underwent C3-C7 laminectomy with C2-T1 fusion. Hospital course c/b sick sinus syndrome and wide complex tachycardia (SVT with aberrancy). S/p PPM 6/21/2024. Also c/b dysphagia, with failed initial VBS and repeat VBS, eventually resulting in PEG tube placement 7/10/24 (after initial refusal and palliative care consult with family meeting).     Upon arrival to ED patient was afebrile with temp 97.8 °F, , SpO2 96%, RR 18, BP 98/61.  Trops within normal limits.  CMP with elevated transaminases.  CBC within normal limits.  CT head without any acute intracranial abnormality.  Chest x-ray did not show acute " cardiopulmonary disease.  EKG with sinus tachycardia and no acute ischemic changes.  S/p 1 L normal saline bolus.     Patient will be admitted to [facility] for observation following recurrent syncopal episode.    Physical Exam  Constitutional:       Appearance: Normal appearance.   Cardiovascular:      Rate and Rhythm: Regular rhythm. Tachycardia present.      Pulses: Normal pulses.      Heart sounds: No murmur heard.  Pulmonary:      Effort: No respiratory distress.      Breath sounds: Normal breath sounds. No wheezing.   Abdominal:      General: There is no distension.      Palpations: Abdomen is soft.      Tenderness: There is no abdominal tenderness.      Comments: PEG site clean -- no erythema, swelling, TTP   Musculoskeletal:         General: No swelling or tenderness.   Skin:     General: Skin is warm and dry.   Neurological:      General: No focal deficit present.      Mental Status: He is alert and oriented to person, place, and time.      Cranial Nerves: Cranial nerves 2-12 are intact.      Sensory: Sensation is intact.      Motor: Motor function is intact. No weakness.   Psychiatric:         Mood and Affect: Mood normal.         Behavior: Behavior normal.     Assessment:  Syncope   -unclear etiology --last admission was attributed to SSS with PPM implantation 6/21/2024  -multiple episodes with similar presentation -- no prodrome, confusion afterward  -EP did not think this was the etiology of syncope at that time and recommended neurology consult; however, this was never pursued.   -Ddx: cardiac origin (arrhythmias) vs neuro -- seizures; less likely orthostatic, neurocardiogenic (vasovagal)    Recent TBI with SAH/SDH   Cervical stenosis of spinal canal s/p  C3-7 laminectomy with C2-T1 fusion 6/16/2024 -- follows with neurosurgery outpatient   Dysphagia s/p PEG 7/10/2024 -- tolerating regular diet   HLD  Hypothyroidism  Chronic cough    Plan  - monitor on telemetry  - ordered orthostatics  - Per ED resident,  pacer interrogation was performed but cannot find record, will order   - recent echo 6/17/2024 with LVEF 60%, normal systolic function and diastolic function, valves largely wnl trace mitral, tricuspid and pulmonic regurg, probable patent foramen ovale   -will hold off on repeat echo as syncopal episodes have been similar in presentation, day team can consider repeat imaging   - consider outpatient Zio patch/holter monitor/loop recorder (has cardiology f/u scheduled for 10/3/24)  - possibly neuro etiology, consider EEG with neurology consult vs OP workup  - continue midodrine 10mg TID  - currently tolerating regular diet, will add tube feeds as well for prn, Jevity 1.5   - continue home meds as noted in H&P    Clara Gregory,   PGY-2  Internal Medicine  [facility]

## 2024-09-28 NOTE — ASSESSMENT & PLAN NOTE
Assessment and Plan:  -Dual-chamber PPM placed in June of 2024  -Mildly tachycardic on exam and telemetry  - Continue tele  -Continue monitoring VS

## 2024-09-28 NOTE — PHYSICAL THERAPY NOTE
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[facility])    Chronic low back pain    Hyperlipidemia    Hypothyroidism    Lumbar radiculopathy    Osteoarthritis of both hands    Cough    Reactive airway disease    Vitamin D deficiency    TBI (traumatic brain injury) ([facility])    Syncope and collapse    Bilateral hand pain    SDH (subdural hematoma) ([facility])    Right hand weakness    SSS (sick sinus syndrome) ([facility])    s/p Medtronic dual chamber PPM 6/21/2024    Dysphagia    Cervical stenosis of spinal canal    Cervical myelopathy ([facility])    At risk for venous thromboembolism (VTE)    At risk for altered bowel elimination    Pilonidal cyst    Idiopathic hypotension       Past Medical History  Past Medical History:   Diagnosis Date    Arthritis     Chronic cough     Disease of thyroid gland     Hypoparathyroidism ([facility])     continue taking calcium and vitamin D, will check CMP, PTH level and Vitamin D level    Pneumonia of right middle lobe due to Streptococcus pneumoniae ([facility]) 11/30/2018    s/p Medtronic dual chamber PPM 6/21/2024 06/21/2024       Past Surgical History  Past Surgical History:   Procedure Laterality Date    CARDIAC ELECTROPHYSIOLOGY PROCEDURE N/A 6/21/2024    Procedure: Cardiac pacer implant;  Surgeon: Kofi Pettit MD;  Location: [facility];  Service: Cardiology    DECOMPRESSION SPINE CERVICAL POSTERIOR N/A 6/16/2024    Procedure: DECOMPRESSION SPINE CERVICAL POSTERIOR C2-T1 with fusion navigated with Oarm;  Surgeon: Endy Velasquez MD;  Location: [facility] MAIN OR;  Service: Neurosurgery    FRACTURE SURGERY      Open Treatment of Each Proximal Finger Phalanx    GASTROSTOMY TUBE PLACEMENT N/A 7/10/2024    Procedure: INSERTION PEG TUBE;  Surgeon: Darcy Reinoso MD;  Location: [facility] MAIN OR;  Service: General        09/28/24 1145   PT Last Visit   PT  Visit Date 09/28/24   Note Type   Note type Evaluation   Pain Assessment   Pain Assessment Tool 0-10   Pain Score No Pain   Restrictions/Precautions   Weight Bearing Precautions Per Order No   Other Precautions Fall Risk;Multiple lines;Telemetry   Home Living   Type of Home House   Home Layout One level;Stairs to enter with rails   Bathroom Shower/Tub Tub/shower unit   Bathroom Toilet Standard   Bathroom Equipment Grab bars in shower;Shower chair;Commode   Bathroom Accessibility Accessible   Home Equipment Walker   Prior Function   Level of [place] Independent with ADLs;Independent with functional mobility;Independent with IADLS   Lives With Significant other;Daughter   Receives Help From Family   IADLs Independent with meal prep;Independent with medication management;Family/Friend/Other provides transportation   Falls in the last 6 months (S)  1 to 4  (4)   Vocational Retired   General   Family/Caregiver Present Yes  (daughter)   Cognition   Overall Cognitive Status WFL   Arousal/Participation Alert   Orientation Level Oriented X4   Memory Within functional limits   Following Commands Follows all commands and directions without difficulty   Subjective   Subjective pt pleasant and cooperative throughout therapy session. pt received supine in bed   RLE Assessment   RLE Assessment X  (4-/5 grossly)   LLE Assessment   LLE Assessment X  (4-/5 grossly)   Bed Mobility   Supine to Sit 4  Minimal assistance   Additional items Assist x 1;Increased time required;Verbal cues;LE management   Sit to Supine 4  Minimal assistance   Additional items Assist x 1;Increased time required;LE management;Verbal cues   Transfers   Sit to Stand 4  Minimal assistance   Additional items Assist x 1;Increased time required;Verbal cues   Stand to Sit 4  Minimal assistance   Additional items Assist x 1;Increased time required;Verbal cues   Toilet transfer 4  Minimal assistance   Additional items Assist x 1;Increased time required;Verbal  cues;Standard toilet   Additional Comments transfers w RW   Ambulation/Elevation   Gait pattern Improper Weight shift;Wide YARIEL;Decreased foot clearance;Shuffling;Short stride;Excessively slow   Gait Assistance 4  Minimal assist   Additional items Assist x 1;Verbal cues;Tactile cues   Assistive Device Rolling walker   Distance 80'+80'   Stair Management Assistance Not tested   Balance   Static Sitting Fair +   Dynamic Sitting Fair +   Static Standing Fair   Dynamic Standing Fair -   Ambulatory Poor +   Endurance Deficit   Endurance Deficit Yes   Endurance Deficit Description generalized weakness, decreased exercise tolerance   Activity Tolerance   Activity Tolerance Patient limited by fatigue   Nurse Made Aware RN cleared and updated   Assessment   Prognosis Fair   Problem List Decreased range of motion;Decreased strength;Decreased endurance;Impaired balance;Decreased mobility   Assessment PT orders received and acknowledged. Patient was seen today for high complexity PT evaluation. High complexity evaluation due to Ongoing medical management for primary dx, Decreased activity tolerance compared to baseline, Fall risk, Continuous pulse oximetry monitoring  Patient is a 54 y.o. male  who was admitted to [facility] on 9/27/2024  with Syncope and collapse . Pt presents to [facility] following a fall at home . At baseline, pt resides with significant other and daughter in house and was independent prior to hospital admission. Currently, upon initial examination, pt  is requiring min assist x1 for bed mobility skills;  min assist x1 for functional transfers and  min assist x1 for ambulation with RW.  Patient currently presents below baseline with limitations in gait, balance, and transfers. Patient will benefit from continued PT services while in hospital in order to address remaining limitations. The patients AM-PAC Basic Mobility Inpatient Short From Raw Score is 18 . Based on AM-PAC scoring and patient presentation,  PT currently recommending Level III (Minimum Resource Intensity). Please also refer to the recommendation of the Physical Therapist for safe discharge planning.   Barriers to Discharge None   Goals   Patient Goals to go home   STG Expiration Date 10/12/24   Short Term Goal #1 Patient will be able to perform bed mobility tasks with  modified independent   in order to improve overall functional mobility and assist in safe d/c. STG 2 Patient will sit EOB for at least 25 minutes at  modified independent   level in order to strengthen abdominal musculature and assist in future transfers and ambulation. STG 3 Patient will be able to perform functional transfer with  modified independent   in order to improve overall functional mobility and assist in safe discharge. STG 4 Patient will be able to ambulate at least 300 feet with least restrictive device with  modified independent   assist in order to improve overall functional mobility and assist in safe discharge. STG 5 Patient will improve sitting/standing static/dynamic balance 1/2 grade in order to improve functional mobility and assist in safe discharge. STG 6 Patient will improve LE strength by 1/2 grade in order to improve functional mobility and assist in safe discharge. STG 7 Patient will be able to negotiate at least 1 stairs with least restrictive device with  modified independent   A in order to improve overall functional mobility and assist in safe discharge   PT Treatment Day 0   Plan   Treatment/Interventions ADL retraining;Functional transfer training;LE strengthening/ROM;Elevations;Therapeutic exercise;Endurance training;Bed mobility;Gait training;Spoke to nursing;OT   PT Frequency 2-3x/wk   Discharge Recommendation   Rehab Resource Intensity Level, PT III (Minimum Resource Intensity)   AM-PAC Basic Mobility Inpatient   Turning in Flat Bed Without Bedrails 4   Lying on Back to Sitting on Edge of Flat Bed Without Bedrails 3   Moving Bed to Chair 3   Standing  Up From Chair Using Arms 3   Walk in Room 3   Climb 3-5 Stairs With Railing 2   Basic Mobility Inpatient Raw Score 18   Basic Mobility Standardized Score 41.05   [facility] Highest Level Of Mobility   -HL Goal 6: Walk 10 steps or more   -HLM Achieved 7: Walk 25 feet or more   Additional Treatment Session   Start Time 1130   End Time 1145   Treatment Assessment upon completion of gait, pt was assisted to and from toilet. pt also required assist with self-care and hygiene due to increased weakness. pt requires increased verbal cuing for safety with transfers as well this date.   Additional Treatment Day 0   End of Consult   Patient Position at End of Consult Supine;All needs within reach           Dimas Gutierrez, PT

## 2024-09-28 NOTE — ASSESSMENT & PLAN NOTE
Assessment and Plan:  - one month ago, per ASCVD risk algorithm moderate intensity statin recommended.   Plan:  -Consider starting statin outpatient

## 2024-09-28 NOTE — PROGRESS NOTES
INTERNAL MEDICINE RESIDENCY PROGRESS NOTE     Name: Luis Forrester   Age & Sex: 54 y.o. male   MRN: [id number]  Unit/Bed#: [facility] 408-01   Encounter: [id number]  Team: SOD Team A    PATIENT INFORMATION     Name: Luis Forrester   Age & Sex: 54 y.o. male   MRN: [id number]  Hospital Stay Days: 0    ASSESSMENT/PLAN     Principal Problem:    Syncope and collapse  Active Problems:    Hyperlipidemia    Hypothyroidism    Cough    TBI (traumatic brain injury) (HCC)    SSS (sick sinus syndrome) ([facility])    Cervical stenosis of spinal canal      * Syncope and collapse  Assessment & Plan  Assessment:  -Patient denies having any prodrome, or any weakness following events. After all four events, patient was confused. Patient has been missing midodrine third doses several days, including before this incident. Patient urinated himself after this fall, but EMS workers did not see any tongue bites. His lips and face turned blue during this event.  -Patient and daughter report that this is the fourth time he has synopsized, the first one of which  was around six months ago. Note in 6/2024 references that the patient had six months of syncope prior, so this may have gone on nine months   -6/2024 echocardiogram reveals trace tricuspid, mitral, and pulmonic regurgitation, with mild aortic root dilatation. EF is 60% and systolic/diastolic function is normal   -Vasovagal and orthostatic syncope unlikely, neurologic and cardiac etiologies possible  Plan:  -Can hold off on new echocardiogram in setting of relatively normal recent echo   -Orthostatics  -Pacemaker interrogated and is intact.  Patient did have an episode of SVT with rates into the 140s on 9/27  - Neurology consulted, appreciate recs  -Continue midodrine 10 mg TID  - Continue tele      Cervical stenosis of spinal canal  Assessment & Plan  Assessment and Plan:  -Patient had fall 6/15/2024, and is status post C3-7 laminectomy with C2-T1 fusion on 6/16/24. MRI 6/15/2024 revealed  Congenital canal stenosis with superimposed degenerative spondylosis .  Most pronounced at C3-4 and C5-C6 with moderate to severe canal stenosis and mild cord compression. The patient was described as having central cord syndrome   -Patient has reduced ability to sit up during exam, but has intact strength and sensation of the extremities along with no urinary issues  -PT/OT consulted    SSS (sick sinus syndrome) ([facility])  Assessment & Plan  Assessment and Plan:  -Dual-chamber PPM placed in June of 2024  -Mildly tachycardic on exam and telemetry  - Continue tele  -Continue monitoring VS     TBI (traumatic brain injury) ([facility])  Assessment & Plan  Assessment and Plan:  -Occurred during the patient third fall this year 6/15/2024. CT imaging showed signs of SAH in the bilateral parafalcine regions and potential SDH   -Patient did hit his head and had bleeding behind his ear during his second fall, but did not get medically evaluated   -Patient did not hit head on the fall prior to this admission, and noncon CT head shows no acute bleeding   -Neurologic exam intact     Cough  Assessment & Plan  Assessment and Plan:  -Chronic and may be secondary to allergic rhinitis  -Patient takes benzonatate 100 mg capsule TID at home, continue on a prn basis    Hypothyroidism  Assessment & Plan  Assessment and Plan:  -TSH 2 weeks ago 0.056, levothyroxine dose adjusted down   -Continue levothyroxine 112 mcg tablet per PEG tube     Hyperlipidemia  Assessment & Plan  Assessment and Plan:  - one month ago, per ASCVD risk algorithm moderate intensity statin recommended.   Plan:  -Consider starting statin outpatient         Disposition: syncopal workup     SUBJECTIVE     Patient seen and examined. No acute events overnight.  Denies chest pain, fever, chills, nausea, vomiting, headache, dizziness, lightheadedness, confusion.  Patient reports drinking about a glass of water a day and will have other fluids.  Patient denies ever having  history of seizures and denies tongue biting but endorses feeling confused after his syncopal episodes.    OBJECTIVE     Vitals:    24 0600 24 0800 24 0900 24 1049   BP: 96/55 115/64 105/64 97/57   BP Location: Right arm Right arm Right arm Right arm   Pulse: 85 93 94 82   Resp: 17 16 17 18   Temp:   98 °F (36.7 °C) 98.1 °F (36.7 °C)   TempSrc:   Oral Oral   SpO2: 93% 95% 96% 95%      Temperature:   Temp (24hrs), Av °F (36.7 °C), Min:97.8 °F (36.6 °C), Max:98.1 °F (36.7 °C)    Temperature: 98.1 °F (36.7 °C)  Intake & Output:  I/O          0701   07 07 07 07 0700    NG/GT   150    Total Intake   150    Urine  650 800    Total Output  650 800    Net  -650 -650                 Weights:        There is no height or weight on file to calculate BMI.  Weight (last 2 days)       None          Physical Exam  Vitals and nursing note reviewed.   Constitutional:       General: He is not in acute distress.     Appearance: Normal appearance. He is well-developed.   HENT:      Head: Normocephalic and atraumatic.   Eyes:      Conjunctiva/sclera: Conjunctivae normal.   Cardiovascular:      Rate and Rhythm: Normal rate and regular rhythm.      Pulses: Normal pulses.      Heart sounds: No murmur heard.  Pulmonary:      Effort: Pulmonary effort is normal. No respiratory distress.      Breath sounds: Normal breath sounds. No wheezing.   Abdominal:      General: There is no distension.      Palpations: Abdomen is soft.      Tenderness: There is no abdominal tenderness.      Comments: PEG site clean -- no erythema, swelling, TTP   Musculoskeletal:         General: No swelling or tenderness.      Cervical back: Neck supple.   Skin:     General: Skin is warm and dry.      Capillary Refill: Capillary refill takes less than 2 seconds.   Neurological:      General: No focal deficit present.      Mental Status: He is alert and oriented to person, place, and time.       Cranial Nerves: Cranial nerves 2-12 are intact.      Sensory: Sensation is intact.      Motor: Motor function is intact. No weakness.   Psychiatric:         Mood and Affect: Mood normal.         Behavior: Behavior normal.       LABORATORY DATA     Labs: I have personally reviewed pertinent reports.  Results from last 7 days   Lab Units 09/28/24  0601 09/27/24  1718   WBC Thousand/uL 7.43 9.73   HEMOGLOBIN g/dL 12.6 14.1   HEMATOCRIT % 37.4 42.1   PLATELETS Thousands/uL 161 204   SEGS PCT % 54 63   MONO PCT % 12 11   EOS PCT % 2 2      Results from last 7 days   Lab Units 09/28/24  0601 09/27/24  1718   POTASSIUM mmol/L 3.8 3.5   CHLORIDE mmol/L 104 99   CO2 mmol/L 24 24   BUN mg/dL 12 13   CREATININE mg/dL 0.58* 0.72   CALCIUM mg/dL 7.4* 8.1*   ALK PHOS U/L  --  82   ALT U/L  --  53*   AST U/L  --  46*     Results from last 7 days   Lab Units 09/27/24  1718   MAGNESIUM mg/dL 1.9                        IMAGING & DIAGNOSTIC TESTING     Radiology Results: I have personally reviewed pertinent reports.  XR chest 1 view portable    Result Date: 9/27/2024  Impression: No acute cardiopulmonary disease. Workstation performed: [id number]     CT head without contrast    Result Date: 9/27/2024  Impression: No acute intracranial abnormality. Workstation performed: [id number]     Other Diagnostic Testing: I have personally reviewed pertinent reports.    ACTIVE MEDICATIONS       Current Facility-Administered Medications:     acetaminophen (TYLENOL) oral suspension 640 mg, Q6H PRN    benzonatate (TESSALON PERLES) capsule 100 mg, TID PRN    enoxaparin (LOVENOX) subcutaneous injection 40 mg, Daily    levothyroxine tablet 112 mcg, Early Morning    melatonin tablet 9 mg, HS    midodrine (PROAMATINE) tablet 5 mg, TID AC    VTE Pharmacologic Prophylaxis: Enoxaparin (Lovenox)  VTE Mechanical Prophylaxis: sequential compression device    Portions of the record may have been created with voice recognition software.  Occasional wrong word or  "\"sound a like\" substitutions may have occurred due to the inherent limitations of voice recognition software.  Read the chart carefully and recognize, using context, where substitutions have occurred.  ==  Reinaldo Perez DO  [facility]  Internal Medicine Residency PGY-2      "

## 2024-09-28 NOTE — UTILIZATION REVIEW
Initial Clinical Review    OBSERVATION 9/27 2132 CHANGED TO INPATIENT 9/28 @ 1247 CONTINUED EVALUATION OF SYNCOPE, CHECK MRI BRAIN     Admission: Date/Time/Statement:   Admission Orders (From admission, onward)       Ordered        09/28/24 1247  INPATIENT ADMISSION  Once                       Orders Placed This Encounter   Procedures    INPATIENT ADMISSION     Standing Status:   Standing     Number of Occurrences:   1     Order Specific Question:   Level of Care     Answer:   Med Surg [16]     Comments:   with telemetry     Order Specific Question:   Estimated length of stay     Answer:   More than 2 Midnights     Order Specific Question:   Certification     Answer:   I certify that inpatient services are medically necessary for this patient for a duration of greater than two midnights. See H&P and MD Progress Notes for additional information about the patient's course of treatment.               ED Arrival Information       Expected   9/27/2024     Arrival   9/27/2024 17:07    Acuity   Urgent              Means of arrival   Ambulance    Escorted by   [facility]    Service   [facility]    Admission type   Emergency              Arrival complaint   Dizziness             Chief Complaint   Patient presents with    Syncope     Reports had syncopal episode today while waiting for lyft.        Initial Presentation: 54 y.o. male presents to ED via EMS due to episode in which pt lost consciousness Patients daughter noticed he was holding on to walker and breathing more deeply. She assisted him to chair and he lost consciousness. Eyes rolled back lips and face turned blue., incontinent of urine. Confused upon awakening.  PMH 4 episodes syncope last 9 months.  In 6/2024 he had prolonged hospitalization after syncope and fall with TBI, SAH, required C3-7 laminectomy, C2-T1 fusion, complicated by SSS, SVT, required PPM, dysphagia requiring PEG placement  Arrival to ED patient was afebrile with temp 97.8 °F, [place]  108, SpO2 96%, RR 18, BP 98/61. CBC was within normal limits, and transaminases were mildly elevated. He received one L NS. Noncon CT head did not show any acute intracranial abnormality and CXR did not show acute cardiopulmonary disease. EKG revealed sinus tachycardia. Pt has cast on right forearm. No focal neuro deficit PEG site clean, no swelling erythema.   Admitted as Obs to med surg Syncope of unknown etiology Plan monitor telemetry , check orthostatics, check pacer interrogation, continue midrodrine, add tube feed jevity as needed Pt currently toleares reg diet and is weaning from TF Consider EEG, Zio patch/holter    9/28 CHANGED TO INPATIENT       Neurology consult  Neurology consulted for further evaluation of syncope. Low suspicion for seizure at this time.  Workup thus far shows No acute abnormality CT head, Pacer interrogation shows  SVT with HR in 140s on 9/27  Ortho BPs: lying 102/59, sitting 100/59, standing 108/60    Recommend MRI brain w/w/o contrast, check routine EEG, Clarify if patient drives, monitor neuroexam notify of any changes, Continue Fall precautions, PT/OT       Date 9/29 MRI ,, EEG pending. He does not drive. Negative orthostatics  Overall feels better, no presyncope, syncope, cp, sob, palpitations or dizziness. Is asking if PEG tube can stay in for meds only after able to tolerate all feedings orally. He states he is afraid he will choke on meds. Speech eval pending.  Does also follow with speech as outpatient.  Cont to monitor on tele, no events, PPM read and showed sinus tachy, Neuro w/u pending.  Hypocalcemia chronic,  ionized duyen 0.95 check PTH, magnesium, phosphorous, and vitamin D levels     ED Treatment-Medication Administration from 09/27/2024 1707 to 09/28/2024 0848         Date/Time Order Dose Route Action     09/27/2024 1819 sodium chloride 0.9 % bolus 1,000 mL 1,000 mL Intravenous New Bag     09/27/2024 0064 acetaminophen (TYLENOL) oral suspension 640 mg 640 mg Oral Given      09/27/2024 2350 benzonatate (TESSALON PERLES) capsule 100 mg 100 mg Oral Given     09/27/2024 2334 melatonin tablet 9 mg 9 mg Per PEG Tube Given     09/28/2024 0552 levothyroxine tablet 112 mcg 112 mcg Per PEG Tube Given     09/28/2024 0816 enoxaparin (LOVENOX) subcutaneous injection 40 mg 40 mg Subcutaneous Given     09/28/2024 0816 potassium chloride oral solution 20 mEq 20 mEq Per PEG Tube Given            Scheduled Medications:  enoxaparin, 40 mg, Subcutaneous, Daily  levothyroxine, 112 mcg, Per PEG Tube, Early Morning  magnesium sulfate, 2 g, Intravenous, Once  melatonin, 9 mg, Per PEG Tube, HS  midodrine, 5 mg, Per PEG Tube, TID AC      Continuous IV Infusions:     PRN Meds:  acetaminophen, 640 mg, Per PEG Tube, Q6H PRN  benzonatate, 100 mg, Oral, TID PRN      ED Triage Vitals   Temperature Pulse Respirations Blood Pressure SpO2 Pain Score   09/27/24 1717 09/27/24 1715 09/27/24 1715 09/27/24 1717 09/27/24 1715 09/27/24 1715   97.8 °F (36.6 °C) (!) 108 18 98/61 96 % No Pain     Weight (last 2 days)       Date/Time Weight    09/28/24 0900 71.2 (157)            Vital Signs (last 3 days)       Date/Time Temp Pulse Resp BP MAP (mmHg) SpO2 O2 Device Patient Position - Orthostatic VS Daggett Coma Scale Score Pain    09/29/24 1141 97.6 °F (36.4 °C) 96 18 119/63 82 95 % None (Room air) Sitting -- --    09/29/24 0800 -- -- -- -- -- -- -- -- 15 No Pain    09/29/24 0700 98.1 °F (36.7 °C) 78 17 109/63 80 97 % None (Room air) Lying -- --    09/29/24 0300 98 °F (36.7 °C) 78 18 96/65 76 99 % None (Room air) Lying -- --    09/28/24 2236 98 °F (36.7 °C) 70 18 103/59 76 99 % None (Room air) Lying -- --    09/28/24 2000 -- -- -- -- -- -- -- -- 15 No Pain    09/28/24 1900 98 °F (36.7 °C) 80 18 108/68 82 97 % None (Room air) Lying -- --    09/28/24 1500 98.1 °F (36.7 °C) 80 18 97/57 72 -- -- Lying -- --    09/28/24 1255 -- -- -- 108/60 78 -- -- Standing - Orthostatic VS -- --    09/28/24 1252 -- -- -- 100/59 76 -- -- Sitting -  Orthostatic VS -- --    09/28/24 1250 -- -- -- 102/59 73 -- -- Lying - Orthostatic VS -- --    09/28/24 1145 -- -- -- -- -- -- -- -- -- No Pain    09/28/24 1049 98.1 °F (36.7 °C) 82 18 97/57 69 95 % -- Lying 15 No Pain    09/28/24 0900 98 °F (36.7 °C) 94 17 105/64 79 96 % -- Lying -- --    09/28/24 0800 -- 93 16 115/64 81 95 % None (Room air) -- -- --    09/28/24 0600 -- 85 17 96/55 70 93 % None (Room air) Lying -- --    09/28/24 0400 -- 90 19 105/61 76 96 % None (Room air) Lying -- --    09/27/24 1719 -- -- -- -- -- -- None (Room air) -- 15 No Pain    09/27/24 1717 97.8 °F (36.6 °C) -- -- 98/61 -- -- -- -- -- --    09/27/24 1715 -- 108 18 -- -- 96 % None (Room air) -- -- No Pain              Pertinent Labs/Diagnostic Test Results:   Radiology:  CT head without contrast   Final Interpretation by Darin Morel MD (09/27 2109)      No acute intracranial abnormality.            XR chest 1 view portable   ED Interpretation by Felicitas James MD (09/27 2007)   No acute cardiopulmonary disease        Final Interpretation by Isidoro Hilliard MD (09/27 2335)      No acute cardiopulmonary disease.         MRI inpatient order    (Results Pending)     Cardiology:   9/27 EKG    Sinus tachycardia  Otherwise normal ECG        Results from last 7 days   Lab Units 09/29/24  0500 09/28/24  0601 09/27/24  1718   WBC Thousand/uL 7.61 7.43 9.73   HEMOGLOBIN g/dL 13.1 12.6 14.1   HEMATOCRIT % 39.3 37.4 42.1   PLATELETS Thousands/uL 150 161 204   TOTAL NEUT ABS Thousands/µL 4.23 4.04 6.23         Results from last 7 days   Lab Units 09/29/24  0500 09/28/24  0601 09/27/24  1718   SODIUM mmol/L 140 137 135   POTASSIUM mmol/L 3.8 3.8 3.5   CHLORIDE mmol/L 104 104 99   CO2 mmol/L 28 24 24   ANION GAP mmol/L 8 9 12   BUN mg/dL 11 12 13   CREATININE mg/dL 0.59* 0.58* 0.72   EGFR ml/min/1.73sq m 113 113 104   CALCIUM mg/dL 7.7* 7.4* 8.1*   CALCIUM, IONIZED mmol/L 0.95*  --   --    MAGNESIUM mg/dL 1.8*  --  1.9   PHOSPHORUS mg/dL 4.9*  --   --       Results from last 7 days   Lab Units 09/29/24  0500 09/27/24  1718   AST U/L 37 46*   ALT U/L 49 53*   ALK PHOS U/L 75 82   TOTAL PROTEIN g/dL 6.6 7.2   ALBUMIN g/dL 3.5 3.9   TOTAL BILIRUBIN mg/dL 0.46 0.58         Results from last 7 days   Lab Units 09/29/24  0500 09/28/24  0601 09/27/24  1718   GLUCOSE RANDOM mg/dL 101 78 80           Results from last 7 days   Lab Units 09/27/24  2142 09/27/24  1718   HS TNI 0HR ng/L  --  5   HS TNI 4HR ng/L 4  --    HSTNI D4 ng/L -1  --              Past Medical History:   Diagnosis Date    Arthritis     Chronic cough     Disease of thyroid gland     Hypoparathyroidism ([facility])     continue taking calcium and vitamin D, will check CMP, PTH level and Vitamin D level    Pneumonia of right middle lobe due to Streptococcus pneumoniae ([facility]) 11/30/2018    s/p Medtronic dual chamber PPM 6/21/2024 06/21/2024     Present on Admission:   Cough   Hyperlipidemia   Hypothyroidism   SSS (sick sinus syndrome) ([facility])   TBI (traumatic brain injury) ([facility])   Cervical stenosis of spinal canal   Syncope and collapse      Admitting Diagnosis: Syncope [R55]  Age/Sex: 54 y.o. male    Network Utilization Review Department  ATTENTION: Please call with any questions or concerns to [phone] and carefully listen to the prompts so that you are directed to the right person. All voicemails are confidential.   For Discharge needs, contact Care Management DC Support Team at [phone] opt. 2  Send all requests for admission clinical reviews, approved or denied determinations and any other requests to dedicated fax number below belonging to the campus where the patient is receiving treatment. List of dedicated fax numbers for the Facilities:  FACILITY NAME UR FAX NUMBER   ADMISSION DENIALS (Administrative/Medical Necessity) [phone]   DISCHARGE SUPPORT TEAM (NETWORK) [phone]   PARENT CHILD HEALTH (Maternity/NICU/Pediatrics) [phone]   [facility] [phone]   [facility]  [facility] [phone]   [facility] [phone]   [facility] [phone]   Counts include 234 beds at the [facility] [phone]   [facility] [phone]   [facility] [phone]   [facility] [phone]   [facility] [phone]   [facility] [phone]   [facility] [phone]   [facility] [phone]

## 2024-09-28 NOTE — H&P
H&P - Internal Medicine   Name: Luis Forrester 54 y.o. male I MRN: [id number]  Unit/Bed#: ED 06 I Date of Admission: 9/27/2024   Date of Service: 9/28/2024 I Hospital Day: 0     Assessment & Plan  Cough  Assessment and Plan:  -Chronic and may be secondary to allergic rhinitis  -Patient takes benzonatate 100 mg capsule TID at home, continue   Hyperlipidemia  Assessment and Plan:  - one month ago, per ASCVD risk algorithm moderate intensity statin recommended.   Plan:  -Consider starting statin outpatient   Hypothyroidism  Assessment and Plan:  -TSH 2 weeks ago 0.056, levothyroxine dose adjusted down   -Continue levothyroxine 112 mcg tablet per PEG tube   SSS (sick sinus syndrome) ([facility])  Assessment and Plan:  -Dual-chamber PPM placed in June of 2024  -Mildly tachycardic on exam and telemetry  -Continue monitoring VS   TBI (traumatic brain injury) ([facility])  Assessment and Plan:  -Occurred during the patient third fall this year 6/15/2024. CT imaging showed signs of SAH in the bilateral parafalcine regions and potential SDH   -Patient did hit his head and had bleeding behind his ear during his second fall, but did not get medically evaluated   -Patient did not hit head on the fall prior to this admission, and noncon CT head shows no acute bleeding   -Neurologic exam intact   Cervical stenosis of spinal canal  Assessment and Plan:  -Patient had fall 6/15/2024, and is status post C3-7 laminectomy with C2-T1 fusion on 6/16/24. MRI 6/15/2024 revealed Congenital canal stenosis with superimposed degenerative spondylosis .  Most pronounced at C3-4 and C5-C6 with moderate to severe canal stenosis and mild cord compression. The patient was described as having central cord syndrome   -Patient has reduced ability to sit up during exam, but has intact strength and sensation of the extremities along with no urinary issues  -PT/OT consulted  Syncope and collapse  Assessment:  -Patient denies having any prodrome, or any weakness  following events. After all four events, patient was confused. Patient has been missing midodrine third doses several days, including before this incident. Patient urinated himself after this fall, but EMS workers did not see any tongue bites. His lips and face turned blue during this event.  -Patient and daughter report that this is the fourth time he has synopsized, the first one of which  was around six months ago. Note in 6/2024 references that the patient had six months of syncope prior, so this may have gone on nine months   -6/2024 echocardiogram reveals trace tricuspid, mitral, and pulmonic regurgitation, with mild aortic root dilatation. EF is 60% and systolic/diastolic function is normal   -Vasovagal and orthostatic syncope unlikely, neurologic and cardiac etiologies possible  Plan:  -Patient underwent interrogation of pacemaker, follow up results   -Can likely hold off on new echocardiogram in setting of relatively normal recent echo   -Orthostatics ordered  -Consider EEG with neurology consult   -Consider Zio patch/Holter monitor  -Continue midodrine 10 mg TID    Code Status: Level 1 - Full Code  Admission Status: INPATIENT   Disposition: Patient requires Med Surg with Telemetry    Admit to team: SOD TEAM A    History of Present Illness   The patient is a 54-year-old male with a history of dual chamber pacemaker for prior syncopal episodes and likely SSS and SVT with aberrancy, hyperlipidemia, hypothyroidism, recent TBI with SAH after a recent fall. He presents to [facility] ED 9/27/2024 after a fall around 5 PM outside a restaurant. He was on track to fall, but the daughter placed him into a chair and he did not hit his head. The daughter states that during the fall his face and lips turned blue, and that he urinated himself. She denies seeing any jerking motion. The patient denies any unusual sensations such as a warm sensation before the fainting, and denies getting up suddenly. He states that it came on  suddenly. The patient denied any nausea, vomiting, chest pain, SOB, fevers, chills, abdominal pain, urinary difficulties, and change in bowel movements. He denies any recent COVID or flu-like symptoms, but does endorse several months of cough.     Of note, the patient has had three other such episodes over the last 6-9 months. The first episode the patient was walking outside with his girlfriend, and fainted in front of a house. The second episode the patient fell and hit his head, and had bleeding behind his ear. He was having significant coughing. He called the PCP at the time and per the daughter the episode was attributed to his coughing. The third time was in 6/2024, when he underwent prolong hospitalization after TBI and SAH. He underwent C3-7 laminectomy with C2-T1 fusion on 6/16/24 for cord compression identified on MR imaging. The hospital course was complicated by likely sick sinus syndrome and wide-complex tachycardia which was likely SVT with aberrancy and PPM placement.  The course was also complicated by dysphagia and eventual PEG tube placement, which the patient has been attempting to wean off of.     On arrival to ED patient was afebrile with temp 97.8 °F, , SpO2 96%, RR 18, BP 98/61. CBC was within normal limits, and transaminases were mildly elevated. He received one L NS. Noncon CT head did not show any acute intracranial abnormality and CXR did not show acute cardiopulmonary disease. EKG revealed sinus tachycardia.    Review of Systems   Constitutional:  Negative for chills and fever.   HENT:  Negative for hearing loss, rhinorrhea and voice change.    Eyes:  Negative for visual disturbance.   Respiratory:  Positive for cough. Negative for wheezing.    Cardiovascular:  Negative for chest pain and leg swelling.   Gastrointestinal:  Negative for abdominal pain, constipation, diarrhea, nausea and vomiting.   Genitourinary:  Negative for difficulty urinating.   Musculoskeletal:  Positive for  gait problem.   Neurological:  Positive for syncope. Negative for dizziness, speech difficulty, weakness and light-headedness.                 Social history:  -Social alcohol use  -Reported Non-smoker   -No reported illicit drug use     Family History:   -Daughter has seizures        Objective     Vitals:    24 1715 24 1717   BP:  98/61   Pulse: (!) 108    Resp: 18    Temp:  97.8 °F (36.6 °C)   SpO2: 96%       Temperature:   Temp (24hrs), Av.8 °F (36.6 °C), Min:97.8 °F (36.6 °C), Max:97.8 °F (36.6 °C)    Temperature: 97.8 °F (36.6 °C)  Intake & Output:  I/O       None          Weights:        There is no height or weight on file to calculate BMI.  Weight (last 2 days)       None          Physical Exam  Constitutional:       General: He is not in acute distress.  Cardiovascular:      Rate and Rhythm: Regular rhythm. Tachycardia present.      Heart sounds: No murmur heard.  Pulmonary:      Effort: Pulmonary effort is normal. No respiratory distress.      Breath sounds: Normal breath sounds. No wheezing.   Abdominal:      General: Abdomen is flat. Bowel sounds are normal. There is no distension.      Palpations: Abdomen is soft.      Tenderness: There is no abdominal tenderness.   Musculoskeletal:         General: Signs of injury present. No swelling.      Right lower leg: No edema.      Left lower leg: No edema.      Comments: Right forearm injury and cast    Skin:     General: Skin is warm and dry.      Coloration: Skin is not jaundiced.   Neurological:      General: No focal deficit present.      Mental Status: He is alert and oriented to person, place, and time.      Sensory: No sensory deficit.      Motor: No weakness.           Lab Results: I have reviewed the following results:   Recent Labs     24  1718   WBC 9.73   HGB 14.1   HCT 42.1      SODIUM 135   K 3.5   CL 99   CO2 24   BUN 13   CREATININE 0.72   GLUC 80   MG 1.9   AST 46*   ALT 53*   ALB 3.9   TBILI 0.58   ALKPHOS 82  "  HSTNI0 5     Micro:  No results found for: \"BLOODCX\", \"URINECX\", \"WOUNDCULT\", \"SPUTUMCULTUR\"  Imaging Review: Reviewed radiology reports from this admission including: chest xray and CT head.  Other Studies: EKG was reviewed.     Currently Ordered Meds:   Current Facility-Administered Medications:     acetaminophen (TYLENOL) oral suspension 640 mg, Q6H PRN    benzonatate (TESSALON PERLES) capsule 100 mg, TID    enoxaparin (LOVENOX) subcutaneous injection 40 mg, Daily    levothyroxine tablet 112 mcg, Early Morning    melatonin tablet 9 mg, HS    midodrine (PROAMATINE) tablet 5 mg, TID AC  VTE Pharmacologic Prophylaxis: Reason for no pharmacologic prophylaxis low risk per admission criteria and recent intracranial bleed   VTE Mechanical Prophylaxis: reason for no mechanical VTE prophylaxis low risk per admission criteria     Administrative Statements     Portions of the record may have been created with voice recognition software.  Occasional wrong word or \"sound a like\" substitutions may have occurred due to the inherent limitations of voice recognition software.  Read the chart carefully and recognize, using context, where substitutions have occurred.  ==  Vini Lowe MD    Internal Medicine Residency PGY-1  "

## 2024-09-28 NOTE — PROGRESS NOTES
Nutrition History  Patient reports home TF is Jevity 1.5 @ 237 mL/hr. Gets water flushes of 150mL before and after each bolus. States he will hold a bolus if he eats a meal. Regular texture oral diet at home, eats slowly. Follows with outpatient SLP. Daughter reports that he gets 1-2 bolus feedings per day on average. Medications given via PEG tube at home.       Plan  Recommend adjusting EN to 237mL Jevity 1.5 bolus BID with 150 mL water flushes before and after each bolus. Will provide 474 mL formula, 711 calories, 30g protein, 360 mL free water, 960 mL total water. Give bolus at 6:00 PM and 9:00 PM to maximize oral intake during the day. Calorie count hung for 9/29-9/30 to better assess oral intake. Will plan to adjust supplemental EN based on calorie count results.     Patient and daughter stated they would like to stop tube feeds and inquired about having PEG tube removed during admission. RD reviewed current plan with them.     Please obtain an updated standing weight as able. Monitor electrolytes.

## 2024-09-28 NOTE — CONSULTS
Consultation - Neurology   Name: Luis Forrester 54 y.o. male I MRN: [id number]  Unit/Bed#: [facility] 408-01 I Date of Admission: 9/27/2024   Date of Service: 9/28/2024 I Hospital Day: 0     Inpatient consult to Neurology  Consult performed by: ÓSCAR Linares  Consult ordered by: Reinaldo Perez DO        Physician Requesting Evaluation: Toshia Mcmillan DO   Reason for Evaluation / Principal Problem: Syncope    Assessment & Plan  Syncope and collapse  54-year-old male with recent SAH/SDH s/p suspected fall in 06/2024, SSS s/p pacemaker 06/2024, s/p C3-C7 laminectomy with C2-T1 fusion 06/2024, recurrent syncopal episodes, arthritis, hypothyroidism, who presented on 9/27 after having a syncopal episode.  The episode was witnessed by the daughter, who reported that patient was standing when she noted that he was holding onto his walker and had deeper breathing.  Patient's daughter assisted him to sit into a chair, where he lost consciousness, had his eyes rolled back, and his face turned blue.  The event lasted approximately 10-15 minutes before patient woke up and was confused and was noted to have an episode of urinary incontinence.  Of note, patient is ordered midodrine 3 times daily but has only been receiving it daily due to family forgetting to give it to him.  BP on presentation 98/61.  Neurology consulted for further evaluation of syncope. Low suspicion for seizure at this time but will work up as noted below.    Workup:  -CT head: No acute intracranial abnormality.  -Pacemaker was interrogated and patient was noted to have SVT with HR in 140s on 9/27  -Ortho BPs: lying 102/59, sitting 100/59, standing 108/60    Plan:  -MRI brain w/wo contrast seizure protocol  -Routine EEG  -Will hold off on starting AED at this time due to lower concern for seizure  -Will need to clarify with patient if he drives  -Fall precautions  -PT/OT  -Monitor neuroexam; notify with any changes  -Medical management and supportive care per  "primary team. Correction of any metabolic or infectious disturbances.   SSS (sick sinus syndrome) (HCC)  -S/p pacemaker placement in 06/2024  -Medical management per primary team  Cervical stenosis of spinal canal  -S/p C3-C7 laminectomy with C2-T1 fusion in 06/2024    Recommendations for outpatient neurological follow up have yet to be determined.    Case and treatment plan reviewed with attending neurologist, Dr. Mcfarland. Please see attending attestation for any further recommendations.    History of Present Illness   HPI: Luis Forrester is a 54 y.o.  male with pacemaker, arthritis, hypothyroidism, who presents with syncope.    Patient seen and evaluated. Patient reports he has been having recurrent syncopal events. He usually does not recall what happens during the events. Yesterday, he does recall standing for a while as he and his daughter were waiting for their [employer] to come. He reports he got tired so he asked to sit down. Daughter at bedside reports that patient appeared unwell and was breathing heavier. She assisted him to the chair. When he sat down on the chair, he lost consciousness. Daughter reports his eyes rolled back, his face turned blue, and he had a \"shallow\" pulse. She denies any shaking or jerking. EMS was initiated. Patient remained unconscious for approximately 10-15 minutes before waking up. Daughter reports patient was confused when he woke up and had urinated himself. Patient does not recall anything after sitting in the chair until he woke up and reports there were EMS and police around him. He states he was confused when he woke up in regards to what had happened and why there were people surrounding him but he recalled being at the restaurant and waiting for the car. He denied tongue bite. He states that he usually is confused as to what happened when he wakes up from the syncopal episodes but he is aware of his surroundings. He denies seizure history. He denies any new weakness, " dizziness/lightheadedness, numbness/tingling, pain. He reports feeling well until this event and denies any fevers, chills, decreased PO intake, or poor sleep. Currently, he feels back to baseline. He reports he was on midodrine for his BP when he was in the ICU in 06/2024 and his BP at home had dropped so his PCP prescribed it for him to take at home.    Patient initially presented on 9/27 after syncopal episode.  Patient's daughter reported that they were standing outside waiting for a car when patient started holding onto his walker and had deeper breathing.  Daughter assisted patient down into a chair but then he lost consciousness.  Daughter reported that patient's eyes rolled back and his face turned blue.  EMS was initiated.  Daughter reported that patient was unconscious for approximately 10-15 minutes before waking up although he was then confused and was noted to have an episode of urinary incontinence.  Daughter did note that she has been only giving patient his midodrine once daily as opposed to 3 times daily as ordered due to forgetting.  Daughter reports that patient has hide recurrent syncopal episodes over the last several months.  The first initial episode was unwitnessed by the daughter and history is unclear.  The second episode was attributed to coughing.  The third episode was unwitnessed and patient was found down, leading to hospitalization due to SAH and SDH.  MRI cervical spine also noted severe canal stenosis and mild cord compression with questionable cord abnormality.  Patient then underwent C3-C7 laminectomy with C2-T1 fusion.  During that hospitalization, patient was noted to have sick sinus syndrome and wide-complex tachycardia, therefore he underwent pacemaker placement.  He also complained of dysphagia and was noted to fail VPS so he underwent PEG tube placement.    Review of Systems   A 12 system ROS was completed. Other than the above mentioned complaints in the HPI and those  commented on below, all remaining systems were negative.      Objective      Temp:  [97.8 °F (36.6 °C)-98.1 °F (36.7 °C)] 98.1 °F (36.7 °C)  HR:  [] 82  Resp:  [16-19] 18  BP: ()/(55-64) 108/60  O2 Device: None (Room air)          I/O last 24 hours:  In: 150 [NG/GT:150]  Out: 1450 [Urine:1450]  Lines/Drains/Airways       Active Status       Name Placement date Placement time Site Days    Gastrostomy/Enterostomy Percutaneous Endoscopic Gastrostomy (PEG) 20 Fr. LUQ 07/10/24  1425  LUQ  79                  Physical Exam  Vitals and nursing note reviewed.   Constitutional:       General: He is not in acute distress.     Appearance: Normal appearance. He is not ill-appearing.   HENT:      Head: Normocephalic.      Mouth/Throat:      Mouth: Mucous membranes are moist.      Pharynx: Oropharynx is clear.   Eyes:      General: No scleral icterus.        Right eye: No discharge.         Left eye: No discharge.      Extraocular Movements: Extraocular movements intact and EOM normal.      Conjunctiva/sclera: Conjunctivae normal.   Cardiovascular:      Rate and Rhythm: Normal rate.   Pulmonary:      Effort: Pulmonary effort is normal. No respiratory distress.   Skin:     General: Skin is warm and dry.      Coloration: Skin is not jaundiced or pale.   Neurological:      Mental Status: He is alert and oriented to person, place, and time.      Coordination: Finger-Nose-Finger Test and Heel to Shin Test normal.   Psychiatric:         Mood and Affect: Mood normal.        Neurologic Exam     Mental Status   Oriented to person, place, and time.   Awake and alert. Able to follow commands. No dysarthria or aphasia noted.     Cranial Nerves     CN II   Right visual field deficit: none  Left visual field deficit: none     CN III, IV, VI   Extraocular motions are normal.     CN V   Facial sensation intact.     CN VII   Facial expression full, symmetric.     CN VIII   Hearing: intact    CN IX, X   Palate: symmetric    CN XI   CN  XI normal.     CN XII   CN XII normal.     Motor Exam   Muscle bulk: normal  Bilateral UE strength 5/5 deltoids, biceps, triceps, hand   Bilateral LE strength 5/5 hip flexion, dorsiflexion, plantar flexion     Sensory Exam   Light touch normal.     Gait, Coordination, and Reflexes     Coordination   Finger to nose coordination: normal  Heel to shin coordination: normal    Tremor   Resting tremor: absent  Intention tremor: absent        Lab Results: I have reviewed the following results: CBC/BMP:   .     09/27/24  1718 09/28/24  0601   WBC 9.73 7.43   HGB 14.1 12.6   HCT 42.1 37.4    161   SODIUM 135 137   K 3.5 3.8   CL 99 104   CO2 24 24   BUN 13 12   CREATININE 0.72 0.58*   GLUC 80 78   MG 1.9  --     , Troponin,BNP:  .     09/27/24  1718   HSTNI0 5      Imaging Review: Reviewed radiology reports from this admission including: chest xray and CT head.  Other Studies: No additional pertinent studies reviewed.    VTE Prophylaxis: Enoxaparin (Lovenox)

## 2024-09-28 NOTE — PLAN OF CARE
Problem: Nutrition/Hydration-ADULT  Goal: Nutrient/Hydration intake appropriate for improving, restoring or maintaining nutritional needs  Description: Monitor and assess patient's nutrition/hydration status for malnutrition. Collaborate with interdisciplinary team and initiate plan and interventions as ordered.  Monitor patient's weight and dietary intake as ordered or per policy. Utilize nutrition screening tool and intervene as necessary. Determine patient's food preferences and provide high-protein, high-caloric foods as appropriate.     INTERVENTIONS:  - Monitor oral intake, urinary output, labs, and treatment plans  - Assess nutrition and hydration status and recommend course of action  - Evaluate amount of meals eaten  - Assist patient with eating if necessary   - Allow adequate time for meals  - Recommend/ encourage appropriate diets, oral nutritional supplements, and vitamin/mineral supplements  - Order, calculate, and assess calorie counts as needed  - Recommend, monitor, and adjust tube feedings and TPN/PPN based on assessed needs  - Assess need for intravenous fluids  - Provide specific nutrition/hydration education as appropriate  - Include patient/family/caregiver in decisions related to nutrition  Outcome: Progressing     Problem: PAIN - ADULT  Goal: Verbalizes/displays adequate comfort level or baseline comfort level  Description: Interventions:  - Encourage patient to monitor pain and request assistance  - Assess pain using appropriate pain scale  - Administer analgesics based on type and severity of pain and evaluate response  - Implement non-pharmacological measures as appropriate and evaluate response  - Consider cultural and social influences on pain and pain management  - Notify physician/advanced practitioner if interventions unsuccessful or patient reports new pain  Outcome: Progressing     Problem: INFECTION - ADULT  Goal: Absence or prevention of progression during  hospitalization  Description: INTERVENTIONS:  - Assess and monitor for signs and symptoms of infection  - Monitor lab/diagnostic results  - Monitor all insertion sites, i.e. indwelling lines, tubes, and drains  - Monitor endotracheal if appropriate and nasal secretions for changes in amount and color  - [place] appropriate cooling/warming therapies per order  - Administer medications as ordered  - Instruct and encourage patient and family to use good hand hygiene technique  - Identify and instruct in appropriate isolation precautions for identified infection/condition  Outcome: Progressing  Goal: Absence of fever/infection during neutropenic period  Description: INTERVENTIONS:  - Monitor WBC    Outcome: Progressing     Problem: SAFETY ADULT  Goal: Patient will remain free of falls  Description: INTERVENTIONS:  - Educate patient/family on patient safety including physical limitations  - Instruct patient to call for assistance with activity   - Consult OT/PT to assist with strengthening/mobility   - Keep Call bell within reach  - Keep bed low and locked with side rails adjusted as appropriate  - Keep care items and personal belongings within reach  - Initiate and maintain comfort rounds  - Make Fall Risk Sign visible to staff  - Offer Toileting every  Hours, in advance of need  - Initiate/Maintain alarm  - Obtain necessary fall risk management equipment:   - Apply yellow socks and bracelet for high fall risk patients  - Consider moving patient to room near nurses station  Outcome: Progressing  Goal: Maintain or return to baseline ADL function  Description: INTERVENTIONS:  -  Assess patient's ability to carry out ADLs; assess patient's baseline for ADL function and identify physical deficits which impact ability to perform ADLs (bathing, care of mouth/teeth, toileting, grooming, dressing, etc.)  - Assess/evaluate cause of self-care deficits   - Assess range of motion  - Assess patient's mobility; develop plan if  impaired  - Assess patient's need for assistive devices and provide as appropriate  - Encourage maximum independence but intervene and supervise when necessary  - Involve family in performance of ADLs  - Assess for home care needs following discharge   - Consider OT consult to assist with ADL evaluation and planning for discharge  - Provide patient education as appropriate  Outcome: Progressing  Goal: Maintains/Returns to pre admission functional level  Description: INTERVENTIONS:  - Perform AM-PAC 6 Click Basic Mobility/ Daily Activity assessment daily.  - Set and communicate daily mobility goal to care team and patient/family/caregiver.   - Collaborate with rehabilitation services on mobility goals if consulted  - Perform Range of Motion  times a day.  - Reposition patient every  hours.  - Dangle patient  times a day  - Stand patient  times a day  - Ambulate patient  times a day  - Out of bed to chair  times a day   - Out of bed for meals times a day  - Out of bed for toileting  - Record patient progress and toleration of activity level   Outcome: Progressing     Problem: DISCHARGE PLANNING  Goal: Discharge to home or other facility with appropriate resources  Description: INTERVENTIONS:  - Identify barriers to discharge w/patient and caregiver  - Arrange for needed discharge resources and transportation as appropriate  - Identify discharge learning needs (meds, wound care, etc.)  - Arrange for interpretive services to assist at discharge as needed  - Refer to Case Management Department for coordinating discharge planning if the patient needs post-hospital services based on physician/advanced practitioner order or complex needs related to functional status, cognitive ability, or social support system  Outcome: Progressing     Problem: Knowledge Deficit  Goal: Patient/family/caregiver demonstrates understanding of disease process, treatment plan, medications, and discharge instructions  Description: Complete  learning assessment and assess knowledge base.  Interventions:  - Provide teaching at level of understanding  - Provide teaching via preferred learning methods  Outcome: Progressing     Problem: NEUROSENSORY - ADULT  Goal: Achieves stable or improved neurological status  Description: INTERVENTIONS  - Monitor and report changes in neurological status  - Monitor vital signs such as temperature, blood pressure, glucose, and any other labs ordered   - Initiate measures to prevent increased intracranial pressure  - Monitor for seizure activity and implement precautions if appropriate      Outcome: Progressing  Goal: Remains free of injury related to seizures activity  Description: INTERVENTIONS  - Maintain airway, patient safety  and administer oxygen as ordered  - Monitor patient for seizure activity, document and report duration and description of seizure to physician/advanced practitioner  - If seizure occurs,  ensure patient safety during seizure  - Reorient patient post seizure  - Seizure pads on all 4 side rails  - Instruct patient/family to notify RN of any seizure activity including if an aura is experienced  - Instruct patient/family to call for assistance with activity based on nursing assessment  - Administer anti-seizure medications if ordered    Outcome: Progressing  Goal: Achieves maximal functionality and self care  Description: INTERVENTIONS  - Monitor swallowing and airway patency with patient fatigue and changes in neurological status  - Encourage and assist patient to increase activity and self care.   - Encourage visually impaired, hearing impaired and aphasic patients to use assistive/communication devices  Outcome: Progressing     Problem: CARDIOVASCULAR - ADULT  Goal: Maintains optimal cardiac output and hemodynamic stability  Description: INTERVENTIONS:  - Monitor I/O, vital signs and rhythm  - Monitor for S/S and trends of decreased cardiac output  - Administer and titrate ordered vasoactive  medications to optimize hemodynamic stability  - Assess quality of pulses, skin color and temperature  - Assess for signs of decreased coronary artery perfusion  - Instruct patient to report change in severity of symptoms  Outcome: Progressing  Goal: Absence of cardiac dysrhythmias or at baseline rhythm  Description: INTERVENTIONS:  - Continuous cardiac monitoring, vital signs, obtain 12 lead EKG if ordered  - Administer antiarrhythmic and heart rate control medications as ordered  - Monitor electrolytes and administer replacement therapy as ordered  Outcome: Progressing     Problem: GASTROINTESTINAL - ADULT  Goal: Minimal or absence of nausea and/or vomiting  Description: INTERVENTIONS:  - Administer IV fluids if ordered to ensure adequate hydration  - Maintain NPO status until nausea and vomiting are resolved  - Nasogastric tube if ordered  - Administer ordered antiemetic medications as needed  - Provide nonpharmacologic comfort measures as appropriate  - Advance diet as tolerated, if ordered  - Consider nutrition services referral to assist patient with adequate nutrition and appropriate food choices  Outcome: Progressing  Goal: Maintains or returns to baseline bowel function  Description: INTERVENTIONS:  - Assess bowel function  - Encourage oral fluids to ensure adequate hydration  - Administer IV fluids if ordered to ensure adequate hydration  - Administer ordered medications as needed  - Encourage mobilization and activity  - Consider nutritional services referral to assist patient with adequate nutrition and appropriate food choices  Outcome: Progressing  Goal: Maintains adequate nutritional intake  Description: INTERVENTIONS:  - Monitor percentage of each meal consumed  - Identify factors contributing to decreased intake, treat as appropriate  - Assist with meals as needed  - Monitor I&O, weight, and lab values if indicated  - Obtain nutrition services referral as needed  Outcome: Progressing  Goal:  Establish and maintain optimal ostomy function  Description: INTERVENTIONS:  - Assess bowel function  - Encourage oral fluids to ensure adequate hydration  - Administer IV fluids if ordered to ensure adequate hydration   - Administer ordered medications as needed  - Encourage mobilization and activity  - Nutrition services referral to assist patient with appropriate food choices  - Assess stoma site  - Consider wound care consult   Outcome: Progressing  Goal: Oral mucous membranes remain intact  Description: INTERVENTIONS  - Assess oral mucosa and hygiene practices  - Implement preventative oral hygiene regimen  - Implement oral medicated treatments as ordered  - Initiate Nutrition services referral as needed  Outcome: Progressing     Problem: METABOLIC, FLUID AND ELECTROLYTES - ADULT  Goal: Electrolytes maintained within normal limits  Description: INTERVENTIONS:  - Monitor labs and assess patient for signs and symptoms of electrolyte imbalances  - Administer electrolyte replacement as ordered  - Monitor response to electrolyte replacements, including repeat lab results as appropriate  - Instruct patient on fluid and nutrition as appropriate  Outcome: Progressing  Goal: Fluid balance maintained  Description: INTERVENTIONS:  - Monitor labs   - Monitor I/O and WT  - Instruct patient on fluid and nutrition as appropriate  - Assess for signs & symptoms of volume excess or deficit  Outcome: Progressing  Goal: Glucose maintained within target range  Description: INTERVENTIONS:  - Monitor Blood Glucose as ordered  - Assess for signs and symptoms of hyperglycemia and hypoglycemia  - Administer ordered medications to maintain glucose within target range  - Assess nutritional intake and initiate nutrition service referral as needed  Outcome: Progressing

## 2024-09-29 PROBLEM — E83.51 HYPOCALCEMIA: Status: ACTIVE | Noted: 2024-09-29

## 2024-09-29 LAB
25(OH)D3 SERPL-MCNC: 42 NG/ML (ref 30–100)
ALBUMIN SERPL BCG-MCNC: 3.5 G/DL (ref 3.5–5)
ALP SERPL-CCNC: 75 U/L (ref 34–104)
ALT SERPL W P-5'-P-CCNC: 49 U/L (ref 7–52)
ANION GAP SERPL CALCULATED.3IONS-SCNC: 8 MMOL/L (ref 4–13)
AST SERPL W P-5'-P-CCNC: 37 U/L (ref 13–39)
BASOPHILS # BLD AUTO: 0.05 THOUSANDS/ÂΜL (ref 0–0.1)
BASOPHILS NFR BLD AUTO: 1 % (ref 0–1)
BILIRUB SERPL-MCNC: 0.46 MG/DL (ref 0.2–1)
BUN SERPL-MCNC: 11 MG/DL (ref 5–25)
CA-I BLD-SCNC: 0.95 MMOL/L (ref 1.12–1.32)
CALCIUM SERPL-MCNC: 7.7 MG/DL (ref 8.4–10.2)
CHLORIDE SERPL-SCNC: 104 MMOL/L (ref 96–108)
CO2 SERPL-SCNC: 28 MMOL/L (ref 21–32)
CREAT SERPL-MCNC: 0.59 MG/DL (ref 0.6–1.3)
EOSINOPHIL # BLD AUTO: 0.34 THOUSAND/ÂΜL (ref 0–0.61)
EOSINOPHIL NFR BLD AUTO: 5 % (ref 0–6)
ERYTHROCYTE [DISTWIDTH] IN BLOOD BY AUTOMATED COUNT: 13.3 % (ref 11.6–15.1)
GFR SERPL CREATININE-BSD FRML MDRD: 113 ML/MIN/1.73SQ M
GLUCOSE SERPL-MCNC: 101 MG/DL (ref 65–140)
HCT VFR BLD AUTO: 39.3 % (ref 36.5–49.3)
HGB BLD-MCNC: 13.1 G/DL (ref 12–17)
IMM GRANULOCYTES # BLD AUTO: 0.03 THOUSAND/UL (ref 0–0.2)
IMM GRANULOCYTES NFR BLD AUTO: 0 % (ref 0–2)
LYMPHOCYTES # BLD AUTO: 1.98 THOUSANDS/ÂΜL (ref 0.6–4.47)
LYMPHOCYTES NFR BLD AUTO: 26 % (ref 14–44)
MAGNESIUM SERPL-MCNC: 1.8 MG/DL (ref 1.9–2.7)
MCH RBC QN AUTO: 31 PG (ref 26.8–34.3)
MCHC RBC AUTO-ENTMCNC: 33.3 G/DL (ref 31.4–37.4)
MCV RBC AUTO: 93 FL (ref 82–98)
MONOCYTES # BLD AUTO: 0.98 THOUSAND/ÂΜL (ref 0.17–1.22)
MONOCYTES NFR BLD AUTO: 13 % (ref 4–12)
NEUTROPHILS # BLD AUTO: 4.23 THOUSANDS/ÂΜL (ref 1.85–7.62)
NEUTS SEG NFR BLD AUTO: 55 % (ref 43–75)
NRBC BLD AUTO-RTO: 0 /100 WBCS
PHOSPHATE SERPL-MCNC: 4.9 MG/DL (ref 2.7–4.5)
PLATELET # BLD AUTO: 150 THOUSANDS/UL (ref 149–390)
PMV BLD AUTO: 11.1 FL (ref 8.9–12.7)
POTASSIUM SERPL-SCNC: 3.8 MMOL/L (ref 3.5–5.3)
PROT SERPL-MCNC: 6.6 G/DL (ref 6.4–8.4)
PTH-INTACT SERPL-MCNC: 7.6 PG/ML (ref 12–88)
RBC # BLD AUTO: 4.23 MILLION/UL (ref 3.88–5.62)
SODIUM SERPL-SCNC: 140 MMOL/L (ref 135–147)
TSH SERPL DL<=0.05 MIU/L-ACNC: 0.02 UIU/ML (ref 0.45–4.5)
WBC # BLD AUTO: 7.61 THOUSAND/UL (ref 4.31–10.16)

## 2024-09-29 PROCEDURE — 83735 ASSAY OF MAGNESIUM: CPT

## 2024-09-29 PROCEDURE — 85025 COMPLETE CBC W/AUTO DIFF WBC: CPT

## 2024-09-29 PROCEDURE — 80053 COMPREHEN METABOLIC PANEL: CPT

## 2024-09-29 PROCEDURE — 84443 ASSAY THYROID STIM HORMONE: CPT

## 2024-09-29 PROCEDURE — 82306 VITAMIN D 25 HYDROXY: CPT

## 2024-09-29 PROCEDURE — 82330 ASSAY OF CALCIUM: CPT

## 2024-09-29 PROCEDURE — 99232 SBSQ HOSP IP/OBS MODERATE 35: CPT | Performed by: INTERNAL MEDICINE

## 2024-09-29 PROCEDURE — 84100 ASSAY OF PHOSPHORUS: CPT

## 2024-09-29 RX ORDER — MAGNESIUM SULFATE HEPTAHYDRATE 40 MG/ML
2 INJECTION, SOLUTION INTRAVENOUS ONCE
Status: COMPLETED | OUTPATIENT
Start: 2024-09-29 | End: 2024-09-29

## 2024-09-29 RX ADMIN — MIDODRINE HYDROCHLORIDE 5 MG: 5 TABLET ORAL at 13:17

## 2024-09-29 RX ADMIN — MAGNESIUM SULFATE HEPTAHYDRATE 2 G: 40 INJECTION, SOLUTION INTRAVENOUS at 18:23

## 2024-09-29 RX ADMIN — MIDODRINE HYDROCHLORIDE 5 MG: 5 TABLET ORAL at 05:34

## 2024-09-29 RX ADMIN — LEVOTHYROXINE SODIUM 112 MCG: 112 TABLET ORAL at 05:34

## 2024-09-29 RX ADMIN — MIDODRINE HYDROCHLORIDE 5 MG: 5 TABLET ORAL at 18:23

## 2024-09-29 RX ADMIN — BENZONATATE 100 MG: 100 CAPSULE ORAL at 22:06

## 2024-09-29 RX ADMIN — Medication 9 MG: at 22:06

## 2024-09-29 NOTE — PLAN OF CARE
Problem: Nutrition/Hydration-ADULT  Goal: Nutrient/Hydration intake appropriate for improving, restoring or maintaining nutritional needs  Description: Monitor and assess patient's nutrition/hydration status for malnutrition. Collaborate with interdisciplinary team and initiate plan and interventions as ordered.  Monitor patient's weight and dietary intake as ordered or per policy. Utilize nutrition screening tool and intervene as necessary. Determine patient's food preferences and provide high-protein, high-caloric foods as appropriate.     INTERVENTIONS:  - Monitor oral intake, urinary output, labs, and treatment plans  - Assess nutrition and hydration status and recommend course of action  - Evaluate amount of meals eaten  - Assist patient with eating if necessary   - Allow adequate time for meals  - Recommend/ encourage appropriate diets, oral nutritional supplements, and vitamin/mineral supplements  - Order, calculate, and assess calorie counts as needed  - Recommend, monitor, and adjust tube feedings and TPN/PPN based on assessed needs  - Assess need for intravenous fluids  - Provide specific nutrition/hydration education as appropriate  - Include patient/family/caregiver in decisions related to nutrition  Outcome: Progressing     Problem: PAIN - ADULT  Goal: Verbalizes/displays adequate comfort level or baseline comfort level  Description: Interventions:  - Encourage patient to monitor pain and request assistance  - Assess pain using appropriate pain scale  - Administer analgesics based on type and severity of pain and evaluate response  - Implement non-pharmacological measures as appropriate and evaluate response  - Consider cultural and social influences on pain and pain management  - Notify physician/advanced practitioner if interventions unsuccessful or patient reports new pain  Outcome: Progressing     Problem: INFECTION - ADULT  Goal: Absence or prevention of progression during  hospitalization  Description: INTERVENTIONS:  - Assess and monitor for signs and symptoms of infection  - Monitor lab/diagnostic results  - Monitor all insertion sites, i.e. indwelling lines, tubes, and drains  - Monitor endotracheal if appropriate and nasal secretions for changes in amount and color  - [place] appropriate cooling/warming therapies per order  - Administer medications as ordered  - Instruct and encourage patient and family to use good hand hygiene technique  - Identify and instruct in appropriate isolation precautions for identified infection/condition  Outcome: Progressing  Goal: Absence of fever/infection during neutropenic period  Description: INTERVENTIONS:  - Monitor WBC    Outcome: Progressing     Problem: SAFETY ADULT  Goal: Patient will remain free of falls  Description: INTERVENTIONS:  - Educate patient/family on patient safety including physical limitations  - Instruct patient to call for assistance with activity   - Consult OT/PT to assist with strengthening/mobility   - Keep Call bell within reach  - Keep bed low and locked with side rails adjusted as appropriate  - Keep care items and personal belongings within reach  - Initiate and maintain comfort rounds  - Make Fall Risk Sign visible to staff  - Offer Toileting every  Hours, in advance of need  - Initiate/Maintain alarm  - Obtain necessary fall risk management equipment:   - Apply yellow socks and bracelet for high fall risk patients  - Consider moving patient to room near nurses station  Outcome: Progressing  Goal: Maintain or return to baseline ADL function  Description: INTERVENTIONS:  -  Assess patient's ability to carry out ADLs; assess patient's baseline for ADL function and identify physical deficits which impact ability to perform ADLs (bathing, care of mouth/teeth, toileting, grooming, dressing, etc.)  - Assess/evaluate cause of self-care deficits   - Assess range of motion  - Assess patient's mobility; develop plan if  impaired  - Assess patient's need for assistive devices and provide as appropriate  - Encourage maximum independence but intervene and supervise when necessary  - Involve family in performance of ADLs  - Assess for home care needs following discharge   - Consider OT consult to assist with ADL evaluation and planning for discharge  - Provide patient education as appropriate  Outcome: Progressing  Goal: Maintains/Returns to pre admission functional level  Description: INTERVENTIONS:  - Perform AM-PAC 6 Click Basic Mobility/ Daily Activity assessment daily.  - Set and communicate daily mobility goal to care team and patient/family/caregiver.   - Collaborate with rehabilitation services on mobility goals if consulted  - Perform Range of Motion  times a day.  - Reposition patient every hours.  - Dangle patient  times a day  - Stand patient  times a day  - Ambulate patient  times a day  - Out of bed to chair  times a day   - Out of bed for meals  times a day  - Out of bed for toileting  - Record patient progress and toleration of activity level   Outcome: Progressing     Problem: DISCHARGE PLANNING  Goal: Discharge to home or other facility with appropriate resources  Description: INTERVENTIONS:  - Identify barriers to discharge w/patient and caregiver  - Arrange for needed discharge resources and transportation as appropriate  - Identify discharge learning needs (meds, wound care, etc.)  - Arrange for interpretive services to assist at discharge as needed  - Refer to Case Management Department for coordinating discharge planning if the patient needs post-hospital services based on physician/advanced practitioner order or complex needs related to functional status, cognitive ability, or social support system  Outcome: Progressing     Problem: Knowledge Deficit  Goal: Patient/family/caregiver demonstrates understanding of disease process, treatment plan, medications, and discharge instructions  Description: Complete  learning assessment and assess knowledge base.  Interventions:  - Provide teaching at level of understanding  - Provide teaching via preferred learning methods  Outcome: Progressing     Problem: NEUROSENSORY - ADULT  Goal: Achieves stable or improved neurological status  Description: INTERVENTIONS  - Monitor and report changes in neurological status  - Monitor vital signs such as temperature, blood pressure, glucose, and any other labs ordered   - Initiate measures to prevent increased intracranial pressure  - Monitor for seizure activity and implement precautions if appropriate      Outcome: Progressing  Goal: Remains free of injury related to seizures activity  Description: INTERVENTIONS  - Maintain airway, patient safety  and administer oxygen as ordered  - Monitor patient for seizure activity, document and report duration and description of seizure to physician/advanced practitioner  - If seizure occurs,  ensure patient safety during seizure  - Reorient patient post seizure  - Seizure pads on all 4 side rails  - Instruct patient/family to notify RN of any seizure activity including if an aura is experienced  - Instruct patient/family to call for assistance with activity based on nursing assessment  - Administer anti-seizure medications if ordered    Outcome: Progressing  Goal: Achieves maximal functionality and self care  Description: INTERVENTIONS  - Monitor swallowing and airway patency with patient fatigue and changes in neurological status  - Encourage and assist patient to increase activity and self care.   - Encourage visually impaired, hearing impaired and aphasic patients to use assistive/communication devices  Outcome: Progressing     Problem: CARDIOVASCULAR - ADULT  Goal: Maintains optimal cardiac output and hemodynamic stability  Description: INTERVENTIONS:  - Monitor I/O, vital signs and rhythm  - Monitor for S/S and trends of decreased cardiac output  - Administer and titrate ordered vasoactive  medications to optimize hemodynamic stability  - Assess quality of pulses, skin color and temperature  - Assess for signs of decreased coronary artery perfusion  - Instruct patient to report change in severity of symptoms  Outcome: Progressing  Goal: Absence of cardiac dysrhythmias or at baseline rhythm  Description: INTERVENTIONS:  - Continuous cardiac monitoring, vital signs, obtain 12 lead EKG if ordered  - Administer antiarrhythmic and heart rate control medications as ordered  - Monitor electrolytes and administer replacement therapy as ordered  Outcome: Progressing     Problem: GASTROINTESTINAL - ADULT  Goal: Minimal or absence of nausea and/or vomiting  Description: INTERVENTIONS:  - Administer IV fluids if ordered to ensure adequate hydration  - Maintain NPO status until nausea and vomiting are resolved  - Nasogastric tube if ordered  - Administer ordered antiemetic medications as needed  - Provide nonpharmacologic comfort measures as appropriate  - Advance diet as tolerated, if ordered  - Consider nutrition services referral to assist patient with adequate nutrition and appropriate food choices  Outcome: Progressing  Goal: Maintains or returns to baseline bowel function  Description: INTERVENTIONS:  - Assess bowel function  - Encourage oral fluids to ensure adequate hydration  - Administer IV fluids if ordered to ensure adequate hydration  - Administer ordered medications as needed  - Encourage mobilization and activity  - Consider nutritional services referral to assist patient with adequate nutrition and appropriate food choices  Outcome: Progressing  Goal: Maintains adequate nutritional intake  Description: INTERVENTIONS:  - Monitor percentage of each meal consumed  - Identify factors contributing to decreased intake, treat as appropriate  - Assist with meals as needed  - Monitor I&O, weight, and lab values if indicated  - Obtain nutrition services referral as needed  Outcome: Progressing  Goal:  Establish and maintain optimal ostomy function  Description: INTERVENTIONS:  - Assess bowel function  - Encourage oral fluids to ensure adequate hydration  - Administer IV fluids if ordered to ensure adequate hydration   - Administer ordered medications as needed  - Encourage mobilization and activity  - Nutrition services referral to assist patient with appropriate food choices  - Assess stoma site  - Consider wound care consult   Outcome: Progressing  Goal: Oral mucous membranes remain intact  Description: INTERVENTIONS  - Assess oral mucosa and hygiene practices  - Implement preventative oral hygiene regimen  - Implement oral medicated treatments as ordered  - Initiate Nutrition services referral as needed  Outcome: Progressing     Problem: METABOLIC, FLUID AND ELECTROLYTES - ADULT  Goal: Electrolytes maintained within normal limits  Description: INTERVENTIONS:  - Monitor labs and assess patient for signs and symptoms of electrolyte imbalances  - Administer electrolyte replacement as ordered  - Monitor response to electrolyte replacements, including repeat lab results as appropriate  - Instruct patient on fluid and nutrition as appropriate  Outcome: Progressing  Goal: Fluid balance maintained  Description: INTERVENTIONS:  - Monitor labs   - Monitor I/O and WT  - Instruct patient on fluid and nutrition as appropriate  - Assess for signs & symptoms of volume excess or deficit  Outcome: Progressing  Goal: Glucose maintained within target range  Description: INTERVENTIONS:  - Monitor Blood Glucose as ordered  - Assess for signs and symptoms of hyperglycemia and hypoglycemia  - Administer ordered medications to maintain glucose within target range  - Assess nutritional intake and initiate nutrition service referral as needed  Outcome: Progressing

## 2024-09-29 NOTE — PROGRESS NOTES
Progress Note - Internal Medicine   Name: Luis Forrester 54 y.o. male I MRN: [id number]  Unit/Bed#: [facility] 408-01 I Date of Admission: 9/27/2024   Date of Service: 9/29/2024 I Hospital Day: 1    Assessment & Plan  Cough  Assessment and Plan:  -Chronic and may be secondary to allergic rhinitis  -Patient takes benzonatate 100 mg capsule TID at home, continue on a prn basis  Hyperlipidemia  Assessment and Plan:  - one month ago, per ASCVD risk algorithm moderate intensity statin recommended.   Plan:  -Consider starting statin outpatient   Hypothyroidism  Assessment and Plan:  -TSH 2 weeks ago 0.056, levothyroxine dose adjusted down   -Continue levothyroxine 112 mcg tablet per PEG tube   SSS (sick sinus syndrome) ([facility])  Assessment and Plan:  -Dual-chamber PPM placed in June of 2024  -Mildly tachycardic on exam and telemetry  - Continue tele  -Continue monitoring VS   TBI (traumatic brain injury) ([facility])  Assessment and Plan:  -Occurred during the patient third fall this year 6/15/2024. CT imaging showed signs of SAH in the bilateral parafalcine regions and potential SDH   -Patient did hit his head and had bleeding behind his ear during his second fall, but did not get medically evaluated   -Patient did not hit head on the fall prior to this admission, and noncon CT head shows no acute bleeding   -Neurologic exam intact   Cervical stenosis of spinal canal  Assessment and Plan:  -Patient had fall 6/15/2024, and is status post C3-7 laminectomy with C2-T1 fusion on 6/16/24. MRI 6/15/2024 revealed Congenital canal stenosis with superimposed degenerative spondylosis .  Most pronounced at C3-4 and C5-C6 with moderate to severe canal stenosis and mild cord compression. The patient was described as having central cord syndrome   -Patient has reduced ability to sit up during exam, but has intact strength and sensation of the extremities along with no urinary issues  -PT/OT consulted  Syncope and  collapse  Assessment:  -Patient denies having any prodrome, or any weakness following events. After all four events, patient was confused. Patient has been missing midodrine third doses several days, including before this incident. Patient urinated himself after this fall, but EMS workers did not see any tongue bites. His lips and face turned blue during this event.  -Patient and daughter report that this is the fourth time he has synopsized, the first one of which  was around six months ago. Note in 6/2024 references that the patient had six months of syncope prior, so this may have gone on nine months   -6/2024 echocardiogram reveals trace tricuspid, mitral, and pulmonic regurgitation, with mild aortic root dilatation. EF is 60% and systolic/diastolic function is normal   -Vasovagal and orthostatic syncope unlikely, neurologic and cardiac etiologies possible  Plan:  -Can hold off on new echocardiogram in setting of relatively normal recent echo   -Orthostatics  -Pacemaker interrogated and is intact.  Patient did have an episode of SVT with rates into the 140s on 9/27  - Neurology consulted who recommends MRI brain w/wo contrast and routine EEG  -Continue midodrine 10 mg TID  - Continue tele    Hypocalcemia  Patient's calcium was low at 7.7 and ionized calcium low at 0.95. Differential diagnosis includes hypoparathyroidism, vitamin D deficiency, hypomagnesemia, or medication induced.     Plan  -Will check PTH, magnesium, phosphorous, and vitamin D levels    Disposition: Pending medical optimization    Team: SOD TEAM A    History of Present Illness   Patient seen and examined. No acute events overnight.  Patient has no complaints at this time.  He denies dizziness going from sitting to standing or when walking to the bathroom.  He denies fever, chills, chest pain, cough, dyspnea, abdominal pain, vomiting, constipation, diarrhea, hematuria, dysuria.    Objective     Vitals:    09/28/24 1900 09/28/24 2236 09/29/24  0300 24 0700   BP: 108/68 103/59 96/65 109/63   BP Location: Right arm Right arm Right arm Right arm   Pulse: 80 70 78 78   Resp: 18 18 18 17   Temp: 98 °F (36.7 °C) 98 °F (36.7 °C) 98 °F (36.7 °C) 98.1 °F (36.7 °C)   TempSrc: Oral Oral Oral Oral   SpO2: 97% 99% 99% 97%   Weight:       Height:          Temperature:   Temp (24hrs), Av °F (36.7 °C), Min:98 °F (36.7 °C), Max:98.1 °F (36.7 °C)    Temperature: 98.1 °F (36.7 °C)  Intake & Output:  I/O          07 07 07 07 07 0700    P.O.  680     NG/GT  300     Total Intake(mL/kg)  980 (13.8)     Urine (mL/kg/hr) 650 800 (0.5)     Total Output 650 800     Net -650 +180                  Weights:   IBW (Ideal Body Weight): 66.1 kg    Body mass index is 24.59 kg/m².  Weight (last 2 days)       Date/Time Weight    24 0900 71.2 (157)          Physical Exam  Constitutional:       General: He is not in acute distress.  Eyes:      Conjunctiva/sclera: Conjunctivae normal.   Cardiovascular:      Rate and Rhythm: Normal rate and regular rhythm.      Heart sounds: No murmur heard.     No friction rub. No gallop.   Pulmonary:      Effort: Pulmonary effort is normal. No respiratory distress.      Breath sounds: Normal breath sounds. No wheezing or rales.   Abdominal:      General: There is no distension.      Palpations: Abdomen is soft.      Tenderness: There is no abdominal tenderness.      Comments: PEG tube in place   Musculoskeletal:         General: No swelling.   Skin:     General: Skin is warm and dry.   Neurological:      Mental Status: He is alert and oriented to person, place, and time.           Lab Results: I have reviewed the following results:   Recent Labs     24  1718 24  0601 24  0500   WBC 9.73   < > 7.61   HGB 14.1   < > 13.1   HCT 42.1   < > 39.3      < > 150   SODIUM 135   < > 140   K 3.5   < > 3.8   CL 99   < > 104   CO2 24   < > 28   BUN 13   < > 11   CREATININE 0.72   < >  0.59*   GLUC 80   < > 101   CAIONIZED  --   --  0.95*   MG 1.9  --   --    AST 46*  --  37   ALT 53*  --  49   ALB 3.9  --  3.5   TBILI 0.58  --  0.46   ALKPHOS 82  --  75   HSTNI0 5  --   --     < > = values in this interval not displayed.     Imaging Review: No pertinent imaging studies reviewed.  Other Studies: No additional pertinent studies reviewed.    Currently Ordered Meds:   Current Facility-Administered Medications:     acetaminophen (TYLENOL) oral suspension 640 mg, Q6H PRN    benzonatate (TESSALON PERLES) capsule 100 mg, TID PRN    enoxaparin (LOVENOX) subcutaneous injection 40 mg, Daily    levothyroxine tablet 112 mcg, Early Morning    melatonin tablet 9 mg, HS    midodrine (PROAMATINE) tablet 5 mg, TID AC  VTE Pharmacologic Prophylaxis: Enoxaparin (Lovenox)  VTE Mechanical Prophylaxis: sequential compression device    Administrative Statements     Portions of the record may have been created with voice recognition software.

## 2024-09-29 NOTE — ASSESSMENT & PLAN NOTE
Patient's calcium was low at 7.7 and ionized calcium low at 0.95. Differential diagnosis includes hypoparathyroidism, vitamin D deficiency, hypomagnesemia, or medication induced.     Plan  -Will check PTH, magnesium, phosphorous, and vitamin D levels

## 2024-09-29 NOTE — ED PROVIDER NOTES
Final diagnoses:   Syncope     ED Disposition       ED Disposition   Admit    Condition   Stable    Date/Time   Fri Sep 27, 2024  9:31 PM    Comment   Case was discussed with SOD and the patient's admission status was agreed to be Admission Status: observation status to the service of Dr. Mcmillan.               Assessment & Plan       Medical Decision Making  Luis Forrester is a 54 y.o. who presents after syncopal episode     Vital signs are tachycardia, otherwise stable, afebrile    Ddx: Within ddx consider vasovagal, orthostatic, cardiogenic, neurogenic, metabolic, etiologies.    Plan: Workup as below  EKG (see interpretation)  Pacemaker interrogated  No acute intracranial abnormality on CT    Disposition: Discussed with SOD. Admit inpatient with tele for further evaluation and treatment         Amount and/or Complexity of Data Reviewed  Labs: ordered.  Radiology: ordered and independent interpretation performed.    Risk  Decision regarding hospitalization.             Medications   melatonin tablet 9 mg (9 mg Per PEG Tube Given 9/27/24 4294)   midodrine (PROAMATINE) tablet 5 mg (has no administration in time range)   levothyroxine tablet 112 mcg (112 mcg Per PEG Tube Given 9/28/24 0576)   enoxaparin (LOVENOX) subcutaneous injection 40 mg (40 mg Subcutaneous Given 9/28/24 0816)   sodium chloride 0.9 % bolus 1,000 mL (0 mL Intravenous Stopped 9/28/24 0352)   potassium chloride oral solution 20 mEq (20 mEq Per PEG Tube Given 9/28/24 0816)       ED Risk Strat Scores                                               History of Present Illness       Chief Complaint   Patient presents with    Syncope     Reports had syncopal episode today while waiting for lyft.        Past Medical History:   Diagnosis Date    Arthritis     Chronic cough     Disease of thyroid gland     Hypoparathyroidism (HCC)     continue taking calcium and vitamin D, will check CMP, PTH level and Vitamin D level    Pneumonia of right middle lobe due to  "Streptococcus pneumoniae (HCC) 11/30/2018    s/p Medtronic dual chamber PPM 6/21/2024 06/21/2024      Past Surgical History:   Procedure Laterality Date    CARDIAC ELECTROPHYSIOLOGY PROCEDURE N/A 6/21/2024    Procedure: Cardiac pacer implant;  Surgeon: Kofi Pettit MD;  [facility]: [facility];  Service: Cardiology    DECOMPRESSION SPINE CERVICAL POSTERIOR N/A 6/16/2024    Procedure: DECOMPRESSION SPINE CERVICAL POSTERIOR C2-T1 with fusion navigated with Oarm;  Surgeon: Endy Velasquez MD;  Location: [facility];  Service: Neurosurgery    FRACTURE SURGERY      Open Treatment of Each Proximal Finger Phalanx    GASTROSTOMY TUBE PLACEMENT N/A 7/10/2024    Procedure: INSERTION PEG TUBE;  Surgeon: Darcy Reinoso MD;  Location: [facility];  Service: General      Family History   Problem Relation Age of Onset    No Known Problems Mother     No Known Problems Father     No Known Problems Sister     No Known Problems Brother     No Known Problems Son     Learning disabilities Daughter     Asthma Daughter     Seizures Daughter     No Known Problems Maternal Grandmother     No Known Problems Maternal Grandfather     No Known Problems Paternal Grandmother     No Known Problems Paternal Grandfather     No Known Problems Maternal Aunt     No Known Problems Maternal Uncle     No Known Problems Paternal Aunt     No Known Problems Paternal Uncle     No Known Problems Cousin       Social History     Tobacco Use    Smoking status: Never     Passive exposure: Never    Smokeless tobacco: Never    Tobacco comments:     pt \"tried it when he was a teenager but did not like them\"   Vaping Use    Vaping status: Never Used   Substance Use Topics    Alcohol use: Yes     Comment: occasionally    Drug use: No      E-Cigarette/Vaping    E-Cigarette Use Never User       E-Cigarette/Vaping Substances    Nicotine No     THC No     CBD No     Flavoring No     Other No     Unknown No       I have reviewed and agree with the history as " documented.     Patient is a 55 yo male with pertinent PMH of TBI with SAH and SDH in June 2024, mild central cord syndrome, prior syncope, hypoparathyroidism, SSS s/p dual chamber PPM who presents for evaluation after syncopal episode. Patient states he went to a bar but did not consume alcohol. Afterwards, he was waiting for a Lyft outside with his daughter when he was observed to become pale. Daughter assisted him into a chair and he did not hit his head but lost consciousness. Daughter says that he was unconscious for up to 10 minutes and called 911.Patient noticed to be incontinent of urine. He denies any preceding chest pain, SOB, dizziness, palpitations, or lightheadedness. She feels asymptomatic in the ED.  No heart Daughter did not observe seizure-like activity, tongue biting, loss of bowel function. He does not have a history of seizures. Denies recent illness, fever, chills, cough, congestion, nausea, vomiting, diarrhea, abdominal pain, headache, lightheadedness or dizziness. Of note, patient has several month history of syncopal episodes, and was admitted in June 2024 with pacemaker placement.         Review of Systems   All other systems reviewed and are negative.          Objective       ED Triage Vitals   Temperature Pulse Blood Pressure Respirations SpO2 Patient Position - Orthostatic VS   09/27/24 1717 09/27/24 1715 09/27/24 1717 09/27/24 1715 09/27/24 1715 09/28/24 0400   97.8 °F (36.6 °C) (!) 108 98/61 18 96 % Lying      Temp Source Heart Rate Source BP Location FiO2 (%) Pain Score    09/28/24 0900 09/27/24 1715 09/28/24 0400 -- 09/27/24 1715    Oral Monitor Right arm  No Pain      Vitals      Date and Time Temp Pulse SpO2 Resp BP Pain Score FACES Pain Rating User   09/29/24 1141 97.6 °F (36.4 °C) 96 95 % 18 119/63 -- -- NB   09/29/24 0700 98.1 °F (36.7 °C) 78 97 % 17 109/63 -- -- RR   09/29/24 0300 98 °F (36.7 °C) 78 99 % 18 96/65 -- -- LS   09/28/24 2236 98 °F (36.7 °C) 70 99 % 18 103/59 -- --  LS   09/28/24 2000 -- -- -- -- -- No Pain -- TA   09/28/24 1900 98 °F (36.7 °C) 80 97 % 18 108/68 -- -- LS   09/28/24 1500 98.1 °F (36.7 °C) 80 -- 18 97/57 -- -- CD   09/28/24 1255 -- -- -- -- 108/60 -- -- AS   09/28/24 1252 -- -- -- -- 100/59 -- -- AS   09/28/24 1250 -- -- -- -- 102/59 -- -- AS   09/28/24 1145 -- -- -- -- -- No Pain -- PD   09/28/24 1049 -- -- -- -- -- No Pain -- AS   09/28/24 1049 98.1 °F (36.7 °C) 82 95 % 18 97/57 -- -- CD   09/28/24 0900 98 °F (36.7 °C) 94 96 % 17 105/64 -- -- CD   09/28/24 0800 -- 93 95 % 16 115/64 -- -- MO   09/28/24 0600 -- 85 93 % 17 96/55 -- -- CC   09/28/24 0400 -- 90 96 % 19 105/61 -- -- RR   09/27/24 1719 -- -- -- -- -- No Pain -- CS   09/27/24 1717 97.8 °F (36.6 °C) -- -- -- 98/61 -- -- CS   09/27/24 1715 -- 108 96 % 18 -- No Pain -- CS            Physical Exam  Constitutional:       Appearance: Normal appearance.   HENT:      Head: Normocephalic and atraumatic.      Nose: Nose normal.      Mouth/Throat:      Mouth: Mucous membranes are moist.      Pharynx: Oropharynx is clear.      Comments: No tongue laceration     Eyes:      Extraocular Movements: Extraocular movements intact.      Conjunctiva/sclera: Conjunctivae normal.      Pupils: Pupils are equal, round, and reactive to light.   Cardiovascular:      Rate and Rhythm: Regular rhythm. Tachycardia present.      Pulses: Normal pulses.      Heart sounds: Normal heart sounds.   Pulmonary:      Effort: Pulmonary effort is normal.      Breath sounds: Normal breath sounds.   Abdominal:      General: Abdomen is flat.      Palpations: Abdomen is soft.      Tenderness: There is no abdominal tenderness.   Musculoskeletal:         General: No signs of injury. Normal range of motion.      Cervical back: Normal range of motion and neck supple. No rigidity or tenderness.   Skin:     General: Skin is warm and dry.   Neurological:      General: No focal deficit present.      Mental Status: He is alert and oriented to person, place,  and time.      Cranial Nerves: No cranial nerve deficit.      Sensory: No sensory deficit.      Motor: No weakness.   Psychiatric:         Mood and Affect: Mood normal.         Behavior: Behavior normal.         Results Reviewed       Procedure Component Value Units Date/Time    PTH, intact [[id number]]  (Abnormal) Collected: 09/28/24 0601    Lab Status: Final result Specimen: Blood from Arm, Right Updated: 09/29/24 1136     PTH 7.6 pg/mL     Basic metabolic panel [[id number]]  (Abnormal) Collected: 09/28/24 0601    Lab Status: Final result Specimen: Blood from Arm, Right Updated: 09/28/24 0634     Sodium 137 mmol/L      Potassium 3.8 mmol/L      Chloride 104 mmol/L      CO2 24 mmol/L      ANION GAP 9 mmol/L      BUN 12 mg/dL      Creatinine 0.58 mg/dL      Glucose 78 mg/dL      Glucose, Fasting 78 mg/dL      Calcium 7.4 mg/dL      eGFR 113 ml/min/1.73sq m     Narrative:      National Kidney Disease Foundation guidelines for Chronic Kidney Disease (CKD):     Stage 1 with normal or high GFR (GFR > 90 mL/min/1.73 square meters)    Stage 2 Mild CKD (GFR = 60-89 mL/min/1.73 square meters)    Stage 3A Moderate CKD (GFR = 45-59 mL/min/1.73 square meters)    Stage 3B Moderate CKD (GFR = 30-44 mL/min/1.73 square meters)    Stage 4 Severe CKD (GFR = 15-29 mL/min/1.73 square meters)    Stage 5 End Stage CKD (GFR <15 mL/min/1.73 square meters)  Note: GFR calculation is accurate only with a steady state creatinine    CBC and differential [[id number]] Collected: 09/28/24 0601    Lab Status: Final result Specimen: Blood from Arm, Right Updated: 09/28/24 0616     WBC 7.43 Thousand/uL      RBC 4.06 Million/uL      Hemoglobin 12.6 g/dL      Hematocrit 37.4 %      MCV 92 fL      MCH 31.0 pg      MCHC 33.7 g/dL      RDW 13.2 %      MPV 10.9 fL      Platelets 161 Thousands/uL      nRBC 0 /100 WBCs      Segmented % 54 %      Immature Grans % 1 %      Lymphocytes % 31 %      Monocytes % 12 %      Eosinophils Relative 2 %      Basophils  Relative 0 %      Absolute Neutrophils 4.04 Thousands/µL      Absolute Immature Grans 0.04 Thousand/uL      Absolute Lymphocytes 2.32 Thousands/µL      Absolute Monocytes 0.87 Thousand/µL      Eosinophils Absolute 0.13 Thousand/µL      Basophils Absolute 0.03 Thousands/µL     HS Troponin I 4hr [[id number]]  (Normal) Collected: 09/27/24 2142    Lab Status: Final result Specimen: Blood from Arm, Right Updated: 09/27/24 2215     hs TnI 4hr 4 ng/L      Delta 4hr hsTnI -1 ng/L     Magnesium [[id number]]  (Normal) Collected: 09/27/24 1718    Lab Status: Final result Specimen: Blood from Arm, Right Updated: 09/27/24 1817     Magnesium 1.9 mg/dL     HS Troponin 0hr (reflex protocol) [[id number]]  (Normal) Collected: 09/27/24 1718    Lab Status: Final result Specimen: Blood from Arm, Right Updated: 09/27/24 1807     hs TnI 0hr 5 ng/L     HS Troponin I 2hr [[id number]]     Lab Status: No result Specimen: Blood     Comprehensive metabolic panel [[id number]]  (Abnormal) Collected: 09/27/24 1718    Lab Status: Final result Specimen: Blood from Arm, Right Updated: 09/27/24 1757     Sodium 135 mmol/L      Potassium 3.5 mmol/L      Chloride 99 mmol/L      CO2 24 mmol/L      ANION GAP 12 mmol/L      BUN 13 mg/dL      Creatinine 0.72 mg/dL      Glucose 80 mg/dL      Calcium 8.1 mg/dL      AST 46 U/L      ALT 53 U/L      Alkaline Phosphatase 82 U/L      Total Protein 7.2 g/dL      Albumin 3.9 g/dL      Total Bilirubin 0.58 mg/dL      eGFR 104 ml/min/1.73sq m     Narrative:      National Kidney Disease Foundation guidelines for Chronic Kidney Disease (CKD):     Stage 1 with normal or high GFR (GFR > 90 mL/min/1.73 square meters)    Stage 2 Mild CKD (GFR = 60-89 mL/min/1.73 square meters)    Stage 3A Moderate CKD (GFR = 45-59 mL/min/1.73 square meters)    Stage 3B Moderate CKD (GFR = 30-44 mL/min/1.73 square meters)    Stage 4 Severe CKD (GFR = 15-29 mL/min/1.73 square meters)    Stage 5 End Stage CKD (GFR <15 mL/min/1.73 square  meters)  Note: GFR calculation is accurate only with a steady state creatinine    CBC and differential [[id number]] Collected: 09/27/24 1718    Lab Status: Final result Specimen: Blood from Arm, Right Updated: 09/27/24 1738     WBC 9.73 Thousand/uL      RBC 4.59 Million/uL      Hemoglobin 14.1 g/dL      Hematocrit 42.1 %      MCV 92 fL      MCH 30.7 pg      MCHC 33.5 g/dL      RDW 13.1 %      MPV 10.7 fL      Platelets 204 Thousands/uL      nRBC 0 /100 WBCs      Segmented % 63 %      Immature Grans % 0 %      Lymphocytes % 23 %      Monocytes % 11 %      Eosinophils Relative 2 %      Basophils Relative 1 %      Absolute Neutrophils 6.23 Thousands/µL      Absolute Immature Grans 0.04 Thousand/uL      Absolute Lymphocytes 2.21 Thousands/µL      Absolute Monocytes 1.04 Thousand/µL      Eosinophils Absolute 0.15 Thousand/µL      Basophils Absolute 0.06 Thousands/µL             CT head without contrast   Final Interpretation by Darin Morel MD (09/27 2109)      No acute intracranial abnormality.                  Workstation performed: [id number]         XR chest 1 view portable   ED Interpretation by Felicitas James MD (09/27 2007)   No acute cardiopulmonary disease        Final Interpretation by Isidoro Hilliard MD (09/27 2335)      No acute cardiopulmonary disease.            Workstation performed: [id number]         MRI inpatient order    (Results Pending)       ECG 12 Lead Documentation Only    Date/Time: 9/29/2024 2:23 PM    Performed by: Lizet Powell MD  Authorized by: Lizet Powell MD    Indications / Diagnosis:  Syncope  ECG reviewed by me, the ED Provider: yes    Patient location:  ED  Rate:     ECG rate:  108    ECG rate assessment: tachycardic    Rhythm:     Rhythm: sinus rhythm    Ectopy:     Ectopy: none    QRS:     QRS axis:  Normal    QRS intervals:  Normal  Conduction:     Conduction: normal    ST segments:     ST segments:  Normal  T waves:     T waves: normal        ED Medication and Procedure  Management   Prior to Admission Medications   Prescriptions Last Dose Informant Patient Reported? Taking?   Diclofenac Sodium (VOLTAREN) 1 % Unknown  No No   Sig: Apply 2 g topically 4 (four) times a day   acetaminophen (TYLENOL) 160 mg/5 mL suspension 2024  No Yes   Sig: Take 20 mL (640 mg total) by mouth every 6 (six) hours as needed for mild pain   benzonatate (TESSALON PERLES) 100 mg capsule 2024  No Yes   Sig: Take 1 capsule (100 mg total) by mouth 3 (three) times a day   docusate (COLACE) 50 mg/5 mL liquid Not Taking  No No   Sig: 10 mL (100 mg total) by Per G Tube route 2 (two) times a day   Patient not taking: Reported on 2024   levothyroxine 112 mcg tablet 2024  No Yes   Sig: Take 1 tablet (112 mcg total) by mouth daily in the early morning   melatonin 10 MG TABS 2024  No Yes   Si tablet (10 mg total) by Per PEG Tube route daily at bedtime   midodrine (PROAMATINE) 5 mg tablet 2024  No Yes   Sig: Take 1 tablet (5 mg total) by mouth 3 (three) times a day before meals   saliva substitute (MOUTH KOTE) 2024  No Yes   Sig: Apply 5 sprays to the mouth or throat 4 (four) times a day as needed (for comfort)      Facility-Administered Medications: None     Current Discharge Medication List        CONTINUE these medications which have NOT CHANGED    Details   acetaminophen (TYLENOL) 160 mg/5 mL suspension Take 20 mL (640 mg total) by mouth every 6 (six) hours as needed for mild pain  Qty: 236 mL, Refills: 0    Associated Diagnoses: Cervical stenosis of spinal canal      benzonatate (TESSALON PERLES) 100 mg capsule Take 1 capsule (100 mg total) by mouth 3 (three) times a day  Qty: 90 capsule, Refills: 0    Associated Diagnoses: Other cough      levothyroxine 112 mcg tablet Take 1 tablet (112 mcg total) by mouth daily in the early morning  Qty: 90 tablet, Refills: 3    Associated Diagnoses: Other specified hypothyroidism      melatonin 10 MG TABS 1 tablet (10 mg total) by Per PEG  Tube route daily at bedtime  Qty: 90 tablet, Refills: 0    Associated Diagnoses: SDH (subdural hematoma) (HCC)      midodrine (PROAMATINE) 5 mg tablet Take 1 tablet (5 mg total) by mouth 3 (three) times a day before meals  Qty: 90 tablet, Refills: 0    Associated Diagnoses: Idiopathic hypotension      saliva substitute (MOUTH KOTE) Apply 5 sprays to the mouth or throat 4 (four) times a day as needed (for comfort)  Qty: 59 mL, Refills: 5    Associated Diagnoses: Closed head injury, initial encounter      Diclofenac Sodium (VOLTAREN) 1 % Apply 2 g topically 4 (four) times a day  Qty: 150 g, Refills: 11    Associated Diagnoses: Cervical stenosis of spinal canal      docusate (COLACE) 50 mg/5 mL liquid 10 mL (100 mg total) by Per G Tube route 2 (two) times a day  Qty: 273 mL, Refills: 5    Associated Diagnoses: At risk for altered bowel elimination           No discharge procedures on file.  ED SEPSIS DOCUMENTATION   Time reflects when diagnosis was documented in both MDM as applicable and the Disposition within this note       Time User Action Codes Description Comment    9/27/2024  7:03 PM Lizet Powell [R55] Syncope     9/28/2024  9:57 AM Reinaldo Perez [S06.5XAA] SDH (subdural hematoma) (HCC)     9/28/2024  9:57 AM Reinaldo Perez [S06.9XAD] Traumatic brain injury, with unknown loss of consciousness status, subsequent encounter                  Lizet Powell MD  09/29/24 5024

## 2024-09-29 NOTE — CASE MANAGEMENT
Case Management Assessment & Discharge Planning Note    Patient name Luis Forrester  Location [facility] 408/[facility] 408-01 MRN [id number]  : 1970 Date 2024       Current Admission Date: 2024  Current Admission Diagnosis:Syncope and collapse   Patient Active Problem List    Diagnosis Date Noted Date Diagnosed    Hypocalcemia 2024     Idiopathic hypotension 2024     Pilonidal cyst 2024     Cervical myelopathy (HCC) 2024     At risk for venous thromboembolism (VTE) 2024     At risk for altered bowel elimination 2024     Cervical stenosis of spinal canal 2024     Dysphagia 2024     SSS (sick sinus syndrome) ([facility]) 2024     s/p Medtronic dual chamber PPM 2024     TBI (traumatic brain injury) ([facility]) 06/15/2024     Syncope and collapse 06/15/2024     Bilateral hand pain 06/15/2024     SDH (subdural hematoma) ([facility]) 06/15/2024     Right hand weakness 06/15/2024     Vitamin D deficiency 2024     Cough 03/15/2018     Reactive airway disease 03/15/2018     Osteoarthritis of both hands 2015     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     Chronic low back pain 2015     Lumbar radiculopathy 2015     Hyperlipidemia 2014     Hypothyroidism 2014     Allergic rhinitis 2012       LOS (days): 1  Geometric Mean LOS (GMLOS) (days):   Days to GMLOS:     OBJECTIVE:    Risk of Unplanned Readmission Score: 13.06       Current admission status: Inpatient       Preferred Pharmacy:   [id number] - [place], [place] - [address]  [facility] [place] [address]  Phone: [phone] Fax: [phone]    Primary Care Provider: Joceline Shook PA-C    Primary Insurance: [id number]  Secondary Insurance:     ASSESSMENT:  Active Health Care Proxies       [facility] Representative - Daughter   Primary Phone: [phone] (Mobile)                 Advance Directives  Does patient have a  Health Care POA?: No  Was patient offered paperwork?: Yes (Declined)  Does patient currently have a Health Care decision maker?: Yes, please see Health Care Proxy section  Does patient have Advance Directives?: No  Was patient offered paperwork?: Yes (Declined.)  Primary Contact: Trinity Veronicayancy       Readmission Root Cause  30 Day Readmission: No    Patient Information  Admitted from:: Home  Mental Status: Alert  During Assessment patient was accompanied by: Daughter  Assessment information provided by:: Patient, Daughter  Primary Caregiver: Self  Support Systems: Self, Spouse/significant other, Daughter  County of Residence: [place]  [facility] [place]  Type of Current Residence: Apartment  Floor Level: 1  Upon entering residence, is there a bedroom on the main floor (no further steps)?: Yes  Upon entering residence, is there a bathroom on the main floor (no further steps)?: Yes  Living Arrangements: Lives w/ Children, Lives w/ Spouse/significant other  Is patient a ?: No    Activities of Daily Living Prior to Admission  Functional Status: Independent  Completes ADLs independently?: Yes  Ambulates independently?: Yes  Does patient use assisted devices?: Yes  Assisted Devices (DME) used: Walker  Does patient currently own DME?: Yes  What DME does the patient currently own?: Walker  Does patient have a history of Outpatient Therapy (PT/OT)?: No  Does the patient have a history of Short-Term Rehab?: Yes ([facility])  Does patient have a history of HHC?: Yes ([facility])  Does patient currently have HHC?: No       Patient Information Continued  Income Source: Unemployed  Does patient have prescription coverage?: Yes  Does patient receive dialysis treatments?: No  Does patient have a history of substance abuse?: No  Does patient have a history of Mental Health Diagnosis?: No       Means of Transportation  Means of Transport to [facility]:: Family transport      Social Determinants of Health  (SDOH)      Flowsheet Row Most Recent Value   Housing Stability    In the last 12 months, was there a time when you were not able to pay the mortgage or rent on time? N   In the past 12 months, how many times have you moved where you were living? 0   At any time in the past 12 months, were you homeless or living in a shelter (including now)? N   Transportation Needs    In the past 12 months, has lack of transportation kept you from medical appointments or from getting medications? no   In the past 12 months, has lack of transportation kept you from meetings, work, or from getting things needed for daily living? No   Food Insecurity    Within the past 12 months, you worried that your food would run out before you got the money to buy more. Sometimes   Within the past 12 months, the food you bought just didn't last and you didn't have money to get more. Sometimes   Utilities    In the past 12 months has the electric, gas, oil, or water company threatened to shut off services in your home? No            DISCHARGE DETAILS:    Discharge planning discussed with:: Patient and daughter Trinity  Freedom of Choice: Yes  Comments - Freedom of Choice: FOC discussed. Pt would like HHC again with [facility].  Referral sent in Aidin.  CM contacted family/caregiver?: Yes  Were Treatment Team discharge recommendations reviewed with patient/caregiver?: Yes  Did patient/caregiver verbalize understanding of patient care needs?: Yes       Contacts  Patient Contacts: Trinity Forrester  Relationship to Patient:: Family  Contact Method: In Person  Reason/Outcome: Emergency Contact, Discharge Planning, Referral    Requested Home Health Care         Is the patient interested in HHC at discharge?: Yes  Home Health Discipline requested:: Occupational Therapy, Physical Therapy  Homebound Criteria Met:: Uses an Assist Device (i.e. cane, walker, etc), Requires the Assistance of Another Person for Safe Ambulation or to Leave the Home  Supporting  Clincal Findings:: Limited Endurance, Fatigues Easliy in Short Distances    DME Referral Provided  Referral made for DME?: No    Other Referral/Resources/Interventions Provided:  Interventions: HHC  Referral Comments: Pt admitted with syncope and collapse. Recommendations for HHC PT/OT. Pt in agreement.  He had [facility] and was just discharged.  He would like them again.  Pending availability.  Referral sent in Aidin     CM met with pt and his daughter Trinity at bedside.  Introduced myself and explained my role.  Pt lives in apartment with ex s/o and daughter, first floor apt.  IADL.  DME includes a walker.  Pt had [facility] at [facility] in July followed by HHC with [facility]  PT/OT recommend HH at discharge, referral sent in Aidin.  Pt daughter wanted information about the waiver program, resources provided.  Pt is also in the process of getting disability.  CM following.

## 2024-09-30 ENCOUNTER — HOME HEALTH ADMISSION (OUTPATIENT)
Dept: HOME HEALTH SERVICES | Facility: HOME HEALTHCARE | Age: 54
End: 2024-09-30
Payer: COMMERCIAL

## 2024-09-30 ENCOUNTER — APPOINTMENT (INPATIENT)
Dept: NEUROLOGY | Facility: CLINIC | Age: 54
DRG: 201 | End: 2024-09-30
Payer: COMMERCIAL

## 2024-09-30 LAB
ANION GAP SERPL CALCULATED.3IONS-SCNC: 9 MMOL/L (ref 4–13)
BASOPHILS # BLD AUTO: 0.04 THOUSANDS/ÂΜL (ref 0–0.1)
BASOPHILS NFR BLD AUTO: 1 % (ref 0–1)
BUN SERPL-MCNC: 10 MG/DL (ref 5–25)
CALCIUM SERPL-MCNC: 7.8 MG/DL (ref 8.4–10.2)
CHLORIDE SERPL-SCNC: 102 MMOL/L (ref 96–108)
CO2 SERPL-SCNC: 29 MMOL/L (ref 21–32)
CREAT SERPL-MCNC: 0.5 MG/DL (ref 0.6–1.3)
EOSINOPHIL # BLD AUTO: 0.42 THOUSAND/ÂΜL (ref 0–0.61)
EOSINOPHIL NFR BLD AUTO: 6 % (ref 0–6)
ERYTHROCYTE [DISTWIDTH] IN BLOOD BY AUTOMATED COUNT: 13.2 % (ref 11.6–15.1)
GFR SERPL CREATININE-BSD FRML MDRD: 121 ML/MIN/1.73SQ M
GLUCOSE SERPL-MCNC: 95 MG/DL (ref 65–140)
HCT VFR BLD AUTO: 40.1 % (ref 36.5–49.3)
HGB BLD-MCNC: 13.1 G/DL (ref 12–17)
IMM GRANULOCYTES # BLD AUTO: 0.02 THOUSAND/UL (ref 0–0.2)
IMM GRANULOCYTES NFR BLD AUTO: 0 % (ref 0–2)
LYMPHOCYTES # BLD AUTO: 1.82 THOUSANDS/ÂΜL (ref 0.6–4.47)
LYMPHOCYTES NFR BLD AUTO: 28 % (ref 14–44)
MAGNESIUM SERPL-MCNC: 2 MG/DL (ref 1.9–2.7)
MCH RBC QN AUTO: 30.8 PG (ref 26.8–34.3)
MCHC RBC AUTO-ENTMCNC: 32.7 G/DL (ref 31.4–37.4)
MCV RBC AUTO: 94 FL (ref 82–98)
MONOCYTES # BLD AUTO: 0.92 THOUSAND/ÂΜL (ref 0.17–1.22)
MONOCYTES NFR BLD AUTO: 14 % (ref 4–12)
NEUTROPHILS # BLD AUTO: 3.38 THOUSANDS/ÂΜL (ref 1.85–7.62)
NEUTS SEG NFR BLD AUTO: 51 % (ref 43–75)
NRBC BLD AUTO-RTO: 0 /100 WBCS
PHOSPHATE SERPL-MCNC: 4.9 MG/DL (ref 2.7–4.5)
PLATELET # BLD AUTO: 151 THOUSANDS/UL (ref 149–390)
PMV BLD AUTO: 11.3 FL (ref 8.9–12.7)
POTASSIUM SERPL-SCNC: 3.6 MMOL/L (ref 3.5–5.3)
RBC # BLD AUTO: 4.26 MILLION/UL (ref 3.88–5.62)
SODIUM SERPL-SCNC: 140 MMOL/L (ref 135–147)
T4 FREE SERPL-MCNC: 1.4 NG/DL (ref 0.61–1.12)
WBC # BLD AUTO: 6.6 THOUSAND/UL (ref 4.31–10.16)

## 2024-09-30 PROCEDURE — 97167 OT EVAL HIGH COMPLEX 60 MIN: CPT

## 2024-09-30 PROCEDURE — 80048 BASIC METABOLIC PNL TOTAL CA: CPT

## 2024-09-30 PROCEDURE — 84439 ASSAY OF FREE THYROXINE: CPT

## 2024-09-30 PROCEDURE — 85025 COMPLETE CBC W/AUTO DIFF WBC: CPT

## 2024-09-30 PROCEDURE — 84100 ASSAY OF PHOSPHORUS: CPT

## 2024-09-30 PROCEDURE — 83735 ASSAY OF MAGNESIUM: CPT

## 2024-09-30 PROCEDURE — 95816 EEG AWAKE AND DROWSY: CPT

## 2024-09-30 PROCEDURE — 95819 EEG AWAKE AND ASLEEP: CPT | Performed by: PSYCHIATRY & NEUROLOGY

## 2024-09-30 RX ORDER — LEVOTHYROXINE SODIUM 88 UG/1
88 TABLET ORAL
Status: DISCONTINUED | OUTPATIENT
Start: 2024-09-30 | End: 2024-10-03 | Stop reason: HOSPADM

## 2024-09-30 RX ADMIN — Medication 9 MG: at 21:15

## 2024-09-30 RX ADMIN — ENOXAPARIN SODIUM 40 MG: 40 INJECTION SUBCUTANEOUS at 08:44

## 2024-09-30 RX ADMIN — ACETAMINOPHEN 640 MG: 650 SUSPENSION ORAL at 16:06

## 2024-09-30 RX ADMIN — BENZONATATE 100 MG: 100 CAPSULE ORAL at 21:15

## 2024-09-30 RX ADMIN — MIDODRINE HYDROCHLORIDE 5 MG: 5 TABLET ORAL at 15:39

## 2024-09-30 RX ADMIN — MIDODRINE HYDROCHLORIDE 5 MG: 5 TABLET ORAL at 12:04

## 2024-09-30 RX ADMIN — MIDODRINE HYDROCHLORIDE 5 MG: 5 TABLET ORAL at 05:17

## 2024-09-30 RX ADMIN — LEVOTHYROXINE SODIUM 112 MCG: 112 TABLET ORAL at 05:17

## 2024-09-30 NOTE — TELEPHONE ENCOUNTER
Patient's daughter came into the office to cancel appointment. She will call back once patient is discharged to schedule appointment.

## 2024-09-30 NOTE — OCCUPATIONAL THERAPY NOTE
Occupational Therapy Evaluation     Patient Name: Luis Forrester  Today's Date: 9/30/2024  Problem List  Principal Problem:    Syncope  Active Problems:    Hyperlipidemia    Hypothyroidism    Cough    TBI (traumatic brain injury) ([facility])    SSS (sick sinus syndrome) ([facility])    Cervical stenosis of spinal canal    Hypocalcemia    Past Medical History  Past Medical History:   Diagnosis Date    Arthritis     Chronic cough     Disease of thyroid gland     Hypoparathyroidism ([facility])     continue taking calcium and vitamin D, will check CMP, PTH level and Vitamin D level    Pneumonia of right middle lobe due to Streptococcus pneumoniae ([facility]) 11/30/2018    s/p Medtronic dual chamber PPM 6/21/2024 06/21/2024     Past Surgical History  Past Surgical History:   Procedure Laterality Date    CARDIAC ELECTROPHYSIOLOGY PROCEDURE N/A 6/21/2024    Procedure: Cardiac pacer implant;  Surgeon: Kofi Pettit MD;  Location: [facility];  Service: Cardiology    DECOMPRESSION SPINE CERVICAL POSTERIOR N/A 6/16/2024    Procedure: DECOMPRESSION SPINE CERVICAL POSTERIOR C2-T1 with fusion navigated with Oarm;  Surgeon: Endy Velasquez MD;  Location: [facility] MAIN OR;  Service: Neurosurgery    FRACTURE SURGERY      Open Treatment of Each Proximal Finger Phalanx    GASTROSTOMY TUBE PLACEMENT N/A 7/10/2024    Procedure: INSERTION PEG TUBE;  Surgeon: Darcy Reinoso MD;  Location: [facility] MAIN [facility];  Service: General         09/30/24 1034   OT Last Visit   OT Visit Date 09/30/24   Note Type   Note type Evaluation   Pain Assessment   Pain Assessment Tool 0-10   Pain Score No Pain   Restrictions/Precautions   Weight Bearing Precautions Per Order No   Other Precautions Cognitive;Multiple lines;Fall Risk;Telemetry;Chair Alarm;Bed Alarm   Home Living   Type of Home House   Home Layout One level  (0 WARD)   Bathroom Shower/Tub Tub/shower unit   Bathroom Toilet Standard   Bathroom Equipment Grab bars in shower;Shower chair;Commode   Home Equipment  Walker  (uses for functional mobility)   Prior Function   Level of [place] Independent with ADLs;Independent with functional mobility;Independent with IADLS   Lives With Significant other;Daughter   Receives Help From Family   IADLs Independent with meal prep;Independent with medication management;Family/Friend/Other provides transportation   Falls in the last 6 months 1 to 4  (4 falls)   Vocational Retired   Lifestyle   Autonomy Pt reports (I) with ADLs, IADLs, and functional mobility with rw at baseline   Reciprocal Relationships family   Service to Others retired   ADL   Where Assessed Edge of bed   Eating Assistance 5  Supervision/Setup   Grooming Assistance 4  Minimal Assistance   UB Bathing Assistance 4  Minimal Assistance   LB Bathing Assistance 4  Minimal Assistance   UB Dressing Assistance 4  Minimal Assistance   LB Dressing Assistance 4  Minimal Assistance   Toileting Assistance  4  Minimal Assistance   Functional Assistance 4  Minimal Assistance   Bed Mobility   Supine to Sit 4  Minimal assistance   Additional items Assist x 1;Increased time required;Verbal cues   Sit to Supine 4  Minimal assistance   Additional items Assist x 1;Increased time required;Verbal cues   Transfers   Sit to Stand 4  Minimal assistance   Additional items Assist x 1;Increased time required;Verbal cues   Stand to Sit 4  Minimal assistance   Additional items Assist x 1;Increased time required;Verbal cues   Additional Comments with rw   Functional Mobility   Functional Mobility 4  Minimal assistance   Additional Comments Pt requires MIN A to ambulate household distance functional mobility with rw   Additional items Rolling walker   Balance   Static Sitting Fair +   Dynamic Sitting Fair   Static Standing Fair -   Dynamic Standing Poor +   Ambulatory Poor +   Activity Tolerance   Activity Tolerance Patient tolerated treatment well   Nurse Made Aware RN Cleared   RUE Assessment   RUE Assessment WFL  (Pt reports difficulty reaching  back of head and behind back)   LUE Assessment   LUE Assessment WFL  (Pt reports difficulty reaching back of head and behind back)   Hand Function   Gross Motor Coordination Functional   Fine Motor Coordination Functional   Cognition   Overall Cognitive Status Impaired   Arousal/Participation Alert;Responsive;Cooperative   Attention Attends with cues to redirect   Orientation Level Oriented X4   Memory Within functional limits   Following Commands Follows one step commands with increased time or repetition   Comments Pt agreeable to therapy. Pt requires cues for safety   Assessment   Limitation Decreased ADL status;Decreased UE ROM;Decreased UE strength;Decreased Safe judgement during ADL;Decreased cognition;Decreased endurance;Decreased self-care trans;Decreased high-level ADLs   Prognosis Good   Assessment Pt is a 55 y/o male that was admitted to [facility] 9/27/2024 with syncope and collapse. Pt  has a past medical history of Arthritis, Chronic cough, Disease of thyroid gland, Hypoparathyroidism ([facility]), Pneumonia of right middle lobe due to Streptococcus pneumoniae ([facility]), and s/p Medtronic dual chamber PPM 6/21/2024. Pt lives with daughter and significant other in a 1 level house with 0 WARD, standard toilet, BSC, and tub shower unit with shower chair and grab bars. Pt reports using rw for functional mobility at baseline. Prior to admission pt (I) ADLs, IADLs, and functional mobility. Pt currently requires MIN A to complete grooming, UB ADLs, LB ADLs, toileting, functional transfers, and household distance functional mobility with rw. Pt limited by decreased ADL status, functional transfers, functional mobility, and activity tolerance. Pt supine in bed at begning of session, pt supine in bed at end of session with alarm set and items within reach. The patient's raw score on the -PAC Daily Activity Inpatient Short Form is 19. A raw score of greater than or equal to 19 suggests the patient may benefit from  discharge to home. Please refer to the recommendation of the Occupational Therapist for safe discharge planning. Recommend Level III minimum intensity OT services  at d/c to maximize pt function.   Goals   Patient Goals to be more independent   LTG Time Frame 10-14   Plan   Treatment Interventions ADL retraining;Functional transfer training;UE strengthening/ROM;Endurance training;Cognitive reorientation;Patient/family training;Equipment evaluation/education;Compensatory technique education;Continued evaluation;Energy conservation;Activityengagement   Goal Expiration Date 10/14/24   OT Frequency 2-3x/wk   Discharge Recommendation   Rehab Resource Intensity Level, OT III (Minimum Resource Intensity)   AM-PAC Daily Activity Inpatient   Lower Body Dressing 3   Bathing 3   Toileting 3   Upper Body Dressing 3   Grooming 3   Eating 4   Daily Activity Raw Score 19   Daily Activity Standardized Score (Calc for Raw Score >=11) 40.22   AM-PAC Applied Cognition Inpatient   Following a Speech/Presentation 3   Understanding Ordinary Conversation 4   Taking Medications 3   Remembering Where Things Are Placed or Put Away 4   Remembering List of 4-5 Errands 3   Taking Care of Complicated Tasks 3   Applied Cognition Raw Score 20   Applied Cognition Standardized Score 41.76   End of Consult   Education Provided Yes;Family or social support of family present for education by provider   Patient Position at End of Consult Supine;All needs within reach;Bed/Chair alarm activated   Nurse Communication Nurse aware of consult     Goals:    Pt will complete functional transfers with MOD IND and appropriate AD to maximize pt safety.    Pt will complete bed mobility with MOD IND  to maximize pt safety.    Pt will complete grooming tasks with MOD IND to maximize pt independence.    Pt will complete LB ADLs with MOD IND  to maximize pt independence.    Pt will complete UB ADLs with MOD IND to maximize pt independence.    Pt will complete toileting  with MOD IND to maximize pt independence.    Pt will complete functional household distance mobility with MOD IND and appropriate AD to maximize pt safety.    Pt will complete simulated IADL tasks with MOD IND to maximize pt independence.     Pt will be able to tolerate 30 minutes of functional activity during therapy session.    Pt will follow multistep directions with increased time or repeating directions 2X to maximize pt safety.     Pt will participate in continued cognitive evaluation for safe discharge planning.        TIFFANY Wesley, OTR/L

## 2024-09-30 NOTE — PROGRESS NOTES
Progress Note - Internal Medicine   Name: Luis Forrester 54 y.o. male I MRN: [id number]  Unit/Bed#: [facility] 408-01 I Date of Admission: 9/27/2024   Date of Service: 9/30/2024 I Hospital Day: 2    Assessment & Plan  Syncope and collapse  Assessment:  -Patient denies having any prodrome, or any weakness following events. After all four events, patient was confused. Patient has been missing midodrine third doses several days, including before this incident. Patient urinated himself after this fall, but EMS workers did not see any tongue bites. His lips and face turned blue during this event.  -Patient and daughter report that this is the fourth time he has synopsized, the first one of which  was around six months ago. Note in 6/2024 references that the patient had six months of syncope prior, so this may have gone on nine months   -6/2024 echocardiogram reveals trace tricuspid, mitral, and pulmonic regurgitation, with mild aortic root dilatation. EF is 60% and systolic/diastolic function is normal   -Orthostatic BPs: lying 102/59, sitting 100/59, standing 108/60   -Vasovagal and orthostatic syncope unlikely, neurologic and cardiac etiologies possible    Plan:  -Can hold off on new echocardiogram in setting of relatively normal recent echo   -Pacemaker interrogated and is intact.  Patient did have an episode of SVT with rates into the 140s on 9/27  - Neurology consulted who recommends MRI brain w/wo contrast and routine EEG  -Continue midodrine 10 mg TID  -Continue tele    Cough  Assessment and Plan:  -Chronic and may be secondary to allergic rhinitis  -Patient takes benzonatate 100 mg capsule TID at home, continue on a prn basis  Hyperlipidemia  Assessment and Plan:  - one month ago, per ASCVD risk algorithm moderate intensity statin recommended.   Plan:  -Consider starting statin outpatient   Hypothyroidism  Assessment and Plan:  -TSH 2 weeks ago 0.056, levothyroxine dose adjusted down   -Continue levothyroxine  112 mcg tablet per PEG tube   SSS (sick sinus syndrome) ([facility])  Assessment and Plan:  -Dual-chamber PPM placed in June of 2024  -Mildly tachycardic on exam and telemetry  - Continue tele  -Continue monitoring VS   TBI (traumatic brain injury) ([facility])  Assessment and Plan:  -Occurred during the patient third fall this year 6/15/2024. CT imaging showed signs of SAH in the bilateral parafalcine regions and potential SDH   -Patient did hit his head and had bleeding behind his ear during his second fall, but did not get medically evaluated   -Patient did not hit head on the fall prior to this admission, and noncon CT head shows no acute bleeding   -Neurologic exam intact   Cervical stenosis of spinal canal  Assessment and Plan:  -Patient had fall 6/15/2024, and is status post C3-7 laminectomy with C2-T1 fusion on 6/16/24. MRI 6/15/2024 revealed Congenital canal stenosis with superimposed degenerative spondylosis .  Most pronounced at C3-4 and C5-C6 with moderate to severe canal stenosis and mild cord compression. The patient was described as having central cord syndrome   -Patient has reduced ability to sit up during exam, but has intact strength and sensation of the extremities along with no urinary issues  -PT/OT consulted  Hypocalcemia  Patient's calcium was low at 7.7 and ionized calcium low at 0.95.  Patient is asymptomatic. PTH is low at 7.6 and phosphorous high at 4.9. Magnesium and vitamin D are normal. Differential diagnosis includes hypoparathyroidism or medication induced.      Plan  -Follow up outpatient for further management       Disposition: Pending medical optimization    Team: SOD TEAM A    History of Present Illness   Patient seen and examined. No acute events overnight.  Patient has no acute complaints at this time.  He is resting comfortably in bed.  He denies fever, chills, headache, dizziness, cough, dyspnea, chest pain, palpitations, abdominal pain, nausea, vomiting, constipation, diarrhea, dysuria,  hematuria, and muscle spasms. Spoke to his daughter at bedside about plan going forward.    Objective     Vitals:    24 1141 24 1900 24 2228 24 0300   BP: 119/63 116/63 99/61 99/64   BP Location: Right arm Right arm Right arm Right arm   Pulse: 96 65 65 66   Resp: 18 18 18 18   Temp: 97.6 °F (36.4 °C) 98 °F (36.7 °C) 97.7 °F (36.5 °C) 97.7 °F (36.5 °C)   TempSrc: Oral Oral Oral Oral   SpO2: 95% 99% 99% 99%   Weight:       Height:          Temperature:   Temp (24hrs), Av.8 °F (36.6 °C), Min:97.6 °F (36.4 °C), Max:98 °F (36.7 °C)    Temperature: 97.7 °F (36.5 °C)  Intake & Output:  I/O          0701   0700  0701   0700  0701  10/01 0700    P.O. 680 684     NG/ 150     Total Intake(mL/kg) 980 (13.8) 834 (11.7)     Urine (mL/kg/hr) 800 (0.5) 0 (0)     Total Output 800 0     Net +180 +834                  Weights:   IBW (Ideal Body Weight): 66.1 kg    Body mass index is 24.59 kg/m².  Weight (last 2 days)       Date/Time Weight    24 0900 71.2 (157)          Physical Exam  Constitutional:       General: He is not in acute distress.  Eyes:      Conjunctiva/sclera: Conjunctivae normal.   Cardiovascular:      Rate and Rhythm: Normal rate and regular rhythm.      Heart sounds: No murmur heard.     No friction rub. No gallop.   Pulmonary:      Effort: Pulmonary effort is normal. No respiratory distress.      Breath sounds: Normal breath sounds. No wheezing or rales.   Abdominal:      General: There is no distension.      Palpations: Abdomen is soft.      Tenderness: There is no abdominal tenderness.      Comments: PEG tube in place   Musculoskeletal:         General: No swelling.   Skin:     General: Skin is warm and dry.   Neurological:      Mental Status: He is alert and oriented to person, place, and time.           Lab Results: I have reviewed the following results:   Recent Labs     24  1718 24  0601 24  0500 24  0504   WBC 9.73   < >  7.61 6.60   HGB 14.1   < > 13.1 13.1   HCT 42.1   < > 39.3 40.1      < > 150 151   SODIUM 135   < > 140 140   K 3.5   < > 3.8 3.6   CL 99   < > 104 102   CO2 24   < > 28 29   BUN 13   < > 11 10   CREATININE 0.72   < > 0.59* 0.50*   GLUC 80   < > 101 95   CAIONIZED  --   --  0.95*  --    MG 1.9  --  1.8* 2.0   PHOS  --    < > 4.9* 4.9*   AST 46*  --  37  --    ALT 53*  --  49  --    ALB 3.9  --  3.5  --    TBILI 0.58  --  0.46  --    ALKPHOS 82  --  75  --    HSTNI0 5  --   --   --     < > = values in this interval not displayed.     Imaging Review: No pertinent imaging studies reviewed.  Other Studies: No additional pertinent studies reviewed.    Currently Ordered Meds:   Current Facility-Administered Medications:     acetaminophen (TYLENOL) oral suspension 640 mg, Q6H PRN    benzonatate (TESSALON PERLES) capsule 100 mg, TID PRN    enoxaparin (LOVENOX) subcutaneous injection 40 mg, Daily    levothyroxine tablet 112 mcg, Early Morning    melatonin tablet 9 mg, HS    midodrine (PROAMATINE) tablet 5 mg, TID AC  VTE Pharmacologic Prophylaxis: Enoxaparin (Lovenox)  VTE Mechanical Prophylaxis: sequential compression device    Administrative Statements     Portions of the record may have been created with voice recognition software.

## 2024-09-30 NOTE — ASSESSMENT & PLAN NOTE
Assessment:  -Patient denies having any prodrome, or any weakness following events. After all four events, patient was confused. Patient has been missing midodrine third doses several days, including before this incident. Patient urinated himself after this fall, but EMS workers did not see any tongue bites. His lips and face turned blue during this event.  -Patient and daughter report that this is the fourth time he has synopsized, the first one of which  was around six months ago. Note in 6/2024 references that the patient had six months of syncope prior, so this may have gone on nine months   -6/2024 echocardiogram reveals trace tricuspid, mitral, and pulmonic regurgitation, with mild aortic root dilatation. EF is 60% and systolic/diastolic function is normal   -Orthostatic BPs: lying 102/59, sitting 100/59, standing 108/60   -Vasovagal and orthostatic syncope unlikely, neurologic and cardiac etiologies possible    Plan:  -Can hold off on new echocardiogram in setting of relatively normal recent echo   -Pacemaker interrogated and is intact.  Patient did have an episode of SVT with rates into the 140s on 9/27  - Neurology consulted who recommends MRI brain w/wo contrast and routine EEG  -Continue midodrine 10 mg TID  -Continue tele     This is being discussed in an encounter from 4/28/22.

## 2024-09-30 NOTE — PLAN OF CARE
Problem: OCCUPATIONAL THERAPY ADULT  Goal: Performs self-care activities at highest level of function for planned discharge setting.  See evaluation for individualized goals.  Description: Treatment Interventions: ADL retraining, Functional transfer training, UE strengthening/ROM, Endurance training, Cognitive reorientation, Patient/family training, Equipment evaluation/education, Compensatory technique education, Continued evaluation, Energy conservation, Activityengagement          See flowsheet documentation for full assessment, interventions and recommendations.   Outcome: Progressing  Note: Limitation: Decreased ADL status, Decreased UE ROM, Decreased UE strength, Decreased Safe judgement during ADL, Decreased cognition, Decreased endurance, Decreased self-care trans, Decreased high-level ADLs  Prognosis: Good  Assessment: Pt is a 55 y/o male that was admitted to [facility] 9/27/2024 with syncope and collapse. Pt  has a past medical history of Arthritis, Chronic cough, Disease of thyroid gland, Hypoparathyroidism ([facility]), Pneumonia of right middle lobe due to Streptococcus pneumoniae ([facility]), and s/p Medtronic dual chamber PPM 6/21/2024. Pt lives with daughter and significant other in a 1 level house with 0 WARD, standard toilet, BSC, and tub shower unit with shower chair and grab bars. Pt reports using rw for functional mobility at baseline. Prior to admission pt (I) ADLs, IADLs, and functional mobility. Pt currently requires MIN A to complete grooming, UB ADLs, LB ADLs, toileting, functional transfers, and household distance functional mobility with rw. Pt limited by decreased ADL status, functional transfers, functional mobility, and activity tolerance. Pt supine in bed at begning of session, pt supine in bed at end of session with alarm set and items within reach. The patient's raw score on the -PAC Daily Activity Inpatient Short Form is 19. A raw score of greater than or equal to 19 suggests the  patient may benefit from discharge to home. Please refer to the recommendation of the Occupational Therapist for safe discharge planning. Recommend Level III minimum intensity OT services  at d/c to maximize pt function.     Rehab Resource Intensity Level, OT: III (Minimum Resource Intensity)

## 2024-09-30 NOTE — ASSESSMENT & PLAN NOTE
Patient's calcium was low at 7.7 and ionized calcium low at 0.95.  Patient is asymptomatic. PTH is low at 7.6 and phosphorous high at 4.9. Magnesium and vitamin D are normal. Differential diagnosis includes hypoparathyroidism or medication induced.      Plan  -Follow up outpatient for further management

## 2024-09-30 NOTE — PROGRESS NOTES
Nutrition Note    Day 1 Calorie count results (from 9/29): estimated intake of 2047 calories (100% estimated needs) and 72g protein (89% of low end of estimated needs) from oral intake alone, + nutritional intake from bolus tube feeds.   Data from day 1 suggests oral intake alone may be adequate to meet estimated needs.     Will collect day 2 calorie count results tomorrow (intake from today) to confirm adequacy.

## 2024-09-30 NOTE — PHYSICAL THERAPY NOTE
PHYSICAL THERAPY NOTE          Patient Name: Luis Forrester  Today's Date: 9/30/2024 09/30/24 1401   PT Last Visit   PT Visit Date 09/30/24   Note Type   Note Type Cancelled Session   Cancel Reasons Refusal       Attempt to see x2 today. On first attempt, patient declining mobilization despite encouragement. On second attempt, patient off floor for imaging. PT will continue to follow and see as able.    Lynn Gutierrez, PT, DPT

## 2024-09-30 NOTE — PROGRESS NOTES
Patient:  CLAU PETERSON    MRN:  [id number]    Randiin Request ID:  [id number]    Level of care reserved:  Home Health Agency    Partner Reserved:  [facility], [place] [address] [phone]    Clinical needs requested:    Geography searched:  [id number]    Start of Service:    Request sent:  3:12pm EDT on 9/29/2024 by Sapphire Gamboa    Partner reserved:  9:15am EDT on 9/30/2024 by Cathleen Martínez    Choice list shared:  9:15am EDT on 9/30/2024 by Cathleen Martínez

## 2024-09-30 NOTE — CASE MANAGEMENT
Case Management Discharge Planning Note    Patient name Luis Forrester  Location [facility] 408/[facility] 408-01 MRN [id number]  : 1970 Date 2024       Current Admission Date: 2024  Current Admission Diagnosis:Syncope and collapse   Patient Active Problem List    Diagnosis Date Noted Date Diagnosed    Hypocalcemia 2024     Idiopathic hypotension 2024     Pilonidal cyst 2024     Cervical myelopathy (H[facility]) 2024     At risk for venous thromboembolism (VTE) 2024     At risk for altered bowel elimination 2024     Cervical stenosis of spinal canal 2024     Dysphagia 2024     SSS (sick sinus syndrome) ([facility]) 2024     s/p Medtronic dual chamber PPM 2024     TBI (traumatic brain injury) ([facility]) 06/15/2024     Syncope and collapse 06/15/2024     Bilateral hand pain 06/15/2024     SDH (subdural hematoma) ([facility]) 06/15/2024     Right hand weakness 06/15/2024     Vitamin D deficiency 2024     Cough 03/15/2018     Reactive airway disease 03/15/2018     Osteoarthritis of both hands 2015     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     Chronic low back pain 2015     Lumbar radiculopathy 2015     Hyperlipidemia 2014     Hypothyroidism 2014     Allergic rhinitis 2012       LOS (days): 2  Geometric Mean LOS (GMLOS) (days):   Days to GMLOS:     OBJECTIVE:  Risk of Unplanned Readmission Score: 14.68         Current admission status: Inpatient   Preferred Pharmacy:   [id number] - [place], [place] - [address]  [place] [address]  Phone: [phone] Fax: [phone]    Primary Care Provider: Joceline Shook PA-C    Primary Insurance: [employer]  Secondary Insurance:     DISCHARGE DETAILS:                                Requested Home Health Care         Is the patient interested in HHC at discharge?: Yes  Home Health Discipline requested:: Occupational Therapy, Physical Therapy  [facility]  Home Health Follow-Up Provider:: PCP  Home Health Services Needed:: Evaluate Functional Status and Safety, Gait/ADL Training, Strengthening/Theraputic Exercises to Improve Function  Homebound Criteria Met:: Uses an Assist Device (i.e. cane, walker, etc), Requires the Assistance of Another Person for Safe Ambulation or to Leave the Home  Supporting Clincal Findings:: Limited Endurance, Fatigues Easliy in Short Distances    DME Referral Provided  Referral made for DME?: No    Other Referral/Resources/Interventions Provided:  Interventions: C  Referral Comments: PT/OT recommended Level III resources, pt agreeable to [facility] referral to [facility]. [facility] able to accept, CM reserved service via [facility].

## 2024-09-30 NOTE — UTILIZATION REVIEW
"NOTIFICATION OF INPATIENT ADMISSION   AUTHORIZATION REQUEST   SERVICING FACILITY:   [facility]  Address: [address] [place] [address]  Tax ID: [id number]  NPI: [id number] ATTENDING PROVIDER:  Attending Name and NPI#: Toshia Mcmillan Do [[id number]]  Address: [address] [place] [address]  Phone: [phone]   ADMISSION INFORMATION:  Place of Service: Inpatient [facility]  Place of Service Code: 21  Inpatient Admission Date/Time: 9/28/24 12:47 PM  Discharge Date/Time: No discharge date for patient encounter.  Admitting Diagnosis Code/Description:  Syncope [R55]     UTILIZATION REVIEW CONTACT:  Marychuy \"Mary\"Jude Utilization   Network Utilization Review Department  Phone: [phone]  Fax: [phone]  Email: [email or web address]  Contact for approvals/pending authorizations, clinical reviews, and discharge.     PHYSICIAN ADVISORY SERVICES:  Medical Necessity Denial & Tszr-ri-Xjbo Review  Phone: [phone]  Fax: [phone]  Email: [email or web address]     DISCHARGE SUPPORT TEAM:  For Patients Discharge Needs & Updates  Phone: [phone] opt. 2 Fax: [phone]  Email: [email or web address]     "

## 2024-10-01 LAB
ANION GAP SERPL CALCULATED.3IONS-SCNC: 10 MMOL/L (ref 4–13)
BASOPHILS # BLD AUTO: 0.04 THOUSANDS/ÂΜL (ref 0–0.1)
BASOPHILS NFR BLD AUTO: 1 % (ref 0–1)
BUN SERPL-MCNC: 11 MG/DL (ref 5–25)
CALCIUM SERPL-MCNC: 7.9 MG/DL (ref 8.4–10.2)
CHLORIDE SERPL-SCNC: 101 MMOL/L (ref 96–108)
CO2 SERPL-SCNC: 28 MMOL/L (ref 21–32)
CREAT SERPL-MCNC: 0.58 MG/DL (ref 0.6–1.3)
EOSINOPHIL # BLD AUTO: 0.38 THOUSAND/ÂΜL (ref 0–0.61)
EOSINOPHIL NFR BLD AUTO: 5 % (ref 0–6)
ERYTHROCYTE [DISTWIDTH] IN BLOOD BY AUTOMATED COUNT: 13.1 % (ref 11.6–15.1)
GFR SERPL CREATININE-BSD FRML MDRD: 113 ML/MIN/1.73SQ M
GLUCOSE SERPL-MCNC: 95 MG/DL (ref 65–140)
HCT VFR BLD AUTO: 39.7 % (ref 36.5–49.3)
HGB BLD-MCNC: 13.3 G/DL (ref 12–17)
IMM GRANULOCYTES # BLD AUTO: 0.03 THOUSAND/UL (ref 0–0.2)
IMM GRANULOCYTES NFR BLD AUTO: 0 % (ref 0–2)
LYMPHOCYTES # BLD AUTO: 1.74 THOUSANDS/ÂΜL (ref 0.6–4.47)
LYMPHOCYTES NFR BLD AUTO: 22 % (ref 14–44)
MCH RBC QN AUTO: 30.8 PG (ref 26.8–34.3)
MCHC RBC AUTO-ENTMCNC: 33.5 G/DL (ref 31.4–37.4)
MCV RBC AUTO: 92 FL (ref 82–98)
MONOCYTES # BLD AUTO: 1 THOUSAND/ÂΜL (ref 0.17–1.22)
MONOCYTES NFR BLD AUTO: 13 % (ref 4–12)
NEUTROPHILS # BLD AUTO: 4.8 THOUSANDS/ÂΜL (ref 1.85–7.62)
NEUTS SEG NFR BLD AUTO: 59 % (ref 43–75)
NRBC BLD AUTO-RTO: 0 /100 WBCS
PLATELET # BLD AUTO: 161 THOUSANDS/UL (ref 149–390)
PMV BLD AUTO: 11.1 FL (ref 8.9–12.7)
POTASSIUM SERPL-SCNC: 3.9 MMOL/L (ref 3.5–5.3)
RBC # BLD AUTO: 4.32 MILLION/UL (ref 3.88–5.62)
SODIUM SERPL-SCNC: 139 MMOL/L (ref 135–147)
WBC # BLD AUTO: 7.99 THOUSAND/UL (ref 4.31–10.16)

## 2024-10-01 PROCEDURE — 99232 SBSQ HOSP IP/OBS MODERATE 35: CPT | Performed by: HOSPITALIST

## 2024-10-01 PROCEDURE — 80048 BASIC METABOLIC PNL TOTAL CA: CPT

## 2024-10-01 PROCEDURE — 85025 COMPLETE CBC W/AUTO DIFF WBC: CPT

## 2024-10-01 RX ADMIN — MIDODRINE HYDROCHLORIDE 5 MG: 5 TABLET ORAL at 19:16

## 2024-10-01 RX ADMIN — ENOXAPARIN SODIUM 40 MG: 40 INJECTION SUBCUTANEOUS at 09:00

## 2024-10-01 RX ADMIN — LEVOTHYROXINE SODIUM 88 MCG: 88 TABLET ORAL at 05:51

## 2024-10-01 RX ADMIN — ACETAMINOPHEN 640 MG: 650 SUSPENSION ORAL at 09:11

## 2024-10-01 RX ADMIN — BENZONATATE 100 MG: 100 CAPSULE ORAL at 21:58

## 2024-10-01 RX ADMIN — Medication 9 MG: at 21:46

## 2024-10-01 RX ADMIN — MIDODRINE HYDROCHLORIDE 5 MG: 5 TABLET ORAL at 09:00

## 2024-10-01 RX ADMIN — MIDODRINE HYDROCHLORIDE 5 MG: 5 TABLET ORAL at 11:23

## 2024-10-01 NOTE — PROGRESS NOTES
Nutrition Note    Day 2 calorie count results (from 9/30): estimated intake of 2099 calories (100% of estimated needs) and 68.5g protein (85% of low end of estimated needs) based on documented oral intake alone. Based on calorie count pt appears to be meeting at least 75% of estimated calorie/protein needs via oral intake.   Discontinued calorie count as adequate data obtained.   Discussed with pt that continuation of supplemental tube feeds does not appear to be needed at this time. However, did recommend that he incorporate oral nutrition supplements, or at least homemade protein shakes, to help ensure he is meeting his needs by oral intake. He has tried Ensure and does not care for it.     Pt is interested in keeping the tube in for medication administration only, told him I would pass this along to provider.

## 2024-10-01 NOTE — PROGRESS NOTES
Progress Note - Internal Medicine   Name: Luis Forrester 54 y.o. male I MRN: [id number]  Unit/Bed#: [facility] 408-01 I Date of Admission: 9/27/2024   Date of Service: 10/1/2024 I Hospital Day: 3    Assessment & Plan  Syncope  Assessment:  -Patient denies having any prodrome, or any weakness following events. After all four events, patient was confused. Patient has been missing midodrine third doses several days, including before this incident. Patient urinated himself after this fall, but EMS workers did not see any tongue bites. His lips and face turned blue during this event.  -Patient and daughter report that this is the fourth time he has synopsized, the first one of which  was around six months ago. Note in 6/2024 references that the patient had six months of syncope prior, so this may have gone on nine months   -6/2024 echocardiogram reveals trace tricuspid, mitral, and pulmonic regurgitation, with mild aortic root dilatation. EF is 60% and systolic/diastolic function is normal   -Orthostatic BPs: lying 102/59, sitting 100/59, standing 108/60   -Vasovagal and orthostatic syncope unlikely, neurologic and cardiac etiologies possible  -Routine EEG during wakefulness, drowsiness, and sleep is normal     Plan:  -Can hold off on new echocardiogram in setting of relatively normal recent echo   -Pacemaker interrogated and is intact however cannot find documentation of an official report. Will get another pacemaker interrogation. Patient did have an episode of SVT with rates into the 140s on 9/27  - Neurology consulted who recommends MRI brain w/wo contrast  -Continue midodrine 5 mg TID  -Continue tele  -Will plan for outpatient tilt table test for further evaluation of recurrent unexplained syncope    Cough  Assessment and Plan:  -Chronic and may be secondary to allergic rhinitis  -Patient takes benzonatate 100 mg capsule TID at home, continue on a prn basis  Hyperlipidemia  Assessment and Plan:  - one month ago,  per ASCVD risk algorithm moderate intensity statin recommended.   Plan:  -Consider starting statin outpatient   Hypothyroidism  Assessment and Plan:  -TSH low at 0.021, levothyroxine dose adjusted down   -Levothyroxine decreased to 88 mcg tablet per PEG tube   SSS (sick sinus syndrome) ([facility])  Assessment and Plan:  -Dual-chamber PPM placed in June of 2024  -Mildly tachycardic on exam and telemetry  - Continue tele  -Continue monitoring VS   TBI (traumatic brain injury) ([facility])  Assessment and Plan:  -Occurred during the patient third fall this year 6/15/2024. CT imaging showed signs of SAH in the bilateral parafalcine regions and potential SDH   -Patient did hit his head and had bleeding behind his ear during his second fall, but did not get medically evaluated   -Patient did not hit head on the fall prior to this admission, and noncon CT head shows no acute bleeding   -Neurologic exam intact   Cervical stenosis of spinal canal  Assessment and Plan:  -Patient had fall 6/15/2024, and is status post C3-7 laminectomy with C2-T1 fusion on 6/16/24. MRI 6/15/2024 revealed Congenital canal stenosis with superimposed degenerative spondylosis .  Most pronounced at C3-4 and C5-C6 with moderate to severe canal stenosis and mild cord compression. The patient was described as having central cord syndrome   -Patient has reduced ability to sit up during exam, but has intact strength and sensation of the extremities along with no urinary issues  -PT/OT consulted, who recommends level 3 care with [facility]  Hypocalcemia  Patient's calcium was low at 7.7 and ionized calcium low at 0.95.  Patient is asymptomatic. PTH is low at 7.6 and phosphorous high at 4.9. Magnesium and vitamin D are normal. Differential diagnosis includes hypoparathyroidism or medication induced.      Plan  -Follow up outpatient for further management       Disposition: Pending further syncope workup    Team: SOD TEAM A    History of Present Illness   Patient seen and  examined. No acute events overnight.  He has no complaints at this time. Patient denies fever, chills, headache, dizziness, dyspnea, cough, chest pain, palpitations, abdominal pain, nausea, vomiting, constipation, diarrhea, dysuria, hematuria, myalgias, muscle cramps, and arthralgias.     Objective     Vitals:    24 1537 24 1928 24 2300 10/01/24 0249   BP: 104/69 105/65 101/62 97/61   BP Location: Right arm Right arm Right arm Right arm   Pulse: 74 77 74 65   Resp: 16 16 16 17   Temp: 98.1 °F (36.7 °C) 98 °F (36.7 °C) 97.8 °F (36.6 °C) 97.8 °F (36.6 °C)   TempSrc: Oral Oral Oral Oral   SpO2: 98%      Weight:       Height:          Temperature:   Temp (24hrs), Av.9 °F (36.6 °C), Min:97.7 °F (36.5 °C), Max:98.1 °F (36.7 °C)    Temperature: 97.8 °F (36.6 °C)  Intake & Output:  I/O          0701   0700  0701  10/01 0700    P.O. 684     NG/ 150    Total Intake(mL/kg) 834 (11.7) 150 (2.1)    Urine (mL/kg/hr) 0 (0) 125 (0.1)    Total Output 0 125    Net +834 +25                Weights:   IBW (Ideal Body Weight): 66.1 kg    Body mass index is 24.59 kg/m².  Weight (last 2 days)       None          Physical Exam  Constitutional:       General: He is not in acute distress.  Eyes:      Conjunctiva/sclera: Conjunctivae normal.   Cardiovascular:      Rate and Rhythm: Normal rate and regular rhythm.      Heart sounds: No murmur heard.     No friction rub. No gallop.   Pulmonary:      Effort: Pulmonary effort is normal. No respiratory distress.      Breath sounds: Normal breath sounds. No wheezing or rales.   Abdominal:      General: There is no distension.      Palpations: Abdomen is soft.      Tenderness: There is no abdominal tenderness.      Comments: PEG tube in place   Musculoskeletal:         General: No swelling.   Skin:     General: Skin is warm and dry.   Neurological:      Mental Status: He is alert and oriented to person, place, and time.           Lab Results: I have reviewed  the following results:   Recent Labs     09/29/24  0500 09/30/24  0504 10/01/24  0551   WBC 7.61 6.60 7.99   HGB 13.1 13.1 13.3   HCT 39.3 40.1 39.7    151 161   SODIUM 140 140 139   K 3.8 3.6 3.9    102 101   CO2 28 29 28   BUN 11 10 11   CREATININE 0.59* 0.50* 0.58*   GLUC 101 95 95   CAIONIZED 0.95*  --   --    MG 1.8* 2.0  --    PHOS 4.9* 4.9*  --    AST 37  --   --    ALT 49  --   --    ALB 3.5  --   --    TBILI 0.46  --   --    ALKPHOS 75  --   --      Imaging Review: Reviewed radiology reports from this admission including: EEG.  Other Studies: No additional pertinent studies reviewed.    Currently Ordered Meds:   Current Facility-Administered Medications:     acetaminophen (TYLENOL) oral suspension 640 mg, Q6H PRN    benzonatate (TESSALON PERLES) capsule 100 mg, TID PRN    enoxaparin (LOVENOX) subcutaneous injection 40 mg, Daily    levothyroxine tablet 88 mcg, Early Morning    melatonin tablet 9 mg, HS    midodrine (PROAMATINE) tablet 5 mg, TID AC  VTE Pharmacologic Prophylaxis: Enoxaparin (Lovenox)  VTE Mechanical Prophylaxis: sequential compression device    Administrative Statements     Portions of the record may have been created with voice recognition software.

## 2024-10-01 NOTE — ASSESSMENT & PLAN NOTE
Assessment:  -Patient denies having any prodrome, or any weakness following events. After all four events, patient was confused. Patient has been missing midodrine third doses several days, including before this incident. Patient urinated himself after this fall, but EMS workers did not see any tongue bites. His lips and face turned blue during this event.  -Patient and daughter report that this is the fourth time he has synopsized, the first one of which  was around six months ago. Note in 6/2024 references that the patient had six months of syncope prior, so this may have gone on nine months   -6/2024 echocardiogram reveals trace tricuspid, mitral, and pulmonic regurgitation, with mild aortic root dilatation. EF is 60% and systolic/diastolic function is normal   -Orthostatic BPs: lying 102/59, sitting 100/59, standing 108/60   -Vasovagal and orthostatic syncope unlikely, neurologic and cardiac etiologies possible  -Routine EEG during wakefulness, drowsiness, and sleep is normal     Plan:  -Can hold off on new echocardiogram in setting of relatively normal recent echo   -Pacemaker interrogated and is intact however cannot find documentation of an official report. Will get another pacemaker interrogation. Patient did have an episode of SVT with rates into the 140s on 9/27  - Neurology consulted who recommends MRI brain w/wo contrast  -Continue midodrine 5 mg TID  -Continue tele  -Will plan for outpatient tilt table test for further evaluation of recurrent unexplained syncope

## 2024-10-01 NOTE — ASSESSMENT & PLAN NOTE
Assessment and Plan:  -TSH low at 0.021, levothyroxine dose adjusted down   -Levothyroxine decreased to 88 mcg tablet per PEG tube

## 2024-10-01 NOTE — ASSESSMENT & PLAN NOTE
Assessment and Plan:  -Patient had fall 6/15/2024, and is status post C3-7 laminectomy with C2-T1 fusion on 6/16/24. MRI 6/15/2024 revealed Congenital canal stenosis with superimposed degenerative spondylosis .  Most pronounced at C3-4 and C5-C6 with moderate to severe canal stenosis and mild cord compression. The patient was described as having central cord syndrome   -Patient has reduced ability to sit up during exam, but has intact strength and sensation of the extremities along with no urinary issues  -PT/OT consulted, who recommends level 3 care with [facility]

## 2024-10-01 NOTE — QUICK NOTE
Reviewed case and routine eeg findings with Dr Eldridge.   Recommend outpatient 72hr ambulatory eeg(ordered).   Continue to observe off aeds for now.   Additional recommendations pending MRI results    Luis Forrester will need follow-up in 6 weeks with epilepsy team for Other in 60 minute appointment. They will require a ambulatory EEG within 4 weeks.

## 2024-10-02 ENCOUNTER — HOME CARE VISIT (OUTPATIENT)
Dept: HOME HEALTH SERVICES | Facility: HOME HEALTHCARE | Age: 54
End: 2024-10-02

## 2024-10-02 ENCOUNTER — APPOINTMENT (INPATIENT)
Dept: RADIOLOGY | Facility: HOSPITAL | Age: 54
DRG: 201 | End: 2024-10-02
Payer: COMMERCIAL

## 2024-10-02 PROBLEM — R05.9 COUGH: Status: RESOLVED | Noted: 2018-03-15 | Resolved: 2024-10-02

## 2024-10-02 PROBLEM — S06.9XAA TBI (TRAUMATIC BRAIN INJURY) (HCC): Status: RESOLVED | Noted: 2024-06-15 | Resolved: 2024-10-02

## 2024-10-02 LAB
ANION GAP SERPL CALCULATED.3IONS-SCNC: 9 MMOL/L (ref 4–13)
BASOPHILS # BLD AUTO: 0.03 THOUSANDS/ÂΜL (ref 0–0.1)
BASOPHILS NFR BLD AUTO: 1 % (ref 0–1)
BUN SERPL-MCNC: 9 MG/DL (ref 5–25)
CALCIUM SERPL-MCNC: 8.3 MG/DL (ref 8.4–10.2)
CHLORIDE SERPL-SCNC: 102 MMOL/L (ref 96–108)
CO2 SERPL-SCNC: 29 MMOL/L (ref 21–32)
CREAT SERPL-MCNC: 0.55 MG/DL (ref 0.6–1.3)
EOSINOPHIL # BLD AUTO: 0.4 THOUSAND/ÂΜL (ref 0–0.61)
EOSINOPHIL NFR BLD AUTO: 6 % (ref 0–6)
ERYTHROCYTE [DISTWIDTH] IN BLOOD BY AUTOMATED COUNT: 13 % (ref 11.6–15.1)
GFR SERPL CREATININE-BSD FRML MDRD: 116 ML/MIN/1.73SQ M
GLUCOSE SERPL-MCNC: 95 MG/DL (ref 65–140)
HCT VFR BLD AUTO: 40.1 % (ref 36.5–49.3)
HGB BLD-MCNC: 13.3 G/DL (ref 12–17)
IMM GRANULOCYTES # BLD AUTO: 0.03 THOUSAND/UL (ref 0–0.2)
IMM GRANULOCYTES NFR BLD AUTO: 1 % (ref 0–2)
LYMPHOCYTES # BLD AUTO: 1.79 THOUSANDS/ÂΜL (ref 0.6–4.47)
LYMPHOCYTES NFR BLD AUTO: 28 % (ref 14–44)
MCH RBC QN AUTO: 30.4 PG (ref 26.8–34.3)
MCHC RBC AUTO-ENTMCNC: 33.2 G/DL (ref 31.4–37.4)
MCV RBC AUTO: 92 FL (ref 82–98)
MONOCYTES # BLD AUTO: 0.9 THOUSAND/ÂΜL (ref 0.17–1.22)
MONOCYTES NFR BLD AUTO: 14 % (ref 4–12)
NEUTROPHILS # BLD AUTO: 3.3 THOUSANDS/ÂΜL (ref 1.85–7.62)
NEUTS SEG NFR BLD AUTO: 50 % (ref 43–75)
NRBC BLD AUTO-RTO: 0 /100 WBCS
PLATELET # BLD AUTO: 158 THOUSANDS/UL (ref 149–390)
PMV BLD AUTO: 11.1 FL (ref 8.9–12.7)
POTASSIUM SERPL-SCNC: 3.7 MMOL/L (ref 3.5–5.3)
RBC # BLD AUTO: 4.38 MILLION/UL (ref 3.88–5.62)
SODIUM SERPL-SCNC: 140 MMOL/L (ref 135–147)
WBC # BLD AUTO: 6.45 THOUSAND/UL (ref 4.31–10.16)

## 2024-10-02 PROCEDURE — A9585 GADOBUTROL INJECTION: HCPCS | Performed by: HOSPITALIST

## 2024-10-02 PROCEDURE — 99232 SBSQ HOSP IP/OBS MODERATE 35: CPT | Performed by: HOSPITALIST

## 2024-10-02 PROCEDURE — 70553 MRI BRAIN STEM W/O & W/DYE: CPT

## 2024-10-02 PROCEDURE — 80048 BASIC METABOLIC PNL TOTAL CA: CPT

## 2024-10-02 PROCEDURE — 85025 COMPLETE CBC W/AUTO DIFF WBC: CPT

## 2024-10-02 PROCEDURE — 99232 SBSQ HOSP IP/OBS MODERATE 35: CPT | Performed by: PSYCHIATRY & NEUROLOGY

## 2024-10-02 RX ORDER — LORAZEPAM 2 MG/ML
1 INJECTION INTRAMUSCULAR ONCE
Status: COMPLETED | OUTPATIENT
Start: 2024-10-02 | End: 2024-10-02

## 2024-10-02 RX ORDER — LEVOTHYROXINE SODIUM 88 UG/1
88 TABLET ORAL
Qty: 30 TABLET | Refills: 0 | Status: SHIPPED | OUTPATIENT
Start: 2024-10-03 | End: 2024-11-02

## 2024-10-02 RX ORDER — LANOLIN ALCOHOL/MO/W.PET/CERES
6 CREAM (GRAM) TOPICAL
Status: DISCONTINUED | OUTPATIENT
Start: 2024-10-03 | End: 2024-10-02

## 2024-10-02 RX ORDER — LANOLIN ALCOHOL/MO/W.PET/CERES
9 CREAM (GRAM) TOPICAL
Status: DISCONTINUED | OUTPATIENT
Start: 2024-10-02 | End: 2024-10-03 | Stop reason: HOSPADM

## 2024-10-02 RX ORDER — GADOBUTROL 604.72 MG/ML
7 INJECTION INTRAVENOUS
Status: COMPLETED | OUTPATIENT
Start: 2024-10-02 | End: 2024-10-02

## 2024-10-02 RX ORDER — HYDROMORPHONE HCL/PF 1 MG/ML
1 SYRINGE (ML) INJECTION ONCE
Status: COMPLETED | OUTPATIENT
Start: 2024-10-02 | End: 2024-10-02

## 2024-10-02 RX ADMIN — SODIUM CHLORIDE 500 ML: 0.9 INJECTION, SOLUTION INTRAVENOUS at 15:54

## 2024-10-02 RX ADMIN — Medication 9 MG: at 22:21

## 2024-10-02 RX ADMIN — GADOBUTROL 7 ML: 604.72 INJECTION INTRAVENOUS at 11:02

## 2024-10-02 RX ADMIN — LORAZEPAM 1 MG: 2 INJECTION INTRAMUSCULAR; INTRAVENOUS at 10:41

## 2024-10-02 RX ADMIN — MIDODRINE HYDROCHLORIDE 5 MG: 5 TABLET ORAL at 15:54

## 2024-10-02 RX ADMIN — MIDODRINE HYDROCHLORIDE 5 MG: 5 TABLET ORAL at 09:43

## 2024-10-02 RX ADMIN — LEVOTHYROXINE SODIUM 88 MCG: 88 TABLET ORAL at 05:36

## 2024-10-02 RX ADMIN — BENZONATATE 100 MG: 100 CAPSULE ORAL at 22:00

## 2024-10-02 RX ADMIN — MIDODRINE HYDROCHLORIDE 5 MG: 5 TABLET ORAL at 11:59

## 2024-10-02 RX ADMIN — ENOXAPARIN SODIUM 40 MG: 40 INJECTION SUBCUTANEOUS at 09:43

## 2024-10-02 RX ADMIN — HYDROMORPHONE HYDROCHLORIDE 1 MG: 1 INJECTION, SOLUTION INTRAMUSCULAR; INTRAVENOUS; SUBCUTANEOUS at 10:41

## 2024-10-02 NOTE — ASSESSMENT & PLAN NOTE
Assessment:  -Patient denies having any prodrome, or any weakness following events. After all four events, patient was confused. Patient has been missing midodrine third doses several days, including before this incident. Patient urinated himself after this fall, but EMS workers did not see any tongue bites. His lips and face turned blue during this event.  -Patient and daughter report that this is the fourth time he has synopsized, the first one of which  was around six months ago. Note in 6/2024 references that the patient had six months of syncope prior, so this may have gone on nine months   -6/2024 echocardiogram reveals trace tricuspid, mitral, and pulmonic regurgitation, with mild aortic root dilatation. EF is 60% and systolic/diastolic function is normal   -Orthostatic BPs: lying 102/59, sitting 100/59, standing 108/60   -Vasovagal and orthostatic syncope unlikely, neurologic and cardiac etiologies possible  -Routine EEG during wakefulness, drowsiness, and sleep is normal   -MRI brain showed no acute intracranial pathology. Mild chronic microangiopathy.     Plan:  -Can hold off on new echocardiogram in setting of relatively normal recent echo   -Pacemaker interrogated and is intact however cannot find documentation of an official report. Will get another pacemaker interrogation. Patient did have an episode of SVT with rates into the 140s on 9/27  - Neurology consulted who recommends outpatient 72 hour ambulatory EEG  -Continue midodrine 5 mg TID  -Continue tele  -Consider outpatient tilt table test with neurology for further evaluation of recurrent unexplained syncope

## 2024-10-02 NOTE — DISCHARGE SUMMARY
Discharge Summary - Internal Medicine   Name: Luis Forrester 54 y.o. male I MRN: [id number]  Unit/Bed#: [facility] 408-01 I Date of Admission: 9/27/2024   Date of Service: 10/2/2024 I Hospital Day: 4          INTERNAL MEDICINE RESIDENCY DISCHARGE SUMMARY     Luis Forrester   54 y.o. male  MRN: [id number]  Room/Bed: [facility] 408/[facility] 408-01     [facility] 4 MED SURG/SD   Encounter: [id number]    Principal Problem:    Syncope  Active Problems:    Hypothyroidism    Hyperlipidemia    SSS (sick sinus syndrome) (HCC)    Cervical stenosis of spinal canal    Hypocalcemia      * Syncope  Assessment & Plan  Assessment:  -Patient denies having any prodrome, or any weakness following events. After all four events, patient was confused. Patient has been missing midodrine third doses several days, including before this incident. Patient urinated himself after this fall, but EMS workers did not see any tongue bites. His lips and face turned blue during this event.  -Patient and daughter report that this is the fourth time he has synopsized, the first one of which  was around six months ago. Note in 6/2024 references that the patient had six months of syncope prior, so this may have gone on nine months   -6/2024 echocardiogram reveals trace tricuspid, mitral, and pulmonic regurgitation, with mild aortic root dilatation. EF is 60% and systolic/diastolic function is normal   -Orthostatic BPs: lying 102/59, sitting 100/59, standing 108/60   -Vasovagal and orthostatic syncope unlikely, neurologic and cardiac etiologies possible  -Routine EEG during wakefulness, drowsiness, and sleep is normal   -MRI brain showed no acute intracranial pathology. Mild chronic microangiopathy.     Plan:  -Can hold off on new echocardiogram in setting of relatively normal recent echo   -Pacemaker interrogated and is intact however cannot find documentation of an official report. Will get another pacemaker  interrogation. Patient did have an episode of SVT with rates into the 140s on 9/27  - Neurology consulted who recommends outpatient 72 hour ambulatory EEG  -Continue midodrine 5 mg TID  -Continue tele  -Consider outpatient tilt table test with neurology for further evaluation of recurrent unexplained syncope      Hypothyroidism  Assessment & Plan  Assessment and Plan:  -TSH low at 0.021, levothyroxine dose adjusted down   -Levothyroxine decreased to 88 mcg tablet     Hypocalcemia  Assessment & Plan  Patient's calcium was low at 7.7 and ionized calcium low at 0.95.  Patient is asymptomatic. PTH is low at 7.6 and phosphorous high at 4.9. Magnesium and vitamin D are normal. Differential diagnosis includes hypoparathyroidism or medication induced.      Plan  -Follow up outpatient for further management       Cervical stenosis of spinal canal  Assessment & Plan  Assessment and Plan:  -Patient had fall 6/15/2024, and is status post C3-7 laminectomy with C2-T1 fusion on 6/16/24. MRI 6/15/2024 revealed Congenital canal stenosis with superimposed degenerative spondylosis .  Most pronounced at C3-4 and C5-C6 with moderate to severe canal stenosis and mild cord compression. The patient was described as having central cord syndrome   -Patient has reduced ability to sit up during exam, but has intact strength and sensation of the extremities along with no urinary issues  -PT/OT consulted, who recommends level 3 care with [facility]    SSS (sick sinus syndrome) (HCC)  Assessment & Plan  Assessment and Plan:  -Dual-chamber PPM placed in June of 2024  -Mildly tachycardic on exam and telemetry  - Continue tele  -Continue monitoring VS     Hyperlipidemia  Assessment & Plan  Assessment and Plan:  - one month ago, per ASCVD risk algorithm moderate intensity statin recommended.   Plan:  -Consider starting statin outpatient         DETAILS OF HOSPITAL COURSE     This is a 54-year-old male with a history of dual chamber pacemaker for  prior syncopal episodes and likely SSS and SVT with aberrancy, hyperlipidemia, hypothyroidism, recent TBI with SAH after a recent fall. He presented to [facility] ED on 9/27/2024 after a fall around 5 PM outside a restaurant. He was on track to fall, but the daughter placed him into a chair and he did not hit his head. The daughter states that during the fall, his face and lips turned blue, and that he had urinary incontinence. She denies seeing any jerking motion. The patient denies a prodrome such as a warm sensation before the fainting, and denies getting up suddenly. He states that he was confused upon wakening. The patient denied any nausea, vomiting, chest pain, SOB, fevers, chills, abdominal pain, urinary difficulties, and change in bowel movements. He denies any recent COVID or flu-like symptoms, but does endorse several months of cough.     Of note, the patient has had three other such episodes over the last 6-9 months. The first episode, the patient was walking outside with his girlfriend, and fainted in front of a house. The second episode, the patient fell and hit his head, and had bleeding behind his ear. At that time, he was having significant coughing. He called his PCP then and per the daughter, the episode was attributed to his coughing. The third time was in 6/2024, when he underwent prolong hospitalization after TBI and SAH. He underwent C3-7 laminectomy with C2-T1 fusion on 6/16/24 for cord compression identified on MR imaging. The hospital course was complicated by likely sick sinus syndrome and wide-complex tachycardia which was likely SVT with aberrancy. During this time, he had a pacemaker placed. The course was also complicated by dysphagia and eventual PEG tube placement, which the patient has been attempting to wean off of.     On arrival to ED, patient was afebrile with temp 97.8 °F, , SpO2 96% on RA, RR 18, BP 98/61. CBC was within normal limits, and transaminases were mildly elevated.  EKG revealed sinus tachycardia. CT head without contrast did not show any acute intracranial abnormality and CXR did not show acute cardiopulmonary disease. Echo on 6/2024 reveals trace tricuspid, mitral, and pulmonic regurgitation, with mild aortic root dilatation, EF is 60% and systolic/diastolic function is normal.     Throughout this hospitalization, patient has had workup done for syncope. His pacemaker was intact and interrogated, which showed SVT with a heart rate in the 140s on 9/27/24, which could be due to high levothyroxine dose. Orthostatic blood pressures were also negative. Vasovagal, orthostatic, and cardiac etiologies are less unlikely. Neurology was consulted to investigate a neurologic source. Routine EEG during wakefulness, drowsiness, and sleep is normal. MRI brain showed no acute intracranial pathology and mild chronic microangiopathy. Of note, his TSH was low at 0.021 so his levothyroxine dose was decreased to 88 mcg.     Today, patient seen and examined. No acute events overnight. He has no complaints at this time. Patient denies fever, chills, headache, dizziness, dyspnea, cough, chest pain, palpitations, abdominal pain, nausea, vomiting, constipation, diarrhea, dysuria, hematuria, myalgias, muscle cramps, and arthralgias. He states that he is able to ambulate with a walker.    Physical Exam:  General: No apparent distress, resting comfortably   Head: Normocephalic, atraumatic  Eyes: Anicteric, no conjunctival erythema  ENT: External ear normal, no nasal discharge  Neck: Trachea midline, no visible lymphadenopathy or goiter  Respiratory: Lungs clear to auscultation, Non-labored respirations, symmetric thorax expansion  Cardiovascular: Normal rate and rhythm, no murmur, Extremities appear well-perfused  Abdomen: Non-distended, non-tender  Extremities: Moves extremities spontaneously, no peripheral edema  Skin: No visible rashes, wounds, or jaundice  Neuro: A&O x 3, no gross focal deficits, no  aphasia    Discharge Instructions  Follow-up with PCP in 7 to 10 days.  Follow-up with neurology in two to three weeks for them to order a tilt table test if necessary.  Please schedule a 72 hour ambulatory EEG for when you are available.   Please continue taking midodrine 5 mg three times a day.    Levothyroxine dose has been decreased to 88 mcg. Please take that dosage and discuss with PCP at your follow up about further management.    DISCHARGE INFORMATION     PCP at Discharge:  Joceline Cormier PA-C    Admitting Provider: Toshia Mcmillan DO  Admission Date: 9/27/2024    Discharge Provider: Mahesh Hilton MD  Discharge Date: 10/02/24     Discharge Disposition: Home with Home Health Care  Discharge Condition: stable  Discharge with Lines: no    Discharge Diet: regular diet  Activity Restrictions: none  Test Results Pending at Discharge: none    Discharge Diagnoses:  Principal Problem:    Syncope  Active Problems:    Hypothyroidism    Hyperlipidemia    SSS (sick sinus syndrome) ([facility])    Cervical stenosis of spinal canal    Hypocalcemia  Resolved Problems:    Cough    TBI (traumatic brain injury) ([facility])      Consulting Providers:      Diagnostic & Therapeutic Procedures Performed:  XR chest 1 view portable    Result Date: 9/27/2024  Impression: No acute cardiopulmonary disease. Workstation performed: [id number]     CT head without contrast    Result Date: 9/27/2024  Impression: No acute intracranial abnormality. Workstation performed: [id number]       Code Status: Level 1 - Full Code  Advance Directive & Living Will: <no information>  Power of :    POLST:      Medications:  Current Discharge Medication List        Current Discharge Medication List        Current Discharge Medication List        CONTINUE these medications which have NOT CHANGED    Details   acetaminophen (TYLENOL) 160 mg/5 mL suspension Take 20 mL (640 mg total) by mouth every 6 (six) hours as needed for mild pain  Qty: 236 mL, Refills: 0     Associated Diagnoses: Cervical stenosis of spinal canal      benzonatate (TESSALON PERLES) 100 mg capsule Take 1 capsule (100 mg total) by mouth 3 (three) times a day  Qty: 90 capsule, Refills: 0    Associated Diagnoses: Other cough      levothyroxine 112 mcg tablet Take 1 tablet (112 mcg total) by mouth daily in the early morning  Qty: 90 tablet, Refills: 3    Associated Diagnoses: Other specified hypothyroidism      melatonin 10 MG TABS 1 tablet (10 mg total) by Per PEG Tube route daily at bedtime  Qty: 90 tablet, Refills: 0    Associated Diagnoses: SDH (subdural hematoma) ([facility])      midodrine (PROAMATINE) 5 mg tablet Take 1 tablet (5 mg total) by mouth 3 (three) times a day before meals  Qty: 90 tablet, Refills: 0    Associated Diagnoses: Idiopathic hypotension      saliva substitute (MOUTH KOTE) Apply 5 sprays to the mouth or throat 4 (four) times a day as needed (for comfort)  Qty: 59 mL, Refills: 5    Associated Diagnoses: Closed head injury, initial encounter      Diclofenac Sodium (VOLTAREN) 1 % Apply 2 g topically 4 (four) times a day  Qty: 150 g, Refills: 11    Associated Diagnoses: Cervical stenosis of spinal canal      docusate (COLACE) 50 mg/5 mL liquid 10 mL (100 mg total) by Per G Tube route 2 (two) times a day  Qty: 273 mL, Refills: 5    Associated Diagnoses: At risk for altered bowel elimination             Allergies:  Allergies   Allergen Reactions    Chlorine Rash       FOLLOW-UP     PCP Outpatient Follow-up:  Follow up: PCP  Date and time: in 7-10 days      Consulting Providers Follow-up:  Specialty: Neurology  Follow up: 10/28/24 at 4:00 pm    Active Issues Requiring Follow-up:   Issue: Recurrent syncope    Discharge Statement:   I spent 30 minutes minutes discharging the patient. This time was spent on the day of discharge. I had direct contact with the patient on the day of discharge. Additional documentation is required if more than 30 minutes were spent on discharge.    Portions of the  "record may have been created with voice recognition software.  Occasional wrong word or \"sound a like\" substitutions may have occurred due to the inherent limitations of voice recognition software.  Read the chart carefully and recognize, using context, where substitutions have occurred.    ==  Olivia Morgan MD  [facility]  Internal Medicine Resident PGY-1   "

## 2024-10-02 NOTE — ASSESSMENT & PLAN NOTE
Assessment and Plan:  -Patient had fall 6/15/2024, and is status post C3-7 laminectomy with C2-T1 fusion on 6/16/24. MRI 6/15/2024 revealed Congenital canal stenosis with superimposed degenerative spondylosis .  Most pronounced at C3-4 and C5-C6 with moderate to severe canal stenosis and mild cord compression. The patient was described as having central cord syndrome   -Patient has reduced ability to sit up during exam, but has intact strength and sensation of the extremities along with no urinary issues  -PT/OT consulted, who recommends level 3 care with [facility]

## 2024-10-02 NOTE — OCCUPATIONAL THERAPY NOTE
Occupational Therapy Cancel        Patient Name: Luis Forrester  Today's Date: 10/2/2024       10/02/24 1009   OT Last Visit   OT Visit Date 10/02/24   Note Type   Note type Cancelled Session   Cancel Reasons Patient off floor/test   Additional Comments Pt off the floor for MRI at this time, OT will continue to follow for treatments as appropriate       TIFFANY Wesley, OTR/L

## 2024-10-02 NOTE — PROGRESS NOTES
Progress Note - Neurology   Name: Luis Forrester 54 y.o. male I MRN: [id number]  Unit/Bed#: [facility] 408-01 I Date of Admission: 9/27/2024   Date of Service: 10/2/2024 I Hospital Day: 4    Assessment & Plan  Syncope  54-year-old male with recent SAH/SDH s/p suspected fall in 06/2024, SSS s/p pacemaker 06/2024, s/p C3-C7 laminectomy with C2-T1 fusion 06/2024, recurrent syncopal episodes, arthritis, hypothyroidism, who presented on 9/27 after having a syncopal episode.  The episode was witnessed by the daughter, who reported that patient was standing when she noted that he was holding onto his walker and had deeper breathing.  Patient's daughter assisted him to sit into a chair, where he lost consciousness, had his eyes rolled back, and his face turned blue.  The event lasted approximately 10-15 minutes before patient woke up and was confused and was noted to have an episode of urinary incontinence.  Of note, patient is ordered midodrine 3 times daily but has only been receiving it daily due to family forgetting to give it to him.  BP on presentation 98/61.  Neurology consulted for further evaluation of syncope. Low suspicion for seizure at this time but will work up as noted below.    Workup:  -Ortho BPs: lying 102/59, sitting 100/59, standing 108/60  -9/27 CT head: No acute intracranial abnormality.  -9/27 Pacemaker was interrogated and patient was noted to have SVT with HR in 140s  -9/28 Routine EEG: This Routine EEG recorded during wakefulness, drowsiness, and sleep is essentially normal. Two left temporal sharp transients lacked typical epileptiform features and are of unclear significance. If clinically warranted, a repeat EEG of prolonged duration could be considered to better characterize the finding.   -10/2  MRI brain w wo contrast: No acute intracranial pathology. Mild chronic microangiopathy.     Plan:  -No indication to initiate AEDs at this time  -Outpatient 72H ambulatory EEG ordered  -No additional  inpatient neurology recommendations   SSS (sick sinus syndrome) (HCC)  -S/p pacemaker placement in 06/2024  -Medical management per primary team  Cervical stenosis of spinal canal  -S/p C3-C7 laminectomy with C2-T1 fusion in 06/2024    Luis Forrester will need follow-up in in 6 weeks with epilepsy team for Other in 60 minute appointment. They will require a ambulatory EEG within 4 weeks.     Subjective   Sleepy after prn ativan for MRI, otherwise feels back to baseline without recurrent syncopal episodes or neurological symptoms    Review of Systems   Eyes:  Negative for visual disturbance.   Neurological:  Negative for dizziness, speech difficulty, weakness, numbness and headaches.     Objective :  Temp:  [97.6 °F (36.4 °C)-98.6 °F (37 °C)] 97.6 °F (36.4 °C)  HR:  [62-84] 84  BP: ()/(57-70) 100/68  Resp:  [18-20] 18  SpO2:  [95 %-98 %] 95 %  O2 Device: None (Room air)    Physical Exam  Vitals reviewed.   Constitutional:       General: He is awake. He is not in acute distress.  HENT:      Head: Normocephalic and atraumatic.   Pulmonary:      Effort: Pulmonary effort is normal.   Abdominal:      Comments: peg   Skin:     General: Skin is warm and dry.   Neurological:      Motor: Motor strength is normal.  Psychiatric:         Speech: Speech normal.     Neurological Exam  Mental Status  Awake. Speech is normal.    Cranial Nerves  CN II-XII intact.    Motor   Strength is 5/5 throughout all four extremities.    Sensory  Light touch is normal in upper and lower extremities.     Coordination  Right: Finger-to-nose normal.    Gait    Gait deferred.    Lab Results: I personally reviewed pertinent labs  Imaging results: I personally reviewed neuroimaging and routine EEG noted above    VTE Pharmacologic Prophylaxis: Enoxaparin (Lovenox)    Assessment, image and plan reviewed with Dr. Eldridge. Plan discussed with patient and primary service. Please refer to attending attestation for additional recommendations

## 2024-10-02 NOTE — CASE MANAGEMENT
Case Management Discharge Planning Note    Patient name Luis Forrester  Location [facility] 408/[facility] 408-01 MRN [id number]  : 1970 Date 10/2/2024       Current Admission Date: 2024  Current Admission Diagnosis:Syncope   Patient Active Problem List    Diagnosis Date Noted Date Diagnosed    Hypocalcemia 2024     Idiopathic hypotension 2024     Pilonidal cyst 2024     Cervical myelopathy (HCC) 2024     At risk for venous thromboembolism (VTE) 2024     At risk for altered bowel elimination 2024     Cervical stenosis of spinal canal 2024     Dysphagia 2024     SSS (sick sinus syndrome) (HCC) 2024     s/p [employer] dual chamber PPM 2024     Syncope 06/15/2024     Bilateral hand pain 06/15/2024     SDH (subdural hematoma) (HCC) 06/15/2024     Right hand weakness 06/15/2024     Vitamin D deficiency 2024     Reactive airway disease 03/15/2018     Osteoarthritis of both hands 2015     Arterial ectasia (HCC) 2015     Chronic low back pain 2015     Lumbar radiculopathy 2015     Hyperlipidemia 2014     Hypothyroidism 2014     Allergic rhinitis 2012       LOS (days): 4  Geometric Mean LOS (GMLOS) (days):   Days to GMLOS:     OBJECTIVE:  Risk of Unplanned Readmission Score: 15.69         Current admission status: Inpatient   Preferred Pharmacy:   [id number] - [place], [place]  [address]  [place] [address]  Phone: [phone] Fax: [phone]    Primary Care Provider: Joceline Shook PA-C    Primary Insurance: [employer]  Secondary Insurance:     DISCHARGE DETAILS:    Discharge planning discussed with:: Pt and daughterTrinity  Freedom of Choice: Yes  Comments - Freedom of Choice: Discussed FOC  CM contacted family/caregiver?: No- see comments (Daughter at bedside)  Were Treatment Team discharge recommendations reviewed with patient/caregiver?: Yes  Did  patient/caregiver verbalize understanding of patient care needs?: Yes  Were patient/caregiver advised of the risks associated with not following Treatment Team discharge recommendations?: Yes    Contacts  Patient Contacts: Trinity, daughter  Relationship to Patient:: Family  Contact Method: In Person  Reason/Outcome: Continuity of Care, Discharge Planning              Other Referral/Resources/Interventions Provided:  Financial Resources Provided: Indigent Transportation  Referral Comments: CM informed by nursing that pt will be d/c'd home today and requires transportation via [employer]. CM was told by nursing that pt was sleepy and hard to arouse, and to discuss DCP with pt when he was more awake and alert. CM met pt and daughter at bedside at 4pm with nursing present. CM was told that pt's BP dropped and he was not feeling well. Nursing and daughter both expressed concerns that pt would not be safe to return home. Secure messaged SOD provider, who stated that they would observe pt until 6pm before making decision on whether to keep pt another day. Pt currently written for d/c. CM discussed with charge nurse Yvette NEWMAN and bedside nurse Kelli JIMENEZ CM will be available via secure chat this evening in case there is transportation needs. No transportation is set up at this time.         Treatment Team Recommendation: Home with Home Health Care  Discharge Destination Plan:: Home with Home Health Care  Transport at Discharge : Ride Share

## 2024-10-02 NOTE — ASSESSMENT & PLAN NOTE
Assessment and Plan:  -Patient had fall 6/15/2024, and is status post C3-7 laminectomy with C2-T1 fusion on 6/16/24. MRI 6/15/2024 revealed Congenital canal stenosis with superimposed degenerative spondylosis .  Most pronounced at C3-4 and C5-C6 with moderate to severe canal stenosis and mild cord compression. The patient was described as having central cord syndrome   -Patient has reduced ability to sit up during exam, but has intact strength and sensation of the extremities along with no urinary issues  -PT/OT consulted, who recommends level 3 care with [facility]

## 2024-10-02 NOTE — CASE COMMUNICATION
Per TC with patient's daughter Trinity. Hospital is holding [facility] at least overnight due to low BP. Move SOC date to Friday.

## 2024-10-02 NOTE — PHYSICAL THERAPY NOTE
Physical Therapy Cancellation Note     10/02/24 1010   PT Last Visit   PT Visit Date 10/02/24   Note Type   Note type Cancelled Session   Cancel Reasons Patient off floor/test         Pt off the floor for MRI at this time, PT will continue to follow for treatments as appropriate     Dimas Gutierrez PT, DPT

## 2024-10-02 NOTE — PROGRESS NOTES
Progress Note - Internal Medicine   Name: Luis Forrester 54 y.o. male I MRN: [id number]  Unit/Bed#: [facility] 408-01 I Date of Admission: 9/27/2024   Date of Service: 10/2/2024 I Hospital Day: 4    Assessment & Plan  Syncope  Assessment:  -Patient denies having any prodrome, or any weakness following events. After all four events, patient was confused. Patient has been missing midodrine third doses several days, including before this incident. Patient urinated himself after this fall, but EMS workers did not see any tongue bites. His lips and face turned blue during this event.  -Patient and daughter report that this is the fourth time he has synopsized, the first one of which  was around six months ago. Note in 6/2024 references that the patient had six months of syncope prior, so this may have gone on nine months   -6/2024 echocardiogram reveals trace tricuspid, mitral, and pulmonic regurgitation, with mild aortic root dilatation. EF is 60% and systolic/diastolic function is normal   -Orthostatic BPs: lying 102/59, sitting 100/59, standing 108/60   -Vasovagal and orthostatic syncope unlikely, neurologic and cardiac etiologies possible  -Routine EEG during wakefulness, drowsiness, and sleep is normal   -MRI brain showed no acute intracranial pathology. Mild chronic microangiopathy.     Plan:  -Can hold off on new echocardiogram in setting of relatively normal recent echo   -Pacemaker interrogated and is intact however cannot find documentation of an official report. Will get another pacemaker interrogation. Patient did have an episode of SVT with rates into the 140s on 9/27  - Neurology consulted who recommends outpatient 72 hour ambulatory EEG  -Continue midodrine 5 mg TID  -Continue tele  -Consider outpatient tilt table test with neurology for further evaluation of recurrent unexplained syncope    Hyperlipidemia  Assessment and Plan:  - one month ago, per ASCVD risk algorithm moderate intensity statin  recommended.   Plan:  -Consider starting statin outpatient   Hypothyroidism  Assessment and Plan:  -TSH low at 0.021, levothyroxine dose adjusted down   -Levothyroxine decreased to 88 mcg tablet   SSS (sick sinus syndrome) (HCC)  Assessment and Plan:  -Dual-chamber PPM placed in June of 2024  -Mildly tachycardic on exam and telemetry  - Continue tele  -Continue monitoring VS   Cervical stenosis of spinal canal  Assessment and Plan:  -Patient had fall 6/15/2024, and is status post C3-7 laminectomy with C2-T1 fusion on 6/16/24. MRI 6/15/2024 revealed Congenital canal stenosis with superimposed degenerative spondylosis .  Most pronounced at C3-4 and C5-C6 with moderate to severe canal stenosis and mild cord compression. The patient was described as having central cord syndrome   -Patient has reduced ability to sit up during exam, but has intact strength and sensation of the extremities along with no urinary issues  -PT/OT consulted, who recommends level 3 care with [facility]  Hypocalcemia  Patient's calcium was low at 7.7 and ionized calcium low at 0.95.  Patient is asymptomatic. PTH is low at 7.6 and phosphorous high at 4.9. Magnesium and vitamin D are normal. Differential diagnosis includes hypoparathyroidism or medication induced.      Plan  -Follow up outpatient for further management       Disposition: Patient cleared for discharge, but d/t ongoing fatigue and weakness from ativan and dilaudid used during MRI brain, will hold off dc for tomorrow     Team: SOD TEAM A    Subjective   Patient seen and examined. No acute events overnight. Today, patient seen and examined. No acute events overnight. He had no events on telemetry. He has no complaints at this time. Patient denies fever, chills, headache, dizziness, dyspnea, cough, chest pain, palpitations, abdominal pain, nausea, vomiting, constipation, diarrhea, dysuria, hematuria, myalgias, muscle cramps, and arthralgias. He states that he is able to ambulate with a  walker.    Patient cleared for discharge, but d/t ongoing fatigue and weakness from ativan and dilaudid used during MRI brain, will hold off dc for tomorrow        Objective :  Temp:  [97.6 °F (36.4 °C)-98.6 °F (37 °C)] 97.6 °F (36.4 °C)  HR:  [62-84] 72  BP: ()/(57-73) 106/70  Resp:  [16-18] 16  SpO2:  [95 %-98 %] 95 %  O2 Device: None (Room air)    I/O         09/30 0701  10/01 0700 10/01 0701  10/02 0700 10/02 0701  10/03 0700    P.O.  500     NG/ 150     Total Intake(mL/kg) 150 (2.1) 650 (9.1)     Urine (mL/kg/hr) 125 (0.1) 0 (0)     Total Output 125 0     Net +25 +650            Unmeasured Urine Occurrence  2 x           Weights:   IBW (Ideal Body Weight): 66.1 kg    Body mass index is 24.59 kg/m².  Weight (last 2 days)       None            Physical Exam  Vitals and nursing note reviewed.   Constitutional:       General: He is not in acute distress.     Appearance: Normal appearance. He is well-developed.   HENT:      Head: Normocephalic and atraumatic.   Eyes:      Conjunctiva/sclera: Conjunctivae normal.   Cardiovascular:      Rate and Rhythm: Normal rate and regular rhythm.      Pulses: Normal pulses.      Heart sounds: No murmur heard.  Pulmonary:      Effort: Pulmonary effort is normal. No respiratory distress.      Breath sounds: Normal breath sounds. No wheezing.   Abdominal:      General: There is no distension.      Palpations: Abdomen is soft.      Tenderness: There is no abdominal tenderness.      Comments: PEG site clean -- no erythema, swelling, TTP   Musculoskeletal:         General: No swelling or tenderness.      Cervical back: Neck supple.   Skin:     General: Skin is warm and dry.      Capillary Refill: Capillary refill takes less than 2 seconds.   Neurological:      General: No focal deficit present.      Mental Status: He is alert and oriented to person, place, and time.      Cranial Nerves: Cranial nerves 2-12 are intact.      Sensory: Sensation is intact.      Motor: Motor  function is intact. No weakness.   Psychiatric:         Mood and Affect: Mood normal.         Behavior: Behavior normal.           Lab Results: I have reviewed the following results:  Recent Labs     09/30/24  0504 10/01/24  0551 10/02/24  0537   WBC 6.60   < > 6.45   HGB 13.1   < > 13.3   HCT 40.1   < > 40.1      < > 158   SODIUM 140   < > 140   K 3.6   < > 3.7      < > 102   CO2 29   < > 29   BUN 10   < > 9   CREATININE 0.50*   < > 0.55*   GLUC 95   < > 95   MG 2.0  --   --    PHOS 4.9*  --   --     < > = values in this interval not displayed.       Imaging Results Review: No pertinent imaging studies reviewed.  Other Study Results Review: No additional pertinent studies reviewed.    Currently Ordered Meds:   Current Facility-Administered Medications:     acetaminophen (TYLENOL) oral suspension 640 mg, Q6H PRN    benzonatate (TESSALON PERLES) capsule 100 mg, TID PRN    enoxaparin (LOVENOX) subcutaneous injection 40 mg, Daily    levothyroxine tablet 88 mcg, Early Morning    melatonin tablet 9 mg, HS    midodrine (PROAMATINE) tablet 5 mg, TID AC  VTE Pharmacologic Prophylaxis: VTE covered by:  enoxaparin, Subcutaneous, 40 mg at 10/02/24 0943     VTE Mechanical Prophylaxis: sequential compression device    Administrative Statements     Portions of the record may have been created with voice recognition software.

## 2024-10-02 NOTE — ASSESSMENT & PLAN NOTE
Assessment:  -Patient denies having any prodrome, or any weakness following events. After all four events, patient was confused. Patient has been missing midodrine third doses several days, including before this incident. Patient urinated himself after this fall, but EMS workers did not see any tongue bites. His lips and face turned blue during this event.  -Patient and daughter report that this is the fourth time he has synopsized, the first one of which  was around six months ago. Note in 6/2024 references that the patient had six months of syncope prior, so this may have gone on nine months   -6/2024 echocardiogram reveals trace tricuspid, mitral, and pulmonic regurgitation, with mild aortic root dilatation. EF is 60% and systolic/diastolic function is normal   -Orthostatic BPs: lying 102/59, sitting 100/59, standing 108/60   -Vasovagal and orthostatic syncope unlikely, neurologic and cardiac etiologies possible  -Routine EEG during wakefulness, drowsiness, and sleep is normal   -MRI brain showed no acute intracranial pathology. Mild chronic microangiopathy.     Plan:  -Can hold off on new echocardiogram in setting of relatively normal recent echo   -Pacemaker interrogated and is intact. Patient did have an episode of SVT with rates into the 140s on 9/27  - Neurology recommends outpatient 72 hour ambulatory EEG  -Continue midodrine 5 mg TID  -Discontinue telemetry  -Consider outpatient tilt table test with neurology for further evaluation of recurrent unexplained syncope

## 2024-10-02 NOTE — DISCHARGE INSTR - AVS FIRST PAGE
Follow-up with PCP in 7 to 10 days.    Follow-up with neurology in two to three weeks for them to order a tilt table test if necessary.    Please schedule a 72 hour ambulatory EEG for when you are available.     Please continue taking midodrine 5 mg three times a day.     Levothyroxine dose has been decreased to 88 mcg. Please take that dosage and discuss with PCP at your follow up about further management.

## 2024-10-02 NOTE — ASSESSMENT & PLAN NOTE
Assessment and Plan:  -TSH low at 0.021, levothyroxine dose adjusted down   -Levothyroxine decreased to 88 mcg tablet

## 2024-10-02 NOTE — ASSESSMENT & PLAN NOTE
54-year-old male with recent SAH/SDH s/p suspected fall in 06/2024, SSS s/p pacemaker 06/2024, s/p C3-C7 laminectomy with C2-T1 fusion 06/2024, recurrent syncopal episodes, arthritis, hypothyroidism, who presented on 9/27 after having a syncopal episode.  The episode was witnessed by the daughter, who reported that patient was standing when she noted that he was holding onto his walker and had deeper breathing.  Patient's daughter assisted him to sit into a chair, where he lost consciousness, had his eyes rolled back, and his face turned blue.  The event lasted approximately 10-15 minutes before patient woke up and was confused and was noted to have an episode of urinary incontinence.  Of note, patient is ordered midodrine 3 times daily but has only been receiving it daily due to family forgetting to give it to him.  BP on presentation 98/61.  Neurology consulted for further evaluation of syncope. Low suspicion for seizure at this time but will work up as noted below.    Workup:  -Ortho BPs: lying 102/59, sitting 100/59, standing 108/60  -9/27 CT head: No acute intracranial abnormality.  -9/27 Pacemaker was interrogated and patient was noted to have SVT with HR in 140s  -9/28 Routine EEG: This Routine EEG recorded during wakefulness, drowsiness, and sleep is essentially normal. Two left temporal sharp transients lacked typical epileptiform features and are of unclear significance. If clinically warranted, a repeat EEG of prolonged duration could be considered to better characterize the finding.   -10/2  MRI brain w wo contrast: No acute intracranial pathology. Mild chronic microangiopathy.     Plan:  -No indication to initiate AEDs at this time  -Outpatient 72H ambulatory EEG ordered  -No additional inpatient neurology recommendations

## 2024-10-03 ENCOUNTER — TELEPHONE (OUTPATIENT)
Age: 54
End: 2024-10-03

## 2024-10-03 VITALS
TEMPERATURE: 98 F | BODY MASS INDEX: 24.64 KG/M2 | WEIGHT: 157 LBS | OXYGEN SATURATION: 96 % | DIASTOLIC BLOOD PRESSURE: 67 MMHG | SYSTOLIC BLOOD PRESSURE: 110 MMHG | HEIGHT: 67 IN | RESPIRATION RATE: 17 BRPM | HEART RATE: 89 BPM

## 2024-10-03 PROCEDURE — 99238 HOSP IP/OBS DSCHRG MGMT 30/<: CPT | Performed by: HOSPITALIST

## 2024-10-03 RX ADMIN — MIDODRINE HYDROCHLORIDE 5 MG: 5 TABLET ORAL at 07:38

## 2024-10-03 RX ADMIN — LEVOTHYROXINE SODIUM 88 MCG: 88 TABLET ORAL at 07:38

## 2024-10-03 RX ADMIN — ENOXAPARIN SODIUM 40 MG: 40 INJECTION SUBCUTANEOUS at 09:12

## 2024-10-03 NOTE — TELEPHONE ENCOUNTER
ROLANDU/ NELI BRIDGET/ SYNCOPE      DC- 10/03/2024- HOME    ----- Message from ÓSCAR Michael sent at 10/2/2024  4:31 PM EDT -----  Regarding: STEFANIE  Luis Forrester will need follow-up in in 6 weeks with epilepsy team for Other in 60 minute appointment. They will require a ambulatory EEG within 4 weeks

## 2024-10-03 NOTE — CASE MANAGEMENT
Case Management Discharge Planning Note    Patient name Luis Forrester  Location [facility] 408/[facility] 408-01 MRN [id number]  : 1970 Date 10/3/2024       Current Admission Date: 2024  Current Admission Diagnosis:Syncope   Patient Active Problem List    Diagnosis Date Noted Date Diagnosed    Hypocalcemia 2024     Idiopathic hypotension 2024     Pilonidal cyst 2024     Cervical myelopathy (HCC) 2024     At risk for venous thromboembolism (VTE) 2024     At risk for altered bowel elimination 2024     Cervical stenosis of spinal canal 2024     Dysphagia 2024     SSS (sick sinus syndrome) (HCC) 2024     s/p Medtronic dual chamber PPM 2024     Syncope 06/15/2024     Bilateral hand pain 06/15/2024     SDH (subdural hematoma) (HCC) 06/15/2024     Right hand weakness 06/15/2024     Vitamin D deficiency 2024     Reactive airway disease 03/15/2018     Osteoarthritis of both hands 2015     Arterial ectasia (HCC) 2015     Chronic low back pain 2015     Lumbar radiculopathy 2015     Hyperlipidemia 2014     Hypothyroidism 2014     Allergic rhinitis 2012       LOS (days): 5  Geometric Mean LOS (GMLOS) (days):   Days to GMLOS:     OBJECTIVE:  Risk of Unplanned Readmission Score: 14.45         Current admission status: Inpatient   Preferred Pharmacy:   [id number] - [place], [place]  [address]  [place] [address]  Phone: [phone] Fax: [phone]    Primary Care Provider: Joceline Shook PA-C    Primary Insurance: [employer]  Secondary Insurance:     DISCHARGE DETAILS:    Discharge planning discussed with:: Pt and daughterTrinity  Freedom of Choice: Yes  Comments - Freedom of Choice: Discussed FOC  CM contacted family/caregiver?: No- see comments (Daughter at bedside)  Were Treatment Team discharge recommendations reviewed with patient/caregiver?: Yes  Did  patient/caregiver verbalize understanding of patient care needs?: Yes  Were patient/caregiver advised of the risks associated with not following Treatment Team discharge recommendations?: Yes    Contacts  Patient Contacts: amara Petersen  Relationship to Patient:: Family  Contact Method: In Person  Reason/Outcome: Continuity of Care, Discharge Planning              Other Referral/Resources/Interventions Provided:  Financial Resources Provided: Indigent Transportation  Interventions: Transportation  Referral Comments: SOD provider reached out to CM via secure chat. Pt is medically stable for d/c and requires transportation home. CM met pt and daughter at bedside, who are agreeable to plan, transportation waiver signed. Lyft set up via [employer] for 1000. Nursing notified. SOD aware.         Treatment Team Recommendation: Home with Home Health Care  Discharge Destination Plan:: Home with Home Health Care  Transport at Discharge : Ride Share        Transported by (Company and Unit #): [employer]  ETA of Transport (Date): 10/03/24  ETA of Transport (Time): 1000

## 2024-10-04 ENCOUNTER — HOME CARE VISIT (OUTPATIENT)
Dept: HOME HEALTH SERVICES | Facility: HOME HEALTHCARE | Age: 54
End: 2024-10-04
Payer: COMMERCIAL

## 2024-10-04 VITALS
TEMPERATURE: 98.4 F | SYSTOLIC BLOOD PRESSURE: 102 MMHG | HEART RATE: 107 BPM | OXYGEN SATURATION: 96 % | DIASTOLIC BLOOD PRESSURE: 80 MMHG

## 2024-10-04 DIAGNOSIS — Z59.41 FOOD INSECURITY: Primary | ICD-10-CM

## 2024-10-04 DIAGNOSIS — M48.02 CERVICAL STENOSIS OF SPINAL CANAL: ICD-10-CM

## 2024-10-04 DIAGNOSIS — S06.5XAA SDH (SUBDURAL HEMATOMA) (HCC): ICD-10-CM

## 2024-10-04 PROCEDURE — 400013 VN SOC

## 2024-10-04 PROCEDURE — G0151 HHCP-SERV OF PT,EA 15 MIN: HCPCS

## 2024-10-04 SDOH — ECONOMIC STABILITY - FOOD INSECURITY: FOOD INSECURITY: Z59.41

## 2024-10-04 NOTE — CASE COMMUNICATION
"Medication discrepancies or Major drug interactions: n/a  Abnormal clinical findings: 4 syncopic episodes, elevated resting HR upon PT arrival (100-114bpm, patient is seen drinking coffee - reports 2-3 cups of coffee per day) difficulty with swallowing larger pills, requires PEG tube for med administration  Patient has an abdominal binder, would adding an order for compression stockings further assist his circulation and possibly reduce  his risk for syncopic episodes?  [facility] has Admitted your patient to Home Health service with the following disciplines: PT, MSW and SLP (ancillary order made for MSW and SLP), DC OT order (patient is at PLOF from previous episode)  Patient stated goals of care: \"I want to get stronger and improve the way I walk so I don't shuffle.\"  Potential barriers to goal achievement: patient is forgetful and has history of episodes of syn cope  Primary focus of home health care: Cardiac Circulatory  Anticipated visit pattern PT 1w1, 2w3, MSW and SLP 1w1 and next visit date 10/8/24  Thank you for allowing us to participate in the care of your patient.    "

## 2024-10-07 ENCOUNTER — HOME CARE VISIT (OUTPATIENT)
Dept: HOME HEALTH SERVICES | Facility: HOME HEALTHCARE | Age: 54
End: 2024-10-07
Payer: COMMERCIAL

## 2024-10-07 VITALS
SYSTOLIC BLOOD PRESSURE: 102 MMHG | HEART RATE: 96 BPM | TEMPERATURE: 98 F | DIASTOLIC BLOOD PRESSURE: 78 MMHG | OXYGEN SATURATION: 98 %

## 2024-10-07 PROCEDURE — G0151 HHCP-SERV OF PT,EA 15 MIN: HCPCS

## 2024-10-07 RX ORDER — PHENOL 1.4 %
10 AEROSOL, SPRAY (ML) MUCOUS MEMBRANE
Qty: 90 TABLET | Refills: 0 | Status: SHIPPED | OUTPATIENT
Start: 2024-10-07

## 2024-10-07 RX ORDER — ACETAMINOPHEN 160 MG/5ML
640 SUSPENSION ORAL EVERY 6 HOURS PRN
Qty: 236 ML | Refills: 0 | Status: SHIPPED | OUTPATIENT
Start: 2024-10-07

## 2024-10-08 ENCOUNTER — HOME CARE VISIT (OUTPATIENT)
Dept: HOME HEALTH SERVICES | Facility: HOME HEALTHCARE | Age: 54
End: 2024-10-08
Payer: COMMERCIAL

## 2024-10-08 DIAGNOSIS — I95.0 IDIOPATHIC HYPOTENSION: ICD-10-CM

## 2024-10-08 RX ORDER — MIDODRINE HYDROCHLORIDE 5 MG/1
5 TABLET ORAL
Qty: 90 TABLET | Refills: 0 | Status: SHIPPED | OUTPATIENT
Start: 2024-10-08

## 2024-10-09 ENCOUNTER — TELEPHONE (OUTPATIENT)
Age: 54
End: 2024-10-09

## 2024-10-09 ENCOUNTER — HOME CARE VISIT (OUTPATIENT)
Dept: HOME HEALTH SERVICES | Facility: HOME HEALTHCARE | Age: 54
End: 2024-10-09
Payer: COMMERCIAL

## 2024-10-09 VITALS
OXYGEN SATURATION: 96 % | SYSTOLIC BLOOD PRESSURE: 103 MMHG | HEART RATE: 85 BPM | DIASTOLIC BLOOD PRESSURE: 80 MMHG | TEMPERATURE: 97.9 F

## 2024-10-09 VITALS
OXYGEN SATURATION: 97 % | TEMPERATURE: 98.2 F | HEART RATE: 108 BPM | DIASTOLIC BLOOD PRESSURE: 72 MMHG | SYSTOLIC BLOOD PRESSURE: 92 MMHG

## 2024-10-09 PROCEDURE — G0151 HHCP-SERV OF PT,EA 15 MIN: HCPCS

## 2024-10-09 PROCEDURE — G0153 HHCP-SVS OF S/L PATH,EA 15MN: HCPCS

## 2024-10-09 NOTE — TELEPHONE ENCOUNTER
Patient significant other Aislinn called in and is requesting a call back from provider Regarding supplies for Feeding tube and other medications thast the patient takes.     Please have Ashley De Padua call patient back at phone # [phone] to discuss questions/ Concerns.

## 2024-10-09 NOTE — TELEPHONE ENCOUNTER
Patients significant other Aislinn who is on the consent form called in to schedule HFU Appt.     HFU/ SL BRIDGET/ SYNCOPE     DC- 10/03/2024- HOME     ----- Message from ÓSCAR Michael sent at 10/2/2024  4:31 PM EDT -----  Regarding: HFU  Luis Forrester will need follow-up in in 6 weeks with epilepsy team for Other in 60 minute appointment. They will require a ambulatory EEG within 4 weeks.     Appt Scheduled 1/31/25 at 11 am with Dr. Jake Peña.   Added to the wait list. Scheduled per patients request to only go to [place] office due to No transportation.  Will message SW for assistance.     Please call phone # [phone] for call back for scheduling.      Can you please assist with a sooner appt for the patient. Can only go to [place] Office to be seen due to not having transportation.

## 2024-10-10 ENCOUNTER — TELEPHONE (OUTPATIENT)
Dept: INTERNAL MEDICINE CLINIC | Facility: CLINIC | Age: 54
End: 2024-10-10

## 2024-10-10 NOTE — TELEPHONE ENCOUNTER
"Received form in the mail from [employer]. I let KELSIE Tomlin look over form to see if PCP could complete it. Unfortunately, a functional capacity exam must be completed to fill out form. We do not perform FCE at [facility]. I lvm for patient explaining.     Scanned for into media and placed form in accordion folder under letter \"R\", if pt would like to pick it up.    "

## 2024-10-11 ENCOUNTER — HOME CARE VISIT (OUTPATIENT)
Dept: HOME HEALTH SERVICES | Facility: HOME HEALTHCARE | Age: 54
End: 2024-10-11
Payer: COMMERCIAL

## 2024-10-11 VITALS
SYSTOLIC BLOOD PRESSURE: 105 MMHG | DIASTOLIC BLOOD PRESSURE: 80 MMHG | TEMPERATURE: 99.1 F | HEART RATE: 97 BPM | OXYGEN SATURATION: 99 %

## 2024-10-11 PROCEDURE — G0153 HHCP-SVS OF S/L PATH,EA 15MN: HCPCS

## 2024-10-11 NOTE — TELEPHONE ENCOUNTER
I returned call, spoke to Aislinn, she said this was taken care of, no further questions/concerns at this time.

## 2024-10-11 NOTE — CASE COMMUNICATION
This message is informative only.  No action required.  HH ST alston made 10.9.24     Impression.  mild oral stage dysphagia characterized by mildly labored mastication.  mild pharyngeal stage dysphagia characterized by coughing. Note. Pt reportedly unable to tatke pills orally and presently takes them via tube.    Cognitive linguistic deficits:   Moderate.Severe in Immediate Memory.  Mild.Moderate in STM.  DNT Problem solving, Orientatio n and Organization due to fatigue.   Hearing.  WFL    Vision.  WFL w glasses  xxxxxxxxxx  Impression of repeat VBS w sp 9.10.24. --Mild.moderate oropharyngeal dysphagia characterized by prolonged holding delayed transfer, oral residue, delayed swallow, pharyngeal residue, and occasional penetration and epiglottic undercoating with liquids. Pt. had prolonged mastication on a cookie. Swallowing was delayed starting at the vallecula, but P t is much improved from previous studies. Multiple swallows do not clear residue. Aspiration observed 1x on thin liquids and 1x on NTL. Aspiration events are silent. Cued coughing helped expel bolus from airway. Attempted chin tuck which does not eliminate penetration events. Pt unable to tranfer pill and spit it out. During AP view, esophageal retention was noticed with pudding. See study for full details.   Rec. of repeat VBS w sp 9.1 0.24   reg diet w thin liquids (starting with puree/soft and advance slowly)   medication crushed w puree   aspiration precautions   compensatory techniques--upright posture, feed when fully alert, small bites and sips, and alternating bites and sips   CUE PT TO COUGH DURING INTAKE   xxxxxxxxxxx  Impression 9.19.24   Observed Pt to have mild oral stage dysphagia characterized by mildly labored mastication. Possible mild pharyngeal stage  dysphagia on regular dense foods wo compensatory tehniques. WFL pharyngeal stage dysphagia on soft regular diet foods and thin liquids w compensatory techniques--small bites/sips  slowly w chin tuck and double swallows per bite.   xxxxxxxxxxx    10.9.24 Rec. Sp tx. 2x wk x 3 wks.  Cont informal eval  Practice assignments given  Cont. tube feeding for meds and nutrition as needed   Cont. 1 to 2 cans of 1.5 Jevity per day as needed   Aspi ration Precaution-- Cont to moniter temp and lung condition.  Contact DrMacy if change in either one.  Reflux precautions  Cont. soft, moist regular foods w comp. tech. ex. wash tech  NO SPEAKING WHEN EATING  Cont thin liquids-  single sips and sitting upright  Moniter weight as needed

## 2024-10-13 ENCOUNTER — HOME CARE VISIT (OUTPATIENT)
Dept: HOME HEALTH SERVICES | Facility: HOME HEALTHCARE | Age: 54
End: 2024-10-13
Payer: COMMERCIAL

## 2024-10-13 DIAGNOSIS — R05.8 OTHER COUGH: ICD-10-CM

## 2024-10-13 NOTE — CASE COMMUNICATION
10.13.24  at 1430     daughter called to say that blood pressure was low yesterday with systolic in the 80s and today it is 120s over 70s.    Denies dizzyness   States pt is on a blood pressure pill ,   Advised her to take BP again later and to call [facility] or PCP if it is low again and she verbalized understanding.    Also advised to call PCP tomorrow to let them know he had episode of low BP .

## 2024-10-14 ENCOUNTER — TELEPHONE (OUTPATIENT)
Dept: INTERNAL MEDICINE CLINIC | Facility: CLINIC | Age: 54
End: 2024-10-14

## 2024-10-14 ENCOUNTER — HOME CARE VISIT (OUTPATIENT)
Dept: HOME HEALTH SERVICES | Facility: HOME HEALTHCARE | Age: 54
End: 2024-10-14
Payer: COMMERCIAL

## 2024-10-14 VITALS
OXYGEN SATURATION: 97 % | DIASTOLIC BLOOD PRESSURE: 70 MMHG | TEMPERATURE: 97.7 F | HEART RATE: 102 BPM | SYSTOLIC BLOOD PRESSURE: 92 MMHG

## 2024-10-14 DIAGNOSIS — J45.20 MILD INTERMITTENT REACTIVE AIRWAY DISEASE WITHOUT COMPLICATION: Primary | ICD-10-CM

## 2024-10-14 PROCEDURE — G0151 HHCP-SERV OF PT,EA 15 MIN: HCPCS

## 2024-10-14 RX ORDER — ALBUTEROL SULFATE 90 UG/1
2 INHALANT RESPIRATORY (INHALATION) EVERY 4 HOURS PRN
Qty: 18 G | Refills: 2 | Status: SHIPPED | OUTPATIENT
Start: 2024-10-14

## 2024-10-14 RX ORDER — BENZONATATE 100 MG/1
100 CAPSULE ORAL 3 TIMES DAILY
Qty: 90 CAPSULE | Refills: 0 | Status: SHIPPED | OUTPATIENT
Start: 2024-10-14

## 2024-10-14 NOTE — TELEPHONE ENCOUNTER
Folder Color- Purple     Name of Form-purple     Form to be filled out by-Joceline     Form to be Faxed -[phone]     Patient made aware of 10 business day policy.

## 2024-10-14 NOTE — CASE COMMUNICATION
Upon PT arrival at rest  bpm and BP 92/70. Denies any dizziness or lightheadedness.    Patient had not taken his morning medication yet as patient overslept this morning. PT directs the patient to call PCP's office for instructions referencing communication with home health hotline over the weekend.

## 2024-10-14 NOTE — TELEPHONE ENCOUNTER
Pts daughter calling in for Luis for continuous low BP. She stated an initial BP was 84/she doesnt remember. His current BP now is 94/61. I asked if anyone was available and Sada was able to take over the call.

## 2024-10-15 ENCOUNTER — HOME CARE VISIT (OUTPATIENT)
Dept: HOME HEALTH SERVICES | Facility: HOME HEALTHCARE | Age: 54
End: 2024-10-15
Payer: COMMERCIAL

## 2024-10-15 VITALS
SYSTOLIC BLOOD PRESSURE: 96 MMHG | TEMPERATURE: 97.7 F | WEIGHT: 151 LBS | HEART RATE: 98 BPM | BODY MASS INDEX: 23.65 KG/M2 | DIASTOLIC BLOOD PRESSURE: 70 MMHG | OXYGEN SATURATION: 97 %

## 2024-10-15 PROCEDURE — G0153 HHCP-SVS OF S/L PATH,EA 15MN: HCPCS

## 2024-10-15 NOTE — CASE COMMUNICATION
This message is informative only.  No action required.   Pt reported pain in his R and L hands.    Daughter Joceline reported calling  to get an order for an xray on the hands since he is going to hospital tomorrow for an EEG      Daughter reported that 's office recommended to wait util he goes to 's office to discuss results of EEG and she will assess his hands.

## 2024-10-16 ENCOUNTER — HOME CARE VISIT (OUTPATIENT)
Dept: HOME HEALTH SERVICES | Facility: HOME HEALTHCARE | Age: 54
End: 2024-10-16
Payer: COMMERCIAL

## 2024-10-16 ENCOUNTER — HOSPITAL ENCOUNTER (OUTPATIENT)
Dept: NON INVASIVE DIAGNOSTICS | Facility: HOSPITAL | Age: 54
Discharge: HOME | End: 2024-10-16
Payer: COMMERCIAL

## 2024-10-16 VITALS
TEMPERATURE: 97.7 F | SYSTOLIC BLOOD PRESSURE: 92 MMHG | HEART RATE: 106 BPM | OXYGEN SATURATION: 96 % | DIASTOLIC BLOOD PRESSURE: 64 MMHG

## 2024-10-16 DIAGNOSIS — I73.9 CLAUDICATION (HCC): ICD-10-CM

## 2024-10-16 PROCEDURE — 93923 UPR/LXTR ART STDY 3+ LVLS: CPT

## 2024-10-16 PROCEDURE — 93925 LOWER EXTREMITY STUDY: CPT

## 2024-10-16 PROCEDURE — G0151 HHCP-SERV OF PT,EA 15 MIN: HCPCS

## 2024-10-16 PROCEDURE — 93925 LOWER EXTREMITY STUDY: CPT | Performed by: SURGERY

## 2024-10-16 PROCEDURE — 93922 UPR/L XTREMITY ART 2 LEVELS: CPT | Performed by: SURGERY

## 2024-10-16 NOTE — CASE COMMUNICATION
Upon PT arrival patient's BP is low at 92/64 and HR is elevated at 106bpm at rest. Patient just woke up prior to PT arrival and had not taken his medication yet.

## 2024-10-17 ENCOUNTER — HOME CARE VISIT (OUTPATIENT)
Dept: HOME HEALTH SERVICES | Facility: HOME HEALTHCARE | Age: 54
End: 2024-10-17
Payer: COMMERCIAL

## 2024-10-18 ENCOUNTER — HOME CARE VISIT (OUTPATIENT)
Dept: HOME HEALTH SERVICES | Facility: HOME HEALTHCARE | Age: 54
End: 2024-10-18
Payer: COMMERCIAL

## 2024-10-18 VITALS
DIASTOLIC BLOOD PRESSURE: 81 MMHG | HEART RATE: 87 BPM | OXYGEN SATURATION: 98 % | SYSTOLIC BLOOD PRESSURE: 105 MMHG | TEMPERATURE: 99.1 F

## 2024-10-18 PROCEDURE — G0153 HHCP-SVS OF S/L PATH,EA 15MN: HCPCS

## 2024-10-21 ENCOUNTER — PATIENT OUTREACH (OUTPATIENT)
Dept: INTERNAL MEDICINE CLINIC | Facility: CLINIC | Age: 54
End: 2024-10-21

## 2024-10-21 DIAGNOSIS — Z78.9 NEEDS ASSISTANCE WITH COMMUNITY RESOURCES: Primary | ICD-10-CM

## 2024-10-21 NOTE — PROGRESS NOTES
SW has reached out to pt to assist  with Community Resources.  Pt has returned from a recent Hospitalizatin and rehab stay.  He is at home now with his dgt Trinity and girlfriend Aislinn.    He has been receiving [facility] care through [facility]'s Pt and OT.  He shares he is doing okay but still needs to use a walker.    He shares his memory is also not good.  His family and S/O needs to assist him with his ADls.  They have started an application for the [place] Waiver Program and for Social Security Disability and Supplemental Security Income.  He has also started an application for [employer].  SW has reviewed with him the 3 to call to set up his free ride to be evaluated.  He indicates he will.    Sw also spoke to his s/o Aislinn as per his request.    Pt appears unsure of the process and has asked if our [facility] can f/u with them to assist if needed.    SW to ask [facility] to f/u and assist as needed.

## 2024-10-22 ENCOUNTER — TELEPHONE (OUTPATIENT)
Dept: INTERNAL MEDICINE CLINIC | Facility: CLINIC | Age: 54
End: 2024-10-22

## 2024-10-22 ENCOUNTER — HOME CARE VISIT (OUTPATIENT)
Dept: HOME HEALTH SERVICES | Facility: HOME HEALTHCARE | Age: 54
End: 2024-10-22
Payer: COMMERCIAL

## 2024-10-22 DIAGNOSIS — I95.0 IDIOPATHIC HYPOTENSION: ICD-10-CM

## 2024-10-22 PROCEDURE — G0153 HHCP-SVS OF S/L PATH,EA 15MN: HCPCS

## 2024-10-22 RX ORDER — MIDODRINE HYDROCHLORIDE 10 MG/1
10 TABLET ORAL
Qty: 90 TABLET | Refills: 1 | Status: SHIPPED | OUTPATIENT
Start: 2024-10-22

## 2024-10-22 NOTE — TELEPHONE ENCOUNTER
Called and spoke to patient and patient's daughter Trinity and per Trinity patient's blood pressure this week has been in 85/72's and today at 2pm was 88/78. Trinity stated patient took his blood pressure medication as prescribed and denies headaches, palpitations, blurry vision and chest pain. Verbalized with patient's PCP Joceline and per Joceline she will increase the dose for Midodrine  from 5mg to 10mg x3 times a day, keep his Cardio appt in November 2024. Patient and daughter is to continue monitoring BP and keep it about 90-60 and above. Called and spoke to patient and Daughter Trinity and made them aware as per note. They understood and had no questions at this time.

## 2024-10-22 NOTE — TELEPHONE ENCOUNTER
Patients daughter called to let us know that she just took her dads BP and it was 88/78.     She asked for a call back

## 2024-10-23 ENCOUNTER — HOME CARE VISIT (OUTPATIENT)
Dept: HOME HEALTH SERVICES | Facility: HOME HEALTHCARE | Age: 54
End: 2024-10-23
Payer: COMMERCIAL

## 2024-10-23 VITALS
HEART RATE: 105 BPM | TEMPERATURE: 98.4 F | BODY MASS INDEX: 24.59 KG/M2 | SYSTOLIC BLOOD PRESSURE: 88 MMHG | WEIGHT: 157 LBS | OXYGEN SATURATION: 98 % | DIASTOLIC BLOOD PRESSURE: 78 MMHG

## 2024-10-23 VITALS
DIASTOLIC BLOOD PRESSURE: 64 MMHG | HEART RATE: 92 BPM | SYSTOLIC BLOOD PRESSURE: 86 MMHG | TEMPERATURE: 97.9 F | OXYGEN SATURATION: 98 %

## 2024-10-23 DIAGNOSIS — K59.09 OTHER CONSTIPATION: Primary | ICD-10-CM

## 2024-10-23 PROCEDURE — G0151 HHCP-SERV OF PT,EA 15 MIN: HCPCS

## 2024-10-23 RX ORDER — DOCUSATE SODIUM 100 MG/1
100 CAPSULE, LIQUID FILLED ORAL 2 TIMES DAILY
Qty: 60 CAPSULE | Refills: 5 | Status: SHIPPED | OUTPATIENT
Start: 2024-10-23

## 2024-10-23 NOTE — CASE COMMUNICATION
This message is informatvie only.  No action required.     SHAUNAI.  Daughter took BP when this SLP arrived and.  She called Joceline Shook PA-C to report  that BP was 88.78.  and they reportedly were going to call Daughter back.

## 2024-10-23 NOTE — CASE COMMUNICATION
"BP upon PT arrival was 86/64. Patient acknowledged the change in Midodrine dosage from yesterday's TC with office but when asked if he took 10mg of Midodrine last night before dinner he responded that he took 5mg because he didn't have 10mg tablets and he also took it after dinner not before. I instructed the patient that he is directed to take it before meals and that he could have taken 2 5mg tablets to substitute for not having the 1 0mg prescription picked up yet.    Patient had also been constipated over the weekend. He attempted to take a stool softener \"a green capsule that I tried swallowing and it came back up.\" I recommended he speak with you about what he should be taking in terms of stool softeners since he regurgitated the stool softener.     Patient also reports he is no longer taking liquid tylenol. Patient is taking 2 500mg tablets of tylenol extra stre ngth as needed for pain.     Med profile updated."

## 2024-10-24 ENCOUNTER — PATIENT OUTREACH (OUTPATIENT)
Dept: INTERNAL MEDICINE CLINIC | Facility: CLINIC | Age: 54
End: 2024-10-24

## 2024-10-24 ENCOUNTER — HOME CARE VISIT (OUTPATIENT)
Dept: HOME HEALTH SERVICES | Facility: HOME HEALTHCARE | Age: 54
End: 2024-10-24
Payer: COMMERCIAL

## 2024-10-24 NOTE — PROGRESS NOTES
"SW has received an IN Basket update from Mariana Pollard,  from [facility].  \"She has called [employer] about 3 weeks ago and they told me he was approved. I relayed that message to Pt and to Aislinn. \"Thanks for all of your long term followup with SSDI and Waiver.      CM Team to f/u re [place] SSI/SSD application as indicated.   "

## 2024-10-25 ENCOUNTER — HOME CARE VISIT (OUTPATIENT)
Dept: HOME HEALTH SERVICES | Facility: HOME HEALTHCARE | Age: 54
End: 2024-10-25
Payer: COMMERCIAL

## 2024-10-25 VITALS
TEMPERATURE: 98 F | SYSTOLIC BLOOD PRESSURE: 118 MMHG | OXYGEN SATURATION: 99 % | HEART RATE: 97 BPM | DIASTOLIC BLOOD PRESSURE: 80 MMHG

## 2024-10-25 VITALS — BODY MASS INDEX: 24.59 KG/M2 | WEIGHT: 157 LBS

## 2024-10-25 PROCEDURE — G0153 HHCP-SVS OF S/L PATH,EA 15MN: HCPCS

## 2024-10-25 PROCEDURE — G0151 HHCP-SERV OF PT,EA 15 MIN: HCPCS

## 2024-10-26 DIAGNOSIS — E03.8 OTHER SPECIFIED HYPOTHYROIDISM: ICD-10-CM

## 2024-10-27 DIAGNOSIS — M54.16 LUMBAR RADICULOPATHY: Primary | ICD-10-CM

## 2024-10-27 NOTE — CASE COMMUNICATION
This message is informative only.  No action required.     Pt. DCed from  ST and [facility]. due to no longer homebound.      Impression 10.25.24   Observed Pt to have mild oral stage dysphagia characterized by mildly labored mastication. Possible mild pharyngeal stage dysphagia on regular dense foods wo compensatory tehniques. WFL pharyngeal stage dysphagia on soft regular diet foods and thin liquids w compensatory techniques--small bites/s ips slowly w chin tuck and double swallows per bite.   Pt. now able to take small and medium sized pills WFL w comp tech. which is an increase from Pt afraid of taking pills orally.  Note. Pt reportedly had difficulty taking 1 large fiber pill  and will now substitute a medium sized fiber pill for it.   Cognitive linguistic deficits:  Mild.Moderate which is an increase from Moderate.Severe in Immediate Memory.  Mild which is an increase  from Mild.Moderate in STM,  WFL which is an increase from Mild.Moderte Problem solving and Organization.   WFL in orientation continues.    Rec.  DC from Sp tx due to no longer home bound  Cont Out Pt Sp tx if desired  Practice assignments given   Cont. soft, moist regular foods w comp. tech. ex. wash tech NO SPEAKING WHEN EATING   Cont thin liquids- single sips with chin tuck and sitting upright   Cont. tube feeding for nutrition--1 t o 2 cans of 1.5 Jevity per day as needed--  which is an increase from   meds and nutrition as needed  Meds orally w compensatory techniques  ex. wash tech.   Aspiration Precaution-- Cont to moniter temp and lung condition. Contact  if change in either one.   Reflux precautions   Moniter weight as needed-- today 10.25.24 weight is 157 lbs. which is an increase from 151 lbs on 10.15.24  Refer if change in status

## 2024-10-28 ENCOUNTER — TELEPHONE (OUTPATIENT)
Age: 54
End: 2024-10-28

## 2024-10-28 RX ORDER — LEVOTHYROXINE SODIUM 88 UG/1
88 TABLET ORAL
Qty: 30 TABLET | Refills: 3 | Status: SHIPPED | OUTPATIENT
Start: 2024-10-28 | End: 2025-02-25

## 2024-10-30 DIAGNOSIS — S06.5XAA SDH (SUBDURAL HEMATOMA) (HCC): ICD-10-CM

## 2024-10-30 DIAGNOSIS — M48.02 CERVICAL STENOSIS OF SPINAL CANAL: ICD-10-CM

## 2024-10-30 RX ORDER — PHENOL 1.4 %
10 AEROSOL, SPRAY (ML) MUCOUS MEMBRANE
Qty: 90 TABLET | Refills: 1 | Status: SHIPPED | OUTPATIENT
Start: 2024-10-30

## 2024-10-31 RX ORDER — ACETAMINOPHEN 500 MG
TABLET ORAL
Qty: 90 TABLET | Refills: 5 | Status: SHIPPED | OUTPATIENT
Start: 2024-10-31

## 2024-11-07 ENCOUNTER — PATIENT OUTREACH (OUTPATIENT)
Dept: INTERNAL MEDICINE CLINIC | Facility: CLINIC | Age: 54
End: 2024-11-07

## 2024-11-07 NOTE — PROGRESS NOTES
Outgoing call   11/07/2024    CMOC received referral from LETICIA Iglesias to assist patient with Waiver, Lanta Van and SSI/SSDI.     CMOC introduce self and role. Pt and girlfriend Aislinn receptive to working with me.     Lanta Van:  Pt has applied for Lanta Van application. Pt states he had the evaluation last Monday and it can take 21 days for patient to get approved or denied.     SSI/SSDI: Pt has applied for SSDI and the application and case is pending. CMOC and patient will be calling social security office tomorrow around 10:15am to get information on the case.     Waiver: Pt states he had the both intake evaluations and the case is at [facility] for financial review. Pt provided 5 years of bank statements to [facility] office.     CMOC and patient called [facility] office at [phone] to get information on case. CMOC and pt had to leave a message.     Next outreach is schedule for 11/08/2024.

## 2024-11-08 ENCOUNTER — PATIENT OUTREACH (OUTPATIENT)
Dept: INTERNAL MEDICINE CLINIC | Facility: CLINIC | Age: 54
End: 2024-11-08

## 2024-11-08 ENCOUNTER — TELEPHONE (OUTPATIENT)
Dept: INTERNAL MEDICINE CLINIC | Facility: CLINIC | Age: 54
End: 2024-11-08

## 2024-11-08 DIAGNOSIS — S06.5XAA SDH (SUBDURAL HEMATOMA) (HCC): ICD-10-CM

## 2024-11-08 RX ORDER — PHENOL 1.4 %
10 AEROSOL, SPRAY (ML) MUCOUS MEMBRANE
Qty: 90 TABLET | Refills: 1 | OUTPATIENT
Start: 2024-11-08

## 2024-11-08 NOTE — TELEPHONE ENCOUNTER
Patient called in asking if their was something that could sent to the pharmacy for itchiness in back of head, please advise

## 2024-11-08 NOTE — PROGRESS NOTES
"Outgoing call   11/08/2024    CMOC along with patient call social security office today. CMOC and patient were transferred to the .  is completing an SSI application for patient today.     Representative states patient has an  Darin Gonsales who is representing patient - Darin Gonsales office is located in [place] [phone].  have filed and submitted documents for patient on Oct 30, 2024.      Pt has active [employer] benefits for him and his daughter. Pt is not working or receiving income since June 2024.     Waiver:   CMOC call [facility] office today. The do not answer because they have a high volume call.     CMOC left message for  Ms. Cha [phone] today to get a return call for an update of case.     Next outreach is schedule for 11/14/2024.     Addendum:   CMOC call [employer] and patient's application is pending at this time. Evaluation has been completed.     CMOC requested patient get 60 day Lanta Van temporary. CMOC needs to check on Tuesday to make sure patient is registered.     Pt is requesting a lyft ride for \"Thursday ways\". CMOC need to know what appointment patient has on Thursday.        " Primary Primary Primary

## 2024-11-11 ENCOUNTER — PATIENT OUTREACH (OUTPATIENT)
Dept: INTERNAL MEDICINE CLINIC | Facility: CLINIC | Age: 54
End: 2024-11-11

## 2024-11-11 NOTE — PROGRESS NOTES
Incoming call   11/11/2024    CMOC received incoming call form patient today. Pt states he wants to conform CMOC is coming to his home on Thursday Nov 14, 2024.     Pt is concerned because the  he is working with Darin Gonsales at [facility] [phone] is charging pt a lot of money.     CMOC will duyen [facility] to ask for advised. [facility] is closed today due to the holiday. CMOC will try to contact them this week.     Next outreach is scheduled for Thurs Nov 14, 2024.

## 2024-11-13 ENCOUNTER — TELEPHONE (OUTPATIENT)
Dept: INTERNAL MEDICINE CLINIC | Facility: CLINIC | Age: 54
End: 2024-11-13

## 2024-11-13 NOTE — TELEPHONE ENCOUNTER
This patient shouldn't need an appointment to discuss the waiver application. We submitted the paperwork. He now has to communicate with his  regarding the waiver program.

## 2024-11-13 NOTE — TELEPHONE ENCOUNTER
If his skin looks normal and is just itchy he can apply hydrocortisone as needed OTC. But if he has skin changes or a rash I would need to see it.

## 2024-11-14 ENCOUNTER — PATIENT OUTREACH (OUTPATIENT)
Dept: INTERNAL MEDICINE CLINIC | Facility: CLINIC | Age: 54
End: 2024-11-14

## 2024-11-14 ENCOUNTER — TELEPHONE (OUTPATIENT)
Dept: INTERNAL MEDICINE CLINIC | Facility: CLINIC | Age: 54
End: 2024-11-14

## 2024-11-14 NOTE — TELEPHONE ENCOUNTER
Yes, if it is related to his waiver application. We already submitted the paperwork. He should have been assigned a  who will further complete the application process.   IV discontinued, cath removed intact

## 2024-11-14 NOTE — TELEPHONE ENCOUNTER
[facility]  ORTHOPEDIC DISCHARGE SUMMARY NOTE    ADMISSION DATE:  11/8/2022  DISCHARGE DATE:  11/9/2022  3:54 PM  DISCHARGING PHYSICIAN:  Natanael Gamino MD  ATTENDING PHYSICIAN:  No att. providers found    DISCHARGE DIAGNOSIS:    Patient Active Problem List   Diagnosis   • DJD (degenerative joint disease), cervical   • Hyperlipemia   • TIA (transient ischemic attack)   • Facial basal cell cancer   • Adenocarcinoma of lung (CMS/HCC)   • Arthritis of left knee   • Essential hypertension   • Knee pain       OTHER DIAGNOSES:    Past Medical History:   Diagnosis Date   • Arthritis of knee     left   • Diverticulosis 2010   • DJD (degenerative joint disease), cervical 2008   • Facial basal cell cancer 2014    Dr. Lemos   • Hyperlipemia    • Hypertension 03/2017   • Lung cancer (CMS/HCC) 08/2015    right-seen by Dr. Lema   • TIA (transient ischemic attack) 4/2010       DISCHARGE DISPOSITION:    home    CONDITION AT DISCHARGE:   good    DISCHARGE INSTRUCTIONS:    DISCHARGE MEDICATIONS:  See Discharge Medication Reconciliation List  FOLLOW-UP:  No follow-ups on file.  DIET:  regular diet  ACTIVITY:  activity as tolerated  WOUND CARE:  keep wound clean and dry  SPECIAL INSTRUCTIONS:        REASON FOR ADMISSION:    Gallo Bustamante is a 81 year old male who was admitted on 11/8/2022 with a loose tka    HOSPITAL COURSE:    He was admitted for a left revision tka which went well    OBJECTIVE:    VITALS:    No data found.     PHYSICAL EXAM:    Constitutional:  Well-developed, well-nourished male in no acute distress.  Skin: Warm, dry, intact without rash or lesion.  Neurologic:  Alert and oriented x3.   Heart:  Regular rate and rhythm.   Lungs:  Clear to auscultation.   Abdomen:  Soft and nontender.   Musculoskeletal:  Clean and dry knee    SIGNIFICANT FINDINGS/COMPLICATIONS:    none    CONSULTS:    none    PROCEDURES:    Revision tka    SIGNIFICANT DIAGNOSTIC STUDIES:    none    CASE DISCUSSED WITH:   Called and spoke to patient, Patient had his spouse check the back of his head to verify if any skin changes or if the area looks like a rash forming and per patient spouse it looks normal and no changes to the skin. Patient and spouse made aware as per note.   Patient and spouse understood and had no questions at this time.     Patient      Discharge instructions, medications and followup appointment were discussed with the patient and after visit summary was given.

## 2024-11-14 NOTE — TELEPHONE ENCOUNTER
Folder Color-Purple    Name of Form-[employer] Physical Conditions    Form to be filled out by-[place]    Form to be Faxed to [phone]    Patient made aware of 10 business day policy.

## 2024-11-14 NOTE — PROGRESS NOTES
Outgoing call   11/14/2024    CMOC met with patient in his home today to help complete documents for social security disability.     CMOC gave the physical conditions paperwork to clerical staff to task to doctor for completion.     CMOC completed and faxed [phone] medical information update to [employer] attorneys handling the case for patient for SSISSDI.     Pt states he would like to continue working with [employer] to help approved SSI/SSDI.     Next outreach is schedule for 11/21/2024.

## 2024-11-15 NOTE — TELEPHONE ENCOUNTER
Reached out to patients daughter Trinity because appt was scheduled by her and pts spouse. I informed Trinity that appointment wasn't necessary as form was already completed by the provider.     Trinity was asking for clarification on which form I was speaking of. I explained form. Trinity understood. She said that she would explain to her dad that appointment isn't necessary and for patient to contact his .     She wanted me to hold off on canceling it just in case pt still wanted to be seen.

## 2024-11-18 DIAGNOSIS — S06.5XAA SDH (SUBDURAL HEMATOMA) (HCC): ICD-10-CM

## 2024-11-19 RX ORDER — PHENOL 1.4 %
10 AEROSOL, SPRAY (ML) MUCOUS MEMBRANE
Qty: 90 TABLET | Refills: 1 | OUTPATIENT
Start: 2024-11-19

## 2024-11-20 DIAGNOSIS — I95.0 IDIOPATHIC HYPOTENSION: ICD-10-CM

## 2024-11-20 RX ORDER — MIDODRINE HYDROCHLORIDE 10 MG/1
10 TABLET ORAL
Qty: 90 TABLET | Refills: 1 | Status: SHIPPED | OUTPATIENT
Start: 2024-11-20

## 2024-11-22 ENCOUNTER — PATIENT OUTREACH (OUTPATIENT)
Dept: INTERNAL MEDICINE CLINIC | Facility: CLINIC | Age: 54
End: 2024-11-22

## 2024-11-22 NOTE — PROGRESS NOTES
Outgoing call   11/22/2024    [employer]: Pt has been approved for [employer] transportation services. Per Phu patient has to pay for medical and non-medical trips.     CMOC send e-mail to Kylah Gotti to get information on patient account. Per Kylah they were processed 11/11/24 If he does have Medicaid I can mail him a part 2 form to be completed as there was no insurance information included with the application itself.  Thank you    Waiver:   Pt has been approved for Waiver services. Pt is approved for 46 hours of care. Pt  is Sharad Green at [phone]. Pt's girlfriend will be patient's caretaker. Pt is using agency [employer] [phone]. Daiana is the manger at [employer].     CMOC will wait until patient received [employer] form in the mail and make sure his transportation are no cost.     Next outreach is schedule for

## 2024-11-25 ENCOUNTER — TELEPHONE (OUTPATIENT)
Dept: INTERNAL MEDICINE CLINIC | Facility: CLINIC | Age: 54
End: 2024-11-25

## 2024-11-25 DIAGNOSIS — S06.5XAA SDH (SUBDURAL HEMATOMA) (HCC): ICD-10-CM

## 2024-11-25 DIAGNOSIS — I95.0 IDIOPATHIC HYPOTENSION: ICD-10-CM

## 2024-11-25 DIAGNOSIS — E03.8 OTHER SPECIFIED HYPOTHYROIDISM: ICD-10-CM

## 2024-11-25 DIAGNOSIS — R05.8 OTHER COUGH: ICD-10-CM

## 2024-11-25 DIAGNOSIS — M48.02 CERVICAL STENOSIS OF SPINAL CANAL: ICD-10-CM

## 2024-11-25 NOTE — TELEPHONE ENCOUNTER
Patient called in because he wanted to speak with pcp about his itchy and watery eyes. Patient said his eyes have been itchy and watery for over a week. He did not try taking anything otc yet because he wants to speak with pcp.    Patient also said he wants to discuss getting a prescribed shampoo because the back of his had is always itchy as well.    Please advise,

## 2024-11-26 RX ORDER — PHENOL 1.4 %
10 AEROSOL, SPRAY (ML) MUCOUS MEMBRANE
Qty: 90 TABLET | Refills: 0 | OUTPATIENT
Start: 2024-11-26

## 2024-11-26 RX ORDER — MIDODRINE HYDROCHLORIDE 10 MG/1
10 TABLET ORAL
Qty: 90 TABLET | Refills: 0 | OUTPATIENT
Start: 2024-11-26

## 2024-11-26 RX ORDER — LEVOTHYROXINE SODIUM 88 UG/1
88 TABLET ORAL
Qty: 90 TABLET | Refills: 1 | Status: SHIPPED | OUTPATIENT
Start: 2024-11-26 | End: 2025-03-26

## 2024-11-26 RX ORDER — BENZONATATE 100 MG/1
100 CAPSULE ORAL 3 TIMES DAILY
Qty: 90 CAPSULE | Refills: 0 | Status: SHIPPED | OUTPATIENT
Start: 2024-11-26

## 2024-12-03 ENCOUNTER — TELEPHONE (OUTPATIENT)
Age: 54
End: 2024-12-03

## 2024-12-03 NOTE — TELEPHONE ENCOUNTER
Caller: Aislinn Vasquez    Doctor: Dr. Pettit    Reason for call: Patient had a pacemaker implant on 6/21/24. He has not been seen in office since the procedure. He has had to cancel several appointments due to transportation and being hospitalized. I scheduled the patient for 12/10/24 with Anisha. I could not find any available appointments with EP until March. Please call the patient to cancel this appointment if he is not able to be seen by Anisha for his follow up .     Call back#: [phone]

## 2024-12-12 ENCOUNTER — OFFICE VISIT (OUTPATIENT)
Dept: INTERNAL MEDICINE CLINIC | Facility: CLINIC | Age: 54
End: 2024-12-12

## 2024-12-12 ENCOUNTER — APPOINTMENT (OUTPATIENT)
Dept: LAB | Facility: CLINIC | Age: 54
End: 2024-12-12
Payer: COMMERCIAL

## 2024-12-12 ENCOUNTER — TELEPHONE (OUTPATIENT)
Dept: INTERNAL MEDICINE CLINIC | Facility: CLINIC | Age: 54
End: 2024-12-12

## 2024-12-12 VITALS
DIASTOLIC BLOOD PRESSURE: 71 MMHG | OXYGEN SATURATION: 98 % | TEMPERATURE: 99.2 F | BODY MASS INDEX: 23.81 KG/M2 | SYSTOLIC BLOOD PRESSURE: 101 MMHG | HEART RATE: 98 BPM | WEIGHT: 152 LBS

## 2024-12-12 DIAGNOSIS — I95.0 IDIOPATHIC HYPOTENSION: ICD-10-CM

## 2024-12-12 DIAGNOSIS — I49.5 SSS (SICK SINUS SYNDROME) (HCC): Primary | ICD-10-CM

## 2024-12-12 DIAGNOSIS — L05.91 PILONIDAL CYST: ICD-10-CM

## 2024-12-12 DIAGNOSIS — E78.2 MIXED HYPERLIPIDEMIA: ICD-10-CM

## 2024-12-12 DIAGNOSIS — E55.9 VITAMIN D DEFICIENCY: ICD-10-CM

## 2024-12-12 DIAGNOSIS — J45.20 MILD INTERMITTENT REACTIVE AIRWAY DISEASE WITHOUT COMPLICATION: ICD-10-CM

## 2024-12-12 DIAGNOSIS — I77.89 ARTERIAL ECTASIA (HCC): ICD-10-CM

## 2024-12-12 DIAGNOSIS — H61.23 BILATERAL IMPACTED CERUMEN: ICD-10-CM

## 2024-12-12 DIAGNOSIS — R13.19 OTHER DYSPHAGIA: ICD-10-CM

## 2024-12-12 DIAGNOSIS — E83.51 HYPOCALCEMIA: ICD-10-CM

## 2024-12-12 DIAGNOSIS — E03.8 OTHER SPECIFIED HYPOTHYROIDISM: ICD-10-CM

## 2024-12-12 DIAGNOSIS — H04.203 WATERY EYES: ICD-10-CM

## 2024-12-12 DIAGNOSIS — L29.9 ITCHY SCALP: ICD-10-CM

## 2024-12-12 DIAGNOSIS — Z95.0 S/P PLACEMENT OF CARDIAC PACEMAKER: ICD-10-CM

## 2024-12-12 LAB
ALBUMIN SERPL BCG-MCNC: 4.4 G/DL (ref 3.5–5)
ALP SERPL-CCNC: 80 U/L (ref 34–104)
ALT SERPL W P-5'-P-CCNC: 28 U/L (ref 7–52)
ANION GAP SERPL CALCULATED.3IONS-SCNC: 12 MMOL/L (ref 4–13)
AST SERPL W P-5'-P-CCNC: 28 U/L (ref 13–39)
BILIRUB SERPL-MCNC: 0.62 MG/DL (ref 0.2–1)
BUN SERPL-MCNC: 15 MG/DL (ref 5–25)
CALCIUM SERPL-MCNC: 8.2 MG/DL (ref 8.4–10.2)
CHLORIDE SERPL-SCNC: 96 MMOL/L (ref 96–108)
CHOLEST SERPL-MCNC: 284 MG/DL (ref ?–200)
CO2 SERPL-SCNC: 28 MMOL/L (ref 21–32)
CREAT SERPL-MCNC: 0.78 MG/DL (ref 0.6–1.3)
GFR SERPL CREATININE-BSD FRML MDRD: 100 ML/MIN/1.73SQ M
GLUCOSE P FAST SERPL-MCNC: 93 MG/DL (ref 65–99)
HDLC SERPL-MCNC: 38 MG/DL
LDLC SERPL CALC-MCNC: 209 MG/DL (ref 0–100)
MAGNESIUM SERPL-MCNC: 2.2 MG/DL (ref 1.9–2.7)
NONHDLC SERPL-MCNC: 246 MG/DL
PHOSPHATE SERPL-MCNC: 5 MG/DL (ref 2.7–4.5)
POTASSIUM SERPL-SCNC: 4 MMOL/L (ref 3.5–5.3)
PROT SERPL-MCNC: 8.1 G/DL (ref 6.4–8.4)
PTH-INTACT SERPL-MCNC: 11.9 PG/ML (ref 12–88)
SODIUM SERPL-SCNC: 136 MMOL/L (ref 135–147)
TRIGL SERPL-MCNC: 184 MG/DL (ref ?–150)
TSH SERPL DL<=0.05 MIU/L-ACNC: 3.43 UIU/ML (ref 0.45–4.5)

## 2024-12-12 PROCEDURE — 80061 LIPID PANEL: CPT

## 2024-12-12 PROCEDURE — 69210 REMOVE IMPACTED EAR WAX UNI: CPT | Performed by: PHYSICIAN ASSISTANT

## 2024-12-12 PROCEDURE — 36415 COLL VENOUS BLD VENIPUNCTURE: CPT

## 2024-12-12 PROCEDURE — 99214 OFFICE O/P EST MOD 30 MIN: CPT | Performed by: PHYSICIAN ASSISTANT

## 2024-12-12 PROCEDURE — 80053 COMPREHEN METABOLIC PANEL: CPT

## 2024-12-12 PROCEDURE — 83970 ASSAY OF PARATHORMONE: CPT

## 2024-12-12 PROCEDURE — 84100 ASSAY OF PHOSPHORUS: CPT

## 2024-12-12 PROCEDURE — 83735 ASSAY OF MAGNESIUM: CPT

## 2024-12-12 PROCEDURE — 84443 ASSAY THYROID STIM HORMONE: CPT

## 2024-12-12 RX ORDER — CLOBETASOL PROPIONATE 0.5 MG/ML
SOLUTION TOPICAL 2 TIMES DAILY PRN
Qty: 25 ML | Refills: 11 | Status: SHIPPED | OUTPATIENT
Start: 2024-12-12

## 2024-12-12 RX ORDER — CETIRIZINE HYDROCHLORIDE 10 MG/1
10 TABLET ORAL DAILY
Qty: 30 TABLET | Refills: 2 | Status: SHIPPED | OUTPATIENT
Start: 2024-12-12

## 2024-12-17 ENCOUNTER — RESULTS FOLLOW-UP (OUTPATIENT)
Dept: INTERNAL MEDICINE CLINIC | Facility: CLINIC | Age: 54
End: 2024-12-17

## 2024-12-17 DIAGNOSIS — E78.2 MIXED HYPERLIPIDEMIA: Primary | ICD-10-CM

## 2024-12-17 RX ORDER — ATORVASTATIN CALCIUM 20 MG/1
20 TABLET, FILM COATED ORAL DAILY
Qty: 90 TABLET | Refills: 3 | Status: SHIPPED | OUTPATIENT
Start: 2024-12-17

## 2024-12-17 NOTE — TELEPHONE ENCOUNTER
@Joceline Shook   This order has been canceled- The patient received a rolling walker through insurance on 7/25/2024 and is not eligible for a new walker or rollator until 7/25/2029. We are not able to offer an out of pocket option due to [place] Medicaid eligibility. Thank you  cc:    @Melissa Patricio

## 2024-12-22 DIAGNOSIS — M54.16 LUMBAR RADICULOPATHY: ICD-10-CM

## 2024-12-22 DIAGNOSIS — M48.02 CERVICAL STENOSIS OF SPINAL CANAL: ICD-10-CM

## 2024-12-23 DIAGNOSIS — I95.0 IDIOPATHIC HYPOTENSION: ICD-10-CM

## 2024-12-23 RX ORDER — ACETAMINOPHEN 500 MG
TABLET ORAL
Qty: 90 TABLET | Refills: 0 | OUTPATIENT
Start: 2024-12-23

## 2024-12-23 RX ORDER — MIDODRINE HYDROCHLORIDE 10 MG/1
10 TABLET ORAL
Qty: 90 TABLET | Refills: 1 | Status: SHIPPED | OUTPATIENT
Start: 2024-12-23

## 2024-12-24 PROBLEM — M79.642 BILATERAL HAND PAIN: Status: RESOLVED | Noted: 2024-06-15 | Resolved: 2024-12-24

## 2024-12-24 PROBLEM — Z91.89 AT RISK FOR ALTERED BOWEL ELIMINATION: Status: RESOLVED | Noted: 2024-07-12 | Resolved: 2024-12-24

## 2024-12-24 PROBLEM — M79.641 BILATERAL HAND PAIN: Status: RESOLVED | Noted: 2024-06-15 | Resolved: 2024-12-24

## 2024-12-24 PROBLEM — Z91.89 AT RISK FOR VENOUS THROMBOEMBOLISM (VTE): Status: RESOLVED | Noted: 2024-07-12 | Resolved: 2024-12-24

## 2024-12-24 PROBLEM — R29.898 RIGHT HAND WEAKNESS: Status: RESOLVED | Noted: 2024-06-15 | Resolved: 2024-12-24

## 2024-12-24 PROBLEM — R55 SYNCOPE: Status: RESOLVED | Noted: 2024-06-15 | Resolved: 2024-12-24

## 2024-12-24 PROBLEM — S06.5XAA SDH (SUBDURAL HEMATOMA) (HCC): Status: RESOLVED | Noted: 2024-06-15 | Resolved: 2024-12-24

## 2024-12-24 NOTE — ASSESSMENT & PLAN NOTE
CTA head 6/15/24: Partially imaged ectatic right carotid bifurcation and proximal cervical internal carotid artery with retropharyngeal course, similar to CT neck with contrast 10/12/2011.

## 2024-12-24 NOTE — ASSESSMENT & PLAN NOTE
Other than hypotension, no issues since dual-chamber PPM placed in June of 2024. Patient has missed several cardiology appointments. Discussed importance of following up with cardiology.

## 2024-12-24 NOTE — ASSESSMENT & PLAN NOTE
PEG placement 7/10/24. Swallowing function has improved, but not at baseline. No longer uses PEG tube. Reminded patient to schedule follow up with GI. Recommend consult with ENT for possible laryngoscope and FEES. He is scheduled 1/30/25. Discussed importance of consult. Could also be related to chronic cough. He only experiences the cough at night time.

## 2024-12-24 NOTE — PROGRESS NOTES
Name: Luis Forrester      : 1970      MRN: [id number]  Encounter Provider: Joceline Rao PA-C  Encounter Date: 2024   Encounter department: [facility] [place]  :  Assessment & Plan  SSS (sick sinus syndrome) (HCC)  Other than hypotension, no issues since dual-chamber PPM placed in 2024. Patient has missed several cardiology appointments. Discussed importance of following up with cardiology.        s/p Medtronic dual chamber PPM 2024  See above.       Arterial ectasia (HCC)  CTA head 6/15/24: Partially imaged ectatic right carotid bifurcation and proximal cervical internal carotid artery with retropharyngeal course, similar to CT neck with contrast 10/12/2011.        Idiopathic hypotension  History of hypotension since admissions. Blood pressure has improved since starting midodrine 10 mg TID. No longer experiencing episodes of dizziness and lightheadedness. Ensure getting enough fluids throughout the day. Has a cardiology follow up 24.         Other specified hypothyroidism  Levothyroxine was decreased to 88 mcg daily about 2-3 months ago. Reviewed correct way to take medication. Continue levothyroxine 88 mcg once daily. Will recheck TSH today.  Orders:    TSH, 3rd generation with Free T4 reflex; Future    Comprehensive metabolic panel; Future    Mixed hyperlipidemia  Cholesterol levels have been sub-optimal. No current therapy. Will recheck today. If still elevated, with elevated ASCVD risk will recommend statin. Low fat diet.  Orders:    Lipid panel; Future    Hypocalcemia  Calcium has been low with a low PTH. Possibly due to medications or hypoparathyroidism. Will recheck levels today.   Orders:    Magnesium; Future    PTH, intact; Future    Phosphorus; Future    Other dysphagia  PEG placement 7/10/24. Swallowing function has improved, but not at baseline. No longer uses PEG tube. Reminded patient to schedule follow up with GI. Recommend consult  with ENT for possible laryngoscope and FEES. He is scheduled 1/30/25. Discussed importance of consult. Could also be related to chronic cough. He only experiences the cough at night time.       Bilateral impacted cerumen  Discussed ear lavage. He was agreeable. Discussed aftercare. Tolerated well.        Watery eyes  Unremarkable exam. Can try cetirizine 10 mg once daily. Recommend eye exams. States he will call to schedule.  Orders:    cetirizine (ZyrTEC) 10 mg tablet; Take 1 tablet (10 mg total) by mouth daily    Itchy scalp  Unremarkable exam. Discussed skin care. Possible paresthesia. Discuss with neurosurgeon. May use Clobetasol as needed.   Orders:    clobetasol (TEMOVATE) 0.05 % external solution; Apply topically 2 (two) times a day as needed (itching)    Mild intermittent reactive airway disease without complication  Cough since admission. No notable cough throughout visit. Possibly due to asthma. Also discussed allergies and GERD contributors. He denies any allergy or GERD related symptoms at this time. No wheezing or SOB. Will consider restarting a maintenance inhaler or Singulair if no improvement at follow up. Will also consider CT chest and PFTS. Only at night while lying down.       Pilonidal cyst  Recurrent pilonidal cyst. Recommend consult with general surgery. He declines at this time.        Vitamin D deficiency  History of low vitamin D. Will recheck level.               History of Present Illness     Patient is a 54 year old male with a PMH of hypothyroidism presenting today for follow up. He went to the ED on 6/15/24 after a syncopal episode at home. He got up in the middle of the night to use the bathroom and he must have passed out. He was found by his daughter. He is not sure on the amount of time he was unconscious. He was having pain in his bilateral upper to mid back, neck, and hands at presentation. States prior to this episode he had brief episodes of syncope, but was never evaluated. He  does not recall much of what happened. States his memory has been poor. He had surgery on his neck and later had a pacemaker put in. Still has pain in his neck and back sometimes, but this is controlled with Tylenol. No further syncopal episodes. His legs are weak since the surgery, but have been improving. He states his PT and OT have finished. He tries to continue doing the exercises at home. He now uses a walker.  He was having difficulty swallowing, but this has improved. He still has his peg tube in. He needs to follow up with GI. States he will call to schedule.   He has had itching of the posterior scalp. States it is usually only when he lies down that he notices it. Has been present for awhile, unsure how long. No rash. No pain.   He also states his eyes are frequently watery. Denies itching or irritation. Denies redness or swelling. States the are just watery. Unsure when this first started.       Review of Systems   Constitutional:  Negative for appetite change, chills, diaphoresis, fatigue, fever and unexpected weight change.   HENT:  Positive for hearing loss (easr feel clogged). Negative for congestion, dental problem, ear discharge, ear pain, postnasal drip, rhinorrhea, sinus pressure, sinus pain, sneezing, sore throat, tinnitus and trouble swallowing.    Eyes:  Negative for photophobia, pain, discharge, redness, itching and visual disturbance.   Respiratory:  Negative for cough, chest tightness, shortness of breath and wheezing.    Cardiovascular:  Negative for chest pain, palpitations and leg swelling.   Gastrointestinal:  Negative for abdominal pain, blood in stool, constipation, diarrhea, nausea and vomiting.   Endocrine: Negative for cold intolerance, heat intolerance, polydipsia, polyphagia and polyuria.   Genitourinary:  Negative for decreased urine volume, difficulty urinating, dysuria, flank pain, frequency, hematuria and urgency.   Musculoskeletal:  Positive for arthralgias, back pain, gait  problem, neck pain and neck stiffness (since the surgery, decreased ROM). Negative for joint swelling and myalgias.   Skin:  Negative for rash.   Neurological:  Positive for weakness (legs). Negative for dizziness, tremors, light-headedness, numbness and headaches.   Hematological:  Negative for adenopathy.   Psychiatric/Behavioral:  Negative for dysphoric mood, self-injury, sleep disturbance and suicidal ideas. The patient is not nervous/anxious.        Objective   /71 (BP Location: Left arm, Patient Position: Sitting, Cuff Size: Standard)   Pulse 98   Temp 99.2 °F (37.3 °C) (Temporal)   Wt 68.9 kg (152 lb)   SpO2 98%   BMI 23.81 kg/m²      Physical Exam  Vitals and nursing note reviewed.   Constitutional:       General: He is awake. He is not in acute distress.     Appearance: Normal appearance. He is well-developed, well-groomed and overweight. He is not ill-appearing.   HENT:      Head: Normocephalic and atraumatic.      Right Ear: Hearing and external ear normal. There is impacted cerumen.      Left Ear: Hearing and external ear normal. There is impacted cerumen.      Nose: Nose normal. No congestion or rhinorrhea.      Mouth/Throat:      Lips: Pink.      Mouth: Mucous membranes are moist.      Pharynx: Oropharynx is clear. Uvula midline. No oropharyngeal exudate or posterior oropharyngeal erythema.   Eyes:      General: Lids are normal. No scleral icterus.     Extraocular Movements: Extraocular movements intact.      Right eye: Normal extraocular motion and no nystagmus.      Left eye: Normal extraocular motion and no nystagmus.      Conjunctiva/sclera: Conjunctivae normal.      Right eye: Right conjunctiva is not injected. No chemosis, exudate or hemorrhage.     Left eye: Left conjunctiva is not injected. No chemosis, exudate or hemorrhage.     Pupils: Pupils are equal, round, and reactive to light.   Cardiovascular:      Rate and Rhythm: Normal rate and regular rhythm.      Pulses: Normal pulses.       Heart sounds: Normal heart sounds. No murmur heard.  Pulmonary:      Effort: Pulmonary effort is normal. No respiratory distress.      Breath sounds: Normal breath sounds and air entry. No decreased air movement. No decreased breath sounds, wheezing, rhonchi or rales.   Abdominal:      General: Abdomen is flat. Bowel sounds are normal. There is no distension.      Palpations: Abdomen is soft. There is no mass.      Tenderness: There is no abdominal tenderness. There is no guarding or rebound.      Hernia: No hernia is present.   Musculoskeletal:         General: Normal range of motion.      Cervical back: Full passive range of motion without pain, normal range of motion and neck supple.      Right lower leg: No edema.      Left lower leg: No edema.   Lymphadenopathy:      Cervical: No cervical adenopathy.   Skin:     General: Skin is warm.      Capillary Refill: Capillary refill takes less than 2 seconds.      Coloration: Skin is not jaundiced.      Findings: No rash (no rash on scalp or any other areas).   Neurological:      General: No focal deficit present.      Mental Status: He is alert and oriented to person, place, and time. Mental status is at baseline.      Motor: Motor function is intact.      Gait: Gait abnormal (using walker).   Psychiatric:         Attention and Perception: Attention normal.         Mood and Affect: Mood and affect normal.         Speech: Speech normal.         Behavior: Behavior normal. Behavior is cooperative.     Ear cerumen removal    Date/Time: 12/12/2024 10:20 AM    Performed by: Joceline Rao PA-C  Authorized by: Joceline Rao PA-C  [place] Protocol:  procedure performed by consultantConsent: Verbal consent obtained.  Risks and benefits: risks, benefits and alternatives were discussed  Consent given by: patient  Patient understanding: patient states understanding of the procedure being performed  Patient identity confirmed: verbally with patient    Patient  location:  Clinic  Indications / Diagnosis:  Bilateral cerumen impaction  Procedure details:     Local anesthetic:  None    Location:  Both ears    Procedure type: irrigation with instrumentation      Instrumentation: curette and forceps      Approach:  Natural orifice  Post-procedure details:     Complication:  None    Hearing quality:  Improved    Patient tolerance of procedure:  Tolerated well, no immediate complications       Administrative Statements   I have spent a total time of 40 minutes in caring for this patient on the day of the visit/encounter including Diagnostic results, Prognosis, Risks and benefits of tx options, Instructions for management, Patient and family education, Importance of tx compliance, Risk factor reductions, Impressions, Counseling / Coordination of care, Reviewing / ordering tests, medicine, procedures  , and Obtaining or reviewing history  . Topics discussed with the patient / family include symptom assessment and management, medication review, goals of care, supportive listening, and anticipatory guidance.

## 2024-12-24 NOTE — ASSESSMENT & PLAN NOTE
Cholesterol levels have been sub-optimal. No current therapy. Will recheck today. If still elevated, with elevated ASCVD risk will recommend statin. Low fat diet.  Orders:    Lipid panel; Future

## 2024-12-24 NOTE — ASSESSMENT & PLAN NOTE
Levothyroxine was decreased to 88 mcg daily about 2-3 months ago. Reviewed correct way to take medication. Continue levothyroxine 88 mcg once daily. Will recheck TSH today.  Orders:    TSH, 3rd generation with Free T4 reflex; Future    Comprehensive metabolic panel; Future

## 2024-12-24 NOTE — ASSESSMENT & PLAN NOTE
Calcium has been low with a low PTH. Possibly due to medications or hypoparathyroidism. Will recheck levels today.   Orders:    Magnesium; Future    PTH, intact; Future    Phosphorus; Future

## 2024-12-24 NOTE — ASSESSMENT & PLAN NOTE
History of hypotension since admissions. Blood pressure has improved since starting midodrine 10 mg TID. No longer experiencing episodes of dizziness and lightheadedness. Ensure getting enough fluids throughout the day. Has a cardiology follow up 12/22/24.

## 2024-12-24 NOTE — ASSESSMENT & PLAN NOTE
Cough since admission. No notable cough throughout visit. Possibly due to asthma. Also discussed allergies and GERD contributors. He denies any allergy or GERD related symptoms at this time. No wheezing or SOB. Will consider restarting a maintenance inhaler or Singulair if no improvement at follow up. Will also consider CT chest and PFTS. Only at night while lying down.

## 2024-12-29 DIAGNOSIS — S06.5XAA SDH (SUBDURAL HEMATOMA) (HCC): ICD-10-CM

## 2024-12-29 DIAGNOSIS — R05.8 OTHER COUGH: ICD-10-CM

## 2024-12-29 DIAGNOSIS — I95.0 IDIOPATHIC HYPOTENSION: ICD-10-CM

## 2024-12-29 DIAGNOSIS — H04.203 WATERY EYES: ICD-10-CM

## 2024-12-30 RX ORDER — CETIRIZINE HYDROCHLORIDE 10 MG/1
10 TABLET ORAL DAILY
Qty: 30 TABLET | Refills: 0 | OUTPATIENT
Start: 2024-12-30

## 2024-12-30 RX ORDER — BENZONATATE 100 MG/1
100 CAPSULE ORAL 3 TIMES DAILY
Qty: 90 CAPSULE | Refills: 0 | Status: SHIPPED | OUTPATIENT
Start: 2024-12-30

## 2024-12-30 RX ORDER — PHENOL 1.4 %
10 AEROSOL, SPRAY (ML) MUCOUS MEMBRANE
Qty: 90 TABLET | Refills: 0 | OUTPATIENT
Start: 2024-12-30

## 2024-12-30 RX ORDER — MIDODRINE HYDROCHLORIDE 10 MG/1
10 TABLET ORAL
Qty: 90 TABLET | Refills: 0 | OUTPATIENT
Start: 2024-12-30

## 2025-01-07 ENCOUNTER — REMOTE DEVICE CLINIC VISIT (OUTPATIENT)
Dept: CARDIOLOGY CLINIC | Facility: CLINIC | Age: 55
End: 2025-01-07
Payer: COMMERCIAL

## 2025-01-07 DIAGNOSIS — Z95.0 CARDIAC PACEMAKER IN SITU: Primary | ICD-10-CM

## 2025-01-07 PROCEDURE — 93296 REM INTERROG EVL PM/IDS: CPT | Performed by: INTERNAL MEDICINE

## 2025-01-07 PROCEDURE — 93294 REM INTERROG EVL PM/LDLS PM: CPT | Performed by: INTERNAL MEDICINE

## 2025-01-07 NOTE — PROGRESS NOTES
"Results for orders placed or performed in visit on 01/07/25   Cardiac EP device report    Narrative    MDT D-PM/ACTIVE SYSTEM IS MRI CONDITIONAL  CARELINK TRANSMISSION: BATTERY STATUS \"14 YRS.\" AP 32%  0%. ALL AVAILABLE LEAD PARAMETERS WITHIN NORMAL LIMITS. 127 FAST A/V NOTED; AVG RATES 143 BPM. EF 60% (ECHO 2024). STACH VS SVT CAN'T BE EXCLUDED. NORMAL DEVICE FUNCTION. NC         "

## 2025-01-08 ENCOUNTER — RESULTS FOLLOW-UP (OUTPATIENT)
Dept: CARDIOLOGY CLINIC | Facility: CLINIC | Age: 55
End: 2025-01-08

## 2025-01-20 ENCOUNTER — TELEPHONE (OUTPATIENT)
Dept: INTERNAL MEDICINE CLINIC | Facility: CLINIC | Age: 55
End: 2025-01-20

## 2025-01-20 ENCOUNTER — TELEPHONE (OUTPATIENT)
Age: 55
End: 2025-01-20

## 2025-01-20 NOTE — TELEPHONE ENCOUNTER
Patients partner Aislinn in to schedule ENT appt, she stated he is a patient of theirs. Informed her he had an appt 9/26/2024 and canceled and he hasn't been seen by ENT since then, he would be a new patient. Informed her next available is 5/15/2025. She said he needs something sooner because he is supposed to get is feeding tube out but they can't d that until he sees ENT and they clear him. She wants [facility] to know all of this, so ABDIFATAH.

## 2025-01-20 NOTE — TELEPHONE ENCOUNTER
Caller: Luis Forrester    Doctor: Anisha Loomis    Call back #: [phone]    Reason for call: FYI: Patient called to let the office know that he had to cancel his appointment today at 3:30 pm with Anisha Loomis. The [employer] services cancelled his ride last minute. He reschedule his hospital follow up appointment with Anisha for 1/28/25.

## 2025-01-22 ENCOUNTER — PATIENT OUTREACH (OUTPATIENT)
Dept: INTERNAL MEDICINE CLINIC | Facility: CLINIC | Age: 55
End: 2025-01-22

## 2025-01-22 NOTE — PROGRESS NOTES
Incoming call   01/22/2025    CMOC received call from patient on this date. Pt wants to have Peg tube removed. CMOC along with pt call GI office at [phone] to schedule gael.     GI Appointment :   Tuesday Feb 4, 2025 at 3:40 PM  At [facility] [address] [place] [address] [place] [address]  [phone]    Pt states he has [employer] and all medical trips are covered at no cost.     Pt SSI/SSDI case is pending.     At this time all goals have been completed CMOC will close the case.

## 2025-01-23 ENCOUNTER — PATIENT OUTREACH (OUTPATIENT)
Dept: INTERNAL MEDICINE CLINIC | Facility: CLINIC | Age: 55
End: 2025-01-23

## 2025-01-23 DIAGNOSIS — M48.02 CERVICAL STENOSIS OF SPINAL CANAL: ICD-10-CM

## 2025-01-23 DIAGNOSIS — I95.0 IDIOPATHIC HYPOTENSION: ICD-10-CM

## 2025-01-23 DIAGNOSIS — S06.5XAA SDH (SUBDURAL HEMATOMA) (HCC): ICD-10-CM

## 2025-01-23 DIAGNOSIS — R05.8 OTHER COUGH: ICD-10-CM

## 2025-01-23 NOTE — PROGRESS NOTES
SW has received an IN BASKET update from [facility] that pt's Lanta Van has been approved and his SSI/SSD case is still pending.    She had also assisted with his [place]aiver application.    Patient does not have any further questions, concerns, or other needs at this time.  Patient has my contact # and PCP office # if needed.  Social Work to remain available to assist as indicated.    Please re-consult Social Work if needed.

## 2025-01-24 ENCOUNTER — TELEPHONE (OUTPATIENT)
Dept: NEUROLOGY | Facility: CLINIC | Age: 55
End: 2025-01-24

## 2025-01-26 DIAGNOSIS — M54.16 LUMBAR RADICULOPATHY: ICD-10-CM

## 2025-01-27 RX ORDER — ACETAMINOPHEN 500 MG
TABLET ORAL
Qty: 90 TABLET | Refills: 0 | Status: SHIPPED | OUTPATIENT
Start: 2025-01-27

## 2025-01-27 RX ORDER — PHENOL 1.4 %
10 AEROSOL, SPRAY (ML) MUCOUS MEMBRANE
Qty: 90 TABLET | Refills: 0 | Status: SHIPPED | OUTPATIENT
Start: 2025-01-27

## 2025-01-27 RX ORDER — MIDODRINE HYDROCHLORIDE 10 MG/1
10 TABLET ORAL
Qty: 90 TABLET | Refills: 0 | Status: SHIPPED | OUTPATIENT
Start: 2025-01-27

## 2025-01-27 RX ORDER — BENZONATATE 100 MG/1
100 CAPSULE ORAL 3 TIMES DAILY
Qty: 90 CAPSULE | Refills: 0 | Status: SHIPPED | OUTPATIENT
Start: 2025-01-27

## 2025-01-27 NOTE — TELEPHONE ENCOUNTER
Medications refilled for now. He needs to make sure he is keeping his appointments. Especially his cardiology appointment. He keeps rescheduling.

## 2025-01-27 NOTE — TELEPHONE ENCOUNTER
Called and advised patient per note, patient understood. Patient just wanted me to document that it wasn't his fault he missed his appointment it was lantas fault.

## 2025-01-28 ENCOUNTER — OFFICE VISIT (OUTPATIENT)
Dept: CARDIOLOGY CLINIC | Facility: CLINIC | Age: 55
End: 2025-01-28
Payer: COMMERCIAL

## 2025-01-28 VITALS
BODY MASS INDEX: 25.91 KG/M2 | HEART RATE: 90 BPM | HEIGHT: 67 IN | SYSTOLIC BLOOD PRESSURE: 114 MMHG | OXYGEN SATURATION: 96 % | WEIGHT: 165.1 LBS | DIASTOLIC BLOOD PRESSURE: 70 MMHG

## 2025-01-28 DIAGNOSIS — I95.0 IDIOPATHIC HYPOTENSION: ICD-10-CM

## 2025-01-28 DIAGNOSIS — Z95.0 PACEMAKER: ICD-10-CM

## 2025-01-28 DIAGNOSIS — E78.2 MIXED HYPERLIPIDEMIA: ICD-10-CM

## 2025-01-28 DIAGNOSIS — I49.5 SSS (SICK SINUS SYNDROME) (HCC): Primary | ICD-10-CM

## 2025-01-28 DIAGNOSIS — I77.810 DILATED AORTIC ROOT (HCC): ICD-10-CM

## 2025-01-28 DIAGNOSIS — R00.0 SINUS TACHYCARDIA: ICD-10-CM

## 2025-01-28 PROCEDURE — 99214 OFFICE O/P EST MOD 30 MIN: CPT | Performed by: NURSE PRACTITIONER

## 2025-01-28 RX ORDER — METOPROLOL SUCCINATE 25 MG/1
TABLET, EXTENDED RELEASE ORAL
Qty: 30 TABLET | Refills: 3 | Status: SHIPPED | OUTPATIENT
Start: 2025-01-28

## 2025-01-28 NOTE — ASSESSMENT & PLAN NOTE
12/12/24 , , HDL 38,    Continue Lipitor 20 mg daily heart healthy diet follow-up fasting lipid profile in 3 months

## 2025-01-28 NOTE — PROGRESS NOTES
Cardiology Follow Up    Luis Forrester  1970  [id number]  [facility]  [address]  [place] [id number] [phone]    Assessment & Plan  SSS (sick sinus syndrome) (HCC)  6/2024 Status post MDT dual-chamber permanent pacemaker  Education provided on pacemaker  Pacemaker  MDT dual chamber PPM 6/2024 1/07/25 device and irrigation battery status 14 years AP 32%  0% all lead parameters are within normal limits.  120 7 fast AV noted.  AV gram rates 143 bpm EF 60%.  Sinus tach versus SVT cannot be excluded.  Normal device function  Sinus tachycardia  1/07/25 device and irrigation battery status 14 years AP 32%  0% all lead parameters are within normal limits.  120 7 fast AV noted.  AV gram rates 143 bpm EF 60%.  Sinus tach versus SVT cannot be excluded.  Start metoprolol succinate 12.5 mg daily at bedtime, follow device interrogation  Mixed hyperlipidemia  12/12/24 , , HDL 38,    Continue Lipitor 20 mg daily heart healthy diet follow-up fasting lipid profile in 3 months  Dilated aortic root (HCC)  Mildly dilated aortic root.  Aortic root 4.30 cm  Continue with BP control   Idiopathic hypotension  BP RUE sitting 110/60 blood pressure did not drop with standing  Continue on midodrine 10 mg 3 times daily        Interval History:   Mr Luis Forrester presents to our office for a follow up visit.  He has never been seen by general cardiology.  He was seen by EP.  Luis is accompanied by his daughter.  He is in his pajamas and slippers.  According to Luis he has he has memory issues due to his fall TBI and SAH.  Luis denies dyspnea with minimal or moderate exertion chest pain palpitations lightheadedness or dizziness.  Education provided on his permanent pacemaker        Medical History   Primary Cardiologist   SSS sp MDT dual chamber PPM 6/2024 1/07/25 device and irrigation battery status 14  years AP 32%  0% all lead parameters are within normal limits.  120 7 fast AV noted.  AV gram rates 143 bpm EF 60%.  Sinus tach versus SVT cannot be excluded.  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  Right ventricular cavity size mildly dilated right atrium mildly dilated.  Atrial septum probable patent foramen ovale noted at rest with predominant left-to-right shunt there is a small septal aneurysm.  Predominantly within the right atrial cavity.  Mildly dilated aortic root.  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[facility])    Hyperlipidemia    Hypothyroidism    Lumbar radiculopathy    Osteoarthritis of both hands    Reactive airway disease    Vitamin D deficiency    SSS (sick sinus syndrome) ([facility])    s/p Medtronic dual chamber PPM 6/21/2024    Dysphagia    Cervical stenosis of spinal canal    Cervical myelopathy ([facility])    Pilonidal cyst    Idiopathic hypotension    Hypocalcemia     Past Medical History:   Diagnosis Date    Arthritis     Chronic cough     Disease of thyroid gland     Hypoparathyroidism ([facility])     continue taking calcium and vitamin D, will check CMP, PTH level and Vitamin D level    Pneumonia of right middle lobe due to Streptococcus pneumoniae ([facility]) 11/30/2018    s/p Medtronic dual chamber PPM 6/21/2024 06/21/2024    SDH (subdural hematoma) ([facility]) 06/15/2024     Social History     Socioeconomic History    Marital status:      Spouse name: Not on file    Number of children: 1    Years of education: Not on file    Highest education level: Not on file   Occupational History    Occupation:    Tobacco Use    Smoking status: Never     Passive exposure: Never     "Smokeless tobacco: Never    Tobacco comments:     pt \"tried it when he was a teenager but did not like them\"   Vaping Use    Vaping status: Never Used   Substance and Sexual Activity    Alcohol use: Yes     Comment: occasionally    Drug use: No    Sexual activity: Not Currently   Other Topics Concern    Not on file   Social History Narrative    Not on file     Social Drivers of Health     Financial Resource Strain: Low Risk  (2/23/2024)    Overall Financial Resource Strain (CARDIA)     Difficulty of Paying Living Expenses: Not hard at all   Food Insecurity: Food Insecurity Present (9/29/2024)    Nursing - Inadequate Food Risk Classification     Worried About Running Out of Food in the Last Year: Sometimes true     Ran Out of Food in the Last Year: Sometimes true     Ran Out of Food in the Last Year: Not on file   Transportation Needs: No Transportation Needs (10/25/2024)    OASIS : Transportation     Lack of Transportation (Medical): No     Lack of Transportation (Non-Medical): No     Patient Unable or Declines to Respond: No   Physical Activity: Not on file   Stress: Not on file   Social Connections: Not on file   Intimate Partner Violence: Not on file   Housing Stability: Low Risk  (9/29/2024)    Housing Stability Vital Sign     Unable to Pay for Housing in the Last Year: No     Number of Times Moved in the Last Year: 0     Homeless in the Last Year: No      Family History   Problem Relation Age of Onset    No Known Problems Mother     No Known Problems Father     No Known Problems Sister     No Known Problems Brother     No Known Problems Son     Learning disabilities Daughter     Asthma Daughter     Seizures Daughter     No Known Problems Maternal Grandmother     No Known Problems Maternal Grandfather     No Known Problems Paternal Grandmother     No Known Problems Paternal Grandfather     No Known Problems Maternal Aunt     No Known Problems Maternal Uncle     No Known Problems Paternal Aunt     No Known " Problems Paternal Uncle     No Known Problems Cousin      Past Surgical History:   Procedure Laterality Date    CARDIAC ELECTROPHYSIOLOGY PROCEDURE N/A 6/21/2024    Procedure: Cardiac pacer implant;  Surgeon: Kofi Pettit MD;  Location: [facility];  Service: Cardiology    DECOMPRESSION SPINE CERVICAL POSTERIOR N/A 6/16/2024    Procedure: DECOMPRESSION SPINE CERVICAL POSTERIOR C2-T1 with fusion navigated with Oarm;  Surgeon: Endy Velasquez MD;  Location: [facility];  Service: Neurosurgery    FRACTURE SURGERY      Open Treatment of Each Proximal Finger Phalanx    GASTROSTOMY TUBE PLACEMENT N/A 7/10/2024    Procedure: INSERTION PEG TUBE;  Surgeon: Darcy Reinoso MD;  Location: [facility];  Service: General       Current Outpatient Medications:     acetaminophen (TYLENOL) 500 mg tablet, Take 1-2 tablets every 6 hours as needed for pain, Disp: 90 tablet, Rfl: 0    albuterol (Ventolin HFA) 90 mcg/act inhaler, Inhale 2 puffs every 4 (four) hours as needed for wheezing or shortness of breath, Disp: 18 g, Rfl: 2    atorvastatin (LIPITOR) 20 mg tablet, Take 1 tablet (20 mg total) by mouth daily, Disp: 90 tablet, Rfl: 3    benzonatate (TESSALON PERLES) 100 mg capsule, Take 1 capsule (100 mg total) by mouth 3 (three) times a day, Disp: 90 capsule, Rfl: 0    cetirizine (ZyrTEC) 10 mg tablet, Take 1 tablet (10 mg total) by mouth daily, Disp: 30 tablet, Rfl: 2    clobetasol (TEMOVATE) 0.05 % external solution, Apply topically 2 (two) times a day as needed (itching), Disp: 25 mL, Rfl: 11    Diclofenac Sodium (VOLTAREN) 1 %, Apply 2 g topically 4 (four) times a day, Disp: 150 g, Rfl: 0    docusate sodium (COLACE) 100 mg capsule, Take 1 capsule (100 mg total) by mouth 2 (two) times a day, Disp: 60 capsule, Rfl: 5    levothyroxine 88 mcg tablet, Take 1 tablet (88 mcg total) by mouth daily in the early morning, Disp: 90 tablet, Rfl: 1    Melatonin 10 MG TABS, 1 tablet (10 mg total) by Per PEG Tube route daily at  "bedtime, Disp: 90 tablet, Rfl: 0    midodrine (PROAMATINE) 10 MG tablet, Take 1 tablet (10 mg total) by mouth 3 (three) times a day before meals, Disp: 90 tablet, Rfl: 0  Allergies   Allergen Reactions    Chlorine Rash       Labs:  Appointment on 12/12/2024   Component Date Value    TSH 3RD GENERATON 12/12/2024 3.434     Cholesterol 12/12/2024 284 (H)     Triglycerides 12/12/2024 184 (H)     HDL, Direct 12/12/2024 38 (L)     LDL Calculated 12/12/2024 209 (H)     Non-HDL-Chol (CHOL-HDL) 12/12/2024 246     Sodium 12/12/2024 136     Potassium 12/12/2024 4.0     Chloride 12/12/2024 96     CO2 12/12/2024 28     ANION GAP 12/12/2024 12     BUN 12/12/2024 15     Creatinine 12/12/2024 0.78     Glucose, Fasting 12/12/2024 93     Calcium 12/12/2024 8.2 (L)     AST 12/12/2024 28     ALT 12/12/2024 28     Alkaline Phosphatase 12/12/2024 80     Total Protein 12/12/2024 8.1     Albumin 12/12/2024 4.4     Total Bilirubin 12/12/2024 0.62     eGFR 12/12/2024 100     Magnesium 12/12/2024 2.2     PTH 12/12/2024 11.9 (L)     Phosphorus 12/12/2024 5.0 (H)      Imaging: Cardiac EP device report  Result Date: 1/7/2025  Narrative: MDT D-PM/ACTIVE SYSTEM IS MRI CONDITIONAL CARELINK TRANSMISSION: BATTERY STATUS \"14 YRS.\" AP 32%  0%. ALL AVAILABLE LEAD PARAMETERS WITHIN NORMAL LIMITS. 127 FAST A/V NOTED; AVG RATES 143 BPM. EF 60% (ECHO 2024). STACH VS SVT CAN'T BE EXCLUDED. NORMAL DEVICE FUNCTION. NC       Review of Systems:  Review of Systems   Musculoskeletal:  Positive for arthralgias, gait problem and myalgias.   All other systems reviewed and are negative.      Physical Exam:  Physical Exam  Vitals reviewed.   Constitutional:       Appearance: Normal appearance.   HENT:      Head: Normocephalic.   Cardiovascular:      Rate and Rhythm: Normal rate and regular rhythm.      Pulses: Normal pulses.      Heart sounds: Normal heart sounds.   Pulmonary:      Effort: Pulmonary effort is normal.      Breath sounds: Normal breath sounds. "   Musculoskeletal:         General: Normal range of motion.      Cervical back: Normal range of motion and neck supple.      Right lower leg: No edema.      Left lower leg: No edema.   Skin:     General: Skin is warm and dry.      Capillary Refill: Capillary refill takes less than 2 seconds.   Neurological:      General: No focal deficit present.      Mental Status: He is alert and oriented to person, place, and time.   Psychiatric:         Mood and Affect: Mood normal.         Behavior: Behavior normal.

## 2025-01-28 NOTE — ASSESSMENT & PLAN NOTE
BP RUE sitting 110/60 blood pressure did not drop with standing  Continue on midodrine 10 mg 3 times daily

## 2025-01-29 NOTE — TELEPHONE ENCOUNTER
Called and left detailed VM advising that we ca see him on 1/30/25 at 1pm. Please try reaching patient to offer that appointment.

## 2025-02-04 ENCOUNTER — TELEPHONE (OUTPATIENT)
Dept: INTERNAL MEDICINE CLINIC | Facility: CLINIC | Age: 55
End: 2025-02-04

## 2025-02-04 NOTE — TELEPHONE ENCOUNTER
Patient called to ask about the procedure where they place a camera down his throat. I believe patient was talking about an endoscopy. I told him that I did not see any orders. Patient told me to speak to pcp about ordering one.

## 2025-02-06 NOTE — TELEPHONE ENCOUNTER
I can not order this. He has to talk to ENT, they will evaluate his dysphagia. He has rescheduled his ENT appointment several times now. Now it is not until May.

## 2025-02-07 ENCOUNTER — TELEPHONE (OUTPATIENT)
Dept: NEUROSURGERY | Facility: CLINIC | Age: 55
End: 2025-02-07

## 2025-02-07 NOTE — TELEPHONE ENCOUNTER
Called patient and LVM to recommend instead of rescheduling appointment (b/c John is booked out until late April early May) if there is any chance he can get his XR before his appointment on Monday at 4 pm. Asked him to please give us a call back as soon as possible

## 2025-02-10 NOTE — TELEPHONE ENCOUNTER
Pt called back. He did not know he needed xrays. He will try to get them done this week. R/s pt to 5/29 @ 4:00 for Dr. Lopez's first available in [facility].   Pt was added to wait list and knows to get xrays ASAP incase we can move appt up.

## 2025-02-11 ENCOUNTER — TELEPHONE (OUTPATIENT)
Age: 55
End: 2025-02-11

## 2025-02-11 NOTE — TELEPHONE ENCOUNTER
Patients GI provider:  Dr. Canas     Number to return call: [phone]    Reason for call: Pt calling requesting to speak with someone regarding more order of syringe for gtube.     Scheduled procedure/appointment date if applicable: Apt/procedure       FYI: I tried looking up nutritional pool but nothing shows

## 2025-02-11 NOTE — TELEPHONE ENCOUNTER
If call back route to Shira SINGER-Thank you   Left message with name and call back number.    Pt tube was placed by gen surg Dr. AUDREY DIAZ.  I do not see GI in the chart.    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    I called adapt pt has not ordered since December 2024 would need an office visit, updated nutrition recommendations from RD and a break in service note to restart service with [employer]. The [employer] companies can only send syringes if the pt is also receiving formula shipments as well. The 60ml enfit syringes can be purchased online.     Pt has first appointment with GI 2/21/25.

## 2025-02-12 DIAGNOSIS — L29.9 ITCHY SCALP: ICD-10-CM

## 2025-02-12 DIAGNOSIS — R05.8 OTHER COUGH: ICD-10-CM

## 2025-02-12 DIAGNOSIS — S06.5XAA SDH (SUBDURAL HEMATOMA) (HCC): ICD-10-CM

## 2025-02-12 DIAGNOSIS — R00.0 SINUS TACHYCARDIA: ICD-10-CM

## 2025-02-13 RX ORDER — METOPROLOL SUCCINATE 25 MG/1
TABLET, EXTENDED RELEASE ORAL
Qty: 30 TABLET | Refills: 0 | OUTPATIENT
Start: 2025-02-13

## 2025-02-14 RX ORDER — CLOBETASOL PROPIONATE 0.5 MG/ML
SOLUTION TOPICAL 2 TIMES DAILY PRN
Qty: 25 ML | Refills: 0 | OUTPATIENT
Start: 2025-02-14

## 2025-02-14 RX ORDER — BENZONATATE 100 MG/1
100 CAPSULE ORAL 3 TIMES DAILY
Qty: 90 CAPSULE | Refills: 0 | Status: SHIPPED | OUTPATIENT
Start: 2025-02-14

## 2025-02-14 RX ORDER — PHENOL 1.4 %
10 AEROSOL, SPRAY (ML) MUCOUS MEMBRANE
Qty: 90 TABLET | Refills: 0 | OUTPATIENT
Start: 2025-02-14

## 2025-02-20 NOTE — PROGRESS NOTES
Advanced Heart Failure / Pulmonary Hypertension Outpatient Visit    Luis Forrester 54 y.o. male   MRN: [id number]  Encounter: [id number]    Assessment:  Patient Active Problem List    Diagnosis Date Noted    Hypocalcemia 09/29/2024    Idiopathic hypotension 09/12/2024    Pilonidal cyst 07/22/2024    Cervical myelopathy (HCC) 07/12/2024    Cervical stenosis of spinal canal 07/03/2024    Dysphagia 06/29/2024    SSS (sick sinus syndrome) (HCC) 06/21/2024    s/p Medtronic dual chamber PPM 6/21/2024 06/21/2024    Vitamin D deficiency 02/23/2024    Reactive airway disease 03/15/2018    Osteoarthritis of both hands 11/18/2015    Arterial ectasia (HCC) 06/18/2015    Lumbar radiculopathy 01/29/2015    Hyperlipidemia 09/11/2014    Hypothyroidism 09/11/2014    Allergic rhinitis 08/07/2012       Today's Plan:  Continue Toprol XL 25 mg QD at bedtime (pt was not taking 12.5 as he could not split pill).  BP's stable, HR remains tachy .  No c/o palpitations  RTO w/ Dr. Meza 07/24/2025    Plan:  SSS (sick sinus syndrome)   6/2024 Status post MDT dual-chamber permanent pacemaker    Pacemaker  MDT dual chamber PPM 6/2024 1/07/25 device and irrigation battery status 14 years AP 32%  0% all lead parameters are within normal limits.  120 7 fast AV noted.  AV gram rates 143 bpm EF 60%.  Sinus tach versus SVT cannot be excluded.  Normal device function  Sinus tachycardia  1/07/25 device and irrigation battery status 14 years AP 32%  0% all lead parameters are within normal limits.  120 7 fast AV noted.  AV gram rates 143 bpm EF 60%.  Sinus tach versus SVT cannot be excluded.  Start metoprolol succinate 12.5 mg daily at bedtime-->increase to 25 mg QD HS  Mixed hyperlipidemia  12/12/24 , , HDL 38,    Continue Lipitor 20 mg daily heart healthy diet follow-up fasting lipid profile in 3 months  Dilated aortic root   Mildly dilated aortic root.  Aortic root 4.30 cm  Continue with BP control   Idiopathic  hypotension  BP: 108/76,   Continue on midodrine 10 mg 3 times daily      HPI:   Luis Forrester is a 54-year-old male with a PMH stated above who presents to the office for 1 month f/u. Follows with Anisha and Dr. Meza.     1/28/25: Anisha: Mr Luis Forrester presents to our office for a follow up visit.  He has never been seen by general cardiology.  He was seen by EP.  Luis is accompanied by his daughter.  He is in his pajamas and slippers.  According to Luis he has he has memory issues due to his fall TBI and SAH.  Luis denies dyspnea with minimal or moderate exertion chest pain palpitations lightheadedness or dizziness.  Education provided on his permanent pacemaker    2/24/25: Pt seems to have cognitive impairment. Pt feeling well overall without any cardiac complaints. Denies CP, SOB, palpitations.  States he could not split Toprol XL pill in half and has been taking a whole pill since last visit. Denies any dizziness or presyncopal symptoms since PPM implant.  Meds refilled--Understands to  Toprol XL and Lipitor at pharmacy.        Past Medical History:   Diagnosis Date    Arthritis     Chronic cough     Disease of thyroid gland     Hypoparathyroidism (HCC)     continue taking calcium and vitamin D, will check CMP, PTH level and Vitamin D level    Pneumonia of right middle lobe due to Streptococcus pneumoniae ([facility]) 11/30/2018    s/p Medtronic dual chamber PPM 6/21/2024 06/21/2024    SDH (subdural hematoma) ([facility]) 06/15/2024       Review of Systems   HENT: Negative.     Eyes: Negative.    Respiratory: Negative.     Cardiovascular: Negative.    Gastrointestinal: Negative.    Endocrine: Negative.    Genitourinary: Negative.    Musculoskeletal: Negative.    Skin: Negative.    Allergic/Immunologic: Negative.    Neurological: Negative.    Hematological: Negative.    Psychiatric/Behavioral: Negative.         Allergies   Allergen Reactions    Chlorine Rash         Current Outpatient  "Medications:     acetaminophen (TYLENOL) 500 mg tablet, Take 1-2 tablets every 6 hours as needed for pain, Disp: 90 tablet, Rfl: 0    albuterol (Ventolin HFA) 90 mcg/act inhaler, Inhale 2 puffs every 4 (four) hours as needed for wheezing or shortness of breath, Disp: 18 g, Rfl: 2    atorvastatin (LIPITOR) 20 mg tablet, Take 1 tablet (20 mg total) by mouth daily, Disp: 90 tablet, Rfl: 3    benzonatate (TESSALON PERLES) 100 mg capsule, Take 1 capsule (100 mg total) by mouth 3 (three) times a day, Disp: 90 capsule, Rfl: 0    cetirizine (ZyrTEC) 10 mg tablet, Take 1 tablet (10 mg total) by mouth daily, Disp: 30 tablet, Rfl: 2    clobetasol (TEMOVATE) 0.05 % external solution, Apply topically 2 (two) times a day as needed (itching), Disp: 25 mL, Rfl: 11    Diclofenac Sodium (VOLTAREN) 1 %, Apply 2 g topically 4 (four) times a day, Disp: 150 g, Rfl: 0    docusate sodium (COLACE) 100 mg capsule, Take 1 capsule (100 mg total) by mouth 2 (two) times a day, Disp: 60 capsule, Rfl: 5    levothyroxine 88 mcg tablet, Take 1 tablet (88 mcg total) by mouth daily in the early morning, Disp: 90 tablet, Rfl: 1    Melatonin 10 MG TABS, 1 tablet (10 mg total) by Per PEG Tube route daily at bedtime, Disp: 90 tablet, Rfl: 0    metoprolol succinate (TOPROL-XL) 25 mg 24 hr tablet, Take 1 tablet (25 mg total) by mouth daily at bedtime for 1 dose Metoprolol succinate 12.5mg daily at bedtime, Disp: 90 tablet, Rfl: 3    midodrine (PROAMATINE) 10 MG tablet, Take 1 tablet (10 mg total) by mouth 3 (three) times a day before meals, Disp: 90 tablet, Rfl: 3    Social History     Socioeconomic History    Marital status:      Spouse name: Not on file    Number of children: 1    Years of education: Not on file    Highest education level: Not on file   Occupational History    Occupation:    Tobacco Use    Smoking status: Never     Passive exposure: Never    Smokeless tobacco: Never    Tobacco comments:     pt \"tried it when he was a " "teenager but did not like them\"   Vaping Use    Vaping status: Never Used   Substance and Sexual Activity    Alcohol use: Yes     Comment: occasionally    Drug use: No    Sexual activity: Not Currently   Other Topics Concern    Not on file   Social History Narrative    Not on file     Social Drivers of Health     Financial Resource Strain: Low Risk  (2/23/2024)    Overall Financial Resource Strain (CARDIA)     Difficulty of Paying Living Expenses: Not hard at all   Food Insecurity: Food Insecurity Present (9/29/2024)    Nursing - Inadequate Food Risk Classification     Worried About Running Out of Food in the Last Year: Sometimes true     Ran Out of Food in the Last Year: Sometimes true     Ran Out of Food in the Last Year: Not on file   Transportation Needs: No Transportation Needs (10/25/2024)    OASIS : Transportation     Lack of Transportation (Medical): No     Lack of Transportation (Non-Medical): No     Patient Unable or Declines to Respond: No   Physical Activity: Not on file   Stress: Not on file   Social Connections: Not on file   Intimate Partner Violence: Not on file   Housing Stability: Low Risk  (9/29/2024)    Housing Stability Vital Sign     Unable to Pay for Housing in the Last Year: No     Number of Times Moved in the Last Year: 0     Homeless in the Last Year: No     Family History   Problem Relation Age of Onset    No Known Problems Mother     No Known Problems Father     No Known Problems Sister     No Known Problems Brother     No Known Problems Son     Learning disabilities Daughter     Asthma Daughter     Seizures Daughter     No Known Problems Maternal Grandmother     No Known Problems Maternal Grandfather     No Known Problems Paternal Grandmother     No Known Problems Paternal Grandfather     No Known Problems Maternal Aunt     No Known Problems Maternal Uncle     No Known Problems Paternal Aunt     No Known Problems Paternal Uncle     No Known Problems Cousin        Vitals:   Blood " "pressure 108/76, pulse (!) 108, height 5' 7\" (1.702 m), weight 77.6 kg (171 lb), SpO2 97%.    Wt Readings from Last 10 Encounters:   02/24/25 77.6 kg (171 lb)   01/28/25 74.9 kg (165 lb 1.6 oz)   12/12/24 68.9 kg (152 lb)   10/25/24 71.2 kg (157 lb)   10/22/24 71.2 kg (157 lb)   10/15/24 68.5 kg (151 lb)   09/28/24 71.2 kg (157 lb)   09/12/24 71.2 kg (157 lb)   08/12/24 74.4 kg (164 lb)   07/24/24 74.8 kg (164 lb 14.5 oz)     Vitals:    02/24/25 1320   BP: 108/76   BP Location: Left arm   Patient Position: Sitting   Cuff Size: Standard   Pulse: (!) 108   SpO2: 97%   Weight: 77.6 kg (171 lb)   Height: 5' 7\" (1.702 m)       Physical Exam  Constitutional:       Appearance: Normal appearance.   Cardiovascular:      Rate and Rhythm: Regular rhythm. Tachycardia present.   Pulmonary:      Effort: Pulmonary effort is normal.      Breath sounds: Normal breath sounds.   Musculoskeletal:         General: Normal range of motion.   Neurological:      Mental Status: He is alert. He is disoriented.       Labs & Results:  Lab Results   Component Value Date    WBC 6.45 10/02/2024    HGB 13.3 10/02/2024    HCT 40.1 10/02/2024    MCV 92 10/02/2024     10/02/2024     Lab Results   Component Value Date    SODIUM 136 12/12/2024    K 4.0 12/12/2024    CL 96 12/12/2024    CO2 28 12/12/2024    BUN 15 12/12/2024    CREATININE 0.78 12/12/2024    GLUC 95 10/02/2024    CALCIUM 8.2 (L) 12/12/2024     Lab Results   Component Value Date    INR 0.99 06/16/2024    PROTIME 13.0 06/16/2024     No results found for: \"BNP\"     ÓSCAR Kennedy  "

## 2025-02-23 DIAGNOSIS — E78.2 MIXED HYPERLIPIDEMIA: ICD-10-CM

## 2025-02-24 ENCOUNTER — OFFICE VISIT (OUTPATIENT)
Dept: CARDIOLOGY CLINIC | Facility: CLINIC | Age: 55
End: 2025-02-24
Payer: COMMERCIAL

## 2025-02-24 VITALS
BODY MASS INDEX: 26.84 KG/M2 | SYSTOLIC BLOOD PRESSURE: 108 MMHG | OXYGEN SATURATION: 97 % | DIASTOLIC BLOOD PRESSURE: 76 MMHG | HEIGHT: 67 IN | WEIGHT: 171 LBS | HEART RATE: 108 BPM

## 2025-02-24 DIAGNOSIS — I95.0 IDIOPATHIC HYPOTENSION: ICD-10-CM

## 2025-02-24 DIAGNOSIS — R00.0 SINUS TACHYCARDIA: ICD-10-CM

## 2025-02-24 DIAGNOSIS — E78.2 MIXED HYPERLIPIDEMIA: ICD-10-CM

## 2025-02-24 PROCEDURE — 99214 OFFICE O/P EST MOD 30 MIN: CPT

## 2025-02-24 RX ORDER — ATORVASTATIN CALCIUM 20 MG/1
20 TABLET, FILM COATED ORAL DAILY
Qty: 90 TABLET | Refills: 3 | Status: SHIPPED | OUTPATIENT
Start: 2025-02-24

## 2025-02-24 RX ORDER — MIDODRINE HYDROCHLORIDE 10 MG/1
10 TABLET ORAL
Qty: 90 TABLET | Refills: 3 | Status: SHIPPED | OUTPATIENT
Start: 2025-02-24

## 2025-02-24 RX ORDER — METOPROLOL SUCCINATE 25 MG/1
25 TABLET, EXTENDED RELEASE ORAL
Qty: 90 TABLET | Refills: 3 | Status: SHIPPED | OUTPATIENT
Start: 2025-02-24 | End: 2025-02-26 | Stop reason: SDUPTHER

## 2025-02-24 RX ORDER — ATORVASTATIN CALCIUM 20 MG/1
20 TABLET, FILM COATED ORAL DAILY
Qty: 90 TABLET | Refills: 0 | OUTPATIENT
Start: 2025-02-24

## 2025-02-26 DIAGNOSIS — M54.16 LUMBAR RADICULOPATHY: ICD-10-CM

## 2025-02-26 DIAGNOSIS — E03.8 OTHER SPECIFIED HYPOTHYROIDISM: ICD-10-CM

## 2025-02-26 DIAGNOSIS — E78.2 MIXED HYPERLIPIDEMIA: ICD-10-CM

## 2025-02-26 DIAGNOSIS — I95.0 IDIOPATHIC HYPOTENSION: ICD-10-CM

## 2025-02-26 DIAGNOSIS — R00.0 SINUS TACHYCARDIA: ICD-10-CM

## 2025-02-26 DIAGNOSIS — S06.5XAA SDH (SUBDURAL HEMATOMA) (HCC): ICD-10-CM

## 2025-02-27 RX ORDER — MIDODRINE HYDROCHLORIDE 10 MG/1
10 TABLET ORAL
Qty: 90 TABLET | Refills: 3 | OUTPATIENT
Start: 2025-02-27

## 2025-02-27 RX ORDER — PHENOL 1.4 %
10 AEROSOL, SPRAY (ML) MUCOUS MEMBRANE
Qty: 90 TABLET | Refills: 1 | Status: SHIPPED | OUTPATIENT
Start: 2025-02-27

## 2025-02-27 RX ORDER — ATORVASTATIN CALCIUM 20 MG/1
20 TABLET, FILM COATED ORAL DAILY
Qty: 90 TABLET | Refills: 0 | OUTPATIENT
Start: 2025-02-27

## 2025-02-27 RX ORDER — METOPROLOL SUCCINATE 25 MG/1
25 TABLET, EXTENDED RELEASE ORAL
Qty: 90 TABLET | Refills: 0 | Status: SHIPPED | OUTPATIENT
Start: 2025-02-27 | End: 2025-02-28

## 2025-02-27 RX ORDER — ACETAMINOPHEN 500 MG
TABLET ORAL
Qty: 90 TABLET | Refills: 1 | Status: SHIPPED | OUTPATIENT
Start: 2025-02-27

## 2025-02-27 RX ORDER — LEVOTHYROXINE SODIUM 88 UG/1
88 TABLET ORAL
Qty: 90 TABLET | Refills: 1 | Status: SHIPPED | OUTPATIENT
Start: 2025-02-27 | End: 2025-08-26

## 2025-02-27 NOTE — TELEPHONE ENCOUNTER
Requested medication(s) are due for refill today: Yes  Patient has already received a courtesy refill: No  Other reason request has been forwarded to provider: CONFIRM SIG

## 2025-02-27 NOTE — TELEPHONE ENCOUNTER
Atorvastatin 90+3 to [facility]-Receipt confirmed by pharmacy (2/24/2025  1:29 PM EST)   Metoprolol script ended routing to office for review.

## 2025-02-27 NOTE — TELEPHONE ENCOUNTER
Refill must be reviewed and completed by the office or provider. The refill is unable to be approved or denied by the medication management team.    Please review refill for metoprolol - Refilled 02.24.2025 but script ended on 02.25.2025  Please review to see if the refill is appropriate.

## 2025-03-06 ENCOUNTER — TELEPHONE (OUTPATIENT)
Dept: GASTROENTEROLOGY | Facility: CLINIC | Age: 55
End: 2025-03-06

## 2025-03-06 NOTE — TELEPHONE ENCOUNTER
----- Message from Jim Coelho MD sent at 3/6/2025 10:21 AM EST -----  Patient coming in to see me for PEG tube removal tomorrow.  The tube was placed by surgery service.  It would be appropriate for them to see the surgery service for its removal.  I am happy to see the patient but would end up referring them to surgery anyways for the tube removal in case that was needed.  If there are any other GI symptoms that need to be evaluated, I can see the patient for that.  I see that patient never had colonoscopy and it has been discussed with him in the past.  If you would like to come in and discuss colonoscopy/colon cancer screening, I can continue to see him for that as well and any other GI issues.  Please discuss and let me know.

## 2025-03-06 NOTE — TELEPHONE ENCOUNTER
I called & lvm for the patient sharing the provider's recommendations in regards to PEG tube removal appointment tomorrow. I advised he can keep the appointment if he is having any other GI symptoms and or wants to discuss having a colonoscopy. Otherwise, he needs to be referred to general surgery for PEG tube removal as Dr. Coelho feels their care team would be more appropriate to remove it.

## 2025-03-06 NOTE — TELEPHONE ENCOUNTER
I called & lvm x2 for the patient sharing the provider's recommendations in regards to PEG tube removal appointment tomorrow. I advised he can keep the appointment if he is having any other GI symptoms and or wants to discuss having a colonoscopy. Otherwise, he needs to be referred to general surgery for PEG tube removal as Dr. Coelho feels their care team would be more appropriate to remove it

## 2025-03-07 DIAGNOSIS — M54.16 LUMBAR RADICULOPATHY: ICD-10-CM

## 2025-03-07 DIAGNOSIS — S06.5XAA SDH (SUBDURAL HEMATOMA) (HCC): ICD-10-CM

## 2025-03-07 RX ORDER — ACETAMINOPHEN 500 MG
TABLET ORAL
Qty: 90 TABLET | Refills: 0 | OUTPATIENT
Start: 2025-03-07

## 2025-03-07 RX ORDER — PHENOL 1.4 %
10 AEROSOL, SPRAY (ML) MUCOUS MEMBRANE
Qty: 90 TABLET | Refills: 0 | OUTPATIENT
Start: 2025-03-07

## 2025-03-10 NOTE — TELEPHONE ENCOUNTER
I called & lvm for the patient sharing the provider's recommendations in regards to PEG tube removal appointment on 3/21/25 needing to be cancelled.     I advised he can keep the appointment if he is having any other GI symptoms and or wants to discuss having a colonoscopy. Otherwise, he needs to be referred to general surgery for PEG tube removal as Dr. Coelho feels their care team would be more appropriate to remove it.

## 2025-03-12 NOTE — TELEPHONE ENCOUNTER
I called left a final voicemail for the patient sharing the provider's recommendations in regards to PEG tube removal appointment on 3/21/25 needing to be cancelled.     I advised he can call back for another appointment if he is having any other GI symptoms and or wants to discuss having a colonoscopy. Otherwise, he needs to be referred to general surgery for PEG tube removal as Dr. Coelho feels their care team would be more appropriate to remove it.

## 2025-03-12 NOTE — TELEPHONE ENCOUNTER
Patient returned call to office. Made patient aware his appointment with GI has been cancelled. He is aware to contact General surgery for peg tube removal

## 2025-03-13 ENCOUNTER — OFFICE VISIT (OUTPATIENT)
Dept: INTERNAL MEDICINE CLINIC | Facility: CLINIC | Age: 55
End: 2025-03-13

## 2025-03-13 VITALS
WEIGHT: 171.8 LBS | OXYGEN SATURATION: 97 % | BODY MASS INDEX: 26.91 KG/M2 | DIASTOLIC BLOOD PRESSURE: 79 MMHG | HEART RATE: 87 BPM | SYSTOLIC BLOOD PRESSURE: 117 MMHG | TEMPERATURE: 98 F

## 2025-03-13 DIAGNOSIS — H61.23 BILATERAL IMPACTED CERUMEN: ICD-10-CM

## 2025-03-13 DIAGNOSIS — E55.9 VITAMIN D DEFICIENCY: ICD-10-CM

## 2025-03-13 DIAGNOSIS — Z12.11 SCREENING FOR COLORECTAL CANCER: ICD-10-CM

## 2025-03-13 DIAGNOSIS — E66.3 OVERWEIGHT WITH BODY MASS INDEX (BMI) OF 26 TO 26.9 IN ADULT: ICD-10-CM

## 2025-03-13 DIAGNOSIS — J30.89 NON-SEASONAL ALLERGIC RHINITIS DUE TO OTHER ALLERGIC TRIGGER: ICD-10-CM

## 2025-03-13 DIAGNOSIS — E03.8 OTHER SPECIFIED HYPOTHYROIDISM: ICD-10-CM

## 2025-03-13 DIAGNOSIS — I95.0 IDIOPATHIC HYPOTENSION: ICD-10-CM

## 2025-03-13 DIAGNOSIS — Z00.00 ANNUAL PHYSICAL EXAM: Primary | ICD-10-CM

## 2025-03-13 DIAGNOSIS — L05.91 PILONIDAL CYST: ICD-10-CM

## 2025-03-13 DIAGNOSIS — Z12.12 SCREENING FOR COLORECTAL CANCER: ICD-10-CM

## 2025-03-13 DIAGNOSIS — J45.20 MILD INTERMITTENT REACTIVE AIRWAY DISEASE WITHOUT COMPLICATION: ICD-10-CM

## 2025-03-13 DIAGNOSIS — G95.9 CERVICAL MYELOPATHY (HCC): ICD-10-CM

## 2025-03-13 DIAGNOSIS — E78.2 MIXED HYPERLIPIDEMIA: ICD-10-CM

## 2025-03-13 DIAGNOSIS — M54.16 LUMBAR RADICULOPATHY: ICD-10-CM

## 2025-03-13 DIAGNOSIS — I49.5 SSS (SICK SINUS SYNDROME) (HCC): ICD-10-CM

## 2025-03-13 DIAGNOSIS — R13.19 OTHER DYSPHAGIA: ICD-10-CM

## 2025-03-13 DIAGNOSIS — Z95.0 S/P PLACEMENT OF CARDIAC PACEMAKER: ICD-10-CM

## 2025-03-13 DIAGNOSIS — H04.203 WATERY EYES: ICD-10-CM

## 2025-03-13 DIAGNOSIS — E83.51 HYPOCALCEMIA: ICD-10-CM

## 2025-03-13 DIAGNOSIS — I77.89 ARTERIAL ECTASIA (HCC): ICD-10-CM

## 2025-03-13 DIAGNOSIS — Z93.1 PRESENCE OF EXTERNALLY REMOVABLE PERCUTANEOUS ENDOSCOPIC GASTROSTOMY (PEG) TUBE (HCC): ICD-10-CM

## 2025-03-13 PROCEDURE — 99396 PREV VISIT EST AGE 40-64: CPT | Performed by: PHYSICIAN ASSISTANT

## 2025-03-13 PROCEDURE — 69210 REMOVE IMPACTED EAR WAX UNI: CPT | Performed by: PHYSICIAN ASSISTANT

## 2025-03-13 PROCEDURE — 99214 OFFICE O/P EST MOD 30 MIN: CPT | Performed by: PHYSICIAN ASSISTANT

## 2025-03-13 NOTE — PATIENT INSTRUCTIONS
"Patient Education     Routine physical for adults   The Basics   Written by the doctors and editors at [facility]   What is a physical? -- A physical is a routine visit, or \"check-up,\" with your doctor. You might also hear it called a \"wellness visit\" or \"preventive visit.\"  During each visit, the doctor will:   Ask about your physical and mental health   Ask about your habits, behaviors, and lifestyle   Do an exam   Give you vaccines if needed   Talk to you about any medicines you take   Give advice about your health   Answer your questions  Getting regular check-ups is an important part of taking care of your health. It can help your doctor find and treat any problems you have. But it's also important for preventing health problems.  A routine physical is different from a \"sick visit.\" A sick visit is when you see a doctor because of a health concern or problem. Since physicals are scheduled ahead of time, you can think about what you want to ask the doctor.  How often should I get a physical? -- It depends on your age and health. In general, for people age 21 years and older:   If you are younger than 50 years, you might be able to get a physical every 3 years.   If you are 50 years or older, your doctor might recommend a physical every year.  If you have an ongoing health condition, like diabetes or high blood pressure, your doctor will probably want to see you more often.  What happens during a physical? -- In general, each visit will include:   Physical exam - The doctor or nurse will check your height, weight, heart rate, and blood pressure. They will also look at your eyes and ears. They will ask about how you are feeling and whether you have any symptoms that bother you.   Medicines - It's a good idea to bring a list of all the medicines you take to each doctor visit. Your doctor will talk to you about your medicines and answer any questions. Tell them if you are having any side effects that bother you. You " "should also tell them if you are having trouble paying for any of your medicines.   Habits and behaviors - This includes:   Your diet   Your exercise habits   Whether you smoke, drink alcohol, or use drugs   Whether you are sexually active   Whether you feel safe at home  Your doctor will talk to you about things you can do to improve your health and lower your risk of health problems. They will also offer help and support. For example, if you want to quit smoking, they can give you advice and might prescribe medicines. If you want to improve your diet or get more physical activity, they can help you with this, too.   Lab tests, if needed - The tests you get will depend on your age and situation. For example, your doctor might want to check your:   Cholesterol   Blood sugar   Iron level   Vaccines - The recommended vaccines will depend on your age, health, and what vaccines you already had. Vaccines are very important because they can prevent certain serious or deadly infections.   Discussion of screening - \"Screening\" means checking for diseases or other health problems before they cause symptoms. Your doctor can recommend screening based on your age, risk, and preferences. This might include tests to check for:   Cancer, such as breast, prostate, cervical, ovarian, colorectal, prostate, lung, or skin cancer   Sexually transmitted infections, such as chlamydia and gonorrhea   Mental health conditions like depression and anxiety  Your doctor will talk to you about the different types of screening tests. They can help you decide which screenings to have. They can also explain what the results might mean.   Answering questions - The physical is a good time to ask the doctor or nurse questions about your health. If needed, they can refer you to other doctors or specialists, too.  Adults older than 65 years often need other care, too. As you get older, your doctor will talk to you about:   How to prevent falling at " home   Hearing or vision tests   Memory testing   How to take your medicines safely   Making sure that you have the help and support you need at home  All topics are updated as new evidence becomes available and our peer review process is complete.  This topic retrieved from [employer] on: May 02, 2024.  Topic 329779 Version 1.0  Release: 32.4.3 - C32.122  © 2024 [employer] and/or its affiliates. All rights reserved.  Consumer Information Use and Disclaimer   Disclaimer: This generalized information is a limited summary of diagnosis, treatment, and/or medication information. It is not meant to be comprehensive and should be used as a tool to help the user understand and/or assess potential diagnostic and treatment options. It does NOT include all information about conditions, treatments, medications, side effects, or risks that may apply to a specific patient. It is not intended to be medical advice or a substitute for the medical advice, diagnosis, or treatment of a health care provider based on the health care provider's examination and assessment of a patient's specific and unique circumstances. Patients must speak with a health care provider for complete information about their health, medical questions, and treatment options, including any risks or benefits regarding use of medications. This information does not endorse any treatments or medications as safe, effective, or approved for treating a specific patient. [employer] and its affiliates disclaim any warranty or liability relating to this information or the use thereof.The use of this information is governed by the Terms of Use, available at https://www.woltersSailPlayuwer.com/en/know/clinical-effectiveness-terms. 2024© [employer]. and its affiliates and/or licensors. All rights reserved.  Copyright   © 2024 [employer] and/or its affiliates. All rights reserved.

## 2025-03-13 NOTE — ASSESSMENT & PLAN NOTE
Lab Results   Component Value Date    CHOLESTEROL 284 (H) 12/12/2024    CHOLESTEROL 221 (H) 08/05/2024    CHOLESTEROL 253 (H) 05/02/2023     Lab Results   Component Value Date    HDL 38 (L) 12/12/2024    HDL 37 (L) 08/05/2024    HDL 46 05/02/2023     Lab Results   Component Value Date    TRIG 184 (H) 12/12/2024    TRIG 175 (H) 08/05/2024    TRIG 188 (H) 05/02/2023     Lab Results   Component Value Date    NONHDLC 246 12/12/2024    NONHDLC 184 08/05/2024    NONHDLC 207 05/02/2023      Lab Results   Component Value Date    LDLCALC 209 (H) 12/12/2024      Recently started on atorvastatin 20 mg once daily. ASCVD risk 9.7%. Repeat lipid panel recently ordered by cardiology. Reminded him to complete.

## 2025-03-13 NOTE — PROGRESS NOTES
Adult Annual Physical  Name: Luis Forrester      : 1970      MRN: [id number]  Encounter Provider: Joceline Rao PA-C  Encounter Date: 3/13/2025   Encounter department: [facility]    Assessment & Plan  Annual physical exam  Discussed general health recommendations and screening guidelines. Up to date on screening labs. Due for colorectal cancer screening. Referral placed. Up to date on prostate cancer screening. Up to date on immunizations. Recommend routine dental and eye exams. Next physical one year.        SSS (sick sinus syndrome) (HCC)  Other than hypotension, no issues since dual-chamber PPM placed in 2024. Followed by cardiology.        s/p Medtronic dual chamber PPM 2024  See above.       Arterial ectasia (HCC)  CTA head 6/15/24: Partially imaged ectatic right carotid bifurcation and proximal cervical internal carotid artery with retropharyngeal course, similar to CT neck with contrast 10/12/2011.        Idiopathic hypotension  History of hypotension since last year. Blood pressure has improved since starting midodrine 10 mg TID. No longer experiencing episodes of dizziness and lightheadedness. Ensure getting enough fluids throughout the day. Continue midodrine 10 mg TID. Follow up with cardiology.         Mixed hyperlipidemia  Lab Results   Component Value Date    CHOLESTEROL 284 (H) 2024    CHOLESTEROL 221 (H) 2024    CHOLESTEROL 253 (H) 2023     Lab Results   Component Value Date    HDL 38 (L) 2024    HDL 37 (L) 2024    HDL 46 2023     Lab Results   Component Value Date    TRIG 184 (H) 2024    TRIG 175 (H) 2024    TRIG 188 (H) 2023     Lab Results   Component Value Date    NONHDLC 246 2024    NONHDLC 184 2024    NONHDLC 207 2023      Lab Results   Component Value Date    LDLCALC 209 (H) 2024      Recently started on atorvastatin 20 mg once daily. ASCVD risk 9.7%.  Repeat lipid panel recently ordered by cardiology. Reminded him to complete.       Other specified hypothyroidism  Lab Results   Component Value Date    [id number] 3.434 12/12/2024      Continue levothyroxine 88 mcg once daily. Will recheck TSH today.   Orders:    TSH, 3rd generation with Free T4 reflex; Future    Hypocalcemia  Calcium has been low with a low PTH. Possibly due to medications or hypoparathyroidism. Will recheck levels today.   Orders:    PTH, intact; Future    Comprehensive metabolic panel; Future    Magnesium; Future    Phosphorus; Future    Presence of externally removable percutaneous endoscopic gastrostomy (PEG) tube (HCC)  Needs PEG tube removed. Referral to general surgery.  Orders:    Ambulatory Referral to General Surgery; Future    Other dysphagia  PEG placement 7/10/24. Swallowing function has improved, but not at baseline. No longer uses PEG tube. Reminded patient to schedule follow up with GI. Recommend consult with ENT for possible laryngoscope and FEES. He is scheduled 1/30/25. Discussed importance of consult. Could also be related to chronic cough. He only experiences the cough at night time.        Bilateral impacted cerumen  Discussed ear lavage. He was agreeable. Discussed aftercare. Tolerated well.        Watery eyes  Unremarkable exam. Continue cetirizine 10 mg once daily. Recommend eye exam by eye doctor. States he will call to schedule.        Non-seasonal allergic rhinitis due to other allergic trigger  Continue cetirizine 10 mg daily.        Mild intermittent reactive airway disease without complication  Cough improved. Will consider PFTs and/or chest CT if cough returns.        Pilonidal cyst  Recurrent pilonidal cyst. Recommend consult with general surgery.  Orders:    Ambulatory Referral to General Surgery; Future    Lumbar radiculopathy  Pain has been controlled.        Cervical myelopathy (HCC)  S/p C3-7 laminectomy with C2-T1 fusion on 6/16/24 due to central cord  syndrome following a fall. Followed by neurosurgery. Reminded him to complete his cervical spine x-rays.        Vitamin D deficiency  No current supplement. Will recheck level.   Orders:    Vitamin D 25 hydroxy; Future    Screening for colorectal cancer  Orders:    Ambulatory Referral to Gastroenterology; Future    Overweight with body mass index (BMI) of 26 to 26.9 in adult         Preventive Screenings:  - Diabetes Screening: screening up-to-date  - Cholesterol Screening: has hyperlipidemia   - Hepatitis C screening: screening up-to-date   - HIV screening: screening up-to-date   - Colon cancer screening: risks/benefits discussed and orders placed   - Lung cancer screening: screening not indicated   - Prostate cancer screening: screening up-to-date     Counseling/Anticipatory Guidance:  - Alcohol: discussed moderation in alcohol intake and recommendations for healthy alcohol use.   - Drug use: discussed harms of illicit drug use and how it can negatively impact mental/physical health.   - Tobacco use: discussed harms of tobacco use and management options for quitting.   - Dental health: discussed importance of regular tooth brushing, flossing, and dental visits.   - Sexual health: discussed sexually transmitted diseases, partner selection, use of condoms, avoidance of unintended pregnancy, and contraceptive alternatives.   - Diet: discussed recommendations for a healthy/well-balanced diet.   - Exercise: the importance of regular exercise/physical activity was discussed. Recommend exercise 3-5 times per week for at least 30 minutes.   - Injury prevention: discussed safety/seat belts, safety helmets, smoke detectors, carbon monoxide detectors, and smoking near bedding or upholstery.     BMI Counseling: Body mass index is 26.91 kg/m². The BMI is above normal. Nutrition recommendations include decreasing portion sizes, encouraging healthy choices of fruits and vegetables, decreasing fast food intake, consuming  healthier snacks, limiting drinks that contain sugar, moderation in carbohydrate intake, increasing intake of lean protein, reducing intake of saturated and trans fat and reducing intake of cholesterol. Exercise recommendations include moderate physical activity 150 minutes/week, exercising 3-5 times per week and strength training exercises. No pharmacotherapy was ordered. Rationale for BMI follow-up plan is due to patient being overweight or obese.     Depression Screening and Follow-up Plan: Patient was screened for depression during today's encounter. They screened negative with a PHQ-2 score of 0.          History of Present Illness     Adult Annual Physical:  Patient presents for annual physical.     Diet and Physical Activity:  - Diet/Nutrition: no special diet.  - Exercise: no formal exercise.    Depression Screening:  - PHQ-2 Score: 0    General Health:  - Sleep: sleeps well and 7-8 hours of sleep on average.  - Hearing: normal hearing bilateral ears.  - Vision: no vision problems and most recent eye exam < 1 year ago.  - Dental: regular dental visits and brushes teeth twice daily.     Health:  - History of STDs: no.   - Urinary symptoms: none.     Advanced Care Planning:  - Has an advanced directive?: no    - Has a durable medical POA?: no    - ACP document given to patient?: no      Review of Systems   Constitutional:  Negative for appetite change, chills, diaphoresis, fatigue, fever and unexpected weight change.   HENT:  Positive for hearing loss (ears feel clogged). Negative for congestion, dental problem, ear discharge, ear pain, postnasal drip, rhinorrhea, sinus pressure, sinus pain, sneezing, sore throat, tinnitus and trouble swallowing.    Eyes:  Negative for photophobia, pain, discharge, redness, itching and visual disturbance.   Respiratory:  Negative for cough, chest tightness, shortness of breath and wheezing.    Cardiovascular:  Negative for chest pain, palpitations and leg swelling.    Gastrointestinal:  Negative for abdominal pain, blood in stool, constipation, diarrhea, nausea and vomiting.   Endocrine: Negative for cold intolerance, heat intolerance, polydipsia, polyphagia and polyuria.   Genitourinary:  Negative for decreased urine volume, difficulty urinating, dysuria, flank pain, frequency, hematuria and urgency.   Musculoskeletal:  Positive for arthralgias, back pain, gait problem, neck pain and neck stiffness (since the surgery, decreased ROM). Negative for joint swelling and myalgias.   Skin:  Negative for rash.   Neurological:  Positive for weakness (legs). Negative for dizziness, tremors, light-headedness, numbness and headaches.   Hematological:  Negative for adenopathy.   Psychiatric/Behavioral:  Negative for dysphoric mood, self-injury, sleep disturbance and suicidal ideas. The patient is not nervous/anxious.      Past Medical History   Past Medical History:   Diagnosis Date    Arthritis     Chronic cough     Disease of thyroid gland     Hypoparathyroidism (HCC)     continue taking calcium and vitamin D, will check CMP, PTH level and Vitamin D level    Pneumonia of right middle lobe due to Streptococcus pneumoniae ([facility]) 11/30/2018    s/p Medtronic dual chamber PPM 6/21/2024 06/21/2024    SDH (subdural hematoma) ([facility]) 06/15/2024     Past Surgical History:   Procedure Laterality Date    CARDIAC ELECTROPHYSIOLOGY PROCEDURE N/A 6/21/2024    Procedure: Cardiac pacer implant;  Surgeon: Kofi Pettit MD;  Location: [facility];  Service: Cardiology    DECOMPRESSION SPINE CERVICAL POSTERIOR N/A 6/16/2024    Procedure: DECOMPRESSION SPINE CERVICAL POSTERIOR C2-T1 with fusion navigated with Oarm;  Surgeon: Endy Velasquez MD;  Location: [facility] MAIN [facility];  Service: Neurosurgery    FRACTURE SURGERY      Open Treatment of Each Proximal Finger Phalanx    GASTROSTOMY TUBE PLACEMENT N/A 7/10/2024    Procedure: INSERTION PEG TUBE;  Surgeon: Darcy Reinoso MD;  Location: [facility];   Service: General     Family History   Problem Relation Age of Onset    No Known Problems Mother     No Known Problems Father     No Known Problems Sister     No Known Problems Brother     No Known Problems Son     Learning disabilities Daughter     Asthma Daughter     Seizures Daughter     No Known Problems Maternal Grandmother     No Known Problems Maternal Grandfather     No Known Problems Paternal Grandmother     No Known Problems Paternal Grandfather     No Known Problems Maternal Aunt     No Known Problems Maternal Uncle     No Known Problems Paternal Aunt     No Known Problems Paternal Uncle     No Known Problems Cousin       reports that he has never smoked. He has never been exposed to tobacco smoke. He has never used smokeless tobacco. He reports current alcohol use. He reports that he does not use drugs.  Current Outpatient Medications   Medication Instructions    acetaminophen (TYLENOL) 500 mg tablet Take 1-2 tablets every 6 hours as needed for pain    albuterol (Ventolin HFA) 90 mcg/act inhaler 2 puffs, Inhalation, Every 4 hours PRN    atorvastatin (LIPITOR) 20 mg, Oral, Daily    benzonatate (TESSALON PERLES) 100 mg, Oral, 3 times daily    cetirizine (ZYRTEC) 10 mg, Oral, Daily    clobetasol (TEMOVATE) 0.05 % external solution Topical, 2 times daily PRN    Diclofenac Sodium (VOLTAREN) 2 g, Topical, 4 times daily    docusate sodium (COLACE) 100 mg, Oral, 2 times daily    levothyroxine 88 mcg, Oral, Daily (early morning)    Melatonin 10 mg, Per PEG Tube, Daily at bedtime    metoprolol succinate (TOPROL-XL) 25 mg, Oral, Daily at bedtime, Metoprolol succinate 12.5mg daily at bedtime    midodrine (PROAMATINE) 10 mg, Oral, 3 times daily before meals     Allergies   Allergen Reactions    Chlorine Rash      Current Outpatient Medications on File Prior to Visit   Medication Sig Dispense Refill    acetaminophen (TYLENOL) 500 mg tablet Take 1-2 tablets every 6 hours as needed for pain 90 tablet 1    albuterol  "(Ventolin HFA) 90 mcg/act inhaler Inhale 2 puffs every 4 (four) hours as needed for wheezing or shortness of breath 18 g 2    atorvastatin (LIPITOR) 20 mg tablet Take 1 tablet (20 mg total) by mouth daily 90 tablet 3    benzonatate (TESSALON PERLES) 100 mg capsule Take 1 capsule (100 mg total) by mouth 3 (three) times a day 90 capsule 0    clobetasol (TEMOVATE) 0.05 % external solution Apply topically 2 (two) times a day as needed (itching) 25 mL 11    Diclofenac Sodium (VOLTAREN) 1 % Apply 2 g topically 4 (four) times a day 150 g 0    docusate sodium (COLACE) 100 mg capsule Take 1 capsule (100 mg total) by mouth 2 (two) times a day 60 capsule 5    levothyroxine 88 mcg tablet Take 1 tablet (88 mcg total) by mouth daily in the early morning 90 tablet 1    Melatonin 10 MG TABS 1 tablet (10 mg total) by Per PEG Tube route daily at bedtime 90 tablet 1    metoprolol succinate (TOPROL-XL) 25 mg 24 hr tablet Take 1 tablet (25 mg total) by mouth daily at bedtime for 1 dose Metoprolol succinate 12.5mg daily at bedtime 90 tablet 0    midodrine (PROAMATINE) 10 MG tablet Take 1 tablet (10 mg total) by mouth 3 (three) times a day before meals 90 tablet 3    [DISCONTINUED] cetirizine (ZyrTEC) 10 mg tablet Take 1 tablet (10 mg total) by mouth daily 30 tablet 2     No current facility-administered medications on file prior to visit.      Social History     Tobacco Use    Smoking status: Never     Passive exposure: Never    Smokeless tobacco: Never    Tobacco comments:     pt \"tried it when he was a teenager but did not like them\"   Vaping Use    Vaping status: Never Used   Substance and Sexual Activity    Alcohol use: Yes     Comment: occasionally    Drug use: No    Sexual activity: Not Currently       Objective   /79 (BP Location: Left arm, Patient Position: Sitting, Cuff Size: Standard)   Pulse 87   Temp 98 °F (36.7 °C) (Temporal)   Wt 77.9 kg (171 lb 12.8 oz)   SpO2 97%   BMI 26.91 kg/m²     Physical Exam  Vitals and " nursing note reviewed.   Constitutional:       General: He is awake. He is not in acute distress.     Appearance: Normal appearance. He is well-developed, well-groomed and overweight. He is not ill-appearing.   HENT:      Head: Normocephalic and atraumatic.      Right Ear: Hearing and external ear normal. There is impacted cerumen.      Left Ear: Hearing and external ear normal. There is impacted cerumen.      Nose: Nose normal. No congestion or rhinorrhea.      Mouth/Throat:      Lips: Pink.      Mouth: Mucous membranes are moist.      Pharynx: Oropharynx is clear. Uvula midline. No oropharyngeal exudate or posterior oropharyngeal erythema.   Eyes:      General: Lids are normal. No scleral icterus.     Extraocular Movements: Extraocular movements intact.      Right eye: Normal extraocular motion and no nystagmus.      Left eye: Normal extraocular motion and no nystagmus.      Conjunctiva/sclera: Conjunctivae normal.      Right eye: Right conjunctiva is not injected. No chemosis, exudate or hemorrhage.     Left eye: Left conjunctiva is not injected. No chemosis, exudate or hemorrhage.     Pupils: Pupils are equal, round, and reactive to light.   Cardiovascular:      Rate and Rhythm: Normal rate and regular rhythm.      Pulses: Normal pulses.      Heart sounds: Normal heart sounds. No murmur heard.  Pulmonary:      Effort: Pulmonary effort is normal. No respiratory distress.      Breath sounds: Normal breath sounds and air entry. No decreased air movement. No decreased breath sounds, wheezing, rhonchi or rales.   Abdominal:      General: Abdomen is flat. Bowel sounds are normal. There is no distension.      Palpations: Abdomen is soft. There is no mass.      Tenderness: There is no abdominal tenderness. There is no guarding or rebound.      Hernia: No hernia is present.      Comments: PEG tube in place   Musculoskeletal:         General: Normal range of motion.      Cervical back: Full passive range of motion without  pain, normal range of motion and neck supple.      Right lower leg: No edema.      Left lower leg: No edema.   Lymphadenopathy:      Cervical: No cervical adenopathy.   Skin:     General: Skin is warm.      Capillary Refill: Capillary refill takes less than 2 seconds.      Coloration: Skin is not jaundiced.      Findings: No rash.   Neurological:      General: No focal deficit present.      Mental Status: He is alert and oriented to person, place, and time. Mental status is at baseline.      Motor: Motor function is intact.      Gait: Gait abnormal (using walker).   Psychiatric:         Attention and Perception: Attention normal.         Mood and Affect: Mood and affect normal.         Speech: Speech normal.         Behavior: Behavior normal. Behavior is cooperative.     Ear cerumen removal    Date/Time: 3/13/2025 2:20 PM    Performed by: Joceline Rao PA-C  Authorized by: Joceline Rao PA-C  [place] Protocol:  procedure performed by consultantConsent: Verbal consent obtained.  Risks and benefits: risks, benefits and alternatives were discussed  Consent given by: patient  Patient understanding: patient states understanding of the procedure being performed  Patient consent: the patient's understanding of the procedure matches consent given  Patient identity confirmed: verbally with patient    Patient location:  Clinic  Indications / Diagnosis:  Cerumen impaction  Procedure details:     Local anesthetic:  None    Location:  Both ears    Procedure type: irrigation with instrumentation      Instrumentation: curette and forceps      Approach:  Natural orifice  Post-procedure details:     Complication:  None    Hearing quality:  Improved    Patient tolerance of procedure:  Tolerated well, no immediate complications     Administrative Statements   I have spent a total time of 30 minutes in caring for this patient on the day of the visit/encounter including Diagnostic results, Prognosis, Risks and benefits of tx  options, Instructions for management, Patient and family education, Importance of tx compliance, Risk factor reductions, Impressions, Counseling / Coordination of care, Reviewing/placing orders in the medical record (including tests, medications, and/or procedures), and Obtaining or reviewing history  .

## 2025-03-13 NOTE — ASSESSMENT & PLAN NOTE
Lab Results   Component Value Date    [id number] 3.434 12/12/2024      Continue levothyroxine 88 mcg once daily. Will recheck TSH today.   Orders:    TSH, 3rd generation with Free T4 reflex; Future

## 2025-03-13 NOTE — ASSESSMENT & PLAN NOTE
S/p C3-7 laminectomy with C2-T1 fusion on 6/16/24 due to central cord syndrome following a fall. Followed by neurosurgery. Reminded him to complete his cervical spine x-rays.

## 2025-03-13 NOTE — ASSESSMENT & PLAN NOTE
Recurrent pilonidal cyst. Recommend consult with general surgery.  Orders:    Ambulatory Referral to General Surgery; Future

## 2025-03-13 NOTE — ASSESSMENT & PLAN NOTE
Other than hypotension, no issues since dual-chamber PPM placed in June of 2024. Followed by cardiology.

## 2025-03-13 NOTE — ASSESSMENT & PLAN NOTE
History of hypotension since last year. Blood pressure has improved since starting midodrine 10 mg TID. No longer experiencing episodes of dizziness and lightheadedness. Ensure getting enough fluids throughout the day. Continue midodrine 10 mg TID. Follow up with cardiology.

## 2025-03-13 NOTE — ASSESSMENT & PLAN NOTE
Calcium has been low with a low PTH. Possibly due to medications or hypoparathyroidism. Will recheck levels today.   Orders:    PTH, intact; Future    Comprehensive metabolic panel; Future    Magnesium; Future    Phosphorus; Future

## 2025-03-16 DIAGNOSIS — H04.203 WATERY EYES: ICD-10-CM

## 2025-03-17 RX ORDER — CETIRIZINE HYDROCHLORIDE 10 MG/1
10 TABLET ORAL DAILY
Qty: 30 TABLET | Refills: 2 | Status: SHIPPED | OUTPATIENT
Start: 2025-03-17

## 2025-03-19 ENCOUNTER — TELEPHONE (OUTPATIENT)
Age: 55
End: 2025-03-19

## 2025-03-19 ENCOUNTER — PREP FOR PROCEDURE (OUTPATIENT)
Age: 55
End: 2025-03-19

## 2025-03-19 DIAGNOSIS — Z12.11 SCREENING FOR COLON CANCER: Primary | ICD-10-CM

## 2025-03-19 NOTE — LETTER
Luis Forrester  [address] [facility] [address]                            ------------------------------------------------------------------------------------------------------------

## 2025-03-19 NOTE — TELEPHONE ENCOUNTER
03/19/25  Screened by: Yessenia Quintana     Referring Provider KALEY WOLF        Pre- Screening:      Body mass index is 26.91 kg/m².  Has patient been referred for a routine screening Colonoscopy? yes  Is the patient between 45-75 years old? yes        Previous Colonoscopy no   If yes:    Date:     Facility:     Reason:         SCHEDULING STAFF: If the patient is between 45yrs-49yrs, please advise patient to confirm benefits/coverage with their insurance company for a routine screening colonoscopy, some insurance carriers will only cover at 50yrs or older. If the patient is over 75years old, please schedule an office visit.      Does the patient want to see a Gastroenterologist prior to their procedure OR are they having any GI symptoms? no     Has the patient been hospitalized or had abdominal surgery in the past 6 months? no     Does the patient use supplemental oxygen? no     Does the patient take Coumadin, Lovenox, Plavix, Elliquis, Xarelto, or other blood thinning medication? no     Has the patient had a stroke, cardiac event, or stent placed in the past year? no     SCHEDULING STAFF: If patient answers NO to above questions, then schedule procedure. If patient answers YES to above questions, then schedule office appointment.      If patient is between 45yrs - 49yrs, please advise patient that we will have to confirm benefits & coverage with their insurance company for a routine screening colonoscopy.

## 2025-03-19 NOTE — TELEPHONE ENCOUNTER
Scheduled date of colonoscopy (as of today):5/13/2025  Physician performing colonoscopy:  Location of colonoscopy: [facility]  Bowel prep reviewed with patient:Elli Desir  Instructions reviewed with patient by: Elli Desir  Clearances: N/A      [place] Dul prep sent via [email or web address]

## 2025-03-24 ENCOUNTER — HOSPITAL ENCOUNTER (OUTPATIENT)
Dept: RADIOLOGY | Facility: HOSPITAL | Age: 55
Discharge: HOME | End: 2025-03-24
Payer: COMMERCIAL

## 2025-03-24 DIAGNOSIS — I95.0 IDIOPATHIC HYPOTENSION: ICD-10-CM

## 2025-03-24 DIAGNOSIS — Z09 FOLLOW-UP EXAMINATION, FOLLOWING OTHER SURGERY: ICD-10-CM

## 2025-03-24 DIAGNOSIS — M54.16 LUMBAR RADICULOPATHY: ICD-10-CM

## 2025-03-24 DIAGNOSIS — Z98.1 S/P CERVICAL SPINAL FUSION: ICD-10-CM

## 2025-03-24 PROCEDURE — 72040 X-RAY EXAM NECK SPINE 2-3 VW: CPT

## 2025-03-25 ENCOUNTER — TELEPHONE (OUTPATIENT)
Age: 55
End: 2025-03-25

## 2025-03-25 DIAGNOSIS — J45.20 MILD INTERMITTENT REACTIVE AIRWAY DISEASE WITHOUT COMPLICATION: ICD-10-CM

## 2025-03-25 NOTE — TELEPHONE ENCOUNTER
Pt called to reschedule his 4/2/2025 consultation with General Surgery, call was connected with the proper department for further assistance.

## 2025-03-25 NOTE — ADDENDUM NOTE
Addended by: KALEY AMADOR on: 2/23/2024 04:39 PM     Modules accepted: Level of Service    
Statement Selected

## 2025-03-26 RX ORDER — ALBUTEROL SULFATE 90 UG/1
2 INHALANT RESPIRATORY (INHALATION) EVERY 4 HOURS PRN
Qty: 18 G | Refills: 3 | Status: SHIPPED | OUTPATIENT
Start: 2025-03-26

## 2025-03-26 RX ORDER — MIDODRINE HYDROCHLORIDE 10 MG/1
10 TABLET ORAL
Qty: 90 TABLET | Refills: 0 | OUTPATIENT
Start: 2025-03-26

## 2025-03-26 RX ORDER — ACETAMINOPHEN 500 MG
TABLET ORAL
Qty: 90 TABLET | Refills: 0 | OUTPATIENT
Start: 2025-03-26

## 2025-03-31 ENCOUNTER — TELEPHONE (OUTPATIENT)
Dept: NEUROSURGERY | Facility: CLINIC | Age: 55
End: 2025-03-31

## 2025-03-31 NOTE — TELEPHONE ENCOUNTER
Spoke with patient offering 4/1 at 245 with Dr. Lopez. Patient declined stating he needs a few days notice. Told patient we will keep him on the wait list for another cancellation.

## 2025-03-31 NOTE — TELEPHONE ENCOUNTER
Pt returned call to move up appt with Dr. Lopez on 4/3/25.  Informed pt that at this time, Dr. Lopez's schedule has been filled, but will send to clerical team to verify that there was not a specific over ride appt that was being considered for the pt as there is no appt time noted.  Pt aware as of now, appt remains on 5/29/25 and he is also still on [facility] as well.

## 2025-04-08 ENCOUNTER — REMOTE DEVICE CLINIC VISIT (OUTPATIENT)
Dept: CARDIOLOGY CLINIC | Facility: CLINIC | Age: 55
End: 2025-04-08
Payer: COMMERCIAL

## 2025-04-08 DIAGNOSIS — Z95.0 PACEMAKER: Primary | ICD-10-CM

## 2025-04-08 DIAGNOSIS — M54.16 LUMBAR RADICULOPATHY: ICD-10-CM

## 2025-04-08 DIAGNOSIS — R05.8 OTHER COUGH: ICD-10-CM

## 2025-04-08 DIAGNOSIS — S06.5XAA SDH (SUBDURAL HEMATOMA) (HCC): ICD-10-CM

## 2025-04-08 PROCEDURE — 93294 REM INTERROG EVL PM/LDLS PM: CPT | Performed by: INTERNAL MEDICINE

## 2025-04-08 PROCEDURE — 93296 REM INTERROG EVL PM/IDS: CPT | Performed by: INTERNAL MEDICINE

## 2025-04-08 NOTE — PROGRESS NOTES
Results for orders placed or performed in visit on 04/08/25   Cardiac EP device report    Narrative    MDT D-PM/ACTIVE SYSTEM IS MRI CONDITIONAL  CARELINK TRANSMISSION: BATTERY VOLTAGE ADEQUATE (14.2 YR). AP 36.3%  <0.1% (AAI-DDD 60 PPM). ALL AVAILABLE LEAD PARAMETERS WITHIN NORMAL LIMITS. 2 DEVICE CLASSIFIED VT-NS EPISODES WITH EGM'S SUGGESTIVE FOR SVT @ 167-173 BPM: EGM MORPHOLOGY SIMILAR TO BASELINE. 12 SVT-ST WITH STACH ON EGM'S - MAX EPISODE 4M, 41S @ -150 BPM. EF 60% (6/17/24 ECHO). HX: SAME & PATIENT ON METOPROLOL SUCC. NORMAL DEVICE FUNCTION.  ES

## 2025-04-09 ENCOUNTER — TELEPHONE (OUTPATIENT)
Dept: NEUROSURGERY | Facility: CLINIC | Age: 55
End: 2025-04-09

## 2025-04-09 RX ORDER — PHENOL 1.4 %
10 AEROSOL, SPRAY (ML) MUCOUS MEMBRANE
Qty: 90 TABLET | Refills: 0 | OUTPATIENT
Start: 2025-04-09

## 2025-04-09 RX ORDER — ACETAMINOPHEN 500 MG
TABLET ORAL
Qty: 90 TABLET | Refills: 0 | OUTPATIENT
Start: 2025-04-09

## 2025-04-09 RX ORDER — BENZONATATE 100 MG/1
100 CAPSULE ORAL 3 TIMES DAILY
Qty: 90 CAPSULE | Refills: 0 | Status: SHIPPED | OUTPATIENT
Start: 2025-04-09

## 2025-04-13 ENCOUNTER — RESULTS FOLLOW-UP (OUTPATIENT)
Dept: NON INVASIVE DIAGNOSTICS | Facility: HOSPITAL | Age: 55
End: 2025-04-13

## 2025-04-13 NOTE — RESULT ENCOUNTER NOTE
Normal Device Function
[FreeTextEntry1] : The patient has a history of PAF , gastric ulcer PAF ETOH abuse who was admitted to [facility] with Atrial fibrillation He was noted to be anemic to 8.8 grm of hemoglobin. He had an echo done which showed an EF of 40-45% . Etiology of this was unclear. Possible from arrhythmia vs ETOH vs IHD ect . The patient had spontaneously converted to NSR . He was discharged on cardizem . \par The patient has been noncompliant with testing and follow up  His edema has resolved. \par He has not gone for repeat blood work a directed. The patient had a rpeat echo which now sows normal LV systolic function .

## 2025-04-14 ENCOUNTER — RESULTS FOLLOW-UP (OUTPATIENT)
Dept: INTERNAL MEDICINE CLINIC | Facility: CLINIC | Age: 55
End: 2025-04-14

## 2025-04-14 ENCOUNTER — APPOINTMENT (OUTPATIENT)
Dept: LAB | Facility: CLINIC | Age: 55
End: 2025-04-14
Attending: NURSE PRACTITIONER
Payer: COMMERCIAL

## 2025-04-14 DIAGNOSIS — E03.8 OTHER SPECIFIED HYPOTHYROIDISM: ICD-10-CM

## 2025-04-14 DIAGNOSIS — E83.51 HYPOCALCEMIA: ICD-10-CM

## 2025-04-14 DIAGNOSIS — E83.51 HYPOCALCEMIA: Primary | ICD-10-CM

## 2025-04-14 DIAGNOSIS — E78.2 MIXED HYPERLIPIDEMIA: ICD-10-CM

## 2025-04-14 DIAGNOSIS — E55.9 VITAMIN D DEFICIENCY: ICD-10-CM

## 2025-04-14 LAB
25(OH)D3 SERPL-MCNC: 45.7 NG/ML (ref 30–100)
ALBUMIN SERPL BCG-MCNC: 4.2 G/DL (ref 3.5–5)
ALP SERPL-CCNC: 101 U/L (ref 34–104)
ALT SERPL W P-5'-P-CCNC: 29 U/L (ref 7–52)
ANION GAP SERPL CALCULATED.3IONS-SCNC: 12 MMOL/L (ref 4–13)
AST SERPL W P-5'-P-CCNC: 29 U/L (ref 13–39)
BILIRUB SERPL-MCNC: 0.64 MG/DL (ref 0.2–1)
BUN SERPL-MCNC: 12 MG/DL (ref 5–25)
CALCIUM SERPL-MCNC: 7.6 MG/DL (ref 8.4–10.2)
CHLORIDE SERPL-SCNC: 97 MMOL/L (ref 96–108)
CHOLEST SERPL-MCNC: 174 MG/DL (ref ?–200)
CO2 SERPL-SCNC: 29 MMOL/L (ref 21–32)
CREAT SERPL-MCNC: 0.73 MG/DL (ref 0.6–1.3)
GFR SERPL CREATININE-BSD FRML MDRD: 103 ML/MIN/1.73SQ M
GLUCOSE P FAST SERPL-MCNC: 88 MG/DL (ref 65–99)
HDLC SERPL-MCNC: 39 MG/DL
LDLC SERPL CALC-MCNC: 111 MG/DL (ref 0–100)
MAGNESIUM SERPL-MCNC: 1.8 MG/DL (ref 1.9–2.7)
NONHDLC SERPL-MCNC: 135 MG/DL
PHOSPHATE SERPL-MCNC: 4.4 MG/DL (ref 2.7–4.5)
POTASSIUM SERPL-SCNC: 3.7 MMOL/L (ref 3.5–5.3)
PROT SERPL-MCNC: 7.6 G/DL (ref 6.4–8.4)
PTH-INTACT SERPL-MCNC: 10.8 PG/ML (ref 12–88)
SODIUM SERPL-SCNC: 138 MMOL/L (ref 135–147)
T4 FREE SERPL-MCNC: 0.97 NG/DL (ref 0.61–1.12)
TRIGL SERPL-MCNC: 121 MG/DL (ref ?–150)
TSH SERPL DL<=0.05 MIU/L-ACNC: 7.07 UIU/ML (ref 0.45–4.5)

## 2025-04-14 PROCEDURE — 84439 ASSAY OF FREE THYROXINE: CPT

## 2025-04-14 PROCEDURE — 84100 ASSAY OF PHOSPHORUS: CPT

## 2025-04-14 PROCEDURE — 82306 VITAMIN D 25 HYDROXY: CPT

## 2025-04-14 PROCEDURE — 80061 LIPID PANEL: CPT

## 2025-04-14 PROCEDURE — 36415 COLL VENOUS BLD VENIPUNCTURE: CPT

## 2025-04-14 PROCEDURE — 83970 ASSAY OF PARATHORMONE: CPT

## 2025-04-14 PROCEDURE — 84443 ASSAY THYROID STIM HORMONE: CPT

## 2025-04-14 PROCEDURE — 83735 ASSAY OF MAGNESIUM: CPT

## 2025-04-14 PROCEDURE — 80053 COMPREHEN METABOLIC PANEL: CPT

## 2025-04-15 ENCOUNTER — TELEPHONE (OUTPATIENT)
Dept: INTERNAL MEDICINE CLINIC | Facility: CLINIC | Age: 55
End: 2025-04-15

## 2025-04-15 DIAGNOSIS — R13.19 OTHER DYSPHAGIA: Primary | ICD-10-CM

## 2025-04-15 RX ORDER — FAMOTIDINE 40 MG/1
40 TABLET, FILM COATED ORAL
Qty: 90 TABLET | Refills: 3 | Status: SHIPPED | OUTPATIENT
Start: 2025-04-15 | End: 2026-04-10

## 2025-04-15 NOTE — TELEPHONE ENCOUNTER
1st attempt at calling patient. Patient did not answer. I left a voicemail to give our office a call back.

## 2025-04-15 NOTE — TELEPHONE ENCOUNTER
When I spoke with him yesterday, he said it wasn't affecting his stomach.   If he is having upset stomach after taking it, then we can try famotidine. He would take famotidine 40 mg once daily at bedtime. In the morning take his levothyroxine on an empty stomach. Try this for 2 weeks and see how he feels. Thank you

## 2025-04-15 NOTE — TELEPHONE ENCOUNTER
Reached out to patient to schedule endo appointment and he asked me to tell the doctor his levothyroxine upsets his stomach so he takes it in the afternoon.     He is aware he was directed to take it before breakfast, but he said he feels better when he takes it later in the day.

## 2025-04-17 ENCOUNTER — RESULTS FOLLOW-UP (OUTPATIENT)
Dept: CARDIOLOGY CLINIC | Facility: CLINIC | Age: 55
End: 2025-04-17

## 2025-04-17 DIAGNOSIS — E78.2 MIXED HYPERLIPIDEMIA: Primary | ICD-10-CM

## 2025-04-17 RX ORDER — ATORVASTATIN CALCIUM 40 MG/1
40 TABLET, FILM COATED ORAL DAILY
Qty: 30 TABLET | Refills: 4 | Status: SHIPPED | OUTPATIENT
Start: 2025-04-17

## 2025-04-17 NOTE — TELEPHONE ENCOUNTER
Called and spoke to patient. Patient made aware as per note and understood. Patient had further questions at this time.    Onset following traumatic SAH in 2021  Maintenance outpatient on Vimpat 200mg BID and Zonegran 200mg qHS.  S.p. Keppra load in ED and continued on Vimpat with Keppra added this admission  Neuro has advised transitioning back to home regimen

## 2025-04-17 NOTE — TELEPHONE ENCOUNTER
Pt returning call about lab results.  Relayed message from Anisha CANTRELL.  He verbalized understanding.

## 2025-04-21 DIAGNOSIS — M48.02 CERVICAL STENOSIS OF SPINAL CANAL: ICD-10-CM

## 2025-04-21 DIAGNOSIS — I95.0 IDIOPATHIC HYPOTENSION: ICD-10-CM

## 2025-04-21 DIAGNOSIS — R00.0 SINUS TACHYCARDIA: ICD-10-CM

## 2025-04-22 RX ORDER — MIDODRINE HYDROCHLORIDE 10 MG/1
10 TABLET ORAL
Qty: 90 TABLET | Refills: 3 | Status: SHIPPED | OUTPATIENT
Start: 2025-04-22

## 2025-04-22 RX ORDER — METOPROLOL SUCCINATE 25 MG/1
25 TABLET, EXTENDED RELEASE ORAL
Qty: 90 TABLET | Refills: 1 | Status: SHIPPED | OUTPATIENT
Start: 2025-04-22 | End: 2025-04-23

## 2025-04-23 ENCOUNTER — CONSULT (OUTPATIENT)
Dept: SURGERY | Facility: CLINIC | Age: 55
End: 2025-04-23
Attending: PHYSICIAN ASSISTANT
Payer: COMMERCIAL

## 2025-04-23 DIAGNOSIS — Z93.1 PRESENCE OF EXTERNALLY REMOVABLE PERCUTANEOUS ENDOSCOPIC GASTROSTOMY (PEG) TUBE (HCC): ICD-10-CM

## 2025-04-23 DIAGNOSIS — L05.91 PILONIDAL CYST: ICD-10-CM

## 2025-04-23 PROCEDURE — 99212 OFFICE O/P EST SF 10 MIN: CPT | Performed by: SURGERY

## 2025-04-23 NOTE — PROGRESS NOTES
Name: Luis Forrester      : 1970      MRN: [id number]  Encounter Provider: Tyler Mercado MD  Encounter Date: 2025   Enc[facility]  :  Assessment & Plan  Presence of externally removable percutaneous endoscopic gastrostomy (PEG) tube (HCC)  I talked him about how the tube would be removed here in the office.  He Was told this to needed to come out within 6 months.  Sounds like he still uses it somewhat.  Told him at his convenience we can have him come back in the office and remove it.  Orders:  •  Ambulatory Referral to General Surgery        History of Present Illness   HPI  Luis Forrester is a 54 y.o. male who presents with a G-tube in place.  He still tends to use it a little bit.  No other complaints with      Review of Systems       Objective   There were no vitals taken for this visit.     Physical Exam  Abdomen: G-tube upper left abdomen

## 2025-04-25 RX ORDER — METOPROLOL SUCCINATE 25 MG/1
25 TABLET, EXTENDED RELEASE ORAL
Qty: 90 TABLET | Refills: 3 | OUTPATIENT
Start: 2025-04-25

## 2025-04-30 ENCOUNTER — TELEPHONE (OUTPATIENT)
Dept: SURGERY | Facility: CLINIC | Age: 55
End: 2025-04-30

## 2025-05-07 ENCOUNTER — TELEPHONE (OUTPATIENT)
Age: 55
End: 2025-05-07

## 2025-05-07 NOTE — TELEPHONE ENCOUNTER
PT CALLED TO RESCHED TODAY'S 5/7/2025 APPT WITH GENERAL SURGERY.    ADVISED PT THAT HE CALLED [facility] AND OFFERED TO TRANSFER PT TO GENERAL SURGERY.    PT WAS APPRECIATIVE.

## 2025-05-11 DIAGNOSIS — E78.2 MIXED HYPERLIPIDEMIA: ICD-10-CM

## 2025-05-11 RX ORDER — ATORVASTATIN CALCIUM 40 MG/1
40 TABLET, FILM COATED ORAL DAILY
Qty: 90 TABLET | Refills: 1 | Status: SHIPPED | OUTPATIENT
Start: 2025-05-11

## 2025-05-15 ENCOUNTER — CONSULT (OUTPATIENT)
Dept: MULTI SPECIALTY CLINIC | Facility: CLINIC | Age: 55
End: 2025-05-15
Attending: PHYSICIAN ASSISTANT

## 2025-05-15 VITALS
SYSTOLIC BLOOD PRESSURE: 106 MMHG | BODY MASS INDEX: 26.78 KG/M2 | DIASTOLIC BLOOD PRESSURE: 79 MMHG | HEART RATE: 99 BPM | TEMPERATURE: 98 F | OXYGEN SATURATION: 98 % | WEIGHT: 171 LBS

## 2025-05-15 DIAGNOSIS — H61.23 BILATERAL IMPACTED CERUMEN: ICD-10-CM

## 2025-05-15 DIAGNOSIS — I95.0 IDIOPATHIC HYPOTENSION: ICD-10-CM

## 2025-05-15 DIAGNOSIS — R13.10 DYSPHAGIA, UNSPECIFIED TYPE: Primary | ICD-10-CM

## 2025-05-15 RX ORDER — MIDODRINE HYDROCHLORIDE 10 MG/1
10 TABLET ORAL
Qty: 270 TABLET | Refills: 2 | Status: SHIPPED | OUTPATIENT
Start: 2025-05-15 | End: 2025-05-19 | Stop reason: SDUPTHER

## 2025-05-17 DIAGNOSIS — R00.0 SINUS TACHYCARDIA: ICD-10-CM

## 2025-05-17 DIAGNOSIS — E78.2 MIXED HYPERLIPIDEMIA: ICD-10-CM

## 2025-05-18 DIAGNOSIS — R00.0 SINUS TACHYCARDIA: ICD-10-CM

## 2025-05-18 RX ORDER — ATORVASTATIN CALCIUM 40 MG/1
40 TABLET, FILM COATED ORAL DAILY
Qty: 90 TABLET | Refills: 0 | OUTPATIENT
Start: 2025-05-18

## 2025-05-18 RX ORDER — METOPROLOL SUCCINATE 25 MG/1
25 TABLET, EXTENDED RELEASE ORAL
Qty: 90 TABLET | Refills: 1 | Status: SHIPPED | OUTPATIENT
Start: 2025-05-18 | End: 2025-05-19

## 2025-05-19 DIAGNOSIS — M54.16 LUMBAR RADICULOPATHY: ICD-10-CM

## 2025-05-19 DIAGNOSIS — R13.19 OTHER DYSPHAGIA: ICD-10-CM

## 2025-05-19 DIAGNOSIS — H04.203 WATERY EYES: ICD-10-CM

## 2025-05-19 DIAGNOSIS — E03.8 OTHER SPECIFIED HYPOTHYROIDISM: ICD-10-CM

## 2025-05-19 DIAGNOSIS — J45.20 MILD INTERMITTENT REACTIVE AIRWAY DISEASE WITHOUT COMPLICATION: ICD-10-CM

## 2025-05-19 DIAGNOSIS — M48.02 CERVICAL STENOSIS OF SPINAL CANAL: ICD-10-CM

## 2025-05-19 DIAGNOSIS — R05.8 OTHER COUGH: ICD-10-CM

## 2025-05-19 DIAGNOSIS — I95.0 IDIOPATHIC HYPOTENSION: ICD-10-CM

## 2025-05-19 DIAGNOSIS — L29.9 ITCHY SCALP: ICD-10-CM

## 2025-05-19 DIAGNOSIS — S06.5XAA SDH (SUBDURAL HEMATOMA) (HCC): ICD-10-CM

## 2025-05-19 NOTE — TELEPHONE ENCOUNTER
Patient is requesting a refill on all his medications.      Please review and sent if appropriate

## 2025-05-20 RX ORDER — BENZONATATE 100 MG/1
100 CAPSULE ORAL 3 TIMES DAILY
Qty: 90 CAPSULE | Refills: 0 | Status: SHIPPED | OUTPATIENT
Start: 2025-05-20

## 2025-05-20 RX ORDER — PHENOL 1.4 %
10 AEROSOL, SPRAY (ML) MUCOUS MEMBRANE
Qty: 90 TABLET | Refills: 1 | Status: SHIPPED | OUTPATIENT
Start: 2025-05-20

## 2025-05-20 RX ORDER — FAMOTIDINE 40 MG/1
40 TABLET, FILM COATED ORAL
Qty: 90 TABLET | Refills: 3 | Status: SHIPPED | OUTPATIENT
Start: 2025-05-20 | End: 2026-05-15

## 2025-05-20 RX ORDER — MIDODRINE HYDROCHLORIDE 10 MG/1
10 TABLET ORAL
Qty: 270 TABLET | Refills: 2 | Status: SHIPPED | OUTPATIENT
Start: 2025-05-20

## 2025-05-20 RX ORDER — ACETAMINOPHEN 500 MG
TABLET ORAL
Qty: 90 TABLET | Refills: 1 | Status: SHIPPED | OUTPATIENT
Start: 2025-05-20

## 2025-05-20 RX ORDER — CLOBETASOL PROPIONATE 0.5 MG/ML
SOLUTION TOPICAL 2 TIMES DAILY PRN
Qty: 25 ML | Refills: 11 | Status: SHIPPED | OUTPATIENT
Start: 2025-05-20

## 2025-05-20 RX ORDER — ALBUTEROL SULFATE 90 UG/1
2 INHALANT RESPIRATORY (INHALATION) EVERY 4 HOURS PRN
Qty: 18 G | Refills: 3 | Status: SHIPPED | OUTPATIENT
Start: 2025-05-20

## 2025-05-20 RX ORDER — LEVOTHYROXINE SODIUM 88 UG/1
88 TABLET ORAL
Qty: 90 TABLET | Refills: 1 | Status: SHIPPED | OUTPATIENT
Start: 2025-05-20 | End: 2025-11-16

## 2025-05-20 RX ORDER — CETIRIZINE HYDROCHLORIDE 10 MG/1
10 TABLET ORAL DAILY
Qty: 30 TABLET | Refills: 2 | Status: SHIPPED | OUTPATIENT
Start: 2025-05-20

## 2025-05-21 DIAGNOSIS — S06.5XAA SDH (SUBDURAL HEMATOMA) (HCC): ICD-10-CM

## 2025-05-21 DIAGNOSIS — R00.0 SINUS TACHYCARDIA: ICD-10-CM

## 2025-05-21 DIAGNOSIS — M54.16 LUMBAR RADICULOPATHY: ICD-10-CM

## 2025-05-22 RX ORDER — METOPROLOL SUCCINATE 25 MG/1
25 TABLET, EXTENDED RELEASE ORAL
Qty: 90 TABLET | Refills: 0 | OUTPATIENT
Start: 2025-05-22 | End: 2025-05-23

## 2025-05-23 RX ORDER — ACETAMINOPHEN 500 MG
TABLET ORAL
Qty: 90 TABLET | Refills: 0 | OUTPATIENT
Start: 2025-05-23

## 2025-05-23 RX ORDER — PHENOL 1.4 %
10 AEROSOL, SPRAY (ML) MUCOUS MEMBRANE
Qty: 90 TABLET | Refills: 0 | OUTPATIENT
Start: 2025-05-23

## 2025-05-29 ENCOUNTER — OFFICE VISIT (OUTPATIENT)
Dept: NEUROSURGERY | Facility: CLINIC | Age: 55
End: 2025-05-29

## 2025-05-29 ENCOUNTER — TELEPHONE (OUTPATIENT)
Dept: NEUROSURGERY | Facility: CLINIC | Age: 55
End: 2025-05-29

## 2025-05-29 VITALS
BODY MASS INDEX: 26.84 KG/M2 | RESPIRATION RATE: 18 BRPM | OXYGEN SATURATION: 98 % | HEIGHT: 67 IN | HEART RATE: 74 BPM | WEIGHT: 171 LBS | DIASTOLIC BLOOD PRESSURE: 72 MMHG | SYSTOLIC BLOOD PRESSURE: 118 MMHG | TEMPERATURE: 98.3 F

## 2025-05-29 DIAGNOSIS — Z98.1 S/P CERVICAL SPINAL FUSION: Primary | ICD-10-CM

## 2025-05-29 DIAGNOSIS — Z09 FOLLOW-UP EXAMINATION, FOLLOWING OTHER SURGERY: ICD-10-CM

## 2025-05-29 NOTE — PROGRESS NOTES
Name: Luis Forrester      : 1970      MRN: [id number]  Encounter Provider: Endy Velasquez MD  Encounter Date: 2025   Encounter department: [facility] NEUROSURGICAL [facility]  :  Assessment & Plan  S/P cervical spinal fusion    Orders:    XR spine cervical 2 or 3 vw injury; Future    Follow-up examination, following other surgery    Orders:    XR spine cervical 2 or 3 vw injury; Future    This is a 54-year-old male status post C2-T1 posterior cervical decompression and fusion presenting for his 1 year postop visit.  X-rays of his cervical spine demonstrate hardware in place without any evidence of hardware failure.  The patient is doing very well.  He is ambulating with a cane.  He does not have any issues or complaints.  I will bring him back in 6 months for follow-up visit with x-rays of his cervical spine.    History of Present Illness     Luis Forrester is a 54 y.o. male who presents for his 1 year postop visit.    HPI   The patient states he is doing very well. His bilateral upper extremity weakness has significantly improved.  He has been ambulating with a walker and uses a cane at times.  He denies any neck pain or upper extremity symptoms.  He does not have any complaints.    Review of Systems   Constitutional: Negative.    HENT: Negative.     Eyes: Negative.    Respiratory: Negative.     Cardiovascular: Negative.    Gastrointestinal: Negative.    Endocrine: Negative.    Genitourinary: Negative.    Musculoskeletal:  Negative for myalgias and neck pain. Gait problem: uses cane for support.  Skin: Negative.    Allergic/Immunologic: Negative.    Neurological:  Negative for weakness and numbness.   Hematological: Negative.    Psychiatric/Behavioral: Negative.       I have personally reviewed the MA's review of systems and made changes as necessary.    Past Medical History   Past Medical History[1]  Past Surgical History[2]  Family History[3]  he reports that he has never smoked.  "He has never been exposed to tobacco smoke. He has never used smokeless tobacco. He reports current alcohol use. He reports that he does not use drugs.  Current Outpatient Medications   Medication Instructions    acetaminophen (TYLENOL) 500 mg tablet Take 1-2 tablets every 6 hours as needed for pain    albuterol (Ventolin HFA) 90 mcg/act inhaler 2 puffs, Inhalation, Every 4 hours PRN    atorvastatin (LIPITOR) 40 mg, Oral, Daily    benzonatate (TESSALON PERLES) 100 mg, Oral, 3 times daily    cetirizine (ZYRTEC) 10 mg, Oral, Daily    clobetasol (TEMOVATE) 0.05 % external solution Topical, 2 times daily PRN    Diclofenac Sodium (VOLTAREN) 2 g, Topical, 4 times daily    docusate sodium (COLACE) 100 mg, Oral, 2 times daily    famotidine (PEPCID) 40 mg, Oral, Daily at bedtime    levothyroxine 88 mcg, Oral, Daily (early morning)    Melatonin 10 mg, Per PEG Tube, Daily at bedtime    metoprolol succinate (TOPROL-XL) 25 mg, Oral, Daily at bedtime, Metoprolol succinate 12.5mg daily at bedtime    midodrine (PROAMATINE) 10 mg, Oral, 3 times daily before meals   Allergies[4]   Objective   Resp 18   Ht 5' 7\" (1.702 m)   Wt 77.6 kg (171 lb)   BMI 26.78 kg/m²     Physical Exam  Neurological Exam  General:  Normal, well developed, not in distress/pain     Skin:   No issues, no rashes noted     Musculoskeletal:   5/5 strength throughout all muscle groups  No tenderness to palpation of the spine       Neurologic Exam:  Awake and alert  Oriented x3  Speech clear and fluent  PRINCE   Sensation to light touch and pin prick intact throughout  No santoyo's  No clonus  2+ patellar reflexes     Gait:   Myelopathic gait with a cane        Administrative Statements   I have spent a total time of 10 minutes in caring for this patient on the day of the visit/encounter including Diagnostic results, Prognosis, Instructions for management, Patient and family education, Impressions, Counseling / Coordination of care, Documenting in the medical " record, Reviewing/placing orders in the medical record (including tests, medications, and/or procedures), and Obtaining or reviewing history  .           [1]   Past Medical History:  Diagnosis Date    Arthritis     Chronic cough     Disease of thyroid gland     Hypoparathyroidism (HCC)     continue taking calcium and vitamin D, will check CMP, PTH level and Vitamin D level    Pneumonia of right middle lobe due to Streptococcus pneumoniae ([facility]) 11/30/2018    s/p Medtronic dual chamber PPM 6/21/2024 06/21/2024    SDH (subdural hematoma) ([facility]) 06/15/2024   [2]   Past Surgical History:  Procedure Laterality Date    CARDIAC ELECTROPHYSIOLOGY PROCEDURE N/A 6/21/2024    Procedure: Cardiac pacer implant;  Surgeon: Kofi Pettit MD;  Location: [facility] CARDIAC CATH LAB;  Service: Cardiology    DECOMPRESSION SPINE CERVICAL POSTERIOR N/A 6/16/2024    Procedure: DECOMPRESSION SPINE CERVICAL POSTERIOR C2-T1 with fusion navigated with Oarm;  Surgeon: Endy Velasquez MD;  Location: [facility] MAIN OR;  Service: Neurosurgery    FRACTURE SURGERY      Open Treatment of Each Proximal Finger Phalanx    GASTROSTOMY TUBE PLACEMENT N/A 7/10/2024    Procedure: INSERTION PEG TUBE;  Surgeon: Darcy Reinoso MD;  Location: [facility] MAIN OR;  Service: General   [3]   Family History  Problem Relation Name Age of Onset    No Known Problems Mother Marni     No Known Problems Father Amos     No Known Problems Sister Jeff     No Known Problems Brother Noe     No Known Problems Son      Learning disabilities Daughter Trinity     Asthma Daughter Trinity     Seizures Daughter Trinity     No Known Problems Maternal Grandmother      No Known Problems Maternal Grandfather      No Known Problems Paternal Grandmother      No Known Problems Paternal Grandfather      No Known Problems Maternal Aunt      No Known Problems Maternal Uncle      No Known Problems Paternal Aunt      No Known Problems Paternal Uncle      No Known Problems Cousin     [4]    Allergies  Allergen Reactions    Chlorine Rash

## 2025-05-29 NOTE — TELEPHONE ENCOUNTER
"Received a securechat message from front office staff that patient was requesting a callback from a nurse \" seen murtaza today and wants to know what made him pass out can a nurse call him\", attempted to return call to patient, no answer, left voicemail encouraging callback.   "

## 2025-06-06 DIAGNOSIS — S06.5XAA SDH (SUBDURAL HEMATOMA) (HCC): ICD-10-CM

## 2025-06-06 DIAGNOSIS — M54.16 LUMBAR RADICULOPATHY: ICD-10-CM

## 2025-06-06 DIAGNOSIS — R00.0 SINUS TACHYCARDIA: ICD-10-CM

## 2025-06-08 DIAGNOSIS — R00.0 SINUS TACHYCARDIA: ICD-10-CM

## 2025-06-08 RX ORDER — METOPROLOL SUCCINATE 25 MG/1
25 TABLET, EXTENDED RELEASE ORAL
Qty: 90 TABLET | Refills: 1 | Status: SHIPPED | OUTPATIENT
Start: 2025-06-08 | End: 2025-06-09

## 2025-06-09 ENCOUNTER — OFFICE VISIT (OUTPATIENT)
Dept: DENTISTRY | Facility: CLINIC | Age: 55
End: 2025-06-09

## 2025-06-09 VITALS — HEART RATE: 88 BPM | SYSTOLIC BLOOD PRESSURE: 112 MMHG | DIASTOLIC BLOOD PRESSURE: 68 MMHG | TEMPERATURE: 97.8 F

## 2025-06-09 DIAGNOSIS — K03.1 ABFRACTION: ICD-10-CM

## 2025-06-09 DIAGNOSIS — Z01.20 ENCOUNTER FOR DENTAL EXAMINATION: Primary | ICD-10-CM

## 2025-06-09 PROCEDURE — D0274 BITEWINGS - 4 RADIOGRAPHIC IMAGES: HCPCS

## 2025-06-09 PROCEDURE — D0220 INTRAORAL - PERIAPICAL FIRST RADIOGRAPHIC IMAGE: HCPCS

## 2025-06-09 PROCEDURE — D0150 COMPREHENSIVE ORAL EVALUATION - NEW OR ESTABLISHED PATIENT: HCPCS

## 2025-06-09 PROCEDURE — D0603 CARIES RISK ASSESSMENT AND DOCUMENTATION, WITH A FINDING OF HIGH RISK: HCPCS

## 2025-06-09 PROCEDURE — D0230 INTRAORAL - PERIAPICAL EACH ADDITIONAL RADIOGRAPHIC IMAGE: HCPCS

## 2025-06-09 RX ORDER — PHENOL 1.4 %
10 AEROSOL, SPRAY (ML) MUCOUS MEMBRANE
Qty: 90 TABLET | Refills: 0 | OUTPATIENT
Start: 2025-06-09

## 2025-06-09 RX ORDER — METOPROLOL SUCCINATE 25 MG/1
25 TABLET, EXTENDED RELEASE ORAL
Qty: 90 TABLET | Refills: 1 | Status: SHIPPED | OUTPATIENT
Start: 2025-06-09

## 2025-06-09 RX ORDER — ACETAMINOPHEN 500 MG
TABLET ORAL
Qty: 90 TABLET | Refills: 0 | OUTPATIENT
Start: 2025-06-09

## 2025-06-09 NOTE — PATIENT INSTRUCTIONS
NV: preliminary impressions- patient needs to decide whether he wants interim dentures or not after full mouth extractions    Patient needs information for SFS

## 2025-06-09 NOTE — PROGRESS NOTES
.Comprehensive Exam    Luis Forrester 54 y.o. male presents with self to [facility] for comprehensive exam.  PMH reviewed, no changes, ASA II. Significant medical history: arterial ectasia, osteoarthritis of both hands, reactive airway syndrome, idiopathic hypotension. Significant allergies: chlorine. Significant medications: albuterol.  Pain level 0/10    Chief complaint:   I want new teeth    Consent:  Reviewed procedures involved with comprehensive exam including radiographs, oral exam, and periodontal probing.   Patient understands and consent was given by self via verbal consent.    Radiographs: 4 BWs and Select PA(s) - #5, 7-9, 22-27.  Findings: #5 retained root tip- PARL  #8 and 9 severe tooth wear  #25 and 25 tooth wear  #14-DO caries    Oral cancer screening: normal.  Extraoral exam: no remarkable findings.  Intraoral exam: significantly sized caries, gingival inflammation, significant calculus.    Periodontal exam:  Hygiene - Poor.  Plaque - Moderate.  Horizontal bone loss -  UR: Moderate (15-33%).  UL: Moderate (15-33%).  LL: Moderate (15-33%).  LR: Moderate (15-33%).  Vertical bone loss - None.  Subgingival calculus - Localized.  BOP - Localized.  Mobility - None.  Furcation involvements - None.  Occlusal trauma - #7-9 with 24-28.  Smoker - No.  Diabetic - No.  Periodontal Stage: II.  Periodontal Grade: B.  Periodontal Plan: Full mouth extractions.    Caries exam:   Caries detected: #2-O, #14-MODBL, #18-mesial, #22-incisal, #23-MIF, #29-DB  Teeth with elevated chance of needing RCT: #29  Likely nonrestorable teeth: #5, 14, 29    Occlusal assessment:  VDO / restorative space - Collapsed.  AP Classification - N/A  Maxillary anterior teeth are occluding edge to edge with mandibular anterior teeth  Mandibular teeth occlude inside of maxillary anterior teeth- occlusal trauma  Supra-eruptions or drifting - #2, 3 supra-erupted.  Crossbites - #7/25,26.  Recommended for orthodontic referral? No.  Recommended for  orthodontic consult at [place]? No.    Other remarkable findings:  #29 deep buccal caries - anticipating into the pulp  Tooth tests normal to cold test     Tx plan:  Tx plan unable to be determined - reviewed treatment options with patient.  Told patient that the best treatment would be to extract all teeth and plan for maxillary and mandibular complete dentures  Told patient that an interim denture is an options after extractions but insurance will not pay for it  Encouraged patient to inquire about SFS before he leaves   Patient was informed that if we do not deliver an interim set of dentures on the day of extractions that he will have to wait 6 + months prior to healing for fabrication of dentures  Patient is unsure of what he wants to do yet- he just wants new teeth  I told him that I can have him come in for preliminary impressions at the next visit and he can decide what he wants to do - interim denture or conventional dentures after extractions    Referral: None needed.  Rx: None.  Recommended recall schedule: 3 months.    Case difficulty assessment:   Criteria    Cumulative level   Medical History ASA I ASA II ASA III-V 2   Anesthesia No history of anesthesia problems Vasoconstrictor Intolerance History of difficulty achieving anesthesia 1   Patient Disposition Cooperative and compliant Anxious but cooperative Uncooperatie 2   Ability to Open Mouth No limitation Slight limitation in opening Significant limitation in opening 2   Gag Reflex None Gags occasionally with radiographs/treatment Extreme gag reflex which has compromised past dental treatment 1   Emergency Condition Minimal pain or swelling Moderate pain or swelling Severe pain or swelling 1   Caries Risk 1 to 3 Proximal Cavities 4 to 6 Proximal Cavities >6 Proximal Cavities 3   Missing Teeth No missing teeth 1-3 Non-canine Missing teeth >3 Missing teeth or missing any canine 3   Periodontal Stage Healthy Stage 1 or 2  Stage 3 or 4 2   Periodontal  Grade Grade A Grade B Grade C 2   Diagnosis Signs and Symptoms consistent with recognized pulpal and periodontal conditions Extensive differential diagnosis of usual signs and symptoms required Confusing and complex signs and symptoms: difficult diagnosis.  History of chronic oral/facial pain. 2   Radiographic Difficulties Minimal Difficulty Obtaining/Interpreting Radiographs Moderate Difficulty: high floor of mouth, narrow or low palate, kia, etc. Extreme Difficulty: Superimposed anatomic structures, etc. 2   Teeth Position Anterior/Premolar  Slight inclination <10o  Slight Rotation <10o 1st molar   Mod. Inclination 10o-30o  Mod. Rotation 10o-30o 2nd or 3rd molar   Extr. Incliniation >30o  Extr. Rotation <30o 1   Teeth Isolation Routine rubber dam placement  Simple pretreatment modification for rubber dam placement Extensive pretreatment modification required for rubber dam isolation. 1   Teeth Morphology Normal original crown morphology.  Roots have slight to no curvature (<10o)  Closed apex <1mm in diameter  Canals visible and not reduced in size.  No Root resorption. Full coverage Restorations, Porcelain restorations, Bridge abutments.  Moderate deviation from normal tooth/root form (taurodontism, dens-in-dente, etc.).  Roots have moderate curvature (10-30o).  Crown axis differs moderately from root axis.  Apical opening 1-1.5mm in diameter. Canals and chambers visible but reduced in size, pulp stones.  Minimal apical root resorption. Restorations do not reflect original anatomy/alignment.  Significant deviation from normal tooth/root form (fusion, gemination, yenny cusps, etc.).  Extreme root curvature (>30o) or S-shaped curve.  Anterior tooth or Mandibular Premolar with 2 roots.  Maxillary premolar with 3 roots.  Canal divides in middle or apical 3rd.  Tooth length >25mm.  Open apex >1.5mm.  Indistinct canals or not visible.  Extensive apical, internal, or external resorption. 2   Malocclusion Class I  Corrected (dental or skeletal) Class II or III Non-Corrected Class II or III 2   Alveolar Ridge Morphology None to Mild Atrophy Moderate Atrophy Severe Atrophy 2   Occlusal Stability Requirements Stable and equal intensity stops on all teeth in centric relation  Anterior guidance is in harmony with the envelope of function.  All posterior teeth disclude during mandibular protrusive movement.  All posterior teeth disclude on the non-working side during mandibular lateral movement.  All posterior teeth disclude on the working side during mandibular lateral movement. 1 to 2 requirements are compromised 3 to 5 requirements are compromised 3   Noonday to Occlusal Stability Correct interarch relationships  Correct crown angulation (tip).  Correct crown inclination (torque)  No rotations.  Tight contact points. 1 to 2 keys are compromised 3 to 5 Keys are compromiseed 3   Case complexity rating = Cumulative level / (60 x 100) = 62 => High    Patient dismissed ambulatory and alert.    NV: Preliminary impressions- decide on whether patient would like extractions done and interim denture or extractions and wait for conventional dentures .    Attending: Dr. Leahy and Dr. Dawson was present in clinic.

## 2025-06-12 ENCOUNTER — TELEPHONE (OUTPATIENT)
Age: 55
End: 2025-06-12

## 2025-06-12 DIAGNOSIS — M48.02 CERVICAL STENOSIS OF SPINAL CANAL: ICD-10-CM

## 2025-06-12 NOTE — TELEPHONE ENCOUNTER
Patient called in to reschedule appointment for tomorrow 6/13 at 11 am with Dr. Diaz due to transportation.    Reschedule appointment for 10/24/2025 at 11 am with Dr. Diaz in [facility]. Added to wait list.

## 2025-06-12 NOTE — TELEPHONE ENCOUNTER
Confirming Upcoming Procedure: colonoscopy on 06/25/2025  Physician performing: Dr Harris  Location of procedure:  [facility]  Prep: miralax prep     LVM and call back number. Send [employer] message

## 2025-06-13 ENCOUNTER — TELEPHONE (OUTPATIENT)
Dept: INTERNAL MEDICINE CLINIC | Facility: CLINIC | Age: 55
End: 2025-06-13

## 2025-07-07 DIAGNOSIS — E78.2 MIXED HYPERLIPIDEMIA: ICD-10-CM

## 2025-07-07 DIAGNOSIS — L29.9 ITCHY SCALP: ICD-10-CM

## 2025-07-07 DIAGNOSIS — E03.8 OTHER SPECIFIED HYPOTHYROIDISM: ICD-10-CM

## 2025-07-07 DIAGNOSIS — R13.19 OTHER DYSPHAGIA: ICD-10-CM

## 2025-07-07 DIAGNOSIS — R00.0 SINUS TACHYCARDIA: ICD-10-CM

## 2025-07-07 DIAGNOSIS — I95.0 IDIOPATHIC HYPOTENSION: ICD-10-CM

## 2025-07-07 DIAGNOSIS — M48.02 CERVICAL STENOSIS OF SPINAL CANAL: ICD-10-CM

## 2025-07-08 ENCOUNTER — REMOTE DEVICE CLINIC VISIT (OUTPATIENT)
Dept: CARDIOLOGY CLINIC | Facility: CLINIC | Age: 55
End: 2025-07-08
Payer: COMMERCIAL

## 2025-07-08 DIAGNOSIS — Z95.0 PRESENCE OF CARDIAC PACEMAKER: Primary | ICD-10-CM

## 2025-07-08 PROCEDURE — 93294 REM INTERROG EVL PM/LDLS PM: CPT | Performed by: INTERNAL MEDICINE

## 2025-07-08 PROCEDURE — 93296 REM INTERROG EVL PM/IDS: CPT | Performed by: INTERNAL MEDICINE

## 2025-07-08 RX ORDER — CLOBETASOL PROPIONATE 0.5 MG/ML
SOLUTION TOPICAL 2 TIMES DAILY PRN
Qty: 25 ML | Refills: 3 | Status: SHIPPED | OUTPATIENT
Start: 2025-07-08

## 2025-07-08 RX ORDER — LEVOTHYROXINE SODIUM 88 UG/1
88 TABLET ORAL
Qty: 90 TABLET | Refills: 3 | Status: SHIPPED | OUTPATIENT
Start: 2025-07-08 | End: 2026-07-03

## 2025-07-08 RX ORDER — FAMOTIDINE 40 MG/1
40 TABLET, FILM COATED ORAL
Qty: 90 TABLET | Refills: 3 | Status: SHIPPED | OUTPATIENT
Start: 2025-07-08 | End: 2026-07-03

## 2025-07-08 RX ORDER — MIDODRINE HYDROCHLORIDE 10 MG/1
10 TABLET ORAL
Qty: 270 TABLET | Refills: 3 | Status: SHIPPED | OUTPATIENT
Start: 2025-07-08

## 2025-07-08 NOTE — PROGRESS NOTES
MDT D-PM/ACTIVE SYSTEM IS MRI CONDITIONAL   CARELINK TRANSMISSION: BATTERY VOLTAGE ADEQUATE (14.0 YR). AP 18.7%.  <0.1%. ALL LEAD PARAMETERS WITHIN NORMAL LIMITS. 1 DEVICE CLASSIFIED NSVT EPISODE & 53 SVT EPISODES W/ EGM'S SHOWING SVT/ST @ 143-150 BPM, MAX DURATION RECORDED 9 MIN 36 S. PVC BURDEN <0.1%. PT TAKES METOPROLOL SUCCINATE. NORMAL DEVICE FUNCTION. [facility]

## 2025-07-09 RX ORDER — ATORVASTATIN CALCIUM 40 MG/1
40 TABLET, FILM COATED ORAL DAILY
Qty: 90 TABLET | Refills: 1 | Status: SHIPPED | OUTPATIENT
Start: 2025-07-09

## 2025-07-09 RX ORDER — METOPROLOL SUCCINATE 25 MG/1
25 TABLET, EXTENDED RELEASE ORAL
Qty: 90 TABLET | Refills: 1 | Status: SHIPPED | OUTPATIENT
Start: 2025-07-09

## 2025-07-10 ENCOUNTER — APPOINTMENT (OUTPATIENT)
Dept: LAB | Facility: CLINIC | Age: 55
End: 2025-07-10
Payer: COMMERCIAL

## 2025-07-10 ENCOUNTER — OFFICE VISIT (OUTPATIENT)
Dept: INTERNAL MEDICINE CLINIC | Facility: CLINIC | Age: 55
End: 2025-07-10

## 2025-07-10 VITALS
HEIGHT: 67 IN | BODY MASS INDEX: 25.58 KG/M2 | SYSTOLIC BLOOD PRESSURE: 95 MMHG | HEART RATE: 97 BPM | WEIGHT: 163 LBS | TEMPERATURE: 98 F | DIASTOLIC BLOOD PRESSURE: 63 MMHG

## 2025-07-10 DIAGNOSIS — E03.8 OTHER SPECIFIED HYPOTHYROIDISM: ICD-10-CM

## 2025-07-10 DIAGNOSIS — R13.19 OTHER DYSPHAGIA: ICD-10-CM

## 2025-07-10 DIAGNOSIS — H61.22 IMPACTED CERUMEN OF LEFT EAR: ICD-10-CM

## 2025-07-10 DIAGNOSIS — E03.8 OTHER SPECIFIED HYPOTHYROIDISM: Primary | ICD-10-CM

## 2025-07-10 DIAGNOSIS — H61.21 CERUMEN DEBRIS ON TYMPANIC MEMBRANE OF RIGHT EAR: ICD-10-CM

## 2025-07-10 DIAGNOSIS — J30.89 NON-SEASONAL ALLERGIC RHINITIS DUE TO OTHER ALLERGIC TRIGGER: ICD-10-CM

## 2025-07-10 LAB — TSH SERPL DL<=0.05 MIU/L-ACNC: 1.16 UIU/ML (ref 0.45–4.5)

## 2025-07-10 PROCEDURE — 36415 COLL VENOUS BLD VENIPUNCTURE: CPT

## 2025-07-10 PROCEDURE — 69209 REMOVE IMPACTED EAR WAX UNI: CPT | Performed by: FAMILY MEDICINE

## 2025-07-10 PROCEDURE — 84443 ASSAY THYROID STIM HORMONE: CPT

## 2025-07-10 PROCEDURE — 99214 OFFICE O/P EST MOD 30 MIN: CPT | Performed by: FAMILY MEDICINE

## 2025-07-10 NOTE — ASSESSMENT & PLAN NOTE
Avoid triggers as best able , he has zyrtec 10 mg takes it some days makes him feel tired even if he takes it at night

## 2025-07-10 NOTE — ASSESSMENT & PLAN NOTE
Patient has large amount of cerumen R ear canal lavaged and all cerumen removed , TM well visualized , no q tips   Orders:    Ear cerumen removal

## 2025-07-10 NOTE — PROGRESS NOTES
Name: Luis Forrester      : 1970      MRN: [id number]  Encounter Provider: Sonya Valadez MD  Encounter Date: 7/10/2025   [facility]    Assessment & Plan  Other specified hypothyroidism  Patient here for follow up to elevated TSH , he is taking levothyroxine 88 mcg daily may miss 3 doses per month , he feels well has lost some weight , sleeps well appetite good , bowels normal   Orders:    TSH, 3rd generation with Free T4 reflex; Future    Non-seasonal allergic rhinitis due to other allergic trigger  Avoid triggers as best able , he has zyrtec 10 mg takes it some days makes him feel tired even if he takes it at night        Cerumen debris on tympanic membrane of right ear  Patient has large amount of cerumen R ear canal lavaged and all cerumen removed , TM well visualized , no q tips   Orders:    Ear cerumen removal    Impacted cerumen of left ear  Very minimal cerumen L ear canal today no need foe lavage        Other dysphagia  Since C spine surgery 2024 , chew slowly thoroughly , avoid offending foods he has G tube  in place , not using it saw gen surg 25 , he will be going back to them soon to have it removed        BMI Counseling: Body mass index is 25.53 kg/m². The BMI is above normal. Nutrition recommendations include decreasing portion sizes, encouraging healthy choices of fruits and vegetables, decreasing fast food intake, consuming healthier snacks, limiting drinks that contain sugar and reducing intake of saturated and trans fat. Exercise recommendations include exercising 3-5 times per week. Rationale for BMI follow-up plan is due to patient being overweight or obese.         History of Present Illness     HPIpatient here or 3 mrc , hypothyroid last TSH 7.07 taking levothyroxine 88 mcg he may miss a dose 3 x per month , trouble hearing Aislinn hx ear wax   He feels fine on this dose , energy is good , BM's normal   He may be  getting his G tube out , he had swallowing issues after neck surgery   Ear cerumen removal    Date/Time: 7/10/2025 8:00 AM    Performed by: Sonya Valadez MD  Authorized by: Sonya Valadez MD    Universal Protocol:  procedure performed by consultantConsent given by: patient  Timeout called at: 7/10/2025 8:14 AM.    Patient location:  Bedside  Procedure details:     Location:  R ear    Procedure type: irrigation only    Post-procedure details:     Patient tolerance of procedure:  Tolerated well, no immediate complications      Review of Systems   Constitutional:  Negative for chills and fever.   HENT:  Positive for congestion. Negative for ear pain and sore throat.    Eyes:  Negative for pain and visual disturbance.   Respiratory:  Negative for cough and shortness of breath.    Cardiovascular:  Negative for chest pain and palpitations.   Gastrointestinal:  Negative for abdominal pain and vomiting.        Dysphagia   Genitourinary:  Negative for dysuria and hematuria.   Musculoskeletal:  Positive for neck pain. Negative for arthralgias and back pain.   Skin:  Negative for color change and rash.   Allergic/Immunologic: Positive for environmental allergies.   Neurological:  Negative for seizures and syncope.   All other systems reviewed and are negative.    Past Medical History[1]  Past Surgical History[2]  Family History[3]  Social History[4]  Medications[5]  Allergies   Allergen Reactions    Chlorine Rash     Immunization History   Administered Date(s) Administered    COVID-19 PFIZER VACCINE 0.3 ML IM 05/17/2021, 06/15/2021, 02/07/2022    COVID-19 Pfizer mRNA vacc PF lidya-sucrose 12 yr and older (Comirnaty) 10/18/2024    INFLUENZA 01/19/2006    Influenza, recombinant, quadrivalent,injectable, preservative free 10/21/2019, 11/16/2020    Influenza, seasonal, injectable 11/18/2015    Influenza, seasonal, injectable, preservative free 03/15/2018, 10/11/2024    Pneumococcal Conjugate Vaccine 20-valent (Pcv20),  "Polysace 10/11/2024    Pneumococcal Polysaccharide PPV23 10/21/2019    TD (adult) Preservative Free 01/20/2009    Tdap 03/15/2018, 05/29/2024    Zoster Vaccine Recombinant 05/29/2024, 10/11/2024     Objective   BP 95/63 (BP Location: Right arm, Patient Position: Sitting, Cuff Size: Large)   Pulse 97   Temp 98 °F (36.7 °C) (Temporal)   Ht 5' 7\" (1.702 m)   Wt 73.9 kg (163 lb)   BMI 25.53 kg/m²     Physical Exam  Vitals and nursing note reviewed.   Constitutional:       General: He is not in acute distress.     Appearance: He is well-developed.      Comments: Skin with good color turgor , well hydrated ,no distress noted     HENT:      Head: Normocephalic and atraumatic.      Right Ear: Decreased hearing noted. There is impacted cerumen.      Left Ear: Decreased hearing noted.      Mouth/Throat:      Dentition: Abnormal dentition.      Pharynx: Oropharynx is clear.     Eyes:      Conjunctiva/sclera: Conjunctivae normal.     Neck:      Thyroid: No thyromegaly.     Cardiovascular:      Rate and Rhythm: Normal rate and regular rhythm.      Heart sounds: Normal heart sounds. No murmur heard.  Pulmonary:      Effort: Pulmonary effort is normal. No respiratory distress.      Breath sounds: Normal breath sounds.   Abdominal:      Palpations: Abdomen is soft.      Tenderness: There is no abdominal tenderness.     Musculoskeletal:         General: No swelling.      Cervical back: Neck supple. Decreased range of motion.   Lymphadenopathy:      Cervical:      Right cervical: No superficial cervical adenopathy.     Left cervical: No superficial cervical adenopathy.     Skin:     General: Skin is warm and dry.      Capillary Refill: Capillary refill takes less than 2 seconds.     Neurological:      Mental Status: He is alert.      Comments: Non focal exam    Psychiatric:         Attention and Perception: Attention normal.         Mood and Affect: Mood normal.         Speech: Speech normal.         Behavior: Behavior normal.   "       Thought Content: Thought content normal.                [1]   Past Medical History:  Diagnosis Date    Arthritis     Chronic cough     Disease of thyroid gland     Hypoparathyroidism (HCC)     continue taking calcium and vitamin D, will check CMP, PTH level and Vitamin D level    Pneumonia of right middle lobe due to Streptococcus pneumoniae ([facility]) 11/30/2018    s/p Medtronic dual chamber PPM 6/21/2024 06/21/2024    SDH (subdural hematoma) ([facility]) 06/15/2024   [2]   Past Surgical History:  Procedure Laterality Date    CARDIAC ELECTROPHYSIOLOGY PROCEDURE N/A 6/21/2024    Procedure: Cardiac pacer implant;  Surgeon: Kofi Pettit MD;  Location: [facility];  Service: Cardiology    DECOMPRESSION SPINE CERVICAL POSTERIOR N/A 6/16/2024    Procedure: DECOMPRESSION SPINE CERVICAL POSTERIOR C2-T1 with fusion navigated with Oarm;  Surgeon: Endy Velasquez MD;  Location: [facility] MAIN [facility];  Service: Neurosurgery    FRACTURE SURGERY      Open Treatment of Each Proximal Finger Phalanx    GASTROSTOMY TUBE PLACEMENT N/A 7/10/2024    Procedure: INSERTION PEG TUBE;  Surgeon: Darcy Reinoso MD;  Location: [facility] MAIN [facility];  Service: General   [3]   Family History  Problem Relation Name Age of Onset    No Known Problems Mother Marni     No Known Problems Father Amos     No Known Problems Sister Jeff     No Known Problems Brother Noe     No Known Problems Son      Learning disabilities Daughter Trinity     Asthma Daughter Trinity     Seizures Daughter Trinity     No Known Problems Maternal Grandmother      No Known Problems Maternal Grandfather      No Known Problems Paternal Grandmother      No Known Problems Paternal Grandfather      No Known Problems Maternal Aunt      No Known Problems Maternal Uncle      No Known Problems Paternal Aunt      No Known Problems Paternal Uncle      No Known Problems Cousin     [4]   Social History  Tobacco Use    Smoking status: Never     Passive exposure: Never    Smokeless tobacco: Never  "   Tobacco comments:     pt \"tried it when he was a teenager but did not like them\"   Vaping Use    Vaping status: Never Used   Substance and Sexual Activity    Alcohol use: Yes     Comment: occasionally    Drug use: No    Sexual activity: Not Currently   [5]   Current Outpatient Medications on File Prior to Visit   Medication Sig    acetaminophen (TYLENOL) 500 mg tablet Take 1-2 tablets every 6 hours as needed for pain    albuterol (Ventolin HFA) 90 mcg/act inhaler Inhale 2 puffs every 4 (four) hours as needed for wheezing or shortness of breath    atorvastatin (LIPITOR) 40 mg tablet Take 1 tablet (40 mg total) by mouth daily    benzonatate (TESSALON PERLES) 100 mg capsule Take 1 capsule (100 mg total) by mouth 3 (three) times a day    cetirizine (ZyrTEC) 10 mg tablet Take 1 tablet (10 mg total) by mouth daily    clobetasol (TEMOVATE) 0.05 % external solution Apply topically 2 (two) times a day as needed (itching)    Diclofenac Sodium (VOLTAREN) 1 % Apply 2 g topically 4 (four) times a day    docusate sodium (COLACE) 100 mg capsule Take 1 capsule (100 mg total) by mouth 2 (two) times a day    famotidine (PEPCID) 40 MG tablet Take 1 tablet (40 mg total) by mouth daily at bedtime    levothyroxine 88 mcg tablet Take 1 tablet (88 mcg total) by mouth daily in the early morning    Melatonin 10 MG TABS 1 tablet (10 mg total) by Per PEG Tube route daily at bedtime    metoprolol succinate (TOPROL-XL) 25 mg 24 hr tablet Take 1 tablet (25 mg total) by mouth daily at bedtime    midodrine (PROAMATINE) 10 MG tablet Take 1 tablet (10 mg total) by mouth 3 (three) times a day before meals     "

## 2025-07-10 NOTE — ASSESSMENT & PLAN NOTE
Patient here for follow up to elevated TSH , he is taking levothyroxine 88 mcg daily may miss 3 doses per month , he feels well has lost some weight , sleeps well appetite good , bowels normal   Orders:    TSH, 3rd generation with Free T4 reflex; Future

## 2025-07-10 NOTE — ASSESSMENT & PLAN NOTE
Since C spine surgery 6/2024 , chew slowly thoroughly , avoid offending foods he has G tube  in place , not using it saw gen surg 4/23/25 , he will be going back to them soon to have it removed

## 2025-07-15 ENCOUNTER — TELEPHONE (OUTPATIENT)
Dept: INTERNAL MEDICINE CLINIC | Facility: CLINIC | Age: 55
End: 2025-07-15

## 2025-07-17 RX ORDER — METOPROLOL SUCCINATE 25 MG/1
25 TABLET, EXTENDED RELEASE ORAL
Qty: 90 TABLET | Refills: 1 | OUTPATIENT
Start: 2025-07-17

## 2025-07-21 DIAGNOSIS — I95.0 IDIOPATHIC HYPOTENSION: ICD-10-CM

## 2025-07-21 DIAGNOSIS — M54.16 LUMBAR RADICULOPATHY: ICD-10-CM

## 2025-07-21 DIAGNOSIS — E78.2 MIXED HYPERLIPIDEMIA: ICD-10-CM

## 2025-07-21 DIAGNOSIS — R00.0 SINUS TACHYCARDIA: ICD-10-CM

## 2025-07-21 DIAGNOSIS — E03.8 OTHER SPECIFIED HYPOTHYROIDISM: ICD-10-CM

## 2025-07-22 ENCOUNTER — OFFICE VISIT (OUTPATIENT)
Dept: SURGERY | Facility: CLINIC | Age: 55
End: 2025-07-22
Payer: COMMERCIAL

## 2025-07-22 VITALS
HEART RATE: 97 BPM | TEMPERATURE: 98.6 F | OXYGEN SATURATION: 100 % | HEIGHT: 67 IN | WEIGHT: 160 LBS | SYSTOLIC BLOOD PRESSURE: 110 MMHG | BODY MASS INDEX: 25.11 KG/M2 | DIASTOLIC BLOOD PRESSURE: 64 MMHG

## 2025-07-22 DIAGNOSIS — Z93.1 PRESENCE OF EXTERNALLY REMOVABLE PERCUTANEOUS ENDOSCOPIC GASTROSTOMY (PEG) TUBE (HCC): Primary | ICD-10-CM

## 2025-07-22 PROCEDURE — 99213 OFFICE O/P EST LOW 20 MIN: CPT | Performed by: SURGERY

## 2025-07-22 RX ORDER — LEVOTHYROXINE SODIUM 88 UG/1
88 TABLET ORAL
Qty: 90 TABLET | Refills: 0 | Status: SHIPPED | OUTPATIENT
Start: 2025-07-22 | End: 2026-07-17

## 2025-07-22 RX ORDER — MIDODRINE HYDROCHLORIDE 10 MG/1
10 TABLET ORAL
Qty: 270 TABLET | Refills: 0 | Status: SHIPPED | OUTPATIENT
Start: 2025-07-22

## 2025-07-22 RX ORDER — ACETAMINOPHEN 500 MG
TABLET ORAL
Qty: 90 TABLET | Refills: 0 | Status: SHIPPED | OUTPATIENT
Start: 2025-07-22

## 2025-07-22 NOTE — PROGRESS NOTES
"Name: Luis Forrester      : 1970      MRN: [id number]  Encounter Provider: Tyler Mercado MD  Encounter Date: 2025   Encounter department: [facility]  :  Assessment & Plan  Presence of externally removable percutaneous endoscopic gastrostomy (PEG) tube (HCC)  PEG tube removed without difficulty.  Asked not to eat for couple of hours.  Dressing changes only if it is leaking.           History of Present Illness   HPI  Luis Forrester is a 55 y.o. male who presents with PEG tube in place.  No longer needed      Review of Systems       Objective   /64 (Patient Position: Sitting, Cuff Size: Adult)   Pulse 97   Temp 98.6 °F (37 °C) (Temporal)   Ht 5' 7\" (1.702 m)   Wt 72.6 kg (160 lb)   SpO2 100%   BMI 25.06 kg/m²      Physical Exam  Abdomen: PEG tube upper abdomen, remaining abdomen soft    "

## 2025-07-23 RX ORDER — ATORVASTATIN CALCIUM 40 MG/1
40 TABLET, FILM COATED ORAL DAILY
Qty: 90 TABLET | Refills: 1 | Status: SHIPPED | OUTPATIENT
Start: 2025-07-23

## 2025-07-23 RX ORDER — METOPROLOL SUCCINATE 25 MG/1
25 TABLET, EXTENDED RELEASE ORAL
Qty: 90 TABLET | Refills: 1 | Status: SHIPPED | OUTPATIENT
Start: 2025-07-23 | End: 2025-07-24 | Stop reason: SDUPTHER

## 2025-07-24 DIAGNOSIS — E78.2 MIXED HYPERLIPIDEMIA: ICD-10-CM

## 2025-07-24 DIAGNOSIS — E03.8 OTHER SPECIFIED HYPOTHYROIDISM: ICD-10-CM

## 2025-07-24 DIAGNOSIS — R00.0 SINUS TACHYCARDIA: ICD-10-CM

## 2025-07-24 RX ORDER — LEVOTHYROXINE SODIUM 88 UG/1
88 TABLET ORAL
Qty: 90 TABLET | Refills: 0 | OUTPATIENT
Start: 2025-07-24 | End: 2026-07-19

## 2025-07-25 RX ORDER — ATORVASTATIN CALCIUM 40 MG/1
40 TABLET, FILM COATED ORAL DAILY
Qty: 90 TABLET | Refills: 0 | OUTPATIENT
Start: 2025-07-25

## 2025-07-25 RX ORDER — METOPROLOL SUCCINATE 25 MG/1
25 TABLET, EXTENDED RELEASE ORAL
Qty: 90 TABLET | Refills: 1 | Status: SHIPPED | OUTPATIENT
Start: 2025-07-25

## 2025-07-26 DIAGNOSIS — R00.0 SINUS TACHYCARDIA: ICD-10-CM

## 2025-07-28 PROBLEM — I47.10 SVT (SUPRAVENTRICULAR TACHYCARDIA) (HCC): Status: ACTIVE | Noted: 2025-07-28

## 2025-07-28 RX ORDER — METOPROLOL SUCCINATE 25 MG/1
25 TABLET, EXTENDED RELEASE ORAL
Qty: 90 TABLET | Refills: 0 | OUTPATIENT
Start: 2025-07-28

## 2025-08-09 PROBLEM — H61.22 IMPACTED CERUMEN OF LEFT EAR: Status: RESOLVED | Noted: 2025-07-10 | Resolved: 2025-08-09

## 2025-08-15 DIAGNOSIS — I95.0 IDIOPATHIC HYPOTENSION: ICD-10-CM

## 2025-08-15 DIAGNOSIS — L29.9 ITCHY SCALP: ICD-10-CM

## 2025-08-15 DIAGNOSIS — R00.0 SINUS TACHYCARDIA: ICD-10-CM

## 2025-08-15 DIAGNOSIS — M48.02 CERVICAL STENOSIS OF SPINAL CANAL: ICD-10-CM

## 2025-08-18 RX ORDER — MIDODRINE HYDROCHLORIDE 10 MG/1
10 TABLET ORAL
Qty: 270 TABLET | Refills: 0 | Status: SHIPPED | OUTPATIENT
Start: 2025-08-18

## 2025-08-18 RX ORDER — METOPROLOL SUCCINATE 25 MG/1
25 TABLET, EXTENDED RELEASE ORAL
Qty: 90 TABLET | Refills: 0 | OUTPATIENT
Start: 2025-08-18

## 2025-08-18 RX ORDER — CLOBETASOL PROPIONATE 0.5 MG/ML
SOLUTION TOPICAL 2 TIMES DAILY PRN
Qty: 25 ML | Refills: 3 | OUTPATIENT
Start: 2025-08-18

## (undated) DEVICE — DRILL BIT NAVG3606010 NAVIGATED: Brand: NAVIGATED INFINITY™ OCCIPITOCERVICAL UPPER THORACIC SYSTEM

## (undated) DEVICE — AIR AND WATER TUBING/CAP SET FOR OLYMPUS® SCOPES: Brand: ERBE

## (undated) DEVICE — GLOVE SRG BIOGEL 7.5

## (undated) DEVICE — POV-IOD SOLUTION 4OZ BT

## (undated) DEVICE — SYRINGE 10ML LL

## (undated) DEVICE — DGW .035 FC J3MM 150CM TEF: Brand: EMERALD

## (undated) DEVICE — SKIN MARKER DUAL TIP WITH RULER CAP, FLEXIBLE RULER AND LABELS: Brand: DEVON

## (undated) DEVICE — ANTIBACTERIAL VIOLET BRAIDED (POLYGLACTIN 910), SYNTHETIC ABSORBABLE SUTURE: Brand: COATED VICRYL

## (undated) DEVICE — TRAY FOLEY 16FR URIMETER SURESTEP

## (undated) DEVICE — PROXIMATE SKIN STAPLERS (35 WIDE) CONTAINS 35 STAINLESS STEEL STAPLES (FIXED HEAD): Brand: PROXIMATE

## (undated) DEVICE — Device: Brand: DEFENDO AIR/WATER/SUCTION AND BIOPSY VALVE

## (undated) DEVICE — SUT PROLENE 2-0 FSLX 18 IN 8689H

## (undated) DEVICE — LIGHT HANDLE COVER SLEEVE DISP BLUE STELLAR

## (undated) DEVICE — BETHLEHEM UNIVERSAL SPINE, KIT: Brand: CARDINAL HEALTH

## (undated) DEVICE — Device

## (undated) DEVICE — SWABSTCK, BENZOIN TINCTURE, 1/PK, STRL: Brand: APLICARE

## (undated) DEVICE — TUBING SUCTION 5MM X 12 FT

## (undated) DEVICE — SLITTER ADJUSTABLE

## (undated) DEVICE — BOWL ASSY BM210 DUAL BLADE DISPOSABLE: Brand: MIDAS REX™

## (undated) DEVICE — JP 3-SPRING RES W/10FR PVC DRAIN/TR: Brand: CARDINAL HEALTH

## (undated) DEVICE — NEURO PATTIES 1/2 X 3

## (undated) DEVICE — SUT STRATAFIX SPIRAL MONOCRYL PLUS 4-0 PS-2 45CM SXMP1B118

## (undated) DEVICE — TELFA ADHESIVE ISLAND DRESSING: Brand: TELFA

## (undated) DEVICE — SUT STRATAFIX SPIRAL PDS PLUS 1 CTX 18 IN SXPP1A400

## (undated) DEVICE — TOOL MR8-14MH30 MR8 14CM MATCH 3MM: Brand: MIDAS REX MR8

## (undated) DEVICE — BINDER ABDOMINAL 46-62 IN

## (undated) DEVICE — DRAPE SHEET THREE QUARTER

## (undated) DEVICE — INTENDED FOR TISSUE SEPARATION, AND OTHER PROCEDURES THAT REQUIRE A SHARP SURGICAL BLADE TO PUNCTURE OR CUT.: Brand: BARD-PARKER SAFETY BLADES SIZE 10, STERILE

## (undated) DEVICE — GLOVE INDICATOR PI UNDERGLOVE SZ 7.5 BLUE

## (undated) DEVICE — ELECTRODE BLADE MOD E-Z CLEAN 4IN -0014AM

## (undated) DEVICE — FIRST STEP BEDSIDE KIT - STAND-UP POUCH, ENDOSCOPIC CLEANING PAD - 1 POUCH: Brand: FIRST STEP BEDSIDE KIT - STAND-UP POUCH, ENDOSCOPIC CLEANING PAD

## (undated) DEVICE — GLOVE SRG BIOGEL ECLIPSE 6.5

## (undated) DEVICE — GAUZE SPONGES,16 PLY: Brand: CURITY

## (undated) DEVICE — CATH GUIDING FIXED SHAPE 43CM

## (undated) DEVICE — SPECIMEN CONTAINER STERILE PEEL PACK

## (undated) DEVICE — MINOR PROCEDURE DRAPE: Brand: CONVERTORS

## (undated) DEVICE — MARKER REFLECTIVE RADIOPAQUE SPHERE

## (undated) DEVICE — POV-IOD LIQUID POUCH 0.75OZ

## (undated) DEVICE — SUPPLY FEE STD

## (undated) DEVICE — PENCIL ELECTROSURG E-Z CLEAN -0035H

## (undated) DEVICE — MONITORING SPINAL IMPULSE CASE FEE

## (undated) DEVICE — GLOVE INDICATOR PI UNDERGLOVE SZ 7 BLUE

## (undated) DEVICE — DRAIN SPONGES,6 PLY: Brand: EXCILON

## (undated) DEVICE — TOOL MR8-15MH22 MR8 15CM MATCH 2.2MM: Brand: MIDAS REX MR8

## (undated) DEVICE — PREMIUM DRY TRAY LF: Brand: MEDLINE INDUSTRIES, INC.

## (undated) DEVICE — DRAPE SURGIKIT SADDLE BAG

## (undated) DEVICE — INTRO SHEATH PEEL AWAY 7FR

## (undated) DEVICE — CHLORAPREP HI-LITE 26ML ORANGE

## (undated) DEVICE — MAYFIELD® DISPOSABLE ADULT SKULL PIN (PLASTIC BASE): Brand: MAYFIELD®

## (undated) DEVICE — SPONGE SCRUB 4 PCT CHLORHEXIDINE

## (undated) DEVICE — BIPOLAR SEALER 23-113-1 AQM 2.3: Brand: AQUAMANTYS™

## (undated) DEVICE — NEEDLE 25G X 1 1/2

## (undated) DEVICE — SUT ETHILON 3-0 FS-1 18 IN 663G

## (undated) DEVICE — HEMOSTATIC MATRIX SURGIFLO 8ML W/THROMBIN

## (undated) DEVICE — AVITENE MICROFIBRILLAR COLLAGEN HEMOSTAT NON-WOVEN WEB: Brand: AVITENE NON-WOVEN WEB

## (undated) DEVICE — JACKSON TABLE FOAM POSITIONING KIT: Brand: CARDINAL HEALTH